# Patient Record
Sex: MALE | Race: WHITE | NOT HISPANIC OR LATINO | Employment: OTHER
[De-identification: names, ages, dates, MRNs, and addresses within clinical notes are randomized per-mention and may not be internally consistent; named-entity substitution may affect disease eponyms.]

---

## 2017-05-26 ENCOUNTER — RECORDS - HEALTHEAST (OUTPATIENT)
Dept: ADMINISTRATIVE | Facility: OTHER | Age: 62
End: 2017-05-26

## 2017-08-30 ENCOUNTER — RECORDS - HEALTHEAST (OUTPATIENT)
Dept: ADMINISTRATIVE | Facility: OTHER | Age: 62
End: 2017-08-30

## 2017-08-30 ENCOUNTER — TRANSFERRED RECORDS (OUTPATIENT)
Dept: HEALTH INFORMATION MANAGEMENT | Facility: CLINIC | Age: 62
End: 2017-08-30

## 2018-07-31 ENCOUNTER — RECORDS - HEALTHEAST (OUTPATIENT)
Dept: LAB | Facility: CLINIC | Age: 63
End: 2018-07-31

## 2018-07-31 LAB
ALBUMIN SERPL-MCNC: 3.8 G/DL (ref 3.5–5)
ALP SERPL-CCNC: 72 U/L (ref 45–120)
ALT SERPL W P-5'-P-CCNC: 25 U/L (ref 0–45)
ANION GAP SERPL CALCULATED.3IONS-SCNC: 8 MMOL/L (ref 5–18)
AST SERPL W P-5'-P-CCNC: 18 U/L (ref 0–40)
BILIRUB SERPL-MCNC: 0.6 MG/DL (ref 0–1)
BUN SERPL-MCNC: 16 MG/DL (ref 8–22)
CALCIUM SERPL-MCNC: 9.2 MG/DL (ref 8.5–10.5)
CHLORIDE BLD-SCNC: 104 MMOL/L (ref 98–107)
CO2 SERPL-SCNC: 25 MMOL/L (ref 22–31)
CREAT SERPL-MCNC: 1.02 MG/DL (ref 0.7–1.3)
GFR SERPL CREATININE-BSD FRML MDRD: >60 ML/MIN/1.73M2
GLUCOSE BLD-MCNC: 127 MG/DL (ref 70–125)
POTASSIUM BLD-SCNC: 4.9 MMOL/L (ref 3.5–5)
PROT SERPL-MCNC: 6.7 G/DL (ref 6–8)
SODIUM SERPL-SCNC: 137 MMOL/L (ref 136–145)

## 2018-12-04 ENCOUNTER — RECORDS - HEALTHEAST (OUTPATIENT)
Dept: ADMINISTRATIVE | Facility: OTHER | Age: 63
End: 2018-12-04

## 2018-12-04 ENCOUNTER — TRANSFERRED RECORDS (OUTPATIENT)
Dept: HEALTH INFORMATION MANAGEMENT | Facility: CLINIC | Age: 63
End: 2018-12-04

## 2018-12-13 ENCOUNTER — RECORDS - HEALTHEAST (OUTPATIENT)
Dept: ADMINISTRATIVE | Facility: OTHER | Age: 63
End: 2018-12-13

## 2018-12-20 ENCOUNTER — RECORDS - HEALTHEAST (OUTPATIENT)
Dept: ADMINISTRATIVE | Facility: OTHER | Age: 63
End: 2018-12-20

## 2019-01-02 ENCOUNTER — HOSPITAL ENCOUNTER (OUTPATIENT)
Dept: RADIOLOGY | Facility: HOSPITAL | Age: 64
Discharge: HOME | End: 2019-01-02
Attending: FAMILY MEDICINE

## 2019-01-02 DIAGNOSIS — K21.00 REFLUX ESOPHAGITIS: ICD-10-CM

## 2019-02-06 ENCOUNTER — RECORDS - HEALTHEAST (OUTPATIENT)
Dept: ADMINISTRATIVE | Facility: OTHER | Age: 64
End: 2019-02-06

## 2019-02-19 ENCOUNTER — HOSPITAL ENCOUNTER (OUTPATIENT)
Dept: NUCLEAR MEDICINE | Facility: HOSPITAL | Age: 64
Discharge: HOME | End: 2019-02-19
Attending: SURGERY

## 2019-02-19 DIAGNOSIS — K21.9 ACID REFLUX: ICD-10-CM

## 2019-02-25 ENCOUNTER — TRANSFERRED RECORDS (OUTPATIENT)
Dept: HEALTH INFORMATION MANAGEMENT | Facility: CLINIC | Age: 64
End: 2019-02-25

## 2019-02-27 ENCOUNTER — TRANSFERRED RECORDS (OUTPATIENT)
Dept: HEALTH INFORMATION MANAGEMENT | Facility: CLINIC | Age: 64
End: 2019-02-27

## 2019-03-20 ENCOUNTER — RECORDS - HEALTHEAST (OUTPATIENT)
Dept: ADMINISTRATIVE | Facility: OTHER | Age: 64
End: 2019-03-20

## 2019-04-01 ENCOUNTER — TRANSFERRED RECORDS (OUTPATIENT)
Dept: HEALTH INFORMATION MANAGEMENT | Facility: CLINIC | Age: 64
End: 2019-04-01

## 2019-04-01 ENCOUNTER — HOSPITAL ENCOUNTER (OUTPATIENT)
Dept: RADIOLOGY | Facility: CLINIC | Age: 64
Discharge: HOME | End: 2019-04-01
Attending: SURGERY

## 2019-04-01 ENCOUNTER — HOSPITAL ENCOUNTER (OUTPATIENT)
Dept: CT IMAGING | Facility: CLINIC | Age: 64
Discharge: HOME | End: 2019-04-01
Attending: SURGERY

## 2019-04-01 DIAGNOSIS — K44.9 HIATAL HERNIA: ICD-10-CM

## 2019-04-01 LAB
CREAT BLD-MCNC: 1.1 MG/DL (ref 0.7–1.3)
GFR SERPL CREATININE-BSD FRML MDRD: >60 ML/MIN/1.73M2

## 2019-05-09 ENCOUNTER — ANESTHESIA - HEALTHEAST (OUTPATIENT)
Dept: SURGERY | Facility: CLINIC | Age: 64
End: 2019-05-09

## 2019-05-09 ENCOUNTER — SURGERY - HEALTHEAST (OUTPATIENT)
Dept: SURGERY | Facility: CLINIC | Age: 64
End: 2019-05-09

## 2019-05-09 ENCOUNTER — TRANSFERRED RECORDS (OUTPATIENT)
Dept: HEALTH INFORMATION MANAGEMENT | Facility: CLINIC | Age: 64
End: 2019-05-09

## 2019-05-09 ASSESSMENT — MIFFLIN-ST. JEOR: SCORE: 1702.53

## 2019-05-14 ENCOUNTER — RECORDS - HEALTHEAST (OUTPATIENT)
Dept: LAB | Facility: CLINIC | Age: 64
End: 2019-05-14

## 2019-05-14 LAB — VIT B12 SERPL-MCNC: 549 PG/ML (ref 213–816)

## 2019-08-05 ENCOUNTER — RECORDS - HEALTHEAST (OUTPATIENT)
Dept: LAB | Facility: HOSPITAL | Age: 64
End: 2019-08-05

## 2019-08-05 LAB
B BURGDOR IGG+IGM SER QL: 0.05 INDEX VALUE
TSH SERPL DL<=0.005 MIU/L-ACNC: 1.06 UIU/ML (ref 0.3–5)
VIT B12 SERPL-MCNC: 630 PG/ML (ref 213–816)

## 2019-08-06 LAB
PATH ICD:: NORMAL
PROT PATTERN SERPL IFE-IMP: NORMAL
REVIEWING PATHOLOGIST: NORMAL
SS-A/RO AUTOANTIBODIES - HISTORICAL: 1 EU
SS-B/LA AUTOANTIBODIES - HISTORICAL: 2 EU

## 2019-08-08 LAB — ARUP MISCELLANEOUS TEST: NORMAL

## 2019-08-09 LAB
MISCELLANEOUS TEST DEPT. - HE HISTORICAL: NORMAL
PERFORMING LAB: NORMAL
SPECIMEN STATUS: NORMAL
TEST NAME: NORMAL

## 2019-10-31 ENCOUNTER — TRANSFERRED RECORDS (OUTPATIENT)
Dept: HEALTH INFORMATION MANAGEMENT | Facility: CLINIC | Age: 64
End: 2019-10-31

## 2019-12-10 ENCOUNTER — MEDICAL CORRESPONDENCE (OUTPATIENT)
Dept: HEALTH INFORMATION MANAGEMENT | Facility: CLINIC | Age: 64
End: 2019-12-10

## 2019-12-18 NOTE — TELEPHONE ENCOUNTER
RECORDS RECEIVED FROM: [facility]- Dr. Abad Noel   DATE RECEIVED: 3.11.2020   NOTES STATUS DETAILS   OFFICE NOTE from referring provider Received 12/10/19 Referral   10/31/19 Office visit with Dr. Noel   OFFICE NOTE from other specialist Received  5/26/17 Office visit with Violet Dawson PA-C ([facility])    5/29/15, 3/24/15 Office visit with Carolyn Schuster NP ([facility])      DISCHARGE SUMMARY from hospital N/A    OPERATIVE REPORT Received  Esophageal Motility Study: 2/25/19   MEDICATION LIST N/A         ENDOSCOPY  Received  EGD: 12/4/18 ([facility])   EGD: 8/30/17 ([facility])    COLONOSCOPY Received 6/7/16 ([facility])    ERCP N/A    EUS N/A    STOOL TESTING N/A    PERTINENT LABS N/A    PATHOLOGY REPORTS (RELATED) N/A    IMAGING (CT, MRI, EGD) N/A      REFERRAL INFORMATION    Date referral was placed: 3/11/2020   Date all records received: N/A   Date records were scanned into EPIC: N/A   Date records were sent to Provider to review: N/A   Date and recommendation received from provider:  LETTER SENT  SCHEDULE APPOINTMENT   Date patient was contacted to schedule: 12/18/19     Nina SALAZAR 12.18.19 2:51 PM   Action Taken Requested all gi related notes and imaging from [facility]. Requested records from [facility] as well.

## 2020-01-31 ENCOUNTER — TELEPHONE (OUTPATIENT)
Dept: GASTROENTEROLOGY | Facility: CLINIC | Age: 65
End: 2020-01-31

## 2020-01-31 NOTE — TELEPHONE ENCOUNTER
----- Message from Enid Quezada MD sent at 1/30/2020 11:53 AM CST -----   Can you look into this referral too?     How did this not go through the referral process?     Seen at [facility] and [facility] ([facility] in the last few months).    I don't think we have anything further to offer.      Thanks,   Yana

## 2020-02-10 ENCOUNTER — TELEPHONE (OUTPATIENT)
Dept: GASTROENTEROLOGY | Facility: CLINIC | Age: 65
End: 2020-02-10

## 2020-02-10 NOTE — TELEPHONE ENCOUNTER
Patient returned my call ans states that he would  like to keep his appointment in the GI clinic with Dr Quezada

## 2020-03-09 ENCOUNTER — TELEPHONE (OUTPATIENT)
Dept: GASTROENTEROLOGY | Facility: CLINIC | Age: 65
End: 2020-03-09

## 2020-03-09 ASSESSMENT — ENCOUNTER SYMPTOMS
RECTAL PAIN: 0
BOWEL INCONTINENCE: 0
NAUSEA: 1
ABDOMINAL PAIN: 1
HEARTBURN: 1
BLOOD IN STOOL: 0
JAUNDICE: 0
CONSTIPATION: 0
BLOATING: 0
DIARRHEA: 1
VOMITING: 0

## 2020-03-09 NOTE — TELEPHONE ENCOUNTER
Spoke to patient reminding of appointment scheduled on 3/11/20 at 0820 with [facility] clinic. Patient to arrive 15 min early. To reschedule or cancel patient to call [phone].      OMA Singh

## 2020-03-11 ENCOUNTER — HEALTH MAINTENANCE LETTER (OUTPATIENT)
Age: 65
End: 2020-03-11

## 2020-03-11 ENCOUNTER — OFFICE VISIT (OUTPATIENT)
Dept: GASTROENTEROLOGY | Facility: CLINIC | Age: 65
End: 2020-03-11
Payer: COMMERCIAL

## 2020-03-11 ENCOUNTER — PRE VISIT (OUTPATIENT)
Dept: GASTROENTEROLOGY | Facility: CLINIC | Age: 65
End: 2020-03-11

## 2020-03-11 VITALS
HEIGHT: 71 IN | DIASTOLIC BLOOD PRESSURE: 86 MMHG | OXYGEN SATURATION: 96 % | BODY MASS INDEX: 31.51 KG/M2 | SYSTOLIC BLOOD PRESSURE: 124 MMHG | HEART RATE: 69 BPM | WEIGHT: 225.1 LBS

## 2020-03-11 DIAGNOSIS — R19.5 LOOSE STOOLS: ICD-10-CM

## 2020-03-11 DIAGNOSIS — E16.1 POSTPRANDIAL HYPOGLYCEMIA: ICD-10-CM

## 2020-03-11 DIAGNOSIS — K21.9 GASTROESOPHAGEAL REFLUX DISEASE WITHOUT ESOPHAGITIS: Primary | ICD-10-CM

## 2020-03-11 DIAGNOSIS — Z98.890 STATUS POST NISSEN FUNDOPLICATION: ICD-10-CM

## 2020-03-11 RX ORDER — DICYCLOMINE HCL 20 MG
20 TABLET ORAL
COMMUNITY
End: 2020-09-01

## 2020-03-11 RX ORDER — GABAPENTIN 300 MG/1
300 CAPSULE ORAL 2 TIMES DAILY
COMMUNITY
End: 2020-12-17

## 2020-03-11 RX ORDER — ATENOLOL 50 MG/1
50 TABLET ORAL DAILY
COMMUNITY
Start: 1999-01-01

## 2020-03-11 RX ORDER — CYANOCOBALAMIN (VITAMIN B-12) 500 MCG
500 TABLET ORAL DAILY
COMMUNITY

## 2020-03-11 RX ORDER — PANTOPRAZOLE SODIUM 40 MG/1
TABLET, DELAYED RELEASE ORAL DAILY
COMMUNITY
Start: 2019-05-15 | End: 2020-10-28

## 2020-03-11 RX ORDER — NORTRIPTYLINE HCL 25 MG
25 CAPSULE ORAL DAILY
COMMUNITY

## 2020-03-11 ASSESSMENT — PAIN SCALES - GENERAL: PAINLEVEL: NO PAIN (0)

## 2020-03-11 ASSESSMENT — MIFFLIN-ST. JEOR: SCORE: 1833.18

## 2020-03-11 NOTE — LETTER
3/11/2020       RE: Bal Junior  [address] [place] [address]     Dear Colleague,    Thank you for referring your patient, Bal Junior, to the [facility] at [facility]. Please see a copy of my visit note below.    GI CLINIC VISIT    CC/REFERRING MD:  Abad Noel  REASON FOR CONSULTATION:   Mr. Junior is a 65 year old male who I was asked to see in consultation at the request of Dr. Abad Noel for   Chief Complaint   Patient presents with     New Patient     New consult       ASSESSMENT/PLAN:  (K21.9) Gastroesophageal reflux disease without esophagitis  (primary encounter diagnosis)  (Z98.890) Status post Nissen fundoplication  Comment:    Patient interested in evaluation for potential Nissen re-do in the setting of return of reflux with report of loosened Nissen on recent (outside) EGD. Notably has had prior B12 deficiency reportedly leading to neuropathy and previously felt to be related to PPI use; he has concerns about ongoing/chronic PPI use.     Has been seen previously at [facility] for [facility]ERD and seen Dr. Zhao of surgery at [facility] for initial opinion regarding Nissen re-do.    Will have patient see thoracic surgery for evaluation, potential review in Esophageal Conference. Discussed return to H2 blocker with more regular Tums use and strict lifestyle and dietary interventions given his desire to avoid PPI.       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    Pt does not feel this correlates with loose stool episodes.    On nortriptyline and dicyclomine, episodes are much less frequent than in the past (2015), when the meds were started, though pt unclear how much impact they are having.     Recalls some diet changes in the past, but doesn't recall much discussion on including foods which  "slow gastric emptying.    Has never done anything to treat the symptomatic hypoglycemia/attempt to resolve symptoms faster.       (R19.5) Loose stools  Comment:    Pt doesn't feel this is part of his \"dumping syndrome\". Occurs sporadically. Apparently rare symptom at this time (perhaps improved with ongoing nortriptyline and dicyclomine use). Will continue to monitor.       1. Check with the physician who is prescribing your B12 and ensure you are getting adequate repletion.  2. Will have you follow-up with esophageal/thoracic team.   3. Will have you meet with our GI dietitian for diet tracking, particularly focusing on complex vs simple carbs to try and identify meals more likely to lead to low blood sugars.   4. Track your loose stool episodes on a calendar - see if any relation to your low blood sugar episodes.   5. Ok to continue your dicyclomine and nortriptyline at this time. We can follow your symptoms and consider tapering off these medications in the future.   6. Consider trial of glucose tabs (take 1) for low blood sugar episodes.   7. Consider trial of ranitidine with Tums at meal time, use if you are very concerned about side effects from PPIs, like pantoprazole.  8. Recommend continuing with aerobic exercise - recommend at least 10 minutes, 3 times a daily. Increasing as tolerated.  9. Follow-up in GI clinic in 12-16 weeks with Dr. Quezada or NORA Choudhury.      Thank you for this consultation.  It was a pleasure to participate in the care of this patient; please contact us with any further questions.  I spent 45 minutes with the patient, of which > 50% was spent face-to-face with the patient in education, care coordination and counseling regarding the above diagnoses (explaining treatment options, disease processes, laboratory/imaging results, prognosis, risks and benefits of treatment options, medication side effects, importance of compliance with treatment, risk factor reduction, follow up " "with primary care physician).  I spent a total time (reviewing notes, labs, imaging, clinical status events) of 30 minutes      Enid Quezada MD   of Medicine  Division of Gastroenterology, Hepatology and Nutrition  [facility]    ---------------------------------------------------------------------------------------------------  HPI:   Mr. Junior is a 65 year old male with HTN, GERD s/p Nissen fundoplication, and reported dumping syndrome who is s/p R inguinal hernia repair and s/p appendectomy who presents for another opinion on his \"slipped Nissen\" and whether it can be repaired. He has previously followed at [facility] for his GERD and dumping syndrome. He was recently seen by Dr. Zhao of surgery at [facility] for consideration of Nissen re-do.     1. GERD  He reports reflux ongoing for decades. In late 1980s/early 1990s he was treating this with intermittent omeprazole (as was only allowed for periodic use at that time). In the 1990s he was able to transition to daily omeprazole which managed his symptoms for a number of years.     In the early 2000s he started having numbness/tingling in his fingers and toes) in early 2000s. He states he [facility]. Around this time his omeprazole was becoming less effective and he began to consider pursuing a Nissen fundoplication.    Mr. Junior shares that Dr. Millan identified a low B12 as the source of his neuropathy and his PPI was felt to be contributing. He elected to move forward with the Nissen surgery (2011) and he was able to stop his PPI.    For much of the 2010s, Mr. Junior felt his Nissen controlled his reflux symptoms. He was able to eat whatever he wanted, including trigger foods, and was symptom-free. Unfortunately his neuropathy symptoms did not resolve, but they did remain stable. He states his B12 was repleted (initially with injections, then with oral B12). He also noted " "increased lower GI symptoms.     Mr. Junior states he'd had intermittent issues with post-prandial loose stools and urgency over the years, but this became a much more frequent phenomenon after his Nissen. He also noted post-prandial lightheadedness, weakness (see Dumping Syndrome below) which was worked up and treated at [facility]. He felt these symptoms were manageable and he continued on for a few years.    Then, two years ago, while on a trip he states he had a sudden onset pain as if someone \"dropped a weight\" on his abdomen or \"hit him\" really hard. He wasn't doing anything in particular when it happened, but found the pain so severe he could not leave his hotel room for two days. He self-treated with ibuprofen and then a bottle of Maalox. The pain started improving after a couple days and he wondered if this was due to Maalox. Mr. Junior saw his PCP upon his return to [facility]. At that visit he noted the pain was gone, but he had begun having frequent reflux again. He recalls being started on Zantac/ranitidine (300 mg/day he believes) and an EGD was ordered. The EGD was done through [facility] and Mr. Junior states he was told it was normal. A few months later had another EGD was done at [facility] with the finding of a slipped Nissen. He then was seen by Dr. Zhao of surgery at [facility] for consideration of Nissen repair.     From available records it seems GERD symptoms started again around ?2017? He had an EGD 8/30/2017 with report indicating a normal exam with intact Nissen. Another EGD on 12/4/2018 was done for BRAVO placement. EGD report indicates a large hiatal hernia was seen and the \"wrap appears slipped.\" BRAVO testing revealed a DeMeester score total of 24.8 (18.7 on Day 1, 29.3 on Day 2 with 100% symptom concordance). Esophageal manometry was done though we do not have the interpretation of this test, images are scanned (but difficult to interpret). Dr. Zhao had ordered an esophagram (1/2/19) which showed free " flow of contrast, no hiatal hernia, and mild reflux. GES 2/19/19 showed only 4% of food remaining at 2 hours and exam stopped. Repeat esophagram 4/1/19 with again no hernia seen on upright images, but small HH induced with abdominal pressure and Valsalva. No reflux was noted on this exam. CT chest 4/1/19 with small HH and fundoplication changes.    Ultimately, he states that Dr. Zhao would consider repeat surgery, but given his above testing further trial of medical management was recommended. Mr. Junior was then started on pantoprazole. He feels is works better than ranitidine, but he still has breakthrough reflux with ingestion of trigger foods.     He is also concerned that his paresthesias are worsening since he recently started PPI. He feels that his B12 was recently checked, though he's not sure who is following this (his PCP or neurologist). He continues on oral B12 replacement and doesn't recall any recommendation to change his dosing after his recent check. Review of records reveals normal B12 in Aug and May of 2019 (630 and 549 respectively). He would like to stop his PPI if possible and hopes that a Nissen re-do may permit this.     In regards to his current GERD symptoms, they are experienced as a substernal burning which occurs with food triggers 1-2 times a week while on daily pantoprazole 40 mg. Occasionally he notes refluxant going up to level of throat and he may bring up liquid with food particles (1-2 times a month) He does feel that if he wasn't avoiding trigger foods symptoms would happen every day. He notes that while on ranitidine in the recent past his symptoms were much worse. He denies any nocturnal refluxant.    Food triggers: tomato sauce, citrus juices      2. Dumping syndrome:   3. Loose stools  He reports the diagnosis was made a [facility] after a GES and post-prandial glucose checks (he believes down to 50s, but not less than 50). He may have had an oral glucose tolerance test, though  "he's not sure.     The symptoms prompting this evaluation were \"dizziness\", feeling \"whoozy\", and the world \"going grey\" as well as uncontrolled sneezing. These episodes would start during a meal or a few minutes after, rarely would delay until 30 minutes after a meal. The symptoms would resolve within 5 minutes, but a generalized feeling of being worn out would last another 30 minutes or so.     When asked today, he denies that he had loose stools, urgency, or cramping with these postprandial episodes, though he recalls having those symptoms at other, unpredictable times. They may happen after eating but would be 1-2 hours later and not necessarily occurring when he'd had the other postprandial symptoms.      Per available [facility] records, he did have rapid emptying on a GES (2013) though oral glucose tolerance test was reportedly normal. His symptoms reported to [facility] were dizziness, weakness, diaphoresis that would last for 60 minutes. He was treated with Baclofen and nortriptyline with 80% resolution of symptoms. Baclofen was discontinued (unclear why) and dicyclomine started. References were made to a prior endocrinology visit but reportedly they could offer no further interventions and we have none of these records. Nortriptyline was titrated up over several months.       Currently he is having post-prandial hypoglycemia-like symptoms a few times a week. It does not happen with every meal and he hasn't identified which meals may bring it one.     His loose stool episodes are very infrequent. He can't recall how frequently they were happening before (while being evaluated at [facility]). He was started on nortriptyline and dicyclomine daily for these symptoms but isn't sure if they've helped.     His current stooling pattern: moving his bowels - 2-3 times a day. Stools are formed and easy to pass.       ROS:    Please see the bottom of this note    PERTINENT PAST MEDICAL HISTORY:  Hypertension  GERD  \"Dumping " "syndrome\"    PREVIOUS SURGERIES:  Past Surgical History:   Procedure Laterality Date     ABDOMEN SURGERY  2012?     APPENDECTOMY  1964?     COLONOSCOPY  2006, 2016     EYE SURGERY  199x    laser for retinal tears     HERNIA REPAIR  1961?     SOFT TISSUE SURGERY  2009?    MCL l. thumb     S/p tonsillectomy and adenoidectomy  S/p R inguinal hernia repair  S/p Nissen fundoplication      ALLERGIES:   No Known Allergies    PERTINENT MEDICATIONS:  Current Outpatient Medications   Medication     atenolol (TENORMIN) 50 MG tablet     pantoprazole (PROTONIX) 40 MG EC tablet     cyanocobalamin (VITAMIN B-12) 500 MCG tablet     dicyclomine (BENTYL) 20 MG tablet     gabapentin (NEURONTIN) 300 MG capsule     nortriptyline (PAMELOR) 25 MG capsule     No current facility-administered medications for this visit.        SOCIAL HISTORY:  Social History     Socioeconomic History     Marital status:      Spouse name: Not on file     Number of children: Not on file     Years of education: Not on file     Highest education level: Not on file   Occupational History     Not on file   Social Needs     Financial resource strain: Not on file     Food insecurity     Worry: Not on file     Inability: Not on file     Transportation needs     Medical: Not on file     Non-medical: Not on file   Tobacco Use     Smoking status: Never Smoker     Smokeless tobacco: Never Used   Substance and Sexual Activity     Alcohol use: Yes     Comment: light/moderate     Drug use: Never     Sexual activity: Yes     Partners: Female     Birth control/protection: Post-menopausal, Male Surgical, Female Surgical   Lifestyle     Physical activity     Days per week: Not on file     Minutes per session: Not on file     Stress: Not on file   Relationships     Social connections     Talks on phone: Not on file     Gets together: Not on file     Attends Yazdanism service: Not on file     Active member of club or organization: Not on file     Attends meetings of clubs " "or organizations: Not on file     Relationship status: Not on file     Intimate partner violence     Fear of current or ex partner: Not on file     Emotionally abused: Not on file     Physically abused: Not on file     Forced sexual activity: Not on file   Other Topics Concern     Not on file   Social History Narrative     Not on file       FAMILY HISTORY:  Family History   Problem Relation Age of Onset     Coronary Artery Disease Maternal Grandfather      Hypertension Mother      Uterine Cancer Mother         1965       Past/family/social history reviewed and no changes    PHYSICAL EXAMINATION:  Vitals /86   Pulse 69   Ht 1.803 m (5' 11\")   Wt 102.1 kg (225 lb 1.6 oz)   SpO2 96%   BMI 31.40 kg/m     Wt   Wt Readings from Last 2 Encounters:   03/11/20 102.1 kg (225 lb 1.6 oz)   Gen: Pt sitting up in NAD, interactive and cooperative on exam  Eyes: sclerae anicteric, no injection  ENT:  OP clear, MMM  Cardiac: RRR, nl S1, S2, no peripheral edema  Resp: Clear bilaterally  GI: Normoactive BS, abd soft, flat, nontender  Skin: Warm, dry, no jaundice, nails appear healthy  Lymph: no submandibular, no cervical, and no supraclavicular LAD  Neuro: alert, oriented, answers questions appropriately    Again, thank you for allowing me to participate in the care of your patient.      Sincerely,    Enid Quezada MD      "

## 2020-03-11 NOTE — PROGRESS NOTES
"GI CLINIC VISIT    CC/REFERRING MD:  Abad Noel  REASON FOR CONSULTATION:   Mr. Junior is a 65 year old male who I was asked to see in consultation at the request of Dr. Abad Noel for   Chief Complaint   Patient presents with     New Patient     New consult       ASSESSMENT/PLAN:  (K21.9) Gastroesophageal reflux disease without esophagitis  (primary encounter diagnosis)  (Z98.890) Status post Nissen fundoplication  Comment:    Patient interested in evaluation for potential Nissen re-do in the setting of return of reflux with report of loosened Nissen on recent (outside) EGD. Notably has had prior B12 deficiency reportedly leading to neuropathy and previously felt to be related to PPI use; he has concerns about ongoing/chronic PPI use.     Has been seen previously at [facility] for GERD and seen Dr. Zhao of surgery at [facility] for initial opinion regarding Nissen re-do.    Will have patient see thoracic surgery for evaluation, potential review in Esophageal Conference. Discussed return to H2 blocker with more regular Tums use and strict lifestyle and dietary interventions given his desire to avoid PPI.       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    Pt does not feel this correlates with loose stool episodes.    On nortriptyline and dicyclomine, episodes are much less frequent than in the past (2015), when the meds were started, though pt unclear how much impact they are having.     Recalls some diet changes in the past, but doesn't recall much discussion on including foods which slow gastric emptying.    Has never done anything to treat the symptomatic hypoglycemia/attempt to resolve symptoms faster.       (R19.5) Loose stools  Comment:    Pt doesn't feel this is part of his \"dumping syndrome\". Occurs sporadically. Apparently rare symptom at this time (perhaps " improved with ongoing nortriptyline and dicyclomine use). Will continue to monitor.       1. Check with the physician who is prescribing your B12 and ensure you are getting adequate repletion.  2. Will have you follow-up with esophageal/thoracic team.   3. Will have you meet with our GI dietitian for diet tracking, particularly focusing on complex vs simple carbs to try and identify meals more likely to lead to low blood sugars.   4. Track your loose stool episodes on a calendar - see if any relation to your low blood sugar episodes.   5. Ok to continue your dicyclomine and nortriptyline at this time. We can follow your symptoms and consider tapering off these medications in the future.   6. Consider trial of glucose tabs (take 1) for low blood sugar episodes.   7. Consider trial of ranitidine with Tums at meal time, use if you are very concerned about side effects from PPIs, like pantoprazole.  8. Recommend continuing with aerobic exercise - recommend at least 10 minutes, 3 times a daily. Increasing as tolerated.  9. Follow-up in GI clinic in 12-16 weeks with Dr. Quezada or NORA Choudhury.      Thank you for this consultation.  It was a pleasure to participate in the care of this patient; please contact us with any further questions.  I spent 45 minutes with the patient, of which > 50% was spent face-to-face with the patient in education, care coordination and counseling regarding the above diagnoses (explaining treatment options, disease processes, laboratory/imaging results, prognosis, risks and benefits of treatment options, medication side effects, importance of compliance with treatment, risk factor reduction, follow up with primary care physician).  I spent a total time (reviewing notes, labs, imaging, clinical status events) of 30 minutes      Enid Quezada MD   of Medicine  Division of Gastroenterology, Hepatology and Nutrition  [facility]  "[place]    ---------------------------------------------------------------------------------------------------  HPI:   Mr. Junior is a 65 year old male with HTN, GERD s/p Nissen fundoplication, and reported dumping syndrome who is s/p R inguinal hernia repair and s/p appendectomy who presents for another opinion on his \"slipped Nissen\" and whether it can be repaired. He has previously followed at [facility] for his GERD and dumping syndrome. He was recently seen by Dr. Zhao of surgery at [facility] for consideration of Nissen re-do.     1. GERD  He reports reflux ongoing for decades. In late 1980s/early 1990s he was treating this with intermittent omeprazole (as was only allowed for periodic use at that time). In the 1990s he was able to transition to daily omeprazole which managed his symptoms for a number of years.     In the early 2000s he started having numbness/tingling in his fingers and toes) in early 2000s. He states he started seeing Dr. Millan at [facility]. Around this time his omeprazole was becoming less effective and he began to consider pursuing a Nissen fundoplication.    Mr. Junior shares that Dr. Millan identified a low B12 as the source of his neuropathy and his PPI was felt to be contributing. He elected to move forward with the Nissen surgery (2011) and he was able to stop his PPI.    For much of the 2010s, Mr. Junior felt his Nissen controlled his reflux symptoms. He was able to eat whatever he wanted, including trigger foods, and was symptom-free. Unfortunately his neuropathy symptoms did not resolve, but they did remain stable. He states his B12 was repleted (initially with injections, then with oral B12). He also noted increased lower GI symptoms.     Mr. Junior states he'd had intermittent issues with post-prandial loose stools and urgency over the years, but this became a much more frequent phenomenon after his Nissen. He also noted post-prandial lightheadedness, " "weakness (see Dumping Syndrome below) which was worked up and treated at [facility]. He felt these symptoms were manageable and he continued on for a few years.    Then, two years ago, while on a trip he states he had a sudden onset pain as if someone \"dropped a weight\" on his abdomen or \"hit him\" really hard. He wasn't doing anything in particular when it happened, but found the pain so severe he could not leave his hotel room for two days. He self-treated with ibuprofen and then a bottle of Maalox. The pain started improving after a couple days and he wondered if this was due to Maalox. Mr. Junior saw his PCP upon his return to [facility]. At that visit he noted the pain was gone, but he had begun having frequent reflux again. He recalls being started on Zantac/ranitidine (300 mg/day he believes) and an EGD was ordered. The EGD was done through [facility] and Mr. Junior states he was told it was normal. A few months later had another EGD was done at [facility] with the finding of a slipped Nissen. He then was seen by Dr. Zhao of surgery at [facility] for consideration of Nissen repair.     From available records it seems GERD symptoms started again around ?2017? He had an EGD 8/30/2017 with report indicating a normal exam with intact Nissen. Another EGD on 12/4/2018 was done for BRAVO placement. EGD report indicates a large hiatal hernia was seen and the \"wrap appears slipped.\" BRAVO testing revealed a DeMeester score total of 24.8 (18.7 on Day 1, 29.3 on Day 2 with 100% symptom concordance). Esophageal manometry was done though we do not have the interpretation of this test, images are scanned (but difficult to interpret). Dr. Zhao had ordered an esophagram (1/2/19) which showed free flow of contrast, no hiatal hernia, and mild reflux. GES 2/19/19 showed only 4% of food remaining at 2 hours and exam stopped. Repeat esophagram 4/1/19 with again no hernia seen on upright images, but small HH induced with abdominal pressure and " "Valsalva. No reflux was noted on this exam. CT chest 4/1/19 with small HH and fundoplication changes.    Ultimately, he states that Dr. Zhao would consider repeat surgery, but given his above testing further trial of medical management was recommended. Mr. Junior was then started on pantoprazole. He feels is works better than ranitidine, but he still has breakthrough reflux with ingestion of trigger foods.     He is also concerned that his paresthesias are worsening since he recently started PPI. He feels that his B12 was recently checked, though he's not sure who is following this (his PCP or neurologist). He continues on oral B12 replacement and doesn't recall any recommendation to change his dosing after his recent check. Review of records reveals normal B12 in Aug and May of 2019 (630 and 549 respectively). He would like to stop his PPI if possible and hopes that a Nissen re-do may permit this.     In regards to his current GERD symptoms, they are experienced as a substernal burning which occurs with food triggers 1-2 times a week while on daily pantoprazole 40 mg. Occasionally he notes refluxant going up to level of throat and he may bring up liquid with food particles (1-2 times a month) He does feel that if he wasn't avoiding trigger foods symptoms would happen every day. He notes that while on ranitidine in the recent past his symptoms were much worse. He denies any nocturnal refluxant.    Food triggers: tomato sauce, citrus juices      2. Dumping syndrome:   3. Loose stools  He reports the diagnosis was made a [facility] after a GES and post-prandial glucose checks (he believes down to 50s, but not less than 50). He may have had an oral glucose tolerance test, though he's not sure.     The symptoms prompting this evaluation were \"dizziness\", feeling \"whoozy\", and the world \"going grey\" as well as uncontrolled sneezing. These episodes would start during a meal or a few minutes after, rarely would delay until 30 " "minutes after a meal. The symptoms would resolve within 5 minutes, but a generalized feeling of being worn out would last another 30 minutes or so.     When asked today, he denies that he had loose stools, urgency, or cramping with these postprandial episodes, though he recalls having those symptoms at other, unpredictable times. They may happen after eating but would be 1-2 hours later and not necessarily occurring when he'd had the other postprandial symptoms.      Per available [facility] records, he did have rapid emptying on a GES (2013) though oral glucose tolerance test was reportedly normal. His symptoms reported to [facility] were dizziness, weakness, diaphoresis that would last for 60 minutes. He was treated with Baclofen and nortriptyline with 80% resolution of symptoms. Baclofen was discontinued (unclear why) and dicyclomine started. References were made to a prior endocrinology visit but reportedly they could offer no further interventions and we have none of these records. Nortriptyline was titrated up over several months.       Currently he is having post-prandial hypoglycemia-like symptoms a few times a week. It does not happen with every meal and he hasn't identified which meals may bring it one.     His loose stool episodes are very infrequent. He can't recall how frequently they were happening before (while being evaluated at [facility]). He was started on nortriptyline and dicyclomine daily for these symptoms but isn't sure if they've helped.     His current stooling pattern: moving his bowels - 2-3 times a day. Stools are formed and easy to pass.       ROS:    Please see the bottom of this note    PERTINENT PAST MEDICAL HISTORY:  Hypertension  GERD  \"Dumping syndrome\"    PREVIOUS SURGERIES:  Past Surgical History:   Procedure Laterality Date     ABDOMEN SURGERY  2012?     APPENDECTOMY  1964?     COLONOSCOPY  2006, 2016     EYE SURGERY  199x    laser for retinal tears     HERNIA REPAIR  1961?     SOFT TISSUE " SURGERY  2009?    [facility] l. thumb     S/p tonsillectomy and adenoidectomy  S/p R inguinal hernia repair  S/p Nissen fundoplication      ALLERGIES:   No Known Allergies    PERTINENT MEDICATIONS:  Current Outpatient Medications   Medication     atenolol (TENORMIN) 50 MG tablet     pantoprazole (PROTONIX) 40 MG EC tablet     cyanocobalamin (VITAMIN B-12) 500 MCG tablet     dicyclomine (BENTYL) 20 MG tablet     gabapentin (NEURONTIN) 300 MG capsule     nortriptyline (PAMELOR) 25 MG capsule     No current facility-administered medications for this visit.        SOCIAL HISTORY:  Social History     Socioeconomic History     Marital status:      Spouse name: Not on file     Number of children: Not on file     Years of education: Not on file     Highest education level: Not on file   Occupational History     Not on file   Social Needs     Financial resource strain: Not on file     Food insecurity     Worry: Not on file     Inability: Not on file     Transportation needs     Medical: Not on file     Non-medical: Not on file   Tobacco Use     Smoking status: Never Smoker     Smokeless tobacco: Never Used   Substance and Sexual Activity     Alcohol use: Yes     Comment: light/moderate     Drug use: Never     Sexual activity: Yes     Partners: Female     Birth control/protection: Post-menopausal, Male Surgical, Female Surgical   Lifestyle     Physical activity     Days per week: Not on file     Minutes per session: Not on file     Stress: Not on file   Relationships     Social connections     Talks on phone: Not on file     Gets together: Not on file     Attends Mu-ism service: Not on file     Active member of club or organization: Not on file     Attends meetings of clubs or organizations: Not on file     Relationship status: Not on file     Intimate partner violence     Fear of current or ex partner: Not on file     Emotionally abused: Not on file     Physically abused: Not on file     Forced sexual activity: Not on  "file   Other Topics Concern     Not on file   Social History Narrative     Not on file       FAMILY HISTORY:  Family History   Problem Relation Age of Onset     Coronary Artery Disease Maternal Grandfather      Hypertension Mother      Uterine Cancer Mother         1965       Past/family/social history reviewed and no changes    PHYSICAL EXAMINATION:  Vitals /86   Pulse 69   Ht 1.803 m (5' 11\")   Wt 102.1 kg (225 lb 1.6 oz)   SpO2 96%   BMI 31.40 kg/m     Wt   Wt Readings from Last 2 Encounters:   03/11/20 102.1 kg (225 lb 1.6 oz)   Gen: Pt sitting up in NAD, interactive and cooperative on exam  Eyes: sclerae anicteric, no injection  ENT:  OP clear, MMM  Cardiac: RRR, nl S1, S2, no peripheral edema  Resp: Clear bilaterally  GI: Normoactive BS, abd soft, flat, nontender  Skin: Warm, dry, no jaundice, nails appear healthy  Lymph: no submandibular, no cervical, and no supraclavicular LAD  Neuro: alert, oriented, answers questions appropriately      Answers for HPI/ROS submitted by the patient on 3/9/2020   General Symptoms: No  Skin Symptoms: No  HENT Symptoms: No  EYE SYMPTOMS: No  HEART SYMPTOMS: No  LUNG SYMPTOMS: No  INTESTINAL SYMPTOMS: Yes  URINARY SYMPTOMS: No  REPRODUCTIVE SYMPTOMS: No  SKELETAL SYMPTOMS: No  BLOOD SYMPTOMS: No  NERVOUS SYSTEM SYMPTOMS: No  MENTAL HEALTH SYMPTOMS: No  Heart burn or indigestion: Yes  Nausea: Yes  Vomiting: No  Abdominal pain: Yes  Bloating: No  Constipation: No  Diarrhea: Yes  Blood in stool: No  Black stools: No  Rectal or Anal pain: No  Fecal incontinence: No  Yellowing of skin or eyes: No  Vomit with blood: No  Change in stools: Yes    "

## 2020-03-11 NOTE — NURSING NOTE
"Chief Complaint   Patient presents with     New Patient     New consult       Vitals:    03/11/20 0812   BP: 124/86   Pulse: 69   SpO2: 96%   Weight: 102.1 kg (225 lb 1.6 oz)   Height: 1.803 m (5' 11\")       Body mass index is 31.4 kg/m .    Bonnie Dunbar CMA    "

## 2020-03-11 NOTE — PATIENT INSTRUCTIONS
1. Check with the physician who is prescribing your B12 and ensure you are getting adequate repletion.  2. Will have you follow-up with esophageal team.   3. Will have you meet with our GI dietitian for diet tracking, particularly focusing on complex vs simple carbs to try and identify meals more likely to lead to low blood sugars.   4. Track your loose stool episodes on a calendar - see if any relation to your low blood sugar episodes.   5. Ok to continue your dicyclomine and nortriptyline at this time. We can follow your symptoms and consider tapering off these medications in the future.   6. Consider trial of glucose tabs (take 1) for low blood sugar episodes.   7. Consider trial of ranitidine with Tums at meal time, use if you are very concerned about side effects from PPIs, like pantoprazole.  8. Recommend continuing with aerobic exercise - recommend at least 10 minutes, 3 times a daily. Increasing as tolerated.  9. Follow-up in GI clinic in 12-16 weeks with Dr. Quezada or NORA Choudhury.     If you have any questions, please don't hesitate to contact me through our GI RN Clinic Coordinator, Sabrina Alvarez, at [phone].

## 2020-03-30 ENCOUNTER — VIRTUAL VISIT (OUTPATIENT)
Dept: GASTROENTEROLOGY | Facility: CLINIC | Age: 65
End: 2020-03-30
Payer: COMMERCIAL

## 2020-03-30 DIAGNOSIS — E16.1 POSTPRANDIAL HYPOGLYCEMIA: ICD-10-CM

## 2020-03-30 DIAGNOSIS — Z71.3 NUTRITIONAL COUNSELING: Primary | ICD-10-CM

## 2020-03-30 DIAGNOSIS — K21.9 GASTROESOPHAGEAL REFLUX DISEASE WITHOUT ESOPHAGITIS: ICD-10-CM

## 2020-03-30 DIAGNOSIS — Z98.890 STATUS POST NISSEN FUNDOPLICATION: ICD-10-CM

## 2020-03-30 DIAGNOSIS — R19.5 LOOSE STOOLS: ICD-10-CM

## 2020-03-30 NOTE — PROGRESS NOTES
[facility] Outpatient Medical Nutrition Therapy      Time Spent:  30 minutes  Session Type:  Initial telephone Session  Referring Physician:  Dr. Enid Quezada  Reason for RD Visit:   Nutritional counseling, post prandial hypoglycemia, GERD, hx Nissen fundoplication.    Nutrition Assessment:  Patient is a 65 year old male with history of hiatal hernia, GERD s/p Nissen fundoplication and noted to be loosened based on recent outside EGD, hypertension, post prandial hypoglycemia, sometimes loose stools and reported hx of B12 deficiency leading to neuropathy previously felt to be related to PPI use.  Patient stated that he has been having some worsening reflux symptoms who has not slept.  He also complains of ongoing issues with hypoglycemic incidences.  He stated that for example if he eats eggs this triggers low blood sugars.  He tends to have some whole-grain toast as well as 1-2 slices of raman or sausage along with eggs, but still has low blood sugars after eating this meal.  He stated that tomatoes and citrus fruits causes reflux for him occasionally wine but other times wine is fine.  He tends to eat 3 meals a day +3 snacks.  His afternoon snack tends to be something sweet usually a couple of cookies.  In the evening he has ice cream for a snack and based on conversation today he does not want to stop eating ice cream in the evening.  He stated that his usual times for having low blood sugars tend to be between lunch and dinner and/or right after eating dinner.  His wife is Polish and therefore the eat many Polish foods/dishes.  About half the time during the week in the evening he will have 1 to 2 glasses of wine or a beer.  He tends to sip on that wine slowly through dinner and up until the evening before bed.  He stated that he is taking B12 daily in tablet form.  In the past he was getting B12 shots but found this difficult to find time to continuously going to clinic to get these shots.  He does not drink  "any coffee so has diet Dr. Pepper in the morning only.  In the summer he will sometimes have diet ice tea in the afternoon as well.  In the past he has tried keeping food and beverage journals and seeing if he can identify any trigger foods and beverages but never found any correlation for his low blood sugars.  He is willing to retry keeping journals.  He denies any major issues with loose stool, only occasional loose stool.  He stated his biggest concerns are low blood sugars and increasing symptoms of reflux.  See diet recall below.    Height:   Ht Readings from Last 1 Encounters:   03/11/20 1.803 m (5' 11\")     Weight:      Wt Readings from Last 10 Encounters:   03/11/20 102.1 kg (225 lb 1.6 oz)        BMI: 31.39    Diet Recall:  (some usual meals and snacks and beverages):  Meal Food    Breakfast Oatmeal, frozen berries, with 1/8 c whole, 1 tsp turbinado sugar   Lunch 11:30-1:45: dinner leftovers or sandwich with deli meat and cheese, lettuce and peppers and pickle with chips and salsa OR if out monster noodle bowl OR 1/2 burrito bowl OR naf naf grill chicken/pork rice, hummus   Dinner BBQ ribs, kasha, pickles with vinegar and sour cream OR chicken/pork/steak/fish with veg and salad and grain or potato   Snacks Mid morning: Banana or apple with handful trail mix and at home a cookiesand PM; single scoop ice cream or glass milk   Beverages Can of Diet dr. 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     Labs:  Reviewed EMR. Pt is planning on getting labs/rechecking B12 but was unable to get labs easily due to current COVID 19 pandemic limiting visits/in person at his usual clinic.    Pertinent Medications/vitamin and mineral supplements:   He reported taking B12 (500 mg) per day, " ranitidine. Reviewed EMR.  Food Allergies:  NKFA    Intolerances: tomato based, citrus, occas wine other times okay. Sweetened greek yogurt and sweetened oatmeal packets cause low blood sugars as well as eggs.    Physical Activity:   He took the month of March off from exercising. He Skis in winter. Just started 30-45 minutes again on elliptical every other day. Will do some spinning classes at work when can. In warmer weather does outdoor biking and likes to hike on weekends.    Estimated Nutrition Needs based on ideal body weight of 78 k-2340 calories (25-30 kcals/kg), 78g protein (1g/kg), ~1 ml/kcal or total fluids per MD.     MALNUTRITION:  % Weight Loss:  None noted  % Intake:  No decreased intake noted  Subcutaneous Fat Loss and Muscle Loss:  Note due to virtual visit did not perform nutrition focused physical exam today.    Fluid Retention:  None noted    Malnutrition Diagnosis: Patient does not meet two of the above criteria necessary for diagnosing malnutrition  In Context of:  Chronic illness or disease    Nutrition Diagnosis:    Food and nutrition related knowledge deficit related to lack of previous diet education/lack of complete recall of previous diet education as evidenced by pt report and interest in diet education/review.     Nutrition Prescription: GERD And anti-dumping diet recommendations    Nutrition Intervention:    Nutrition Education/Counseling:  Provided diet education for dumping syndrome, hypoglycemia and GERD issues.  Reviewed the difference between complex versus simple carbohydrates.  Discussed some tips and suggestions for choosing more complex carbohydrates in place of some of patient's usual simple sugars in diet.  Encourage patient to retry keeping food and beverage journals and tracking symptoms to see if he can identify any specific foods, meals, snacks contributing to low blood sugars.  Highly recommended patient discontinue sweetened afternoon snack and evening snack  and choosing a healthier snack option instead especially since these are times when patient tends to notice issues with lower blood sugars.  Discussed some example snacks patient can try and based on some of his food preferences.    Reviewed diet education with tips and suggestions for GERD including recommended foods (as tolerated) and foods not recommended if causing symptoms such as chocolate, caffeine, carbonation, peppermint and spearmint, alcohol, spicy foods and fatty foods/high fat diet. Provided lowfat diet tips. Encouraged patient to keep a food journal and write down all food and beverages consumed, time consumed and track any GI symptoms. Encouraged drinking adequate fluids to get at least 480z-64 oz per day.    Discussed some lifestyle recommendations that may be helpful for relieving issues with GERD such as getting regular exercise at least 3-4 times per week, maintaining a healthy body weight/weight loss, can raise the head of the bed by using a foam wedge under top part of mattress for example, eating slowly, sitting while eating and eating in a calm and relaxed place is recommended, stop eating when satisfied not full. Additionally recommended not eating 3 hours before bedtime or lying down.    Patient verbalized understanding of education provided. See Goals below.     Goals:    1. Aim for eating a healthier afternoon snack and also if having an evening snack make a healthy snack option (can try decreasing the number of nights you have ice cream for example).    -Can retry having greek yogurt for afternoon or evening snack (lowfat plain with frozen berries or Dannon Triple zero SF yogurt) for example.  -Can try having a smaller portion of nuts and fruit at snack and the other half for afternoon snack.  -Can have some non starchy vegetables such as cut up carrots, peppers or other vegetables you like for a snack.    2. Aim for choosing complex carbohydrates at meals and snacks along with lean  protein sources. Complex carbohydrates are vegetables, fruit, whole grain starches, lowfat unsweetened dairy. Limit or avoid simplex carbohydrates such as added sugars, turbinado sugar, honey, agave syrup, sweetened beverages/sweetened teas/sodas.    3. Eat slowly, chew food very well before swallowing and stop eating when satisfied not full.    4. Some potential food and beverage triggers for reflux include chocolate, caffeine, peppermint and spearmint, alcohol, spicy foods and fatty foods/high amounts of fat in diet.    5. Some lifestyle tips/recommendations for decreasing reflux issues include getting regular exercise at least 3-4 times per week, can raise the head of the bed by using a foam wedge under top part of mattress for example, eating slowly, sitting while eating and eating in a calm and relaxed place and stop eating when satisfied/don't eat until full. Avoid straws, chewing gum, hard candies. Additionally recommended not eat at least 3 hours before bedtime or lying down. Maintaining a healthy body weight/weight loss.    6. Can retry keeping daily food and beverage journals and track symptoms. Can use an betsy such as My Symptoms tracker or Lesa betsy which are helpful for tracking symptoms and identifying any potential food/beverage triggers for symptoms. Otherwise some other apps such as My Fitness pal, Lose it apps, track amount of added sugars eaten, calories, total carbohydrates, total grams protein, fat, fiber for example which can be helpful for reducing intake/hidden intake of added sugars that can lead to dumping/low blood sugar issues.     7. Continue to limit the amount of fluids that you drink with meals and aim for drinking most fluids in between meals as able.    8. Recommend drinking at least 48 oz-64 oz water/sugar free flavored water per day.    Follow up in 2-3 months.    Nutrition Monitoring and Evaluation: Will monitor adherence to nutrition recommendations at future RD visits.      Further Medical Nutrition Therapy:  Recommended f/u in 2-3 months. Pt already has visit scheduled in 6/2020 following PA-C visit.    Patient was encouraged to call/contact RD with any further questions.    Tia Neal MS, RD, LD

## 2020-03-30 NOTE — PATIENT INSTRUCTIONS
It was nice speaking with you today on the phone. Below are some of the nutrition recommendations we discussed at your visit today.    1. Aim for eating a healthier afternoon snack and also if having an evening snack make a healthy snack option (can try decreasing the number of nights you have ice cream for example).    -Can retry having greek yogurt for afternoon or evening snack (lowfat plain with frozen berries or Dannon Triple zero SF yogurt) for example.  -Can try having a smaller portion of nuts and fruit at snack and the other half for afternoon snack.  -Can have some non starchy vegetables such as cut up carrots, peppers or other vegetables you like for a snack.    2. Aim for choosing complex carbohydrates at meals and snacks along with lean protein sources. Complex carbohydrates are vegetables, fruit, whole grain starches, lowfat unsweetened dairy. Limit or avoid simplex carbohydrates such as added sugars, turbinado sugar, honey, agave syrup, sweetened beverages/sweetened teas/sodas.    3. Eat slowly, chew food very well before swallowing and stop eating when satisfied not full.    4. Some potential food and beverage triggers for reflux include chocolate, caffeine, peppermint and spearmint, alcohol, spicy foods and fatty foods/high amounts of fat in diet.    5. Some lifestyle tips/recommendations for decreasing reflux issues include getting regular exercise at least 3-4 times per week, can raise the head of the bed by using a foam wedge under top part of mattress for example, eating slowly, sitting while eating and eating in a calm and relaxed place and stop eating when satisfied/don't eat until full. Avoid straws, chewing gum, hard candies. Additionally recommended not eat at least 3 hours before bedtime or lying down. Maintaining a healthy body weight/weight loss.    6. Can retry keeping daily food and beverage journals and track symptoms. Can use an betsy such as My Symptoms tracker or Lesa betsy which are  helpful for tracking symptoms and identifying any potential food/beverage triggers for symptoms. Otherwise some other apps such as My Fitness pal, Lose it apps, track amount of added sugars eaten, calories, total carbohydrates, total grams protein, fat, fiber for example which can be helpful for reducing intake/hidden intake of added sugars that can lead to dumping/low blood sugar issues.     7. Continue to limit the amount of fluids that you drink with meals and aim for drinking most fluids in between meals as able.    8. Recommend drinking at least 48 oz-64 oz water/sugar free flavored water per day.    Follow up in 2-3 months.    Tia Neal MS, RD, LD  [phone]

## 2020-05-11 ENCOUNTER — RECORDS - HEALTHEAST (OUTPATIENT)
Dept: LAB | Facility: CLINIC | Age: 65
End: 2020-05-11

## 2020-05-11 LAB — VIT B12 SERPL-MCNC: 1108 PG/ML (ref 213–816)

## 2020-06-17 ENCOUNTER — VIRTUAL VISIT (OUTPATIENT)
Dept: GASTROENTEROLOGY | Facility: CLINIC | Age: 65
End: 2020-06-17
Payer: COMMERCIAL

## 2020-06-17 DIAGNOSIS — E16.1 POSTPRANDIAL HYPOGLYCEMIA: ICD-10-CM

## 2020-06-17 DIAGNOSIS — Z71.3 NUTRITIONAL COUNSELING: Primary | ICD-10-CM

## 2020-06-17 DIAGNOSIS — R19.5 LOOSE STOOLS: ICD-10-CM

## 2020-06-17 DIAGNOSIS — Z98.890 STATUS POST NISSEN FUNDOPLICATION: ICD-10-CM

## 2020-06-17 DIAGNOSIS — K21.9 GASTROESOPHAGEAL REFLUX DISEASE WITHOUT ESOPHAGITIS: ICD-10-CM

## 2020-06-17 NOTE — LETTER
"    6/17/2020         RE: Bal Junior  [address] [place] [address]        Dear Colleague,    Thank you for referring your patient, Bal Junior, to the [facility]. Please see a copy of my visit note below.    Bal Junior is a 65 year old male who is being evaluated via a billable telephone visit.        Phone call duration: 45 minutes  Call start time: 9:01 am  Call end time: 9:46 am    Tia Neal MS, RD, LD    [facility] Outpatient Medical Nutrition Therapy      Time Spent:  45 minutes  Session Type:  Initial telephone Session  Referring Physician:  Dr. Enid Quezada  Reason for RD Visit:   Nutritional counseling, post prandial hypoglycemia, GERD, hx Nissen fundoplication.    Nutrition Assessment:  Patient is a 65 year old male with history of hiatal hernia, GERD s/p Nissen fundoplication and noted to be loosened based on recent outside EGD, hypertension, post prandial hypoglycemia, sometimes loose stools and reported hx of B12 deficiency leading to neuropathy previously felt to be related to PPI use.      Patient stated about a month ago he had a day where he felt like he \"got punched in the stomach\" where he could not eat or do anything until the next day.  He believes his Nissen slipped at that time.  He stated after last visit he started keeping some food journals for a few weeks and found that greater volumes of food and more dense foods like a large amount of red meat caused more loose stools and postprandial hypo-glycemia reactions.  He especially noticed this at lunch meal and believes lunch meal is little bit larger due to having several courses such as homemade soup as well as salad and sandwich or homemade soup and other leftovers at lunch.  He stated his lower blood sugars are more prevalent now after lunch meals now. One our of 3 days, he is now having issues with more immediate loose stool after breakfast meal exclusively, and then the rest of the day " stools are normal.  He continues to take B12, and stated if he does not take his B12 for a few days then he will get more neuropathy symptoms.  He is looking forward to his follow-up visit with AYDEE next week to discuss his symptoms and issue last month where he believes his Nissen slipped.  See patient's progress towards previous goals as well as diet recall below.    Progress towards previous goals:  1. Aim for eating a healthier afternoon snack and also if having an evening snack make a healthy snack option (can try decreasing the number of nights you have ice cream for example).-Meeting.  He is having a half of sandwich as an afternoon snack instead of cookies and sweets.  He is not having ice cream every night, reduced and other nights will have half a cup of plain unsweetened Greek yogurt with some fruit.    2. Aim for choosing complex carbohydrates at meals and snacks along with lean protein sources. Complex carbohydrates are vegetables, fruit, whole grain starches, lowfat unsweetened dairy. Limit or avoid simplex carbohydrates such as added sugars, turbinado sugar, honey, agave syrup, sweetened beverages/sweetened teas/sodas.-Not consistently having some protein at breakfast meal. Uses whole milk in oatmeal and turbinado sugar but ~less sugar than previously was.    3. Eat slowly, chew food very well before swallowing and stop eating when satisfied not full.-has been paying more attention to this.    4. Some potential food and beverage triggers for reflux include chocolate, caffeine, peppermint and spearmint, alcohol, spicy foods and fatty foods/high amounts of fat in diet.-reported that currently his reflux symptoms are well controlled.    5. Some lifestyle tips/recommendations for decreasing reflux issues include getting regular exercise at least 3-4 times per week, can raise the head of the bed by using a foam wedge under top part of mattress for example, eating slowly, sitting while eating and eating in  "a calm and relaxed place and stop eating when satisfied/don't eat until full. Avoid straws, chewing gum, hard candies. Additionally recommended not eat at least 3 hours before bedtime or lying down. Maintaining a healthy body weight/weight loss.-reported that currently his reflux symptoms are well controlled.    6. Can retry keeping daily food and beverage journals and track symptoms. Can use an betsy such as My Symptoms tracker or Lesa betsy which are helpful for tracking symptoms and identifying any potential food/beverage triggers for symptoms. Otherwise some other apps such as My Fitness pal, Lose it apps, track amount of added sugars eaten, calories, total carbohydrates, total grams protein, fat, fiber for example which can be helpful for reducing intake/hidden intake of added sugars that can lead to dumping/low blood sugar issues. -kept a journal for a few weeks after last visit. Volume and density food causes more symptoms (hypoglycemia and looser stools).    7. Continue to limit the amount of fluids that you drink with meals and aim for drinking most fluids in between meals as able.-Meeting. He stated this can be difficult when they have soup at meals, but he is trying to eat less of the broth. He stated that his wife is polish traditionally they have homemade soups at many lunch and dinner meals.    8. Recommend drinking at least 48 oz-64 oz water/sugar free flavored water per day.-Increased since last visit but sometimes less than recommended at 32-48 oz water per day.    Height:   Ht Readings from Last 1 Encounters:   03/11/20 1.803 m (5' 11\")     Weight:      Wt Readings from Last 10 Encounters:   03/11/20 102.1 kg (225 lb 1.6 oz)        BMI: 31.39    Diet Recall:  (some usual meals and snacks and beverages):  Meal Food    Breakfast Oatmeal, 12 strawberries, with whole milk, 1/4 tsp turbinado sugar   Lunch 11:30-1:45: dinner leftovers or homemade soup, salad, 1/2-1 sandwich with deli meat and cheese, lettuce " and peppers and pickle with chips and salsa OR if out monster noodle bowl OR 1/2 burrito bowl    Dinner 1/2 leftover pork tenderloin with cucumber salad with bell peppers and balsalmic with potato OR homemade soup OR BBQ ribs, kasha, pickles with vinegar and sour cream OR chicken/pork/steak/fish with veg and salad and grain or potato   Snacks Afternoon: 1/2 sandwich (ham/turkeycheese) and PM: single scoop ice cream or 1/2 c unsweetened greek plain yogurt with fruit (kiwi and banana)    Beverages Can of Diet dr. 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     Labs:  Reviewed EMR. Pt is planning on getting labs/rechecking B12 but was unable to get labs easily due to current COVID 19 pandemic limiting visits/in person at his usual clinic.    Pertinent Medications/vitamin and mineral supplements:   He reported taking B12 (500 mg) per day, ranitidine. Reviewed EMR.  Food Allergies:  NKFA    Intolerances: tomato based, citrus, occas wine other times okay. Sweetened greek yogurt and sweetened oatmeal packets cause low blood sugars as well as eggs.    Physical Activity:   He took the month of March off from exercising. He Skis in winter. Just started 30-45 minutes again on elliptical every other day. Will do some spinning classes at work when can. In warmer weather does outdoor biking and likes to hike on weekends.    Estimated Nutrition Needs based on ideal body weight of 78 k-2340 calories (25-30 kcals/kg), 78g protein (1g/kg), ~1 ml/kcal or total fluids per MD.     MALNUTRITION:  % Weight Loss:  None noted  % Intake:  No decreased intake noted  Subcutaneous Fat Loss  and Muscle Loss:  Note due to virtual visit did not perform nutrition focused physical exam today.    Fluid Retention:  None noted    Malnutrition Diagnosis: Patient does not meet two of the above criteria necessary for diagnosing malnutrition  In Context of:  Chronic illness or disease    Nutrition Diagnosis:    Food and nutrition related knowledge deficit related to lack of previous diet education/lack of complete recall of previous diet education as evidenced by pt report and interest in diet education/review. -Ongoing    Nutrition Prescription: continue GERD And anti-dumping diet recommendations    Nutrition Intervention:    Nutrition Education/Counseling:  Discussed pt's progress towards previous goals, obtained some recent diet recall. Provided diet education review for pt with issues of post prandial hypoglycemia, possible dumping syndrome/loose stools in am and some tips/suggestions with hx nissen.  Patient verbalized understanding of education provided. See Goals below.     Goals:  1. Include some protein/lean protein source along with breakfast meal.   -can try lowfat Fairlife milk (higher in protein and lactose free) or skim or 1% milk) in oatmeal instead of whole milk.  -discontinue adding sugar into oatmeal  -Can add a small amount (1 tsp-1T range) of almond or cashew butter into oatmeal for some protein.  -Can have a lowfat cheese stick or 1 oz of lean meat on the side with oatmeal.    2. Continue to limit portion size of lunch meals. Can try 1/2 portions of your usual lunch meals to see if better tolerated.    3. For soup, can drain most of the liquid and just eat more of the solid contents of your soups.    4. Stop eating when satisfied, do not eat until full.    5. If you will eat a later lunch meal, then aim for having a healthy planned snack before lunch time (to help prevent getting too hungry and overeating at lunch meal and to help potential for post meal low blood sugars).    6. Continue  eating a healthy/low sugar planned afternoon snack and limiting the number of nights that you have ice cream in the evening.  Some example snacks include:  -Greek yogurt for afternoon or evening snack (lowfat plain with frozen berries or Dannon Triple zero SF yogurt).  -Can have some non starchy vegetables such as cut up carrots, peppers or other vegetables you like for a snack.  -1/2 sandwich  -protein drink or 1/2 protein drink (low sugar, low fat. Some examples include Premier protein, Pure protein, muscle milk, Ensure Max or Boost high protein). Note: for the symptoms you are experiencing, I do not recommend ensure original or ensure plus or boost original because they are higher in fat and sugar.    7. Continue to limit the amount of fluids that you drink with meals and aim for drinking most fluids in between meals as able.    8. Recommend drinking at least 48 oz-64 oz water/sugar free flavored water per day.    Nutrition Monitoring and Evaluation: Will monitor adherence to nutrition recommendations at future RD visits.     Further Medical Nutrition Therapy:  Can follow up as needed.     Patient was encouraged to call/contact RD with any further questions.    Tia Neal, MS, RD, LD

## 2020-06-17 NOTE — PATIENT INSTRUCTIONS
It was nice speaking with you again today. Below are the nutrition recommendations we discussed at your visit today.  Please let me know if you have any additional questions.    Goals:  1. Include some protein/lean protein source along with breakfast meal.   -can try lowfat Fairlife milk (higher in protein and lactose free) or skim or 1% milk) in oatmeal instead of whole milk.  -discontinue adding sugar into oatmeal  -Can add a small amount (1 tsp-1T range) of almond or cashew butter into oatmeal for some protein.    2. Continue to limit portion size of lunch meals. Can try 1/2 portions of your usual lunch meals to see if better tolerated.    3. For soup, can drain most of the liquid and just eat more of the solid contents of your soups.    4. Stop eating when satisfied, do not eat until full.    5. If you will eat a later lunch meal, then aim for having a healthy planned snack before lunch time (to help prevent getting too hungry and overeating at lunch meal and to help potential for post meal low blood sugars).    6. Continue eating a healthy/low sugar planned afternoon snack and limiting the number of nights that you have ice cream in the evening.  Some example snacks include:  -Greek yogurt for afternoon or evening snack (lowfat plain with frozen berries or Dannon Triple zero SF yogurt).  -Can have some non starchy vegetables such as cut up carrots, peppers or other vegetables you like for a snack.  -1/2 sandwich  -protein drink or 1/2 protein drink (low sugar, low fat. Some examples include Premier protein, Pure protein, muscle milk, Ensure Max or Boost high protein). Note do not recommend.    7. Continue to limit the amount of fluids that you drink with meals and aim for drinking most fluids in between meals as able.    8. Recommend drinking at least 48 oz-64 oz water/sugar free flavored water per day.    Follow up as needed.    If you would like to schedule a follow up appointment with Tia Neal,  Registered Dietitian, please call [phone].    Tia Neal, MS, RD, LD

## 2020-06-23 ENCOUNTER — VIRTUAL VISIT (OUTPATIENT)
Dept: GASTROENTEROLOGY | Facility: CLINIC | Age: 65
End: 2020-06-23
Payer: COMMERCIAL

## 2020-06-23 DIAGNOSIS — R19.5 LOOSE STOOLS: ICD-10-CM

## 2020-06-23 DIAGNOSIS — K21.9 GASTROESOPHAGEAL REFLUX DISEASE WITHOUT ESOPHAGITIS: ICD-10-CM

## 2020-06-23 DIAGNOSIS — E16.1 POSTPRANDIAL HYPOGLYCEMIA: ICD-10-CM

## 2020-06-23 DIAGNOSIS — Z98.890 HISTORY OF NISSEN FUNDOPLICATION: Primary | ICD-10-CM

## 2020-06-23 ASSESSMENT — PAIN SCALES - GENERAL: PAINLEVEL: NO PAIN (0)

## 2020-06-23 NOTE — LETTER
6/23/2020         RE: Bal Junior  [address] [place] [address]        Dear Colleague,    Thank you for referring your patient, Bal Junior, to the [facility]. Please see a copy of my visit note below.    Bal Junior is a 65 year old male who is being evaluated via a billable video visit.      Video-Visit Details    Type of service:  Video Visit    Video Start Time: 6:00  Video End Time: 6:24    Originating Location (pt. Location): patient's home     Distant Location (provider location):  [facility] (provider's home)     Platform used for Video Visit: Grecia Pedroza PA-C  Division of Gastroenterology, Hepatology & Nutrition  [facility]      GI CLINIC VISIT    CC/REFERRING MD:  Abad Noel  REASON FOR CONSULTATION: follow-up     ASSESSMENT/PLAN:  1.  Gastroesophageal reflux disease without esophagitis, Status post Nissen fundoplication  Patient has history of B12 deficiency reportedly leading to neuropathy, thought to be secondary to PPI use.  Interested in reducing medicine so that he can discontinue PPI.  Has been evaluated through [facility] for this, would recommend that he see thoracic surgery for evaluation. Currently on PPI, can continue if neuropathy does not worsen, notably recent B12 elevated.   -- thoracic surgery referral- please call us if you have not heard about an appointment!   GERD Lifestyle Modifications  -- Avoid foods high in fat or high fructose corn syrup  -- do not eat within three hours of laying down or going to bed  -- raise the head of your bed 6-8 inches  -- avoid alcohol  -- aim for BMI of less than <25 and lose extra weight as needed  -- ok to continue pantoprazole- if neuropathy switch to Pepcid      2. Postprandial hypoglycemia  Rapid emptying on GES with reported postprandial hypoglycemia on glucometer checks (in 50s), though normal oral glucose tests.  On nortriptyline and  "dicyclomine and patient notes episodes are less frequent than in the past.  Would recommend continuing nortriptyline at this point, but we will plan to taper dicyclomine as outlined below.  Also can use glucose tabs if he does have symptoms.  --Decrease dicyclomine to 2 times a day for 2 weeks, then to 1 time a day for 2 weeks.  If her symptoms do not worsen, stop the medication entirely.  --Continue nortriptyline  --Continue recommendations as was discussed with our dietitian  -- Consider trial of glucose tabs (take 1) for low blood sugar episodes.     3. Loose stools  Patient does not feel that this is related to dumping syndrome/postprandial hypoglycemia as outlined above.  Does not feel that there are specific food triggers.  Will obtain celiac serologies, not consistent with infectious diarrhea.  We will continue to monitor.  -- labs for celiac disease      Thank you for this consultation.  It was a pleasure to participate in the care of this patient; please contact us with any further questions.  I spent 24 minutes with the patient, of which > 50% was spent face-to-face with the patient in education, care coordination and counseling regarding the above diagnoses (explaining treatment options, disease processes, laboratory/imaging results, prognosis, risks and benefits of treatment options, medication side effects, importance of compliance with treatment, risk factor reduction, follow up with primary care physician).    Adrienne Pedroza PA-C  Division of Gastroenterology, Hepatology & Nutrition  [facility]    HPI:   Mr. Junior is a 65 year old male with HTN, GERD s/p Nissen fundoplication, and reported dumping syndrome who is s/p R inguinal hernia repair and s/p appendectomy who presents for another opinion on his \"slipped Nissen\" and GI symptoms. He has previously been seen by Dr. Quezada, please see her documentation for additional details. He has also previously followed at [facility] for GERD and " "dumping syndrome, and has recently been seen by Dr. Zhao of surgery at [facility] for consideration of Nissen re-do.     History summarized in brief:   Reports decades of reflux starting in late 1980s/early 1990s she was treating with intermittent omeprazole.  In the 1990s does transition to daily omeprazole.  In the early 2000 fingers and toes which was attributed to the low B12, it was felt that PPI may be contributing to this underwent in 2011 so that he could stop his PPI.  Stated for well controlled neuropathy symptoms have not resolved but were stable.  Replaced B12 with PCP.  Also described intermittent issues with postprandial stools, urgency, lightheadedness weakness he was treated at [facility].  Had acute symptoms.  Ago felt like something hit him hard.  Pain improved but reflux continued.  He was started on Zantac had multiple EGDs 1 tried slipped Nissen.    Patient then underwent following work-up for his symptoms:  -EGD 8/30/2017 with report indicating a normal exam with intact Nissen.   -12/4/2018 was done for BRAVO placement. EGD report indicates a large hiatal hernia was seen and the \"wrap appears slipped.\"   -BRAVO testing revealed a DeMeester score total of 24.8 (18.7 on Day 1, 29.3 on Day 2 with 100% symptom concordance).  - Esophageal manometry was done though we do not have the interpretation of this test, images are scanned (but difficult to interpret).   - esophagram (1/2/19) which showed free flow of contrast, no hiatal hernia, and mild reflux. GES 2/19/19 showed only 4% of food remaining at 2 hours and exam stopped.   - esophagram 4/1/19 with again no hernia seen on upright images, but small HH induced with abdominal pressure and Valsalva. No reflux was noted on this exam.   - CT chest 4/1/19 with small HH and fundoplication changes.    The patient had stated that Dr. Zhao would consider repeat surgery, but given his above testing further trial of medical management was " "recommended. Mr. Junior was then started on pantoprazole-is concerned that paresthesias are worsening since starting that.    Regards to dumping syndrome, this was diagnosed at [facility] after gastric emptying study (rapid with oral glucose tolerance test normal), postprandial glucose checks with levels in the 50s.  Describes symptoms of dizziness, wooziness, and \"going gray \", associated with sneezing which would occur after a meal.  Typically resolved in 5 minutes.  Also noted loose stools sometimes occur at unpredictable times.  Patient was treated with baclofen and nortriptyline with 80% resolution symptoms, and baclofen was discontinued and switched to dicyclomine.      Interval history 6/23/2020:  Symptoms are going ok, symptoms are mostly controlled when he avoids having large volume and density of foods. Breakfest and lunch are the most common times for loose stools and wooziness. Eggs are a big trigger. Otherwise has cut back on amount he is eating. Has not been measuring blood sugars. Is able to tell based on symptoms- hot sweats, dizziness. Sometimes he will need to sneeze and this clears up.     Bowel movements: 2-3 times a week will have diarrhea (non-bloody, Orange scale 6 with the smell at the end). In between these days, he will usually have a fairly normal bowel movement. Was previously happening 3-4 times a week.     About a month and a half ago, he felt like something punched him in his abdomen. This was centered on the upper stomach.  Thought about going in but symptoms improved. Not doing or eating anything out of the ordinary.     GERD  Pantoprazole seems to be controlling. Not taking zantac. Has breakthrough symptoms about once a month. Is concernend that neuropathy might get worse with pantoprazole.     Continues to take Nortiptyline or Bentyl. Takes Bentyl 3 times a day     Vitamin B12- is checking with primary care last level was high.      PERTINENT PAST MEDICAL " "HISTORY:  Hypertension  GERD  \"Dumping syndrome\"    PREVIOUS SURGERIES:  Past Surgical History:   Procedure Laterality Date     ABDOMEN SURGERY  2012?     APPENDECTOMY  1964?     COLONOSCOPY  2006, 2016     EYE SURGERY  199x    laser for retinal tears     HERNIA REPAIR  1961?     SOFT TISSUE SURGERY  2009?    MCL l. thumb     S/p tonsillectomy and adenoidectomy  S/p R inguinal hernia repair  S/p Nissen fundoplication      ALLERGIES:   No Known Allergies    PERTINENT MEDICATIONS:  Current Outpatient Medications   Medication     atenolol (TENORMIN) 50 MG tablet     cyanocobalamin (VITAMIN B-12) 500 MCG tablet     dicyclomine (BENTYL) 20 MG tablet     gabapentin (NEURONTIN) 300 MG capsule     nortriptyline (PAMELOR) 25 MG capsule     pantoprazole (PROTONIX) 40 MG EC tablet     No current facility-administered medications for this visit.        SOCIAL HISTORY:  Social History     Socioeconomic History     Marital status:      Spouse name: Not on file     Number of children: Not on file     Years of education: Not on file     Highest education level: Not on file   Occupational History     Not on file   Social Needs     Financial resource strain: Not on file     Food insecurity     Worry: Not on file     Inability: Not on file     Transportation needs     Medical: Not on file     Non-medical: Not on file   Tobacco Use     Smoking status: Never Smoker     Smokeless tobacco: Never Used   Substance and Sexual Activity     Alcohol use: Yes     Comment: light/moderate     Drug use: Never     Sexual activity: Yes     Partners: Female     Birth control/protection: Post-menopausal, Male Surgical, Female Surgical   Lifestyle     Physical activity     Days per week: Not on file     Minutes per session: Not on file     Stress: Not on file   Relationships     Social connections     Talks on phone: Not on file     Gets together: Not on file     Attends Nondenominational service: Not on file     Active member of club or organization: " Not on file     Attends meetings of clubs or organizations: Not on file     Relationship status: Not on file     Intimate partner violence     Fear of current or ex partner: Not on file     Emotionally abused: Not on file     Physically abused: Not on file     Forced sexual activity: Not on file   Other Topics Concern     Not on file   Social History Narrative     Not on file       FAMILY HISTORY:  Family History   Problem Relation Age of Onset     Coronary Artery Disease Maternal Grandfather      Hypertension Mother      Uterine Cancer Mother         1965       Past/family/social history reviewed and no changes    PHYSICAL EXAMINATION:  Vitals There were no vitals taken for this visit.   Wt   Wt Readings from Last 2 Encounters:   03/11/20 102.1 kg (225 lb 1.6 oz)   General appearance: Healthy appearing adult, in no acute distress  Eyes: Sclera anicteric, Pupils round and reactive to light  Ears, nose, mouth and throat: No obvious external lesions of ears and nose, Hearing intact  Neck: Symmetric, No obvious external lesions  Respiratory: Normal respiration, no use of accessory muscles   MSK: grossly normal movement of upper extremities   Skin: No rashes or jaundice   Psychiatric: Oriented to person, place and time, Appropriate mood and affect.     Again, thank you for allowing me to participate in the care of your patient.        Sincerely,        Adrienne Pedroza PA-C

## 2020-06-23 NOTE — NURSING NOTE
Chief Complaint   Patient presents with     RECHECK     follow up       There were no vitals filed for this visit.    There is no height or weight on file to calculate BMI.    Bonnie Hawkins, CMA

## 2020-06-23 NOTE — PROGRESS NOTES
"Bal Junior is a 65 year old male who is being evaluated via a billable video visit.      The patient has been notified of following:     \"This video visit will be conducted via a call between you and your physician/provider. We have found that certain health care needs can be provided without the need for an in-person physical exam.  This service lets us provide the care you need with a video conversation.  If a prescription is necessary we can send it directly to your pharmacy.  If lab work is needed we can place an order for that and you can then stop by our lab to have the test done at a later time.    Video visits are billed at different rates depending on your insurance coverage.  Please reach out to your insurance provider with any questions.    If during the course of the call the physician/provider feels a video visit is not appropriate, you will not be charged for this service.\"    Patient has given verbal consent for Video visit? Yes    Will anyone else be joining your video visit? No    Video-Visit Details    Type of service:  Video Visit    Video Start Time: 6:00  Video End Time: 6:24    Originating Location (pt. Location): patient's home     Distant Location (provider location):  [facility] (provider's home)     Platform used for Video Visit: Grecia Pedroza PA-C  Division of Gastroenterology, Hepatology & Nutrition  [facility]      GI CLINIC VISIT    CC/REFERRING MD:  Abad Noel  REASON FOR CONSULTATION: follow-up     ASSESSMENT/PLAN:  1.  Gastroesophageal reflux disease without esophagitis, Status post Nissen fundoplication  Patient has history of B12 deficiency reportedly leading to neuropathy, thought to be secondary to PPI use.  Interested in reducing medicine so that he can discontinue PPI.  Has been evaluated through [facility] for this, would recommend that he see thoracic surgery for evaluation. Currently on PPI, can continue if " neuropathy does not worsen, notably recent B12 elevated.   -- thoracic surgery referral- please call us if you have not heard about an appointment!   GERD Lifestyle Modifications  -- Avoid foods high in fat or high fructose corn syrup  -- do not eat within three hours of laying down or going to bed  -- raise the head of your bed 6-8 inches  -- avoid alcohol  -- aim for BMI of less than <25 and lose extra weight as needed  -- ok to continue pantoprazole- if neuropathy switch to Pepcid      2. Postprandial hypoglycemia  Rapid emptying on GES with reported postprandial hypoglycemia on glucometer checks (in 50s), though normal oral glucose tests.  On nortriptyline and dicyclomine and patient notes episodes are less frequent than in the past.  Would recommend continuing nortriptyline at this point, but we will plan to taper dicyclomine as outlined below.  Also can use glucose tabs if he does have symptoms.  --Decrease dicyclomine to 2 times a day for 2 weeks, then to 1 time a day for 2 weeks.  If her symptoms do not worsen, stop the medication entirely.  --Continue nortriptyline  --Continue recommendations as was discussed with our dietitian  -- Consider trial of glucose tabs (take 1) for low blood sugar episodes.     3. Loose stools  Patient does not feel that this is related to dumping syndrome/postprandial hypoglycemia as outlined above.  Does not feel that there are specific food triggers.  Will obtain celiac serologies, not consistent with infectious diarrhea.  We will continue to monitor.  -- labs for celiac disease      Thank you for this consultation.  It was a pleasure to participate in the care of this patient; please contact us with any further questions.  I spent 24 minutes with the patient, of which > 50% was spent face-to-face with the patient in education, care coordination and counseling regarding the above diagnoses (explaining treatment options, disease processes, laboratory/imaging results, prognosis,  "risks and benefits of treatment options, medication side effects, importance of compliance with treatment, risk factor reduction, follow up with primary care physician).    Adrienne Pedroza PA-C  Division of Gastroenterology, Hepatology & Nutrition  [facility]    HPI:   Mr. Junior is a 65 year old male with HTN, GERD s/p Nissen fundoplication, and reported dumping syndrome who is s/p R inguinal hernia repair and s/p appendectomy who presents for another opinion on his \"slipped Nissen\" and GI symptoms. He has previously been seen by Dr. Quezada, please see her documentation for additional details. He has also previously followed at [facility] for GERD and dumping syndrome, and has recently been seen by Dr. Zhao of surgery at [facility] for consideration of Nissen re-do.     History summarized in brief:   Reports decades of reflux starting in late 1980s/early 1990s she was treating with intermittent omeprazole.  In the 1990s does transition to daily omeprazole.  In the early 2000 fingers and toes which was attributed to the low B12, it was felt that PPI may be contributing to this underwent in 2011 so that he could stop his PPI.  Stated for well controlled neuropathy symptoms have not resolved but were stable.  Replaced B12 with PCP.  Also described intermittent issues with postprandial stools, urgency, lightheadedness weakness he was treated at [facility].  Had acute symptoms.  Ago felt like something hit him hard.  Pain improved but reflux continued.  He was started on Zantac had multiple EGDs 1 tried slipped Nissen.    Patient then underwent following work-up for his symptoms:  -EGD 8/30/2017 with report indicating a normal exam with intact Nissen.   -12/4/2018 was done for BRAVO placement. EGD report indicates a large hiatal hernia was seen and the \"wrap appears slipped.\"   -BRAVO testing revealed a DeMeester score total of 24.8 (18.7 on Day 1, 29.3 on Day 2 with 100% symptom concordance).  - " "Esophageal manometry was done though we do not have the interpretation of this test, images are scanned (but difficult to interpret).   - esophagram (1/2/19) which showed free flow of contrast, no hiatal hernia, and mild reflux. GES 2/19/19 showed only 4% of food remaining at 2 hours and exam stopped.   - esophagram 4/1/19 with again no hernia seen on upright images, but small HH induced with abdominal pressure and Valsalva. No reflux was noted on this exam.   - CT chest 4/1/19 with small HH and fundoplication changes.    The patient had stated that Dr. Zhao would consider repeat surgery, but given his above testing further trial of medical management was recommended. Mr. Junior was then started on pantoprazole-is concerned that paresthesias are worsening since starting that.    Regards to dumping syndrome, this was diagnosed at [facility] after gastric emptying study (rapid with oral glucose tolerance test normal), postprandial glucose checks with levels in the 50s.  Describes symptoms of dizziness, wooziness, and \"going gray \", associated with sneezing which would occur after a meal.  Typically resolved in 5 minutes.  Also noted loose stools sometimes occur at unpredictable times.  Patient was treated with baclofen and nortriptyline with 80% resolution symptoms, and baclofen was discontinued and switched to dicyclomine.      Interval history 6/23/2020:  Symptoms are going ok, symptoms are mostly controlled when he avoids having large volume and density of foods. Breakfest and lunch are the most common times for loose stools and wooziness. Eggs are a big trigger. Otherwise has cut back on amount he is eating. Has not been measuring blood sugars. Is able to tell based on symptoms- hot sweats, dizziness. Sometimes he will need to sneeze and this clears up.     Bowel movements: 2-3 times a week will have diarrhea (non-bloody, [place] scale 6 with the smell at the end). In between these days, he will " "usually have a fairly normal bowel movement. Was previously happening 3-4 times a week.     About a month and a half ago, he felt like something punched him in his abdomen. This was centered on the upper stomach.  Thought about going in but symptoms improved. Not doing or eating anything out of the ordinary.     GERD  Pantoprazole seems to be controlling. Not taking zantac. Has breakthrough symptoms about once a month. Is concernend that neuropathy might get worse with pantoprazole.     Continues to take Nortiptyline or Bentyl. Takes Bentyl 3 times a day     Vitamin B12- is checking with primary care last level was high.      PERTINENT PAST MEDICAL HISTORY:  Hypertension  GERD  \"Dumping syndrome\"    PREVIOUS SURGERIES:  Past Surgical History:   Procedure Laterality Date     ABDOMEN SURGERY  2012?     APPENDECTOMY  1964?     COLONOSCOPY  2006, 2016     EYE SURGERY  199x    laser for retinal tears     HERNIA REPAIR  1961?     SOFT TISSUE SURGERY  2009?    [facility] l. thumb     S/p tonsillectomy and adenoidectomy  S/p R inguinal hernia repair  S/p Nissen fundoplication      ALLERGIES:   No Known Allergies    PERTINENT MEDICATIONS:  Current Outpatient Medications   Medication     atenolol (TENORMIN) 50 MG tablet     cyanocobalamin (VITAMIN B-12) 500 MCG tablet     dicyclomine (BENTYL) 20 MG tablet     gabapentin (NEURONTIN) 300 MG capsule     nortriptyline (PAMELOR) 25 MG capsule     pantoprazole (PROTONIX) 40 MG EC tablet     No current facility-administered medications for this visit.        SOCIAL HISTORY:  Social History     Socioeconomic History     Marital status:      Spouse name: Not on file     Number of children: Not on file     Years of education: Not on file     Highest education level: Not on file   Occupational History     Not on file   Social Needs     Financial resource strain: Not on file     Food insecurity     Worry: Not on file     Inability: Not on file     Transportation needs     Medical: Not on " file     Non-medical: Not on file   Tobacco Use     Smoking status: Never Smoker     Smokeless tobacco: Never Used   Substance and Sexual Activity     Alcohol use: Yes     Comment: light/moderate     Drug use: Never     Sexual activity: Yes     Partners: Female     Birth control/protection: Post-menopausal, Male Surgical, Female Surgical   Lifestyle     Physical activity     Days per week: Not on file     Minutes per session: Not on file     Stress: Not on file   Relationships     Social connections     Talks on phone: Not on file     Gets together: Not on file     Attends Judaism service: Not on file     Active member of club or organization: Not on file     Attends meetings of clubs or organizations: Not on file     Relationship status: Not on file     Intimate partner violence     Fear of current or ex partner: Not on file     Emotionally abused: Not on file     Physically abused: Not on file     Forced sexual activity: Not on file   Other Topics Concern     Not on file   Social History Narrative     Not on file       FAMILY HISTORY:  Family History   Problem Relation Age of Onset     Coronary Artery Disease Maternal Grandfather      Hypertension Mother      Uterine Cancer Mother         1965       Past/family/social history reviewed and no changes    PHYSICAL EXAMINATION:  Vitals There were no vitals taken for this visit.   Wt   Wt Readings from Last 2 Encounters:   03/11/20 102.1 kg (225 lb 1.6 oz)   General appearance: Healthy appearing adult, in no acute distress  Eyes: Sclera anicteric, Pupils round and reactive to light  Ears, nose, mouth and throat: No obvious external lesions of ears and nose, Hearing intact  Neck: Symmetric, No obvious external lesions  Respiratory: Normal respiration, no use of accessory muscles   MSK: grossly normal movement of upper extremities   Skin: No rashes or jaundice   Psychiatric: Oriented to person, place and time, Appropriate mood and affect.

## 2020-06-24 ENCOUNTER — PRE VISIT (OUTPATIENT)
Dept: SURGERY | Facility: CLINIC | Age: 65
End: 2020-06-24

## 2020-06-24 NOTE — TELEPHONE ENCOUNTER
ONCOLOGY INTAKE: Records Information      APPT INFORMATION:  Referring provider:  NORA Deleon  Referring provider s clinic:  [facility]  Reason for visit/diagnosis:  GERD  Has patient been notified of appointment date and time?: YES    RECORDS INFORMATION:  Were the records received with the referral (via [employer])? Yes    Has patient been seen for any external appt for this diagnosis? No    If yes, where? N/A    Has patient had any imaging or procedures outside of [place] for this condition? No      If Yes, where? N/A    ADDITIONAL INFORMATION:  None

## 2020-07-02 ENCOUNTER — OFFICE VISIT (OUTPATIENT)
Dept: SURGERY | Facility: CLINIC | Age: 65
End: 2020-07-02
Attending: PHYSICIAN ASSISTANT
Payer: COMMERCIAL

## 2020-07-02 VITALS
HEIGHT: 70 IN | OXYGEN SATURATION: 96 % | RESPIRATION RATE: 14 BRPM | BODY MASS INDEX: 31.64 KG/M2 | HEART RATE: 68 BPM | SYSTOLIC BLOOD PRESSURE: 135 MMHG | WEIGHT: 221 LBS | TEMPERATURE: 98.8 F | DIASTOLIC BLOOD PRESSURE: 89 MMHG

## 2020-07-02 DIAGNOSIS — Z98.890 HISTORY OF NISSEN FUNDOPLICATION: ICD-10-CM

## 2020-07-02 DIAGNOSIS — K21.9 GASTROESOPHAGEAL REFLUX DISEASE WITHOUT ESOPHAGITIS: ICD-10-CM

## 2020-07-02 PROBLEM — K91.1 POSTGASTRIC SURGERY SYNDROME: Status: ACTIVE | Noted: 2020-07-02

## 2020-07-02 PROBLEM — M79.2 PERIPHERAL NEUROPATHIC PAIN: Status: ACTIVE | Noted: 2020-07-02

## 2020-07-02 PROBLEM — K91.89 SLIPPED NISSEN FUNDOPLICATION: Status: ACTIVE | Noted: 2018-06-12

## 2020-07-02 PROBLEM — R00.2 PALPITATIONS: Status: ACTIVE | Noted: 2020-07-02

## 2020-07-02 PROBLEM — Z91.038: Status: ACTIVE | Noted: 2020-07-02

## 2020-07-02 PROBLEM — G62.9 PERIPHERAL NEUROPATHY: Status: ACTIVE | Noted: 2020-07-02

## 2020-07-02 PROBLEM — K21.00 GASTROESOPHAGEAL REFLUX DISEASE WITH ESOPHAGITIS: Status: ACTIVE | Noted: 2020-07-02

## 2020-07-02 PROBLEM — K44.9 HIATAL HERNIA WITH GASTROESOPHAGEAL REFLUX: Status: ACTIVE | Noted: 2020-07-02

## 2020-07-02 PROCEDURE — G0463 HOSPITAL OUTPT CLINIC VISIT: HCPCS | Mod: ZF

## 2020-07-02 RX ORDER — EPINEPHRINE 0.3 MG/.3ML
INJECTION SUBCUTANEOUS
COMMUNITY

## 2020-07-02 RX ORDER — AMOXICILLIN 250 MG/1
250 CAPSULE ORAL 2 TIMES DAILY
COMMUNITY
End: 2020-08-31

## 2020-07-02 ASSESSMENT — MIFFLIN-ST. JEOR: SCORE: 1793.08

## 2020-07-02 ASSESSMENT — PAIN SCALES - GENERAL: PAINLEVEL: NO PAIN (0)

## 2020-07-02 NOTE — NURSING NOTE
"Oncology Rooming Note    July 2, 2020 10:44 AM   Bal Junior is a 65 year old male who presents for:    Chief Complaint   Patient presents with     Oncology Clinic Visit     New; Hx of Nissen fundoplication and Gastroesophageal Reflux      Initial Vitals: /89   Pulse 68   Temp 98.8  F (37.1  C) (Oral)   Resp 14   Ht 1.769 m (5' 9.65\")   Wt 100.2 kg (221 lb)   SpO2 96%   BMI 32.03 kg/m   Estimated body mass index is 32.03 kg/m  as calculated from the following:    Height as of this encounter: 1.769 m (5' 9.65\").    Weight as of this encounter: 100.2 kg (221 lb). Body surface area is 2.22 meters squared.  No Pain (0) Comment: Data Unavailable   No LMP for male patient.  Allergies reviewed: Yes  Medications reviewed: Yes    Medications: Medication refills not needed today.  Pharmacy name entered into [employer]: [facility] - [place] [id number]    Clinical concerns: GERD       Hermila Reyes CMA              "

## 2020-07-02 NOTE — LETTER
7/2/2020         RE: Bal Junior  [address] [address]        Dear Colleague,    Thank you for referring your patient, Bal Junior, to the [facility]. Please see a copy of my visit note below.    THORACIC SURGERY - NEW PATIENT OFFICE VISIT          I saw Mr. Junior in consultation for the evaluation and treatment of a herniated Nissen Fundoplication.     HPI  Mr. Bal Junior is a 65 year old year-old male who has had a long standing history of reflux. He was on long term PPI and was diagnosed with peripheral neuropathy likely due to prolonged use of PPIs. He had a Nissen fundoplication in 2011 which gave him good relief of symptoms until a couple of years ago when he suddenly experienced upper abdominal pain and then strted to have reflux symptoms again. He is back on PPIs.  He has seen a surgeon at another hospital where he has had some of his testing to consider a  Re-do Nissen. He is here today for a second opinion    Previsit Tests   EGD 5/19: ? Slipped Nissen, no esophagitis  12/4/2018 BRAVO: DeMeester score total of 24.8 (18.7 on Day 1, 29.3 on Day 2 with 100% symptom concordance)  Esophageal manometry was done though we do not have the interpretation of this test, images are scanned (but difficult to interpret)   1/2/19  Esophagram: Free flow of contrast, no hiatal hernia, and mild reflux.   2/19/19: Gastric Emptying study: showed only 4% of food remaining at 2 hours and exam stopped.   4/1/19: Repeat esophagram no hernia seen on upright images, but small HH induced with abdominal pressure and Valsalva. No reflux was noted on this exam.  4/1/19: CT chest : appears to have herniated wrap  PMH      Past Medical History:   Diagnosis Date     Stomach problems Noted earlier        PSH      Past Surgical History:   Procedure Laterality Date     ABDOMEN SURGERY  2012?     APPENDECTOMY  1964?     COLONOSCOPY  2006, 2016     EYE SURGERY  199x    laser for retinal tears      HERNIA REPAIR  1961?     SOFT TISSUE SURGERY  2009?    [facility] l. thumb        ETOH denies   TOB denies     Physical examination  BMI  32        IMPRESSION (Z98.890) History of Nissen fundoplication  Comment:   Plan:     (K21.9) Gastroesophageal reflux disease without esophagitis  Comment:   Plan:      65 year old year-old male with recurrent reflux symtpoms s/p Nissen fundoplication. Likely herniated wrap.    PLAN  I spent a total of 45 minutes with Mr. Junior , more than 50% of which were spent in counseling, coordination of care, and face-to-face time. I reviewed the plan as follows:  We discussed that given his symptoms, it would probably make sense to perform a take-down and re-do of the Nissen Fundoplication.   We discussed the potential risks of the re-operative surgery.   I would like to go over his work-up at our esophageal conference prior to offering him surgery.    All questions were answered and Bal Junior and present family were in agreement with the plan.  I appreciate the opportunity to participate in the care of your patient and will keep you updated.  Sincerely,        Katlyn Tobar MD

## 2020-07-09 ENCOUNTER — TELEPHONE (OUTPATIENT)
Dept: SURGERY | Facility: CLINIC | Age: 65
End: 2020-07-09

## 2020-07-09 NOTE — TELEPHONE ENCOUNTER
Called patient to see where he had his Nissen Fundoplication. He had the surgery done at [facility] in [place] but he cannot recall the year he had it done.

## 2020-07-13 NOTE — PROGRESS NOTES
THORACIC SURGERY - NEW PATIENT OFFICE VISIT          I saw Mr. Junior in consultation for the evaluation and treatment of a herniated Nissen Fundoplication.     HPI  . Bal Junior is a 65 year old year-old male who has had a long standing history of reflux. He was on long term PPI and was diagnosed with peripheral neuropathy likely due to prolonged use of PPIs. He had a Nissen fundoplication in 2011 which gave him good relief of symptoms until a couple of years ago when he suddenly experienced upper abdominal pain and then strted to have reflux symptoms again. He is back on PPIs.  He has seen a surgeon at another hospital where he has had some of his testing to consider a  Re-do Nissen. He is here today for a second opinion    Previsit Tests   EGD 5/19: ? Slipped Nissen, no esophagitis  12/4/2018 BRAVO: DeMeester score total of 24.8 (18.7 on Day 1, 29.3 on Day 2 with 100% symptom concordance)  Esophageal manometry was done though we do not have the interpretation of this test, images are scanned (but difficult to interpret)   1/2/19  Esophagram: Free flow of contrast, no hiatal hernia, and mild reflux.   2/19/19: Gastric Emptying study: showed only 4% of food remaining at 2 hours and exam stopped.   4/1/19: Repeat esophagram no hernia seen on upright images, but small HH induced with abdominal pressure and Valsalva. No reflux was noted on this exam.  4/1/19: CT chest : appears to have herniated wrap  PMH      Past Medical History:   Diagnosis Date     Stomach problems Noted earlier        PSH      Past Surgical History:   Procedure Laterality Date     ABDOMEN SURGERY  2012?     APPENDECTOMY  1964?     COLONOSCOPY  2006, 2016     EYE SURGERY  199x    laser for retinal tears     HERNIA REPAIR  1961?     SOFT TISSUE SURGERY  2009?    [facility] l. thumb        ETOH denies   TOB denies     Physical examination  BMI  32        IMPRESSION (Z98.890) History of Nissen fundoplication  Comment:   Plan:     (K21.9) Gastroesophageal  reflux disease without esophagitis  Comment:   Plan:      65 year old year-old male with recurrent reflux symtpoms s/p Nissen fundoplication. Likely herniated wrap.    PLAN  I spent a total of 45 minutes with Mr. Junior , more than 50% of which were spent in counseling, coordination of care, and face-to-face time. I reviewed the plan as follows:  We discussed that given his symptoms, it would probably make sense to perform a take-down and re-do of the Nissen Fundoplication.   We discussed the potential risks of the re-operative surgery.   I would like to go over his work-up at our esophageal conference prior to offering him surgery.    All questions were answered and Bal Junior and present family were in agreement with the plan.  I appreciate the opportunity to participate in the care of your patient and will keep you updated.  Sincerely,

## 2020-07-23 ENCOUNTER — TELEPHONE (OUTPATIENT)
Dept: GASTROENTEROLOGY | Facility: CLINIC | Age: 65
End: 2020-07-23

## 2020-07-23 NOTE — TELEPHONE ENCOUNTER
Called and spoke with patient to confirm where he would be having labs completed GI provider ordered from visit 6/23/20. Patient resides in [place], [place] and will be completing labs prior to next follow up GI appointment to be scheduled at end of August/beginning of September.

## 2020-08-10 ENCOUNTER — TELEPHONE (OUTPATIENT)
Dept: SURGERY | Facility: CLINIC | Age: 65
End: 2020-08-10

## 2020-08-10 NOTE — TELEPHONE ENCOUNTER
Called patient and left a voicemail with my call back information. Dr. Tobar discussed his case at the esophageal conference and recommendations were to go ahead with surgery.     Called and spoke with Bal, he is still very interested in having surgery. Will let Dr. Tobar know.

## 2020-08-12 ENCOUNTER — PREP FOR PROCEDURE (OUTPATIENT)
Dept: SURGERY | Facility: CLINIC | Age: 65
End: 2020-08-12

## 2020-08-12 DIAGNOSIS — K44.9 HIATAL HERNIA: Primary | ICD-10-CM

## 2020-08-12 RX ORDER — CEFAZOLIN SODIUM 1 G/50ML
1 INJECTION, SOLUTION INTRAVENOUS SEE ADMIN INSTRUCTIONS
Status: CANCELLED | OUTPATIENT
Start: 2020-08-12

## 2020-08-12 RX ORDER — CEFAZOLIN SODIUM 2 G/50ML
2 SOLUTION INTRAVENOUS
Status: CANCELLED | OUTPATIENT
Start: 2020-08-12

## 2020-08-26 ENCOUNTER — DOCUMENTATION ONLY (OUTPATIENT)
Dept: GASTROENTEROLOGY | Facility: CLINIC | Age: 65
End: 2020-08-26

## 2020-08-26 NOTE — PROGRESS NOTES
Called and spoke with patient to advise labs ordered from GI visit 6/23/20 faxed to [facility], fax #[phone].

## 2020-08-27 ENCOUNTER — TELEPHONE (OUTPATIENT)
Dept: SURGERY | Facility: CLINIC | Age: 65
End: 2020-08-27

## 2020-08-27 ENCOUNTER — TRANSFERRED RECORDS (OUTPATIENT)
Dept: HEALTH INFORMATION MANAGEMENT | Facility: CLINIC | Age: 65
End: 2020-08-27

## 2020-08-27 DIAGNOSIS — Z11.59 ENCOUNTER FOR SCREENING FOR OTHER VIRAL DISEASES: Primary | ICD-10-CM

## 2020-08-27 PROBLEM — K44.9 HIATAL HERNIA: Status: ACTIVE | Noted: 2020-08-27

## 2020-08-27 NOTE — TELEPHONE ENCOUNTER
Spoke with patient to schedule procedure with Dr. Katlyn Tobar    Procedure was scheduled on 09/25 at [facility]  Patient will have H&P at [facility]     Patient is aware a COVID-19 test is needed before their procedure. The test should be with-in 4 days of their procedure.   Test Details: Patient will contact [facility] or [facility] in [facility]     Patient is aware a / is needed day of surgery.   Surgery letter was sent via [employer] and mail, patient has my direct contact information for any further questions.

## 2020-08-31 ENCOUNTER — TELEPHONE (OUTPATIENT)
Dept: GASTROENTEROLOGY | Facility: CLINIC | Age: 65
End: 2020-08-31

## 2020-08-31 NOTE — TELEPHONE ENCOUNTER
[facility] Call Center    Phone Message    May a detailed message be left on voicemail: yes     Reason for Call: Other:     Bal says he received a VM from Adrienne Pedroza this afternoon asking for him to return her call.     Please call him again when you are able.       Action Taken: Message routed to:  [facility] ([facility] GI    Travel Screening: Not Applicable

## 2020-09-01 ENCOUNTER — VIRTUAL VISIT (OUTPATIENT)
Dept: GASTROENTEROLOGY | Facility: CLINIC | Age: 65
End: 2020-09-01
Payer: COMMERCIAL

## 2020-09-01 VITALS — HEIGHT: 71 IN | BODY MASS INDEX: 29.79 KG/M2 | WEIGHT: 212.8 LBS

## 2020-09-01 DIAGNOSIS — K21.9 GASTROESOPHAGEAL REFLUX DISEASE WITHOUT ESOPHAGITIS: Primary | ICD-10-CM

## 2020-09-01 DIAGNOSIS — K91.89 SLIPPED NISSEN FUNDOPLICATION: ICD-10-CM

## 2020-09-01 DIAGNOSIS — R19.5 LOOSE STOOLS: ICD-10-CM

## 2020-09-01 DIAGNOSIS — E16.1 POSTPRANDIAL HYPOGLYCEMIA: ICD-10-CM

## 2020-09-01 ASSESSMENT — MIFFLIN-ST. JEOR: SCORE: 1777.38

## 2020-09-01 ASSESSMENT — PAIN SCALES - GENERAL: PAINLEVEL: NO PAIN (0)

## 2020-09-01 NOTE — PATIENT INSTRUCTIONS
It was a pleasure taking care of you today.  I've included a brief summary of our discussion and care plan from today's visit below.  Please review this information with your primary care provider.  ______________________________________________________________________    My recommendations are summarized as follows:    --Plan for takedown and redo of Nissen fundoplication with thoracic surgery.   GERD Lifestyle Modifications  -- Avoid foods high in fat or high fructose corn syrup  -- do not eat within three hours of laying down or going to bed  -- raise the head of your bed 6-8 inches  -- avoid alcohol  -- aim for BMI of less than <25 and lose extra weight as needed  -- ok to continue pantoprazole- if neuropathy switch to Pepcid   --Continue nortriptyline  --Continue recommendations as was discussed with our dietitian  --Start Metamucil on a daily basis with 1/2 tablespoon/day    Return to GI Clinic in 2-3 months to review your progress.    ______________________________________________________________________    Who do I call with any questions after my visit?  Please be in touch if there are any further questions that arise following today's visit.  There are multiple ways to contact your gastroenterology care team.        During business hours, you may reach a Gastroenterology nurse at [phone]      To schedule or reschedule an appointment, please call [phone].       You can always send a secure message through [employer].  [employer] messages are answered by your nurse or doctor typically within 24 hours.  Please allow extra time on weekends and holidays.        For urgent/emergent questions after business hours, you may reach the on-call GI Fellow by contacting the [facility] at [phone].     How will I get the results of any tests ordered?    You will receive all of your results.  If you have signed up for MyChart, any tests ordered at your visit will be available to you after your  physician reviews them.  Typically this takes 1-2 weeks.  If there are urgent results that require a change in your care plan, your physician or nurse will call you to discuss the next steps.      What is [employer]?  [employer] is a secure way for you to access all of your healthcare records from the [facility].  It is a web based computer program, so you can sign on to it from any location.  It also allows you to send secure messages to your care team.  I recommend signing up for [employer] access if you have not already done so and are comfortable with using a computer.      How to I schedule a follow-up visit?  If you did not schedule a follow-up visit today, please call [phone] to schedule a follow-up office visit.        Sincerely,    Adrienne Pedroza PA-C  Division of Gastroenterology, Hepatology & Nutrition  [facility]

## 2020-09-01 NOTE — NURSING NOTE
"Chief Complaint   Patient presents with     RECHECK     3 mo f/u for GERD.       Vitals:    09/01/20 1558   Weight: 96.5 kg (212 lb 12.8 oz)   Height: 1.803 m (5' 11\")       Body mass index is 29.68 kg/m .    Mary Dunbar LPN               "

## 2020-09-01 NOTE — LETTER
9/1/2020       RE: Bal Junior  [address] [place] [address]     Dear Colleague,    Thank you for referring your patient, Bal Junior, to the [facility]STRO[facility] IBD [facility] at [facility]. Please see a copy of my visit note below.    GI CLINIC VISIT    CC/REFERRING MD:  Abad Noel  REASON FOR CONSULTATION: follow-up     ASSESSMENT/PLAN:  1.  Gastroesophageal reflux disease without esophagitis, Status post Nissen fundoplication  Patient has history of B12 deficiency reportedly leading to neuropathy, thought to be secondary to PPI use.  Patient currently on pantoprazole at this time, reports if he does not take this for 2 days he will have horrible symptoms though he is concerned he was having neuropathy due to this medication.  Has been evaluated by our thoracic surgery group with the plan for takedown and redo of Nissen fundoplication at the end of September.  After surgery, would recommend discontinuing PPI entirely.   --Plan for takedown and redo of Nissen fundoplication with thoracic surgery.   GERD Lifestyle Modifications  -- Avoid foods high in fat or high fructose corn syrup  -- do not eat within three hours of laying down or going to bed  -- raise the head of your bed 6-8 inches  -- avoid alcohol  -- aim for BMI of less than <25 and lose extra weight as needed  -- ok to continue pantoprazole- if neuropathy switch to Pepcid      2. Postprandial hypoglycemia  Rapid emptying on GES with reported postprandial hypoglycemia on glucometer checks (in 50s), though normal oral glucose tests.  On nortriptyline, notes that symptoms have improved significantly since tapering off of dicyclomine.  --Continue nortriptyline  --Continue recommendations as was discussed with our dietitian    3. Loose stools  Patient does not feel that this is related to dumping syndrome/postprandial hypoglycemia as outlined above.  Celiac serologies within normal limits.  Reports  "that volume of food as opposed to type of food is most likely trigger for episode of symptoms.  Could start low-dose fiber supplementation with 1/2 tablespoon of Metamucil a day.  Medication discussed.  --Start Metamucil on a daily basis with 1/2 tablespoon/day    Follow-up in 2 to 3 months    Thank you for this consultation.  It was a pleasure to participate in the care of this patient; please contact us with any further questions.     Adrienne Pedroza PA-C  Division of Gastroenterology, Hepatology & Nutrition  [facility]    HPI:   Mr. Junior is a 65 year old male with HTN, GERD s/p Nissen fundoplication, and reported dumping syndrome who is s/p R inguinal hernia repair and s/p appendectomy who presents for another opinion on his \"slipped Nissen\" and GI symptoms. He has previously been seen by Dr. Quezada, please see her documentation for additional details. He has also previously followed at [facility] for GERD and dumping syndrome, and has recently been seen by Dr. Zhao of surgery at [facility] for consideration of Nissen re-do.     History summarized in brief:   Reports decades of reflux starting in late 1980s/early 1990s she was treating with intermittent omeprazole.  In the 1990s does transition to daily omeprazole.  In the early 2000 fingers and toes which was attributed to the low B12, it was felt that PPI may be contributing to this underwent in 2011 so that he could stop his PPI.  Stated for well controlled neuropathy symptoms have not resolved but were stable.  Replaced B12 with PCP.  Also described intermittent issues with postprandial stools, urgency, lightheadedness weakness he was treated at [facility].  Had acute symptoms.  Ago felt like something hit him hard.  Pain improved but reflux continued.  He was started on Zantac had multiple EGDs 1 tried slipped Nissen.    Patient then underwent following work-up for his symptoms:  -EGD 8/30/2017 with report indicating a normal exam with " "intact Nissen.   -12/4/2018 was done for BRAVO placement. EGD report indicates a large hiatal hernia was seen and the \"wrap appears slipped.\"   -BRAVO testing revealed a DeMeester score total of 24.8 (18.7 on Day 1, 29.3 on Day 2 with 100% symptom concordance).  - Esophageal manometry was done though we do not have the interpretation of this test, images are scanned (but difficult to interpret).   - esophagram (1/2/19) which showed free flow of contrast, no hiatal hernia, and mild reflux. GES 2/19/19 showed only 4% of food remaining at 2 hours and exam stopped.   - esophagram 4/1/19 with again no hernia seen on upright images, but small HH induced with abdominal pressure and Valsalva. No reflux was noted on this exam.   - CT chest 4/1/19 with small HH and fundoplication changes.    The patient had stated that Dr. Zhao would consider repeat surgery, but given his above testing further trial of medical management was recommended. Mr. Junior was then started on pantoprazole-is concerned that paresthesias are worsening since starting that.    Regards to dumping syndrome, this was diagnosed at [facility] after gastric emptying study (rapid with oral glucose tolerance test normal), postprandial glucose checks with levels in the 50s.  Describes symptoms of dizziness, wooziness, and \"going gray \", associated with sneezing which would occur after a meal.  Typically resolved in 5 minutes.  Also noted loose stools sometimes occur at unpredictable times.  Patient was treated with baclofen and nortriptyline with 80% resolution symptoms, and baclofen was discontinued and switched to dicyclomine.      Interval history 6/23/2020:  Symptoms are going ok, symptoms are mostly controlled when he avoids having large volume and density of foods. Breakfest and lunch are the most common times for loose stools and wooziness. Eggs are a big trigger. Otherwise has cut back on amount he is eating. Has not been measuring blood " "sugars. Is able to tell based on symptoms- hot sweats, dizziness. Sometimes he will need to sneeze and this clears up.     Bowel movements: 2-3 times a week will have diarrhea (non-bloody, Glen scale 6 with the smell at the end). In between these days, he will usually have a fairly normal bowel movement. Was previously happening 3-4 times a week.     About a month and a half ago, he felt like something punched him in his abdomen. This was centered on the upper stomach.  Thought about going in but symptoms improved. Not doing or eating anything out of the ordinary.     GERD  Pantoprazole seems to be controlling. Not taking zantac. Has breakthrough symptoms about once a month. Is concernend that neuropathy might get worse with pantoprazole.     Continues to take Nortiptyline or Bentyl. Takes Bentyl 3 times a day     Vitamin B12- is checking with primary care last level was high.    Interval History 9/1/2020:  Decreasing diccylomine helped his symptoms. Symptoms of sneezing after eating has decreased significantly. Maybe will have symptoms for 10 minutes. He would say this has improved 80%. Feels like nortriptyline is going ok.     Still taking pantoprazole- rare symptoms related to heartburn situation. If he skips this for a couple of days he will have symptoms. Has not had to take pepcid.     Bowel movements: some loose stools in the morning (related to amount of food the day before). Does not seem to be type of foods, volume related. Stools are a little loose 2-3 times a week.     Has had increased \"hay fever symptoms\"-    PERTINENT PAST MEDICAL HISTORY:  Hypertension  GERD  \"Dumping syndrome\"          PREVIOUS SURGERIES:  Past Surgical History:   Procedure Laterality Date     ABDOMEN SURGERY  2012?     APPENDECTOMY  1964?     COLONOSCOPY  2006, 2016     EYE SURGERY  199x    laser for retinal tears     HERNIA REPAIR  1961?     SOFT TISSUE SURGERY  2009?    MCL l. thumb     S/p tonsillectomy and adenoidectomy  S/p " R inguinal hernia repair  S/p Nissen fundoplication      ALLERGIES:  Allergies   Allergen Reactions     Hornets      Wasp Venom Protein Hives     Bee Venom Swelling       PERTINENT MEDICATIONS:  Current Outpatient Medications   Medication     atenolol (TENORMIN) 50 MG tablet     cyanocobalamin (VITAMIN B-12) 500 MCG tablet     dicyclomine (BENTYL) 20 MG tablet     EPINEPHrine (EPIPEN 2-GINO) 0.3 MG/0.3ML injection 2-pack     gabapentin (NEURONTIN) 300 MG capsule     nortriptyline (PAMELOR) 25 MG capsule     pantoprazole (PROTONIX) 40 MG EC tablet     No current facility-administered medications for this visit.        SOCIAL HISTORY:  Social History     Socioeconomic History     Marital status:      Spouse name: Not on file     Number of children: Not on file     Years of education: Not on file     Highest education level: Not on file   Occupational History     Not on file   Social Needs     Financial resource strain: Not on file     Food insecurity     Worry: Not on file     Inability: Not on file     Transportation needs     Medical: Not on file     Non-medical: Not on file   Tobacco Use     Smoking status: Never Smoker     Smokeless tobacco: Never Used   Substance and Sexual Activity     Alcohol use: Yes     Alcohol/week: 3.0 - 6.0 standard drinks     Types: 1 - 2 Glasses of wine, 1 - 2 Cans of beer, 1 - 2 Shots of liquor per week     Frequency: 2-3 times a week     Drinks per session: 1 or 2     Binge frequency: Less than monthly     Comment: glass of wine with dinner, can of beer in evening or 1 whiskey  or bourbon     Drug use: Never     Sexual activity: Yes     Partners: Female     Birth control/protection: Post-menopausal, Male Surgical, Female Surgical   Lifestyle     Physical activity     Days per week: Not on file     Minutes per session: Not on file     Stress: Not on file   Relationships     Social connections     Talks on phone: Not on file     Gets together: Not on file     Attends Taoism  service: Not on file     Active member of club or organization: Not on file     Attends meetings of clubs or organizations: Not on file     Relationship status: Not on file     Intimate partner violence     Fear of current or ex partner: Not on file     Emotionally abused: Not on file     Physically abused: Not on file     Forced sexual activity: Not on file   Other Topics Concern     Not on file   Social History Narrative     Not on file               FAMILY HISTORY:  Family History   Problem Relation Age of Onset     Coronary Artery Disease Maternal Grandfather      Hypertension Mother      Uterine Cancer Mother         1965       Past/family/social history reviewed and no changes    PHYSICAL EXAMINATION:  Telephone visit in the setting of COVID pandemic       Again, thank you for allowing me to participate in the care of your patient.  Sincerely,    Adrienne Pedroza PA-C

## 2020-09-01 NOTE — LETTER
"    9/1/2020         RE: Bal Junior  [address] [facility] [address]        Dear Colleague,    Thank you for referring your patient, Bal Junior, to the [facility]STRO[facility]OLOG[facility] IBD [facility]IN[facility]. Please see a copy of my visit note below.    Bal Junior is a 65 year old male who is being evaluated via a billable telephone visit.      The patient has been notified of following:     \"This telephone visit will be conducted via a call between you and your physician/provider. We have found that certain health care needs can be provided without the need for a physical exam.  This service lets us provide the care you need with a short phone conversation.  If a prescription is necessary we can send it directly to your pharmacy.  If lab work is needed we can place an order for that and you can then stop by our lab to have the test done at a later time.    Telephone visits are billed at different rates depending on your insurance coverage. During this emergency period, for some insurers they may be billed the same as an in-person visit.  Please reach out to your insurance provider with any questions.    If during the course of the call the physician/provider feels a telephone visit is not appropriate, you will not be charged for this service.\"    Patient has given verbal consent for Telephone visit?  Yes    What phone number would you like to be contacted at? [phone]    How would you like to obtain your AVS? [employer]    Phone call duration: 26 minutes    Adrienne Pedroza PA-C  Division of Gastroenterology, Hepatology & Nutrition  [facility]      GI CLINIC VISIT    CC/REFERRING MD:  Abad Noel  REASON FOR CONSULTATION: follow-up     ASSESSMENT/PLAN:  1.  Gastroesophageal reflux disease without esophagitis, Status post Nissen fundoplication  Patient has history of B12 deficiency reportedly leading to neuropathy, thought to be secondary to PPI use.  Patient currently on pantoprazole at this " time, reports if he does not take this for 2 days he will have horrible symptoms though he is concerned he was having neuropathy due to this medication.  Has been evaluated by our thoracic surgery group with the plan for takedown and redo of Nissen fundoplication at the end of September.  After surgery, would recommend discontinuing PPI entirely.   --Plan for takedown and redo of Nissen fundoplication with thoracic surgery.   GERD Lifestyle Modifications  -- Avoid foods high in fat or high fructose corn syrup  -- do not eat within three hours of laying down or going to bed  -- raise the head of your bed 6-8 inches  -- avoid alcohol  -- aim for BMI of less than <25 and lose extra weight as needed  -- ok to continue pantoprazole- if neuropathy switch to Pepcid      2. Postprandial hypoglycemia  Rapid emptying on GES with reported postprandial hypoglycemia on glucometer checks (in 50s), though normal oral glucose tests.  On nortriptyline, notes that symptoms have improved significantly since tapering off of dicyclomine.  --Continue nortriptyline  --Continue recommendations as was discussed with our dietitian    3. Loose stools  Patient does not feel that this is related to dumping syndrome/postprandial hypoglycemia as outlined above.  Celiac serologies within normal limits.  Reports that volume of food as opposed to type of food is most likely trigger for episode of symptoms.  Could start low-dose fiber supplementation with 1/2 tablespoon of Metamucil a day.  Medication discussed.  --Start Metamucil on a daily basis with 1/2 tablespoon/day    Follow-up in 2 to 3 months    Thank you for this consultation.  It was a pleasure to participate in the care of this patient; please contact us with any further questions.     Adrienne Pedroza PA-C  Division of Gastroenterology, Hepatology & Nutrition  [facility]    HPI:   Mr. Junior is a 65 year old male with HTN, GERD s/p Nissen fundoplication, and reported dumping  "syndrome who is s/p R inguinal hernia repair and s/p appendectomy who presents for another opinion on his \"slipped Nissen\" and GI symptoms. He has previously been seen by Dr. Quezada, please see her documentation for additional details. He has also previously followed at [facility] for GERD and dumping syndrome, and has recently been seen by Dr. Zhao of surgery at [facility] for consideration of Nissen re-do.     History summarized in brief:   Reports decades of reflux starting in late 1980s/early 1990s she was treating with intermittent omeprazole.  In the 1990s does transition to daily omeprazole.  In the early 2000 fingers and toes which was attributed to the low B12, it was felt that PPI may be contributing to this underwent in 2011 so that he could stop his PPI.  Stated for well controlled neuropathy symptoms have not resolved but were stable.  Replaced B12 with PCP.  Also described intermittent issues with postprandial stools, urgency, lightheadedness weakness he was treated at [place] [facility].  Had acute symptoms.  Ago felt like something hit him hard.  Pain improved but reflux continued.  He was started on Zantac had multiple EGDs 1 tried slipped Nissen.    Patient then underwent following work-up for his symptoms:  -EGD 8/30/2017 with report indicating a normal exam with intact Nissen.   -12/4/2018 was done for BRAVO placement. EGD report indicates a large hiatal hernia was seen and the \"wrap appears slipped.\"   -BRAVO testing revealed a DeMeester score total of 24.8 (18.7 on Day 1, 29.3 on Day 2 with 100% symptom concordance).  - Esophageal manometry was done though we do not have the interpretation of this test, images are scanned (but difficult to interpret).   - esophagram (1/2/19) which showed free flow of contrast, no hiatal hernia, and mild reflux. GES 2/19/19 showed only 4% of food remaining at 2 hours and exam stopped.   - esophagram 4/1/19 with again no hernia seen on upright images, but small HH " "induced with abdominal pressure and Valsalva. No reflux was noted on this exam.   - CT chest 4/1/19 with small HH and fundoplication changes.    The patient had stated that Dr. Zhao would consider repeat surgery, but given his above testing further trial of medical management was recommended. Mr. Junior was then started on pantoprazole-is concerned that paresthesias are worsening since starting that.    Regards to dumping syndrome, this was diagnosed at [place] [facility] after gastric emptying study (rapid with oral glucose tolerance test normal), postprandial glucose checks with levels in the 50s.  Describes symptoms of dizziness, wooziness, and \"going gray \", associated with sneezing which would occur after a meal.  Typically resolved in 5 minutes.  Also noted loose stools sometimes occur at unpredictable times.  Patient was treated with baclofen and nortriptyline with 80% resolution symptoms, and baclofen was discontinued and switched to dicyclomine.      Interval history 6/23/2020:  Symptoms are going ok, symptoms are mostly controlled when he avoids having large volume and density of foods. Breakfest and lunch are the most common times for loose stools and wooziness. Eggs are a big trigger. Otherwise has cut back on amount he is eating. Has not been measuring blood sugars. Is able to tell based on symptoms- hot sweats, dizziness. Sometimes he will need to sneeze and this clears up.     Bowel movements: 2-3 times a week will have diarrhea (non-bloody, [place] scale 6 with the smell at the end). In between these days, he will usually have a fairly normal bowel movement. Was previously happening 3-4 times a week.     About a month and a half ago, he felt like something punched him in his abdomen. This was centered on the upper stomach.  Thought about going in but symptoms improved. Not doing or eating anything out of the ordinary.     GERD  Pantoprazole seems to be controlling. Not taking zantac. Has " "breakthrough symptoms about once a month. Is concernend that neuropathy might get worse with pantoprazole.     Continues to take Nortiptyline or Bentyl. Takes Bentyl 3 times a day     Vitamin B12- is checking with primary care last level was high.    Interval History 9/1/2020:  Decreasing diccylomine helped his symptoms. Symptoms of sneezing after eating has decreased significantly. Maybe will have symptoms for 10 minutes. He would say this has improved 80%. Feels like nortriptyline is going ok.     Still taking pantoprazole- rare symptoms related to heartburn situation. If he skips this for a couple of days he will have symptoms. Has not had to take pepcid.     Bowel movements: some loose stools in the morning (related to amount of food the day before). Does not seem to be type of foods, volume related. Stools are a little loose 2-3 times a week.     Has had increased \"hay fever symptoms\"-    PERTINENT PAST MEDICAL HISTORY:  Hypertension  GERD  \"Dumping syndrome\"    PREVIOUS SURGERIES:  Past Surgical History:   Procedure Laterality Date     ABDOMEN SURGERY  2012?     APPENDECTOMY  1964?     COLONOSCOPY  2006, 2016     EYE SURGERY  199x    laser for retinal tears     HERNIA REPAIR  1961?     SOFT TISSUE SURGERY  2009?    MCL l. thumb     S/p tonsillectomy and adenoidectomy  S/p R inguinal hernia repair  S/p Nissen fundoplication      ALLERGIES:     Allergies   Allergen Reactions     Hornets      Wasp Venom Protein Hives     Bee Venom Swelling       PERTINENT MEDICATIONS:  Current Outpatient Medications   Medication     atenolol (TENORMIN) 50 MG tablet     cyanocobalamin (VITAMIN B-12) 500 MCG tablet     dicyclomine (BENTYL) 20 MG tablet     EPINEPHrine (EPIPEN 2-GINO) 0.3 MG/0.3ML injection 2-pack     gabapentin (NEURONTIN) 300 MG capsule     nortriptyline (PAMELOR) 25 MG capsule     pantoprazole (PROTONIX) 40 MG EC tablet     No current facility-administered medications for this visit.        SOCIAL HISTORY:  Social " History     Socioeconomic History     Marital status:      Spouse name: Not on file     Number of children: Not on file     Years of education: Not on file     Highest education level: Not on file   Occupational History     Not on file   Social Needs     Financial resource strain: Not on file     Food insecurity     Worry: Not on file     Inability: Not on file     Transportation needs     Medical: Not on file     Non-medical: Not on file   Tobacco Use     Smoking status: Never Smoker     Smokeless tobacco: Never Used   Substance and Sexual Activity     Alcohol use: Yes     Alcohol/week: 3.0 - 6.0 standard drinks     Types: 1 - 2 Glasses of wine, 1 - 2 Cans of beer, 1 - 2 Shots of liquor per week     Frequency: 2-3 times a week     Drinks per session: 1 or 2     Binge frequency: Less than monthly     Comment: glass of wine with dinner, can of beer in evening or 1 whiskey  or bourbon     Drug use: Never     Sexual activity: Yes     Partners: Female     Birth control/protection: Post-menopausal, Male Surgical, Female Surgical   Lifestyle     Physical activity     Days per week: Not on file     Minutes per session: Not on file     Stress: Not on file   Relationships     Social connections     Talks on phone: Not on file     Gets together: Not on file     Attends [employer] service: Not on file     Active member of club or organization: Not on file     Attends meetings of clubs or organizations: Not on file     Relationship status: Not on file     Intimate partner violence     Fear of current or ex partner: Not on file     Emotionally abused: Not on file     Physically abused: Not on file     Forced sexual activity: Not on file   Other Topics Concern     Not on file   Social History Narrative     Not on file       FAMILY HISTORY:  Family History   Problem Relation Age of Onset     Coronary Artery Disease Maternal Grandfather      Hypertension Mother      Uterine Cancer Mother         1965       Past/family/social  history reviewed and no changes    PHYSICAL EXAMINATION:  Telephone visit in the setting of COVID pandemic       Again, thank you for allowing me to participate in the care of your patient.        Sincerely,        Adrienne Pedroza PA-C

## 2020-09-01 NOTE — PROGRESS NOTES
"Bal Junior is a 65 year old male who is being evaluated via a billable telephone visit.      The patient has been notified of following:     \"This telephone visit will be conducted via a call between you and your physician/provider. We have found that certain health care needs can be provided without the need for a physical exam.  This service lets us provide the care you need with a short phone conversation.  If a prescription is necessary we can send it directly to your pharmacy.  If lab work is needed we can place an order for that and you can then stop by our lab to have the test done at a later time.    Telephone visits are billed at different rates depending on your insurance coverage. During this emergency period, for some insurers they may be billed the same as an in-person visit.  Please reach out to your insurance provider with any questions.    If during the course of the call the physician/provider feels a telephone visit is not appropriate, you will not be charged for this service.\"    Patient has given verbal consent for Telephone visit?  Yes    What phone number would you like to be contacted at? [phone]    How would you like to obtain your AVS? [employer]    Phone call duration: 26 minutes    Adrienne Pedroza PA-C  Division of Gastroenterology, Hepatology & Nutrition  [facility]      GI CLINIC VISIT    CC/REFERRING MD:  Abad Noel  REASON FOR CONSULTATION: follow-up     ASSESSMENT/PLAN:  1.  Gastroesophageal reflux disease without esophagitis, Status post Nissen fundoplication  Patient has history of B12 deficiency reportedly leading to neuropathy, thought to be secondary to PPI use.  Patient currently on pantoprazole at this time, reports if he does not take this for 2 days he will have horrible symptoms though he is concerned he was having neuropathy due to this medication.  Has been evaluated by our thoracic surgery group with the plan for takedown and redo of Nissen " "fundoplication at the end of September.  After surgery, would recommend discontinuing PPI entirely.   --Plan for takedown and redo of Nissen fundoplication with thoracic surgery.   GERD Lifestyle Modifications  -- Avoid foods high in fat or high fructose corn syrup  -- do not eat within three hours of laying down or going to bed  -- raise the head of your bed 6-8 inches  -- avoid alcohol  -- aim for BMI of less than <25 and lose extra weight as needed  -- ok to continue pantoprazole- if neuropathy switch to Pepcid      2. Postprandial hypoglycemia  Rapid emptying on GES with reported postprandial hypoglycemia on glucometer checks (in 50s), though normal oral glucose tests.  On nortriptyline, notes that symptoms have improved significantly since tapering off of dicyclomine.  --Continue nortriptyline  --Continue recommendations as was discussed with our dietitian    3. Loose stools  Patient does not feel that this is related to dumping syndrome/postprandial hypoglycemia as outlined above.  Celiac serologies within normal limits.  Reports that volume of food as opposed to type of food is most likely trigger for episode of symptoms.  Could start low-dose fiber supplementation with 1/2 tablespoon of Metamucil a day.  Medication discussed.  --Start Metamucil on a daily basis with 1/2 tablespoon/day    Follow-up in 2 to 3 months    Thank you for this consultation.  It was a pleasure to participate in the care of this patient; please contact us with any further questions.     Adrienne Pedroza PA-C  Division of Gastroenterology, Hepatology & Nutrition  [facility]    HPI:   Mr. Junior is a 65 year old male with HTN, GERD s/p Nissen fundoplication, and reported dumping syndrome who is s/p R inguinal hernia repair and s/p appendectomy who presents for another opinion on his \"slipped Nissen\" and GI symptoms. He has previously been seen by Dr. Quezada, please see her documentation for additional details. He has also " "previously followed at [facility] for GERD and dumping syndrome, and has recently been seen by Dr. Zhao of surgery at [facility] for consideration of Nissen re-do.     History summarized in brief:   Reports decades of reflux starting in late 1980s/early 1990s she was treating with intermittent omeprazole.  In the 1990s does transition to daily omeprazole.  In the early 2000 fingers and toes which was attributed to the low B12, it was felt that PPI may be contributing to this underwent in 2011 so that he could stop his PPI.  Stated for well controlled neuropathy symptoms have not resolved but were stable.  Replaced B12 with PCP.  Also described intermittent issues with postprandial stools, urgency, lightheadedness weakness he was treated at [place] [facility].  Had acute symptoms.  Ago felt like something hit him hard.  Pain improved but reflux continued.  He was started on Zantac had multiple EGDs 1 tried slipped Nissen.    Patient then underwent following work-up for his symptoms:  -EGD 8/30/2017 with report indicating a normal exam with intact Nissen.   -12/4/2018 was done for BRAVO placement. EGD report indicates a large hiatal hernia was seen and the \"wrap appears slipped.\"   -BRAVO testing revealed a DeMeester score total of 24.8 (18.7 on Day 1, 29.3 on Day 2 with 100% symptom concordance).  - Esophageal manometry was done though we do not have the interpretation of this test, images are scanned (but difficult to interpret).   - esophagram (1/2/19) which showed free flow of contrast, no hiatal hernia, and mild reflux. GES 2/19/19 showed only 4% of food remaining at 2 hours and exam stopped.   - esophagram 4/1/19 with again no hernia seen on upright images, but small HH induced with abdominal pressure and Valsalva. No reflux was noted on this exam.   - CT chest 4/1/19 with small HH and fundoplication changes.    The patient had stated that Dr. Zhao would consider repeat surgery, but given his above testing " "further trial of medical management was recommended. Mr. Junior was then started on pantoprazole-is concerned that paresthesias are worsening since starting that.    Regards to dumping syndrome, this was diagnosed at [place] [facility] after gastric emptying study (rapid with oral glucose tolerance test normal), postprandial glucose checks with levels in the 50s.  Describes symptoms of dizziness, wooziness, and \"going gray \", associated with sneezing which would occur after a meal.  Typically resolved in 5 minutes.  Also noted loose stools sometimes occur at unpredictable times.  Patient was treated with baclofen and nortriptyline with 80% resolution symptoms, and baclofen was discontinued and switched to dicyclomine.      Interval history 6/23/2020:  Symptoms are going ok, symptoms are mostly controlled when he avoids having large volume and density of foods. Breakfest and lunch are the most common times for loose stools and wooziness. Eggs are a big trigger. Otherwise has cut back on amount he is eating. Has not been measuring blood sugars. Is able to tell based on symptoms- hot sweats, dizziness. Sometimes he will need to sneeze and this clears up.     Bowel movements: 2-3 times a week will have diarrhea (non-bloody, [place] scale 6 with the smell at the end). In between these days, he will usually have a fairly normal bowel movement. Was previously happening 3-4 times a week.     About a month and a half ago, he felt like something punched him in his abdomen. This was centered on the upper stomach.  Thought about going in but symptoms improved. Not doing or eating anything out of the ordinary.     GERD  Pantoprazole seems to be controlling. Not taking zantac. Has breakthrough symptoms about once a month. Is concernend that neuropathy might get worse with pantoprazole.     Continues to take Nortiptyline or Bentyl. Takes Bentyl 3 times a day     Vitamin B12- is checking with primary care last level was " "high.    Interval History 9/1/2020:  Decreasing diccylomine helped his symptoms. Symptoms of sneezing after eating has decreased significantly. Maybe will have symptoms for 10 minutes. He would say this has improved 80%. Feels like nortriptyline is going ok.     Still taking pantoprazole- rare symptoms related to heartburn situation. If he skips this for a couple of days he will have symptoms. Has not had to take pepcid.     Bowel movements: some loose stools in the morning (related to amount of food the day before). Does not seem to be type of foods, volume related. Stools are a little loose 2-3 times a week.     Has had increased \"hay fever symptoms\"-    PERTINENT PAST MEDICAL HISTORY:  Hypertension  GERD  \"Dumping syndrome\"    PREVIOUS SURGERIES:  Past Surgical History:   Procedure Laterality Date     ABDOMEN SURGERY  2012?     APPENDECTOMY  1964?     COLONOSCOPY  2006, 2016     EYE SURGERY  199x    laser for retinal tears     HERNIA REPAIR  1961?     SOFT TISSUE SURGERY  2009?    MCL l. thumb     S/p tonsillectomy and adenoidectomy  S/p R inguinal hernia repair  S/p Nissen fundoplication      ALLERGIES:     Allergies   Allergen Reactions     Hornets      Wasp Venom Protein Hives     Bee Venom Swelling       PERTINENT MEDICATIONS:  Current Outpatient Medications   Medication     atenolol (TENORMIN) 50 MG tablet     cyanocobalamin (VITAMIN B-12) 500 MCG tablet     dicyclomine (BENTYL) 20 MG tablet     EPINEPHrine (EPIPEN 2-GINO) 0.3 MG/0.3ML injection 2-pack     gabapentin (NEURONTIN) 300 MG capsule     nortriptyline (PAMELOR) 25 MG capsule     pantoprazole (PROTONIX) 40 MG EC tablet     No current facility-administered medications for this visit.        SOCIAL HISTORY:  Social History     Socioeconomic History     Marital status:      Spouse name: Not on file     Number of children: Not on file     Years of education: Not on file     Highest education level: Not on file   Occupational History     Not on " file   Social Needs     Financial resource strain: Not on file     Food insecurity     Worry: Not on file     Inability: Not on file     Transportation needs     Medical: Not on file     Non-medical: Not on file   Tobacco Use     Smoking status: Never Smoker     Smokeless tobacco: Never Used   Substance and Sexual Activity     Alcohol use: Yes     Alcohol/week: 3.0 - 6.0 standard drinks     Types: 1 - 2 Glasses of wine, 1 - 2 Cans of beer, 1 - 2 Shots of liquor per week     Frequency: 2-3 times a week     Drinks per session: 1 or 2     Binge frequency: Less than monthly     Comment: glass of wine with dinner, can of beer in evening or 1 whiskey  or bourbon     Drug use: Never     Sexual activity: Yes     Partners: Female     Birth control/protection: Post-menopausal, Male Surgical, Female Surgical   Lifestyle     Physical activity     Days per week: Not on file     Minutes per session: Not on file     Stress: Not on file   Relationships     Social connections     Talks on phone: Not on file     Gets together: Not on file     Attends [employer]ah's witness service: Not on file     Active member of club or organization: Not on file     Attends meetings of clubs or organizations: Not on file     Relationship status: Not on file     Intimate partner violence     Fear of current or ex partner: Not on file     Emotionally abused: Not on file     Physically abused: Not on file     Forced sexual activity: Not on file   Other Topics Concern     Not on file   Social History Narrative     Not on file       FAMILY HISTORY:  Family History   Problem Relation Age of Onset     Coronary Artery Disease Maternal Grandfather      Hypertension Mother      Uterine Cancer Mother         1965       Past/family/social history reviewed and no changes    PHYSICAL EXAMINATION:  Telephone visit in the setting of COVID pandemic

## 2020-09-02 ENCOUNTER — TELEPHONE (OUTPATIENT)
Dept: GASTROENTEROLOGY | Facility: CLINIC | Age: 65
End: 2020-09-02

## 2020-09-17 ENCOUNTER — RECORDS - HEALTHEAST (OUTPATIENT)
Dept: LAB | Facility: CLINIC | Age: 65
End: 2020-09-17

## 2020-09-17 LAB
ALBUMIN SERPL-MCNC: 4.2 G/DL (ref 3.5–5)
ALP SERPL-CCNC: 98 U/L (ref 45–120)
ALT SERPL W P-5'-P-CCNC: 37 U/L (ref 0–45)
ANION GAP SERPL CALCULATED.3IONS-SCNC: 11 MMOL/L (ref 5–18)
AST SERPL W P-5'-P-CCNC: 22 U/L (ref 0–40)
BILIRUB SERPL-MCNC: 0.8 MG/DL (ref 0–1)
BUN SERPL-MCNC: 13 MG/DL (ref 8–22)
CALCIUM SERPL-MCNC: 9.3 MG/DL (ref 8.5–10.5)
CHLORIDE BLD-SCNC: 105 MMOL/L (ref 98–107)
CO2 SERPL-SCNC: 23 MMOL/L (ref 22–31)
CREAT SERPL-MCNC: 0.92 MG/DL (ref 0.7–1.3)
GFR SERPL CREATININE-BSD FRML MDRD: >60 ML/MIN/1.73M2
GLUCOSE BLD-MCNC: 86 MG/DL (ref 70–125)
POTASSIUM BLD-SCNC: 4.5 MMOL/L (ref 3.5–5)
PROT SERPL-MCNC: 7.3 G/DL (ref 6–8)
SODIUM SERPL-SCNC: 139 MMOL/L (ref 136–145)

## 2020-09-22 DIAGNOSIS — K44.9 HIATAL HERNIA WITH GASTROESOPHAGEAL REFLUX: Primary | ICD-10-CM

## 2020-09-22 DIAGNOSIS — K21.9 HIATAL HERNIA WITH GASTROESOPHAGEAL REFLUX: Primary | ICD-10-CM

## 2020-09-24 ENCOUNTER — VIRTUAL VISIT (OUTPATIENT)
Dept: SURGERY | Facility: CLINIC | Age: 65
End: 2020-09-24
Attending: STUDENT IN AN ORGANIZED HEALTH CARE EDUCATION/TRAINING PROGRAM
Payer: COMMERCIAL

## 2020-09-24 ENCOUNTER — ANESTHESIA EVENT (OUTPATIENT)
Dept: SURGERY | Facility: CLINIC | Age: 65
End: 2020-09-24
Payer: COMMERCIAL

## 2020-09-24 DIAGNOSIS — K21.9 GASTROESOPHAGEAL REFLUX DISEASE WITHOUT ESOPHAGITIS: Primary | ICD-10-CM

## 2020-09-24 PROCEDURE — 40001009 ZZH VIDEO/TELEPHONE VISIT; NO CHARGE

## 2020-09-24 NOTE — LETTER
"    9/24/2020         RE: Bal Junior  [address]  [place] [address]        Dear Colleague,    Thank you for referring your patient, Bal Junior, to the [facility]. Please see a copy of my visit note below.    Bal Junior is a 65 year old male who is being evaluated via a billable telephone visit.      The patient has been notified of following:     \"This telephone visit will be conducted via a call between you and your physician/provider. We have found that certain health care needs can be provided without the need for a physical exam.  This service lets us provide the care you need with a short phone conversation.  If a prescription is necessary we can send it directly to your pharmacy.  If lab work is needed we can place an order for that and you can then stop by our lab to have the test done at a later time.    Telephone visits are billed at different rates depending on your insurance coverage. During this emergency period, for some insurers they may be billed the same as an in-person visit.  Please reach out to your insurance provider with any questions.    If during the course of the call the physician/provider feels a telephone visit is not appropriate, you will not be charged for this service.\"    Patient has given verbal consent for Telephone visit?  Yes    What phone number would you like to be contacted at? [phone]    How would you like to obtain your AVS? Mail a copy    Vitals - Patient Reported 9/24/2020   Height (Patient Reported) 2' 3.756\"   Weight (Patient Reported) 217 lb 6.4 oz   BMI (Based on Pt Reported Ht/Wt) 198.4 kg/m2   Systolic (Patient Reported) 122   Diastolic (Patient Reported) 82   Pulse (Patient Reported) 55     2/10 on pain scale.     Shanda Cain CMA          Phone call duration minutes    Katlyn Tobar MD    THORACIC SURGERY FOLLOW UP VISIT    HPI  . Bal Junior is a 65 year old year-old male who has had a long standing history of " reflux. He was on long term PPI and was diagnosed with peripheral neuropathy likely due to prolonged use of PPIs. He had a Nissen fundoplication in 2011 which gave him good relief of symptoms until a couple of years ago when he suddenly experienced upper abdominal pain and then strted to have reflux symptoms again. He is back on PPIs.  He is currently scheduled for a take down and re-do Nissen tmrw and is here for a pre operative visit.  He says his dumping syndrome symtpms are much beter after some change in medications     Previsit Tests   EGD 5/19: ? Slipped Nissen, no esophagitis  12/4/2018 BRAVO: DeMeester score total of 24.8 (18.7 on Day 1, 29.3 on Day 2 with 100% symptom concordance)  Esophageal manometry was done though we do not have the interpretation of this test, images are scanned (but difficult to interpret)   1/2/19  Esophagram: Free flow of contrast, no hiatal hernia, and mild reflux.   2/19/19: Gastric Emptying study: showed only 4% of food remaining at 2 hours and exam stopped.   4/1/19: Repeat esophagram no hernia seen on upright images, but small HH induced with abdominal pressure and Valsalva. No reflux was noted on this exam.  4/1/19: CT chest : appears to have herniated wrap  PMH        Past Medical History        Past Medical History:   Diagnosis Date     Stomach problems Noted earlier            PSH        Past Surgical History         Past Surgical History:   Procedure Laterality Date     ABDOMEN SURGERY   2012?     APPENDECTOMY   1964?     COLONOSCOPY   2006, 2016     EYE SURGERY   199x     laser for retinal tears     HERNIA REPAIR   1961?     SOFT TISSUE SURGERY   2009?     MCL l. thumb            ETOH denies   TOB denies      Physical examination  BMI  32           IMPRESSION (Z98.890) History of Nissen fundoplication  Comment:   Plan:      (K21.9) Gastroesophageal reflux disease without esophagitis  Comment:   Plan:        PLAN  I spent a total of 40 minutes with Mr. Bal Junior , more  than 50% of which were spent in counseling, coordination of care, and face-to-face time. I reviewed the plan as follows:  We discussed again that the procedure is intended to take care of his reflux and not fot the dumping syndrome.  Procedure planned:  Laparoscopic take down of Nissen fundoplication, hiatal hernia repair and re-do Nissen fundoplication, possible PEG tube. I reviewed the indications, risks, and benefits of the procedure with Mr. Bal Junior.  We discussed the risks that include but are not limited to bleeding, infection, need for reoperation, conversion to laparotomy, potential long-term failure, and death. I explained the anticipated hospital course (2-5 days) and postoperative recovery, transient dysphagia, transient diarrhea, and permanent postprandial bloating which can be mitigated with proper dietary habits. We discussed the importance of lifetime awareness to limit lifting heavy weights in case of identifying a hiatal hernia.  .  Consent: pending   Necessary Preop Tests & Appointments:   Pre op H and P done   Regional Anesthesia Plan: local   Anticoagulation Plan: lovenox in holding   All questions were answered and Bal Junior and present family were in agreement with the plan.  I appreciate the opportunity to participate in the care of your patient and will keep you updated.  Sincerely,    Katlyn Tobar MD

## 2020-09-24 NOTE — PROGRESS NOTES
"Bal Junior is a 65 year old male who is being evaluated via a billable telephone visit.      The patient has been notified of following:     \"This telephone visit will be conducted via a call between you and your physician/provider. We have found that certain health care needs can be provided without the need for a physical exam.  This service lets us provide the care you need with a short phone conversation.  If a prescription is necessary we can send it directly to your pharmacy.  If lab work is needed we can place an order for that and you can then stop by our lab to have the test done at a later time.    Telephone visits are billed at different rates depending on your insurance coverage. During this emergency period, for some insurers they may be billed the same as an in-person visit.  Please reach out to your insurance provider with any questions.    If during the course of the call the physician/provider feels a telephone visit is not appropriate, you will not be charged for this service.\"    Patient has given verbal consent for Telephone visit?  Yes    What phone number would you like to be contacted at? [phone]    How would you like to obtain your AVS? Mail a copy    Vitals - Patient Reported 9/24/2020   Height (Patient Reported) 2' 3.756\"   Weight (Patient Reported) 217 lb 6.4 oz   BMI (Based on Pt Reported Ht/Wt) 198.4 kg/m2   Systolic (Patient Reported) 122   Diastolic (Patient Reported) 82   Pulse (Patient Reported) 55     2/10 on pain scale.     Shanda Cain, [facility]          Phone call duration minutes    Katlyn Tobar MD    THORACIC SURGERY FOLLOW UP VISIT    HPI  Mr. Bal Junior is a 65 year old year-old male who has had a long standing history of reflux. He was on long term PPI and was diagnosed with peripheral neuropathy likely due to prolonged use of PPIs. He had a Nissen fundoplication in 2011 which gave him good relief of symptoms until a couple of years ago when he suddenly " experienced upper abdominal pain and then strted to have reflux symptoms again. He is back on PPIs.  He is currently scheduled for a take down and re-do Nissen tmrw and is here for a pre operative visit.  He says his dumping syndrome symtpms are much beter after some change in medications     Previsit Tests   EGD 5/19: ? Slipped Nissen, no esophagitis  12/4/2018 BRAVO: DeMeester score total of 24.8 (18.7 on Day 1, 29.3 on Day 2 with 100% symptom concordance)  Esophageal manometry was done though we do not have the interpretation of this test, images are scanned (but difficult to interpret)   1/2/19  Esophagram: Free flow of contrast, no hiatal hernia, and mild reflux.   2/19/19: Gastric Emptying study: showed only 4% of food remaining at 2 hours and exam stopped.   4/1/19: Repeat esophagram no hernia seen on upright images, but small HH induced with abdominal pressure and Valsalva. No reflux was noted on this exam.  4/1/19: CT chest : appears to have herniated wrap  PMH        Past Medical History        Past Medical History:   Diagnosis Date     Stomach problems Noted earlier            PSH        Past Surgical History         Past Surgical History:   Procedure Laterality Date     ABDOMEN SURGERY   2012?     APPENDECTOMY   1964?     COLONOSCOPY   2006, 2016     EYE SURGERY   199x     laser for retinal tears     HERNIA REPAIR   1961?     SOFT TISSUE SURGERY   2009?     MCL l. thumb            ETOH denies   TOB denies      Physical examination  BMI  32           IMPRESSION (Z98.890) History of Nissen fundoplication  Comment:   Plan:      (K21.9) Gastroesophageal reflux disease without esophagitis  Comment:   Plan:        PLAN  I spent a total of 40 minutes with . Bal Juniro , more than 50% of which were spent in counseling, coordination of care, and face-to-face time. I reviewed the plan as follows:  We discussed again that the procedure is intended to take care of his reflux and not fot the dumping  syndrome.  Procedure planned:  Laparoscopic take down of Nissen fundoplication, hiatal hernia repair and re-do Nissen fundoplication, possible PEG tube. I reviewed the indications, risks, and benefits of the procedure with . Bal Junior.  We discussed the risks that include but are not limited to bleeding, infection, need for reoperation, conversion to laparotomy, potential long-term failure, and death. I explained the anticipated hospital course (2-5 days) and postoperative recovery, transient dysphagia, transient diarrhea, and permanent postprandial bloating which can be mitigated with proper dietary habits. We discussed the importance of lifetime awareness to limit lifting heavy weights in case of identifying a hiatal hernia.  .  Consent: pending   Necessary Preop Tests & Appointments:   Pre op H and P done   Regional Anesthesia Plan: local   Anticoagulation Plan: lovenox in holding       All questions were answered and Bal Junior and present family were in agreement with the plan.  I appreciate the opportunity to participate in the care of your patient and will keep you updated.  Sincerely,

## 2020-09-25 ENCOUNTER — ANESTHESIA (OUTPATIENT)
Dept: SURGERY | Facility: CLINIC | Age: 65
End: 2020-09-25
Payer: COMMERCIAL

## 2020-09-25 ENCOUNTER — APPOINTMENT (OUTPATIENT)
Dept: GENERAL RADIOLOGY | Facility: CLINIC | Age: 65
End: 2020-09-25
Attending: STUDENT IN AN ORGANIZED HEALTH CARE EDUCATION/TRAINING PROGRAM
Payer: COMMERCIAL

## 2020-09-25 ENCOUNTER — HOSPITAL ENCOUNTER (INPATIENT)
Facility: CLINIC | Age: 65
LOS: 3 days | Discharge: HOME | End: 2020-09-28
Attending: STUDENT IN AN ORGANIZED HEALTH CARE EDUCATION/TRAINING PROGRAM | Admitting: STUDENT IN AN ORGANIZED HEALTH CARE EDUCATION/TRAINING PROGRAM
Payer: COMMERCIAL

## 2020-09-25 DIAGNOSIS — K44.9 HIATAL HERNIA: ICD-10-CM

## 2020-09-25 PROBLEM — Z93.1 S/P NISSEN FUNDOPLICATION (WITH GASTROSTOMY TUBE PLACEMENT) (H): Status: ACTIVE | Noted: 2020-09-25

## 2020-09-25 LAB
ABO + RH BLD: NORMAL
ABO + RH BLD: NORMAL
ANION GAP SERPL CALCULATED.3IONS-SCNC: 12 MMOL/L (ref 3–14)
BASE DEFICIT BLDA-SCNC: 5.3 MMOL/L
BASE DEFICIT BLDA-SCNC: 5.9 MMOL/L
BASE DEFICIT BLDA-SCNC: 6.5 MMOL/L
BASE DEFICIT BLDA-SCNC: 7 MMOL/L
BASE DEFICIT BLDA-SCNC: 7.3 MMOL/L
BASE DEFICIT BLDA-SCNC: 7.4 MMOL/L
BASE DEFICIT BLDA-SCNC: 8.4 MMOL/L
BLD GP AB SCN SERPL QL: NORMAL
BLOOD BANK CMNT PATIENT-IMP: NORMAL
BUN SERPL-MCNC: 17 MG/DL (ref 7–30)
CA-I BLD-MCNC: 4.2 MG/DL (ref 4.4–5.2)
CA-I BLD-MCNC: 4.4 MG/DL (ref 4.4–5.2)
CA-I BLD-MCNC: 4.4 MG/DL (ref 4.4–5.2)
CA-I BLD-MCNC: 4.6 MG/DL (ref 4.4–5.2)
CA-I BLD-MCNC: 4.6 MG/DL (ref 4.4–5.2)
CA-I BLD-MCNC: 4.7 MG/DL (ref 4.4–5.2)
CALCIUM SERPL-MCNC: 7.9 MG/DL (ref 8.5–10.1)
CHLORIDE SERPL-SCNC: 105 MMOL/L (ref 94–109)
CO2 SERPL-SCNC: 19 MMOL/L (ref 20–32)
CREAT SERPL-MCNC: 1.17 MG/DL (ref 0.66–1.25)
ERYTHROCYTE [DISTWIDTH] IN BLOOD BY AUTOMATED COUNT: 12.5 % (ref 10–15)
GFR SERPL CREATININE-BSD FRML MDRD: 65 ML/MIN/{1.73_M2}
GLUCOSE BLD-MCNC: 139 MG/DL (ref 70–99)
GLUCOSE BLD-MCNC: 142 MG/DL (ref 70–99)
GLUCOSE BLD-MCNC: 152 MG/DL (ref 70–99)
GLUCOSE BLD-MCNC: 169 MG/DL (ref 70–99)
GLUCOSE BLD-MCNC: 193 MG/DL (ref 70–99)
GLUCOSE BLD-MCNC: 201 MG/DL (ref 70–99)
GLUCOSE BLDC GLUCOMTR-MCNC: 115 MG/DL (ref 70–99)
GLUCOSE BLDC GLUCOMTR-MCNC: 127 MG/DL (ref 70–99)
GLUCOSE BLDC GLUCOMTR-MCNC: 128 MG/DL (ref 70–99)
GLUCOSE BLDC GLUCOMTR-MCNC: 134 MG/DL (ref 70–99)
GLUCOSE BLDC GLUCOMTR-MCNC: 140 MG/DL (ref 70–99)
GLUCOSE SERPL-MCNC: 140 MG/DL (ref 70–99)
HCO3 BLD-SCNC: 19 MMOL/L (ref 21–28)
HCO3 BLD-SCNC: 19 MMOL/L (ref 21–28)
HCO3 BLD-SCNC: 20 MMOL/L (ref 21–28)
HCO3 BLD-SCNC: 21 MMOL/L (ref 21–28)
HCT VFR BLD AUTO: 42.3 % (ref 40–53)
HGB BLD-MCNC: 14.3 G/DL (ref 13.3–17.7)
HGB BLD-MCNC: 14.7 G/DL (ref 13.3–17.7)
HGB BLD-MCNC: 15 G/DL (ref 13.3–17.7)
HGB BLD-MCNC: 15.4 G/DL (ref 13.3–17.7)
HGB BLD-MCNC: 15.4 G/DL (ref 13.3–17.7)
HGB BLD-MCNC: 16.3 G/DL (ref 13.3–17.7)
HGB BLD-MCNC: 16.4 G/DL (ref 13.3–17.7)
LACTATE BLD-SCNC: 2.6 MMOL/L (ref 0.7–2)
LACTATE BLD-SCNC: 3.4 MMOL/L (ref 0.7–2)
LACTATE BLD-SCNC: 3.7 MMOL/L (ref 0.7–2)
LACTATE BLD-SCNC: 4 MMOL/L (ref 0.7–2)
LACTATE BLD-SCNC: 4.2 MMOL/L (ref 0.7–2)
LACTATE BLD-SCNC: 4.6 MMOL/L (ref 0.7–2)
LACTATE BLD-SCNC: 5.2 MMOL/L (ref 0.7–2)
MCH RBC QN AUTO: 30.4 PG (ref 26.5–33)
MCHC RBC AUTO-ENTMCNC: 33.8 G/DL (ref 31.5–36.5)
MCV RBC AUTO: 90 FL (ref 78–100)
O2/TOTAL GAS SETTING VFR VENT: 10 %
O2/TOTAL GAS SETTING VFR VENT: 41 %
O2/TOTAL GAS SETTING VFR VENT: 52 %
O2/TOTAL GAS SETTING VFR VENT: 60 %
PCO2 BLD: 36 MM HG (ref 35–45)
PCO2 BLD: 40 MM HG (ref 35–45)
PCO2 BLD: 40 MM HG (ref 35–45)
PCO2 BLD: 44 MM HG (ref 35–45)
PCO2 BLD: 44 MM HG (ref 35–45)
PCO2 BLD: 46 MM HG (ref 35–45)
PCO2 BLD: 46 MM HG (ref 35–45)
PH BLD: 7.24 PH (ref 7.35–7.45)
PH BLD: 7.24 PH (ref 7.35–7.45)
PH BLD: 7.26 PH (ref 7.35–7.45)
PH BLD: 7.26 PH (ref 7.35–7.45)
PH BLD: 7.29 PH (ref 7.35–7.45)
PH BLD: 7.31 PH (ref 7.35–7.45)
PH BLD: 7.35 PH (ref 7.35–7.45)
PLATELET # BLD AUTO: 168 10E9/L (ref 150–450)
PO2 BLD: 102 MM HG (ref 80–105)
PO2 BLD: 107 MM HG (ref 80–105)
PO2 BLD: 113 MM HG (ref 80–105)
PO2 BLD: 138 MM HG (ref 80–105)
PO2 BLD: 78 MM HG (ref 80–105)
PO2 BLD: 80 MM HG (ref 80–105)
PO2 BLD: 93 MM HG (ref 80–105)
POTASSIUM BLD-SCNC: 4.6 MMOL/L (ref 3.4–5.3)
POTASSIUM BLD-SCNC: 4.8 MMOL/L (ref 3.4–5.3)
POTASSIUM BLD-SCNC: 4.8 MMOL/L (ref 3.4–5.3)
POTASSIUM BLD-SCNC: 5.1 MMOL/L (ref 3.4–5.3)
POTASSIUM BLD-SCNC: 5.3 MMOL/L (ref 3.4–5.3)
POTASSIUM BLD-SCNC: 5.4 MMOL/L (ref 3.4–5.3)
POTASSIUM SERPL-SCNC: 4.5 MMOL/L (ref 3.4–5.3)
RBC # BLD AUTO: 4.71 10E12/L (ref 4.4–5.9)
SODIUM BLD-SCNC: 134 MMOL/L (ref 133–144)
SODIUM BLD-SCNC: 135 MMOL/L (ref 133–144)
SODIUM BLD-SCNC: 136 MMOL/L (ref 133–144)
SODIUM BLD-SCNC: 138 MMOL/L (ref 133–144)
SODIUM SERPL-SCNC: 135 MMOL/L (ref 133–144)
SPECIMEN EXP DATE BLD: NORMAL
WBC # BLD AUTO: 16.4 10E9/L (ref 4–11)

## 2020-09-25 PROCEDURE — 25000132 ZZH RX MED GY IP 250 OP 250 PS 637

## 2020-09-25 PROCEDURE — 40000170 ZZH STATISTIC PRE-PROCEDURE ASSESSMENT II: Performed by: STUDENT IN AN ORGANIZED HEALTH CARE EDUCATION/TRAINING PROGRAM

## 2020-09-25 PROCEDURE — 25000125 ZZHC RX 250: Performed by: NURSE ANESTHETIST, CERTIFIED REGISTERED

## 2020-09-25 PROCEDURE — 37000008 ZZH ANESTHESIA TECHNICAL FEE, 1ST 30 MIN: Performed by: STUDENT IN AN ORGANIZED HEALTH CARE EDUCATION/TRAINING PROGRAM

## 2020-09-25 PROCEDURE — 12000004 ZZH R&B IMCU UMMC

## 2020-09-25 PROCEDURE — 84132 ASSAY OF SERUM POTASSIUM: CPT

## 2020-09-25 PROCEDURE — 25000125 ZZHC RX 250

## 2020-09-25 PROCEDURE — 71000014 ZZH RECOVERY PHASE 1 LEVEL 2 FIRST HR: Performed by: STUDENT IN AN ORGANIZED HEALTH CARE EDUCATION/TRAINING PROGRAM

## 2020-09-25 PROCEDURE — 25000128 H RX IP 250 OP 636

## 2020-09-25 PROCEDURE — 27210794 ZZH OR GENERAL SUPPLY STERILE: Performed by: STUDENT IN AN ORGANIZED HEALTH CARE EDUCATION/TRAINING PROGRAM

## 2020-09-25 PROCEDURE — 83605 ASSAY OF LACTIC ACID: CPT

## 2020-09-25 PROCEDURE — 86900 BLOOD TYPING SEROLOGIC ABO: CPT | Performed by: STUDENT IN AN ORGANIZED HEALTH CARE EDUCATION/TRAINING PROGRAM

## 2020-09-25 PROCEDURE — 37000009 ZZH ANESTHESIA TECHNICAL FEE, EACH ADDTL 15 MIN: Performed by: STUDENT IN AN ORGANIZED HEALTH CARE EDUCATION/TRAINING PROGRAM

## 2020-09-25 PROCEDURE — 25000128 H RX IP 250 OP 636: Performed by: NURSE ANESTHETIST, CERTIFIED REGISTERED

## 2020-09-25 PROCEDURE — 25000565 ZZH ISOFLURANE, EA 15 MIN: Performed by: STUDENT IN AN ORGANIZED HEALTH CARE EDUCATION/TRAINING PROGRAM

## 2020-09-25 PROCEDURE — 40000986 XR CHEST PORT 1 VW

## 2020-09-25 PROCEDURE — 40000275 ZZH STATISTIC RCP TIME EA 10 MIN

## 2020-09-25 PROCEDURE — 25800030 ZZH RX IP 258 OP 636

## 2020-09-25 PROCEDURE — 84295 ASSAY OF SERUM SODIUM: CPT

## 2020-09-25 PROCEDURE — 71000015 ZZH RECOVERY PHASE 1 LEVEL 2 EA ADDTL HR: Performed by: STUDENT IN AN ORGANIZED HEALTH CARE EDUCATION/TRAINING PROGRAM

## 2020-09-25 PROCEDURE — 86850 RBC ANTIBODY SCREEN: CPT | Performed by: STUDENT IN AN ORGANIZED HEALTH CARE EDUCATION/TRAINING PROGRAM

## 2020-09-25 PROCEDURE — 00000146 ZZHCL STATISTIC GLUCOSE BY METER IP

## 2020-09-25 PROCEDURE — 36415 COLL VENOUS BLD VENIPUNCTURE: CPT | Performed by: STUDENT IN AN ORGANIZED HEALTH CARE EDUCATION/TRAINING PROGRAM

## 2020-09-25 PROCEDURE — 25800030 ZZH RX IP 258 OP 636: Performed by: NURSE ANESTHETIST, CERTIFIED REGISTERED

## 2020-09-25 PROCEDURE — 25000128 H RX IP 250 OP 636: Performed by: ANESTHESIOLOGY

## 2020-09-25 PROCEDURE — P9041 ALBUMIN (HUMAN),5%, 50ML: HCPCS | Performed by: NURSE ANESTHETIST, CERTIFIED REGISTERED

## 2020-09-25 PROCEDURE — 85027 COMPLETE CBC AUTOMATED: CPT

## 2020-09-25 PROCEDURE — 86901 BLOOD TYPING SEROLOGIC RH(D): CPT | Performed by: STUDENT IN AN ORGANIZED HEALTH CARE EDUCATION/TRAINING PROGRAM

## 2020-09-25 PROCEDURE — 82947 ASSAY GLUCOSE BLOOD QUANT: CPT

## 2020-09-25 PROCEDURE — 25000128 H RX IP 250 OP 636: Performed by: STUDENT IN AN ORGANIZED HEALTH CARE EDUCATION/TRAINING PROGRAM

## 2020-09-25 PROCEDURE — 36000064 ZZH SURGERY LEVEL 4 EA 15 ADDTL MIN - UMMC: Performed by: STUDENT IN AN ORGANIZED HEALTH CARE EDUCATION/TRAINING PROGRAM

## 2020-09-25 PROCEDURE — 40000014 ZZH STATISTIC ARTERIAL MONITORING DAILY

## 2020-09-25 PROCEDURE — 82330 ASSAY OF CALCIUM: CPT

## 2020-09-25 PROCEDURE — 80048 BASIC METABOLIC PNL TOTAL CA: CPT | Performed by: ANESTHESIOLOGY

## 2020-09-25 PROCEDURE — 36000062 ZZH SURGERY LEVEL 4 1ST 30 MIN - UMMC: Performed by: STUDENT IN AN ORGANIZED HEALTH CARE EDUCATION/TRAINING PROGRAM

## 2020-09-25 PROCEDURE — 25000125 ZZHC RX 250: Performed by: STUDENT IN AN ORGANIZED HEALTH CARE EDUCATION/TRAINING PROGRAM

## 2020-09-25 PROCEDURE — 82803 BLOOD GASES ANY COMBINATION: CPT

## 2020-09-25 RX ORDER — SODIUM CHLORIDE, SODIUM LACTATE, POTASSIUM CHLORIDE, CALCIUM CHLORIDE 600; 310; 30; 20 MG/100ML; MG/100ML; MG/100ML; MG/100ML
INJECTION, SOLUTION INTRAVENOUS CONTINUOUS PRN
Status: DISCONTINUED | OUTPATIENT
Start: 2020-09-25 | End: 2020-09-25

## 2020-09-25 RX ORDER — ONDANSETRON 4 MG/1
4 TABLET, ORALLY DISINTEGRATING ORAL EVERY 30 MIN PRN
Status: DISCONTINUED | OUTPATIENT
Start: 2020-09-25 | End: 2020-09-25 | Stop reason: HOSPADM

## 2020-09-25 RX ORDER — SODIUM CHLORIDE, SODIUM LACTATE, POTASSIUM CHLORIDE, CALCIUM CHLORIDE 600; 310; 30; 20 MG/100ML; MG/100ML; MG/100ML; MG/100ML
INJECTION, SOLUTION INTRAVENOUS CONTINUOUS
Status: DISCONTINUED | OUTPATIENT
Start: 2020-09-25 | End: 2020-09-25 | Stop reason: HOSPADM

## 2020-09-25 RX ORDER — GABAPENTIN 300 MG/1
300 CAPSULE ORAL 2 TIMES DAILY
Status: DISCONTINUED | OUTPATIENT
Start: 2020-09-25 | End: 2020-09-28 | Stop reason: HOSPADM

## 2020-09-25 RX ORDER — CEFAZOLIN SODIUM 2 G/100ML
2 INJECTION, SOLUTION INTRAVENOUS
Status: COMPLETED | OUTPATIENT
Start: 2020-09-25 | End: 2020-09-25

## 2020-09-25 RX ORDER — FENTANYL CITRATE 50 UG/ML
INJECTION, SOLUTION INTRAMUSCULAR; INTRAVENOUS PRN
Status: DISCONTINUED | OUTPATIENT
Start: 2020-09-25 | End: 2020-09-25

## 2020-09-25 RX ORDER — GINSENG 100 MG
CAPSULE ORAL 3 TIMES DAILY
Status: DISCONTINUED | OUTPATIENT
Start: 2020-09-25 | End: 2020-09-28 | Stop reason: HOSPADM

## 2020-09-25 RX ORDER — METHOCARBAMOL 500 MG/1
500 TABLET, FILM COATED ORAL 4 TIMES DAILY
Status: DISCONTINUED | OUTPATIENT
Start: 2020-09-25 | End: 2020-09-28 | Stop reason: HOSPADM

## 2020-09-25 RX ORDER — OXYCODONE HYDROCHLORIDE 5 MG/1
5-10 TABLET ORAL EVERY 4 HOURS PRN
Status: DISCONTINUED | OUTPATIENT
Start: 2020-09-25 | End: 2020-09-28 | Stop reason: HOSPADM

## 2020-09-25 RX ORDER — NORTRIPTYLINE HCL 25 MG
25 CAPSULE ORAL DAILY
Status: DISCONTINUED | OUTPATIENT
Start: 2020-09-26 | End: 2020-09-26

## 2020-09-25 RX ORDER — ESMOLOL HYDROCHLORIDE 10 MG/ML
INJECTION INTRAVENOUS PRN
Status: DISCONTINUED | OUTPATIENT
Start: 2020-09-25 | End: 2020-09-25

## 2020-09-25 RX ORDER — ONDANSETRON 2 MG/ML
INJECTION INTRAMUSCULAR; INTRAVENOUS PRN
Status: DISCONTINUED | OUTPATIENT
Start: 2020-09-25 | End: 2020-09-25

## 2020-09-25 RX ORDER — ACETAMINOPHEN 325 MG/1
975 TABLET ORAL EVERY 6 HOURS
Status: DISCONTINUED | OUTPATIENT
Start: 2020-09-25 | End: 2020-09-28 | Stop reason: HOSPADM

## 2020-09-25 RX ORDER — LIDOCAINE HYDROCHLORIDE 20 MG/ML
INJECTION, SOLUTION INFILTRATION; PERINEURAL PRN
Status: DISCONTINUED | OUTPATIENT
Start: 2020-09-25 | End: 2020-09-25

## 2020-09-25 RX ORDER — PROPOFOL 10 MG/ML
INJECTION, EMULSION INTRAVENOUS PRN
Status: DISCONTINUED | OUTPATIENT
Start: 2020-09-25 | End: 2020-09-25

## 2020-09-25 RX ORDER — BUPIVACAINE HYDROCHLORIDE AND EPINEPHRINE 2.5; 5 MG/ML; UG/ML
INJECTION, SOLUTION INFILTRATION; PERINEURAL PRN
Status: DISCONTINUED | OUTPATIENT
Start: 2020-09-25 | End: 2020-09-25 | Stop reason: HOSPADM

## 2020-09-25 RX ORDER — GLYCOPYRROLATE 0.2 MG/ML
INJECTION, SOLUTION INTRAMUSCULAR; INTRAVENOUS PRN
Status: DISCONTINUED | OUTPATIENT
Start: 2020-09-25 | End: 2020-09-25

## 2020-09-25 RX ORDER — EPHEDRINE SULFATE 50 MG/ML
INJECTION, SOLUTION INTRAMUSCULAR; INTRAVENOUS; SUBCUTANEOUS PRN
Status: DISCONTINUED | OUTPATIENT
Start: 2020-09-25 | End: 2020-09-25

## 2020-09-25 RX ORDER — OXYCODONE HYDROCHLORIDE 5 MG/1
5 TABLET ORAL EVERY 4 HOURS PRN
Status: DISCONTINUED | OUTPATIENT
Start: 2020-09-25 | End: 2020-09-25

## 2020-09-25 RX ORDER — PHENYLEPHRINE HCL IN 0.9% NACL 50MG/250ML
0.5-6 PLASTIC BAG, INJECTION (ML) INTRAVENOUS CONTINUOUS
Status: DISCONTINUED | OUTPATIENT
Start: 2020-09-25 | End: 2020-09-25 | Stop reason: HOSPADM

## 2020-09-25 RX ORDER — ONDANSETRON 2 MG/ML
4 INJECTION INTRAMUSCULAR; INTRAVENOUS EVERY 30 MIN PRN
Status: DISCONTINUED | OUTPATIENT
Start: 2020-09-25 | End: 2020-09-25 | Stop reason: HOSPADM

## 2020-09-25 RX ORDER — ALBUMIN, HUMAN INJ 5% 5 %
SOLUTION INTRAVENOUS CONTINUOUS PRN
Status: DISCONTINUED | OUTPATIENT
Start: 2020-09-25 | End: 2020-09-25

## 2020-09-25 RX ORDER — HEPARIN SODIUM 5000 [USP'U]/.5ML
5000 INJECTION, SOLUTION INTRAVENOUS; SUBCUTANEOUS EVERY 8 HOURS
Status: DISCONTINUED | OUTPATIENT
Start: 2020-09-26 | End: 2020-09-28 | Stop reason: HOSPADM

## 2020-09-25 RX ORDER — CEFAZOLIN SODIUM 1 G/3ML
1 INJECTION, POWDER, FOR SOLUTION INTRAMUSCULAR; INTRAVENOUS SEE ADMIN INSTRUCTIONS
Status: DISCONTINUED | OUTPATIENT
Start: 2020-09-25 | End: 2020-09-25 | Stop reason: HOSPADM

## 2020-09-25 RX ORDER — LABETALOL 20 MG/4 ML (5 MG/ML) INTRAVENOUS SYRINGE
PRN
Status: DISCONTINUED | OUTPATIENT
Start: 2020-09-25 | End: 2020-09-25

## 2020-09-25 RX ORDER — HYDROMORPHONE HYDROCHLORIDE 1 MG/ML
.3-.5 INJECTION, SOLUTION INTRAMUSCULAR; INTRAVENOUS; SUBCUTANEOUS EVERY 5 MIN PRN
Status: DISCONTINUED | OUTPATIENT
Start: 2020-09-25 | End: 2020-09-25 | Stop reason: HOSPADM

## 2020-09-25 RX ORDER — LIDOCAINE 40 MG/G
CREAM TOPICAL
Status: DISCONTINUED | OUTPATIENT
Start: 2020-09-25 | End: 2020-09-28 | Stop reason: HOSPADM

## 2020-09-25 RX ORDER — DEXAMETHASONE SODIUM PHOSPHATE 4 MG/ML
INJECTION, SOLUTION INTRA-ARTICULAR; INTRALESIONAL; INTRAMUSCULAR; INTRAVENOUS; SOFT TISSUE PRN
Status: DISCONTINUED | OUTPATIENT
Start: 2020-09-25 | End: 2020-09-25

## 2020-09-25 RX ORDER — SODIUM CHLORIDE, SODIUM GLUCONATE, SODIUM ACETATE, POTASSIUM CHLORIDE AND MAGNESIUM CHLORIDE 526; 502; 368; 37; 30 MG/100ML; MG/100ML; MG/100ML; MG/100ML; MG/100ML
INJECTION, SOLUTION INTRAVENOUS CONTINUOUS PRN
Status: DISCONTINUED | OUTPATIENT
Start: 2020-09-25 | End: 2020-09-25

## 2020-09-25 RX ORDER — DEXTROSE, SODIUM CHLORIDE, SODIUM LACTATE, POTASSIUM CHLORIDE, AND CALCIUM CHLORIDE 5; .6; .31; .03; .02 G/100ML; G/100ML; G/100ML; G/100ML; G/100ML
1000 INJECTION, SOLUTION INTRAVENOUS CONTINUOUS
Status: DISCONTINUED | OUTPATIENT
Start: 2020-09-25 | End: 2020-09-26

## 2020-09-25 RX ORDER — ATENOLOL 50 MG/1
50 TABLET ORAL DAILY
Status: DISCONTINUED | OUTPATIENT
Start: 2020-09-25 | End: 2020-09-26

## 2020-09-25 RX ORDER — NALOXONE HYDROCHLORIDE 0.4 MG/ML
.1-.4 INJECTION, SOLUTION INTRAMUSCULAR; INTRAVENOUS; SUBCUTANEOUS
Status: ACTIVE | OUTPATIENT
Start: 2020-09-25 | End: 2020-09-26

## 2020-09-25 RX ORDER — FENTANYL CITRATE 50 UG/ML
25-50 INJECTION, SOLUTION INTRAMUSCULAR; INTRAVENOUS
Status: DISCONTINUED | OUTPATIENT
Start: 2020-09-25 | End: 2020-09-25 | Stop reason: HOSPADM

## 2020-09-25 RX ORDER — HYDROMORPHONE HYDROCHLORIDE 1 MG/ML
0.5 INJECTION, SOLUTION INTRAMUSCULAR; INTRAVENOUS; SUBCUTANEOUS
Status: DISCONTINUED | OUTPATIENT
Start: 2020-09-25 | End: 2020-09-28 | Stop reason: HOSPADM

## 2020-09-25 RX ADMIN — CEFAZOLIN 1 G: 1 INJECTION, POWDER, FOR SOLUTION INTRAMUSCULAR; INTRAVENOUS at 16:09

## 2020-09-25 RX ADMIN — SODIUM CHLORIDE 500 ML: 9 INJECTION, SOLUTION INTRAVENOUS at 18:48

## 2020-09-25 RX ADMIN — LIDOCAINE HYDROCHLORIDE 100 MG: 20 INJECTION, SOLUTION INFILTRATION; PERINEURAL at 07:50

## 2020-09-25 RX ADMIN — LABETALOL 20 MG/4 ML (5 MG/ML) INTRAVENOUS SYRINGE 2.5 MG: at 08:57

## 2020-09-25 RX ADMIN — ROCURONIUM BROMIDE 10 MG: 10 INJECTION INTRAVENOUS at 13:42

## 2020-09-25 RX ADMIN — ROCURONIUM BROMIDE 20 MG: 10 INJECTION INTRAVENOUS at 11:34

## 2020-09-25 RX ADMIN — PHENYLEPHRINE HYDROCHLORIDE 100 MCG: 10 INJECTION INTRAVENOUS at 08:21

## 2020-09-25 RX ADMIN — FENTANYL CITRATE 100 MCG: 50 INJECTION, SOLUTION INTRAMUSCULAR; INTRAVENOUS at 11:23

## 2020-09-25 RX ADMIN — ATENOLOL 50 MG: 50 TABLET ORAL at 21:01

## 2020-09-25 RX ADMIN — SODIUM CHLORIDE, POTASSIUM CHLORIDE, SODIUM LACTATE AND CALCIUM CHLORIDE: 600; 310; 30; 20 INJECTION, SOLUTION INTRAVENOUS at 07:38

## 2020-09-25 RX ADMIN — NOREPINEPHRINE BITARTRATE 6.4 MCG: 1 INJECTION, SOLUTION, CONCENTRATE INTRAVENOUS at 09:25

## 2020-09-25 RX ADMIN — HYDROMORPHONE HYDROCHLORIDE 0.5 MG: 1 INJECTION, SOLUTION INTRAMUSCULAR; INTRAVENOUS; SUBCUTANEOUS at 20:03

## 2020-09-25 RX ADMIN — ROCURONIUM BROMIDE 20 MG: 10 INJECTION INTRAVENOUS at 11:26

## 2020-09-25 RX ADMIN — PHENYLEPHRINE HYDROCHLORIDE 100 MCG: 10 INJECTION INTRAVENOUS at 13:47

## 2020-09-25 RX ADMIN — FENTANYL CITRATE 25 MCG: 50 INJECTION, SOLUTION INTRAMUSCULAR; INTRAVENOUS at 18:01

## 2020-09-25 RX ADMIN — NOREPINEPHRINE BITARTRATE 6.4 MCG: 1 INJECTION, SOLUTION, CONCENTRATE INTRAVENOUS at 11:15

## 2020-09-25 RX ADMIN — ROCURONIUM BROMIDE 20 MG: 10 INJECTION INTRAVENOUS at 14:44

## 2020-09-25 RX ADMIN — ESMOLOL HYDROCHLORIDE 20 MG: 10 INJECTION, SOLUTION INTRAVENOUS at 11:39

## 2020-09-25 RX ADMIN — PHENYLEPHRINE HYDROCHLORIDE 100 MCG: 10 INJECTION INTRAVENOUS at 13:27

## 2020-09-25 RX ADMIN — Medication 10 MG: at 07:51

## 2020-09-25 RX ADMIN — SODIUM CHLORIDE, POTASSIUM CHLORIDE, SODIUM LACTATE AND CALCIUM CHLORIDE: 600; 310; 30; 20 INJECTION, SOLUTION INTRAVENOUS at 12:37

## 2020-09-25 RX ADMIN — CEFAZOLIN 1 G: 1 INJECTION, POWDER, FOR SOLUTION INTRAMUSCULAR; INTRAVENOUS at 12:08

## 2020-09-25 RX ADMIN — SODIUM CHLORIDE, POTASSIUM CHLORIDE, SODIUM LACTATE AND CALCIUM CHLORIDE: 600; 310; 30; 20 INJECTION, SOLUTION INTRAVENOUS at 11:20

## 2020-09-25 RX ADMIN — PHENYLEPHRINE HYDROCHLORIDE 50 MCG: 10 INJECTION INTRAVENOUS at 08:12

## 2020-09-25 RX ADMIN — PHENYLEPHRINE HYDROCHLORIDE 200 MCG: 10 INJECTION INTRAVENOUS at 08:35

## 2020-09-25 RX ADMIN — Medication 5 MG: at 08:35

## 2020-09-25 RX ADMIN — NOREPINEPHRINE BITARTRATE 6.4 MCG: 1 INJECTION, SOLUTION, CONCENTRATE INTRAVENOUS at 09:33

## 2020-09-25 RX ADMIN — PHENYLEPHRINE HYDROCHLORIDE 0.4 MCG/KG/MIN: 10 INJECTION INTRAVENOUS at 09:29

## 2020-09-25 RX ADMIN — Medication 2 G: at 08:04

## 2020-09-25 RX ADMIN — FENTANYL CITRATE 100 MCG: 50 INJECTION, SOLUTION INTRAMUSCULAR; INTRAVENOUS at 07:48

## 2020-09-25 RX ADMIN — PHENYLEPHRINE HYDROCHLORIDE 150 MCG: 10 INJECTION INTRAVENOUS at 07:55

## 2020-09-25 RX ADMIN — ROCURONIUM BROMIDE 20 MG: 10 INJECTION INTRAVENOUS at 09:27

## 2020-09-25 RX ADMIN — Medication 5 MG: at 13:29

## 2020-09-25 RX ADMIN — MIDAZOLAM 2 MG: 1 INJECTION INTRAMUSCULAR; INTRAVENOUS at 07:38

## 2020-09-25 RX ADMIN — PHENYLEPHRINE HYDROCHLORIDE 100 MCG: 10 INJECTION INTRAVENOUS at 09:17

## 2020-09-25 RX ADMIN — PHENYLEPHRINE HYDROCHLORIDE 100 MCG: 10 INJECTION INTRAVENOUS at 12:14

## 2020-09-25 RX ADMIN — ONDANSETRON 4 MG: 2 INJECTION INTRAMUSCULAR; INTRAVENOUS at 16:28

## 2020-09-25 RX ADMIN — FENTANYL CITRATE 150 MCG: 50 INJECTION, SOLUTION INTRAMUSCULAR; INTRAVENOUS at 07:50

## 2020-09-25 RX ADMIN — FENTANYL CITRATE 25 MCG: 50 INJECTION, SOLUTION INTRAMUSCULAR; INTRAVENOUS at 18:10

## 2020-09-25 RX ADMIN — FENTANYL CITRATE 50 MCG: 50 INJECTION, SOLUTION INTRAMUSCULAR; INTRAVENOUS at 16:55

## 2020-09-25 RX ADMIN — ROCURONIUM BROMIDE 80 MG: 10 INJECTION INTRAVENOUS at 07:51

## 2020-09-25 RX ADMIN — GABAPENTIN 300 MG: 300 CAPSULE ORAL at 21:01

## 2020-09-25 RX ADMIN — NOREPINEPHRINE BITARTRATE 6.4 MCG: 1 INJECTION, SOLUTION, CONCENTRATE INTRAVENOUS at 09:20

## 2020-09-25 RX ADMIN — HYDROMORPHONE HYDROCHLORIDE 0.5 MG: 1 INJECTION, SOLUTION INTRAMUSCULAR; INTRAVENOUS; SUBCUTANEOUS at 16:44

## 2020-09-25 RX ADMIN — OXYCODONE HYDROCHLORIDE 5 MG: 5 TABLET ORAL at 21:01

## 2020-09-25 RX ADMIN — BACITRACIN: 500 OINTMENT TOPICAL at 21:01

## 2020-09-25 RX ADMIN — ROCURONIUM BROMIDE 20 MG: 10 INJECTION INTRAVENOUS at 10:28

## 2020-09-25 RX ADMIN — LABETALOL 20 MG/4 ML (5 MG/ML) INTRAVENOUS SYRINGE 5 MG: at 16:57

## 2020-09-25 RX ADMIN — ROCURONIUM BROMIDE 20 MG: 10 INJECTION INTRAVENOUS at 15:14

## 2020-09-25 RX ADMIN — ROCURONIUM BROMIDE 10 MG: 10 INJECTION INTRAVENOUS at 09:56

## 2020-09-25 RX ADMIN — PHENYLEPHRINE HYDROCHLORIDE 100 MCG: 10 INJECTION INTRAVENOUS at 08:36

## 2020-09-25 RX ADMIN — CEFAZOLIN 1 G: 1 INJECTION, POWDER, FOR SOLUTION INTRAMUSCULAR; INTRAVENOUS at 10:04

## 2020-09-25 RX ADMIN — ROCURONIUM BROMIDE 20 MG: 10 INJECTION INTRAVENOUS at 13:15

## 2020-09-25 RX ADMIN — PROPOFOL 80 MG: 10 INJECTION, EMULSION INTRAVENOUS at 07:51

## 2020-09-25 RX ADMIN — ROCURONIUM BROMIDE 10 MG: 10 INJECTION INTRAVENOUS at 11:01

## 2020-09-25 RX ADMIN — Medication 5 MG: at 08:19

## 2020-09-25 RX ADMIN — ROCURONIUM BROMIDE 10 MG: 10 INJECTION INTRAVENOUS at 12:54

## 2020-09-25 RX ADMIN — GLYCOPYRROLATE 0.2 MG: 0.2 INJECTION, SOLUTION INTRAMUSCULAR; INTRAVENOUS at 08:38

## 2020-09-25 RX ADMIN — HYDROMORPHONE HYDROCHLORIDE 0.5 MG: 1 INJECTION, SOLUTION INTRAMUSCULAR; INTRAVENOUS; SUBCUTANEOUS at 16:23

## 2020-09-25 RX ADMIN — ROCURONIUM BROMIDE 20 MG: 10 INJECTION INTRAVENOUS at 14:08

## 2020-09-25 RX ADMIN — ROCURONIUM BROMIDE 10 MG: 10 INJECTION INTRAVENOUS at 12:47

## 2020-09-25 RX ADMIN — CEFAZOLIN 1 G: 1 INJECTION, POWDER, FOR SOLUTION INTRAMUSCULAR; INTRAVENOUS at 14:01

## 2020-09-25 RX ADMIN — SUGAMMADEX 200 MG: 100 INJECTION, SOLUTION INTRAVENOUS at 16:33

## 2020-09-25 RX ADMIN — FENTANYL CITRATE 25 MCG: 50 INJECTION, SOLUTION INTRAMUSCULAR; INTRAVENOUS at 19:21

## 2020-09-25 RX ADMIN — ALBUMIN HUMAN: 0.05 INJECTION, SOLUTION INTRAVENOUS at 14:06

## 2020-09-25 RX ADMIN — PHENYLEPHRINE HYDROCHLORIDE 100 MCG: 10 INJECTION INTRAVENOUS at 13:42

## 2020-09-25 RX ADMIN — PHENYLEPHRINE HYDROCHLORIDE 100 MCG: 10 INJECTION INTRAVENOUS at 08:13

## 2020-09-25 RX ADMIN — SODIUM CHLORIDE, SODIUM LACTATE, POTASSIUM CHLORIDE, CALCIUM CHLORIDE AND DEXTROSE MONOHYDRATE 1000 ML: 5; 600; 310; 30; 20 INJECTION, SOLUTION INTRAVENOUS at 18:40

## 2020-09-25 RX ADMIN — PHENYLEPHRINE HYDROCHLORIDE 100 MCG: 10 INJECTION INTRAVENOUS at 12:54

## 2020-09-25 RX ADMIN — SODIUM CHLORIDE, SODIUM GLUCONATE, SODIUM ACETATE, POTASSIUM CHLORIDE AND MAGNESIUM CHLORIDE: 526; 502; 368; 37; 30 INJECTION, SOLUTION INTRAVENOUS at 14:32

## 2020-09-25 RX ADMIN — FENTANYL CITRATE 25 MCG: 50 INJECTION, SOLUTION INTRAMUSCULAR; INTRAVENOUS at 19:10

## 2020-09-25 RX ADMIN — FENTANYL CITRATE 25 MCG: 50 INJECTION, SOLUTION INTRAMUSCULAR; INTRAVENOUS at 18:28

## 2020-09-25 RX ADMIN — HUMAN INSULIN 1 UNITS/HR: 100 INJECTION, SOLUTION SUBCUTANEOUS at 12:07

## 2020-09-25 RX ADMIN — FENTANYL CITRATE 50 MCG: 50 INJECTION, SOLUTION INTRAMUSCULAR; INTRAVENOUS at 16:51

## 2020-09-25 RX ADMIN — ESMOLOL HYDROCHLORIDE 30 MG: 10 INJECTION, SOLUTION INTRAVENOUS at 16:46

## 2020-09-25 RX ADMIN — LABETALOL 20 MG/4 ML (5 MG/ML) INTRAVENOUS SYRINGE 2.5 MG: at 13:16

## 2020-09-25 RX ADMIN — PROPOFOL 120 MG: 10 INJECTION, EMULSION INTRAVENOUS at 07:50

## 2020-09-25 RX ADMIN — DEXAMETHASONE SODIUM PHOSPHATE 6 MG: 4 INJECTION, SOLUTION INTRA-ARTICULAR; INTRALESIONAL; INTRAMUSCULAR; INTRAVENOUS; SOFT TISSUE at 07:59

## 2020-09-25 RX ADMIN — ROCURONIUM BROMIDE 20 MG: 10 INJECTION INTRAVENOUS at 08:47

## 2020-09-25 RX ADMIN — ESMOLOL HYDROCHLORIDE 20 MG: 10 INJECTION, SOLUTION INTRAVENOUS at 12:54

## 2020-09-25 RX ADMIN — PHENYLEPHRINE HYDROCHLORIDE 100 MCG: 10 INJECTION INTRAVENOUS at 08:02

## 2020-09-25 RX ADMIN — METHOCARBAMOL 500 MG: 500 TABLET, FILM COATED ORAL at 21:01

## 2020-09-25 RX ADMIN — LABETALOL 20 MG/4 ML (5 MG/ML) INTRAVENOUS SYRINGE 2.5 MG: at 14:01

## 2020-09-25 RX ADMIN — HYDROMORPHONE HYDROCHLORIDE 0.5 MG: 1 INJECTION, SOLUTION INTRAMUSCULAR; INTRAVENOUS; SUBCUTANEOUS at 23:34

## 2020-09-25 RX ADMIN — PHENYLEPHRINE HYDROCHLORIDE 100 MCG: 10 INJECTION INTRAVENOUS at 08:26

## 2020-09-25 ASSESSMENT — ACTIVITIES OF DAILY LIVING (ADL)
FALL_HISTORY_WITHIN_LAST_SIX_MONTHS: NO
AMBULATION: 0-->INDEPENDENT
TOILETING: 0-->INDEPENDENT
RETIRED_EATING: 0-->INDEPENDENT
SWALLOWING: 0-->SWALLOWS FOODS/LIQUIDS WITHOUT DIFFICULTY
BATHING: 0-->INDEPENDENT
COGNITION: 0 - NO COGNITION ISSUES REPORTED
RETIRED_COMMUNICATION: 0-->UNDERSTANDS/COMMUNICATES WITHOUT DIFFICULTY
TRANSFERRING: 0-->INDEPENDENT
DRESS: 0-->INDEPENDENT

## 2020-09-25 ASSESSMENT — MIFFLIN-ST. JEOR: SCORE: 1796.87

## 2020-09-25 NOTE — BRIEF OP NOTE
[facility], [place]    Brief Operative Note    Pre-operative diagnosis: Hiatal hernia [K44.9]    Post-operative diagnosis Same as pre-operative diagnosis    Procedure: Procedure(s):  Take-Down and Re-Do of Nissen Fundoplications, Lysis of Adhesions, Percutaneous Gastrostomy Tube Placement, Esophagogastroduodenoscopy    Surgeon: Surgeon(s) and Role:     * Katlyn Tobar MD - Primary     * Damon Johnson MD - Assisting     * Jose Garibay MD - Fellow    Anesthesia: General     Estimated blood loss: Less than 50 ml    Drains:   Percutaneous Gastrostomy Tube to Dependent Drainage  Bilateral 19 Fr Tenzin Drains (Pleural) to Water Seal    Specimens: None    Findings: Fundoplication failed, appears to have fallen apart. Re-done with 52 Fr Bougie. PEG tube placed. Bilateral pleural drains.    Complications: None    Implants: None    Jose Garibay MD  Cardiothoracic Surgery Fellow  [phone]

## 2020-09-25 NOTE — ANESTHESIA CARE TRANSFER NOTE
Patient: Bal Junior    Procedure(s):  TAKE-DOWN, FUNDOPLICATION, REDO NISSEN, LAPAROSCOPIC, gastrostomy feeding tube placement    Diagnosis: Hiatal hernia [K44.9]  Diagnosis Additional Information: No value filed.    Anesthesia Type:   General     Note:  Airway :ETT  Patient transferred to:PACU  Comments: Placed on vent.  VSS.  Awakening in pacu Handoff Report: Identifed the Patient, Identified the Reponsible Provider, Reviewed the pertinent medical history, Discussed the surgical course, Reviewed Intra-OP anesthesia mangement and issues during anesthesia, Set expectations for post-procedure period and Allowed opportunity for questions and acknowledgement of understanding      Vitals: (Last set prior to Anesthesia Care Transfer)    CRNA VITALS  9/25/2020 1641 - 9/25/2020 1720      9/25/2020             Pulse:  110    ART BP:  145/81    ART Mean:  106    SpO2:  100 %    Resp Rate (observed):  9                Electronically Signed By: ROSY Fowler CRNA  September 25, 2020  5:20 PM

## 2020-09-25 NOTE — OR NURSING
Jose Garibay at bedside. CXR ok. Aware of Lactic level. Continue with transfer to  when bed available. Bilateral CT to H2O seal.

## 2020-09-25 NOTE — ANESTHESIA PREPROCEDURE EVALUATION
"Anesthesia Pre-Procedure Evaluation    Patient: Bal Junior   MRN:     [id number] Gender:   male   Age:    65 year old :      1955        Preoperative Diagnosis: Hiatal hernia [K44.9]   Procedure(s):  TAKE-DOWN, FUNDOPLICATION, REDO NISSEN, LAPAROSCOPIC     LABS:  CBC: No results found for: WBC, HGB, HCT, PLT  BMP: No results found for: NA, POTASSIUM, CHLORIDE, CO2, BUN, CR, GLC  COAGS: No results found for: PTT, INR, FIBR  POC:   Lab Results   Component Value Date     (H) 2020     OTHER: No results found for: PH, LACT, A1C, AUGUSTIN, PHOS, MAG, ALBUMIN, PROTTOTAL, ALT, AST, GGT, ALKPHOS, BILITOTAL, BILIDIRECT, LIPASE, AMYLASE, DAVID, TSH, T4, T3, CRP, SED     Preop Vitals    BP Readings from Last 3 Encounters:   20 124/84   20 135/89   20 124/86    Pulse Readings from Last 3 Encounters:   20 68   20 69      Resp Readings from Last 3 Encounters:   20 18   20 14    SpO2 Readings from Last 3 Encounters:   20 99%   20 96%   20 96%      Temp Readings from Last 1 Encounters:   20 36.5  C (97.7  F) (Oral)    Ht Readings from Last 1 Encounters:   20 1.803 m (5' 10.98\")      Wt Readings from Last 1 Encounters:   20 98.5 kg (217 lb 2.5 oz)    Estimated body mass index is 30.3 kg/m  as calculated from the following:    Height as of this encounter: 1.803 m (5' 10.98\").    Weight as of this encounter: 98.5 kg (217 lb 2.5 oz).     LDA:        Past Medical History:   Diagnosis Date     Hypertension      Neuritis      Peripheral neuropathy      Personal history of other medical treatment     postgastrectomy dumping syndrome     Stomach problems Noted earlier      Past Surgical History:   Procedure Laterality Date     ABDOMEN SURGERY  ?     APPENDECTOMY  1964?     COLONOSCOPY  2006, 2016     EYE SURGERY  199x    laser for retinal tears     HERNIA REPAIR  ?     SOFT TISSUE SURGERY  ?    MCL l. thumb      Allergies   Allergen " Reactions     Hornets      Wasp Venom Protein Hives     Bee Venom Swelling             JZ[facility] AN PHYSICAL EXAM    Assessment:   ASA SCORE: 3    H&P: History and physical reviewed and following examination; no interval change.   Smoking Status:  Non-Smoker/Unknown   NPO Status: NPO Appropriate     Plan:   Anes. Type:  General   Pre-Medication: None   Induction:  IV (Standard)   Airway: ETT; Oral   Access/Monitoring: PIV; 2nd PIV; A-Line; FloTrac   Maintenance: Balanced     Advanced Monitoring: BIS     Postop Plan:   Postop Pain: Opioids  Postop Sedation/Airway: Not planned     PONV Management:   Adult Risk Factors:, Non-Smoker, Postop Opioids   Prevention: Ondansetron     CONSENT: Direct conversation   Plan and risks discussed with: Patient   Blood Products: Consented (ALL Blood Products)                   Gia Cortez MD

## 2020-09-26 ENCOUNTER — APPOINTMENT (OUTPATIENT)
Dept: PHYSICAL THERAPY | Facility: CLINIC | Age: 65
End: 2020-09-26
Attending: STUDENT IN AN ORGANIZED HEALTH CARE EDUCATION/TRAINING PROGRAM
Payer: COMMERCIAL

## 2020-09-26 ENCOUNTER — APPOINTMENT (OUTPATIENT)
Dept: GENERAL RADIOLOGY | Facility: CLINIC | Age: 65
End: 2020-09-26
Attending: STUDENT IN AN ORGANIZED HEALTH CARE EDUCATION/TRAINING PROGRAM
Payer: COMMERCIAL

## 2020-09-26 LAB
ANION GAP SERPL CALCULATED.3IONS-SCNC: 7 MMOL/L (ref 3–14)
BUN SERPL-MCNC: 14 MG/DL (ref 7–30)
CALCIUM SERPL-MCNC: 8.1 MG/DL (ref 8.5–10.1)
CHLORIDE SERPL-SCNC: 106 MMOL/L (ref 94–109)
CO2 SERPL-SCNC: 25 MMOL/L (ref 20–32)
CREAT SERPL-MCNC: 0.95 MG/DL (ref 0.66–1.25)
ERYTHROCYTE [DISTWIDTH] IN BLOOD BY AUTOMATED COUNT: 12.5 % (ref 10–15)
GFR SERPL CREATININE-BSD FRML MDRD: 84 ML/MIN/{1.73_M2}
GLUCOSE SERPL-MCNC: 144 MG/DL (ref 70–99)
HCT VFR BLD AUTO: 39.5 % (ref 40–53)
HGB BLD-MCNC: 13.3 G/DL (ref 13.3–17.7)
LACTATE BLD-SCNC: 2.8 MMOL/L (ref 0.7–2)
LACTATE BLD-SCNC: 4.7 MMOL/L (ref 0.7–2)
MAGNESIUM SERPL-MCNC: 1.6 MG/DL (ref 1.6–2.3)
MCH RBC QN AUTO: 30.4 PG (ref 26.5–33)
MCHC RBC AUTO-ENTMCNC: 33.7 G/DL (ref 31.5–36.5)
MCV RBC AUTO: 90 FL (ref 78–100)
PLATELET # BLD AUTO: 127 10E9/L (ref 150–450)
POTASSIUM SERPL-SCNC: 4 MMOL/L (ref 3.4–5.3)
PROCALCITONIN SERPL-MCNC: 0.21 NG/ML
RBC # BLD AUTO: 4.38 10E12/L (ref 4.4–5.9)
SODIUM SERPL-SCNC: 137 MMOL/L (ref 133–144)
WBC # BLD AUTO: 10 10E9/L (ref 4–11)

## 2020-09-26 PROCEDURE — 85027 COMPLETE CBC AUTOMATED: CPT

## 2020-09-26 PROCEDURE — 80048 BASIC METABOLIC PNL TOTAL CA: CPT

## 2020-09-26 PROCEDURE — 25000128 H RX IP 250 OP 636

## 2020-09-26 PROCEDURE — 83735 ASSAY OF MAGNESIUM: CPT

## 2020-09-26 PROCEDURE — 12000004 ZZH R&B IMCU UMMC

## 2020-09-26 PROCEDURE — 97161 PT EVAL LOW COMPLEX 20 MIN: CPT | Mod: GP

## 2020-09-26 PROCEDURE — 25800030 ZZH RX IP 258 OP 636: Performed by: NURSE PRACTITIONER

## 2020-09-26 PROCEDURE — 84145 PROCALCITONIN (PCT): CPT | Performed by: NURSE PRACTITIONER

## 2020-09-26 PROCEDURE — 25000132 ZZH RX MED GY IP 250 OP 250 PS 637

## 2020-09-26 PROCEDURE — 83605 ASSAY OF LACTIC ACID: CPT

## 2020-09-26 PROCEDURE — 36415 COLL VENOUS BLD VENIPUNCTURE: CPT | Performed by: NURSE PRACTITIONER

## 2020-09-26 PROCEDURE — 97530 THERAPEUTIC ACTIVITIES: CPT | Mod: GP

## 2020-09-26 PROCEDURE — 25000132 ZZH RX MED GY IP 250 OP 250 PS 637: Performed by: STUDENT IN AN ORGANIZED HEALTH CARE EDUCATION/TRAINING PROGRAM

## 2020-09-26 PROCEDURE — 87040 BLOOD CULTURE FOR BACTERIA: CPT | Performed by: NURSE PRACTITIONER

## 2020-09-26 PROCEDURE — 83605 ASSAY OF LACTIC ACID: CPT | Performed by: NURSE PRACTITIONER

## 2020-09-26 PROCEDURE — 36415 COLL VENOUS BLD VENIPUNCTURE: CPT

## 2020-09-26 PROCEDURE — 71045 X-RAY EXAM CHEST 1 VIEW: CPT

## 2020-09-26 RX ORDER — DEXTROSE, SODIUM CHLORIDE, SODIUM LACTATE, POTASSIUM CHLORIDE, AND CALCIUM CHLORIDE 5; .6; .31; .03; .02 G/100ML; G/100ML; G/100ML; G/100ML; G/100ML
1000 INJECTION, SOLUTION INTRAVENOUS CONTINUOUS
Status: DISCONTINUED | OUTPATIENT
Start: 2020-09-26 | End: 2020-09-28 | Stop reason: HOSPADM

## 2020-09-26 RX ORDER — NORTRIPTYLINE HCL 25 MG
25 CAPSULE ORAL EVERY 24 HOURS
Status: DISCONTINUED | OUTPATIENT
Start: 2020-09-26 | End: 2020-09-28 | Stop reason: HOSPADM

## 2020-09-26 RX ORDER — LIDOCAINE 40 MG/G
CREAM TOPICAL
Status: DISCONTINUED | OUTPATIENT
Start: 2020-09-26 | End: 2020-09-28 | Stop reason: HOSPADM

## 2020-09-26 RX ORDER — ATENOLOL 50 MG/1
50 TABLET ORAL EVERY 24 HOURS
Status: DISCONTINUED | OUTPATIENT
Start: 2020-09-26 | End: 2020-09-28 | Stop reason: HOSPADM

## 2020-09-26 RX ADMIN — BACITRACIN: 500 OINTMENT TOPICAL at 20:07

## 2020-09-26 RX ADMIN — ATENOLOL 50 MG: 50 TABLET ORAL at 22:13

## 2020-09-26 RX ADMIN — ACETAMINOPHEN 975 MG: 325 TABLET, FILM COATED ORAL at 03:07

## 2020-09-26 RX ADMIN — OXYCODONE HYDROCHLORIDE 10 MG: 5 TABLET ORAL at 19:57

## 2020-09-26 RX ADMIN — HYDROMORPHONE HYDROCHLORIDE 0.5 MG: 1 INJECTION, SOLUTION INTRAMUSCULAR; INTRAVENOUS; SUBCUTANEOUS at 08:28

## 2020-09-26 RX ADMIN — BACITRACIN: 500 OINTMENT TOPICAL at 13:14

## 2020-09-26 RX ADMIN — HEPARIN SODIUM 5000 UNITS: 5000 INJECTION, SOLUTION INTRAVENOUS; SUBCUTANEOUS at 11:58

## 2020-09-26 RX ADMIN — OXYCODONE HYDROCHLORIDE 10 MG: 5 TABLET ORAL at 13:44

## 2020-09-26 RX ADMIN — ACETAMINOPHEN 975 MG: 325 TABLET, FILM COATED ORAL at 09:46

## 2020-09-26 RX ADMIN — METHOCARBAMOL 500 MG: 500 TABLET, FILM COATED ORAL at 11:56

## 2020-09-26 RX ADMIN — SODIUM CHLORIDE, SODIUM LACTATE, POTASSIUM CHLORIDE, CALCIUM CHLORIDE AND DEXTROSE MONOHYDRATE 1000 ML: 5; 600; 310; 30; 20 INJECTION, SOLUTION INTRAVENOUS at 08:06

## 2020-09-26 RX ADMIN — HYDROMORPHONE HYDROCHLORIDE 0.5 MG: 1 INJECTION, SOLUTION INTRAMUSCULAR; INTRAVENOUS; SUBCUTANEOUS at 03:12

## 2020-09-26 RX ADMIN — OXYCODONE HYDROCHLORIDE 10 MG: 5 TABLET ORAL at 01:18

## 2020-09-26 RX ADMIN — BACITRACIN: 500 OINTMENT TOPICAL at 08:07

## 2020-09-26 RX ADMIN — GABAPENTIN 300 MG: 300 CAPSULE ORAL at 08:02

## 2020-09-26 RX ADMIN — METHOCARBAMOL 500 MG: 500 TABLET, FILM COATED ORAL at 08:00

## 2020-09-26 RX ADMIN — ACETAMINOPHEN 975 MG: 325 TABLET, FILM COATED ORAL at 22:13

## 2020-09-26 RX ADMIN — SODIUM CHLORIDE 500 ML: 9 INJECTION, SOLUTION INTRAVENOUS at 01:36

## 2020-09-26 RX ADMIN — METHOCARBAMOL 500 MG: 500 TABLET, FILM COATED ORAL at 15:43

## 2020-09-26 RX ADMIN — OXYCODONE HYDROCHLORIDE 10 MG: 5 TABLET ORAL at 09:46

## 2020-09-26 RX ADMIN — ACETAMINOPHEN 975 MG: 325 TABLET, FILM COATED ORAL at 15:43

## 2020-09-26 RX ADMIN — NORTRIPTYLINE HYDROCHLORIDE 25 MG: 25 CAPSULE ORAL at 22:13

## 2020-09-26 RX ADMIN — OXYCODONE HYDROCHLORIDE 10 MG: 5 TABLET ORAL at 05:27

## 2020-09-26 RX ADMIN — HEPARIN SODIUM 5000 UNITS: 5000 INJECTION, SOLUTION INTRAVENOUS; SUBCUTANEOUS at 19:56

## 2020-09-26 RX ADMIN — GABAPENTIN 300 MG: 300 CAPSULE ORAL at 19:56

## 2020-09-26 RX ADMIN — HYDROMORPHONE HYDROCHLORIDE 0.5 MG: 1 INJECTION, SOLUTION INTRAMUSCULAR; INTRAVENOUS; SUBCUTANEOUS at 05:31

## 2020-09-26 RX ADMIN — METHOCARBAMOL 500 MG: 500 TABLET, FILM COATED ORAL at 19:56

## 2020-09-26 ASSESSMENT — ACTIVITIES OF DAILY LIVING (ADL)
ADLS_ACUITY_SCORE: 14
ADLS_ACUITY_SCORE: 13
ADLS_ACUITY_SCORE: 14
ADLS_ACUITY_SCORE: 14
ADLS_ACUITY_SCORE: 13
ADLS_ACUITY_SCORE: 13

## 2020-09-26 ASSESSMENT — PAIN DESCRIPTION - DESCRIPTORS: DESCRIPTORS: CONSTANT

## 2020-09-26 ASSESSMENT — MIFFLIN-ST. JEOR: SCORE: 1948.87

## 2020-09-26 NOTE — PROGRESS NOTES
Rapid Response Team Note    Assessment   In assessment a rapid response was called on Bal Junior due to lactic acidosis.   Initial lactic acid done in PACU and found to be 5.2. recheck at 4.7    This presentation is likely due to pain and dehydration and worsened by the comorbidities listed above. The patient is NOT critically ill at this time.    Sepsis Evaluation   NO EVIDENCE OF SEPSIS at this time.  Vital sign, physical exam, and lab findings are likely due to pain and dehydration.  However, will get blood cultures    Plan   1) 500 NS bolus  2) Blood cultures X 2   3) Repeat lactic acid 0530  4) Procalcitonin     Disposition: The patient will remain on the current unit. We will continue to monitor this patient closely..    The thoracic surgery primary team was paged and currently awaiting a response.    Reassessment   Reassessment and plan follow-up will be performed by the primary team. The current agreed upon plan for reassessment/follow-up includes vitals check and will be completed in 2 hours.    Time Spent on this Encounter   Total Critical Care time spent by me, excluding procedures, was 20 minutes.    ROSY Trejo [facility] AMCOM Job Code Contact #[id number]    Hospital Course   Admission Diagnosis: Hiatal hernia [K44.9]     Brief Summary of events leading to rapid response:   A rapid response was called for Bal Junior due to lactic acidosis found on scheduled recheck.      The patients is not known to have an infection.    Significant Comorbidities:   Pt is POD0 s/p Take-Down and Re-Do of Nissen Fundoplications, Lysis of Adhesions, Percutaneous Gastrostomy Tube Placement, Esophagogastroduodenoscopy.    Medications   Scheduled     sodium chloride 0.9%  1,000 mL Intravenous Once     acetaminophen  975 mg Oral Q6H     atenolol  50 mg Oral Daily     bacitracin   Topical TID     gabapentin  300 mg Oral BID     heparin ANTICOAGULANT  5,000 Units Subcutaneous Q8H     methocarbamol   500 mg Oral 4x Daily     nortriptyline  25 mg Oral Daily     sodium chloride (PF)  3 mL Intracatheter Q8H     sodium chloride (PF)  3 mL Intracatheter Q8H      PRN   HYDROmorphone, lidocaine 4%, lidocaine 4%, lidocaine (buffered or not buffered), lidocaine (buffered or not buffered), - MEDICATION INSTRUCTIONS -, naloxone, oxyCODONE, sodium chloride (PF), sodium chloride (PF)   Allergies   Allergies   Allergen Reactions     Hornets      Wasp Venom Protein Hives     Bee Venom Swelling        Physical Exam   Temp: 97.6  F (36.4  C) Temp  Min: 96.9  F (36.1  C)  Max: 98  F (36.7  C)  Resp: 22 Resp  Min: 10  Max: 22  SpO2: 97 % SpO2  Min: 92 %  Max: 99 %  Pulse: 94 Pulse  Min: 88  Max: 112    No data recorded  BP: (!) 152/96 Systolic (24hrs), Av , Min:124 , Max:153   Diastolic (24hrs), Av, Min:84, Max:104     I/Os: I/O last 3 completed shifts:  In: 3930 [P.O.:105; I.V.:2575]  Out: 705 [Urine:575; Blood:30; Chest Tube:100]     Exam:   General: in no acute distress  Mental Status: AAOx4.      Significant Results and Procedures   Lactic Acid:   Recent Labs   Lab Test 20  0026 20  1800 20  1640   LACT 4.7* 5.2* 4.6*     CBC:   Recent Labs   Lab Test 20  1800 20  1640 20  1603   WBC 16.4*  --   --    HGB 14.3 14.7 15.4   HCT 42.3  --   --      --   --

## 2020-09-26 NOTE — ANESTHESIA POSTPROCEDURE EVALUATION
Anesthesia POST Procedure Evaluation    Patient: Bal Junior   MRN:     [id number] Gender:   male   Age:    65 year old :      1955        Preoperative Diagnosis: Hiatal hernia [K44.9]   Procedure(s):  TAKE-DOWN, FUNDOPLICATION, REDO NISSEN, LAPAROSCOPIC, gastrostomy feeding tube placement   Postop Comments: No value filed.     Anesthesia Type: General       Disposition: Admission   Postop Pain Control: Uneventful            Sign Out: Well controlled pain   PONV: No   Neuro/Psych: Uneventful            Sign Out: Acceptable/Baseline neuro status   Airway/Respiratory: Uneventful            Sign Out: Acceptable/Baseline resp. status   CV/Hemodynamics: Uneventful            Sign Out: Acceptable CV status   Other NRE: NONE   DID A NON-ROUTINE EVENT OCCUR? No         Last Anesthesia Record Vitals:  CRNA VITALS  2020 1641 - 2020 1741      2020             Pulse:  110    ART BP:  145/81    ART Mean:  106    SpO2:  100 %    Resp Rate (observed):  9          Last PACU Vitals:  Vitals Value Taken Time   /97 2020  8:00 PM   Temp 36.4  C (97.6  F) 2020  6:15 PM   Pulse 97 2020  8:06 PM   Resp 14 2020  8:00 PM   SpO2 93 % 2020  8:06 PM   Temp src     NIBP     Pulse     SpO2     Resp     Temp     Ht Rate     Temp 2     Vitals shown include unvalidated device data.      Electronically Signed By: Ellyn Robison MD, 2020, 8:07 PM

## 2020-09-26 NOTE — PROGRESS NOTES
Postop check    Pt is POD0 s/p Take-Down and Re-Do of Nissen Fundoplications, Lysis of Adhesions, Percutaneous Gastrostomy Tube Placement, Esophagogastroduodenoscopy.    Complaining of pain, worst at chest tube sites and epigastrum. He has received 1 dose of oxy since moving to the floor. Denies n/v, SOB, lightheadedness.    Vitals:    09/25/20 2100 09/25/20 2130 09/25/20 2200 09/25/20 2230   BP: (!) 140/101 (!) 148/99 (!) 142/104 (!) 139/90   Pulse: 99 94 95 89   Resp:       Temp:       TempSrc:       SpO2: 94% 98% 99% 98%   Weight:       Height:           Gen: NAD, resting comfortably  CV: RRR  Resp: nonlabored respirations, comfortable on 3LNC, b/l chest tubes to Y connector in place w/ serosang drainage and no leak, weak cough, incisions cdi  Abd: soft, ntnd, incisions cdi, PEG tube in place w/ dark output  Ext: WWP, no edema or tenderness    A/P  65 year old male now s/p re-do Nissen fundoplication, doing well postoperatively. No acute concerns. Will increase oxycodone dose.     Continue management per primary team.    Diana Mac MD (PGY-1)  Surgery

## 2020-09-26 NOTE — PROGRESS NOTES
09/26/20 1000   Quick Adds   Type of Visit Initial PT Evaluation   Living Environment   Lives With spouse   Living Arrangements   ([facility])   Home Accessibility stairs within home;stairs to enter home   Number of Stairs, Main Entrance 2   Stair Railings, Main Entrance none   Number of Stairs, Within Home, Primary   (13)   Stair Railings, Within Home, Primary railing on left side (ascending)   Transportation Anticipated car, drives self;family or friend will provide   Living Environment Comment Lives in [facility] with basement. All beds on 2nd floors. pt uses basement level often for library and exercise equipment   Self-Care   Usual Activity Tolerance excellent   Current Activity Tolerance fair   Regular Exercise   (elliptical in basement. Avid skier)   Activity/Exercise/Self-Care Comment works in computer security   Functional Level Prior   Ambulation 0-->independent   Transferring 0-->independent   Toileting 0-->independent   Bathing 0-->independent   Communication 0-->understands/communicates without difficulty   Swallowing 0-->swallows foods/liquids without difficulty   Cognition 0 - no cognition issues reported   Fall history within last six months no   Which of the above functional risks had a recent onset or change? ambulation;transferring;bathing;dressing   Prior Functional Level Comment IND at baseline   General Information   Onset of Illness/Injury or Date of Surgery - Date 09/25/20   Referring Physician Jose Garibay MD    Patient/Family Goals Statement Home on Monday. Improve pain   Pertinent History of Current Problem (include personal factors and/or comorbidities that impact the POC) s/p Take-Down and Re-Do of Nissen Fundoplications, Lysis of Adhesions, Percutaneous Gastrostomy Tube Placement, Esophagogastroduodenoscopy.   Precautions/Limitations abdominal precautions   Cognitive Status Examination   Orientation orientation to person, place and time   Level of Consciousness alert  "  Follows Commands and Answers Questions 100% of the time   Pain Assessment   Patient Currently in Pain Yes, see Vital Sign flowsheet  (mostly chest tube)   Integumentary/Edema   Integumentary/Edema no deficits were identifed   Integumentary/Edema Comments Mild B foot swelling, likely inactivity   Posture    Posture Not impaired   Range of Motion (ROM)   ROM Comment Limited LUE due to pain otherwise WFL   Strength   Strength Comments NT due to pain/straining   Bed Mobility   Bed Mobility Comments Mod-A supine>sit   Transfer Skills   Transfer Comments CGA sit<>stand   Gait   Gait Comments Not tolerating due to pain   Balance   Balance Comments No overt balance deficits not, limited by pain   Sensory Examination   Sensory Perception Comments Baseline neuropathy   General Therapy Interventions   Planned Therapy Interventions bed mobility training;strengthening;transfer training;risk factor education;home program guidelines;progressive activity/exercise;gait training   Clinical Impression   Criteria for Skilled Therapeutic Intervention yes, treatment indicated   PT Diagnosis Impaired functional mobility   Influenced by the following impairments Pain, precautions   Functional limitations due to impairments Decreased IND with bed mobility, transfers, gait   Clinical Presentation Stable/Uncomplicated   Clinical Presentation Rationale Medically stable   Clinical Decision Making (Complexity) Low complexity   Therapy Frequency Daily   Predicted Duration of Therapy Intervention (days/wks) 3 days   Anticipated Discharge Disposition Home with Assist   Risk & Benefits of therapy have been explained Yes   Patient, Family & other staff in agreement with plan of care Yes   Clinical Impression Comments Current deficits mostly due to pain/chest tubes. Anticipate with further pain management pt will progress will and safely discharge home   [facility] AM-PAC TM \"6 Clicks\"   2016, Trustees of [facility], under license to " "[employer].  All rights reserved.   6 Clicks Short Forms Basic Mobility Inpatient Short Form   [facility] AM-PAC  \"6 Clicks\" V.2 Basic Mobility Inpatient Short Form   1. Turning from your back to your side while in a flat bed without using bedrails? 3 - A Little   2. Moving from lying on your back to sitting on the side of a flat bed without using bedrails? 2 - A Lot   3. Moving to and from a bed to a chair (including a wheelchair)? 3 - A Little   4. Standing up from a chair using your arms (e.g., wheelchair, or bedside chair)? 3 - A Little   5. To walk in hospital room? 3 - A Little   6. Climbing 3-5 steps with a railing? 2 - A Lot   Basic Mobility Raw Score (Score out of 24.Lower scores equate to lower levels of function) 16   Total Evaluation Time   Total Evaluation Time (Minutes) 8     "

## 2020-09-26 NOTE — OR NURSING
Recheck Potassium 4.5. Notified Dr. Robison. Satting 96% ON 3 liter NC. Arouses to soft verbal. Dr. Robison will address sign-out.

## 2020-09-26 NOTE — PLAN OF CARE
"/87 (BP Location: Left arm)   Pulse 101   Temp 98.5  F (36.9  C) (Oral)   Resp 19   Ht 1.803 m (5' 10.98\")   Wt 113.7 kg (250 lb 10.6 oz)   SpO2 95%   BMI 34.98 kg/m      Neuro: A&Ox4. Numbness and tingling in hands and feet, baseline for patient.  Cardiac: SR with Bps in 140s/80s. VSS. afebrile  Respiratory: 2L + capno. Capno hits 24 hours at 9pm on 9/26  GI/: Adequate urine output via steward. PEG tube with G to gravity, clear brown drainage.   Diet/appetite: NPO w/ ice chips  Activity:  Assist of 2, declined getting oob due to pain. Sequentials on all night.   Pain: oxy and dilaudid given routinely throughout the night in addition to scheduled tylenol. Not adequate pain relief. Pain at sites of chest tube incisions, burning.   Skin: No new deficits noted. Lap sites with liquid bandage. Chest tube incisions.   LDA's: PIVs, D5LR at 75/hr    Plan: Continue with POC. Notify primary team with changes.    "

## 2020-09-26 NOTE — PLAN OF CARE
Neuro: A&Ox4.   Cardiac: SR. SBP stable. Afebrile. VSS.   Respiratory: Weaned to RA, CAPNO to remain in place until 9pm.   GI/: Prasad removed, pt voiding post removal. Adequate urine output. No BM, pt reports passing gas.   Diet/appetite: Tolerating clear liquid diet. G tube remains vented to gravity.  Activity:  Assist of 1, up to chair and in halls.  Pain: Oxycodone and dilaudid given for pain at CT sites. At acceptable level on current regimen.   Skin: No new deficits noted.  LDA's: R CT removed, L CT to water seal.       Plan: Continue with POC. Notify primary team with changes.

## 2020-09-26 NOTE — PROVIDER NOTIFICATION
09/26/20 0108   Call Information   Date of Call 09/26/20   Time of Call 0108   Name of person requesting the team Jeannette   Title of person requesting team RN   RRT Arrival time 0110   Time RRT ended 0200   Reason for call   Type of RRT Adult   Primary reason for call Sepsis suspected   Sepsis Suspected Elevated Lactate level   Was patient transferred from the ED, ICU, or PACU within last 24 hours prior to RRT call? No   SBAR   Situation Called for Lactic 4.7   Background Pt is POD0 s/p Take-Down and Re-Do of Nissen Fundoplications, Lysis of Adhesions, Percutaneous Gastrostomy Tube Placement, Esophagogastroduodenoscopy.   Notable History/Conditions Hypertension   Assessment Patient tired from surgery, c/o pain at chest tube sites (medicated by bedside RN). Chest tubes patent, lungs cleared, but diminished.    Interventions Fluid bolus  (500ml fluid bolus (thoracic request). )   Patient Outcome   Patient Outcome Stabilized on unit   RRT Team   Attending/Primary/Covering Physician Surgery   Date Attending Physician notified 09/26/20   Time Attending Physician notified 0108   Physician(s) Ifeoma Moseley APRN CNP   Lead RN La Manning     D/I: Called for patient with lactic 4.7. MAPs 100's, HR 90's SPO2 high 90's. CO2 28-29. Chest tubes patent. Patient c/o pain at CT insertion site. 500ml fluid bolus given (smaller fluid bolus per thoracic request). Lactic is trending down slowly post-op. Blood culture times two. No antibiotics at this time.   P: Continue care on 6B, follow-up lactic at 0700.     RRT follow-up 0330: Procalcitonin drawn and result pending. U/O about 100 ml/hour. VS unchanged.  Patient was sleeping at recheck and not disturbed. Plan for lactic follow-up with morning labs.     Will follow-up on lactic and procalcitonin result.   Update. Lactic imprioved to 2.8. Procalcitonin 0.21. Surgery team updated by bedside RN (see note).

## 2020-09-26 NOTE — PROVIDER NOTIFICATION
Rapid response called at 0107 for lactic of 4.7. 500ml bolus ordered and blood cultures and procalcitonin.  Primary team notified and in touch with rapid response team.

## 2020-09-26 NOTE — PROGRESS NOTES
Cross Cover Note      I was paged that a rapid response was called for elevated lactate. Initial lactate in PACU was 5.2 and recheck was 4.7, which triggered the response.     He is in pain postoperatively, but otherwise has no complaints.     Vitals:    09/25/20 2321 09/26/20 0000 09/26/20 0100 09/26/20 0130   BP: (!) 147/91 (!) 145/95 (!) 152/96 (!) 145/91   BP Location: Left arm      Pulse: 88 92 94 97   Resp: 22   20   Temp: 97.6  F (36.4  C)      TempSrc: Oral      SpO2: 96% 95% 97% 100%   Weight:       Height:         He remains hemodynamically stable. Lactate is likely related to postoperative changes.     In discussion with rapid response team, the following plan was agreed upon:    - 500cc bolus  - BCx x2  - Repeat lactate @0530  - Procal    Patient was discussed with Dr. Jamari Mac MD (PGY-1)  Surgery

## 2020-09-26 NOTE — PLAN OF CARE
"Transfer  Transferred from: PACU  Via:bed  Family: Aware of transfer  Belongings: Received with pt  Chart: Received with pt  Medications: Meds received from old unit with pt  Code Status verified on armband: yes  2 RN Skin Assessment Completed By: Daniel Barclay RN   Med rec completed: no  Bed surface reassessed with algorithm and charted: yes  New bed surface ordered: /no    Report received from: ALISON Millan  Pt status: BP (!) 145/100   Pulse 90   Temp 98  F (36.7  C) (Axillary)   Resp 16   Ht 1.803 m (5' 10.98\")   Wt 98.5 kg (217 lb 2.5 oz)   SpO2 92%   BMI 30.30 kg/m            "

## 2020-09-26 NOTE — PLAN OF CARE
6B Discharge Planner PT   Patient plan for discharge: Home Monday with spouse  Current status: Mod-A supine>sit. Required extended time sitting EOB due to becoming diaphoretic, improved with cool wash clothes. BP near 147/95 both at rest and EOB. CGA sit<>stand and stand/pivot to chair.  Barriers to return to prior living situation: Pain, current activity tolerance  Recommendations for discharge: Home with assist  Rationale for recommendations: Current deficits mostly due to pain/chest tubes. Anticipate with further pain management pt will progress will and safely discharge home.       Entered by: Chris Wu 09/26/2020 11:09 AM

## 2020-09-26 NOTE — PROGRESS NOTES
"THORACIC SURGERY PROGRESS NOTE  09/26/2020    S: Overnight received 500 ml bolus for lactate 5.2, has improved. Pain is controlled. UOP appropriate. No nausea/vomiting.     O:  BP (!) 149/95 (BP Location: Left arm)   Pulse 94   Temp 98.3  F (36.8  C) (Oral)   Resp 20   Ht 1.803 m (5' 10.98\")   Wt 113.7 kg (250 lb 10.6 oz)   SpO2 96%   BMI 34.98 kg/m      Sitting up in bed, NAD, looks comfortable  RRR  NLB on 2L NC  Abdomen is soft, non-distended, appropriately tender to palpation  PEG to gravity drainage with normal output  Extremities are warm    Labs reviewed - WBC normal, Hgb stable, LA 2.8 (4.7 < 5.2)  CXR reviewed - no acute findings    A/P: 65M s/p re-do Nissen fundoplication, KATHYA, and PEG placement on 9/25. Doing well.    - Pain control  - Clear liquid diet  - Will remove right chest tube  - Keep left chest tube to water seal  - PEG to gravity drainage, but ok for meds  - Dispo: 6B    Seen with staff.    Albino Contreras MD (PGY-3)  General Surgery Resident  Thoracic Surgery  "

## 2020-09-27 ENCOUNTER — APPOINTMENT (OUTPATIENT)
Dept: GENERAL RADIOLOGY | Facility: CLINIC | Age: 65
End: 2020-09-27
Attending: STUDENT IN AN ORGANIZED HEALTH CARE EDUCATION/TRAINING PROGRAM
Payer: COMMERCIAL

## 2020-09-27 LAB
ANION GAP SERPL CALCULATED.3IONS-SCNC: 5 MMOL/L (ref 3–14)
BUN SERPL-MCNC: 11 MG/DL (ref 7–30)
CALCIUM SERPL-MCNC: 8.2 MG/DL (ref 8.5–10.1)
CHLORIDE SERPL-SCNC: 106 MMOL/L (ref 94–109)
CO2 SERPL-SCNC: 26 MMOL/L (ref 20–32)
CREAT SERPL-MCNC: 0.82 MG/DL (ref 0.66–1.25)
GFR SERPL CREATININE-BSD FRML MDRD: >90 ML/MIN/{1.73_M2}
GLUCOSE SERPL-MCNC: 116 MG/DL (ref 70–99)
MAGNESIUM SERPL-MCNC: 1.9 MG/DL (ref 1.6–2.3)
POTASSIUM SERPL-SCNC: 3.9 MMOL/L (ref 3.4–5.3)
SODIUM SERPL-SCNC: 137 MMOL/L (ref 133–144)

## 2020-09-27 PROCEDURE — 25000132 ZZH RX MED GY IP 250 OP 250 PS 637: Performed by: STUDENT IN AN ORGANIZED HEALTH CARE EDUCATION/TRAINING PROGRAM

## 2020-09-27 PROCEDURE — 71045 X-RAY EXAM CHEST 1 VIEW: CPT

## 2020-09-27 PROCEDURE — 25000132 ZZH RX MED GY IP 250 OP 250 PS 637

## 2020-09-27 PROCEDURE — 71045 X-RAY EXAM CHEST 1 VIEW: CPT | Mod: 77

## 2020-09-27 PROCEDURE — 36415 COLL VENOUS BLD VENIPUNCTURE: CPT

## 2020-09-27 PROCEDURE — 25000128 H RX IP 250 OP 636

## 2020-09-27 PROCEDURE — 80048 BASIC METABOLIC PNL TOTAL CA: CPT

## 2020-09-27 PROCEDURE — 12000004 ZZH R&B IMCU UMMC

## 2020-09-27 PROCEDURE — 83735 ASSAY OF MAGNESIUM: CPT

## 2020-09-27 RX ORDER — POLYETHYLENE GLYCOL 3350 17 G/17G
17 POWDER, FOR SOLUTION ORAL DAILY
Status: DISCONTINUED | OUTPATIENT
Start: 2020-09-27 | End: 2020-09-28 | Stop reason: HOSPADM

## 2020-09-27 RX ORDER — AMOXICILLIN 250 MG
2 CAPSULE ORAL DAILY
Status: DISCONTINUED | OUTPATIENT
Start: 2020-09-27 | End: 2020-09-28 | Stop reason: HOSPADM

## 2020-09-27 RX ADMIN — OXYCODONE HYDROCHLORIDE 10 MG: 5 TABLET ORAL at 01:25

## 2020-09-27 RX ADMIN — METHOCARBAMOL 500 MG: 500 TABLET, FILM COATED ORAL at 15:30

## 2020-09-27 RX ADMIN — METHOCARBAMOL 500 MG: 500 TABLET, FILM COATED ORAL at 19:57

## 2020-09-27 RX ADMIN — NORTRIPTYLINE HYDROCHLORIDE 25 MG: 25 CAPSULE ORAL at 21:56

## 2020-09-27 RX ADMIN — ATENOLOL 50 MG: 50 TABLET ORAL at 21:56

## 2020-09-27 RX ADMIN — BACITRACIN: 500 OINTMENT TOPICAL at 08:09

## 2020-09-27 RX ADMIN — GABAPENTIN 300 MG: 300 CAPSULE ORAL at 19:57

## 2020-09-27 RX ADMIN — ACETAMINOPHEN 975 MG: 325 TABLET, FILM COATED ORAL at 15:30

## 2020-09-27 RX ADMIN — METHOCARBAMOL 500 MG: 500 TABLET, FILM COATED ORAL at 11:31

## 2020-09-27 RX ADMIN — BACITRACIN: 500 OINTMENT TOPICAL at 19:57

## 2020-09-27 RX ADMIN — HEPARIN SODIUM 5000 UNITS: 5000 INJECTION, SOLUTION INTRAVENOUS; SUBCUTANEOUS at 12:00

## 2020-09-27 RX ADMIN — POLYETHYLENE GLYCOL 3350 17 G: 17 POWDER, FOR SOLUTION ORAL at 08:08

## 2020-09-27 RX ADMIN — HEPARIN SODIUM 5000 UNITS: 5000 INJECTION, SOLUTION INTRAVENOUS; SUBCUTANEOUS at 04:10

## 2020-09-27 RX ADMIN — ACETAMINOPHEN 975 MG: 325 TABLET, FILM COATED ORAL at 04:10

## 2020-09-27 RX ADMIN — ACETAMINOPHEN 975 MG: 325 TABLET, FILM COATED ORAL at 21:56

## 2020-09-27 RX ADMIN — GABAPENTIN 300 MG: 300 CAPSULE ORAL at 08:08

## 2020-09-27 RX ADMIN — DOCUSATE SODIUM 50 MG AND SENNOSIDES 8.6 MG 2 TABLET: 8.6; 5 TABLET, FILM COATED ORAL at 08:09

## 2020-09-27 RX ADMIN — HEPARIN SODIUM 5000 UNITS: 5000 INJECTION, SOLUTION INTRAVENOUS; SUBCUTANEOUS at 19:57

## 2020-09-27 RX ADMIN — BACITRACIN: 500 OINTMENT TOPICAL at 13:16

## 2020-09-27 RX ADMIN — ACETAMINOPHEN 975 MG: 325 TABLET, FILM COATED ORAL at 10:02

## 2020-09-27 RX ADMIN — METHOCARBAMOL 500 MG: 500 TABLET, FILM COATED ORAL at 08:08

## 2020-09-27 ASSESSMENT — ACTIVITIES OF DAILY LIVING (ADL)
ADLS_ACUITY_SCORE: 12
ADLS_ACUITY_SCORE: 12
ADLS_ACUITY_SCORE: 13
ADLS_ACUITY_SCORE: 12
ADLS_ACUITY_SCORE: 13
ADLS_ACUITY_SCORE: 13

## 2020-09-27 ASSESSMENT — MIFFLIN-ST. JEOR: SCORE: 1803.87

## 2020-09-27 NOTE — PLAN OF CARE
6B PT    Pt reports just finished walking to nursing station and it really wiped him out. Will check back in PM as scheduling allows.

## 2020-09-27 NOTE — PROGRESS NOTES
STAFF ADDENDUM:  I saw and evaluated Mr. Junior and agree with the resident s findings and plan of care as documented in the resident s note and edited by me, as applicable.      In summary, Mr. Junior is doing well. Started on clears with g-tube to gravity, removed right chest tube.   The patient had all questions answered and was in agreement with the plan.  Erik Lindsey MD

## 2020-09-27 NOTE — PROGRESS NOTES
"THORACIC SURGERY PROGRESS NOTE  09/27/2020    S: No acute events overnight. Pain is controlled. Tolerating clears, no n/v. No fever/chills.    O:  /82   Pulse 78   Temp 98.1  F (36.7  C)   Resp 18   Ht 1.803 m (5' 10.98\")   Wt 99.2 kg (218 lb 11.1 oz)   SpO2 94%   BMI 30.52 kg/m    Sitting up in bed, NAD, looks comfortable and well  NLB on 2 L NC  RRR  Incisions c/d/i with skin glue  Abdomen is soft, non-distended, minimally appropriately tender to palpation  PEG in place without surrounding skin erythema or drainage  Extremities are warm    Labs reviewed - BMP unremarkable  CXR reviewed - Both chest tubes now removed, no acute findings    A/P: 65M s/p re-do Nissen fundoplication, KATHYA, and PEG placement on 9/25. Doing well.    - Pain control  - Clear liquid diet  - Left chest tube removed this morning  - Keep left chest tube to water seal  - Cap PEG, ok for meds  - Dispo: 6B    Discussed with staff.    Albino Contreras MD (PGY-3)  General Surgery Resident  Thoracic Surgery  "

## 2020-09-27 NOTE — PROGRESS NOTES
STAFF ADDENDUM:  I saw and evaluated Mr. Junior and agree with the resident s findings and plan of care as documented in the resident s note and edited by me, as applicable.      In summary, Mr. Junior is making gradual progress. Both chest tubes are out and we capped his G-tube today. Hopefully, we can discharge him tomorrow. He will require an enema prior to discharge.  The patient had all questions answered and was in agreement with the plan.  Erik Lindsey MD

## 2020-09-27 NOTE — PLAN OF CARE
D AVSS with sat's 93% on room air while awake and 96% on 2L/min of oxygen via nasal cannula while sleeping. Heart regular and lungs decreased in bases otherwise clear. Denied nausea and pain has been controlled with Oxycodone. Making adequate amount of urine and Minimal output from CT. Slept well between cares.   I Vital's, assessment and med's per order.   A Resting in bed with call light in reach.   P Continue to monitor and update MD with changes.

## 2020-09-28 ENCOUNTER — APPOINTMENT (OUTPATIENT)
Dept: GENERAL RADIOLOGY | Facility: CLINIC | Age: 65
End: 2020-09-28
Attending: STUDENT IN AN ORGANIZED HEALTH CARE EDUCATION/TRAINING PROGRAM
Payer: COMMERCIAL

## 2020-09-28 ENCOUNTER — APPOINTMENT (OUTPATIENT)
Dept: PHYSICAL THERAPY | Facility: CLINIC | Age: 65
End: 2020-09-28
Attending: STUDENT IN AN ORGANIZED HEALTH CARE EDUCATION/TRAINING PROGRAM
Payer: COMMERCIAL

## 2020-09-28 VITALS
BODY MASS INDEX: 30.62 KG/M2 | SYSTOLIC BLOOD PRESSURE: 130 MMHG | HEIGHT: 71 IN | TEMPERATURE: 98.3 F | RESPIRATION RATE: 20 BRPM | OXYGEN SATURATION: 98 % | HEART RATE: 74 BPM | WEIGHT: 218.7 LBS | DIASTOLIC BLOOD PRESSURE: 89 MMHG

## 2020-09-28 PROCEDURE — 97116 GAIT TRAINING THERAPY: CPT | Mod: GP

## 2020-09-28 PROCEDURE — 25000132 ZZH RX MED GY IP 250 OP 250 PS 637

## 2020-09-28 PROCEDURE — 25000132 ZZH RX MED GY IP 250 OP 250 PS 637: Performed by: STUDENT IN AN ORGANIZED HEALTH CARE EDUCATION/TRAINING PROGRAM

## 2020-09-28 PROCEDURE — 71045 X-RAY EXAM CHEST 1 VIEW: CPT

## 2020-09-28 PROCEDURE — 0DJ08ZZ INSPECTION OF UPPER INTESTINAL TRACT, VIA NATURAL OR ARTIFICIAL OPENING ENDOSCOPIC: ICD-10-PCS | Performed by: STUDENT IN AN ORGANIZED HEALTH CARE EDUCATION/TRAINING PROGRAM

## 2020-09-28 PROCEDURE — 97530 THERAPEUTIC ACTIVITIES: CPT | Mod: GP

## 2020-09-28 PROCEDURE — 25000128 H RX IP 250 OP 636

## 2020-09-28 PROCEDURE — 0DH63UZ INSERTION OF FEEDING DEVICE INTO STOMACH, PERCUTANEOUS APPROACH: ICD-10-PCS | Performed by: STUDENT IN AN ORGANIZED HEALTH CARE EDUCATION/TRAINING PROGRAM

## 2020-09-28 PROCEDURE — 0DQ64ZZ REPAIR STOMACH, PERCUTANEOUS ENDOSCOPIC APPROACH: ICD-10-PCS | Performed by: STUDENT IN AN ORGANIZED HEALTH CARE EDUCATION/TRAINING PROGRAM

## 2020-09-28 PROCEDURE — 0DV44ZZ RESTRICTION OF ESOPHAGOGASTRIC JUNCTION, PERCUTANEOUS ENDOSCOPIC APPROACH: ICD-10-PCS | Performed by: STUDENT IN AN ORGANIZED HEALTH CARE EDUCATION/TRAINING PROGRAM

## 2020-09-28 PROCEDURE — 0BQT4ZZ REPAIR DIAPHRAGM, PERCUTANEOUS ENDOSCOPIC APPROACH: ICD-10-PCS | Performed by: STUDENT IN AN ORGANIZED HEALTH CARE EDUCATION/TRAINING PROGRAM

## 2020-09-28 RX ORDER — OXYCODONE HYDROCHLORIDE 5 MG/1
5-10 TABLET ORAL EVERY 4 HOURS PRN
Qty: 30 TABLET | Refills: 0 | Status: SHIPPED | OUTPATIENT
Start: 2020-09-28 | End: 2020-10-28

## 2020-09-28 RX ORDER — AMOXICILLIN 250 MG
2 CAPSULE ORAL DAILY
Qty: 50 TABLET | Refills: 0 | Status: SHIPPED | OUTPATIENT
Start: 2020-09-29 | End: 2020-10-28

## 2020-09-28 RX ADMIN — HEPARIN SODIUM 5000 UNITS: 5000 INJECTION, SOLUTION INTRAVENOUS; SUBCUTANEOUS at 04:39

## 2020-09-28 RX ADMIN — BACITRACIN: 500 OINTMENT TOPICAL at 08:06

## 2020-09-28 RX ADMIN — ACETAMINOPHEN 975 MG: 325 TABLET, FILM COATED ORAL at 09:14

## 2020-09-28 RX ADMIN — DOCUSATE SODIUM 50 MG AND SENNOSIDES 8.6 MG 2 TABLET: 8.6; 5 TABLET, FILM COATED ORAL at 08:05

## 2020-09-28 RX ADMIN — GABAPENTIN 300 MG: 300 CAPSULE ORAL at 08:05

## 2020-09-28 RX ADMIN — METHOCARBAMOL 500 MG: 500 TABLET, FILM COATED ORAL at 08:05

## 2020-09-28 RX ADMIN — METHOCARBAMOL 500 MG: 500 TABLET, FILM COATED ORAL at 11:21

## 2020-09-28 ASSESSMENT — ACTIVITIES OF DAILY LIVING (ADL)
ADLS_ACUITY_SCORE: 12

## 2020-09-28 NOTE — PROGRESS NOTES
"  THORACIC SURGERY PROGRESS NOTE  September 28, 2020     S. No acute events. Ambulating. Some difficulty breathing overnight. Pain is controlled. Tolerating CLDs. Satted well on 2L NC. Urinating.     O.  /85 (BP Location: Left arm)   Pulse 67   Temp 98.5  F (36.9  C) (Oral)   Resp 17   Ht 1.803 m (5' 10.98\")   Wt 99.2 kg (218 lb 11.1 oz)   SpO2 96%   BMI 30.52 kg/m       Sitting up in bed, NAD, looks comfortable and well  NLB on 2 L NC  RRR  Incisions c/d/i with skin glue  Abdomen is soft, non-distended, minimally appropriately tender to palpation  PEG in place without surrounding skin erythema or drainage, clamped  Extremities are warm    Labs reviewed  Imaging reviewed - CXR 9/28: 1. Decrease in size of tiny right apical pneumothorax. 2. Bibasilar streaky opacities favored to be atelectasis. Small left pleural effusion.    I/O last 3 completed shifts:  In: 605 [P.O.:605]  Out: 1420 [Urine:1175; Emesis/NG output:225; Chest Tube:20]     A/P: 65M s/p re-do Nissen fundoplication, KATHYA, and PEG placement on 9/25, now POD3 and doing well.     - Pain control  - Clear liquid diet  - Cap PEG, ok for meds  - Bowel regiment  - Pain mgmt with Robaxin  - PPX: subcutaneous heparin  - Dispo: 6B      Seen and d/w staff     Delta Hutchinson MD NAVEED  PGY-1  Surgery  [facility]  Pager [phone]           "

## 2020-09-28 NOTE — PROVIDER NOTIFICATION
"Notified surgery cross cover that pt c/o feeling \"hot\" and having some shortness of breath/ shallow RR. VSS.   -surgery cross cover at bedside, CXR ordered. Continue to monitor  "

## 2020-09-28 NOTE — PROGRESS NOTES
Cross cover note    I was paged that pt feels warm and is having some trouble breathing. I went to see and examine patient.    He feels warmer than usual, but the bedside fan is helping. Since about 3pm, he has felt some trouble breathing, like he is just taking shorter breaths. Saturating >95%, taking shallow breaths but RR wnl. HDS    Last chest tube was removed this AM. Will get CXR to evaluate.    Diana Mac MD (PGY-1)  Surgery

## 2020-09-28 NOTE — PROGRESS NOTES
DISCHARGE                         9/28/2020  3:00 PM  ----------------------------------------------------------------------------  Discharged to: Home  Via: private transportation  Accompanied by: Family  Discharge Instructions: nissen diet, activity, medications, follow up appointments, when to call the MD, aftercare instructions.  Prescriptions: To be filled by discharge pharmacy; medication list reviewed & sent with pt  Follow Up Appointments: arranged; information given  Belongings: All sent with pt  IV: d/c'd  Telemetry: d/c'd  Pt exhibits understanding of above discharge instructions; all questions answered.  G-tube teaching completed with patient and wife.     Discharge Paperwork: Signed, copied, and sent home with patient.

## 2020-09-28 NOTE — PROGRESS NOTES
BRIEF PROGRESS NOTE    PEG tube and the insertion site check complete.  Tube in appropriate position and with appropriate tension.  No erythema or induration.  Pt educated on how to maintain appropriate tension and demonstrated understanding.  No other scheduled PEG follow up necessary.    Alexey Cohen PA-C  P: [phone]

## 2020-09-28 NOTE — PROGRESS NOTES
Received consult for Nissen Fundoplication diet ed.      Assessed learning needs, learning preferences, and willingness to learn    Nutrition Education (Content):  a) Provided handout Diet Guidelines for a Nissen Fundoplication  b) Discussed slow progression of diet based on tolerance    Nutrition Education (Application):  a) Discussed eating habits and recommended alternative food choices    Patient verbalizes understanding of diet    Anticipate good compliance    Diet Education - refer to Education Flowsheet    Marleny Holcomb RDN, LD  Pg [phone]

## 2020-09-28 NOTE — PLAN OF CARE
PT - 6B  Discharge Planner PT   Patient plan for discharge: Home with assist  Current status: Engaged pt in ambulation for 100 ft, + 100 ft to and from therapy gym with SBA. Facilitated stair training with SBA. Pt tolerated session well, AVSS on RA for activity.  Barriers to return to prior living situation: no PT barriers  Recommendations for discharge: Home with assist  Rationale for recommendations: Pt displays good BLE strength, is able to ambulate 100 ft without Assistive device with SBA, is able to climb 18 stairs with step through pattern.   Entered by: Masoud Blanton 09/28/2020 1:54 PM     Physical Therapy Discharge Summary    Reason for therapy discharge:    Discharged to home.    Progress towards therapy goal(s). See goals on Care Plan in [facility] electronic health record for goal details.  Goals met    Therapy recommendation(s):    Continue home exercise program.

## 2020-09-28 NOTE — PLAN OF CARE
Neuro: A&Ox4.   Cardiac: SR. SBP stable. Afebrile. VSS.         Respiratory: Weaned to RA. IS encouraged.  GI/: Adequate urine output. No BM, pt reports passing gas. Miralax and senna started this AM.  Diet/appetite: Tolerating clear liquid diet. G tube clamped. No nausea noted.   Activity:  Assist of 1, up to chair and in halls.  Pain: On scheduled robaxin.  At acceptable level on current regimen.   Skin: No new deficits noted.  LDA's: L CT removed.     L CT removed this AM. This afternoon pt reported feeling flush/hot and slight increase in work of breathing. Temp 99.6 at this time. Surg cross cover notified, no orders. Pt reported feeling better after taking a nap. Pt needed encouraged for OOB activity.

## 2020-09-28 NOTE — DISCHARGE SUMMARY
NAME: Bal Junior   MRN: [id number]   : 1955     DATE OF ADMISSION: 2020     PRE/POSTOPERATIVE DIAGNOSES: Hiatal hernia [K44.9]    PROCEDURES PERFORMED:   Take-Down and Re-Do of Nissen Fundoplications, Lysis of Adhesions, Percutaneous Gastrostomy Tube Placement, Esophagogastroduodenoscopy    PATHOLOGY RESULTS: None    CULTURE RESULTS: None     INTRAOPERATIVE COMPLICATIONS: None     POSTOPERATIVE COMPLICATIONS: None     DRAINS/TUBES PRESENT AT DISCHARGE: PEG tube for gastropexy and PRN decompression     DATE OF DISCHARGE:  2020     HOSPITAL COURSE: Bal Junior is a 65 year old male who on 2020 underwent the above-named procedures.  He tolerated the operation well and postoperatively was transferred to the general post-surgical unit.  The remainder of his course was essentially uncomplicated.  Prior to discharge, his pain was controlled well, he was able to perform ADLs and ambulate independently without difficulty, and had full return of bowel and bladder function.  On 2020, he was discharged to home in stable condition.    DISCHARGE EXAM:   A&O, NAD  Resp non-labored  Distal extremities warm    Incisions CDI     DISCHARGE INSTRUCTIONS:  Discharge Procedure Orders   XR Upper GI without KUB   Standing Status: Future Standing Exp. Date: 20     Order Specific Question Answer Comments   Priority Routine      Adult [facility] Follow-up and recommended labs and tests   Order Comments: 1.) Follow up with primary care physician, Abad Noel, in 1-2 weeks.  2.) Follow up with a thoracic surgery Clinical Nurse Specialist in Thoracic Surgery clinic in 1 month, prior to which an upper GI should be performed.     Appointments on [place] and/or [facility] (with [facility] or [facility] provider or service). Call [phone] if you haven't heard regarding these appointments within 7 days of discharge.     Discharge Instructions   Order Comments: THORACIC SURGERY DISCHARGE  "INSTRUCTIONS    DIET: Diet as discussed with dietician    If your plans upon discharge include prolonged periods of sitting (i.e a lengthy car or plane ride), it is highly beneficial to get up and walk at least once per hour to help prevent swelling and blood clots.     You may remove chest tube dressing 48 hours after tube removal and bandage the site at your own discretion thereafter.  Small amounts of leakage are normal for 2-3 days after removal.  Feel free to call with questions.    You may get incision wet 2 days after operation. Do not submerge, soak, or scrub incision or swim until seen in follow-up.    Take incentive spirometer home for continued frequent use    Activity as tolerated, no strenous activity until seen in follow-up, no lifting greater than 20 pounds for the next 6-8 weeks.    Stay hydrated. Take over the counter fiber (metamucil or benefiber) and stool softeners (Miralax, docusate or senna) if becoming constipated.     Call for fever greater than 101.5, chills, increased size of incision, red skin around incision, vision changes, muscle strength changes, sensation changes, shortness of breath, or other concerns.    No driving while taking narcotic pain medication.    Transition to ibuprofen or tylenol/acetaminophen for pain control. Do not take tylenol/acetaminophen and acetaminophen containing narcotic (e.g., percocet or vicodin) at the same time. If you have known ulcer problems, or kidney trouble (elevated creatinine) do not take the ibuprofen.    In emergencies, call 911    For other Questions or Concerns;   A.) During weekday working hours (Monday through Friday 8am to 4:30pm)   call [phone] and ask to speak to a clinical nurse specialist.     B.) At nights (after 4:30pm), on weekends, or if urgent call [phone] and   tell the  \"I would like to page job code [phone], the thoracic surgery   fellow on call, please.\"       DISCHARGE MEDICATIONS:   Current Discharge " Medication List      START taking these medications    Details   oxyCODONE (ROXICODONE) 5 MG tablet Take 1-2 tablets (5-10 mg) by mouth every 4 hours as needed for moderate to severe pain  Qty: 30 tablet, Refills: 0    Comments: Wean narcotic use as tolerated starting at time of discharge  Associated Diagnoses: Hiatal hernia      senna-docusate (SENOKOT-S/PERICOLACE) 8.6-50 MG tablet Take 2 tablets by mouth daily  Qty: 50 tablet, Refills: 0    Associated Diagnoses: Hiatal hernia         CONTINUE these medications which have NOT CHANGED    Details   atenolol (TENORMIN) 50 MG tablet Take 50 mg by mouth daily       cyanocobalamin (VITAMIN B-12) 500 MCG tablet daily       gabapentin (NEURONTIN) 300 MG capsule Take 300 mg by mouth 2 times daily       nortriptyline (PAMELOR) 25 MG capsule Take 25 mg by mouth daily       pantoprazole (PROTONIX) 40 MG EC tablet daily       EPINEPHrine (EPIPEN 2-GINO) 0.3 MG/0.3ML injection 2-pack 0.3 mg

## 2020-09-28 NOTE — PLAN OF CARE
"Shift: 6227-8223  VS: VSS /85 (BP Location: Left arm)   Pulse 67   Temp 98.5  F (36.9  C) (Oral)   Resp 17   Ht 1.803 m (5' 10.98\")   Wt 99.2 kg (218 lb 11.1 oz)   SpO2 96%   BMI 30.52 kg/m    Pain: denies.  Neuro: A&O x4.  Cardiac: SR 60s-70s.  Respiratory: Satting >92% on RA when awake, 2L NC placed while pt sleeping.  GI/Diet/Appetite: Tolerating clear liquid diet. Bowel sounds hypoactive - pt states he feels he is unable to pass gas. Ambulation & prn bowel meds encouraged.  : Voiding adequately via urinal.  LDA's: R PIV SL. G tube clamped.  Skin: Abdominal lap sites HEATHER, bilat. Chest tube sites covered.  Activity: Up w/1. Refusing OOB overnight.  Tests/Procedures: CXR x1 as pt endorsed feeling \"hot\" and short of breath. Improved this am per pt.  Pertinent Labs/Lab Collection: N/A     Plan: Will continue w/ POC.    "

## 2020-09-29 ENCOUNTER — PATIENT OUTREACH (OUTPATIENT)
Dept: CARE COORDINATION | Facility: CLINIC | Age: 65
End: 2020-09-29

## 2020-09-29 NOTE — OP NOTE
Procedure Date: 09/25/2020      PREOPERATIVE DIAGNOSIS:  Herniated Nissen fundoplication with recurrent reflux symptoms.      POSTOPERATIVE DIAGNOSIS:   Herniated Nissen fundoplication with recurrent reflux symptoms.      PROCEDURE PERFORMED:  Laparoscopic takedown of Nissen fundoplication, repair of hiatal hernia with redo Nissen fundoplication, upper endoscopy and gastropexy with PEG tube.      SURGEON:  Katlyn Tobar MD      ASSISTANT SURGEON:  Jose Garibay MD      OPERATIVE FINDINGS:  Herniated fat, large hiatal defect.  Previous mesh visualized.     Complications: small gastrotomy during take down. Small wedge of the area stapled across. No clinical significance     DESCRIPTION OF PROCEDURE:  After obtaining informed consent, the patient was brought to the operating room and laid supine on the operating table.  After induction of general anesthesia and introduction of an endotracheal tube, the patient was positioned supine with the arms out and a foot board.  The chest and abdomen were prepped and draped in a sterile manner.        We then proceeded with our standard 4-port laparoscopic approach with the first port being made using the open Anahy technique and having 0 Vicryl sutures in the fascia for eventual closure.  We had a first port for the liver retractor.  Upon entering the abdominal cavity, there were some adhesions to the anterior abdominal wall which were taken down.  We then identified the hiatal defect and the port clamp.  Very meticulously with sharp dissection, we freed up the hiatus circumferentially.  We then carefully took down the Nissen wrap.  There was a small gastrotomy during this procedure due to a laceration caused during the take-down, which was immediately recognized and a small wedge of this area of the stomach was taken using a green load on the stapler.  After the wrap had been completely undone and the hiatus had been freed up, we carried out a hiatal dissection of the  mediastinum up to the inferior pulmonary vein.  We confirmed with endoscopy at this time that we had at least 3 cm of intraabdominal esophagus.  We then at this point placed bilateral chest tubes after we entered the pleural space.  We then proceeded with our hiatal repair using 3 pledgeted silk sutures posteriorly and 1 anteriorly.  A 52 Icelandic bougie was passed through the esophagus and with this, we will comfortably able to pass a grasper through the hiatus, confirming that the hiatal repair was not too tight.        We then proceeded to perform a Nissen fundoplication with the bougie in place.  A repeat endoscopy was then performed to confirm a stack of coins appearance endoscopically and to also make sure that the wrap was not too tight.  After confirming this, we proceeded to place a PEG tube with endoscopic and gastroscopic visualization.  An easily transilluminated area in the left upper quadrant was identified.  A needle was passed through this into the stomach and using the snare and the pull-through technique, a 20 Icelandic Ponsky tube was delivered into the stomach to emerge at the left upper quadrant incision.  This was tacked to the abdominal wall making sure it was neither too tight nor too loose.  We then ensured hemostasis.        The port sites were closed in layers.  The patient tolerated the procedure well.         AIME RAHMAN MD             D: 2020   T: 2020   MT: CHANELL      Name:     DEWEY SERRANO   MRN:      [id number]        Account:        [id number]   :      1955           Procedure Date: 2020      Document: [id number]

## 2020-09-29 NOTE — PROGRESS NOTES
[facility]: Post-Discharge Note  SITUATION                                                      Admission:    Admission Date: 09/25/20   Reason for Admission: Take-Down and Re-Do of Nissen Fundoplications  Discharge:   Discharge Date: 09/28/20  Discharge Diagnosis: Take-Down and Re-Do of Nissen Fundoplications  Discharge Service: thoracic surgery  Discharge Plan:  pcp thoracic surgery    BACKGROUND                                                      Bal Junior is a 65 year old male who on 9/25/2020 underwent the above-named procedures.  He tolerated the operation well and postoperatively was transferred to the general post-surgical unit.  The remainder of his course was essentially uncomplicated.  Prior to discharge, his pain was controlled well, he was able to perform ADLs and ambulate independently without difficulty, and had full return of bowel and bladder function.  On September 28, 2020, he was discharged to home in stable condition.    ASSESSMENT      Discharge Assessment  Patient reports symptoms are: Improved(coughing some plegm, some peg sight pain when coughing.)  Does the patient have all of their medications?: Yes  Does patient know what their new medications are for?: Yes  Does patient have a follow-up appointment scheduled?: Yes  Does patient have any other questions or concerns?: No    Post-op  Did the patient have surgery or a procedure: Yes  Incision: healing  Drainage: No  Bleeding: none  Fever: No  Chills: No  Redness: No  Warmth: No  Swelling: No  Incision site pain: No  Closure: suture  Eating & Drinking: eating and drinking without complaints/concerns  PO Intake: full liquids  Bowel Function: normal  Urinary Status: voiding without complaint/concerns        PLAN                                                      Outpatient Plan:      Future Appointments   Date Time Provider Department Center   10/9/2020  9:00 AM Adrienne Pedroza PA-C [facility]            Marleny Valiente

## 2020-09-30 NOTE — ANESTHESIA PROCEDURE NOTES
Arterial Line Procedure Note      Staff -   Anesthesiologist:  Gia Cortez MD  Performed By: anesthesiologist    Location: In OR After Induction  Procedure Start/Stop Times:     patient identified, IV checked, site marked, risks and benefits discussed, informed consent, monitors and equipment checked, pre-op evaluation and at physician/surgeon's request      Correct Patient: Yes      Correct Position: Yes      Correct Site: Yes      Correct Procedure: Yes      Correct Laterality:  Yes    Site Marked:  Yes  Line Placement:     Procedure:  Arterial Line    Insertion Site:  Radial    Insertion laterality:  Left    Skin Prep: Chloraprep      Patient Prep: patient draped, mask, sterile gloves, hat and hand hygiene      Local skin infiltration:  None    Ultrasound Guided?: Yes      Artery evaluated via ultrasound confirming patency.   Using realtime imaging, the artery was punctured and the needle was observed entering the artery.      A permanent image is NOT entered into the patient's record.      Catheter size:  20 gauge, Quick cath    Cath secured with: anchor securement device      Dressing:  Tegaderm    Complications:  None obvious    Arterial waveform: Yes

## 2020-10-02 LAB
BACTERIA SPEC CULT: NO GROWTH
BACTERIA SPEC CULT: NO GROWTH
SPECIMEN SOURCE: NORMAL
SPECIMEN SOURCE: NORMAL

## 2020-10-09 ENCOUNTER — VIRTUAL VISIT (OUTPATIENT)
Dept: GASTROENTEROLOGY | Facility: CLINIC | Age: 65
End: 2020-10-09
Payer: COMMERCIAL

## 2020-10-09 VITALS — BODY MASS INDEX: 29.2 KG/M2 | WEIGHT: 204 LBS | HEIGHT: 70 IN

## 2020-10-09 DIAGNOSIS — K21.9 GERD WITHOUT ESOPHAGITIS: Primary | ICD-10-CM

## 2020-10-09 PROCEDURE — 99213 OFFICE O/P EST LOW 20 MIN: CPT | Mod: 95 | Performed by: PHYSICIAN ASSISTANT

## 2020-10-09 ASSESSMENT — MIFFLIN-ST. JEOR: SCORE: 1721.59

## 2020-10-09 ASSESSMENT — PAIN SCALES - GENERAL: PAINLEVEL: MILD PAIN (3)

## 2020-10-09 NOTE — NURSING NOTE
"Chief Complaint   Patient presents with     RECHECK     follow up on reflux and dumping syndrome       Vitals:    10/09/20 0830   Weight: 92.5 kg (204 lb)   Height: 1.778 m (5' 10\")       Body mass index is 29.27 kg/m .      Ciaran Chirinos LPN                        "

## 2020-10-09 NOTE — LETTER
10/9/2020       RE: Bal Junior  [address] [place] [address]     Dear Colleague,    Thank you for referring your patient, Bal Junior, to the [facility]. Please see a copy of my visit note below.    GI CLINIC VISIT    CC/REFERRING MD:  Abad Noel  REASON FOR CONSULTATION: follow-up     ASSESSMENT/PLAN:  1.  Gastroesophageal reflux disease without esophagitis, Status post Nissen fundoplication re-do  Patient has history of B12 deficiency reportedly leading to neuropathy, thought to be secondary to PPI use.  Patient currently on pantoprazole at this time, has recently underwent Nissen re-do, overall is healing well from the surgery.  Would recommend trial of discontinuing PPI entirely- discussed that rebound reflux less likely given Nissen surgery.   -- stop PPI  -- if you have temporary symptoms, ok to take tums or pepcid. If symptoms persist please let us know      2. Postprandial hypoglycemia  Rapid emptying on GES with reported postprandial hypoglycemia on glucometer checks (in 50s), though normal oral glucose tests.  On nortriptyline, notes that symptoms have improved significantly since tapering off of dicyclomine.  --Continue nortriptyline  --Continue recommendations as was discussed with our dietitian     3. Loose stools  Has had negative celiac serologies, hard to say if this is due to liquid diet versus more chronic post-prandial loose stools. Will continue to monitor for improvement after surgery     Follow-up in 2 to 3 months    Thank you for this consultation.  It was a pleasure to participate in the care of this patient; please contact us with any further questions.     Adrienne Pedroza PA-C  Division of Gastroenterology, Hepatology & Nutrition  [facility]    HPI:   Mr. Junior is a 65 year old male with HTN, GERD s/p Nissen fundoplication, and reported dumping syndrome who is s/p  "R inguinal hernia repair and s/p appendectomy who presents for another opinion on his \"slipped Nissen\" and GI symptoms. He has previously been seen by Dr. Quezada, please see her documentation for additional details. He has also previously followed at [facility] for GERD and dumping syndrome, and has recently been seen by Dr. Zhao of surgery at [facility] for consideration of Nissen re-do.     History summarized in brief:   Reports decades of reflux starting in late 1980s/early 1990s she was treating with intermittent omeprazole.  In the 1990s does transition to daily omeprazole.  In the early 2000 fingers and toes which was attributed to the low B12, it was felt that PPI may be contributing to this underwent in 2011 so that he could stop his PPI.  Stated for well controlled neuropathy symptoms have not resolved but were stable.  Replaced B12 with PCP.  Also described intermittent issues with postprandial stools, urgency, lightheadedness weakness he was treated at [facility].  Had acute symptoms.  Ago felt like something hit him hard.  Pain improved but reflux continued.  He was started on Zantac had multiple EGDs 1 tried slipped Nissen.    Patient then underwent following work-up for his symptoms:  -EGD 8/30/2017 with report indicating a normal exam with intact Nissen.   -12/4/2018 was done for BRAVO placement. EGD report indicates a large hiatal hernia was seen and the \"wrap appears slipped.\"   -BRAVO testing revealed a DeMeester score total of 24.8 (18.7 on Day 1, 29.3 on Day 2 with 100% symptom concordance).  - Esophageal manometry was done though we do not have the interpretation of this test, images are scanned (but difficult to interpret).   - esophagram (1/2/19) which showed free flow of contrast, no hiatal hernia, and mild reflux. GES 2/19/19 showed only 4% of food remaining at 2 hours and exam stopped.   - esophagram 4/1/19 with again no hernia seen on upright images, but small HH induced with " "abdominal pressure and Valsalva. No reflux was noted on this exam.   - CT chest 4/1/19 with small HH and fundoplication changes.    The patient had stated that Dr. Zhao would consider repeat surgery, but given his above testing further trial of medical management was recommended. Mr. Junior was then started on pantoprazole-is concerned that paresthesias are worsening since starting that.    Regards to dumping syndrome, this was diagnosed at [facility] after gastric emptying study (rapid with oral glucose tolerance test normal), postprandial glucose checks with levels in the 50s.  Describes symptoms of dizziness, wooziness, and \"going gray \", associated with sneezing which would occur after a meal.  Typically resolved in 5 minutes.  Also noted loose stools sometimes occur at unpredictable times.  Patient was treated with baclofen and nortriptyline with 80% resolution symptoms, and baclofen was discontinued and switched to dicyclomine.        Interval history 6/23/2020:  Symptoms are going ok, symptoms are mostly controlled when he avoids having large volume and density of foods. Breakfest and lunch are the most common times for loose stools and wooziness. Eggs are a big trigger. Otherwise has cut back on amount he is eating. Has not been measuring blood sugars. Is able to tell based on symptoms- hot sweats, dizziness. Sometimes he will need to sneeze and this clears up.     Bowel movements: 2-3 times a week will have diarrhea (non-bloody, Dallas scale 6 with the smell at the end). In between these days, he will usually have a fairly normal bowel movement. Was previously happening 3-4 times a week.     About a month and a half ago, he felt like something punched him in his abdomen. This was centered on the upper stomach.  Thought about going in but symptoms improved. Not doing or eating anything out of the ordinary.     GERD  Pantoprazole seems to be controlling. Not taking zantac. Has breakthrough " "symptoms about once a month. Is concernend that neuropathy might get worse with pantoprazole.     Continues to take Nortiptyline or Bentyl. Takes Bentyl 3 times a day     Vitamin B12- is checking with primary care last level was high.    Interval History 9/1/2020:  Decreasing diccylomine helped his symptoms. Symptoms of sneezing after eating has decreased significantly. Maybe will have symptoms for 10 minutes. He would say this has improved 80%. Feels like nortriptyline is going ok.     Still taking pantoprazole- rare symptoms related to heartburn situation. If he skips this for a couple of days he will have symptoms. Has not had to take pepcid.     Bowel movements: some loose stools in the morning (related to amount of food the day before). Does not seem to be type of foods, volume related. Stools are a little loose 2-3 times a week.     Has had increased \"hay fever symptoms\"    Interval  History 10/9/2020:   Take-down and re-do of nissen fundoplication, KATHYA, gastrostomy tube placement (still present)  Flushing tube 30 ml twice a day. G tube is irritating if he sits up.     Mixing in semi-liquids in the past couple of days, hard to say if this is \"dumping syndrome\".    GERD- has not had any GERD, tomato soup. Continues on PPI     Bowel movements have been a little more liquid than normal, hard to say if this is due to more liquid diet. Every other day or every third day loose stools. Has not taken senna.     PERTINENT PAST MEDICAL HISTORY:  Hypertension  GERD  \"Dumping syndrome\"    PREVIOUS SURGERIES:  Past Surgical History:   Procedure Laterality Date     ABDOMEN SURGERY  2012?     APPENDECTOMY  1964?     COLONOSCOPY  2006, 2016     EYE SURGERY  199x    laser for retinal tears     HERNIA REPAIR  1961?     LAPAROSCOPIC TAKEDOWN NISSEN FUNDOPLICATION N/A 9/25/2020    Procedure: TAKE-DOWN, FUNDOPLICATION, REDO NISSEN, LAPAROSCOPIC, gastrostomy feeding tube placement;  Surgeon: Katlyn Tobar MD;  Location:  OR     " SOFT TISSUE SURGERY  2009?    [facility] l. thumb     S/p tonsillectomy and adenoidectomy  S/p R inguinal hernia repair  S/p Nissen fundoplication    ALLERGIES:  Allergies   Allergen Reactions     Hornets      Wasp Venom Protein Hives     Bee Venom Swelling     PERTINENT MEDICATIONS:  Current Outpatient Medications   Medication     atenolol (TENORMIN) 50 MG tablet     cyanocobalamin (VITAMIN B-12) 500 MCG tablet     EPINEPHrine (EPIPEN 2-GINO) 0.3 MG/0.3ML injection 2-pack     gabapentin (NEURONTIN) 300 MG capsule     nortriptyline (PAMELOR) 25 MG capsule     pantoprazole (PROTONIX) 40 MG EC tablet     oxyCODONE (ROXICODONE) 5 MG tablet     senna-docusate (SENOKOT-S/PERICOLACE) 8.6-50 MG tablet     No current facility-administered medications for this visit.      SOCIAL HISTORY:  Social History     Socioeconomic History     Marital status:      Spouse name: Not on file     Number of children: Not on file     Years of education: Not on file     Highest education level: Not on file   Occupational History     Not on file   Social Needs     Financial resource strain: Not on file     Food insecurity     Worry: Not on file     Inability: Not on file     Transportation needs     Medical: Not on file     Non-medical: Not on file   Tobacco Use     Smoking status: Never Smoker     Smokeless tobacco: Never Used   Substance and Sexual Activity     Alcohol use: Yes     Alcohol/week: 3.0 - 6.0 standard drinks     Types: 1 - 2 Glasses of wine, 1 - 2 Cans of beer, 1 - 2 Shots of liquor per week     Frequency: 2-3 times a week     Drinks per session: 1 or 2     Binge frequency: Less than monthly     Comment: glass of wine with dinner, can of beer in evening or 1 whiskey  or bourbon     Drug use: Never     Sexual activity: Yes     Partners: Female     Birth control/protection: Post-menopausal, Male Surgical, Female Surgical   Lifestyle     Physical activity     Days per week: Not on file     Minutes per session: Not on file      Stress: Not on file   Relationships     Social connections     Talks on phone: Not on file     Gets together: Not on file     Attends Muslim service: Not on file     Active member of club or organization: Not on file     Attends meetings of clubs or organizations: Not on file     Relationship status: Not on file     Intimate partner violence     Fear of current or ex partner: Not on file     Emotionally abused: Not on file     Physically abused: Not on file     Forced sexual activity: Not on file   Other Topics Concern     Not on file   Social History Narrative     Not on file     FAMILY HISTORY:  Family History   Problem Relation Age of Onset     Coronary Artery Disease Maternal Grandfather      Hypertension Mother      Uterine Cancer Mother         1965     GERD No family hx of      Ulcerative Colitis No family hx of      Crohn's Disease No family hx of      Stomach Cancer No family hx of      Past/family/social history reviewed and no changes    PHYSICAL EXAMINATION:  Telephone visit in the setting of COVID pandemic     Again, thank you for allowing me to participate in the care of your patient.  Sincerely,    Adrienne Pedroza PA-C

## 2020-10-09 NOTE — PATIENT INSTRUCTIONS
It was a pleasure taking care of you today.  I've included a brief summary of our discussion and care plan from today's visit below.  Please review this information with your primary care provider.  ______________________________________________________________________    My recommendations are summarized as follows:    -- stop PPI  -- if you have temporary symptoms, ok to take tums or pepcid. If symptoms persist please let us know   --Continue nortriptyline  --Continue recommendations as was discussed with our dietitian   -- monitor loose stools  -- let us know if symptoms change or worsen     Return to GI Clinic in 2 months to review your progress.    ______________________________________________________________________    How do I schedule labs, imaging studies, or procedures that were ordered in clinic today?   Labs: To schedule lab appointment at the [facility], use my chart or call [phone]. If you have a [place] lab closer to home where you are regularly seen you can give them a call.     Procedures: If a colonoscopy, upper endoscopy, breath test, esophageal manometry, or pH impedence was ordered today, our endoscopy team will call you to schedule this. If you have not heard from our endoscopy team within a week, please call [phone] to schedule.     Imaging Studies: If you were scheduled for a CT scan, X-ray, MRI, ultrasound, HIDA scan or other imaging study, please call [phone] to have this scheduled.     Referral: If a referral to another specialty was ordered, expect a phone call or follow instructions above. If you have not heard from anyone regarding your referral in a week, please call our clinic to check the status.     Who do I call with any questions after my visit?  Please be in touch if there are any further questions that arise following today's visit.  There are multiple ways to contact your gastroenterology care team.        During business hours, you may reach a  Gastroenterology nurse at [phone]      To schedule or reschedule an appointment, please call [phone].       You can always send a secure message through [employer].  [employer] messages are answered by your nurse or doctor typically within 24 hours.  Please allow extra time on weekends and holidays.        For urgent/emergent questions after business hours, you may reach the on-call GI Fellow by contacting the [facility] at [phone].     How will I get the results of any tests ordered?    You will receive all of your results.  If you have signed up for [employer], any tests ordered at your visit will be available to you after your physician reviews them.  Typically this takes 1-2 weeks.  If there are urgent results that require a change in your care plan, your physician or nurse will call you to discuss the next steps.      What is [employer]?  [employer] is a secure way for you to access all of your healthcare records from the [facility].  It is a web based computer program, so you can sign on to it from any location.  It also allows you to send secure messages to your care team.  I recommend signing up for [employer] access if you have not already done so and are comfortable with using a computer.      How to I schedule a follow-up visit?  If you did not schedule a follow-up visit today, please call [phone] to schedule a follow-up office visit.      Sincerely,    Adrienne Pedroza PA-C  Division of Gastroenterology, Hepatology & Nutrition  [facility]

## 2020-10-09 NOTE — PROGRESS NOTES
"Bal Junior is a 65 year old male who is being evaluated via a billable telephone visit.      The patient has been notified of following:     \"This telephone visit will be conducted via a call between you and your physician/provider. We have found that certain health care needs can be provided without the need for a physical exam.  This service lets us provide the care you need with a short phone conversation.  If a prescription is necessary we can send it directly to your pharmacy.  If lab work is needed we can place an order for that and you can then stop by our lab to have the test done at a later time.    Telephone visits are billed at different rates depending on your insurance coverage. During this emergency period, for some insurers they may be billed the same as an in-person visit.  Please reach out to your insurance provider with any questions.    If during the course of the call the physician/provider feels a telephone visit is not appropriate, you will not be charged for this service.\"    Patient has given verbal consent for Telephone visit?  Yes. [place] so he will no longer has access to his computer. Pt would like to do a telephone visit.     What phone number would you like to be contacted at?   [phone].    How would you like to obtain your AVS? Criptext    Phone call duration: 13 minutes    Adrienne Pedroza PA-C  Division of Gastroenterology, Hepatology & Nutrition  [facility]      GI CLINIC VISIT    CC/REFERRING MD:  Abad Noel  REASON FOR CONSULTATION: follow-up     ASSESSMENT/PLAN:  1.  Gastroesophageal reflux disease without esophagitis, Status post Nissen fundoplication re-do  Patient has history of B12 deficiency reportedly leading to neuropathy, thought to be secondary to PPI use.  Patient currently on pantoprazole at this time, has recently underwent Nissen re-do, overall is healing well from the surgery.  Would " "recommend trial of discontinuing PPI entirely- discussed that rebound reflux less likely given Nissen surgery.   -- stop PPI  -- if you have temporary symptoms, ok to take tums or pepcid. If symptoms persist please let us know      2. Postprandial hypoglycemia  Rapid emptying on GES with reported postprandial hypoglycemia on glucometer checks (in 50s), though normal oral glucose tests.  On nortriptyline, notes that symptoms have improved significantly since tapering off of dicyclomine.  --Continue nortriptyline  --Continue recommendations as was discussed with our dietitian     3. Loose stools  Has had negative celiac serologies, hard to say if this is due to liquid diet versus more chronic post-prandial loose stools. Will continue to monitor for improvement after surgery     Follow-up in 2 to 3 months    Thank you for this consultation.  It was a pleasure to participate in the care of this patient; please contact us with any further questions.     Adrienne Pedroza PA-C  Division of Gastroenterology, Hepatology & Nutrition  [facility]    HPI:   Mr. Junior is a 65 year old male with HTN, GERD s/p Nissen fundoplication, and reported dumping syndrome who is s/p R inguinal hernia repair and s/p appendectomy who presents for another opinion on his \"slipped Nissen\" and GI symptoms. He has previously been seen by Dr. Quezada, please see her documentation for additional details. He has also previously followed at [place] [facility] for GERD and dumping syndrome, and has recently been seen by Dr. Zhao of surgery at [facility] for consideration of Nissen re-do.     History summarized in brief:   Reports decades of reflux starting in late 1980s/early 1990s she was treating with intermittent omeprazole.  In the 1990s does transition to daily omeprazole.  In the early 2000 fingers and toes which was attributed to the low B12, it was felt that PPI may be contributing to this underwent in 2011 so that he could stop his PPI.  " "Stated for well controlled neuropathy symptoms have not resolved but were stable.  Replaced B12 with PCP.  Also described intermittent issues with postprandial stools, urgency, lightheadedness weakness he was treated at [place] [facility].  Had acute symptoms.  Ago felt like something hit him hard.  Pain improved but reflux continued.  He was started on Zantac had multiple EGDs 1 tried slipped Nissen.    Patient then underwent following work-up for his symptoms:  -EGD 8/30/2017 with report indicating a normal exam with intact Nissen.   -12/4/2018 was done for BRAVO placement. EGD report indicates a large hiatal hernia was seen and the \"wrap appears slipped.\"   -BRAVO testing revealed a DeMeester score total of 24.8 (18.7 on Day 1, 29.3 on Day 2 with 100% symptom concordance).  - Esophageal manometry was done though we do not have the interpretation of this test, images are scanned (but difficult to interpret).   - esophagram (1/2/19) which showed free flow of contrast, no hiatal hernia, and mild reflux. GES 2/19/19 showed only 4% of food remaining at 2 hours and exam stopped.   - esophagram 4/1/19 with again no hernia seen on upright images, but small HH induced with abdominal pressure and Valsalva. No reflux was noted on this exam.   - CT chest 4/1/19 with small HH and fundoplication changes.    The patient had stated that Dr. Zhao would consider repeat surgery, but given his above testing further trial of medical management was recommended. Mr. Junior was then started on pantoprazole-is concerned that paresthesias are worsening since starting that.    Regards to dumping syndrome, this was diagnosed at [place] [facility] after gastric emptying study (rapid with oral glucose tolerance test normal), postprandial glucose checks with levels in the 50s.  Describes symptoms of dizziness, wooziness, and \"going gray \", associated with sneezing which would occur after a meal.  Typically resolved in 5 minutes.  Also " noted loose stools sometimes occur at unpredictable times.  Patient was treated with baclofen and nortriptyline with 80% resolution symptoms, and baclofen was discontinued and switched to dicyclomine.      Interval history 6/23/2020:  Symptoms are going ok, symptoms are mostly controlled when he avoids having large volume and density of foods. Breakfest and lunch are the most common times for loose stools and wooziness. Eggs are a big trigger. Otherwise has cut back on amount he is eating. Has not been measuring blood sugars. Is able to tell based on symptoms- hot sweats, dizziness. Sometimes he will need to sneeze and this clears up.     Bowel movements: 2-3 times a week will have diarrhea (non-bloody, Camden scale 6 with the smell at the end). In between these days, he will usually have a fairly normal bowel movement. Was previously happening 3-4 times a week.     About a month and a half ago, he felt like something punched him in his abdomen. This was centered on the upper stomach.  Thought about going in but symptoms improved. Not doing or eating anything out of the ordinary.     GERD  Pantoprazole seems to be controlling. Not taking zantac. Has breakthrough symptoms about once a month. Is concernend that neuropathy might get worse with pantoprazole.     Continues to take Nortiptyline or Bentyl. Takes Bentyl 3 times a day     Vitamin B12- is checking with primary care last level was high.    Interval History 9/1/2020:  Decreasing diccylomine helped his symptoms. Symptoms of sneezing after eating has decreased significantly. Maybe will have symptoms for 10 minutes. He would say this has improved 80%. Feels like nortriptyline is going ok.     Still taking pantoprazole- rare symptoms related to heartburn situation. If he skips this for a couple of days he will have symptoms. Has not had to take pepcid.     Bowel movements: some loose stools in the morning (related to amount of food the day before). Does not seem  "to be type of foods, volume related. Stools are a little loose 2-3 times a week.     Has had increased \"hay fever symptoms\"    Interval  History 10/9/2020:   Take-down and re-do of nissen fundoplication, KATHYA, gastrostomy tube placement (still present)  Flushing tube 30 ml twice a day. G tube is irritating if he sits up.     Mixing in semi-liquids in the past couple of days, hard to say if this is \"dumping syndrome\".    GERD- has not had any GERD, tomato soup. Continues on PPI     Bowel movements have been a little more liquid than normal, hard to say if this is due to more liquid diet. Every other day or every third day loose stools. Has not taken senna.     PERTINENT PAST MEDICAL HISTORY:  Hypertension  GERD  \"Dumping syndrome\"    PREVIOUS SURGERIES:  Past Surgical History:   Procedure Laterality Date     ABDOMEN SURGERY  2012?     APPENDECTOMY  1964?     COLONOSCOPY  2006, 2016     EYE SURGERY  199x    laser for retinal tears     HERNIA REPAIR  1961?     LAPAROSCOPIC TAKEDOWN NISSEN FUNDOPLICATION N/A 9/25/2020    Procedure: TAKE-DOWN, FUNDOPLICATION, REDO NISSEN, LAPAROSCOPIC, gastrostomy feeding tube placement;  Surgeon: Katlyn Tobar MD;  Location: [facility] OR     SOFT TISSUE SURGERY  2009?    MCL l. thumb     S/p tonsillectomy and adenoidectomy  S/p R inguinal hernia repair  S/p Nissen fundoplication      ALLERGIES:     Allergies   Allergen Reactions     Hornets      Wasp Venom Protein Hives     Bee Venom Swelling       PERTINENT MEDICATIONS:  Current Outpatient Medications   Medication     atenolol (TENORMIN) 50 MG tablet     cyanocobalamin (VITAMIN B-12) 500 MCG tablet     EPINEPHrine (EPIPEN 2-GINO) 0.3 MG/0.3ML injection 2-pack     gabapentin (NEURONTIN) 300 MG capsule     nortriptyline (PAMELOR) 25 MG capsule     pantoprazole (PROTONIX) 40 MG EC tablet     oxyCODONE (ROXICODONE) 5 MG tablet     senna-docusate (SENOKOT-S/PERICOLACE) 8.6-50 MG tablet     No current facility-administered medications for this " visit.        SOCIAL HISTORY:  Social History     Socioeconomic History     Marital status:      Spouse name: Not on file     Number of children: Not on file     Years of education: Not on file     Highest education level: Not on file   Occupational History     Not on file   Social Needs     Financial resource strain: Not on file     Food insecurity     Worry: Not on file     Inability: Not on file     Transportation needs     Medical: Not on file     Non-medical: Not on file   Tobacco Use     Smoking status: Never Smoker     Smokeless tobacco: Never Used   Substance and Sexual Activity     Alcohol use: Yes     Alcohol/week: 3.0 - 6.0 standard drinks     Types: 1 - 2 Glasses of wine, 1 - 2 Cans of beer, 1 - 2 Shots of liquor per week     Frequency: 2-3 times a week     Drinks per session: 1 or 2     Binge frequency: Less than monthly     Comment: glass of wine with dinner, can of beer in evening or 1 whiskey  or bourbon     Drug use: Never     Sexual activity: Yes     Partners: Female     Birth control/protection: Post-menopausal, Male Surgical, Female Surgical   Lifestyle     Physical activity     Days per week: Not on file     Minutes per session: Not on file     Stress: Not on file   Relationships     Social connections     Talks on phone: Not on file     Gets together: Not on file     Attends Bahai service: Not on file     Active member of club or organization: Not on file     Attends meetings of clubs or organizations: Not on file     Relationship status: Not on file     Intimate partner violence     Fear of current or ex partner: Not on file     Emotionally abused: Not on file     Physically abused: Not on file     Forced sexual activity: Not on file   Other Topics Concern     Not on file   Social History Narrative     Not on file       FAMILY HISTORY:  Family History   Problem Relation Age of Onset     Coronary Artery Disease Maternal Grandfather      Hypertension Mother      Uterine Cancer Mother          1965     GERD No family hx of      Ulcerative Colitis No family hx of      Crohn's Disease No family hx of      Stomach Cancer No family hx of        Past/family/social history reviewed and no changes    PHYSICAL EXAMINATION:  Telephone visit in the setting of COVID pandemic

## 2020-10-09 NOTE — LETTER
"    10/9/2020         RE: Bal Junior  [address] [place] [address]        Dear Colleague,    Thank you for referring your patient, Bal Junior, to the [facility] [place]. Please see a copy of my visit note below.    Bal Junior is a 65 year old male who is being evaluated via a billable telephone visit.      The patient has been notified of following:     \"This telephone visit will be conducted via a call between you and your physician/provider. We have found that certain health care needs can be provided without the need for a physical exam.  This service lets us provide the care you need with a short phone conversation.  If a prescription is necessary we can send it directly to your pharmacy.  If lab work is needed we can place an order for that and you can then stop by our lab to have the test done at a later time.    Telephone visits are billed at different rates depending on your insurance coverage. During this emergency period, for some insurers they may be billed the same as an in-person visit.  Please reach out to your insurance provider with any questions.    If during the course of the call the physician/provider feels a telephone visit is not appropriate, you will not be charged for this service.\"    Patient has given verbal consent for Telephone visit?  Yes. Pt [place] so he will no longer has access to his computer. Pt would like to do a telephone visit.     What phone number would you like to be contacted at?   [phone].    How would you like to obtain your AVS? PlaceFullmiguelina    Phone call duration: 13 minutes    Adrienne Pedroza PA-C  Division of Gastroenterology, Hepatology & Nutrition  [facility]      GI CLINIC VISIT    CC/REFERRING MD:  Abad Noel  REASON FOR CONSULTATION: follow-up     ASSESSMENT/PLAN:  1.  Gastroesophageal reflux disease without esophagitis, Status post Nissen " "fundoplication re-do  Patient has history of B12 deficiency reportedly leading to neuropathy, thought to be secondary to PPI use.  Patient currently on pantoprazole at this time, has recently underwent Nissen re-do, overall is healing well from the surgery.  Would recommend trial of discontinuing PPI entirely- discussed that rebound reflux less likely given Nissen surgery.   -- stop PPI  -- if you have temporary symptoms, ok to take tums or pepcid. If symptoms persist please let us know      2. Postprandial hypoglycemia  Rapid emptying on GES with reported postprandial hypoglycemia on glucometer checks (in 50s), though normal oral glucose tests.  On nortriptyline, notes that symptoms have improved significantly since tapering off of dicyclomine.  --Continue nortriptyline  --Continue recommendations as was discussed with our dietitian     3. Loose stools  Has had negative celiac serologies, hard to say if this is due to liquid diet versus more chronic post-prandial loose stools. Will continue to monitor for improvement after surgery     Follow-up in 2 to 3 months    Thank you for this consultation.  It was a pleasure to participate in the care of this patient; please contact us with any further questions.     Adrienne Pedroza PA-C  Division of Gastroenterology, Hepatology & Nutrition  [facility]    HPI:   Mr. Junior is a 65 year old male with HTN, GERD s/p Nissen fundoplication, and reported dumping syndrome who is s/p R inguinal hernia repair and s/p appendectomy who presents for another opinion on his \"slipped Nissen\" and GI symptoms. He has previously been seen by Dr. Quezada, please see her documentation for additional details. He has also previously followed at [place] [facility] for GERD and dumping syndrome, and has recently been seen by Dr. Zhao of surgery at [facility] for consideration of Nissen re-do.     History summarized in brief:   Reports decades of reflux starting in late 1980s/early 1990s she was " "treating with intermittent omeprazole.  In the 1990s does transition to daily omeprazole.  In the early 2000 fingers and toes which was attributed to the low B12, it was felt that PPI may be contributing to this underwent in 2011 so that he could stop his PPI.  Stated for well controlled neuropathy symptoms have not resolved but were stable.  Replaced B12 with PCP.  Also described intermittent issues with postprandial stools, urgency, lightheadedness weakness he was treated at [place] [facility].  Had acute symptoms.  Ago felt like something hit him hard.  Pain improved but reflux continued.  He was started on Zantac had multiple EGDs 1 tried slipped Nissen.    Patient then underwent following work-up for his symptoms:  -EGD 8/30/2017 with report indicating a normal exam with intact Nissen.   -12/4/2018 was done for BRAVO placement. EGD report indicates a large hiatal hernia was seen and the \"wrap appears slipped.\"   -BRAVO testing revealed a DeMeester score total of 24.8 (18.7 on Day 1, 29.3 on Day 2 with 100% symptom concordance).  - Esophageal manometry was done though we do not have the interpretation of this test, images are scanned (but difficult to interpret).   - esophagram (1/2/19) which showed free flow of contrast, no hiatal hernia, and mild reflux. GES 2/19/19 showed only 4% of food remaining at 2 hours and exam stopped.   - esophagram 4/1/19 with again no hernia seen on upright images, but small HH induced with abdominal pressure and Valsalva. No reflux was noted on this exam.   - CT chest 4/1/19 with small HH and fundoplication changes.    The patient had stated that Dr. Zhao would consider repeat surgery, but given his above testing further trial of medical management was recommended. Mr. Junior was then started on pantoprazole-is concerned that paresthesias are worsening since starting that.    Regards to dumping syndrome, this was diagnosed at [place] [facility] after gastric emptying study " "(rapid with oral glucose tolerance test normal), postprandial glucose checks with levels in the 50s.  Describes symptoms of dizziness, wooziness, and \"going gray \", associated with sneezing which would occur after a meal.  Typically resolved in 5 minutes.  Also noted loose stools sometimes occur at unpredictable times.  Patient was treated with baclofen and nortriptyline with 80% resolution symptoms, and baclofen was discontinued and switched to dicyclomine.      Interval history 6/23/2020:  Symptoms are going ok, symptoms are mostly controlled when he avoids having large volume and density of foods. Breakfest and lunch are the most common times for loose stools and wooziness. Eggs are a big trigger. Otherwise has cut back on amount he is eating. Has not been measuring blood sugars. Is able to tell based on symptoms- hot sweats, dizziness. Sometimes he will need to sneeze and this clears up.     Bowel movements: 2-3 times a week will have diarrhea (non-bloody, Gulf scale 6 with the smell at the end). In between these days, he will usually have a fairly normal bowel movement. Was previously happening 3-4 times a week.     About a month and a half ago, he felt like something punched him in his abdomen. This was centered on the upper stomach.  Thought about going in but symptoms improved. Not doing or eating anything out of the ordinary.     GERD  Pantoprazole seems to be controlling. Not taking zantac. Has breakthrough symptoms about once a month. Is concernend that neuropathy might get worse with pantoprazole.     Continues to take Nortiptyline or Bentyl. Takes Bentyl 3 times a day     Vitamin B12- is checking with primary care last level was high.    Interval History 9/1/2020:  Decreasing diccylomine helped his symptoms. Symptoms of sneezing after eating has decreased significantly. Maybe will have symptoms for 10 minutes. He would say this has improved 80%. Feels like nortriptyline is going ok.     Still taking " "pantoprazole- rare symptoms related to heartburn situation. If he skips this for a couple of days he will have symptoms. Has not had to take pepcid.     Bowel movements: some loose stools in the morning (related to amount of food the day before). Does not seem to be type of foods, volume related. Stools are a little loose 2-3 times a week.     Has had increased \"hay fever symptoms\"    Interval  History 10/9/2020:   Take-down and re-do of nissen fundoplication, KATHYA, gastrostomy tube placement (still present)  Flushing tube 30 ml twice a day. G tube is irritating if he sits up.     Mixing in semi-liquids in the past couple of days, hard to say if this is \"dumping syndrome\".    GERD- has not had any GERD, tomato soup. Continues on PPI     Bowel movements have been a little more liquid than normal, hard to say if this is due to more liquid diet. Every other day or every third day loose stools. Has not taken senna.     PERTINENT PAST MEDICAL HISTORY:  Hypertension  GERD  \"Dumping syndrome\"    PREVIOUS SURGERIES:  Past Surgical History:   Procedure Laterality Date     ABDOMEN SURGERY  2012?     APPENDECTOMY  1964?     COLONOSCOPY  2006, 2016     EYE SURGERY  199x    laser for retinal tears     HERNIA REPAIR  1961?     LAPAROSCOPIC TAKEDOWN NISSEN FUNDOPLICATION N/A 9/25/2020    Procedure: TAKE-DOWN, FUNDOPLICATION, REDO NISSEN, LAPAROSCOPIC, gastrostomy feeding tube placement;  Surgeon: Katlyn Tobar MD;  Location: [facility] OR     SOFT TISSUE SURGERY  2009?    MCL l. thumb     S/p tonsillectomy and adenoidectomy  S/p R inguinal hernia repair  S/p Nissen fundoplication      ALLERGIES:     Allergies   Allergen Reactions     Hornets      Wasp Venom Protein Hives     Bee Venom Swelling       PERTINENT MEDICATIONS:  Current Outpatient Medications   Medication     atenolol (TENORMIN) 50 MG tablet     cyanocobalamin (VITAMIN B-12) 500 MCG tablet     EPINEPHrine (EPIPEN 2-GINO) 0.3 MG/0.3ML injection 2-pack     gabapentin (NEURONTIN) " 300 MG capsule     nortriptyline (PAMELOR) 25 MG capsule     pantoprazole (PROTONIX) 40 MG EC tablet     oxyCODONE (ROXICODONE) 5 MG tablet     senna-docusate (SENOKOT-S/PERICOLACE) 8.6-50 MG tablet     No current facility-administered medications for this visit.        SOCIAL HISTORY:  Social History     Socioeconomic History     Marital status:      Spouse name: Not on file     Number of children: Not on file     Years of education: Not on file     Highest education level: Not on file   Occupational History     Not on file   Social Needs     Financial resource strain: Not on file     Food insecurity     Worry: Not on file     Inability: Not on file     Transportation needs     Medical: Not on file     Non-medical: Not on file   Tobacco Use     Smoking status: Never Smoker     Smokeless tobacco: Never Used   Substance and Sexual Activity     Alcohol use: Yes     Alcohol/week: 3.0 - 6.0 standard drinks     Types: 1 - 2 Glasses of wine, 1 - 2 Cans of beer, 1 - 2 Shots of liquor per week     Frequency: 2-3 times a week     Drinks per session: 1 or 2     Binge frequency: Less than monthly     Comment: glass of wine with dinner, can of beer in evening or 1 whiskey  or bourbon     Drug use: Never     Sexual activity: Yes     Partners: Female     Birth control/protection: Post-menopausal, Male Surgical, Female Surgical   Lifestyle     Physical activity     Days per week: Not on file     Minutes per session: Not on file     Stress: Not on file   Relationships     Social connections     Talks on phone: Not on file     Gets together: Not on file     Attends Taoist service: Not on file     Active member of club or organization: Not on file     Attends meetings of clubs or organizations: Not on file     Relationship status: Not on file     Intimate partner violence     Fear of current or ex partner: Not on file     Emotionally abused: Not on file     Physically abused: Not on file     Forced sexual activity: Not on  file   Other Topics Concern     Not on file   Social History Narrative     Not on file       FAMILY HISTORY:  Family History   Problem Relation Age of Onset     Coronary Artery Disease Maternal Grandfather      Hypertension Mother      Uterine Cancer Mother         1965     GERD No family hx of      Ulcerative Colitis No family hx of      Crohn's Disease No family hx of      Stomach Cancer No family hx of        Past/family/social history reviewed and no changes    PHYSICAL EXAMINATION:  Telephone visit in the setting of COVID pandemic           Again, thank you for allowing me to participate in the care of your patient.        Sincerely,        Adrienne Pedroza PA-C

## 2020-10-27 ENCOUNTER — ANCILLARY PROCEDURE (OUTPATIENT)
Dept: GENERAL RADIOLOGY | Facility: CLINIC | Age: 65
End: 2020-10-27
Attending: PHYSICIAN ASSISTANT
Payer: COMMERCIAL

## 2020-10-27 DIAGNOSIS — K44.9 HIATAL HERNIA: ICD-10-CM

## 2020-10-27 PROCEDURE — 74240 X-RAY XM UPR GI TRC 1CNTRST: CPT | Mod: GC | Performed by: RADIOLOGY

## 2020-10-27 ASSESSMENT — ENCOUNTER SYMPTOMS
POSTURAL DYSPNEA: 0
COUGH: 1
WHEEZING: 0
HEMOPTYSIS: 0
COUGH DISTURBING SLEEP: 0
SNORES LOUDLY: 0
SPUTUM PRODUCTION: 1
DYSPNEA ON EXERTION: 0
SHORTNESS OF BREATH: 0

## 2020-10-28 ENCOUNTER — OFFICE VISIT (OUTPATIENT)
Dept: SURGERY | Facility: CLINIC | Age: 65
End: 2020-10-28
Attending: CLINICAL NURSE SPECIALIST
Payer: COMMERCIAL

## 2020-10-28 ENCOUNTER — DOCUMENTATION ONLY (OUTPATIENT)
Dept: ONCOLOGY | Facility: CLINIC | Age: 65
End: 2020-10-28

## 2020-10-28 VITALS
BODY MASS INDEX: 30.42 KG/M2 | OXYGEN SATURATION: 99 % | TEMPERATURE: 97.7 F | HEART RATE: 76 BPM | SYSTOLIC BLOOD PRESSURE: 122 MMHG | DIASTOLIC BLOOD PRESSURE: 77 MMHG | RESPIRATION RATE: 16 BRPM | WEIGHT: 212 LBS

## 2020-10-28 DIAGNOSIS — K44.9 HIATAL HERNIA WITH GASTROESOPHAGEAL REFLUX: Primary | ICD-10-CM

## 2020-10-28 DIAGNOSIS — K21.9 HIATAL HERNIA WITH GASTROESOPHAGEAL REFLUX: Primary | ICD-10-CM

## 2020-10-28 LAB — GLUCOSE BLDC GLUCOMTR-MCNC: 102 MG/DL (ref 70–99)

## 2020-10-28 PROCEDURE — 999N001017 HC STATISTIC GLUCOSE BY METER IP

## 2020-10-28 PROCEDURE — 99024 POSTOP FOLLOW-UP VISIT: CPT | Performed by: CLINICAL NURSE SPECIALIST

## 2020-10-28 PROCEDURE — G0463 HOSPITAL OUTPT CLINIC VISIT: HCPCS

## 2020-10-28 ASSESSMENT — PAIN SCALES - GENERAL: PAINLEVEL: NO PAIN (0)

## 2020-10-28 NOTE — PATIENT INSTRUCTIONS
Keep gauze dressing on PEG site, change daily.  OK to remove dressing, take shower, pat dry and re-apply a fresh dressing.  May take 10-14 days to completely heal.  There are no dietary restrictions.    Avoid heavy lifting, pushing or pulling (life-time caution).  Call with any concerns or questions.  Return as needed.

## 2020-10-28 NOTE — PROGRESS NOTES
The pt was in a clinic appointment and had a near syncopal episode today during a peg tube removal. Pt complained weakness, and dizzyness. No LOC. RRT was called. Upon arrival pt is A&Ox4; skin is pale warm and dry. BP is 120/77 laying down. P-75. The pt is able to stand up and has BP drop to 100's and complains of dizziness. Pt sits back down and drinks water. Pt states that he has a hx of this. Pt denies any current CP, SOB, dizziness, nausuea, vomiting, headache, or pain. CMS intact. Stroke scale is negative. BS is 108. Pt states that he is now feeling better and refuses any further medical care.

## 2020-10-28 NOTE — PROGRESS NOTES
"THORACIC SURGERY FOLLOW UP VISIT    I saw Mr. Bal Junior in follow-up today. The clinical summary follows:     PREOP DIAGNOSIS   Herniated Nissen fundoplication  PROCEDURE   Laparoscopic takedown of Nissen fundoplication, repair of hiatal hernia with redo Nissen fundoplication, upper endoscopy and gastropexy with PEG tube.   DATE OF PROCEDURE  9/25/2020    COMPLICATIONS  None    INTERVAL STUDIES  Esophagram 10/27/2020  IMPRESSION:  1. Status post Nissen fundoplication redo with a small hiatal hernia  extending through the otherwise normally positioned Nissen wrap.  2. Mild esophageal dysmotility.    SUBJECTIVE  I completely went off Protonix and am eating pretty much anything.   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   His wife is also at home.    IMPRESSION No diagnosis found.  65 year-old male recovering from his recent re-do of Nissen fundoplication.   He denies any issues with nausea, bloating or constipation.  He is not using any analgesics.    His incisions are well healed.   We reviewed his xray.  All questions were answered.    After discussing removal of PEG and after care, the PEG was removed without complication.    Upon sitting up, he became light-headed and clammy, saying \"I'm losing it\".    I laid him back and lowered his head, called a rapid response team.   His initial blood pressure was 130/69, HR 72 and O2 sat 93%.    His blood sugar was checked and is 109.  After 30\" of assessing, slowly sitting up and finally standing, he was feeling better.  He was also given several bottles of water.  After stopping in the bathroom (he feels crampy and needed to have a BM), he was released home.       PLAN  I spent a total of 40 minutes with Mr. Bal Junior, more than 50% of which were spent in counseling, coordination of care, and face-to-face time. I reviewed the plan as follows:  After care instructions were written out on the " after visit summary and given to patient.  Call or return with any new concerns.  All questions were answered and the patient and present family were in agreement with the plan.  I appreciate the opportunity to participate in the care of your patient and will keep you updated.  Sincerely,  ROSY Benton, CNS  Answers for HPI/ROS submitted by the patient on 10/27/2020   General Symptoms: No  Skin Symptoms: No  HENT Symptoms: No  EYE SYMPTOMS: No  HEART SYMPTOMS: No  LUNG SYMPTOMS: Yes  INTESTINAL SYMPTOMS: No  URINARY SYMPTOMS: No  REPRODUCTIVE SYMPTOMS: No  SKELETAL SYMPTOMS: No  BLOOD SYMPTOMS: No  NERVOUS SYSTEM SYMPTOMS: No  MENTAL HEALTH SYMPTOMS: No  Cough: Yes  Sputum or phlegm: Yes  Coughing up blood: No  Difficulty breating or shortness of breath: No  Snoring: No  Wheezing: No  Difficulty breathing on exertion: No  Nighttime Cough: No  Difficulty breathing when lying flat: No

## 2020-10-28 NOTE — NURSING NOTE
"Oncology Rooming Note    October 28, 2020 7:51 AM   Bal Junior is a 65 year old male who presents for:    Chief Complaint   Patient presents with     Oncology Clinic Visit     Return; [facility]     Initial Vitals: /77   Pulse 76   Temp 97.7  F (36.5  C) (Oral)   Resp 16   Wt 96.2 kg (212 lb)   SpO2 99%   BMI 30.42 kg/m   Estimated body mass index is 30.42 kg/m  as calculated from the following:    Height as of 10/9/20: 1.778 m (5' 10\").    Weight as of this encounter: 96.2 kg (212 lb). Body surface area is 2.18 meters squared.  No Pain (0) Comment: Data Unavailable   No LMP for male patient.  Allergies reviewed: Yes  Medications reviewed: Yes    Medications: Medication refills not needed today.  Pharmacy name entered into [employer]: [facility] - [address]    Clinical concerns:  Patient would like to have feeding tube removed.      Lisa Flores CMA              "

## 2020-10-29 ENCOUNTER — TELEPHONE (OUTPATIENT)
Dept: SURGERY | Facility: CLINIC | Age: 65
End: 2020-10-29

## 2020-10-29 NOTE — TELEPHONE ENCOUNTER
"Bal called and left me a VM telling me he is doing great today, has no residual pain from removing his PEG, \"very little drainage\" and feels things are fine.   He thanked me for calling him.  "

## 2020-10-29 NOTE — PROGRESS NOTES
10/29/2020  I tried to reach manisha Selby asking him to call me and give me an update or if he has any questions.  Answers for HPI/ROS submitted by the patient on 10/27/2020   General Symptoms: No  Skin Symptoms: No  HENT Symptoms: No  EYE SYMPTOMS: No  HEART SYMPTOMS: No  LUNG SYMPTOMS: Yes  INTESTINAL SYMPTOMS: No  URINARY SYMPTOMS: No  REPRODUCTIVE SYMPTOMS: No  SKELETAL SYMPTOMS: No  BLOOD SYMPTOMS: No  NERVOUS SYSTEM SYMPTOMS: No  MENTAL HEALTH SYMPTOMS: No  Cough: Yes  Sputum or phlegm: Yes  Coughing up blood: No  Difficulty breating or shortness of breath: No  Snoring: No  Wheezing: No  Difficulty breathing on exertion: No  Nighttime Cough: No  Difficulty breathing when lying flat: No

## 2020-12-17 DIAGNOSIS — G62.9 PERIPHERAL NEUROPATHY: ICD-10-CM

## 2020-12-17 DIAGNOSIS — G62.9 NEUROPATHY: Primary | ICD-10-CM

## 2020-12-17 NOTE — TELEPHONE ENCOUNTER
Refill request for gabapentin.  Pt is due for follow up appt. No future appt scheduled.  Letter mailed to pt.  Medication T'd for review and signature  Perla Samuels LPN on 12/17/2020 at 4:17 PM

## 2020-12-17 NOTE — LETTER
12/17/2020        RE: Bal Junior  [address] [address]        Dear Bal,      We recently provided you with medication refills.  Many medications require routine follow-up with your doctor.    Your prescription(s) have been refilled for 30 days so you may have time for the above noted follow-up. Please call to schedule soon so we can assure you have an appointment before your next refills are needed. If you have already made a follow up appointment, please disregard this letter.           Sincerely,        [facility] [place]     (Formerly known as [facility])

## 2020-12-18 RX ORDER — GABAPENTIN 300 MG/1
300 CAPSULE ORAL 2 TIMES DAILY
Qty: 60 CAPSULE | Refills: 3 | Status: SHIPPED | OUTPATIENT
Start: 2020-12-18 | End: 2021-02-02

## 2021-01-04 ENCOUNTER — HEALTH MAINTENANCE LETTER (OUTPATIENT)
Age: 66
End: 2021-01-04

## 2021-01-26 ENCOUNTER — TELEPHONE (OUTPATIENT)
Dept: GASTROENTEROLOGY | Facility: CLINIC | Age: 66
End: 2021-01-26

## 2021-01-26 NOTE — TELEPHONE ENCOUNTER
[facility] Call Center    Phone Message    May a detailed message be left on voicemail: yes     Reason for Call: Other: Patient is scheduled w/ Adrienne for video visit on 2/2 at 3:00p for follow up.     Based review of the scheduling protocol, the patient would meet criteria for a virtual visit, however they are out of state or will be at the time of the visit.  Does this patient meet criteria, in your clinical judgement, to proceed with a virtual visit?   Please work directly with the patient to arrange the appropriate virtual vs in person visit.     Action Taken: Message routed to:  [facility] ([facility] Gastroenterology    Travel Screening: Not Applicable

## 2021-01-29 NOTE — TELEPHONE ENCOUNTER
patient will continue to be followed in clinic when in person visits resume video  visit appropriate to continue

## 2021-02-02 ENCOUNTER — VIRTUAL VISIT (OUTPATIENT)
Dept: NEUROLOGY | Facility: CLINIC | Age: 66
End: 2021-02-02
Payer: COMMERCIAL

## 2021-02-02 ENCOUNTER — VIRTUAL VISIT (OUTPATIENT)
Dept: GASTROENTEROLOGY | Facility: CLINIC | Age: 66
End: 2021-02-02
Payer: COMMERCIAL

## 2021-02-02 VITALS — HEIGHT: 70 IN | WEIGHT: 208 LBS | BODY MASS INDEX: 29.78 KG/M2

## 2021-02-02 VITALS — BODY MASS INDEX: 30.35 KG/M2 | HEIGHT: 70 IN | WEIGHT: 212 LBS

## 2021-02-02 DIAGNOSIS — G56.01 CARPAL TUNNEL SYNDROME OF RIGHT WRIST: Chronic | ICD-10-CM

## 2021-02-02 DIAGNOSIS — G62.9 NEUROPATHY: ICD-10-CM

## 2021-02-02 DIAGNOSIS — K91.1 DUMPING SYNDROME: Primary | ICD-10-CM

## 2021-02-02 DIAGNOSIS — G60.9 IDIOPATHIC PERIPHERAL NEUROPATHY: Primary | ICD-10-CM

## 2021-02-02 PROCEDURE — 99213 OFFICE O/P EST LOW 20 MIN: CPT | Mod: 95 | Performed by: PHYSICIAN ASSISTANT

## 2021-02-02 PROCEDURE — 99214 OFFICE O/P EST MOD 30 MIN: CPT | Mod: 95 | Performed by: PSYCHIATRY & NEUROLOGY

## 2021-02-02 RX ORDER — GABAPENTIN 300 MG/1
300 CAPSULE ORAL 2 TIMES DAILY
Qty: 180 CAPSULE | Refills: 3 | Status: SHIPPED | OUTPATIENT
Start: 2021-02-02 | End: 2021-11-24

## 2021-02-02 ASSESSMENT — MIFFLIN-ST. JEOR
SCORE: 1734.73
SCORE: 1752.88

## 2021-02-02 NOTE — LETTER
"  2/2/2021      RE: Bal Junior  [address] [facility] [address]      Dear Colleague,    Thank you for referring your patient, Bal Junior, to the [facility] [place]. Please see a copy of my visit note below.    Bal Junior is a 66 year old male who is being evaluated via a billable video visit.      The patient has been notified of following:     \"This video visit will be conducted via a call between you and your physician/provider. We have found that certain health care needs can be provided without the need for an in-person physical exam.  This service lets us provide the care you need with a video conversation.  If a prescription is necessary we can send it directly to your pharmacy.  If lab work is needed we can place an order for that and you can then stop by our lab to have the test done at a later time.    If during the course of the call the physician/provider feels a video visit is not appropriate, you will not be charged for this service.\"     Patient confirmed that they are in [place] for today's visit no, patient lives in [place] and currently there for visit. Follow-up will be completed in person       Video-Visit Details  Type of service:  Video Visit    Video Start Time: 2:59 PM  Video End Time:  3:23 PM    Originating Location (pt. Location): Home    Distant Location (provider location):  [facility] [place]     Platform used: [facility]      GI CLINIC VISIT    CC/REFERRING MD:  Abad Noel  REASON FOR CONSULTATION: follow-up     ASSESSMENT/PLAN:  1.  Gastroesophageal reflux disease without esophagitis, Status post Nissen fundoplication re-do  Patient has history of B12 deficiency reportedly leading to neuropathy, thought to be secondary to PPI use. Now resolved, has stopped PPI without symptoms.      2. Postprandial hypoglycemia  Rapid emptying on GES with reported postprandial hypoglycemia on glucometer checks " "(in 50s), though normal oral glucose tests.  On nortriptyline, notes that symptoms have improved significantly since tapering off of dicyclomine. Discussed lifestyle modifications including eating smaller more frequent meals spread throughout the day.  Would also recommend resting after eating, and not having liquids with eating.  Should also avoid soda and carbonation.  --Continue nortriptyline for the time being.  We can discuss tapering and follow-up  --Continue recommendations as was discussed with our dietitian   --Try to have small more frequent meals throughout the day.  Try to prevent overloading your stomach at any given time.  After eating lie down.  Do not have liquids with eating meals.  Avoid high sugar foods    3.  Chest pain  Patient reports episode of chest pain which felt as though food was caught in his Nissen wrap.  This resolved with increased water intake.  Discussed that if symptoms do not resolve next time, may need to be evaluated for a cardiac etiology.  If it does not feel like food getting stuck, can consider evaluating further with esophagram. Should also follow good swallowing hygeine     Follow-up in 4-6 months.  Next visit will need to be done in person at clinic in [place].    Thank you for this consultation.  It was a pleasure to participate in the care of this patient; please contact us with any further questions.     29 Minutes was spent on the date of the encounter during chart review, history and exam, documentation, and further activities as noted       Adrienne Pedroza PA-C  Division of Gastroenterology, Hepatology & Nutrition  [facility]    HPI:   Mr. Junior is a 65 year old male with HTN, GERD s/p Nissen fundoplication, and reported dumping syndrome who is s/p R inguinal hernia repair and s/p appendectomy who presents for another opinion on his \"slipped Nissen\" and GI symptoms. He has previously been seen by Dr. Quezada, please see her documentation for " "additional details. He has also previously followed at [place] [facility] for GERD and dumping syndrome, and has recently been seen by Dr. Zhao of surgery at [facility] for consideration of Nissen re-do.     History summarized in brief:   Reports decades of reflux starting in late 1980s/early 1990s she was treating with intermittent omeprazole.  In the 1990s does transition to daily omeprazole.  In the early 2000 fingers and toes which was attributed to the low B12, it was felt that PPI may be contributing to this underwent in 2011 so that he could stop his PPI.  Stated for well controlled neuropathy symptoms have not resolved but were stable.  Replaced B12 with PCP.  Also described intermittent issues with postprandial stools, urgency, lightheadedness weakness he was treated at [place] [facility].  Had acute symptoms.  Ago felt like something hit him hard.  Pain improved but reflux continued.  He was started on Zantac had multiple EGDs 1 tried slipped Nissen.    Patient then underwent following work-up for his symptoms:  -EGD 8/30/2017 with report indicating a normal exam with intact Nissen.   -12/4/2018 was done for BRAVO placement. EGD report indicates a large hiatal hernia was seen and the \"wrap appears slipped.\"   -BRAVO testing revealed a DeMeester score total of 24.8 (18.7 on Day 1, 29.3 on Day 2 with 100% symptom concordance).  - Esophageal manometry was done though we do not have the interpretation of this test, images are scanned (but difficult to interpret).   - esophagram (1/2/19) which showed free flow of contrast, no hiatal hernia, and mild reflux. GES 2/19/19 showed only 4% of food remaining at 2 hours and exam stopped.   - esophagram 4/1/19 with again no hernia seen on upright images, but small HH induced with abdominal pressure and Valsalva. No reflux was noted on this exam.   - CT chest 4/1/19 with small HH and fundoplication changes.    The patient had stated that Dr. Zhao would consider repeat surgery, " "but given his above testing further trial of medical management was recommended. Mr. Junior was then started on pantoprazole-is concerned that paresthesias are worsening since starting that.    Regards to dumping syndrome, this was diagnosed at [place] [facility] after gastric emptying study (rapid with oral glucose tolerance test normal), postprandial glucose checks with levels in the 50s.  Describes symptoms of dizziness, wooziness, and \"going gray \", associated with sneezing which would occur after a meal.  Typically resolved in 5 minutes.  Also noted loose stools sometimes occur at unpredictable times.  Patient was treated with baclofen and nortriptyline with 80% resolution symptoms, and baclofen was discontinued and switched to dicyclomine.      Interval history 6/23/2020:  Symptoms are going ok, symptoms are mostly controlled when he avoids having large volume and density of foods. Breakfest and lunch are the most common times for loose stools and wooziness. Eggs are a big trigger. Otherwise has cut back on amount he is eating. Has not been measuring blood sugars. Is able to tell based on symptoms- hot sweats, dizziness. Sometimes he will need to sneeze and this clears up.     Bowel movements: 2-3 times a week will have diarrhea (non-bloody, [place] scale 6 with the smell at the end). In between these days, he will usually have a fairly normal bowel movement. Was previously happening 3-4 times a week.     About a month and a half ago, he felt like something punched him in his abdomen. This was centered on the upper stomach.  Thought about going in but symptoms improved. Not doing or eating anything out of the ordinary.     GERD  Pantoprazole seems to be controlling. Not taking zantac. Has breakthrough symptoms about once a month. Is concernend that neuropathy might get worse with pantoprazole.     Continues to take Nortiptyline or Bentyl. Takes Bentyl 3 times a day     Vitamin B12- is checking with " "primary care last level was high.    Interval History 9/1/2020:  Decreasing diccylomine helped his symptoms. Symptoms of sneezing after eating has decreased significantly. Maybe will have symptoms for 10 minutes. He would say this has improved 80%. Feels like nortriptyline is going ok.     Still taking pantoprazole- rare symptoms related to heartburn situation. If he skips this for a couple of days he will have symptoms. Has not had to take pepcid.     Bowel movements: some loose stools in the morning (related to amount of food the day before). Does not seem to be type of foods, volume related. Stools are a little loose 2-3 times a week.     Has had increased \"hay fever symptoms\"    Interval  History 10/9/2020:   Take-down and re-do of nissen fundoplication, KATHYA, gastrostomy tube placement (still present)  Flushing tube 30 ml twice a day. G tube is irritating if he sits up.     Mixing in semi-liquids in the past couple of days, hard to say if this is \"dumping syndrome\".    GERD- has not had any GERD, tomato soup. Continues on PPI     Bowel movements have been a little more liquid than normal, hard to say if this is due to more liquid diet. Every other day or every third day loose stools. Has not taken senna.     Interval History 2/2/20201:  Had liqour about hald an hour before eating, then had cheese (large amount). Then had \"hyoglycemic episode\". Lasted about 5-10 minutes. Had a sharp pain in upper chest- felt like something had gotten stuck inside the nissen wrap- he drank some water and pain went away. He felt fine the next morning. Does not remember what food he had eaten.     Reflux symptoms completely- not on any PPI. Does feel bloating and gas, no pain. Sometimes burping, othertimes passing gas. Seems to be volume related- but no specific food triggers. Has reduced carbonated beverages. Does not drink out of straws. Diet Dr. Pepper.    30% of the time in the morning after having oatmeal and fruit- he will " "feel lethargic, will rest.     Weight is stable at 208.     No longer taking dicyclomine     Continues to take nortirtpyline     PERTINENT PAST MEDICAL HISTORY:  Hypertension  GERD  \"Dumping syndrome\"    PREVIOUS SURGERIES:  Past Surgical History:   Procedure Laterality Date     ABDOMEN SURGERY  2012?     APPENDECTOMY  1964?     COLONOSCOPY  2006, 2016     EYE SURGERY  199x    laser for retinal tears     HERNIA REPAIR  1961?     LAPAROSCOPIC TAKEDOWN NISSEN FUNDOPLICATION N/A 9/25/2020    Procedure: TAKE-DOWN, FUNDOPLICATION, REDO NISSEN, LAPAROSCOPIC, gastrostomy feeding tube placement;  Surgeon: Katlyn Tobar MD;  Location: [facility] OR     SOFT TISSUE SURGERY  2009?    MCL l. thumb     S/p tonsillectomy and adenoidectomy  S/p R inguinal hernia repair  S/p Nissen fundoplication      ALLERGIES:     Allergies   Allergen Reactions     Hornets      Wasp Venom Protein Hives     Bee Venom Swelling       PERTINENT MEDICATIONS:  Current Outpatient Medications   Medication     atenolol (TENORMIN) 50 MG tablet     cyanocobalamin (VITAMIN B-12) 500 MCG tablet     EPINEPHrine (EPIPEN 2-GINO) 0.3 MG/0.3ML injection 2-pack     gabapentin (NEURONTIN) 300 MG capsule     nortriptyline (PAMELOR) 25 MG capsule     No current facility-administered medications for this visit.      Facility-Administered Medications Ordered in Other Visits   Medication     barium sulfate (EZ-DISK) tablet 700 mg     sod bicarbonate-citric acid-simethicone (EZ GAS) 2.21-1.53-0.04 g packet 4 g       SOCIAL HISTORY:  Social History     Socioeconomic History     Marital status:      Spouse name: Not on file     Number of children: Not on file     Years of education: Not on file     Highest education level: Not on file   Occupational History     Not on file   Social Needs     Financial resource strain: Not on file     Food insecurity     Worry: Not on file     Inability: Not on file     Transportation needs     Medical: Not on file     Non-medical: Not on " file   Tobacco Use     Smoking status: Never Smoker     Smokeless tobacco: Never Used   Substance and Sexual Activity     Alcohol use: Yes     Alcohol/week: 3.0 - 6.0 standard drinks     Types: 1 - 2 Glasses of wine, 1 - 2 Cans of beer, 1 - 2 Shots of liquor per week     Frequency: 2-3 times a week     Drinks per session: 1 or 2     Binge frequency: Less than monthly     Comment: glass of wine with dinner, can of beer in evening or 1 whiskey  or bourbon     Drug use: Never     Sexual activity: Yes     Partners: Female     Birth control/protection: Post-menopausal, Male Surgical, Female Surgical   Lifestyle     Physical activity     Days per week: Not on file     Minutes per session: Not on file     Stress: Not on file   Relationships     Social connections     Talks on phone: Not on file     Gets together: Not on file     Attends Muslim service: Not on file     Active member of club or organization: Not on file     Attends meetings of clubs or organizations: Not on file     Relationship status: Not on file     Intimate partner violence     Fear of current or ex partner: Not on file     Emotionally abused: Not on file     Physically abused: Not on file     Forced sexual activity: Not on file   Other Topics Concern     Not on file   Social History Narrative     Not on file       FAMILY HISTORY:  Family History   Problem Relation Age of Onset     Coronary Artery Disease Maternal Grandfather      Hypertension Mother      Uterine Cancer Mother         1965     Mitral Valve Replacement Father      GERD No family hx of      Ulcerative Colitis No family hx of      Crohn's Disease No family hx of      Stomach Cancer No family hx of        Past/family/social history reviewed and no changes    PHYSICAL EXAMINATION:  Telephone visit in the setting of COVID pandemic       Again, thank you for allowing me to participate in the care of your patient.      Sincerely,    Adrienne Pedroza PA-C

## 2021-02-02 NOTE — NURSING NOTE
"Chief Complaint   Patient presents with     Follow Up     follow up       Vitals:    02/02/21 1435   Weight: 96.2 kg (212 lb)   Height: 1.778 m (5' 10\")       Body mass index is 30.42 kg/m .    Bonnie Hawkins CMA    "

## 2021-02-02 NOTE — PROGRESS NOTES
"Video follow-up visit requested by patient  Video follow-up visit due to the COVID-19 pandemic  Patient identified  .Patient is being evaluated via a billable video visit. The patient has been notified of following:     \"This video visit will be conducted via a call between you and your physician/provider. We have found that certain health care needs can be provided without the need for an in-person physical exam. This service lets us provide the care you need with a video conversation. If a prescription is necessary we can send it directly to your pharmacy. If lab work is needed we can place an order for that and you can then stop by our lab to have the test done at a later time.    If during the course of the call the physician/provider feels a video visit is not appropriate, you will not be charged for this service.    Physician has received verbal consent for a Video Visit from the patient? YES    Patient would like the video invitation sent by: Email, my chart    Video Visit Details    Type of service: Video Visit    Video Start Time: 9:10 AM    Video End Time (time video stopped): 9:25 AM    Originating Location (pt. Location): Patient's Home    Distant Location (provider location): [facility]     Mode of Communication: Video Conference via [facility]            Chief Complaint   Paresthesias hands and feet slight nose and tongue rare    HPI  Seen September 3, 2019  Video visit February 2, 2021    66-year-old with history of  Paresthesias  Sensory neuropathy  Relatively brisker reflexes expected for neuropathy  Right carpal tunnel syndrome mild to moderate    Distribution is in a stocking glove but also involves the tips of his ear and tips of his tongue    Patient had some fluctuations in his symptoms since I last saw him but overall feels that they are pretty much the same    He has some paresthesias more so in the toes and the fingers it stays just in the toes not so much the " foot rarely might have it in the tongue    No bowel or bladder difficulty  No trouble with erections  No significant low back pain or neck pain    The does still do skiing and his hands and feet get more cold and he is aware that  Question whether he has some autonomic instability    No significant balance problems as he still a ski year  Does okay walking in the dark    Since last seen did have a redo of his Nissen procedure on September 2020 hospitalized for 3 days  Currently now eating well  No other major illnesses since last seen          His neurologic difficulties include:    1. Numbness and tingling and dysesthesias of his hands and feet, sometimes involves the tip of his tongue and nose, comes intermittently. Seemed to be somewhat better with Neurontin, did not seem to be very progressive.  2. Mild carpal tunnel syndrome, works on computers, not getting worse the last time I saw him, last EMG though was in November 2009.  3. Symptoms slightly better with B12 even though his B12 was not significantly low.  4. Past history of Nissen fundoplication with GERD nine-plus years ago and with increased GERD recently when I saw him in July 2018.  5. Past history of dumping syndrome which occurred more so after breakfast and takes nortriptyline through GI specialists for that.  6. Patient did have  Lyme's tick bite back in 2018 treated with antibiotics for 1 month      Some summary of past workup showed:    1. EEG on August 14, 2013 was normal.  2. EMG in 2009 carpal tunnel syndrome, did not want surgery.  3. Laboratory data in 2009 for small fiber neuropathy included negative GALOP antibodies, negative MAG antibodies.  4. Negative anti-sulfatide antibodies. Anti-Hu was negative. Hepatic profile normal, sedimentation rate 5, MAYELA, rheumatoid factor, Lyme s titers negative, serum protein immunoelectrophoresis negative, SSA/SSB were negative,     methylmalonic acid was 0.11 with a folic acid of 15.2 and B12 of 538.  5.   Repeat laboratory data shows        immune electrophoresis negative       B12 630, TSH 1.06       SSA and SSB negative        Lyme's titer negative  6.  EMG examination shows 2019       Right carpal tunnel syndrome mild to moderate in degree slightly increased chronic motor changes compared to        Sensory neuropathy with some slight change in amplitudes but sensory snap potentials are still present at the sural superficial peroneal ulnar and median nerves        Past medical history:    1. Sensory neuropathy.  2. Hypertension.  3. GERD status post Nissen fundoplication.  Repeat surgery 2020 for Nissen  4. Dumping syndrome.  5. Carpal tunnel syndrome.    Family history is positive for:    1. Father with gallbladder removed.  2. Mother with uterine cancer, hypertension, and polio.  3. Maternal grandfather  with MI.        Review of Systems   No headache no chest pain no shortness of breath no nausea vomiting no diarrhea no fever chills  No low back pain no neck pain  Gets erections okay  No bowel or bladder difficulties  Paresthesias in the toes fingers tongue and tips of the ears similar  Still able to ski but gets cold extremities  No significant ataxia    Otherwise review of systems negative          Neurologic exam  Alert oriented x3  No aphasia no neglect normal memory recall    Cranial nerves II through XII are normal  No nystagmus  No ophthalmoplegia  Symmetrical pupils  Reactive pupils  Visual fields intact  Face is symmetrical  Tongue twisters are good    Upper extremities  No drift no tremor no incoordination  Distal and proximal strength is normal    Lower extremity strength distal and proximally are normal    Previous exam for comparison  Slight vibratory decrease in the toes compared to the ankle  Temperature appreciation seems to be intact  Pinprick is similar in the feet and hands no significant stocking or glove distribution    Negative Sorenson reflex downgoing toes  no clonus no spasticity reflexes did not seem on the brisk side today as compared to 6 months ago    No spasticity no change in tone reflexes present at 2+ biceps triceps and knees, 1+ at the ankles,  Toes are downgoing    Gait normal  Romberg mildly positive more so when he standing on his tippy toes                  Assessment/Plan     1.  CTS (Carpal Tunnel Syndrome) (G56.01)   Patient still with a mild to moderate right carpal tunnel syndrome on his EMG       2.  Extremity numbness (R20.0)   Probably has a sensory neuropathy subtle with some slight change in the sensory snap potentials comparing 8524-4995 a 10-year difference     3.  Sensory neuropathy (G60.8)   Relatively stable with no changes in exam or changes in EMG or lab testing from 8839-0791, but still follow clinically   May have some mild autonomic instability a Raynaud's type phenomenon    Sensory neuropathy (G62.9) If in the future he was more hyperreflexive we could consider checking a scan of his neck but without significant neck complaints I would hold off on this     At this time, he has:    1. Paresthesias more of a sensory neuropathy with negative workup.  2. Rather brisk reflexes, but no long tract findings of myelopathy.  3. Empirically on B12 replacement.  4. Neurontin 300 mg b.i.d. gives him some symptomatic relief.  5. Talked about the difference between negative and positive symptoms.  6. Talked about good foot care since he has numbness, but he needs to be careful about injuries, frostbite, or other difficulties.  7. Repeat labs and EMG fall 2019 stable  8.  Patient is an avid skier we talked about the fact that he should avoid frostbite which could exacerbate his sensory neuropathy    Recommend patient follow-up in late August 2021 for in person examination  Depending on symptoms and signs decide whether we would do an MRI of his head or cervical spine  Currently symptoms seem to be quite stable though    Total care time today was 32  minutes refilled medications reviewed past symptoms signs current symptoms and side recommendation and plan

## 2021-02-02 NOTE — NURSING NOTE
Chief Complaint   Patient presents with     Numbness     Pt states he still having numbness and tingling.     Video Visit     MYCHART/If having difficulties call [phone]     Perla Samuels LPN on 2/2/2021 at 8:49 AM

## 2021-02-02 NOTE — LETTER
"    2/2/2021         RE: Bal Junior  [address] [place] [address]        Dear Colleague,    Thank you for referring your patient, Bal Junior, to the [facility] [place]. Please see a copy of my visit note below.    Video follow-up visit requested by patient  Video follow-up visit due to the COVID-19 pandemic  Patient identified  .Patient is being evaluated via a billable video visit. The patient has been notified of following:     \"This video visit will be conducted via a call between you and your physician/provider. We have found that certain health care needs can be provided without the need for an in-person physical exam. This service lets us provide the care you need with a video conversation. If a prescription is necessary we can send it directly to your pharmacy. If lab work is needed we can place an order for that and you can then stop by our lab to have the test done at a later time.    If during the course of the call the physician/provider feels a video visit is not appropriate, you will not be charged for this service.    Physician has received verbal consent for a Video Visit from the patient? YES    Patient would like the video invitation sent by: Email, my chart    Video Visit Details    Type of service: Video Visit    Video Start Time: 9:10 AM    Video End Time (time video stopped): 9:25 AM    Originating Location (pt. Location): Patient's Home    Distant Location (provider location): [facility]     Mode of Communication: Video Conference via [employer]            Chief Complaint   Paresthesias hands and feet slight nose and tongue rare    HPI  Seen September 3, 2019  Video visit February 2, 2021    66-year-old with history of  Paresthesias  Sensory neuropathy  Relatively brisker reflexes expected for neuropathy  Right carpal tunnel syndrome mild to moderate    Distribution is in a stocking glove but also involves the tips of his ear " and tips of his tongue    Patient had some fluctuations in his symptoms since I last saw him but overall feels that they are pretty much the same    He has some paresthesias more so in the toes and the fingers it stays just in the toes not so much the foot rarely might have it in the tongue    No bowel or bladder difficulty  No trouble with erections  No significant low back pain or neck pain    The does still do skiing and his hands and feet get more cold and he is aware that  Question whether he has some autonomic instability    No significant balance problems as he still a ski year  Does okay walking in the dark    Since last seen did have a redo of his Nissen procedure on September 2020 hospitalized for 3 days  Currently now eating well  No other major illnesses since last seen          His neurologic difficulties include:    1. Numbness and tingling and dysesthesias of his hands and feet, sometimes involves the tip of his tongue and nose, comes intermittently. Seemed to be somewhat better with Neurontin, did not seem to be very progressive.  2. Mild carpal tunnel syndrome, works on computers, not getting worse the last time I saw him, last EMG though was in November 2009.  3. Symptoms slightly better with B12 even though his B12 was not significantly low.  4. Past history of Nissen fundoplication with GERD nine-plus years ago and with increased GERD recently when I saw him in July 2018.  5. Past history of dumping syndrome which occurred more so after breakfast and takes nortriptyline through GI specialists for that.  6. Patient did have  Lyme's tick bite back in 2018 treated with antibiotics for 1 month      Some summary of past workup showed:    1. EEG on August 14, 2013 was normal.  2. EMG in 2009 carpal tunnel syndrome, did not want surgery.  3. Laboratory data in 2009 for small fiber neuropathy included negative GALOP antibodies, negative MAG antibodies.  4. Negative anti-sulfatide antibodies. Anti-Hu was  negative. Hepatic profile normal, sedimentation rate 5, MAYELA, rheumatoid factor, Lyme s titers negative, serum protein immunoelectrophoresis negative, SSA/SSB were negative,     methylmalonic acid was 0.11 with a folic acid of 15.2 and B12 of 538.  5.  Repeat laboratory data shows        immune electrophoresis negative       B12 630, TSH 1.06       SSA and SSB negative        Lyme's titer negative  6.  EMG examination shows 2019       Right carpal tunnel syndrome mild to moderate in degree slightly increased chronic motor changes compared to        Sensory neuropathy with some slight change in amplitudes but sensory snap potentials are still present at the sural superficial peroneal ulnar and median nerves        Past medical history:    1. Sensory neuropathy.  2. Hypertension.  3. GERD status post Nissen fundoplication.  Repeat surgery 2020 for Nissen  4. Dumping syndrome.  5. Carpal tunnel syndrome.    Family history is positive for:    1. Father with gallbladder removed.  2. Mother with uterine cancer, hypertension, and polio.  3. Maternal grandfather  with MI.        Review of Systems   No headache no chest pain no shortness of breath no nausea vomiting no diarrhea no fever chills  No low back pain no neck pain  Gets erections okay  No bowel or bladder difficulties  Paresthesias in the toes fingers tongue and tips of the ears similar  Still able to ski but gets cold extremities  No significant ataxia    Otherwise review of systems negative          Neurologic exam  Alert oriented x3  No aphasia no neglect normal memory recall    Cranial nerves II through XII are normal  No nystagmus  No ophthalmoplegia  Symmetrical pupils  Reactive pupils  Visual fields intact  Face is symmetrical  Tongue twisters are good    Upper extremities  No drift no tremor no incoordination  Distal and proximal strength is normal    Lower extremity strength distal and proximally are normal    Previous exam  for comparison  Slight vibratory decrease in the toes compared to the ankle  Temperature appreciation seems to be intact  Pinprick is similar in the feet and hands no significant stocking or glove distribution    Negative Sorenson reflex downgoing toes no clonus no spasticity reflexes did not seem on the brisk side today as compared to 6 months ago    No spasticity no change in tone reflexes present at 2+ biceps triceps and knees, 1+ at the ankles,  Toes are downgoing    Gait normal  Romberg mildly positive more so when he standing on his tippy toes                  Assessment/Plan     1.  CTS (Carpal Tunnel Syndrome) (G56.01)   Patient still with a mild to moderate right carpal tunnel syndrome on his EMG       2.  Extremity numbness (R20.0)   Probably has a sensory neuropathy subtle with some slight change in the sensory snap potentials comparing 9307-1169 a 10-year difference     3.  Sensory neuropathy (G60.8)   Relatively stable with no changes in exam or changes in EMG or lab testing from [id number], but still follow clinically   May have some mild autonomic instability a Raynaud's type phenomenon    Sensory neuropathy (G62.9) If in the future he was more hyperreflexive we could consider checking a scan of his neck but without significant neck complaints I would hold off on this     At this time, he has:    1. Paresthesias more of a sensory neuropathy with negative workup.  2. Rather brisk reflexes, but no long tract findings of myelopathy.  3. Empirically on B12 replacement.  4. Neurontin 300 mg b.i.d. gives him some symptomatic relief.  5. Talked about the difference between negative and positive symptoms.  6. Talked about good foot care since he has numbness, but he needs to be careful about injuries, frostbite, or other difficulties.  7. Repeat labs and EMG fall 2019 stable  8.  Patient is an avid skier we talked about the fact that he should avoid frostbite which could exacerbate his sensory  neuropathy    Recommend patient follow-up in late August 2021 for in person examination  Depending on symptoms and signs decide whether we would do an MRI of his head or cervical spine  Currently symptoms seem to be quite stable though    Total care time today was 32 minutes refilled medications reviewed past symptoms signs current symptoms and side recommendation and plan            Again, thank you for allowing me to participate in the care of your patient.        Sincerely,        Yo Millan MD

## 2021-02-02 NOTE — PROGRESS NOTES
"Bal Junior is a 66 year old male who is being evaluated via a billable video visit.      The patient has been notified of following:     \"This video visit will be conducted via a call between you and your physician/provider. We have found that certain health care needs can be provided without the need for an in-person physical exam.  This service lets us provide the care you need with a video conversation.  If a prescription is necessary we can send it directly to your pharmacy.  If lab work is needed we can place an order for that and you can then stop by our lab to have the test done at a later time.    If during the course of the call the physician/provider feels a video visit is not appropriate, you will not be charged for this service.\"     Patient confirmed that they are in [place] for today's visit no, patient lives in [place] and currently there for visit. Follow-up will be completed in person     Video-Visit Details  Type of service:  Video Visit    Video Start Time: 2:59 PM  Video End Time:  3:23 PM    Originating Location (pt. Location): Home    Distant Location (provider location):  [facility] [place]     Platform used: [facility]      GI CLINIC VISIT    CC/REFERRING MD:  Abad Noel  REASON FOR CONSULTATION: follow-up     ASSESSMENT/PLAN:  1.  Gastroesophageal reflux disease without esophagitis, Status post Nissen fundoplication re-do  Patient has history of B12 deficiency reportedly leading to neuropathy, thought to be secondary to PPI use. Now resolved, has stopped PPI without symptoms.      2. Postprandial hypoglycemia  Rapid emptying on GES with reported postprandial hypoglycemia on glucometer checks (in 50s), though normal oral glucose tests.  On nortriptyline, notes that symptoms have improved significantly since tapering off of dicyclomine. Discussed lifestyle modifications including eating smaller more frequent meals spread throughout the day.  Would " "also recommend resting after eating, and not having liquids with eating.  Should also avoid soda and carbonation.  --Continue nortriptyline for the time being.  We can discuss tapering and follow-up  --Continue recommendations as was discussed with our dietitian   --Try to have small more frequent meals throughout the day.  Try to prevent overloading your stomach at any given time.  After eating lie down.  Do not have liquids with eating meals.  Avoid high sugar foods    3.  Chest pain  Patient reports episode of chest pain which felt as though food was caught in his Nissen wrap.  This resolved with increased water intake.  Discussed that if symptoms do not resolve next time, may need to be evaluated for a cardiac etiology.  If it does not feel like food getting stuck, can consider evaluating further with esophagram. Should also follow good swallowing hygeine     Follow-up in 4-6 months.  Next visit will need to be done in person at clinic in [place].    Thank you for this consultation.  It was a pleasure to participate in the care of this patient; please contact us with any further questions.     29 Minutes was spent on the date of the encounter during chart review, history and exam, documentation, and further activities as noted       Adrienne Pedroza PA-C  Division of Gastroenterology, Hepatology & Nutrition  [facility]    HPI:   Mr. Junior is a 65 year old male with HTN, GERD s/p Nissen fundoplication, and reported dumping syndrome who is s/p R inguinal hernia repair and s/p appendectomy who presents for another opinion on his \"slipped Nissen\" and GI symptoms. He has previously been seen by Dr. Quezada, please see her documentation for additional details. He has also previously followed at [facility] for GERD and dumping syndrome, and has recently been seen by Dr. Zhao of surgery at [facility] for consideration of Nissen re-do.     History summarized in brief:   Reports decades of reflux starting in " "late 1980s/early 1990s she was treating with intermittent omeprazole.  In the 1990s does transition to daily omeprazole.  In the early 2000 fingers and toes which was attributed to the low B12, it was felt that PPI may be contributing to this underwent in 2011 so that he could stop his PPI.  Stated for well controlled neuropathy symptoms have not resolved but were stable.  Replaced B12 with PCP.  Also described intermittent issues with postprandial stools, urgency, lightheadedness weakness he was treated at [place] [facility].  Had acute symptoms.  Ago felt like something hit him hard.  Pain improved but reflux continued.  He was started on Zantac had multiple EGDs 1 tried slipped Nissen.    Patient then underwent following work-up for his symptoms:  -EGD 8/30/2017 with report indicating a normal exam with intact Nissen.   -12/4/2018 was done for BRAVO placement. EGD report indicates a large hiatal hernia was seen and the \"wrap appears slipped.\"   -BRAVO testing revealed a DeMeester score total of 24.8 (18.7 on Day 1, 29.3 on Day 2 with 100% symptom concordance).  - Esophageal manometry was done though we do not have the interpretation of this test, images are scanned (but difficult to interpret).   - esophagram (1/2/19) which showed free flow of contrast, no hiatal hernia, and mild reflux. GES 2/19/19 showed only 4% of food remaining at 2 hours and exam stopped.   - esophagram 4/1/19 with again no hernia seen on upright images, but small HH induced with abdominal pressure and Valsalva. No reflux was noted on this exam.   - CT chest 4/1/19 with small HH and fundoplication changes.    The patient had stated that Dr. Zhao would consider repeat surgery, but given his above testing further trial of medical management was recommended. Mr. Junior was then started on pantoprazole-is concerned that paresthesias are worsening since starting that.    Regards to dumping syndrome, this was diagnosed at [place] " "gastroenterology after gastric emptying study (rapid with oral glucose tolerance test normal), postprandial glucose checks with levels in the 50s.  Describes symptoms of dizziness, wooziness, and \"going gray \", associated with sneezing which would occur after a meal.  Typically resolved in 5 minutes.  Also noted loose stools sometimes occur at unpredictable times.  Patient was treated with baclofen and nortriptyline with 80% resolution symptoms, and baclofen was discontinued and switched to dicyclomine.      Interval history 6/23/2020:  Symptoms are going ok, symptoms are mostly controlled when he avoids having large volume and density of foods. Breakfest and lunch are the most common times for loose stools and wooziness. Eggs are a big trigger. Otherwise has cut back on amount he is eating. Has not been measuring blood sugars. Is able to tell based on symptoms- hot sweats, dizziness. Sometimes he will need to sneeze and this clears up.     Bowel movements: 2-3 times a week will have diarrhea (non-bloody, Schley scale 6 with the smell at the end). In between these days, he will usually have a fairly normal bowel movement. Was previously happening 3-4 times a week.     About a month and a half ago, he felt like something punched him in his abdomen. This was centered on the upper stomach.  Thought about going in but symptoms improved. Not doing or eating anything out of the ordinary.     GERD  Pantoprazole seems to be controlling. Not taking zantac. Has breakthrough symptoms about once a month. Is concernend that neuropathy might get worse with pantoprazole.     Continues to take Nortiptyline or Bentyl. Takes Bentyl 3 times a day     Vitamin B12- is checking with primary care last level was high.    Interval History 9/1/2020:  Decreasing diccylomine helped his symptoms. Symptoms of sneezing after eating has decreased significantly. Maybe will have symptoms for 10 minutes. He would say this has improved 80%. Feels " "like nortriptyline is going ok.     Still taking pantoprazole- rare symptoms related to heartburn situation. If he skips this for a couple of days he will have symptoms. Has not had to take pepcid.     Bowel movements: some loose stools in the morning (related to amount of food the day before). Does not seem to be type of foods, volume related. Stools are a little loose 2-3 times a week.     Has had increased \"hay fever symptoms\"    Interval  History 10/9/2020:   Take-down and re-do of nissen fundoplication, KATHYA, gastrostomy tube placement (still present)  Flushing tube 30 ml twice a day. G tube is irritating if he sits up.     Mixing in semi-liquids in the past couple of days, hard to say if this is \"dumping syndrome\".    GERD- has not had any GERD, tomato soup. Continues on PPI     Bowel movements have been a little more liquid than normal, hard to say if this is due to more liquid diet. Every other day or every third day loose stools. Has not taken senna.     Interval History 2/2/20201:  Had liqour about hald an hour before eating, then had cheese (large amount). Then had \"hyoglycemic episode\". Lasted about 5-10 minutes. Had a sharp pain in upper chest- felt like something had gotten stuck inside the nissen wrap- he drank some water and pain went away. He felt fine the next morning. Does not remember what food he had eaten.     Reflux symptoms completely- not on any PPI. Does feel bloating and gas, no pain. Sometimes burping, othertimes passing gas. Seems to be volume related- but no specific food triggers. Has reduced carbonated beverages. Does not drink out of straws. Diet Dr. Pepper.    30% of the time in the morning after having oatmeal and fruit- he will feel lethargic, will rest.     Weight is stable at 208.     No longer taking dicyclomine     Continues to take nortirtpyline     PERTINENT PAST MEDICAL HISTORY:  Hypertension  GERD  \"Dumping syndrome\"    PREVIOUS SURGERIES:  Past Surgical History: "   Procedure Laterality Date     ABDOMEN SURGERY  2012?     APPENDECTOMY  1964?     COLONOSCOPY  2006, 2016     EYE SURGERY  199x    laser for retinal tears     HERNIA REPAIR  1961?     LAPAROSCOPIC TAKEDOWN NISSEN FUNDOPLICATION N/A 9/25/2020    Procedure: TAKE-DOWN, FUNDOPLICATION, REDO NISSEN, LAPAROSCOPIC, gastrostomy feeding tube placement;  Surgeon: Katlyn Tobar MD;  Location: [facility] OR     SOFT TISSUE SURGERY  2009?    MCL l. thumb     S/p tonsillectomy and adenoidectomy  S/p R inguinal hernia repair  S/p Nissen fundoplication      ALLERGIES:     Allergies   Allergen Reactions     Hornets      Wasp Venom Protein Hives     Bee Venom Swelling       PERTINENT MEDICATIONS:  Current Outpatient Medications   Medication     atenolol (TENORMIN) 50 MG tablet     cyanocobalamin (VITAMIN B-12) 500 MCG tablet     EPINEPHrine (EPIPEN 2-GINO) 0.3 MG/0.3ML injection 2-pack     gabapentin (NEURONTIN) 300 MG capsule     nortriptyline (PAMELOR) 25 MG capsule     No current facility-administered medications for this visit.      Facility-Administered Medications Ordered in Other Visits   Medication     barium sulfate (EZ-DISK) tablet 700 mg     sod bicarbonate-citric acid-simethicone (EZ GAS) 2.21-1.53-0.04 g packet 4 g       SOCIAL HISTORY:  Social History     Socioeconomic History     Marital status:      Spouse name: Not on file     Number of children: Not on file     Years of education: Not on file     Highest education level: Not on file   Occupational History     Not on file   Social Needs     Financial resource strain: Not on file     Food insecurity     Worry: Not on file     Inability: Not on file     Transportation needs     Medical: Not on file     Non-medical: Not on file   Tobacco Use     Smoking status: Never Smoker     Smokeless tobacco: Never Used   Substance and Sexual Activity     Alcohol use: Yes     Alcohol/week: 3.0 - 6.0 standard drinks     Types: 1 - 2 Glasses of wine, 1 - 2 Cans of beer, 1 - 2 Shots  of liquor per week     Frequency: 2-3 times a week     Drinks per session: 1 or 2     Binge frequency: Less than monthly     Comment: glass of wine with dinner, can of beer in evening or 1 whiskey  or bourbon     Drug use: Never     Sexual activity: Yes     Partners: Female     Birth control/protection: Post-menopausal, Male Surgical, Female Surgical   Lifestyle     Physical activity     Days per week: Not on file     Minutes per session: Not on file     Stress: Not on file   Relationships     Social connections     Talks on phone: Not on file     Gets together: Not on file     Attends Druze service: Not on file     Active member of club or organization: Not on file     Attends meetings of clubs or organizations: Not on file     Relationship status: Not on file     Intimate partner violence     Fear of current or ex partner: Not on file     Emotionally abused: Not on file     Physically abused: Not on file     Forced sexual activity: Not on file   Other Topics Concern     Not on file   Social History Narrative     Not on file       FAMILY HISTORY:  Family History   Problem Relation Age of Onset     Coronary Artery Disease Maternal Grandfather      Hypertension Mother      Uterine Cancer Mother         1965     Mitral Valve Replacement Father      GERD No family hx of      Ulcerative Colitis No family hx of      Crohn's Disease No family hx of      Stomach Cancer No family hx of        Past/family/social history reviewed and no changes    PHYSICAL EXAMINATION:  Telephone visit in the setting of COVID pandemic

## 2021-02-02 NOTE — PATIENT INSTRUCTIONS
It was a pleasure taking care of you today.  I've included a brief summary of our discussion and care plan from today's visit below.  Please review this information with your primary care provider.  ______________________________________________________________________    My recommendations are summarized as follows:    --Continue nortriptyline for the time being.  We can discuss tapering and follow-up  --Continue recommendations as was discussed with our dietitian   --Try to have small more frequent meals throughout the day.  Try to prevent overloading your stomach at any given time.  After eating lie down.  Do not have liquids with eating meals.  Avoid high sugar food like soda as this can cause bloating  -- let us know if chest discomfort recurs/if food gets stuck    Return to GI Clinic in 4-6 months in person to review your progress.    ______________________________________________________________________    How do I schedule labs, imaging studies, or procedures that were ordered in clinic today?   Labs: To schedule lab appointment at the [facility], use my chart or call [phone]. If you have a [place] lab closer to home where you are regularly seen you can give them a call.     Procedures: If a colonoscopy, upper endoscopy, breath test, esophageal manometry, or pH impedence was ordered today, our endoscopy team will call you to schedule this. If you have not heard from our endoscopy team within a week, please call [phone] to schedule.     Imaging Studies: If you were scheduled for a CT scan, X-ray, MRI, ultrasound, HIDA scan or other imaging study, please call [phone] to have this scheduled.     Referral: If a referral to another specialty was ordered, expect a phone call or follow instructions above. If you have not heard from anyone regarding your referral in a week, please call our clinic to check the status.     Who do I call with any questions after my visit?  Please be in  touch if there are any further questions that arise following today's visit.  There are multiple ways to contact your gastroenterology care team.        During business hours, you may reach a Gastroenterology nurse at [phone]      To schedule or reschedule an appointment, please call [phone].       You can always send a secure message through [employer].  [employer] messages are answered by your nurse or doctor typically within 24 hours.  Please allow extra time on weekends and holidays.        For urgent/emergent questions after business hours, you may reach the on-call GI Fellow by contacting the [facility]  at [phone].     How will I get the results of any tests ordered?    You will receive all of your results.  If you have signed up for [employer], any tests ordered at your visit will be available to you after your physician reviews them.  Typically this takes 1-2 weeks.  If there are urgent results that require a change in your care plan, your physician or nurse will call you to discuss the next steps.      What is [employer]?  [employer] is a secure way for you to access all of your healthcare records from the [facility].  It is a web based computer program, so you can sign on to it from any location.  It also allows you to send secure messages to your care team.  I recommend signing up for [employer] access if you have not already done so and are comfortable with using a computer.      How to I schedule a follow-up visit?  If you did not schedule a follow-up visit today, please call [phone] to schedule a follow-up office visit.      Sincerely,    Adrienne Pedroza PA-C  Division of Gastroenterology, Hepatology & Nutrition  [facility]

## 2021-03-07 ENCOUNTER — HEALTH MAINTENANCE LETTER (OUTPATIENT)
Age: 66
End: 2021-03-07

## 2021-05-28 NOTE — ANESTHESIA POSTPROCEDURE EVALUATION
Patient: Bal Junior  UPPER GASTROINTESTINAL ENDOSCOPY  Anesthesia type: MAC    Patient location: PACU  Last vitals:   Vitals Value Taken Time   /80 5/9/2019 11:45 AM   Temp 36.5  C (97.7  F) 5/9/2019 11:14 AM   Pulse 66 5/9/2019 11:58 AM   Resp 12 5/9/2019 11:14 AM   SpO2 99 % 5/9/2019 11:58 AM   Vitals shown include unvalidated device data.  Post vital signs: stable  Level of consciousness: awake and responds to simple questions  Post-anesthesia pain: pain controlled  Post-anesthesia nausea and vomiting: no  Pulmonary: unassisted, return to baseline  Cardiovascular: stable and blood pressure at baseline  Hydration: adequate  Anesthetic events: no    QCDR Measures:  ASA# 11 - Analy-op Cardiac Arrest: ASA11B - Patient did NOT experience unanticipated cardiac arrest  ASA# 12 - Analy-op Mortality Rate: ASA12B - Patient did NOT die  ASA# 13 - PACU Re-Intubation Rate: ASA13B - Patient did NOT require a new airway mgmt  ASA# 10 - Composite Anes Safety: ASA10A - No serious adverse event    Additional Notes:

## 2021-05-28 NOTE — ANESTHESIA CARE TRANSFER NOTE
Last vitals:   Vitals:    05/09/19 1114   BP: 115/85   Pulse: 77   Resp: 12   Temp: 36.5  C (97.7  F)   SpO2: 100%     Patient's level of consciousness is drowsy  Spontaneous respirations: yes  Maintains airway independently: yes  Dentition unchanged: yes  Oropharynx: oropharynx clear of all foreign objects    QCDR Measures:  ASA# 20 - Surgical Safety Checklist: WHO surgical safety checklist completed prior to induction    PQRS# 430 - Adult PONV Prevention: 4558F - Pt received => 2 anti-emetic agents (different classes) preop & intraop  ASA# 8 - Peds PONV Prevention: NA - Not pediatric patient, not GA or 2 or more risk factors NOT present  PQRS# 424 - Analy-op Temp Management: NA - MAC anesthesia or case < 60 minutes  PQRS# 426 - PACU Transfer Protocol: - Transfer of care checklist used  ASA# 14 - Acute Post-op Pain: ASA14B - Patient did NOT experience pain >= 7 out of 10

## 2021-05-28 NOTE — ANESTHESIA PREPROCEDURE EVALUATION
Anesthesia Evaluation      Patient summary reviewed   No history of anesthetic complications     Airway   Mallampati: III  Neck ROM: full   Pulmonary - normal exam                          Cardiovascular - normal exam  (+) hypertension, ,      Neuro/Psych    (+) neuromuscular disease,      Endo/Other    (+) obesity,      GI/Hepatic/Renal    (+) hiatal hernia, GERD,             Dental - normal exam                        Anesthesia Plan  Planned anesthetic: MAC    ASA 2   Induction: intravenous   Anesthetic plan and risks discussed with: patient  Anesthesia plan special considerations: antiemetics,   Post-op plan: routine recovery

## 2021-05-30 ENCOUNTER — RECORDS - HEALTHEAST (OUTPATIENT)
Dept: ADMINISTRATIVE | Facility: CLINIC | Age: 66
End: 2021-05-30

## 2021-05-31 ENCOUNTER — RECORDS - HEALTHEAST (OUTPATIENT)
Dept: ADMINISTRATIVE | Facility: CLINIC | Age: 66
End: 2021-05-31

## 2021-06-02 ENCOUNTER — RECORDS - HEALTHEAST (OUTPATIENT)
Dept: ADMINISTRATIVE | Facility: CLINIC | Age: 66
End: 2021-06-02

## 2021-06-02 VITALS — WEIGHT: 204.4 LBS | BODY MASS INDEX: 30.27 KG/M2 | HEIGHT: 69 IN

## 2021-08-05 ENCOUNTER — LAB REQUISITION (OUTPATIENT)
Dept: LAB | Facility: CLINIC | Age: 66
End: 2021-08-05

## 2021-08-05 DIAGNOSIS — I10 ESSENTIAL (PRIMARY) HYPERTENSION: ICD-10-CM

## 2021-08-05 DIAGNOSIS — Z13.220 ENCOUNTER FOR SCREENING FOR LIPOID DISORDERS: ICD-10-CM

## 2021-08-05 LAB
ALBUMIN SERPL-MCNC: 3.9 G/DL (ref 3.5–5)
ALP SERPL-CCNC: 79 U/L (ref 45–120)
ALT SERPL W P-5'-P-CCNC: 18 U/L (ref 0–45)
ANION GAP SERPL CALCULATED.3IONS-SCNC: 12 MMOL/L (ref 5–18)
AST SERPL W P-5'-P-CCNC: 19 U/L (ref 0–40)
BILIRUB SERPL-MCNC: 0.6 MG/DL (ref 0–1)
BUN SERPL-MCNC: 16 MG/DL (ref 8–22)
CALCIUM SERPL-MCNC: 9.3 MG/DL (ref 8.5–10.5)
CHLORIDE BLD-SCNC: 110 MMOL/L (ref 98–107)
CHOLEST SERPL-MCNC: 161 MG/DL
CO2 SERPL-SCNC: 20 MMOL/L (ref 22–31)
CREAT SERPL-MCNC: 1.07 MG/DL (ref 0.7–1.3)
GFR SERPL CREATININE-BSD FRML MDRD: 72 ML/MIN/1.73M2
GLUCOSE BLD-MCNC: 90 MG/DL (ref 70–125)
HDLC SERPL-MCNC: 53 MG/DL
LDLC SERPL CALC-MCNC: 75 MG/DL
POTASSIUM BLD-SCNC: 4.3 MMOL/L (ref 3.5–5)
PROT SERPL-MCNC: 6.7 G/DL (ref 6–8)
PSA SERPL-MCNC: 0.48 UG/L (ref 0–4.5)
SODIUM SERPL-SCNC: 142 MMOL/L (ref 136–145)
TRIGL SERPL-MCNC: 167 MG/DL

## 2021-08-05 PROCEDURE — 82040 ASSAY OF SERUM ALBUMIN: CPT | Performed by: FAMILY MEDICINE

## 2021-08-05 PROCEDURE — 36415 COLL VENOUS BLD VENIPUNCTURE: CPT | Performed by: FAMILY MEDICINE

## 2021-08-05 PROCEDURE — G0103 PSA SCREENING: HCPCS | Performed by: FAMILY MEDICINE

## 2021-08-05 PROCEDURE — 80061 LIPID PANEL: CPT | Performed by: FAMILY MEDICINE

## 2021-10-10 ENCOUNTER — HEALTH MAINTENANCE LETTER (OUTPATIENT)
Age: 66
End: 2021-10-10

## 2021-11-24 ENCOUNTER — OFFICE VISIT (OUTPATIENT)
Dept: NEUROLOGY | Facility: CLINIC | Age: 66
End: 2021-11-24
Payer: COMMERCIAL

## 2021-11-24 VITALS
WEIGHT: 203 LBS | HEART RATE: 62 BPM | BODY MASS INDEX: 29.06 KG/M2 | DIASTOLIC BLOOD PRESSURE: 75 MMHG | HEIGHT: 70 IN | SYSTOLIC BLOOD PRESSURE: 124 MMHG

## 2021-11-24 DIAGNOSIS — M54.2 NECK PAIN: ICD-10-CM

## 2021-11-24 DIAGNOSIS — G62.9 NEUROPATHY: ICD-10-CM

## 2021-11-24 DIAGNOSIS — G60.9 IDIOPATHIC PERIPHERAL NEUROPATHY: Primary | ICD-10-CM

## 2021-11-24 PROCEDURE — 99215 OFFICE O/P EST HI 40 MIN: CPT | Performed by: PSYCHIATRY & NEUROLOGY

## 2021-11-24 RX ORDER — GABAPENTIN 300 MG/1
300 CAPSULE ORAL 3 TIMES DAILY
Qty: 270 CAPSULE | Refills: 3 | Status: SHIPPED | OUTPATIENT
Start: 2021-11-24 | End: 2022-06-24

## 2021-11-24 ASSESSMENT — MIFFLIN-ST. JEOR: SCORE: 1712.05

## 2021-11-24 NOTE — LETTER
11/24/2021         RE: Bal Junior  [address] [place] [address]        Dear Colleague,    Thank you for referring your patient, Bal Junior, to the [facility]. Please see a copy of my visit note below.    In person evaluation    HPI  9/3/2019, in person visit  2/2/2021, video visit    66-year-old followed neurologically for:  Paresthesias  Sensory neuropathy  Right CTS mild to moderate  Relatively brisk reflexes in relationship to concern with neuropathy    A.  Sensory neuropathy       Onset in 2009       Patient with paresthesias of his toes and fingers       In the past had some paresthesias of the tongue       Distribution is predominantly stocking glove a little bit of the tips of the ear and tips of the tongue       No bowel or bladder difficulty       No trouble with erections       No significant back or neck pain       No balance problems can ski and walk in the dark with no difficulty         Does go skiing and his hands get fairly cold question some autonomic instability distally    B.  History of GERD        Nissen fundoplication procedure September 2020      C.  Patient with a fall in September 2021 has had no loss of consciousness       Seen in the ER had a cervical spine x-ray with no fracture but a lot of degenerative disease       Feels any and had a concussion balance is not as good as it used to be      His symptoms of achiness in his ankles and some numbness sometimes keep him awake at night  His last gabapentin teen 300 mg twice daily we will try increasing Ativan at    Since last seen in February 2021 on video  No specific hospitalizations  Was in the ER August 24, 2021 after a fall with hitting his head with no loss of consciousness  No major illnesses  No Covid illness  Did get the Covid vaccine and the booster  Still an avid skier      His neurologic difficulties include:  1. Numbness and tingling and dysesthesias of his hands and feet,  sometimes involves the tip of his tongue and nose, comes intermittently. Seemed to be somewhat better with Neurontin, did not seem to be very progressive.  2. Mild carpal tunnel syndrome, works on computers, not getting worse the last time I saw him, last EMG though was in November 2009.  3. Symptoms slightly better with B12 even though his B12 was not significantly low.  4. Past history of Nissen fundoplication with GERD nine-plus years ago and with increased GERD recently when I saw him in July 2018.  5. Past history of dumping syndrome which occurred more so after breakfast and takes nortriptyline through GI specialists for that.  6. Patient did have  Lyme's tick bite back in 2018 treated with antibiotics for 1 month      Some summary of past workup showed:  MRI scan head 10/16/2009 with and without contrast normal  EEG on August 14, 2013 was normal.  EMG in 2009 carpal tunnel syndrome, did not want surgery.  Laboratory data in 2009 for small fiber neuropathy included negative GALOP antibodies, negative MAG antibodies.  Negative anti-sulfatide antibodies. Anti-Hu was negative. Hepatic profile normal, sedimentation rate 5, MAYELA, rheumatoid factor, Lyme s titers negative, serum protein immunoelectrophoresis negative, SSA/SSB were negative,     methylmalonic acid was 0.11 with a folic acid of 15.2 and B12 of 538.  Repeat laboratory data shows 2019       immune electrophoresis negative       B12 630, TSH 1.06       SSA and SSB negative        Lyme's titer negative  EMG examination shows September 2019       Right carpal tunnel syndrome mild to moderate in degree slightly increased chronic motor changes compared to 2009       Sensory neuropathy with some slight change in amplitudes but sensory snap potentials are still present at the sural superficial peroneal ulnar and median nerves    Laboratory review  Labs 8/5/2021  Sodium 142 potassium 4.3, BUN 16 creatinine 1.07  Glucose 90  AST 19  8/24/2021 C-spine x-ray    IMPRESSION: No fracture.  Normal vertebral heights and alignment. Severe C4-C5 through C6-C7 interbody degeneration. No advanced facet arthropathy. Normal open mouth view.      Past medical history  Sensory neuropathy, onset   Hypertension  GERD status post Nissen fundoplication, repeat surgery 2020  Dumping syndrome  Carpal tunnel syndrome right    Habits  Does not smoke  Occasional alcoholic beverage, 3/week      Family history is positive for:  1. Father with gallbladder removed.  2. Mother with uterine cancer, hypertension, and polio.  3. Maternal grandfather  with MI.        Review of Systems   Pertinent positives and negatives    No headache no chest pain or shortness of breath no nausea vomiting no diarrhea no fever chills    Does have some neck pain but not radicular    Balance is a little bit more off than before  Blames it on the concussion that he had in September    No diplopia dysarthria dysphagia    Does have some paresthesias that involve both the nose fingertips and toes    No significant back pain  No bowel or bladder difficulty  No trouble with erections      Otherwise review of systems negative    General exam  Alert oriented x3  Blood pressure 124/75, pulse 62, temperature 97.7  Lungs clear  Heart rate regular  Abdomen soft  Symmetrical pulses  No edema feet      Neurologic exam  Alert oriented x3  Normal prosody of speech  Normal naming  Normal comprehension  Normal repetition  No aphasia  No neglect  Normal memory recall      Cranial nerves II through XII normal  No ophthalmoplegia  No nystagmus  Visual fields intact  Face is symmetrical  Tongue twisters good    Upper extremities reported right over left  Deltoid 5/5  Biceps 5/5  Triceps 5/5  Wrist extensors and flexors 5/5  Finger extensors and flexors 5/5    No drift  No incoordination      Lower extremities reported right over left  Iliopsoas 5/5  Quadriceps 5/5  Hamstrings 5/5  Anterior tibial 5/5  Gastrocnemius 5/5    No  actual spasticity  Negative straight leg raising    Reflexes brisk  Biceps 3/3  Triceps 3/3  Knees 3/3  Ankles 2/2    Toes downgoing  Negative Sorenson reflex  No spasticity    Gait  Normal on the flat  Slight difficulty with balance when he is up on his tippy toes or heels  Negative Romberg    Vibratory sensation intact in the upper and lower extremities            Assessment/Plan     1.  CTS (Carpal Tunnel Syndrome) (G56.01)        Patient still with a mild to moderate right carpal tunnel syndrome on his EMG       2.  Extremity numbness (R20.0)         Probably has a sensory neuropathy subtle with some slight change in the sensory snap potentials comparing [id number], a 10-year difference     3.  Sensory neuropathy (G60.8)        Relatively stable with no changes in exam or changes in EMG or lab testing from 9070-0446, but still follow clinically        May have some mild autonomic instability a Raynaud's type phenomenon    4.  Sensory neuropathy (G62.9) I      f in the future he was more hyperreflexive we could consider checking a scan of his neck but without significant neck complaints I would hold off on this     5.  Brisk reflexes degenerative disease on plain x-ray of the neck       Rule out myelopathy with MRI of the neck    6.  Fall with hitting his head, August 24, 2021 possible mild concussion but no loss of consciousness seen in the ER      At this time, he has:    1. Paresthesias more of a sensory neuropathy with negative workup.  2. Rather brisk reflexes, but no long tract findings of myelopathy.  3. Empirically on B12 replacement.  4. Neurontin 300 mg 3 times daily (a.m. 6 PM bedtime)  5. Talked about the difference between negative and positive symptoms.  6. Talked about good foot care since he has numbness, but he needs to be careful about injuries, frostbite, or other difficulties.  7. Repeat labs and EMG fall 2019 stable  8.  Patient is an avid skier we talked about the fact that he should avoid  frostbite which could exacerbate his sensory neuropathy    With recent fall hitting his head brisk reflexes and some slight increase in symptoms  More ataxia than before although it subtle    Recommend MRI scan cervical spine rule out spinal canal stenosis  If this shows degenerative disease but no canal narrowing we will just monitor his exam clinically and follow-up in June 2022  If there is significant narrowing I refer him to the neurosurgeons I have notified him that we would send him a letter or call him with the results of the MRI    We discussed his symptoms and signs and work-up above    As part of visit today  Reviewed labs  Reviewed x-rays  Reviewed recent fall and ER report August August 24, 2021    Total care time today 46 minutes          Again, thank you for allowing me to participate in the care of your patient.        Sincerely,        Yo Millan MD

## 2021-11-24 NOTE — NURSING NOTE
Chief Complaint   Patient presents with     NEUROPATHY     Pt states he has been having pain in bilat feet/ankles. Pt states he is having difficulty sleeping due to the pain/numbness.     Perla Samuels LPN on 11/24/2021 at 10:31 AM

## 2021-11-24 NOTE — PROGRESS NOTES
In person evaluation    HPI  9/3/2019, in person visit  2/2/2021, video visit  11/24/2021, in person visit    66-year-old followed neurologically for:  Paresthesias  Sensory neuropathy  Right CTS mild to moderate  Relatively brisk reflexes in relationship to concern with neuropathy    A.  Sensory neuropathy       Onset in 2009       Patient with paresthesias of his toes and fingers       In the past had some paresthesias of the tongue       Distribution is predominantly stocking glove a little bit of the tips of the ear and tips of the tongue       No bowel or bladder difficulty       No trouble with erections       No significant back or neck pain       No balance problems can ski and walk in the dark with no difficulty         Does go skiing and his hands get fairly cold question some autonomic instability distally    B.  History of GERD        Nissen fundoplication procedure September 2020      C.  Patient with a fall in September 2021 has had no loss of consciousness       Seen in the ER had a cervical spine x-ray with no fracture but a lot of degenerative disease       Feels any and had a concussion balance is not as good as it used to be      His symptoms of achiness in his ankles and some numbness sometimes keep him awake at night  His last gabapentin teen 300 mg twice daily we will try increasing Ativan at    Since last seen in February 2021 on video  No specific hospitalizations  Was in the ER August 24, 2021 after a fall with hitting his head with no loss of consciousness  No major illnesses  No Covid illness  Did get the Covid vaccine and the booster  Still an avid skier      His neurologic difficulties include:  1. Numbness and tingling and dysesthesias of his hands and feet, sometimes involves the tip of his tongue and nose, comes intermittently. Seemed to be somewhat better with Neurontin, did not seem to be very progressive.  2. Mild carpal tunnel syndrome, works on computers, not getting worse the  last time I saw him, last EMG though was in 2009.  3. Symptoms slightly better with B12 even though his B12 was not significantly low.  4. Past history of Nissen fundoplication with GERD nine-plus years ago and with increased GERD recently when I saw him in 2018.  5. Past history of dumping syndrome which occurred more so after breakfast and takes nortriptyline through GI specialists for that.  6. Patient did have  Lyme's tick bite back in 2018 treated with antibiotics for 1 month      Past medical history  Sensory neuropathy, onset   Hypertension  GERD status post Nissen fundoplication, repeat surgery 2020  Dumping syndrome  Carpal tunnel syndrome right    Habits  Does not smoke  Occasional alcoholic beverage, 3/week      Family history is positive for:  1. Father with gallbladder removed.  2. Mother with uterine cancer, hypertension, and polio.  3. Maternal grandfather  with MI.     Work-up summary  MRI scan head 10/16/2009 with and without contrast normal  EEG on 2013 was normal.  EMG in  carpal tunnel syndrome, did not want surgery.  Laboratory data in  for small fiber neuropathy included negative GALOP antibodies, negative MAG antibodies.  Negative anti-sulfatide antibodies. Anti-Hu was negative. Hepatic profile normal, sedimentation rate 5, MAYELA, rheumatoid factor, Lyme s titers negative, serum protein immunoelectrophoresis negative, SSA/SSB were negative,     methylmalonic acid was 0.11 with a folic acid of 15.2 and B12 of 538.  Repeat laboratory data shows        immune electrophoresis negative       B12 630, TSH 1.06       SSA and SSB negative        Lyme's titer negative  EMG examination shows 2019       Right carpal tunnel syndrome mild to moderate in degree slightly increased chronic motor changes compared to        Sensory neuropathy with some slight change in amplitudes but sensory snap potentials are still present at the sural  superficial peroneal ulnar and median nerves  Laboratory review  Labs 8/5/2021  Sodium 142 potassium 4.3, BUN 16 creatinine 1.07  Glucose 90  AST 19  8/24/2021 C-spine x-ray   IMPRESSION: No fracture.  Normal vertebral heights and alignment. Severe C4-C5 through C6-C7 interbody degeneration. No advanced facet arthropathy. Normal open mouth view.    MRI scan cervical spine 1/7/2022  C3-4, severe right, moderate left foraminal narrowing no canal narrowing  C4-5 severe bilateral foraminal narrowing, no canal narrowing  C5-6, severe bilateral foraminal narrowing, mild canal narrowing  C6-7 moderate bilateral foraminal narrowing, no canal narrowing  C7-T1, moderate right foraminal narrowing, no canal narrowing  At the C5/C6 level, there is a broad bar disc osteophyte complex with mild spinal canal narrowing.        Review of Systems   Pertinent positives and negatives    No headache no chest pain or shortness of breath no nausea vomiting no diarrhea no fever chills    Does have some neck pain but not radicular    Balance is a little bit more off than before  Blames it on the concussion that he had in September    No diplopia dysarthria dysphagia    Does have some paresthesias that involve both the nose fingertips and toes    No significant back pain  No bowel or bladder difficulty  No trouble with erections      Otherwise review of systems negative    General exam  Alert oriented x3  Blood pressure 124/75, pulse 62, temperature 97.7  Lungs clear  Heart rate regular  Abdomen soft  Symmetrical pulses  No edema feet      Neurologic exam  Alert oriented x3  Normal prosody of speech  Normal naming  Normal comprehension  Normal repetition  No aphasia  No neglect  Normal memory recall      Cranial nerves II through XII normal  No ophthalmoplegia  No nystagmus  Visual fields intact  Face is symmetrical  Tongue twisters good    Upper extremities reported right over left  Deltoid 5/5  Biceps 5/5  Triceps 5/5  Wrist extensors and  flexors 5/5  Finger extensors and flexors 5/5    No drift  No incoordination      Lower extremities reported right over left  Iliopsoas 5/5  Quadriceps 5/5  Hamstrings 5/5  Anterior tibial 5/5  Gastrocnemius 5/5    No actual spasticity  Negative straight leg raising    Reflexes brisk  Biceps 3/3  Triceps 3/3  Knees 3/3  Ankles 2/2    Toes downgoing  Negative Sorenson reflex  No spasticity    Gait  Normal on the flat  Slight difficulty with balance when he is up on his tippy toes or heels  Negative Romberg    Vibratory sensation intact in the upper and lower extremities            Assessment/Plan     1.  CTS (Carpal Tunnel Syndrome) (G56.01)        Patient still with a mild to moderate right carpal tunnel syndrome on his EMG       2.  Extremity numbness (R20.0)         Probably has a sensory neuropathy subtle with some slight change in the sensory snap potentials comparing 7604-9342, a 10-year difference     3.  Sensory neuropathy (G60.8)        Relatively stable with no changes in exam or changes in EMG or lab testing from 2977-2632, but still follow clinically        May have some mild autonomic instability a Raynaud's type phenomenon    4.  Sensory neuropathy (G62.9) I      f in the future he was more hyperreflexive we could consider checking a scan of his neck but without significant neck complaints I would hold off on this     5.  Brisk reflexes degenerative disease on plain x-ray of the neck       Rule out myelopathy with MRI of the neck    6.  Fall with hitting his head, August 24, 2021 possible mild concussion but no loss of consciousness seen in the ER      At this time, he has:    1. Paresthesias more of a sensory neuropathy with negative workup.  2. Rather brisk reflexes, but no long tract findings of myelopathy.  3. Empirically on B12 replacement.  4. Neurontin 300 mg 3 times daily (a.m. 6 PM bedtime)  5. Talked about the difference between negative and positive symptoms.  6. Talked about good foot care  since he has numbness, but he needs to be careful about injuries, frostbite, or other difficulties.  7. Repeat labs and EMG fall 2019 stable  8.  Patient is an avid skier we talked about the fact that he should avoid frostbite which could exacerbate his sensory neuropathy    With recent fall hitting his head brisk reflexes and some slight increase in symptoms  More ataxia than before although it subtle    Recommend MRI scan cervical spine rule out spinal canal stenosis  If this shows degenerative disease but no canal narrowing we will just monitor his exam clinically and follow-up in June 2022  If there is significant narrowing I refer him to the neurosurgeons I have notified him that we would send him a letter or call him with the results of the MRI    We discussed his symptoms and signs and work-up above    As part of visit today  Reviewed labs  Reviewed x-rays  Reviewed recent fall and ER report August August 24, 2021    Total care time today 46 minutes    Addendum 1/7/2022  MRI scan cervical spine 1/7/2022  C3-4, severe right, moderate left foraminal narrowing no canal narrowing  C4-5 severe bilateral foraminal narrowing, no canal narrowing  C5-6, severe bilateral foraminal narrowing, mild canal narrowing  C6-7 moderate bilateral foraminal narrowing, no canal narrowing  C7-T1, moderate right foraminal narrowing, no canal narrowing  At the C5/C6 level, there is a broad bar disc osteophyte complex with mild spinal canal narrowing.

## 2021-11-24 NOTE — LETTER
January 7, 2022      Bal Junior  [address] [facility] [address]        Dear ,    We are writing to inform you of your test results.    Your recent MRI of the neck on 1/7/2022 does not show any spinal cord compression that would cause your neuropathy symptoms.  There is a lot of arthritis in the neck.  Continuous plan follow-up for neuropathy in 1 year    If you have any questions or concerns, please call the clinic at the number listed above.       Sincerely,

## 2022-01-07 ENCOUNTER — HOSPITAL ENCOUNTER (OUTPATIENT)
Dept: MRI IMAGING | Facility: CLINIC | Age: 67
Discharge: HOME | End: 2022-01-07
Attending: PSYCHIATRY & NEUROLOGY | Admitting: PSYCHIATRY & NEUROLOGY
Payer: COMMERCIAL

## 2022-01-07 DIAGNOSIS — G60.9 IDIOPATHIC PERIPHERAL NEUROPATHY: ICD-10-CM

## 2022-01-07 DIAGNOSIS — M54.2 NECK PAIN: ICD-10-CM

## 2022-01-07 PROCEDURE — 72141 MRI NECK SPINE W/O DYE: CPT

## 2022-03-27 ENCOUNTER — HEALTH MAINTENANCE LETTER (OUTPATIENT)
Age: 67
End: 2022-03-27

## 2022-05-04 ENCOUNTER — TELEPHONE (OUTPATIENT)
Dept: NEUROLOGY | Facility: CLINIC | Age: 67
End: 2022-05-04
Payer: COMMERCIAL

## 2022-05-04 NOTE — TELEPHONE ENCOUNTER
Spoke with pharmacy staff, Ariana, and gave her provider's [facility] number per request.     Romelia Casarez RN on 5/4/2022 at 10:24 AM

## 2022-05-04 NOTE — TELEPHONE ENCOUNTER
[facility] Call Center    Phone Message    May a detailed message be left on voicemail: yes     Reason for Call: Medication Question or concern regarding medication   Prescription Clarification  Name of Medication: gabapentin (NEURONTIN) 300 MG capsule  Prescribing Provider: Dr. Millan   Pharmacy: [facility] - [place] [address]   What on the order needs clarification? Ariana from [facility] pharmacy called and stated that they need a [facility] number in order to fill this prescription. Please call back with further information.           Action Taken: Message routed to:  [facility] ([facility]    Travel Screening: Not Applicable

## 2022-06-24 ENCOUNTER — OFFICE VISIT (OUTPATIENT)
Dept: NEUROLOGY | Facility: CLINIC | Age: 67
End: 2022-06-24
Payer: COMMERCIAL

## 2022-06-24 VITALS
HEIGHT: 70 IN | BODY MASS INDEX: 28.49 KG/M2 | WEIGHT: 199 LBS | SYSTOLIC BLOOD PRESSURE: 98 MMHG | DIASTOLIC BLOOD PRESSURE: 81 MMHG | HEART RATE: 58 BPM

## 2022-06-24 DIAGNOSIS — G62.9 NEUROPATHY: ICD-10-CM

## 2022-06-24 DIAGNOSIS — G60.9 IDIOPATHIC PERIPHERAL NEUROPATHY: Primary | ICD-10-CM

## 2022-06-24 DIAGNOSIS — G56.01 CARPAL TUNNEL SYNDROME OF RIGHT WRIST: ICD-10-CM

## 2022-06-24 DIAGNOSIS — M54.2 NECK PAIN: ICD-10-CM

## 2022-06-24 PROCEDURE — 99214 OFFICE O/P EST MOD 30 MIN: CPT | Performed by: PSYCHIATRY & NEUROLOGY

## 2022-06-24 RX ORDER — GABAPENTIN 300 MG/1
300 CAPSULE ORAL 3 TIMES DAILY
Qty: 270 CAPSULE | Refills: 3 | Status: SHIPPED | OUTPATIENT
Start: 2022-06-24 | End: 2023-02-15

## 2022-06-24 NOTE — PROGRESS NOTES
"In person evaluation    HPI  9/3/2019, in person visit  2/2/2021, video visit  11/24/2021, in person visit  6/24/2022, in person visit      67-year-old followed neurologically for:  Paresthesias  Sensory neuropathy  Right CTS mild to moderate  Relatively brisk reflexes in relationship to concern with neuropathy      Since last seen November 2021  Gets some pain in the left foot more in the heel and on the right foot more in the arch they are intermittent, rarely sound more like a planter fasciitis or Achilles tendinitis  Do not happen often enough for him to know if there is anything that triggers it  Seems to be worse later in the day or at night  Did state that he could take an extra dose of gabapentin if it flared up  Should also make sure he wears good shoes and support  And if the pain flares up see if there is something that he did earlier in the day or leading up to it that may have triggered it.    We reviewed his actual MRI scan of the cervical spine showing the degenerative changes but nothing that would go after surgically    Patient has had some \"dumping type symptomatology from his GI problem and it was talking the other physicians in that regard        A.  Sensory neuropathy       Onset in 2009       Patient with paresthesias of his toes and fingers       In the past had some paresthesias of the tongue       Distribution is predominantly stocking glove a little bit of the tips of the ear and tips of the tongue       No bowel or bladder difficulty       No trouble with erections       No significant back or neck pain       No balance problems can ski and walk in the dark with no difficulty         Does go skiing and his hands get fairly cold question some autonomic instability distally    B.  History of GERD        Nissen fundoplication procedure September 2020      C.  Patient with a fall in September 2021 has had no loss of consciousness       Seen in the ER had a cervical spine x-ray with no fracture " but a lot of degenerative disease       Feels any and had a concussion balance is not as good as it used to be      His symptoms of achiness in his ankles and some numbness sometimes keep him awake at night  His last gabapentin teen 300 mg twice daily we will try increasing Ativan at    Since last seen in 2021 on video  No specific hospitalizations  Was in the ER 2021 after a fall with hitting his head with no loss of consciousness  No major illnesses  No Covid illness  Did get the Covid vaccine and the booster  Still an avid skier      His neurologic difficulties include:  1. Numbness and tingling and dysesthesias of his hands and feet, sometimes involves the tip of his tongue and nose, comes intermittently. Seemed to be somewhat better with Neurontin, did not seem to be very progressive.  2. 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  3. Symptoms slightly better with B12 even though his B12 was not significantly low.  4. Past history of Nissen fundoplication with GERD nine-plus years ago and with increased GERD recently when I saw him in 2018.  5. Past history of dumping syndrome which occurred more so after breakfast and takes nortriptyline through GI specialists for that.  6. Patient did have  Lyme's tick bite back in 2018 treated with antibiotics for 1 month      Past medical history  Sensory neuropathy, onset   Hypertension  GERD status post Nissen fundoplication, repeat surgery 2020  Dumping syndrome  Carpal tunnel syndrome right    Habits  Does not smoke  Occasional alcoholic beverage, 3/week      Family history is positive for:  1. Father with gallbladder removed.  2. Mother with uterine cancer, hypertension, and polio.  3. Maternal grandfather  with MI.     Work-up summary  MRI scan head 10/16/2009 with and without contrast normal  EEG on 2013 was normal.  EMG in   carpal tunnel syndrome, did not want surgery.  Laboratory data in 2009 for small fiber neuropathy included negative GALOP antibodies, negative MAG antibodies.  Negative anti-sulfatide antibodies. Anti-Hu was negative. Hepatic profile normal, sedimentation rate 5, MAYELA, rheumatoid factor, Lyme s titers negative, serum protein immunoelectrophoresis negative, SSA/SSB were negative,     methylmalonic acid was 0.11 with a folic acid of 15.2 and B12 of 538.  Repeat laboratory data shows 2019       immune electrophoresis negative       B12 630, TSH 1.06       SSA and SSB negative        Lyme's titer negative  EMG examination shows September 2019       Right carpal tunnel syndrome mild to moderate in degree slightly increased chronic motor changes compared to 2009       Sensory neuropathy with some slight change in amplitudes but sensory snap potentials are still present at the sural superficial peroneal ulnar and median nerves  Laboratory review  Labs 8/5/2021  Sodium 142 potassium 4.3, BUN 16 creatinine 1.07  Glucose 90  AST 19  8/24/2021 C-spine x-ray   IMPRESSION: No fracture.  Normal vertebral heights and alignment. Severe C4-C5 through C6-C7 interbody degeneration. No advanced facet arthropathy. Normal open mouth view.    MRI scan cervical spine 1/7/2022  C3-4, severe right, moderate left foraminal narrowing no canal narrowing  C4-5 severe bilateral foraminal narrowing, no canal narrowing  C5-6, severe bilateral foraminal narrowing, mild canal narrowing  C6-7 moderate bilateral foraminal narrowing, no canal narrowing  C7-T1, moderate right foraminal narrowing, no canal narrowing  At the C5/C6 level, there is a broad bar disc osteophyte complex with mild spinal canal narrowing.        Review of Systems   Pertinent positives and negatives    No headache no chest pain or shortness of breath no nausea vomiting no diarrhea no fever chills    Does have some neck pain but not radicular    Balance is a little bit more off  than before  Blames it on the concussion that he had in September    No diplopia dysarthria dysphagia    Does have some paresthesias that involve both the nose fingertips and toes    No significant back pain  No bowel or bladder difficulty  No trouble with erections    Has some pain on the left heel  Some pain in the right arch  They are both intermittent and occasional last time was 3 months ago    Otherwise review of systems negative    General exam  Alert oriented x3  Blood pressure 98/81, pulse 58  Lungs clear  Heart rate regular  Abdomen soft  Symmetrical pulses  No edema feet      Neurologic exam  Alert oriented x3  Normal prosody of speech  Normal naming  Normal comprehension  Normal repetition  No aphasia  No neglect  Normal memory recall      Cranial nerves II through XII normal  No ophthalmoplegia  No nystagmus  Visual fields intact  Face is symmetrical  Tongue twisters good    Upper extremities reported right over left  Deltoid 5/5  Biceps 5/5  Triceps 5/5  Wrist extensors and flexors 5/5  Finger extensors and flexors 5/5    No drift  No incoordination      Lower extremities reported right over left  Iliopsoas 5/5  Quadriceps 5/5  Hamstrings 5/5  Anterior tibial 5/5  Gastrocnemius 5/5    No actual spasticity  Negative straight leg raising    Reflexes brisk  Biceps 3/3  Triceps 3/3  Knees 3/3  Ankles 2/2    Toes downgoing  Negative Sorenson reflex  No spasticity    Gait  Normal on the flat  Slight difficulty with balance when he is up on his tippy toes or heels  Negative Romberg    Vibratory sensation intact in the upper and lower extremities            Assessment/Plan     1.  CTS (Carpal Tunnel Syndrome) (G56.01)        Patient still with a mild to moderate right carpal tunnel syndrome on his EMG       2.  Extremity numbness (R20.0)         Probably has a sensory neuropathy subtle with some slight change in the sensory snap potentials comparing 2207-0578, a 10-year difference     3.  Sensory neuropathy  (G60.8)        Relatively stable with no changes in exam or changes in EMG or lab testing from 8893-1552, but still follow clinically        May have some mild autonomic instability a Raynaud's type phenomenon    4.  Sensory neuropathy (G62.9)          5.  Brisk reflexes degenerative disease on plain x-ray of the neck September 2021       Rule out myelopathy with MRI of the neck      MRI scan cervical spine 1/7/2022      C3-4, severe right, moderate left foraminal narrowing no canal narrowing      C4-5 severe bilateral foraminal narrowing, no canal narrowing      C5-6, severe bilateral foraminal narrowing, mild canal narrowing      C6-7 moderate bilateral foraminal narrowing, no canal narrowing      C7-T1, moderate right foraminal narrowing, no canal narrowing       At the C5/C6 level, there is a broad bar disc osteophyte complex with mild spinal canal narrowing.        if in the future he was more hyperreflexive we could consider checking a scan of his neck but without significant neck complaints I would hold off on repeating this at this time.    6.  Fall with hitting his head, August 24, 2021 possible mild concussion but no loss of consciousness seen in the ER      At this time, he has:    1. Paresthesias more of a sensory neuropathy with negative workup.  2. Rather brisk reflexes, but no long tract findings of myelopathy.  3. Empirically on B12 replacement.  4. Neurontin 300 mg 3 times daily (a.m. 6 PM bedtime)  5. Talked about the difference between negative and positive symptoms.  6. Talked about good foot care since he has numbness, but he needs to be careful about injuries, frostbite, or other difficulties.  7. Repeat labs and EMG fall 2019 stable  8.  Patient is an avid skier we talked about the fact that he should avoid frostbite which could exacerbate his sensory neuropathy      I did talk with patient about his gabapentin teen and if his foot pain is bad at night he be allowed to take a intermittent  extra gabapentin he has extra at home.  If he needed to do this more regularly we would have to increase the prescription  He will follow-up in February 2023  He still the skiing he should use good foot wear if he is out in the cold    Total care time today 32 minutes  We did review the actual MRI scan of the cervical spine together.

## 2022-06-24 NOTE — LETTER
"    6/24/2022         RE: Bal Junior  [address] [place] [address]        Dear Colleague,    Thank you for referring your patient, Bal Junior, to the [facility] [place]. Please see a copy of my visit note below.    In person evaluation    HPI  9/3/2019, in person visit  2/2/2021, video visit  11/24/2021, in person visit  6/24/2022, in person visit      67-year-old followed neurologically for:  Paresthesias  Sensory neuropathy  Right CTS mild to moderate  Relatively brisk reflexes in relationship to concern with neuropathy      Since last seen November 2021  Gets some pain in the left foot more in the heel and on the right foot more in the arch they are intermittent, rarely sound more like a planter fasciitis or Achilles tendinitis  Do not happen often enough for him to know if there is anything that triggers it  Seems to be worse later in the day or at night  Did state that he could take an extra dose of gabapentin if it flared up  Should also make sure he wears good shoes and support  And if the pain flares up see if there is something that he did earlier in the day or leading up to it that may have triggered it.    We reviewed his actual MRI scan of the cervical spine showing the degenerative changes but nothing that would go after surgically    Patient has had some \"dumping type symptomatology from his GI problem and it was talking the other physicians in that regard        A.  Sensory neuropathy       Onset in 2009       Patient with paresthesias of his toes and fingers       In the past had some paresthesias of the tongue       Distribution is predominantly stocking glove a little bit of the tips of the ear and tips of the tongue       No bowel or bladder difficulty       No trouble with erections       No significant back or neck pain       No balance problems can ski and walk in the dark with no difficulty         Does go skiing and his hands get fairly cold question " some autonomic instability distally    B.  History of GERD        Nissen fundoplication procedure September 2020      C.  Patient with a fall in September 2021 has had no loss of consciousness       Seen in the ER had a cervical spine x-ray with no fracture but a lot of degenerative disease       Feels any and had a concussion balance is not as good as it used to be      His symptoms of achiness in his ankles and some numbness sometimes keep him awake at night  His last gabapentin teen 300 mg twice daily we will try increasing Ativan at    Since last seen in February 2021 on video  No specific hospitalizations  Was in the ER August 24, 2021 after a fall with hitting his head with no loss of consciousness  No major illnesses  No Covid illness  Did get the Covid vaccine and the booster  Still an avid skier      His neurologic difficulties include:  1. Numbness and tingling and dysesthesias of his hands and feet, sometimes involves the tip of his tongue and nose, comes intermittently. Seemed to be somewhat better with Neurontin, did not seem to be very progressive.  2. Mild carpal tunnel syndrome, works on computers, not getting worse the last time I saw him, last EMG though was in November 2009.  3. Symptoms slightly better with B12 even though his B12 was not significantly low.  4. Past history of Nissen fundoplication with GERD nine-plus years ago and with increased GERD recently when I saw him in July 2018.  5. Past history of dumping syndrome which occurred more so after breakfast and takes nortriptyline through GI specialists for that.  6. Patient did have  Lyme's tick bite back in 2018 treated with antibiotics for 1 month      Past medical history  Sensory neuropathy, onset 2009  Hypertension  GERD status post Nissen fundoplication, repeat surgery February 2020  Dumping syndrome  Carpal tunnel syndrome right    Habits  Does not smoke  Occasional alcoholic beverage, 3/week      Family history is positive for:  1.  Father with gallbladder removed.  2. Mother with uterine cancer, hypertension, and polio.  3. Maternal grandfather  with MI.     Work-up summary  MRI scan head 10/16/2009 with and without contrast normal  EEG on 2013 was normal.  EMG in  carpal tunnel syndrome, did not want surgery.  Laboratory data in  for small fiber neuropathy included negative GALOP antibodies, negative MAG antibodies.  Negative anti-sulfatide antibodies. Anti-Hu was negative. Hepatic profile normal, sedimentation rate 5, MAYELA, rheumatoid factor, Lyme s titers negative, serum protein immunoelectrophoresis negative, SSA/SSB were negative,     methylmalonic acid was 0.11 with a folic acid of 15.2 and B12 of 538.  Repeat laboratory data shows        immune electrophoresis negative       B12 630, TSH 1.06       SSA and SSB negative        Lyme's titer negative  EMG examination shows 2019       Right carpal tunnel syndrome mild to moderate in degree slightly increased chronic motor changes compared to        Sensory neuropathy with some slight change in amplitudes but sensory snap potentials are still present at the sural superficial peroneal ulnar and median nerves  Laboratory review  Labs 2021  Sodium 142 potassium 4.3, BUN 16 creatinine 1.07  Glucose 90  AST 19  2021 C-spine x-ray   IMPRESSION: No fracture.  Normal vertebral heights and alignment. Severe C4-C5 through C6-C7 interbody degeneration. No advanced facet arthropathy. Normal open mouth view.    MRI scan cervical spine 2022  C3-4, severe right, moderate left foraminal narrowing no canal narrowing  C4-5 severe bilateral foraminal narrowing, no canal narrowing  C5-6, severe bilateral foraminal narrowing, mild canal narrowing  C6-7 moderate bilateral foraminal narrowing, no canal narrowing  C7-T1, moderate right foraminal narrowing, no canal narrowing  At the C5/C6 level, there is a broad bar disc osteophyte complex with mild spinal canal  narrowing.        Review of Systems   Pertinent positives and negatives    No headache no chest pain or shortness of breath no nausea vomiting no diarrhea no fever chills    Does have some neck pain but not radicular    Balance is a little bit more off than before  Blames it on the concussion that he had in September    No diplopia dysarthria dysphagia    Does have some paresthesias that involve both the nose fingertips and toes    No significant back pain  No bowel or bladder difficulty  No trouble with erections    Has some pain on the left heel  Some pain in the right arch  They are both intermittent and occasional last time was 3 months ago    Otherwise review of systems negative    General exam  Alert oriented x3  Blood pressure 98/81, pulse 58  Lungs clear  Heart rate regular  Abdomen soft  Symmetrical pulses  No edema feet      Neurologic exam  Alert oriented x3  Normal prosody of speech  Normal naming  Normal comprehension  Normal repetition  No aphasia  No neglect  Normal memory recall      Cranial nerves II through XII normal  No ophthalmoplegia  No nystagmus  Visual fields intact  Face is symmetrical  Tongue twisters good    Upper extremities reported right over left  Deltoid 5/5  Biceps 5/5  Triceps 5/5  Wrist extensors and flexors 5/5  Finger extensors and flexors 5/5    No drift  No incoordination      Lower extremities reported right over left  Iliopsoas 5/5  Quadriceps 5/5  Hamstrings 5/5  Anterior tibial 5/5  Gastrocnemius 5/5    No actual spasticity  Negative straight leg raising    Reflexes brisk  Biceps 3/3  Triceps 3/3  Knees 3/3  Ankles 2/2    Toes downgoing  Negative Sorenson reflex  No spasticity    Gait  Normal on the flat  Slight difficulty with balance when he is up on his tippy toes or heels  Negative Romberg    Vibratory sensation intact in the upper and lower extremities            Assessment/Plan     1.  CTS (Carpal Tunnel Syndrome) (G56.01)        Patient still with a mild to moderate  right carpal tunnel syndrome on his EMG       2.  Extremity numbness (R20.0)         Probably has a sensory neuropathy subtle with some slight change in the sensory snap potentials comparing [id number], a 10-year difference     3.  Sensory neuropathy (G60.8)        Relatively stable with no changes in exam or changes in EMG or lab testing from [id number], but still follow clinically        May have some mild autonomic instability a Raynaud's type phenomenon    4.  Sensory neuropathy (G62.9)          5.  Brisk reflexes degenerative disease on plain x-ray of the neck September 2021       Rule out myelopathy with MRI of the neck      MRI scan cervical spine 1/7/2022      C3-4, severe right, moderate left foraminal narrowing no canal narrowing      C4-5 severe bilateral foraminal narrowing, no canal narrowing      C5-6, severe bilateral foraminal narrowing, mild canal narrowing      C6-7 moderate bilateral foraminal narrowing, no canal narrowing      C7-T1, moderate right foraminal narrowing, no canal narrowing       At the C5/C6 level, there is a broad bar disc osteophyte complex with mild spinal canal narrowing.        if in the future he was more hyperreflexive we could consider checking a scan of his neck but without significant neck complaints I would hold off on repeating this at this time.    6.  Fall with hitting his head, August 24, 2021 possible mild concussion but no loss of consciousness seen in the ER      At this time, he has:    1. Paresthesias more of a sensory neuropathy with negative workup.  2. Rather brisk reflexes, but no long tract findings of myelopathy.  3. Empirically on B12 replacement.  4. Neurontin 300 mg 3 times daily (a.m. 6 PM bedtime)  5. Talked about the difference between negative and positive symptoms.  6. Talked about good foot care since he has numbness, but he needs to be careful about injuries, frostbite, or other difficulties.  7. Repeat labs and EMG fall 2019 stable  8.  Patient is  an avid skier we talked about the fact that he should avoid frostbite which could exacerbate his sensory neuropathy      I did talk with patient about his gabapentin teen and if his foot pain is bad at night he be allowed to take a intermittent extra gabapentin he has extra at home.  If he needed to do this more regularly we would have to increase the prescription  He will follow-up in February 2023  He still the skiing he should use good foot wear if he is out in the cold    Total care time today 32 minutes  We did review the actual MRI scan of the cervical spine together.          Again, thank you for allowing me to participate in the care of your patient.        Sincerely,        Yo Millan MD

## 2022-06-24 NOTE — NURSING NOTE
Chief Complaint   Patient presents with     NEUROPATHY     7 month follow up. Pt states some days gabapentin not helpful.     Perla Samuels LPN on 6/24/2022 at 10:59 AM

## 2022-07-15 ENCOUNTER — LAB REQUISITION (OUTPATIENT)
Dept: LAB | Facility: CLINIC | Age: 67
End: 2022-07-15
Payer: MEDICARE

## 2022-07-15 DIAGNOSIS — Z13.220 ENCOUNTER FOR SCREENING FOR LIPOID DISORDERS: ICD-10-CM

## 2022-07-15 DIAGNOSIS — I10 ESSENTIAL (PRIMARY) HYPERTENSION: ICD-10-CM

## 2022-07-15 LAB
ALBUMIN SERPL BCG-MCNC: 4.2 G/DL (ref 3.5–5.2)
ALP SERPL-CCNC: 88 U/L (ref 40–129)
ALT SERPL W P-5'-P-CCNC: 17 U/L (ref 10–50)
ANION GAP SERPL CALCULATED.3IONS-SCNC: 11 MMOL/L (ref 7–15)
AST SERPL W P-5'-P-CCNC: ABNORMAL U/L
BILIRUB SERPL-MCNC: 0.4 MG/DL
BUN SERPL-MCNC: 17.9 MG/DL (ref 8–23)
CALCIUM SERPL-MCNC: 8.9 MG/DL (ref 8.8–10.2)
CHLORIDE SERPL-SCNC: 103 MMOL/L (ref 98–107)
CHOLEST SERPL-MCNC: 171 MG/DL
CREAT SERPL-MCNC: 0.99 MG/DL (ref 0.67–1.17)
DEPRECATED HCO3 PLAS-SCNC: 21 MMOL/L (ref 22–29)
GFR SERPL CREATININE-BSD FRML MDRD: 84 ML/MIN/1.73M2
GLUCOSE SERPL-MCNC: 116 MG/DL (ref 70–99)
HDLC SERPL-MCNC: 52 MG/DL
LDLC SERPL CALC-MCNC: 51 MG/DL
NONHDLC SERPL-MCNC: 119 MG/DL
POTASSIUM SERPL-SCNC: 4.2 MMOL/L (ref 3.4–5.3)
PROT SERPL-MCNC: 6.7 G/DL (ref 6.4–8.3)
SODIUM SERPL-SCNC: 135 MMOL/L (ref 136–145)
TRIGL SERPL-MCNC: 339 MG/DL

## 2022-07-15 PROCEDURE — 84155 ASSAY OF PROTEIN SERUM: CPT | Mod: ORL | Performed by: FAMILY MEDICINE

## 2022-07-15 PROCEDURE — 80061 LIPID PANEL: CPT | Mod: ORL | Performed by: FAMILY MEDICINE

## 2022-09-18 ENCOUNTER — HEALTH MAINTENANCE LETTER (OUTPATIENT)
Age: 67
End: 2022-09-18

## 2023-01-10 ENCOUNTER — MYC MEDICAL ADVICE (OUTPATIENT)
Dept: GASTROENTEROLOGY | Facility: CLINIC | Age: 68
End: 2023-01-10

## 2023-01-10 NOTE — TELEPHONE ENCOUNTER
Called to remind patient of their upcoming appointment with our GI clinic, on Tuesday 1/17/2023 at 11:45AM with Char Aguirre. This appointment is scheduled as an in-person appt. Please arrive 15 minutes early to check in for your appointment. , if your appointment is virtual (video or telephone) you need to be in [place] for the visit. To reschedule or cancel patient to call [phone].    Anna Granado MA

## 2023-01-11 ENCOUNTER — TRANSFERRED RECORDS (OUTPATIENT)
Dept: HEALTH INFORMATION MANAGEMENT | Facility: CLINIC | Age: 68
End: 2023-01-11
Payer: COMMERCIAL

## 2023-01-16 NOTE — PROGRESS NOTES
GI CLINIC VISIT    CC/REFERRING MD:  Abad Noel  REASON FOR CONSULTATION: follow-up     ASSESSMENT/PLAN:  1.  Gastroesophageal reflux disease without esophagitis, Status post Nissen fundoplication re-do  Patient has history of B12 deficiency reportedly leading to neuropathy, thought to be secondary to PPI use. Now resolved, has stopped PPI without symptoms.      2. Postprandial hypoglycemia  Rapid emptying on GES with reported postprandial hypoglycemia on glucometer checks (in 50s), though normal oral glucose test at [facility].  Continues on nortriptyline to slow emptying, previously on dicyclomine as well. Given recent worsening of symptoms, would recommend referral to endocrine for further work up to determine blood glucose pattern, and evaluate for other potential etiologies. We will also try fiber supplementation around meal times in attempt to further slow emptying of stomach. Additionally he would benefit from meeting with a dietitian again. Reviewed lifestyle modifications including eating smaller more frequent meals spread throughout the day.  Would also recommend resting after eating, and not having liquids with eating.  Should also avoid soda and carbonation.  -- Continue nortriptyline daily  -- Try soluble fiber (sugar free or plain psyllium). Try 1/2 tablespoon with breakfast to start. Can increase to 1/2-1 tablespoon with meals.  -- Referral to endocrine  -- Referral to meet with our dietitian  -- Carry glucose tabs with you  --Try to have small more frequent meals throughout the day.  Try to prevent overloading your stomach at any given time.  After eating lie down.  Do not have liquids with eating meals.  Avoid high sugar foods      Follow-up in 3-4 months.  Next visit will need to be done in person at clinic in [place].    Thank you for this consultation.  It was a pleasure to participate in the care of this patient; please contact us with any further questions.     70 Minutes was spent on the  "date of the encounter during chart review, history and exam, documentation, and further activities as noted     Cathy Aguirre PA-C  Division of Gastroenterology, Hepatology & Nutrition  [facility]    HPI:   Mr. Junior is a 67 year old male with HTN, GERD s/p Nissen fundoplication, and reported dumping syndrome who is s/p R inguinal hernia repair and s/p appendectomy who presents for another opinion on his \"slipped Nissen\" and GI symptoms. He has previously been seen by Dr. Quezada, please see her documentation for additional details. He has also previously followed at [facility] for GERD and dumping syndrome, and has recently been seen by Dr. Zhao of surgery at [facility] for consideration of Nissen re-do.     History summarized in brief:   Reports decades of reflux starting in late 1980s/early 1990s she was treating with intermittent omeprazole.  In the 1990s does transition to daily omeprazole.  In the early 2000 fingers and toes which was attributed to the low B12, it was felt that PPI may be contributing to this underwent in 2011 so that he could stop his PPI.  Stated for well controlled neuropathy symptoms have not resolved but were stable.  Replaced B12 with PCP.  Also described intermittent issues with postprandial stools, urgency, lightheadedness weakness he was treated at [place] [facility].  Had acute symptoms.  Ago felt like something hit him hard.  Pain improved but reflux continued.  He was started on Zantac had multiple EGDs 1 tried slipped Nissen.    Patient then underwent following work-up for his symptoms:  -EGD 8/30/2017 with report indicating a normal exam with intact Nissen.   -12/4/2018 was done for BRAVO placement. EGD report indicates a large hiatal hernia was seen and the \"wrap appears slipped.\"   -BRAVO testing revealed a DeMeester score total of 24.8 (18.7 on Day 1, 29.3 on Day 2 with 100% symptom concordance).  - Esophageal manometry was done though we do not have the " "interpretation of this test, images are scanned (but difficult to interpret).   - esophagram (1/2/19) which showed free flow of contrast, no hiatal hernia, and mild reflux. GES 2/19/19 showed only 4% of food remaining at 2 hours and exam stopped.   - esophagram 4/1/19 with again no hernia seen on upright images, but small HH induced with abdominal pressure and Valsalva. No reflux was noted on this exam.   - CT chest 4/1/19 with small HH and fundoplication changes.    The patient had stated that Dr. Zhao would consider repeat surgery, but given his above testing further trial of medical management was recommended. Mr. Junior was then started on pantoprazole-is concerned that paresthesias are worsening since starting that.    Regards to dumping syndrome, this was diagnosed at [place] [facility] after gastric emptying study (rapid with oral glucose tolerance test normal), postprandial glucose checks with levels in the 50s.  Describes symptoms of dizziness, wooziness, and \"going gray \", associated with sneezing which would occur after a meal.  Typically resolved in 5 minutes.  Also noted loose stools sometimes occur at unpredictable times.  Patient was treated with baclofen and nortriptyline with 80% resolution symptoms, and baclofen was discontinued and switched to dicyclomine.      Interval history 6/23/2020:  Symptoms are going ok, symptoms are mostly controlled when he avoids having large volume and density of foods. Breakfest and lunch are the most common times for loose stools and wooziness. Eggs are a big trigger. Otherwise has cut back on amount he is eating. Has not been measuring blood sugars. Is able to tell based on symptoms- hot sweats, dizziness. Sometimes he will need to sneeze and this clears up.     Bowel movements: 2-3 times a week will have diarrhea (non-bloody, Haralson scale 6 with the smell at the end). In between these days, he will usually have a fairly normal bowel movement. Was " "previously happening 3-4 times a week.     About a month and a half ago, he felt like something punched him in his abdomen. This was centered on the upper stomach.  Thought about going in but symptoms improved. Not doing or eating anything out of the ordinary.     GERD  Pantoprazole seems to be controlling. Not taking zantac. Has breakthrough symptoms about once a month. Is concernend that neuropathy might get worse with pantoprazole.     Continues to take Nortiptyline or Bentyl. Takes Bentyl 3 times a day     Vitamin B12- is checking with primary care last level was high.    Interval History 9/1/2020:  Decreasing diccylomine helped his symptoms. Symptoms of sneezing after eating has decreased significantly. Maybe will have symptoms for 10 minutes. He would say this has improved 80%. Feels like nortriptyline is going ok.     Still taking pantoprazole- rare symptoms related to heartburn situation. If he skips this for a couple of days he will have symptoms. Has not had to take pepcid.     Bowel movements: some loose stools in the morning (related to amount of food the day before). Does not seem to be type of foods, volume related. Stools are a little loose 2-3 times a week.     Has had increased \"hay fever symptoms\"    Interval  History 10/9/2020:   Take-down and re-do of nissen fundoplication, KATHYA, gastrostomy tube placement (still present)  Flushing tube 30 ml twice a day. G tube is irritating if he sits up.     Mixing in semi-liquids in the past couple of days, hard to say if this is \"dumping syndrome\".    GERD- has not had any GERD, tomato soup. Continues on PPI     Bowel movements have been a little more liquid than normal, hard to say if this is due to more liquid diet. Every other day or every third day loose stools. Has not taken senna.     Interval History 2/2/20201:  Had liqour about hald an hour before eating, then had cheese (large amount). Then had \"hyoglycemic episode\". Lasted about 5-10 minutes. Had a " "sharp pain in upper chest- felt like something had gotten stuck inside the nissen wrap- he drank some water and pain went away. He felt fine the next morning. Does not remember what food he had eaten.     Reflux symptoms completely- not on any PPI. Does feel bloating and gas, no pain. Sometimes burping, othertimes passing gas. Seems to be volume related- but no specific food triggers. Has reduced carbonated beverages. Does not drink out of straws. Diet Dr. Pepper.    30% of the time in the morning after having oatmeal and fruit- he will feel lethargic, will rest.     Weight is stable at 208.     No longer taking dicyclomine     Continues to take nortirtpyline    Interval History 1/17/2022:  Here today for follow-up due to increasing symptoms, this is my first encounter with this patient.     He reports 10-12 yr history of \"dumping syndrome\" with worsening in past 2 months. He describes recent episode while skiing where he became dizzy and light headed, lights started going out. He was taken by  on sled, given glucose tabs and started feeling better. When at a medical facility glucose was 115 (did not have supplies to check previously).    He also reports new onset of feeling dizzy and woozy when he walks his dog in the morning before eating.     Currently having symptoms daily - this is typically feeling \"down\" within about 30 minutes after eating. Describes this as being phyically tired and feeling emotionally depressed. Laying down for 15 minutes will help. He also gets urgent loose stools 30 minutes after eating a couple times per week. Also notes sneezing episodes after eating.    Notes he previously checked his blood sugars after meals, typically 60-70, has not for sometime.     Nortriptyline 25 mcg daily to slow gastric emptying, takes daily.    GERD well controlled post surgical revision. Continues to avoid omeprazole. Taking vitamin B12 and gabapentin for neuropathy.    No abdominal pain, no nausea " "or vomiting. No hematochezia or melena.    Diet Recall: eats small frequent meals, try to avoid liquids with meals  Breakfast: oatmeal, milk, berries, 1tsp sugar  Mid Morning: granola bar/fruit nut bar/handful of nuts  Lunch: green salad, leftovers  Mid Afternoon: banana with nuts  Dinner: green salad, meat/sausage, starch, out to eat once per week  PM Snack: Occasional small dish of ice cream.  No alcoholic drinks unless out to eat. Has not noticed more sx with more alcohol.  No pattern with foods and sx that he can appreciate  Diarrhea 30 minutes after eating - eggs, asian food at lunch, rare at dinner.    Weight down 4 lb since the holidays.       PERTINENT PAST MEDICAL HISTORY:  Hypertension  GERD  \"Dumping syndrome\"    PREVIOUS SURGERIES:  Past Surgical History:   Procedure Laterality Date     ABDOMEN SURGERY  2012?     APPENDECTOMY  1964?     COLONOSCOPY  2006, 2016     EYE SURGERY  199x    laser for retinal tears     GASTRIC FUNDOPLICATION       HERNIA REPAIR  1961?     LAPAROSCOPIC TAKEDOWN NISSEN FUNDOPLICATION N/A 9/25/2020    Procedure: TAKE-DOWN, FUNDOPLICATION, REDO NISSEN, LAPAROSCOPIC, gastrostomy feeding tube placement;  Surgeon: Katlyn Tobar MD;  Location:  OR     [place] ESOPHAGOGASTRODUODENOSCOPY TRANSORAL DIAGNOSTIC N/A 5/9/2019    Procedure: UPPER GASTROINTESTINAL ENDOSCOPY;  Surgeon: Dino Ward MD;  Location: [facility];  Service: General     SOFT TISSUE SURGERY  2009?    MCL l. thumb     S/p tonsillectomy and adenoidectomy  S/p R inguinal hernia repair  S/p Nissen fundoplication    ALLERGIES:     Allergies   Allergen Reactions     Hornets      Wasp Venom Protein Hives     Bee Venom Swelling       PERTINENT MEDICATIONS:  Current Outpatient Medications   Medication     atenolol (TENORMIN) 50 MG tablet     cyanocobalamin (VITAMIN B-12) 500 MCG tablet     EPINEPHrine (ANY BX GENERIC EQUIV) 0.3 MG/0.3ML injection 2-pack     gabapentin (NEURONTIN) 300 MG capsule     nortriptyline " (PAMELOR) 25 MG capsule     No current facility-administered medications for this visit.     Facility-Administered Medications Ordered in Other Visits   Medication     barium sulfate (EZ-DISK) tablet 700 mg     sod bicarbonate-citric acid-simethicone (EZ GAS) 2.21-1.53-0.04 g packet 4 g       SOCIAL HISTORY:  Social History     Socioeconomic History     Marital status:      Spouse name: Not on file     Number of children: Not on file     Years of education: Not on file     Highest education level: Not on file   Occupational History     Not on file   Tobacco Use     Smoking status: Never     Smokeless tobacco: Never   Substance and Sexual Activity     Alcohol use: Yes     Alcohol/week: 3.0 - 6.0 standard drinks     Types: 1 - 2 Glasses of wine, 1 - 2 Cans of beer, 1 - 2 Shots of liquor per week     Comment: glass of wine with dinner, can of beer in evening or 1 whiskey  or bourbon     Drug use: Never     Sexual activity: Yes     Partners: Female     Birth control/protection: Post-menopausal, Male Surgical, Female Surgical   Other Topics Concern     Not on file   Social History Narrative     Not on file     Social Determinants of Health     Financial Resource Strain: Not on file   Food Insecurity: Not on file   Transportation Needs: Not on file   Physical Activity: Not on file   Stress: Not on file   Social Connections: Not on file   Intimate Partner Violence: Not on file   Housing Stability: Not on file       FAMILY HISTORY:  Family History   Problem Relation Age of Onset     Coronary Artery Disease Maternal Grandfather      Hypertension Mother      Uterine Cancer Mother         1965     Mitral Valve Replacement Father      GERD No family hx of      Ulcerative Colitis No family hx of      Crohn's Disease No family hx of      Stomach Cancer No family hx of        Past/family/social history reviewed and no changes    PHYSICAL EXAMINATION:  Eyes: Sclera anicteric/injected  Ears/nose/mouth/throat: Normal  oropharynx without ulcers or exudate, mucus membranes moist, hearing intact  Neck: supple, thyroid normal size  CV: No edema  Respiratory: Unlabored breathing  Lymph: No axillary, submandibular, supraclavicular or inguinal lymphadenopathy  Abd: Nondistended, +bs, no hepatosplenomegaly, nontender, no peritoneal signs  Skin: warm, perfused, no jaundice  Psych: Normal affect  MSK: Normal gait

## 2023-01-17 ENCOUNTER — OFFICE VISIT (OUTPATIENT)
Dept: GASTROENTEROLOGY | Facility: CLINIC | Age: 68
End: 2023-01-17
Payer: COMMERCIAL

## 2023-01-17 VITALS
WEIGHT: 193.6 LBS | HEIGHT: 70 IN | SYSTOLIC BLOOD PRESSURE: 128 MMHG | HEART RATE: 60 BPM | BODY MASS INDEX: 27.72 KG/M2 | DIASTOLIC BLOOD PRESSURE: 76 MMHG

## 2023-01-17 DIAGNOSIS — K91.1 DUMPING SYNDROME: Primary | ICD-10-CM

## 2023-01-17 PROCEDURE — 99417 PROLNG OP E/M EACH 15 MIN: CPT | Performed by: DIETITIAN, REGISTERED

## 2023-01-17 PROCEDURE — 99215 OFFICE O/P EST HI 40 MIN: CPT | Performed by: DIETITIAN, REGISTERED

## 2023-01-17 ASSESSMENT — PAIN SCALES - GENERAL: PAINLEVEL: NO PAIN (0)

## 2023-01-17 NOTE — PATIENT INSTRUCTIONS
It was a pleasure taking care of you today.  I've included a brief summary of our discussion and care plan from today's visit below.  Please review this information with your primary care provider.  ______________________________________________________________________    My recommendations are summarized as follows:  -- Continue nortriptyline daily  -- Try soluble fiber (sugar free or plain psyllium). Try 1/2 tablespoon with breakfast to start. Can increase to 1/2-1 tablespoon with meals.  -- Referral to endocrine  -- Referral to meet with our dietitian  -- Carry glucose tabs with you    -- please see scheduling information provided below     Return to GI Clinic in 3-4 months to review your progress.    ______________________________________________________________________    How do I schedule labs, imaging studies, or procedures that were ordered in clinic today?     Labs: To schedule lab appointment at the [facility], use my chart or call [phone]. If you have a [facility] lab closer to home where you are regularly seen you can give them a call.     Procedures: If a colonoscopy, upper endoscopy, breath test, esophageal manometry, or pH impedence was ordered today, our endoscopy team will call you to schedule this. If you have not heard from our endoscopy team within a week, please call [phone] to schedule.     Imaging Studies: If you were scheduled for a CT scan, X-ray, MRI, ultrasound, HIDA scan or other imaging study, please call [phone] to have this scheduled.     Referral: If a referral to another specialty was ordered, expect a phone call or follow instructions above. If you have not heard from anyone regarding your referral in a week, please call our clinic to check the status.     Who do I call with any questions after my visit?  Please be in touch if there are any further questions that arise following today's visit.  There are multiple ways to contact your gastroenterology  care team.      During business hours, you may reach a Gastroenterology nurse at [phone]    To schedule or reschedule an appointment, please call [phone].     You can always send a secure message through [employer].  [employer] messages are answered by your nurse or doctor typically within 24 hours.  Please allow extra time on weekends and holidays.      For urgent/emergent questions after business hours, you may reach the on-call GI Fellow by contacting the [facility]  at [phone].     How will I get the results of any tests ordered?    You will receive all of your results.  If you have signed up for [employer]t, any tests ordered at your visit will be available to you after your provider reviews them.  Typically this takes 1-2 weeks.  If there are urgent results that require a change in your care plan, your provider or nurse will call you to discuss the next steps.      What is [employer]?  [employer] is a secure way for you to access all of your healthcare records from the [facility].  It is a web based computer program, so you can sign on to it from any location.  It also allows you to send secure messages to your care team.  I recommend signing up for [employer] access if you have not already done so and are comfortable with using a computer.      How to I schedule a follow-up visit?  If you did not schedule a follow-up visit today, please call [phone] to schedule a follow-up office visit.      Sincerely,    Cathy Aguirre PA-C  Division of Gastroenterology, Hepatology & Nutrition  [facility]

## 2023-01-17 NOTE — TELEPHONE ENCOUNTER
RECORDS RECEIVED FROM: internal /ce   DATE RECEIVED: 1/18/23    NOTES (FOR ALL VISITS) STATUS DETAILS   OFFICE NOTES from referring provider internal  Cathy Aguirre PA-C   OFFICE NOTES from other specialist Ce/internal  Francine- 1.11.23 Abad Rodriguez 2.2.21 Gastro      MEDICATION LIST internal       LABS     DIABETES: HBGA1C, CREATININE, FASTING LIPIDS, MICROALBUMIN URINE, POTASSIUM, TSH, T4    THYROID: TSH, T4, CBC, THYRODLONULIN, TOTAL T3, FREE T4, CALCITONIN, CEA internal  CMP- 7/15/22  Lipid- 7/15/22  Prostate specific- 8/5/21

## 2023-01-17 NOTE — LETTER
1/17/2023         RE: Bal Junior  [address] [place] [address]        Dear Colleague,    Thank you for referring your patient, Bal Junior, to the [facility] [place]. Please see a copy of my visit note below.    GI CLINIC VISIT    CC/REFERRING MD:  Abad Noel  REASON FOR CONSULTATION: follow-up     ASSESSMENT/PLAN:  1.  Gastroesophageal reflux disease without esophagitis, Status post Nissen fundoplication re-do  Patient has history of B12 deficiency reportedly leading to neuropathy, thought to be secondary to PPI use. Now resolved, has stopped PPI without symptoms.      2. Postprandial hypoglycemia  Rapid emptying on GES with reported postprandial hypoglycemia on glucometer checks (in 50s), though normal oral glucose test at [facility].  Continues on nortriptyline to slow emptying, previously on dicyclomine as well. Given recent worsening of symptoms, would recommend referral to endocrine for further work up to determine blood glucose pattern, and evaluate for other potential etiologies. We will also try fiber supplementation around meal times in attempt to further slow emptying of stomach. Additionally he would benefit from meeting with a dietitian again. Reviewed lifestyle modifications including eating smaller more frequent meals spread throughout the day.  Would also recommend resting after eating, and not having liquids with eating.  Should also avoid soda and carbonation.  -- Continue nortriptyline daily  -- Try soluble fiber (sugar free or plain psyllium). Try 1/2 tablespoon with breakfast to start. Can increase to 1/2-1 tablespoon with meals.  -- Referral to endocrine  -- Referral to meet with our dietitian  -- Carry glucose tabs with you  --Try to have small more frequent meals throughout the day.  Try to prevent overloading your stomach at any given time.  After eating lie down.  Do not have liquids with eating meals.  Avoid high sugar  "foods      Follow-up in 3-4 months.  Next visit will need to be done in person at clinic in [place].    Thank you for this consultation.  It was a pleasure to participate in the care of this patient; please contact us with any further questions.     70 Minutes was spent on the date of the encounter during chart review, history and exam, documentation, and further activities as noted     Cathy Aguirre PA-C  Division of Gastroenterology, Hepatology & Nutrition  [facility]    HPI:   Mr. Junior is a 67 year old male with HTN, GERD s/p Nissen fundoplication, and reported dumping syndrome who is s/p R inguinal hernia repair and s/p appendectomy who presents for another opinion on his \"slipped Nissen\" and GI symptoms. He has previously been seen by Dr. Quezada, please see her documentation for additional details. He has also previously followed at [facility] for GERD and dumping syndrome, and has recently been seen by Dr. Zhao of surgery at [facility] for consideration of Nissen re-do.     History summarized in brief:   Reports decades of reflux starting in late 1980s/early 1990s she was treating with intermittent omeprazole.  In the 1990s does transition to daily omeprazole.  In the early 2000 fingers and toes which was attributed to the low B12, it was felt that PPI may be contributing to this underwent in 2011 so that he could stop his PPI.  Stated for well controlled neuropathy symptoms have not resolved but were stable.  Replaced B12 with PCP.  Also described intermittent issues with postprandial stools, urgency, lightheadedness weakness he was treated at [place] [facility].  Had acute symptoms.  Ago felt like something hit him hard.  Pain improved but reflux continued.  He was started on Zantac had multiple EGDs 1 tried slipped Nissen.    Patient then underwent following work-up for his symptoms:  -EGD 8/30/2017 with report indicating a normal exam with intact Nissen.   -12/4/2018 was done for BRAVO " "placement. EGD report indicates a large hiatal hernia was seen and the \"wrap appears slipped.\"   -BRAVO testing revealed a DeMeester score total of 24.8 (18.7 on Day 1, 29.3 on Day 2 with 100% symptom concordance).  - Esophageal manometry was done though we do not have the interpretation of this test, images are scanned (but difficult to interpret).   - esophagram (1/2/19) which showed free flow of contrast, no hiatal hernia, and mild reflux. GES 2/19/19 showed only 4% of food remaining at 2 hours and exam stopped.   - esophagram 4/1/19 with again no hernia seen on upright images, but small HH induced with abdominal pressure and Valsalva. No reflux was noted on this exam.   - CT chest 4/1/19 with small HH and fundoplication changes.    The patient had stated that Dr. Zhao would consider repeat surgery, but given his above testing further trial of medical management was recommended. Mr. Junior was then started on pantoprazole-is concerned that paresthesias are worsening since starting that.    Regards to dumping syndrome, this was diagnosed at [place] [facility] after gastric emptying study (rapid with oral glucose tolerance test normal), postprandial glucose checks with levels in the 50s.  Describes symptoms of dizziness, wooziness, and \"going gray \", associated with sneezing which would occur after a meal.  Typically resolved in 5 minutes.  Also noted loose stools sometimes occur at unpredictable times.  Patient was treated with baclofen and nortriptyline with 80% resolution symptoms, and baclofen was discontinued and switched to dicyclomine.      Interval history 6/23/2020:  Symptoms are going ok, symptoms are mostly controlled when he avoids having large volume and density of foods. Breakfest and lunch are the most common times for loose stools and wooziness. Eggs are a big trigger. Otherwise has cut back on amount he is eating. Has not been measuring blood sugars. Is able to tell based on symptoms- hot " "sweats, dizziness. Sometimes he will need to sneeze and this clears up.     Bowel movements: 2-3 times a week will have diarrhea (non-bloody, Amador scale 6 with the smell at the end). In between these days, he will usually have a fairly normal bowel movement. Was previously happening 3-4 times a week.     About a month and a half ago, he felt like something punched him in his abdomen. This was centered on the upper stomach.  Thought about going in but symptoms improved. Not doing or eating anything out of the ordinary.     GERD  Pantoprazole seems to be controlling. Not taking zantac. Has breakthrough symptoms about once a month. Is concernend that neuropathy might get worse with pantoprazole.     Continues to take Nortiptyline or Bentyl. Takes Bentyl 3 times a day     Vitamin B12- is checking with primary care last level was high.    Interval History 9/1/2020:  Decreasing diccylomine helped his symptoms. Symptoms of sneezing after eating has decreased significantly. Maybe will have symptoms for 10 minutes. He would say this has improved 80%. Feels like nortriptyline is going ok.     Still taking pantoprazole- rare symptoms related to heartburn situation. If he skips this for a couple of days he will have symptoms. Has not had to take pepcid.     Bowel movements: some loose stools in the morning (related to amount of food the day before). Does not seem to be type of foods, volume related. Stools are a little loose 2-3 times a week.     Has had increased \"hay fever symptoms\"    Interval  History 10/9/2020:   Take-down and re-do of nissen fundoplication, KATHYA, gastrostomy tube placement (still present)  Flushing tube 30 ml twice a day. G tube is irritating if he sits up.     Mixing in semi-liquids in the past couple of days, hard to say if this is \"dumping syndrome\".    GERD- has not had any GERD, tomato soup. Continues on PPI     Bowel movements have been a little more liquid than normal, hard to say if this is due " "to more liquid diet. Every other day or every third day loose stools. Has not taken senna.     Interval History 2/2/20201:  Had liqour about hald an hour before eating, then had cheese (large amount). Then had \"hyoglycemic episode\". Lasted about 5-10 minutes. Had a sharp pain in upper chest- felt like something had gotten stuck inside the nissen wrap- he drank some water and pain went away. He felt fine the next morning. Does not remember what food he had eaten.     Reflux symptoms completely- not on any PPI. Does feel bloating and gas, no pain. Sometimes burping, othertimes passing gas. Seems to be volume related- but no specific food triggers. Has reduced carbonated beverages. Does not drink out of straws. Diet Dr. Pepper.    30% of the time in the morning after having oatmeal and fruit- he will feel lethargic, will rest.     Weight is stable at 208.     No longer taking dicyclomine     Continues to take nortirtpyline    Interval History 1/17/2022:  Here today for follow-up due to increasing symptoms, this is my first encounter with this patient.     He reports 10-12 yr history of \"dumping syndrome\" with worsening in past 2 months. He describes recent episode while skiing where he became dizzy and light headed, lights started going out. He was taken by  on sled, given glucose tabs and started feeling better. When at a medical facility glucose was 115 (did not have supplies to check previously).    He also reports new onset of feeling dizzy and woozy when he walks his dog in the morning before eating.     Currently having symptoms daily - this is typically feeling \"down\" within about 30 minutes after eating. Describes this as being phyically tired and feeling emotionally depressed. Laying down for 15 minutes will help. He also gets urgent loose stools 30 minutes after eating a couple times per week. Also notes sneezing episodes after eating.    Notes he previously checked his blood sugars after meals, " "typically 60-70, has not for sometime.     Nortriptyline 25 mcg daily to slow gastric emptying, takes daily.    GERD well controlled post surgical revision. Continues to avoid omeprazole. Taking vitamin B12 and gabapentin for neuropathy.    No abdominal pain, no nausea or vomiting. No hematochezia or melena.    Diet Recall: eats small frequent meals, try to avoid liquids with meals  Breakfast: oatmeal, milk, berries, 1tsp sugar  Mid Morning: granola bar/fruit nut bar/handful of nuts  Lunch: green salad, leftovers  Mid Afternoon: banana with nuts  Dinner: green salad, meat/sausage, starch, out to eat once per week  PM Snack: Occasional small dish of ice cream.  No alcoholic drinks unless out to eat. Has not noticed more sx with more alcohol.  No pattern with foods and sx that he can appreciate  Diarrhea 30 minutes after eating - eggs, asian food at lunch, rare at dinner.    Weight down 4 lb since the holidays.       PERTINENT PAST MEDICAL HISTORY:  Hypertension  GERD  \"Dumping syndrome\"    PREVIOUS SURGERIES:  Past Surgical History:   Procedure Laterality Date     ABDOMEN SURGERY  2012?     APPENDECTOMY  1964?     COLONOSCOPY  2006, 2016     EYE SURGERY  199x    laser for retinal tears     GASTRIC FUNDOPLICATION       HERNIA REPAIR  1961?     LAPAROSCOPIC TAKEDOWN NISSEN FUNDOPLICATION N/A 9/25/2020    Procedure: TAKE-DOWN, FUNDOPLICATION, REDO NISSEN, LAPAROSCOPIC, gastrostomy feeding tube placement;  Surgeon: Katlyn Tobar MD;  Location: [facility] ESOPHAGOGASTRODUODENOSCOPY TRANSORAL DIAGNOSTIC N/A 5/9/2019    Procedure: UPPER GASTROINTESTINAL ENDOSCOPY;  Surgeon: Dino Ward MD;  Location: [facility];  Service: General     SOFT TISSUE SURGERY  2009?    MCL l. thumb     S/p tonsillectomy and adenoidectomy  S/p R inguinal hernia repair  S/p Nissen fundoplication    ALLERGIES:     Allergies   Allergen Reactions     Hornets      Wasp Venom Protein Hives     Bee Venom Swelling       PERTINENT " MEDICATIONS:  Current Outpatient Medications   Medication     atenolol (TENORMIN) 50 MG tablet     cyanocobalamin (VITAMIN B-12) 500 MCG tablet     EPINEPHrine (ANY BX GENERIC EQUIV) 0.3 MG/0.3ML injection 2-pack     gabapentin (NEURONTIN) 300 MG capsule     nortriptyline (PAMELOR) 25 MG capsule     No current facility-administered medications for this visit.     Facility-Administered Medications Ordered in Other Visits   Medication     barium sulfate (EZ-DISK) tablet 700 mg     sod bicarbonate-citric acid-simethicone (EZ GAS) 2.21-1.53-0.04 g packet 4 g       SOCIAL HISTORY:  Social History     Socioeconomic History     Marital status:      Spouse name: Not on file     Number of children: Not on file     Years of education: Not on file     Highest education level: Not on file   Occupational History     Not on file   Tobacco Use     Smoking status: Never     Smokeless tobacco: Never   Substance and Sexual Activity     Alcohol use: Yes     Alcohol/week: 3.0 - 6.0 standard drinks     Types: 1 - 2 Glasses of wine, 1 - 2 Cans of beer, 1 - 2 Shots of liquor per week     Comment: glass of wine with dinner, can of beer in evening or 1 whiskey  or bourbon     Drug use: Never     Sexual activity: Yes     Partners: Female     Birth control/protection: Post-menopausal, Male Surgical, Female Surgical   Other Topics Concern     Not on file   Social History Narrative     Not on file     Social Determinants of Health     Financial Resource Strain: Not on file   Food Insecurity: Not on file   Transportation Needs: Not on file   Physical Activity: Not on file   Stress: Not on file   Social Connections: Not on file   Intimate Partner Violence: Not on file   Housing Stability: Not on file       FAMILY HISTORY:  Family History   Problem Relation Age of Onset     Coronary Artery Disease Maternal Grandfather      Hypertension Mother      Uterine Cancer Mother         1965     Mitral Valve Replacement Father      GERD No family hx  of      Ulcerative Colitis No family hx of      Crohn's Disease No family hx of      Stomach Cancer No family hx of        Past/family/social history reviewed and no changes    PHYSICAL EXAMINATION:  Eyes: Sclera anicteric/injected  Ears/nose/mouth/throat: Normal oropharynx without ulcers or exudate, mucus membranes moist, hearing intact  Neck: supple, thyroid normal size  CV: No edema  Respiratory: Unlabored breathing  Lymph: No axillary, submandibular, supraclavicular or inguinal lymphadenopathy  Abd: Nondistended, +bs, no hepatosplenomegaly, nontender, no peritoneal signs  Skin: warm, perfused, no jaundice  Psych: Normal affect  MSK: Normal gait        Sincerely,    Cathy Aguirre PA-C

## 2023-01-17 NOTE — NURSING NOTE
"Chief Complaint   Patient presents with     Follow Up       Vitals:    01/17/23 1154   BP: 128/76   Pulse: 60   Weight: 87.8 kg (193 lb 9.6 oz)   Height: 1.778 m (5' 10\")       Body mass index is 27.78 kg/m .    Anna Granado MA    "

## 2023-01-18 ENCOUNTER — LAB (OUTPATIENT)
Dept: LAB | Facility: CLINIC | Age: 68
End: 2023-01-18
Payer: COMMERCIAL

## 2023-01-18 ENCOUNTER — OFFICE VISIT (OUTPATIENT)
Dept: ENDOCRINOLOGY | Facility: CLINIC | Age: 68
End: 2023-01-18
Payer: COMMERCIAL

## 2023-01-18 ENCOUNTER — PRE VISIT (OUTPATIENT)
Dept: ENDOCRINOLOGY | Facility: CLINIC | Age: 68
End: 2023-01-18

## 2023-01-18 VITALS
BODY MASS INDEX: 27.69 KG/M2 | SYSTOLIC BLOOD PRESSURE: 116 MMHG | WEIGHT: 193 LBS | DIASTOLIC BLOOD PRESSURE: 76 MMHG | HEART RATE: 58 BPM

## 2023-01-18 DIAGNOSIS — E16.2 HYPOGLYCEMIA: ICD-10-CM

## 2023-01-18 DIAGNOSIS — K91.1 DUMPING SYNDROME: ICD-10-CM

## 2023-01-18 DIAGNOSIS — Z93.1 S/P NISSEN FUNDOPLICATION (WITH GASTROSTOMY TUBE PLACEMENT) (H): ICD-10-CM

## 2023-01-18 DIAGNOSIS — E16.2 HYPOGLYCEMIA: Primary | ICD-10-CM

## 2023-01-18 LAB
ALBUMIN SERPL BCG-MCNC: 4.2 G/DL (ref 3.5–5.2)
ALP SERPL-CCNC: 86 U/L (ref 40–129)
ALT SERPL W P-5'-P-CCNC: 10 U/L (ref 10–50)
ANION GAP SERPL CALCULATED.3IONS-SCNC: 8 MMOL/L (ref 7–15)
AST SERPL W P-5'-P-CCNC: 18 U/L (ref 10–50)
BILIRUB SERPL-MCNC: 0.5 MG/DL
BUN SERPL-MCNC: 14.8 MG/DL (ref 8–23)
CALCIUM SERPL-MCNC: 9.5 MG/DL (ref 8.8–10.2)
CHLORIDE SERPL-SCNC: 106 MMOL/L (ref 98–107)
CREAT SERPL-MCNC: 0.94 MG/DL (ref 0.67–1.17)
DEPRECATED HCO3 PLAS-SCNC: 26 MMOL/L (ref 22–29)
ERYTHROCYTE [DISTWIDTH] IN BLOOD BY AUTOMATED COUNT: 12.2 % (ref 10–15)
GFR SERPL CREATININE-BSD FRML MDRD: 89 ML/MIN/1.73M2
GLUCOSE SERPL-MCNC: 111 MG/DL (ref 70–99)
HBA1C MFR BLD: 5.9 %
HCT VFR BLD AUTO: 44.9 % (ref 40–53)
HGB BLD-MCNC: 15.1 G/DL (ref 13.3–17.7)
MCH RBC QN AUTO: 30.8 PG (ref 26.5–33)
MCHC RBC AUTO-ENTMCNC: 33.6 G/DL (ref 31.5–36.5)
MCV RBC AUTO: 91 FL (ref 78–100)
PLATELET # BLD AUTO: 274 10E3/UL (ref 150–450)
POTASSIUM SERPL-SCNC: 5.2 MMOL/L (ref 3.4–5.3)
PROT SERPL-MCNC: 7.1 G/DL (ref 6.4–8.3)
RBC # BLD AUTO: 4.91 10E6/UL (ref 4.4–5.9)
SODIUM SERPL-SCNC: 140 MMOL/L (ref 136–145)
TSH SERPL DL<=0.005 MIU/L-ACNC: 1.02 UIU/ML (ref 0.3–4.2)
WBC # BLD AUTO: 6.9 10E3/UL (ref 4–11)

## 2023-01-18 PROCEDURE — 84443 ASSAY THYROID STIM HORMONE: CPT | Performed by: INTERNAL MEDICINE

## 2023-01-18 PROCEDURE — 99204 OFFICE O/P NEW MOD 45 MIN: CPT | Performed by: INTERNAL MEDICINE

## 2023-01-18 PROCEDURE — 83036 HEMOGLOBIN GLYCOSYLATED A1C: CPT | Performed by: INTERNAL MEDICINE

## 2023-01-18 PROCEDURE — 36415 COLL VENOUS BLD VENIPUNCTURE: CPT | Performed by: PATHOLOGY

## 2023-01-18 PROCEDURE — 80053 COMPREHEN METABOLIC PANEL: CPT | Performed by: PATHOLOGY

## 2023-01-18 PROCEDURE — 85027 COMPLETE CBC AUTOMATED: CPT | Performed by: PATHOLOGY

## 2023-01-18 ASSESSMENT — ENCOUNTER SYMPTOMS
CHILLS: 0
FATIGUE: 1
WEIGHT LOSS: 1
FEVER: 0
HALLUCINATIONS: 0
POLYPHAGIA: 0
DECREASED APPETITE: 0
ALTERED TEMPERATURE REGULATION: 1
POLYDIPSIA: 0
INCREASED ENERGY: 1
WEIGHT GAIN: 0
NIGHT SWEATS: 0

## 2023-01-18 ASSESSMENT — PAIN SCALES - GENERAL: PAINLEVEL: NO PAIN (0)

## 2023-01-18 NOTE — NURSING NOTE
"Chief Complaint   Patient presents with     Endocrine Problem     Vital signs:      BP: 116/76 Pulse: 58             Weight: 87.5 kg (193 lb)  Estimated body mass index is 27.69 kg/m  as calculated from the following:    Height as of 1/17/23: 1.778 m (5' 10\").    Weight as of this encounter: 87.5 kg (193 lb).          "

## 2023-01-18 NOTE — PROGRESS NOTES
Endocrinology Clinic Visit    Chief Complaint: Endocrine Problem     Information obtained from:Patient      Assessment/Treatment Plan:    History of Niesen fundoplication  History of dumping syndrome  Prediabetes A1c 5.9   Symptoms concerning for hypoglycemia without hypoglycemic medications    Reports that he has had dumping syndrome longstanding described as diarrhea immediately after eating food.  He has also noted over the years that 'feels down' immediately after eating.  More recently he was skiing when he had symptoms of dizziness, presyncope and then finally after going down the ski; he could not get up.  He was given glucose gels at the time without checking his blood sugar and he felt better.  Following the glucose gels his blood sugar was 117.  He does not normally check his blood sugars at home. His most recent episode led to referral to endocrinology clinic.  He was told that continuous glucose monitor might also be helpful for monitoring purposes and is asking if he can have a prescription for it.  It is hard to say if his symptoms are secondary to hypoglycemia without documented blood sugars.  CGM is not approved to monitor this kind of condition however can be used [discussed not accurate at the lower end of normal].  Most importantly we will schedule mixed Meal test to assess blood sugar and insulin levels following meal intake.  He has never had any symptoms in a fasting state. Further plan based on the results. If he has post prandial hypoglycemia due to history of Nissen fundoplication; would consider acarbose.  We checked CMP, CBC, TSH and hemoglobin A1c at the time of appointment and results were unremarkable except for prediabetes.      Of note, no signs and symptoms suggestive of adrenal insufficiency therefore screening not pursued on this plan at this time.    Return to clinic in 4 months.    Test and/or medications prescribed today:  Orders Placed This Encounter   Procedures     TSH with  free T4 reflex     Comprehensive metabolic panel     CBC with platelets     Hemoglobin A1c         Cali Dougherty MD  Staff Endocrinologist    Division of Endocrinology and Diabetes      Subjective:         HPI: Bal Junior is a 67 year old male with history of concern for hypoglycemia  who is seen in consultation at Cathy Aguirre's request for the same.     Two weeks ago, skiing (2 rounds), felt 'oozy, about to black out'. Couldn't stand up.  70/40 BP, glucose gels given, by the time transport showed up BP normal, BG was 117 after correction. A friend drove. Tired for two days after that. BG not checked recently; 71 hypoglycemia symptoms. Sneezing 30 minutes after eating. Over the last two months; more frequent, at least once per day, 'feeling down' after eating within one  Hour, BG not checked during this episodes. Fasting state no symptoms. Minor symptoms since 2010 but now has worsened.    Minor episodes, dumping syndrome many years ago   3 hours after eating.   Nissen fundoplication redo 2020   Peripheral neuropathy 50 years ago. B12 was low. On gabapentin.   Omeprazole for 20 years.     Weight-191-192; now 187.   No symtpoms if not eating.   Answers for HPI/ROS submitted by the patient on 1/18/2023  General Symptoms: Yes  Skin Symptoms: No  HENT Symptoms: No  EYE SYMPTOMS: No  HEART SYMPTOMS: No  LUNG SYMPTOMS: No  INTESTINAL SYMPTOMS: No  URINARY SYMPTOMS: No  REPRODUCTIVE SYMPTOMS: No  SKELETAL SYMPTOMS: No  BLOOD SYMPTOMS: No  NERVOUS SYSTEM SYMPTOMS: No  MENTAL HEALTH SYMPTOMS: No  Fever: No  Loss of appetite: No  Weight loss: Yes  Weight gain: No  Fatigue: Yes  Night sweats: No  Chills: No  Increased stress: No  Excessive hunger: No  Excessive thirst: No  Feeling hot or cold when others believe the temperature is normal: Yes  Loss of height: No  Post-operative complications: No  Surgical site pain: No  Hallucinations: No  Change in or Loss of Energy: Yes  Hyperactivity: No  Confusion:  No         Allergies   Allergen Reactions     Hornets      Wasp Venom Protein Hives     Bee Venom Swelling       Current Outpatient Medications   Medication Sig Dispense Refill     atenolol (TENORMIN) 50 MG tablet Take 50 mg by mouth daily        Continuous Blood Gluc  (FREESTYLE SIMIN 2 READER) REN 1 each once for 1 dose Use to read blood sugars per 's instructions. 1 each 0     Continuous Blood Gluc Sensor (FREESTYLE SIMIN 2 SENSOR) MISC 1 each every 14 days Use 1 sensor every 14 days. Use to read blood sugars per 's instructions. 2 each 5     cyanocobalamin (VITAMIN B-12) 500 MCG tablet daily        EPINEPHrine (ANY BX GENERIC EQUIV) 0.3 MG/0.3ML injection 2-pack 0.3 mg       gabapentin (NEURONTIN) 300 MG capsule Take 1 capsule (300 mg) by mouth 3 times daily 270 capsule 3     nortriptyline (PAMELOR) 25 MG capsule Take 25 mg by mouth daily          Review of Systems     11 point review system (Constitutional, HENT, Eyes, Respiratory, Cardiovascular, Gastrointestinal, Genitourinary, Musculoskeletal,Neurological, Psychiatric/Behavioural, Endocrine) is negative or is as per HPI above    Past Medical History:   Diagnosis Date     Hypertension      Neuritis      Peripheral neuropathy      Personal history of other medical treatment     postgastrectomy dumping syndrome     Stomach problems Noted earlier       Past Surgical History:   Procedure Laterality Date     ABDOMEN SURGERY  2012?     APPENDECTOMY  1964?     COLONOSCOPY  2006, 2016     EYE SURGERY  199x    laser for retinal tears     GASTRIC FUNDOPLICATION       HERNIA REPAIR  1961?     LAPAROSCOPIC TAKEDOWN NISSEN FUNDOPLICATION N/A 9/25/2020    Procedure: TAKE-DOWN, FUNDOPLICATION, REDO NISSEN, LAPAROSCOPIC, gastrostomy feeding tube placement;  Surgeon: Katlyn Tobar MD;  Location: [facility] OR     ME ESOPHAGOGASTRODUODENOSCOPY TRANSORAL DIAGNOSTIC N/A 5/9/2019    Procedure: UPPER GASTROINTESTINAL ENDOSCOPY;  Surgeon: Dino Ward  MD;  Location: [facility] OR;  Service: General     SOFT TISSUE SURGERY  2009?    MCL l. thumb     Objective:   /76 (BP Location: Right arm, Patient Position: Sitting, Cuff Size: Adult Regular)   Pulse 58   Wt 87.5 kg (193 lb)   BMI 27.69 kg/m    Wt Readings from Last 10 Encounters:   01/18/23 87.5 kg (193 lb)   01/17/23 87.8 kg (193 lb 9.6 oz)   06/24/22 90.3 kg (199 lb)   11/24/21 92.1 kg (203 lb)   02/02/21 96.2 kg (212 lb)   02/02/21 94.3 kg (208 lb)   10/28/20 96.2 kg (212 lb)   10/09/20 92.5 kg (204 lb)   09/27/20 99.2 kg (218 lb 11.1 oz)   09/01/20 96.5 kg (212 lb 12.8 oz)   Constitutional: Pleasant no acute cardiopulmonary distress.   EYES: anicteric, normal extra-ocular movements, no lid lag or retraction.  Cardiovascular: RRR, S1, S2 normal.   Pulmonary/Chest: CTAB. No wheezing or rales.   Abdominal: +BS. Non tender to palpation.  Stretch marks: none. Hernia.   Neurological: Alert and oriented.  No tremor and reflexes are symmetrical bilaterally and within the normal limits. Muscle strength 5/5.   Extremities: No edema.  Psychological: appropriate mood and affect   In House Labs:       TSH   Date Value Ref Range Status   08/05/2019 1.06 0.30 - 5.00 uIU/mL Final       Creatinine   Date Value Ref Range Status   07/15/2022 0.99 0.67 - 1.17 mg/dL Final   09/27/2020 0.82 0.66 - 1.25 mg/dL Final   ]  Component      Latest Ref Rng & Units 1/18/2023   Sodium      136 - 145 mmol/L 140   Potassium      3.4 - 5.3 mmol/L 5.2   Chloride      98 - 107 mmol/L 106   Carbon Dioxide (CO2)      22 - 29 mmol/L 26   Anion Gap      7 - 15 mmol/L 8   Urea Nitrogen      8.0 - 23.0 mg/dL 14.8   Creatinine      0.67 - 1.17 mg/dL 0.94   Calcium      8.8 - 10.2 mg/dL 9.5   Glucose      70 - 99 mg/dL 111 (H)   Alkaline Phosphatase      40 - 129 U/L 86   AST      10 - 50 U/L 18   ALT      10 - 50 U/L 10   Protein Total      6.4 - 8.3 g/dL 7.1   Albumin      3.5 - 5.2 g/dL 4.2   Bilirubin Total      <=1.2 mg/dL 0.5   GFR  Estimate      >60 mL/min/1.73m2 89   WBC      4.0 - 11.0 10e3/uL 6.9   RBC Count      4.40 - 5.90 10e6/uL 4.91   Hemoglobin      13.3 - 17.7 g/dL 15.1   Hematocrit      40.0 - 53.0 % 44.9   MCV      78 - 100 fL 91   MCH      26.5 - 33.0 pg 30.8   MCHC      31.5 - 36.5 g/dL 33.6   RDW      10.0 - 15.0 % 12.2   Platelet Count      150 - 450 10e3/uL 274   TSH      0.30 - 4.20 uIU/mL 1.02   Hemoglobin A1C      <5.7 % 5.9 (H)     This note has been dictated using voice recognition software.  As a result, there may be errors in the documentation that have gone undetected.  Please consider this when interpreting information in this documentation.

## 2023-01-18 NOTE — LETTER
1/18/2023       RE: Bal Junior  [address] [place] [address]     Dear Colleague,    Thank you for referring your patient, Bal Junior, to the [facility] ENDOCRINOLOGY [facility]IC [place] [facility]. Please see a copy of my visit note below.    Endocrinology Clinic Visit    Chief Complaint: Endocrine Problem     Information obtained from:Patient      Assessment/Treatment Plan:    History of Niesen fundoplication  History of dumping syndrome  Prediabetes A1c 5.9   Symptoms concerning for hypoglycemia without hypoglycemic medications    Reports that he has had dumping syndrome longstanding described as diarrhea immediately after eating food.  He has also noted over the years that 'feels down' immediately after eating.  More recently he was skiing when he had symptoms of dizziness, presyncope and then finally after going down the ski; he could not get up.  He was given glucose gels at the time without checking his blood sugar and he felt better.  Following the glucose gels his blood sugar was 117.  He does not normally check his blood sugars at home. His most recent episode led to referral to endocrinology clinic.  He was told that continuous glucose monitor might also be helpful for monitoring purposes and is asking if he can have a prescription for it.  It is hard to say if his symptoms are secondary to hypoglycemia without documented blood sugars.  CGM is not approved to monitor this kind of condition however can be used [discussed not accurate at the lower end of normal].  Most importantly we will schedule mixed Meal test to assess blood sugar and insulin levels following meal intake.  He has never had any symptoms in a fasting state. Further plan based on the results. If he has post prandial hypoglycemia due to history of Nissen fundoplication; would consider acarbose.  We checked CMP, CBC, TSH and hemoglobin A1c at the time of appointment and  results were unremarkable except for prediabetes.      Of note, no signs and symptoms suggestive of adrenal insufficiency therefore screening not pursued on this plan at this time.    Return to clinic in 4 months.    Test and/or medications prescribed today:  Orders Placed This Encounter   Procedures     TSH with free T4 reflex     Comprehensive metabolic panel     CBC with platelets     Hemoglobin A1c         Cali Dougherty MD  Staff Endocrinologist    Division of Endocrinology and Diabetes      Subjective:         HPI: Bal Junior is a 67 year old male with history of concern for hypoglycemia  who is seen in consultation at Cathy Aguirre's request for the same.     Two weeks ago, skiing (2 rounds), felt 'oozy, about to black out'. Couldn't stand up.  70/40 BP, glucose gels given, by the time transport showed up BP normal, BG was 117 after correction. A friend drove. Tired for two days after that. BG not checked recently; 71 hypoglycemia symptoms. Sneezing 30 minutes after eating. Over the last two months; more frequent, at least once per day, 'feeling down' after eating within one  Hour, BG not checked during this episodes. Fasting state no symptoms. Minor symptoms since 2010 but now has worsened.    Minor episodes, dumping syndrome many years ago   3 hours after eating.   Nissen fundoplication redo 2020   Peripheral neuropathy 50 years ago. B12 was low. On gabapentin.   Omeprazole for 20 years.     Weight-191-192; now 187.   No symtpoms if not eating.   Answers for HPI/ROS submitted by the patient on 1/18/2023  General Symptoms: Yes  Skin Symptoms: No  HENT Symptoms: No  EYE SYMPTOMS: No  HEART SYMPTOMS: No  LUNG SYMPTOMS: No  INTESTINAL SYMPTOMS: No  URINARY SYMPTOMS: No  REPRODUCTIVE SYMPTOMS: No  SKELETAL SYMPTOMS: No  BLOOD SYMPTOMS: No  NERVOUS SYSTEM SYMPTOMS: No  MENTAL HEALTH SYMPTOMS: No  Fever: No  Loss of appetite: No  Weight loss: Yes  Weight gain: No  Fatigue: Yes  Night sweats:  No  Chills: No  Increased stress: No  Excessive hunger: No  Excessive thirst: No  Feeling hot or cold when others believe the temperature is normal: Yes  Loss of height: No  Post-operative complications: No  Surgical site pain: No  Hallucinations: No  Change in or Loss of Energy: Yes  Hyperactivity: No  Confusion: No         Allergies   Allergen Reactions     Hornets      Wasp Venom Protein Hives     Bee Venom Swelling       Current Outpatient Medications   Medication Sig Dispense Refill     atenolol (TENORMIN) 50 MG tablet Take 50 mg by mouth daily        Continuous Blood Gluc  (FREESTYLE SIMIN 2 READER) REN 1 each once for 1 dose Use to read blood sugars per 's instructions. 1 each 0     Continuous Blood Gluc Sensor (FREESTYLE SIMIN 2 SENSOR) MISC 1 each every 14 days Use 1 sensor every 14 days. Use to read blood sugars per 's instructions. 2 each 5     cyanocobalamin (VITAMIN B-12) 500 MCG tablet daily        EPINEPHrine (ANY BX GENERIC EQUIV) 0.3 MG/0.3ML injection 2-pack 0.3 mg       gabapentin (NEURONTIN) 300 MG capsule Take 1 capsule (300 mg) by mouth 3 times daily 270 capsule 3     nortriptyline (PAMELOR) 25 MG capsule Take 25 mg by mouth daily          Review of Systems     11 point review system (Constitutional, HENT, Eyes, Respiratory, Cardiovascular, Gastrointestinal, Genitourinary, Musculoskeletal,Neurological, Psychiatric/Behavioural, Endocrine) is negative or is as per HPI above    Past Medical History:   Diagnosis Date     Hypertension      Neuritis      Peripheral neuropathy      Personal history of other medical treatment     postgastrectomy dumping syndrome     Stomach problems Noted earlier       Past Surgical History:   Procedure Laterality Date     ABDOMEN SURGERY  2012?     APPENDECTOMY  1964?     COLONOSCOPY  2006, 2016     EYE SURGERY  199x    laser for retinal tears     GASTRIC FUNDOPLICATION       HERNIA REPAIR  1961?     LAPAROSCOPIC TAKEDOWN NISSEN  FUNDOPLICATION N/A 9/25/2020    Procedure: TAKE-DOWN, FUNDOPLICATION, REDO NISSEN, LAPAROSCOPIC, gastrostomy feeding tube placement;  Surgeon: Katlyn Tobar MD;  [facility]     NC ESOPHAGOGASTRODUODENOSCOPY TRANSORAL DIAGNOSTIC N/A 5/9/2019    Procedure: UPPER GASTROINTESTINAL ENDOSCOPY;  Surgeon: Dino Ward MD;  Location: [facility];  Service: General     SOFT TISSUE SURGERY  2009?    MCL l. thumb     Objective:   /76 (BP Location: Right arm, Patient Position: Sitting, Cuff Size: Adult Regular)   Pulse 58   Wt 87.5 kg (193 lb)   BMI 27.69 kg/m    Wt Readings from Last 10 Encounters:   01/18/23 87.5 kg (193 lb)   01/17/23 87.8 kg (193 lb 9.6 oz)   06/24/22 90.3 kg (199 lb)   11/24/21 92.1 kg (203 lb)   02/02/21 96.2 kg (212 lb)   02/02/21 94.3 kg (208 lb)   10/28/20 96.2 kg (212 lb)   10/09/20 92.5 kg (204 lb)   09/27/20 99.2 kg (218 lb 11.1 oz)   09/01/20 96.5 kg (212 lb 12.8 oz)   Constitutional: Pleasant no acute cardiopulmonary distress.   EYES: anicteric, normal extra-ocular movements, no lid lag or retraction.  Cardiovascular: RRR, S1, S2 normal.   Pulmonary/Chest: CTAB. No wheezing or rales.   Abdominal: +BS. Non tender to palpation.  Stretch marks: none. Hernia.   Neurological: Alert and oriented.  No tremor and reflexes are symmetrical bilaterally and within the normal limits. Muscle strength 5/5.   Extremities: No edema.  Psychological: appropriate mood and affect   In House Labs:       TSH   Date Value Ref Range Status   08/05/2019 1.06 0.30 - 5.00 uIU/mL Final       Creatinine   Date Value Ref Range Status   07/15/2022 0.99 0.67 - 1.17 mg/dL Final   09/27/2020 0.82 0.66 - 1.25 mg/dL Final   ]  Component      Latest Ref Rng & Units 1/18/2023   Sodium      136 - 145 mmol/L 140   Potassium      3.4 - 5.3 mmol/L 5.2   Chloride      98 - 107 mmol/L 106   Carbon Dioxide (CO2)      22 - 29 mmol/L 26   Anion Gap      7 - 15 mmol/L 8   Urea Nitrogen      8.0 - 23.0 mg/dL 14.8    Creatinine      0.67 - 1.17 mg/dL 0.94   Calcium      8.8 - 10.2 mg/dL 9.5   Glucose      70 - 99 mg/dL 111 (H)   Alkaline Phosphatase      40 - 129 U/L 86   AST      10 - 50 U/L 18   ALT      10 - 50 U/L 10   Protein Total      6.4 - 8.3 g/dL 7.1   Albumin      3.5 - 5.2 g/dL 4.2   Bilirubin Total      <=1.2 mg/dL 0.5   GFR Estimate      >60 mL/min/1.73m2 89   WBC      4.0 - 11.0 10e3/uL 6.9   RBC Count      4.40 - 5.90 10e6/uL 4.91   Hemoglobin      13.3 - 17.7 g/dL 15.1   Hematocrit      40.0 - 53.0 % 44.9   MCV      78 - 100 fL 91   MCH      26.5 - 33.0 pg 30.8   MCHC      31.5 - 36.5 g/dL 33.6   RDW      10.0 - 15.0 % 12.2   Platelet Count      150 - 450 10e3/uL 274   TSH      0.30 - 4.20 uIU/mL 1.02   Hemoglobin A1C      <5.7 % 5.9 (H)     This note has been dictated using voice recognition software.  As a result, there may be errors in the documentation that have gone undetected.  Please consider this when interpreting information in this documentation.          Again, thank you for allowing me to participate in the care of your patient.      Sincerely,    Cali Dougherty MD

## 2023-01-19 NOTE — RESULT ENCOUNTER NOTE
Dear Bal,     Here are your recent results.   #1 thyroid blood work result is within the normal limits.  2.  Hemoglobin A1c level indicates prediabetes or borderline diabetes.  3.  Electrolytes, kidney function test results and liver function test results are within the expected range.  #4 complete blood count results are within the normal limits.  We will discuss next steps once we have the results from the scheduled mixed Meal test result.   Please let us know if you have any questions or concerns.    Regards,  Cali Dougherty MD

## 2023-01-30 ENCOUNTER — MEDICAL CORRESPONDENCE (OUTPATIENT)
Dept: HEALTH INFORMATION MANAGEMENT | Facility: CLINIC | Age: 68
End: 2023-01-30
Payer: COMMERCIAL

## 2023-02-02 ENCOUNTER — TRANSCRIBE ORDERS (OUTPATIENT)
Dept: OTHER | Age: 68
End: 2023-02-02

## 2023-02-02 ENCOUNTER — INFUSION THERAPY VISIT (OUTPATIENT)
Dept: INFUSION THERAPY | Facility: CLINIC | Age: 68
End: 2023-02-02
Attending: INTERNAL MEDICINE
Payer: MEDICARE

## 2023-02-02 VITALS
DIASTOLIC BLOOD PRESSURE: 69 MMHG | TEMPERATURE: 97.8 F | RESPIRATION RATE: 16 BRPM | OXYGEN SATURATION: 100 % | SYSTOLIC BLOOD PRESSURE: 124 MMHG | HEART RATE: 64 BPM

## 2023-02-02 DIAGNOSIS — E16.2 HYPOGLYCEMIA: ICD-10-CM

## 2023-02-02 DIAGNOSIS — Z93.1 S/P NISSEN FUNDOPLICATION (WITH GASTROSTOMY TUBE PLACEMENT) (H): ICD-10-CM

## 2023-02-02 DIAGNOSIS — K91.1 DUMPING SYNDROME: Primary | ICD-10-CM

## 2023-02-02 DIAGNOSIS — E16.2 HYPOGLYCEMIA: Primary | ICD-10-CM

## 2023-02-02 LAB
GLUCOSE BLDC GLUCOMTR-MCNC: 136 MG/DL (ref 70–99)
GLUCOSE BLDC GLUCOMTR-MCNC: 138 MG/DL (ref 70–99)
GLUCOSE BLDC GLUCOMTR-MCNC: 162 MG/DL (ref 70–99)
GLUCOSE SERPL-MCNC: 115 MG/DL (ref 70–99)
GLUCOSE SERPL-MCNC: 129 MG/DL (ref 70–99)
GLUCOSE SERPL-MCNC: 152 MG/DL (ref 70–99)
GLUCOSE SERPL-MCNC: 183 MG/DL (ref 70–99)
GLUCOSE SERPL-MCNC: 187 MG/DL (ref 70–99)
GLUCOSE SERPL-MCNC: 68 MG/DL (ref 70–99)
GLUCOSE SERPL-MCNC: 79 MG/DL (ref 70–99)
GLUCOSE SERPL-MCNC: 84 MG/DL (ref 70–99)
GLUCOSE SERPL-MCNC: 91 MG/DL (ref 70–99)
INSULIN SERPL-ACNC: 10.2 UU/ML (ref 2.6–24.9)
INSULIN SERPL-ACNC: 213 UU/ML (ref 2.6–24.9)
INSULIN SERPL-ACNC: 23.6 UU/ML (ref 2.6–24.9)
INSULIN SERPL-ACNC: 26.9 UU/ML (ref 2.6–24.9)
INSULIN SERPL-ACNC: 4.6 UU/ML (ref 2.6–24.9)
INSULIN SERPL-ACNC: 6.3 UU/ML (ref 2.6–24.9)
INSULIN SERPL-ACNC: 632 UU/ML (ref 2.6–24.9)
INSULIN SERPL-ACNC: 8.7 UU/ML (ref 2.6–24.9)
INSULIN SERPL-ACNC: 91.9 UU/ML (ref 2.6–24.9)

## 2023-02-02 PROCEDURE — 999N000127 HC STATISTIC PERIPHERAL IV START W US GUIDANCE

## 2023-02-02 PROCEDURE — 36415 COLL VENOUS BLD VENIPUNCTURE: CPT | Performed by: INTERNAL MEDICINE

## 2023-02-02 PROCEDURE — 82962 GLUCOSE BLOOD TEST: CPT | Mod: 91

## 2023-02-02 PROCEDURE — 82947 ASSAY GLUCOSE BLOOD QUANT: CPT | Performed by: INTERNAL MEDICINE

## 2023-02-02 PROCEDURE — 83525 ASSAY OF INSULIN: CPT | Mod: 91 | Performed by: INTERNAL MEDICINE

## 2023-02-02 RX ORDER — NICOTINE POLACRILEX 4 MG
15 LOZENGE BUCCAL
Status: DISCONTINUED | OUTPATIENT
Start: 2023-02-02 | End: 2023-02-02

## 2023-02-02 NOTE — LETTER
2/2/2023         RE: Bal Junior  [address] [place] [address]        Dear Colleague,    Thank you for referring your patient, Bal Junior, to the [facility]. Please see a copy of my visit note below.    Nursing Note  Bal Junior [facility]:   Chief Complaint   Patient presents with     ENDO meal tolerance test     ENDO meal tolerance test     During today's [facility] appointment, orders from Cali Dougherty MD were completed.  Frequency: once    Progress note:    Last ate at 8 pm on 2/1/2023. Light meal.  Patient identification verified by name and date of birth.  Assessment completed.  Vitals recorded in Doc Flowsheets.  Patient was provided with education regarding medication/procedure and possible side effects.  Patient verbalized understanding.     present during visit today: Not Applicable.    Vascular access: peripheral IV was placed by vascular access nurse. Used for all lab draws.    Treatment Conditions:  DIET EFFECTIVE NOW, starting today at 0854, Until Specified   Food Preferences:  Other - please comment 1. Provide a HIGH CHO meal of 8 oz orange juice, 6 oz Yoplait fat free fruit flavored yogurt (11 total carbs) , 1 slice bread or toast with 1 tsp margarine and 2 tsp jam was sugar free (6 total carbs).     Baseline vs: RR 16, P 57 /73 Labs at 0829  Start meal at 0830 patient ate the entire HIGH CHO meal within 10 minutes.    Time zero 0830  +15 0845 RR 16 P 57 /76 light headed  +30 0900 RR 18 P 59 /71 59 light headed, pt felt he could not stand if he tried  +45 0915 RR 18 P 67 /70 67 light headed dizzy feels disconnected. RN noted a change if pt's color he was very pale.  +60 0930 RR 16 P 72 /74 still having symtomes but they were starting to improve.       +90 1000 RR 16 P 68 /63 symptoms improving. Feels  exhausted.   +120 1030 RR 16 P 63  78 Back to base line except feeling exhausted. RN noted color improving  +180 1130 RR 16 P58 /73 baseline. Color baseline  +240 1230 RR 16 P 64 /69 Baseline    Discharge Plan:   Follow up plan of care with: primary care provider,  Discharge instructions were reviewed with patient.  Patient/representative verbalized understanding of discharge instructions and all questions answered.  [facility] discharged [facility] in stable condition.    Marci Flores RN        /73   Pulse 58   Temp 97.8  F (36.6  C) (Oral)   Resp 16   SpO2 99%           Again, thank you for allowing me to participate in the care of your patient.        Sincerely,        [facility] Nurse

## 2023-02-02 NOTE — PROGRESS NOTES
Nursing Note  Bal Junior presents today to [facility] for:   Chief Complaint   Patient presents with     ENDO meal tolerance test     ENDO meal tolerance test     During today's [facility] appointment, orders from Cali Dougherty MD were completed.  Frequency: once    Progress note:    Last ate at 8 pm on 2/1/2023. Light meal.  Patient identification verified by name and date of birth.  Assessment completed.  Vitals recorded in Doc Flowsheets.  Patient was provided with education regarding medication/procedure and possible side effects.  Patient verbalized understanding.     present during visit today: Not Applicable.    Vascular access: peripheral IV was placed by vascular access nurse. Used for all lab draws.    Treatment Conditions:  DIET EFFECTIVE NOW, starting today at 0854, Until Specified   Food Preferences:  Other - please comment 1. Provide a HIGH CHO meal of 8 oz orange juice, 6 oz Yoplait fat free fruit flavored yogurt (11 total carbs) , 1 slice bread or toast with 1 tsp margarine and 2 tsp jam was sugar free (6 total carbs).     Baseline vs: RR 16, P 57 /73 Labs at 0829  Start meal at 0830 patient ate the entire HIGH CHO meal within 10 minutes.    Time zero 0830  +15 0845 RR 16 P 57 /76 light headed  +30 0900 RR 18 P 59 /71 59 light headed, pt felt he could not stand if he tried  +45 0915 RR 18 P 67 /70 67 light headed dizzy feels disconnected. RN noted a change if pt's color he was very pale.  +60 0930 RR 16 P 72 /74 still having symtomes but they were starting to improve.       +90 1000 RR 16 P 68 /63 symptoms improving. Feels exhausted.   +120 1030 RR 16 P 63  78 Back to base line except feeling exhausted. RN noted color improving  +180 1130 RR 16 P58 /73 baseline. Color baseline  +240 1230 RR 16 P 64 /69 Baseline    Discharge Plan:   Follow up plan of care with: primary  care provider,  Discharge instructions were reviewed with patient.  Patient/representative verbalized understanding of discharge instructions and all questions answered.  [facility] discharged [facility] in stable condition.    Marci Flores RN        /73   Pulse 58   Temp 97.8  F (36.6  C) (Oral)   Resp 16   SpO2 99%

## 2023-02-02 NOTE — LETTER
Date:February 3, 2023      Provider requested that no letter be sent. Do not send.       [facility]

## 2023-02-06 ENCOUNTER — TELEPHONE (OUTPATIENT)
Dept: ENDOCRINOLOGY | Facility: CLINIC | Age: 68
End: 2023-02-06

## 2023-02-06 NOTE — PROGRESS NOTES
Outcome for 02/06/23 3:08 PM: [employer] message sent  Bibi Ortiz MA  Outcome for 02/07/23 6:41 AM: Data uploaded on Federico Ortiz MA  Outcome for 02/07/23 10:30 AM: Federico emailed to provider  Bibi Ortiz MA

## 2023-02-06 NOTE — RESULT ENCOUNTER NOTE
Please add-on pt at 9:30 or 10:30 am on 2/8/23 to discuss results and next steps.   Dear zheng Selby results of the mixed meal test showed overall, prediabetes or borderline diabetes and exaggerated insulin production after high carbohydrate intake. We would like to discuss next steps with you regarding this finding and our team will call you and schedule a follow up appointment for this week.     Please let us know if you have any questions or concerns.    Regards,  Cali Dougherty MD

## 2023-02-06 NOTE — TELEPHONE ENCOUNTER
Spoke to pt over the phone and scheduled overbook appointment per Dr. Dougherty to go over lab results.   Lance Morocho on 2/6/2023 at 3:04 PM

## 2023-02-06 NOTE — TELEPHONE ENCOUNTER
----- Message from Cali Dougherty MD sent at 2/6/2023  1:21 PM CST -----  Please add-on pt at 9:30 or 10:30 am on 2/8/23 to discuss results and next steps.   Dear zheng Selby results of the mixed meal test showed overall, prediabetes or borderline diabetes and exaggerated insulin production after high carbohydrate intake. We would like to discuss next steps with you regarding this finding and our team will call you and schedule a follow up appointment for this week.      Please let us know if you have any questions or concerns.     Regards,  Cali Dougherty MD

## 2023-02-08 ENCOUNTER — VIRTUAL VISIT (OUTPATIENT)
Dept: ENDOCRINOLOGY | Facility: CLINIC | Age: 68
End: 2023-02-08
Payer: COMMERCIAL

## 2023-02-08 DIAGNOSIS — Z93.1 S/P NISSEN FUNDOPLICATION (WITH GASTROSTOMY TUBE PLACEMENT) (H): ICD-10-CM

## 2023-02-08 DIAGNOSIS — R73.03 PREDIABETES: ICD-10-CM

## 2023-02-08 DIAGNOSIS — E16.1 POSTPRANDIAL HYPOGLYCEMIA: ICD-10-CM

## 2023-02-08 DIAGNOSIS — K91.1 DUMPING SYNDROME: Primary | ICD-10-CM

## 2023-02-08 PROCEDURE — 99214 OFFICE O/P EST MOD 30 MIN: CPT | Mod: TEL | Performed by: INTERNAL MEDICINE

## 2023-02-08 RX ORDER — ACARBOSE 25 MG/1
25 TABLET ORAL
Qty: 90 TABLET | Refills: 3 | Status: SHIPPED | OUTPATIENT
Start: 2023-02-08 | End: 2023-05-17

## 2023-02-08 ASSESSMENT — ENCOUNTER SYMPTOMS
VOMITING: 0
BOWEL INCONTINENCE: 0
NAUSEA: 0
CONSTIPATION: 0
HEARTBURN: 1
RECTAL PAIN: 0
DIARRHEA: 1
BLOOD IN STOOL: 0
ABDOMINAL PAIN: 0
JAUNDICE: 0

## 2023-02-08 NOTE — NURSING NOTE
Video-Visit Details    Type of service:  Video Visit    Originating Location (pt. Location): Home      Mode of Communication:  Telephone call

## 2023-02-08 NOTE — PROGRESS NOTES
Endocrinology Clinic Visit    Chief Complaint: Follow Up and Video Visit     Information obtained from:Patient      Assessment/Treatment Plan:    Dumping syndrome of 10 years duration following Niesen fundoplication  Prediabetes A1c 5.9   Hypoglycemia symptoms with lowest BG 68 mg/dl following mixed meal test containing high CHO content  Federico continuous glucose monitor reviewed. Lowest BG was 68 mgl/dl. We completed mixed meal test with high CHO content and had symptoms of hypoglycemia following meal with lowest BG of 68 mg/dl and high insulin  Level prior to that. Normal BG and insulin level at the end of the test.  Checked CMP, CBC, TSH and hemoglobin A1c at the time of appointment and results were unremarkable except for prediabetes.  Of note, no signs and symptoms suggestive of adrenal insufficiency therefore screening not pursued.  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  The mixed meal test findings are suggestive of PHH.  Treatment plan: Dietary modification is the first treatment plan therefore we are arranging a follow-up with a nutritionist.  In addition, he might benefit from acarbose with meals.  Prescription for acarbose provided.  We will follow-up again in 3-4 months; earlier follow-up if symptoms worsen.      Return to clinic in 3-4 months.        Cali Dougherty MD  Staff Endocrinologist    Division of Endocrinology and Diabetes      Subjective:         HPI: Bal Junior is a 68 year old male with history of concern for hypoglycemia  who is here to follow-up on results.  Few weeks ago, skiing (2 rounds), felt 'oozy, about to black out'. Couldn't stand up.  70/40 BP, glucose gels given, by the time transport showed up BP normal, BG was 117 after correction. A friend drove. Tired for two days after that. BG not checked recently; 71 hypoglycemia symptoms.  "Sneezing 30 minutes after eating. Over the last two months; more frequent, at least once per day, 'feeling down' after eating within one  Hour, BG not checked during this episodes. Fasting state no symptoms. Minor symptoms since 2010 but now has worsened.    Minor episodes, dumping syndrome many years ago   3 hours after eating.   Nissen fundoplication redo 2020   Peripheral neuropathy 50 years ago. B12 was low. On gabapentin.   Omeprazole for 20 years.     Weight-191-192; now 187.   No symtpoms if not eating.     Mixed meal test on 2/2/23 progress note.   \"Last ate at 8 pm on 2/1/2023. Light meal.  Patient identification verified by name and date of birth.  Assessment completed.  Vitals recorded in Doc Flowsheets.  Patient was provided with education regarding medication/procedure and possible side effects.  Patient verbalized understanding.      present during visit today: Not Applicable.     Vascular access: peripheral IV was placed by vascular access nurse. Used for all lab draws.     Treatment Conditions:  DIET EFFECTIVE NOW, starting today at 0854, Until Specified   Food Preferences:  Other - please comment 1. Provide a HIGH CHO meal of 8 oz orange juice, 6 oz Yoplait fat free fruit flavored yogurt (11 total carbs) , 1 slice bread or toast with 1 tsp margarine and 2 tsp jam was sugar free (6 total carbs).      Baseline vs: RR 16, P 57 /73 Labs at 0829  Start meal at 0830 patient ate the entire HIGH CHO meal within 10 minutes.     Time zero 0830  +15 0845 RR 16 P 57 /76 light headed  +30 0900 RR 18 P 59 /71 59 light headed, pt felt he could not stand if he tried  +45 0915 RR 18 P 67 /70 67 light headed dizzy feels disconnected. RN noted a change if pt's color he was very pale.  +60 0930 RR 16 P 72 /74 still having symtomes but they were starting to improve.       +90 1000 RR 16 P 68 /63 symptoms improving. Feels exhausted.   +120 1030 RR 16 P 63  78 Back to " "base line except feeling exhausted. RN noted color improving  +180 1130 RR 16 P58 /73 baseline. Color baseline  +240 1230 RR 16 P 64 /69 Baseline\"    Component      Latest Ref Rng & Units 2/2/2023 2/2/2023 2/2/2023 2/2/2023           8:28 AM  8:45 AM  9:00 AM  9:01 AM   Glucose      70 - 99 mg/dL 115 (H) 129 (H) 152 (H)    Insulin      2.6 - 24.9 uU/mL 4.6 8.7 26.9 (H)    GLUCOSE BY METER POCT      70 - 99 mg/dL    138 (H)     Component      Latest Ref Rng & Units 2/2/2023 2/2/2023 2/2/2023 2/2/2023           9:14 AM  9:15 AM  9:30 AM  9:31 AM   Glucose      70 - 99 mg/dL  183 (H) 187 (H)    Insulin      2.6 - 24.9 uU/mL  213.0 (H) 632.0 (H)    GLUCOSE BY METER POCT      70 - 99 mg/dL 136 (H)   162 (H)     Component      Latest Ref Rng & Units 2/2/2023 2/2/2023 2/2/2023 2/2/2023          10:00 AM 10:30 AM 11:30 AM 12:30 PM   Glucose      70 - 99 mg/dL 79 68 (L) 84 91   Insulin      2.6 - 24.9 uU/mL 91.9 (H) 23.6 10.2 6.3   GLUCOSE BY METER POCT      70 - 99 mg/dL         Answers for HPI/ROS submitted by the patient on 2/8/2023  General Symptoms: No  Skin Symptoms: No  HENT Symptoms: No  EYE SYMPTOMS: No  HEART SYMPTOMS: No  LUNG SYMPTOMS: No  INTESTINAL SYMPTOMS: Yes  URINARY SYMPTOMS: No  REPRODUCTIVE SYMPTOMS: No  SKELETAL SYMPTOMS: No  BLOOD SYMPTOMS: No  NERVOUS SYSTEM SYMPTOMS: No  MENTAL HEALTH SYMPTOMS: No  Heart burn or indigestion: Yes  Nausea: No  Vomiting: No  Abdominal pain: No  Constipation: No  Diarrhea: Yes  Blood in stool: No  Black stools: No  Rectal or Anal pain: No  Fecal incontinence: No  Yellowing of skin or eyes: No  Vomit with blood: No  Change in stools: No           Allergies   Allergen Reactions     Hornets      Wasp Venom Protein Hives     Bee Venom Swelling       Current Outpatient Medications   Medication Sig Dispense Refill     atenolol (TENORMIN) 50 MG tablet Take 50 mg by mouth daily        Continuous Blood Gluc Sensor (FREESTYLE SIMIN 2 SENSOR) MISC 1 each every 14 days " Use 1 sensor every 14 days. Use to read blood sugars per 's instructions. 2 each 5     cyanocobalamin (VITAMIN B-12) 500 MCG tablet daily        EPINEPHrine (ANY BX GENERIC EQUIV) 0.3 MG/0.3ML injection 2-pack 0.3 mg       gabapentin (NEURONTIN) 300 MG capsule Take 1 capsule (300 mg) by mouth 3 times daily 270 capsule 3     nortriptyline (PAMELOR) 25 MG capsule Take 25 mg by mouth daily          Review of Systems     11 point review system (Constitutional, HENT, Eyes, Respiratory, Cardiovascular, Gastrointestinal, Genitourinary, Musculoskeletal,Neurological, Psychiatric/Behavioural, Endocrine) is negative or is as per HPI above    Past Medical History:   Diagnosis Date     Hypertension      Neuritis      Peripheral neuropathy      Personal history of other medical treatment     postgastrectomy dumping syndrome     Stomach problems Noted earlier        Past Surgical History:   Procedure Laterality Date     ABDOMEN SURGERY  2012?     APPENDECTOMY  1964?     COLONOSCOPY  2006, 2016     EYE SURGERY  199x    laser for retinal tears     GASTRIC FUNDOPLICATION       HERNIA REPAIR  1961?     LAPAROSCOPIC TAKEDOWN NISSEN FUNDOPLICATION N/A 9/25/2020    Procedure: TAKE-DOWN, FUNDOPLICATION, REDO NISSEN, LAPAROSCOPIC, gastrostomy feeding tube placement;  Surgeon: Katlyn Tobar MD;  Location:  OR     [place] ESOPHAGOGASTRODUODENOSCOPY TRANSORAL DIAGNOSTIC N/A 5/9/2019    Procedure: UPPER GASTROINTESTINAL ENDOSCOPY;  Surgeon: Dino Ward MD;  Location: [facility];  Service: General     SOFT TISSUE SURGERY  2009?    MCL l. thumb     Objective:     Wt Readings from Last 10 Encounters:   01/18/23 87.5 kg (193 lb)   01/17/23 87.8 kg (193 lb 9.6 oz)   06/24/22 90.3 kg (199 lb)   11/24/21 92.1 kg (203 lb)   02/02/21 96.2 kg (212 lb)   02/02/21 94.3 kg (208 lb)   10/28/20 96.2 kg (212 lb)   10/09/20 92.5 kg (204 lb)   09/27/20 99.2 kg (218 lb 11.1 oz)   09/01/20 96.5 kg (212 lb 12.8 oz)   previously documented  below   Constitutional: Pleasant no acute cardiopulmonary distress.   EYES: anicteric, normal extra-ocular movements, no lid lag or retraction.  Cardiovascular: RRR, S1, S2 normal.   Pulmonary/Chest: CTAB. No wheezing or rales.   Abdominal: +BS. Non tender to palpation.  Stretch marks: none. Hernia.   Neurological: Alert and oriented.  No tremor and reflexes are symmetrical bilaterally and within the normal limits. Muscle strength 5/5.   Extremities: No edema.  Psychological: appropriate mood and affect   In House Labs:       TSH   Date Value Ref Range Status   01/18/2023 1.02 0.30 - 4.20 uIU/mL Final   08/05/2019 1.06 0.30 - 5.00 uIU/mL Final       Creatinine   Date Value Ref Range Status   01/18/2023 0.94 0.67 - 1.17 mg/dL Final   09/27/2020 0.82 0.66 - 1.25 mg/dL Final       Component      Latest Ref Rng & Units 2/2/2023 2/2/2023 2/2/2023 2/2/2023           9:14 AM  9:15 AM  9:30 AM  9:31 AM   Sodium      136 - 145 mmol/L       Potassium      3.4 - 5.3 mmol/L       Chloride      98 - 107 mmol/L       Carbon Dioxide (CO2)      22 - 29 mmol/L       Anion Gap      7 - 15 mmol/L       Urea Nitrogen      8.0 - 23.0 mg/dL       Creatinine      0.67 - 1.17 mg/dL       Calcium      8.8 - 10.2 mg/dL       Glucose      70 - 99 mg/dL  183 (H) 187 (H)    Alkaline Phosphatase      40 - 129 U/L       AST      10 - 50 U/L       ALT      10 - 50 U/L       Protein Total      6.4 - 8.3 g/dL       Albumin      3.5 - 5.2 g/dL       Bilirubin Total      <=1.2 mg/dL       GFR Estimate      >60 mL/min/1.73m2       WBC      4.0 - 11.0 10e3/uL       RBC Count      4.40 - 5.90 10e6/uL       Hemoglobin      13.3 - 17.7 g/dL       Hematocrit      40.0 - 53.0 %       MCV      78 - 100 fL       MCH      26.5 - 33.0 pg         Component      Latest Ref Rng & Units 2/2/2023          12:30 PM   Sodium      136 - 145 mmol/L    Potassium      3.4 - 5.3 mmol/L    Chloride      98 - 107 mmol/L    Carbon Dioxide (CO2)      22 - 29 mmol/L    Anion  Gap      7 - 15 mmol/L    Urea Nitrogen      8.0 - 23.0 mg/dL    Creatinine      0.67 - 1.17 mg/dL    Calcium      8.8 - 10.2 mg/dL    Glucose      70 - 99 mg/dL 91   Alkaline Phosphatase      40 - 129 U/L    AST      10 - 50 U/L    ALT      10 - 50 U/L    Protein Total      6.4 - 8.3 g/dL    Albumin      3.5 - 5.2 g/dL    Bilirubin Total      <=1.2 mg/dL    GFR Estimate      >60 mL/min/1.73m2    WBC      4.0 - 11.0 10e3/uL    RBC Count      4.40 - 5.90 10e6/uL    Hemoglobin      13.3 - 17.7 g/dL    Hematocrit      40.0 - 53.0 %    MCV      78 - 100 fL    MCH      26.5 - 33.0 pg    MCHC      31.5 - 36.5 g/dL    RDW      10.0 - 15.0 %    Platelet Count      150 - 450 10e3/uL    TSH      0.30 - 4.20 uIU/mL    Hemoglobin A1C      <5.7 %    Insulin      2.6 - 24.9 uU/mL 6.3   GLUCOSE BY METER POCT      70 - 99 mg/dL      Component      Latest Ref Rng & Units 1/18/2023   Sodium      136 - 145 mmol/L 140   Potassium      3.4 - 5.3 mmol/L 5.2   Chloride      98 - 107 mmol/L 106   Carbon Dioxide (CO2)      22 - 29 mmol/L 26   Anion Gap      7 - 15 mmol/L 8   Urea Nitrogen      8.0 - 23.0 mg/dL 14.8   Creatinine      0.67 - 1.17 mg/dL 0.94   Calcium      8.8 - 10.2 mg/dL 9.5   Glucose      70 - 99 mg/dL 111 (H)   Alkaline Phosphatase      40 - 129 U/L 86   AST      10 - 50 U/L 18   ALT      10 - 50 U/L 10   Protein Total      6.4 - 8.3 g/dL 7.1   Albumin      3.5 - 5.2 g/dL 4.2   Bilirubin Total      <=1.2 mg/dL 0.5   GFR Estimate      >60 mL/min/1.73m2 89   WBC      4.0 - 11.0 10e3/uL 6.9   RBC Count      4.40 - 5.90 10e6/uL 4.91   Hemoglobin      13.3 - 17.7 g/dL 15.1   Hematocrit      40.0 - 53.0 % 44.9   MCV      78 - 100 fL 91   MCH      26.5 - 33.0 pg 30.8   MCHC      31.5 - 36.5 g/dL 33.6   RDW      10.0 - 15.0 % 12.2   Platelet Count      150 - 450 10e3/uL 274   TSH      0.30 - 4.20 uIU/mL 1.02   Hemoglobin A1C      <5.7 % 5.9 (H)     This note has been dictated using voice recognition software.  As a result, there  may be errors in the documentation that have gone undetected.  Please consider this when interpreting information in this documentation.                           This note has been dictated using voice recognition software.  As a result, there may be errors in the documentation that have gone undetected.  Please consider this when interpreting information in this documentation.    Total telephone time= 9 minutes. 34 minutes spent on the date of the encounter doing CGM review, history, documentation and further activities per the note.

## 2023-02-08 NOTE — LETTER
2/8/2023       RE: Bal Junior  [address] [place] [address]     Dear Colleague,    Thank you for referring your patient, Bal Junior, to the [facility] ENDOCRINOLOGY [facility] [place] at [facility]. Please see a copy of my visit note below.    Outcome for 02/06/23 3:08 PM: [employer] message sent  Bibi Ortiz MA  Outcome for 02/07/23 6:41 AM: Data uploaded on Federicodevang Ortiz MA  Outcome for 02/07/23 10:30 AM: Federico emailed to provider  Bibi Ortiz MA              Endocrinology Clinic Visit    Chief Complaint: Follow Up and Video Visit     Information obtained from:Patient      Assessment/Treatment Plan:    Dumping syndrome of 10 years duration following Niesen fundoplication  Prediabetes A1c 5.9   Hypoglycemia symptoms with lowest BG 68 mg/dl following mixed meal test containing high CHO content  Federico continuous glucose monitor reviewed. Lowest BG was 68 mgl/dl. We completed mixed meal test with high CHO content and had symptoms of hypoglycemia following meal with lowest BG of 68 mg/dl and high insulin  Level prior to that. Normal BG and insulin level at the end of the test.  Checked CMP, CBC, TSH and hemoglobin A1c at the time of appointment and results were unremarkable except for prediabetes.  Of note, no signs and symptoms suggestive of adrenal insufficiency therefore screening not pursued.  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  The mixed meal test findings are suggestive of PHH.  Treatment plan: Dietary modification is the first treatment plan therefore we are arranging a follow-up with a nutritionist.  In addition, he might benefit from acarbose with meals.  Prescription for acarbose provided.  We will follow-up again in 3-4 months; earlier follow-up if symptoms worsen.      Return to clinic in  "3-4 months.        Cali Dougherty MD  Staff Endocrinologist    Division of Endocrinology and Diabetes      Subjective:         HPI: Bal Junior is a 68 year old male with history of concern for hypoglycemia  who is here to follow-up on results.  Few weeks ago, skiing (2 rounds), felt 'oozy, about to black out'. Couldn't stand up.  70/40 BP, glucose gels given, by the time transport showed up BP normal, BG was 117 after correction. A friend drove. Tired for two days after that. BG not checked recently; 71 hypoglycemia symptoms. Sneezing 30 minutes after eating. Over the last two months; more frequent, at least once per day, 'feeling down' after eating within one  Hour, BG not checked during this episodes. Fasting state no symptoms. Minor symptoms since 2010 but now has worsened.    Minor episodes, dumping syndrome many years ago   3 hours after eating.   Nissen fundoplication redo 2020   Peripheral neuropathy 50 years ago. B12 was low. On gabapentin.   Omeprazole for 20 years.     Weight-191-192; now 187.   No symtpoms if not eating.     Mixed meal test on 2/2/23 progress note.   \"Last ate at 8 pm on 2/1/2023. Light meal.  Patient identification verified by name and date of birth.  Assessment completed.  Vitals recorded in Doc Flowsheets.  Patient was provided with education regarding medication/procedure and possible side effects.  Patient verbalized understanding.      present during visit today: Not Applicable.     Vascular access: peripheral IV was placed by vascular access nurse. Used for all lab draws.     Treatment Conditions:  DIET EFFECTIVE NOW, starting today at 0854, Until Specified   Food Preferences:  Other - please comment 1. Provide a HIGH CHO meal of 8 oz orange juice, 6 oz Yoplait fat free fruit flavored yogurt (11 total carbs) , 1 slice bread or toast with 1 tsp margarine and 2 tsp jam was sugar free (6 total carbs).      Baseline vs: RR 16, P 57 /73 Labs at " "0829  Start meal at 0830 patient ate the entire HIGH CHO meal within 10 minutes.     Time zero 0830  +15 0845 RR 16 P 57 /76 light headed  +30 0900 RR 18 P 59 /71 59 light headed, pt felt he could not stand if he tried  +45 0915 RR 18 P 67 /70 67 light headed dizzy feels disconnected. RN noted a change if pt's color he was very pale.  +60 0930 RR 16 P 72 /74 still having symtomes but they were starting to improve.       +90 1000 RR 16 P 68 /63 symptoms improving. Feels exhausted.   +120 1030 RR 16 P 63  78 Back to base line except feeling exhausted. RN noted color improving  +180 1130 RR 16 P58 /73 baseline. Color baseline  +240 1230 RR 16 P 64 /69 Baseline\"    Component      Latest Ref Rng & Units 2/2/2023 2/2/2023 2/2/2023 2/2/2023           8:28 AM  8:45 AM  9:00 AM  9:01 AM   Glucose      70 - 99 mg/dL 115 (H) 129 (H) 152 (H)    Insulin      2.6 - 24.9 uU/mL 4.6 8.7 26.9 (H)    GLUCOSE BY METER POCT      70 - 99 mg/dL    138 (H)     Component      Latest Ref Rng & Units 2/2/2023 2/2/2023 2/2/2023 2/2/2023           9:14 AM  9:15 AM  9:30 AM  9:31 AM   Glucose      70 - 99 mg/dL  183 (H) 187 (H)    Insulin      2.6 - 24.9 uU/mL  213.0 (H) 632.0 (H)    GLUCOSE BY METER POCT      70 - 99 mg/dL 136 (H)   162 (H)     Component      Latest Ref Rng & Units 2/2/2023 2/2/2023 2/2/2023 2/2/2023          10:00 AM 10:30 AM 11:30 AM 12:30 PM   Glucose      70 - 99 mg/dL 79 68 (L) 84 91   Insulin      2.6 - 24.9 uU/mL 91.9 (H) 23.6 10.2 6.3   GLUCOSE BY METER POCT      70 - 99 mg/dL         Answers for HPI/ROS submitted by the patient on 2/8/2023  General Symptoms: No  Skin Symptoms: No  HENT Symptoms: No  EYE SYMPTOMS: No  HEART SYMPTOMS: No  LUNG SYMPTOMS: No  INTESTINAL SYMPTOMS: Yes  URINARY SYMPTOMS: No  REPRODUCTIVE SYMPTOMS: No  SKELETAL SYMPTOMS: No  BLOOD SYMPTOMS: No  NERVOUS SYSTEM SYMPTOMS: No  MENTAL HEALTH SYMPTOMS: No  Heart burn or indigestion: Yes  Nausea: " No  Vomiting: No  Abdominal pain: No  Constipation: No  Diarrhea: Yes  Blood in stool: No  Black stools: No  Rectal or Anal pain: No  Fecal incontinence: No  Yellowing of skin or eyes: No  Vomit with blood: No  Change in stools: No           Allergies   Allergen Reactions     Hornets      Wasp Venom Protein Hives     Bee Venom Swelling       Current Outpatient Medications   Medication Sig Dispense Refill     atenolol (TENORMIN) 50 MG tablet Take 50 mg by mouth daily        Continuous Blood Gluc Sensor (FREESTYLE SIMIN 2 SENSOR) MISC 1 each every 14 days Use 1 sensor every 14 days. Use to read blood sugars per 's instructions. 2 each 5     cyanocobalamin (VITAMIN B-12) 500 MCG tablet daily        EPINEPHrine (ANY BX GENERIC EQUIV) 0.3 MG/0.3ML injection 2-pack 0.3 mg       gabapentin (NEURONTIN) 300 MG capsule Take 1 capsule (300 mg) by mouth 3 times daily 270 capsule 3     nortriptyline (PAMELOR) 25 MG capsule Take 25 mg by mouth daily          Review of Systems     11 point review system (Constitutional, HENT, Eyes, Respiratory, Cardiovascular, Gastrointestinal, Genitourinary, Musculoskeletal,Neurological, Psychiatric/Behavioural, Endocrine) is negative or is as per HPI above    Past Medical History:   Diagnosis Date     Hypertension      Neuritis      Peripheral neuropathy      Personal history of other medical treatment     postgastrectomy dumping syndrome     Stomach problems Noted earlier        Past Surgical History:   Procedure Laterality Date     ABDOMEN SURGERY  2012?     APPENDECTOMY  1964?     COLONOSCOPY  2006, 2016     EYE SURGERY  199x    laser for retinal tears     GASTRIC FUNDOPLICATION       HERNIA REPAIR  1961?     LAPAROSCOPIC TAKEDOWN NISSEN FUNDOPLICATION N/A 9/25/2020    Procedure: TAKE-DOWN, FUNDOPLICATION, REDO NISSEN, LAPAROSCOPIC, gastrostomy feeding tube placement;  Surgeon: Katlyn Tobar MD;  Location: [facility] OR     MI ESOPHAGOGASTRODUODENOSCOPY TRANSORAL DIAGNOSTIC N/A 5/9/2019     Procedure: UPPER GASTROINTESTINAL ENDOSCOPY;  Surgeon: Dino Ward MD;  Location: [facility];  Service: General     SOFT TISSUE SURGERY  2009?    MCL l. thumb     Objective:     Wt Readings from Last 10 Encounters:   01/18/23 87.5 kg (193 lb)   01/17/23 87.8 kg (193 lb 9.6 oz)   06/24/22 90.3 kg (199 lb)   11/24/21 92.1 kg (203 lb)   02/02/21 96.2 kg (212 lb)   02/02/21 94.3 kg (208 lb)   10/28/20 96.2 kg (212 lb)   10/09/20 92.5 kg (204 lb)   09/27/20 99.2 kg (218 lb 11.1 oz)   09/01/20 96.5 kg (212 lb 12.8 oz)   previously documented below   Constitutional: Pleasant no acute cardiopulmonary distress.   EYES: anicteric, normal extra-ocular movements, no lid lag or retraction.  Cardiovascular: RRR, S1, S2 normal.   Pulmonary/Chest: CTAB. No wheezing or rales.   Abdominal: +BS. Non tender to palpation.  Stretch marks: none. Hernia.   Neurological: Alert and oriented.  No tremor and reflexes are symmetrical bilaterally and within the normal limits. Muscle strength 5/5.   Extremities: No edema.  Psychological: appropriate mood and affect   In House Labs:       TSH   Date Value Ref Range Status   01/18/2023 1.02 0.30 - 4.20 uIU/mL Final   08/05/2019 1.06 0.30 - 5.00 uIU/mL Final       Creatinine   Date Value Ref Range Status   01/18/2023 0.94 0.67 - 1.17 mg/dL Final   09/27/2020 0.82 0.66 - 1.25 mg/dL Final       Component      Latest Ref Rng & Units 2/2/2023 2/2/2023 2/2/2023 2/2/2023           9:14 AM  9:15 AM  9:30 AM  9:31 AM   Sodium      136 - 145 mmol/L       Potassium      3.4 - 5.3 mmol/L       Chloride      98 - 107 mmol/L       Carbon Dioxide (CO2)      22 - 29 mmol/L       Anion Gap      7 - 15 mmol/L       Urea Nitrogen      8.0 - 23.0 mg/dL       Creatinine      0.67 - 1.17 mg/dL       Calcium      8.8 - 10.2 mg/dL       Glucose      70 - 99 mg/dL  183 (H) 187 (H)    Alkaline Phosphatase      40 - 129 U/L       AST      10 - 50 U/L       ALT      10 - 50 U/L       Protein Total      6.4 -  8.3 g/dL       Albumin      3.5 - 5.2 g/dL       Bilirubin Total      <=1.2 mg/dL       GFR Estimate      >60 mL/min/1.73m2       WBC      4.0 - 11.0 10e3/uL       RBC Count      4.40 - 5.90 10e6/uL       Hemoglobin      13.3 - 17.7 g/dL       Hematocrit      40.0 - 53.0 %       MCV      78 - 100 fL       MCH      26.5 - 33.0 pg         Component      Latest Ref Rng & Units 2/2/2023          12:30 PM   Sodium      136 - 145 mmol/L    Potassium      3.4 - 5.3 mmol/L    Chloride      98 - 107 mmol/L    Carbon Dioxide (CO2)      22 - 29 mmol/L    Anion Gap      7 - 15 mmol/L    Urea Nitrogen      8.0 - 23.0 mg/dL    Creatinine      0.67 - 1.17 mg/dL    Calcium      8.8 - 10.2 mg/dL    Glucose      70 - 99 mg/dL 91   Alkaline Phosphatase      40 - 129 U/L    AST      10 - 50 U/L    ALT      10 - 50 U/L    Protein Total      6.4 - 8.3 g/dL    Albumin      3.5 - 5.2 g/dL    Bilirubin Total      <=1.2 mg/dL    GFR Estimate      >60 mL/min/1.73m2    WBC      4.0 - 11.0 10e3/uL    RBC Count      4.40 - 5.90 10e6/uL    Hemoglobin      13.3 - 17.7 g/dL    Hematocrit      40.0 - 53.0 %    MCV      78 - 100 fL    MCH      26.5 - 33.0 pg    MCHC      31.5 - 36.5 g/dL    RDW      10.0 - 15.0 %    Platelet Count      150 - 450 10e3/uL    TSH      0.30 - 4.20 uIU/mL    Hemoglobin A1C      <5.7 %    Insulin      2.6 - 24.9 uU/mL 6.3   GLUCOSE BY METER POCT      70 - 99 mg/dL      Component      Latest Ref Rng & Units 1/18/2023   Sodium      136 - 145 mmol/L 140   Potassium      3.4 - 5.3 mmol/L 5.2   Chloride      98 - 107 mmol/L 106   Carbon Dioxide (CO2)      22 - 29 mmol/L 26   Anion Gap      7 - 15 mmol/L 8   Urea Nitrogen      8.0 - 23.0 mg/dL 14.8   Creatinine      0.67 - 1.17 mg/dL 0.94   Calcium      8.8 - 10.2 mg/dL 9.5   Glucose      70 - 99 mg/dL 111 (H)   Alkaline Phosphatase      40 - 129 U/L 86   AST      10 - 50 U/L 18   ALT      10 - 50 U/L 10   Protein Total      6.4 - 8.3 g/dL 7.1   Albumin      3.5 - 5.2 g/dL 4.2    Bilirubin Total      <=1.2 mg/dL 0.5   GFR Estimate      >60 mL/min/1.73m2 89   WBC      4.0 - 11.0 10e3/uL 6.9   RBC Count      4.40 - 5.90 10e6/uL 4.91   Hemoglobin      13.3 - 17.7 g/dL 15.1   Hematocrit      40.0 - 53.0 % 44.9   MCV      78 - 100 fL 91   MCH      26.5 - 33.0 pg 30.8   MCHC      31.5 - 36.5 g/dL 33.6   RDW      10.0 - 15.0 % 12.2   Platelet Count      150 - 450 10e3/uL 274   TSH      0.30 - 4.20 uIU/mL 1.02   Hemoglobin A1C      <5.7 % 5.9 (H)     This note has been dictated using voice recognition software.  As a result, there may be errors in the documentation that have gone undetected.  Please consider this when interpreting information in this documentation.                           This note has been dictated using voice recognition software.  As a result, there may be errors in the documentation that have gone undetected.  Please consider this when interpreting information in this documentation.    Total telephone time= 9 minutes. 34 minutes spent on the date of the encounter doing CGM review, history, documentation and further activities per the note.    Cali Dougherty MD

## 2023-02-15 ENCOUNTER — OFFICE VISIT (OUTPATIENT)
Dept: NEUROLOGY | Facility: CLINIC | Age: 68
End: 2023-02-15
Payer: COMMERCIAL

## 2023-02-15 VITALS
HEIGHT: 70 IN | DIASTOLIC BLOOD PRESSURE: 74 MMHG | HEART RATE: 53 BPM | SYSTOLIC BLOOD PRESSURE: 124 MMHG | BODY MASS INDEX: 28.2 KG/M2 | WEIGHT: 197 LBS

## 2023-02-15 DIAGNOSIS — G60.9 IDIOPATHIC PERIPHERAL NEUROPATHY: Primary | ICD-10-CM

## 2023-02-15 DIAGNOSIS — G62.9 NEUROPATHY: ICD-10-CM

## 2023-02-15 PROCEDURE — 99214 OFFICE O/P EST MOD 30 MIN: CPT | Performed by: PSYCHIATRY & NEUROLOGY

## 2023-02-15 RX ORDER — GABAPENTIN 300 MG/1
300 CAPSULE ORAL 3 TIMES DAILY
Qty: 270 CAPSULE | Refills: 3 | Status: SHIPPED | OUTPATIENT
Start: 2023-02-15 | End: 2023-10-30

## 2023-02-15 NOTE — LETTER
"    2/15/2023         RE: Bal Junior  [address] [place] [address]        Dear Colleague,    Thank you for referring your patient, Bal Junior, to the [facility] NEUROLOGY [facility]IN[facility] [place]. Please see a copy of my visit note below.    In person evaluation    HPI  9/3/2019, in person visit  2/2/2021, video visit  11/24/2021, in person visit  6/24/2022, in person visit  2/15/2023, in person visit          68-year-old followed neurologically for:  Paresthesias  Sensory neuropathy  Right CTS mild to moderate  Relatively brisk reflexes in relationship to concern with neuropathy      Patient sensory neuropathy seems to be stable  Has numbness and tingling in fingers and toes but it does seem to bother him as much  Previously had some left foot heel pain more so than right  In the past that sounded much more like a planter fasciitis    Feels that the gabapentin 300 mg 3 times daily keeps the symptoms and check  No swelling in the feet no excessive tiredness    Has some Raynaud's phenomenon with cold hands  Is also an avid ski year  Previously had MRI scan cervical spine in January 2022 no significant cervical myelopathy      Patient has had some \"dumping type symptomatology from his GI problem and it was talking the other physicians in that regard  Was out skiing in [facility] January 2023  Was on the ski lift and got very \"woozy\" which was more of a lightheadedness no chest pain  X-ray let him ski down that he will but then could not get back up when he was at the bottom  They did give him glucose and after the glucose treatment his glucose went up to 70  Primary is working him up for difficulty with his \"dumping syndrome periodic hypoglycemia possible inappropriate insulin secretion      A.  Sensory neuropathy       Onset in 2009       Patient with paresthesias of his toes and fingers       In the past had some paresthesias of the tongue       Distribution is predominantly stocking glove a " little bit of the tips of the ear and tips of the tongue       No bowel or bladder difficulty       No trouble with erections       No significant back or neck pain       No balance problems can ski and walk in the dark with no difficulty         Does go skiing and his hands get fairly cold question some autonomic instability distally    B.  History of GERD        Nissen fundoplication procedure September 2020      C.  Patient with a fall in September 2021 has had no loss of consciousness       Seen in the ER had a cervical spine x-ray with no fracture but a lot of degenerative disease       Feels a after he had a concussion balance is not as good as it used to be      Still an avid skier      His neurologic difficulties include:  1. Numbness and tingling and dysesthesias of his hands and feet, sometimes involves the tip of his tongue and nose, comes intermittently. Seemed to be somewhat better with Neurontin, did not seem to be very progressive.  2. Mild carpal tunnel syndrome, works on computers, not getting worse the last time I saw him, last EMG though was in November 2009.  3. Symptoms slightly better with B12 even though his B12 was not significantly low.  4. Past history of Nissen fundoplication with GERD nine-plus years ago and with increased GERD recently when I saw him in July 2018.  5. Past history of dumping syndrome which occurred more so after breakfast and takes nortriptyline through GI specialists for that.  6. Patient did have  Lyme's tick bite back in 2018 treated with antibiotics for 1 month      Past medical history  Sensory neuropathy, onset 2009  Hypertension  GERD status post Nissen fundoplication, repeat surgery February 2020  Dumping syndrome  Carpal tunnel syndrome right    Habits  Does not smoke  Occasional alcoholic beverage, 3/week      Family history is positive for:  1. Father with gallbladder removed.  2. Mother with uterine cancer, hypertension, and polio.  3. Maternal grandfather   with MI.     Work-up summary  MRI scan head 10/16/2009 with and without contrast normal  EEG on 2013 was normal.  EMG in  carpal tunnel syndrome, did not want surgery.  Laboratory data in  for small fiber neuropathy included negative GALOP antibodies, negative MAG antibodies.  Negative anti-sulfatide antibodies. Anti-Hu was negative. Hepatic profile normal, sedimentation rate 5, MAYELA, rheumatoid factor, Lyme s titers negative, serum protein immunoelectrophoresis negative, SSA/SSB were negative,     methylmalonic acid was 0.11 with a folic acid of 15.2 and B12 of 538.  Repeat laboratory data shows        immune electrophoresis negative       B12 630, TSH 1.06       SSA and SSB negative        Lyme's titer negative  EMG examination shows 2019       Right carpal tunnel syndrome mild to moderate in degree slightly increased chronic motor changes compared to        Sensory neuropathy with some slight change in amplitudes but sensory snap potentials are still present at the sural superficial peroneal ulnar and median nerves  Laboratory review  Labs 2021  Sodium 142 potassium 4.3, BUN 16 creatinine 1.07  Glucose 90  AST 19  2021 C-spine x-ray   IMPRESSION: No fracture.  Normal vertebral heights and alignment. Severe C4-C5 through C6-C7 interbody degeneration. No advanced facet arthropathy. Normal open mouth view.    MRI scan cervical spine 2022  C3-4, severe right, moderate left foraminal narrowing no canal narrowing  C4-5 severe bilateral foraminal narrowing, no canal narrowing  C5-6, severe bilateral foraminal narrowing, mild canal narrowing  C6-7 moderate bilateral foraminal narrowing, no canal narrowing  C7-T1, moderate right foraminal narrowing, no canal narrowing  At the C5/C6 level, there is a broad bar disc osteophyte complex with mild spinal canal narrowing.        Review of Systems   Pertinent positives and negatives    No headache no chest pain or shortness  of breath no nausea vomiting no diarrhea no fever chills    Does have some neck pain but not radicular    Balance is a little bit more off than before  Blames it on the concussion that he had in September    No diplopia dysarthria dysphagia    Does have some paresthesias that involve both the nose fingertips and toes    No significant back pain  No bowel or bladder difficulty  No trouble with erections    Has some pain on the left heel  Some pain in the right arch  They are both intermittent and occasional last time was 3 months ago    Otherwise review of systems negative    General exam  Alert oriented x3  Blood pressure 124/74, pulse 53  Lungs clear  Heart rate regular  Abdomen soft  Symmetrical pulses  No edema feet      Neurologic exam  Alert oriented x3  Normal prosody of speech  Normal naming  Normal comprehension  Normal repetition  No aphasia  No neglect  Normal memory recall      Cranial nerves II through XII normal  No ophthalmoplegia  No nystagmus  Visual fields intact  Face is symmetrical  Tongue twisters good    Upper extremities reported right over left  Deltoid 5/5  Biceps 5/5  Triceps 5/5  Wrist extensors and flexors 5/5  Finger extensors and flexors 5/5    No drift  No incoordination      Lower extremities reported right over left  Iliopsoas 5/5  Quadriceps 5/5  Hamstrings 5/5  Anterior tibial 5/5  Gastrocnemius 5/5    No actual spasticity  Negative straight leg raising    Reflexes brisk  Biceps 3/3  Triceps 3/3  Knees 3/3  Ankles 2/2    Toes downgoing  Negative Sorenson reflex  No spasticity    Gait  Normal on the flat  Slight difficulty with balance when he is up on his tippy toes or heels  Negative Romberg    Sensory exam  Decreased pinprick stocking distribution in the foot to the mid shin  Some increased hypersensitivity at mid shin of pinprick  Temperature appreciation decreased to mid shin  Vibration decreased to the ankle                  Assessment/Plan     1.  CTS (Carpal Tunnel Syndrome)  (G56.01)        Patient still with a mild to moderate right carpal tunnel syndrome on his EMG       2.  Extremity numbness (R20.0)         Probably has a sensory neuropathy subtle with some slight change in the sensory snap potentials comparing [id number], a 10-year difference     3.  Sensory neuropathy (G60.8)        Relatively stable with no changes in exam or changes in EMG or lab testing from 4994-2850, but still follow clinically        May have some mild autonomic instability a Raynaud's type phenomenon        4.  Sensory neuropathy (G62.9)          5.  Brisk reflexes degenerative disease on plain x-ray of the neck September 2021       Rule out myelopathy with MRI of the neck      MRI scan cervical spine 1/7/2022      C3-4, severe right, moderate left foraminal narrowing no canal narrowing      C4-5 severe bilateral foraminal narrowing, no canal narrowing      C5-6, severe bilateral foraminal narrowing, mild canal narrowing      C6-7 moderate bilateral foraminal narrowing, no canal narrowing      C7-T1, moderate right foraminal narrowing, no canal narrowing       At the C5/C6 level, there is a broad bar disc osteophyte complex with mild spinal canal narrowing.        if in the future he was more hyperreflexive we could consider checking a scan of his neck but without significant neck complaints I would hold off on repeating this at this time.    6.  Fall with hitting his head, August 24, 2021 possible mild concussion but no loss of consciousness seen in the ER    7.  Dumping syndrome with hypoglycemia       [place] to be from his fundoplication       Concerned whether there is some autonomic instability also although       We will monitor    At this time, he has:    1. Paresthesias more of a sensory neuropathy with negative workup.  2. Rather brisk reflexes, but no long tract findings of myelopathy.  3. Empirically on B12 replacement.  4. Neurontin 300 mg 3 times daily   5. Talked about the difference between  negative and positive symptoms.  6. Talked about good foot care since he has numbness, but he needs to be careful about injuries, frostbite, or other difficulties.  7. Repeat labs and EMG fall 2019 stable  8.  Patient is an avid skier we talked about the fact that he should avoid frostbite which could exacerbate his sensory neuropathy      Medications refilled  He still the skiing he should use good foot wear if he is out in the cold    Patient follow-up in October 2023    Total care time today 32 minutes discussing and evaluating the above.    As part of the visit  I reviewed chart notes 2/8/2023 and 1/18/2023          Again, thank you for allowing me to participate in the care of your patient.        Sincerely,        Yo Millan MD

## 2023-02-15 NOTE — PROGRESS NOTES
"In person evaluation    HPI  9/3/2019, in person visit  2/2/2021, video visit  11/24/2021, in person visit  6/24/2022, in person visit  2/15/2023, in person visit          68-year-old followed neurologically for:  Paresthesias  Sensory neuropathy  Right CTS mild to moderate  Relatively brisk reflexes in relationship to concern with neuropathy      Patient sensory neuropathy seems to be stable  Has numbness and tingling in fingers and toes but it does seem to bother him as much  Previously had some left foot heel pain more so than right  In the past that sounded much more like a planter fasciitis    Feels that the gabapentin 300 mg 3 times daily keeps the symptoms and check  No swelling in the feet no excessive tiredness    Has some Raynaud's phenomenon with cold hands  Is also an avid ski year  Previously had MRI scan cervical spine in January 2022 no significant cervical myelopathy      Patient has had some \"dumping type symptomatology from his GI problem and it was talking the other physicians in that regard  Was out skiing in [facility] January 2023  Was on the ski lift and got very \"woozy\" which was more of a lightheadedness no chest pain  X-ray let him ski down that he will but then could not get back up when he was at the bottom  They did give him glucose and after the glucose treatment his glucose went up to 70  Primary is working him up for difficulty with his \"dumping syndrome periodic hypoglycemia possible inappropriate insulin secretion      A.  Sensory neuropathy       Onset in 2009       Patient with paresthesias of his toes and fingers       In the past had some paresthesias of the tongue       Distribution is predominantly stocking glove a little bit of the tips of the ear and tips of the tongue       No bowel or bladder difficulty       No trouble with erections       No significant back or neck pain       No balance problems can ski and walk in the dark with no difficulty         Does go " skiing and his hands get fairly cold question some autonomic instability distally    B.  History of GERD        Nissen fundoplication procedure 2020      C.  Patient with a fall in 2021 has had no loss of consciousness       Seen in the ER had a cervical spine x-ray with no fracture but a lot of degenerative disease       Feels a after he had a concussion balance is not as good as it used to be      Still an avid skier      His neurologic difficulties include:  1. Numbness and tingling and dysesthesias of his hands and feet, sometimes involves the tip of his tongue and nose, comes intermittently. Seemed to be somewhat better with Neurontin, did not seem to be very progressive.  2. 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  3. Symptoms slightly better with B12 even though his B12 was not significantly low.  4. Past history of Nissen fundoplication with GERD nine-plus years ago and with increased GERD recently when I saw him in 2018.  5. Past history of dumping syndrome which occurred more so after breakfast and takes nortriptyline through GI specialists for that.  6. Patient did have  Lyme's tick bite back in 2018 treated with antibiotics for 1 month      Past medical history  Sensory neuropathy, onset   Hypertension  GERD status post Nissen fundoplication, repeat surgery 2020  Dumping syndrome  Carpal tunnel syndrome right    Habits  Does not smoke  Occasional alcoholic beverage, 3/week      Family history is positive for:  1. Father with gallbladder removed.  2. Mother with uterine cancer, hypertension, and polio.  3. Maternal grandfather  with MI.     Work-up summary  MRI scan head 10/16/2009 with and without contrast normal  EEG on 2013 was normal.  EMG in  carpal tunnel syndrome, did not want surgery.  Laboratory data in  for small fiber neuropathy included negative  GALOP antibodies, negative MAG antibodies.  Negative anti-sulfatide antibodies. Anti-Hu was negative. Hepatic profile normal, sedimentation rate 5, MAYELA, rheumatoid factor, Lyme s titers negative, serum protein immunoelectrophoresis negative, SSA/SSB were negative,     methylmalonic acid was 0.11 with a folic acid of 15.2 and B12 of 538.  Repeat laboratory data shows 2019       immune electrophoresis negative       B12 630, TSH 1.06       SSA and SSB negative        Lyme's titer negative  EMG examination shows September 2019       Right carpal tunnel syndrome mild to moderate in degree slightly increased chronic motor changes compared to 2009       Sensory neuropathy with some slight change in amplitudes but sensory snap potentials are still present at the sural superficial peroneal ulnar and median nerves  Laboratory review  Labs 8/5/2021  Sodium 142 potassium 4.3, BUN 16 creatinine 1.07  Glucose 90  AST 19  8/24/2021 C-spine x-ray   IMPRESSION: No fracture.  Normal vertebral heights and alignment. Severe C4-C5 through C6-C7 interbody degeneration. No advanced facet arthropathy. Normal open mouth view.    MRI scan cervical spine 1/7/2022  C3-4, severe right, moderate left foraminal narrowing no canal narrowing  C4-5 severe bilateral foraminal narrowing, no canal narrowing  C5-6, severe bilateral foraminal narrowing, mild canal narrowing  C6-7 moderate bilateral foraminal narrowing, no canal narrowing  C7-T1, moderate right foraminal narrowing, no canal narrowing  At the C5/C6 level, there is a broad bar disc osteophyte complex with mild spinal canal narrowing.        Review of Systems   Pertinent positives and negatives    No headache no chest pain or shortness of breath no nausea vomiting no diarrhea no fever chills    Does have some neck pain but not radicular    Balance is a little bit more off than before  Blames it on the concussion that he had in September    No diplopia dysarthria dysphagia    Does have some  paresthesias that involve both the nose fingertips and toes    No significant back pain  No bowel or bladder difficulty  No trouble with erections    Has some pain on the left heel  Some pain in the right arch  They are both intermittent and occasional last time was 3 months ago    Otherwise review of systems negative    General exam  Alert oriented x3  Blood pressure 124/74, pulse 53  Lungs clear  Heart rate regular  Abdomen soft  Symmetrical pulses  No edema feet      Neurologic exam  Alert oriented x3  Normal prosody of speech  Normal naming  Normal comprehension  Normal repetition  No aphasia  No neglect  Normal memory recall      Cranial nerves II through XII normal  No ophthalmoplegia  No nystagmus  Visual fields intact  Face is symmetrical  Tongue twisters good    Upper extremities reported right over left  Deltoid 5/5  Biceps 5/5  Triceps 5/5  Wrist extensors and flexors 5/5  Finger extensors and flexors 5/5    No drift  No incoordination      Lower extremities reported right over left  Iliopsoas 5/5  Quadriceps 5/5  Hamstrings 5/5  Anterior tibial 5/5  Gastrocnemius 5/5    No actual spasticity  Negative straight leg raising    Reflexes brisk  Biceps 3/3  Triceps 3/3  Knees 3/3  Ankles 2/2    Toes downgoing  Negative Sorenson reflex  No spasticity    Gait  Normal on the flat  Slight difficulty with balance when he is up on his tippy toes or heels  Negative Romberg    Sensory exam  Decreased pinprick stocking distribution in the foot to the mid shin  Some increased hypersensitivity at mid shin of pinprick  Temperature appreciation decreased to mid shin  Vibration decreased to the ankle                  Assessment/Plan     1.  CTS (Carpal Tunnel Syndrome) (G56.01)        Patient still with a mild to moderate right carpal tunnel syndrome on his EMG       2.  Extremity numbness (R20.0)         Probably has a sensory neuropathy subtle with some slight change in the sensory snap potentials comparing [id number], a  10-year difference     3.  Sensory neuropathy (G60.8)        Relatively stable with no changes in exam or changes in EMG or lab testing from 8064-1035, but still follow clinically        May have some mild autonomic instability a Raynaud's type phenomenon        4.  Sensory neuropathy (G62.9)          5.  Brisk reflexes degenerative disease on plain x-ray of the neck September 2021       Rule out myelopathy with MRI of the neck      MRI scan cervical spine 1/7/2022      C3-4, severe right, moderate left foraminal narrowing no canal narrowing      C4-5 severe bilateral foraminal narrowing, no canal narrowing      C5-6, severe bilateral foraminal narrowing, mild canal narrowing      C6-7 moderate bilateral foraminal narrowing, no canal narrowing      C7-T1, moderate right foraminal narrowing, no canal narrowing       At the C5/C6 level, there is a broad bar disc osteophyte complex with mild spinal canal narrowing.        if in the future he was more hyperreflexive we could consider checking a scan of his neck but without significant neck complaints I would hold off on repeating this at this time.    6.  Fall with hitting his head, August 24, 2021 possible mild concussion but no loss of consciousness seen in the ER    7.  Dumping syndrome with hypoglycemia       [place] to be from his fundoplication       Concerned whether there is some autonomic instability also although       We will monitor    At this time, he has:    1. Paresthesias more of a sensory neuropathy with negative workup.  2. Rather brisk reflexes, but no long tract findings of myelopathy.  3. Empirically on B12 replacement.  4. Neurontin 300 mg 3 times daily   5. Talked about the difference between negative and positive symptoms.  6. Talked about good foot care since he has numbness, but he needs to be careful about injuries, frostbite, or other difficulties.  7. Repeat labs and EMG fall 2019 stable  8.  Patient is an avid skier we talked about the fact  that he should avoid frostbite which could exacerbate his sensory neuropathy      Medications refilled  He still the skiing he should use good foot wear if he is out in the cold    Patient follow-up in October 2023    Total care time today 32 minutes discussing and evaluating the above.    As part of the visit  I reviewed chart notes 2/8/2023 and 1/18/2023

## 2023-02-15 NOTE — NURSING NOTE
Chief Complaint   Patient presents with     NEUROPATHY     8 month follow up. Numbness/tingling in fingers/toes feels it has improved slightly/stable.     Perla Samuels LPN on 2/15/2023 at 10:27 AM

## 2023-02-20 ENCOUNTER — TELEPHONE (OUTPATIENT)
Dept: ENDOCRINOLOGY | Facility: CLINIC | Age: 68
End: 2023-02-20
Payer: COMMERCIAL

## 2023-02-20 ENCOUNTER — LAB REQUISITION (OUTPATIENT)
Dept: LAB | Facility: CLINIC | Age: 68
End: 2023-02-20
Payer: MEDICARE

## 2023-02-20 DIAGNOSIS — K91.1 DUMPING SYNDROME: ICD-10-CM

## 2023-02-20 DIAGNOSIS — Z93.1 S/P NISSEN FUNDOPLICATION (WITH GASTROSTOMY TUBE PLACEMENT) (H): ICD-10-CM

## 2023-02-20 DIAGNOSIS — J02.9 ACUTE PHARYNGITIS, UNSPECIFIED: ICD-10-CM

## 2023-02-20 DIAGNOSIS — E16.2 HYPOGLYCEMIA: ICD-10-CM

## 2023-02-20 PROCEDURE — 87081 CULTURE SCREEN ONLY: CPT | Mod: ORL | Performed by: PHYSICIAN ASSISTANT

## 2023-02-20 NOTE — TELEPHONE ENCOUNTER
[facility] Call Center    Phone Message    May a detailed message be left on voicemail: yes     Reason for Call: Medication Question or concern regarding medication   Prescription Clarification  Name of Medication: Continuous Blood Gluc Sensor (FREESTYLE SIMIN 2 SENSOR) MISC -    Prescribing Provider:    Pharmacy: [facility] - [place] [address]   What on the order needs clarification?       Per pharmacy would like us to know [place] and is asking if a 3 months fill would be okay since at this time the RX is only a 1 month fill. Per pharmacy also has check with insurance and it looks like things went smoothly.  Please call pharmacy back asap.          Action Taken: Message routed to:  Other: ENDO    Travel Screening: Not Applicable                                                                         lower L/lower R

## 2023-02-22 LAB — BACTERIA SPEC CULT: NORMAL

## 2023-04-10 NOTE — TELEPHONE ENCOUNTER
Called the patient to confirm he needed the appointment in the GI clinic with Dr Quezada    Left my direct number for call back   Cimzia Pregnancy And Lactation Text: This medication crosses the placenta but can be considered safe in certain situations. Cimzia may be excreted in breast milk.

## 2023-04-19 ENCOUNTER — MYC MEDICAL ADVICE (OUTPATIENT)
Dept: ENDOCRINOLOGY | Facility: CLINIC | Age: 68
End: 2023-04-19
Payer: COMMERCIAL

## 2023-05-07 ENCOUNTER — HEALTH MAINTENANCE LETTER (OUTPATIENT)
Age: 68
End: 2023-05-07

## 2023-05-16 ASSESSMENT — ENCOUNTER SYMPTOMS
NAUSEA: 0
BOWEL INCONTINENCE: 0
BLOATING: 0
BLOOD IN STOOL: 0
RECTAL PAIN: 0
CONSTIPATION: 0
VOMITING: 0
DIARRHEA: 1
ABDOMINAL PAIN: 0
HEARTBURN: 0

## 2023-05-17 ENCOUNTER — OFFICE VISIT (OUTPATIENT)
Dept: ENDOCRINOLOGY | Facility: CLINIC | Age: 68
End: 2023-05-17
Payer: COMMERCIAL

## 2023-05-17 VITALS
HEIGHT: 70 IN | HEART RATE: 63 BPM | BODY MASS INDEX: 27.35 KG/M2 | SYSTOLIC BLOOD PRESSURE: 121 MMHG | WEIGHT: 191 LBS | DIASTOLIC BLOOD PRESSURE: 78 MMHG | OXYGEN SATURATION: 99 %

## 2023-05-17 DIAGNOSIS — R73.03 PREDIABETES: Primary | ICD-10-CM

## 2023-05-17 DIAGNOSIS — E16.2 HYPOGLYCEMIA: ICD-10-CM

## 2023-05-17 DIAGNOSIS — K91.1 DUMPING SYNDROME: ICD-10-CM

## 2023-05-17 LAB — HBA1C MFR BLD: 5.9 % (ref 4.3–?)

## 2023-05-17 PROCEDURE — 99214 OFFICE O/P EST MOD 30 MIN: CPT | Performed by: INTERNAL MEDICINE

## 2023-05-17 PROCEDURE — 83036 HEMOGLOBIN GLYCOSYLATED A1C: CPT | Performed by: INTERNAL MEDICINE

## 2023-05-17 RX ORDER — LANCETS
EACH MISCELLANEOUS
Qty: 100 EACH | Refills: 6 | Status: SHIPPED | OUTPATIENT
Start: 2023-05-17

## 2023-05-17 RX ORDER — METFORMIN HCL 500 MG
500 TABLET, EXTENDED RELEASE 24 HR ORAL
Qty: 90 TABLET | Refills: 0 | Status: SHIPPED | OUTPATIENT
Start: 2023-05-17 | End: 2023-09-28

## 2023-05-17 NOTE — NURSING NOTE
"Chief Complaint   Patient presents with     Endocrine Problem     Hypoglycemia     Vital signs:      BP: 121/78 Pulse: 63     SpO2: 99 %     Height: 177.8 cm (5' 10\") Weight: 86.6 kg (191 lb)  Estimated body mass index is 27.41 kg/m  as calculated from the following:    Height as of this encounter: 1.778 m (5' 10\").    Weight as of this encounter: 86.6 kg (191 lb).          "

## 2023-05-17 NOTE — PROGRESS NOTES
Endocrinology Clinic Visit    Chief Complaint: Endocrine Problem (Hypoglycemia)     Information obtained from:Patient      Assessment/Treatment Plan:    Dumping syndrome of 10 years duration following Niesen fundoplication  Prediabetes A1c 5.9   Hypoglycemia symptoms with lowest BG 68 mg/dl following mixed meal test containing high CHO content  Federico continuous glucose monitor reviewed. Lowest BG was 68 mgl/dl. We completed mixed meal test with high CHO content and had symptoms of hypoglycemia following meal with lowest BG of 68 mg/dl and high insulin  Level prior to that. Normal BG and insulin level at the end of the test.  Checked CMP, CBC, TSH and hemoglobin A1c at the time of appointment and results were unremarkable except for prediabetes.  Of note, no signs and symptoms suggestive of adrenal insufficiency therefore screening not pursued.  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  The mixed meal test findings are suggestive of PHH.  Treatment plan: Dietary modification is the first treatment plan therefore we are arranging a follow-up with a nutritionist.  In addition, we tried acarbose however he did not tolerate it due to bloating.  Discussed other options off label use, decision was made to proceed with metformin low-dose.    Cali Dougherty MD  Staff Endocrinologist    Division of Endocrinology and Diabetes      Subjective:         HPI: Bal Junior is a 68 year old male with history of concern for hypoglycemia  who is here to follow-up on results.  Few weeks ago, skiing (2 rounds), felt 'oozy, about to black out'. Couldn't stand up.  70/40 BP, glucose gels given, by the time transport showed up BP normal, BG was 117 after correction. A friend drove. Tired for two days after that. BG not checked recently; 71 hypoglycemia symptoms. Sneezing 30 minutes after eating.  "Over the last two months; more frequent, at least once per day, 'feeling down' after eating within one  Hour, BG not checked during this episodes. Fasting state no symptoms. Minor symptoms since 2010 but now has worsened.    Minor episodes, dumping syndrome many years ago   3 hours after eating.   Nissen fundoplication redo 2020   Peripheral neuropathy 50 years ago. B12 was low. On gabapentin.   Omeprazole for 20 years.     Weight-191-192; now 187.   No symtpoms if not eating.     Mixed meal test on 2/2/23 progress note.   \"Last ate at 8 pm on 2/1/2023. Light meal.  Patient identification verified by name and date of birth.  Assessment completed.  Vitals recorded in Doc Flowsheets.  Patient was provided with education regarding medication/procedure and possible side effects.  Patient verbalized understanding.      present during visit today: Not Applicable.     Vascular access: peripheral IV was placed by vascular access nurse. Used for all lab draws.     Treatment Conditions:  DIET EFFECTIVE NOW, starting today at 0854, Until Specified   Food Preferences:  Other - please comment 1. Provide a HIGH CHO meal of 8 oz orange juice, 6 oz Yoplait fat free fruit flavored yogurt (11 total carbs) , 1 slice bread or toast with 1 tsp margarine and 2 tsp jam was sugar free (6 total carbs).      Baseline vs: RR 16, P 57 /73 Labs at 0829  Start meal at 0830 patient ate the entire HIGH CHO meal within 10 minutes.     Time zero 0830  +15 0845 RR 16 P 57 /76 light headed  +30 0900 RR 18 P 59 /71 59 light headed, pt felt he could not stand if he tried  +45 0915 RR 18 P 67 /70 67 light headed dizzy feels disconnected. RN noted a change if pt's color he was very pale.  +60 0930 RR 16 P 72 /74 still having symtomes but they were starting to improve.       +90 1000 RR 16 P 68 /63 symptoms improving. Feels exhausted.   +120 1030 RR 16 P 63  78 Back to base line except feeling exhausted. " "RN noted color improving  +180 1130 RR 16 P58 /73 baseline. Color baseline  +240 1230 RR 16 P 64 /69 Baseline\"    Component      Latest Ref Rng & Units 2/2/2023 2/2/2023 2/2/2023 2/2/2023           8:28 AM  8:45 AM  9:00 AM  9:01 AM   Glucose      70 - 99 mg/dL 115 (H) 129 (H) 152 (H)    Insulin      2.6 - 24.9 uU/mL 4.6 8.7 26.9 (H)    GLUCOSE BY METER POCT      70 - 99 mg/dL    138 (H)     Component      Latest Ref Rng & Units 2/2/2023 2/2/2023 2/2/2023 2/2/2023           9:14 AM  9:15 AM  9:30 AM  9:31 AM   Glucose      70 - 99 mg/dL  183 (H) 187 (H)    Insulin      2.6 - 24.9 uU/mL  213.0 (H) 632.0 (H)    GLUCOSE BY METER POCT      70 - 99 mg/dL 136 (H)   162 (H)     Component      Latest Ref Rng & Units 2/2/2023 2/2/2023 2/2/2023 2/2/2023          10:00 AM 10:30 AM 11:30 AM 12:30 PM   Glucose      70 - 99 mg/dL 79 68 (L) 84 91   Insulin      2.6 - 24.9 uU/mL 91.9 (H) 23.6 10.2 6.3   GLUCOSE BY METER POCT      70 - 99 mg/dL         Answers for HPI/ROS submitted by the patient on 5/16/2023  General Symptoms: No  Skin Symptoms: No  HENT Symptoms: No  EYE SYMPTOMS: No  HEART SYMPTOMS: No  LUNG SYMPTOMS: No  INTESTINAL SYMPTOMS: Yes  URINARY SYMPTOMS: No  REPRODUCTIVE SYMPTOMS: No  SKELETAL SYMPTOMS: No  BLOOD SYMPTOMS: No  NERVOUS SYSTEM SYMPTOMS: No  MENTAL HEALTH SYMPTOMS: No  Heart burn or indigestion: No  Nausea: No  Vomiting: No  Abdominal pain: No  Bloating: No  Constipation: No  Diarrhea: Yes  Blood in stool: No  Black stools: No  Rectal or Anal pain: No  Fecal incontinence: No  Vomit with blood: No  Change in stools: No         Allergies   Allergen Reactions     Hornets      Wasp Venom Protein Hives     Bee Venom Swelling       Current Outpatient Medications   Medication Sig Dispense Refill     atenolol (TENORMIN) 50 MG tablet Take 50 mg by mouth daily        blood glucose (NO BRAND SPECIFIED) test strip Use to test blood sugar 1 times daily or as directed. To accompany: Blood Glucose Monitor " Brands: per insurance. 100 strip 6     blood glucose monitoring (NO BRAND SPECIFIED) meter device kit Use to test blood sugar 1 times daily or as directed. Preferred blood glucose meter OR supplies to accompany: Blood Glucose Monitor Brands: per insurance. 1 kit 0     Continuous Blood Gluc Sensor (FREESTYLE SIMIN 2 SENSOR) [facility] 1 each every 14 days Use 1 sensor every 14 days. Use to read blood sugars per 's instructions. 6 each 1     cyanocobalamin (VITAMIN B-12) 500 MCG tablet daily        EPINEPHrine (ANY BX GENERIC EQUIV) 0.3 MG/0.3ML injection 2-pack 0.3 mg       gabapentin (NEURONTIN) 300 MG capsule Take 1 capsule (300 mg) by mouth 3 times daily 270 capsule 3     metFORMIN (GLUCOPHAGE XR) 500 MG 24 hr tablet Take 1 tablet (500 mg) by mouth daily (with dinner) 90 tablet 0     nortriptyline (PAMELOR) 25 MG capsule Take 25 mg by mouth daily        thin (NO BRAND SPECIFIED) lancets Use with lanceting device. To accompany: Blood Glucose Monitor Brands: per insurance. 100 each 6       Review of Systems     11 point review system (Constitutional, HENT, Eyes, Respiratory, Cardiovascular, Gastrointestinal, Genitourinary, Musculoskeletal,Neurological, Psychiatric/Behavioural, Endocrine) is negative or is as per HPI above    Past Medical History:   Diagnosis Date     Hypertension      Neuritis      Peripheral neuropathy      Personal history of other medical treatment     postgastrectomy dumping syndrome     Stomach problems Noted earlier        Past Surgical History:   Procedure Laterality Date     ABDOMEN SURGERY  2012?     APPENDECTOMY  1964?     COLONOSCOPY  2006, 2016     EYE SURGERY  199x    laser for retinal tears     GASTRIC FUNDOPLICATION       HERNIA REPAIR  1961?     LAPAROSCOPIC TAKEDOWN NISSEN FUNDOPLICATION N/A 9/25/2020    Procedure: TAKE-DOWN, FUNDOPLICATION, REDO NISSEN, LAPAROSCOPIC, gastrostomy feeding tube placement;  Surgeon: Katlyn Tobar MD;  Location:  OR     [place]  ESOPHAGOGASTRODUODENOSCOPY TRANSORAL DIAGNOSTIC N/A 5/9/2019    Procedure: UPPER GASTROINTESTINAL ENDOSCOPY;  Surgeon: Dino Ward MD;  Location: [facility];  Service: General     SOFT TISSUE SURGERY  2009?    MCL l. thumb     Objective:     Wt Readings from Last 10 Encounters:   05/17/23 86.6 kg (191 lb)   02/15/23 89.4 kg (197 lb)   01/18/23 87.5 kg (193 lb)   01/17/23 87.8 kg (193 lb 9.6 oz)   06/24/22 90.3 kg (199 lb)   11/24/21 92.1 kg (203 lb)   02/02/21 96.2 kg (212 lb)   02/02/21 94.3 kg (208 lb)   10/28/20 96.2 kg (212 lb)   10/09/20 92.5 kg (204 lb)     Constitutional: Pleasant no acute cardiopulmonary distress.   EYES: anicteric, normal extra-ocular movements, no lid lag or retraction.  Cardiovascular: RRR, S1, S2 normal.   Pulmonary/Chest: CTAB. No wheezing or rales.   Abdominal: +BS. Non tender to palpation.  Stretch marks: none. Hernia.   Neurological: Alert and oriented.  No tremor and reflexes are symmetrical bilaterally and within the normal limits. Muscle strength 5/5.   Extremities: No edema.  Psychological: appropriate mood and affect   In House Labs:       TSH   Date Value Ref Range Status   01/18/2023 1.02 0.30 - 4.20 uIU/mL Final   08/05/2019 1.06 0.30 - 5.00 uIU/mL Final       Creatinine   Date Value Ref Range Status   01/18/2023 0.94 0.67 - 1.17 mg/dL Final   09/27/2020 0.82 0.66 - 1.25 mg/dL Final     Hemoglobin A1C   Date Value Ref Range Status   01/18/2023 5.9 (H) <5.7 % Final     Comment:     Normal <5.7%   Prediabetes 5.7-6.4%    Diabetes 6.5% or higher     Note: Adopted from ADA consensus guidelines.     Hemoglobin A1C POCT   Date Value Ref Range Status   05/17/2023 5.9 4.3 - <5.7 % Final     Component      Latest Ref Rng & Units 2/2/2023 2/2/2023 2/2/2023 2/2/2023           9:14 AM  9:15 AM  9:30 AM  9:31 AM   Sodium      136 - 145 mmol/L       Potassium      3.4 - 5.3 mmol/L       Chloride      98 - 107 mmol/L       Carbon Dioxide (CO2)      22 - 29 mmol/L       Anion  Gap      7 - 15 mmol/L       Urea Nitrogen      8.0 - 23.0 mg/dL       Creatinine      0.67 - 1.17 mg/dL       Calcium      8.8 - 10.2 mg/dL       Glucose      70 - 99 mg/dL  183 (H) 187 (H)    Alkaline Phosphatase      40 - 129 U/L       AST      10 - 50 U/L       ALT      10 - 50 U/L       Protein Total      6.4 - 8.3 g/dL       Albumin      3.5 - 5.2 g/dL       Bilirubin Total      <=1.2 mg/dL       GFR Estimate      >60 mL/min/1.73m2       WBC      4.0 - 11.0 10e3/uL       RBC Count      4.40 - 5.90 10e6/uL       Hemoglobin      13.3 - 17.7 g/dL       Hematocrit      40.0 - 53.0 %       MCV      78 - 100 fL       MCH      26.5 - 33.0 pg         Component      Latest Ref Rng & Units 2/2/2023          12:30 PM   Sodium      136 - 145 mmol/L    Potassium      3.4 - 5.3 mmol/L    Chloride      98 - 107 mmol/L    Carbon Dioxide (CO2)      22 - 29 mmol/L    Anion Gap      7 - 15 mmol/L    Urea Nitrogen      8.0 - 23.0 mg/dL    Creatinine      0.67 - 1.17 mg/dL    Calcium      8.8 - 10.2 mg/dL    Glucose      70 - 99 mg/dL 91   Alkaline Phosphatase      40 - 129 U/L    AST      10 - 50 U/L    ALT      10 - 50 U/L    Protein Total      6.4 - 8.3 g/dL    Albumin      3.5 - 5.2 g/dL    Bilirubin Total      <=1.2 mg/dL    GFR Estimate      >60 mL/min/1.73m2    WBC      4.0 - 11.0 10e3/uL    RBC Count      4.40 - 5.90 10e6/uL    Hemoglobin      13.3 - 17.7 g/dL    Hematocrit      40.0 - 53.0 %    MCV      78 - 100 fL    MCH      26.5 - 33.0 pg    MCHC      31.5 - 36.5 g/dL    RDW      10.0 - 15.0 %    Platelet Count      150 - 450 10e3/uL    TSH      0.30 - 4.20 uIU/mL    Hemoglobin A1C      <5.7 %    Insulin      2.6 - 24.9 uU/mL 6.3   GLUCOSE BY METER POCT      70 - 99 mg/dL      Component      Latest Ref Rng & Units 1/18/2023   Sodium      136 - 145 mmol/L 140   Potassium      3.4 - 5.3 mmol/L 5.2   Chloride      98 - 107 mmol/L 106   Carbon Dioxide (CO2)      22 - 29 mmol/L 26   Anion Gap      7 - 15 mmol/L 8   Urea  Nitrogen      8.0 - 23.0 mg/dL 14.8   Creatinine      0.67 - 1.17 mg/dL 0.94   Calcium      8.8 - 10.2 mg/dL 9.5   Glucose      70 - 99 mg/dL 111 (H)   Alkaline Phosphatase      40 - 129 U/L 86   AST      10 - 50 U/L 18   ALT      10 - 50 U/L 10   Protein Total      6.4 - 8.3 g/dL 7.1   Albumin      3.5 - 5.2 g/dL 4.2   Bilirubin Total      <=1.2 mg/dL 0.5   GFR Estimate      >60 mL/min/1.73m2 89   WBC      4.0 - 11.0 10e3/uL 6.9   RBC Count      4.40 - 5.90 10e6/uL 4.91   Hemoglobin      13.3 - 17.7 g/dL 15.1   Hematocrit      40.0 - 53.0 % 44.9   MCV      78 - 100 fL 91   MCH      26.5 - 33.0 pg 30.8   MCHC      31.5 - 36.5 g/dL 33.6   RDW      10.0 - 15.0 % 12.2   Platelet Count      150 - 450 10e3/uL 274   TSH      0.30 - 4.20 uIU/mL 1.02   Hemoglobin A1C      <5.7 % 5.9 (H)         This note has been dictated using voice recognition software.  As a result, there may be errors in the documentation that have gone undetected.  Please consider this when interpreting information in this documentation.    Total telephone time= 9 minutes. 34 minutes spent on the date of the encounter doing CGM review, history, documentation and further activities per the note.

## 2023-05-17 NOTE — LETTER
5/17/2023       RE: Bal Junior  [address] [place] [address]     Dear Colleague,    Thank you for referring your patient, Bal Junior, to the [facility] ENDOCRINOLOGY CLINIC [place] at [facility]. Please see a copy of my visit note below.      Endocrinology Clinic Visit    Chief Complaint: Endocrine Problem (Hypoglycemia)     Information obtained from:Patient      Assessment/Treatment Plan:    Dumping syndrome of 10 years duration following Niesen fundoplication  Prediabetes A1c 5.9   Hypoglycemia symptoms with lowest BG 68 mg/dl following mixed meal test containing high CHO content  Federico continuous glucose monitor reviewed. Lowest BG was 68 mgl/dl. We completed mixed meal test with high CHO content and had symptoms of hypoglycemia following meal with lowest BG of 68 mg/dl and high insulin  Level prior to that. Normal BG and insulin level at the end of the test.  Checked CMP, CBC, TSH and hemoglobin A1c at the time of appointment and results were unremarkable except for prediabetes.  Of note, no signs and symptoms suggestive of adrenal insufficiency therefore screening not pursued.  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  The mixed meal test findings are suggestive of PHH.  Treatment plan: Dietary modification is the first treatment plan therefore we are arranging a follow-up with a nutritionist.  In addition, we tried acarbose however he did not tolerate it due to bloating.  Discussed other options off label use, decision was made to proceed with metformin low-dose.    Cali Dougherty MD  Staff Endocrinologist    Division of Endocrinology and Diabetes      Subjective:         HPI: Bal Junior is a 68 year old male with history of concern for hypoglycemia  who is here to follow-up on results.  Few weeks ago,  "skiing (2 rounds), felt 'oozy, about to black out'. Couldn't stand up.  70/40 BP, glucose gels given, by the time transport showed up BP normal, BG was 117 after correction. A friend drove. Tired for two days after that. BG not checked recently; 71 hypoglycemia symptoms. Sneezing 30 minutes after eating. Over the last two months; more frequent, at least once per day, 'feeling down' after eating within one  Hour, BG not checked during this episodes. Fasting state no symptoms. Minor symptoms since 2010 but now has worsened.    Minor episodes, dumping syndrome many years ago   3 hours after eating.   Nissen fundoplication redo 2020   Peripheral neuropathy 50 years ago. B12 was low. On gabapentin.   Omeprazole for 20 years.     Weight-191-192; now 187.   No symtpoms if not eating.     Mixed meal test on 2/2/23 progress note.   \"Last ate at 8 pm on 2/1/2023. Light meal.  Patient identification verified by name and date of birth.  Assessment completed.  Vitals recorded in Doc Flowsheets.  Patient was provided with education regarding medication/procedure and possible side effects.  Patient verbalized understanding.      present during visit today: Not Applicable.     Vascular access: peripheral IV was placed by vascular access nurse. Used for all lab draws.     Treatment Conditions:  DIET EFFECTIVE NOW, starting today at 0854, Until Specified   Food Preferences:  Other - please comment 1. Provide a HIGH CHO meal of 8 oz orange juice, 6 oz Yoplait fat free fruit flavored yogurt (11 total carbs) , 1 slice bread or toast with 1 tsp margarine and 2 tsp jam was sugar free (6 total carbs).      Baseline vs: RR 16, P 57 /73 Labs at 0829  Start meal at 0830 patient ate the entire HIGH CHO meal within 10 minutes.     Time zero 0830  +15 0845 RR 16 P 57 /76 light headed  +30 0900 RR 18 P 59 /71 59 light headed, pt felt he could not stand if he tried  +45 0915 RR 18 P 67 /70 67 light " "headed dizzy feels disconnected. RN noted a change if pt's color he was very pale.  +60 0930 RR 16 P 72 /74 still having symtomes but they were starting to improve.       +90 1000 RR 16 P 68 /63 symptoms improving. Feels exhausted.   +120 1030 RR 16 P 63  78 Back to base line except feeling exhausted. RN noted color improving  +180 1130 RR 16 P58 /73 baseline. Color baseline  +240 1230 RR 16 P 64 /69 Baseline\"    Component      Latest Ref Rng & Units 2/2/2023 2/2/2023 2/2/2023 2/2/2023           8:28 AM  8:45 AM  9:00 AM  9:01 AM   Glucose      70 - 99 mg/dL 115 (H) 129 (H) 152 (H)    Insulin      2.6 - 24.9 uU/mL 4.6 8.7 26.9 (H)    GLUCOSE BY METER POCT      70 - 99 mg/dL    138 (H)     Component      Latest Ref Rng & Units 2/2/2023 2/2/2023 2/2/2023 2/2/2023           9:14 AM  9:15 AM  9:30 AM  9:31 AM   Glucose      70 - 99 mg/dL  183 (H) 187 (H)    Insulin      2.6 - 24.9 uU/mL  213.0 (H) 632.0 (H)    GLUCOSE BY METER POCT      70 - 99 mg/dL 136 (H)   162 (H)     Component      Latest Ref Rng & Units 2/2/2023 2/2/2023 2/2/2023 2/2/2023          10:00 AM 10:30 AM 11:30 AM 12:30 PM   Glucose      70 - 99 mg/dL 79 68 (L) 84 91   Insulin      2.6 - 24.9 uU/mL 91.9 (H) 23.6 10.2 6.3   GLUCOSE BY METER POCT      70 - 99 mg/dL         Answers for HPI/ROS submitted by the patient on 5/16/2023  General Symptoms: No  Skin Symptoms: No  HENT Symptoms: No  EYE SYMPTOMS: No  HEART SYMPTOMS: No  LUNG SYMPTOMS: No  INTESTINAL SYMPTOMS: Yes  URINARY SYMPTOMS: No  REPRODUCTIVE SYMPTOMS: No  SKELETAL SYMPTOMS: No  BLOOD SYMPTOMS: No  NERVOUS SYSTEM SYMPTOMS: No  MENTAL HEALTH SYMPTOMS: No  Heart burn or indigestion: No  Nausea: No  Vomiting: No  Abdominal pain: No  Bloating: No  Constipation: No  Diarrhea: Yes  Blood in stool: No  Black stools: No  Rectal or Anal pain: No  Fecal incontinence: No  Vomit with blood: No  Change in stools: No         Allergies   Allergen Reactions    aCr Lucas" Venom Protein Hives    Bee Venom Swelling       Current Outpatient Medications   Medication Sig Dispense Refill    atenolol (TENORMIN) 50 MG tablet Take 50 mg by mouth daily       blood glucose (NO BRAND SPECIFIED) test strip Use to test blood sugar 1 times daily or as directed. To accompany: Blood Glucose Monitor Brands: per insurance. 100 strip 6    blood glucose monitoring (NO BRAND SPECIFIED) meter device kit Use to test blood sugar 1 times daily or as directed. Preferred blood glucose meter OR supplies to accompany: Blood Glucose Monitor Brands: per insurance. 1 kit 0    Continuous Blood Gluc Sensor (FREESTYLE SIMIN 2 SENSOR) [facility] 1 each every 14 days Use 1 sensor every 14 days. Use to read blood sugars per 's instructions. 6 each 1    cyanocobalamin (VITAMIN B-12) 500 MCG tablet daily       EPINEPHrine (ANY BX GENERIC EQUIV) 0.3 MG/0.3ML injection 2-pack 0.3 mg      gabapentin (NEURONTIN) 300 MG capsule Take 1 capsule (300 mg) by mouth 3 times daily 270 capsule 3    metFORMIN (GLUCOPHAGE XR) 500 MG 24 hr tablet Take 1 tablet (500 mg) by mouth daily (with dinner) 90 tablet 0    nortriptyline (PAMELOR) 25 MG capsule Take 25 mg by mouth daily       thin (NO BRAND SPECIFIED) lancets Use with lanceting device. To accompany: Blood Glucose Monitor Brands: per insurance. 100 each 6       Review of Systems     11 point review system (Constitutional, HENT, Eyes, Respiratory, Cardiovascular, Gastrointestinal, Genitourinary, Musculoskeletal,Neurological, Psychiatric/Behavioural, Endocrine) is negative or is as per HPI above    Past Medical History:   Diagnosis Date    Hypertension     Neuritis     Peripheral neuropathy     Personal history of other medical treatment     postgastrectomy dumping syndrome    Stomach problems Noted earlier        Past Surgical History:   Procedure Laterality Date    ABDOMEN SURGERY  2012?    APPENDECTOMY  1964?    COLONOSCOPY  2006, 2016    EYE SURGERY  199x    laser for retinal  tears    GASTRIC FUNDOPLICATION      HERNIA REPAIR  1961?    LAPAROSCOPIC TAKEDOWN NISSEN FUNDOPLICATION N/A 9/25/2020    Procedure: TAKE-DOWN, FUNDOPLICATION, REDO NISSEN, LAPAROSCOPIC, gastrostomy feeding tube placement;  Surgeon: Katlyn Tobar MD;  Location: [facility] ESOPHAGOGASTRODUODENOSCOPY TRANSORAL DIAGNOSTIC N/A 5/9/2019    Procedure: UPPER GASTROINTESTINAL ENDOSCOPY;  Surgeon: Dino Ward MD;  Location: [facility];  Service: General    SOFT TISSUE SURGERY  2009?    MCL l. thumb     Objective:     Wt Readings from Last 10 Encounters:   05/17/23 86.6 kg (191 lb)   02/15/23 89.4 kg (197 lb)   01/18/23 87.5 kg (193 lb)   01/17/23 87.8 kg (193 lb 9.6 oz)   06/24/22 90.3 kg (199 lb)   11/24/21 92.1 kg (203 lb)   02/02/21 96.2 kg (212 lb)   02/02/21 94.3 kg (208 lb)   10/28/20 96.2 kg (212 lb)   10/09/20 92.5 kg (204 lb)     Constitutional: Pleasant no acute cardiopulmonary distress.   EYES: anicteric, normal extra-ocular movements, no lid lag or retraction.  Cardiovascular: RRR, S1, S2 normal.   Pulmonary/Chest: CTAB. No wheezing or rales.   Abdominal: +BS. Non tender to palpation.  Stretch marks: none. Hernia.   Neurological: Alert and oriented.  No tremor and reflexes are symmetrical bilaterally and within the normal limits. Muscle strength 5/5.   Extremities: No edema.  Psychological: appropriate mood and affect   In House Labs:       TSH   Date Value Ref Range Status   01/18/2023 1.02 0.30 - 4.20 uIU/mL Final   08/05/2019 1.06 0.30 - 5.00 uIU/mL Final       Creatinine   Date Value Ref Range Status   01/18/2023 0.94 0.67 - 1.17 mg/dL Final   09/27/2020 0.82 0.66 - 1.25 mg/dL Final     Hemoglobin A1C   Date Value Ref Range Status   01/18/2023 5.9 (H) <5.7 % Final     Comment:     Normal <5.7%   Prediabetes 5.7-6.4%    Diabetes 6.5% or higher     Note: Adopted from ADA consensus guidelines.     Hemoglobin A1C POCT   Date Value Ref Range Status   05/17/2023 5.9 4.3 - <5.7 % Final      Component      Latest Ref Rng & Units 2/2/2023 2/2/2023 2/2/2023 2/2/2023           9:14 AM  9:15 AM  9:30 AM  9:31 AM   Sodium      136 - 145 mmol/L       Potassium      3.4 - 5.3 mmol/L       Chloride      98 - 107 mmol/L       Carbon Dioxide (CO2)      22 - 29 mmol/L       Anion Gap      7 - 15 mmol/L       Urea Nitrogen      8.0 - 23.0 mg/dL       Creatinine      0.67 - 1.17 mg/dL       Calcium      8.8 - 10.2 mg/dL       Glucose      70 - 99 mg/dL  183 (H) 187 (H)    Alkaline Phosphatase      40 - 129 U/L       AST      10 - 50 U/L       ALT      10 - 50 U/L       Protein Total      6.4 - 8.3 g/dL       Albumin      3.5 - 5.2 g/dL       Bilirubin Total      <=1.2 mg/dL       GFR Estimate      >60 mL/min/1.73m2       WBC      4.0 - 11.0 10e3/uL       RBC Count      4.40 - 5.90 10e6/uL       Hemoglobin      13.3 - 17.7 g/dL       Hematocrit      40.0 - 53.0 %       MCV      78 - 100 fL       MCH      26.5 - 33.0 pg         Component      Latest Ref Rng & Units 2/2/2023          12:30 PM   Sodium      136 - 145 mmol/L    Potassium      3.4 - 5.3 mmol/L    Chloride      98 - 107 mmol/L    Carbon Dioxide (CO2)      22 - 29 mmol/L    Anion Gap      7 - 15 mmol/L    Urea Nitrogen      8.0 - 23.0 mg/dL    Creatinine      0.67 - 1.17 mg/dL    Calcium      8.8 - 10.2 mg/dL    Glucose      70 - 99 mg/dL 91   Alkaline Phosphatase      40 - 129 U/L    AST      10 - 50 U/L    ALT      10 - 50 U/L    Protein Total      6.4 - 8.3 g/dL    Albumin      3.5 - 5.2 g/dL    Bilirubin Total      <=1.2 mg/dL    GFR Estimate      >60 mL/min/1.73m2    WBC      4.0 - 11.0 10e3/uL    RBC Count      4.40 - 5.90 10e6/uL    Hemoglobin      13.3 - 17.7 g/dL    Hematocrit      40.0 - 53.0 %    MCV      78 - 100 fL    MCH      26.5 - 33.0 pg    MCHC      31.5 - 36.5 g/dL    RDW      10.0 - 15.0 %    Platelet Count      150 - 450 10e3/uL    TSH      0.30 - 4.20 uIU/mL    Hemoglobin A1C      <5.7 %    Insulin      2.6 - 24.9 uU/mL 6.3    GLUCOSE BY METER POCT      70 - 99 mg/dL      Component      Latest Ref Rng & Units 1/18/2023   Sodium      136 - 145 mmol/L 140   Potassium      3.4 - 5.3 mmol/L 5.2   Chloride      98 - 107 mmol/L 106   Carbon Dioxide (CO2)      22 - 29 mmol/L 26   Anion Gap      7 - 15 mmol/L 8   Urea Nitrogen      8.0 - 23.0 mg/dL 14.8   Creatinine      0.67 - 1.17 mg/dL 0.94   Calcium      8.8 - 10.2 mg/dL 9.5   Glucose      70 - 99 mg/dL 111 (H)   Alkaline Phosphatase      40 - 129 U/L 86   AST      10 - 50 U/L 18   ALT      10 - 50 U/L 10   Protein Total      6.4 - 8.3 g/dL 7.1   Albumin      3.5 - 5.2 g/dL 4.2   Bilirubin Total      <=1.2 mg/dL 0.5   GFR Estimate      >60 mL/min/1.73m2 89   WBC      4.0 - 11.0 10e3/uL 6.9   RBC Count      4.40 - 5.90 10e6/uL 4.91   Hemoglobin      13.3 - 17.7 g/dL 15.1   Hematocrit      40.0 - 53.0 % 44.9   MCV      78 - 100 fL 91   MCH      26.5 - 33.0 pg 30.8   MCHC      31.5 - 36.5 g/dL 33.6   RDW      10.0 - 15.0 % 12.2   Platelet Count      150 - 450 10e3/uL 274   TSH      0.30 - 4.20 uIU/mL 1.02   Hemoglobin A1C      <5.7 % 5.9 (H)         This note has been dictated using voice recognition software.  As a result, there may be errors in the documentation that have gone undetected.  Please consider this when interpreting information in this documentation.    Total telephone time= 9 minutes. 34 minutes spent on the date of the encounter doing CGM review, history, documentation and further activities per the note.      Cali Dougherty MD

## 2023-05-23 ENCOUNTER — TELEPHONE (OUTPATIENT)
Dept: ENDOCRINOLOGY | Facility: CLINIC | Age: 68
End: 2023-05-23
Payer: COMMERCIAL

## 2023-05-23 NOTE — TELEPHONE ENCOUNTER
Communication Summary: Spoke with patient on the phone and scheduled appointment     Appointment type: Return Endocrine  Provider: Dr. Dougherty  Return date: 01/31/2024  Speciality phone number: [phone]  Additional appointment(s) needed: N/  Additional notes: N/A    Rima Ro on 5/23/2023 at 8:53 AM

## 2023-09-14 ENCOUNTER — LAB REQUISITION (OUTPATIENT)
Dept: LAB | Facility: CLINIC | Age: 68
End: 2023-09-14
Payer: MEDICARE

## 2023-09-14 DIAGNOSIS — I10 ESSENTIAL (PRIMARY) HYPERTENSION: ICD-10-CM

## 2023-09-14 DIAGNOSIS — Z13.220 ENCOUNTER FOR SCREENING FOR LIPOID DISORDERS: ICD-10-CM

## 2023-09-14 DIAGNOSIS — Z12.5 ENCOUNTER FOR SCREENING FOR MALIGNANT NEOPLASM OF PROSTATE: ICD-10-CM

## 2023-09-14 PROCEDURE — 80053 COMPREHEN METABOLIC PANEL: CPT | Mod: ORL | Performed by: FAMILY MEDICINE

## 2023-09-14 PROCEDURE — 80061 LIPID PANEL: CPT | Mod: ORL | Performed by: FAMILY MEDICINE

## 2023-09-14 PROCEDURE — G0103 PSA SCREENING: HCPCS | Mod: ORL | Performed by: FAMILY MEDICINE

## 2023-09-15 LAB
ALBUMIN SERPL BCG-MCNC: 4.1 G/DL (ref 3.5–5.2)
ALP SERPL-CCNC: 78 U/L (ref 40–129)
ALT SERPL W P-5'-P-CCNC: 13 U/L (ref 0–70)
ANION GAP SERPL CALCULATED.3IONS-SCNC: 11 MMOL/L (ref 7–15)
AST SERPL W P-5'-P-CCNC: 17 U/L (ref 0–45)
BILIRUB SERPL-MCNC: 0.3 MG/DL
BUN SERPL-MCNC: 13.6 MG/DL (ref 8–23)
CALCIUM SERPL-MCNC: 9.2 MG/DL (ref 8.8–10.2)
CHLORIDE SERPL-SCNC: 105 MMOL/L (ref 98–107)
CHOLEST SERPL-MCNC: 156 MG/DL
CREAT SERPL-MCNC: 1 MG/DL (ref 0.67–1.17)
DEPRECATED HCO3 PLAS-SCNC: 24 MMOL/L (ref 22–29)
EGFRCR SERPLBLD CKD-EPI 2021: 82 ML/MIN/1.73M2
GLUCOSE SERPL-MCNC: 116 MG/DL (ref 70–99)
HDLC SERPL-MCNC: 54 MG/DL
LDLC SERPL CALC-MCNC: 51 MG/DL
NONHDLC SERPL-MCNC: 102 MG/DL
POTASSIUM SERPL-SCNC: 4.5 MMOL/L (ref 3.4–5.3)
PROT SERPL-MCNC: 6.5 G/DL (ref 6.4–8.3)
PSA SERPL DL<=0.01 NG/ML-MCNC: 0.72 NG/ML (ref 0–4.5)
SODIUM SERPL-SCNC: 140 MMOL/L (ref 136–145)
TRIGL SERPL-MCNC: 257 MG/DL

## 2023-09-25 DIAGNOSIS — R73.03 PREDIABETES: ICD-10-CM

## 2023-09-25 DIAGNOSIS — E16.2 HYPOGLYCEMIA: ICD-10-CM

## 2023-09-28 RX ORDER — METFORMIN HCL 500 MG
500 TABLET, EXTENDED RELEASE 24 HR ORAL
Qty: 90 TABLET | Refills: 1 | Status: SHIPPED | OUTPATIENT
Start: 2023-09-28 | End: 2024-01-17

## 2023-09-28 NOTE — TELEPHONE ENCOUNTER
metFORMIN (GLUCOPHAGE XR) 500 MG 24 hr tablet       Biguanide Agents Passed     Cali Dougherty MD    Endocrinology, Diabetes, and Metabolism    5/17/2023  [facility] [place]

## 2023-10-25 ENCOUNTER — TELEPHONE (OUTPATIENT)
Dept: ENDOCRINOLOGY | Facility: CLINIC | Age: 68
End: 2023-10-25
Payer: COMMERCIAL

## 2023-10-26 NOTE — TELEPHONE ENCOUNTER
[facility] Call Center    Phone Message    May a detailed message be left on voicemail: yes     Reason for Call: Other: Per pt would not like to wait until August 2024 for a follow up. Per pt would like if something can be offer to him in Jan or Feb 2024. Writer ask if pt would like to do video and pt would not like to do a video. Please call pt back to discuss. Thank you!      Action Taken: Message routed to:  [facility] ([facility] ENDO    Travel Screening: Not Applicable

## 2023-10-30 ENCOUNTER — OFFICE VISIT (OUTPATIENT)
Dept: NEUROLOGY | Facility: CLINIC | Age: 68
End: 2023-10-30
Payer: COMMERCIAL

## 2023-10-30 VITALS
DIASTOLIC BLOOD PRESSURE: 78 MMHG | HEART RATE: 78 BPM | BODY MASS INDEX: 27.2 KG/M2 | WEIGHT: 190 LBS | HEIGHT: 70 IN | SYSTOLIC BLOOD PRESSURE: 115 MMHG

## 2023-10-30 DIAGNOSIS — G60.9 IDIOPATHIC PERIPHERAL NEUROPATHY: Primary | ICD-10-CM

## 2023-10-30 DIAGNOSIS — G62.9 NEUROPATHY: ICD-10-CM

## 2023-10-30 PROCEDURE — 99213 OFFICE O/P EST LOW 20 MIN: CPT | Performed by: PSYCHIATRY & NEUROLOGY

## 2023-10-30 RX ORDER — GABAPENTIN 300 MG/1
300 CAPSULE ORAL 3 TIMES DAILY
Qty: 270 CAPSULE | Refills: 3 | Status: SHIPPED | OUTPATIENT
Start: 2023-10-30 | End: 2024-09-10

## 2023-10-30 NOTE — LETTER
"    10/30/2023         RE: Bal Junior  [address] [place] [address]        Dear Colleague,    Thank you for referring your patient, Bal Junior, to the [facility] [place]. Please see a copy of my visit note below.    In person evaluation    HPI  9/3/2019, in person visit  2/2/2021, video visit  11/24/2021, in person visit  6/24/2022, in person visit  2/15/2023, in person visit          68-year-old followed neurologically for:  Paresthesias  Sensory neuropathy  Right CTS mild to moderate  Relatively brisk reflexes in relationship to concern with neuropathy    Since last seen a year ago  No hospitalization  No surgeries  No major illnesses  Is working on some dental issues with the dentist/oral surgeon      Patient's sensory neuropathy seems to be stable on exam  No new bowel or bladder difficulties  Has some numbness and tingling in his fingers and toes  Previously had more left foot heel pain more so than the right  May have had some fasciitis in his foot in the past    Says that sometimes it is hard for him to get off about a chair although when we tested him today his proximal muscle strength is pretty good    Discussed good foot care  Still does ski      Neurontin 300 mg 3 times per day rarely will take 1/4 tablet  No swelling in the feet no excessive sedation    Feels that the gabapentin 300 mg 3 times daily keeps the symptoms and check  No swelling in the feet no excessive tiredness    Has some Raynaud's phenomenon with cold hands  Is also an avid ski year  Previously had MRI scan cervical spine in January 2022 no significant cervical myelopathy      Past GI difficulties  Patient has had some \"dumping type symptomatology from his GI problem and it was talking the other physicians in that regard  Was out skiing in [facility] January 2023  Was on the ski lift and got very \"woozy\" which was more of a lightheadedness no chest pain  X-ray let him ski down that he will " "but then could not get back up when he was at the bottom  They did give him glucose and after the glucose treatment his glucose went up to 70  Primary is working him up for difficulty with his \"dumping syndrome periodic hypoglycemia possible inappropriate insulin secretion      A.  Sensory neuropathy       Onset in 2009       Patient with paresthesias of his toes and fingers       In the past had some paresthesias of the tongue       Distribution is predominantly stocking glove a little bit of the tips of the ear and tips of the tongue       No bowel or bladder difficulty       No trouble with erections       No significant back or neck pain       No balance problems can ski and walk in the dark with no difficulty         Does go skiing and his hands get fairly cold question some autonomic instability distally    B.  History of GERD        Nissen fundoplication procedure September 2020      C.  Patient with a fall in September 2021 has had no loss of consciousness       Seen in the ER had a cervical spine x-ray with no fracture but a lot of degenerative disease       Feels a after he had a concussion balance is not as good as it used to be      Still an avid skier      His neurologic difficulties include:  1. Numbness and tingling and dysesthesias of his hands and feet, sometimes involves the tip of his tongue and nose, comes intermittently. Seemed to be somewhat better with Neurontin, did not seem to be very progressive.  2. Mild carpal tunnel syndrome, works on computers, not getting worse the last time I saw him, last EMG though was in November 2009.  3. Symptoms slightly better with B12 even though his B12 was not significantly low.  4. Past history of Nissen fundoplication with GERD nine-plus years ago and with increased GERD recently when I saw him in July 2018.  5. Past history of dumping syndrome which occurred more so after breakfast and takes nortriptyline through GI specialists for that.  6. Patient did " have  Lyme's tick bite back in 2018 treated with antibiotics for 1 month      Past medical history  Sensory neuropathy, onset   Hypertension  GERD status post Nissen fundoplication, repeat surgery 2020  Dumping syndrome  Carpal tunnel syndrome right    Habits  Does not smoke  Occasional alcoholic beverage, 3/week      Family history is positive for:  1. Father with gallbladder removed.  2. Mother with uterine cancer, hypertension, and polio.  3. Maternal grandfather  with MI.     Work-up summary  MRI scan head 10/16/2009 with and without contrast normal  EEG on 2013 was normal.  EMG in  carpal tunnel syndrome, did not want surgery.  Laboratory data in  for small fiber neuropathy included negative GALOP antibodies, negative MAG antibodies.  Negative anti-sulfatide antibodies. Anti-Hu was negative. Hepatic profile normal, sedimentation rate 5, MAYELA, rheumatoid factor, Lyme s titers negative, serum protein immunoelectrophoresis negative, SSA/SSB were negative,     methylmalonic acid was 0.11 with a folic acid of 15.2 and B12 of 538.  Repeat laboratory data shows        immune electrophoresis negative       B12 630, TSH 1.06       SSA and SSB negative        Lyme's titer negative  EMG examination shows 2019       Right carpal tunnel syndrome mild to moderate in degree slightly increased chronic motor changes compared to        Sensory neuropathy with some slight change in amplitudes but sensory snap potentials are still present at the sural superficial peroneal ulnar and median nerves  Laboratory review  Labs 2021  Sodium 142 potassium 4.3, BUN 16 creatinine 1.07  Glucose 90  AST 19  2021 C-spine x-ray   IMPRESSION: No fracture.  Normal vertebral heights and alignment. Severe C4-C5 through C6-C7 interbody degeneration. No advanced facet arthropathy. Normal open mouth view.    MRI scan cervical spine 2022  C3-4, severe right, moderate left foraminal  narrowing no canal narrowing  C4-5 severe bilateral foraminal narrowing, no canal narrowing  C5-6, severe bilateral foraminal narrowing, mild canal narrowing  C6-7 moderate bilateral foraminal narrowing, no canal narrowing  C7-T1, moderate right foraminal narrowing, no canal narrowing  At the C5/C6 level, there is a broad bar disc osteophyte complex with mild spinal canal narrowing.    Laboratory data review                        9/2023  NA/K            140/4.5  BUN/Cr         13.6/1.0  GLU              116  AST               17  HGBA1C       5.9      Review of Systems   Pertinent positives and negatives    No headache no chest pain or shortness of breath no nausea vomiting no diarrhea no fever chills    Does have some neck pain but not radicular    Balance is a little bit more off than before  Blames it on the concussion that he had in September    No diplopia dysarthria dysphagia    Does have some paresthesias that involve both the nose fingertips and toes    No significant back pain  No bowel or bladder difficulty  No trouble with erections    Has some pain on the left heel  Some pain in the right arch  They are both intermittent and occasional last time was 3 months ago    Otherwise review of systems negative    General exam  Alert oriented x3  Blood pressure 115/78, pulse 78  Lungs clear  Heart rate regular  Abdomen soft  Symmetrical pulses  No edema feet  Straight leg raising sign negative    Neurologic exam  Alert oriented x3  Normal prosody of speech  Normal naming  Normal comprehension  Normal repetition  No aphasia  No neglect  Normal memory recall      Cranial nerves II through XII normal  No ophthalmoplegia  No nystagmus  Visual fields intact  Face is symmetrical  Tongue twisters good    Upper extremities reported right over left  Deltoid 5/5  Biceps 5/5  Triceps 5/5  Wrist extensors and flexors 5/5  Finger extensors and flexors 5/5    No drift  No incoordination      Lower extremities reported right  "over left  Iliopsoas 5/5  Quadriceps 5/5  Hamstrings 5/5  Anterior tibial 5/5  Gastrocnemius 5/5    No actual spasticity  Negative straight leg raising    Reflexes brisk  Biceps 3/3  Triceps 3/3  Knees 3/3  Ankles 2/2    Toes downgoing  Negative Sorenson reflex  No spasticity    Gait  Normal on the flat  Gets up from the chair with his arms crossed no difficulty  Seen consistent with status  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  Sent COVID-19    Reduced urine used to have on your Dilaudid and the Suboxone    It is 341\"  Slight difficulty with balance when he is up on his tippy toes or heels  Negative Romberg    Sensory exam  Decreased pinprick stocking distribution in the foot to the mid shin subtle  Some increased hypersensitivity on the bottom of his feet  Temperature appreciation decreased to mid shin subtle  Vibration decreased to the ankle subtle    Neuro exam stable              Assessment/Plan     1.  CTS (Carpal Tunnel Syndrome) (G56.01)        Patient still with a mild to moderate right carpal tunnel syndrome on his EMG       2.  Extremity numbness (R20.0)         Probably has a sensory neuropathy subtle with some slight change in the sensory snap potentials comparing 9111-3837, a 10-year difference     3.  Sensory neuropathy (G60.8)        Relatively stable with no changes in exam or changes in EMG or lab testing from 6254-8280, but still follow clinically        May have some mild autonomic instability a Raynaud's type phenomenon        4.  Sensory neuropathy (G62.9)          5.  Brisk reflexes degenerative disease on plain x-ray of the neck September 2021       Rule out myelopathy with MRI of the neck      MRI scan cervical spine 1/7/2022      C3-4, severe right, moderate left foraminal narrowing no canal narrowing      C4-5 severe bilateral foraminal narrowing, no canal narrowing      C5-6, severe bilateral foraminal narrowing, mild canal narrowing   "    C6-7 moderate bilateral foraminal narrowing, no canal narrowing      C7-T1, moderate right foraminal narrowing, no canal narrowing       At the C5/C6 level, there is a broad bar disc osteophyte complex with mild spinal canal narrowing.        if in the future he was more hyperreflexive we could consider checking a scan of his neck but without significant neck complaints I would hold off on repeating this at this time.    6.  Fall with hitting his head, August 24, 2021 possible mild concussion but no loss of consciousness seen in the ER    7.  Dumping syndrome with hypoglycemia       [place] to be from his fundoplication       Concerned whether there is some autonomic instability also although       We will monitor    At this time, he has:    1. Paresthesias more of a sensory neuropathy with negative workup.  2. Rather brisk reflexes, but no long tract findings of myelopathy.  3. Empirically on B12 replacement.  4. Neurontin 300 mg 3 times daily (sometimes uses a fourth rescue tablet)  5. Talked about the difference between negative and positive symptoms.  6. Talked about good foot care since he has numbness, but he needs to be careful about injuries, frostbite, or other difficulties.  7. Repeat labs and EMG fall 2019 stable  8.  Patient is an avid skier we talked about the fact that he should avoid frostbite which could exacerbate his sensory neuropathy      Medications refilled  He still the skiing he should use good foot wear if he is out in the cold  Discussed good foot care    Follow-up September 2024    Total care time today 22 minutes    As part of visit today  Reviewed laboratory data        Again, thank you for allowing me to participate in the care of your patient.        Sincerely,        marcell Millan MD

## 2023-10-30 NOTE — NURSING NOTE
Chief Complaint   Patient presents with    NEUROPATHY     8 month follow up. Pt states neuropathy symptoms are unchanged.     Perla Samuels LPN on 10/30/2023 at 3:12 PM

## 2023-10-30 NOTE — PROGRESS NOTES
"In person evaluation    HPI  9/3/2019, in person visit  2/2/2021, video visit  11/24/2021, in person visit  6/24/2022, in person visit  2/15/2023, in person visit          68-year-old followed neurologically for:  Paresthesias  Sensory neuropathy  Right CTS mild to moderate  Relatively brisk reflexes in relationship to concern with neuropathy    Since last seen a year ago  No hospitalization  No surgeries  No major illnesses  Is working on some dental issues with the dentist/oral surgeon      Patient's sensory neuropathy seems to be stable on exam  No new bowel or bladder difficulties  Has some numbness and tingling in his fingers and toes  Previously had more left foot heel pain more so than the right  May have had some fasciitis in his foot in the past    Says that sometimes it is hard for him to get off about a chair although when we tested him today his proximal muscle strength is pretty good    Discussed good foot care  Still does ski      Neurontin 300 mg 3 times per day rarely will take 1/4 tablet  No swelling in the feet no excessive sedation    Feels that the gabapentin 300 mg 3 times daily keeps the symptoms and check  No swelling in the feet no excessive tiredness    Has some Raynaud's phenomenon with cold hands  Is also an avid ski year  Previously had MRI scan cervical spine in January 2022 no significant cervical myelopathy      Past GI difficulties  Patient has had some \"dumping type symptomatology from his GI problem and it was talking the other physicians in that regard  Was out skiing in [facility] January 2023  Was on the ski lift and got very \"woozy\" which was more of a lightheadedness no chest pain  X-ray let him ski down that he will but then could not get back up when he was at the bottom  They did give him glucose and after the glucose treatment his glucose went up to 70  Primary is working him up for difficulty with his \"dumping syndrome periodic hypoglycemia possible inappropriate " insulin secretion      A.  Sensory neuropathy       Onset in 2009       Patient with paresthesias of his toes and fingers       In the past had some paresthesias of the tongue       Distribution is predominantly stocking glove a little bit of the tips of the ear and tips of the tongue       No bowel or bladder difficulty       No trouble with erections       No significant back or neck pain       No balance problems can ski and walk in the dark with no difficulty         Does go skiing and his hands get fairly cold question some autonomic instability distally    B.  History of GERD        Nissen fundoplication procedure September 2020      C.  Patient with a fall in September 2021 has had no loss of consciousness       Seen in the ER had a cervical spine x-ray with no fracture but a lot of degenerative disease       Feels a after he had a concussion balance is not as good as it used to be      Still an avid skier      His neurologic difficulties include:  1. Numbness and tingling and dysesthesias of his hands and feet, sometimes involves the tip of his tongue and nose, comes intermittently. Seemed to be somewhat better with Neurontin, did not seem to be very progressive.  2. Mild carpal tunnel syndrome, works on computers, not getting worse the last time I saw him, last EMG though was in November 2009.  3. Symptoms slightly better with B12 even though his B12 was not significantly low.  4. Past history of Nissen fundoplication with GERD nine-plus years ago and with increased GERD recently when I saw him in July 2018.  5. Past history of dumping syndrome which occurred more so after breakfast and takes nortriptyline through GI specialists for that.  6. Patient did have  Lyme's tick bite back in 2018 treated with antibiotics for 1 month      Past medical history  Sensory neuropathy, onset 2009  Hypertension  GERD status post Nissen fundoplication, repeat surgery February 2020  Dumping syndrome  Carpal tunnel syndrome  right    Habits  Does not smoke  Occasional alcoholic beverage, 3/week      Family history is positive for:  1. Father with gallbladder removed.  2. Mother with uterine cancer, hypertension, and polio.  3. Maternal grandfather  with MI.     Work-up summary  MRI scan head 10/16/2009 with and without contrast normal  EEG on 2013 was normal.  EMG in  carpal tunnel syndrome, did not want surgery.  Laboratory data in  for small fiber neuropathy included negative GALOP antibodies, negative MAG antibodies.  Negative anti-sulfatide antibodies. Anti-Hu was negative. Hepatic profile normal, sedimentation rate 5, MAYELA, rheumatoid factor, Lyme s titers negative, serum protein immunoelectrophoresis negative, SSA/SSB were negative,     methylmalonic acid was 0.11 with a folic acid of 15.2 and B12 of 538.  Repeat laboratory data shows        immune electrophoresis negative       B12 630, TSH 1.06       SSA and SSB negative        Lyme's titer negative  EMG examination shows 2019       Right carpal tunnel syndrome mild to moderate in degree slightly increased chronic motor changes compared to        Sensory neuropathy with some slight change in amplitudes but sensory snap potentials are still present at the sural superficial peroneal ulnar and median nerves  Laboratory review  Labs 2021  Sodium 142 potassium 4.3, BUN 16 creatinine 1.07  Glucose 90  AST 19  2021 C-spine x-ray   IMPRESSION: No fracture.  Normal vertebral heights and alignment. Severe C4-C5 through C6-C7 interbody degeneration. No advanced facet arthropathy. Normal open mouth view.    MRI scan cervical spine 2022  C3-4, severe right, moderate left foraminal narrowing no canal narrowing  C4-5 severe bilateral foraminal narrowing, no canal narrowing  C5-6, severe bilateral foraminal narrowing, mild canal narrowing  C6-7 moderate bilateral foraminal narrowing, no canal narrowing  C7-T1, moderate right foraminal  narrowing, no canal narrowing  At the C5/C6 level, there is a broad bar disc osteophyte complex with mild spinal canal narrowing.    Laboratory data review                        9/2023  NA/K            140/4.5  BUN/Cr         13.6/1.0  GLU              116  AST               17  HGBA1C       5.9      Review of Systems   Pertinent positives and negatives    No headache no chest pain or shortness of breath no nausea vomiting no diarrhea no fever chills    Does have some neck pain but not radicular    Balance is a little bit more off than before  Blames it on the concussion that he had in September    No diplopia dysarthria dysphagia    Does have some paresthesias that involve both the nose fingertips and toes    No significant back pain  No bowel or bladder difficulty  No trouble with erections    Has some pain on the left heel  Some pain in the right arch  They are both intermittent and occasional last time was 3 months ago    Otherwise review of systems negative    General exam  Alert oriented x3  Blood pressure 115/78, pulse 78  Lungs clear  Heart rate regular  Abdomen soft  Symmetrical pulses  No edema feet  Straight leg raising sign negative    Neurologic exam  Alert oriented x3  Normal prosody of speech  Normal naming  Normal comprehension  Normal repetition  No aphasia  No neglect  Normal memory recall      Cranial nerves II through XII normal  No ophthalmoplegia  No nystagmus  Visual fields intact  Face is symmetrical  Tongue twisters good    Upper extremities reported right over left  Deltoid 5/5  Biceps 5/5  Triceps 5/5  Wrist extensors and flexors 5/5  Finger extensors and flexors 5/5    No drift  No incoordination      Lower extremities reported right over left  Iliopsoas 5/5  Quadriceps 5/5  Hamstrings 5/5  Anterior tibial 5/5  Gastrocnemius 5/5    No actual spasticity  Negative straight leg raising    Reflexes brisk  Biceps 3/3  Triceps 3/3  Knees 3/3  Ankles 2/2    Toes downgoing  Negative Sorenson  "reflex  No spasticity    Gait  Normal on the flat  Gets up from the chair with his arms crossed no difficulty  Seen consistent with status  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  Sent COVID-19    Reduced urine used to have on your Dilaudid and the Suboxone    It is 341\"  Slight difficulty with balance when he is up on his tippy toes or heels  Negative Romberg    Sensory exam  Decreased pinprick stocking distribution in the foot to the mid shin subtle  Some increased hypersensitivity on the bottom of his feet  Temperature appreciation decreased to mid shin subtle  Vibration decreased to the ankle subtle    Neuro exam stable              Assessment/Plan     1.  CTS (Carpal Tunnel Syndrome) (G56.01)        Patient still with a mild to moderate right carpal tunnel syndrome on his EMG       2.  Extremity numbness (R20.0)         Probably has a sensory neuropathy subtle with some slight change in the sensory snap potentials comparing 0701-4608, a 10-year difference     3.  Sensory neuropathy (G60.8)        Relatively stable with no changes in exam or changes in EMG or lab testing from 7357-1414, but still follow clinically        May have some mild autonomic instability a Raynaud's type phenomenon        4.  Sensory neuropathy (G62.9)          5.  Brisk reflexes degenerative disease on plain x-ray of the neck September 2021       Rule out myelopathy with MRI of the neck      MRI scan cervical spine 1/7/2022      C3-4, severe right, moderate left foraminal narrowing no canal narrowing      C4-5 severe bilateral foraminal narrowing, no canal narrowing      C5-6, severe bilateral foraminal narrowing, mild canal narrowing      C6-7 moderate bilateral foraminal narrowing, no canal narrowing      C7-T1, moderate right foraminal narrowing, no canal narrowing       At the C5/C6 level, there is a broad bar disc osteophyte complex with mild spinal canal narrowing.        if in " the future he was more hyperreflexive we could consider checking a scan of his neck but without significant neck complaints I would hold off on repeating this at this time.    6.  Fall with hitting his head, August 24, 2021 possible mild concussion but no loss of consciousness seen in the ER    7.  Dumping syndrome with hypoglycemia       [place] to be from his fundoplication       Concerned whether there is some autonomic instability also although       We will monitor    At this time, he has:    1. Paresthesias more of a sensory neuropathy with negative workup.  2. Rather brisk reflexes, but no long tract findings of myelopathy.  3. Empirically on B12 replacement.  4. Neurontin 300 mg 3 times daily (sometimes uses a fourth rescue tablet)  5. Talked about the difference between negative and positive symptoms.  6. Talked about good foot care since he has numbness, but he needs to be careful about injuries, frostbite, or other difficulties.  7. Repeat labs and EMG fall 2019 stable  8.  Patient is an avid skier we talked about the fact that he should avoid frostbite which could exacerbate his sensory neuropathy      Medications refilled  He still the skiing he should use good foot wear if he is out in the cold  Discussed good foot care    Follow-up September 2024    Total care time today 22 minutes    As part of visit today  Reviewed laboratory data

## 2024-01-17 ENCOUNTER — OFFICE VISIT (OUTPATIENT)
Dept: ENDOCRINOLOGY | Facility: CLINIC | Age: 69
End: 2024-01-17
Payer: COMMERCIAL

## 2024-01-17 VITALS
BODY MASS INDEX: 26.46 KG/M2 | HEART RATE: 71 BPM | WEIGHT: 189 LBS | HEIGHT: 71 IN | DIASTOLIC BLOOD PRESSURE: 81 MMHG | OXYGEN SATURATION: 98 % | SYSTOLIC BLOOD PRESSURE: 129 MMHG

## 2024-01-17 DIAGNOSIS — E16.1 POSTPRANDIAL HYPOGLYCEMIA: Primary | ICD-10-CM

## 2024-01-17 DIAGNOSIS — E16.2 HYPOGLYCEMIA: ICD-10-CM

## 2024-01-17 DIAGNOSIS — Z98.890 S/P NISSEN FUNDOPLICATION (WITHOUT GASTROSTOMY TUBE) PROCEDURE: ICD-10-CM

## 2024-01-17 DIAGNOSIS — K44.9 HIATAL HERNIA: ICD-10-CM

## 2024-01-17 DIAGNOSIS — R73.03 PREDIABETES: ICD-10-CM

## 2024-01-17 LAB — HBA1C MFR BLD: 6 %

## 2024-01-17 PROCEDURE — 83036 HEMOGLOBIN GLYCOSYLATED A1C: CPT | Performed by: PATHOLOGY

## 2024-01-17 PROCEDURE — 99215 OFFICE O/P EST HI 40 MIN: CPT | Performed by: INTERNAL MEDICINE

## 2024-01-17 RX ORDER — METFORMIN HCL 500 MG
500 TABLET, EXTENDED RELEASE 24 HR ORAL
Qty: 90 TABLET | Refills: 3 | Status: SHIPPED | OUTPATIENT
Start: 2024-01-17

## 2024-01-17 ASSESSMENT — PAIN SCALES - GENERAL: PAINLEVEL: NO PAIN (0)

## 2024-01-17 NOTE — LETTER
1/17/2024       RE: Bal Junior  [address] [place] [address]     Dear Colleague,    Thank you for referring your patient, Bal Junior, to the [facility] [place] [facility]. Please see a copy of my visit note below.    1/9/2024  PATIENT LAB/IMAGING STATUS : No pending lab orders         Endocrinology Clinic Visit    Chief Complaint: Diabetes     Information obtained from:Patient      Assessment/Treatment Plan:    Postprandial hypoglycemia   Dumping syndrome of 10 years duration following Niesen fundoplication  Prediabetes A1c 6%   Hypoglycemia symptoms with lowest BG 68 mg/dl following mixed meal test containing high CHO content (fasting was 115).   Federico continuous glucose monitor reviewed. Lowest BG was 53 mgl/dl and he was symptomatic. We completed mixed meal test with high CHO content and had symptoms of hypoglycemia following meal with lowest BG of 68 mg/dl and high insulin  Level prior to that. Normal BG and insulin level at the end of the test.  Checked CMP, CBC, TSH and hemoglobin A1c previously and results were unremarkable except for prediabetes.  Of note, no signs and symptoms suggestive of adrenal insufficiency.      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  The mixed meal test findings are suggestive of PHH.    For additional evaluation recommended 72 hour fast in a hospital setting however Bal doesn't want to purse that at this time. Will obtain a follow up CT which was ordered today.      Treatment plan: Dietary modification is the first treatment plan therefore I have reccommended a follow up with a nutritionist.  He has noticed interval improvement on metformin (prediabetes); continue metformin. Schedule CT scan for additional evaluation. Let us know if you want to pursue 72 hour  "fast in a hospital setting.     Cali Dougherty MD  Staff Endocrinologist    Division of Endocrinology and Diabetes      Subjective:         HPI: Bal Junior is a 68 year old male with history of concern for hypoglycemia  who is here to follow-up on results.  Several months ago, skiing (2 rounds), felt 'oozy, about to black out'. Couldn't stand up.  glucose gels given, by the time transport showed up BP normal, BG was 117 after correction. A friend drove. Tired for two days after that. Minor symptoms since 2010 but now has worsened.      Minor episodes, dumping syndrome many years ago   3 hours after eating.   Nissen fundoplication redo 2020   Peripheral neuropathy 50 years ago. B12 was low. On gabapentin.   Omeprazole for 20 years.     Weight-191-192; now 187.   No symtpoms if not eating.     Mixed meal test on 2/2/23 progress note.   \"Last ate at 8 pm on 2/1/2023. Light meal.    Treatment Conditions:  DIET EFFECTIVE NOW, starting today at 0854, Until Specified   Food Preferences:  Other - please comment 1. Provide a HIGH CHO meal of 8 oz orange juice, 6 oz Yoplait fat free fruit flavored yogurt (11 total carbs) , 1 slice bread or toast with 1 tsp margarine and 2 tsp jam was sugar free (6 total carbs).      Baseline vs: RR 16, P 57 /73 Labs at 0829  Start meal at 0830 patient ate the entire HIGH CHO meal within 10 minutes.     Time zero 0830  +15 0845 RR 16 P 57 /76 light headed  +30 0900 RR 18 P 59 /71 59 light headed, pt felt he could not stand if he tried  +45 0915 RR 18 P 67 /70 67 light headed dizzy feels disconnected. RN noted a change if pt's color he was very pale.  +60 0930 RR 16 P 72 /74 still having symtomes but they were starting to improve.       +90 1000 RR 16 P 68 /63 symptoms improving. Feels exhausted.   +120 1030 RR 16 P 63  78 Back to base line except feeling exhausted. RN noted color improving  +180 1130 RR 16 P58 /73 baseline. Color " "baseline  +240 1230 RR 16 P 64 /69 Baseline\"    General Symptoms: No  Skin Symptoms: No  HENT Symptoms: No  EYE SYMPTOMS: No  HEART SYMPTOMS: No  LUNG SYMPTOMS: No  INTESTINAL SYMPTOMS: Yes  URINARY SYMPTOMS: No  REPRODUCTIVE SYMPTOMS: No  SKELETAL SYMPTOMS: No  BLOOD SYMPTOMS: No  NERVOUS SYSTEM SYMPTOMS: No  MENTAL HEALTH SYMPTOMS: No  Heart burn or indigestion: No  Nausea: No  Vomiting: No  Abdominal pain: No  Bloating: No  Constipation: No  Diarrhea: Yes  Blood in stool: No  Black stools: No  Rectal or Anal pain: No  Fecal incontinence: No  Vomit with blood: No  Change in stools: No       Interval improvement on metformin. BG readings as follows:               Allergies   Allergen Reactions     Hornets      Wasp Venom Protein Hives     Bee Venom Swelling       Current Outpatient Medications   Medication Sig Dispense Refill     atenolol (TENORMIN) 50 MG tablet Take 50 mg by mouth daily        blood glucose (NO BRAND SPECIFIED) test strip Use to test blood sugar 1 times daily or as directed. To accompany: Blood Glucose Monitor Brands: per insurance. 100 strip 6     blood glucose monitoring (NO BRAND SPECIFIED) meter device kit Use to test blood sugar 1 times daily or as directed. Preferred blood glucose meter OR supplies to accompany: Blood Glucose Monitor Brands: per insurance. 1 kit 0     Continuous Blood Gluc Sensor (FREESTYLE SIMIN 2 SENSOR) MISC 1 each every 14 days Use 1 sensor every 14 days. Use to read blood sugars per 's instructions. 6 each 1     cyanocobalamin (VITAMIN B-12) 500 MCG tablet daily        EPINEPHrine (ANY BX GENERIC EQUIV) 0.3 MG/0.3ML injection 2-pack 0.3 mg       gabapentin (NEURONTIN) 300 MG capsule Take 1 capsule (300 mg) by mouth 3 times daily 270 capsule 3     metFORMIN (GLUCOPHAGE XR) 500 MG 24 hr tablet Take 1 tablet (500 mg) by mouth daily (with dinner) 90 tablet 1     nortriptyline (PAMELOR) 25 MG capsule Take 25 mg by mouth daily        thin (NO BRAND " SPECIFIED) lancets Use with lanceting device. To accompany: Blood Glucose Monitor Brands: per insurance. 100 each 6       Review of Systems     11 point review system (Constitutional, HENT, Eyes, Respiratory, Cardiovascular, Gastrointestinal, Genitourinary, Musculoskeletal,Neurological, Psychiatric/Behavioural, Endocrine) is negative or is as per HPI above    Past Medical History:   Diagnosis Date     Hypertension      Neuritis      Peripheral neuropathy      Personal history of other medical treatment     postgastrectomy dumping syndrome     Stomach problems Noted earlier        Past Surgical History:   Procedure Laterality Date     ABDOMEN SURGERY  2012?     APPENDECTOMY  1964?     COLONOSCOPY  2006, 2016     EYE SURGERY  199x    laser for retinal tears     GASTRIC FUNDOPLICATION       HERNIA REPAIR  1961?     LAPAROSCOPIC TAKEDOWN NISSEN FUNDOPLICATION N/A 9/25/2020    Procedure: TAKE-DOWN, FUNDOPLICATION, REDO NISSEN, LAPAROSCOPIC, gastrostomy feeding tube placement;  Surgeon: Katlyn Tobar MD;  Location: [facility] ESOPHAGOGASTRODUODENOSCOPY TRANSORAL DIAGNOSTIC N/A 5/9/2019    Procedure: UPPER GASTROINTESTINAL ENDOSCOPY;  Surgeon: iDno Ward MD;  Location: [facility];  Service: General     SOFT TISSUE SURGERY  2009?    MCL l. thumb     Objective:     Wt Readings from Last 10 Encounters:   01/17/24 85.7 kg (189 lb)   10/30/23 86.2 kg (190 lb)   05/17/23 86.6 kg (191 lb)   02/15/23 89.4 kg (197 lb)   01/18/23 87.5 kg (193 lb)   01/17/23 87.8 kg (193 lb 9.6 oz)   06/24/22 90.3 kg (199 lb)   11/24/21 92.1 kg (203 lb)   02/02/21 96.2 kg (212 lb)   02/02/21 94.3 kg (208 lb)     Constitutional: Pleasant no acute cardiopulmonary distress.   EYES: anicteric, normal extra-ocular movements, no lid lag or retraction.  Cardiovascular: RRR, S1, S2 normal.   Pulmonary/Chest: CTAB. No wheezing or rales.   Abdominal: +BS. Non tender to palpation.  Stretch marks: none. Hernia.   Neurological: Alert and  oriented.  No tremor and reflexes are symmetrical bilaterally and within the normal limits. Muscle strength 5/5.   Extremities: No edema.  Psychological: appropriate mood and affect   In House Labs:       TSH   Date Value Ref Range Status   01/18/2023 1.02 0.30 - 4.20 uIU/mL Final   08/05/2019 1.06 0.30 - 5.00 uIU/mL Final       Creatinine   Date Value Ref Range Status   09/14/2023 1.00 0.67 - 1.17 mg/dL Final   09/27/2020 0.82 0.66 - 1.25 mg/dL Final     Hemoglobin A1C   Date Value Ref Range Status   01/18/2023 5.9 (H) <5.7 % Final     Comment:     Normal <5.7%   Prediabetes 5.7-6.4%    Diabetes 6.5% or higher     Note: Adopted from ADA consensus guidelines.     Hemoglobin A1C POCT   Date Value Ref Range Status   05/17/2023 5.9 4.3 - <5.7 % Final     Component      Latest Ref Rng & Units 2/2/2023 2/2/2023 2/2/2023 2/2/2023           9:14 AM  9:15 AM  9:30 AM  9:31 AM   Glucose      70 - 99 mg/dL  183 (H) 187 (H)      Component      Latest Ref Rng & Units 2/2/2023          12:30 PM   Glucose      70 - 99 mg/dL 91   Insulin      2.6 - 24.9 uU/mL 6.3     Component      Latest Ref Rng & Units 1/18/2023   Sodium      136 - 145 mmol/L 140   Potassium      3.4 - 5.3 mmol/L 5.2   Chloride      98 - 107 mmol/L 106   Carbon Dioxide (CO2)      22 - 29 mmol/L 26   Anion Gap      7 - 15 mmol/L 8   Urea Nitrogen      8.0 - 23.0 mg/dL 14.8   Creatinine      0.67 - 1.17 mg/dL 0.94   Calcium      8.8 - 10.2 mg/dL 9.5   Glucose      70 - 99 mg/dL 111 (H)   Alkaline Phosphatase      40 - 129 U/L 86   AST      10 - 50 U/L 18   ALT      10 - 50 U/L 10   Protein Total      6.4 - 8.3 g/dL 7.1   Albumin      3.5 - 5.2 g/dL 4.2   Bilirubin Total      <=1.2 mg/dL 0.5   GFR Estimate      >60 mL/min/1.73m2 89   WBC      4.0 - 11.0 10e3/uL 6.9   RBC Count      4.40 - 5.90 10e6/uL 4.91   Hemoglobin      13.3 - 17.7 g/dL 15.1   Hematocrit      40.0 - 53.0 % 44.9   MCV      78 - 100 fL 91   MCH      26.5 - 33.0 pg 30.8   MCHC      31.5 - 36.5  g/dL 33.6   RDW      10.0 - 15.0 % 12.2   Platelet Count      150 - 450 10e3/uL 274   TSH      0.30 - 4.20 uIU/mL 1.02   Hemoglobin A1C      <5.7 % 5.9 (H)         Component      Latest Ref Rng & Units 2/2/2023 2/2/2023 2/2/2023 2/2/2023           8:28 AM  8:45 AM  9:00 AM  9:01 AM   Glucose      70 - 99 mg/dL 115 (H) 129 (H) 152 (H)    Insulin      2.6 - 24.9 uU/mL 4.6 8.7 26.9 (H)    GLUCOSE BY METER POCT      70 - 99 mg/dL    138 (H)     Component      Latest Ref Rng & Units 2/2/2023 2/2/2023 2/2/2023 2/2/2023           9:14 AM  9:15 AM  9:30 AM  9:31 AM   Glucose      70 - 99 mg/dL  183 (H) 187 (H)    Insulin      2.6 - 24.9 uU/mL  213.0 (H) 632.0 (H)    GLUCOSE BY METER POCT      70 - 99 mg/dL 136 (H)   162 (H)     Component      Latest Ref Rng & Units 2/2/2023 2/2/2023 2/2/2023 2/2/2023          10:00 AM 10:30 AM 11:30 AM 12:30 PM   Glucose      70 - 99 mg/dL 79 68 (L) 84 91   Insulin      2.6 - 24.9 uU/mL 91.9 (H) 23.6 10.2 6.3   GLUCOSE BY METER POCT      70 - 99 mg/dL         This note has been dictated using voice recognition software.  As a result, there may be errors in the documentation that have gone undetected.  Please consider this when interpreting information in this documentation.    40 minutes spent by me on the date of the encounter doing chart review, history and exam, documentation, counseling on management of postprandial hypoglycemia and further activities per the note.     Duplicate.                                                               Again, thank you for allowing me to participate in the care of your patient.      Sincerely,    Cali Dougherty MD

## 2024-01-17 NOTE — PROGRESS NOTES
Endocrinology Clinic Visit    Chief Complaint: Diabetes     Information obtained from:Patient      Assessment/Treatment Plan:    Postprandial hypoglycemia   Dumping syndrome of 10 years duration following Niesen fundoplication  Prediabetes A1c 6%   Hypoglycemia symptoms with lowest BG 68 mg/dl following mixed meal test containing high CHO content (fasting was 115).   Federico continuous glucose monitor reviewed. Lowest BG was 53 mgl/dl and he was symptomatic. We completed mixed meal test with high CHO content and had symptoms of hypoglycemia following meal with lowest BG of 68 mg/dl and high insulin  Level prior to that. Normal BG and insulin level at the end of the test.  Checked CMP, CBC, TSH and hemoglobin A1c previously and results were unremarkable except for prediabetes.  Of note, no signs and symptoms suggestive of adrenal insufficiency.      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  The mixed meal test findings are suggestive of PHH.    For additional evaluation recommended 72 hour fast in a hospital setting however Bal doesn't want to purse that at this time. Will obtain a follow up CT which was ordered today.      Treatment plan: Dietary modification is the first treatment plan therefore I have reccommended a follow up with a nutritionist.  He has noticed interval improvement on metformin (prediabetes); continue metformin. Schedule CT scan for additional evaluation. Let us know if you want to pursue 72 hour fast in a hospital setting.     Cali Dougherty MD  Staff Endocrinologist    Division of Endocrinology and Diabetes      Subjective:         HPI: Bal Junior is a 68 year old male with history of concern for hypoglycemia  who is here to follow-up on results.  Several months ago, skiing (2 rounds), felt 'oozy, about to black out'. Couldn't stand up.  glucose gels  "given, by the time transport showed up BP normal, BG was 117 after correction. A friend drove. Tired for two days after that. Minor symptoms since 2010 but now has worsened.      Minor episodes, dumping syndrome many years ago   3 hours after eating.   Nissen fundoplication redo 2020   Peripheral neuropathy 50 years ago. B12 was low. On gabapentin.   Omeprazole for 20 years.     Weight-191-192; now 187.   No symtpoms if not eating.     Mixed meal test on 2/2/23 progress note.   \"Last ate at 8 pm on 2/1/2023. Light meal.    Treatment Conditions:  DIET EFFECTIVE NOW, starting today at 0854, Until Specified   Food Preferences:  Other - please comment 1. Provide a HIGH CHO meal of 8 oz orange juice, 6 oz Yoplait fat free fruit flavored yogurt (11 total carbs) , 1 slice bread or toast with 1 tsp margarine and 2 tsp jam was sugar free (6 total carbs).      Baseline vs: RR 16, P 57 /73 Labs at 0829  Start meal at 0830 patient ate the entire HIGH CHO meal within 10 minutes.     Time zero 0830  +15 0845 RR 16 P 57 /76 light headed  +30 0900 RR 18 P 59 /71 59 light headed, pt felt he could not stand if he tried  +45 0915 RR 18 P 67 /70 67 light headed dizzy feels disconnected. RN noted a change if pt's color he was very pale.  +60 0930 RR 16 P 72 /74 still having symtomes but they were starting to improve.       +90 1000 RR 16 P 68 /63 symptoms improving. Feels exhausted.   +120 1030 RR 16 P 63  78 Back to base line except feeling exhausted. RN noted color improving  +180 1130 RR 16 P58 /73 baseline. Color baseline  +240 1230 RR 16 P 64 /69 Baseline\"    General Symptoms: No  Skin Symptoms: No  HENT Symptoms: No  EYE SYMPTOMS: No  HEART SYMPTOMS: No  LUNG SYMPTOMS: No  INTESTINAL SYMPTOMS: Yes  URINARY SYMPTOMS: No  REPRODUCTIVE SYMPTOMS: No  SKELETAL SYMPTOMS: No  BLOOD SYMPTOMS: No  NERVOUS SYSTEM SYMPTOMS: No  MENTAL HEALTH SYMPTOMS: No  Heart burn or indigestion: " No  Nausea: No  Vomiting: No  Abdominal pain: No  Bloating: No  Constipation: No  Diarrhea: Yes  Blood in stool: No  Black stools: No  Rectal or Anal pain: No  Fecal incontinence: No  Vomit with blood: No  Change in stools: No       Interval improvement on metformin. BG readings as follows:               Allergies   Allergen Reactions    Hornets     Wasp Venom Protein Hives    Bee Venom Swelling       Current Outpatient Medications   Medication Sig Dispense Refill    atenolol (TENORMIN) 50 MG tablet Take 50 mg by mouth daily       blood glucose (NO BRAND SPECIFIED) test strip Use to test blood sugar 1 times daily or as directed. To accompany: Blood Glucose Monitor Brands: per insurance. 100 strip 6    blood glucose monitoring (NO BRAND SPECIFIED) meter device kit Use to test blood sugar 1 times daily or as directed. Preferred blood glucose meter OR supplies to accompany: Blood Glucose Monitor Brands: per insurance. 1 kit 0    Continuous Blood Gluc Sensor (FREESTYLE SIMIN 2 SENSOR) [facility] 1 each every 14 days Use 1 sensor every 14 days. Use to read blood sugars per 's instructions. 6 each 1    cyanocobalamin (VITAMIN B-12) 500 MCG tablet daily       EPINEPHrine (ANY BX GENERIC EQUIV) 0.3 MG/0.3ML injection 2-pack 0.3 mg      gabapentin (NEURONTIN) 300 MG capsule Take 1 capsule (300 mg) by mouth 3 times daily 270 capsule 3    metFORMIN (GLUCOPHAGE XR) 500 MG 24 hr tablet Take 1 tablet (500 mg) by mouth daily (with dinner) 90 tablet 1    nortriptyline (PAMELOR) 25 MG capsule Take 25 mg by mouth daily       thin (NO BRAND SPECIFIED) lancets Use with lanceting device. To accompany: Blood Glucose Monitor Brands: per insurance. 100 each 6       Review of Systems     11 point review system (Constitutional, HENT, Eyes, Respiratory, Cardiovascular, Gastrointestinal, Genitourinary, Musculoskeletal,Neurological, Psychiatric/Behavioural, Endocrine) is negative or is as per HPI above    Past Medical History:   Diagnosis  Date    Hypertension     Neuritis     Peripheral neuropathy     Personal history of other medical treatment     postgastrectomy dumping syndrome    Stomach problems Noted earlier        Past Surgical History:   Procedure Laterality Date    ABDOMEN SURGERY  2012?    APPENDECTOMY  1964?    COLONOSCOPY  2006, 2016    EYE SURGERY  199x    laser for retinal tears    GASTRIC FUNDOPLICATION      HERNIA REPAIR  1961?    LAPAROSCOPIC TAKEDOWN NISSEN FUNDOPLICATION N/A 9/25/2020    Procedure: TAKE-DOWN, FUNDOPLICATION, REDO NISSEN, LAPAROSCOPIC, gastrostomy feeding tube placement;  Surgeon: Katlyn Tobar MD;  Location:  [place]    [place] ESOPHAGOGASTRODUODENOSCOPY TRANSORAL DIAGNOSTIC N/A 5/9/2019    Procedure: UPPER GASTROINTESTINAL ENDOSCOPY;  Surgeon: Dino Ward MD;  Location: [facility];  Service: General    SOFT TISSUE SURGERY  2009?    MCL l. thumb     Objective:     Wt Readings from Last 10 Encounters:   01/17/24 85.7 kg (189 lb)   10/30/23 86.2 kg (190 lb)   05/17/23 86.6 kg (191 lb)   02/15/23 89.4 kg (197 lb)   01/18/23 87.5 kg (193 lb)   01/17/23 87.8 kg (193 lb 9.6 oz)   06/24/22 90.3 kg (199 lb)   11/24/21 92.1 kg (203 lb)   02/02/21 96.2 kg (212 lb)   02/02/21 94.3 kg (208 lb)     Constitutional: Pleasant no acute cardiopulmonary distress.   EYES: anicteric, normal extra-ocular movements, no lid lag or retraction.  Cardiovascular: RRR, S1, S2 normal.   Pulmonary/Chest: CTAB. No wheezing or rales.   Abdominal: +BS. Non tender to palpation.  Stretch marks: none. Hernia.   Neurological: Alert and oriented.  No tremor and reflexes are symmetrical bilaterally and within the normal limits. Muscle strength 5/5.   Extremities: No edema.  Psychological: appropriate mood and affect   In House Labs:       TSH   Date Value Ref Range Status   01/18/2023 1.02 0.30 - 4.20 uIU/mL Final   08/05/2019 1.06 0.30 - 5.00 uIU/mL Final       Creatinine   Date Value Ref Range Status   09/14/2023 1.00 0.67 - 1.17 mg/dL Final    09/27/2020 0.82 0.66 - 1.25 mg/dL Final     Hemoglobin A1C   Date Value Ref Range Status   01/18/2023 5.9 (H) <5.7 % Final     Comment:     Normal <5.7%   Prediabetes 5.7-6.4%    Diabetes 6.5% or higher     Note: Adopted from ADA consensus guidelines.     Hemoglobin A1C POCT   Date Value Ref Range Status   05/17/2023 5.9 4.3 - <5.7 % Final     Component      Latest Ref Rng & Units 2/2/2023 2/2/2023 2/2/2023 2/2/2023           9:14 AM  9:15 AM  9:30 AM  9:31 AM   Glucose      70 - 99 mg/dL  183 (H) 187 (H)      Component      Latest Ref Rng & Units 2/2/2023          12:30 PM   Glucose      70 - 99 mg/dL 91   Insulin      2.6 - 24.9 uU/mL 6.3     Component      Latest Ref Rng & Units 1/18/2023   Sodium      136 - 145 mmol/L 140   Potassium      3.4 - 5.3 mmol/L 5.2   Chloride      98 - 107 mmol/L 106   Carbon Dioxide (CO2)      22 - 29 mmol/L 26   Anion Gap      7 - 15 mmol/L 8   Urea Nitrogen      8.0 - 23.0 mg/dL 14.8   Creatinine      0.67 - 1.17 mg/dL 0.94   Calcium      8.8 - 10.2 mg/dL 9.5   Glucose      70 - 99 mg/dL 111 (H)   Alkaline Phosphatase      40 - 129 U/L 86   AST      10 - 50 U/L 18   ALT      10 - 50 U/L 10   Protein Total      6.4 - 8.3 g/dL 7.1   Albumin      3.5 - 5.2 g/dL 4.2   Bilirubin Total      <=1.2 mg/dL 0.5   GFR Estimate      >60 mL/min/1.73m2 89   WBC      4.0 - 11.0 10e3/uL 6.9   RBC Count      4.40 - 5.90 10e6/uL 4.91   Hemoglobin      13.3 - 17.7 g/dL 15.1   Hematocrit      40.0 - 53.0 % 44.9   MCV      78 - 100 fL 91   MCH      26.5 - 33.0 pg 30.8   MCHC      31.5 - 36.5 g/dL 33.6   RDW      10.0 - 15.0 % 12.2   Platelet Count      150 - 450 10e3/uL 274   TSH      0.30 - 4.20 uIU/mL 1.02   Hemoglobin A1C      <5.7 % 5.9 (H)         Component      Latest Ref Rng & Units 2/2/2023 2/2/2023 2/2/2023 2/2/2023           8:28 AM  8:45 AM  9:00 AM  9:01 AM   Glucose      70 - 99 mg/dL 115 (H) 129 (H) 152 (H)    Insulin      2.6 - 24.9 uU/mL 4.6 8.7 26.9 (H)    GLUCOSE BY METER POCT       70 - 99 mg/dL    138 (H)     Component      Latest Ref Rng & Units 2/2/2023 2/2/2023 2/2/2023 2/2/2023           9:14 AM  9:15 AM  9:30 AM  9:31 AM   Glucose      70 - 99 mg/dL  183 (H) 187 (H)    Insulin      2.6 - 24.9 uU/mL  213.0 (H) 632.0 (H)    GLUCOSE BY METER POCT      70 - 99 mg/dL 136 (H)   162 (H)     Component      Latest Ref Rng & Units 2/2/2023 2/2/2023 2/2/2023 2/2/2023          10:00 AM 10:30 AM 11:30 AM 12:30 PM   Glucose      70 - 99 mg/dL 79 68 (L) 84 91   Insulin      2.6 - 24.9 uU/mL 91.9 (H) 23.6 10.2 6.3   GLUCOSE BY METER POCT      70 - 99 mg/dL         This note has been dictated using voice recognition software.  As a result, there may be errors in the documentation that have gone undetected.  Please consider this when interpreting information in this documentation.    40 minutes spent by me on the date of the encounter doing chart review, history and exam, documentation, counseling on management of postprandial hypoglycemia and further activities per the note.

## 2024-01-26 ENCOUNTER — ANCILLARY PROCEDURE (OUTPATIENT)
Dept: CT IMAGING | Facility: CLINIC | Age: 69
End: 2024-01-26
Attending: INTERNAL MEDICINE
Payer: COMMERCIAL

## 2024-01-26 DIAGNOSIS — Z98.890 S/P NISSEN FUNDOPLICATION (WITHOUT GASTROSTOMY TUBE) PROCEDURE: ICD-10-CM

## 2024-01-26 DIAGNOSIS — K44.9 HIATAL HERNIA: ICD-10-CM

## 2024-01-26 DIAGNOSIS — E16.1 POSTPRANDIAL HYPOGLYCEMIA: ICD-10-CM

## 2024-01-26 PROCEDURE — 74177 CT ABD & PELVIS W/CONTRAST: CPT | Mod: GC | Performed by: STUDENT IN AN ORGANIZED HEALTH CARE EDUCATION/TRAINING PROGRAM

## 2024-01-26 RX ORDER — IOPAMIDOL 755 MG/ML
93 INJECTION, SOLUTION INTRAVASCULAR ONCE
Status: COMPLETED | OUTPATIENT
Start: 2024-01-26 | End: 2024-01-26

## 2024-01-26 RX ADMIN — IOPAMIDOL 93 ML: 755 INJECTION, SOLUTION INTRAVASCULAR at 11:59

## 2024-01-26 NOTE — DISCHARGE INSTRUCTIONS

## 2024-01-29 NOTE — RESULT ENCOUNTER NOTE
Hello -    Here are my comments about your recent results:  The CT scan showed postoperative changes of the Niesen fundoplication.  And per the report the recent fundal wrap is above the diaphragm which might be concerning for a slipped Nissen.  Please follow-up with your surgeon regarding this finding.    There is hepatic steatosis or fatty liver.  Please schedule a routine appointment with gastroenterology regarding this issue or can follow-up with your primary.    Incidentally it was noted that you have kidney stone nonobstructing.    The pancreas appears small  or as reported it is atrophic.  This does not need additional follow-up at this time.     Please let us know if you have any questions or concerns.     Regards,  Cali Dougherty MD

## 2024-02-08 ENCOUNTER — TELEPHONE (OUTPATIENT)
Dept: GASTROENTEROLOGY | Facility: CLINIC | Age: 69
End: 2024-02-08
Payer: COMMERCIAL

## 2024-02-08 NOTE — TELEPHONE ENCOUNTER
Writer received a message from provider to help get Pt scheduled for a follow up visit in 4 months (June). Writer called and left a message, along with call back number for the Pt.

## 2024-02-13 NOTE — TELEPHONE ENCOUNTER
[employer] message sent offering alternative/sooner appointment slot on Cathy Aguirre's schedule. Awaiting confirmation.

## 2024-02-13 NOTE — TELEPHONE ENCOUNTER
[facility] Call Center    Phone Message    May a detailed message be left on voicemail: yes     Reason for Call: Other: Patient returned call.  Writer found first available in July.  Patient said his symptoms are more severe than that and wants to be seen much sooner due to symptoms.  Please call to discuss.      Action Taken: Message routed to:  [facility] ([facility] GI    Travel Screening: Not Applicable

## 2024-02-19 ENCOUNTER — OFFICE VISIT (OUTPATIENT)
Dept: GASTROENTEROLOGY | Facility: CLINIC | Age: 69
End: 2024-02-19
Payer: COMMERCIAL

## 2024-02-19 VITALS
DIASTOLIC BLOOD PRESSURE: 83 MMHG | HEIGHT: 70 IN | SYSTOLIC BLOOD PRESSURE: 126 MMHG | BODY MASS INDEX: 27.29 KG/M2 | OXYGEN SATURATION: 98 % | WEIGHT: 190.6 LBS | HEART RATE: 62 BPM

## 2024-02-19 DIAGNOSIS — K91.1 DUMPING SYNDROME: ICD-10-CM

## 2024-02-19 DIAGNOSIS — E16.2 HYPOGLYCEMIA: ICD-10-CM

## 2024-02-19 DIAGNOSIS — K91.1 POSTGASTRIC SURGERY SYNDROME: ICD-10-CM

## 2024-02-19 DIAGNOSIS — K91.89 SLIPPED NISSEN FUNDOPLICATION: Primary | ICD-10-CM

## 2024-02-19 DIAGNOSIS — R13.10 DYSPHAGIA, UNSPECIFIED TYPE: ICD-10-CM

## 2024-02-19 DIAGNOSIS — R68.81 EARLY SATIETY: ICD-10-CM

## 2024-02-19 PROCEDURE — 99215 OFFICE O/P EST HI 40 MIN: CPT | Performed by: DIETITIAN, REGISTERED

## 2024-02-19 PROCEDURE — 99417 PROLNG OP E/M EACH 15 MIN: CPT | Performed by: DIETITIAN, REGISTERED

## 2024-02-19 ASSESSMENT — PAIN SCALES - GENERAL: PAINLEVEL: NO PAIN (0)

## 2024-02-19 NOTE — PROGRESS NOTES
GI CLINIC VISIT    CC/REFERRING MD:  Abad Noel  REASON FOR CONSULTATION: follow-up     ASSESSMENT/PLAN:  1.  Gastroesophageal reflux disease without esophagitis, Status post Nissen fundoplication re-do 9/25/2020    Longstanding reflux starting in 1980s/early 1990s, treated with omeprazole starting in 1990s.  He developed neuropathy attributed to B12 deficiency and felt that PPI was contributing to this so underwent Nissen in 2011 so he could stop PPI.  There was evidence of Nissen slippage and this was redone 9/25/2020.  He did well postsurgery without reflux symptoms.  However recent CT scan 1/2024 showed superior aspect of Nissen fundal wrap appears to be about 2 cm above the diaphragm possibly indicating slipped Nissen.  He does report that in the last couple months starting around December he has had decreased appetite with early fullness, increased chest pressure, and return of dysphagia with feeling of foods getting caught near base of sternum about twice per week.  He has had about a 5 pound weight loss since December.  No significant reflux or heartburn symptoms.  No epigastric pain.  We will workup further with upper endoscopy, esophagram timed with tablet, and we will move forward with esophageal manometry as part of potential surgery workup.  .--Upper endoscopy  -- Esophagram, timed and with barium tablet  -- Esophageal manometry      2. Postprandial hypoglycemia  Rapid emptying on GES with reported postprandial hypoglycemia on glucometer checks (in 50s), though normal oral glucose test at [facility].  He has been seen by endocrinology with our group, mixed meal test was indicative of postprandial hyperinsulinemic hypoglycemia.  He was recommended to complete 72-hour fast in hospital setting however he declined to do this at this time.  He continues on nortriptyline to slow gastric emptying.  He was also started on metformin and he reports that this is significantly improved his symptoms, now much  "less severe and less frequent.  Not using fiber supplementation regularly.  He has been recommended to meet with a dietitian however has not been able to do so yet.  We will refer today.  Recommend lifestyle modifications including eating smaller more frequent meals spread throughout the day.  Would also recommend resting after eating, and not having liquids with eating.  Should also avoid soda and carbonation.  -- Continue nortriptyline daily  -- Continue to follow with endocrine  -- Referral to meet with our dietitian  --Try to have small more frequent meals throughout the day.  Try to prevent overloading your stomach at any given time.  After eating lie down.  Do not have liquids with eating meals.  Avoid high sugar foods      Follow-up in 3-4 months.  Next visit will need to be done in person at clinic in [place].    Thank you for this consultation.  It was a pleasure to participate in the care of this patient; please contact us with any further questions.     60 Minutes was spent on the date of the encounter during chart review, history and exam, documentation, and further activities as noted     Cathy Aguirre PA-C  Division of Gastroenterology, Hepatology & Nutrition  [facility]    HPI:   Mr. Junior is a 69 year old male with HTN, GERD s/p Nissen fundoplication, and reported dumping syndrome who is s/p R inguinal hernia repair and s/p appendectomy who presents for another opinion on his \"slipped Nissen\" and GI symptoms. He has previously been seen by Dr. Quezada, please see her documentation for additional details. He has also previously followed at [facility] for GERD and dumping syndrome, and has recently been seen by Dr. Zhao of surgery at [facility] for consideration of Nissen re-do.     History summarized in brief:   Reports decades of reflux starting in late 1980s/early 1990s she was treating with intermittent omeprazole.  In the 1990s does transition to daily omeprazole.  In the early 2000 " "fingers and toes which was attributed to the low B12, it was felt that PPI may be contributing to this underwent in 2011 so that he could stop his PPI.  Stated for well controlled neuropathy symptoms have not resolved but were stable.  Replaced B12 with PCP.  Also described intermittent issues with postprandial stools, urgency, lightheadedness weakness he was treated at [place] [facility].  Had acute symptoms.  Ago felt like something hit him hard.  Pain improved but reflux continued.  He was started on Zantac had multiple EGDs 1 tried slipped Nissen.    Patient then underwent following work-up for his symptoms:  -EGD 8/30/2017 with report indicating a normal exam with intact Nissen.   -12/4/2018 was done for BRAVO placement. EGD report indicates a large hiatal hernia was seen and the \"wrap appears slipped.\"   -BRAVO testing revealed a DeMeester score total of 24.8 (18.7 on Day 1, 29.3 on Day 2 with 100% symptom concordance).  - Esophageal manometry was done though we do not have the interpretation of this test, images are scanned (but difficult to interpret).   - esophagram (1/2/19) which showed free flow of contrast, no hiatal hernia, and mild reflux. GES 2/19/19 showed only 4% of food remaining at 2 hours and exam stopped.   - esophagram 4/1/19 with again no hernia seen on upright images, but small HH induced with abdominal pressure and Valsalva. No reflux was noted on this exam.   - CT chest 4/1/19 with small HH and fundoplication changes.    The patient had stated that Dr. Zhao would consider repeat surgery, but given his above testing further trial of medical management was recommended. Mr. Junior was then started on pantoprazole-is concerned that paresthesias are worsening since starting that.    Regards to dumping syndrome, this was diagnosed at [place] [facility] after gastric emptying study (rapid with oral glucose tolerance test normal), postprandial glucose checks with levels in the 50s.  " "Describes symptoms of dizziness, wooziness, and \"going gray \", associated with sneezing which would occur after a meal.  Typically resolved in 5 minutes.  Also noted loose stools sometimes occur at unpredictable times.  Patient was treated with baclofen and nortriptyline with 80% resolution symptoms, and baclofen was discontinued and switched to dicyclomine.      Interval history 6/23/2020:  Symptoms are going ok, symptoms are mostly controlled when he avoids having large volume and density of foods. Breakfest and lunch are the most common times for loose stools and wooziness. Eggs are a big trigger. Otherwise has cut back on amount he is eating. Has not been measuring blood sugars. Is able to tell based on symptoms- hot sweats, dizziness. Sometimes he will need to sneeze and this clears up.     Bowel movements: 2-3 times a week will have diarrhea (non-bloody, [place] scale 6 with the smell at the end). In between these days, he will usually have a fairly normal bowel movement. Was previously happening 3-4 times a week.     About a month and a half ago, he felt like something punched him in his abdomen. This was centered on the upper stomach.  Thought about going in but symptoms improved. Not doing or eating anything out of the ordinary.     GERD  Pantoprazole seems to be controlling. Not taking zantac. Has breakthrough symptoms about once a month. Is concernend that neuropathy might get worse with pantoprazole.     Continues to take Nortiptyline or Bentyl. Takes Bentyl 3 times a day     Vitamin B12- is checking with primary care last level was high.    Interval History 9/1/2020:  Decreasing diccylomine helped his symptoms. Symptoms of sneezing after eating has decreased significantly. Maybe will have symptoms for 10 minutes. He would say this has improved 80%. Feels like nortriptyline is going ok.     Still taking pantoprazole- rare symptoms related to heartburn situation. If he skips this for a couple of days he " "will have symptoms. Has not had to take pepcid.     Bowel movements: some loose stools in the morning (related to amount of food the day before). Does not seem to be type of foods, volume related. Stools are a little loose 2-3 times a week.     Has had increased \"hay fever symptoms\"    Interval  History 10/9/2020:   Take-down and re-do of nissen fundoplication, KATHYA, gastrostomy tube placement (still present)  Flushing tube 30 ml twice a day. G tube is irritating if he sits up.     Mixing in semi-liquids in the past couple of days, hard to say if this is \"dumping syndrome\".    GERD- has not had any GERD, tomato soup. Continues on PPI     Bowel movements have been a little more liquid than normal, hard to say if this is due to more liquid diet. Every other day or every third day loose stools. Has not taken senna.     Interval History 2/2/20201:  Had liqour about hald an hour before eating, then had cheese (large amount). Then had \"hyoglycemic episode\". Lasted about 5-10 minutes. Had a sharp pain in upper chest- felt like something had gotten stuck inside the nissen wrap- he drank some water and pain went away. He felt fine the next morning. Does not remember what food he had eaten.     Reflux symptoms completely- not on any PPI. Does feel bloating and gas, no pain. Sometimes burping, othertimes passing gas. Seems to be volume related- but no specific food triggers. Has reduced carbonated beverages. Does not drink out of straws. Diet Dr. Pepper.    30% of the time in the morning after having oatmeal and fruit- he will feel lethargic, will rest.     Weight is stable at 208.     No longer taking dicyclomine     Continues to take nortirtpyline    Interval History 1/17/2022:  Here today for follow-up due to increasing symptoms, this is my first encounter with this patient.     He reports 10-12 yr history of \"dumping syndrome\" with worsening in past 2 months. He describes recent episode while skiing where he became dizzy " "and light headed, lights started going out. He was taken by  on sled, given glucose tabs and started feeling better. When at a medical facility glucose was 115 (did not have supplies to check previously).    He also reports new onset of feeling dizzy and woozy when he walks his dog in the morning before eating.     Currently having symptoms daily - this is typically feeling \"down\" within about 30 minutes after eating. Describes this as being phyically tired and feeling emotionally depressed. Laying down for 15 minutes will help. He also gets urgent loose stools 30 minutes after eating a couple times per week. Also notes sneezing episodes after eating.    Notes he previously checked his blood sugars after meals, typically 60-70, has not for sometime.     Nortriptyline 25 mcg daily to slow gastric emptying, takes daily.    GERD well controlled post surgical revision. Continues to avoid omeprazole. Taking vitamin B12 and gabapentin for neuropathy.    No abdominal pain, no nausea or vomiting. No hematochezia or melena.    Diet Recall: eats small frequent meals, try to avoid liquids with meals  Breakfast: oatmeal, milk, berries, 1tsp sugar  Mid Morning: granola bar/fruit nut bar/handful of nuts  Lunch: green salad, leftovers  Mid Afternoon: banana with nuts  Dinner: green salad, meat/sausage, starch, out to eat once per week  PM Snack: Occasional small dish of ice cream.  No alcoholic drinks unless out to eat. Has not noticed more sx with more alcohol.  No pattern with foods and sx that he can appreciate  Diarrhea 30 minutes after eating - eggs, asian food at lunch, rare at dinner.    Weight down 4 lb since the holidays.       Interval History 2/19/24:    Here today for follow up after CT A/P last month showing superior aspect of the Nissen fundal wrap appears about 2 cm abovethe diaphragm possibly representing slipped Nissen.     Today he reports that he has been experiencing intermittent dysphagia with " "feeling of food getting caught at base of sternum about twice per week.  Unsure when this first started but he thinks last few months.  No epigastric pain but does note increased chest pressure.  No heartburn or acid brash.  He thinks he might of had some regurgitation.  He also endorses lower appetite and early satiety.  Notes he has lost about 5 to 7 pounds in the last 2 to 3 months.    In terms of his postprandial hypoglycemia and dumping syndrome, he reports that this is much better controlled.  He is working with Dr. Dougherty with endocrinology.  Testing done including continuous glucose monitor with lowest BG of 53.  Mixed meal testing with high carbohydrate content had symptoms of hypoglycemia following meal with lowest BG of 68 and high insulin prior to that but normal insulin at end of test.  For complete workup he was recommended 72-hour fast in hospital setting, however Bal declined at this time.  He has not yet met with a dietitian, he is interested in doing so.  He was started on metformin which he reports has been a big improvement.  He continues on nortriptyline.  Tried fiber supplement but did not feel like this made a big difference, taking intermittently.  Has been recommended to work with the dietitian regarding eating pattern and food choices to help manage however has not been able to do so yet.  He reports much less dizziness, severity and frequency improved.  Currently having about 2-3 bowel movements per day.  Morning stool after breakfast can be runny, often after noon stool will be more formed.  Occasional additional stools in the evening after dinner.  No blood in stool or melena.      PERTINENT PAST MEDICAL HISTORY:  Hypertension  GERD  \"Dumping syndrome\"    PREVIOUS SURGERIES:  Past Surgical History:   Procedure Laterality Date    ABDOMEN SURGERY  2012?    APPENDECTOMY  1964?    COLONOSCOPY  2006, 2016    EYE SURGERY  199x    laser for retinal tears    GASTRIC FUNDOPLICATION      HERNIA " REPAIR  1961?    LAPAROSCOPIC TAKEDOWN NISSEN FUNDOPLICATION N/A 9/25/2020    Procedure: TAKE-DOWN, FUNDOPLICATION, REDO NISSEN, LAPAROSCOPIC, gastrostomy feeding tube placement;  Surgeon: Katlyn Tobar MD;  Location: [facility] OR    CT ESOPHAGOGASTRODUODENOSCOPY TRANSORAL DIAGNOSTIC N/A 5/9/2019    Procedure: UPPER GASTROINTESTINAL ENDOSCOPY;  Surgeon: Dino Ward MD;  Location: [facility];  Service: General    SOFT TISSUE SURGERY  2009?    MCL l. thumb     S/p tonsillectomy and adenoidectomy  S/p R inguinal hernia repair  S/p Nissen fundoplication    ALLERGIES:     Allergies   Allergen Reactions    Hornets     Wasp Venom Protein Hives    Bee Venom Swelling       PERTINENT MEDICATIONS:  Current Outpatient Medications   Medication    atenolol (TENORMIN) 50 MG tablet    blood glucose (NO BRAND SPECIFIED) test strip    blood glucose monitoring (NO BRAND SPECIFIED) meter device kit    Continuous Blood Gluc Sensor (FREESTYLE SIMIN 2 SENSOR) MISC    cyanocobalamin (VITAMIN B-12) 500 MCG tablet    EPINEPHrine (ANY BX GENERIC EQUIV) 0.3 MG/0.3ML injection 2-pack    gabapentin (NEURONTIN) 300 MG capsule    metFORMIN (GLUCOPHAGE XR) 500 MG 24 hr tablet    nortriptyline (PAMELOR) 25 MG capsule    thin (NO BRAND SPECIFIED) lancets     No current facility-administered medications for this visit.     Facility-Administered Medications Ordered in Other Visits   Medication    barium sulfate (EZ-DISK) tablet 700 mg    sod bicarbonate-citric acid-simethicone (EZ GAS) 2.21-1.53-0.04 g packet 4 g       SOCIAL HISTORY:  Social History     Socioeconomic History    Marital status:      Spouse name: Not on file    Number of children: Not on file    Years of education: Not on file    Highest education level: Not on file   Occupational History    Not on file   Tobacco Use    Smoking status: Never    Smokeless tobacco: Never   Substance and Sexual Activity    Alcohol use: Yes     Alcohol/week: 3.0 - 6.0 standard drinks of  alcohol     Types: 1 - 2 Glasses of wine, 1 - 2 Cans of beer, 1 - 2 Shots of liquor per week     Comment: glass of wine with dinner, can of beer in evening or 1 whiskey  or bourbon    Drug use: Never    Sexual activity: Yes     Partners: Female     Birth control/protection: Post-menopausal, Male Surgical, Female Surgical   Other Topics Concern    Not on file   Social History Narrative    Not on file     Social Determinants of Health     Financial Resource Strain: Not on file   Food Insecurity: Not on file   Transportation Needs: Not on file   Physical Activity: Not on file   Stress: Not on file   Social Connections: Not on file   Interpersonal Safety: Not on file   Housing Stability: Not on file       FAMILY HISTORY:  Family History   Problem Relation Age of Onset    Coronary Artery Disease Maternal Grandfather     Hypertension Mother     Uterine Cancer Mother         1965    Mitral Valve Replacement Father     GERD No family hx of     Ulcerative Colitis No family hx of     Crohn's Disease No family hx of     Stomach Cancer No family hx of        Past/family/social history reviewed and no changes    PHYSICAL EXAMINATION:  Eyes: Sclera anicteric/injected  Ears/nose/mouth/throat: Normal oropharynx without ulcers or exudate, mucus membranes moist, hearing intact  Neck: supple, thyroid normal size  CV: No edema  Respiratory: Unlabored breathing  Lymph: No axillary, submandibular, supraclavicular or inguinal lymphadenopathy  Abd: Nondistended, +bs, no hepatosplenomegaly, nontender, no peritoneal signs  Skin: warm, perfused, no jaundice  Psych: Normal affect  MSK: Normal gait

## 2024-02-19 NOTE — PROGRESS NOTES
"Chief Complaint   Patient presents with    Follow Up       Vitals:    02/19/24 1054   BP: 126/83   BP Location: Left arm   Patient Position: Sitting   Cuff Size: Adult Regular   Pulse: 62   SpO2: 98%   Weight: 86.5 kg (190 lb 9.6 oz)   Height: 1.778 m (5' 10\")       Body mass index is 27.35 kg/m .    Rico Burrows"

## 2024-02-19 NOTE — LETTER
2/19/2024         RE: Bal Junior  [address] [place] [address]        Dear Colleague,    Thank you for referring your patient, Bal Junior, to the [facility] [place]. Please see a copy of my visit note below.    GI CLINIC VISIT    CC/REFERRING MD:  Abad Noel  REASON FOR CONSULTATION: follow-up     ASSESSMENT/PLAN:  1.  Gastroesophageal reflux disease without esophagitis, Status post Nissen fundoplication re-do 9/25/2020    Longstanding reflux starting in 1980s/early 1990s, treated with omeprazole starting in 1990s.  He developed neuropathy attributed to B12 deficiency and felt that PPI was contributing to this so underwent Nissen in 2011 so he could stop PPI.  There was evidence of Nissen slippage and this was redone 9/25/2020.  He did well postsurgery without reflux symptoms.  However recent CT scan 1/2024 showed superior aspect of Nissen fundal wrap appears to be about 2 cm above the diaphragm possibly indicating slipped Nissen.  He does report that in the last couple months starting around December he has had decreased appetite with early fullness, increased chest pressure, and return of dysphagia with feeling of foods getting caught near base of sternum about twice per week.  He has had about a 5 pound weight loss since December.  No significant reflux or heartburn symptoms.  No epigastric pain.  We will workup further with upper endoscopy, esophagram timed with tablet, and we will move forward with esophageal manometry as part of potential surgery workup.  .--Upper endoscopy  -- Esophagram, timed and with barium tablet  -- Esophageal manometry      2. Postprandial hypoglycemia  Rapid emptying on GES with reported postprandial hypoglycemia on glucometer checks (in 50s), though normal oral glucose test at [facility].  He has been seen by endocrinology with our group, mixed meal test was indicative of postprandial hyperinsulinemic hypoglycemia.  He was  "recommended to complete 72-hour fast in hospital setting however he declined to do this at this time.  He continues on nortriptyline to slow gastric emptying.  He was also started on metformin and he reports that this is significantly improved his symptoms, now much less severe and less frequent.  Not using fiber supplementation regularly.  He has been recommended to meet with a dietitian however has not been able to do so yet.  We will refer today.  Recommend lifestyle modifications including eating smaller more frequent meals spread throughout the day.  Would also recommend resting after eating, and not having liquids with eating.  Should also avoid soda and carbonation.  -- Continue nortriptyline daily  -- Continue to follow with endocrine  -- Referral to meet with our dietitian  --Try to have small more frequent meals throughout the day.  Try to prevent overloading your stomach at any given time.  After eating lie down.  Do not have liquids with eating meals.  Avoid high sugar foods      Follow-up in 3-4 months.  Next visit will need to be done in person at clinic in [place].    Thank you for this consultation.  It was a pleasure to participate in the care of this patient; please contact us with any further questions.     60 Minutes was spent on the date of the encounter during chart review, history and exam, documentation, and further activities as noted     Cathy Aguirre PA-C  Division of Gastroenterology, Hepatology & Nutrition  [facility]    HPI:   Mr. Junior is a 69 year old male with HTN, GERD s/p Nissen fundoplication, and reported dumping syndrome who is s/p R inguinal hernia repair and s/p appendectomy who presents for another opinion on his \"slipped Nissen\" and GI symptoms. He has previously been seen by Dr. Quezada, please see her documentation for additional details. He has also previously followed at [facility] for GERD and dumping syndrome, and has recently been seen by Dr. Zhao of " "surgery at [facility] for consideration of Nissen re-do.     History summarized in brief:   Reports decades of reflux starting in late 1980s/early 1990s she was treating with intermittent omeprazole.  In the 1990s does transition to daily omeprazole.  In the early 2000 fingers and toes which was attributed to the low B12, it was felt that PPI may be contributing to this underwent in 2011 so that he could stop his PPI.  Stated for well controlled neuropathy symptoms have not resolved but were stable.  Replaced B12 with PCP.  Also described intermittent issues with postprandial stools, urgency, lightheadedness weakness he was treated at [facility].  Had acute symptoms.  Ago felt like something hit him hard.  Pain improved but reflux continued.  He was started on Zantac had multiple EGDs 1 tried slipped Nissen.    Patient then underwent following work-up for his symptoms:  -EGD 8/30/2017 with report indicating a normal exam with intact Nissen.   -12/4/2018 was done for BRAVO placement. EGD report indicates a large hiatal hernia was seen and the \"wrap appears slipped.\"   -BRAVO testing revealed a DeMeester score total of 24.8 (18.7 on Day 1, 29.3 on Day 2 with 100% symptom concordance).  - Esophageal manometry was done though we do not have the interpretation of this test, images are scanned (but difficult to interpret).   - esophagram (1/2/19) which showed free flow of contrast, no hiatal hernia, and mild reflux. GES 2/19/19 showed only 4% of food remaining at 2 hours and exam stopped.   - esophagram 4/1/19 with again no hernia seen on upright images, but small HH induced with abdominal pressure and Valsalva. No reflux was noted on this exam.   - CT chest 4/1/19 with small HH and fundoplication changes.    The patient had stated that Dr. Zhao would consider repeat surgery, but given his above testing further trial of medical management was recommended. Mr. Junior was then started on pantoprazole-is concerned " "that paresthesias are worsening since starting that.    Regards to dumping syndrome, this was diagnosed at [place] [facility] after gastric emptying study (rapid with oral glucose tolerance test normal), postprandial glucose checks with levels in the 50s.  Describes symptoms of dizziness, wooziness, and \"going gray \", associated with sneezing which would occur after a meal.  Typically resolved in 5 minutes.  Also noted loose stools sometimes occur at unpredictable times.  Patient was treated with baclofen and nortriptyline with 80% resolution symptoms, and baclofen was discontinued and switched to dicyclomine.      Interval history 6/23/2020:  Symptoms are going ok, symptoms are mostly controlled when he avoids having large volume and density of foods. Breakfest and lunch are the most common times for loose stools and wooziness. Eggs are a big trigger. Otherwise has cut back on amount he is eating. Has not been measuring blood sugars. Is able to tell based on symptoms- hot sweats, dizziness. Sometimes he will need to sneeze and this clears up.     Bowel movements: 2-3 times a week will have diarrhea (non-bloody, [place] scale 6 with the smell at the end). In between these days, he will usually have a fairly normal bowel movement. Was previously happening 3-4 times a week.     About a month and a half ago, he felt like something punched him in his abdomen. This was centered on the upper stomach.  Thought about going in but symptoms improved. Not doing or eating anything out of the ordinary.     GERD  Pantoprazole seems to be controlling. Not taking zantac. Has breakthrough symptoms about once a month. Is concernend that neuropathy might get worse with pantoprazole.     Continues to take Nortiptyline or Bentyl. Takes Bentyl 3 times a day     Vitamin B12- is checking with primary care last level was high.    Interval History 9/1/2020:  Decreasing diccylomine helped his symptoms. Symptoms of sneezing after " "eating has decreased significantly. Maybe will have symptoms for 10 minutes. He would say this has improved 80%. Feels like nortriptyline is going ok.     Still taking pantoprazole- rare symptoms related to heartburn situation. If he skips this for a couple of days he will have symptoms. Has not had to take pepcid.     Bowel movements: some loose stools in the morning (related to amount of food the day before). Does not seem to be type of foods, volume related. Stools are a little loose 2-3 times a week.     Has had increased \"hay fever symptoms\"    Interval  History 10/9/2020:   Take-down and re-do of nissen fundoplication, KATHYA, gastrostomy tube placement (still present)  Flushing tube 30 ml twice a day. G tube is irritating if he sits up.     Mixing in semi-liquids in the past couple of days, hard to say if this is \"dumping syndrome\".    GERD- has not had any GERD, tomato soup. Continues on PPI     Bowel movements have been a little more liquid than normal, hard to say if this is due to more liquid diet. Every other day or every third day loose stools. Has not taken senna.     Interval History 2/2/20201:  Had liqour about hald an hour before eating, then had cheese (large amount). Then had \"hyoglycemic episode\". Lasted about 5-10 minutes. Had a sharp pain in upper chest- felt like something had gotten stuck inside the nissen wrap- he drank some water and pain went away. He felt fine the next morning. Does not remember what food he had eaten.     Reflux symptoms completely- not on any PPI. Does feel bloating and gas, no pain. Sometimes burping, othertimes passing gas. Seems to be volume related- but no specific food triggers. Has reduced carbonated beverages. Does not drink out of straws. Diet Dr. Pepper.    30% of the time in the morning after having oatmeal and fruit- he will feel lethargic, will rest.     Weight is stable at 208.     No longer taking dicyclomine     Continues to take nortirtpyline    Interval " "History 1/17/2022:  Here today for follow-up due to increasing symptoms, this is my first encounter with this patient.     He reports 10-12 yr history of \"dumping syndrome\" with worsening in past 2 months. He describes recent episode while skiing where he became dizzy and light headed, lights started going out. He was taken by  on sled, given glucose tabs and started feeling better. When at a medical facility glucose was 115 (did not have supplies to check previously).    He also reports new onset of feeling dizzy and woozy when he walks his dog in the morning before eating.     Currently having symptoms daily - this is typically feeling \"down\" within about 30 minutes after eating. Describes this as being phyically tired and feeling emotionally depressed. Laying down for 15 minutes will help. He also gets urgent loose stools 30 minutes after eating a couple times per week. Also notes sneezing episodes after eating.    Notes he previously checked his blood sugars after meals, typically 60-70, has not for sometime.     Nortriptyline 25 mcg daily to slow gastric emptying, takes daily.    GERD well controlled post surgical revision. Continues to avoid omeprazole. Taking vitamin B12 and gabapentin for neuropathy.    No abdominal pain, no nausea or vomiting. No hematochezia or melena.    Diet Recall: eats small frequent meals, try to avoid liquids with meals  Breakfast: oatmeal, milk, berries, 1tsp sugar  Mid Morning: granola bar/fruit nut bar/handful of nuts  Lunch: green salad, leftovers  Mid Afternoon: banana with nuts  Dinner: green salad, meat/sausage, starch, out to eat once per week  PM Snack: Occasional small dish of ice cream.  No alcoholic drinks unless out to eat. Has not noticed more sx with more alcohol.  No pattern with foods and sx that he can appreciate  Diarrhea 30 minutes after eating - eggs, asian food at lunch, rare at dinner.    Weight down 4 lb since the holidays.       Interval History " 2/19/24:    Here today for follow up after CT A/P last month showing superior aspect of the Nissen fundal wrap appears about 2 cm abovethe diaphragm possibly representing slipped Nissen.     Today he reports that he has been experiencing intermittent dysphagia with feeling of food getting caught at base of sternum about twice per week.  Unsure when this first started but he thinks last few months.  No epigastric pain but does note increased chest pressure.  No heartburn or acid brash.  He thinks he might of had some regurgitation.  He also endorses lower appetite and early satiety.  Notes he has lost about 5 to 7 pounds in the last 2 to 3 months.    In terms of his postprandial hypoglycemia and dumping syndrome, he reports that this is much better controlled.  He is working with Dr. Dougherty with endocrinology.  Testing done including continuous glucose monitor with lowest BG of 53.  Mixed meal testing with high carbohydrate content had symptoms of hypoglycemia following meal with lowest BG of 68 and high insulin prior to that but normal insulin at end of test.  For complete workup he was recommended 72-hour fast in hospital setting, however Bal declined at this time.  He has not yet met with a dietitian, he is interested in doing so.  He was started on metformin which he reports has been a big improvement.  He continues on nortriptyline.  Tried fiber supplement but did not feel like this made a big difference, taking intermittently.  Has been recommended to work with the dietitian regarding eating pattern and food choices to help manage however has not been able to do so yet.  He reports much less dizziness, severity and frequency improved.  Currently having about 2-3 bowel movements per day.  Morning stool after breakfast can be runny, often after noon stool will be more formed.  Occasional additional stools in the evening after dinner.  No blood in stool or melena.      PERTINENT PAST MEDICAL  "HISTORY:  Hypertension  GERD  \"Dumping syndrome\"    PREVIOUS SURGERIES:  Past Surgical History:   Procedure Laterality Date    ABDOMEN SURGERY  2012?    APPENDECTOMY  1964?    COLONOSCOPY  2006, 2016    EYE SURGERY  199x    laser for retinal tears    GASTRIC FUNDOPLICATION      HERNIA REPAIR  1961?    LAPAROSCOPIC TAKEDOWN NISSEN FUNDOPLICATION N/A 9/25/2020    Procedure: TAKE-DOWN, FUNDOPLICATION, REDO NISSEN, LAPAROSCOPIC, gastrostomy feeding tube placement;  Surgeon: Katlyn Tobar MD;  Location: [facility]    DC ESOPHAGOGASTRODUODENOSCOPY TRANSORAL DIAGNOSTIC N/A 5/9/2019    Procedure: UPPER GASTROINTESTINAL ENDOSCOPY;  Surgeon: Dino Ward MD;  Location: [facility];  Service: General    SOFT TISSUE SURGERY  2009?    MCL l. thumb     S/p tonsillectomy and adenoidectomy  S/p R inguinal hernia repair  S/p Nissen fundoplication    ALLERGIES:     Allergies   Allergen Reactions    Hornets     Wasp Venom Protein Hives    Bee Venom Swelling       PERTINENT MEDICATIONS:  Current Outpatient Medications   Medication    atenolol (TENORMIN) 50 MG tablet    blood glucose (NO BRAND SPECIFIED) test strip    blood glucose monitoring (NO BRAND SPECIFIED) meter device kit    Continuous Blood Gluc Sensor (FREESTYLE SIMIN 2 SENSOR) MISC    cyanocobalamin (VITAMIN B-12) 500 MCG tablet    EPINEPHrine (ANY BX GENERIC EQUIV) 0.3 MG/0.3ML injection 2-pack    gabapentin (NEURONTIN) 300 MG capsule    metFORMIN (GLUCOPHAGE XR) 500 MG 24 hr tablet    nortriptyline (PAMELOR) 25 MG capsule    thin (NO BRAND SPECIFIED) lancets     No current facility-administered medications for this visit.     Facility-Administered Medications Ordered in Other Visits   Medication    barium sulfate (EZ-DISK) tablet 700 mg    sod bicarbonate-citric acid-simethicone (EZ GAS) 2.21-1.53-0.04 g packet 4 g       SOCIAL HISTORY:  Social History     Socioeconomic History    Marital status:      Spouse name: Not on file    Number of children: Not on " file    Years of education: Not on file    Highest education level: Not on file   Occupational History    Not on file   Tobacco Use    Smoking status: Never    Smokeless tobacco: Never   Substance and Sexual Activity    Alcohol use: Yes     Alcohol/week: 3.0 - 6.0 standard drinks of alcohol     Types: 1 - 2 Glasses of wine, 1 - 2 Cans of beer, 1 - 2 Shots of liquor per week     Comment: glass of wine with dinner, can of beer in evening or 1 whiskey  or bourbon    Drug use: Never    Sexual activity: Yes     Partners: Female     Birth control/protection: Post-menopausal, Male Surgical, Female Surgical   Other Topics Concern    Not on file   Social History Narrative    Not on file     Social Determinants of Health     Financial Resource Strain: Not on file   Food Insecurity: Not on file   Transportation Needs: Not on file   Physical Activity: Not on file   Stress: Not on file   Social Connections: Not on file   Interpersonal Safety: Not on file   Housing Stability: Not on file       FAMILY HISTORY:  Family History   Problem Relation Age of Onset    Coronary Artery Disease Maternal Grandfather     Hypertension Mother     Uterine Cancer Mother         1965    Mitral Valve Replacement Father     GERD No family hx of     Ulcerative Colitis No family hx of     Crohn's Disease No family hx of     Stomach Cancer No family hx of        Past/family/social history reviewed and no changes    PHYSICAL EXAMINATION:  Eyes: Sclera anicteric/injected  Ears/nose/mouth/throat: Normal oropharynx without ulcers or exudate, mucus membranes moist, hearing intact  Neck: supple, thyroid normal size  CV: No edema  Respiratory: Unlabored breathing  Lymph: No axillary, submandibular, supraclavicular or inguinal lymphadenopathy  Abd: Nondistended, +bs, no hepatosplenomegaly, nontender, no peritoneal signs  Skin: warm, perfused, no jaundice  Psych: Normal affect  MSK: Normal gait            Chief Complaint   Patient presents with    Follow Up  "      Vitals:    02/19/24 1054   BP: 126/83   BP Location: Left arm   Patient Position: Sitting   Cuff Size: Adult Regular   Pulse: 62   SpO2: 98%   Weight: 86.5 kg (190 lb 9.6 oz)   Height: 1.778 m (5' 10\")       Body mass index is 27.35 kg/m .    Rico Burrows      Again, thank you for allowing me to participate in the care of your patient.      Sincerely,    Cathy Aguirre PA-C  "

## 2024-02-19 NOTE — PATIENT INSTRUCTIONS
It was a pleasure taking care of you today.  I've included a brief summary of our discussion and care plan from today's visit below.  Please review this information with your primary care provider.  ______________________________________________________________________    My recommendations are summarized as follows:    -- Schedule upper endoscopy  -- Schedule upper GI imaging  -- If symptoms get worse, you can try famotidine (Pepcid), gaviscon, or reflux gourmet  -- Referral to GI dietitian    -- please see scheduling information provided below     Return to GI Clinic in 3-4 months to review your progress.    ______________________________________________________________________    How do I schedule labs, imaging studies, or procedures that were ordered in clinic today?     Labs: To schedule lab appointment at the [facility], use my chart or call [phone]. If you have a [place] lab closer to home where you are regularly seen you can give them a call.     Procedures: If a colonoscopy, upper endoscopy, breath test, esophageal manometry, or pH impedence was ordered today, our endoscopy team will call you to schedule this. If you have not heard from our endoscopy team within a week, please call [phone] to schedule.     Imaging Studies: If you were scheduled for a CT scan, X-ray, MRI, ultrasound, HIDA scan or other imaging study, please call [phone] to have this scheduled.     Referral: If a referral to another specialty was ordered, expect a phone call or follow instructions above. If you have not heard from anyone regarding your referral in a week, please call our clinic to check the status.     Who do I call with any questions after my visit?  Please be in touch if there are any further questions that arise following today's visit.  There are multiple ways to contact your gastroenterology care team.      During business hours, you may reach a Gastroenterology nurse at  [phone]    To schedule or reschedule an appointment, please call [phone].     You can always send a secure message through [email or web address].  [email or web address] messages are answered by your nurse or doctor typically within 24 hours.  Please allow extra time on weekends and holidays.      For urgent/emergent questions after business hours, you may reach the on-call GI Fellow by contacting the [facility]  at [phone].     How will I get the results of any tests ordered?    You will receive all of your results.  If you have signed up for [employer], any tests ordered at your visit will be available to you after your provider reviews them.  Typically this takes 1-2 weeks.  If there are urgent results that require a change in your care plan, your provider or nurse will call you to discuss the next steps.      What is [email or web address]?  [email or web address] is a secure way for you to access all of your healthcare records from the [facility].  It is a web based computer program, so you can sign on to it from any location.  It also allows you to send secure messages to your care team.  I recommend signing up for [email or web address] access if you have not already done so and are comfortable with using a computer.      How to I schedule a follow-up visit?  If you did not schedule a follow-up visit today, please call [phone] to schedule a follow-up office visit.      Sincerely,    Cathy Aguirre PA-C  Division of Gastroenterology, Hepatology & Nutrition  [facility]

## 2024-03-05 ENCOUNTER — TELEPHONE (OUTPATIENT)
Dept: GASTROENTEROLOGY | Facility: CLINIC | Age: 69
End: 2024-03-05
Payer: COMMERCIAL

## 2024-03-05 ENCOUNTER — HOSPITAL ENCOUNTER (OUTPATIENT)
Facility: AMBULATORY SURGERY CENTER | Age: 69
End: 2024-03-05
Attending: INTERNAL MEDICINE
Payer: MEDICARE

## 2024-03-05 NOTE — TELEPHONE ENCOUNTER
"Endoscopy Scheduling Screen    Have you had a positive Covid test in the last 14 days?  No    Are you active on MyChart?   Yes    What insurance is in the chart?  Other:  [employer]    Ordering/Referring Provider: MAIKOL PARNELL   (If ordering provider performs procedure, schedule with ordering provider unless otherwise instructed. )    BMI: Estimated body mass index is 27.35 kg/m  as calculated from the following:    Height as of 2/19/24: 1.778 m (5' 10\").    Weight as of 2/19/24: 86.5 kg (190 lb 9.6 oz).     Sedation Ordered  moderate sedation.   If patient BMI > 50 do not schedule in [facility].    If patient BMI > 45 do not schedule at [facility].    Are you taking methadone or Suboxone?  No    Have you had difficulties, pain, or discomfort during past endoscopy procedures?  No    Are you taking any prescription medications for pain 3 or more times per week?   NO - No RN review required.    Do you have a history of malignant hyperthermia or adverse reaction to anesthesia?  No    (Females) Are you currently pregnant?   No     Have you been diagnosed or told you have pulmonary hypertension?   No    Do you have an LVAD?  No    Have you been told you have moderate to severe sleep apnea?  No    Have you been told you have COPD, asthma, or any other lung disease?  No    Do you have any heart conditions?  No     Have you ever had an organ transplant?   No    Have you ever had or are you awaiting a heart or lung transplant?   No    Have you had a stroke or transient ischemic attack (TIA aka \"mini stroke\" in the last 6 months?   No    Have you been diagnosed with or been told you have cirrhosis of the liver?   No    Are you currently on dialysis?   No    Do you need assistance transferring?   No    BMI: Estimated body mass index is 27.35 kg/m  as calculated from the following:    Height as of 2/19/24: 1.778 m (5' 10\").    Weight as of 2/19/24: 86.5 kg (190 lb 9.6 oz).     Is patients BMI > 40 and scheduling location [facility]?  No    Do you take " an injectable medication for weight loss or diabetes (excluding insulin)?  No    Do you take the medication Naltrexone?  No    Do you take blood thinners?  No       Prep   Are you currently on dialysis or do you have chronic kidney disease?  No    Do you have a diagnosis of diabetes?  No    Do you have a diagnosis of cystic fibrosis (CF)?  No    On a regular basis do you go 3 -5 days between bowel movements?  No    BMI > 40?  No    Preferred Pharmacy:    [facility] - [place] - [address]  [address] [facility] [place] [address]  Phone: [phone] Fax: [phone]      Final Scheduling Details   Colonoscopy prep sent?  N/A    Procedure scheduled  Upper endoscopy (EGD)    Surgeon:  KAYCEE     Date of procedure:  6/26/24     Pre-OP / PAC:   No - Not required for this site.    Location  [facility] - [facility] - Per order.    Sedation   MAC/Deep Sedation - Per order.      Patient Reminders:   You will receive a call from a Nurse to review instructions and health history.  This assessment must be completed prior to your procedure.  Failure to complete the Nurse assessment may result in the procedure being cancelled.      On the day of your procedure, please designate an adult(s) who can drive you home stay with you for the next 24 hours. The medicines used in the exam will make you sleepy. You will not be able to drive.      You cannot take public transportation, ride share services, or non-medical taxi service without a responsible caregiver.  Medical transport services are allowed with the requirement that a responsible caregiver will receive you at your destination.  We require that drivers and caregivers are confirmed prior to your procedure.

## 2024-04-05 ENCOUNTER — PATIENT OUTREACH (OUTPATIENT)
Dept: GASTROENTEROLOGY | Facility: CLINIC | Age: 69
End: 2024-04-05
Payer: COMMERCIAL

## 2024-04-05 NOTE — TELEPHONE ENCOUNTER
Reached out to pt to review information and instructions for upcoming esophageal manometry testing. Left detailed vm with call back number to use as needed.

## 2024-04-11 ENCOUNTER — OFFICE VISIT (OUTPATIENT)
Dept: GASTROENTEROLOGY | Facility: CLINIC | Age: 69
End: 2024-04-11
Payer: COMMERCIAL

## 2024-04-11 VITALS
TEMPERATURE: 96.8 F | RESPIRATION RATE: 16 BRPM | HEIGHT: 70 IN | DIASTOLIC BLOOD PRESSURE: 76 MMHG | WEIGHT: 180 LBS | OXYGEN SATURATION: 99 % | BODY MASS INDEX: 25.77 KG/M2 | SYSTOLIC BLOOD PRESSURE: 108 MMHG | HEART RATE: 51 BPM

## 2024-04-11 DIAGNOSIS — K91.1 DUMPING SYNDROME: ICD-10-CM

## 2024-04-11 DIAGNOSIS — K44.9 HIATAL HERNIA: Primary | ICD-10-CM

## 2024-04-11 PROCEDURE — 91010 ESOPHAGUS MOTILITY STUDY: CPT | Performed by: PHYSICIAN ASSISTANT

## 2024-04-11 PROCEDURE — 91037 ESOPH IMPED FUNCTION TEST: CPT | Performed by: PHYSICIAN ASSISTANT

## 2024-04-11 ASSESSMENT — PAIN SCALES - GENERAL: PAINLEVEL: NO PAIN (0)

## 2024-04-11 NOTE — PATIENT INSTRUCTIONS
Esophogeal Manometry Study  1. Resume regular diet.  2. You may have a bloody nose or sore throat after the procedure.  3. If you have questions call [phone] from 7:00am-5:00pm.  For afterhours questions call GI doctor on call at [phone].

## 2024-04-11 NOTE — PROGRESS NOTES
Non-Invasive GI Procedure Visit    Bal Junior is a 69 year old male with history of    Hiatal hernia  Dumping syndrome.   Patient stated reason for procedure:  potentially slipped nissen      COVID-19 PCR Results           No data to display              COVID-19 Antibody Results, Testing for Immunity           No data to display                Pre-Procedure Assessment  Patient presents to clinic today for Esophageal Manometry Study    Referring Provider: Cathy MELENDEZ  Patient has undergone previous endoscopy.    Does patient report taking a PPI (omeprazole, pantoprazole, rabeprazole, lansoprazole, esomeprazole, dexlansoprazole)? No  Does patient report taking a H2 blocker (ranitidine, or famotidine)? No  Does patient report taking opioids? No  Patient reported that last food and/or drink was last consumed 12 hours ago.  Esophageal Questionnaire(s) Completed: Yes - Esophageal Questionnaire(s)    BEDQ Questionnaire      4/10/2024     8:52 AM   BEDQ Questionnaire: How Often Have You Had the Following?   Trouble eating solid food (meat, bread, vegetables) 1   Trouble eating soft foods (yogurt, jello, pudding) 0   Trouble swallowing liquids 0   Pain while swallowing 1   Coughing or choking while swallowing foods or liquids 0   Total Score: 2         4/10/2024     8:52 AM   BEDQ Questionnaire: Discomfort/Pain Ratings   Eating solid food (meat, bread, vegetables) 1   Eating soft foods (yogurt, jello, pudding) 0   Drinking liquid 0   Total Score: 1       Eckardt Questionnaire      4/10/2024     8:53 AM   Eckardt Questionnaire   Dysphagia 1   Regurgitation 0   Retrosternal Pain 0   Weight Loss (kg) 1   Total Score:  2       Promis 10 Questionnaire      4/10/2024     8:54 AM   PROMIS 10 FLOWSHEET DATA   In general, would you say your health is: 4   In general, would you say your quality of life is: 4   In general, how would you rate your physical health? 4   In general, how would you rate your mental health, including  "your mood and your ability to think? 4   In general, how would you rate your satisfaction with your social activities and relationships? 4   In general, please rate how well you carry out your usual social activities and roles. (This includes activities at home, at work and in your community, and responsibilities as a parent, child, spouse, employee, friend, etc.) 4   To what extent are you able to carry out your everyday physical activities such as walking, climbing stairs, carrying groceries, or moving a chair? 5   In the past 7 days, how often have you been bothered by emotional problems such as feeling anxious, depressed, or irritable? 2   In the past 7 days, how would you rate your fatigue on average? 3   In the past 7 days, how would you rate your pain on average, where 0 means no pain, and 10 means worst imaginable pain? 2   Mental health question re-calculation - no clinical value 4   Physical health question re-calculation - no clinical value 3   Pain question re-calculation - no clinical value 4   Global Mental Health Score 16   Global Physical Health Score 16   PROMIS TOTAL - SUBSCORES 32   .    Patient Hx  Patient's history, medications and allergies were reviewed.     Height: 5' 10\"   Weight: 180 lbs 0 oz    Patient Active Problem List    Diagnosis Date Noted    Dumping syndrome 01/18/2023     Priority: Medium    Hypoglycemia 01/18/2023     Priority: Medium    Carpal tunnel syndrome of right wrist 02/02/2021     Priority: Medium    S/P Nissen fundoplication (with gastrostomy tube placement) (H) 09/25/2020     Priority: Medium    Hiatal hernia 08/27/2020     Priority: Medium     Added automatically from request for surgery [id number]      Gastroesophageal reflux disease with esophagitis 07/02/2020     Priority: Medium    Hiatal hernia with gastroesophageal reflux 07/02/2020     Priority: Medium    Hx of allergy to insects 07/02/2020     Priority: Medium    Peripheral neuropathy 07/02/2020     Priority: " Medium    Palpitations 07/02/2020     Priority: Medium     Created by Conversion      Peripheral neuropathic pain 07/02/2020     Priority: Medium    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 Authorizing Provider   atenolol (TENORMIN) 50 MG tablet Take 50 mg by mouth daily  1/1/1999   Reported, Patient   blood glucose (NO BRAND SPECIFIED) test strip Use to test blood sugar 1 times daily or as directed. To accompany: Blood Glucose Monitor Brands: per insurance. 5/17/23   Cali Dougherty MD   blood glucose monitoring (NO BRAND SPECIFIED) meter device kit Use to test blood sugar 1 times daily or as directed. Preferred blood glucose meter OR supplies to accompany: Blood Glucose Monitor Brands: per insurance. 5/17/23   Cali Dougherty MD   Continuous Blood Gluc Sensor (FREESTYLE SIMIN 2 SENSOR) MISC 1 each every 14 days Use 1 sensor every 14 days. Use to read blood sugars per 's instructions. 2/20/23   Cali Dougherty MD   cyanocobalamin (VITAMIN B-12) 500 MCG tablet daily     Reported, Patient   EPINEPHrine (ANY BX GENERIC EQUIV) 0.3 MG/0.3ML injection 2-pack 0.3 mg    Reported, Patient   gabapentin (NEURONTIN) 300 MG capsule Take 1 capsule (300 mg) by mouth 3 times daily 10/30/23   Yo Millan MD   metFORMIN (GLUCOPHAGE XR) 500 MG 24 hr tablet Take 1 tablet (500 mg) by mouth daily (with dinner) 1/17/24   Cali Dougherty MD   nortriptyline (PAMELOR) 25 MG capsule Take 25 mg by mouth daily     Reported, Patient   thin (NO BRAND SPECIFIED) lancets Use with lanceting device. To accompany: Blood Glucose Monitor Brands: per insurance. 5/17/23    Cali Dougherty MD     Allergies   Allergen Reactions    Hornets     Wasp Venom Protein Hives    Bee Venom Swelling     Past Medical History:   Diagnosis Date    Hypertension     Neuritis     Peripheral neuropathy     Personal history of other medical treatment     postgastrectomy dumping syndrome    Stomach problems Noted earlier     Past Surgical History:   Procedure Laterality Date    ABDOMEN SURGERY  2012?    APPENDECTOMY  1964?    COLONOSCOPY  2006, 2016    EYE SURGERY  199x    laser for retinal tears    GASTRIC FUNDOPLICATION      HERNIA REPAIR  1961?    LAPAROSCOPIC TAKEDOWN NISSEN FUNDOPLICATION N/A 9/25/2020    Procedure: TAKE-DOWN, FUNDOPLICATION, REDO NISSEN, LAPAROSCOPIC, gastrostomy feeding tube placement;  Surgeon: Katlyn Tobar MD;  [facility]    WA ESOPHAGOGASTRODUODENOSCOPY TRANSORAL DIAGNOSTIC N/A 5/9/2019    Procedure: UPPER GASTROINTESTINAL ENDOSCOPY;  Surgeon: Dino Ward MD;  [facility]: [facility];  Service: General    SOFT TISSUE SURGERY  2009?    MCL l. thumb     Family History   Problem Relation Age of Onset    Coronary Artery Disease Maternal Grandfather     Hypertension Mother     Uterine Cancer Mother         1965    Mitral Valve Replacement Father     GERD No family hx of     Ulcerative Colitis No family hx of     Crohn's Disease No family hx of     Stomach Cancer No family hx of      Social History     Tobacco Use    Smoking status: Never     Passive exposure: Past    Smokeless tobacco: Never   Substance Use Topics    Alcohol use: Yes     Alcohol/week: 3.0 - 6.0 standard drinks of alcohol     Types: 1 - 2 Glasses of wine, 1 - 2 Cans of beer, 1 - 2 Shots of liquor per week     Comment: glass of wine with dinner, can of beer in evening or 1 whiskey  or bourbon        Pre-Procedure Education & Consent  Procedure education was provided to: Patient  Teaching method: Explanation  Barriers to learning: No Barrier    Patient indicated understanding of pre-procedure  instruction and appropriate consent was obtained and documented.    ____________________________________________________________________    Post-Procedure Documentation: Esophageal Manometry    Manometry catheter was placed via right nare to 53 cm and normal saline swallows given per protocol. Manometry catheter was removed at the end of test.    Discharge instructions given to patient.    Notification of pending test results sent to provider for interpretation. Please reference scanned document for final interpretation of results. Patient will follow up with referring provider for test results.    Lindsey Rockwell RN on 4/11/2024 at 8:42 AM

## 2024-04-15 ENCOUNTER — VIRTUAL VISIT (OUTPATIENT)
Dept: GASTROENTEROLOGY | Facility: CLINIC | Age: 69
End: 2024-04-15
Attending: DIETITIAN, REGISTERED
Payer: COMMERCIAL

## 2024-04-15 VITALS — HEIGHT: 70 IN | BODY MASS INDEX: 25.77 KG/M2 | WEIGHT: 180 LBS

## 2024-04-15 DIAGNOSIS — E16.2 HYPOGLYCEMIA: ICD-10-CM

## 2024-04-15 DIAGNOSIS — K44.9 HIATAL HERNIA: ICD-10-CM

## 2024-04-15 DIAGNOSIS — R13.10 DYSPHAGIA, UNSPECIFIED TYPE: ICD-10-CM

## 2024-04-15 DIAGNOSIS — K91.1 DUMPING SYNDROME: Primary | ICD-10-CM

## 2024-04-15 DIAGNOSIS — R68.81 EARLY SATIETY: ICD-10-CM

## 2024-04-15 PROCEDURE — 99207 PR NO CHARGE LOS: CPT | Mod: 95 | Performed by: DIETITIAN, REGISTERED

## 2024-04-15 PROCEDURE — 97802 MEDICAL NUTRITION INDIV IN: CPT | Mod: GA | Performed by: DIETITIAN, REGISTERED

## 2024-04-15 ASSESSMENT — PAIN SCALES - GENERAL: PAINLEVEL: NO PAIN (0)

## 2024-04-15 NOTE — PATIENT INSTRUCTIONS
It was nice speaking with you today. Below are the nutrition recommendations we discussed at your visit.    Please let me know if you have any additional questions.    Nutrition Recommendations    Aim for eating multiple smaller meals per day (such as 4-6 smaller meals and snacks per day).    Aim for eating a smaller lunch meal. Could start by eating 1/2 of your usual amount at lunch and then eat the other half later in the afternoon/as another smaller meal.    Limit foods with added sugars. Choose higher fiber carbohydrate foods and include some protein along with all meals and with snacks.    Chew food very well before swallowing.    Eat slowly and take at least 20 minutes to eat a meal.    Avoid drinking fluids with eating meals, snacks and solid foods, and wait at least 30 minutes after eating before drinking fluids again. (Ideally also stop drinking about 30 minutes before meals too, but more important to wait 30 minutes after eating before drinking liquids).    Drink at least 48 oz-64 oz water per day (okay to add some sugar free [place] drops or sugar free crystal lite to some water too).    Include some good sources of soluble fiber in your diet such as oats, barley, berries, figs, avocado, banana, sweet potatoes, broccoli, brussels sprouts, apples, applesauce, nuts especially almonds, roberto seeds, flaxseeds, ground flaxseeds, beans and legumes (navy beans are especially high in soluble fiber)    Here are some links to handouts. You can ignore the part where it says after weight loss surgery. Although you didn't have weight loss surgery, the tips are the same recommendations for preventing/decreasing issues with hypoglycemia and dumping syndrome.    Preventing Low Blood Sugar     Preventing Dumping Syndrome     Can follow up as needed.    If you would like to schedule a follow up appointment, please call [phone].    Thank you,    Tia Neal, MS, RD, LD

## 2024-04-15 NOTE — PROGRESS NOTES
Virtual Visit Details    Type of service:  Video Visit   Video Start Time:  1:35 PM  Video End Time: 2:10 PM    Originating Location (pt. Location): Home  Distant Location (provider location):  On-site  Platform used for Video Visit: Grecia CARDENSA signed electronically on 2/19/2024 (prior to visit).    [facility] Outpatient Medical Nutrition Therapy      Time Spent:  35 minutes  Session Type:  Re-Initial (last visit in 9/2020)  Referring Physician:  Cathy Aguirre PA-C  Reason for RD Visit:   dumping syndrome     Nutrition Assessment:  Patient is a 69 year old male with history that includes GERD, hypertension, hiatal hernia, hx Nissen fundiplication in 2011 and redo in 9/2020, slipped Nissen, postprandial hypoglycemia,  started metformin, dumping syndrome,and reported losing weight unintentionally.     He stated that he has issues at times after eating where he gets light-headed/woozy feeling and has to lie down, occasionally gets loose stools/diarrhea after a meal but this is not usual. The light headed feeling is more of a common issue for him. He especially notices this after lunch meal, but sometimes it can happen after his breakfast meal. Usually he is okay after dinner and in the evening. He has gotten this feeling a couple of time after dinner but this is not usual and after lunch is the more prevalent time it occurs.  From discussion today, his lunch meal tends to be his larger meal of the day. He has been trying to eat smaller amounts at meals. He is limiting his diet soda intake to 1 can diet coke spread out throughout the morning. He will have a diet iced tea or unsweetened iced tea with his afternoon snack. At breakfast, he doesn't drink with this meal but does drink liquids with other meals and snacks. He tends to eat oatmeal for breakfast and this is usually the best tolerated foods for him. He doesn't tolerate eggs as they cause him to have diarrhea/loose stools. He only gets diarrhea/loose  "stools once in a while and he notices this especially if he drinks too many liquids before a meal. He is wondering if there are some other foods similar to oatmeal that may be better tolerated and help prevent/decrease issues with dumping syndrome and interested in other diet education and tips for this. He isn't currently experiencing any heartburn, but he went back to avoiding some of those foods such as tomato sauce that used to trigger his symptoms in the past.    Patient Active Problem List   Diagnosis    Essential hypertension    Gastroesophageal reflux disease with esophagitis    Hiatal hernia with gastroesophageal reflux    Hx of allergy to insects    Slipped Nissen fundoplication    Peripheral neuropathy    Palpitations    Peripheral neuropathic pain    Postgastric surgery syndrome    Hiatal hernia    S/P Nissen fundoplication (with gastrostomy tube placement) (H)    Carpal tunnel syndrome of right wrist    Dumping syndrome    Hypoglycemia     Height:   Ht Readings from Last 1 Encounters:   04/15/24 1.778 m (5' 10\")     Weight: he stated that since 2010, with having GERD and Nissen issues, he has been steadily losing weight. He reported in 2010, he weighed ~250 lbs and now he is ~180 lbs.  Wt Readings from Last 10 Encounters:   04/15/24 81.6 kg (180 lb)   04/11/24 81.6 kg (180 lb)   02/19/24 86.5 kg (190 lb 9.6 oz)   01/17/24 85.7 kg (189 lb)   10/30/23 86.2 kg (190 lb)   05/17/23 86.6 kg (191 lb)   02/15/23 89.4 kg (197 lb)   01/18/23 87.5 kg (193 lb)   01/17/23 87.8 kg (193 lb 9.6 oz)   06/24/22 90.3 kg (199 lb)     BMI: Estimated body mass index is 25.83 kg/m  as calculated from the following:    Height as of this encounter: 1.778 m (5' 10\").    Weight as of this encounter: 81.6 kg (180 lb).    Diet Recall:  (some usual/recent meals/snacks/beverages):  Meal Food    Breakfast Oatmeal, fruit and milk   Lunch Today: leftover salmon, fried rice and salad or yesterday: leftover burger, fried rice   Dinner " [place], fried rice or polish tyree stew, sauerkraut, beans, sausage, veggies. Also eats other meats/pork some beef and some chicken and salmon   Snacks 10:30-11: sports bar/small bag nuts, dried fruit. Afternoon: 3:30-4:30 PM: something sweeter, couple cookies   Beverages 1 can diet soda (stretches throughout morning), 32 oz water, 16 oz diet instant iced tea   Alcohol Intake Every other night has 1 Beer or 1 glass bourbon     Frequency of eating/taking out meals: ~1x/month. [place] in [place] goes out every other day (stays in [place] for 3 month of the year).     Labs:    Last Comprehensive Metabolic Panel:  Sodium   Date Value Ref Range Status   09/14/2023 140 136 - 145 mmol/L Final   09/27/2020 137 133 - 144 mmol/L Final     Potassium   Date Value Ref Range Status   09/14/2023 4.5 3.4 - 5.3 mmol/L Final   08/05/2021 4.3 3.5 - 5.0 mmol/L Final   09/27/2020 3.9 3.4 - 5.3 mmol/L Final     Chloride   Date Value Ref Range Status   09/14/2023 105 98 - 107 mmol/L Final   08/05/2021 110 (H) 98 - 107 mmol/L Final   09/27/2020 106 94 - 109 mmol/L Final     Carbon Dioxide   Date Value Ref Range Status   09/27/2020 26 20 - 32 mmol/L Final     Carbon Dioxide (CO2)   Date Value Ref Range Status   09/14/2023 24 22 - 29 mmol/L Final   08/05/2021 20 (L) 22 - 31 mmol/L Final     Anion Gap   Date Value Ref Range Status   09/14/2023 11 7 - 15 mmol/L Final   08/05/2021 12 5 - 18 mmol/L Final   09/27/2020 5 3 - 14 mmol/L Final     Glucose   Date Value Ref Range Status   09/14/2023 116 (H) 70 - 99 mg/dL Final   08/05/2021 90 70 - 125 mg/dL Final   09/27/2020 116 (H) 70 - 99 mg/dL Final     GLUCOSE BY METER POCT   Date Value Ref Range Status   02/02/2023 162 (H) 70 - 99 mg/dL Final     Urea Nitrogen   Date Value Ref Range Status   09/14/2023 13.6 8.0 - 23.0 mg/dL Final   08/05/2021 16 8 - 22 mg/dL Final   09/27/2020 11 7 - 30 mg/dL Final     Creatinine   Date Value Ref Range Status   09/14/2023 1.00 0.67 - 1.17 mg/dL Final   09/27/2020  0.82 0.66 - 1.25 mg/dL Final     GFR Estimate   Date Value Ref Range Status   09/14/2023 82 >60 mL/min/1.73m2 Final   09/27/2020 >90 >60 mL/min/[1.73_m2] Final     Comment:     Non  GFR Calc  Starting 12/18/2018, serum creatinine based estimated GFR (eGFR) will be   calculated using the Chronic Kidney Disease Epidemiology Collaboration   (CKD-EPI) equation.       Calcium   Date Value Ref Range Status   09/14/2023 9.2 8.8 - 10.2 mg/dL Final   09/27/2020 8.2 (L) 8.5 - 10.1 mg/dL Final     CBC RESULTS:   Recent Labs   Lab Test 01/18/23  1204   WBC 6.9   RBC 4.91   HGB 15.1   HCT 44.9   MCV 91   MCH 30.8   MCHC 33.6   RDW 12.2        Pertinent Medications/vitamin and mineral supplements:      Current Outpatient Medications   Medication Sig Dispense Refill    atenolol (TENORMIN) 50 MG tablet Take 50 mg by mouth daily       blood glucose (NO BRAND SPECIFIED) test strip Use to test blood sugar 1 times daily or as directed. To accompany: Blood Glucose Monitor Brands: per insurance. 100 strip 6    blood glucose monitoring (NO BRAND SPECIFIED) meter device kit Use to test blood sugar 1 times daily or as directed. Preferred blood glucose meter OR supplies to accompany: Blood Glucose Monitor Brands: per insurance. 1 kit 0    Continuous Blood Gluc Sensor (FREESTYLE SIMIN 2 SENSOR) MISC 1 each every 14 days Use 1 sensor every 14 days. Use to read blood sugars per 's instructions. 6 each 1    cyanocobalamin (VITAMIN B-12) 500 MCG tablet daily       EPINEPHrine (ANY BX GENERIC EQUIV) 0.3 MG/0.3ML injection 2-pack 0.3 mg      gabapentin (NEURONTIN) 300 MG capsule Take 1 capsule (300 mg) by mouth 3 times daily 270 capsule 3    metFORMIN (GLUCOPHAGE XR) 500 MG 24 hr tablet Take 1 tablet (500 mg) by mouth daily (with dinner) 90 tablet 3    nortriptyline (PAMELOR) 25 MG capsule Take 25 mg by mouth daily       thin (NO BRAND SPECIFIED) lancets Use with lanceting device. To accompany: Blood Glucose  Monitor Brands: per insurance. 100 each 6     No current facility-administered medications for this visit.     Facility-Administered Medications Ordered in Other Visits   Medication Dose Route Frequency Provider Last Rate Last Admin    barium sulfate (EZ-DISK) tablet 700 mg  700 mg Oral Once Norm Mcdonald MD        sod bicarbonate-citric acid-simethicone (EZ GAS) 2.21-1.53-0.04 g packet 4 g  4 g Oral Once Norm Mcdonald MD         Food Allergies:  NKFA   Physical Activity:  spinning class 1-2 times per week and mountain biking 2x/week and 1-2x/week kayak. Dog walks.gets 8-12K steps per day. During ski season (over 3 months) skis 4 times per week.    MALNUTRITION:  % Weight Loss:  Weight loss does not meet criteria for malnutrition   % Intake:  Decreased intake does not meet criteria for malnutrition   Subcutaneous Fat Loss:  None observed  Muscle Loss:  None observed  Fluid Retention:  None noted    Malnutrition Diagnosis: Patient does not meet two of the above criteria necessary for diagnosing malnutrition  In Context of:  Chronic illness or disease    Nutrition Prescription: Nutrition Education     Nutrition Intervention      Provided diet education for hx nissen, hypoglycemia and dumping syndrome.    Answered patient's questions. Patient verbalized understanding of education provided. See Goals below.     Educational Materials Provided:  FV: preventing dumping syndrome and preventing low blood sugars (explained this is a handout we use after weight loss surgery and although, he hasn't had wt loss surgery, this tips for preventing dumping are the same recommendations).    Goals:    Aim for eating multiple smaller meals per day (such as 4-6 smaller meals and snacks per day).    Aim for eating a smaller lunch meal. Could start by eating 1/2 of your usual amount at lunch and then eat the other half later in the afternoon/as another smaller meal.    Limit foods with added sugars. Choose higher fiber  carbohydrate foods and include some protein along with all meals and with snacks.    Chew food very well before swallowing.    Eat slowly and take at least 20 minutes to eat a meal.    Avoid drinking fluids with eating meals, snacks and solid foods, and wait at least 30 minutes after eating before drinking fluids again. (Ideally also stop drinking about 30 minutes before meals too, but more important to wait 30 minutes after eating before drinking liquids).    Drink at least 48 oz-64 oz water per day (okay to add some sugar free Christopher drops or sugar free crystal lite to some water too).    Include some good sources of soluble fiber in your diet such as oats, barley, berries, figs, avocado, banana, sweet potatoes, broccoli, brussels sprouts, apples, applesauce, nuts especially almonds, roberto seeds, flaxseeds, ground flaxseeds, beans and legumes (navy beans are especially high in soluble fiber)    Preventing Low Blood Sugar     Preventing Dumping Syndrome     Nutrition Monitoring and Evaluation: Will monitor adherence to nutrition recommendations at future RD visits.     Further Medical Nutrition Therapy:  Follow-up as needed.    Patient was encouraged to contact RD with any further questions.    Tia Neal, MS, RD, LD

## 2024-04-15 NOTE — NURSING NOTE
Is the patient currently in the state of [place]? YES    Visit mode:VIDEO    If the visit is dropped, the patient can be reconnected by: VIDEO VISIT: Send to e-mail at: [email or web address]    Will anyone else be joining the visit? NO  (If patient encounters technical issues they should call [phone]    How would you like to obtain your AVS? MyChart    Are changes needed to the allergy or medication list? No    Are refills needed on medications prescribed by this physician? NO    Reason for visit: Consult    Santos ELAM

## 2024-04-15 NOTE — LETTER
4/15/2024         RE: Bal Junior  [address] [place] [address]        Dear Colleague,    Thank you for referring your patient, Bal Junior, to the [facility] [place]. Please see a copy of my visit note below.      ABN signed electronically on 2/19/2024 (prior to visit).    [facility] Outpatient Medical Nutrition Therapy      Time Spent:  35 minutes  Session Type:  Re-Initial (last visit in 9/2020)  Referring Physician:  Cathy Aguirre PA-C  Reason for RD Visit:   dumping syndrome     Nutrition Assessment:  Patient is a 69 year old male with history that includes GERD, hypertension, hiatal hernia, hx Nissen fundiplication in 2011 and redo in 9/2020, slipped Nissen, postprandial hypoglycemia,  started metformin, dumping syndrome,and reported losing weight unintentionally.     He stated that he has issues at times after eating where he gets light-headed/woozy feeling and has to lie down, occasionally gets loose stools/diarrhea after a meal but this is not usual. The light headed feeling is more of a common issue for him. He especially notices this after lunch meal, but sometimes it can happen after his breakfast meal. Usually he is okay after dinner and in the evening. He has gotten this feeling a couple of time after dinner but this is not usual and after lunch is the more prevalent time it occurs.  From discussion today, his lunch meal tends to be his larger meal of the day. He has been trying to eat smaller amounts at meals. He is limiting his diet soda intake to 1 can diet coke spread out throughout the morning. He will have a diet iced tea or unsweetened iced tea with his afternoon snack. At breakfast, he doesn't drink with this meal but does drink liquids with other meals and snacks. He tends to eat oatmeal for breakfast and this is usually the best tolerated foods for him. He doesn't tolerate eggs as they cause him to have diarrhea/loose stools. He only  "gets diarrhea/loose stools once in a while and he notices this especially if he drinks too many liquids before a meal. He is wondering if there are some other foods similar to oatmeal that may be better tolerated and help prevent/decrease issues with dumping syndrome and interested in other diet education and tips for this. He isn't currently experiencing any heartburn, but he went back to avoiding some of those foods such as tomato sauce that used to trigger his symptoms in the past.    Patient Active Problem List   Diagnosis    Essential hypertension    Gastroesophageal reflux disease with esophagitis    Hiatal hernia with gastroesophageal reflux    Hx of allergy to insects    Slipped Nissen fundoplication    Peripheral neuropathy    Palpitations    Peripheral neuropathic pain    Postgastric surgery syndrome    Hiatal hernia    S/P Nissen fundoplication (with gastrostomy tube placement) (H)    Carpal tunnel syndrome of right wrist    Dumping syndrome    Hypoglycemia     Height:   Ht Readings from Last 1 Encounters:   04/15/24 1.778 m (5' 10\")     Weight: he stated that since 2010, with having GERD and Nissen issues, he has been steadily losing weight. He reported in 2010, he weighed ~250 lbs and now he is ~180 lbs.  Wt Readings from Last 10 Encounters:   04/15/24 81.6 kg (180 lb)   04/11/24 81.6 kg (180 lb)   02/19/24 86.5 kg (190 lb 9.6 oz)   01/17/24 85.7 kg (189 lb)   10/30/23 86.2 kg (190 lb)   05/17/23 86.6 kg (191 lb)   02/15/23 89.4 kg (197 lb)   01/18/23 87.5 kg (193 lb)   01/17/23 87.8 kg (193 lb 9.6 oz)   06/24/22 90.3 kg (199 lb)     BMI: Estimated body mass index is 25.83 kg/m  as calculated from the following:    Height as of this encounter: 1.778 m (5' 10\").    Weight as of this encounter: 81.6 kg (180 lb).    Diet Recall:  (some usual/recent meals/snacks/beverages):  Meal Food    Breakfast Oatmeal, fruit and milk   Lunch Today: leftover salmon, fried rice and salad or yesterday: leftover [place], " fried rice   Dinner [place], fried rice or polish tyree stew, sauerkraut, beans, sausage, veggies. Also eats other meats/pork some beef and some chicken and salmon   Snacks 10:30-11: sports bar/small bag nuts, dried fruit. Afternoon: 3:30-4:30 PM: something sweeter, couple cookies   Beverages 1 can diet soda (stretches throughout morning), 32 oz water, 16 oz diet instant iced tea   Alcohol Intake Every other night has 1 Beer or 1 glass bourbon     Frequency of eating/taking out meals: ~1x/month. [place] goes out every other day ([place] in [place] for 3 month of the year).     Labs:    Last Comprehensive Metabolic Panel:  Sodium   Date Value Ref Range Status   09/14/2023 140 136 - 145 mmol/L Final   09/27/2020 137 133 - 144 mmol/L Final     Potassium   Date Value Ref Range Status   09/14/2023 4.5 3.4 - 5.3 mmol/L Final   08/05/2021 4.3 3.5 - 5.0 mmol/L Final   09/27/2020 3.9 3.4 - 5.3 mmol/L Final     Chloride   Date Value Ref Range Status   09/14/2023 105 98 - 107 mmol/L Final   08/05/2021 110 (H) 98 - 107 mmol/L Final   09/27/2020 106 94 - 109 mmol/L Final     Carbon Dioxide   Date Value Ref Range Status   09/27/2020 26 20 - 32 mmol/L Final     Carbon Dioxide (CO2)   Date Value Ref Range Status   09/14/2023 24 22 - 29 mmol/L Final   08/05/2021 20 (L) 22 - 31 mmol/L Final     Anion Gap   Date Value Ref Range Status   09/14/2023 11 7 - 15 mmol/L Final   08/05/2021 12 5 - 18 mmol/L Final   09/27/2020 5 3 - 14 mmol/L Final     Glucose   Date Value Ref Range Status   09/14/2023 116 (H) 70 - 99 mg/dL Final   08/05/2021 90 70 - 125 mg/dL Final   09/27/2020 116 (H) 70 - 99 mg/dL Final     GLUCOSE BY METER POCT   Date Value Ref Range Status   02/02/2023 162 (H) 70 - 99 mg/dL Final     Urea Nitrogen   Date Value Ref Range Status   09/14/2023 13.6 8.0 - 23.0 mg/dL Final   08/05/2021 16 8 - 22 mg/dL Final   09/27/2020 11 7 - 30 mg/dL Final     Creatinine   Date Value Ref Range Status   09/14/2023 1.00 0.67 - 1.17 mg/dL  Final   09/27/2020 0.82 0.66 - 1.25 mg/dL Final     GFR Estimate   Date Value Ref Range Status   09/14/2023 82 >60 mL/min/1.73m2 Final   09/27/2020 >90 >60 mL/min/[1.73_m2] Final     Comment:     Non  GFR Calc  Starting 12/18/2018, serum creatinine based estimated GFR (eGFR) will be   calculated using the Chronic Kidney Disease Epidemiology Collaboration   (CKD-EPI) equation.       Calcium   Date Value Ref Range Status   09/14/2023 9.2 8.8 - 10.2 mg/dL Final   09/27/2020 8.2 (L) 8.5 - 10.1 mg/dL Final     CBC RESULTS:   Recent Labs   Lab Test 01/18/23  1204   WBC 6.9   RBC 4.91   HGB 15.1   HCT 44.9   MCV 91   MCH 30.8   MCHC 33.6   RDW 12.2        Pertinent Medications/vitamin and mineral supplements:      Current Outpatient Medications   Medication Sig Dispense Refill    atenolol (TENORMIN) 50 MG tablet Take 50 mg by mouth daily       blood glucose (NO BRAND SPECIFIED) test strip Use to test blood sugar 1 times daily or as directed. To accompany: Blood Glucose Monitor Brands: per insurance. 100 strip 6    blood glucose monitoring (NO BRAND SPECIFIED) meter device kit Use to test blood sugar 1 times daily or as directed. Preferred blood glucose meter OR supplies to accompany: Blood Glucose Monitor Brands: per insurance. 1 kit 0    Continuous Blood Gluc Sensor (FREESTYLE SIMIN 2 SENSOR) MISC 1 each every 14 days Use 1 sensor every 14 days. Use to read blood sugars per 's instructions. 6 each 1    cyanocobalamin (VITAMIN B-12) 500 MCG tablet daily       EPINEPHrine (ANY BX GENERIC EQUIV) 0.3 MG/0.3ML injection 2-pack 0.3 mg      gabapentin (NEURONTIN) 300 MG capsule Take 1 capsule (300 mg) by mouth 3 times daily 270 capsule 3    metFORMIN (GLUCOPHAGE XR) 500 MG 24 hr tablet Take 1 tablet (500 mg) by mouth daily (with dinner) 90 tablet 3    nortriptyline (PAMELOR) 25 MG capsule Take 25 mg by mouth daily       thin (NO BRAND SPECIFIED) lancets Use with lanceting device. To accompany:  Blood Glucose Monitor Brands: per insurance. 100 each 6     No current facility-administered medications for this visit.     Facility-Administered Medications Ordered in Other Visits   Medication Dose Route Frequency Provider Last Rate Last Admin    barium sulfate (EZ-DISK) tablet 700 mg  700 mg Oral Once Norm Mcdonald MD        sod bicarbonate-citric acid-simethicone (EZ GAS) 2.21-1.53-0.04 g packet 4 g  4 g Oral Once Norm Mcdonald MD         Food Allergies:  NKFA   Physical Activity:  spinning class 1-2 times per week and mountain biking 2x/week and 1-2x/week kayak. Dog walks.gets 8-12K steps per day. During ski season (over 3 months) skis 4 times per week.    MALNUTRITION:  % Weight Loss:  Weight loss does not meet criteria for malnutrition   % Intake:  Decreased intake does not meet criteria for malnutrition   Subcutaneous Fat Loss:  None observed  Muscle Loss:  None observed  Fluid Retention:  None noted    Malnutrition Diagnosis: Patient does not meet two of the above criteria necessary for diagnosing malnutrition  In Context of:  Chronic illness or disease    Nutrition Prescription: Nutrition Education     Nutrition Intervention      Provided diet education for hx nissen, hypoglycemia and dumping syndrome.    Answered patient's questions. Patient verbalized understanding of education provided. See Goals below.     Educational Materials Provided:  FV: preventing dumping syndrome and preventing low blood sugars (explained this is a handout we use after weight loss surgery and although, he hasn't had wt loss surgery, this tips for preventing dumping are the same recommendations).    Goals:    Aim for eating multiple smaller meals per day (such as 4-6 smaller meals and snacks per day).    Aim for eating a smaller lunch meal. Could start by eating 1/2 of your usual amount at lunch and then eat the other half later in the afternoon/as another smaller meal.    Limit foods with added sugars.  Choose higher fiber carbohydrate foods and include some protein along with all meals and with snacks.    Chew food very well before swallowing.    Eat slowly and take at least 20 minutes to eat a meal.    Avoid drinking fluids with eating meals, snacks and solid foods, and wait at least 30 minutes after eating before drinking fluids again. (Ideally also stop drinking about 30 minutes before meals too, but more important to wait 30 minutes after eating before drinking liquids).    Drink at least 48 oz-64 oz water per day (okay to add some sugar free Christopher drops or sugar free crystal lite to some water too).    Include some good sources of soluble fiber in your diet such as oats, barley, berries, figs, avocado, banana, sweet potatoes, broccoli, brussels sprouts, apples, applesauce, nuts especially almonds, roberto seeds, flaxseeds, ground flaxseeds, beans and legumes (navy beans are especially high in soluble fiber)    Preventing Low Blood Sugar     Preventing Dumping Syndrome     Nutrition Monitoring and Evaluation: Will monitor adherence to nutrition recommendations at future RD visits.     Further Medical Nutrition Therapy:  Follow-up as needed.    Patient was encouraged to contact RD with any further questions.          Again, thank you for allowing me to participate in the care of your patient.      Sincerely,    Tia Neal RD

## 2024-04-30 ENCOUNTER — ANCILLARY PROCEDURE (OUTPATIENT)
Dept: GENERAL RADIOLOGY | Facility: CLINIC | Age: 69
End: 2024-04-30
Attending: DIETITIAN, REGISTERED
Payer: COMMERCIAL

## 2024-04-30 DIAGNOSIS — R13.10 DYSPHAGIA, UNSPECIFIED TYPE: ICD-10-CM

## 2024-04-30 DIAGNOSIS — K91.89 SLIPPED NISSEN FUNDOPLICATION: ICD-10-CM

## 2024-04-30 PROCEDURE — 74220 X-RAY XM ESOPHAGUS 1CNTRST: CPT | Mod: GC | Performed by: RADIOLOGY

## 2024-06-03 DIAGNOSIS — K91.89 SLIPPED NISSEN FUNDOPLICATION: ICD-10-CM

## 2024-06-03 DIAGNOSIS — K44.9 HIATAL HERNIA: Primary | ICD-10-CM

## 2024-06-03 DIAGNOSIS — R13.10 DYSPHAGIA, UNSPECIFIED TYPE: ICD-10-CM

## 2024-06-05 ENCOUNTER — PATIENT OUTREACH (OUTPATIENT)
Dept: ONCOLOGY | Facility: CLINIC | Age: 69
End: 2024-06-05
Payer: COMMERCIAL

## 2024-06-05 NOTE — PROGRESS NOTES
New Patient Oncology Nurse Navigator Note     Referring provider: Cathy Aguirre PA-C    Referring Clinic/Organization: [facility]  Referred to: Thoracic Surgery  Requested provider (if applicable): First available - did not specify   Referral Received: 06/03/24       Evaluation for :   Diagnosis   R13.10 (ICD-10-CM) - Dysphagia, unspecified type   K44.9 (ICD-10-CM) - Hiatal hernia   K91.89 (ICD-10-CM) - Slipped Nissen fundoplication      Additional Information:  s/p Nissen redo 9/2020, recent increasing symptoms, CT scan 1/2024 with wrap 2 cm above diaphragm    Clinical History (per Nurse review of records provided):      09/25/2020 Procedure: Laparoscopic takedown of Nissen fundoplication, repair of hiatal hernia with redo Nissen fundoplication, upper endoscopy and gastropexy with PEG tube.     01/26/2024 CT Abdomen Pelvis (bookmarked) showed:   IMPRESSION:   1.  Superior aspect of the Nissen fundal wrap appears about 2 cm above  the diaphragm possibly representing slipped Nissen.  2.  Hepatic steatosis.  3.  Nonobstructing renal calculi. No hydronephrosis.    Clinical Assessment / Barriers to Care (Per Nurse):  Tobacco History    Smoking Status  Never     BMI 25    Records Location: [facility]   Records Needed: None  Additional testing needed prior to consult: None  Referral updates and Plan:   Former pt of Dr. Tobar's. Sending for scheduling with Dr. Tobar.     Ashley Diaz, MPN, RN, OCN  [facility] Oncology Nurse Navigator  [phone] / [phone]

## 2024-07-14 ENCOUNTER — HEALTH MAINTENANCE LETTER (OUTPATIENT)
Age: 69
End: 2024-07-14

## 2024-07-23 ENCOUNTER — OFFICE VISIT (OUTPATIENT)
Dept: GASTROENTEROLOGY | Facility: CLINIC | Age: 69
End: 2024-07-23
Attending: DIETITIAN, REGISTERED
Payer: COMMERCIAL

## 2024-07-23 ENCOUNTER — LAB (OUTPATIENT)
Dept: LAB | Facility: CLINIC | Age: 69
End: 2024-07-23
Payer: COMMERCIAL

## 2024-07-23 VITALS
BODY MASS INDEX: 25.91 KG/M2 | SYSTOLIC BLOOD PRESSURE: 106 MMHG | WEIGHT: 181 LBS | HEART RATE: 63 BPM | DIASTOLIC BLOOD PRESSURE: 74 MMHG | OXYGEN SATURATION: 99 % | HEIGHT: 70 IN

## 2024-07-23 DIAGNOSIS — R63.4 WEIGHT LOSS: ICD-10-CM

## 2024-07-23 DIAGNOSIS — R10.11 RUQ ABDOMINAL PAIN: Primary | ICD-10-CM

## 2024-07-23 DIAGNOSIS — Z93.1 S/P NISSEN FUNDOPLICATION (WITH GASTROSTOMY TUBE PLACEMENT) (H): ICD-10-CM

## 2024-07-23 DIAGNOSIS — R19.5 LOOSE STOOLS: ICD-10-CM

## 2024-07-23 DIAGNOSIS — E16.2 HYPOGLYCEMIA: ICD-10-CM

## 2024-07-23 DIAGNOSIS — K91.1 DUMPING SYNDROME: ICD-10-CM

## 2024-07-23 LAB
ALBUMIN SERPL BCG-MCNC: 4.1 G/DL (ref 3.5–5.2)
ALP SERPL-CCNC: 72 U/L (ref 40–150)
ALT SERPL W P-5'-P-CCNC: <5 U/L (ref 0–70)
ANION GAP SERPL CALCULATED.3IONS-SCNC: 9 MMOL/L (ref 7–15)
AST SERPL W P-5'-P-CCNC: 14 U/L (ref 0–45)
BASOPHILS # BLD AUTO: 0 10E3/UL (ref 0–0.2)
BASOPHILS NFR BLD AUTO: 1 %
BILIRUB SERPL-MCNC: 0.5 MG/DL
BUN SERPL-MCNC: 15.4 MG/DL (ref 8–23)
CALCIUM SERPL-MCNC: 9.1 MG/DL (ref 8.8–10.4)
CHLORIDE SERPL-SCNC: 107 MMOL/L (ref 98–107)
CREAT SERPL-MCNC: 1.01 MG/DL (ref 0.67–1.17)
EGFRCR SERPLBLD CKD-EPI 2021: 81 ML/MIN/1.73M2
EOSINOPHIL # BLD AUTO: 0.3 10E3/UL (ref 0–0.7)
EOSINOPHIL NFR BLD AUTO: 5 %
ERYTHROCYTE [DISTWIDTH] IN BLOOD BY AUTOMATED COUNT: 12.7 % (ref 10–15)
GLUCOSE SERPL-MCNC: 85 MG/DL (ref 70–99)
HCO3 SERPL-SCNC: 24 MMOL/L (ref 22–29)
HCT VFR BLD AUTO: 44.4 % (ref 40–53)
HGB BLD-MCNC: 15.2 G/DL (ref 13.3–17.7)
IMM GRANULOCYTES # BLD: 0 10E3/UL
IMM GRANULOCYTES NFR BLD: 0 %
LYMPHOCYTES # BLD AUTO: 1.9 10E3/UL (ref 0.8–5.3)
LYMPHOCYTES NFR BLD AUTO: 28 %
MCH RBC QN AUTO: 31.3 PG (ref 26.5–33)
MCHC RBC AUTO-ENTMCNC: 34.2 G/DL (ref 31.5–36.5)
MCV RBC AUTO: 92 FL (ref 78–100)
MONOCYTES # BLD AUTO: 0.8 10E3/UL (ref 0–1.3)
MONOCYTES NFR BLD AUTO: 11 %
NEUTROPHILS # BLD AUTO: 3.7 10E3/UL (ref 1.6–8.3)
NEUTROPHILS NFR BLD AUTO: 55 %
NRBC # BLD AUTO: 0 10E3/UL
NRBC BLD AUTO-RTO: 0 /100
PLATELET # BLD AUTO: 187 10E3/UL (ref 150–450)
POTASSIUM SERPL-SCNC: 4.9 MMOL/L (ref 3.4–5.3)
PROT SERPL-MCNC: 6.7 G/DL (ref 6.4–8.3)
RBC # BLD AUTO: 4.85 10E6/UL (ref 4.4–5.9)
SODIUM SERPL-SCNC: 140 MMOL/L (ref 135–145)
TSH SERPL DL<=0.005 MIU/L-ACNC: 1.16 UIU/ML (ref 0.3–4.2)
WBC # BLD AUTO: 6.7 10E3/UL (ref 4–11)

## 2024-07-23 PROCEDURE — 36415 COLL VENOUS BLD VENIPUNCTURE: CPT | Performed by: PATHOLOGY

## 2024-07-23 PROCEDURE — 99214 OFFICE O/P EST MOD 30 MIN: CPT | Performed by: DIETITIAN, REGISTERED

## 2024-07-23 PROCEDURE — 84443 ASSAY THYROID STIM HORMONE: CPT | Performed by: PATHOLOGY

## 2024-07-23 PROCEDURE — 80053 COMPREHEN METABOLIC PANEL: CPT | Performed by: PATHOLOGY

## 2024-07-23 PROCEDURE — 85025 COMPLETE CBC W/AUTO DIFF WBC: CPT | Performed by: PATHOLOGY

## 2024-07-23 ASSESSMENT — PAIN SCALES - GENERAL: PAINLEVEL: NO PAIN (0)

## 2024-07-23 NOTE — PROGRESS NOTES
GI CLINIC VISIT    CC/REFERRING MD:  Abad Noel  REASON FOR CONSULTATION: follow-up     ASSESSMENT/PLAN:  1.  Gastroesophageal reflux disease without esophagitis, Status post Nissen fundoplication re-do 9/25/2020    Longstanding reflux starting in 1980s/early 1990s, treated with omeprazole starting in 1990s.  He developed neuropathy attributed to B12 deficiency and felt that PPI was contributing to this so underwent Nissen in 2011 so he could stop PPI.  There was evidence of Nissen slippage and this was redone 9/25/2020.  He did well postsurgery without reflux symptoms.  However recent CT scan 1/2024 showed superior aspect of Nissen fundal wrap appears to be about 2 cm above the diaphragm possibly indicating slipped Nissen.  He was having symptoms of dysphagia and chest pressure which have now improved. Cotninues to have low appetite/early satiety, unclear if this is related. After last visit esophagram and manometry done.  Manometry consistent with inconclusive manometric EGJOO. Esophagram showed routine contrast lasting up to 5 minutes but patent flow of contrast and tablet.  He has planned upper endoscopy planned with Dr. Michel in September with Endoflip and potential dilation.  He will also meet with thoracic surgery regarding potential role for surgical repair.   -- Upper endoscopy with EndoFLIP in Sept  -- Meet with thoracic surgery      2. Postprandial hypoglycemia  Rapid emptying on GES with reported postprandial hypoglycemia on glucometer checks (in 50s), though normal oral glucose test at [facility].  He has been seen by endocrinology with our group, mixed meal test was indicative of postprandial hyperinsulinemic hypoglycemia.  He was recommended to complete 72-hour fast in hospital setting however he declined to do this at this time.  He continues on nortriptyline to attempt to slow gastric emptying.  He was also started on metformin and he reports that this is significantly improved his symptoms  "initially, however over the past few weeks has had increasing symptoms again of \"depression\" irritability, graying out without syncope, fatigue, jitteriness following eating. Currently occurring with most PO intake. He has not worn glucose monitor recently. Encouraged him to do this and follow up with endocrine team. He is agreeable to this. Not using fiber supplementation regularly.  Met with RD, trying to consume small frequent meals.   Would also recommend resting after eating, and not having liquids with eating.  Should also avoid soda and carbonation.  -- Wear glucose monitor  -- Follow up with endocrine  -- Continue small frequent meals  -- Continue nortriptyline daily  --Try to have small more frequent meals throughout the day.  Try to prevent overloading your stomach at any given time.  After eating lie down.  Do not have liquids with eating meals.  Avoid high sugar foods    3. RUQ Pain, tenderness  4. Loose stools  5. Weight loss  6. Decreased appetite and early satiety.  Patient with new RUQ pain on exam, no pain with eating or without palpation. We will get RUQ to further assess. He also notes looser and more frequent stools over the past few weeks, timing somewhat correlated with increased symptoms postprandially. No significant urgency, nocturnal stools, or blood. Notably he has had lower appetite and early satiety. Given change in symptoms will get basic lab work with CBC, CMP, TSH. Will also check fecal calprotectin. Given worsening symptoms above including jitteriness, fatigue, graying out and looser stools,  overall low suspicion for something like NET with will check urine 5HIAA to ensure. We will also see if we can add on colonoscopy to EGD given recent weight trend down.  -- Get RUQ abdominal ultrasound  -- EGD as above, see if we can add on colonoscopy  -- CBC, CMP, TSH, fecal calcprotectin, urine 5HIAA.      Follow-up in 3-4 months.  Next visit will need to be done in person at clinic in " "[place].    Thank you for this consultation.  It was a pleasure to participate in the care of this patient; please contact us with any further questions.     39 Minutes was spent on the date of the encounter during chart review, history and exam, documentation, and further activities as noted     Cathy Aguirre PA-C  Division of Gastroenterology, Hepatology & Nutrition  [facility]    HPI:   Mr. Junior is a 69 year old male with HTN, GERD s/p Nissen fundoplication, and reported dumping syndrome who is s/p R inguinal hernia repair and s/p appendectomy who presents for another opinion on his \"slipped Nissen\" and GI symptoms. He has previously been seen by Dr. Quezada, please see her documentation for additional details. He has also previously followed at [facility] for GERD and dumping syndrome, and has recently been seen by Dr. Zhao of surgery at [facility] for consideration of Nissen re-do.     History summarized in brief:   Reports decades of reflux starting in late 1980s/early 1990s she was treating with intermittent omeprazole.  In the 1990s does transition to daily omeprazole.  In the early 2000 fingers and toes which was attributed to the low B12, it was felt that PPI may be contributing to this underwent in 2011 so that he could stop his PPI.  Stated for well controlled neuropathy symptoms have not resolved but were stable.  Replaced B12 with PCP.  Also described intermittent issues with postprandial stools, urgency, lightheadedness weakness he was treated at [place] [facility].  Had acute symptoms.  Ago felt like something hit him hard.  Pain improved but reflux continued.  He was started on Zantac had multiple EGDs 1 tried slipped Nissen.    Patient then underwent following work-up for his symptoms:  -EGD 8/30/2017 with report indicating a normal exam with intact Nissen.   -12/4/2018 was done for BRAVO placement. EGD report indicates a large hiatal hernia was seen and the \"wrap appears slipped.\" " "  -BRAVO testing revealed a DeMeester score total of 24.8 (18.7 on Day 1, 29.3 on Day 2 with 100% symptom concordance).  - Esophageal manometry was done though we do not have the interpretation of this test, images are scanned (but difficult to interpret).   - esophagram (1/2/19) which showed free flow of contrast, no hiatal hernia, and mild reflux. GES 2/19/19 showed only 4% of food remaining at 2 hours and exam stopped.   - esophagram 4/1/19 with again no hernia seen on upright images, but small HH induced with abdominal pressure and Valsalva. No reflux was noted on this exam.   - CT chest 4/1/19 with small HH and fundoplication changes.    The patient had stated that Dr. Zhao would consider repeat surgery, but given his above testing further trial of medical management was recommended. Mr. Junior was then started on pantoprazole-is concerned that paresthesias are worsening since starting that.    Regards to dumping syndrome, this was diagnosed at [place] [facility] after gastric emptying study (rapid with oral glucose tolerance test normal), postprandial glucose checks with levels in the 50s.  Describes symptoms of dizziness, wooziness, and \"going gray \", associated with sneezing which would occur after a meal.  Typically resolved in 5 minutes.  Also noted loose stools sometimes occur at unpredictable times.  Patient was treated with baclofen and nortriptyline with 80% resolution symptoms, and baclofen was discontinued and switched to dicyclomine.      Interval history 6/23/2020:  Symptoms are going ok, symptoms are mostly controlled when he avoids having large volume and density of foods. Breakfest and lunch are the most common times for loose stools and wooziness. Eggs are a big trigger. Otherwise has cut back on amount he is eating. Has not been measuring blood sugars. Is able to tell based on symptoms- hot sweats, dizziness. Sometimes he will need to sneeze and this clears up.     Bowel movements: " "2-3 times a week will have diarrhea (non-bloody, Cabell scale 6 with the smell at the end). In between these days, he will usually have a fairly normal bowel movement. Was previously happening 3-4 times a week.     About a month and a half ago, he felt like something punched him in his abdomen. This was centered on the upper stomach.  Thought about going in but symptoms improved. Not doing or eating anything out of the ordinary.     GERD  Pantoprazole seems to be controlling. Not taking zantac. Has breakthrough symptoms about once a month. Is concernend that neuropathy might get worse with pantoprazole.     Continues to take Nortiptyline or Bentyl. Takes Bentyl 3 times a day     Vitamin B12- is checking with primary care last level was high.    Interval History 9/1/2020:  Decreasing diccylomine helped his symptoms. Symptoms of sneezing after eating has decreased significantly. Maybe will have symptoms for 10 minutes. He would say this has improved 80%. Feels like nortriptyline is going ok.     Still taking pantoprazole- rare symptoms related to heartburn situation. If he skips this for a couple of days he will have symptoms. Has not had to take pepcid.     Bowel movements: some loose stools in the morning (related to amount of food the day before). Does not seem to be type of foods, volume related. Stools are a little loose 2-3 times a week.     Has had increased \"hay fever symptoms\"    Interval  History 10/9/2020:   Take-down and re-do of nissen fundoplication, KATHYA, gastrostomy tube placement (still present)  Flushing tube 30 ml twice a day. G tube is irritating if he sits up.     Mixing in semi-liquids in the past couple of days, hard to say if this is \"dumping syndrome\".    GERD- has not had any GERD, tomato soup. Continues on PPI     Bowel movements have been a little more liquid than normal, hard to say if this is due to more liquid diet. Every other day or every third day loose stools. Has not taken senna. " "    Interval History 2/2/20201:  Had liqour about hald an hour before eating, then had cheese (large amount). Then had \"hyoglycemic episode\". Lasted about 5-10 minutes. Had a sharp pain in upper chest- felt like something had gotten stuck inside the nissen wrap- he drank some water and pain went away. He felt fine the next morning. Does not remember what food he had eaten.     Reflux symptoms completely- not on any PPI. Does feel bloating and gas, no pain. Sometimes burping, othertimes passing gas. Seems to be volume related- but no specific food triggers. Has reduced carbonated beverages. Does not drink out of straws. Diet Dr. Pepper.    30% of the time in the morning after having oatmeal and fruit- he will feel lethargic, will rest.     Weight is stable at 208.     No longer taking dicyclomine     Continues to take nortirtpyline    Interval History 1/17/2022:  Here today for follow-up due to increasing symptoms, this is my first encounter with this patient.     He reports 10-12 yr history of \"dumping syndrome\" with worsening in past 2 months. He describes recent episode while skiing where he became dizzy and light headed, lights started going out. He was taken by  on sled, given glucose tabs and started feeling better. When at a medical facility glucose was 115 (did not have supplies to check previously).    He also reports new onset of feeling dizzy and woozy when he walks his dog in the morning before eating.     Currently having symptoms daily - this is typically feeling \"down\" within about 30 minutes after eating. Describes this as being phyically tired and feeling emotionally depressed. Laying down for 15 minutes will help. He also gets urgent loose stools 30 minutes after eating a couple times per week. Also notes sneezing episodes after eating.    Notes he previously checked his blood sugars after meals, typically 60-70, has not for sometime.     Nortriptyline 25 mcg daily to slow gastric " emptying, takes daily.    GERD well controlled post surgical revision. Continues to avoid omeprazole. Taking vitamin B12 and gabapentin for neuropathy.    No abdominal pain, no nausea or vomiting. No hematochezia or melena.    Diet Recall: eats small frequent meals, try to avoid liquids with meals  Breakfast: oatmeal, milk, berries, 1tsp sugar  Mid Morning: granola bar/fruit nut bar/handful of nuts  Lunch: green salad, leftovers  Mid Afternoon: banana with nuts  Dinner: green salad, meat/sausage, starch, out to eat once per week  PM Snack: Occasional small dish of ice cream.  No alcoholic drinks unless out to eat. Has not noticed more sx with more alcohol.  No pattern with foods and sx that he can appreciate  Diarrhea 30 minutes after eating - eggs, asian food at lunch, rare at dinner.    Weight down 4 lb since the holidays.       Interval History 2/19/24:  Here today for follow up after CT A/P last month showing superior aspect of the Nissen fundal wrap appears about 2 cm abovethe diaphragm possibly representing slipped Nissen.     Today he reports that he has been experiencing intermittent dysphagia with feeling of food getting caught at base of sternum about twice per week.  Unsure when this first started but he thinks last few months.  No epigastric pain but does note increased chest pressure.  No heartburn or acid brash.  He thinks he might of had some regurgitation.  He also endorses lower appetite and early satiety.  Notes he has lost about 5 to 7 pounds in the last 2 to 3 months.    In terms of his postprandial hypoglycemia and dumping syndrome, he reports that this is much better controlled.  He is working with Dr. Dougherty with endocrinology.  Testing done including continuous glucose monitor with lowest BG of 53.  Mixed meal testing with high carbohydrate content had symptoms of hypoglycemia following meal with lowest BG of 68 and high insulin prior to that but normal insulin at end of test.  For complete  "workup he was recommended 72-hour fast in hospital setting, however Bal declined at this time.  He has not yet met with a dietitian, he is interested in doing so.  He was started on metformin which he reports has been a big improvement.  He continues on nortriptyline.  Tried fiber supplement but did not feel like this made a big difference, taking intermittently.  Has been recommended to work with the dietitian regarding eating pattern and food choices to help manage however has not been able to do so yet.  He reports much less dizziness, severity and frequency improved.  Currently having about 2-3 bowel movements per day.  Morning stool after breakfast can be runny, often after noon stool will be more formed.  Occasional additional stools in the evening after dinner.  No blood in stool or melena.    Interval History 7/23/24:  Here today for follow-up.    After last visit esophagram and manometry done.  Manometry consistent with inconclusive manometric EGJOO. Esophagram showed routine contrast lasting up to 5 minutes but patent flow of contrast and tablet.  He has planned upper endoscopy planned with Dr. Michel in September with Endoflip and potential dilation.  Also planning to see thoracic surgery regarding potentially slipped Nissen.    He reports that his swelling is actually much better.  Not feeling like food is getting stuck.  No epigastric or chest pain.  No GERD for the last few weeks.    He reports he has been doing well with his postprandial symptoms however in the last couple weeks this has gotten to be more significant again.  Occurring with almost any PO, doesn't matter what he eats or CHO content. After eating will have a sneezing fit for about a minute.  He also gets a general \"depression\" feeling.  Feels clammy and irritable. Gets \"graying\" but no passing out. Needs to lay down and rest for at least 30 minutes.  No nausea or vomiting. Does endorse low appetite and early fullness.  Seen by Tia CORONA " "for postprandial hypoglycemia.  Eating small frequent meals.    He hasn't worn glucose monitor recently. Has followed with endocrinology, hasn't seen recently with worsening symptoms.    Last few weeks  more loose stools, formed in the morning, then looser. [place] 4 then 5-6. Typically 3 stools per day. Not typically urgent. Stools often after eating.    Intermittent RLQ discomfort. Some bloating.    Weight trending down. 10 lb over past 5-6 months, stable last 3 months.   Wt Readings from Last 10 Encounters:   07/23/24 82.1 kg (181 lb)   04/15/24 81.6 kg (180 lb)   04/11/24 81.6 kg (180 lb)   02/19/24 86.5 kg (190 lb 9.6 oz)   01/17/24 85.7 kg (189 lb)   10/30/23 86.2 kg (190 lb)   05/17/23 86.6 kg (191 lb)   02/15/23 89.4 kg (197 lb)   01/18/23 87.5 kg (193 lb)   01/17/23 87.8 kg (193 lb 9.6 oz)         PERTINENT PAST MEDICAL HISTORY:  Hypertension  GERD  \"Dumping syndrome\"    PREVIOUS SURGERIES:  Past Surgical History:   Procedure Laterality Date    ABDOMEN SURGERY  2012?    APPENDECTOMY  1964?    COLONOSCOPY  2006, 2016    EYE SURGERY  199x    laser for retinal tears    GASTRIC FUNDOPLICATION      HERNIA REPAIR  1961?    LAPAROSCOPIC TAKEDOWN NISSEN FUNDOPLICATION N/A 9/25/2020    Procedure: TAKE-DOWN, FUNDOPLICATION, REDO NISSEN, LAPAROSCOPIC, gastrostomy feeding tube placement;  Surgeon: Katlyn Tobar MD;  Location: [facility] ESOPHAGOGASTRODUODENOSCOPY TRANSORAL DIAGNOSTIC N/A 5/9/2019    Procedure: UPPER GASTROINTESTINAL ENDOSCOPY;  Surgeon: Dino Ward MD;  Location: [facility];  Service: General    SOFT TISSUE SURGERY  2009?    MCL l. thumb     S/p tonsillectomy and adenoidectomy  S/p R inguinal hernia repair  S/p Nissen fundoplication    ALLERGIES:     Allergies   Allergen Reactions    Hornets     Wasp Venom Protein Hives    Bee Venom Swelling       PERTINENT MEDICATIONS:  Current Outpatient Medications   Medication Sig Dispense Refill    atenolol (TENORMIN) 50 MG tablet Take 50 mg by " mouth daily       blood glucose (NO BRAND SPECIFIED) test strip Use to test blood sugar 1 times daily or as directed. To accompany: Blood Glucose Monitor Brands: per insurance. 100 strip 6    blood glucose monitoring (NO BRAND SPECIFIED) meter device kit Use to test blood sugar 1 times daily or as directed. Preferred blood glucose meter OR supplies to accompany: Blood Glucose Monitor Brands: per insurance. 1 kit 0    Continuous Blood Gluc Sensor (FREESTYLE SIMIN 2 SENSOR) [facility] 1 each every 14 days Use 1 sensor every 14 days. Use to read blood sugars per 's instructions. 6 each 1    cyanocobalamin (VITAMIN B-12) 500 MCG tablet daily       EPINEPHrine (ANY BX GENERIC EQUIV) 0.3 MG/0.3ML injection 2-pack 0.3 mg      gabapentin (NEURONTIN) 300 MG capsule Take 1 capsule (300 mg) by mouth 3 times daily 270 capsule 3    metFORMIN (GLUCOPHAGE XR) 500 MG 24 hr tablet Take 1 tablet (500 mg) by mouth daily (with dinner) 90 tablet 3    nortriptyline (PAMELOR) 25 MG capsule Take 25 mg by mouth daily       thin (NO BRAND SPECIFIED) lancets Use with lanceting device. To accompany: Blood Glucose Monitor Brands: per insurance. 100 each 6     No current facility-administered medications for this visit.     Facility-Administered Medications Ordered in Other Visits   Medication Dose Route Frequency Provider Last Rate Last Admin    barium sulfate (EZ-DISK) tablet 700 mg  700 mg Oral Once Norm Mcdonald MD        sod bicarbonate-citric acid-simethicone (EZ GAS) 2.21-1.53-0.04 g packet 4 g  4 g Oral Once Norm Mcdonald MD           SOCIAL HISTORY:  Social History     Socioeconomic History    Marital status:      Spouse name: Not on file    Number of children: Not on file    Years of education: Not on file    Highest education level: Not on file   Occupational History    Not on file   Tobacco Use    Smoking status: Never     Passive exposure: Past    Smokeless tobacco: Never   Substance and Sexual  Activity    Alcohol use: Yes     Alcohol/week: 3.0 - 6.0 standard drinks of alcohol     Types: 1 - 2 Glasses of wine, 1 - 2 Cans of beer, 1 - 2 Shots of liquor per week     Comment: glass of wine with dinner, can of beer in evening or 1 whiskey  or bourbon    Drug use: Never    Sexual activity: Yes     Partners: Female     Birth control/protection: Post-menopausal, Male Surgical, Female Surgical   Other Topics Concern    Not on file   Social History Narrative    Not on file     Social Determinants of Health     Financial Resource Strain: Not on file   Food Insecurity: Not on file   Transportation Needs: Not on file   Physical Activity: Not on file   Stress: Not on file   Social Connections: Not on file   Interpersonal Safety: Not At Risk (9/19/2023)    Received from [facility] Custom IPV     Do you feel UNSAFE in any of your personal relationships with your family members or any other acquaintances?: No   Housing Stability: Not on file       FAMILY HISTORY:  Family History   Problem Relation Age of Onset    Coronary Artery Disease Maternal Grandfather     Hypertension Mother     Uterine Cancer Mother         1965    Mitral Valve Replacement Father     GERD No family hx of     Ulcerative Colitis No family hx of     Crohn's Disease No family hx of     Stomach Cancer No family hx of        Past/family/social history reviewed and no changes    PHYSICAL EXAMINATION:  Eyes: Sclera anicteric/injected  Ears/nose/mouth/throat: Normal oropharynx without ulcers or exudate, mucus membranes moist, hearing intact  Neck: supple, thyroid normal size  CV: No edema  Respiratory: Unlabored breathing  Lymph: No axillary, submandibular, supraclavicular or inguinal lymphadenopathy  Abd: Nondistended, +bs, no hepatosplenomegaly, RUQ tenderness, no Coyle sign, no peritoneal signs  Skin: warm, perfused, no jaundice  Psych: Normal affect  MSK: Normal gait      PERTINENT STUDIES:  CT A/P  1/26/24:  IMPRESSION:   1.  Superior aspect of the Nissen fundal wrap appears about 2 cm above  the diaphragm possibly representing slipped Nissen.  2.  Hepatic steatosis.  3.  Nonobstructing renal calculi. No hydronephrosis.    Esophageal Manometry 4/11/24:  This is most consistent with inconclusive manometric EGJOO- esophagogastric junction outflow obstruction. If clinically appropriate, please consider an upper endoscopic evaluation with endoflip +/- dilation and consultation to thoracic surgery     Esophogram 4/30/24:   IMPRESSION:  1. Timed barium esophagram results as detailed above with retained  esophageal contrast lasting up to 5 minutes. There is patent flow of  contrast.   2. Expected passage of barium tablet into the stomach.

## 2024-07-23 NOTE — NURSING NOTE
"Chief Complaint   Patient presents with    Follow Up       Vitals:    07/23/24 0750   BP: 106/74   Pulse: 63   SpO2: 99%   Weight: 82.1 kg (181 lb)   Height: 1.778 m (5' 10\")       Body mass index is 25.97 kg/m .    [employer]    "

## 2024-07-23 NOTE — LETTER
7/23/2024      Bal Junior  [address] [place] [address]      Dear Colleague,    Thank you for referring your patient, Bal Junior, to the [facility] [place]. Please see a copy of my visit note below.    GI CLINIC VISIT    CC/REFERRING MD:  Abda Noel  REASON FOR CONSULTATION: follow-up     ASSESSMENT/PLAN:  1.  Gastroesophageal reflux disease without esophagitis, Status post Nissen fundoplication re-do 9/25/2020    Longstanding reflux starting in 1980s/early 1990s, treated with omeprazole starting in 1990s.  He developed neuropathy attributed to B12 deficiency and felt that PPI was contributing to this so underwent Nissen in 2011 so he could stop PPI.  There was evidence of Nissen slippage and this was redone 9/25/2020.  He did well postsurgery without reflux symptoms.  However recent CT scan 1/2024 showed superior aspect of Nissen fundal wrap appears to be about 2 cm above the diaphragm possibly indicating slipped Nissen.  He was having symptoms of dysphagia and chest pressure which have now improved. Cotninues to have low appetite/early satiety, unclear if this is related. After last visit esophagram and manometry done.  Manometry consistent with inconclusive manometric EGJOO. Esophagram showed routine contrast lasting up to 5 minutes but patent flow of contrast and tablet.  He has planned upper endoscopy planned with Dr. Michel in September with Endoflip and potential dilation.  He will also meet with thoracic surgery regarding potential role for surgical repair.   -- Upper endoscopy with EndoFLIP in Sept  -- Meet with thoracic surgery      2. Postprandial hypoglycemia  Rapid emptying on GES with reported postprandial hypoglycemia on glucometer checks (in 50s), though normal oral glucose test at [facility].  He has been seen by endocrinology with our group, mixed meal test was indicative of postprandial hyperinsulinemic hypoglycemia.  He was recommended to  "complete 72-hour fast in hospital setting however he declined to do this at this time.  He continues on nortriptyline to attempt to slow gastric emptying.  He was also started on metformin and he reports that this is significantly improved his symptoms initially, however over the past few weeks has had increasing symptoms again of \"depression\" irritability, graying out without syncope, fatigue, jitteriness following eating. Currently occurring with most PO intake. He has not worn glucose monitor recently. Encouraged him to do this and follow up with endocrine team. He is agreeable to this. Not using fiber supplementation regularly.  Met with RD, trying to consume small frequent meals.   Would also recommend resting after eating, and not having liquids with eating.  Should also avoid soda and carbonation.  -- Wear glucose monitor  -- Follow up with endocrine  -- Continue small frequent meals  -- Continue nortriptyline daily  --Try to have small more frequent meals throughout the day.  Try to prevent overloading your stomach at any given time.  After eating lie down.  Do not have liquids with eating meals.  Avoid high sugar foods    3. RUQ Pain, tenderness  4. Loose stools  5. Weight loss  6. Decreased appetite and early satiety.  Patient with new RUQ pain on exam, no pain with eating or without palpation. We will get RUQ to further assess. He also notes looser and more frequent stools over the past few weeks, timing somewhat correlated with increased symptoms postprandially. No significant urgency, nocturnal stools, or blood. Notably he has had lower appetite and early satiety. Given change in symptoms will get basic lab work with CBC, CMP, TSH. Will also check fecal calprotectin. Given worsening symptoms above including jitteriness, fatigue, graying out and looser stools,  overall low suspicion for something like NET with will check urine 5HIAA to ensure. We will also see if we can add on colonoscopy to EGD given " "recent weight trend down.  -- Get RUQ abdominal ultrasound  -- EGD as above, see if we can add on colonoscopy  -- CBC, CMP, TSH, fecal calcprotectin, urine 5HIAA.      Follow-up in 3-4 months.  Next visit will need to be done in person at clinic in [place].    Thank you for this consultation.  It was a pleasure to participate in the care of this patient; please contact us with any further questions.     39 Minutes was spent on the date of the encounter during chart review, history and exam, documentation, and further activities as noted     Cathy Aguirre PA-C  Division of Gastroenterology, Hepatology & Nutrition  [facility]    HPI:   Mr. Junior is a 69 year old male with HTN, GERD s/p Nissen fundoplication, and reported dumping syndrome who is s/p R inguinal hernia repair and s/p appendectomy who presents for another opinion on his \"slipped Nissen\" and GI symptoms. He has previously been seen by Dr. Quezada, please see her documentation for additional details. He has also previously followed at [facility] for GERD and dumping syndrome, and has recently been seen by Dr. Zhao of surgery at [facility] for consideration of Nissen re-do.     History summarized in brief:   Reports decades of reflux starting in late 1980s/early 1990s she was treating with intermittent omeprazole.  In the 1990s does transition to daily omeprazole.  In the early 2000 fingers and toes which was attributed to the low B12, it was felt that PPI may be contributing to this underwent in 2011 so that he could stop his PPI.  Stated for well controlled neuropathy symptoms have not resolved but were stable.  Replaced B12 with PCP.  Also described intermittent issues with postprandial stools, urgency, lightheadedness weakness he was treated at [place] [facility].  Had acute symptoms.  Ago felt like something hit him hard.  Pain improved but reflux continued.  He was started on Zantac had multiple EGDs 1 tried slipped Nissen.    Patient then " "underwent following work-up for his symptoms:  -EGD 8/30/2017 with report indicating a normal exam with intact Nissen.   -12/4/2018 was done for BRAVO placement. EGD report indicates a large hiatal hernia was seen and the \"wrap appears slipped.\"   -BRAVO testing revealed a DeMeester score total of 24.8 (18.7 on Day 1, 29.3 on Day 2 with 100% symptom concordance).  - Esophageal manometry was done though we do not have the interpretation of this test, images are scanned (but difficult to interpret).   - esophagram (1/2/19) which showed free flow of contrast, no hiatal hernia, and mild reflux. GES 2/19/19 showed only 4% of food remaining at 2 hours and exam stopped.   - esophagram 4/1/19 with again no hernia seen on upright images, but small HH induced with abdominal pressure and Valsalva. No reflux was noted on this exam.   - CT chest 4/1/19 with small HH and fundoplication changes.    The patient had stated that Dr. Zhao would consider repeat surgery, but given his above testing further trial of medical management was recommended. Mr. Junior was then started on pantoprazole-is concerned that paresthesias are worsening since starting that.    Regards to dumping syndrome, this was diagnosed at [place] [facility] after gastric emptying study (rapid with oral glucose tolerance test normal), postprandial glucose checks with levels in the 50s.  Describes symptoms of dizziness, wooziness, and \"going gray \", associated with sneezing which would occur after a meal.  Typically resolved in 5 minutes.  Also noted loose stools sometimes occur at unpredictable times.  Patient was treated with baclofen and nortriptyline with 80% resolution symptoms, and baclofen was discontinued and switched to dicyclomine.      Interval history 6/23/2020:  Symptoms are going ok, symptoms are mostly controlled when he avoids having large volume and density of foods. Breakfest and lunch are the most common times for loose stools and " "wooziness. Eggs are a big trigger. Otherwise has cut back on amount he is eating. Has not been measuring blood sugars. Is able to tell based on symptoms- hot sweats, dizziness. Sometimes he will need to sneeze and this clears up.     Bowel movements: 2-3 times a week will have diarrhea (non-bloody, [place] scale 6 with the smell at the end). In between these days, he will usually have a fairly normal bowel movement. Was previously happening 3-4 times a week.     About a month and a half ago, he felt like something punched him in his abdomen. This was centered on the upper stomach.  Thought about going in but symptoms improved. Not doing or eating anything out of the ordinary.     GERD  Pantoprazole seems to be controlling. Not taking zantac. Has breakthrough symptoms about once a month. Is concernend that neuropathy might get worse with pantoprazole.     Continues to take Nortiptyline or Bentyl. Takes Bentyl 3 times a day     Vitamin B12- is checking with primary care last level was high.    Interval History 9/1/2020:  Decreasing diccylomine helped his symptoms. Symptoms of sneezing after eating has decreased significantly. Maybe will have symptoms for 10 minutes. He would say this has improved 80%. Feels like nortriptyline is going ok.     Still taking pantoprazole- rare symptoms related to heartburn situation. If he skips this for a couple of days he will have symptoms. Has not had to take pepcid.     Bowel movements: some loose stools in the morning (related to amount of food the day before). Does not seem to be type of foods, volume related. Stools are a little loose 2-3 times a week.     Has had increased \"hay fever symptoms\"    Interval  History 10/9/2020:   Take-down and re-do of nissen fundoplication, KATHYA, gastrostomy tube placement (still present)  Flushing tube 30 ml twice a day. G tube is irritating if he sits up.     Mixing in semi-liquids in the past couple of days, hard to say if this is \"dumping " "syndrome\".    GERD- has not had any GERD, tomato soup. Continues on PPI     Bowel movements have been a little more liquid than normal, hard to say if this is due to more liquid diet. Every other day or every third day loose stools. Has not taken senna.     Interval History 2/2/20201:  Had liqour about hald an hour before eating, then had cheese (large amount). Then had \"hyoglycemic episode\". Lasted about 5-10 minutes. Had a sharp pain in upper chest- felt like something had gotten stuck inside the nissen wrap- he drank some water and pain went away. He felt fine the next morning. Does not remember what food he had eaten.     Reflux symptoms completely- not on any PPI. Does feel bloating and gas, no pain. Sometimes burping, othertimes passing gas. Seems to be volume related- but no specific food triggers. Has reduced carbonated beverages. Does not drink out of straws. Diet Dr. Pepper.    30% of the time in the morning after having oatmeal and fruit- he will feel lethargic, will rest.     Weight is stable at 208.     No longer taking dicyclomine     Continues to take nortirtpyline    Interval History 1/17/2022:  Here today for follow-up due to increasing symptoms, this is my first encounter with this patient.     He reports 10-12 yr history of \"dumping syndrome\" with worsening in past 2 months. He describes recent episode while skiing where he became dizzy and light headed, lights started going out. He was taken by  on sled, given glucose tabs and started feeling better. When at a medical facility glucose was 115 (did not have supplies to check previously).    He also reports new onset of feeling dizzy and woozy when he walks his dog in the morning before eating.     Currently having symptoms daily - this is typically feeling \"down\" within about 30 minutes after eating. Describes this as being phyically tired and feeling emotionally depressed. Laying down for 15 minutes will help. He also gets urgent loose " stools 30 minutes after eating a couple times per week. Also notes sneezing episodes after eating.    Notes he previously checked his blood sugars after meals, typically 60-70, has not for sometime.     Nortriptyline 25 mcg daily to slow gastric emptying, takes daily.    GERD well controlled post surgical revision. Continues to avoid omeprazole. Taking vitamin B12 and gabapentin for neuropathy.    No abdominal pain, no nausea or vomiting. No hematochezia or melena.    Diet Recall: eats small frequent meals, try to avoid liquids with meals  Breakfast: oatmeal, milk, berries, 1tsp sugar  Mid Morning: granola bar/fruit nut bar/handful of nuts  Lunch: green salad, leftovers  Mid Afternoon: banana with nuts  Dinner: green salad, meat/sausage, starch, out to eat once per week  PM Snack: Occasional small dish of ice cream.  No alcoholic drinks unless out to eat. Has not noticed more sx with more alcohol.  No pattern with foods and sx that he can appreciate  Diarrhea 30 minutes after eating - eggs, asian food at lunch, rare at dinner.    Weight down 4 lb since the holidays.       Interval History 2/19/24:  Here today for follow up after CT A/P last month showing superior aspect of the Nissen fundal wrap appears about 2 cm abovethe diaphragm possibly representing slipped Nissen.     Today he reports that he has been experiencing intermittent dysphagia with feeling of food getting caught at base of sternum about twice per week.  Unsure when this first started but he thinks last few months.  No epigastric pain but does note increased chest pressure.  No heartburn or acid brash.  He thinks he might of had some regurgitation.  He also endorses lower appetite and early satiety.  Notes he has lost about 5 to 7 pounds in the last 2 to 3 months.    In terms of his postprandial hypoglycemia and dumping syndrome, he reports that this is much better controlled.  He is working with Dr. Dougherty with endocrinology.  Testing done  including continuous glucose monitor with lowest BG of 53.  Mixed meal testing with high carbohydrate content had symptoms of hypoglycemia following meal with lowest BG of 68 and high insulin prior to that but normal insulin at end of test.  For complete workup he was recommended 72-hour fast in hospital setting, however Bal declined at this time.  He has not yet met with a dietitian, he is interested in doing so.  He was started on metformin which he reports has been a big improvement.  He continues on nortriptyline.  Tried fiber supplement but did not feel like this made a big difference, taking intermittently.  Has been recommended to work with the dietitian regarding eating pattern and food choices to help manage however has not been able to do so yet.  He reports much less dizziness, severity and frequency improved.  Currently having about 2-3 bowel movements per day.  Morning stool after breakfast can be runny, often after noon stool will be more formed.  Occasional additional stools in the evening after dinner.  No blood in stool or melena.    Interval History 7/23/24:  Here today for follow-up.    After last visit esophagram and manometry done.  Manometry consistent with inconclusive manometric EGJOO. Esophagram showed routine contrast lasting up to 5 minutes but patent flow of contrast and tablet.  He has planned upper endoscopy planned with Dr. Michel in September with Endoflip and potential dilation.  Also planning to see thoracic surgery regarding potentially slipped Nissen.    He reports that his swelling is actually much better.  Not feeling like food is getting stuck.  No epigastric or chest pain.  No GERD for the last few weeks.    He reports he has been doing well with his postprandial symptoms however in the last couple weeks this has gotten to be more significant again.  Occurring with almost any PO, doesn't matter what he eats or CHO content. After eating will have a sneezing fit for about a  "minute.  He also gets a general \"depression\" feeling.  Feels clammy and irritable. Gets \"graying\" but no passing out. Needs to lay down and rest for at least 30 minutes.  No nausea or vomiting. Does endorse low appetite and early fullness.  Seen by Tia CORONA for postprandial hypoglycemia.  Eating small frequent meals.    He hasn't worn glucose monitor recently. Has followed with endocrinology, hasn't seen recently with worsening symptoms.    Last few weeks  more loose stools, formed in the morning, then looser. [place] 4 then 5-6. Typically 3 stools per day. Not typically urgent. Stools often after eating.    Intermittent RLQ discomfort. Some bloating.    Weight trending down. 10 lb over past 5-6 months, stable last 3 months.   Wt Readings from Last 10 Encounters:   07/23/24 82.1 kg (181 lb)   04/15/24 81.6 kg (180 lb)   04/11/24 81.6 kg (180 lb)   02/19/24 86.5 kg (190 lb 9.6 oz)   01/17/24 85.7 kg (189 lb)   10/30/23 86.2 kg (190 lb)   05/17/23 86.6 kg (191 lb)   02/15/23 89.4 kg (197 lb)   01/18/23 87.5 kg (193 lb)   01/17/23 87.8 kg (193 lb 9.6 oz)         PERTINENT PAST MEDICAL HISTORY:  Hypertension  GERD  \"Dumping syndrome\"    PREVIOUS SURGERIES:  Past Surgical History:   Procedure Laterality Date    ABDOMEN SURGERY  2012?    APPENDECTOMY  1964?    COLONOSCOPY  2006, 2016    EYE SURGERY  199x    laser for retinal tears    GASTRIC FUNDOPLICATION      HERNIA REPAIR  1961?    LAPAROSCOPIC TAKEDOWN NISSEN FUNDOPLICATION N/A 9/25/2020    Procedure: TAKE-DOWN, FUNDOPLICATION, REDO NISSEN, LAPAROSCOPIC, gastrostomy feeding tube placement;  Surgeon: Katlyn Tobar MD;  Location:  OR    [place] ESOPHAGOGASTRODUODENOSCOPY TRANSORAL DIAGNOSTIC N/A 5/9/2019    Procedure: UPPER GASTROINTESTINAL ENDOSCOPY;  Surgeon: Dino Ward MD;  Location: [facility];  Service: General    SOFT TISSUE SURGERY  2009?    MCL l. thumb     S/p tonsillectomy and adenoidectomy  S/p R inguinal hernia repair  S/p Nissen " fundoplication    ALLERGIES:     Allergies   Allergen Reactions    Hornets     Wasp Venom Protein Hives    Bee Venom Swelling       PERTINENT MEDICATIONS:  Current Outpatient Medications   Medication Sig Dispense Refill    atenolol (TENORMIN) 50 MG tablet Take 50 mg by mouth daily       blood glucose (NO BRAND SPECIFIED) test strip Use to test blood sugar 1 times daily or as directed. To accompany: Blood Glucose Monitor Brands: per insurance. 100 strip 6    blood glucose monitoring (NO BRAND SPECIFIED) meter device kit Use to test blood sugar 1 times daily or as directed. Preferred blood glucose meter OR supplies to accompany: Blood Glucose Monitor Brands: per insurance. 1 kit 0    Continuous Blood Gluc Sensor (FREESTYLE SIMIN 2 SENSOR) [facility] 1 each every 14 days Use 1 sensor every 14 days. Use to read blood sugars per 's instructions. 6 each 1    cyanocobalamin (VITAMIN B-12) 500 MCG tablet daily       EPINEPHrine (ANY BX GENERIC EQUIV) 0.3 MG/0.3ML injection 2-pack 0.3 mg      gabapentin (NEURONTIN) 300 MG capsule Take 1 capsule (300 mg) by mouth 3 times daily 270 capsule 3    metFORMIN (GLUCOPHAGE XR) 500 MG 24 hr tablet Take 1 tablet (500 mg) by mouth daily (with dinner) 90 tablet 3    nortriptyline (PAMELOR) 25 MG capsule Take 25 mg by mouth daily       thin (NO BRAND SPECIFIED) lancets Use with lanceting device. To accompany: Blood Glucose Monitor Brands: per insurance. 100 each 6     No current facility-administered medications for this visit.     Facility-Administered Medications Ordered in Other Visits   Medication Dose Route Frequency Provider Last Rate Last Admin    barium sulfate (EZ-DISK) tablet 700 mg  700 mg Oral Once Norm Mcdonald MD        sod bicarbonate-citric acid-simethicone (EZ GAS) 2.21-1.53-0.04 g packet 4 g  4 g Oral Once Norm Mcdonald MD           SOCIAL HISTORY:  Social History     Socioeconomic History    Marital status:      Spouse name: Not on file     Number of children: Not on file    Years of education: Not on file    Highest education level: Not on file   Occupational History    Not on file   Tobacco Use    Smoking status: Never     Passive exposure: Past    Smokeless tobacco: Never   Substance and Sexual Activity    Alcohol use: Yes     Alcohol/week: 3.0 - 6.0 standard drinks of alcohol     Types: 1 - 2 Glasses of wine, 1 - 2 Cans of beer, 1 - 2 Shots of liquor per week     Comment: glass of wine with dinner, can of beer in evening or 1 whiskey  or bourbon    Drug use: Never    Sexual activity: Yes     Partners: Female     Birth control/protection: Post-menopausal, Male Surgical, Female Surgical   Other Topics Concern    Not on file   Social History Narrative    Not on file     Social Determinants of Health     Financial Resource Strain: Not on file   Food Insecurity: Not on file   Transportation Needs: Not on file   Physical Activity: Not on file   Stress: Not on file   Social Connections: Not on file   Interpersonal Safety: Not At Risk (9/19/2023)    Received [facility] and [facility]     IP Custom IPV     Do you feel UNSAFE in any of your personal relationships with your family members or any other acquaintances?: No   Housing Stability: Not on file       FAMILY HISTORY:  Family History   Problem Relation Age of Onset    Coronary Artery Disease Maternal Grandfather     Hypertension Mother     Uterine Cancer Mother         1965    Mitral Valve Replacement Father     GERD No family hx of     Ulcerative Colitis No family hx of     Crohn's Disease No family hx of     Stomach Cancer No family hx of        Past/family/social history reviewed and no changes    PHYSICAL EXAMINATION:  Eyes: Sclera anicteric/injected  Ears/nose/mouth/throat: Normal oropharynx without ulcers or exudate, mucus membranes moist, hearing intact  Neck: supple, thyroid normal size  CV: No edema  Respiratory: Unlabored breathing  Lymph: No axillary,  submandibular, supraclavicular or inguinal lymphadenopathy  Abd: Nondistended, +bs, no hepatosplenomegaly, RUQ tenderness, no Coyle sign, no peritoneal signs  Skin: warm, perfused, no jaundice  Psych: Normal affect  MSK: Normal gait      PERTINENT STUDIES:  CT A/P 1/26/24:  IMPRESSION:   1.  Superior aspect of the Nissen fundal wrap appears about 2 cm above  the diaphragm possibly representing slipped Nissen.  2.  Hepatic steatosis.  3.  Nonobstructing renal calculi. No hydronephrosis.    Esophageal Manometry 4/11/24:  This is most consistent with inconclusive manometric EGJOO- esophagogastric junction outflow obstruction. If clinically appropriate, please consider an upper endoscopic evaluation with endoflip +/- dilation and consultation to thoracic surgery     Esophogram 4/30/24:   IMPRESSION:  1. Timed barium esophagram results as detailed above with retained  esophageal contrast lasting up to 5 minutes. There is patent flow of  contrast.   2. Expected passage of barium tablet into the stomach.            Again, thank you for allowing me to participate in the care of your patient.      Sincerely,    Cathy Aguirre PA-C

## 2024-07-23 NOTE — PATIENT INSTRUCTIONS
It was a pleasure meeting with you today and discussing your healthcare plan. Below is a summary of what we covered:    -- Labs, urine test, stool test  -- Get RUQ abdominal ultrasound  -- Wear glucose monitor  -- Follow up with endocrine  -- Continue small frequent meals  -- Continue nortriptyline daily  -- Upper endoscopy with [facility] in Sept  -- Meet with thoracic surgery      Please see below for any additional questions and scheduling guidelines.    Sign up for ActiveEon: ActiveEon patient portal serves as a secure platform for accessing your medical records from the [facility]. Additionally, ActiveEon facilitates easy, timely, and secure messaging with your care team. If you have not signed up, you may do so by using the provided code or calling [phone].    Coordinating your care after your visit:  There are multiple options for scheduling your follow-up care based on your provider's recommendation.    How do I schedule a follow-up clinic appointment:   After your appointment, you may receive scheduling assistance with the Clinic Coordinators by having a seat in the waiting room and a Clinic Coordinator will call you up to schedule.  Virtual visits or after you leave the clinic:  Your provider has placed a follow-up order in the [employer] portal for scheduling your return appointment. A member of the scheduling team will contact you to schedule.  [employer] Scheduling: Timely scheduling through Active[employer] is advised to ensure appointment availability.   Call to schedule: You may schedule your follow-up appointment(s) by calling [phone], option 1.    How do I schedule my endoscopy or colonoscopy procedure:  If a procedure, such as a colonoscopy or upper endoscopy was ordered by your provider, the scheduling team will contact you to schedule this procedure. Or you may choose to call to schedule at   [phone], option 2.  Please allow 20-30 minutes when scheduling a procedure.    How do I get my  blood work done? To get your blood work done, you need to schedule a lab appointment at an [facility]. There are multiple ways to schedule:   At the clinic: The Clinic Coordinator you meet after your visit can help you schedule a lab appointment.   [facility] scheduling: [facility] offers online lab scheduling at all [facility] laboratory locations.   Call to schedule: You can call [phone] to schedule your lab appointment.    How do I schedule my imaging study: To schedule imaging studies, such as CT scans, ultrasounds, MRIs, or X-rays, contact Imaging Services at [phone].    How do I schedule a referral to another doctor: If your provider recommended a referral to another specialist(s), the referral order was placed by your provider. You will receive a phone call to schedule this referral, or you may choose to call the number attached to the referral to self-schedule.    For Post-Visit Question(s):  For any inquiries following today's visit:  Please utilize [employer] messaging and allow 48 hours for reply or contact the Call Center during normal business hours at [phone], option 3.  For Emergent After-hours questions, contact the On-Call GI Fellow through the [facility]  at [phone].  In addition, you may contact your Nurse directly using the provided contact information.    Test Results:  Test results will be accessible via [employer] in compliance with the 21st Century Cures Act. This means that your results will be available to you at the same time as your provider. Often you may see your results before your provider does. Results are reviewed by staff within two weeks with communication follow-up. Results may be released in the patient portal prior to your care team review.    Prescription Refill(s):  Medication prescribed by your provider will be addressed during your visit. For future refills, please coordinate with your pharmacy. If you have not had a  recent clinic visit or routine labs, for your safety, your provider may not be able to refill your prescription.

## 2024-07-25 ENCOUNTER — MYC MEDICAL ADVICE (OUTPATIENT)
Dept: ENDOCRINOLOGY | Facility: CLINIC | Age: 69
End: 2024-07-25

## 2024-07-25 ENCOUNTER — APPOINTMENT (OUTPATIENT)
Dept: LAB | Facility: CLINIC | Age: 69
End: 2024-07-25
Payer: COMMERCIAL

## 2024-07-25 DIAGNOSIS — E16.2 HYPOGLYCEMIA: ICD-10-CM

## 2024-07-25 DIAGNOSIS — R63.4 WEIGHT LOSS: ICD-10-CM

## 2024-07-25 DIAGNOSIS — R10.11 RUQ ABDOMINAL PAIN: Primary | ICD-10-CM

## 2024-07-25 DIAGNOSIS — Z93.1 S/P NISSEN FUNDOPLICATION (WITH GASTROSTOMY TUBE PLACEMENT) (H): Primary | ICD-10-CM

## 2024-07-25 DIAGNOSIS — R68.81 EARLY SATIETY: ICD-10-CM

## 2024-07-25 DIAGNOSIS — R19.5 LOOSE STOOLS: ICD-10-CM

## 2024-07-25 PROCEDURE — 83993 ASSAY FOR CALPROTECTIN FECAL: CPT | Performed by: DIETITIAN, REGISTERED

## 2024-07-25 PROCEDURE — 99000 SPECIMEN HANDLING OFFICE-LAB: CPT | Performed by: PATHOLOGY

## 2024-07-25 PROCEDURE — 83497 ASSAY OF 5-HIAA: CPT | Mod: 90

## 2024-07-25 NOTE — PROGRESS NOTES
Per Cathy Aguirre -    Due to new symptoms, plan to order and scheduled colonoscopy in addition to EGD.    Inbasket message sent to endoscopy scheduling, who confirmed the colonoscopy cannot be added onto current EGD due to the provider's schedule being full.    New order placed; inbasket message sent to endoscopy scheduling to request patient be scheduled.

## 2024-07-26 LAB — CALPROTECTIN STL-MCNT: 13.3 MG/KG (ref 0–49.9)

## 2024-07-26 NOTE — TELEPHONE ENCOUNTER
LVM for patient to schedule colonoscopy separately from EGD. There is not enough time with current EGD to add on.

## 2024-07-28 DIAGNOSIS — Z93.1 S/P NISSEN FUNDOPLICATION (WITH GASTROSTOMY TUBE PLACEMENT) (H): ICD-10-CM

## 2024-07-28 DIAGNOSIS — K91.1 DUMPING SYNDROME: ICD-10-CM

## 2024-07-28 DIAGNOSIS — E16.2 HYPOGLYCEMIA: ICD-10-CM

## 2024-07-28 NOTE — TELEPHONE ENCOUNTER
Medication Requested:  Continuous Blood Gluc Sensor (FREESTYLE SIMIN 2 SENSOR) MISC 6 each 1 2/20/2023 -- No   Sig - Route: 1 each every 14 days Use 1 sensor every 14 days. Use to read blood sugars per 's instructions. - Does not apply               ----------------------  Last Office Visit : 1/17/2024  [facility] [place]      Future Office visit:     1/15/2025 1:00 PM (30 min)  [place]   Arrive by: 12:45 PM   RETURN ENDOCRINE   [facility] ([facility])   Cali Dougherty MD     ----------------------

## 2024-07-29 LAB
5HIAA & CREATININE UR-IMP: NORMAL
5OH-INDOLEACETATE 24H UR-MCNC: 2.7 MG/L
5OH-INDOLEACETATE 24H UR-MRATE: 4 MG/D
5OH-INDOLEACETATE/CREAT 24H UR: 3 MG/GCR
CREAT 24H UR-MRATE: 1318 MG/D
CREAT UR-MCNC: 85 MG/DL

## 2024-07-30 ENCOUNTER — LAB (OUTPATIENT)
Dept: LAB | Facility: CLINIC | Age: 69
End: 2024-07-30
Payer: COMMERCIAL

## 2024-07-30 DIAGNOSIS — Z93.1 S/P NISSEN FUNDOPLICATION (WITH GASTROSTOMY TUBE PLACEMENT) (H): ICD-10-CM

## 2024-07-30 DIAGNOSIS — E16.2 HYPOGLYCEMIA: ICD-10-CM

## 2024-07-30 LAB — CORTIS SERPL-MCNC: 11.4 UG/DL

## 2024-07-30 PROCEDURE — 36415 COLL VENOUS BLD VENIPUNCTURE: CPT

## 2024-07-30 PROCEDURE — 82533 TOTAL CORTISOL: CPT

## 2024-07-30 PROCEDURE — 82024 ASSAY OF ACTH: CPT

## 2024-07-31 LAB — ACTH PLAS-MCNC: 41 PG/ML

## 2024-08-01 NOTE — TELEPHONE ENCOUNTER
Addressed in a different encounter./  FREESTYLE SIMIN 2 SENSOR) [facility] 6 each 1 7/27/2024

## 2024-08-14 NOTE — TELEPHONE ENCOUNTER
Writer attempted to reach patient to confirm whether or not he will completing colonoscopy with [facility].

## 2024-09-09 NOTE — PROGRESS NOTES
"In person evaluation    HPI  9/3/2019, in person visit  2/2/2021, video visit  11/24/2021, in person visit  6/24/2022, in person visit  2/15/2023, in person visit  10/30/2023, in person visit  9/10/2024, in person visit        69-year-old followed neurologically for:  Paresthesias  Sensory neuropathy  Right CTS mild to moderate  Relatively brisk reflexes in relationship to neuropathy      Since last seen a year ago  No hospitalizations  No surgeries  No major illnesses  No falls or injuries    Neurontin 300 mg 3 times daily, no swelling in the feet no constipation  B12 500 mcg once per day    Does have a procedure on his esophagus coming up for redo of his Nissen procedure    Still skis and does okay  No changes in bowel or bladder      Does have a little bit of numbness and tingling in the fingers and toes  Has a little bit more hypersensitivity on the left foot compared to the right  May have had some past fasciitis in the foot      Knows about good footcare    Has some Raynaud's phenomenon with cold hands  Is also an avid ski year  Previously had MRI scan cervical spine in January 2022 no significant cervical myelopathy      Past GI difficulties  Patient has had some \"dumping type symptomatology from his GI problem and it was talking the other physicians in that regard  Was out skiing in [facility] January 2023  Was on the ski lift and got very \"woozy\" which was more of a lightheadedness no chest pain  X-ray let him ski down that he will but then could not get back up when he was at the bottom  They did give him glucose and after the glucose treatment his glucose went up to 70  Primary is working him up for difficulty with his \"dumping syndrome periodic hypoglycemia possible inappropriate insulin secretion      A.  Sensory neuropathy       Onset in 2009       Patient with paresthesias of his toes and fingers       In the past had some paresthesias of the tongue       Distribution is predominantly stocking " glove a little bit of the tips of the ear and tips of the tongue       No bowel or bladder difficulty       No trouble with erections       No significant back or neck pain       No balance problems can ski and walk in the dark with no difficulty         Does go skiing and his hands get fairly cold question some autonomic instability distally    B.  History of GERD        Nissen fundoplication procedure September 2020      C.  Patient with a fall in September 2021 has had no loss of consciousness       Seen in the ER had a cervical spine x-ray with no fracture but a lot of degenerative disease       Feels a after he had a concussion balance is not as good as it used to be      Still an avid skier      His neurologic difficulties include:  1. Numbness and tingling and dysesthesias of his hands and feet, sometimes involves the tip of his tongue and nose, comes intermittently. Seemed to be somewhat better with Neurontin, did not seem to be very progressive.  2. Mild carpal tunnel syndrome, works on computers, not getting worse the last time I saw him, last EMG though was in November 2009.  3. Symptoms slightly better with B12 even though his B12 was not significantly low.  4. Past history of Nissen fundoplication with GERD nine-plus years ago and with increased GERD recently when I saw him in July 2018.  5. Past history of dumping syndrome which occurred more so after breakfast and takes nortriptyline through GI specialists for that.  6. Patient did have  Lyme's tick bite back in 2018 treated with antibiotics for 1 month      Past medical history  Sensory neuropathy, onset 2009  Hypertension  GERD status post Nissen fundoplication, repeat surgery February 2020  Dumping syndrome  Carpal tunnel syndrome right    Habits  Does not smoke  Occasional alcoholic beverage, 3/week      Family history is positive for:  1. Father with gallbladder removed.  2. Mother with uterine cancer, hypertension, and polio.  3. Maternal  grandfather  with MI.     Work-up summary  MRI scan head 10/16/2009 with and without contrast normal  EEG on 2013 was normal.  EMG in  carpal tunnel syndrome, did not want surgery.  Laboratory data in  for small fiber neuropathy included negative GALOP antibodies, negative MAG antibodies.  Negative anti-sulfatide antibodies. Anti-Hu was negative. Hepatic profile normal, sedimentation rate 5, MAYELA, rheumatoid factor, Lyme s titers negative, serum protein immunoelectrophoresis negative, SSA/SSB were negative,     methylmalonic acid was 0.11 with a folic acid of 15.2 and B12 of 538.  Repeat laboratory data shows        immune electrophoresis negative       B12 630, TSH 1.06       SSA and SSB negative        Lyme's titer negative  EMG examination shows 2019       Right carpal tunnel syndrome mild to moderate in degree slightly increased chronic motor changes compared to        Sensory neuropathy with some slight change in amplitudes but sensory snap potentials are still present at the sural superficial peroneal ulnar and median nerves  Laboratory review  Labs 2021  Sodium 142 potassium 4.3, BUN 16 creatinine 1.07  Glucose 90  AST 19  2021 C-spine x-ray   IMPRESSION: No fracture.  Normal vertebral heights and alignment. Severe C4-C5 through C6-C7 interbody degeneration. No advanced facet arthropathy. Normal open mouth view.    MRI scan cervical spine 2022  C3-4, severe right, moderate left foraminal narrowing no canal narrowing  C4-5 severe bilateral foraminal narrowing, no canal narrowing  C5-6, severe bilateral foraminal narrowing, mild canal narrowing  C6-7 moderate bilateral foraminal narrowing, no canal narrowing  C7-T1, moderate right foraminal narrowing, no canal narrowing  At the C5/C6 level, there is a broad bar disc osteophyte complex with mild spinal canal narrowing.    Laboratory data review                        2023  NA/K             140/4.5       148/4.9  BUN/Cr         13.6/1.0     15.4/1.01  GLU              116             85  AST               17              14  WBC/HGB                      6.7/15.2  PLTs                               187,000  HGBA1C       5.9  TSH                                1.16    Review of Systems   Pertinent positives and negatives    No headache no chest pain or shortness of breath no nausea vomiting no diarrhea no fever chills    Does have some neck pain but not radicular    Balance is a little bit more off than before  Blames it on the concussion that he had in September    No diplopia dysarthria dysphagia    Does have some paresthesias that involve both the nose fingertips and toes    No significant back pain  No bowel or bladder difficulty  No trouble with erections    Has some pain on the left heel  Some pain in the right arch  They are both intermittent and occasional last time was 3 months ago    Otherwise review of systems negative    General exam  Blood pressure 119/73, pulse 62  Alert oriented x3  HEENT normal  Lungs clear  Heart rate regular  Abdomen soft  Symmetrical pulses  No edema feet  Straight leg raising sign negative    Neurologic exam  Alert oriented x3  Normal prosody of speech  Normal naming  Normal comprehension  Normal repetition  No aphasia  No neglect  Normal memory recall      Cranial nerves II through XII normal  No ophthalmoplegia  No nystagmus  Visual fields intact  Face is symmetrical  Tongue twisters good    Upper extremities reported right over left  Deltoid 5/5  Biceps 5/5  Triceps 5/5  Wrist extensors and flexors 5/5  Finger extensors and flexors 5/5    No drift  No incoordination      Lower extremities reported right over left  Iliopsoas 5/5  Quadriceps 5/5  Hamstrings 5/5  Anterior tibial 5/5  EHL 5/5  Gastrocnemius 5/5    No actual spasticity  Negative straight leg raising    Reflexes brisk  Biceps 2/2  Triceps 1/1  Knees 2/2  Ankles 1/0    Toes downgoing  Negative Sorenson  reflex  No spasticity    Gait  Normal on the flat  Gets up from the chair with his arms crossed no difficulty  Negative Romberg      Sensory exam  Decreased pinprick stocking distribution in the foot to the mid shin subtle (slightly hypersensitive though on the left compared to the right)  Some increased hypersensitivity on the bottom of his feet  Temperature appreciation decreased to mid shin subtle  Vibration decreased to the ankle subtle      Neuroexam stable  Discussed gait safety            Assessment/Plan     1.  CTS (Carpal Tunnel Syndrome) (G56.01)        Patient still with a mild to moderate right carpal tunnel syndrome on his EMG       2.  Extremity numbness (R20.0)         Probably has a sensory neuropathy subtle with some slight change in the sensory snap potentials comparing 6636-3114, a 10-year difference     3.  Sensory neuropathy (G60.8)        Relatively stable with no changes in exam or changes in EMG or lab testing from [id number], but still follow clinically        May have some mild autonomic instability a Raynaud's type phenomenon        4.  Sensory neuropathy (G62.9)          5.  Brisk reflexes degenerative disease on plain x-ray of the neck September 2021       Rule out myelopathy with MRI of the neck      MRI scan cervical spine 1/7/2022      C3-4, severe right, moderate left foraminal narrowing no canal narrowing      C4-5 severe bilateral foraminal narrowing, no canal narrowing      C5-6, severe bilateral foraminal narrowing, mild canal narrowing      C6-7 moderate bilateral foraminal narrowing, no canal narrowing      C7-T1, moderate right foraminal narrowing, no canal narrowing       At the C5/C6 level, there is a broad bar disc osteophyte complex with mild spinal canal narrowing.        if in the future he was more hyperreflexive we could consider checking a scan of his neck but without significant neck complaints I would hold off on repeating this at this time.    6.  Fall with hitting  his head, August 24, 2021 possible mild concussion but no loss of consciousness seen in the ER    7.  Dumping syndrome with hypoglycemia       [place] to be from his fundoplication       Concerned whether there is some autonomic instability also although       We will monitor    At this time, he has:    1. Paresthesias more of a sensory neuropathy with negative workup.  2. Rather brisk reflexes, but no long tract findings of myelopathy.  3. Empirically on B12 replacement.  4. Neurontin 300 mg 3 times daily (sometimes uses a fourth rescue tablet)  5. Talked about the difference between negative and positive symptoms.  6. Talked about good foot care since he has numbness, but he needs to be careful about injuries, frostbite, or other difficulties.  7. Repeat labs and EMG fall 2019 stable  8.  Patient is an avid skier we talked about the fact that he should avoid frostbite which could exacerbate his sensory neuropathy      Medication refilled  He still skis wears, good footwear when he is out in the cold  Discussed good footcare especially with history of neuropathy  Discussed balance and coordination    Follow-up in 1 year    Total care time today 30 minutes  The longitudinal plan of care for the diagnosis(es)/condition(s) as documented were addressed during this visit. Due to the added complexity in care, I will continue to support Bal in the subsequent management and with ongoing continuity of care.      As per the visit today  Reviewed laboratory data  Reviewed EMR chart  Reviewed GI note 7/23/2024  As part of visit today  Reviewed laboratory data

## 2024-09-10 ENCOUNTER — OFFICE VISIT (OUTPATIENT)
Dept: NEUROLOGY | Facility: CLINIC | Age: 69
End: 2024-09-10
Payer: COMMERCIAL

## 2024-09-10 VITALS
SYSTOLIC BLOOD PRESSURE: 119 MMHG | HEIGHT: 70 IN | DIASTOLIC BLOOD PRESSURE: 73 MMHG | BODY MASS INDEX: 26.2 KG/M2 | HEART RATE: 62 BPM | WEIGHT: 183 LBS

## 2024-09-10 DIAGNOSIS — G62.9 NEUROPATHY: ICD-10-CM

## 2024-09-10 DIAGNOSIS — G60.9 IDIOPATHIC PERIPHERAL NEUROPATHY: Primary | ICD-10-CM

## 2024-09-10 PROCEDURE — 99214 OFFICE O/P EST MOD 30 MIN: CPT | Performed by: PSYCHIATRY & NEUROLOGY

## 2024-09-10 PROCEDURE — G2211 COMPLEX E/M VISIT ADD ON: HCPCS | Performed by: PSYCHIATRY & NEUROLOGY

## 2024-09-10 RX ORDER — GABAPENTIN 300 MG/1
300 CAPSULE ORAL 3 TIMES DAILY
Qty: 270 CAPSULE | Refills: 3 | Status: SHIPPED | OUTPATIENT
Start: 2024-09-10

## 2024-09-10 NOTE — NURSING NOTE
Chief Complaint   Patient presents with    NEUROPATHY     11 month follow up.  Pt states neuropathy symptoms are unchanged. Denies any new concerns.     Perla Samuels LPN on 9/10/2024 at 1:52 PM

## 2024-09-10 NOTE — LETTER
"9/10/2024      Bal Junior  [address] [place] [address]      Dear Colleague,    Thank you for referring your patient, Bal Junior, to the [facility] [place]. Please see a copy of my visit note below.    In person evaluation    HPI  9/3/2019, in person visit  2/2/2021, video visit  11/24/2021, in person visit  6/24/2022, in person visit  2/15/2023, in person visit  10/30/2023, in person visit  9/10/2024, in person visit        69-year-old followed neurologically for:  Paresthesias  Sensory neuropathy  Right CTS mild to moderate  Relatively brisk reflexes in relationship to neuropathy      Since last seen a year ago  No hospitalizations  No surgeries  No major illnesses  No falls or injuries    Neurontin 300 mg 3 times daily, no swelling in the feet no constipation  B12 500 mcg once per day    Does have a procedure on his esophagus coming up for redo of his Nissen procedure    Still skis and does okay  No changes in bowel or bladder      Does have a little bit of numbness and tingling in the fingers and toes  Has a little bit more hypersensitivity on the left foot compared to the right  May have had some past fasciitis in the foot      Knows about good footcare    Has some Raynaud's phenomenon with cold hands  Is also an avid ski year  Previously had MRI scan cervical spine in January 2022 no significant cervical myelopathy      Past GI difficulties  Patient has had some \"dumping type symptomatology from his GI problem and it was talking the other physicians in that regard  Was out skiing in [facility] January 2023  Was on the ski lift and got very \"woozy\" which was more of a lightheadedness no chest pain  X-ray let him ski down that he will but then could not get back up when he was at the bottom  They did give him glucose and after the glucose treatment his glucose went up to 70  Primary is working him up for difficulty with his \"dumping syndrome periodic hypoglycemia " possible inappropriate insulin secretion      A.  Sensory neuropathy       Onset in 2009       Patient with paresthesias of his toes and fingers       In the past had some paresthesias of the tongue       Distribution is predominantly stocking glove a little bit of the tips of the ear and tips of the tongue       No bowel or bladder difficulty       No trouble with erections       No significant back or neck pain       No balance problems can ski and walk in the dark with no difficulty         Does go skiing and his hands get fairly cold question some autonomic instability distally    B.  History of GERD        Nissen fundoplication procedure September 2020      C.  Patient with a fall in September 2021 has had no loss of consciousness       Seen in the ER had a cervical spine x-ray with no fracture but a lot of degenerative disease       Feels a after he had a concussion balance is not as good as it used to be      Still an avid skier      His neurologic difficulties include:  1. Numbness and tingling and dysesthesias of his hands and feet, sometimes involves the tip of his tongue and nose, comes intermittently. Seemed to be somewhat better with Neurontin, did not seem to be very progressive.  2. Mild carpal tunnel syndrome, works on computers, not getting worse the last time I saw him, last EMG though was in November 2009.  3. Symptoms slightly better with B12 even though his B12 was not significantly low.  4. Past history of Nissen fundoplication with GERD nine-plus years ago and with increased GERD recently when I saw him in July 2018.  5. Past history of dumping syndrome which occurred more so after breakfast and takes nortriptyline through GI specialists for that.  6. Patient did have  Lyme's tick bite back in 2018 treated with antibiotics for 1 month      Past medical history  Sensory neuropathy, onset 2009  Hypertension  GERD status post Nissen fundoplication, repeat surgery February 2020  Dumping  syndrome  Carpal tunnel syndrome right    Habits  Does not smoke  Occasional alcoholic beverage, 3/week      Family history is positive for:  1. Father with gallbladder removed.  2. Mother with uterine cancer, hypertension, and polio.  3. Maternal grandfather  with MI.     Work-up summary  MRI scan head 10/16/2009 with and without contrast normal  EEG on 2013 was normal.  EMG in  carpal tunnel syndrome, did not want surgery.  Laboratory data in  for small fiber neuropathy included negative GALOP antibodies, negative MAG antibodies.  Negative anti-sulfatide antibodies. Anti-Hu was negative. Hepatic profile normal, sedimentation rate 5, MAYELA, rheumatoid factor, Lyme s titers negative, serum protein immunoelectrophoresis negative, SSA/SSB were negative,     methylmalonic acid was 0.11 with a folic acid of 15.2 and B12 of 538.  Repeat laboratory data shows        immune electrophoresis negative       B12 630, TSH 1.06       SSA and SSB negative        Lyme's titer negative  EMG examination shows 2019       Right carpal tunnel syndrome mild to moderate in degree slightly increased chronic motor changes compared to        Sensory neuropathy with some slight change in amplitudes but sensory snap potentials are still present at the sural superficial peroneal ulnar and median nerves  Laboratory review  Labs 2021  Sodium 142 potassium 4.3, BUN 16 creatinine 1.07  Glucose 90  AST 19  2021 C-spine x-ray   IMPRESSION: No fracture.  Normal vertebral heights and alignment. Severe C4-C5 through C6-C7 interbody degeneration. No advanced facet arthropathy. Normal open mouth view.    MRI scan cervical spine 2022  C3-4, severe right, moderate left foraminal narrowing no canal narrowing  C4-5 severe bilateral foraminal narrowing, no canal narrowing  C5-6, severe bilateral foraminal narrowing, mild canal narrowing  C6-7 moderate bilateral foraminal narrowing, no canal  narrowing  C7-T1, moderate right foraminal narrowing, no canal narrowing  At the C5/C6 level, there is a broad bar disc osteophyte complex with mild spinal canal narrowing.    Laboratory data review                        9/2023 7/2024  NA/K            140/4.5       148/4.9  BUN/Cr         13.6/1.0     15.4/1.01  GLU              116             85  AST               17              14  WBC/HGB                      6.7/15.2  PLTs                               187,000  HGBA1C       5.9  TSH                                1.16    Review of Systems   Pertinent positives and negatives    No headache no chest pain or shortness of breath no nausea vomiting no diarrhea no fever chills    Does have some neck pain but not radicular    Balance is a little bit more off than before  Blames it on the concussion that he had in September    No diplopia dysarthria dysphagia    Does have some paresthesias that involve both the nose fingertips and toes    No significant back pain  No bowel or bladder difficulty  No trouble with erections    Has some pain on the left heel  Some pain in the right arch  They are both intermittent and occasional last time was 3 months ago    Otherwise review of systems negative    General exam  Blood pressure 119/73, pulse 62  Alert oriented x3  HEENT normal  Lungs clear  Heart rate regular  Abdomen soft  Symmetrical pulses  No edema feet  Straight leg raising sign negative    Neurologic exam  Alert oriented x3  Normal prosody of speech  Normal naming  Normal comprehension  Normal repetition  No aphasia  No neglect  Normal memory recall      Cranial nerves II through XII normal  No ophthalmoplegia  No nystagmus  Visual fields intact  Face is symmetrical  Tongue twisters good    Upper extremities reported right over left  Deltoid 5/5  Biceps 5/5  Triceps 5/5  Wrist extensors and flexors 5/5  Finger extensors and flexors 5/5    No drift  No incoordination      Lower extremities reported right over  left  Iliopsoas 5/5  Quadriceps 5/5  Hamstrings 5/5  Anterior tibial 5/5  EHL 5/5  Gastrocnemius 5/5    No actual spasticity  Negative straight leg raising    Reflexes brisk  Biceps 2/2  Triceps 1/1  Knees 2/2  Ankles 1/0    Toes downgoing  Negative Sorenson reflex  No spasticity    Gait  Normal on the flat  Gets up from the chair with his arms crossed no difficulty  Negative Romberg      Sensory exam  Decreased pinprick stocking distribution in the foot to the mid shin subtle (slightly hypersensitive though on the left compared to the right)  Some increased hypersensitivity on the bottom of his feet  Temperature appreciation decreased to mid shin subtle  Vibration decreased to the ankle subtle      Neuroexam stable  Discussed gait safety            Assessment/Plan     1.  CTS (Carpal Tunnel Syndrome) (G56.01)        Patient still with a mild to moderate right carpal tunnel syndrome on his EMG       2.  Extremity numbness (R20.0)         Probably has a sensory neuropathy subtle with some slight change in the sensory snap potentials comparing 2459-0204, a 10-year difference     3.  Sensory neuropathy (G60.8)        Relatively stable with no changes in exam or changes in EMG or lab testing from 0430-6816, but still follow clinically        May have some mild autonomic instability a Raynaud's type phenomenon        4.  Sensory neuropathy (G62.9)          5.  Brisk reflexes degenerative disease on plain x-ray of the neck September 2021       Rule out myelopathy with MRI of the neck      MRI scan cervical spine 1/7/2022      C3-4, severe right, moderate left foraminal narrowing no canal narrowing      C4-5 severe bilateral foraminal narrowing, no canal narrowing      C5-6, severe bilateral foraminal narrowing, mild canal narrowing      C6-7 moderate bilateral foraminal narrowing, no canal narrowing      C7-T1, moderate right foraminal narrowing, no canal narrowing       At the C5/C6 level, there is a broad bar disc  osteophyte complex with mild spinal canal narrowing.        if in the future he was more hyperreflexive we could consider checking a scan of his neck but without significant neck complaints I would hold off on repeating this at this time.    6.  Fall with hitting his head, August 24, 2021 possible mild concussion but no loss of consciousness seen in the ER    7.  Dumping syndrome with hypoglycemia       [place] to be from his fundoplication       Concerned whether there is some autonomic instability also although       We will monitor    At this time, he has:    1. Paresthesias more of a sensory neuropathy with negative workup.  2. Rather brisk reflexes, but no long tract findings of myelopathy.  3. Empirically on B12 replacement.  4. Neurontin 300 mg 3 times daily (sometimes uses a fourth rescue tablet)  5. Talked about the difference between negative and positive symptoms.  6. Talked about good foot care since he has numbness, but he needs to be careful about injuries, frostbite, or other difficulties.  7. Repeat labs and EMG fall 2019 stable  8.  Patient is an avid skier we talked about the fact that he should avoid frostbite which could exacerbate his sensory neuropathy      Medication refilled  He still skis wears, good footwear when he is out in the cold  Discussed good footcare especially with history of neuropathy  Discussed balance and coordination    Follow-up in 1 year    Total care time today 30 minutes  The longitudinal plan of care for the diagnosis(es)/condition(s) as documented were addressed during this visit. Due to the added complexity in care, I will continue to support Bal in the subsequent management and with ongoing continuity of care.      As per the visit today  Reviewed laboratory data  Reviewed EMR chart  Reviewed GI note 7/23/2024  As part of visit today  Reviewed laboratory data        Again, thank you for allowing me to participate in the care of your patient.         Sincerely,        marcell Millan MD

## 2024-09-12 ENCOUNTER — TELEPHONE (OUTPATIENT)
Dept: GASTROENTEROLOGY | Facility: CLINIC | Age: 69
End: 2024-09-12
Payer: COMMERCIAL

## 2024-09-12 NOTE — TELEPHONE ENCOUNTER
Attempted to contact patient in order to complete pre assessment questions.     No answer. Left message to return call to [phone] option 2.    Callback required communication sent via [employer].      Procedure details:    Patient scheduled for Upper endoscopy (EGD) with Endoflip on 9.24.24.     Arrival time: 0815. Procedure time 0945    Facility location: [facility]; [address] [place] [address], [address] Floor, [place] [address]. Check in location: Main entrance at registration desk.    Sedation type: MAC    Pre op exam needed? No.    Indication for procedure: history of nissen with redo 9/2020, increasing symptoms, inconclusive EGJOO on manometry, dilation if appropriate       Chart review:     Electronic implanted devices? No    Recent diagnosis of diverticulitis within the last 6 weeks? N/A      Medication review:    Diabetic? Yes. Diabetic medication HOLDING recommendations: Oral diabetic medications: Metformin (glucophage): HOLD day of procedure.     Anticoagulants? No    Weight loss medication/injectable? No GLP-1 medication per patient's medication list.  RN will verify with pre-assessment call.    Other medication HOLDING recommendations:  N/A      Prep for procedure:     Prep instructions sent via [employer].       Ariana Kelly RN  Endoscopy Procedure Pre Assessment

## 2024-09-12 NOTE — TELEPHONE ENCOUNTER
Pre assessment completed for upcoming procedure.   (Please see previous telephone encounter notes for complete details)    Patient returned call.       Procedure details:    Arrival time and facility location reviewed.    Pre op exam needed? No.    Designated  policy reviewed. Instructed to have someone stay 24 hours post procedure.       Medication review:    Medications reviewed. Please see supporting documentation below. Holding recommendations discussed (if applicable).   Oral diabetic medication(s): Metformin (glucophage): HOLD day of procedure.  Patient reports metformin was discontinued for one month.       Prep for procedure:     Procedure prep instructions reviewed.        Any additional information needed:  N/A      Patient  verbalized understanding and had no questions or concerns at this time.      Ana Luisa Montes RN  Endoscopy Procedure Pre Assessment   [phone] option 2

## 2024-09-20 NOTE — TELEPHONE ENCOUNTER
RECORDS STATUS - ALL OTHER DIAGNOSIS      RECORDS RECEIVED FROM: [facility]   NOTES STATUS DETAILS   OFFICE NOTE from referring provider Epic 2/19/2024 - Cathy Aguirre PA-C   DISCHARGE SUMMARY from hospital [facility] 9/25/2020 - [facility]   OPERATIVE REPORT [facility] 9/25/2020 - TAKE-DOWN, FUNDOPLICATION, REDO NISSEN, LAPAROSCOPIC, gastrostomy feeding tube placement (Abdomen)    MEDICATION LIST [facility]    LABS     PATHOLOGY REPORTS     ANYTHING RELATED TO DIAGNOSIS Epic 7/30/2024   IMAGING (NEED IMAGES & REPORT)     CT SCANS PACS CT Abdomen Pelvis: 1/26/2024  CT Chest: 4/1/2019   XRAYS PACS Xray Esophagram: 4/30/2024-1/2/2019  Xray Upper GI: 10/27/2020  Xray Chest: 9/28/2020-9/25/2020      Seek medical care for worsening symptoms such as increased redness or swelling.

## 2024-09-22 ENCOUNTER — MYC MEDICAL ADVICE (OUTPATIENT)
Dept: ENDOCRINOLOGY | Facility: CLINIC | Age: 69
End: 2024-09-22
Payer: COMMERCIAL

## 2024-09-24 ENCOUNTER — HOSPITAL ENCOUNTER (OUTPATIENT)
Facility: CLINIC | Age: 69
Discharge: HOME | End: 2024-09-24
Attending: INTERNAL MEDICINE | Admitting: INTERNAL MEDICINE
Payer: MEDICARE

## 2024-09-24 ENCOUNTER — ANESTHESIA EVENT (OUTPATIENT)
Dept: GASTROENTEROLOGY | Facility: CLINIC | Age: 69
End: 2024-09-24
Payer: MEDICARE

## 2024-09-24 ENCOUNTER — ANESTHESIA (OUTPATIENT)
Dept: GASTROENTEROLOGY | Facility: CLINIC | Age: 69
End: 2024-09-24
Payer: MEDICARE

## 2024-09-24 VITALS
OXYGEN SATURATION: 100 % | HEART RATE: 53 BPM | BODY MASS INDEX: 26.2 KG/M2 | WEIGHT: 183 LBS | DIASTOLIC BLOOD PRESSURE: 83 MMHG | HEIGHT: 70 IN | RESPIRATION RATE: 16 BRPM | SYSTOLIC BLOOD PRESSURE: 114 MMHG | TEMPERATURE: 97.9 F

## 2024-09-24 LAB — UPPER GI ENDOSCOPY: NORMAL

## 2024-09-24 PROCEDURE — 43239 EGD BIOPSY SINGLE/MULTIPLE: CPT | Performed by: NURSE ANESTHETIST, CERTIFIED REGISTERED

## 2024-09-24 PROCEDURE — 88360 TUMOR IMMUNOHISTOCHEM/MANUAL: CPT | Mod: TC | Performed by: INTERNAL MEDICINE

## 2024-09-24 PROCEDURE — 250N000011 HC RX IP 250 OP 636: Performed by: NURSE ANESTHETIST, CERTIFIED REGISTERED

## 2024-09-24 PROCEDURE — 43239 EGD BIOPSY SINGLE/MULTIPLE: CPT | Performed by: ANESTHESIOLOGY

## 2024-09-24 PROCEDURE — 43239 EGD BIOPSY SINGLE/MULTIPLE: CPT | Performed by: INTERNAL MEDICINE

## 2024-09-24 PROCEDURE — 88360 TUMOR IMMUNOHISTOCHEM/MANUAL: CPT | Mod: 26 | Performed by: PATHOLOGY

## 2024-09-24 PROCEDURE — 370N000017 HC ANESTHESIA TECHNICAL FEE, PER MIN: Performed by: INTERNAL MEDICINE

## 2024-09-24 PROCEDURE — 91040 ESOPH BALLOON DISTENSION TST: CPT | Performed by: INTERNAL MEDICINE

## 2024-09-24 PROCEDURE — 88305 TISSUE EXAM BY PATHOLOGIST: CPT | Mod: TC | Performed by: INTERNAL MEDICINE

## 2024-09-24 PROCEDURE — 250N000009 HC RX 250: Performed by: NURSE ANESTHETIST, CERTIFIED REGISTERED

## 2024-09-24 PROCEDURE — 258N000003 HC RX IP 258 OP 636: Performed by: NURSE ANESTHETIST, CERTIFIED REGISTERED

## 2024-09-24 PROCEDURE — 88305 TISSUE EXAM BY PATHOLOGIST: CPT | Mod: 26 | Performed by: PATHOLOGY

## 2024-09-24 RX ORDER — ONDANSETRON 2 MG/ML
4 INJECTION INTRAMUSCULAR; INTRAVENOUS
Status: DISCONTINUED | OUTPATIENT
Start: 2024-09-24 | End: 2024-09-24 | Stop reason: HOSPADM

## 2024-09-24 RX ORDER — OXYCODONE HYDROCHLORIDE 10 MG/1
10 TABLET ORAL
Status: DISCONTINUED | OUTPATIENT
Start: 2024-09-24 | End: 2024-09-24 | Stop reason: HOSPADM

## 2024-09-24 RX ORDER — PROCHLORPERAZINE MALEATE 5 MG
5 TABLET ORAL EVERY 6 HOURS PRN
Status: DISCONTINUED | OUTPATIENT
Start: 2024-09-24 | End: 2024-09-24 | Stop reason: HOSPADM

## 2024-09-24 RX ORDER — ONDANSETRON 2 MG/ML
4 INJECTION INTRAMUSCULAR; INTRAVENOUS EVERY 6 HOURS PRN
Status: DISCONTINUED | OUTPATIENT
Start: 2024-09-24 | End: 2024-09-24 | Stop reason: HOSPADM

## 2024-09-24 RX ORDER — FLUMAZENIL 0.1 MG/ML
0.2 INJECTION, SOLUTION INTRAVENOUS
Status: DISCONTINUED | OUTPATIENT
Start: 2024-09-24 | End: 2024-09-24 | Stop reason: HOSPADM

## 2024-09-24 RX ORDER — LIDOCAINE HYDROCHLORIDE 20 MG/ML
INJECTION, SOLUTION INFILTRATION; PERINEURAL PRN
Status: DISCONTINUED | OUTPATIENT
Start: 2024-09-24 | End: 2024-09-24

## 2024-09-24 RX ORDER — ONDANSETRON 2 MG/ML
4 INJECTION INTRAMUSCULAR; INTRAVENOUS EVERY 30 MIN PRN
Status: DISCONTINUED | OUTPATIENT
Start: 2024-09-24 | End: 2024-09-24 | Stop reason: HOSPADM

## 2024-09-24 RX ORDER — SODIUM CHLORIDE, SODIUM LACTATE, POTASSIUM CHLORIDE, CALCIUM CHLORIDE 600; 310; 30; 20 MG/100ML; MG/100ML; MG/100ML; MG/100ML
INJECTION, SOLUTION INTRAVENOUS CONTINUOUS
Status: DISCONTINUED | OUTPATIENT
Start: 2024-09-24 | End: 2024-09-24 | Stop reason: HOSPADM

## 2024-09-24 RX ORDER — PROPOFOL 10 MG/ML
INJECTION, EMULSION INTRAVENOUS PRN
Status: DISCONTINUED | OUTPATIENT
Start: 2024-09-24 | End: 2024-09-24

## 2024-09-24 RX ORDER — SODIUM CHLORIDE, SODIUM LACTATE, POTASSIUM CHLORIDE, CALCIUM CHLORIDE 600; 310; 30; 20 MG/100ML; MG/100ML; MG/100ML; MG/100ML
INJECTION, SOLUTION INTRAVENOUS CONTINUOUS PRN
Status: DISCONTINUED | OUTPATIENT
Start: 2024-09-24 | End: 2024-09-24

## 2024-09-24 RX ORDER — PROPOFOL 10 MG/ML
INJECTION, EMULSION INTRAVENOUS CONTINUOUS PRN
Status: DISCONTINUED | OUTPATIENT
Start: 2024-09-24 | End: 2024-09-24

## 2024-09-24 RX ORDER — NALOXONE HYDROCHLORIDE 0.4 MG/ML
0.1 INJECTION, SOLUTION INTRAMUSCULAR; INTRAVENOUS; SUBCUTANEOUS
Status: DISCONTINUED | OUTPATIENT
Start: 2024-09-24 | End: 2024-09-24 | Stop reason: HOSPADM

## 2024-09-24 RX ORDER — ONDANSETRON 4 MG/1
4 TABLET, ORALLY DISINTEGRATING ORAL EVERY 6 HOURS PRN
Status: DISCONTINUED | OUTPATIENT
Start: 2024-09-24 | End: 2024-09-24 | Stop reason: HOSPADM

## 2024-09-24 RX ORDER — DEXAMETHASONE SODIUM PHOSPHATE 4 MG/ML
4 INJECTION, SOLUTION INTRA-ARTICULAR; INTRALESIONAL; INTRAMUSCULAR; INTRAVENOUS; SOFT TISSUE
Status: DISCONTINUED | OUTPATIENT
Start: 2024-09-24 | End: 2024-09-24 | Stop reason: HOSPADM

## 2024-09-24 RX ORDER — ONDANSETRON 4 MG/1
4 TABLET, ORALLY DISINTEGRATING ORAL EVERY 30 MIN PRN
Status: DISCONTINUED | OUTPATIENT
Start: 2024-09-24 | End: 2024-09-24 | Stop reason: HOSPADM

## 2024-09-24 RX ORDER — OXYCODONE HYDROCHLORIDE 5 MG/1
5 TABLET ORAL
Status: DISCONTINUED | OUTPATIENT
Start: 2024-09-24 | End: 2024-09-24 | Stop reason: HOSPADM

## 2024-09-24 RX ORDER — LIDOCAINE 40 MG/G
CREAM TOPICAL
Status: DISCONTINUED | OUTPATIENT
Start: 2024-09-24 | End: 2024-09-24 | Stop reason: HOSPADM

## 2024-09-24 RX ADMIN — LIDOCAINE HYDROCHLORIDE 100 MG: 20 INJECTION, SOLUTION INFILTRATION; PERINEURAL at 09:11

## 2024-09-24 RX ADMIN — SODIUM CHLORIDE, POTASSIUM CHLORIDE, SODIUM LACTATE AND CALCIUM CHLORIDE: 600; 310; 30; 20 INJECTION, SOLUTION INTRAVENOUS at 09:08

## 2024-09-24 RX ADMIN — PROPOFOL 150 MCG/KG/MIN: 10 INJECTION, EMULSION INTRAVENOUS at 09:11

## 2024-09-24 RX ADMIN — PROPOFOL 30 MG: 10 INJECTION, EMULSION INTRAVENOUS at 09:17

## 2024-09-24 RX ADMIN — PROPOFOL 50 MG: 10 INJECTION, EMULSION INTRAVENOUS at 09:11

## 2024-09-24 ASSESSMENT — ACTIVITIES OF DAILY LIVING (ADL)
ADLS_ACUITY_SCORE: 36

## 2024-09-24 NOTE — ANESTHESIA PREPROCEDURE EVALUATION
Anesthesia Pre-Procedure Evaluation    Patient: Bal Junior   MRN: [id number] : 1955        Procedure : Procedure(s):  Esophagoscopy, gastroscopy, duodenoscopy (EGD), combined  Esophageal Balloon Provocation Study          Past Medical History:   Diagnosis Date    Hypertension     Neuritis     Peripheral neuropathy     Personal history of other medical treatment     postgastrectomy dumping syndrome    Stomach problems Noted earlier      Past Surgical History:   Procedure Laterality Date    ABDOMEN SURGERY  2012?    APPENDECTOMY  1964?    COLONOSCOPY  , 2016    EYE SURGERY  199x    laser for retinal tears    GASTRIC FUNDOPLICATION      HERNIA REPAIR  ?    LAPAROSCOPIC TAKEDOWN NISSEN FUNDOPLICATION N/A 2020    Procedure: TAKE-DOWN, FUNDOPLICATION, REDO NISSEN, LAPAROSCOPIC, gastrostomy feeding tube placement;  Surgeon: Katlyn Tobar MD;  Location:  OR    KS ESOPHAGOGASTRODUODENOSCOPY TRANSORAL DIAGNOSTIC N/A 2019    Procedure: UPPER GASTROINTESTINAL ENDOSCOPY;  Surgeon: Dino Ward MD;  Location: [facility];  Service: General    SOFT TISSUE SURGERY  ?    MCL l. thumb      Allergies   Allergen Reactions    Hornets     Wasp Venom Protein Hives    Bee Venom Swelling      Social History     Tobacco Use    Smoking status: Never     Passive exposure: Past    Smokeless tobacco: Never   Substance Use Topics    Alcohol use: Yes     Alcohol/week: 3.0 - 6.0 standard drinks of alcohol     Types: 1 - 2 Glasses of wine, 1 - 2 Cans of beer, 1 - 2 Shots of liquor per week     Comment: glass of wine with dinner, can of beer in evening or 1 whiskey  or bourbon      Wt Readings from Last 1 Encounters:   09/10/24 83 kg (183 lb)        Anesthesia Evaluation   Pt has had prior anesthetic.         ROS/MED HX  ENT/Pulmonary:       Neurologic:       Cardiovascular:     (+)  hypertension- -   -  - -                                      METS/Exercise Tolerance:     Hematologic:      "  Musculoskeletal:       GI/Hepatic: Comment: H/o Nissen    (+) GERD,                   Renal/Genitourinary:       Endo:       Psychiatric/Substance Use:       Infectious Disease:       Malignancy:       Other:            Physical Exam    Airway        Mallampati: II   TM distance: > 3 FB   Neck ROM: full   Mouth opening: > 3 cm    Respiratory Devices and Support         Dental     Comment: wnl        Cardiovascular          Rhythm and rate: regular and normal     Pulmonary           breath sounds clear to auscultation           OUTSIDE LABS:  CBC:   Lab Results   Component Value Date    WBC 6.7 07/23/2024    WBC 6.9 01/18/2023    HGB 15.2 07/23/2024    HGB 15.1 01/18/2023    HCT 44.4 07/23/2024    HCT 44.9 01/18/2023     07/23/2024     01/18/2023     BMP:   Lab Results   Component Value Date     07/23/2024     09/14/2023    POTASSIUM 4.9 07/23/2024    POTASSIUM 4.5 09/14/2023    CHLORIDE 107 07/23/2024    CHLORIDE 105 09/14/2023    CO2 24 07/23/2024    CO2 24 09/14/2023    BUN 15.4 07/23/2024    BUN 13.6 09/14/2023    CR 1.01 07/23/2024    CR 1.00 09/14/2023    GLC 85 07/23/2024     (H) 09/14/2023     COAGS: No results found for: \"PTT\", \"INR\", \"FIBR\"  POC:   Lab Results   Component Value Date     (H) 10/28/2020     HEPATIC:   Lab Results   Component Value Date    ALBUMIN 4.1 07/23/2024    PROTTOTAL 6.7 07/23/2024    ALT <5 07/23/2024    AST 14 07/23/2024    ALKPHOS 72 07/23/2024    BILITOTAL 0.5 07/23/2024     OTHER:   Lab Results   Component Value Date    PH 7.35 09/25/2020    LACT 2.8 (H) 09/26/2020    A1C 6.0 (H) 01/17/2024    AUGUSTIN 9.1 07/23/2024    MAG 1.9 09/27/2020    TSH 1.16 07/23/2024       Anesthesia Plan    ASA Status:  2    NPO Status:  NPO Appropriate    Anesthesia Type: MAC.     - Reason for MAC: straight local not clinically adequate              Consents    Anesthesia Plan(s) and associated risks, benefits, and realistic alternatives discussed. Questions " "answered and patient/representative(s) expressed understanding.     - Discussed:     - Discussed with:  Patient      - Extended Intubation/Ventilatory Support Discussed: No.      - Patient is DNR/DNI Status: No     Use of blood products discussed: No .     Postoperative Care            Comments:    Other Comments: NPO. Meds reviewed. No recent URI. No CP or SOB. No problems with anesthesia in past.           Roshni Diggs MD    I have reviewed the pertinent notes and labs in the chart from the past 30 days and (re)examined the patient.  Any updates or changes from those notes are reflected in this note.              # Overweight: Estimated body mass index is 26.26 kg/m  as calculated from the following:    Height as of 9/10/24: 1.778 m (5' 10\").    Weight as of 9/10/24: 83 kg (183 lb).      "

## 2024-09-24 NOTE — ANESTHESIA POSTPROCEDURE EVALUATION
Patient: Bal Junior    Procedure: Procedure(s):  ESOPHAGOGASTRODUODENOSCOPY, WITH BIOPSY  Esophageal Balloon Provocation Study       Anesthesia Type:  MAC    Note:  Disposition: Outpatient   Postop Pain Control: Uneventful            Sign Out: Well controlled pain   PONV: No   Neuro/Psych: Uneventful            Sign Out: Acceptable/Baseline neuro status   Airway/Respiratory: Uneventful            Sign Out: Acceptable/Baseline resp. status   CV/Hemodynamics: Uneventful            Sign Out: Acceptable CV status; No obvious hypovolemia; No obvious fluid overload   Other NRE: NONE   DID A NON-ROUTINE EVENT OCCUR? No           Last vitals:  Vitals Value Taken Time   /83 09/24/24 1001   Temp     Pulse     Resp     SpO2 100 % 09/24/24 1005   Vitals shown include unfiled device data.    Electronically Signed By: Roshni Diggs MD  September 24, 2024  10:26 AM

## 2024-09-24 NOTE — H&P
Bal Junior  [id number]  male  69 year old      Reason for procedure/surgery: egd, endoflip, inconclusive egjoo    Patient Active Problem List   Diagnosis    Essential hypertension    Gastroesophageal reflux disease with esophagitis    Hiatal hernia with gastroesophageal reflux    Hx of allergy to insects    Slipped Nissen fundoplication    Peripheral neuropathy    Palpitations    Peripheral neuropathic pain    Postgastric surgery syndrome    Hiatal hernia    S/P Nissen fundoplication (with gastrostomy tube placement) (H)    Carpal tunnel syndrome of right wrist    Dumping syndrome    Hypoglycemia       Past Surgical History:    Past Surgical History:   Procedure Laterality Date    ABDOMEN SURGERY  2012?    APPENDECTOMY  1964?    COLONOSCOPY  2006, 2016    EYE SURGERY  199x    laser for retinal tears    GASTRIC FUNDOPLICATION      HERNIA REPAIR  1961?    LAPAROSCOPIC TAKEDOWN NISSEN FUNDOPLICATION N/A 9/25/2020    Procedure: TAKE-DOWN, FUNDOPLICATION, REDO NISSEN, LAPAROSCOPIC, gastrostomy feeding tube placement;  Surgeon: Katlyn Tobar MD;  Location:  OR    FL ESOPHAGOGASTRODUODENOSCOPY TRANSORAL DIAGNOSTIC N/A 5/9/2019    Procedure: UPPER GASTROINTESTINAL ENDOSCOPY;  Surgeon: Dino Ward MD;  Location: [facility];  Service: General    SOFT TISSUE SURGERY  2009?    [facility] l. thumb       Past Medical History:   Past Medical History:   Diagnosis Date    Hypertension     Neuritis     Peripheral neuropathy     Personal history of other medical treatment     postgastrectomy dumping syndrome    Stomach problems Noted earlier       Social History:   Social History     Tobacco Use    Smoking status: Never     Passive exposure: Past    Smokeless tobacco: Never   Substance Use Topics    Alcohol use: Yes     Alcohol/week: 3.0 - 6.0 standard drinks of alcohol     Types: 1 - 2 Glasses of wine, 1 - 2 Cans of beer, 1 - 2 Shots of liquor per week     Comment: glass of wine with dinner, can of beer in evening or 1  "domi  or lidiaon       Family History:   Family History   Problem Relation Age of Onset    Coronary Artery Disease Maternal Grandfather     Hypertension Mother     Uterine Cancer Mother         1965    Mitral Valve Replacement Father     GERD No family hx of     Ulcerative Colitis No family hx of     Crohn's Disease No family hx of     Stomach Cancer No family hx of        Allergies:   Allergies   Allergen Reactions    Hornets     Wasp Venom Protein Hives    Bee Venom Swelling       Active Medications:   No current outpatient medications on file.       Systemic Review:   CONSTITUTIONAL: NEGATIVE for fever, chills, change in weight  ENT/MOUTH: NEGATIVE for ear, mouth and throat problems  RESP: NEGATIVE for significant cough or SOB  CV: NEGATIVE for chest pain, palpitations or peripheral edema    Physical Examination:   Vital Signs: /87   Pulse 53   Temp 97.9  F (36.6  C) (Oral)   Resp 16   Ht 1.778 m (5' 10\")   Wt 83 kg (183 lb)   SpO2 99%   BMI 26.26 kg/m    GENERAL: healthy, alert and no distress  NECK: no adenopathy, no asymmetry, masses, or scars  RESP: lungs clear to auscultation - no rales, rhonchi or wheezes  CV: regular rate and rhythm, normal S1 S2, no S3 or S4, no murmur, click or rub, no peripheral edema and peripheral pulses strong  ABDOMEN: soft, nontender, no hepatosplenomegaly, no masses and bowel sounds normal  MS: no gross musculoskeletal defects noted, no edema    I examined patient s dentition and informed the patient that dental injuries are a risk of any anesthetic management. No concerns were noted with this patient's dentition. . The patient has consented to proceed with the procedure.    Plan: Appropriate to proceed as scheduled.      Carson Michel DO  9/24/2024    PCP:  Domenica Wing    "

## 2024-09-24 NOTE — ANESTHESIA CARE TRANSFER NOTE
Patient: Bal Junior    Procedure: Procedure(s):  ESOPHAGOGASTRODUODENOSCOPY, WITH BIOPSY  Esophageal Balloon Provocation Study       Diagnosis: Dysphagia, unspecified type [R13.10]  Hiatal hernia [K44.9]  Slipped Nissen fundoplication [K91.89]  Diagnosis Additional Information: No value filed.    Anesthesia Type:   MAC     Note:    Oropharynx: oropharynx clear of all foreign objects and spontaneously breathing  Level of Consciousness: drowsy  Oxygen Supplementation: room air    Independent Airway: airway patency satisfactory and stable  Dentition: dentition unchanged  Vital Signs Stable: post-procedure vital signs reviewed and stable  Report to RN Given: handoff report given  Patient transferred to: Phase II    Handoff Report: Identifed the Patient, Identified the Reponsible Provider, Reviewed the pertinent medical history, Discussed the surgical course, Reviewed Intra-OP anesthesia mangement and issues during anesthesia, Set expectations for post-procedure period and Allowed opportunity for questions and acknowledgement of understanding      Vitals:  Vitals Value Taken Time   BP 98/69 09/24/24 0941   Temp     Pulse 74    Resp 15    SpO2 100 % 09/24/24 0942   Vitals shown include unfiled device data.    Electronically Signed By: ROSY Wong CRNA  September 24, 2024  9:43 AM

## 2024-09-24 NOTE — OP NOTE
Please see endoscopy report for full description of the procedure.     EndoFLIP directed at the  LES (16cm (EF-322) balloon length):   Date: 9/24/24    16cm balloon  Balloon inflation Balloon pressure CSA (mm^2) DI (mm^2/mmHg) Dmin (mm) Compliance   30 (ladmark ID) 12.5 17 1.39 4.7    40 19.2 17 0.9 4.7    50 29.3 115 3.92 12.1    60 43.5  6.18 18.5    70           Absent contractility. Normal EGJ opening    Carson Michel DO   of Medicine  Director, Esophageal Disorders Program  Division of Gastroenterology, Hepatology, and Nutrition  [facility]

## 2024-09-26 ENCOUNTER — TELEPHONE (OUTPATIENT)
Dept: GASTROENTEROLOGY | Facility: CLINIC | Age: 69
End: 2024-09-26
Payer: COMMERCIAL

## 2024-09-26 NOTE — TELEPHONE ENCOUNTER
Patient underwent EGD 9/24 with Dr. Michel. Path from GE junction has resulted adenocarcinoma.    Reviewed case with Dr. Michel. Dr. Michel has contacted patient and reviewed results. We have reached out and sent urgent message to thoracic surgery team regarding new esophageal adenocarcinoma. Will continue to follow.    Cathy Aguirre PA-C  Division of Gastroenterology, Hepatology & Nutrition  [facility]

## 2024-09-27 DIAGNOSIS — C16.0 ADENOCARCINOMA OF GASTROESOPHAGEAL JUNCTION (H): Primary | ICD-10-CM

## 2024-09-27 LAB
PATH REPORT.COMMENTS IMP SPEC: NORMAL
PATH REPORT.COMMENTS IMP SPEC: NORMAL
PATH REPORT.FINAL DX SPEC: NORMAL
PATH REPORT.GROSS SPEC: NORMAL
PATH REPORT.RELEVANT HX SPEC: NORMAL
PATHOLOGY SYNOPTIC REPORT: NORMAL
PHOTO IMAGE: NORMAL

## 2024-09-28 ENCOUNTER — TELEPHONE (OUTPATIENT)
Dept: GASTROENTEROLOGY | Facility: CLINIC | Age: 69
End: 2024-09-28
Payer: COMMERCIAL

## 2024-09-28 NOTE — TELEPHONE ENCOUNTER
I personally discussed the findings with the patient via telephone regarding his newly diagnosed cancer, biopsy proven. I discussed the case with NORA Villaseñor who also notified the thoracic surgery team.     Carson Michel DO   of Medicine  Director, Esophageal Disorders Program  Director of Endoscopy  Division of Gastroenterology, Hepatology, and Nutrition  [facility]    
MEDICATIONS  (STANDING):  budesonide 160 MICROgram(s)/formoterol 4.5 MICROgram(s) Inhaler 2 Puff(s) Inhalation two times a day  enoxaparin Injectable 30 milliGRAM(s) SubCutaneous every 24 hours  hydrALAZINE Injectable 5 milliGRAM(s) IV Push every 8 hours  lactated ringers. 1000 milliLiter(s) (75 mL/Hr) IV Continuous <Continuous>  levothyroxine Injectable 20 MICROGram(s) IV Push <User Schedule>  levothyroxine Injectable 40 MICROGram(s) IV Push <User Schedule>  metoprolol tartrate Injectable 5 milliGRAM(s) IV Push every 6 hours  piperacillin/tazobactam IVPB.. 3.375 Gram(s) IV Intermittent every 8 hours    MEDICATIONS  (PRN):  albuterol/ipratropium for Nebulization 3 milliLiter(s) Nebulizer every 6 hours PRN Shortness of Breath and/or Wheezing

## 2024-10-01 ENCOUNTER — HOSPITAL ENCOUNTER (OUTPATIENT)
Dept: PET IMAGING | Facility: HOSPITAL | Age: 69
Discharge: HOME | End: 2024-10-01
Attending: THORACIC SURGERY (CARDIOTHORACIC VASCULAR SURGERY) | Admitting: THORACIC SURGERY (CARDIOTHORACIC VASCULAR SURGERY)
Payer: MEDICARE

## 2024-10-01 DIAGNOSIS — C16.0 ADENOCARCINOMA OF GASTROESOPHAGEAL JUNCTION (H): ICD-10-CM

## 2024-10-01 LAB — GLUCOSE BLDC GLUCOMTR-MCNC: 105 MG/DL (ref 70–99)

## 2024-10-01 PROCEDURE — 82962 GLUCOSE BLOOD TEST: CPT

## 2024-10-01 PROCEDURE — G1010 CDSM STANSON: HCPCS | Mod: PI

## 2024-10-01 PROCEDURE — 343N000001 HC RX 343: Performed by: THORACIC SURGERY (CARDIOTHORACIC VASCULAR SURGERY)

## 2024-10-01 PROCEDURE — A9552 F18 FDG: HCPCS | Performed by: THORACIC SURGERY (CARDIOTHORACIC VASCULAR SURGERY)

## 2024-10-01 PROCEDURE — 343N000001 HC RX 343 MED OP 636: Performed by: THORACIC SURGERY (CARDIOTHORACIC VASCULAR SURGERY)

## 2024-10-01 RX ORDER — FLUDEOXYGLUCOSE F 18 200 MCI/ML
7-18 INJECTION, SOLUTION INTRAVENOUS ONCE
Status: COMPLETED | OUTPATIENT
Start: 2024-10-01 | End: 2024-10-01

## 2024-10-01 RX ADMIN — FLUDEOXYGLUCOSE F 18 11.1 MILLICURIE: 200 INJECTION, SOLUTION INTRAVENOUS at 08:28

## 2024-10-02 NOTE — PROGRESS NOTES
"    THORACIC SURGERY - NEW PATIENT OFFICE VISIT      Dear Dr. Wing, Domenica BURNS,    I saw Bal Junior at Dr. 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    HPI  Bal Junior is a 69 year old man with PMH of HTN, GERD s/p Nissen fundoplication, and reported dumping syndrome. In recent months, patient endorses occasional reflux and chest pressure which are improving, but continues to have low appetite/early satiety, and denies Hoarse voice, Dyspnea, and Cough. He recently had an EGD with the GI team to assess reflux/dysphagia in setting of possible \"slipped\" Nissen vs hiatal hernia found on CT. During the EGD, the GEJ was noted to be bulky and erythematous. Biopsies were obtained which have resulted in poorly differentiated, adenocarcinoma.     Patient denies previous cardiac history. His last colonoscopy was last Friday without concerning findings.       Per 07/23/2024 GI note:  Bal has had longstanding reflux starting in 1980s/early 1990s, treated with omeprazole starting in 1990s.  He developed neuropathy attributed to B12 deficiency and felt that PPI was contributing to this so underwent Nissen in 2011 so he could stop PPI.  There was evidence of Nissen slippage and this was redone 9/25/2020. CT scan 1/2024 showed superior aspect of Nissen fundal wrap appears to be about 2 cm above the diaphragm possibly indicating slipped Nissen. He was having symptoms of dysphagia and chest pressure which have now improved. Continues to have low appetite/early satiety. Manometry consistent with inconclusive manometric EGJOO. Esophagram showed routine contrast lasting up to 5 minutes but patent flow of contrast and tablet.     ECOG performance status  0- Fully active, without restriction                                   Previsit Tests:  Barium Swallow (04/30/2024):   FINDINGS: Examination of the esophagus reveals adequate primary and  secondary peristalsis. " There were no focal strictures, though formal  evaluation of the mucosa was not performed. The gastroesophageal  junction is patent. No hiatal hernia was seen on examination. There  was absence of gastroesophageal reflux.    Path (09/24/2024):   GASTROESOPHAGEAL JUNCTION, BIOPSY:  - Adenocarcinoma, poorly-differentiated, with signet ring cell features    PET (10/01/2024):     Findings suspicious for the gastroesophageal junction adenocarcinoma   without convincing evidence of tona or metastatic disease.     Endoscopy findings (09/24/2024):   Evidence of a Nissen fundoplication was found at the gastroesophageal   junction. The wrap appeared loose. This was traversed.   Localized moderate mucosal changes characterized by erythema and   nodularity were found at the gastroesophageal junction. Biopsies were   taken with a cold forceps for histology.       Esophageal Motility Study: (04/11/2024):      Covid vaccination status: Vaccinated    Labs:  Hospital Outpatient Visit on 10/01/2024   Component Date Value Ref Range Status    GLUCOSE BY METER POCT 10/01/2024 105 (H)  70 - 99 mg/dL Final         PMH  Past Medical History:   Diagnosis Date    Hypertension     Neuritis     Peripheral neuropathy     Personal history of other medical treatment     postgastrectomy dumping syndrome    Stomach problems Noted earlier      PSH  Past Surgical History:   Procedure Laterality Date    ABDOMEN SURGERY  2012?    APPENDECTOMY  1964?    COLONOSCOPY  2006, 2016    ESOPHAGEAL BALLOON PROVOCATION STUDY N/A 9/24/2024    Procedure: Esophageal Balloon Provocation Study;  Surgeon: Carson Michel DO;  Location: [facility]    ESOPHAGOSCOPY, GASTROSCOPY, DUODENOSCOPY (EGD), COMBINED N/A 9/24/2024    Procedure: ESOPHAGOGASTRODUODENOSCOPY, WITH BIOPSY;  Surgeon: Carson Michel DO;  Location:  GI    EYE SURGERY  199x    laser for retinal tears    GASTRIC FUNDOPLICATION      HERNIA REPAIR  1961?    LAPAROSCOPIC TAKEDOWN NISSEN FUNDOPLICATION N/A  9/25/2020    Procedure: TAKE-DOWN, FUNDOPLICATION, REDO NISSEN, LAPAROSCOPIC, gastrostomy feeding tube placement;  Surgeon: Katlyn Tobar MD;  Location: [facility] OR    NH ESOPHAGOGASTRODUODENOSCOPY TRANSORAL DIAGNOSTIC N/A 5/9/2019    Procedure: UPPER GASTROINTESTINAL ENDOSCOPY;  Surgeon: Dino Ward MD;  Location: [facility];  Service: General    SOFT TISSUE SURGERY  2009?    [facility] l. thumb      Meds:  Current Outpatient Medications   Medication Sig Dispense Refill    atenolol (TENORMIN) 50 MG tablet Take 50 mg by mouth daily       blood glucose (NO BRAND SPECIFIED) test strip Use to test blood sugar 1 times daily or as directed. To accompany: Blood Glucose Monitor Brands: per insurance. 100 strip 6    blood glucose monitoring (NO BRAND SPECIFIED) meter device kit Use to test blood sugar 1 times daily or as directed. Preferred blood glucose meter OR supplies to accompany: Blood Glucose Monitor Brands: per insurance. 1 kit 0    Continuous Glucose Sensor (FREESTYLE SIMIN 2 SENSOR) MISC Use 1 sensor every 14 days. Use to read blood sugars per 's instructions. 6 each 1    cyanocobalamin (VITAMIN B-12) 500 MCG tablet daily       EPINEPHrine (ANY BX GENERIC EQUIV) 0.3 MG/0.3ML injection 2-pack 0.3 mg      gabapentin (NEURONTIN) 300 MG capsule Take 1 capsule (300 mg) by mouth 3 times daily. 270 capsule 3    metFORMIN (GLUCOPHAGE XR) 500 MG 24 hr tablet Take 1 tablet (500 mg) by mouth daily (with dinner) 90 tablet 3    nortriptyline (PAMELOR) 25 MG capsule Take 25 mg by mouth daily       thin (NO BRAND SPECIFIED) lancets Use with lanceting device. To accompany: Blood Glucose Monitor Brands: per insurance. 100 each 6     No current facility-administered medications for this visit.     Facility-Administered Medications Ordered in Other Visits   Medication Dose Route Frequency Provider Last Rate Last Admin    barium sulfate (EZ-DISK) tablet 700 mg  700 mg Oral Once Norm Mcdonald MD        sod  "bicarbonate-citric acid-simethicone (EZ GAS) 2.21-1.53-0.04 g packet 4 g  4 g Oral Once Norm Mcdonald MD           Allergies   Allergies   Allergen Reactions    Hornets     Wasp Venom Protein Hives    Bee Venom Swelling     Social History:  Social History     Tobacco Use    Smoking status: Never     Passive exposure: Past    Smokeless tobacco: Never   Substance Use Topics    Alcohol use: Yes     Alcohol/week: 3.0 - 6.0 standard drinks of alcohol     Types: 1 - 2 Glasses of wine, 1 - 2 Cans of beer, 1 - 2 Shots of liquor per week     Comment: glass of wine with dinner, can of beer in evening or 1 whiskey  or bourbon    Drug use: Never        Physical examination  Vitals:  /72 (BP Location: Right arm, Patient Position: Sitting, Cuff Size: Adult Large)   Pulse 51   Temp 97.5  F (36.4  C) (Oral)   Resp 16   Ht 1.746 m (5' 8.75\")   Wt 83.8 kg (184 lb 11.2 oz)   SpO2 99%   BMI 27.47 kg/m    Gen: Alert and conversational adult male in NAD  Eyes: Nonicteric  ENT: Mucous Membranes Moist  Pulm: Nonlabored Breathing on RA  CV: RRR   Skin: WWP  Neuro: CN grossly intact, no focal deficits  Psych: Appropriate in conversation        From a personal perspective, Bal is a very active person who enjoys skiing and mountain biking.    IMPRESSION   GEJ adenocarcinoma     Bal Junior is a 69 year old man with recent biopsy confirmed GEJ poorly differentiated, adenocarcinoma in setting of prolonged GERD.       PLAN  I spent 45 min on the date of the encounter in chart review, patient visit, review of tests, documentation and/or discussion with other providers about the issues documented above. I reviewed the plan as follows:    We discussed at length his diagnosis, pathology results, and PET scan findings. The PET scan does not show any evidence of distant metastasis. The pathology results, however, show a more aggressive cellularity with pleomorphism and signet ring cells. In order to better characterize the extent " and stage of disease prior to surgical intervention, he should receive an endoscopic ultrasound (EUS) to determine the depth of the tumor. He will also need to be seen by medical oncology to provide guidance on medical management and possible need for neoadjuvant chemotherapy.     All questions were answered and Bal Junior and present family were in agreement with the plan.  I appreciate the opportunity to participate in the care of your patient and will keep you updated.      Sincerely,

## 2024-10-03 ENCOUNTER — TELEPHONE (OUTPATIENT)
Dept: GASTROENTEROLOGY | Facility: CLINIC | Age: 69
End: 2024-10-03
Payer: COMMERCIAL

## 2024-10-03 ENCOUNTER — PRE VISIT (OUTPATIENT)
Dept: SURGERY | Facility: CLINIC | Age: 69
End: 2024-10-03
Payer: COMMERCIAL

## 2024-10-03 ENCOUNTER — ONCOLOGY VISIT (OUTPATIENT)
Dept: SURGERY | Facility: CLINIC | Age: 69
End: 2024-10-03
Attending: DIETITIAN, REGISTERED
Payer: COMMERCIAL

## 2024-10-03 ENCOUNTER — PATIENT OUTREACH (OUTPATIENT)
Dept: ONCOLOGY | Facility: CLINIC | Age: 69
End: 2024-10-03
Payer: COMMERCIAL

## 2024-10-03 ENCOUNTER — PATIENT OUTREACH (OUTPATIENT)
Dept: SURGERY | Facility: CLINIC | Age: 69
End: 2024-10-03
Payer: COMMERCIAL

## 2024-10-03 VITALS
DIASTOLIC BLOOD PRESSURE: 72 MMHG | HEART RATE: 51 BPM | WEIGHT: 184.7 LBS | OXYGEN SATURATION: 99 % | TEMPERATURE: 97.5 F | BODY MASS INDEX: 27.36 KG/M2 | RESPIRATION RATE: 16 BRPM | HEIGHT: 69 IN | SYSTOLIC BLOOD PRESSURE: 114 MMHG

## 2024-10-03 DIAGNOSIS — K44.9 HIATAL HERNIA: ICD-10-CM

## 2024-10-03 DIAGNOSIS — C16.0 ADENOCARCINOMA OF GASTROESOPHAGEAL JUNCTION (H): Primary | ICD-10-CM

## 2024-10-03 DIAGNOSIS — K91.89 SLIPPED NISSEN FUNDOPLICATION: ICD-10-CM

## 2024-10-03 DIAGNOSIS — R13.10 DYSPHAGIA, UNSPECIFIED TYPE: ICD-10-CM

## 2024-10-03 PROCEDURE — 99204 OFFICE O/P NEW MOD 45 MIN: CPT | Performed by: STUDENT IN AN ORGANIZED HEALTH CARE EDUCATION/TRAINING PROGRAM

## 2024-10-03 PROCEDURE — G0463 HOSPITAL OUTPT CLINIC VISIT: HCPCS | Performed by: STUDENT IN AN ORGANIZED HEALTH CARE EDUCATION/TRAINING PROGRAM

## 2024-10-03 ASSESSMENT — PAIN SCALES - GENERAL: PAINLEVEL: NO PAIN (0)

## 2024-10-03 NOTE — PROGRESS NOTES
New Patient Oncology Nurse Navigator Note     Referring provider: Dr. Katlyn Tobar    Referring Clinic/Organization: [facility]  Referred to: Thoracic Surgery  Requested provider (if applicable): First available - did not specify   Referral Received: 10/03/24       Evaluation for :   Diagnosis   C16.0 (ICD-10-CM) - Adenocarcinoma of gastroesophageal junction (H)   Additional Information:  STAT EUS ordered    Clinical History (per Nurse review of records provided):      09/24/2024 Surgical Pathology (bookmarked) showed:   Final Diagnosis   A. GASTROESOPHAGEAL JUNCTION, BIOPSY:  - Adenocarcinoma, poorly-differentiated, with signet ring cell features   Addendum electronically signed by Jay Browning DO on 9/27/2024 at  7:45 AM  Electronically signed by Jay Browning DO on 9/25/2024 at 11:29 AM   Synoptic Checklist   Gastric HER2 Biomarker Reporting Template   Protocol posted: 2/27/2019(Added in Addendum) GASTRIC HER2: BIOMARKER REPORTING TEMPLATE - All Specimens     GASTRIC HER2 BIOMARKER TESTS   Test(s) Performed     HER2 by IHC     Results  Negative (Score 0)   .      Clinical Information  [facility]   Dysphagia       10/01/2024 PET (bookmarked) showed:   IMPRESSION:     Findings suspicious for the gastroesophageal junction adenocarcinoma without convincing evidence of tona or metastatic disease.    Clinical Assessment / Barriers to Care (Per Nurse):  Never smoker  Records Location: Epic   Records Needed: None  Additional testing needed prior to consult: EUS  Referral updates and Plan:   10/3: Msg sent to Dr. Mc. Msg sent to advanced GI team.   10/4: Per Yvette SHEPPARD RN they were able to get Bal scheduled for EUS 10/8. Per chart review, [facility] schedulers scheduled Bal for a video visit with Dr. Mc 10/14. Will have NPS link referral with the appt.    MP PickeringN, RN, OCN  [facility] Oncology Nurse Navigator  [phone] / [phone]

## 2024-10-03 NOTE — TELEPHONE ENCOUNTER
Advanced Endoscopy     Referring provider:      Chacha Bhatia APRN CNS       Referred to: Advanced Endoscopy Provider Group     Provider Requested:  none specified     Referral Received:  10/3/24    Records received:  EPIC    EGD 9/24/24  Impression:            - Normal esophagus.                          - A Nissen fundoplication was found. The wrap appears                          loose.                          - Erythematous, nodular mucosa in the esophagus.                          Biopsied.                          - 2 cm hiatal hernia.                          - Normal stomach.                          - Normal examined duodenum.                          - FLIP imaging performed. Suggestive of normal                          esophago-gastric junction opening. Suggestive of                          abnormal contractility of the esophagus. Not                          consistent with esophago-gastric junction outflow                          obstruction.     Final Diagnosis   A. GASTROESOPHAGEAL JUNCTION, BIOPSY:  - Adenocarcinoma, poorly-differentiated, with signet ring cell features     Esophagram 4/30/24   IMPRESSION:  1. Timed barium esophagram results as detailed above with retained  esophageal contrast lasting up to 5 minutes. There is patent flow of  contrast.   2. Expected passage of barium tablet into the stomach.    Esophageal manometry study 4/11/24  Impressions   This is most consistent with inconclusive manometric EGJOO- esophagogastric junction outflow obstruction. If clinically appropriate, please consider an upper endoscopic evaluation with endoflip +/- dilation and consultation to thoracic surgery   Ludy Neal PA-C     CT abd/pelvis 1/26/24  IMPRESSION:   1.  Superior aspect of the Nissen fundal wrap appears about 2 cm above  the diaphragm possibly representing slipped Nissen.  2.  Hepatic steatosis.  3.  Nonobstructing renal calculi. No hydronephrosis       Images received:   PACs    Insurance Coverage:  [employer]    Evaluation for:  Adenocarcinoma of gastroesophageal junction (H) , EUS for Biopsy proven adenocarcinoma of GE junction, poorly-differentiated, with signet ring cell features      Clinical History (per RN review):     Dr Tobar's oncology note 10/3/24 (not yet completed)    I saw Bal Junior at Dr. 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     HPI  Bal Junior is a 69 year old man with PMH of HTN, GERD s/p Nissen fundoplication, and reported dumping syndrome. In recent months, patient endorses recent dysphagia and chest pressure which are improving, but continues to have low appetite/early satiety, and denies Hoarse voice, Dyspnea, and Cough. He recently had an EGD with the GI team to assess reflux/dysphagia in setting of possible slipped Nissen found on CT. During the EGD, the GEJ was noted to be bulky and erythematous. Biopsies were obtained which have resulted in poorly differentiated, adenocarcinoma.      MD review date:   MD Decision for clinic consultation/Orders:            Referral updates/Patient contacted:

## 2024-10-03 NOTE — LETTER
"10/3/2024      Bal Junior  [address] [facility] [address]      Dear Colleague,    Thank you for referring your patient, Bal Junior, to the [facility]. Please see a copy of my visit note below.        THORACIC SURGERY - NEW PATIENT OFFICE VISIT      Dear Domenica Arias,    I saw Bal Junior at Dr. 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    HPI  Bal Junior is a 69 year old man with PMH of HTN, GERD s/p Nissen fundoplication, and reported dumping syndrome. In recent months, patient endorses occasional reflux and chest pressure which are improving, but continues to have low appetite/early satiety, and denies Hoarse voice, Dyspnea, and Cough. He recently had an EGD with the GI team to assess reflux/dysphagia in setting of possible \"slipped\" Nissen vs hiatal hernia found on CT. During the EGD, the GEJ was noted to be bulky and erythematous. Biopsies were obtained which have resulted in poorly differentiated, adenocarcinoma.     Patient denies previous cardiac history. His last colonoscopy was last Friday without concerning findings.       Per 07/23/2024 GI note:  Bal has had longstanding reflux starting in 1980s/early 1990s, treated with omeprazole starting in 1990s.  He developed neuropathy attributed to B12 deficiency and felt that PPI was contributing to this so underwent Nissen in 2011 so he could stop PPI.  There was evidence of Nissen slippage and this was redone 9/25/2020. CT scan 1/2024 showed superior aspect of Nissen fundal wrap appears to be about 2 cm above the diaphragm possibly indicating slipped Nissen. He was having symptoms of dysphagia and chest pressure which have now improved. Continues to have low appetite/early satiety. Manometry consistent with inconclusive manometric EGJOO. Esophagram showed routine contrast lasting up to 5 minutes but patent flow of contrast and tablet. "     ECOG performance status  0- Fully active, without restriction                                   Previsit Tests:  Barium Swallow (04/30/2024):   FINDINGS: Examination of the esophagus reveals adequate primary and  secondary peristalsis. There were no focal strictures, though formal  evaluation of the mucosa was not performed. The gastroesophageal  junction is patent. No hiatal hernia was seen on examination. There  was absence of gastroesophageal reflux.    Path (09/24/2024):   GASTROESOPHAGEAL JUNCTION, BIOPSY:  - Adenocarcinoma, poorly-differentiated, with signet ring cell features    PET (10/01/2024):     Findings suspicious for the gastroesophageal junction adenocarcinoma   without convincing evidence of tona or metastatic disease.     Endoscopy findings (09/24/2024):   Evidence of a Nissen fundoplication was found at the gastroesophageal   junction. The wrap appeared loose. This was traversed.   Localized moderate mucosal changes characterized by erythema and   nodularity were found at the gastroesophageal junction. Biopsies were   taken with a cold forceps for histology.       Esophageal Motility Study: (04/11/2024):      Covid vaccination status: Vaccinated    Labs:  Hospital Outpatient Visit on 10/01/2024   Component Date Value Ref Range Status     GLUCOSE BY METER POCT 10/01/2024 105 (H)  70 - 99 mg/dL Final         PMH  Past Medical History:   Diagnosis Date     Hypertension      Neuritis      Peripheral neuropathy      Personal history of other medical treatment     postgastrectomy dumping syndrome     Stomach problems Noted earlier      PSH  Past Surgical History:   Procedure Laterality Date     ABDOMEN SURGERY  2012?     APPENDECTOMY  1964?     COLONOSCOPY  2006, 2016     ESOPHAGEAL BALLOON PROVOCATION STUDY N/A 9/24/2024    Procedure: Esophageal Balloon Provocation Study;  Surgeon: Carson Michel DO;  Location:  GI     ESOPHAGOSCOPY, GASTROSCOPY, DUODENOSCOPY (EGD), COMBINED N/A 9/24/2024     Procedure: ESOPHAGOGASTRODUODENOSCOPY, WITH BIOPSY;  Surgeon: Carson Michel DO;  Location:  GI     EYE SURGERY  199x    laser for retinal tears     GASTRIC FUNDOPLICATION       HERNIA REPAIR  1961?     LAPAROSCOPIC TAKEDOWN NISSEN FUNDOPLICATION N/A 9/25/2020    Procedure: TAKE-DOWN, FUNDOPLICATION, REDO NISSEN, LAPAROSCOPIC, gastrostomy feeding tube placement;  Surgeon: Katlyn Tobar MD;  Location:  OR     MT ESOPHAGOGASTRODUODENOSCOPY TRANSORAL DIAGNOSTIC N/A 5/9/2019    Procedure: UPPER GASTROINTESTINAL ENDOSCOPY;  Surgeon: Dino Ward MD;  Location: [facility];  Service: General     SOFT TISSUE SURGERY  2009?    [facility] l. thumb      Meds:  Current Outpatient Medications   Medication Sig Dispense Refill     atenolol (TENORMIN) 50 MG tablet Take 50 mg by mouth daily        blood glucose (NO BRAND SPECIFIED) test strip Use to test blood sugar 1 times daily or as directed. To accompany: Blood Glucose Monitor Brands: per insurance. 100 strip 6     blood glucose monitoring (NO BRAND SPECIFIED) meter device kit Use to test blood sugar 1 times daily or as directed. Preferred blood glucose meter OR supplies to accompany: Blood Glucose Monitor Brands: per insurance. 1 kit 0     Continuous Glucose Sensor (FREESTYLE SIMIN 2 SENSOR) MISC Use 1 sensor every 14 days. Use to read blood sugars per 's instructions. 6 each 1     cyanocobalamin (VITAMIN B-12) 500 MCG tablet daily        EPINEPHrine (ANY BX GENERIC EQUIV) 0.3 MG/0.3ML injection 2-pack 0.3 mg       gabapentin (NEURONTIN) 300 MG capsule Take 1 capsule (300 mg) by mouth 3 times daily. 270 capsule 3     metFORMIN (GLUCOPHAGE XR) 500 MG 24 hr tablet Take 1 tablet (500 mg) by mouth daily (with dinner) 90 tablet 3     nortriptyline (PAMELOR) 25 MG capsule Take 25 mg by mouth daily        thin (NO BRAND SPECIFIED) lancets Use with lanceting device. To accompany: Blood Glucose Monitor Brands: per insurance. 100 each 6     No current  "facility-administered medications for this visit.     Facility-Administered Medications Ordered in Other Visits   Medication Dose Route Frequency Provider Last Rate Last Admin     barium sulfate (EZ-DISK) tablet 700 mg  700 mg Oral Once Nrom Mcdonald MD         sod bicarbonate-citric acid-simethicone (EZ GAS) 2.21-1.53-0.04 g packet 4 g  4 g Oral Once Norm Mcdonald MD           Allergies   Allergies   Allergen Reactions     Hornets      Wasp Venom Protein Hives     Bee Venom Swelling     Social History:  Social History     Tobacco Use     Smoking status: Never     Passive exposure: Past     Smokeless tobacco: Never   Substance Use Topics     Alcohol use: Yes     Alcohol/week: 3.0 - 6.0 standard drinks of alcohol     Types: 1 - 2 Glasses of wine, 1 - 2 Cans of beer, 1 - 2 Shots of liquor per week     Comment: glass of wine with dinner, can of beer in evening or 1 whiskey  or bourbon     Drug use: Never        Physical examination  Vitals:  /72 (BP Location: Right arm, Patient Position: Sitting, Cuff Size: Adult Large)   Pulse 51   Temp 97.5  F (36.4  C) (Oral)   Resp 16   Ht 1.746 m (5' 8.75\")   Wt 83.8 kg (184 lb 11.2 oz)   SpO2 99%   BMI 27.47 kg/m    Gen: Alert and conversational adult male in NAD  Eyes: Nonicteric  ENT: Mucous Membranes Moist  Pulm: Nonlabored Breathing on RA  CV: RRR   Skin: WWP  Neuro: CN grossly intact, no focal deficits  Psych: Appropriate in conversation        From a personal perspective, Bal is a very active person who enjoys skiing and mountain biking.    IMPRESSION   GEJ adenocarcinoma     Bal Junior is a 69 year old man with recent biopsy confirmed GEJ poorly differentiated, adenocarcinoma in setting of prolonged GERD.       PLAN  I spent 45 min on the date of the encounter in chart review, patient visit, review of tests, documentation and/or discussion with other providers about the issues documented above. I reviewed the plan as follows:    We " discussed at length his diagnosis, pathology results, and PET scan findings. The PET scan does not show any evidence of distant metastasis. The pathology results, however, show a more aggressive cellularity with pleomorphism and signet ring cells. In order to better characterize the extent and stage of disease prior to surgical intervention, he should receive an endoscopic ultrasound (EUS) to determine the depth of the tumor. He will also need to be seen by medical oncology to provide guidance on medical management and possible need for neoadjuvant chemotherapy.     All questions were answered and Bal Junior and present family were in agreement with the plan.  I appreciate the opportunity to participate in the care of your patient and will keep you updated.      Sincerely,         Again, thank you for allowing me to participate in the care of your patient.        Sincerely,        Katlyn Tobar MD

## 2024-10-03 NOTE — PROGRESS NOTES
Met with pt and pt's wife in clinic following visit with Dr. Tobar. Introduced self and role of RNCC.     Handouts given: Thoracic surgery direct contact information      Discussed plan of care including: Referral to med onc and GI for EUS    RNCC will outreach again: following completion of EUS    Pt is aware of need for PAC visit within 30 days prior to surgery      Pt in agreement and had no further questions or concerns.    Cathreine Del Toro, RN BSN  Thoracic Surgery RN Care Coordinator  [phone]

## 2024-10-03 NOTE — NURSING NOTE
"Oncology Rooming Note    October 3, 2024 10:32 AM   Bal Junior is a 69 year old male who presents for:    Chief Complaint   Patient presents with    Oncology Clinic Visit     Hiatal Hernia     Initial Vitals: /72 (BP Location: Right arm, Patient Position: Sitting, Cuff Size: Adult Large)   Pulse 51   Temp 97.5  F (36.4  C) (Oral)   Resp 16   Ht 1.746 m (5' 8.75\")   Wt 83.8 kg (184 lb 11.2 oz)   SpO2 99%   BMI 27.47 kg/m   Estimated body mass index is 27.47 kg/m  as calculated from the following:    Height as of this encounter: 1.746 m (5' 8.75\").    Weight as of this encounter: 83.8 kg (184 lb 11.2 oz). Body surface area is 2.02 meters squared.  No Pain (0) Comment: Data Unavailable   No LMP for male patient.  Allergies reviewed: Yes  Medications reviewed: Yes    Medications: Medication refills not needed today.  Pharmacy name entered into [facility]: [facility] - [place], [place] - [address]    Frailty Screening:   Is the patient here for a new oncology consult visit in cancer care? 2. No      Clinical concerns: Patient is new.       Claudia Waller LPN  10/3/2024              "

## 2024-10-04 ENCOUNTER — TELEPHONE (OUTPATIENT)
Dept: GASTROENTEROLOGY | Facility: CLINIC | Age: 69
End: 2024-10-04
Payer: COMMERCIAL

## 2024-10-04 ENCOUNTER — PATIENT OUTREACH (OUTPATIENT)
Dept: GASTROENTEROLOGY | Facility: CLINIC | Age: 69
End: 2024-10-04
Payer: COMMERCIAL

## 2024-10-04 DIAGNOSIS — C16.0 ADENOCARCINOMA OF GASTROESOPHAGEAL JUNCTION (H): Primary | ICD-10-CM

## 2024-10-04 NOTE — TELEPHONE ENCOUNTER
Called pt to discuss referral and Dr Lopez's recommendations. Would like to schedule on 10/8 as a one hour morning case.     Procedure/Imaging/Clinic: Upper EUS   Physician: Jessica   Timing: Tues 10/8   Scope time needed:1 hr [facility] slot   Anesthesia:MAC   Dx: GE junction adenoca   Tier:Tier 2 - Not life/limb threatening but potential for  patient morbidity/mortality or adverse  patient/disease outcome if  surgery/procedure not done within 30 day   Location: [facility]   OK to schedule while attending: HOMERO   Header of letter for pt communication:    Examination using ultrasound from inside the gastrointestinal tract.     Pt in agreement with 10/8 procedure date.     Explained they can expect a call from  for date of procedure,  will need a , someone to stay with them for 24 hours and should stay in town for 24 hours (within 45 min of Hospital) post procedure    Does patient have any history of gastric bypass/gastric surgery/altered panc/bili anatomy? None, Nissen in the past    Does patient have [employer] insurance?:[employer]    Med Review    Blood thinner -  none  ASA - none  Diabetic - none  Any meds by injection or mouth for weight loss or diabetes- none    Patient Education r/t procedure: mychart    Verbalized understanding of all instructions. All questions answered.     Procedure order placed, message routed to Endo      Yvette Tee, RN, BSN,   [facility]  Care coordinator          Edema of right lower extremity Non-traumatic compartment syndrome of right lower extremity

## 2024-10-04 NOTE — TELEPHONE ENCOUNTER
"Endoscopy Scheduling Screen    Have you had any respiratory illness or flu-like symptoms in the last 10 days?  No    What is your communication preference for Instructions and/or Bowel Prep?   MyChart    What insurance is in the chart?  Other:  [employer]    Ordering/Referring Provider: JOHN HARMON   (If ordering provider performs procedure, schedule with ordering provider unless otherwise instructed. )    BMI: Estimated body mass index is 27.47 kg/m  as calculated from the following:    Height as of 10/3/24: 1.746 m (5' 8.75\").    Weight as of 10/3/24: 83.8 kg (184 lb 11.2 oz).     Sedation Ordered  MAC/deep sedation.   BMI<= 45 45 < BMI <= 48 48 < BMI < = 50  BMI > 50   No Restrictions No [facility]  No [facility]  [facility] with exceptions Hospital Only OR Only       Do you have a history of malignant hyperthermia?  No     Have you been diagnosed or told you have pulmonary hypertension?   No    Do you have an LVAD?  No    Have you been told you have moderate to severe sleep apnea?  No.    Have you been told you have COPD, asthma, or any other lung disease?  No    Do you have any heart conditions?  No     Have you ever had or are you waiting for an organ transplant?  No. Continue scheduling, no site restrictions.    Have you had a stroke or transient ischemic attack (TIA aka \"mini stroke\" in the last 6 months?   No    Have you been diagnosed with or been told you have cirrhosis of the liver?   No.    Are you currently on dialysis?   No    Do you need assistance transferring?   No    BMI: Estimated body mass index is 27.47 kg/m  as calculated from the following:    Height as of 10/3/24: 1.746 m (5' 8.75\").    Weight as of 10/3/24: 83.8 kg (184 lb 11.2 oz).     Is patients BMI > 40 and scheduling location [facility]?  No    Do you take an injectable or oral medication for weight loss or diabetes (excluding insulin)?  No    Do you take the medication Naltrexone?  No    Do you take blood thinners?  No       Prep   Are you " currently on dialysis or do you have chronic kidney disease?  No    Do you have a diagnosis of diabetes?  No    Do you have a diagnosis of cystic fibrosis (CF)?  No    On a regular basis do you go 3 -5 days between bowel movements?  No    BMI > 40?  No    Preferred Pharmacy:    [facility] - [place] - [address]  [address]  [place] [address]  Phone: [phone] Fax: [phone]      Final Scheduling Details     Procedure scheduled  Endoscopic ultrasound (EUS)    Surgeon:  LAYNE     Date of procedure:  10/8/2024     Pre-OP / PAC:   No - Not required for this site.    Location  [facility] - Per order.    Sedation   MAC/Deep Sedation - Per order.      Patient Reminders:   You will receive a call from a Nurse to review instructions and health history.  This assessment must be completed prior to your procedure.  Failure to complete the Nurse assessment may result in the procedure being cancelled.      On the day of your procedure, please designate an adult(s) who can drive you home stay with you for the next 24 hours. The medicines used in the exam will make you sleepy. You will not be able to drive.      You cannot take public transportation, ride share services, or non-medical taxi service without a responsible caregiver.  Medical transport services are allowed with the requirement that a responsible caregiver will receive you at your destination.  We require that drivers and caregivers are confirmed prior to your procedure.

## 2024-10-04 NOTE — TELEPHONE ENCOUNTER
Pre assessment completed for upcoming procedure.      Procedure details:    Patient scheduled for Endoscopic ultrasound (EUS) on 10.8.24.     Arrival time: 0830. Procedure time 1000    Facility location: [facility]; [address] [place] [address], [address] Floor, [place] [address]. Check in location: Main entrance at registration desk.    Sedation type: MAC    Pre op exam needed? No.    Indication for procedure:     Dx: GE junction adenocarcinoma       Designated  policy reviewed. Instructed to have someone stay 24 hours post procedure.       Chart review:     Electronic implanted devices? No    Recent diagnosis of diverticulitis within the last 6 weeks?  N/A      Medication review:    Diabetic? Prediabetic    Anticoagulants? No    Weight loss medication/injectable? No GLP-1 medication per patient's medication list.  RN will verify with pre-assessment call.    Other medication HOLDING recommendations:  N/A      Prep for procedure:     Prep instructions sent via [employer]     Reviewed procedure prep instructions.     Patient verbalized understanding and had no questions or concerns at this time.        Ariana Kelly RN  Endoscopy Procedure Pre Assessment   [phone] option 2

## 2024-10-07 NOTE — TELEPHONE ENCOUNTER
RECORDS STATUS - ALL OTHER DIAGNOSIS      RECORDS RECEIVED FROM: [facility]   NOTES STATUS DETAILS   OFFICE NOTE from referring provider Epic 10/3/2024 - Dr. Katlyn Tobar   DISCHARGE SUMMARY from hospital [facility] 9/25/2020 - [facility]    OPERATIVE REPORT [facility] 9/25/2020 - TAKE-DOWN, FUNDOPLICATION, REDO NISSEN, LAPAROSCOPIC, gastrostomy feeding tube placement (Abdomen)    MEDICATION LIST [facility]    LABS     PATHOLOGY REPORTS [facility] 9/24/2024 - [id number]    ANYTHING RELATED TO DIAGNOSIS Epic 7/30/2024   IMAGING (NEED IMAGES & REPORT)     CT SCANS PACS CT Abdomen Pelvis: 1/26/2024  CT Chest: 4/1/2019   XRAYS PACS Xray Esophagram: 4/30/2024-1/2/2019  Xray Upper GI: 10/27/2020  Xray Chest: 9/28/2020-9/25/2020   PET PACS PET Eyes to Thighs: 10/1/2024

## 2024-10-08 ENCOUNTER — ANESTHESIA EVENT (OUTPATIENT)
Dept: GASTROENTEROLOGY | Facility: CLINIC | Age: 69
End: 2024-10-08
Payer: MEDICARE

## 2024-10-08 ENCOUNTER — HOSPITAL ENCOUNTER (OUTPATIENT)
Facility: CLINIC | Age: 69
Discharge: HOME | End: 2024-10-08
Attending: INTERNAL MEDICINE | Admitting: INTERNAL MEDICINE
Payer: MEDICARE

## 2024-10-08 ENCOUNTER — ANESTHESIA (OUTPATIENT)
Dept: GASTROENTEROLOGY | Facility: CLINIC | Age: 69
End: 2024-10-08
Payer: MEDICARE

## 2024-10-08 VITALS — HEART RATE: 54 BPM | SYSTOLIC BLOOD PRESSURE: 105 MMHG | OXYGEN SATURATION: 100 % | DIASTOLIC BLOOD PRESSURE: 85 MMHG

## 2024-10-08 LAB — UPPER EUS: NORMAL

## 2024-10-08 PROCEDURE — 43242 EGD US FINE NEEDLE BX/ASPIR: CPT | Performed by: INTERNAL MEDICINE

## 2024-10-08 PROCEDURE — 370N000017 HC ANESTHESIA TECHNICAL FEE, PER MIN: Performed by: INTERNAL MEDICINE

## 2024-10-08 PROCEDURE — 43238 EGD US FINE NEEDLE BX/ASPIR: CPT | Performed by: INTERNAL MEDICINE

## 2024-10-08 PROCEDURE — 43238 EGD US FINE NEEDLE BX/ASPIR: CPT | Performed by: NURSE ANESTHETIST, CERTIFIED REGISTERED

## 2024-10-08 PROCEDURE — 250N000011 HC RX IP 250 OP 636: Performed by: NURSE ANESTHETIST, CERTIFIED REGISTERED

## 2024-10-08 PROCEDURE — 88172 CYTP DX EVAL FNA 1ST EA SITE: CPT | Mod: 26 | Performed by: PATHOLOGY

## 2024-10-08 PROCEDURE — 88305 TISSUE EXAM BY PATHOLOGIST: CPT | Mod: 26 | Performed by: PATHOLOGY

## 2024-10-08 PROCEDURE — 88172 CYTP DX EVAL FNA 1ST EA SITE: CPT | Mod: TC | Performed by: INTERNAL MEDICINE

## 2024-10-08 PROCEDURE — 250N000009 HC RX 250: Performed by: NURSE ANESTHETIST, CERTIFIED REGISTERED

## 2024-10-08 PROCEDURE — 88173 CYTOPATH EVAL FNA REPORT: CPT | Mod: 26 | Performed by: PATHOLOGY

## 2024-10-08 RX ORDER — ONDANSETRON 2 MG/ML
4 INJECTION INTRAMUSCULAR; INTRAVENOUS EVERY 30 MIN PRN
Status: DISCONTINUED | OUTPATIENT
Start: 2024-10-08 | End: 2024-10-08 | Stop reason: HOSPADM

## 2024-10-08 RX ORDER — PROPOFOL 10 MG/ML
INJECTION, EMULSION INTRAVENOUS CONTINUOUS PRN
Status: DISCONTINUED | OUTPATIENT
Start: 2024-10-08 | End: 2024-10-09

## 2024-10-08 RX ORDER — OXYCODONE HYDROCHLORIDE 5 MG/1
5 TABLET ORAL
Status: DISCONTINUED | OUTPATIENT
Start: 2024-10-08 | End: 2024-10-08 | Stop reason: HOSPADM

## 2024-10-08 RX ORDER — DEXAMETHASONE SODIUM PHOSPHATE 4 MG/ML
4 INJECTION, SOLUTION INTRA-ARTICULAR; INTRALESIONAL; INTRAMUSCULAR; INTRAVENOUS; SOFT TISSUE
Status: DISCONTINUED | OUTPATIENT
Start: 2024-10-08 | End: 2024-10-08 | Stop reason: HOSPADM

## 2024-10-08 RX ORDER — FLUMAZENIL 0.1 MG/ML
0.2 INJECTION, SOLUTION INTRAVENOUS
Status: DISCONTINUED | OUTPATIENT
Start: 2024-10-08 | End: 2024-10-08 | Stop reason: HOSPADM

## 2024-10-08 RX ORDER — LIDOCAINE 40 MG/G
CREAM TOPICAL
Status: DISCONTINUED | OUTPATIENT
Start: 2024-10-08 | End: 2024-10-08 | Stop reason: HOSPADM

## 2024-10-08 RX ORDER — NALOXONE HYDROCHLORIDE 0.4 MG/ML
0.2 INJECTION, SOLUTION INTRAMUSCULAR; INTRAVENOUS; SUBCUTANEOUS
Status: DISCONTINUED | OUTPATIENT
Start: 2024-10-08 | End: 2024-10-08 | Stop reason: HOSPADM

## 2024-10-08 RX ORDER — ONDANSETRON 4 MG/1
4 TABLET, ORALLY DISINTEGRATING ORAL EVERY 30 MIN PRN
Status: DISCONTINUED | OUTPATIENT
Start: 2024-10-08 | End: 2024-10-08 | Stop reason: HOSPADM

## 2024-10-08 RX ORDER — OXYCODONE HYDROCHLORIDE 10 MG/1
10 TABLET ORAL
Status: DISCONTINUED | OUTPATIENT
Start: 2024-10-08 | End: 2024-10-08 | Stop reason: HOSPADM

## 2024-10-08 RX ORDER — ONDANSETRON 2 MG/ML
4 INJECTION INTRAMUSCULAR; INTRAVENOUS EVERY 6 HOURS PRN
Status: DISCONTINUED | OUTPATIENT
Start: 2024-10-08 | End: 2024-10-08 | Stop reason: HOSPADM

## 2024-10-08 RX ORDER — NALOXONE HYDROCHLORIDE 0.4 MG/ML
0.1 INJECTION, SOLUTION INTRAMUSCULAR; INTRAVENOUS; SUBCUTANEOUS
Status: DISCONTINUED | OUTPATIENT
Start: 2024-10-08 | End: 2024-10-08 | Stop reason: HOSPADM

## 2024-10-08 RX ORDER — ONDANSETRON 4 MG/1
4 TABLET, ORALLY DISINTEGRATING ORAL EVERY 6 HOURS PRN
Status: DISCONTINUED | OUTPATIENT
Start: 2024-10-08 | End: 2024-10-08 | Stop reason: HOSPADM

## 2024-10-08 RX ORDER — NALOXONE HYDROCHLORIDE 0.4 MG/ML
0.4 INJECTION, SOLUTION INTRAMUSCULAR; INTRAVENOUS; SUBCUTANEOUS
Status: DISCONTINUED | OUTPATIENT
Start: 2024-10-08 | End: 2024-10-08 | Stop reason: HOSPADM

## 2024-10-08 RX ORDER — LIDOCAINE HYDROCHLORIDE 20 MG/ML
INJECTION, SOLUTION INFILTRATION; PERINEURAL PRN
Status: DISCONTINUED | OUTPATIENT
Start: 2024-10-08 | End: 2024-10-09

## 2024-10-08 RX ADMIN — PROPOFOL 150 MCG/KG/MIN: 10 INJECTION, EMULSION INTRAVENOUS at 10:05

## 2024-10-08 RX ADMIN — LIDOCAINE HYDROCHLORIDE 80 MG: 20 INJECTION, SOLUTION INFILTRATION; PERINEURAL at 10:05

## 2024-10-08 ASSESSMENT — ACTIVITIES OF DAILY LIVING (ADL)
ADLS_ACUITY_SCORE: 36

## 2024-10-08 NOTE — ANESTHESIA CARE TRANSFER NOTE
Patient: Bal Junior    Procedure: Procedure(s):  ESOPHAGOGASTRODUODENOSCOPY, WITH FINE NEEDLE ASPIRATION BIOPSY, WITH ENDOSCOPIC ULTRASOUND GUIDANCE       Diagnosis: Adenocarcinoma of gastroesophageal junction (H) [C16.0]  Diagnosis Additional Information: No value filed.    Anesthesia Type:   MAC     Note:    Oropharynx: oropharynx clear of all foreign objects  Level of Consciousness: drowsy  Oxygen Supplementation: room air    Independent Airway: airway patency satisfactory and stable  Dentition: dentition unchanged  Vital Signs Stable: post-procedure vital signs reviewed and stable  Report to RN Given: handoff report given  Patient transferred to: Phase II    Handoff Report: Identifed the Patient, Identified the Reponsible Provider, Reviewed the pertinent medical history, Discussed the surgical course, Reviewed Intra-OP anesthesia mangement and issues during anesthesia, Set expectations for post-procedure period and Allowed opportunity for questions and acknowledgement of understanding      Vitals:  Vitals Value Taken Time   BP 82/60 10/08/24 1106   Temp     Pulse 54 10/08/24 1107   Resp     SpO2 100 % 10/08/24 1107   Vitals shown include unfiled device data.    Electronically Signed By: ROSY Baptiste CRNA  October 8, 2024  11:08 AM

## 2024-10-08 NOTE — ANESTHESIA POSTPROCEDURE EVALUATION
Patient: Bal Junior    Procedure: Procedure(s):  ESOPHAGOGASTRODUODENOSCOPY, WITH FINE NEEDLE ASPIRATION BIOPSY, WITH ENDOSCOPIC ULTRASOUND GUIDANCE       Anesthesia Type:  MAC    Note:  Disposition: Outpatient   Postop Pain Control: Uneventful            Sign Out: Well controlled pain   PONV: No   Neuro/Psych: Uneventful            Sign Out: Acceptable/Baseline neuro status   Airway/Respiratory: Uneventful            Sign Out: Acceptable/Baseline resp. status   CV/Hemodynamics: Uneventful            Sign Out: Acceptable CV status; No obvious hypovolemia; No obvious fluid overload   Other NRE: NONE   DID A NON-ROUTINE EVENT OCCUR? No           Last vitals:  Vitals Value Taken Time   /85 10/08/24 1140   Temp     Pulse 60 10/08/24 1111   Resp     SpO2 100 % 10/08/24 1140   Vitals shown include unfiled device data.    Electronically Signed By: Dagmar Ho MD  October 8, 2024  11:46 AM

## 2024-10-08 NOTE — ANESTHESIA PREPROCEDURE EVALUATION
Anesthesia Pre-Procedure Evaluation    Patient: Bal Junior   MRN: [id number] : 1955        Procedure : Procedure(s):  Endoscopic ultrasound upper gastrointestinal tract (GI)          Past Medical History:   Diagnosis Date    Hypertension     Neuritis     Peripheral neuropathy     Personal history of other medical treatment     postgastrectomy dumping syndrome    Stomach problems Noted earlier      Past Surgical History:   Procedure Laterality Date    ABDOMEN SURGERY  ?    APPENDECTOMY  1964?    COLONOSCOPY  , 2016    ESOPHAGEAL BALLOON PROVOCATION STUDY N/A 2024    Procedure: Esophageal Balloon Provocation Study;  Surgeon: Carson Michel DO;  Location:  GI    ESOPHAGOSCOPY, GASTROSCOPY, DUODENOSCOPY (EGD), COMBINED N/A 2024    Procedure: ESOPHAGOGASTRODUODENOSCOPY, WITH BIOPSY;  Surgeon: Carson Michel DO;  Location:  GI    EYE SURGERY  199x    laser for retinal tears    GASTRIC FUNDOPLICATION      HERNIA REPAIR  ?    LAPAROSCOPIC TAKEDOWN NISSEN FUNDOPLICATION N/A 2020    Procedure: TAKE-DOWN, FUNDOPLICATION, REDO NISSEN, LAPAROSCOPIC, gastrostomy feeding tube placement;  Surgeon: Katlyn Tboar MD;  Location: [facility] ESOPHAGOGASTRODUODENOSCOPY TRANSORAL DIAGNOSTIC N/A 2019    Procedure: UPPER GASTROINTESTINAL ENDOSCOPY;  Surgeon: Dino Ward MD;  Location: Health system;  Service: General    SOFT TISSUE SURGERY  ?    MCL l. thumb      Allergies   Allergen Reactions    Hornets     Wasp Venom Protein Hives    Bee Venom Swelling      Social History     Tobacco Use    Smoking status: Never     Passive exposure: Past    Smokeless tobacco: Never   Substance Use Topics    Alcohol use: Yes     Alcohol/week: 3.0 - 6.0 standard drinks of alcohol     Types: 1 - 2 Glasses of wine, 1 - 2 Cans of beer, 1 - 2 Shots of liquor per week     Comment: glass of wine with dinner, can of beer in evening or 1 whiskey  or bourbon      Wt Readings from Last 1 Encounters:  "  10/03/24 83.8 kg (184 lb 11.2 oz)        Anesthesia Evaluation   Pt has had prior anesthetic. Type: General and MAC.    No history of anesthetic complications       ROS/MED HX  ENT/Pulmonary:     (+)     JESSICA risk factors,  hypertension,                                 Neurologic:       Cardiovascular:     (+)  hypertension- -   -  - -                                      METS/Exercise Tolerance:     Hematologic:       Musculoskeletal:       GI/Hepatic: Comment: H/o Nissen     (+) GERD,     hiatal hernia,              Renal/Genitourinary:       Endo:       Psychiatric/Substance Use:       Infectious Disease:       Malignancy:       Other:            Physical Exam    Airway        Mallampati: II   TM distance: > 3 FB   Neck ROM: full   Mouth opening: > 3 cm    Respiratory Devices and Support         Dental       (+) Modest Abnormalities - crowns, retainers, 1 or 2 missing teeth      Cardiovascular   cardiovascular exam normal          Pulmonary   pulmonary exam normal                OUTSIDE LABS:  CBC:   Lab Results   Component Value Date    WBC 6.7 07/23/2024    WBC 6.9 01/18/2023    HGB 15.2 07/23/2024    HGB 15.1 01/18/2023    HCT 44.4 07/23/2024    HCT 44.9 01/18/2023     07/23/2024     01/18/2023     BMP:   Lab Results   Component Value Date     07/23/2024     09/14/2023    POTASSIUM 4.9 07/23/2024    POTASSIUM 4.5 09/14/2023    CHLORIDE 107 07/23/2024    CHLORIDE 105 09/14/2023    CO2 24 07/23/2024    CO2 24 09/14/2023    BUN 15.4 07/23/2024    BUN 13.6 09/14/2023    CR 1.01 07/23/2024    CR 1.00 09/14/2023     (H) 10/01/2024    GLC 85 07/23/2024     COAGS: No results found for: \"PTT\", \"INR\", \"FIBR\"  POC:   Lab Results   Component Value Date     (H) 10/28/2020     HEPATIC:   Lab Results   Component Value Date    ALBUMIN 4.1 07/23/2024    PROTTOTAL 6.7 07/23/2024    ALT <5 07/23/2024    AST 14 07/23/2024    ALKPHOS 72 07/23/2024    BILITOTAL 0.5 07/23/2024     OTHER: " "  Lab Results   Component Value Date    PH 7.35 09/25/2020    LACT 2.8 (H) 09/26/2020    A1C 6.0 (H) 01/17/2024    AUGUSTIN 9.1 07/23/2024    MAG 1.9 09/27/2020    TSH 1.16 07/23/2024       Anesthesia Plan    ASA Status:  2    NPO Status:  NPO Appropriate    Anesthesia Type: MAC.     - Reason for MAC: straight local not clinically adequate   Induction: Intravenous.   Maintenance: TIVA.        Consents    Anesthesia Plan(s) and associated risks, benefits, and realistic alternatives discussed. Questions answered and patient/representative(s) expressed understanding.     - Discussed: Risks, Benefits and Alternatives for the PROCEDURE were discussed     - Discussed with:             Postoperative Care    Pain management: Multi-modal analgesia.   PONV prophylaxis: Ondansetron (or other 5HT-3), Background Propofol Infusion     Comments:               Dagmar Ho MD    I have reviewed the pertinent notes and labs in the chart from the past 30 days and (re)examined the patient.  Any updates or changes from those notes are reflected in this note.              # Hypertension: Noted on problem list         # Overweight: Estimated body mass index is 27.47 kg/m  as calculated from the following:    Height as of 10/3/24: 1.746 m (5' 8.75\").    Weight as of 10/3/24: 83.8 kg (184 lb 11.2 oz).             "

## 2024-10-08 NOTE — H&P
See note 10/3/24 by Dr. Tobar.    EUS for staging of GE junction adenoca.  PET without additional foci of disease.  Minimal dysphagia.    Mallampati 3.  ASA 2

## 2024-10-08 NOTE — DISCHARGE INSTRUCTIONS
Discharge Instructions after Endoscopic Ultrasound    Activity  You were given medicine for pain. You may be dizzy or sleepy.    For 24 hours:  Do not drive or use heavy equipment.  Do not make important decisions.  Do not drink any alcohol.    Diet  Wait one hour before eating or drinking. Start with sips of water. When your gag reflex has returned you  may go back to your usual diet, medicines and light exercise.    Discomfort  Some bloating is normal. You may have large burps or pass air.  You may have a sore throat for 2 to 3 days. It may help to:  Avoid hot liquids for 24 hours.  Use sore throat lozenges.  Gargle as needed with salt water up to 4 times a day. Mix 1 cup of warm water with 1 teaspoon of salt. Do not swallow.  You may take Tylenol (acetaminophen) for pain unless your doctor has told you not to.    Follow-up  ___ We took small tissue or fluid samples to study. We will call you with the results in about 10 working days.    When to call    Call right away if you have:  Severe throat pain or trouble swallowing  Black stools (tar-like looking bowel movement)  Fever above 100.6 F (37.5 C)  Unusual pain in belly or chest not relieved by belching or passing air.    If you vomit blood or have severe pain, go to an emergency room.    If you have questions, call    Monday to Friday, 8 a.m. to 4:30 p.m.:   Central Scheduling Department: [phone]  After hours: Hospital: [phone] (Ask for the GI fellow on call)

## 2024-10-08 NOTE — ANESTHESIA CARE TRANSFER NOTE
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    Anesthesia Type:   MAC     Note:  Anesthesia Care Transfer Notewriter  Vitals:  Vitals Value Taken Time   /83 10/08/24 0845   Temp     Pulse     Resp     SpO2 100 % 10/08/24 0854   Vitals shown include unfiled device data.    Electronically Signed By: Yvette Guzmán  October 8, 2024  8:55 AM

## 2024-10-09 LAB
PATH REPORT.COMMENTS IMP SPEC: NORMAL
PATH REPORT.COMMENTS IMP SPEC: NORMAL
PATH REPORT.FINAL DX SPEC: NORMAL
PATH REPORT.GROSS SPEC: NORMAL
PATH REPORT.MICROSCOPIC SPEC OTHER STN: NORMAL
PATH REPORT.RELEVANT HX SPEC: NORMAL

## 2024-10-11 NOTE — PROGRESS NOTES
Virtual Visit Details    Type of service:  Video Visit     Originating Location (pt. Location): Home    Distant Location (provider location):  On-site  Platform used for Video Visit: [facility] ONCOLOGY NEW PATIENT VISIT - INITIAL CONSULTATION NOTE - Dr. 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 Katlyn Tobar, Thoracic Surgery service, [facility]      History of Present Illness/Cancer Diagnosis and Evaluation to date:  Mr. Junior is a 69 year old male who joins me today for initial consultation regarding a new diagnosis of gastroesophageal junction adenocarcinoma.  He joined me for virtual video visit from his daughter's home in [place], [place].  His wife Jess joins us for most of the visit as well after walking the dog.    His past medical history is most prominent for a history of severe acid reflux, necessitating this and fundoplication around 2011/12, also for issues relating to postprandial hypoglycemia and neuritis/neuropathy, of unclear etiology.  He states that the neuropathic symptoms were attributed by his primary care doctor, and others to long-term use of omeprazole in the 2000s, prior to the Nissen fundoplication surgery.    He has been followed routinely with upper endoscopies over the last decade, many of which have been done within the [facility].  The ones done over the last seven years include ones performed on August 30, 2017, December 4, 2018, May 9, 2019, and most recently as September 24, 2024.    He had a CT scan in January 2024 with results as follows:     January 26, 2024--CT a/p--IMPRESSION:   1.  Superior aspect of the Nissen fundal wrap appears about 2 cm above  the diaphragm possibly  "representing slipped Nissen.  2.  Hepatic steatosis.  3.  Nonobstructing renal calculi. No hydronephrosis.    He was experiencing dysphagia and slipped Nissen fundoplication and related issues, some of which were said to be related to worsening occasional reflux, and chest pressure associate with early satiety.  On April 30, 2024, he underwent barium swallow, with results as follows:  IMPRESSION:  1. Timed barium esophagram results as detailed above with retained  esophageal contrast lasting up to 5 minutes. There is patent flow of  contrast.   2. Expected passage of barium tablet into the stomach.      He had continued evaluation and during the summer of 2024, including on July 23, 2024. Part of the assessment included impression that the \"Manometry was consistent with inconclusive manometric EGJOO. Esophagram showed routine contrast lasting up to 5 minutes but patent flow of contrast and tablet.\"      Further evaluation included upper endoscopy, that revealed a nodularity at the gastroesophageal junction, concerning for malignancy, as follows:  September 24, 2024--EGD--Evidence of a Nissen fundoplication was found at the gastroesophageal   junction. The wrap appeared loose. This was traversed.   Localized moderate mucosal changes characterized by erythema and   nodularity were found at the gastroesophageal junction. Biopsies were   taken with a cold forceps for histology.   Specimen:    Gastric Esophageal Junction, Gastroesophageal biopsy                                      Final Diagnosis   A. GASTROESOPHAGEAL JUNCTION, BIOPSY:  - Adenocarcinoma, poorly-differentiated, with signet ring cell features     GASTRIC HER2 BIOMARKER TESTS   Test(s) Performed     HER2 by IHC     Results         With this new finding of malignancy, a staging PET/CT was performed, and no distant metastasis was detected:  October 1, 2024--PET/CT --                                                                 IMPRESSION Findings " suspicious for the gastroesophageal junction adenocarcinoma without convincing evidence of tona or metastatic disease.      He further underwent a staging EUS, that characterized the tumor as staged as T2N0 per EUS criteria:      Oct 8, 2024 EUS--    Impression:            - Malignant esophageal tumor was found at the                          gastroesophageal junction. Siewert 2. Measurements as                          above.                          This is classified T2 N0 (cytology pending) M0 by EUS                          criteria.                          Three small lymph nodes seen as described above. Two                          were sampled (station 8R) and are preliminarily                          benign. The third was identical in size and appearance                          and not amenable to sampling due to the proximity to                          the heart and proximal margin of the mass.                          - Z-line, 37 cm from the incisors.                          - A Nissen fundoplication was found. This has                          partially slipped above the diaphragm.   Specimen A                 Interpretation:                  Negative for malignancy  Polymorphous population of lymphocytes present                 Adequacy:                 Satisfactory for evaluation        He met with Dr. Katlyn Tobar from our thoracic surgery service here at [facility] on October 3, 2024, thus he presents here today to discuss the medical oncology implications of his new diagnosis of cancer.  Mainly his questions focus on prognosis, logistics of care, including potential chemotherapy and/or radiation, and any potential use for immunotherapy in his case.        Review Of Systems:  Comprehensive (14-point) ROS reviewed. Pertinent symptoms reviewed above per HPI.      Past medical, social, surgical, and family histories reviewed, confirmed, and pertinent details discussed with patient and family;  additional sources include the medical record.      Past Medical History:   Diagnosis Date    Hypertension     Neuritis     Peripheral neuropathy     Personal history of other medical treatment     postgastrectomy dumping syndrome    Stomach problems Noted earlier      Past Surgical History:   Procedure Laterality Date    ABDOMEN SURGERY  2012?    APPENDECTOMY  1964?    COLONOSCOPY  2006, 2016    ESOPHAGEAL BALLOON PROVOCATION STUDY N/A 9/24/2024    Procedure: Esophageal Balloon Provocation Study;  Surgeon: Carson Michel DO;  Location:  GI    ESOPHAGOSCOPY, GASTROSCOPY, DUODENOSCOPY (EGD), COMBINED N/A 9/24/2024    Procedure: ESOPHAGOGASTRODUODENOSCOPY, WITH BIOPSY;  Surgeon: Carson Michel DO;  Location:  GI    ESOPHAGOSCOPY, GASTROSCOPY, DUODENOSCOPY (EGD), COMBINED N/A 10/8/2024    Procedure: ESOPHAGOGASTRODUODENOSCOPY, WITH FINE NEEDLE ASPIRATION BIOPSY, WITH ENDOSCOPIC ULTRASOUND GUIDANCE;  Surgeon: Lance Lopez MD;  Location:  GI    EYE SURGERY  199x    laser for retinal tears    GASTRIC FUNDOPLICATION      HERNIA REPAIR  1961?    LAPAROSCOPIC TAKEDOWN NISSEN FUNDOPLICATION N/A 9/25/2020    Procedure: TAKE-DOWN, FUNDOPLICATION, REDO NISSEN, LAPAROSCOPIC, gastrostomy feeding tube placement;  Surgeon: Katlyn Tobar MD;  Location: [facility] ESOPHAGOGASTRODUODENOSCOPY TRANSORAL DIAGNOSTIC N/A 5/9/2019    Procedure: UPPER GASTROINTESTINAL ENDOSCOPY;  Surgeon: Dino Ward MD;  Location: [facility];  Service: General    SOFT TISSUE SURGERY  2009?    MCL l. thumb          SOCIAL HISTORY: Lives in [place], [place] with his wife Jess. They've been  for 15 years.  He has three daughters, ages 38, 42, 50.  He is retired from working in computer security.  He denies any history of oral tobacco or cigarette use in his lifetime.  Alcohol use that he reports lifetime is five drinks per week since age of 17.  Info from other notes:    Alcohol use: Yes        Alcohol/week: 3.0 - 6.0  standard drinks of alcohol       Types: 1 - 2 Glasses of wine, 1 - 2 Cans of beer, 1 - 2 Shots of liquor per week       Comment: glass of win   He is an avid skier, and enjoy skiing a winter, and sometimes the peripheral neuropathycan interfere with that aspect of quality of life.    FAMILY HISTORY OF CANCER: his mother was diagnosed with uterine cancer in her late 30s; this diagnosis took place in the 1960s, and she  in  of unrelated causes.  He has two sisters, in good health, and his father had a superficial form of skin cancer, otherwise no other family history of cancer.      Allergies:  Allergies as of 10/14/2024 - Reviewed 10/08/2024   Allergen Reaction Noted    Hornets  2020    Wasp venom protein Hives 2016    Bee venom Swelling 2019       Current Medications:  Current Outpatient Medications   Medication Sig Dispense Refill    atenolol (TENORMIN) 50 MG tablet Take 50 mg by mouth daily       blood glucose (NO BRAND SPECIFIED) test strip Use to test blood sugar 1 times daily or as directed. To accompany: Blood Glucose Monitor Brands: per insurance. 100 strip 6    blood glucose monitoring (NO BRAND SPECIFIED) meter device kit Use to test blood sugar 1 times daily or as directed. Preferred blood glucose meter OR supplies to accompany: Blood Glucose Monitor Brands: per insurance. 1 kit 0    Continuous Glucose Sensor (FREESTYLE SIMIN 2 SENSOR) MISC Use 1 sensor every 14 days. Use to read blood sugars per 's instructions. 6 each 1    cyanocobalamin (VITAMIN B-12) 500 MCG tablet Take 500 mcg by mouth daily.      EPINEPHrine (ANY BX GENERIC EQUIV) 0.3 MG/0.3ML injection 2-pack 0.3 mg      gabapentin (NEURONTIN) 300 MG capsule Take 1 capsule (300 mg) by mouth 3 times daily. 270 capsule 3    metFORMIN (GLUCOPHAGE XR) 500 MG 24 hr tablet Take 1 tablet (500 mg) by mouth daily (with dinner) 90 tablet 3    nortriptyline (PAMELOR) 25 MG capsule Take 25 mg by mouth daily       thin (NO  BRAND SPECIFIED) lancets Use with lanceting device. To accompany: Blood Glucose Monitor Brands: per insurance. 100 each 6        Physical Exam:  In-person physical exam could not be performed today in context of a Virtual Visit. Observed physical assessments made today by visualizing the patient by video link:  Vitals - Patient Reported  Pain Score: No Pain (0)             General/Constitutional: Generally appears well, not acutely ill.  HEENT: no scleral icterus, not jaundiced.  Respiratory: no labored breathing.  Musculoskeletal: appears to have full range of motion and adequate physical strength.  Skin: no jaundice, discoloration or other notable lesions.  Neurological: no evidence of tremors.  Psychiatric: no evident anxiety; fully alert and oriented with fluent speech.      The rest of a comprehensive physical examination is deferred due to nature of video visits.     Laboratory/Imaging Studies  No visits with results within 2 Week(s) from this visit.   Latest known visit with results is:   Lab on 07/30/2024   Component Date Value Ref Range Status    Cortisol 07/30/2024 11.4    ug/dL Final    6 months and older:  6 to 10 AM Cortisol Reference Range:  4-22 ug/dL   4 to 8 PM Cortisol Reference Range:  3-17 ug/dL    Adrenal Corticotropin 07/30/2024 41  <47 pg/mL Final          RADIOLOGY:  Prior to and including the day of this visit, I personally reviewed the recent imaging scans. I released the pertinent recent imaging results to [facility] in advance of this visit, and reviewed the summary verbatim and in lay language during today's call.     ASSESSMENT/PLAN:  Mr. Bal Junior is a 61-year-old gentleman from [place], [place] with a long-standing history over ~two decades of severe acid reflux, necessitating Nissen Fundoplication procedure in 2011 or 2012; in recent years he has experienced a slippage of the fundoplication anatomy. Throughout 2024, he has had evaluation for worsening symptoms including dysphagia and  chest pressure, and as of September/October 20, 24, he has a new diagnosis of gastroesophageal adenocarcinoma staged by endosonographic criteria as a T2N0 form of GE junction cancer.  Histopathologically, it is poorly differentiated with signet ring cell features.  Per the pet CT, there is no evidence of distant metastasis.  Per the EUS, the tumor is classed as Siewert 2.    I reviewed basic principles of the natural course, biology, diagnosis, and treatment of gastroesophageal adenocarcinomas.  I described results of the biopsy and EGD procedure in lay terms, and also reviewed the reports of the staging PET/CT scan that I had ordered and that was completed prior to today's visit.     His particular tumor is Siewert class 2, and we reviewed the meaning of that classification. He has already met with Dr. Tobar, one of our expert thoracic surgeons who is assessing his case and will present his case at our multidisciplinary thoracic tumor board tomorrow, Tuesday, October 15, 2024 to discuss his case in more depth.  In particular, I deferred to her in terms of surgical implications of having the prior Nissen fundoplication performed, and also with recent slippage reported in recent years, and how that would impact any potential surgery.  I corresponded with Dr. Tobar last week regarding the tumor staging, and we discussed potential treatment options and approaches to his case, and we will have continue discussion following the overall tumor board discussion.    In terms of treatment approach and strategy, I reviewed the general standard of care historically over the last one or two decades has comprised neoadjuvant or perioperative treatments, with chemoradiation, per the CROSS regimen, or perioperative systemic chemotherapy, respectively.  In that study (CROSS), the addition of neoadjuvant chemoradiation using carboplatin and paclitaxel for patients with T2-3/N0/M0 operable esophageal adenocarcinoma resulted in median  overall survival of 49 months, compared with 26 months OS for surgery alone.  The NCCN guidelines also endorses (category 1) the use of this regimen as another potential approach to patients with these resectable cancers: weekly paclitaxel 50mg/m2 and carboplatin AUC2 x 5 weeks (Ace LONGO et al. J Clin Oncol 28:15s, 2010).    With the Yazdanism of FLOT, this three drug regimen, which while difficult to tolerate for many patients, has been shown to be advantageous in terms of survival when used perioperatively, and is administered intravenously every two weeks for four cycles, then with opportunity for surgery, followed again with another four cycles given every two weeks in the postoperative setting as well.  Assuming that he may be a candidate for surgery, and considering the recent presentation at the 2024 ASCO annual meeting in June of the results of the  ESOPEC trial that reported higher rates of pathologic complete response as well as significantly higher overall survival rates using perioperative FLOT as compared to the neoadjuvant concurrent chemoradiation CROSS regimen (overall survival 66 months vs 37 months, respectively), then I would recommend perioperative FLOT systemic chemotherapy be used rather than incorporating radiation. I have noted that, per the abstract for the ESOPEC trial, the investigators did include patient's whose tumors were clinically staged as T2 and N0, and we await final publication of that study.    I reviewed all the above,and in addition to his questions about logistics of care, he asked about potential use of immunotherapy.  I will request compensation or profiling to assess genomic features including for dMMR/MSI-H; I stated that most often immunotherapeutic treatments are largely reserved for patients with stage IV/metastatic forms of this cancer, although in the minority of cases that also harbor dMMR/MSI-H, which is generally a rare exception, there is potential use for  deploying immunotherapy in the upfront neoadjuvant intent setting.  We will await those results in assessment as well.    Thank you, Dr. Tobar, for the opportunity to meet this kind gentleman and to be of service.        VIRTUAL VISIT - DETAILS:    I have reviewed the note as documented above. This accurately captures the substance of my conversation with the patient.    Date of call: October 11, 2024   Start of call: 7:48 AM  End of call: 8:20 AM    Provider location: [facility] (academic office)  Patient location: Home      Mode of Video Visit: Joseph           I spent 32 minutes in consultation, including history and discussion with the patient including review of recent lab values and radiologic imaging results.  An additional 30 minutes was spent on the day of the visit, including reviewing pertinent medical notes and documentation from other physicians and APPs who have evaluated and treated this patient, pertinent lab values, pathology and imaging results, personal review of radiologic images, discussing the case with referring providers and/or nurse coordinator team, post-visit orders, and all post-visit documentation.    Jonah Mc MD PhD       The above was transcribed using Dragon voice recognition software that is now required for use by [facility] and [facility] in place of transcription of dictated notes.  This change may unfortunately lead into an increase in errors in the EMR. While I reviewed and edited the transcription, I may miss errors.  Please let me know of any of serious errors and I will addend the note.            ADDENDUM 10/28/24    Tumor genomic profilng reported by [facility] on 10-26-24  Positive for Claudin 18 expression 3+, negative for HER2.  Microsatellite stable (GUILLAUME)  See scanned report for other details.  Likely pathogenic alterations noted in TP53, PRKCA, FANCD2      JONAH MC MD PHD

## 2024-10-14 ENCOUNTER — PRE VISIT (OUTPATIENT)
Dept: ONCOLOGY | Facility: CLINIC | Age: 69
End: 2024-10-14
Payer: COMMERCIAL

## 2024-10-14 ENCOUNTER — VIRTUAL VISIT (OUTPATIENT)
Dept: ONCOLOGY | Facility: CLINIC | Age: 69
End: 2024-10-14
Attending: CLINICAL NURSE SPECIALIST
Payer: COMMERCIAL

## 2024-10-14 ENCOUNTER — PATIENT OUTREACH (OUTPATIENT)
Dept: ONCOLOGY | Facility: CLINIC | Age: 69
End: 2024-10-14
Payer: COMMERCIAL

## 2024-10-14 VITALS — WEIGHT: 179 LBS | HEIGHT: 69 IN | BODY MASS INDEX: 26.51 KG/M2

## 2024-10-14 DIAGNOSIS — C16.0 ADENOCARCINOMA OF GASTROESOPHAGEAL JUNCTION (H): ICD-10-CM

## 2024-10-14 PROCEDURE — 99205 OFFICE O/P NEW HI 60 MIN: CPT | Mod: 95 | Performed by: INTERNAL MEDICINE

## 2024-10-14 ASSESSMENT — PAIN SCALES - GENERAL: PAINLEVEL: NO PAIN (0)

## 2024-10-14 NOTE — LETTER
10/14/2024      Bal Junior  [address] [facility] [address]      Dear Colleague,    Thank you for referring your patient, Bal Junior, to the [facility]. Please see a copy of my visit note below.    Virtual Visit Details    Type of service:  Video Visit     Originating Location (pt. Location): Home    Distant Location (provider location):  On-site  Platform used for Video Visit: [facility] ONCOLOGY NEW PATIENT VISIT - INITIAL CONSULTATION NOTE - Dr. 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 Katlyn Tobar, Thoracic Surgery service, [facility]      History of Present Illness/Cancer Diagnosis and Evaluation to date:  Mr. Junior is a 69 year old male who joins me today for initial consultation regarding a new diagnosis of gastroesophageal junction adenocarcinoma.  He joined me for virtual video visit from his daughter's home in [place], [place].  His wife Jess joins us for most of the visit as well after walking the dog.    His past medical history is most prominent for a history of severe acid reflux, necessitating this and fundoplication around 2011/12, also for issues relating to postprandial hypoglycemia and neuritis/neuropathy, of unclear etiology.  He states that the neuropathic symptoms were attributed by his primary care doctor, and others to long-term use of omeprazole in the 2000s, prior to the Nissen fundoplication surgery.    He has been followed routinely with upper endoscopies over the last decade, many of which have been done within the [facility] system.  The ones done over the last seven years include ones performed on August 30, 2017, December 4, 2018, May 9, 2019, and most  "recently as September 24, 2024.    He had a CT scan in January 2024 with results as follows:     January 26, 2024--CT a/p--IMPRESSION:   1.  Superior aspect of the Nissen fundal wrap appears about 2 cm above  the diaphragm possibly representing slipped Nissen.  2.  Hepatic steatosis.  3.  Nonobstructing renal calculi. No hydronephrosis.    He was experiencing dysphagia and slipped Nissen fundoplication and related issues, some of which were said to be related to worsening occasional reflux, and chest pressure associate with early satiety.  On April 30, 2024, he underwent barium swallow, with results as follows:  IMPRESSION:  1. Timed barium esophagram results as detailed above with retained  esophageal contrast lasting up to 5 minutes. There is patent flow of  contrast.   2. Expected passage of barium tablet into the stomach.      He had continued evaluation and during the summer of 2024, including on July 23, 2024. Part of the assessment included impression that the \"Manometry was consistent with inconclusive manometric EGJOO. Esophagram showed routine contrast lasting up to 5 minutes but patent flow of contrast and tablet.\"      Further evaluation included upper endoscopy, that revealed a nodularity at the gastroesophageal junction, concerning for malignancy, as follows:  September 24, 2024--EGD--Evidence of a Nissen fundoplication was found at the gastroesophageal   junction. The wrap appeared loose. This was traversed.   Localized moderate mucosal changes characterized by erythema and   nodularity were found at the gastroesophageal junction. Biopsies were   taken with a cold forceps for histology.   Specimen:    Gastric Esophageal Junction, Gastroesophageal biopsy                                      Final Diagnosis   A. GASTROESOPHAGEAL JUNCTION, BIOPSY:  - Adenocarcinoma, poorly-differentiated, with signet ring cell features     GASTRIC HER2 BIOMARKER TESTS   Test(s) Performed     HER2 by IHC     Results   "       With this new finding of malignancy, a staging PET/CT was performed, and no distant metastasis was detected:  October 1, 2024--PET/CT --                                                                 IMPRESSION Findings suspicious for the gastroesophageal junction adenocarcinoma without convincing evidence of tona or metastatic disease.      He further underwent a staging EUS, that characterized the tumor as staged as T2N0 per EUS criteria:      Oct 8, 2024 EUS--    Impression:            - Malignant esophageal tumor was found at the                          gastroesophageal junction. Siewert 2. Measurements as                          above.                          This is classified T2 N0 (cytology pending) M0 by EUS                          criteria.                          Three small lymph nodes seen as described above. Two                          were sampled (station 8R) and are preliminarily                          benign. The third was identical in size and appearance                          and not amenable to sampling due to the proximity to                          the heart and proximal margin of the mass.                          - Z-line, 37 cm from the incisors.                          - A Nissen fundoplication was found. This has                          partially slipped above the diaphragm.   Specimen A                 Interpretation:                  Negative for malignancy  Polymorphous population of lymphocytes present                 Adequacy:                 Satisfactory for evaluation        He met with Dr. Katlyn Tobar from our thoracic surgery service here at [facility] on October 3, 2024, thus he presents here today to discuss the medical oncology implications of his new diagnosis of cancer.  Mainly his questions focus on prognosis, logistics of care, including potential chemotherapy and/or radiation, and any potential use for immunotherapy in his case.        Review Of  Systems:  Comprehensive (14-point) ROS reviewed. Pertinent symptoms reviewed above per HPI.      Past medical, social, surgical, and family histories reviewed, confirmed, and pertinent details discussed with patient and family; additional sources include the medical record.      Past Medical History:   Diagnosis Date     Hypertension      Neuritis      Peripheral neuropathy      Personal history of other medical treatment     postgastrectomy dumping syndrome     Stomach problems Noted earlier      Past Surgical History:   Procedure Laterality Date     ABDOMEN SURGERY  2012?     APPENDECTOMY  1964?     COLONOSCOPY  2006, 2016     ESOPHAGEAL BALLOON PROVOCATION STUDY N/A 9/24/2024    Procedure: Esophageal Balloon Provocation Study;  Surgeon: Carson Michel DO;  Location:  GI     ESOPHAGOSCOPY, GASTROSCOPY, DUODENOSCOPY (EGD), COMBINED N/A 9/24/2024    Procedure: ESOPHAGOGASTRODUODENOSCOPY, WITH BIOPSY;  Surgeon: Carson Michel DO;  Location:  GI     ESOPHAGOSCOPY, GASTROSCOPY, DUODENOSCOPY (EGD), COMBINED N/A 10/8/2024    Procedure: ESOPHAGOGASTRODUODENOSCOPY, WITH FINE NEEDLE ASPIRATION BIOPSY, WITH ENDOSCOPIC ULTRASOUND GUIDANCE;  Surgeon: Lance Lopez MD;  Location:  GI     EYE SURGERY  199x    laser for retinal tears     GASTRIC FUNDOPLICATION       HERNIA REPAIR  1961?     LAPAROSCOPIC TAKEDOWN NISSEN FUNDOPLICATION N/A 9/25/2020    Procedure: TAKE-DOWN, FUNDOPLICATION, REDO NISSEN, LAPAROSCOPIC, gastrostomy feeding tube placement;  Surgeon: Katlyn Tobar MD;  Location: [facility] ESOPHAGOGASTRODUODENOSCOPY TRANSORAL DIAGNOSTIC N/A 5/9/2019    Procedure: UPPER GASTROINTESTINAL ENDOSCOPY;  Surgeon: Dino Ward MD;  [facility];  Service: General     SOFT TISSUE SURGERY  2009?    MCL l. thumb          SOCIAL HISTORY: Lives in [place], [place] with his wife Jess. They've been  for 15 years.  He has three daughters, ages 38, 42, 50.  He is retired from working in  computer security.  He denies any history of oral tobacco or cigarette use in his lifetime.  Alcohol use that he reports lifetime is five drinks per week since age of 17.  Info from other notes:    Alcohol use: Yes        Alcohol/week: 3.0 - 6.0 standard drinks of alcohol       Types: 1 - 2 Glasses of wine, 1 - 2 Cans of beer, 1 - 2 Shots of liquor per week       Comment: glass of win   He is an avid skier, and enjoy skiing a winter, and sometimes the peripheral neuropathycan interfere with that aspect of quality of life.    FAMILY HISTORY OF CANCER: his mother was diagnosed with uterine cancer in her late 30s; this diagnosis took place in the 1960s, and she  in  of unrelated causes.  He has two sisters, in good health, and his father had a superficial form of skin cancer, otherwise no other family history of cancer.      Allergies:  Allergies as of 10/14/2024 - Reviewed 10/08/2024   Allergen Reaction Noted     Hornets  2020     Wasp venom protein Hives 2016     Bee venom Swelling 2019       Current Medications:  Current Outpatient Medications   Medication Sig Dispense Refill     atenolol (TENORMIN) 50 MG tablet Take 50 mg by mouth daily        blood glucose (NO BRAND SPECIFIED) test strip Use to test blood sugar 1 times daily or as directed. To accompany: Blood Glucose Monitor Brands: per insurance. 100 strip 6     blood glucose monitoring (NO BRAND SPECIFIED) meter device kit Use to test blood sugar 1 times daily or as directed. Preferred blood glucose meter OR supplies to accompany: Blood Glucose Monitor Brands: per insurance. 1 kit 0     Continuous Glucose Sensor (FREESTYLE SIMIN 2 SENSOR) MISC Use 1 sensor every 14 days. Use to read blood sugars per 's instructions. 6 each 1     cyanocobalamin (VITAMIN B-12) 500 MCG tablet Take 500 mcg by mouth daily.       EPINEPHrine (ANY BX GENERIC EQUIV) 0.3 MG/0.3ML injection 2-pack 0.3 mg       gabapentin (NEURONTIN) 300 MG capsule  Take 1 capsule (300 mg) by mouth 3 times daily. 270 capsule 3     metFORMIN (GLUCOPHAGE XR) 500 MG 24 hr tablet Take 1 tablet (500 mg) by mouth daily (with dinner) 90 tablet 3     nortriptyline (PAMELOR) 25 MG capsule Take 25 mg by mouth daily        thin (NO BRAND SPECIFIED) lancets Use with lanceting device. To accompany: Blood Glucose Monitor Brands: per insurance. 100 each 6        Physical Exam:  In-person physical exam could not be performed today in context of a Virtual Visit. Observed physical assessments made today by visualizing the patient by video link:  Vitals - Patient Reported  Pain Score: No Pain (0)             General/Constitutional: Generally appears well, not acutely ill.  HEENT: no scleral icterus, not jaundiced.  Respiratory: no labored breathing.  Musculoskeletal: appears to have full range of motion and adequate physical strength.  Skin: no jaundice, discoloration or other notable lesions.  Neurological: no evidence of tremors.  Psychiatric: no evident anxiety; fully alert and oriented with fluent speech.      The rest of a comprehensive physical examination is deferred due to nature of video visits.     Laboratory/Imaging Studies  No visits with results within 2 Week(s) from this visit.   Latest known visit with results is:   Lab on 07/30/2024   Component Date Value Ref Range Status     Cortisol 07/30/2024 11.4    ug/dL Final    6 months and older:  6 to 10 AM Cortisol Reference Range:  4-22 ug/dL   4 to 8 PM Cortisol Reference Range:  3-17 ug/dL     Adrenal Corticotropin 07/30/2024 41  <47 pg/mL Final          RADIOLOGY:  Prior to and including the day of this visit, I personally reviewed the recent imaging scans. I released the pertinent recent imaging results to [employer] in advance of this visit, and reviewed the summary verbatim and in lay language during today's call.     ASSESSMENT/PLAN:  Mr. Bal Junior is a 61-year-old gentleman from [place], [place] with a long-standing history over  ~two decades of severe acid reflux, necessitating Nissen Fundoplication procedure in 2011 or 2012; in recent years he has experienced a slippage of the fundoplication anatomy. Throughout 2024, he has had evaluation for worsening symptoms including dysphagia and chest pressure, and as of September/October 20, 24, he has a new diagnosis of gastroesophageal adenocarcinoma staged by endosonographic criteria as a T2N0 form of GE junction cancer.  Histopathologically, it is poorly differentiated with signet ring cell features.  Per the pet CT, there is no evidence of distant metastasis.  Per the EUS, the tumor is classed as Siewert 2.    I reviewed basic principles of the natural course, biology, diagnosis, and treatment of gastroesophageal adenocarcinomas.  I described results of the biopsy and EGD procedure in lay terms, and also reviewed the reports of the staging PET/CT scan that I had ordered and that was completed prior to today's visit.     His particular tumor is Siewert class 2, and we reviewed the meaning of that classification. He has already met with Dr. Tobar, one of our expert thoracic surgeons who is assessing his case and will present his case at our multidisciplinary thoracic tumor board tomorrow, Tuesday, October 15, 2024 to discuss his case in more depth.  In particular, I deferred to her in terms of surgical implications of having the prior Nissen fundoplication performed, and also with recent slippage reported in recent years, and how that would impact any potential surgery.  I corresponded with Dr. Tobar last week regarding the tumor staging, and we discussed potential treatment options and approaches to his case, and we will have continue discussion following the overall tumor board discussion.    In terms of treatment approach and strategy, I reviewed the general standard of care historically over the last one or two decades has comprised neoadjuvant or perioperative treatments, with chemoradiation,  per the CROSS regimen, or perioperative systemic chemotherapy, respectively.  In that study (CROSS), the addition of neoadjuvant chemoradiation using carboplatin and paclitaxel for patients with T2-3/N0/M0 operable esophageal adenocarcinoma resulted in median overall survival of 49 months, compared with 26 months OS for surgery alone.  The NCCN guidelines also endorses (category 1) the use of this regimen as another potential approach to patients with these resectable cancers: weekly paclitaxel 50mg/m2 and carboplatin AUC2 x 5 weeks (Ace LONGO et al. J Clin Oncol 28:15s, 2010).    With the Shinto of FLOT, this three drug regimen, which while difficult to tolerate for many patients, has been shown to be advantageous in terms of survival when used perioperatively, and is administered intravenously every two weeks for four cycles, then with opportunity for surgery, followed again with another four cycles given every two weeks in the postoperative setting as well.  Assuming that he may be a candidate for surgery, and considering the recent presentation at the 2024 ASCO annual meeting in June of the results of the  ESOPEC trial that reported higher rates of pathologic complete response as well as significantly higher overall survival rates using perioperative FLOT as compared to the neoadjuvant concurrent chemoradiation CROSS regimen (overall survival 66 months vs 37 months, respectively), then I would recommend perioperative FLOT systemic chemotherapy be used rather than incorporating radiation. I have noted that, per the abstract for the ESOPEC trial, the investigators did include patient's whose tumors were clinically staged as T2 and N0, and we await final publication of that study.    I reviewed all the above,and in addition to his questions about logistics of care, he asked about potential use of immunotherapy.  I will request compensation or profiling to assess genomic features including for dMMR/MSI-H; I  stated that most often immunotherapeutic treatments are largely reserved for patients with stage IV/metastatic forms of this cancer, although in the minority of cases that also harbor dMMR/MSI-H, which is generally a rare exception, there is potential use for deploying immunotherapy in the upfront neoadjuvant intent setting.  We will await those results in assessment as well.    Thank you, Dr. Tobar, for the opportunity to meet this kind gentleman and to be of service.        VIRTUAL VISIT - DETAILS:    I have reviewed the note as documented above. This accurately captures the substance of my conversation with the patient.    Date of call: October 11, 2024   Start of call: 7:48 AM  End of call: 8:20 AM    Provider location: [facility] (academic office)  Patient location: Home      Mode of Video Visit: Amwell           I spent 32 minutes in consultation, including history and discussion with the patient including review of recent lab values and radiologic imaging results.  An additional 30 minutes was spent on the day of the visit, including reviewing pertinent medical notes and documentation from other physicians and APPs who have evaluated and treated this patient, pertinent lab values, pathology and imaging results, personal review of radiologic images, discussing the case with referring providers and/or nurse coordinator team, post-visit orders, and all post-visit documentation.    Lawrence Mc MD PhD       The above was transcribed using Dragon voice recognition software that is now required for use by [facility] and [facility] in place of transcription of dictated notes.  This change may unfortunately lead into an increase in errors in the EMR. While I reviewed and edited the transcription, I may miss errors.  Please let me know of any of serious errors and I will addend the note.          Again, thank you for allowing me to participate in the care of your patient.         Sincerely,        Lawrence Mc MD

## 2024-10-14 NOTE — NURSING NOTE
Current patient location: Pt is at his daughters home in [place]    Is the patient currently in the state of [place]? YES    Visit mode:VIDEO    If the visit is dropped, the patient can be reconnected by: VIDEO VISIT: Send to e-mail at: [email or web address]    Will anyone else be joining the visit? NO  (If patient encounters technical issues they should call [phone]    Are changes needed to the allergy or medication list? Pt stated no changes to allergies and Pt stated no med changes    Are refills needed on medications prescribed by this physician? NO    Rooming Documentation:  Questionnaire(s) not done per department protocol    Reason for visit: Consult    Marleny Byrd LPN

## 2024-10-14 NOTE — TELEPHONE ENCOUNTER
Tiffanie requisition submitted using specimen:    Case Report   Surgical Pathology Report                         Case: [id number]                                  Authorizing Provider:  Carson Michel DO          Collected:           09/24/2024 09:20 AM          Ordering Location:     [facility]          Received:            09/24/2024 09:43 AM                                 Endoscopy                                                                    Pathologist:           Jay Browning DO                                                            Specimen:    Gastric Esophageal Junction, Gastroesophageal biopsy                                    Final Diagnosis   A. GASTROESOPHAGEAL JUNCTION, BIOPSY:   - Adenocarcinoma, poorly-differentiated, with signet ring cell features      [facility]: Cancer Care Short Note                                                                                          Completed chart audit to assign Oncology Care Coordination enrollment status.    Introductory Cookstrhart message sent to patient:    Introduced self and role of RN Care Coordinator at [facility]. Provided my contact information, [facility] phone number (which has options to talk with a Nurse available 24/7 - triage and RNCC via this option during business hours).     Advised use of [place] [facility] to not use it for symptoms unless is an update on status from working with another provider about symptoms. Cookstrharts will typically be answered same day, voicemail messages within 4-6 hours or the next business day if calling after 3 pm. Anything requiring urgent call back pt should ask to speak with triage to leave a message so provider and or care coordinator can be paged.    Attached Consent to Communicate (previously Auth to Share Protected Health Information) to allow designated parties access to medical and billing information. Asked pt to scan back as a pdf, email, fax or bring back to clinic at  next in person appointment.

## 2024-10-15 ENCOUNTER — HOME INFUSION (PRE-WILLOW HOME INFUSION) (OUTPATIENT)
Dept: PHARMACY | Facility: CLINIC | Age: 69
End: 2024-10-15

## 2024-10-15 ENCOUNTER — TUMOR CONFERENCE (OUTPATIENT)
Dept: ONCOLOGY | Facility: CLINIC | Age: 69
End: 2024-10-15
Payer: COMMERCIAL

## 2024-10-15 NOTE — TUMOR CONFERENCE
Tumor Conference Information           Documentation / Disclaimer Cancer Tumor Board Note  Cancer tumor board recommendations do not override what is determined to be reasonable care and treatment, which is dependent on the circumstances of a patient's case; the patient's medical, social, and personal concerns; and the clinical judgment of the oncologist [physician].    Group consensus for neoadjuvant FLOT prior to surgical resection.    The patient's medical oncologist and RNCC will discuss this plan with the pt.    Catherine Del Toro RN BSN  Thoracic Surgery RN Care Coordinator  [phone]

## 2024-10-21 ENCOUNTER — MYC MEDICAL ADVICE (OUTPATIENT)
Dept: INTERVENTIONAL RADIOLOGY/VASCULAR | Facility: CLINIC | Age: 69
End: 2024-10-21
Payer: COMMERCIAL

## 2024-10-21 DIAGNOSIS — C16.0 ADENOCARCINOMA OF GASTROESOPHAGEAL JUNCTION (H): Primary | ICD-10-CM

## 2024-10-21 RX ORDER — HEPARIN SODIUM (PORCINE) LOCK FLUSH IV SOLN 100 UNIT/ML 100 UNIT/ML
5 SOLUTION INTRAVENOUS
Status: CANCELLED | OUTPATIENT
Start: 2024-10-21

## 2024-10-21 RX ORDER — METHYLPREDNISOLONE SODIUM SUCCINATE 125 MG/2ML
125 INJECTION INTRAMUSCULAR; INTRAVENOUS
Status: CANCELLED
Start: 2024-11-14

## 2024-10-21 RX ORDER — ALBUTEROL SULFATE 0.83 MG/ML
2.5 SOLUTION RESPIRATORY (INHALATION)
Status: CANCELLED | OUTPATIENT
Start: 2024-10-21

## 2024-10-21 RX ORDER — ALBUTEROL SULFATE 90 UG/1
1-2 INHALANT RESPIRATORY (INHALATION)
Status: CANCELLED
Start: 2024-11-28

## 2024-10-21 RX ORDER — EPINEPHRINE 1 MG/ML
0.3 INJECTION, SOLUTION INTRAMUSCULAR; SUBCUTANEOUS EVERY 5 MIN PRN
Status: CANCELLED | OUTPATIENT
Start: 2024-10-21

## 2024-10-21 RX ORDER — HEPARIN SODIUM (PORCINE) LOCK FLUSH IV SOLN 100 UNIT/ML 100 UNIT/ML
5 SOLUTION INTRAVENOUS
Status: CANCELLED | OUTPATIENT
Start: 2024-11-01

## 2024-10-21 RX ORDER — HEPARIN SODIUM,PORCINE 10 UNIT/ML
5-20 VIAL (ML) INTRAVENOUS DAILY PRN
Status: CANCELLED | OUTPATIENT
Start: 2024-11-14

## 2024-10-21 RX ORDER — MEPERIDINE HYDROCHLORIDE 25 MG/ML
25 INJECTION INTRAMUSCULAR; INTRAVENOUS; SUBCUTANEOUS EVERY 30 MIN PRN
Status: CANCELLED | OUTPATIENT
Start: 2024-10-21

## 2024-10-21 RX ORDER — MEPERIDINE HYDROCHLORIDE 25 MG/ML
25 INJECTION INTRAMUSCULAR; INTRAVENOUS; SUBCUTANEOUS EVERY 30 MIN PRN
Status: CANCELLED | OUTPATIENT
Start: 2024-11-28

## 2024-10-21 RX ORDER — ONDANSETRON 2 MG/ML
8 INJECTION INTRAMUSCULAR; INTRAVENOUS ONCE
Status: CANCELLED | OUTPATIENT
Start: 2024-10-21

## 2024-10-21 RX ORDER — HEPARIN SODIUM (PORCINE) LOCK FLUSH IV SOLN 100 UNIT/ML 100 UNIT/ML
5 SOLUTION INTRAVENOUS
OUTPATIENT
Start: 2024-12-13

## 2024-10-21 RX ORDER — METHYLPREDNISOLONE SODIUM SUCCINATE 125 MG/2ML
125 INJECTION INTRAMUSCULAR; INTRAVENOUS
Status: CANCELLED
Start: 2024-11-28

## 2024-10-21 RX ORDER — MEPERIDINE HYDROCHLORIDE 25 MG/ML
25 INJECTION INTRAMUSCULAR; INTRAVENOUS; SUBCUTANEOUS EVERY 30 MIN PRN
Status: CANCELLED | OUTPATIENT
Start: 2024-12-12

## 2024-10-21 RX ORDER — HEPARIN SODIUM (PORCINE) LOCK FLUSH IV SOLN 100 UNIT/ML 100 UNIT/ML
5 SOLUTION INTRAVENOUS
OUTPATIENT
Start: 2024-11-15

## 2024-10-21 RX ORDER — ALBUTEROL SULFATE 90 UG/1
1-2 INHALANT RESPIRATORY (INHALATION)
Status: CANCELLED
Start: 2024-11-14

## 2024-10-21 RX ORDER — HEPARIN SODIUM (PORCINE) LOCK FLUSH IV SOLN 100 UNIT/ML 100 UNIT/ML
5 SOLUTION INTRAVENOUS
OUTPATIENT
Start: 2024-12-12

## 2024-10-21 RX ORDER — DIPHENHYDRAMINE HYDROCHLORIDE 50 MG/ML
50 INJECTION INTRAMUSCULAR; INTRAVENOUS
Status: CANCELLED
Start: 2024-11-28

## 2024-10-21 RX ORDER — HEPARIN SODIUM,PORCINE 10 UNIT/ML
5-20 VIAL (ML) INTRAVENOUS DAILY PRN
Status: CANCELLED | OUTPATIENT
Start: 2024-11-01

## 2024-10-21 RX ORDER — ALBUTEROL SULFATE 0.83 MG/ML
2.5 SOLUTION RESPIRATORY (INHALATION)
Status: CANCELLED | OUTPATIENT
Start: 2024-11-28

## 2024-10-21 RX ORDER — EPINEPHRINE 1 MG/ML
0.3 INJECTION, SOLUTION INTRAMUSCULAR; SUBCUTANEOUS EVERY 5 MIN PRN
Status: CANCELLED | OUTPATIENT
Start: 2024-12-12

## 2024-10-21 RX ORDER — HEPARIN SODIUM (PORCINE) LOCK FLUSH IV SOLN 100 UNIT/ML 100 UNIT/ML
5 SOLUTION INTRAVENOUS
Status: CANCELLED | OUTPATIENT
Start: 2024-11-14

## 2024-10-21 RX ORDER — MEPERIDINE HYDROCHLORIDE 25 MG/ML
25 INJECTION INTRAMUSCULAR; INTRAVENOUS; SUBCUTANEOUS EVERY 30 MIN PRN
Status: CANCELLED | OUTPATIENT
Start: 2024-11-14

## 2024-10-21 RX ORDER — LORAZEPAM 2 MG/ML
0.5 INJECTION INTRAMUSCULAR EVERY 4 HOURS PRN
OUTPATIENT
Start: 2024-12-12

## 2024-10-21 RX ORDER — HEPARIN SODIUM,PORCINE 10 UNIT/ML
5-20 VIAL (ML) INTRAVENOUS DAILY PRN
OUTPATIENT
Start: 2024-12-12

## 2024-10-21 RX ORDER — METHYLPREDNISOLONE SODIUM SUCCINATE 125 MG/2ML
125 INJECTION INTRAMUSCULAR; INTRAVENOUS
Status: CANCELLED
Start: 2024-10-21

## 2024-10-21 RX ORDER — DIPHENHYDRAMINE HYDROCHLORIDE 50 MG/ML
50 INJECTION INTRAMUSCULAR; INTRAVENOUS
Status: CANCELLED
Start: 2024-11-14

## 2024-10-21 RX ORDER — LORAZEPAM 2 MG/ML
0.5 INJECTION INTRAMUSCULAR EVERY 4 HOURS PRN
Status: CANCELLED | OUTPATIENT
Start: 2024-11-14

## 2024-10-21 RX ORDER — HEPARIN SODIUM,PORCINE 10 UNIT/ML
5-20 VIAL (ML) INTRAVENOUS DAILY PRN
Status: CANCELLED | OUTPATIENT
Start: 2024-10-21

## 2024-10-21 RX ORDER — HEPARIN SODIUM (PORCINE) LOCK FLUSH IV SOLN 100 UNIT/ML 100 UNIT/ML
5 SOLUTION INTRAVENOUS
OUTPATIENT
Start: 2024-11-29

## 2024-10-21 RX ORDER — ALBUTEROL SULFATE 0.83 MG/ML
2.5 SOLUTION RESPIRATORY (INHALATION)
Status: CANCELLED | OUTPATIENT
Start: 2024-11-14

## 2024-10-21 RX ORDER — EPINEPHRINE 1 MG/ML
0.3 INJECTION, SOLUTION INTRAMUSCULAR; SUBCUTANEOUS EVERY 5 MIN PRN
Status: CANCELLED | OUTPATIENT
Start: 2024-11-14

## 2024-10-21 RX ORDER — LORAZEPAM 2 MG/ML
0.5 INJECTION INTRAMUSCULAR EVERY 4 HOURS PRN
Status: CANCELLED | OUTPATIENT
Start: 2024-10-21

## 2024-10-21 RX ORDER — ONDANSETRON 2 MG/ML
8 INJECTION INTRAMUSCULAR; INTRAVENOUS ONCE
OUTPATIENT
Start: 2024-11-28

## 2024-10-21 RX ORDER — EPINEPHRINE 1 MG/ML
0.3 INJECTION, SOLUTION INTRAMUSCULAR; SUBCUTANEOUS EVERY 5 MIN PRN
Status: CANCELLED | OUTPATIENT
Start: 2024-11-28

## 2024-10-21 RX ORDER — ALBUTEROL SULFATE 90 UG/1
1-2 INHALANT RESPIRATORY (INHALATION)
Status: CANCELLED
Start: 2024-10-21

## 2024-10-21 RX ORDER — LORAZEPAM 2 MG/ML
0.5 INJECTION INTRAMUSCULAR EVERY 4 HOURS PRN
OUTPATIENT
Start: 2024-11-28

## 2024-10-21 RX ORDER — ALBUTEROL SULFATE 0.83 MG/ML
2.5 SOLUTION RESPIRATORY (INHALATION)
Status: CANCELLED | OUTPATIENT
Start: 2024-12-12

## 2024-10-21 RX ORDER — METHYLPREDNISOLONE SODIUM SUCCINATE 125 MG/2ML
125 INJECTION INTRAMUSCULAR; INTRAVENOUS
Status: CANCELLED
Start: 2024-12-12

## 2024-10-21 RX ORDER — HEPARIN SODIUM (PORCINE) LOCK FLUSH IV SOLN 100 UNIT/ML 100 UNIT/ML
5 SOLUTION INTRAVENOUS
OUTPATIENT
Start: 2024-11-28

## 2024-10-21 RX ORDER — ONDANSETRON 2 MG/ML
8 INJECTION INTRAMUSCULAR; INTRAVENOUS ONCE
Status: CANCELLED | OUTPATIENT
Start: 2024-11-14

## 2024-10-21 RX ORDER — DIPHENHYDRAMINE HYDROCHLORIDE 50 MG/ML
50 INJECTION INTRAMUSCULAR; INTRAVENOUS
Status: CANCELLED
Start: 2024-12-12

## 2024-10-21 RX ORDER — HEPARIN SODIUM,PORCINE 10 UNIT/ML
5-20 VIAL (ML) INTRAVENOUS DAILY PRN
OUTPATIENT
Start: 2024-12-13

## 2024-10-21 RX ORDER — HEPARIN SODIUM,PORCINE 10 UNIT/ML
5-20 VIAL (ML) INTRAVENOUS DAILY PRN
OUTPATIENT
Start: 2024-11-28

## 2024-10-21 RX ORDER — ALBUTEROL SULFATE 90 UG/1
1-2 INHALANT RESPIRATORY (INHALATION)
Status: CANCELLED
Start: 2024-12-12

## 2024-10-21 RX ORDER — HEPARIN SODIUM,PORCINE 10 UNIT/ML
5-20 VIAL (ML) INTRAVENOUS DAILY PRN
OUTPATIENT
Start: 2024-11-15

## 2024-10-21 RX ORDER — ONDANSETRON 2 MG/ML
8 INJECTION INTRAMUSCULAR; INTRAVENOUS ONCE
OUTPATIENT
Start: 2024-12-12

## 2024-10-21 RX ORDER — HEPARIN SODIUM,PORCINE 10 UNIT/ML
5-20 VIAL (ML) INTRAVENOUS DAILY PRN
OUTPATIENT
Start: 2024-11-29

## 2024-10-21 RX ORDER — DIPHENHYDRAMINE HYDROCHLORIDE 50 MG/ML
50 INJECTION INTRAMUSCULAR; INTRAVENOUS
Status: CANCELLED
Start: 2024-10-21

## 2024-10-22 NOTE — PROGRESS NOTES
Interventional Radiology - Pre Procedure Chart Review  Outpatient - [facility]  Oct 24, 2024    Procedure Requested: Port PLACEMENT  Requested by: Lawrence Mc MD     History and Physical Reviewed: H&P documented within 30 days (by Anand MCHUGH on 9/24/2024).  I have personally reviewed the patient's medical history and have updated the medical record as necessary.    HPI: 69 year old patient with history of HTN, GERD s/p Nissen c/b slipped Nissen, hiatal hernia followed routinely with upper endoscopies over the past decade.  C/R dysphagia and evaluated with upper endoscopy noting nodularity at the gastrophageal juncture.  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    Plans for chemotherapy with FLOT followed by surgical resection.        Clinical notes reviewed.  Initial order reviewed, no laterality specified.      Pertinent medications reviewed:   - Anticoagulation: None per chart review  - Antibiotic: Ancef 2 g IV x1 ordered for procedure     Allergies   Allergen Reactions    Hornets     Wasp Venom Protein Hives    Bee Venom Swelling       IMAGING:  Narrative & Impression   EXAM: PET ONCOLOGY (EYES TO THIGHS)  LOCATION: [facility]  DATE: 10/1/2024     INDICATION: Initial treatment strategy for malignant neoplasm of lower third of esophagus, gastroesophageal junction adenocarcinoma.  COMPARISON: CT abdomen pelvis 1/26/2024, CT chest 4/1/2019  CONTRAST: None  TECHNIQUE: Serum glucose level 105 mg/dL. One hour post intravenous administration of 11.1 mCi F-18 FDG, PET imaging was performed from the skull vertex to mid thighs, utilizing attenuation correction with concurrent axial CT and PET/CT image fusion.   Dose reduction techniques were used.     PET/CT FINDINGS: Segmental FDG avid wall thickening in the lower esophagus extending into the gastroesophageal junction (SUV max 7.3) suspicious for the biopsy-proven adenocarcinoma.  "No convincing evidence of tona or metastatic disease.      Symmetric mild FDG uptake in the palatine tonsils and a few cervical chain lymph nodes typical of a reactive/inflammatory process. Bilateral shoulder synovitis.     CT FINDINGS: Mild senescent intracranial changes. Moderate coronary arterial calcification. Normal variant azygos lobe/fissure. A 2 mm nodule in the left upper lobe laterally is stable dating back to 4/1/2019 and should be benign (series 6, image 109).   Sequelae of Nissen fundoplication with wrap located superior to the diaphragm which can be seen with slipped Nissen. Small cyst near the dome of the liver. Nonobstructing bilateral renal calyceal tip calculi. No hydronephrosis. Colonic diverticulosis.   Diffuse bladder wall thickening/muscular hypertrophy. Multilevel degenerative changes in the spine. Few osteocartilaginous loose bodies adjacent to the right glenohumeral joint. The lower extremities are unremarkable.                                                                      IMPRESSION:     Findings suspicious for the gastroesophageal junction adenocarcinoma without convincing evidence of tona or metastatic disease.       EXAM:  There were no vitals taken for this visit.  Not assessed    LABS:  No results found for: \"INR\"    Lab Results   Component Value Date    WBC 6.7 07/23/2024    HGB 15.2 07/23/2024     07/23/2024       No results found for: \"CREATININE\"    No components found for: \"K\"      PLAN:  Planning for port PLACEMENT, with sedation, laterality at proceduralist discretion in IR.    - Ancef 2 g IV x1 ordered for procedure  - No apparent contraindications for right sided port placement based upon chart review  - Radiologist to further review procedure with patient.    EMR reviewed. No formal assessment completed.     Total time: 15 minutes       DAVION LOOMIS NP  Interventional Radiology    "

## 2024-10-23 ENCOUNTER — TELEPHONE (OUTPATIENT)
Dept: ONCOLOGY | Facility: CLINIC | Age: 69
End: 2024-10-23
Payer: COMMERCIAL

## 2024-10-23 NOTE — TELEPHONE ENCOUNTER
MEDICAL ONCOLOGY ATTENDING PHYSICIAN TELEPHONE NOTE  October 23, 2024  Time and duration of call: 10:47 am, 2 minutes [left voicemail]     Home Phone [phone]   Work Phone Not on file.   Mobile [phone]      Reason for call: to update patient on plans to move forward with upfront/neoadjuvant chemotherapy      I called the patient Bal Junior at the above date and time to provide an update on outcomes of interdisciplinary discussion. Our oncology scheduling team is scheduling him for MITZI evaluation and initiation of FLOT regimen chemotherapy to begin within the next 1-2 weeks. I left a voicemail and invited him to call the clinic if he has any questions.      JONAH TOMPKINS MD PHD    No No No

## 2024-10-24 ENCOUNTER — MYC MEDICAL ADVICE (OUTPATIENT)
Dept: SURGERY | Facility: CLINIC | Age: 69
End: 2024-10-24

## 2024-10-24 ENCOUNTER — HOSPITAL ENCOUNTER (OUTPATIENT)
Dept: INTERVENTIONAL RADIOLOGY/VASCULAR | Facility: CLINIC | Age: 69
Discharge: HOME | End: 2024-10-24
Attending: INTERNAL MEDICINE | Admitting: STUDENT IN AN ORGANIZED HEALTH CARE EDUCATION/TRAINING PROGRAM
Payer: MEDICARE

## 2024-10-24 ENCOUNTER — ENROLLMENT (OUTPATIENT)
Dept: HOME HEALTH SERVICES | Facility: HOME HEALTH | Age: 69
End: 2024-10-24
Payer: COMMERCIAL

## 2024-10-24 VITALS
OXYGEN SATURATION: 96 % | TEMPERATURE: 97.8 F | DIASTOLIC BLOOD PRESSURE: 72 MMHG | SYSTOLIC BLOOD PRESSURE: 107 MMHG | RESPIRATION RATE: 18 BRPM | HEART RATE: 68 BPM

## 2024-10-24 DIAGNOSIS — C16.0 ADENOCARCINOMA OF GASTROESOPHAGEAL JUNCTION (H): Primary | ICD-10-CM

## 2024-10-24 DIAGNOSIS — C16.0 ADENOCARCINOMA OF GASTROESOPHAGEAL JUNCTION (H): ICD-10-CM

## 2024-10-24 LAB
ATRIAL RATE - MUSE: 52 BPM
DIASTOLIC BLOOD PRESSURE - MUSE: NORMAL MMHG
INTERPRETATION ECG - MUSE: NORMAL
P AXIS - MUSE: 6 DEGREES
PR INTERVAL - MUSE: 162 MS
QRS DURATION - MUSE: 68 MS
QT - MUSE: 458 MS
QTC - MUSE: 425 MS
R AXIS - MUSE: 11 DEGREES
SYSTOLIC BLOOD PRESSURE - MUSE: NORMAL MMHG
T AXIS - MUSE: 24 DEGREES
VENTRICULAR RATE- MUSE: 52 BPM

## 2024-10-24 PROCEDURE — 93005 ELECTROCARDIOGRAM TRACING: CPT

## 2024-10-24 PROCEDURE — 258N000003 HC RX IP 258 OP 636: Performed by: STUDENT IN AN ORGANIZED HEALTH CARE EDUCATION/TRAINING PROGRAM

## 2024-10-24 PROCEDURE — 99152 MOD SED SAME PHYS/QHP 5/>YRS: CPT

## 2024-10-24 PROCEDURE — 93010 ELECTROCARDIOGRAM REPORT: CPT | Performed by: INTERNAL MEDICINE

## 2024-10-24 PROCEDURE — 250N000011 HC RX IP 250 OP 636: Performed by: NURSE PRACTITIONER

## 2024-10-24 PROCEDURE — 272N000500 HC NEEDLE CR2

## 2024-10-24 PROCEDURE — C1769 GUIDE WIRE: HCPCS

## 2024-10-24 PROCEDURE — C1788 PORT, INDWELLING, IMP: HCPCS

## 2024-10-24 PROCEDURE — 93005 ELECTROCARDIOGRAM TRACING: CPT | Performed by: STUDENT IN AN ORGANIZED HEALTH CARE EDUCATION/TRAINING PROGRAM

## 2024-10-24 PROCEDURE — 250N000011 HC RX IP 250 OP 636: Performed by: STUDENT IN AN ORGANIZED HEALTH CARE EDUCATION/TRAINING PROGRAM

## 2024-10-24 PROCEDURE — 999N000054 HC STATISTIC EKG NON-CHARGEABLE

## 2024-10-24 PROCEDURE — 250N000009 HC RX 250: Performed by: STUDENT IN AN ORGANIZED HEALTH CARE EDUCATION/TRAINING PROGRAM

## 2024-10-24 RX ORDER — NALOXONE HYDROCHLORIDE 0.4 MG/ML
0.4 INJECTION, SOLUTION INTRAMUSCULAR; INTRAVENOUS; SUBCUTANEOUS
Status: DISCONTINUED | OUTPATIENT
Start: 2024-10-24 | End: 2024-10-25 | Stop reason: HOSPADM

## 2024-10-24 RX ORDER — NALOXONE HYDROCHLORIDE 0.4 MG/ML
0.2 INJECTION, SOLUTION INTRAMUSCULAR; INTRAVENOUS; SUBCUTANEOUS
Status: DISCONTINUED | OUTPATIENT
Start: 2024-10-24 | End: 2024-10-25 | Stop reason: HOSPADM

## 2024-10-24 RX ORDER — HEPARIN SODIUM 200 [USP'U]/100ML
1 INJECTION, SOLUTION INTRAVENOUS EVERY 5 MIN PRN
Status: DISCONTINUED | OUTPATIENT
Start: 2024-10-24 | End: 2024-10-25 | Stop reason: HOSPADM

## 2024-10-24 RX ORDER — LIDOCAINE HYDROCHLORIDE AND EPINEPHRINE 10; 10 MG/ML; UG/ML
1-20 INJECTION, SOLUTION INFILTRATION; PERINEURAL ONCE
Status: COMPLETED | OUTPATIENT
Start: 2024-10-24 | End: 2024-10-24

## 2024-10-24 RX ORDER — FLUMAZENIL 0.1 MG/ML
0.2 INJECTION, SOLUTION INTRAVENOUS
Status: DISCONTINUED | OUTPATIENT
Start: 2024-10-24 | End: 2024-10-25 | Stop reason: HOSPADM

## 2024-10-24 RX ORDER — CEFAZOLIN SODIUM/WATER 2 G/20 ML
2 SYRINGE (ML) INTRAVENOUS
Status: COMPLETED | OUTPATIENT
Start: 2024-10-24 | End: 2024-10-24

## 2024-10-24 RX ORDER — HEPARIN SODIUM (PORCINE) LOCK FLUSH IV SOLN 100 UNIT/ML 100 UNIT/ML
500 SOLUTION INTRAVENOUS ONCE
Status: COMPLETED | OUTPATIENT
Start: 2024-10-24 | End: 2024-10-24

## 2024-10-24 RX ORDER — FENTANYL CITRATE 50 UG/ML
25-50 INJECTION, SOLUTION INTRAMUSCULAR; INTRAVENOUS EVERY 5 MIN PRN
Status: DISCONTINUED | OUTPATIENT
Start: 2024-10-24 | End: 2024-10-25 | Stop reason: HOSPADM

## 2024-10-24 RX ORDER — LIDOCAINE 40 MG/G
CREAM TOPICAL
Status: DISCONTINUED | OUTPATIENT
Start: 2024-10-24 | End: 2024-10-25 | Stop reason: HOSPADM

## 2024-10-24 RX ADMIN — LIDOCAINE HYDROCHLORIDE 20 ML: 10 INJECTION, SOLUTION INFILTRATION; PERINEURAL at 13:39

## 2024-10-24 RX ADMIN — MIDAZOLAM HYDROCHLORIDE 1 MG: 1 INJECTION, SOLUTION INTRAMUSCULAR; INTRAVENOUS at 13:45

## 2024-10-24 RX ADMIN — HEPARIN SODIUM IN SODIUM CHLORIDE 1 BAG: 200 INJECTION INTRAVENOUS at 13:31

## 2024-10-24 RX ADMIN — FENTANYL CITRATE 50 MCG: 50 INJECTION INTRAMUSCULAR; INTRAVENOUS at 13:28

## 2024-10-24 RX ADMIN — Medication 2 G: at 12:45

## 2024-10-24 RX ADMIN — SODIUM CHLORIDE 1000 ML: 9 INJECTION, SOLUTION INTRAVENOUS at 13:32

## 2024-10-24 RX ADMIN — HEPARIN SODIUM (PORCINE) LOCK FLUSH IV SOLN 100 UNIT/ML 500 UNITS: 100 SOLUTION at 13:46

## 2024-10-24 RX ADMIN — FENTANYL CITRATE 50 MCG: 50 INJECTION INTRAMUSCULAR; INTRAVENOUS at 13:45

## 2024-10-24 RX ADMIN — LIDOCAINE HYDROCHLORIDE,EPINEPHRINE BITARTRATE 20 ML: 10; .01 INJECTION, SOLUTION INFILTRATION; PERINEURAL at 13:40

## 2024-10-24 RX ADMIN — MIDAZOLAM HYDROCHLORIDE 1 MG: 1 INJECTION, SOLUTION INTRAMUSCULAR; INTRAVENOUS at 13:28

## 2024-10-24 NOTE — DISCHARGE INSTRUCTIONS
Port Placement Procedure Discharge Instructions:  You had a port placed. A port is a small medical device that is placed under the skin and is connected to a vein with a catheter (thin, flexible tube). Ports can be used to administer IV medications (including chemotherapy), fluids or blood products or for blood lab draws. Please follow the below instructions after your procedure:    Care Instructions:  - If you received sedation for your procedure, do not drive or operate heavy machinery for the rest of the day.  - You may shower beginning tomorrow (post procedure day #1). Do not scrub site until well healed; pat dry gently with a towel.  - You likely have skin adhesive over your port site. Skin adhesive works like a bandage to keep the site covered and protected. Do not use antibiotic ointment or creams/lotions over adhesive as it can break it down. The skin adhesive will peel off on its own (typically in 5-14 days).  - Avoid submerging the port site under water (ex: tub baths, Jacuzzis, lakes, hot tubs and pools) for 10 days or until your site is well healed.  - You may have some discomfort, minimal swelling, redness and/or bruising at your port site/procedure site. You may take over the counter pain medication for discomfort (follow the package directions) or apply an ice pack wrapped in a towel over the site (rotating 20 minutes with ice pack on and 20 minutes with ice pack off) for comfort as needed. It can take several days for these to resolve.  - Avoid heavy lifting (greater than 10 pounds) and strenuous activities for 2 days following your procedure.   - If you experience significant bleeding at site, apply pressure with hands above the clavicle bone, sit upright and seek immediate medical assistance.  - Ports need to be flushed approximately every 4-6 weeks, if not being used more frequently. Follow up with the provider who ordered your port placement for further instructions for this.    Seek medical  evaluation or contact [facility] RN [facility] at [phone] if you experience the following:  - Uncontrolled bleeding from port site  - Fever (greater than 101 F (38.3C))  - Purulent (yellow/green/foul smelling) drainage from port insertion site  - Increasing pain at port site  - Increasing redness at port site          * Recovery After Conscious Sedation (Adult)  We gave you medicine by vein to make you sleepy or relaxed during your procedure. This may have included both a pain medicine and sleeping medicine. Most of the effects have worn off. But you may still feel sleepy for the next 6 to 8 hours.  Home care  Follow these guidelines when you get home:  You may feel sleepy and clumsy and have poor balance for the next few hours.  A responsible adult should stay with you for the next 8 hours. This person should make sure your condition doesn t get worse.  Don't drink any alcohol for the next 24 hours.  Don't drive, operate dangerous machinery, make important business or personal decisions or sign legal documents during the next 24 hours.  You may vomit (throw up) if you eat too soon after the procedure. If this happens, drink small amounts of water, juice or clear broth. Wait to try solid food until you no longer have nausea (upset stomach).  Note: Your care team may tell you not to take any medicine by mouth for pain or sleep in the next 4 hours. These medicines may react with the medicines you had in the hospital. This could cause a much stronger response than usual.  Follow-up care  Follow up with your care team if you are not alert and back to your usual level of activity within 12 hours.  When to seek medical advice  Call your care team right away if any of these occur:  You still feel sleepy or clumsy after 12 hours, or your sleepiness gets worse  Weakness or dizziness gets worse  Repeated vomiting  If you can't be woken up and someone is staying with you, they should call 911.  For informational purposes only.  Not to replace the advice of your health care provider.  Copyright   2018 [employer]. All rights reserved.

## 2024-10-24 NOTE — PROGRESS NOTES
AUTH REQUIRED DAY ORDERS (or CHANGE IN ORDERS) ARE RECEIVED. PLEASE NOTIFY PA TEAM ONCE ORDERS RECEIVED.    10/15/2024 - PT HAS COVERAGE FOR HOME DISCONNECT, 5FU, LINECARE AND HYDRATION.     PT DOES NOT HAVE COVERAGE FOR ONPRO AND TPA. ML    ELASTOMERICS ARE NOT COVERED UNDER PATIENT PLAN. IF DRUG SENT ON A HOMEPUMP PLEASE NOTIFY INTAKE FOR SELF-PAY QUOTE. ml

## 2024-10-24 NOTE — IR NOTE
Patient Name: Bal Junior  Medical Record Number: [id number]  Today's Date: 10/24/2024    Procedure: IR right chest port placement  Proceduralist: Dr. Giraldo    Procedure Start: 1328  Procedure end: 1353  Sedation medications administered: 2 mg midazolam and 100 mcg fentanyl   Sedation time: 25 minutes    Other Notes: During pre-procedure, pt had a vasovagal episode with IV placement. Pt became lightheaded and heart rate and BP decreased. Pt was placed supine which improved symptoms. Once pt was sat up again, symptoms reoccurred, and again placed supine. Pt does endorse having these symptoms during prior IV starts, though infrequent. An ECG and fluid bolus were ordered after discussion with Dr. Giraldo, interventional radiologist. Patient's symptoms resolved with fluid bolus. Provider deemed it was safe to proceed with procedure.    Pt arrived to IR room 1 from Pre/post bay 1. Consent reviewed. Pt denies any questions or concerns regarding procedure. Pt positioned supine and monitored per protocol. Pt tolerated procedure without any noted complications. VSS on RA. Right chest wall port was placed. Pt transferred back to Pre/post bay 1.    Discharge criteria met. No further symptoms mentioned above. Vitals remained stable during and after procedure. Discharge instructions given and reviewed with patient and spouse. No further questions or concerns. Pt brought to hospital entrance via wheelchair and d/c'd home with spouse.

## 2024-10-25 ENCOUNTER — HOME INFUSION (OUTPATIENT)
Dept: HOME HEALTH SERVICES | Facility: HOME HEALTH | Age: 69
End: 2024-10-25
Payer: COMMERCIAL

## 2024-10-25 DIAGNOSIS — C16.0 ADENOCARCINOMA OF GASTROESOPHAGEAL JUNCTION (H): Primary | ICD-10-CM

## 2024-10-28 LAB — SPECIMEN STATUS: NORMAL

## 2024-10-30 RX ORDER — PROCHLORPERAZINE MALEATE 10 MG
10 TABLET ORAL EVERY 6 HOURS PRN
Qty: 30 TABLET | Refills: 2 | Status: SHIPPED | OUTPATIENT
Start: 2024-10-30

## 2024-10-30 RX ORDER — ONDANSETRON 8 MG/1
8 TABLET, FILM COATED ORAL EVERY 8 HOURS PRN
Qty: 30 TABLET | Refills: 2 | Status: SHIPPED | OUTPATIENT
Start: 2024-10-30

## 2024-10-30 RX ORDER — DEXAMETHASONE 4 MG/1
8 TABLET ORAL 2 TIMES DAILY WITH MEALS
Qty: 6 TABLET | Refills: 7 | Status: SHIPPED | OUTPATIENT
Start: 2024-10-30 | End: 2024-11-13

## 2024-10-30 NOTE — PROGRESS NOTES
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 Mc's consult note):  Mr. Junior is a 69 year old male with history of severe acid reflux, s/p fundoplication around 2011/12, and postprandial hypoglycemia and neuritis/neuropathy, of unclear etiology.      He has been followed routinely with upper endoscopies over the last decade, many of which have been done within the [facility] system.  The ones done over the last seven years include ones performed on August 30, 2017, December 4, 2018, May 9, 2019, and most recently as September 24, 2024.    He had a CT scan in January 2024 with results as follows:     January 26, 2024--CT a/p--IMPRESSION:   1.  Superior aspect of the Nissen fundal wrap appears about 2 cm above  the diaphragm possibly representing slipped Nissen.  2.  Hepatic steatosis.  3.  Nonobstructing renal calculi. No hydronephrosis.    He was experiencing dysphagia and slipped Nissen fundoplication and related issues, some of which were said to be related to worsening occasional reflux, and chest pressure associate with early satiety.  On April 30, 2024, he underwent barium swallow, with results as follows:  IMPRESSION:  1. Timed barium esophagram results as detailed above with retained  esophageal contrast lasting up to 5 minutes. There is patent flow of  contrast.   2. Expected passage of barium tablet into the stomach.      He had continued evaluation and during the summer of 2024, including on July 23, 2024. Part of the assessment included impression that the \"Manometry was consistent with inconclusive manometric EGJOO. Esophagram showed routine contrast lasting up to 5 minutes but patent flow of contrast and tablet.\"      Further evaluation included " upper endoscopy, that revealed a nodularity at the gastroesophageal junction, concerning for malignancy, as follows:  September 24, 2024--EGD--Evidence of a Nissen fundoplication was found at the gastroesophageal   junction. The wrap appeared loose. This was traversed.   Localized moderate mucosal changes characterized by erythema and   nodularity were found at the gastroesophageal junction. Biopsies were   taken with a cold forceps for histology.   Specimen:    Gastric Esophageal Junction, Gastroesophageal biopsy                                      Final Diagnosis   A. GASTROESOPHAGEAL JUNCTION, BIOPSY:  - Adenocarcinoma, poorly-differentiated, with signet ring cell features     GASTRIC HER2 BIOMARKER TESTS   Test(s) Performed     HER2 by IHC     Results         With this new finding of malignancy, a staging PET/CT was performed, and no distant metastasis was detected:  October 1, 2024--PET/CT --                                                                 IMPRESSION Findings suspicious for the gastroesophageal junction adenocarcinoma without convincing evidence of tona or metastatic disease.      He further underwent a staging EUS, that characterized the tumor as staged as T2N0 per EUS criteria:      Oct 8, 2024 EUS--    Impression:            - Malignant esophageal tumor was found at the                          gastroesophageal junction. Siewert 2. Measurements as                          above.                          This is classified T2 N0 (cytology pending) M0 by EUS                          criteria.                          Three small lymph nodes seen as described above. Two                          were sampled (station 8R) and are preliminarily                          benign. The third was identical in size and appearance                          and not amenable to sampling due to the proximity to                          the heart and proximal margin of the mass.                          -  Z-line, 37 cm from the incisors.                          - A Nissen fundoplication was found. This has                          partially slipped above the diaphragm.   Specimen A                 Interpretation:                  Negative for malignancy  Polymorphous population of lymphocytes present                 Adequacy:                 Satisfactory for evaluation        He met with Dr. Katlyn Tobar and Dr. Lawrence Mc with plan for neoadjuvant FLOT x 4 followed by surgical resection and then 4 adjuvant cycles of FLOT.      He presents today to start cycle 1 of FLOT.     Interval History: Bal is here today with his wife. He is overall doing well. He notes very slight dysphagia at times with eating otherwise is feeling well. No pain, anorexia, or other symptoms. Energy has been good. No changes to bowels or bladder. No fevers/chills or infectious concerns.     He has some questions about chemotherapy as he has not had any teaching or information on this yet. He does feel ready to begin today.         Past medical, social, surgical, and family histories reviewed, confirmed, and pertinent details discussed with patient and family; additional sources include the medical record.      Past Medical History:   Diagnosis Date    Hypertension     Neuritis     Peripheral neuropathy     Personal history of other medical treatment     postgastrectomy dumping syndrome    Stomach problems Noted earlier      Past Surgical History:   Procedure Laterality Date    ABDOMEN SURGERY  2012?    APPENDECTOMY  1964?    COLONOSCOPY  2006, 2016    ESOPHAGEAL BALLOON PROVOCATION STUDY N/A 9/24/2024    Procedure: Esophageal Balloon Provocation Study;  Surgeon: Carson Michel DO;  Location:  GI    ESOPHAGOSCOPY, GASTROSCOPY, DUODENOSCOPY (EGD), COMBINED N/A 9/24/2024    Procedure: ESOPHAGOGASTRODUODENOSCOPY, WITH BIOPSY;  Surgeon: Carson Michel DO;  Location:  GI    ESOPHAGOSCOPY, GASTROSCOPY, DUODENOSCOPY (EGD), COMBINED N/A 10/8/2024     Procedure: ESOPHAGOGASTRODUODENOSCOPY, WITH FINE NEEDLE ASPIRATION BIOPSY, WITH ENDOSCOPIC ULTRASOUND GUIDANCE;  Surgeon: Lance Lopez MD;  Location:  GI    EYE SURGERY  199x    laser for retinal tears    GASTRIC FUNDOPLICATION      HERNIA REPAIR  ?    IR CHEST PORT PLACEMENT > 5 YRS OF AGE  10/24/2024    LAPAROSCOPIC TAKEDOWN NISSEN FUNDOPLICATION N/A 2020    Procedure: TAKE-DOWN, FUNDOPLICATION, REDO NISSEN, LAPAROSCOPIC, gastrostomy feeding tube placement;  Surgeon: Katlyn Tobar MD;  Location:  OR    GA ESOPHAGOGASTRODUODENOSCOPY TRANSORAL DIAGNOSTIC N/A 2019    Procedure: UPPER GASTROINTESTINAL ENDOSCOPY;  Surgeon: Dino Ward MD;  Location: [facility];  Service: General    SOFT TISSUE SURGERY  ?    JCARLOS l. thumb          SOCIAL HISTORY: Lives in [place], [place] with his wife Jess. They've been  for 15 years.  He has three daughters, ages 38, 42, 50.  He is retired from working in computer security.  He denies any history of oral tobacco or cigarette use in his lifetime.  Alcohol use that he reports lifetime is five drinks per week since age of 17.  Info from other notes:    Alcohol use: Yes        Alcohol/week: 3.0 - 6.0 standard drinks of alcohol       Types: 1 - 2 Glasses of wine, 1 - 2 Cans of beer, 1 - 2 Shots of liquor per week       Comment: glass of win   He is an avid skier, and enjoy skiing a winter, and sometimes the peripheral neuropathycan interfere with that aspect of quality of life.    FAMILY HISTORY OF CANCER: his mother was diagnosed with uterine cancer in her late 30s; this diagnosis took place in the 1960s, and she  in  of unrelated causes.  He has two sisters, in good health, and his father had a superficial form of skin cancer, otherwise no other family history of cancer.      Allergies:  Allergies as of 10/31/2024 - Reviewed 10/24/2024   Allergen Reaction Noted    Hornets  2020    Wasp venom protein Hives 2016     Bee venom Swelling 05/08/2019       Current Medications:  Current Outpatient Medications   Medication Sig Dispense Refill    atenolol (TENORMIN) 50 MG tablet Take 50 mg by mouth daily       blood glucose (NO BRAND SPECIFIED) test strip Use to test blood sugar 1 times daily or as directed. To accompany: Blood Glucose Monitor Brands: per insurance. 100 strip 6    blood glucose monitoring (NO BRAND SPECIFIED) meter device kit Use to test blood sugar 1 times daily or as directed. Preferred blood glucose meter OR supplies to accompany: Blood Glucose Monitor Brands: per insurance. 1 kit 0    Continuous Glucose Sensor (FREESTYLE SIMIN 2 SENSOR) MISC Use 1 sensor every 14 days. Use to read blood sugars per 's instructions. 6 each 1    cyanocobalamin (VITAMIN B-12) 500 MCG tablet Take 500 mcg by mouth daily.      dexAMETHasone (DECADRON) 4 MG tablet Take 2 tablets (8 mg) by mouth 2 times daily (with meals). Start evening of Docetaxel infusion and continue for a total of 3 doses. 6 tablet 7    EPINEPHrine (ANY BX GENERIC EQUIV) 0.3 MG/0.3ML injection 2-pack 0.3 mg      gabapentin (NEURONTIN) 300 MG capsule Take 1 capsule (300 mg) by mouth 3 times daily. 270 capsule 3    metFORMIN (GLUCOPHAGE XR) 500 MG 24 hr tablet Take 1 tablet (500 mg) by mouth daily (with dinner) 90 tablet 3    nortriptyline (PAMELOR) 25 MG capsule Take 25 mg by mouth daily       ondansetron (ZOFRAN) 8 MG tablet Take 1 tablet (8 mg) by mouth every 8 hours as needed for nausea (vomiting). 30 tablet 2    prochlorperazine (COMPAZINE) 10 MG tablet Take 1 tablet (10 mg) by mouth every 6 hours as needed for nausea or vomiting. 30 tablet 2    thin (NO BRAND SPECIFIED) lancets Use with lanceting device. To accompany: Blood Glucose Monitor Brands: per insurance. 100 each 6        PHYSICAL EXAM:  /75   Pulse 60   Temp 97.4  F (36.3  C) (Oral)   Resp 16   Wt 86.4 kg (190 lb 6.4 oz)   SpO2 98%   BMI 28.32 kg/m    Wt Readings from Last 4  Encounters:   10/31/24 86.4 kg (190 lb 6.4 oz)   10/14/24 81.2 kg (179 lb)   10/03/24 83.8 kg (184 lb 11.2 oz)   09/24/24 83 kg (183 lb)   General: Alert, oriented, pleasant, NAD  HEENT: Normocephalic, atraumatic, no icterus. MMM, no lesions or thrush.   Neck: No cervical or supraclavicular LAD.  Lungs: CTA bilaterally, normal work of breathing  Cardiac: RRR  Abdomen: Soft, nontender, nondistended. Normoactive bowel sounds.   Neuro: CNII-XII grossly intact  Extremities: No pedal edema      Laboratory/Imaging Studies      10/31/24 08:29   Sodium 140   Potassium 4.4   Chloride 108 (H)   Carbon Dioxide (CO2) 23   Urea Nitrogen 12.6   Creatinine 0.91   GFR Estimate >90   Calcium 8.9   Anion Gap 9   Albumin 3.9   Protein Total 6.5   Alkaline Phosphatase 81   ALT 12   AST 20   Bilirubin Total 0.4   Glucose 110 (H)   WBC 7.4   Hemoglobin 14.1   Hematocrit 41.2   Platelet Count 181   RBC Count 4.64   MCV 89   MCH 30.4   MCHC 34.2   RDW 12.4   % Neutrophils 62   % Lymphocytes 26   % Monocytes 9   % Eosinophils 4   % Basophils 0   Absolute Basophils 0.0   Absolute Eosinophils 0.3   Absolute Immature Granulocytes 0.0   Absolute Lymphocytes 1.9   Absolute Monocytes 0.6   % Immature Granulocytes 0   Absolute Neutrophils 4.6   Absolute NRBCs 0.0   NRBCs per 100 WBC 0       ASSESSMENT/PLAN:  Mr. Bal Junior is a 69-year-old gentleman from [place], [place] with a long-standing history over ~two decades of severe acid reflux, necessitating Nissen Fundoplication procedure in 2011 or 2012; in recent years he has experienced a slippage of the fundoplication anatomy.     GE junction adenocarcinoma, localized: Plan to start neoadjuvant FLOT today. He is clinically well and baseline labs are adequate today to start this. I reviewed treatment plan, pre meds, common side effects including fatigue, myelosuppression, cold sensitivity, nausea, anorexia, bowel changes, hair loss. He will have MITZI follow-up prior to each cycle with labs but  should reach out with any interval concerns. He had chemotherapy teaching after the visit by our RNCC.     45 minutes spent on the date of the encounter doing chart review, review of test results, interpretation of tests, patient visit, and documentation     Riya Wiley PA-C

## 2024-10-31 ENCOUNTER — ONCOLOGY VISIT (OUTPATIENT)
Dept: ONCOLOGY | Facility: CLINIC | Age: 69
End: 2024-10-31
Attending: INTERNAL MEDICINE
Payer: MEDICARE

## 2024-10-31 ENCOUNTER — HOME INFUSION BILLING (OUTPATIENT)
Dept: HOME HEALTH SERVICES | Facility: HOME HEALTH | Age: 69
End: 2024-10-31
Payer: COMMERCIAL

## 2024-10-31 ENCOUNTER — INFUSION THERAPY VISIT (OUTPATIENT)
Dept: ONCOLOGY | Facility: CLINIC | Age: 69
End: 2024-10-31
Attending: PHYSICIAN ASSISTANT
Payer: MEDICARE

## 2024-10-31 ENCOUNTER — PATIENT OUTREACH (OUTPATIENT)
Dept: ONCOLOGY | Facility: CLINIC | Age: 69
End: 2024-10-31

## 2024-10-31 ENCOUNTER — APPOINTMENT (OUTPATIENT)
Dept: LAB | Facility: CLINIC | Age: 69
End: 2024-10-31
Attending: PHYSICIAN ASSISTANT
Payer: MEDICARE

## 2024-10-31 VITALS
HEART RATE: 60 BPM | WEIGHT: 190.4 LBS | SYSTOLIC BLOOD PRESSURE: 122 MMHG | DIASTOLIC BLOOD PRESSURE: 75 MMHG | TEMPERATURE: 97.4 F | BODY MASS INDEX: 28.32 KG/M2 | OXYGEN SATURATION: 98 % | RESPIRATION RATE: 16 BRPM

## 2024-10-31 DIAGNOSIS — C16.0 ADENOCARCINOMA OF GASTROESOPHAGEAL JUNCTION (H): Primary | ICD-10-CM

## 2024-10-31 LAB
ALBUMIN SERPL BCG-MCNC: 3.9 G/DL (ref 3.5–5.2)
ALP SERPL-CCNC: 81 U/L (ref 40–150)
ALT SERPL W P-5'-P-CCNC: 12 U/L (ref 0–70)
ANION GAP SERPL CALCULATED.3IONS-SCNC: 9 MMOL/L (ref 7–15)
AST SERPL W P-5'-P-CCNC: 20 U/L (ref 0–45)
BASOPHILS # BLD AUTO: 0 10E3/UL (ref 0–0.2)
BASOPHILS NFR BLD AUTO: 0 %
BILIRUB SERPL-MCNC: 0.4 MG/DL
BUN SERPL-MCNC: 12.6 MG/DL (ref 8–23)
CALCIUM SERPL-MCNC: 8.9 MG/DL (ref 8.8–10.4)
CHLORIDE SERPL-SCNC: 108 MMOL/L (ref 98–107)
CREAT SERPL-MCNC: 0.91 MG/DL (ref 0.67–1.17)
EGFRCR SERPLBLD CKD-EPI 2021: >90 ML/MIN/1.73M2
EOSINOPHIL # BLD AUTO: 0.3 10E3/UL (ref 0–0.7)
EOSINOPHIL NFR BLD AUTO: 4 %
ERYTHROCYTE [DISTWIDTH] IN BLOOD BY AUTOMATED COUNT: 12.4 % (ref 10–15)
GLUCOSE SERPL-MCNC: 110 MG/DL (ref 70–99)
HCO3 SERPL-SCNC: 23 MMOL/L (ref 22–29)
HCT VFR BLD AUTO: 41.2 % (ref 40–53)
HGB BLD-MCNC: 14.1 G/DL (ref 13.3–17.7)
IMM GRANULOCYTES # BLD: 0 10E3/UL
IMM GRANULOCYTES NFR BLD: 0 %
LYMPHOCYTES # BLD AUTO: 1.9 10E3/UL (ref 0.8–5.3)
LYMPHOCYTES NFR BLD AUTO: 26 %
MCH RBC QN AUTO: 30.4 PG (ref 26.5–33)
MCHC RBC AUTO-ENTMCNC: 34.2 G/DL (ref 31.5–36.5)
MCV RBC AUTO: 89 FL (ref 78–100)
MONOCYTES # BLD AUTO: 0.6 10E3/UL (ref 0–1.3)
MONOCYTES NFR BLD AUTO: 9 %
NEUTROPHILS # BLD AUTO: 4.6 10E3/UL (ref 1.6–8.3)
NEUTROPHILS NFR BLD AUTO: 62 %
NRBC # BLD AUTO: 0 10E3/UL
NRBC BLD AUTO-RTO: 0 /100
PLATELET # BLD AUTO: 181 10E3/UL (ref 150–450)
POTASSIUM SERPL-SCNC: 4.4 MMOL/L (ref 3.4–5.3)
PROT SERPL-MCNC: 6.5 G/DL (ref 6.4–8.3)
RBC # BLD AUTO: 4.64 10E6/UL (ref 4.4–5.9)
SODIUM SERPL-SCNC: 140 MMOL/L (ref 135–145)
WBC # BLD AUTO: 7.4 10E3/UL (ref 4–11)

## 2024-10-31 PROCEDURE — 96415 CHEMO IV INFUSION ADDL HR: CPT

## 2024-10-31 PROCEDURE — 250N000011 HC RX IP 250 OP 636: Performed by: PHYSICIAN ASSISTANT

## 2024-10-31 PROCEDURE — 96417 CHEMO IV INFUS EACH ADDL SEQ: CPT

## 2024-10-31 PROCEDURE — A4222 INFUSION SUPPLIES WITH PUMP: HCPCS

## 2024-10-31 PROCEDURE — 85004 AUTOMATED DIFF WBC COUNT: CPT | Performed by: INTERNAL MEDICINE

## 2024-10-31 PROCEDURE — G0498 CHEMO EXTEND IV INFUS W/PUMP: HCPCS

## 2024-10-31 PROCEDURE — 36591 DRAW BLOOD OFF VENOUS DEVICE: CPT | Performed by: INTERNAL MEDICINE

## 2024-10-31 PROCEDURE — 80048 BASIC METABOLIC PNL TOTAL CA: CPT | Performed by: INTERNAL MEDICINE

## 2024-10-31 PROCEDURE — 96368 THER/DIAG CONCURRENT INF: CPT

## 2024-10-31 PROCEDURE — 96413 CHEMO IV INFUSION 1 HR: CPT

## 2024-10-31 PROCEDURE — 96375 TX/PRO/DX INJ NEW DRUG ADDON: CPT

## 2024-10-31 PROCEDURE — 96367 TX/PROPH/DG ADDL SEQ IV INF: CPT

## 2024-10-31 PROCEDURE — G0463 HOSPITAL OUTPT CLINIC VISIT: HCPCS | Performed by: PHYSICIAN ASSISTANT

## 2024-10-31 PROCEDURE — E0781 EXTERNAL AMBULATORY INFUS PU: HCPCS | Mod: RR

## 2024-10-31 PROCEDURE — 99215 OFFICE O/P EST HI 40 MIN: CPT | Performed by: PHYSICIAN ASSISTANT

## 2024-10-31 PROCEDURE — 82947 ASSAY GLUCOSE BLOOD QUANT: CPT | Performed by: INTERNAL MEDICINE

## 2024-10-31 PROCEDURE — 250N000011 HC RX IP 250 OP 636: Performed by: INTERNAL MEDICINE

## 2024-10-31 PROCEDURE — 258N000003 HC RX IP 258 OP 636: Performed by: INTERNAL MEDICINE

## 2024-10-31 RX ORDER — HEPARIN SODIUM (PORCINE) LOCK FLUSH IV SOLN 100 UNIT/ML 100 UNIT/ML
500 SOLUTION INTRAVENOUS ONCE
Status: COMPLETED | OUTPATIENT
Start: 2024-10-31 | End: 2024-10-31

## 2024-10-31 RX ORDER — ONDANSETRON 2 MG/ML
8 INJECTION INTRAMUSCULAR; INTRAVENOUS ONCE
Status: COMPLETED | OUTPATIENT
Start: 2024-10-31 | End: 2024-10-31

## 2024-10-31 RX ADMIN — DEXTROSE MONOHYDRATE 100 ML: 50 INJECTION, SOLUTION INTRAVENOUS at 11:52

## 2024-10-31 RX ADMIN — Medication 500 UNITS: at 08:30

## 2024-10-31 RX ADMIN — ONDANSETRON 8 MG: 2 INJECTION INTRAMUSCULAR; INTRAVENOUS at 10:14

## 2024-10-31 RX ADMIN — OXALIPLATIN 170 MG: 5 INJECTION, SOLUTION INTRAVENOUS at 11:52

## 2024-10-31 RX ADMIN — FOSAPREPITANT: 150 INJECTION, POWDER, LYOPHILIZED, FOR SOLUTION INTRAVENOUS at 10:12

## 2024-10-31 RX ADMIN — LEUCOVORIN CALCIUM 400 MG: 200 INJECTION, POWDER, LYOPHILIZED, FOR SOLUTION INTRAMUSCULAR; INTRAVENOUS at 11:52

## 2024-10-31 RX ADMIN — DOCETAXEL 100 MG: 20 INJECTION, SOLUTION, CONCENTRATE INTRAVENOUS at 10:35

## 2024-10-31 ASSESSMENT — PAIN SCALES - GENERAL: PAINLEVEL_OUTOF10: NO PAIN (0)

## 2024-10-31 NOTE — PROGRESS NOTES
"Infusion Nursing Note:  Bal Junior presents today for Cycle 1 Day 1 docetaxel, oxaliplatin, leucovorin, fluorouracil home pump connect.    Patient seen by provider today: Yes: Riya Wiley PA-C   present during visit today: Not Applicable.    Note: Bal is new to oncology infusion room and is receiving FLOT for the first time. Oriented to infusion room and call light. Chemotherapy teaching done previously by RNCC.  Reinforced chemotherapy teaching/side effects and schedule during infusion visit. Copy of AVS reviewed with patient. Instructed to call care coordinator, triage (or MD on call if after hours/weekends) with chills/temp >=100.4, questions/concerns. Bal stated understanding of plan.    Bal presents today feeling well. Denies pain or nausea/vomiting. Offers no concerns since visit with Riya Wiley prior to infusion.    Shortly after starting docetaxel infusion, Bal reported feeling \"really tired and woozy, like he needed to take a nap.\" Also reported tingling sensation on his tongue. Infusion stopped with immediate resolution of tongue symptoms without intervention. Kept infusion off for several minutes, Bal continued to feel tired, but did not complain of dizziness, chest pain, lip/tongue/throat swelling/tingling/numbness. Restarted infusion and remained next to patient for several more     Prior to discharge: Port is secured in place with tegaderm and flushed with 10cc NS with positive blood return noted.    Continuous home infusion CADD pump connected.    All connectors secured in place and clamps taped open.    Pump started, \"running\" noted on display (CADD): YES.   Capillary element taped to pt's skin per protocol.  Pump Connection double checked with Emily Meese, RN.  Patient instructed to call our clinic or [facility] with any questions or concerns at home.  Patient verbalized understanding.    Patient set up for pump disconnect at our clinic on 11/01/24 at 1400. " Appointment scheduled.        Intravenous Access:  Implanted Port.    Treatment Conditions:     Latest Reference Range & Units 10/31/24 08:29   Sodium 135 - 145 mmol/L 140   Potassium 3.4 - 5.3 mmol/L 4.4   Chloride 98 - 107 mmol/L 108 (H)   Carbon Dioxide (CO2) 22 - 29 mmol/L 23   Urea Nitrogen 8.0 - 23.0 mg/dL 12.6   Creatinine 0.67 - 1.17 mg/dL 0.91   GFR Estimate >60 mL/min/1.73m2 >90   Calcium 8.8 - 10.4 mg/dL 8.9   Anion Gap 7 - 15 mmol/L 9   Albumin 3.5 - 5.2 g/dL 3.9   Protein Total 6.4 - 8.3 g/dL 6.5   Alkaline Phosphatase 40 - 150 U/L 81   ALT 0 - 70 U/L 12   AST 0 - 45 U/L 20   Bilirubin Total <=1.2 mg/dL 0.4   Glucose 70 - 99 mg/dL 110 (H)   WBC 4.0 - 11.0 10e3/uL 7.4   Hemoglobin 13.3 - 17.7 g/dL 14.1   Hematocrit 40.0 - 53.0 % 41.2   Platelet Count 150 - 450 10e3/uL 181   RBC Count 4.40 - 5.90 10e6/uL 4.64   MCV 78 - 100 fL 89   MCH 26.5 - 33.0 pg 30.4   MCHC 31.5 - 36.5 g/dL 34.2   RDW 10.0 - 15.0 % 12.4   % Neutrophils % 62   % Lymphocytes % 26   % Monocytes % 9   % Eosinophils % 4   % Basophils % 0   Absolute Basophils 0.0 - 0.2 10e3/uL 0.0   Absolute Eosinophils 0.0 - 0.7 10e3/uL 0.3   Absolute Immature Granulocytes <=0.4 10e3/uL 0.0   Absolute Lymphocytes 0.8 - 5.3 10e3/uL 1.9   Absolute Monocytes 0.0 - 1.3 10e3/uL 0.6   % Immature Granulocytes % 0   Absolute Neutrophils 1.6 - 8.3 10e3/uL 4.6   Absolute NRBCs 10e3/uL 0.0   NRBCs per 100 WBC <1 /100 0     Results reviewed, labs MET treatment parameters, ok to proceed with treatment.      Post Infusion Assessment:  Patient tolerated infusion without incident.  Blood return noted pre and post infusion.  Site patent and intact, free from redness, edema or discomfort.  No evidence of extravasations.      Discharge Plan:   Prescription refills given for zofran, compazine, decadron.  Discharge instructions reviewed with: Patient.  Patient and/or family verbalized understanding of discharge instructions and all questions answered.  Copy of AVS reviewed  with patient and/or family.  Patient will return tomorrow for next infusion appointment.  Patient discharged in stable condition accompanied by: wife.  Departure Mode: Ambulatory.      Pau Farias RN

## 2024-10-31 NOTE — NURSING NOTE
"Chief Complaint   Patient presents with    Port Draw     Labs drawn via port by RN in lab.  VS taken       Port accessed with 20 gauge 3/4\" Power needle by RN, labs collected, line flushed with saline and heparin.  Vitals taken. Pt checked in for appointment(s).    Kelly Mac RN    "

## 2024-10-31 NOTE — LETTER
10/31/2024      Bal Junior  [address]  [facility] [address]      Dear Colleague,    Thank you for referring your patient, Bal Junior, to the [facility]. Please see a copy of my visit note below.    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 Mc's consult note):  Mr. Junior is a 69 year old male with history of severe acid reflux, s/p fundoplication around 2011/12, and postprandial hypoglycemia and neuritis/neuropathy, of unclear etiology.      He has been followed routinely with upper endoscopies over the last decade, many of which have been done within the [facility] system.  The ones done over the last seven years include ones performed on August 30, 2017, December 4, 2018, May 9, 2019, and most recently as September 24, 2024.    He had a CT scan in January 2024 with results as follows:     January 26, 2024--CT a/p--IMPRESSION:   1.  Superior aspect of the Nissen fundal wrap appears about 2 cm above  the diaphragm possibly representing slipped Nissen.  2.  Hepatic steatosis.  3.  Nonobstructing renal calculi. No hydronephrosis.    He was experiencing dysphagia and slipped Nissen fundoplication and related issues, some of which were said to be related to worsening occasional reflux, and chest pressure associate with early satiety.  On April 30, 2024, he underwent barium swallow, with results as follows:  IMPRESSION:  1. Timed barium esophagram results as detailed above with retained  esophageal contrast lasting up to 5 minutes. There is patent flow of  contrast.   2. Expected passage of barium tablet into the stomach.      He had continued evaluation and during the summer of 2024, including on July 23, 2024. Part of the  "assessment included impression that the \"Manometry was consistent with inconclusive manometric EGJOO. Esophagram showed routine contrast lasting up to 5 minutes but patent flow of contrast and tablet.\"      Further evaluation included upper endoscopy, that revealed a nodularity at the gastroesophageal junction, concerning for malignancy, as follows:  September 24, 2024--EGD--Evidence of a Nissen fundoplication was found at the gastroesophageal   junction. The wrap appeared loose. This was traversed.   Localized moderate mucosal changes characterized by erythema and   nodularity were found at the gastroesophageal junction. Biopsies were   taken with a cold forceps for histology.   Specimen:    Gastric Esophageal Junction, Gastroesophageal biopsy                                      Final Diagnosis   A. GASTROESOPHAGEAL JUNCTION, BIOPSY:  - Adenocarcinoma, poorly-differentiated, with signet ring cell features     GASTRIC HER2 BIOMARKER TESTS   Test(s) Performed     HER2 by IHC     Results         With this new finding of malignancy, a staging PET/CT was performed, and no distant metastasis was detected:  October 1, 2024--PET/CT --                                                                 IMPRESSION Findings suspicious for the gastroesophageal junction adenocarcinoma without convincing evidence of tona or metastatic disease.      He further underwent a staging EUS, that characterized the tumor as staged as T2N0 per EUS criteria:      Oct 8, 2024 EUS--    Impression:            - Malignant esophageal tumor was found at the                          gastroesophageal junction. Siewert 2. Measurements as                          above.                          This is classified T2 N0 (cytology pending) M0 by EUS                          criteria.                          Three small lymph nodes seen as described above. Two                          were sampled (station 8R) and are preliminarily                       "    benign. The third was identical in size and appearance                          and not amenable to sampling due to the proximity to                          the heart and proximal margin of the mass.                          - Z-line, 37 cm from the incisors.                          - A Nissen fundoplication was found. This has                          partially slipped above the diaphragm.   Specimen A                 Interpretation:                  Negative for malignancy  Polymorphous population of lymphocytes present                 Adequacy:                 Satisfactory for evaluation        He met with Dr. Katlyn Tobar and Dr. Lawrence Mc with plan for neoadjuvant FLOT x 4 followed by surgical resection and then 4 adjuvant cycles of FLOT.      He presents today to start cycle 1 of FLOT.     Interval History: Bal is here today with his wife. He is overall doing well. He notes very slight dysphagia at times with eating otherwise is feeling well. No pain, anorexia, or other symptoms. Energy has been good. No changes to bowels or bladder. No fevers/chills or infectious concerns.     He has some questions about chemotherapy as he has not had any teaching or information on this yet. He does feel ready to begin today.         Past medical, social, surgical, and family histories reviewed, confirmed, and pertinent details discussed with patient and family; additional sources include the medical record.      Past Medical History:   Diagnosis Date     Hypertension      Neuritis      Peripheral neuropathy      Personal history of other medical treatment     postgastrectomy dumping syndrome     Stomach problems Noted earlier      Past Surgical History:   Procedure Laterality Date     ABDOMEN SURGERY  2012?     APPENDECTOMY  1964?     COLONOSCOPY  2006, 2016     ESOPHAGEAL BALLOON PROVOCATION STUDY N/A 9/24/2024    Procedure: Esophageal Balloon Provocation Study;  Surgeon: Carson Michel DO;  Location:  GI      ESOPHAGOSCOPY, GASTROSCOPY, DUODENOSCOPY (EGD), COMBINED N/A 2024    Procedure: ESOPHAGOGASTRODUODENOSCOPY, WITH BIOPSY;  Surgeon: Carson Michel DO;  Location:  GI     ESOPHAGOSCOPY, GASTROSCOPY, DUODENOSCOPY (EGD), COMBINED N/A 10/8/2024    Procedure: ESOPHAGOGASTRODUODENOSCOPY, WITH FINE NEEDLE ASPIRATION BIOPSY, WITH ENDOSCOPIC ULTRASOUND GUIDANCE;  Surgeon: Lance Lopez MD;  Location:  GI     EYE SURGERY  199x    laser for retinal tears     GASTRIC FUNDOPLICATION       HERNIA REPAIR  ?     IR CHEST PORT PLACEMENT > 5 YRS OF AGE  10/24/2024     LAPAROSCOPIC TAKEDOWN NISSEN FUNDOPLICATION N/A 2020    Procedure: TAKE-DOWN, FUNDOPLICATION, REDO NISSEN, LAPAROSCOPIC, gastrostomy feeding tube placement;  Surgeon: Katlyn Tobar MD;  Location:  OR     DC ESOPHAGOGASTRODUODENOSCOPY TRANSORAL DIAGNOSTIC N/A 2019    Procedure: UPPER GASTROINTESTINAL ENDOSCOPY;  Surgeon: Dino Ward MD;  Location: [facility];  Service: General     SOFT TISSUE SURGERY  ?    MCL l. thumb          SOCIAL HISTORY: Lives in [place], [place] with his wife Jess. They've been  for 15 years.  He has three daughters, ages 38, 42, 50.  He is retired from working in computer security.  He denies any history of oral tobacco or cigarette use in his lifetime.  Alcohol use that he reports lifetime is five drinks per week since age of 17.  Info from other notes:    Alcohol use: Yes        Alcohol/week: 3.0 - 6.0 standard drinks of alcohol       Types: 1 - 2 Glasses of wine, 1 - 2 Cans of beer, 1 - 2 Shots of liquor per week       Comment: glass of win   He is an avid skier, and enjoy skiing a winter, and sometimes the peripheral neuropathycan interfere with that aspect of quality of life.    FAMILY HISTORY OF CANCER: his mother was diagnosed with uterine cancer in her late 30s; this diagnosis took place in the 1960s, and she  in  of unrelated causes.  He has two sisters, in good health,  and his father had a superficial form of skin cancer, otherwise no other family history of cancer.      Allergies:  Allergies as of 10/31/2024 - Reviewed 10/24/2024   Allergen Reaction Noted     Hornets  07/02/2020     Wasp venom protein Hives 06/26/2016     Bee venom Swelling 05/08/2019       Current Medications:  Current Outpatient Medications   Medication Sig Dispense Refill     atenolol (TENORMIN) 50 MG tablet Take 50 mg by mouth daily        blood glucose (NO BRAND SPECIFIED) test strip Use to test blood sugar 1 times daily or as directed. To accompany: Blood Glucose Monitor Brands: per insurance. 100 strip 6     blood glucose monitoring (NO BRAND SPECIFIED) meter device kit Use to test blood sugar 1 times daily or as directed. Preferred blood glucose meter OR supplies to accompany: Blood Glucose Monitor Brands: per insurance. 1 kit 0     Continuous Glucose Sensor (FREESTYLE SIMIN 2 SENSOR) MISC Use 1 sensor every 14 days. Use to read blood sugars per 's instructions. 6 each 1     cyanocobalamin (VITAMIN B-12) 500 MCG tablet Take 500 mcg by mouth daily.       dexAMETHasone (DECADRON) 4 MG tablet Take 2 tablets (8 mg) by mouth 2 times daily (with meals). Start evening of Docetaxel infusion and continue for a total of 3 doses. 6 tablet 7     EPINEPHrine (ANY BX GENERIC EQUIV) 0.3 MG/0.3ML injection 2-pack 0.3 mg       gabapentin (NEURONTIN) 300 MG capsule Take 1 capsule (300 mg) by mouth 3 times daily. 270 capsule 3     metFORMIN (GLUCOPHAGE XR) 500 MG 24 hr tablet Take 1 tablet (500 mg) by mouth daily (with dinner) 90 tablet 3     nortriptyline (PAMELOR) 25 MG capsule Take 25 mg by mouth daily        ondansetron (ZOFRAN) 8 MG tablet Take 1 tablet (8 mg) by mouth every 8 hours as needed for nausea (vomiting). 30 tablet 2     prochlorperazine (COMPAZINE) 10 MG tablet Take 1 tablet (10 mg) by mouth every 6 hours as needed for nausea or vomiting. 30 tablet 2     thin (NO BRAND SPECIFIED) lancets Use with  lanceting device. To accompany: Blood Glucose Monitor Brands: per insurance. 100 each 6        PHYSICAL EXAM:  /75   Pulse 60   Temp 97.4  F (36.3  C) (Oral)   Resp 16   Wt 86.4 kg (190 lb 6.4 oz)   SpO2 98%   BMI 28.32 kg/m    Wt Readings from Last 4 Encounters:   10/31/24 86.4 kg (190 lb 6.4 oz)   10/14/24 81.2 kg (179 lb)   10/03/24 83.8 kg (184 lb 11.2 oz)   09/24/24 83 kg (183 lb)   General: Alert, oriented, pleasant, NAD  HEENT: Normocephalic, atraumatic, no icterus. MMM, no lesions or thrush.   Neck: No cervical or supraclavicular LAD.  Lungs: CTA bilaterally, normal work of breathing  Cardiac: RRR  Abdomen: Soft, nontender, nondistended. Normoactive bowel sounds.   Neuro: CNII-XII grossly intact  Extremities: No pedal edema      Laboratory/Imaging Studies      10/31/24 08:29   Sodium 140   Potassium 4.4   Chloride 108 (H)   Carbon Dioxide (CO2) 23   Urea Nitrogen 12.6   Creatinine 0.91   GFR Estimate >90   Calcium 8.9   Anion Gap 9   Albumin 3.9   Protein Total 6.5   Alkaline Phosphatase 81   ALT 12   AST 20   Bilirubin Total 0.4   Glucose 110 (H)   WBC 7.4   Hemoglobin 14.1   Hematocrit 41.2   Platelet Count 181   RBC Count 4.64   MCV 89   MCH 30.4   MCHC 34.2   RDW 12.4   % Neutrophils 62   % Lymphocytes 26   % Monocytes 9   % Eosinophils 4   % Basophils 0   Absolute Basophils 0.0   Absolute Eosinophils 0.3   Absolute Immature Granulocytes 0.0   Absolute Lymphocytes 1.9   Absolute Monocytes 0.6   % Immature Granulocytes 0   Absolute Neutrophils 4.6   Absolute NRBCs 0.0   NRBCs per 100 WBC 0       ASSESSMENT/PLAN:  Mr. Bal Junior is a 69-year-old gentleman from [place], [place] with a long-standing history over ~two decades of severe acid reflux, necessitating Nissen Fundoplication procedure in 2011 or 2012; in recent years he has experienced a slippage of the fundoplication anatomy.     GE junction adenocarcinoma, localized: Plan to start neoadjuvant FLOT today. He is clinically well and  baseline labs are adequate today to start this. I reviewed treatment plan, pre meds, common side effects including fatigue, myelosuppression, cold sensitivity, nausea, anorexia, bowel changes, hair loss. He will have MITZI follow-up prior to each cycle with labs but should reach out with any interval concerns. He had chemotherapy teaching after the visit by our RNCC.     45 minutes spent on the date of the encounter doing chart review, review of test results, interpretation of tests, patient visit, and documentation     Riya Wiley PA-C                   Again, thank you for allowing me to participate in the care of your patient.        Sincerely,        Riya Wiley PA-C

## 2024-10-31 NOTE — PATIENT INSTRUCTIONS
[facility] Triage and after hours / weekends / holidays:  [phone]    Please call the triage or after hours line if you experience a temperature greater than or equal to 100.4, shaking chills, have uncontrolled nausea, vomiting and/or diarrhea, dizziness, shortness of breath, chest pain, bleeding, unexplained bruising, or if you have any other new/concerning symptoms, questions, or concerns.      If you are having any concerning symptoms or wish to speak to a provider before your next infusion visit, please call your care coordinator or triage to notify them so we can adequately serve you.     If you need a refill on a narcotic prescription or other medication, please call before your infusion appointment.

## 2024-10-31 NOTE — TELEPHONE ENCOUNTER
RN Care Coordination Note     OUTGOING CALL: spoke with patient via telephone call    Provided RNCC contact information, [facility] phone number (which has options to talk with a Nurse available 24/7 - triage and RNCC via this option during business hours).   Reviewed appropriate use of [employer], not to be used for symptom reporting.   Reviewed [facility] care team members including midlevel providers in oncology dept and Dr. Mc's usual clinic hours.    Chemo Teach  re: FLOT treatment plan   -See Pt Education documentation - Chemo Effects (Adult)  -Reviewed treatment schedule including cycle length, lab monitoring and prn medications.  -Verbal and written instruction provided using:   [facility] Drug Information sent via [employer] 10/15: reviewed Possible Side Effects, When to Contact Your Doctor of Health Care Provider and Self Care Tips sections.     We discussed what to expect on day of infusion / [facility] Infusion visitor policy    Take home medications: prochlorperazine and ondansetron for nausea, loperamide for diarrhea and dexamethasone start this evening x3 days    Pt is outside of [facility] Infusion services, will have pump disconnect appointments at [facility] location, except for [place] will come to the [facility] for disconnect.    Follow-up visit: GI MITZI will be added prior to each cycle in person.    Pt voiced understanding of above instructions and information and denied further questions

## 2024-10-31 NOTE — NURSING NOTE
"Oncology Rooming Note    October 31, 2024 8:36 AM   Bal Junior is a 69 year old male who presents for:    Chief Complaint   Patient presents with    Port Draw     Labs drawn via port by RN in lab.  VS taken    Oncology Clinic Visit     Ge Junction Adenocarcinoma      Initial Vitals: /75   Pulse 60   Temp 97.4  F (36.3  C) (Oral)   Resp 16   Wt 86.4 kg (190 lb 6.4 oz)   SpO2 98%   BMI 28.32 kg/m   Estimated body mass index is 28.32 kg/m  as calculated from the following:    Height as of 10/14/24: 1.746 m (5' 8.75\").    Weight as of this encounter: 86.4 kg (190 lb 6.4 oz). Body surface area is 2.05 meters squared.  No Pain (0) Comment: Data Unavailable   No LMP for male patient.  Allergies reviewed: Yes  Medications reviewed: Yes    Medications: Medication refills not needed today.  Pharmacy name entered into [employer]: [facility] - [place], [place] - [facility] [address]    Frailty Screening:   Is the patient here for a new oncology consult visit in cancer care? 2. No      Clinical concerns:  none      Mar Murray              "

## 2024-11-01 ENCOUNTER — INFUSION THERAPY VISIT (OUTPATIENT)
Dept: ONCOLOGY | Facility: CLINIC | Age: 69
End: 2024-11-01
Attending: INTERNAL MEDICINE
Payer: MEDICARE

## 2024-11-01 VITALS
OXYGEN SATURATION: 99 % | TEMPERATURE: 97.5 F | DIASTOLIC BLOOD PRESSURE: 80 MMHG | HEART RATE: 98 BPM | RESPIRATION RATE: 16 BRPM | SYSTOLIC BLOOD PRESSURE: 129 MMHG

## 2024-11-01 DIAGNOSIS — C16.0 ADENOCARCINOMA OF GASTROESOPHAGEAL JUNCTION (H): Primary | ICD-10-CM

## 2024-11-01 PROCEDURE — 250N000011 HC RX IP 250 OP 636: Performed by: INTERNAL MEDICINE

## 2024-11-01 RX ORDER — HEPARIN SODIUM (PORCINE) LOCK FLUSH IV SOLN 100 UNIT/ML 100 UNIT/ML
5 SOLUTION INTRAVENOUS
Status: DISCONTINUED | OUTPATIENT
Start: 2024-11-01 | End: 2024-11-01 | Stop reason: HOSPADM

## 2024-11-01 RX ADMIN — Medication 5 ML: at 13:52

## 2024-11-01 ASSESSMENT — PAIN SCALES - GENERAL: PAINLEVEL_OUTOF10: MODERATE PAIN (5)

## 2024-11-01 NOTE — PROGRESS NOTES
Infusion Nursing Note:  Bal Junior presents today for pump disconnect.    Patient seen by provider today: No   present during visit today: Not Applicable.    Note: Pt arrived to infusion for pump disconnect.  Denies recent fever/chills/sore throat, CP/cough/SOB, N/V/C, (reports two loose stools overnight after having been constipated), denies skin breakdown, urinary symptoms or other s/s infection.      Intravenous Access:  Implanted Port.    Treatment Conditions:  Not Applicable.      Post Infusion Assessment:  Reservoir volume of 5FU CADD pump was 0.  Patient tolerated infusion without incident.  Blood return noted pre and post infusion.  Site patent and intact, free from redness, edema or discomfort.  No evidence of extravasations.  Access discontinued per protocol.       Discharge Plan:   Patient declined prescription refills.  Discharge instructions reviewed with: Patient.  Patient and/or family verbalized understanding of discharge instructions and all questions answered.  AVS to patient via [employer].  Patient will return 11/13 ([facility]) for next appointment.   Patient discharged in stable condition accompanied by: self.  Departure Mode: Ambulatory.    Administrations This Visit       heparin lock flush 100 unit/mL injection 5 mL       Admin Date  11/01/2024 Action  $Given Dose  5 mL Route  Intracatheter Documented By  Luisana Moyer RN              sodium chloride (PF) 0.9% PF flush 3-20 mL       Admin Date  11/01/2024 Action  $Given Dose  10 mL Route  Intracatheter Documented By  Luisana Moyer RN                        Luisana Moyer RN

## 2024-11-04 RX ORDER — DIPHENHYDRAMINE HYDROCHLORIDE 50 MG/ML
50 INJECTION INTRAMUSCULAR; INTRAVENOUS
Start: 2024-11-28

## 2024-11-04 RX ORDER — METHYLPREDNISOLONE SODIUM SUCCINATE 40 MG/ML
40 INJECTION INTRAMUSCULAR; INTRAVENOUS
Status: CANCELLED
Start: 2024-11-14

## 2024-11-04 RX ORDER — DIPHENHYDRAMINE HYDROCHLORIDE 50 MG/ML
25 INJECTION INTRAMUSCULAR; INTRAVENOUS
Start: 2024-12-12

## 2024-11-04 RX ORDER — EPINEPHRINE 1 MG/ML
0.3 INJECTION, SOLUTION, CONCENTRATE INTRAVENOUS EVERY 5 MIN PRN
OUTPATIENT
Start: 2024-12-12

## 2024-11-04 RX ORDER — ALBUTEROL SULFATE 90 UG/1
1-2 INHALANT RESPIRATORY (INHALATION)
Start: 2024-11-28

## 2024-11-04 RX ORDER — ALBUTEROL SULFATE 0.83 MG/ML
2.5 SOLUTION RESPIRATORY (INHALATION)
OUTPATIENT
Start: 2024-11-28

## 2024-11-04 RX ORDER — EPINEPHRINE 1 MG/ML
0.3 INJECTION, SOLUTION, CONCENTRATE INTRAVENOUS EVERY 5 MIN PRN
OUTPATIENT
Start: 2024-11-28

## 2024-11-04 RX ORDER — DIPHENHYDRAMINE HYDROCHLORIDE 50 MG/ML
25 INJECTION INTRAMUSCULAR; INTRAVENOUS
Start: 2024-11-28

## 2024-11-04 RX ORDER — ALBUTEROL SULFATE 0.83 MG/ML
2.5 SOLUTION RESPIRATORY (INHALATION)
OUTPATIENT
Start: 2024-12-12

## 2024-11-04 RX ORDER — ALBUTEROL SULFATE 90 UG/1
1-2 INHALANT RESPIRATORY (INHALATION)
Start: 2024-12-12

## 2024-11-04 RX ORDER — ALBUTEROL SULFATE 90 UG/1
1-2 INHALANT RESPIRATORY (INHALATION)
Status: CANCELLED
Start: 2024-11-14

## 2024-11-04 RX ORDER — DIPHENHYDRAMINE HYDROCHLORIDE 50 MG/ML
25 INJECTION INTRAMUSCULAR; INTRAVENOUS
Status: CANCELLED
Start: 2024-11-14

## 2024-11-04 RX ORDER — ALBUTEROL SULFATE 0.83 MG/ML
2.5 SOLUTION RESPIRATORY (INHALATION)
Status: CANCELLED | OUTPATIENT
Start: 2024-11-14

## 2024-11-04 RX ORDER — METHYLPREDNISOLONE SODIUM SUCCINATE 40 MG/ML
40 INJECTION INTRAMUSCULAR; INTRAVENOUS
Start: 2024-12-12

## 2024-11-04 RX ORDER — MEPERIDINE HYDROCHLORIDE 25 MG/ML
25 INJECTION INTRAMUSCULAR; INTRAVENOUS; SUBCUTANEOUS
Status: CANCELLED | OUTPATIENT
Start: 2024-11-14

## 2024-11-04 RX ORDER — METHYLPREDNISOLONE SODIUM SUCCINATE 40 MG/ML
40 INJECTION INTRAMUSCULAR; INTRAVENOUS
Start: 2024-11-28

## 2024-11-04 RX ORDER — MEPERIDINE HYDROCHLORIDE 25 MG/ML
25 INJECTION INTRAMUSCULAR; INTRAVENOUS; SUBCUTANEOUS
OUTPATIENT
Start: 2024-12-12

## 2024-11-04 RX ORDER — DIPHENHYDRAMINE HYDROCHLORIDE 50 MG/ML
50 INJECTION INTRAMUSCULAR; INTRAVENOUS
Status: CANCELLED
Start: 2024-11-14

## 2024-11-04 RX ORDER — DIPHENHYDRAMINE HYDROCHLORIDE 50 MG/ML
50 INJECTION INTRAMUSCULAR; INTRAVENOUS
Start: 2024-12-12

## 2024-11-04 RX ORDER — EPINEPHRINE 1 MG/ML
0.3 INJECTION, SOLUTION, CONCENTRATE INTRAVENOUS EVERY 5 MIN PRN
Status: CANCELLED | OUTPATIENT
Start: 2024-11-14

## 2024-11-04 RX ORDER — MEPERIDINE HYDROCHLORIDE 25 MG/ML
25 INJECTION INTRAMUSCULAR; INTRAVENOUS; SUBCUTANEOUS
OUTPATIENT
Start: 2024-11-28

## 2024-11-06 ENCOUNTER — PATIENT OUTREACH (OUTPATIENT)
Dept: ONCOLOGY | Facility: CLINIC | Age: 69
End: 2024-11-06
Payer: COMMERCIAL

## 2024-11-07 ENCOUNTER — HOME INFUSION (OUTPATIENT)
Dept: HOME HEALTH SERVICES | Facility: HOME HEALTH | Age: 69
End: 2024-11-07
Payer: COMMERCIAL

## 2024-11-09 ENCOUNTER — NURSE TRIAGE (OUTPATIENT)
Dept: NURSING | Facility: CLINIC | Age: 69
End: 2024-11-09
Payer: COMMERCIAL

## 2024-11-09 NOTE — TELEPHONE ENCOUNTER
"Nurse Triage SBAR    Is this a 2nd Level Triage? No    Situation/Background: Patient is calling regarding diarrhea the last few days, since afternoon Thursday, 11/7. Patient states he has lost 4 pounds in the last few days. Patient thinks he had 6-8 diarrhea stools since Thursday afternoon.     Background: Patient had first infusion treatment on 10/31/24.       Assessment:   Gas pains in abdomen - mild at time of call  Is not passing gas between stools   Patient reports diarrhea stools \"every 3-4 hours\" since Thursday.       Recommendation: Per disposition, Go to ED now. Patient stated he is not certain if he will go to ED. Patient stated he would call [facility] ED to ask what they would do. Rationale reinforced. Advised to call back if additional concerns. Declines additional questions. Advised patient to call back with any new or worsening symptoms. Patient verbalized understanding and agrees with plan.    Protocol Recommended Disposition: Emergency department    Kalyani Sorenson RN on 11/9/2024 at 9:18 AM  [facility]  Reason for Disposition   [1] Neutropenia known or suspected (e.g., recent cancer chemotherapy) AND [2] new-onset of diarrhea    Additional Information   Negative: Shock suspected (e.g., cold/pale/clammy skin, too weak to stand, low BP, rapid pulse)   Negative: Difficult to awaken or acting confused (e.g., disoriented, slurred speech)   Negative: Sounds like a life-threatening emergency to the triager   Negative: Vomiting also present and worse than the diarrhea   Negative: Fecal impaction suspected (with leakage of watery stool around impaction)   Negative: [1] SEVERE abdominal pain (e.g., excruciating) AND [2] present > 1 hour   Negative: [1] SEVERE abdominal pain AND [2] age > 60 years   Negative: [1] Blood in the stool AND [2] moderate or large amount of blood   Negative: Black or tarry bowel movements  (Exception: Chronic-unchanged black-grey BMs AND is taking iron " pills or Pepto-Bismol.)    Protocols used: Cancer - Diarrhea-A-AH

## 2024-11-12 PROCEDURE — E1399 DURABLE MEDICAL EQUIPMENT MI: HCPCS

## 2024-11-13 ENCOUNTER — VIRTUAL VISIT (OUTPATIENT)
Dept: ONCOLOGY | Facility: CLINIC | Age: 69
End: 2024-11-13
Attending: INTERNAL MEDICINE
Payer: MEDICARE

## 2024-11-13 ENCOUNTER — INFUSION THERAPY VISIT (OUTPATIENT)
Dept: INFUSION THERAPY | Facility: HOSPITAL | Age: 69
End: 2024-11-13
Attending: FAMILY MEDICINE
Payer: MEDICARE

## 2024-11-13 ENCOUNTER — HOME INFUSION BILLING (OUTPATIENT)
Dept: HOME HEALTH SERVICES | Facility: HOME HEALTH | Age: 69
End: 2024-11-13
Payer: COMMERCIAL

## 2024-11-13 ENCOUNTER — INFUSION THERAPY VISIT (OUTPATIENT)
Dept: INFUSION THERAPY | Facility: HOSPITAL | Age: 69
End: 2024-11-13
Attending: INTERNAL MEDICINE
Payer: MEDICARE

## 2024-11-13 VITALS — HEIGHT: 70 IN | BODY MASS INDEX: 26.05 KG/M2 | WEIGHT: 182 LBS

## 2024-11-13 VITALS
WEIGHT: 187 LBS | DIASTOLIC BLOOD PRESSURE: 84 MMHG | OXYGEN SATURATION: 97 % | BODY MASS INDEX: 26.83 KG/M2 | SYSTOLIC BLOOD PRESSURE: 119 MMHG | HEART RATE: 58 BPM | TEMPERATURE: 98 F | RESPIRATION RATE: 16 BRPM

## 2024-11-13 DIAGNOSIS — C16.0 ADENOCARCINOMA OF GASTROESOPHAGEAL JUNCTION (H): Primary | ICD-10-CM

## 2024-11-13 LAB
ALBUMIN SERPL BCG-MCNC: 3.7 G/DL (ref 3.5–5.2)
ALP SERPL-CCNC: 75 U/L (ref 40–150)
ALT SERPL W P-5'-P-CCNC: 18 U/L (ref 0–70)
ANION GAP SERPL CALCULATED.3IONS-SCNC: 11 MMOL/L (ref 7–15)
AST SERPL W P-5'-P-CCNC: 20 U/L (ref 0–45)
BASOPHILS # BLD AUTO: 0.1 10E3/UL (ref 0–0.2)
BASOPHILS NFR BLD AUTO: 1 %
BILIRUB SERPL-MCNC: 0.2 MG/DL
BUN SERPL-MCNC: 12.7 MG/DL (ref 8–23)
CALCIUM SERPL-MCNC: 8.5 MG/DL (ref 8.8–10.4)
CHLORIDE SERPL-SCNC: 103 MMOL/L (ref 98–107)
CREAT SERPL-MCNC: 0.89 MG/DL (ref 0.67–1.17)
EGFRCR SERPLBLD CKD-EPI 2021: >90 ML/MIN/1.73M2
EOSINOPHIL # BLD AUTO: 0.1 10E3/UL (ref 0–0.7)
EOSINOPHIL NFR BLD AUTO: 2 %
ERYTHROCYTE [DISTWIDTH] IN BLOOD BY AUTOMATED COUNT: 12.2 % (ref 10–15)
GLUCOSE SERPL-MCNC: 110 MG/DL (ref 70–99)
HCO3 SERPL-SCNC: 23 MMOL/L (ref 22–29)
HCT VFR BLD AUTO: 39.9 % (ref 40–53)
HGB BLD-MCNC: 13.9 G/DL (ref 13.3–17.7)
IMM GRANULOCYTES # BLD: 0 10E3/UL
IMM GRANULOCYTES NFR BLD: 1 %
LYMPHOCYTES # BLD AUTO: 1.9 10E3/UL (ref 0.8–5.3)
LYMPHOCYTES NFR BLD AUTO: 46 %
MCH RBC QN AUTO: 30.8 PG (ref 26.5–33)
MCHC RBC AUTO-ENTMCNC: 34.8 G/DL (ref 31.5–36.5)
MCV RBC AUTO: 88 FL (ref 78–100)
MONOCYTES # BLD AUTO: 0.9 10E3/UL (ref 0–1.3)
MONOCYTES NFR BLD AUTO: 22 %
NEUTROPHILS # BLD AUTO: 1.2 10E3/UL (ref 1.6–8.3)
NEUTROPHILS NFR BLD AUTO: 28 %
NRBC # BLD AUTO: 0 10E3/UL
NRBC BLD AUTO-RTO: 0 /100
PLATELET # BLD AUTO: 216 10E3/UL (ref 150–450)
POTASSIUM SERPL-SCNC: 4.6 MMOL/L (ref 3.4–5.3)
PROT SERPL-MCNC: 6.4 G/DL (ref 6.4–8.3)
RBC # BLD AUTO: 4.52 10E6/UL (ref 4.4–5.9)
SODIUM SERPL-SCNC: 137 MMOL/L (ref 135–145)
WBC # BLD AUTO: 4.2 10E3/UL (ref 4–11)

## 2024-11-13 PROCEDURE — A4222 INFUSION SUPPLIES WITH PUMP: HCPCS

## 2024-11-13 PROCEDURE — 96375 TX/PRO/DX INJ NEW DRUG ADDON: CPT

## 2024-11-13 PROCEDURE — A4221 SUPP NON-INSULIN INF CATH/WK: HCPCS

## 2024-11-13 PROCEDURE — 96413 CHEMO IV INFUSION 1 HR: CPT

## 2024-11-13 PROCEDURE — 96368 THER/DIAG CONCURRENT INF: CPT

## 2024-11-13 PROCEDURE — G0498 CHEMO EXTEND IV INFUS W/PUMP: HCPCS

## 2024-11-13 PROCEDURE — 82040 ASSAY OF SERUM ALBUMIN: CPT | Performed by: INTERNAL MEDICINE

## 2024-11-13 PROCEDURE — 85004 AUTOMATED DIFF WBC COUNT: CPT | Performed by: INTERNAL MEDICINE

## 2024-11-13 PROCEDURE — 36591 DRAW BLOOD OFF VENOUS DEVICE: CPT | Performed by: INTERNAL MEDICINE

## 2024-11-13 PROCEDURE — 258N000003 HC RX IP 258 OP 636: Performed by: INTERNAL MEDICINE

## 2024-11-13 PROCEDURE — 96415 CHEMO IV INFUSION ADDL HR: CPT

## 2024-11-13 PROCEDURE — 96417 CHEMO IV INFUS EACH ADDL SEQ: CPT

## 2024-11-13 PROCEDURE — 250N000011 HC RX IP 250 OP 636: Performed by: INTERNAL MEDICINE

## 2024-11-13 PROCEDURE — 96367 TX/PROPH/DG ADDL SEQ IV INF: CPT

## 2024-11-13 RX ORDER — METHYLPREDNISOLONE SODIUM SUCCINATE 40 MG/ML
40 INJECTION INTRAMUSCULAR; INTRAVENOUS
Status: DISCONTINUED | OUTPATIENT
Start: 2024-11-13 | End: 2024-11-13 | Stop reason: HOSPADM

## 2024-11-13 RX ORDER — MEPERIDINE HYDROCHLORIDE 50 MG/ML
25 INJECTION INTRAMUSCULAR; INTRAVENOUS; SUBCUTANEOUS
Status: DISCONTINUED | OUTPATIENT
Start: 2024-11-13 | End: 2024-11-13 | Stop reason: HOSPADM

## 2024-11-13 RX ORDER — DIPHENHYDRAMINE HYDROCHLORIDE 50 MG/ML
25 INJECTION INTRAMUSCULAR; INTRAVENOUS
Status: DISCONTINUED | OUTPATIENT
Start: 2024-11-13 | End: 2024-11-13 | Stop reason: HOSPADM

## 2024-11-13 RX ORDER — ALBUTEROL SULFATE 90 UG/1
1-2 INHALANT RESPIRATORY (INHALATION)
Status: DISCONTINUED | OUTPATIENT
Start: 2024-11-13 | End: 2024-11-13 | Stop reason: HOSPADM

## 2024-11-13 RX ORDER — EPINEPHRINE 1 MG/ML
0.3 INJECTION, SOLUTION INTRAMUSCULAR; SUBCUTANEOUS EVERY 5 MIN PRN
Status: DISCONTINUED | OUTPATIENT
Start: 2024-11-13 | End: 2024-11-13 | Stop reason: HOSPADM

## 2024-11-13 RX ORDER — ONDANSETRON 2 MG/ML
8 INJECTION INTRAMUSCULAR; INTRAVENOUS ONCE
Status: COMPLETED | OUTPATIENT
Start: 2024-11-13 | End: 2024-11-13

## 2024-11-13 RX ORDER — DEXAMETHASONE 4 MG/1
8 TABLET ORAL 2 TIMES DAILY WITH MEALS
Qty: 6 TABLET | Refills: 7 | Status: SHIPPED | OUTPATIENT
Start: 2024-11-13

## 2024-11-13 RX ORDER — ALBUTEROL SULFATE 0.83 MG/ML
2.5 SOLUTION RESPIRATORY (INHALATION)
Status: DISCONTINUED | OUTPATIENT
Start: 2024-11-13 | End: 2024-11-13 | Stop reason: HOSPADM

## 2024-11-13 RX ORDER — DIPHENHYDRAMINE HYDROCHLORIDE 50 MG/ML
50 INJECTION INTRAMUSCULAR; INTRAVENOUS
Status: DISCONTINUED | OUTPATIENT
Start: 2024-11-13 | End: 2024-11-13 | Stop reason: HOSPADM

## 2024-11-13 RX ADMIN — OXALIPLATIN 170 MG: 5 INJECTION, SOLUTION INTRAVENOUS at 12:55

## 2024-11-13 RX ADMIN — DEXAMETHASONE SODIUM PHOSPHATE: 10 INJECTION, SOLUTION INTRAMUSCULAR; INTRAVENOUS at 10:54

## 2024-11-13 RX ADMIN — LEUCOVORIN CALCIUM 400 MG: 350 INJECTION, POWDER, LYOPHILIZED, FOR SUSPENSION INTRAMUSCULAR; INTRAVENOUS at 12:55

## 2024-11-13 RX ADMIN — SODIUM CHLORIDE 250 ML: 9 INJECTION, SOLUTION INTRAVENOUS at 10:36

## 2024-11-13 RX ADMIN — ONDANSETRON 8 MG: 2 INJECTION, SOLUTION INTRAMUSCULAR; INTRAVENOUS at 10:36

## 2024-11-13 RX ADMIN — DOCETAXEL 100 MG: 20 INJECTION INTRAVENOUS at 11:39

## 2024-11-13 ASSESSMENT — PAIN SCALES - GENERAL: PAINLEVEL_OUTOF10: NO PAIN (0)

## 2024-11-13 NOTE — NURSING NOTE
Current patient location:  [facility]    Is the patient currently in the state of [place]? YES    Visit mode:VIDEO    If the visit is dropped, the patient can be reconnected by:VIDEO VISIT: Text to cell phone:   Telephone Information:   Mobile [phone]       Will anyone else be joining the visit? NO  (If patient encounters technical issues they should call [phone]    Are changes needed to the allergy or medication list? No    Are refills needed on medications prescribed by this physician? NO    Rooming Documentation:  Pt unable    Reason for visit: SIER[facility] VVF

## 2024-11-13 NOTE — PROGRESS NOTES
Virtual Visit Details    Type of service:  Video Visit     Originating Location (pt. 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 Mc's consult note):  Mr. Junior is a 69 year old male with history of severe acid reflux, s/p fundoplication around 2011/12, and postprandial hypoglycemia and neuritis/neuropathy, of unclear etiology.      He has been followed routinely with upper endoscopies over the last decade, many of which have been done within the [facility] system.  The ones done over the last seven years include ones performed on August 30, 2017, December 4, 2018, May 9, 2019, and most recently as September 24, 2024.    He had a CT scan in January 2024 with results as follows:     January 26, 2024--CT a/p--IMPRESSION:   1.  Superior aspect of the Nissen fundal wrap appears about 2 cm above  the diaphragm possibly representing slipped Nissen.  2.  Hepatic steatosis.  3.  Nonobstructing renal calculi. No hydronephrosis.    He was experiencing dysphagia and slipped Nissen fundoplication and related issues, some of which were said to be related to worsening occasional reflux, and chest pressure associate with early satiety.  On April 30, 2024, he underwent barium swallow, with results as follows:  IMPRESSION:  1. Timed barium esophagram results as detailed above with retained  esophageal contrast lasting up to 5 minutes. There is patent flow of  contrast.   2. Expected passage of barium tablet into the stomach.      He had continued evaluation and during the summer of 2024, including on July 23, 2024. Part of the assessment included impression that the  "\"Manometry was consistent with inconclusive manometric EGJOO. Esophagram showed routine contrast lasting up to 5 minutes but patent flow of contrast and tablet.\"      Further evaluation included upper endoscopy, that revealed a nodularity at the gastroesophageal junction, concerning for malignancy, as follows:  September 24, 2024--EGD--Evidence of a Nissen fundoplication was found at the gastroesophageal   junction. The wrap appeared loose. This was traversed.   Localized moderate mucosal changes characterized by erythema and   nodularity were found at the gastroesophageal junction. Biopsies were   taken with a cold forceps for histology.   Specimen:    Gastric Esophageal Junction, Gastroesophageal biopsy                                      Final Diagnosis   A. GASTROESOPHAGEAL JUNCTION, BIOPSY:  - Adenocarcinoma, poorly-differentiated, with signet ring cell features     GASTRIC HER2 BIOMARKER TESTS   Test(s) Performed     HER2 by IHC     Results         With this new finding of malignancy, a staging PET/CT was performed, and no distant metastasis was detected:  October 1, 2024--PET/CT --                                                                 IMPRESSION Findings suspicious for the gastroesophageal junction adenocarcinoma without convincing evidence of tona or metastatic disease.      He further underwent a staging EUS, that characterized the tumor as staged as T2N0 per EUS criteria:      Oct 8, 2024 EUS--    Impression:            - Malignant esophageal tumor was found at the                          gastroesophageal junction. Siewert 2. Measurements as                          above.                          This is classified T2 N0 (cytology pending) M0 by EUS                          criteria.                          Three small lymph nodes seen as described above. Two                          were sampled (station 8R) and are preliminarily                          benign. The third was identical in " "size and appearance                          and not amenable to sampling due to the proximity to                          the heart and proximal margin of the mass.                          - Z-line, 37 cm from the incisors.                          - A Nissen fundoplication was found. This has                          partially slipped above the diaphragm.   Specimen A                 Interpretation:                  Negative for malignancy  Polymorphous population of lymphocytes present                 Adequacy:                 Satisfactory for evaluation        He met with Dr. Katlyn Tobar and Dr. Lawrence Mc with plan for neoadjuvant FLOT x 4 followed by surgical resection and then 4 adjuvant cycles of FLOT.  He started FLOT on 10/31.     Interval History: Bal is seen virtually prior to C2 of FLOT. He has been feeling well the past few days. With cycle 1 he felt pretty good initially then Sunday he started to have fatigue and felt intense shin pain on Sunday night like a muscle pulling. This calmed down with tylenol by Monday afternoon. He was using warm showers as well.     He did not have any nausea and did not use any antiemetics. Had some mouth sensitivity and sores start Tuesday. He changed to Biotene toothpaste and started s/s rinses with relief.     He had diarrhea start Wednesday morning, got worse on Thursday through Saturday. Food was \"going through\" him. He did not use Imodium. Noticed 3-4 pound weight loss so he called into triage on Saturday morning for advice and was told to go to ED right away. He was seen by [facility] ED staff and since he was clinically well and tolerating orals, he did not receive any IVF or labs. He felt like ED recommendation was excessive. Of note, he was not using any Imodium for the diarrhea. Stools have been normal since last Sunday.     No blood sugar issues on steroids.     Had minor cold sensitivity in oropharynx, nothing in hands or feet. No changes in chronic PN. "     No fevers/chills or infectious concerns.     Past medical, social, surgical, and family histories reviewed, confirmed, and pertinent details discussed with patient and family; additional sources include the medical record.      Past Medical History:   Diagnosis Date    Hypertension     Neuritis     Peripheral neuropathy     Personal history of other medical treatment     postgastrectomy dumping syndrome    Stomach problems Noted earlier      Past Surgical History:   Procedure Laterality Date    ABDOMEN SURGERY  2012?    APPENDECTOMY  1964?    COLONOSCOPY  2006, 2016    ESOPHAGEAL BALLOON PROVOCATION STUDY N/A 9/24/2024    Procedure: Esophageal Balloon Provocation Study;  Surgeon: Carson Michel DO;  Location:  GI    ESOPHAGOSCOPY, GASTROSCOPY, DUODENOSCOPY (EGD), COMBINED N/A 9/24/2024    Procedure: ESOPHAGOGASTRODUODENOSCOPY, WITH BIOPSY;  Surgeon: Carson Michel DO;  Location:  GI    ESOPHAGOSCOPY, GASTROSCOPY, DUODENOSCOPY (EGD), COMBINED N/A 10/8/2024    Procedure: ESOPHAGOGASTRODUODENOSCOPY, WITH FINE NEEDLE ASPIRATION BIOPSY, WITH ENDOSCOPIC ULTRASOUND GUIDANCE;  Surgeon: Lance Lopez MD;  Location:  GI    EYE SURGERY  199x    laser for retinal tears    GASTRIC FUNDOPLICATION      HERNIA REPAIR  1961?    IR CHEST PORT PLACEMENT > 5 YRS OF AGE  10/24/2024    LAPAROSCOPIC TAKEDOWN NISSEN FUNDOPLICATION N/A 9/25/2020    Procedure: TAKE-DOWN, FUNDOPLICATION, REDO NISSEN, LAPAROSCOPIC, gastrostomy feeding tube placement;  Surgeon: Katlyn Tobar MD;  Location: [facility] ESOPHAGOGASTRODUODENOSCOPY TRANSORAL DIAGNOSTIC N/A 5/9/2019    Procedure: UPPER GASTROINTESTINAL ENDOSCOPY;  Surgeon: Dino Ward MD;  Location: [facility];  Service: General    SOFT TISSUE SURGERY  2009?    MCL l. thumb          SOCIAL HISTORY: Lives in [place], [place] with his wife Jess. They've been  for 15 years.  He has three daughters, ages 38, 42, 50.  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  He denies any history of oral tobacco or cigarette use in his lifetime.  Alcohol use that he reports lifetime is five drinks per week since age of 17.  Info from other notes:    Alcohol use: Yes        Alcohol/week: 3.0 - 6.0 standard drinks of alcohol       Types: 1 - 2 Glasses of wine, 1 - 2 Cans of beer, 1 - 2 Shots of liquor per week       Comment: glass of win   He is an avid skier, and enjoy skiing a winter, and sometimes the peripheral neuropathycan interfere with that aspect of quality of life.    FAMILY HISTORY OF CANCER: his mother was diagnosed with uterine cancer in her late 30s; this diagnosis took place in the 1960s, and she  in  of unrelated causes.  He has two sisters, in good health, and his father had a superficial form of skin cancer, otherwise no other family history of cancer.      Allergies:  Allergies as of 2024 - Reviewed 2024   Allergen Reaction Noted    Hornets  2020    Wasp venom protein Hives 2016    Bee venom Swelling 2019       Current Medications:  Current Outpatient Medications   Medication Sig Dispense Refill    atenolol (TENORMIN) 50 MG tablet Take 50 mg by mouth daily       blood glucose (NO BRAND SPECIFIED) test strip Use to test blood sugar 1 times daily or as directed. To accompany: Blood Glucose Monitor Brands: per insurance. 100 strip 6    blood glucose monitoring (NO BRAND SPECIFIED) meter device kit Use to test blood sugar 1 times daily or as directed. Preferred blood glucose meter OR supplies to accompany: Blood Glucose Monitor Brands: per insurance. 1 kit 0    Continuous Glucose Sensor (FREESTYLE SIMIN 2 SENSOR) MISC Use 1 sensor every 14 days. Use to read blood sugars per 's instructions. 6 each 1    cyanocobalamin (VITAMIN B-12) 500 MCG tablet Take 500 mcg by mouth daily.      dexAMETHasone (DECADRON) 4 MG tablet Take 2 tablets (8 mg) by mouth 2 times daily (with meals). Start evening of Docetaxel infusion and  continue for a total of 3 doses. 6 tablet 7    EPINEPHrine (ANY BX GENERIC EQUIV) 0.3 MG/0.3ML injection 2-pack 0.3 mg      Fluorouracil (ADRUCIL) 5,150 mg in sodium chloride 0.9 % 240 mL via CADD pump Infuse 5,150 mg at 10 mL/hr over 24 hours into the vein once Do not start before November 13, 2024. 921123 mL 0    gabapentin (NEURONTIN) 300 MG capsule Take 1 capsule (300 mg) by mouth 3 times daily. 270 capsule 3    metFORMIN (GLUCOPHAGE XR) 500 MG 24 hr tablet Take 1 tablet (500 mg) by mouth daily (with dinner) 90 tablet 3    nortriptyline (PAMELOR) 25 MG capsule Take 25 mg by mouth daily       ondansetron (ZOFRAN) 8 MG tablet Take 1 tablet (8 mg) by mouth every 8 hours as needed for nausea (vomiting). 30 tablet 2    prochlorperazine (COMPAZINE) 10 MG tablet Take 1 tablet (10 mg) by mouth every 6 hours as needed for nausea or vomiting. 30 tablet 2    thin (NO BRAND SPECIFIED) lancets Use with lanceting device. To accompany: Blood Glucose Monitor Brands: per insurance. 100 each 6        PHYSICAL EXAM:  Video physical exam  General: Patient appears well in no acute distress.   Skin: No visualized rash or lesions on visualized skin  Eyes: EOMI, no erythema, sclera icterus or discharge noted  Resp: Appears to be breathing comfortably without accessory muscle usage, speaking in full sentences, no cough  MSK: Appears to have normal range of motion based on visualized movements  Neurologic: No apparent tremors, facial movements symmetric  Psych: affect and mood congruent, alert and oriented        Laboratory/Imaging Studies   Labs pending at time of appt       ASSESSMENT/PLAN:  Mr. Bal Junior is a 69-year-old gentleman from [place], [place] with a long-standing history over ~two decades of severe acid reflux, necessitating Nissen Fundoplication procedure in 2011 or 2012; in recent years he has experienced a slippage of the fundoplication anatomy.     GE junction adenocarcinoma, localized: S/p 1 cycle of neoadjuvant FLOT.  This was complicated with diarrhea, myalgias, fatigue, and mild mucositis and cold sensitivity. See below for side effect management. Okay to proceed with cycle 2 of FLOT later today as long as labs are adequate. He will have MITZI follow-up prior to each cycle with labs but should reach out with any interval concerns.     Right now his PET is scheduled a month after C4 FLOT. Will review timeline with Dr. Mc and Dr. Tobar.     Diarrhea: Imodium PRN if 3 or more loose stools in 24 hours. Reviewed dosing on this.     Mucositis: S/s rinses BID for prevention and every 2-3 hours during the day for active sores.     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 May be cyclical each cycle. Discussed using tylenol, warm bath/showers, heating pad.     Riya Wiley PA-C     35 minutes spent on the date of the encounter doing chart review, review of test results, interpretation of tests, patient visit, and documentation

## 2024-11-13 NOTE — PROGRESS NOTES
Infusion Nursing Note:  Bal Junior presents today for D1C2 Taxotere , oxaliplatin, leucovorin, and 5 F U pump only.    Patient seen by provider today: Yes: video visit with Riya Wiley   present during visit today: Not Applicable.    Note: pt hooked up to chemo pump at 1515 today. Pt will come in tomorrow for pump disconnect at 1515 and also was approved for neulasta on pro..      Intravenous Access:  Implanted Port.    Treatment Conditions:  Lab Results   Component Value Date    HGB 13.9 11/13/2024    WBC 4.2 11/13/2024    ANEUTAUTO 1.2 (L) 11/13/2024     11/13/2024        Lab Results   Component Value Date     11/13/2024    POTASSIUM 4.6 11/13/2024    MAG 1.9 09/27/2020    CR 0.89 11/13/2024    AUGUSTIN 8.5 (L) 11/13/2024    BILITOTAL 0.2 11/13/2024    ALBUMIN 3.7 11/13/2024    ALT 18 11/13/2024    AST 20 11/13/2024       Results reviewed, labs did NOT meet treatment parameters: Riya Wiley was secure chatted. She wants pt to get Neulasta on pro tomorrow when he comes for pump disconnect. Finance informed for prior auth. Pt is to get treatment today. Also pt informed he should start Claritin tonight, tomorrow , and Friday. Pt also will take dexamethasone at home..      Post Infusion Assessment:  Patient tolerated infusion without incident.  Blood return noted pre and post infusion.  Site patent and intact, free from redness, edema or discomfort.  All connections taped at discharge and port reinforced      Discharge Plan:   Patient and/or family verbalized understanding of discharge instructions and all questions answered.      Belkys Carr RN

## 2024-11-13 NOTE — LETTER
11/13/2024      Bal Junior  [address] [facility] [address]      Dear Colleague,    Thank you for referring your patient, Bal Junior, to the [facility]. Please see a copy of my visit note below.    Virtual Visit Details    Type of service:  Video Visit     Originating Location (pt. 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 Mc's consult note):  Mr. Junior is a 69 year old male with history of severe acid reflux, s/p fundoplication around 2011/12, and postprandial hypoglycemia and neuritis/neuropathy, of unclear etiology.      He has been followed routinely with upper endoscopies over the last decade, many of which have been done within the [facility] system.  The ones done over the last seven years include ones performed on August 30, 2017, December 4, 2018, May 9, 2019, and most recently as September 24, 2024.    He had a CT scan in January 2024 with results as follows:     January 26, 2024--CT a/p--IMPRESSION:   1.  Superior aspect of the Nissen fundal wrap appears about 2 cm above  the diaphragm possibly representing slipped Nissen.  2.  Hepatic steatosis.  3.  Nonobstructing renal calculi. No hydronephrosis.    He was experiencing dysphagia and slipped Nissen fundoplication and related issues, some of which were said to be related to worsening occasional reflux, and chest pressure associate with early satiety.  On April 30, 2024, he underwent barium swallow, with results as follows:  IMPRESSION:  1. Timed barium esophagram results as detailed above with retained  esophageal contrast lasting up to 5 minutes.  "There is patent flow of  contrast.   2. Expected passage of barium tablet into the stomach.      He had continued evaluation and during the summer of 2024, including on July 23, 2024. Part of the assessment included impression that the \"Manometry was consistent with inconclusive manometric EGJOO. Esophagram showed routine contrast lasting up to 5 minutes but patent flow of contrast and tablet.\"      Further evaluation included upper endoscopy, that revealed a nodularity at the gastroesophageal junction, concerning for malignancy, as follows:  September 24, 2024--EGD--Evidence of a Nissen fundoplication was found at the gastroesophageal   junction. The wrap appeared loose. This was traversed.   Localized moderate mucosal changes characterized by erythema and   nodularity were found at the gastroesophageal junction. Biopsies were   taken with a cold forceps for histology.   Specimen:    Gastric Esophageal Junction, Gastroesophageal biopsy                                      Final Diagnosis   A. GASTROESOPHAGEAL JUNCTION, BIOPSY:  - Adenocarcinoma, poorly-differentiated, with signet ring cell features     GASTRIC HER2 BIOMARKER TESTS   Test(s) Performed     HER2 by IHC     Results         With this new finding of malignancy, a staging PET/CT was performed, and no distant metastasis was detected:  October 1, 2024--PET/CT --                                                                 IMPRESSION Findings suspicious for the gastroesophageal junction adenocarcinoma without convincing evidence of tona or metastatic disease.      He further underwent a staging EUS, that characterized the tumor as staged as T2N0 per EUS criteria:      Oct 8, 2024 EUS--    Impression:            - Malignant esophageal tumor was found at the                          gastroesophageal junction. Siewert 2. Measurements as                          above.                          This is classified T2 N0 (cytology pending) M0 by EUS             " "             criteria.                          Three small lymph nodes seen as described above. Two                          were sampled (station [id number]) and are preliminarily                          benign. The third was identical in size and appearance                          and not amenable to sampling due to the proximity to                          the heart and proximal margin of the mass.                          - Z-line, 37 cm from the incisors.                          - A Nissen fundoplication was found. This has                          partially slipped above the diaphragm.   Specimen A                 Interpretation:                  Negative for malignancy  Polymorphous population of lymphocytes present                 Adequacy:                 Satisfactory for evaluation        He met with Dr. Katlyn Tobar and Dr. Lawrence Mc with plan for neoadjuvant FLOT x 4 followed by surgical resection and then 4 adjuvant cycles of FLOT.  He started FLOT on 10/31.     Interval History: Bal is seen virtually prior to C2 of FLOT. He has been feeling well the past few days. With cycle 1 he felt pretty good initially then Sunday he started to have fatigue and felt intense shin pain on Sunday night like a muscle pulling. This calmed down with tylenol by Monday afternoon. He was using warm showers as well.     He did not have any nausea and did not use any antiemetics. Had some mouth sensitivity and sores start Tuesday. He changed to Biotene toothpaste and started s/s rinses with relief.     He had diarrhea start Wednesday morning, got worse on Thursday through Saturday. Food was \"going through\" him. He did not use Imodium. Noticed 3-4 pound weight loss so he called into triage on Saturday morning for advice and was told to go to ED right away. He was seen by [facility] ED staff and since he was clinically well and tolerating orals, he did not receive any IVF or labs. He felt like ED recommendation was excessive. " Of note, he was not using any Imodium for the diarrhea. Stools have been normal since last Sunday.     No blood sugar issues on steroids.     Had minor cold sensitivity in oropharynx, nothing in hands or feet. No changes in chronic PN.     No fevers/chills or infectious concerns.     Past medical, social, surgical, and family histories reviewed, confirmed, and pertinent details discussed with patient and family; additional sources include the medical record.      Past Medical History:   Diagnosis Date     Hypertension      Neuritis      Peripheral neuropathy      Personal history of other medical treatment     postgastrectomy dumping syndrome     Stomach problems Noted earlier      Past Surgical History:   Procedure Laterality Date     ABDOMEN SURGERY  2012?     APPENDECTOMY  1964?     COLONOSCOPY  2006, 2016     ESOPHAGEAL BALLOON PROVOCATION STUDY N/A 9/24/2024    Procedure: Esophageal Balloon Provocation Study;  Surgeon: Carson Michel DO;  Location:  GI     ESOPHAGOSCOPY, GASTROSCOPY, DUODENOSCOPY (EGD), COMBINED N/A 9/24/2024    Procedure: ESOPHAGOGASTRODUODENOSCOPY, WITH BIOPSY;  Surgeon: Carson Michel DO;  Location:  GI     ESOPHAGOSCOPY, GASTROSCOPY, DUODENOSCOPY (EGD), COMBINED N/A 10/8/2024    Procedure: ESOPHAGOGASTRODUODENOSCOPY, WITH FINE NEEDLE ASPIRATION BIOPSY, WITH ENDOSCOPIC ULTRASOUND GUIDANCE;  Surgeon: Lance Lopez MD;  Location:  GI     EYE SURGERY  199x    laser for retinal tears     GASTRIC FUNDOPLICATION       HERNIA REPAIR  1961?     IR CHEST PORT PLACEMENT > 5 YRS OF AGE  10/24/2024     LAPAROSCOPIC TAKEDOWN NISSEN FUNDOPLICATION N/A 9/25/2020    Procedure: TAKE-DOWN, FUNDOPLICATION, REDO NISSEN, LAPAROSCOPIC, gastrostomy feeding tube placement;  Surgeon: Katlyn Tobar MD;  Location:  OR     IN ESOPHAGOGASTRODUODENOSCOPY TRANSORAL DIAGNOSTIC N/A 5/9/2019    Procedure: UPPER GASTROINTESTINAL ENDOSCOPY;  Surgeon: Dino Ward MD;  Location: [facility];   Service: General     SOFT TISSUE SURGERY  ?    JCARLOS l. thumb          SOCIAL HISTORY: Lives in [place], [place] with his wife Jess. They've been  for 15 years.  He has three daughters, ages 38, 42, 50.  He is retired from working in computer security.  He denies any history of oral tobacco or cigarette use in his lifetime.  Alcohol use that he reports lifetime is five drinks per week since age of 17.  Info from other notes:    Alcohol use: Yes        Alcohol/week: 3.0 - 6.0 standard drinks of alcohol       Types: 1 - 2 Glasses of wine, 1 - 2 Cans of beer, 1 - 2 Shots of liquor per week       Comment: glass of win   He is an avid skier, and enjoy skiing a winter, and sometimes the peripheral neuropathycan interfere with that aspect of quality of life.    FAMILY HISTORY OF CANCER: his mother was diagnosed with uterine cancer in her late 30s; this diagnosis took place in the 1960s, and she  in  of unrelated causes.  He has two sisters, in good health, and his father had a superficial form of skin cancer, otherwise no other family history of cancer.      Allergies:  Allergies as of 2024 - Reviewed 2024   Allergen Reaction Noted     Hornets  2020     Wasp venom protein Hives 2016     Bee venom Swelling 2019       Current Medications:  Current Outpatient Medications   Medication Sig Dispense Refill     atenolol (TENORMIN) 50 MG tablet Take 50 mg by mouth daily        blood glucose (NO BRAND SPECIFIED) test strip Use to test blood sugar 1 times daily or as directed. To accompany: Blood Glucose Monitor Brands: per insurance. 100 strip 6     blood glucose monitoring (NO BRAND SPECIFIED) meter device kit Use to test blood sugar 1 times daily or as directed. Preferred blood glucose meter OR supplies to accompany: Blood Glucose Monitor Brands: per insurance. 1 kit 0     Continuous Glucose Sensor (FREESTYLE SIMIN 2 SENSOR) MISC Use 1 sensor every 14 days. Use to read blood sugars  per 's instructions. 6 each 1     cyanocobalamin (VITAMIN B-12) 500 MCG tablet Take 500 mcg by mouth daily.       dexAMETHasone (DECADRON) 4 MG tablet Take 2 tablets (8 mg) by mouth 2 times daily (with meals). Start evening of Docetaxel infusion and continue for a total of 3 doses. 6 tablet 7     EPINEPHrine (ANY BX GENERIC EQUIV) 0.3 MG/0.3ML injection 2-pack 0.3 mg       Fluorouracil (ADRUCIL) 5,150 mg in sodium chloride 0.9 % 240 mL via CADD pump Infuse 5,150 mg at 10 mL/hr over 24 hours into the vein once Do not start before November 13, 2024. 525059 mL 0     gabapentin (NEURONTIN) 300 MG capsule Take 1 capsule (300 mg) by mouth 3 times daily. 270 capsule 3     metFORMIN (GLUCOPHAGE XR) 500 MG 24 hr tablet Take 1 tablet (500 mg) by mouth daily (with dinner) 90 tablet 3     nortriptyline (PAMELOR) 25 MG capsule Take 25 mg by mouth daily        ondansetron (ZOFRAN) 8 MG tablet Take 1 tablet (8 mg) by mouth every 8 hours as needed for nausea (vomiting). 30 tablet 2     prochlorperazine (COMPAZINE) 10 MG tablet Take 1 tablet (10 mg) by mouth every 6 hours as needed for nausea or vomiting. 30 tablet 2     thin (NO BRAND SPECIFIED) lancets Use with lanceting device. To accompany: Blood Glucose Monitor Brands: per insurance. 100 each 6        PHYSICAL EXAM:  Video physical exam  General: Patient appears well in no acute distress.   Skin: No visualized rash or lesions on visualized skin  Eyes: EOMI, no erythema, sclera icterus or discharge noted  Resp: Appears to be breathing comfortably without accessory muscle usage, speaking in full sentences, no cough  MSK: Appears to have normal range of motion based on visualized movements  Neurologic: No apparent tremors, facial movements symmetric  Psych: affect and mood congruent, alert and oriented        Laboratory/Imaging Studies   Labs pending at time of appt       ASSESSMENT/PLAN:  Mr. Bal Junior is a 69-year-old gentleman from [place], [place] with a  long-standing history over ~two decades of severe acid reflux, necessitating Nissen Fundoplication procedure in 2011 or 2012; in recent years he has experienced a slippage of the fundoplication anatomy.     GE junction adenocarcinoma, localized: S/p 1 cycle of neoadjuvant FLOT. This was complicated with diarrhea, myalgias, fatigue, and mild mucositis and cold sensitivity. See below for side effect management. Okay to proceed with cycle 2 of FLOT later today as long as labs are adequate. He will have MTIZI follow-up prior to each cycle with labs but should reach out with any interval concerns.     Right now his PET is scheduled a month after C4 FLOT. Will review timeline with Dr. Mc and Dr. Tobar.     Diarrhea: Imodium PRN if 3 or more loose stools in 24 hours. Reviewed dosing on this.     Mucositis: S/s rinses BID for prevention and every 2-3 hours during the day for active sores.     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 May be cyclical each cycle. Discussed using tylenol, warm bath/showers, heating pad.     Riya Wiley PA-C     35 minutes spent on the date of the encounter doing chart review, review of test results, interpretation of tests, patient visit, and documentation                     Again, thank you for allowing me to participate in the care of your patient.        Sincerely,        Riya Wiley PA-C

## 2024-11-14 ENCOUNTER — INFUSION THERAPY VISIT (OUTPATIENT)
Dept: INFUSION THERAPY | Facility: HOSPITAL | Age: 69
End: 2024-11-14
Attending: INTERNAL MEDICINE
Payer: MEDICARE

## 2024-11-14 VITALS
SYSTOLIC BLOOD PRESSURE: 123 MMHG | HEART RATE: 77 BPM | RESPIRATION RATE: 16 BRPM | DIASTOLIC BLOOD PRESSURE: 78 MMHG | TEMPERATURE: 97.7 F

## 2024-11-14 DIAGNOSIS — C16.0 ADENOCARCINOMA OF GASTROESOPHAGEAL JUNCTION (H): Primary | ICD-10-CM

## 2024-11-14 PROCEDURE — 250N000011 HC RX IP 250 OP 636: Performed by: INTERNAL MEDICINE

## 2024-11-14 PROCEDURE — 96372 THER/PROPH/DIAG INJ SC/IM: CPT | Performed by: PHYSICIAN ASSISTANT

## 2024-11-14 PROCEDURE — 96377 APPLICATON ON-BODY INJECTOR: CPT | Mod: XS | Performed by: PHYSICIAN ASSISTANT

## 2024-11-14 PROCEDURE — 250N000011 HC RX IP 250 OP 636: Mod: JZ | Performed by: PHYSICIAN ASSISTANT

## 2024-11-14 RX ORDER — HEPARIN SODIUM (PORCINE) LOCK FLUSH IV SOLN 100 UNIT/ML 100 UNIT/ML
5 SOLUTION INTRAVENOUS
Status: DISCONTINUED | OUTPATIENT
Start: 2024-11-14 | End: 2024-11-14 | Stop reason: HOSPADM

## 2024-11-14 RX ADMIN — PEGFILGRASTIM 6 MG: KIT SUBCUTANEOUS at 15:07

## 2024-11-14 RX ADMIN — HEPARIN 5 ML: 100 SYRINGE at 15:07

## 2024-11-14 NOTE — PROGRESS NOTES
Infusion Nursing Note:  Bal Junior presents today for D2C2 5fu pump discontinuation.    Patient seen by provider today: No   present during visit today: Not Applicable.    Note: Patient states feeling very tired today. Reviewed Neulasta On-body kit including potential side effects and management strategies, when and where to call if issues. Neulasta hand-outs given.      Intravenous Access:  Implanted Port.    Treatment Conditions:  Not Applicable.      Post Infusion Assessment:  Patient tolerated infusion without incident.  Blood return noted post infusion.  Site patent and intact, free from redness, edema or discomfort.  Access discontinued per protocol.       Discharge Plan:   Patient will return 11/27 for next appointment.   Patient discharged in stable condition accompanied by: self.  Departure Mode: Ambulatory.      Marisa Walker RN

## 2024-11-16 PROCEDURE — E1399 DURABLE MEDICAL EQUIPMENT MI: HCPCS

## 2024-11-18 ENCOUNTER — HOME INFUSION (OUTPATIENT)
Dept: HOME HEALTH SERVICES | Facility: HOME HEALTH | Age: 69
End: 2024-11-18
Payer: COMMERCIAL

## 2024-11-18 DIAGNOSIS — C16.0 ADENOCARCINOMA OF GASTROESOPHAGEAL JUNCTION (H): ICD-10-CM

## 2024-11-26 NOTE — PROGRESS NOTES
Virtual Visit Details    Type of service:  Video Visit     Originating Location (pt. 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 Mc's consult note):  Mr. Junior is a 69 year old male with history of severe acid reflux, s/p fundoplication around 2011/12, and postprandial hypoglycemia and neuritis/neuropathy, of unclear etiology.      He has been followed routinely with upper endoscopies over the last decade, many of which have been done within the [facility] system.  The ones done over the last seven years include ones performed on August 30, 2017, December 4, 2018, May 9, 2019, and most recently as September 24, 2024.    He had a CT scan in January 2024 with results as follows:     January 26, 2024--CT a/p--IMPRESSION:   1.  Superior aspect of the Nissen fundal wrap appears about 2 cm above  the diaphragm possibly representing slipped Nissen.  2.  Hepatic steatosis.  3.  Nonobstructing renal calculi. No hydronephrosis.    He was experiencing dysphagia and slipped Nissen fundoplication and related issues, some of which were said to be related to worsening occasional reflux, and chest pressure associate with early satiety.  On April 30, 2024, he underwent barium swallow, with results as follows:  IMPRESSION:  1. Timed barium esophagram results as detailed above with retained  esophageal contrast lasting up to 5 minutes. There is patent flow of  contrast.   2. Expected passage of barium tablet into the stomach.      He had continued evaluation and during the summer of 2024, including on July 23, 2024. Part of the assessment included impression  "that the \"Manometry was consistent with inconclusive manometric EGJOO. Esophagram showed routine contrast lasting up to 5 minutes but patent flow of contrast and tablet.\"      Further evaluation included upper endoscopy, that revealed a nodularity at the gastroesophageal junction, concerning for malignancy, as follows:  September 24, 2024--EGD--Evidence of a Nissen fundoplication was found at the gastroesophageal   junction. The wrap appeared loose. This was traversed.   Localized moderate mucosal changes characterized by erythema and   nodularity were found at the gastroesophageal junction. Biopsies were   taken with a cold forceps for histology.   Specimen:    Gastric Esophageal Junction, Gastroesophageal biopsy                                      Final Diagnosis   A. GASTROESOPHAGEAL JUNCTION, BIOPSY:  - Adenocarcinoma, poorly-differentiated, with signet ring cell features     GASTRIC HER2 BIOMARKER TESTS   Test(s) Performed     HER2 by IHC     Results         With this new finding of malignancy, a staging PET/CT was performed, and no distant metastasis was detected:  October 1, 2024--PET/CT --                                                                 IMPRESSION Findings suspicious for the gastroesophageal junction adenocarcinoma without convincing evidence of tona or metastatic disease.      He further underwent a staging EUS, that characterized the tumor as staged as T2N0 per EUS criteria:      Oct 8, 2024 EUS--    Impression:            - Malignant esophageal tumor was found at the                          gastroesophageal junction. Siewert 2. Measurements as                          above.                          This is classified T2 N0 (cytology pending) M0 by EUS                          criteria.                          Three small lymph nodes seen as described above. Two                          were sampled (station [id number]) and are preliminarily                          benign. The third was " "identical in size and appearance                          and not amenable to sampling due to the proximity to                          the heart and proximal margin of the mass.                          - Z-line, 37 cm from the incisors.                          - A Nissen fundoplication was found. This has                          partially slipped above the diaphragm.   Specimen A                 Interpretation:                  Negative for malignancy  Polymorphous population of lymphocytes present                 Adequacy:                 Satisfactory for evaluation        He met with Dr. Katlyn Tobar and Dr. Lawrence Mc with plan for neoadjuvant FLOT x 4 followed by surgical resection and then 4 adjuvant cycles of FLOT.  He started FLOT on 10/31.     Interval History: Bal is seen virtually prior to C3 of FLOT. He is feeling well today. Cycle 2 went okay. He had muscle pain/soreness again the first weekend following FLOT for 2 days. It was \"everywhere I have ever pulled a muscle\". Did not have the bad shin pain. No deep bone pain from neulasta. He used tylenol with relief.     He did not have any diarrhea this cycle. He used Imodium quickly with any soft stools and this helped. No nausea. Some minimal GERD--at his usual baseline. Uses TUMs with relief.     His appetite is down but weight is maintaining. He is using whey protein powder in his oatmeal.     Otherwise he has noticed hair thinning. He quickly gets fatigued on day 1 but then is fully recovered for several days before chemo.     No rashes. No mucositis.           Past medical, social, surgical, and family histories reviewed, confirmed, and pertinent details discussed with patient and family; additional sources include the medical record.      Past Medical History:   Diagnosis Date    Hypertension     Neuritis     Peripheral neuropathy     Personal history of other medical treatment     postgastrectomy dumping syndrome    Stomach problems Noted earlier    "   Past Surgical History:   Procedure Laterality Date    ABDOMEN SURGERY  2012?    APPENDECTOMY  1964?    COLONOSCOPY  2006, 2016    ESOPHAGEAL BALLOON PROVOCATION STUDY N/A 9/24/2024    Procedure: Esophageal Balloon Provocation Study;  Surgeon: Carson Michel DO;  Location:  GI    ESOPHAGOSCOPY, GASTROSCOPY, DUODENOSCOPY (EGD), COMBINED N/A 9/24/2024    Procedure: ESOPHAGOGASTRODUODENOSCOPY, WITH BIOPSY;  Surgeon: Carson Michel DO;  Location:  GI    ESOPHAGOSCOPY, GASTROSCOPY, DUODENOSCOPY (EGD), COMBINED N/A 10/8/2024    Procedure: ESOPHAGOGASTRODUODENOSCOPY, WITH FINE NEEDLE ASPIRATION BIOPSY, WITH ENDOSCOPIC ULTRASOUND GUIDANCE;  Surgeon: Lance Lopez MD;  Location:  GI    EYE SURGERY  199x    laser for retinal tears    GASTRIC FUNDOPLICATION      HERNIA REPAIR  1961?    IR CHEST PORT PLACEMENT > 5 YRS OF AGE  10/24/2024    LAPAROSCOPIC TAKEDOWN NISSEN FUNDOPLICATION N/A 9/25/2020    Procedure: TAKE-DOWN, FUNDOPLICATION, REDO NISSEN, LAPAROSCOPIC, gastrostomy feeding tube placement;  Surgeon: Katlyn Tobar MD;  Location:  OR    WI ESOPHAGOGASTRODUODENOSCOPY TRANSORAL DIAGNOSTIC N/A 5/9/2019    Procedure: UPPER GASTROINTESTINAL ENDOSCOPY;  Surgeon: Dino Ward MD;  Location: [facility];  Service: General    SOFT TISSUE SURGERY  2009?    MCL l. thumb          SOCIAL HISTORY: Lives in [place], [place] with his wife Jess. They've been  for 15 years.  He has three daughters, ages 38, 42, 50.  He is retired from working in computer security.  He denies any history of oral tobacco or cigarette use in his lifetime.  Alcohol use that he reports lifetime is five drinks per week since age of 17.  Info from other notes:    Alcohol use: Yes        Alcohol/week: 3.0 - 6.0 standard drinks of alcohol       Types: 1 - 2 Glasses of wine, 1 - 2 Cans of beer, 1 - 2 Shots of liquor per week       Comment: glass of win   He is an avid skier, and enjoy skiing a winter, and sometimes the  peripheral neuropathycan interfere with that aspect of quality of life.    FAMILY HISTORY OF CANCER: his mother was diagnosed with uterine cancer in her late 30s; this diagnosis took place in the 1960s, and she  in  of unrelated causes.  He has two sisters, in good health, and his father had a superficial form of skin cancer, otherwise no other family history of cancer.      Allergies:  Allergies as of 2024 - Reviewed 2024   Allergen Reaction Noted    Hornets  2020    Wasp venom protein Hives 2016    Bee venom Swelling 2019       Current Medications:  Current Outpatient Medications   Medication Sig Dispense Refill    atenolol (TENORMIN) 50 MG tablet Take 50 mg by mouth daily       blood glucose (NO BRAND SPECIFIED) test strip Use to test blood sugar 1 times daily or as directed. To accompany: Blood Glucose Monitor Brands: per insurance. 100 strip 6    blood glucose monitoring (NO BRAND SPECIFIED) meter device kit Use to test blood sugar 1 times daily or as directed. Preferred blood glucose meter OR supplies to accompany: Blood Glucose Monitor Brands: per insurance. 1 kit 0    Continuous Glucose Sensor (FREESTYLE SIMIN 2 SENSOR) MISC Use 1 sensor every 14 days. Use to read blood sugars per 's instructions. 6 each 1    cyanocobalamin (VITAMIN B-12) 500 MCG tablet Take 500 mcg by mouth daily.      dexAMETHasone (DECADRON) 4 MG tablet Take 2 tablets (8 mg) by mouth 2 times daily (with meals). Start evening of Docetaxel infusion and continue for a total of 3 doses. 6 tablet 7    EPINEPHrine (ANY BX GENERIC EQUIV) 0.3 MG/0.3ML injection 2-pack 0.3 mg      [START ON 2024] Fluorouracil (ADRUCIL) 5,150 mg in sodium chloride 0.9 % 240 mL via CADD pump Infuse 5,150 mg at 10 mL/hr over 24 hours into the vein once Do not start before 2024. 795350 mL 0    gabapentin (NEURONTIN) 300 MG capsule Take 1 capsule (300 mg) by mouth 3 times daily. 270 capsule 3     metFORMIN (GLUCOPHAGE XR) 500 MG 24 hr tablet Take 1 tablet (500 mg) by mouth daily (with dinner) 90 tablet 3    nortriptyline (PAMELOR) 25 MG capsule Take 25 mg by mouth daily       ondansetron (ZOFRAN) 8 MG tablet Take 1 tablet (8 mg) by mouth every 8 hours as needed for nausea (vomiting). 30 tablet 2    prochlorperazine (COMPAZINE) 10 MG tablet Take 1 tablet (10 mg) by mouth every 6 hours as needed for nausea or vomiting. 30 tablet 2    thin (NO BRAND SPECIFIED) lancets Use with lanceting device. To accompany: Blood Glucose Monitor Brands: per insurance. 100 each 6        PHYSICAL EXAM:  Video physical exam  General: Patient appears well in no acute distress.   Skin: No visualized rash or lesions on visualized skin  Eyes: EOMI, no erythema, sclera icterus or discharge noted  Resp: Appears to be breathing comfortably without accessory muscle usage, speaking in full sentences, no cough  MSK: Appears to have normal range of motion based on visualized movements  Neurologic: No apparent tremors, facial movements symmetric  Psych: affect and mood congruent, alert and oriented        Laboratory/Imaging Studies   11/27/24 07:34   Sodium 136   Potassium 4.2   Chloride 104   Carbon Dioxide (CO2) 21 (L)   Urea Nitrogen 11.0   Creatinine 1.01   GFR Estimate 81   Calcium 8.7 (L)   Anion Gap 11   Albumin 3.5   Protein Total 5.9 (L)   Alkaline Phosphatase 117   ALT 27   AST 27   Bilirubin Total 0.2   Glucose 140 (H)   WBC 16.0 (H)   Hemoglobin 14.2   Hematocrit 41.4   Platelet Count 128 (L)   RBC Count 4.60   MCV 90   MCH 30.9   MCHC 34.3   RDW 14.4   % Neutrophils 77   % Lymphocytes 13   % Monocytes 6   % Eosinophils 0   % Basophils 1   Absolute Basophils 0.1   Absolute Eosinophils 0.0   Absolute Immature Granulocytes 0.6 (H)   Absolute Lymphocytes 2.0   Absolute Monocytes 1.0   % Immature Granulocytes 4   Absolute Neutrophils 12.3 (H)   Absolute NRBCs 0.0   NRBCs per 100 WBC 0         ASSESSMENT/PLAN:  Mr. Bal Junior is a  69-year-old gentleman from [place], [place] with a long-standing history over ~two decades of severe acid reflux, necessitating Nissen Fundoplication procedure in 2011 or 2012; in recent years he has experienced a slippage of the fundoplication anatomy.     GE junction adenocarcinoma, localized: S/p 2 cycles of neoadjuvant FLOT. Neulasta was added with C2 with ANC of 1200. He is tolerating fairly well with expected side effects. See below for side effect management.  He will have MITZI follow-up prior to each cycle with labs but should reach out with any interval concerns.     Right now his PET is scheduled a month after C4 FLOT. Both Dr. Mc and Dr. Tobar are okay with this.     Diarrhea: Imodium PRN if 3 or more loose stools in 24 hours. Better this past cycle.     Mucositis: S/s rinses BID for prevention and every 2-3 hours during the day for active sores. None this past cycle.     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 May be cyclical each cycle. Discussed using tylenol, warm bath/showers, heating pad.     Riya Wiley PA-C     30 minutes spent on the date of the encounter doing chart review, review of test results, interpretation of tests, patient visit, and documentation

## 2024-11-27 ENCOUNTER — INFUSION THERAPY VISIT (OUTPATIENT)
Dept: INFUSION THERAPY | Facility: HOSPITAL | Age: 69
End: 2024-11-27
Attending: INTERNAL MEDICINE
Payer: MEDICARE

## 2024-11-27 ENCOUNTER — HOME INFUSION BILLING (OUTPATIENT)
Dept: HOME HEALTH SERVICES | Facility: HOME HEALTH | Age: 69
End: 2024-11-27
Payer: COMMERCIAL

## 2024-11-27 ENCOUNTER — VIRTUAL VISIT (OUTPATIENT)
Dept: ONCOLOGY | Facility: CLINIC | Age: 69
End: 2024-11-27
Attending: PHYSICIAN ASSISTANT
Payer: MEDICARE

## 2024-11-27 VITALS — HEIGHT: 70 IN | BODY MASS INDEX: 26.05 KG/M2 | WEIGHT: 182 LBS

## 2024-11-27 VITALS
HEART RATE: 74 BPM | SYSTOLIC BLOOD PRESSURE: 99 MMHG | RESPIRATION RATE: 16 BRPM | OXYGEN SATURATION: 99 % | DIASTOLIC BLOOD PRESSURE: 74 MMHG | TEMPERATURE: 97.8 F

## 2024-11-27 VITALS — BODY MASS INDEX: 26.69 KG/M2 | WEIGHT: 186 LBS

## 2024-11-27 DIAGNOSIS — C16.0 ADENOCARCINOMA OF GASTROESOPHAGEAL JUNCTION (H): Primary | ICD-10-CM

## 2024-11-27 LAB
ALBUMIN SERPL BCG-MCNC: 3.5 G/DL (ref 3.5–5.2)
ALP SERPL-CCNC: 117 U/L (ref 40–150)
ALT SERPL W P-5'-P-CCNC: 27 U/L (ref 0–70)
ANION GAP SERPL CALCULATED.3IONS-SCNC: 11 MMOL/L (ref 7–15)
AST SERPL W P-5'-P-CCNC: 27 U/L (ref 0–45)
BASOPHILS # BLD AUTO: 0.1 10E3/UL (ref 0–0.2)
BASOPHILS NFR BLD AUTO: 1 %
BILIRUB SERPL-MCNC: 0.2 MG/DL
BUN SERPL-MCNC: 11 MG/DL (ref 8–23)
CALCIUM SERPL-MCNC: 8.7 MG/DL (ref 8.8–10.4)
CHLORIDE SERPL-SCNC: 104 MMOL/L (ref 98–107)
CREAT SERPL-MCNC: 1.01 MG/DL (ref 0.67–1.17)
EGFRCR SERPLBLD CKD-EPI 2021: 81 ML/MIN/1.73M2
EOSINOPHIL # BLD AUTO: 0 10E3/UL (ref 0–0.7)
EOSINOPHIL NFR BLD AUTO: 0 %
ERYTHROCYTE [DISTWIDTH] IN BLOOD BY AUTOMATED COUNT: 14.4 % (ref 10–15)
GLUCOSE SERPL-MCNC: 140 MG/DL (ref 70–99)
HCO3 SERPL-SCNC: 21 MMOL/L (ref 22–29)
HCT VFR BLD AUTO: 41.4 % (ref 40–53)
HGB BLD-MCNC: 14.2 G/DL (ref 13.3–17.7)
IMM GRANULOCYTES # BLD: 0.6 10E3/UL
IMM GRANULOCYTES NFR BLD: 4 %
LYMPHOCYTES # BLD AUTO: 2 10E3/UL (ref 0.8–5.3)
LYMPHOCYTES NFR BLD AUTO: 13 %
MCH RBC QN AUTO: 30.9 PG (ref 26.5–33)
MCHC RBC AUTO-ENTMCNC: 34.3 G/DL (ref 31.5–36.5)
MCV RBC AUTO: 90 FL (ref 78–100)
MONOCYTES # BLD AUTO: 1 10E3/UL (ref 0–1.3)
MONOCYTES NFR BLD AUTO: 6 %
NEUTROPHILS # BLD AUTO: 12.3 10E3/UL (ref 1.6–8.3)
NEUTROPHILS NFR BLD AUTO: 77 %
NRBC # BLD AUTO: 0 10E3/UL
NRBC BLD AUTO-RTO: 0 /100
PLATELET # BLD AUTO: 128 10E3/UL (ref 150–450)
POTASSIUM SERPL-SCNC: 4.2 MMOL/L (ref 3.4–5.3)
PROT SERPL-MCNC: 5.9 G/DL (ref 6.4–8.3)
RBC # BLD AUTO: 4.6 10E6/UL (ref 4.4–5.9)
SODIUM SERPL-SCNC: 136 MMOL/L (ref 135–145)
WBC # BLD AUTO: 16 10E3/UL (ref 4–11)

## 2024-11-27 PROCEDURE — 250N000011 HC RX IP 250 OP 636: Performed by: INTERNAL MEDICINE

## 2024-11-27 PROCEDURE — 258N000003 HC RX IP 258 OP 636: Performed by: INTERNAL MEDICINE

## 2024-11-27 PROCEDURE — 85004 AUTOMATED DIFF WBC COUNT: CPT | Performed by: INTERNAL MEDICINE

## 2024-11-27 PROCEDURE — 36591 DRAW BLOOD OFF VENOUS DEVICE: CPT | Performed by: INTERNAL MEDICINE

## 2024-11-27 PROCEDURE — A4222 INFUSION SUPPLIES WITH PUMP: HCPCS

## 2024-11-27 PROCEDURE — A4221 SUPP NON-INSULIN INF CATH/WK: HCPCS

## 2024-11-27 PROCEDURE — 80053 COMPREHEN METABOLIC PANEL: CPT | Performed by: INTERNAL MEDICINE

## 2024-11-27 PROCEDURE — E1399 DURABLE MEDICAL EQUIPMENT MI: HCPCS

## 2024-11-27 RX ORDER — DIPHENHYDRAMINE HYDROCHLORIDE 50 MG/ML
50 INJECTION INTRAMUSCULAR; INTRAVENOUS
Status: DISCONTINUED | OUTPATIENT
Start: 2024-11-27 | End: 2024-11-27 | Stop reason: HOSPADM

## 2024-11-27 RX ORDER — EPINEPHRINE 1 MG/ML
0.3 INJECTION, SOLUTION INTRAMUSCULAR; SUBCUTANEOUS EVERY 5 MIN PRN
Status: DISCONTINUED | OUTPATIENT
Start: 2024-11-27 | End: 2024-11-27 | Stop reason: HOSPADM

## 2024-11-27 RX ORDER — ONDANSETRON 2 MG/ML
8 INJECTION INTRAMUSCULAR; INTRAVENOUS ONCE
Status: COMPLETED | OUTPATIENT
Start: 2024-11-27 | End: 2024-11-27

## 2024-11-27 RX ORDER — MEPERIDINE HYDROCHLORIDE 50 MG/ML
25 INJECTION INTRAMUSCULAR; INTRAVENOUS; SUBCUTANEOUS
Status: DISCONTINUED | OUTPATIENT
Start: 2024-11-27 | End: 2024-11-27 | Stop reason: HOSPADM

## 2024-11-27 RX ORDER — DIPHENHYDRAMINE HYDROCHLORIDE 50 MG/ML
25 INJECTION INTRAMUSCULAR; INTRAVENOUS
Status: DISCONTINUED | OUTPATIENT
Start: 2024-11-27 | End: 2024-11-27 | Stop reason: HOSPADM

## 2024-11-27 RX ORDER — METHYLPREDNISOLONE SODIUM SUCCINATE 40 MG/ML
40 INJECTION INTRAMUSCULAR; INTRAVENOUS
Status: DISCONTINUED | OUTPATIENT
Start: 2024-11-27 | End: 2024-11-27 | Stop reason: HOSPADM

## 2024-11-27 RX ORDER — ALBUTEROL SULFATE 90 UG/1
1-2 INHALANT RESPIRATORY (INHALATION)
Status: DISCONTINUED | OUTPATIENT
Start: 2024-11-27 | End: 2024-11-27 | Stop reason: HOSPADM

## 2024-11-27 RX ORDER — ALBUTEROL SULFATE 0.83 MG/ML
2.5 SOLUTION RESPIRATORY (INHALATION)
Status: DISCONTINUED | OUTPATIENT
Start: 2024-11-27 | End: 2024-11-27 | Stop reason: HOSPADM

## 2024-11-27 RX ADMIN — OXALIPLATIN 170 MG: 5 INJECTION, SOLUTION INTRAVENOUS at 10:26

## 2024-11-27 RX ADMIN — DEXTROSE MONOHYDRATE 250 ML: 50 INJECTION, SOLUTION INTRAVENOUS at 10:26

## 2024-11-27 RX ADMIN — DOCETAXEL 100 MG: 20 INJECTION INTRAVENOUS at 09:13

## 2024-11-27 RX ADMIN — LEUCOVORIN CALCIUM 400 MG: 350 INJECTION, POWDER, LYOPHILIZED, FOR SUSPENSION INTRAMUSCULAR; INTRAVENOUS at 10:26

## 2024-11-27 RX ADMIN — ONDANSETRON 8 MG: 2 INJECTION, SOLUTION INTRAMUSCULAR; INTRAVENOUS at 08:35

## 2024-11-27 RX ADMIN — DEXAMETHASONE SODIUM PHOSPHATE: 10 INJECTION, SOLUTION INTRAMUSCULAR; INTRAVENOUS at 08:43

## 2024-11-27 NOTE — PROGRESS NOTES
Infusion Nursing Note:  Bal Junior presents today for Cycle 3, Day 1 of his chemotherapy plan.    Patient seen by provider today: Yes: Video visit with AYDEE Ritter.   present during visit today: Not Applicable.    Note: Bal arrived ambulatory by himself for treatment following his video visit with AYDEE Ritter. Plan of care reviewed and all questions answered at this time.   Bal stated he forgot to tell Riya he has had a few occasions where he feels a bit more fatigued and notices his heart rate on his watch is above 100. He lays down for about a half hour and heart rate goes back to normal range. He hasn't noticed a pattern/trigger for it. He denies any other changes. Riya was notified via secure chat. She stated as long as he does not have any other cardiac symptoms with it (SOB, dizziness, CP) it is okay to watch. Especially if it is happening when he is moving it could be some sinus tach just from deconditioning. Bal was informed of her response.  Zofran, emend/dexamethasone administered 30 minutes prior to treatment. Treatment administered as ordered.    Intravenous Access:  Labs drawn without difficulty.  Implanted Port.    Treatment Conditions:  Lab Results   Component Value Date    HGB 14.2 11/27/2024    WBC 16.0 (H) 11/27/2024    ANEUTAUTO 12.3 (H) 11/27/2024     (L) 11/27/2024        Lab Results   Component Value Date     11/27/2024    POTASSIUM 4.2 11/27/2024    MAG 1.9 09/27/2020    CR 1.01 11/27/2024    AUGUSTIN 8.7 (L) 11/27/2024    BILITOTAL 0.2 11/27/2024    ALBUMIN 3.5 11/27/2024    ALT 27 11/27/2024    AST 27 11/27/2024       Results reviewed, labs MET treatment parameters, ok to proceed with treatment.    Post Infusion Assessment:  Patient tolerated infusion without incident.  Blood return noted pre and post infusion.  Site patent and intact, free from redness, edema or discomfort.  No evidence of extravasations.  CADD pump settings verified with ALISON Hopkins, and pump  was started at 1240.     Discharge Plan:   Patient will return to [facility] tomorrow for next appointment.  Patient discharged in stable condition accompanied by: daughter.  Departure Mode: Ambulatory.      Rosa James RN

## 2024-11-27 NOTE — NURSING NOTE
Current patient location: Infusion Center in [facility]   Is the patient currently in the state of [place]? YES    Visit mode:VIDEO    If the visit is dropped, the patient can be reconnected by:TELEPHONE VISIT: Phone number: [phone]    Will anyone else be joining the visit? NO  (If patient encounters technical issues they should call [phone] :[phone])    Are changes needed to the allergy or medication list? No    Are refills needed on medications prescribed by this physician? NO    Rooming Documentation:  Unable to complete questionnaire(s) due to time    Reason for visit: RECHECK    Leny ELAM

## 2024-11-28 ENCOUNTER — INFUSION THERAPY VISIT (OUTPATIENT)
Dept: ONCOLOGY | Facility: CLINIC | Age: 69
End: 2024-11-28
Attending: INTERNAL MEDICINE
Payer: MEDICARE

## 2024-11-28 VITALS
DIASTOLIC BLOOD PRESSURE: 77 MMHG | SYSTOLIC BLOOD PRESSURE: 110 MMHG | OXYGEN SATURATION: 99 % | HEART RATE: 80 BPM | TEMPERATURE: 97.7 F | RESPIRATION RATE: 16 BRPM

## 2024-11-28 DIAGNOSIS — C16.0 ADENOCARCINOMA OF GASTROESOPHAGEAL JUNCTION (H): Primary | ICD-10-CM

## 2024-11-28 PROCEDURE — 250N000011 HC RX IP 250 OP 636: Performed by: INTERNAL MEDICINE

## 2024-11-28 PROCEDURE — 96372 THER/PROPH/DIAG INJ SC/IM: CPT | Performed by: PHYSICIAN ASSISTANT

## 2024-11-28 RX ORDER — HEPARIN SODIUM (PORCINE) LOCK FLUSH IV SOLN 100 UNIT/ML 100 UNIT/ML
5 SOLUTION INTRAVENOUS
Status: DISCONTINUED | OUTPATIENT
Start: 2024-11-28 | End: 2024-11-28 | Stop reason: HOSPADM

## 2024-11-28 RX ADMIN — HEPARIN SODIUM (PORCINE) LOCK FLUSH IV SOLN 100 UNIT/ML 5 ML: 100 SOLUTION at 12:46

## 2024-11-28 NOTE — PATIENT INSTRUCTIONS
[facility] Triage and after hours / weekends / holidays:  [phone]    Please call the triage or after hours line if you experience a temperature greater than or equal to 100.4, shaking chills, have uncontrolled nausea, vomiting and/or diarrhea, dizziness, shortness of breath, chest pain, bleeding, unexplained bruising, or if you have any other new/concerning symptoms, questions or concerns.      If you are having any concerning symptoms or wish to speak to a provider before your next infusion visit, please call triage to notify them so we can adequately serve you.     If you need a refill on a narcotic prescription or other medication, please call before your infusion appointment.

## 2024-11-28 NOTE — PROGRESS NOTES
Infusion Nursing Note:  Bal Junior presents today for CADD Disconnect and Neulasta On-Pro.    Patient seen by provider today: No   present during visit today: Not Applicable.    Note: Patient presents to the infusion center feeling significantly fatigued today. He reports that the fatigued is much worse this cycle. Also reports significant cold sensitivity in his bilateral hands and feet and his mouth. The cold sensitivity is also worse this cycle - he has peripheral neuropathy at baseline. Denies fevers, chills, cough, or SOB. He reports some right rib/chest pain that he feels is due to his tumor location, he declines any pain interventions at this time. Patient encouraged to call triage line with any worsening symptoms or concerns.       Intravenous Access:  Implanted Port.    Treatment Conditions:  Not Applicable.      Post Infusion Assessment:  Blood return noted pre and post infusion.  Site patent and intact, free from redness, edema or discomfort.  No evidence of extravasations.  Access discontinued per protocol.     ONPRO  Was placed on patient's: back of left arm.    Was placed at 1:00 PM    Podpal used: Yes    ONPRO injector device Lot number: [id number]    Patient education included: what patient can expect after application, what colored lights mean on the device, when to remove device, when and where to call with questions or issues, all patients questions answered, and that Neulasta administration will occur at 1600 on 11/29/2024.    Patient tolerated administration well.    Discharge Plan:   Patient declined prescription refills.  Discharge instructions reviewed with: Patient and Family.  Patient and/or family verbalized understanding of discharge instructions and all questions answered.  AVS to patient via Arzeda.  Patient will return 12/11/2024 for next appointment.   Patient discharged in stable condition accompanied by: self and wife.  Departure Mode: Ambulatory.      Daija Avery  RN

## 2024-12-02 ENCOUNTER — HOME INFUSION (OUTPATIENT)
Dept: HOME HEALTH SERVICES | Facility: HOME HEALTH | Age: 69
End: 2024-12-02
Payer: COMMERCIAL

## 2024-12-02 DIAGNOSIS — C16.0 ADENOCARCINOMA OF GASTROESOPHAGEAL JUNCTION (H): ICD-10-CM

## 2024-12-03 ENCOUNTER — PATIENT OUTREACH (OUTPATIENT)
Dept: ONCOLOGY | Facility: CLINIC | Age: 69
End: 2024-12-03
Payer: COMMERCIAL

## 2024-12-03 NOTE — TELEPHONE ENCOUNTER
[facility]: Cancer Care                                                                                          Returned call to pt stating he has lost 6 lbs in one week. Wondering if protein shakes will help.  Home scale is 176 lbs, last recorded in clinic 182-186 lbs on 11/27 . Pt had not noticed weight loss in any specific body area. Mild diarrhea Friday, took 1 imodium. Has been pushing fluids and feels C3 went well. Eating 3 meals a day with snacks, denies any nausea and vomiting.    Offered Nutrition consult if pt was interested in exploring different types of shakes or review daily caloric intake, pt declined.    Reviewed whether pt was having any urinary issues or edema, he stated urine flow is slower, sometimes takes 5 min or more to get started. Denied urinating less.  Denies any swelling in arms or legs  Yesterday slept 14 hours but did wake up refreshed, was able to do some work from home, watched tv. Taking a couple of naps a day.    Advised pt continue to monitor, may add 1-2 protein shakes a day if he feels he's eating less than usual. He is in his Arthur period and to watch for increasing fatigue, sob or weakness. Advised to call triage 24/7 and may send him for labs at local [facility]. He confirmed next infusion on 12/11 at , will have virtual visit with Riya Wiley PA-C after labs are drawn. He's very pleased with how scheduling has this set up, there is a private conference room he can use while there for the virtual call.     Advised pt to weigh himself at home the day of infusion with same amount of clothing on, and re-weigh in clinic on 12/11 to see if scales match up, pt verbalized understanding and denied any other concerns.    Signature:  Jelly Dill RN

## 2024-12-10 NOTE — PROGRESS NOTES
Virtual Visit Details    Type of service:  Video Visit     Originating Location (pt. Location): Other clinic    Distant Location (provider location):  Off-site  Platform used for Video Visit: 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 Mc's consult note):  Mr. Junior is a 69 year old male with history of severe acid reflux, s/p fundoplication around 2011/12, and postprandial hypoglycemia and neuritis/neuropathy, of unclear etiology.      He has been followed routinely with upper endoscopies over the last decade, many of which have been done within the [facility] [place] system.  The ones done over the last seven years include ones performed on August 30, 2017, December 4, 2018, May 9, 2019, and most recently as September 24, 2024.    He had a CT scan in January 2024 with results as follows:     January 26, 2024--CT a/p--IMPRESSION:   1.  Superior aspect of the Nissen fundal wrap appears about 2 cm above  the diaphragm possibly representing slipped Nissen.  2.  Hepatic steatosis.  3.  Nonobstructing renal calculi. No hydronephrosis.    He was experiencing dysphagia and slipped Nissen fundoplication and related issues, some of which were said to be related to worsening occasional reflux, and chest pressure associate with early satiety.  On April 30, 2024, he underwent barium swallow, with results as follows:  IMPRESSION:  1. Timed barium esophagram results as detailed above with retained  esophageal contrast lasting up to 5 minutes. There is patent flow of  contrast.   2. Expected passage of barium tablet into the stomach.      He had continued evaluation and during the summer of 2024, including on July 23, 2024. Part of the assessment included impression that  "the \"Manometry was consistent with inconclusive manometric EGJOO. Esophagram showed routine contrast lasting up to 5 minutes but patent flow of contrast and tablet.\"      Further evaluation included upper endoscopy, that revealed a nodularity at the gastroesophageal junction, concerning for malignancy, as follows:  September 24, 2024--EGD--Evidence of a Nissen fundoplication was found at the gastroesophageal   junction. The wrap appeared loose. This was traversed.   Localized moderate mucosal changes characterized by erythema and   nodularity were found at the gastroesophageal junction. Biopsies were   taken with a cold forceps for histology.   Specimen:    Gastric Esophageal Junction, Gastroesophageal biopsy                                      Final Diagnosis   A. GASTROESOPHAGEAL JUNCTION, BIOPSY:  - Adenocarcinoma, poorly-differentiated, with signet ring cell features     GASTRIC HER2 BIOMARKER TESTS   Test(s) Performed     HER2 by IHC     Results         With this new finding of malignancy, a staging PET/CT was performed, and no distant metastasis was detected:  October 1, 2024--PET/CT --                                                                 IMPRESSION Findings suspicious for the gastroesophageal junction adenocarcinoma without convincing evidence of tona or metastatic disease.      He further underwent a staging EUS, that characterized the tumor as staged as T2N0 per EUS criteria:      Oct 8, 2024 EUS--    Impression:            - Malignant esophageal tumor was found at the                          gastroesophageal junction. Siewert 2. Measurements as                          above.                          This is classified T2 N0 (cytology pending) M0 by EUS                          criteria.                          Three small lymph nodes seen as described above. Two                          were sampled (station 8R) and are preliminarily                          benign. The third was identical " in size and appearance                          and not amenable to sampling due to the proximity to                          the heart and proximal margin of the mass.                          - Z-line, 37 cm from the incisors.                          - A Nissen fundoplication was found. This has                          partially slipped above the diaphragm.   Specimen A                 Interpretation:                  Negative for malignancy  Polymorphous population of lymphocytes present                 Adequacy:                 Satisfactory for evaluation        He met with Dr. Katlyn Tobar and Dr. Lawrence Mc with plan for neoadjuvant FLOT x 4 followed by surgical resection and then 4 adjuvant cycles of FLOT.  He started FLOT on 10/31.     Interval History: Bal is seen virtually prior to C3 of FLOT. He notes with cycle 3 he had leg and shin pain again for the first few days. Tylenol was helpful. He also had cold sensitivity lasting a longer time. It was noticeable yesterday. Doing well managing this with simple cold avoidance. No neuropathy without cold exposure. He also has been more fatigued but is expecting this.     The first week of treatment he dropped 6 pounds unexpectedly. He started protein shakes daily and so far has regained 4 pounds. No nausea, GERD is at baseline. Had one day of softer stools and used 1 Imodium with resolution.     He had some irritated, tender red skin on face. Has been using moisturizer. Also has some irritated skin on head of penis. No dysuria symptoms.     He has a hard spot on roof of mouth but no tender sores. Using s/s rinses.     No fevers/chills or URI symptoms.     Past medical, social, surgical, and family histories reviewed, confirmed, and pertinent details discussed with patient and family; additional sources include the medical record.      Past Medical History:   Diagnosis Date    Hypertension     Neuritis     Peripheral neuropathy     Personal history of other  medical treatment     postgastrectomy dumping syndrome    Stomach problems Noted earlier      Past Surgical History:   Procedure Laterality Date    ABDOMEN SURGERY  2012?    APPENDECTOMY  1964?    COLONOSCOPY  2006, 2016    ESOPHAGEAL BALLOON PROVOCATION STUDY N/A 9/24/2024    Procedure: Esophageal Balloon Provocation Study;  Surgeon: Carson Michel DO;  Location:  GI    ESOPHAGOSCOPY, GASTROSCOPY, DUODENOSCOPY (EGD), COMBINED N/A 9/24/2024    Procedure: ESOPHAGOGASTRODUODENOSCOPY, WITH BIOPSY;  Surgeon: Carson Michel DO;  Location:  GI    ESOPHAGOSCOPY, GASTROSCOPY, DUODENOSCOPY (EGD), COMBINED N/A 10/8/2024    Procedure: ESOPHAGOGASTRODUODENOSCOPY, WITH FINE NEEDLE ASPIRATION BIOPSY, WITH ENDOSCOPIC ULTRASOUND GUIDANCE;  Surgeon: Lance Lopez MD;  Location:  GI    EYE SURGERY  199x    laser for retinal tears    GASTRIC FUNDOPLICATION      HERNIA REPAIR  1961?    IR CHEST PORT PLACEMENT > 5 YRS OF AGE  10/24/2024    LAPAROSCOPIC TAKEDOWN NISSEN FUNDOPLICATION N/A 9/25/2020    Procedure: TAKE-DOWN, FUNDOPLICATION, REDO NISSEN, LAPAROSCOPIC, gastrostomy feeding tube placement;  Surgeon: Katlyn Tobar MD;  Location:  OR    [place] ESOPHAGOGASTRODUODENOSCOPY TRANSORAL DIAGNOSTIC N/A 5/9/2019    Procedure: UPPER GASTROINTESTINAL ENDOSCOPY;  Surgeon: Dino Ward MD;  Location: [facility];  Service: General    SOFT TISSUE SURGERY  2009?    MCL l. thumb          SOCIAL HISTORY: Lives in [place], [place] with his wife Jess. They've been  for 15 years.  He has three daughters, ages 38, 42, 50.  He is retired from working in computer security.  He denies any history of oral tobacco or cigarette use in his lifetime.  Alcohol use that he reports lifetime is five drinks per week since age of 17.  Info from other notes:    Alcohol use: Yes        Alcohol/week: 3.0 - 6.0 standard drinks of alcohol       Types: 1 - 2 Glasses of wine, 1 - 2 Cans of beer, 1 - 2 Shots of liquor per week        Comment: glass of bismark   He is an avid skier, and enjoy skiing a winter, and sometimes the peripheral neuropathycan interfere with that aspect of quality of life.    FAMILY HISTORY OF CANCER: his mother was diagnosed with uterine cancer in her late 30s; this diagnosis took place in the 1960s, and she  in  of unrelated causes.  He has two sisters, in good health, and his father had a superficial form of skin cancer, otherwise no other family history of cancer.      Allergies:  Allergies as of 2024 - Reviewed 2024   Allergen Reaction Noted    Hornets  2020    Wasp venom protein Hives 2016    Bee venom Swelling 2019       Current Medications:  Current Outpatient Medications   Medication Sig Dispense Refill    atenolol (TENORMIN) 50 MG tablet Take 50 mg by mouth daily       blood glucose (NO BRAND SPECIFIED) test strip Use to test blood sugar 1 times daily or as directed. To accompany: Blood Glucose Monitor Brands: per insurance. 100 strip 6    blood glucose monitoring (NO BRAND SPECIFIED) meter device kit Use to test blood sugar 1 times daily or as directed. Preferred blood glucose meter OR supplies to accompany: Blood Glucose Monitor Brands: per insurance. 1 kit 0    Continuous Glucose Sensor (FREESTYLE SIMIN 2 SENSOR) MISC Use 1 sensor every 14 days. Use to read blood sugars per 's instructions. 6 each 1    cyanocobalamin (VITAMIN B-12) 500 MCG tablet Take 500 mcg by mouth daily.      dexAMETHasone (DECADRON) 4 MG tablet Take 2 tablets (8 mg) by mouth 2 times daily (with meals). Start evening of Docetaxel infusion and continue for a total of 3 doses. 6 tablet 7    EPINEPHrine (ANY BX GENERIC EQUIV) 0.3 MG/0.3ML injection 2-pack 0.3 mg      [START ON 2024] Fluorouracil (ADRUCIL) 5,150 mg in sodium chloride 0.9 % 240 mL via CADD pump Infuse 5,150 mg at 10 mL/hr over 24 hours into the vein once Do not start before 2024. 370679 mL 0    gabapentin  (NEURONTIN) 300 MG capsule Take 1 capsule (300 mg) by mouth 3 times daily. 270 capsule 3    metFORMIN (GLUCOPHAGE XR) 500 MG 24 hr tablet Take 1 tablet (500 mg) by mouth daily (with dinner) 90 tablet 3    nortriptyline (PAMELOR) 25 MG capsule Take 25 mg by mouth daily       ondansetron (ZOFRAN) 8 MG tablet Take 1 tablet (8 mg) by mouth every 8 hours as needed for nausea (vomiting). 30 tablet 2    prochlorperazine (COMPAZINE) 10 MG tablet Take 1 tablet (10 mg) by mouth every 6 hours as needed for nausea or vomiting. 30 tablet 2    thin (NO BRAND SPECIFIED) lancets Use with lanceting device. To accompany: Blood Glucose Monitor Brands: per insurance. 100 each 6        PHYSICAL EXAM:  Video physical exam  General: Patient appears well in no acute distress.   Skin: No visualized rash or lesions on visualized skin  Eyes: EOMI, no erythema, sclera icterus or discharge noted  Resp: Appears to be breathing comfortably without accessory muscle usage, speaking in full sentences, no cough  MSK: Appears to have normal range of motion based on visualized movements  Neurologic: No apparent tremors, facial movements symmetric  Psych: affect and mood congruent, alert and oriented        Laboratory/Imaging Studies   12/11/24 07:57   Sodium 136   Potassium 4.3   Chloride 104   Carbon Dioxide (CO2) 21 (L)   Urea Nitrogen 18.3   Creatinine 1.00   GFR Estimate 82   Calcium 8.5 (L)   Anion Gap 11   Albumin 3.3 (L)   Protein Total 5.6 (L)   Alkaline Phosphatase 118   ALT 17   AST 25   Bilirubin Total 0.3   Glucose 114 (H)   WBC 17.5 (H)   Hemoglobin 13.1 (L)   Hematocrit 38.8 (L)   Platelet Count 119 (L)   RBC Count 4.24 (L)   MCV 92   MCH 30.9   MCHC 33.8   RDW 16.9 (H)   % Neutrophils 79   % Lymphocytes 10   % Monocytes 7   % Eosinophils 0   % Basophils 0   Absolute Basophils 0.1   Absolute Eosinophils 0.0   Absolute Immature Granulocytes 0.6 (H)   Absolute Lymphocytes 1.8   Absolute Monocytes 1.2   % Immature Granulocytes 3   Absolute  Neutrophils 13.8 (H)   Absolute NRBCs 0.0   NRBCs per 100 WBC 0         ASSESSMENT/PLAN:  Mr. Bal Junior is a 69-year-old gentleman from [place], [place] with a long-standing history over ~two decades of severe acid reflux, necessitating Nissen Fundoplication procedure in 2011 or 2012; in recent years he has experienced a slippage of the fundoplication anatomy.     GE junction adenocarcinoma, localized: S/p 3 cycles of neoadjuvant FLOT. This was complicated with diarrhea, myalgias, fatigue, mild mucositis, and cold sensitivity. See below for side effect management. Okay to proceed with cycle 4 of FLOT today. Neulasta was added with cycle 2 due to lower ANC and this will continue for this cycle.     Right now his PET is scheduled a month after C4 FLOT. This was okayed by Dr. Tobar and Dr. Mc. After surgery we will see him back for 4 more cycles of FLOT. He will call with any interval concerns.     Diarrhea: Imodium PRN if 3 or more loose stools in 24 hours. Reviewed dosing on this.     Mucositis: S/s rinses BID for prevention and every 2-3 hours during the day for active sores.     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 May be cyclical each cycle. Discussed using tylenol, warm bath/showers, heating pad.     Rashes: Rx for antifungal cream BID to penis until clearance. Hydrocortisone cream once daily to face along with moisturizer.     Riya Wiley PA-C     35 minutes spent on the date of the encounter doing chart review, review of test results, interpretation of tests, patient visit, and documentation     The longitudinal plan of care for the diagnosis(es)/condition(s) as documented were addressed during this visit. Due to the added complexity in care, I will continue to support Bal in the subsequent management and with ongoing continuity of care.

## 2024-12-11 ENCOUNTER — VIRTUAL VISIT (OUTPATIENT)
Dept: ONCOLOGY | Facility: CLINIC | Age: 69
End: 2024-12-11
Attending: PHYSICIAN ASSISTANT
Payer: MEDICARE

## 2024-12-11 ENCOUNTER — INFUSION THERAPY VISIT (OUTPATIENT)
Dept: INFUSION THERAPY | Facility: HOSPITAL | Age: 69
End: 2024-12-11
Attending: INTERNAL MEDICINE
Payer: MEDICARE

## 2024-12-11 ENCOUNTER — HOME INFUSION BILLING (OUTPATIENT)
Dept: HOME HEALTH SERVICES | Facility: HOME HEALTH | Age: 69
End: 2024-12-11
Payer: COMMERCIAL

## 2024-12-11 VITALS
TEMPERATURE: 97.6 F | WEIGHT: 186.4 LBS | BODY MASS INDEX: 26.75 KG/M2 | RESPIRATION RATE: 16 BRPM | HEART RATE: 75 BPM | SYSTOLIC BLOOD PRESSURE: 115 MMHG | DIASTOLIC BLOOD PRESSURE: 72 MMHG | OXYGEN SATURATION: 99 %

## 2024-12-11 VITALS — BODY MASS INDEX: 25.77 KG/M2 | WEIGHT: 180 LBS | HEIGHT: 70 IN

## 2024-12-11 DIAGNOSIS — B37.2 CANDIDIASIS OF SKIN: ICD-10-CM

## 2024-12-11 DIAGNOSIS — C16.0 ADENOCARCINOMA OF GASTROESOPHAGEAL JUNCTION (H): Primary | ICD-10-CM

## 2024-12-11 LAB
ALBUMIN SERPL BCG-MCNC: 3.3 G/DL (ref 3.5–5.2)
ALP SERPL-CCNC: 118 U/L (ref 40–150)
ALT SERPL W P-5'-P-CCNC: 17 U/L (ref 0–70)
ANION GAP SERPL CALCULATED.3IONS-SCNC: 11 MMOL/L (ref 7–15)
AST SERPL W P-5'-P-CCNC: 25 U/L (ref 0–45)
BASOPHILS # BLD AUTO: 0.1 10E3/UL (ref 0–0.2)
BASOPHILS NFR BLD AUTO: 0 %
BILIRUB SERPL-MCNC: 0.3 MG/DL
BUN SERPL-MCNC: 18.3 MG/DL (ref 8–23)
CALCIUM SERPL-MCNC: 8.5 MG/DL (ref 8.8–10.4)
CHLORIDE SERPL-SCNC: 104 MMOL/L (ref 98–107)
CREAT SERPL-MCNC: 1 MG/DL (ref 0.67–1.17)
EGFRCR SERPLBLD CKD-EPI 2021: 82 ML/MIN/1.73M2
EOSINOPHIL # BLD AUTO: 0 10E3/UL (ref 0–0.7)
EOSINOPHIL NFR BLD AUTO: 0 %
ERYTHROCYTE [DISTWIDTH] IN BLOOD BY AUTOMATED COUNT: 16.9 % (ref 10–15)
GLUCOSE SERPL-MCNC: 114 MG/DL (ref 70–99)
HCO3 SERPL-SCNC: 21 MMOL/L (ref 22–29)
HCT VFR BLD AUTO: 38.8 % (ref 40–53)
HGB BLD-MCNC: 13.1 G/DL (ref 13.3–17.7)
IMM GRANULOCYTES # BLD: 0.6 10E3/UL
IMM GRANULOCYTES NFR BLD: 3 %
LYMPHOCYTES # BLD AUTO: 1.8 10E3/UL (ref 0.8–5.3)
LYMPHOCYTES NFR BLD AUTO: 10 %
MCH RBC QN AUTO: 30.9 PG (ref 26.5–33)
MCHC RBC AUTO-ENTMCNC: 33.8 G/DL (ref 31.5–36.5)
MCV RBC AUTO: 92 FL (ref 78–100)
MONOCYTES # BLD AUTO: 1.2 10E3/UL (ref 0–1.3)
MONOCYTES NFR BLD AUTO: 7 %
NEUTROPHILS # BLD AUTO: 13.8 10E3/UL (ref 1.6–8.3)
NEUTROPHILS NFR BLD AUTO: 79 %
NRBC # BLD AUTO: 0 10E3/UL
NRBC BLD AUTO-RTO: 0 /100
PLATELET # BLD AUTO: 119 10E3/UL (ref 150–450)
POTASSIUM SERPL-SCNC: 4.3 MMOL/L (ref 3.4–5.3)
PROT SERPL-MCNC: 5.6 G/DL (ref 6.4–8.3)
RBC # BLD AUTO: 4.24 10E6/UL (ref 4.4–5.9)
SODIUM SERPL-SCNC: 136 MMOL/L (ref 135–145)
WBC # BLD AUTO: 17.5 10E3/UL (ref 4–11)

## 2024-12-11 PROCEDURE — 82435 ASSAY OF BLOOD CHLORIDE: CPT | Performed by: INTERNAL MEDICINE

## 2024-12-11 PROCEDURE — 250N000011 HC RX IP 250 OP 636: Performed by: INTERNAL MEDICINE

## 2024-12-11 PROCEDURE — G0498 CHEMO EXTEND IV INFUS W/PUMP: HCPCS

## 2024-12-11 PROCEDURE — 96375 TX/PRO/DX INJ NEW DRUG ADDON: CPT

## 2024-12-11 PROCEDURE — A4222 INFUSION SUPPLIES WITH PUMP: HCPCS

## 2024-12-11 PROCEDURE — 96415 CHEMO IV INFUSION ADDL HR: CPT

## 2024-12-11 PROCEDURE — 96413 CHEMO IV INFUSION 1 HR: CPT

## 2024-12-11 PROCEDURE — 258N000003 HC RX IP 258 OP 636: Performed by: INTERNAL MEDICINE

## 2024-12-11 PROCEDURE — 96368 THER/DIAG CONCURRENT INF: CPT

## 2024-12-11 PROCEDURE — 85004 AUTOMATED DIFF WBC COUNT: CPT | Performed by: INTERNAL MEDICINE

## 2024-12-11 PROCEDURE — 96417 CHEMO IV INFUS EACH ADDL SEQ: CPT

## 2024-12-11 PROCEDURE — A4221 SUPP NON-INSULIN INF CATH/WK: HCPCS

## 2024-12-11 PROCEDURE — 36591 DRAW BLOOD OFF VENOUS DEVICE: CPT | Performed by: INTERNAL MEDICINE

## 2024-12-11 PROCEDURE — 82040 ASSAY OF SERUM ALBUMIN: CPT | Performed by: INTERNAL MEDICINE

## 2024-12-11 PROCEDURE — 80053 COMPREHEN METABOLIC PANEL: CPT | Performed by: INTERNAL MEDICINE

## 2024-12-11 PROCEDURE — 96367 TX/PROPH/DG ADDL SEQ IV INF: CPT

## 2024-12-11 PROCEDURE — E1399 DURABLE MEDICAL EQUIPMENT MI: HCPCS

## 2024-12-11 PROCEDURE — 82247 BILIRUBIN TOTAL: CPT | Performed by: INTERNAL MEDICINE

## 2024-12-11 PROCEDURE — 85018 HEMOGLOBIN: CPT | Performed by: INTERNAL MEDICINE

## 2024-12-11 PROCEDURE — E0781 EXTERNAL AMBULATORY INFUS PU: HCPCS | Mod: RR

## 2024-12-11 RX ORDER — ONDANSETRON 2 MG/ML
8 INJECTION INTRAMUSCULAR; INTRAVENOUS ONCE
Status: COMPLETED | OUTPATIENT
Start: 2024-12-11 | End: 2024-12-11

## 2024-12-11 RX ORDER — EPINEPHRINE 1 MG/ML
0.3 INJECTION, SOLUTION INTRAMUSCULAR; SUBCUTANEOUS EVERY 5 MIN PRN
Status: DISCONTINUED | OUTPATIENT
Start: 2024-12-11 | End: 2024-12-11 | Stop reason: HOSPADM

## 2024-12-11 RX ORDER — DIPHENHYDRAMINE HYDROCHLORIDE 50 MG/ML
50 INJECTION INTRAMUSCULAR; INTRAVENOUS
Status: DISCONTINUED | OUTPATIENT
Start: 2024-12-11 | End: 2024-12-11 | Stop reason: HOSPADM

## 2024-12-11 RX ORDER — CLOTRIMAZOLE 1 %
CREAM (GRAM) TOPICAL 2 TIMES DAILY
Qty: 30 G | Refills: 0 | Status: SHIPPED | OUTPATIENT
Start: 2024-12-11

## 2024-12-11 RX ORDER — METHYLPREDNISOLONE SODIUM SUCCINATE 40 MG/ML
40 INJECTION INTRAMUSCULAR; INTRAVENOUS
Status: DISCONTINUED | OUTPATIENT
Start: 2024-12-11 | End: 2024-12-11 | Stop reason: HOSPADM

## 2024-12-11 RX ORDER — ALBUTEROL SULFATE 0.83 MG/ML
2.5 SOLUTION RESPIRATORY (INHALATION)
Status: DISCONTINUED | OUTPATIENT
Start: 2024-12-11 | End: 2024-12-11 | Stop reason: HOSPADM

## 2024-12-11 RX ORDER — ALBUTEROL SULFATE 90 UG/1
1-2 INHALANT RESPIRATORY (INHALATION)
Status: DISCONTINUED | OUTPATIENT
Start: 2024-12-11 | End: 2024-12-11 | Stop reason: HOSPADM

## 2024-12-11 RX ORDER — HEPARIN SODIUM (PORCINE) LOCK FLUSH IV SOLN 100 UNIT/ML 100 UNIT/ML
5 SOLUTION INTRAVENOUS
Status: DISCONTINUED | OUTPATIENT
Start: 2024-12-11 | End: 2024-12-11 | Stop reason: HOSPADM

## 2024-12-11 RX ORDER — DIPHENHYDRAMINE HYDROCHLORIDE 50 MG/ML
25 INJECTION INTRAMUSCULAR; INTRAVENOUS
Status: DISCONTINUED | OUTPATIENT
Start: 2024-12-11 | End: 2024-12-11 | Stop reason: HOSPADM

## 2024-12-11 RX ORDER — MEPERIDINE HYDROCHLORIDE 50 MG/ML
25 INJECTION INTRAMUSCULAR; INTRAVENOUS; SUBCUTANEOUS
Status: DISCONTINUED | OUTPATIENT
Start: 2024-12-11 | End: 2024-12-11 | Stop reason: HOSPADM

## 2024-12-11 RX ADMIN — DEXAMETHASONE SODIUM PHOSPHATE: 10 INJECTION, SOLUTION INTRAMUSCULAR; INTRAVENOUS at 08:56

## 2024-12-11 RX ADMIN — LEUCOVORIN CALCIUM 400 MG: 350 INJECTION, POWDER, LYOPHILIZED, FOR SUSPENSION INTRAMUSCULAR; INTRAVENOUS at 10:37

## 2024-12-11 RX ADMIN — DOCETAXEL 100 MG: 20 INJECTION INTRAVENOUS at 09:31

## 2024-12-11 RX ADMIN — OXALIPLATIN 170 MG: 5 INJECTION, SOLUTION INTRAVENOUS at 10:38

## 2024-12-11 RX ADMIN — ONDANSETRON 8 MG: 2 INJECTION, SOLUTION INTRAMUSCULAR; INTRAVENOUS at 08:50

## 2024-12-11 RX ADMIN — DEXTROSE MONOHYDRATE 250 ML: 50 INJECTION, SOLUTION INTRAVENOUS at 08:50

## 2024-12-11 ASSESSMENT — PAIN SCALES - GENERAL: PAINLEVEL_OUTOF10: NO PAIN (0)

## 2024-12-11 NOTE — LETTER
12/11/2024      Bal Junior  [address] [facility] [address]      Dear Colleague,    Thank you for referring your patient, Bal Junior, to the [facility]. Please see a copy of my visit note below.    Virtual Visit Details    Type of service:  Video Visit     Originating Location (pt. 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 Mc's consult note):  Mr. Junior is a 69 year old male with history of severe acid reflux, s/p fundoplication around 2011/12, and postprandial hypoglycemia and neuritis/neuropathy, of unclear etiology.      He has been followed routinely with upper endoscopies over the last decade, many of which have been done within the [facility] system.  The ones done over the last seven years include ones performed on August 30, 2017, December 4, 2018, May 9, 2019, and most recently as September 24, 2024.    He had a CT scan in January 2024 with results as follows:     January 26, 2024--CT a/p--IMPRESSION:   1.  Superior aspect of the Nissen fundal wrap appears about 2 cm above  the diaphragm possibly representing slipped Nissen.  2.  Hepatic steatosis.  3.  Nonobstructing renal calculi. No hydronephrosis.    He was experiencing dysphagia and slipped Nissen fundoplication and related issues, some of which were said to be related to worsening occasional reflux, and chest pressure associate with early satiety.  On April 30, 2024, he underwent barium swallow, with results as follows:  IMPRESSION:  1. Timed barium esophagram results as detailed above with retained  esophageal contrast lasting up to 5  "minutes. There is patent flow of  contrast.   2. Expected passage of barium tablet into the stomach.      He had continued evaluation and during the summer of 2024, including on July 23, 2024. Part of the assessment included impression that the \"Manometry was consistent with inconclusive manometric EGJOO. Esophagram showed routine contrast lasting up to 5 minutes but patent flow of contrast and tablet.\"      Further evaluation included upper endoscopy, that revealed a nodularity at the gastroesophageal junction, concerning for malignancy, as follows:  September 24, 2024--EGD--Evidence of a Nissen fundoplication was found at the gastroesophageal   junction. The wrap appeared loose. This was traversed.   Localized moderate mucosal changes characterized by erythema and   nodularity were found at the gastroesophageal junction. Biopsies were   taken with a cold forceps for histology.   Specimen:    Gastric Esophageal Junction, Gastroesophageal biopsy                                      Final Diagnosis   A. GASTROESOPHAGEAL JUNCTION, BIOPSY:  - Adenocarcinoma, poorly-differentiated, with signet ring cell features     GASTRIC HER2 BIOMARKER TESTS   Test(s) Performed     HER2 by IHC     Results         With this new finding of malignancy, a staging PET/CT was performed, and no distant metastasis was detected:  October 1, 2024--PET/CT --                                                                 IMPRESSION Findings suspicious for the gastroesophageal junction adenocarcinoma without convincing evidence of tona or metastatic disease.      He further underwent a staging EUS, that characterized the tumor as staged as T2N0 per EUS criteria:      Oct 8, 2024 EUS--    Impression:            - Malignant esophageal tumor was found at the                          gastroesophageal junction. Siewert 2. Measurements as                          above.                          This is classified T2 N0 (cytology pending) M0 by EUS    "                       criteria.                          Three small lymph nodes seen as described above. Two                          were sampled (station [id number]) and are preliminarily                          benign. The third was identical in size and appearance                          and not amenable to sampling due to the proximity to                          the heart and proximal margin of the mass.                          - Z-line, 37 cm from the incisors.                          - A Nissen fundoplication was found. This has                          partially slipped above the diaphragm.   Specimen A                 Interpretation:                  Negative for malignancy  Polymorphous population of lymphocytes present                 Adequacy:                 Satisfactory for evaluation        He met with Dr. Katlyn Tobar and Dr. Lawrence Mc with plan for neoadjuvant FLOT x 4 followed by surgical resection and then 4 adjuvant cycles of FLOT.  He started FLOT on 10/31.     Interval History: Bal is seen virtually prior to C3 of FLOT. He notes with cycle 3 he had leg and shin pain again for the first few days. Tylenol was helpful. He also had cold sensitivity lasting a longer time. It was noticeable yesterday. Doing well managing this with simple cold avoidance. No neuropathy without cold exposure. He also has been more fatigued but is expecting this.     The first week of treatment he dropped 6 pounds unexpectedly. He started protein shakes daily and so far has regained 4 pounds. No nausea, GERD is at baseline. Had one day of softer stools and used 1 Imodium with resolution.     He had some irritated, tender red skin on face. Has been using moisturizer. Also has some irritated skin on head of penis. No dysuria symptoms.     He has a hard spot on roof of mouth but no tender sores. Using s/s rinses.     No fevers/chills or URI symptoms.     Past medical, social, surgical, and family histories reviewed, confirmed,  and pertinent details discussed with patient and family; additional sources include the medical record.      Past Medical History:   Diagnosis Date     Hypertension      Neuritis      Peripheral neuropathy      Personal history of other medical treatment     postgastrectomy dumping syndrome     Stomach problems Noted earlier      Past Surgical History:   Procedure Laterality Date     ABDOMEN SURGERY  2012?     APPENDECTOMY  1964?     COLONOSCOPY  2006, 2016     ESOPHAGEAL BALLOON PROVOCATION STUDY N/A 9/24/2024    Procedure: Esophageal Balloon Provocation Study;  Surgeon: Carson Michel DO;  Location:  GI     ESOPHAGOSCOPY, GASTROSCOPY, DUODENOSCOPY (EGD), COMBINED N/A 9/24/2024    Procedure: ESOPHAGOGASTRODUODENOSCOPY, WITH BIOPSY;  Surgeon: Carson Michel DO;  Location:  GI     ESOPHAGOSCOPY, GASTROSCOPY, DUODENOSCOPY (EGD), COMBINED N/A 10/8/2024    Procedure: ESOPHAGOGASTRODUODENOSCOPY, WITH FINE NEEDLE ASPIRATION BIOPSY, WITH ENDOSCOPIC ULTRASOUND GUIDANCE;  Surgeon: Lance Lopez MD;  Location:  GI     EYE SURGERY  199x    laser for retinal tears     GASTRIC FUNDOPLICATION       HERNIA REPAIR  1961?     IR CHEST PORT PLACEMENT > 5 YRS OF AGE  10/24/2024     LAPAROSCOPIC TAKEDOWN NISSEN FUNDOPLICATION N/A 9/25/2020    Procedure: TAKE-DOWN, FUNDOPLICATION, REDO NISSEN, LAPAROSCOPIC, gastrostomy feeding tube placement;  Surgeon: Katlyn Tobar MD;  Location: [facility] ESOPHAGOGASTRODUODENOSCOPY TRANSORAL DIAGNOSTIC N/A 5/9/2019    Procedure: UPPER GASTROINTESTINAL ENDOSCOPY;  Surgeon: Dino Ward MD;  Location: [facility];  Service: General     SOFT TISSUE SURGERY  2009?    MCL lJackeline thumb          SOCIAL HISTORY: Lives in [place], [place] with his wife Jess. They've been  for 15 years.  He has three daughters, ages 38, 42, 50.  He is retired from working in computer security.  He denies any history of oral tobacco or cigarette use in his lifetime.  Alcohol use that he reports  lifetime is five drinks per week since age of 17.  Info from other notes:    Alcohol use: Yes        Alcohol/week: 3.0 - 6.0 standard drinks of alcohol       Types: 1 - 2 Glasses of wine, 1 - 2 Cans of beer, 1 - 2 Shots of liquor per week       Comment: glass of win   He is an avid skier, and enjoy skiing a winter, and sometimes the peripheral neuropathycan interfere with that aspect of quality of life.    FAMILY HISTORY OF CANCER: his mother was diagnosed with uterine cancer in her late 30s; this diagnosis took place in the 1960s, and she  in  of unrelated causes.  He has two sisters, in good health, and his father had a superficial form of skin cancer, otherwise no other family history of cancer.      Allergies:  Allergies as of 2024 - Reviewed 2024   Allergen Reaction Noted     Hornets  2020     Wasp venom protein Hives 2016     Bee venom Swelling 2019       Current Medications:  Current Outpatient Medications   Medication Sig Dispense Refill     atenolol (TENORMIN) 50 MG tablet Take 50 mg by mouth daily        blood glucose (NO BRAND SPECIFIED) test strip Use to test blood sugar 1 times daily or as directed. To accompany: Blood Glucose Monitor Brands: per insurance. 100 strip 6     blood glucose monitoring (NO BRAND SPECIFIED) meter device kit Use to test blood sugar 1 times daily or as directed. Preferred blood glucose meter OR supplies to accompany: Blood Glucose Monitor Brands: per insurance. 1 kit 0     Continuous Glucose Sensor (FREESTYLE SIMIN 2 SENSOR) MISC Use 1 sensor every 14 days. Use to read blood sugars per 's instructions. 6 each 1     cyanocobalamin (VITAMIN B-12) 500 MCG tablet Take 500 mcg by mouth daily.       dexAMETHasone (DECADRON) 4 MG tablet Take 2 tablets (8 mg) by mouth 2 times daily (with meals). Start evening of Docetaxel infusion and continue for a total of 3 doses. 6 tablet 7     EPINEPHrine (ANY BX GENERIC EQUIV) 0.3 MG/0.3ML  injection 2-pack 0.3 mg       [START ON 12/11/2024] Fluorouracil (ADRUCIL) 5,150 mg in sodium chloride 0.9 % 240 mL via CADD pump Infuse 5,150 mg at 10 mL/hr over 24 hours into the vein once Do not start before December 11, 2024. 813175 mL 0     gabapentin (NEURONTIN) 300 MG capsule Take 1 capsule (300 mg) by mouth 3 times daily. 270 capsule 3     metFORMIN (GLUCOPHAGE XR) 500 MG 24 hr tablet Take 1 tablet (500 mg) by mouth daily (with dinner) 90 tablet 3     nortriptyline (PAMELOR) 25 MG capsule Take 25 mg by mouth daily        ondansetron (ZOFRAN) 8 MG tablet Take 1 tablet (8 mg) by mouth every 8 hours as needed for nausea (vomiting). 30 tablet 2     prochlorperazine (COMPAZINE) 10 MG tablet Take 1 tablet (10 mg) by mouth every 6 hours as needed for nausea or vomiting. 30 tablet 2     thin (NO BRAND SPECIFIED) lancets Use with lanceting device. To accompany: Blood Glucose Monitor Brands: per insurance. 100 each 6        PHYSICAL EXAM:  Video physical exam  General: Patient appears well in no acute distress.   Skin: No visualized rash or lesions on visualized skin  Eyes: EOMI, no erythema, sclera icterus or discharge noted  Resp: Appears to be breathing comfortably without accessory muscle usage, speaking in full sentences, no cough  MSK: Appears to have normal range of motion based on visualized movements  Neurologic: No apparent tremors, facial movements symmetric  Psych: affect and mood congruent, alert and oriented        Laboratory/Imaging Studies   12/11/24 07:57   Sodium 136   Potassium 4.3   Chloride 104   Carbon Dioxide (CO2) 21 (L)   Urea Nitrogen 18.3   Creatinine 1.00   GFR Estimate 82   Calcium 8.5 (L)   Anion Gap 11   Albumin 3.3 (L)   Protein Total 5.6 (L)   Alkaline Phosphatase 118   ALT 17   AST 25   Bilirubin Total 0.3   Glucose 114 (H)   WBC 17.5 (H)   Hemoglobin 13.1 (L)   Hematocrit 38.8 (L)   Platelet Count 119 (L)   RBC Count 4.24 (L)   MCV 92   MCH 30.9   MCHC 33.8   RDW 16.9 (H)   %  Neutrophils 79   % Lymphocytes 10   % Monocytes 7   % Eosinophils 0   % Basophils 0   Absolute Basophils 0.1   Absolute Eosinophils 0.0   Absolute Immature Granulocytes 0.6 (H)   Absolute Lymphocytes 1.8   Absolute Monocytes 1.2   % Immature Granulocytes 3   Absolute Neutrophils 13.8 (H)   Absolute NRBCs 0.0   NRBCs per 100 WBC 0         ASSESSMENT/PLAN:  Mr. Bal Junior is a 69-year-old gentleman from [place], [place] with a long-standing history over ~two decades of severe acid reflux, necessitating Nissen Fundoplication procedure in 2011 or 2012; in recent years he has experienced a slippage of the fundoplication anatomy.     GE junction adenocarcinoma, localized: S/p 3 cycles of neoadjuvant FLOT. This was complicated with diarrhea, myalgias, fatigue, mild mucositis, and cold sensitivity. See below for side effect management. Okay to proceed with cycle 4 of FLOT today. Neulasta was added with cycle 2 due to lower ANC and this will continue for this cycle.     Right now his PET is scheduled a month after C4 FLOT. This was okayed by Dr. Tobar and Dr. Mc. After surgery we will see him back for 4 more cycles of FLOT. He will call with any interval concerns.     Diarrhea: Imodium PRN if 3 or more loose stools in 24 hours. Reviewed dosing on this.     Mucositis: S/s rinses BID for prevention and every 2-3 hours during the day for active sores.     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 May be cyclical each cycle. Discussed using tylenol, warm bath/showers, heating pad.     Rashes: Rx for antifungal cream BID to penis until clearance. Hydrocortisone cream once daily to face along with moisturizer.     Riya Wiley PA-C     35 minutes spent on the date of the encounter doing chart review, review of test results, interpretation of tests, patient visit, and documentation     The longitudinal plan of care for the diagnosis(es)/condition(s) as documented were addressed during this visit. Due to the added complexity in care, I  will continue to support Bal in the subsequent management and with ongoing continuity of care.                  Again, thank you for allowing me to participate in the care of your patient.        Sincerely,        Riya Wiley PA-C

## 2024-12-11 NOTE — NURSING NOTE
Current patient location:  [facility]    Is the patient currently in the state of [place]? YES    Visit mode:VIDEO    If the visit is dropped, the patient can be reconnected by:VIDEO VISIT: Text to cell phone:   Telephone Information:   Mobile [phone]       Will anyone else be joining the visit? NO  (If patient encounters technical issues they should call [phone]    Are changes needed to the allergy or medication list? No    Are refills needed on medications prescribed by this physician? NO    Rooming Documentation:  Questionnaire(s) not done per department protocol    Reason for visit: SIERRA ELAM

## 2024-12-11 NOTE — PROGRESS NOTES
"Infusion Nursing Note:  Bal Junior presents today for D1C4 Docetaxel, oxaliplatin, 5fu.    Patient seen by provider today: Yes: NORA Bauman   present during visit today: Not Applicable.    Note: Reviewed plan of care. Patient c/o increased fatigue and cold sensitivity that remains today; patient notified provider. Reviewed strategies for managing cold sensitivities.     Patient c/o \"weird taste\" when docetaxel infusion started; went away with mints. Partway through oxaliplatin infusion, patient c/o increase in fatigue.      Patient connected to CADD pump for 24 hour 5fu infusion, will return approx 1230 tomorrow for 1245 pump discontinuation.      Intravenous Access:  Labs drawn without difficulty.  Implanted Port.    Treatment Conditions:  Lab Results   Component Value Date    HGB 13.1 (L) 12/11/2024    WBC 17.5 (H) 12/11/2024    ANEUTAUTO 13.8 (H) 12/11/2024     (L) 12/11/2024        Lab Results   Component Value Date     12/11/2024    POTASSIUM 4.3 12/11/2024    MAG 1.9 09/27/2020    CR 1.00 12/11/2024    AUGUSTIN 8.5 (L) 12/11/2024    BILITOTAL 0.3 12/11/2024    ALBUMIN 3.3 (L) 12/11/2024    ALT 17 12/11/2024    AST 25 12/11/2024       Results reviewed, labs MET treatment parameters, ok to proceed with treatment.      Post Infusion Assessment:  Patient tolerated infusion without incident other than significantly increased fatigue. Patient requested provider be notified so secured chat sent to NORA Greenberg  Blood return noted pre and post infusion.  Site patent and intact, free from redness, edema or discomfort.  No evidence of extravasations.       Discharge Plan:   Patient will return tomorrow for pump discontinuation and Neulasta next appointment.   Patient discharged in stable condition accompanied by: wife, Jess  Departure Mode: Ambulatory.      Marisa Walker RN    "

## 2024-12-12 ENCOUNTER — INFUSION THERAPY VISIT (OUTPATIENT)
Dept: INFUSION THERAPY | Facility: HOSPITAL | Age: 69
End: 2024-12-12
Attending: INTERNAL MEDICINE
Payer: MEDICARE

## 2024-12-12 VITALS
HEART RATE: 81 BPM | RESPIRATION RATE: 18 BRPM | DIASTOLIC BLOOD PRESSURE: 73 MMHG | SYSTOLIC BLOOD PRESSURE: 117 MMHG | OXYGEN SATURATION: 97 % | TEMPERATURE: 98.3 F

## 2024-12-12 DIAGNOSIS — C16.0 ADENOCARCINOMA OF GASTROESOPHAGEAL JUNCTION (H): Primary | ICD-10-CM

## 2024-12-12 PROCEDURE — 250N000011 HC RX IP 250 OP 636: Performed by: INTERNAL MEDICINE

## 2024-12-12 PROCEDURE — 96372 THER/PROPH/DIAG INJ SC/IM: CPT | Performed by: PHYSICIAN ASSISTANT

## 2024-12-12 RX ORDER — HEPARIN SODIUM (PORCINE) LOCK FLUSH IV SOLN 100 UNIT/ML 100 UNIT/ML
5 SOLUTION INTRAVENOUS
Status: DISCONTINUED | OUTPATIENT
Start: 2024-12-12 | End: 2024-12-12 | Stop reason: HOSPADM

## 2024-12-12 RX ADMIN — HEPARIN 5 ML: 100 SYRINGE at 12:56

## 2024-12-12 NOTE — PROGRESS NOTES
"Infusion Nursing Note:  Bal Junior presents today for D2C4 5fu pump discontinuation.    Patient seen by provider today: No   present during visit today: Not Applicable.    Note: Patient presents with worsening neuropathy in florecita hands, present now while sitting in sunshine. Patient also c/o couple of small, raised, white bumps roof of mouth and tongue \"not feeling normal\", like \"I burned it a couple of days ago and it's now healing\". Patient is doing baking soda-salt rinses. Patient instructed to call Triage if mouth issues progress.      Intravenous Access:  Implanted Port.    Treatment Conditions:  Not Applicable.      Post Infusion Assessment:  Patient tolerated infusion without incident.  Blood return noted pre and post infusion.  Site patent and intact, free from redness, edema or discomfort.  No evidence of extravasations.  Access discontinued per protocol.       Discharge Plan:   Patient does not have next appointments scheduled yet.   Patient discharged in stable condition accompanied by: self.  Departure Mode: Ambulatory.  Neulasta On-body kit applied with Pod Pal dressing to RIGHT posterior arm, will dispense medication approx 1600 tomorrow.       Marisa Walker RN    "

## 2024-12-16 ENCOUNTER — HOSPITAL ENCOUNTER (EMERGENCY)
Facility: CLINIC | Age: 69
Discharge: HOME | End: 2024-12-16
Attending: STUDENT IN AN ORGANIZED HEALTH CARE EDUCATION/TRAINING PROGRAM | Admitting: STUDENT IN AN ORGANIZED HEALTH CARE EDUCATION/TRAINING PROGRAM
Payer: MEDICARE

## 2024-12-16 ENCOUNTER — APPOINTMENT (OUTPATIENT)
Dept: CT IMAGING | Facility: CLINIC | Age: 69
End: 2024-12-16
Attending: STUDENT IN AN ORGANIZED HEALTH CARE EDUCATION/TRAINING PROGRAM
Payer: MEDICARE

## 2024-12-16 ENCOUNTER — NURSE TRIAGE (OUTPATIENT)
Dept: ONCOLOGY | Facility: CLINIC | Age: 69
End: 2024-12-16
Payer: COMMERCIAL

## 2024-12-16 VITALS
OXYGEN SATURATION: 98 % | TEMPERATURE: 97.9 F | RESPIRATION RATE: 18 BRPM | DIASTOLIC BLOOD PRESSURE: 84 MMHG | SYSTOLIC BLOOD PRESSURE: 143 MMHG | BODY MASS INDEX: 25.48 KG/M2 | HEIGHT: 70 IN | HEART RATE: 114 BPM | WEIGHT: 178 LBS

## 2024-12-16 DIAGNOSIS — R00.0 TACHYCARDIA: ICD-10-CM

## 2024-12-16 LAB
ALBUMIN SERPL BCG-MCNC: 3.3 G/DL (ref 3.5–5.2)
ALBUMIN UR-MCNC: NEGATIVE MG/DL
ALP SERPL-CCNC: 183 U/L (ref 40–150)
ALT SERPL W P-5'-P-CCNC: 25 U/L (ref 0–70)
ANION GAP SERPL CALCULATED.3IONS-SCNC: 10 MMOL/L (ref 7–15)
APPEARANCE UR: CLEAR
AST SERPL W P-5'-P-CCNC: 25 U/L (ref 0–45)
BASOPHILS # BLD AUTO: 0 10E3/UL (ref 0–0.2)
BASOPHILS NFR BLD AUTO: 0 %
BILIRUB SERPL-MCNC: 0.3 MG/DL
BILIRUB UR QL STRIP: NEGATIVE
BUN SERPL-MCNC: 16 MG/DL (ref 8–23)
CALCIUM SERPL-MCNC: 8.2 MG/DL (ref 8.8–10.4)
CHLORIDE SERPL-SCNC: 103 MMOL/L (ref 98–107)
COLOR UR AUTO: ABNORMAL
CREAT SERPL-MCNC: 0.77 MG/DL (ref 0.67–1.17)
EGFRCR SERPLBLD CKD-EPI 2021: >90 ML/MIN/1.73M2
EOSINOPHIL # BLD AUTO: 0 10E3/UL (ref 0–0.7)
EOSINOPHIL NFR BLD AUTO: 0 %
ERYTHROCYTE [DISTWIDTH] IN BLOOD BY AUTOMATED COUNT: 17 % (ref 10–15)
FLUAV RNA SPEC QL NAA+PROBE: NEGATIVE
FLUBV RNA RESP QL NAA+PROBE: NEGATIVE
GLUCOSE SERPL-MCNC: 141 MG/DL (ref 70–99)
GLUCOSE UR STRIP-MCNC: NEGATIVE MG/DL
HCO3 SERPL-SCNC: 22 MMOL/L (ref 22–29)
HCT VFR BLD AUTO: 39.3 % (ref 40–53)
HGB BLD-MCNC: 13.4 G/DL (ref 13.3–17.7)
HGB UR QL STRIP: NEGATIVE
IMM GRANULOCYTES # BLD: 0.5 10E3/UL
IMM GRANULOCYTES NFR BLD: 2 %
KETONES UR STRIP-MCNC: NEGATIVE MG/DL
LACTATE SERPL-SCNC: 2.1 MMOL/L (ref 0.7–2)
LEUKOCYTE ESTERASE UR QL STRIP: ABNORMAL
LYMPHOCYTES # BLD AUTO: 2.1 10E3/UL (ref 0.8–5.3)
LYMPHOCYTES NFR BLD AUTO: 9 %
MAGNESIUM SERPL-MCNC: 2 MG/DL (ref 1.7–2.3)
MCH RBC QN AUTO: 31.6 PG (ref 26.5–33)
MCHC RBC AUTO-ENTMCNC: 34.1 G/DL (ref 31.5–36.5)
MCV RBC AUTO: 93 FL (ref 78–100)
MONOCYTES # BLD AUTO: 0.4 10E3/UL (ref 0–1.3)
MONOCYTES NFR BLD AUTO: 2 %
MUCOUS THREADS #/AREA URNS LPF: PRESENT /LPF
NEUTROPHILS # BLD AUTO: 20.3 10E3/UL (ref 1.6–8.3)
NEUTROPHILS NFR BLD AUTO: 87 %
NITRATE UR QL: NEGATIVE
NRBC # BLD AUTO: 0 10E3/UL
NRBC BLD AUTO-RTO: 0 /100
PH UR STRIP: 5.5 [PH] (ref 5–7)
PLAT MORPH BLD: NORMAL
PLATELET # BLD AUTO: 169 10E3/UL (ref 150–450)
POTASSIUM SERPL-SCNC: 3.7 MMOL/L (ref 3.4–5.3)
PROT SERPL-MCNC: 5.7 G/DL (ref 6.4–8.3)
RBC # BLD AUTO: 4.24 10E6/UL (ref 4.4–5.9)
RBC MORPH BLD: NORMAL
RBC URINE: 0 /HPF
RSV RNA SPEC NAA+PROBE: NEGATIVE
SARS-COV-2 RNA RESP QL NAA+PROBE: NEGATIVE
SODIUM SERPL-SCNC: 135 MMOL/L (ref 135–145)
SP GR UR STRIP: 1.05 (ref 1–1.03)
SQUAMOUS EPITHELIAL: <1 /HPF
TROPONIN T SERPL HS-MCNC: 9 NG/L
TSH SERPL DL<=0.005 MIU/L-ACNC: 1.75 UIU/ML (ref 0.3–4.2)
UROBILINOGEN UR STRIP-MCNC: NORMAL MG/DL
WBC # BLD AUTO: 23.3 10E3/UL (ref 4–11)
WBC URINE: 2 /HPF

## 2024-12-16 PROCEDURE — 84443 ASSAY THYROID STIM HORMONE: CPT | Performed by: PHYSICIAN ASSISTANT

## 2024-12-16 PROCEDURE — 36415 COLL VENOUS BLD VENIPUNCTURE: CPT | Performed by: PHYSICIAN ASSISTANT

## 2024-12-16 PROCEDURE — 96361 HYDRATE IV INFUSION ADD-ON: CPT | Performed by: STUDENT IN AN ORGANIZED HEALTH CARE EDUCATION/TRAINING PROGRAM

## 2024-12-16 PROCEDURE — 71275 CT ANGIOGRAPHY CHEST: CPT | Mod: MF

## 2024-12-16 PROCEDURE — 83605 ASSAY OF LACTIC ACID: CPT | Performed by: PHYSICIAN ASSISTANT

## 2024-12-16 PROCEDURE — 84484 ASSAY OF TROPONIN QUANT: CPT | Performed by: PHYSICIAN ASSISTANT

## 2024-12-16 PROCEDURE — 96360 HYDRATION IV INFUSION INIT: CPT | Mod: 59 | Performed by: STUDENT IN AN ORGANIZED HEALTH CARE EDUCATION/TRAINING PROGRAM

## 2024-12-16 PROCEDURE — G1010 CDSM STANSON: HCPCS

## 2024-12-16 PROCEDURE — 93010 ELECTROCARDIOGRAM REPORT: CPT | Performed by: PHYSICIAN ASSISTANT

## 2024-12-16 PROCEDURE — 258N000003 HC RX IP 258 OP 636: Performed by: PHYSICIAN ASSISTANT

## 2024-12-16 PROCEDURE — 250N000011 HC RX IP 250 OP 636: Performed by: STUDENT IN AN ORGANIZED HEALTH CARE EDUCATION/TRAINING PROGRAM

## 2024-12-16 PROCEDURE — 99284 EMERGENCY DEPT VISIT MOD MDM: CPT | Mod: FS | Performed by: STUDENT IN AN ORGANIZED HEALTH CARE EDUCATION/TRAINING PROGRAM

## 2024-12-16 PROCEDURE — 71275 CT ANGIOGRAPHY CHEST: CPT | Mod: 26 | Performed by: STUDENT IN AN ORGANIZED HEALTH CARE EDUCATION/TRAINING PROGRAM

## 2024-12-16 PROCEDURE — 87637 SARSCOV2&INF A&B&RSV AMP PRB: CPT | Performed by: PHYSICIAN ASSISTANT

## 2024-12-16 PROCEDURE — 93005 ELECTROCARDIOGRAM TRACING: CPT | Performed by: STUDENT IN AN ORGANIZED HEALTH CARE EDUCATION/TRAINING PROGRAM

## 2024-12-16 PROCEDURE — G1010 CDSM STANSON: HCPCS | Performed by: STUDENT IN AN ORGANIZED HEALTH CARE EDUCATION/TRAINING PROGRAM

## 2024-12-16 PROCEDURE — 99285 EMERGENCY DEPT VISIT HI MDM: CPT | Mod: 25 | Performed by: STUDENT IN AN ORGANIZED HEALTH CARE EDUCATION/TRAINING PROGRAM

## 2024-12-16 PROCEDURE — 81001 URINALYSIS AUTO W/SCOPE: CPT | Performed by: PHYSICIAN ASSISTANT

## 2024-12-16 PROCEDURE — 85025 COMPLETE CBC W/AUTO DIFF WBC: CPT | Performed by: PHYSICIAN ASSISTANT

## 2024-12-16 PROCEDURE — 83735 ASSAY OF MAGNESIUM: CPT | Performed by: PHYSICIAN ASSISTANT

## 2024-12-16 PROCEDURE — 82040 ASSAY OF SERUM ALBUMIN: CPT | Performed by: PHYSICIAN ASSISTANT

## 2024-12-16 PROCEDURE — 87040 BLOOD CULTURE FOR BACTERIA: CPT | Performed by: PHYSICIAN ASSISTANT

## 2024-12-16 PROCEDURE — 999N000127 HC STATISTIC PERIPHERAL IV START W US GUIDANCE

## 2024-12-16 RX ORDER — HEPARIN SODIUM,PORCINE 10 UNIT/ML
5-10 VIAL (ML) INTRAVENOUS
Status: DISCONTINUED | OUTPATIENT
Start: 2024-12-16 | End: 2024-12-16 | Stop reason: HOSPADM

## 2024-12-16 RX ORDER — HEPARIN SODIUM,PORCINE 10 UNIT/ML
VIAL (ML) INTRAVENOUS
Status: DISCONTINUED
Start: 2024-12-16 | End: 2024-12-16 | Stop reason: HOSPADM

## 2024-12-16 RX ORDER — IOPAMIDOL 755 MG/ML
62 INJECTION, SOLUTION INTRAVASCULAR ONCE
Status: COMPLETED | OUTPATIENT
Start: 2024-12-16 | End: 2024-12-16

## 2024-12-16 RX ORDER — ACETAMINOPHEN 500 MG
1000 TABLET ORAL ONCE
Status: COMPLETED | OUTPATIENT
Start: 2024-12-16 | End: 2024-12-16

## 2024-12-16 RX ADMIN — SODIUM CHLORIDE 500 ML: 9 INJECTION, SOLUTION INTRAVENOUS at 18:03

## 2024-12-16 RX ADMIN — HEPARIN, PORCINE (PF) 10 UNIT/ML INTRAVENOUS SYRINGE 10 ML: at 21:46

## 2024-12-16 RX ADMIN — IOPAMIDOL 62 ML: 755 INJECTION, SOLUTION INTRAVENOUS at 18:55

## 2024-12-16 ASSESSMENT — ACTIVITIES OF DAILY LIVING (ADL)
ADLS_ACUITY_SCORE: 47

## 2024-12-16 ASSESSMENT — COLUMBIA-SUICIDE SEVERITY RATING SCALE - C-SSRS
6. HAVE YOU EVER DONE ANYTHING, STARTED TO DO ANYTHING, OR PREPARED TO DO ANYTHING TO END YOUR LIFE?: NO
2. HAVE YOU ACTUALLY HAD ANY THOUGHTS OF KILLING YOURSELF IN THE PAST MONTH?: NO
1. IN THE PAST MONTH, HAVE YOU WISHED YOU WERE DEAD OR WISHED YOU COULD GO TO SLEEP AND NOT WAKE UP?: NO

## 2024-12-16 NOTE — ED PROVIDER NOTES
ED Provider Note  [facility]      History     Chief Complaint   Patient presents with    Headache    Tachycardia    Hypertension     HPI  Bal Junior is a 69 year old male complex past medical history including history of adenocarcinoma of the gastroesophageal junction, history of prior Nissen, GERD, hypertension and palpitations who presents to the emergency room with concerns for headache and fast heart rate.    Patient presents with his wife.  He notes that he had recent chemotherapy Wednesday and Thursday of last week.  Shortly thereafter he started noticing elevated heart rates he.  He states he is a smart watch and has noticed heart rates into the 120s.  This is improved when he lies flat noting that then he has heart rates into the 70s.  He denies significant other associated symptoms with this including any associated chest pain or shortness of breath.  Denies any fevers.  He denies any runny nose or cough.  He does note some lightheadedness and dizziness which he states he feels is from his chemo, not new for him.  He has had several episodes of diarrhea without vomiting abdominal pain or urinary symptoms.  Noted appetite has been good and he has been eating and drinking having about 8 glasses of water yesterday he tells me.  Patient denies any new medication changes in the chemotherapy.  He denies any caffeine use recreational drug use, or concerns for withdrawal from other meds.    Patient also states that in the car ride here he started developing gradual onset mild frontal headache.  This is without any head injuries found coagulation vision changes vomiting or significant history of headaches.  He notes he is not overly concerned about the headache today.    Past Medical History  Past Medical History:   Diagnosis Date    Hypertension     Neuritis     Peripheral neuropathy     Personal history of other medical treatment     postgastrectomy dumping syndrome    Stomach  problems Noted earlier     Past Surgical History:   Procedure Laterality Date    ABDOMEN SURGERY  2012?    APPENDECTOMY  1964?    COLONOSCOPY  2006, 2016    ESOPHAGEAL BALLOON PROVOCATION STUDY N/A 9/24/2024    Procedure: Esophageal Balloon Provocation Study;  Surgeon: Carson Michel DO;  Location:  GI    ESOPHAGOSCOPY, GASTROSCOPY, DUODENOSCOPY (EGD), COMBINED N/A 9/24/2024    Procedure: ESOPHAGOGASTRODUODENOSCOPY, WITH BIOPSY;  Surgeon: Carson Michel DO;  Location:  GI    ESOPHAGOSCOPY, GASTROSCOPY, DUODENOSCOPY (EGD), COMBINED N/A 10/8/2024    Procedure: ESOPHAGOGASTRODUODENOSCOPY, WITH FINE NEEDLE ASPIRATION BIOPSY, WITH ENDOSCOPIC ULTRASOUND GUIDANCE;  Surgeon: Lance Lopez MD;  Location:  GI    EYE SURGERY  199x    laser for retinal tears    GASTRIC FUNDOPLICATION      HERNIA REPAIR  1961?    IR CHEST PORT PLACEMENT > 5 YRS OF AGE  10/24/2024    LAPAROSCOPIC TAKEDOWN NISSEN FUNDOPLICATION N/A 9/25/2020    Procedure: TAKE-DOWN, FUNDOPLICATION, REDO NISSEN, LAPAROSCOPIC, gastrostomy feeding tube placement;  Surgeon: Katlyn Tobar MD;  Location:  OR    [place] ESOPHAGOGASTRODUODENOSCOPY TRANSORAL DIAGNOSTIC N/A 5/9/2019    Procedure: UPPER GASTROINTESTINAL ENDOSCOPY;  Surgeon: Dino Ward MD;  Location: [facility];  Service: General    SOFT TISSUE SURGERY  2009?    JCARLOS l. thumb     atenolol (TENORMIN) 50 MG tablet  blood glucose (NO BRAND SPECIFIED) test strip  blood glucose monitoring (NO BRAND SPECIFIED) meter device kit  clotrimazole (LOTRIMIN) 1 % external cream  Continuous Glucose Sensor (FREESTYLE SIMIN 2 SENSOR) MISC  cyanocobalamin (VITAMIN B-12) 500 MCG tablet  dexAMETHasone (DECADRON) 4 MG tablet  EPINEPHrine (ANY BX GENERIC EQUIV) 0.3 MG/0.3ML injection 2-pack  Fluorouracil (ADRUCIL) 5,150 mg in sodium chloride 0.9 % 240 mL via CADD pump  gabapentin (NEURONTIN) 300 MG capsule  nortriptyline (PAMELOR) 25 MG capsule  ondansetron (ZOFRAN) 8 MG tablet  prochlorperazine  "(COMPAZINE) 10 MG tablet  thin (NO BRAND SPECIFIED) lancets      Allergies   Allergen Reactions    Hornets     Wasp Venom Protein Hives    Bee Venom Swelling     Family History  Family History   Problem Relation Age of Onset    Coronary Artery Disease Maternal Grandfather     Hypertension Mother     Uterine Cancer Mother         1965    Mitral Valve Replacement Father     GERD No family hx of     Ulcerative Colitis No family hx of     Crohn's Disease No family hx of     Stomach Cancer No family hx of      Social History   Social History     Tobacco Use    Smoking status: Never     Passive exposure: Past    Smokeless tobacco: Never   Substance Use Topics    Alcohol use: Yes     Alcohol/week: 3.0 - 6.0 standard drinks of alcohol     Types: 1 - 2 Glasses of wine, 1 - 2 Cans of beer, 1 - 2 Shots of liquor per week     Comment: glass of wine with dinner, can of beer in evening or 1 whiskey  or bourbon    Drug use: Never      A medically appropriate review of systems was performed with pertinent positives and negatives noted in the HPI, and all other systems negative.    Physical Exam   BP: 111/76  Pulse: 118  Temp: 98.1  F (36.7  C)  Resp: 19  Height: 177.8 cm (5' 10\")  Weight: 80.7 kg (178 lb)  SpO2: 99 %  Physical Exam    GENERAL APPEARANCE: The patient is well developed, well appearing, and in no acute distress.  HEAD:  Normocephalic and atraumatic.   EENT: Voice normal.  NECK: Trachea is midline.No lymphadenopathy or tenderness.  LUNGS: Breath sounds are equal and clear bilaterally. No wheezes, rhonchi, or rales.  HEART: Borderline tachycardic rate and normal rhythm.    ABDOMEN: Soft, flat, and benign. No mass, tenderness, guarding, or rebound.Bowel sounds are present.  EXTREMITIES: No cyanosis, clubbing, or edema.  NEUROLOGIC: No focal sensory or motor deficits are noted.  Cranial nerves II through XII intact.  Rapid alternating movements and finger-to-nose testing is intact.   strength is 5 out of 5 bilaterally " with resisted plantarflexion dorsiflexion 5 out of 5 bilaterally.  PSYCHIATRIC: The patient is awake, alert.  Appropriate mood and affect.  SKIN: Warm, dry, and well perfused. Good turgor.      ED Course, Procedures, & Data    IV Antibiotics given and/or elevated Lactate of 2.1 and no sepsis note found - Delete this reminder and enter the sepsis note or '.edcms' before signing chart.>>>EKG 12/16/2024:  Sinus tachycardia, low voltage present.  Ventricular rate 105 QTc 430.  On my interpretation the rhythm is tachycardic.  No visible ST elevation or depression to suggest ischemia.  EKG similar to 10/24/2024     Results for orders placed or performed during the hospital encounter of 12/16/24   CT Chest Pulmonary Embolism w Contrast     Status: None    Narrative    CT CHEST PULMONARY EMBOLISM W CONTRAST, 12/16/2024 7:10 PM    History: tachycardia; Male sex; No prior imaging in the last 24 hours;  Pulmonary Embolism Rule-Out Criteria (PERC) score > 0; Revised Tiffin  Score (RGS) not >= 11; No D-dimer result available; D-dimer not  ordered    Comparison: None.    Technique: Helical acquisition of CT images of the chest from the lung  apices to the kidneys were acquired after the administration of  intravenous contrast according to the CT pulmonary angiogram protocol.  Axial images were reconstructed in 1 and 3 mm slice thickness. Coronal  reconstructions performed. Three-dimensional (3D) post-processed  angiographic images were reconstructed, archived to PACS and used in  the interpretation of this study.    Contrast dose: iopamidol (ISOVUE-370) solution 62mL    Findings:   The contrast bolus is adequate. There is no pulmonary embolism.     Lungs: No acute opacities. No suspicious pulmonary nodules. Central  tracheobronchial tree is clear. No pleural effusion or pneumothorax.  Azygos fissure vein.    Mediastinum: No lymphadenopathy. Normal caliber of pulmonary arteries  and thoracic aorta.    Upper abdomen: Small hiatal  hernia. The appearance of the upper  abdomen is otherwise unremarkable.    Bones and soft tissues: No acute or aggressive abnormality.       Impression    Impression:  1. No pulmonary embolism identified.  2. No worrisome findings in the chest and upper abdomen.    In the event of a positive result for acute pulmonary embolism  resulting in right heart strain, consider calling the   [facility] patient placement ([phone]) for PERT (Pulmonary Embolism  Response Team) Activation?    PERT -- Pulmonary Embolism Response Team (Multidisciplinary team  including cardiology, interventional radiology, critical care,  hematology)    I have personally reviewed the examination and initial interpretation  and I agree with the findings.    RENÉ MARS DO         SYSTEM ID:  [id number]   Comprehensive metabolic panel     Status: Abnormal   Result Value Ref Range    Sodium 135 135 - 145 mmol/L    Potassium 3.7 3.4 - 5.3 mmol/L    Carbon Dioxide (CO2) 22 22 - 29 mmol/L    Anion Gap 10 7 - 15 mmol/L    Urea Nitrogen 16.0 8.0 - 23.0 mg/dL    Creatinine 0.77 0.67 - 1.17 mg/dL    GFR Estimate >90 >60 mL/min/1.73m2    Calcium 8.2 (L) 8.8 - 10.4 mg/dL    Chloride 103 98 - 107 mmol/L    Glucose 141 (H) 70 - 99 mg/dL    Alkaline Phosphatase 183 (H) 40 - 150 U/L    AST 25 0 - 45 U/L    ALT 25 0 - 70 U/L    Protein Total 5.7 (L) 6.4 - 8.3 g/dL    Albumin 3.3 (L) 3.5 - 5.2 g/dL    Bilirubin Total 0.3 <=1.2 mg/dL   TSH with free T4 reflex     Status: Normal   Result Value Ref Range    TSH 1.75 0.30 - 4.20 uIU/mL   Magnesium     Status: Normal   Result Value Ref Range    Magnesium 2.0 1.7 - 2.3 mg/dL   Troponin T, High Sensitivity     Status: Normal   Result Value Ref Range    Troponin T, High Sensitivity 9 <=22 ng/L   CBC with platelets and differential     Status: Abnormal   Result Value Ref Range    WBC Count 23.3 (H) 4.0 - 11.0 10e3/uL    RBC Count 4.24 (L) 4.40 - 5.90 10e6/uL    Hemoglobin 13.4 13.3 - 17.7 g/dL    Hematocrit 39.3 (L) 40.0 -  53.0 %    MCV 93 78 - 100 fL    MCH 31.6 26.5 - 33.0 pg    MCHC 34.1 31.5 - 36.5 g/dL    RDW 17.0 (H) 10.0 - 15.0 %    Platelet Count 169 150 - 450 10e3/uL    % Neutrophils 87 %    % Lymphocytes 9 %    % Monocytes 2 %    % Eosinophils 0 %    % Basophils 0 %    % Immature Granulocytes 2 %    NRBCs per 100 WBC 0 <1 /100    Absolute Neutrophils 20.3 (H) 1.6 - 8.3 10e3/uL    Absolute Lymphocytes 2.1 0.8 - 5.3 10e3/uL    Absolute Monocytes 0.4 0.0 - 1.3 10e3/uL    Absolute Eosinophils 0.0 0.0 - 0.7 10e3/uL    Absolute Basophils 0.0 0.0 - 0.2 10e3/uL    Absolute Immature Granulocytes 0.5 (H) <=0.4 10e3/uL    Absolute NRBCs 0.0 10e3/uL   RBC and Platelet Morphology     Status: None   Result Value Ref Range    RBC Morphology Confirmed RBC Indices     Platelet Assessment  Automated Count Confirmed. Platelet morphology is normal.     Automated Count Confirmed. Platelet morphology is normal.   Lactic acid whole blood     Status: Abnormal   Result Value Ref Range    Lactic Acid 2.1 (H) 0.7 - 2.0 mmol/L   Influenza A/B, RSV and SARS-CoV2 PCR (COVID-19) Nasopharyngeal     Status: Normal    Specimen: Nasopharyngeal; Swab   Result Value Ref Range    Influenza A PCR Negative Negative    Influenza B PCR Negative Negative    RSV PCR Negative Negative    SARS CoV2 PCR Negative Negative    Narrative    Testing was performed using the Xpert Xpress CoV2/Flu/RSV Assay on the [employer] GeneXpert Instrument. This test should be ordered for the detection of SARS-CoV2, influenza, and RSV viruses in individuals with signs and symptoms of respiratory tract infection. This test is for in vitro diagnostic use under the [place] FDA for laboratories certified under CLIA to perform high or moderate complexity testing. This test has been [place] FDA cleared. A negative result does not rule out the presence of PCR inhibitors in the specimen or target RNA in concentration below the limit of detection for the assay. If only one viral target is positive but  coinfection with multiple targets is suspected, the sample should be re-tested with another FDA cleared, approved, or authorized test, if coninfection would change clinical management. This test was validated by the [facility]. These laboratories are certified under the Clinical Laboratory Improvement Amendments of 1988 (CLIA-88) as qualified to perfom high complexity laboratory testing.   UA with Microscopic     Status: Abnormal   Result Value Ref Range    Color Urine Light Yellow Colorless, Straw, Light Yellow, Yellow    Appearance Urine Clear Clear    Glucose Urine Negative Negative mg/dL    Bilirubin Urine Negative Negative    Ketones Urine Negative Negative mg/dL    Specific Gravity Urine 1.050 (H) 1.003 - 1.035    Blood Urine Negative Negative    pH Urine 5.5 5.0 - 7.0    Protein Albumin Urine Negative Negative mg/dL    Urobilinogen Urine Normal Normal, 2.0 mg/dL    Nitrite Urine Negative Negative    Leukocyte Esterase Urine Trace (A) Negative    Mucus Urine Present (A) None Seen /LPF    RBC Urine 0 <=2 /HPF    WBC Urine 2 <=5 /HPF    Squamous Epithelials Urine <1 <=1 /HPF   EKG 12 lead     Status: None (Preliminary result)   Result Value Ref Range    Systolic Blood Pressure  mmHg    Diastolic Blood Pressure  mmHg    Ventricular Rate 105 BPM    Atrial Rate 105 BPM    ND Interval 146 ms    QRS Duration 60 ms     ms    QTc 430 ms    P Axis 88 degrees    R AXIS 89 degrees    T Axis 79 degrees    Interpretation ECG       Sinus tachycardia  Low voltage QRS  Borderline ECG     CBC with platelets differential     Status: Abnormal    Narrative    The following orders were created for panel order CBC with platelets differential.  Procedure                               Abnormality         Status                     ---------                               -----------         ------                     CBC with platelets and d...[[id number]]  Abnormal            Final result               RBC and  Platelet Morphology[[id number]]                      Final result                 Please view results for these tests on the individual orders.     Medications   heparin lock flush 10 unit/mL injection 5-10 mL (10 mLs Intracatheter $Given 12/16/24 2146)   sodium chloride 0.9% BOLUS 500 mL (0 mLs Intravenous Stopped 12/16/24 2139)   acetaminophen (TYLENOL) tablet 1,000 mg (1,000 mg Oral Not Given 12/16/24 1803)   iopamidol (ISOVUE-370) solution 62 mL (62 mLs Intravenous $Given 12/16/24 1855)   sodium chloride (PF) 0.9% PF flush 92 mL (92 mLs Intravenous $Given 12/16/24 1856)     Labs Ordered and Resulted from Time of ED Arrival to Time of ED Departure   COMPREHENSIVE METABOLIC PANEL - Abnormal       Result Value    Sodium 135      Potassium 3.7      Carbon Dioxide (CO2) 22      Anion Gap 10      Urea Nitrogen 16.0      Creatinine 0.77      GFR Estimate >90      Calcium 8.2 (*)     Chloride 103      Glucose 141 (*)     Alkaline Phosphatase 183 (*)     AST 25      ALT 25      Protein Total 5.7 (*)     Albumin 3.3 (*)     Bilirubin Total 0.3     CBC WITH PLATELETS AND DIFFERENTIAL - Abnormal    WBC Count 23.3 (*)     RBC Count 4.24 (*)     Hemoglobin 13.4      Hematocrit 39.3 (*)     MCV 93      MCH 31.6      MCHC 34.1      RDW 17.0 (*)     Platelet Count 169      % Neutrophils 87      % Lymphocytes 9      % Monocytes 2      % Eosinophils 0      % Basophils 0      % Immature Granulocytes 2      NRBCs per 100 WBC 0      Absolute Neutrophils 20.3 (*)     Absolute Lymphocytes 2.1      Absolute Monocytes 0.4      Absolute Eosinophils 0.0      Absolute Basophils 0.0      Absolute Immature Granulocytes 0.5 (*)     Absolute NRBCs 0.0     LACTIC ACID WHOLE BLOOD - Abnormal    Lactic Acid 2.1 (*)    ROUTINE UA WITH MICROSCOPIC - Abnormal    Color Urine Light Yellow      Appearance Urine Clear      Glucose Urine Negative      Bilirubin Urine Negative      Ketones Urine Negative      Specific Gravity Urine 1.050 (*)     Blood Urine  Negative      pH Urine 5.5      Protein Albumin Urine Negative      Urobilinogen Urine Normal      Nitrite Urine Negative      Leukocyte Esterase Urine Trace (*)     Mucus Urine Present (*)     RBC Urine 0      WBC Urine 2      Squamous Epithelials Urine <1     TSH WITH FREE T4 REFLEX - Normal    TSH 1.75     MAGNESIUM - Normal    Magnesium 2.0     TROPONIN T, HIGH SENSITIVITY - Normal    Troponin T, High Sensitivity 9     INFLUENZA A/B, RSV AND SARS-COV2 PCR - Normal    Influenza A PCR Negative      Influenza B PCR Negative      RSV PCR Negative      SARS CoV2 PCR Negative     RBC AND PLATELET MORPHOLOGY    RBC Morphology Confirmed RBC Indices      Platelet Assessment        Value: Automated Count Confirmed. Platelet morphology is normal.   BLOOD CULTURE   BLOOD CULTURE     CT Chest Pulmonary Embolism w Contrast   Final Result   Impression:   1. No pulmonary embolism identified.   2. No worrisome findings in the chest and upper abdomen.      In the event of a positive result for acute pulmonary embolism   resulting in right heart strain, consider calling the    [facility] patient placement ([phone]) for PERT (Pulmonary Embolism   Response Team) Activation?      PERT -- Pulmonary Embolism Response Team (Multidisciplinary team   including cardiology, interventional radiology, critical care,   hematology)      I have personally reviewed the examination and initial interpretation   and I agree with the findings.      RENÉ MARS DO            SYSTEM ID:  [id number]             Critical care was not performed.     Medical Decision Making  The patient's presentation was of moderate complexity (an acute complicated injury).    The patient's evaluation involved:  review of external note(s) from 3+ sources (see separate area of note for details)  ordering and/or review of 3+ test(s) in this encounter (see separate area of note for details)    The patient's management necessitated moderate risk (prescription drug management  including medications given in the ED).    Assessment & Plan    This is a medically complex exterior male present with concerns for several days of ongoing tachycardia without other associated symptoms in the setting of recent chemotherapy.  On presentation to the department he is mildly tachycardic initial presenting heart rate 118.  He is normoxic and normotensive.  He has no focal neurologic deficits on exam he has clear lungs and a soft abdomen.  Consider broad differential including arrhythmia electrolyte disturbance, anxiety, hypovolemia, PE, ACS amongst others on the differential.  Will initiate workup including EKG CT chest routine infectious labs.  Patient given 500 cc of fluids while awaiting test results.    Initial EKG shows sinus tachycardia without findings for ischemia or other arrhythmias.  Chemistry shows leukocytosis 23.3 consistent with patient's history of Neulasta.  Initial troponin negative suggest against ACS.  Chemistry shows no clinically significant electrolyte disturbance.  CT PE negative for PE, pneumonia or other emergent chest pathology.  UA negative.  COVID-negative.  Blood cultures ordered and pending.  On reevaluation patient feels consistent with his baseline he feels like he can safely go home.  I rechecked his heart rate prior to discharge she was around 10 3-1 06.  He has been eating and drinking the department.  He was instructed to follow-up closely with his team and we did discuss return precautions.  Patient has no other questions or concerns at this time.  Red flag signs were addressed, and they were in agreement with the patient care plan provided.    Patient seen and discussed with attending physician , who agrees with my plan of care.      --    ED Attending Physician Attestation    I Jude Marrufo MD, cared for this patient with the Advanced Practice Provider (MITZI). I personally provided a substantive portion of the care for this patient, including approving  the care plan for the number and complexity of problems addressed and taking responsibility related to the risk of complications and/or morbidity or mortality of patient management. Please see the MITZI's documentation for full details.    Summary of HPI, PE, ED Course   Patient is a 69 year old male evaluated in the emergency department for tachycardia. Exam and ED course notable for reassurring labs and CT scan, marked leukocytosis but expected. After the completion of care in the emergency department, the patient was discharged.      Jude Marrufo MD  Emergency Medicine      I have reviewed the nursing notes. I have reviewed the findings, diagnosis, plan and need for follow up with the patient.    Discharge Medication List as of 12/16/2024  9:39 PM          Final diagnoses:   Tachycardia       YANIV Gutierrez  [facility] EMERGENCY DEPARTMENT  12/16/2024     Aleyda Young PA-C  12/16/24 [id number]       Jude Marrufo MD  12/19/24 [id number]

## 2024-12-16 NOTE — TELEPHONE ENCOUNTER
Oncology Nurse Triage    Situation:   Bal reporting the following symptoms:  - concern for fast heart rate    Background:   Treating Provider:   Dr. Mc    Date of last office visit: 12/11/2024 Riya Wiley PA-C    Recent Treatments:12/12/2024 D1C2 of DOCEtaxel, fluorouracil, leucovorin  Oxaliplatin    Vital signs from 12/12 were /73 Pulse 81      Assessment:       Pt does not have a home pulse oximetry.    Pt has a smart watch that tracks pulse, the watch and outpt readings are within a few beats of each other.    Over weekend Sat 12/14 and Coy 12/15 the HR was up in the 1teens and 1 twenties and took a few hours to come down    At rest/laying HR is at 99.     Usual heart rate is around upper 60's.     Continues to have fatigue. Has a mild headache last night, tylenol seemed to help. Denies feeling anxious or heart pounding out of chest.     Pt's grandfather had history of heart issues, but no other closer relative that has history of cardiac or stroke. Pt denies history of cardiac/stroke.     Can raise arms both evenly, denies facial drooping, uneven smile.     Pt is drinking enough fluids, urine is clear/yellow, Denies odor/burning with urination.     Pt continues to take atenolol 50mg daily for hx of high BP.     1410 BP taken at home cuff was 141/103    Denies fever, chest pain, SOB, abdomen pain, nausea/vomiting.     Recommendations:     1417 Paged provider Dr. Mc     1420 Per Dr. Mc, recommends patient to go the emergency department, preference is [facility] [address]  for further in-person evaluation.     1421 This writer spoke to Bal with recommendations and Bal is in agreement with going to [facility] ED today. Is choosing to have wife drive pt in, Pt is aware to call 911 if needed if starts to have any chest pain/SOB.     1416 This writer gave hand off report to [facility] ED nurse.

## 2024-12-16 NOTE — ED TRIAGE NOTES
Came in to ED triage ambulatory accompanied by wife. Came in as advised by Oncology triage due to high  and High /108. With headache pain level of 3/10. No chest pain, minimal  dizziness upon standing. No N/V/D.

## 2024-12-17 ENCOUNTER — PATIENT OUTREACH (OUTPATIENT)
Dept: ONCOLOGY | Facility: CLINIC | Age: 69
End: 2024-12-17
Payer: COMMERCIAL

## 2024-12-17 LAB
ATRIAL RATE - MUSE: 105 BPM
DIASTOLIC BLOOD PRESSURE - MUSE: NORMAL MMHG
INTERPRETATION ECG - MUSE: NORMAL
P AXIS - MUSE: 88 DEGREES
PR INTERVAL - MUSE: 146 MS
QRS DURATION - MUSE: 60 MS
QT - MUSE: 326 MS
QTC - MUSE: 430 MS
R AXIS - MUSE: 89 DEGREES
SYSTOLIC BLOOD PRESSURE - MUSE: NORMAL MMHG
T AXIS - MUSE: 79 DEGREES
VENTRICULAR RATE- MUSE: 105 BPM

## 2024-12-17 NOTE — TELEPHONE ENCOUNTER
[facility]: Cancer Care                                                                                          Returned call to pt checking in after ER visit yesterday for tachycardia.    This morning HR 74. Pt does have a BP cuff but has not checked recently. BP was high in the ER yesterday 143/84, otherwise has been normal in clinic.    Pt is on Atenolol 50 mg for past 20 years by pcp. He finished 4 cycles of FLOT on 12/12 and is getting a PET scan on 1/14 for thoracic surgery. No other symptoms at this time.    Advised pt to continue monitoring both BP and HR at home, write or call in if consistently high. He feels hydrated and stated no issues eating or drinking. He should also follow-up with pcp. Pt verbalized understanding.    Signature:  Jelly Dill RN

## 2024-12-17 NOTE — DISCHARGE INSTRUCTIONS
Here in the emergency room you have reassuring evaluation.  Your labs here are reassuring.  Your CT scan shows no findings for blood clot or pneumonia in your lungs.  Your urine sample today is negative for infection.  Your COVID is negative.  You have been able to eat and drink here, were given fluids and continue to feel well.  You wish to discharge.  At this time does not appear to be an emergent cause for your symptoms.  You are able to discharge.  It is importantly follow-up closely with your oncology team however.  You should reach out to them tomorrow.    If you develop any new or worsening symptoms, is important to return right away to the emergency department for further evaluation and management.

## 2024-12-18 PROCEDURE — A4221 SUPP NON-INSULIN INF CATH/WK: HCPCS

## 2024-12-19 LAB
BACTERIA BLD CULT: NORMAL
BACTERIA BLD CULT: NORMAL

## 2024-12-21 LAB
BACTERIA BLD CULT: NO GROWTH
BACTERIA BLD CULT: NO GROWTH

## 2024-12-30 ENCOUNTER — PATIENT OUTREACH (OUTPATIENT)
Dept: ONCOLOGY | Facility: CLINIC | Age: 69
End: 2024-12-30
Payer: COMMERCIAL

## 2024-12-30 NOTE — TELEPHONE ENCOUNTER
[facility]: Cancer Care                                                                                          Returned call to pt who stated [place] and wondering if any restrictions from care team. He denied any symptoms at this time, last week did get a rash on both hands but applied lotion liberally and this resolved. He finished 4 cycles of FLOT on 12/12 and follow-up with Dr. Tobar is scheduled 1/16 with PET scan prior. Informed him no restrictions from Oncology, as always he should look up closest ER to where he's staying in case he needs to be seen while away. Will continue to monitor specialty's notes to coordinate follow-up in Oncology after surgery.    Signature:  Jelly Dill RN

## 2025-01-07 DIAGNOSIS — D70.1 CHEMOTHERAPY-INDUCED NEUTROPENIA: Primary | ICD-10-CM

## 2025-01-07 DIAGNOSIS — C16.0 ADENOCARCINOMA OF GASTROESOPHAGEAL JUNCTION (H): ICD-10-CM

## 2025-01-07 DIAGNOSIS — T45.1X5A CHEMOTHERAPY-INDUCED NEUTROPENIA: Primary | ICD-10-CM

## 2025-01-07 RX ORDER — ALBUTEROL SULFATE 90 UG/1
1-2 INHALANT RESPIRATORY (INHALATION)
Start: 2025-01-09

## 2025-01-07 RX ORDER — EPINEPHRINE 1 MG/ML
0.3 INJECTION, SOLUTION INTRAMUSCULAR; SUBCUTANEOUS EVERY 5 MIN PRN
OUTPATIENT
Start: 2025-01-23

## 2025-01-07 RX ORDER — LORAZEPAM 2 MG/ML
0.5 INJECTION INTRAMUSCULAR EVERY 4 HOURS PRN
OUTPATIENT
Start: 2025-01-23

## 2025-01-07 RX ORDER — ALBUTEROL SULFATE 0.83 MG/ML
2.5 SOLUTION RESPIRATORY (INHALATION)
OUTPATIENT
Start: 2025-01-23

## 2025-01-07 RX ORDER — ONDANSETRON 2 MG/ML
8 INJECTION INTRAMUSCULAR; INTRAVENOUS ONCE
OUTPATIENT
Start: 2025-01-07

## 2025-01-07 RX ORDER — HEPARIN SODIUM,PORCINE 10 UNIT/ML
5-20 VIAL (ML) INTRAVENOUS DAILY PRN
OUTPATIENT
Start: 2025-01-09

## 2025-01-07 RX ORDER — HEPARIN SODIUM (PORCINE) LOCK FLUSH IV SOLN 100 UNIT/ML 100 UNIT/ML
5 SOLUTION INTRAVENOUS
OUTPATIENT
Start: 2025-01-24

## 2025-01-07 RX ORDER — HEPARIN SODIUM,PORCINE 10 UNIT/ML
5-20 VIAL (ML) INTRAVENOUS DAILY PRN
OUTPATIENT
Start: 2025-02-07

## 2025-01-07 RX ORDER — METHYLPREDNISOLONE SODIUM SUCCINATE 40 MG/ML
40 INJECTION INTRAMUSCULAR; INTRAVENOUS
Start: 2025-02-06

## 2025-01-07 RX ORDER — HEPARIN SODIUM (PORCINE) LOCK FLUSH IV SOLN 100 UNIT/ML 100 UNIT/ML
5 SOLUTION INTRAVENOUS
OUTPATIENT
Start: 2025-01-23

## 2025-01-07 RX ORDER — LORAZEPAM 2 MG/ML
0.5 INJECTION INTRAMUSCULAR EVERY 4 HOURS PRN
OUTPATIENT
Start: 2025-01-07

## 2025-01-07 RX ORDER — DIPHENHYDRAMINE HYDROCHLORIDE 50 MG/ML
50 INJECTION INTRAMUSCULAR; INTRAVENOUS
Start: 2025-01-07

## 2025-01-07 RX ORDER — HEPARIN SODIUM (PORCINE) LOCK FLUSH IV SOLN 100 UNIT/ML 100 UNIT/ML
5 SOLUTION INTRAVENOUS
OUTPATIENT
Start: 2025-02-06

## 2025-01-07 RX ORDER — EPINEPHRINE 1 MG/ML
0.3 INJECTION, SOLUTION INTRAMUSCULAR; SUBCUTANEOUS EVERY 5 MIN PRN
OUTPATIENT
Start: 2025-02-06

## 2025-01-07 RX ORDER — DIPHENHYDRAMINE HYDROCHLORIDE 50 MG/ML
25 INJECTION INTRAMUSCULAR; INTRAVENOUS
Start: 2025-01-23

## 2025-01-07 RX ORDER — ONDANSETRON 2 MG/ML
8 INJECTION INTRAMUSCULAR; INTRAVENOUS ONCE
OUTPATIENT
Start: 2025-01-23

## 2025-01-07 RX ORDER — DIPHENHYDRAMINE HYDROCHLORIDE 50 MG/ML
25 INJECTION INTRAMUSCULAR; INTRAVENOUS
Start: 2025-02-06

## 2025-01-07 RX ORDER — DIPHENHYDRAMINE HYDROCHLORIDE 50 MG/ML
25 INJECTION INTRAMUSCULAR; INTRAVENOUS
Start: 2025-01-09

## 2025-01-07 RX ORDER — ALBUTEROL SULFATE 90 UG/1
1-2 INHALANT RESPIRATORY (INHALATION)
Start: 2025-01-23

## 2025-01-07 RX ORDER — EPINEPHRINE 1 MG/ML
0.3 INJECTION, SOLUTION INTRAMUSCULAR; SUBCUTANEOUS EVERY 5 MIN PRN
OUTPATIENT
Start: 2025-01-07

## 2025-01-07 RX ORDER — HEPARIN SODIUM (PORCINE) LOCK FLUSH IV SOLN 100 UNIT/ML 100 UNIT/ML
5 SOLUTION INTRAVENOUS
OUTPATIENT
Start: 2025-01-07

## 2025-01-07 RX ORDER — ONDANSETRON 2 MG/ML
8 INJECTION INTRAMUSCULAR; INTRAVENOUS ONCE
OUTPATIENT
Start: 2025-02-06

## 2025-01-07 RX ORDER — HEPARIN SODIUM,PORCINE 10 UNIT/ML
5-20 VIAL (ML) INTRAVENOUS DAILY PRN
OUTPATIENT
Start: 2025-02-06

## 2025-01-07 RX ORDER — DIPHENHYDRAMINE HYDROCHLORIDE 50 MG/ML
50 INJECTION INTRAMUSCULAR; INTRAVENOUS
Start: 2025-01-09

## 2025-01-07 RX ORDER — LORAZEPAM 2 MG/ML
0.5 INJECTION INTRAMUSCULAR EVERY 4 HOURS PRN
OUTPATIENT
Start: 2025-01-09

## 2025-01-07 RX ORDER — HEPARIN SODIUM,PORCINE 10 UNIT/ML
5-20 VIAL (ML) INTRAVENOUS DAILY PRN
OUTPATIENT
Start: 2025-01-10

## 2025-01-07 RX ORDER — HEPARIN SODIUM,PORCINE 10 UNIT/ML
5-20 VIAL (ML) INTRAVENOUS DAILY PRN
OUTPATIENT
Start: 2025-01-07

## 2025-01-07 RX ORDER — METHYLPREDNISOLONE SODIUM SUCCINATE 40 MG/ML
40 INJECTION INTRAMUSCULAR; INTRAVENOUS
Start: 2025-01-23

## 2025-01-07 RX ORDER — MEPERIDINE HYDROCHLORIDE 25 MG/ML
25 INJECTION INTRAMUSCULAR; INTRAVENOUS; SUBCUTANEOUS
OUTPATIENT
Start: 2025-02-06

## 2025-01-07 RX ORDER — MEPERIDINE HYDROCHLORIDE 25 MG/ML
25 INJECTION INTRAMUSCULAR; INTRAVENOUS; SUBCUTANEOUS
OUTPATIENT
Start: 2025-01-23

## 2025-01-07 RX ORDER — ALBUTEROL SULFATE 0.83 MG/ML
2.5 SOLUTION RESPIRATORY (INHALATION)
OUTPATIENT
Start: 2025-01-07

## 2025-01-07 RX ORDER — METHYLPREDNISOLONE SODIUM SUCCINATE 40 MG/ML
40 INJECTION INTRAMUSCULAR; INTRAVENOUS
Start: 2025-01-07

## 2025-01-07 RX ORDER — ALBUTEROL SULFATE 0.83 MG/ML
2.5 SOLUTION RESPIRATORY (INHALATION)
OUTPATIENT
Start: 2025-01-09

## 2025-01-07 RX ORDER — HEPARIN SODIUM,PORCINE 10 UNIT/ML
5-20 VIAL (ML) INTRAVENOUS DAILY PRN
OUTPATIENT
Start: 2025-01-24

## 2025-01-07 RX ORDER — DIPHENHYDRAMINE HYDROCHLORIDE 50 MG/ML
50 INJECTION INTRAMUSCULAR; INTRAVENOUS
Start: 2025-01-23

## 2025-01-07 RX ORDER — ALBUTEROL SULFATE 90 UG/1
1-2 INHALANT RESPIRATORY (INHALATION)
Start: 2025-01-07

## 2025-01-07 RX ORDER — MEPERIDINE HYDROCHLORIDE 25 MG/ML
25 INJECTION INTRAMUSCULAR; INTRAVENOUS; SUBCUTANEOUS
OUTPATIENT
Start: 2025-01-09

## 2025-01-07 RX ORDER — HEPARIN SODIUM (PORCINE) LOCK FLUSH IV SOLN 100 UNIT/ML 100 UNIT/ML
5 SOLUTION INTRAVENOUS
OUTPATIENT
Start: 2025-02-07

## 2025-01-07 RX ORDER — LORAZEPAM 2 MG/ML
0.5 INJECTION INTRAMUSCULAR EVERY 4 HOURS PRN
OUTPATIENT
Start: 2025-02-06

## 2025-01-07 RX ORDER — MEPERIDINE HYDROCHLORIDE 25 MG/ML
25 INJECTION INTRAMUSCULAR; INTRAVENOUS; SUBCUTANEOUS
OUTPATIENT
Start: 2025-01-07

## 2025-01-07 RX ORDER — METHYLPREDNISOLONE SODIUM SUCCINATE 40 MG/ML
40 INJECTION INTRAMUSCULAR; INTRAVENOUS
Start: 2025-01-09

## 2025-01-07 RX ORDER — DIPHENHYDRAMINE HYDROCHLORIDE 50 MG/ML
50 INJECTION INTRAMUSCULAR; INTRAVENOUS
Start: 2025-02-06

## 2025-01-07 RX ORDER — HEPARIN SODIUM (PORCINE) LOCK FLUSH IV SOLN 100 UNIT/ML 100 UNIT/ML
5 SOLUTION INTRAVENOUS
OUTPATIENT
Start: 2025-01-10

## 2025-01-07 RX ORDER — EPINEPHRINE 1 MG/ML
0.3 INJECTION, SOLUTION INTRAMUSCULAR; SUBCUTANEOUS EVERY 5 MIN PRN
OUTPATIENT
Start: 2025-01-09

## 2025-01-07 RX ORDER — ALBUTEROL SULFATE 90 UG/1
1-2 INHALANT RESPIRATORY (INHALATION)
Start: 2025-02-06

## 2025-01-07 RX ORDER — HEPARIN SODIUM,PORCINE 10 UNIT/ML
5-20 VIAL (ML) INTRAVENOUS DAILY PRN
OUTPATIENT
Start: 2025-01-23

## 2025-01-07 RX ORDER — DIPHENHYDRAMINE HYDROCHLORIDE 50 MG/ML
25 INJECTION INTRAMUSCULAR; INTRAVENOUS
Start: 2025-01-07

## 2025-01-07 RX ORDER — ONDANSETRON 2 MG/ML
8 INJECTION INTRAMUSCULAR; INTRAVENOUS ONCE
OUTPATIENT
Start: 2025-01-09

## 2025-01-07 RX ORDER — HEPARIN SODIUM (PORCINE) LOCK FLUSH IV SOLN 100 UNIT/ML 100 UNIT/ML
5 SOLUTION INTRAVENOUS
OUTPATIENT
Start: 2025-01-09

## 2025-01-07 RX ORDER — ALBUTEROL SULFATE 0.83 MG/ML
2.5 SOLUTION RESPIRATORY (INHALATION)
OUTPATIENT
Start: 2025-02-06

## 2025-01-16 ENCOUNTER — PATIENT OUTREACH (OUTPATIENT)
Dept: SURGERY | Facility: CLINIC | Age: 70
End: 2025-01-16

## 2025-01-16 ENCOUNTER — HOSPITAL ENCOUNTER (OUTPATIENT)
Dept: PET IMAGING | Facility: CLINIC | Age: 70
End: 2025-01-16
Attending: CLINICAL NURSE SPECIALIST
Payer: MEDICARE

## 2025-01-16 ENCOUNTER — ONCOLOGY VISIT (OUTPATIENT)
Dept: SURGERY | Facility: CLINIC | Age: 70
End: 2025-01-16
Attending: STUDENT IN AN ORGANIZED HEALTH CARE EDUCATION/TRAINING PROGRAM
Payer: MEDICARE

## 2025-01-16 VITALS
HEART RATE: 85 BPM | OXYGEN SATURATION: 99 % | DIASTOLIC BLOOD PRESSURE: 77 MMHG | RESPIRATION RATE: 18 BRPM | SYSTOLIC BLOOD PRESSURE: 126 MMHG | BODY MASS INDEX: 27.23 KG/M2 | TEMPERATURE: 97.6 F | WEIGHT: 189.8 LBS

## 2025-01-16 DIAGNOSIS — I10 ESSENTIAL HYPERTENSION: ICD-10-CM

## 2025-01-16 DIAGNOSIS — R00.2 PALPITATIONS: ICD-10-CM

## 2025-01-16 DIAGNOSIS — C16.0 ADENOCARCINOMA OF GASTROESOPHAGEAL JUNCTION (H): Primary | ICD-10-CM

## 2025-01-16 DIAGNOSIS — C16.0 ADENOCARCINOMA OF GASTROESOPHAGEAL JUNCTION (H): ICD-10-CM

## 2025-01-16 PROCEDURE — A9552 F18 FDG: HCPCS | Performed by: CLINICAL NURSE SPECIALIST

## 2025-01-16 PROCEDURE — 343N000001 HC RX 343 MED OP 636: Performed by: CLINICAL NURSE SPECIALIST

## 2025-01-16 PROCEDURE — 99215 OFFICE O/P EST HI 40 MIN: CPT | Performed by: STUDENT IN AN ORGANIZED HEALTH CARE EDUCATION/TRAINING PROGRAM

## 2025-01-16 PROCEDURE — G0463 HOSPITAL OUTPT CLINIC VISIT: HCPCS | Performed by: STUDENT IN AN ORGANIZED HEALTH CARE EDUCATION/TRAINING PROGRAM

## 2025-01-16 PROCEDURE — 78816 PET IMAGE W/CT FULL BODY: CPT | Mod: PS

## 2025-01-16 PROCEDURE — G0463 HOSPITAL OUTPT CLINIC VISIT: HCPCS | Mod: 25 | Performed by: STUDENT IN AN ORGANIZED HEALTH CARE EDUCATION/TRAINING PROGRAM

## 2025-01-16 RX ORDER — FLUDEOXYGLUCOSE F 18 200 MCI/ML
10-18 INJECTION, SOLUTION INTRAVENOUS ONCE
Status: COMPLETED | OUTPATIENT
Start: 2025-01-16 | End: 2025-01-16

## 2025-01-16 RX ADMIN — FLUDEOXYGLUCOSE F 18 10.53 MILLICURIE: 200 INJECTION, SOLUTION INTRAVENOUS at 07:37

## 2025-01-16 ASSESSMENT — PAIN SCALES - GENERAL: PAINLEVEL_OUTOF10: NO PAIN (0)

## 2025-01-16 NOTE — PROGRESS NOTES
Met with pt and pt's wife in clinic following visit with Dr. Tobar. Introduced self and role of RNCC.     Handouts given: Thoracic surgery direct contact information      Discussed plan of care including: PFT, echo, abdominal CTA. Prefers imaging at [facility].   Step 1-diagnostic lap, J tube placement, Nissen takedown  Step 2-esophagectomy    RNCC will outreach again: As needed. Pt states he would like to schedule testing but is still considering surgery and seeking other opinions. He agreed to call or mychart RNCC with his decision in the next 1-2 weeks.     Pt is aware of need for PAC visit within 30 days prior to surgery. We discussed he will not need an H & P with PCP and should see our PAC clinic.    Pt in agreement and had no further questions or concerns.    Catherine Del Toro, RN BSN  Thoracic Surgery RN Care Coordinator  [phone]

## 2025-01-16 NOTE — PROGRESS NOTES
THORACIC SURGERY FOLLOW UP VISIT    Dear Dr. Wing,  I saw Mr. Bal Junior in follow-up today. The clinical summary follows:     PREVISIT DIAGNOSIS   Adenocarcinoma of the GE junction  PROCEDURE   Take-down fundoplication, laparoscopic redo nissen fundoplication with gastrostomy feeding tube placement  DATE OF PROCEDURE  9/25/20    COMPLICATIONS  None    INTERVAL STUDIES  EGD 9/24/24: loose Nissen fundoplication wrap, erythematous and nodular mucosa in esophagus, 2cm hiatal hernia  EUS 10/8/24: malignant esophageal tumor at gastroesophageal junction, Siewert 2, 15 x 5 mm, without extension beyond muscularis propria  PET 10/1/24: FDG avid wall thickening in lower esophagus extending into GE junction (SUV max 7.3). No convincing evidence of tona or metastatic disease    PET 1/16/25: results pending    Pathology 9/24/24: adenocarcinoma, poorly-differentiated, with signet ring cell features    ETOH Never  TOB Never  BMI 27.23 kg/m2    SUBJECTIVE  Mr. Junior has been doing well since the takedown and redo Nissen fundoplication. The recent discovery and biopsy results prompted his return to clinic for surgical management of the newly found cancer in his esophagus. He has undergone four rounds of chemotherapy with some recovery from the fatigue that has accompanied the infusions.    From a personal perspective, Mr. Junior lives at home with his wife. He enjoys skiing which has not be possible recently due to fatigue.    IMPRESSION  69 year-old male with newly diagnosed adenocarcinoma of the gastroesophageal junction.    PLAN  I spent 45 min on the date of the encounter in chart review, patient visit, review of tests, documentation and/or discussion with other providers about the issues documented above. I reviewed the plan as follows:  Discussed recommendation of undergoing laparoscopic takedown of Nissen fundoplication and evaluate blood supply of stomach and placement of jejunostomy tube. Second procedure would  consist of resection of gastroesophageal junction with anastomosis of remaining esophagus and gastric conduit. At this time, Mr. Junior is hesitant about undergoing such extensive procedures and would like time to consider this further.  Necessary Tests & Appointments: Pulmonary function test, preoperative assessment clinic, CTA abdomen to evaluate gastroepiploic artery  We will be in touch with him after the above procedures to evaluate for surgery  All questions were answered and the patient and present family were in agreement with the plan.  I appreciate the opportunity to participate in the care of your patient and will keep you updated.  Sincerely,

## 2025-01-16 NOTE — NURSING NOTE
"Oncology Rooming Note    January 16, 2025 10:55 AM   Bal Junior is a 69 year old male who presents for:    Chief Complaint   Patient presents with    Oncology Clinic Visit     Adenocarcinoma of gastroesophageal junction     Initial Vitals: /77 (BP Location: Right arm, Patient Position: Sitting, Cuff Size: Adult Regular)   Pulse 85   Temp 97.6  F (36.4  C) (Oral)   Resp 18   Wt 86.1 kg (189 lb 12.8 oz)   SpO2 99%   BMI 27.23 kg/m   Estimated body mass index is 27.23 kg/m  as calculated from the following:    Height as of 12/16/24: 1.778 m (5' 10\").    Weight as of this encounter: 86.1 kg (189 lb 12.8 oz). Body surface area is 2.06 meters squared.  No Pain (0) Comment: Data Unavailable   No LMP for male patient.  Allergies reviewed: Yes  Medications reviewed: Yes    Medications: Medication refills not needed today.  Pharmacy name entered into [employer]:    [facility] - [place], [place] - [facility] - [facility]    Frailty Screening:   Is the patient here for a new oncology consult visit in cancer care? 2. No      Clinical concerns: Patient wants to review his most recent scan.       Sue Vergara              "

## 2025-01-16 NOTE — LETTER
1/16/2025      Bal Junior  [address] [place] [facility] [address]      Dear Colleague,    Thank you for referring your patient, Bal Junior, to the [facility]. Please see a copy of my visit note below.    THORACIC SURGERY FOLLOW UP VISIT    Dear Dr. Wing,  I saw . Bal Junior in follow-up today. The clinical summary follows:     PREVISIT DIAGNOSIS   Adenocarcinoma of the GE junction  PROCEDURE   Take-down fundoplication, laparoscopic redo nissen fundoplication with gastrostomy feeding tube placement  DATE OF PROCEDURE  9/25/20    COMPLICATIONS  None    INTERVAL STUDIES  EGD 9/24/24: loose Nissen fundoplication wrap, erythematous and nodular mucosa in esophagus, 2cm hiatal hernia  EUS 10/8/24: malignant esophageal tumor at gastroesophageal junction, Siewert 2, 15 x 5 mm, without extension beyond muscularis propria  PET 10/1/24: FDG avid wall thickening in lower esophagus extending into GE junction (SUV max 7.3). No convincing evidence of tona or metastatic disease    PET 1/16/25: results pending    Pathology 9/24/24: adenocarcinoma, poorly-differentiated, with signet ring cell features    ETOH Never  TOB Never  BMI 27.23 kg/m2    SUBJECTIVE  Mr. Junior has been doing well since the takedown and redo Nissen fundoplication. The recent discovery and biopsy results prompted his return to clinic for surgical management of the newly found cancer in his esophagus. He has undergone four rounds of chemotherapy with some recovery from the fatigue that has accompanied the infusions.    From a personal perspective, Mr. Junior lives at home with his wife. He enjoys skiing which has not be possible recently due to fatigue.    IMPRESSION  69 year-old male with newly diagnosed adenocarcinoma of the gastroesophageal junction.    PLAN  I spent 45 min on the date of the encounter in chart review, patient visit, review of tests, documentation and/or discussion with other providers about the issues  documented above. I reviewed the plan as follows:  Discussed recommendation of undergoing laparoscopic takedown of Nissen fundoplication and evaluate blood supply of stomach and placement of jejunostomy tube. Second procedure would consist of resection of gastroesophageal junction with anastomosis of remaining esophagus and gastric conduit. At this time, Mr. Junior is hesitant about undergoing such extensive procedures and would like time to consider this further.  Necessary Tests & Appointments: Pulmonary function test, preoperative assessment clinic, CTA abdomen to evaluate gastroepiploic artery  We will be in touch with him after the above procedures to evaluate for surgery  All questions were answered and the patient and present family were in agreement with the plan.  I appreciate the opportunity to participate in the care of your patient and will keep you updated.  Sincerely,       Again, thank you for allowing me to participate in the care of your patient.        Sincerely,        Katlyn Tobar MD    Electronically signed

## 2025-01-27 ENCOUNTER — PATIENT OUTREACH (OUTPATIENT)
Dept: ONCOLOGY | Facility: CLINIC | Age: 70
End: 2025-01-27
Payer: COMMERCIAL

## 2025-01-27 RX ORDER — ALBUTEROL SULFATE 0.83 MG/ML
2.5 SOLUTION RESPIRATORY (INHALATION) ONCE
Status: COMPLETED | OUTPATIENT
Start: 2025-01-28 | End: 2025-01-28

## 2025-01-27 NOTE — TELEPHONE ENCOUNTER
[facility]: Cancer Care                                                                                          Called pt to follow-up after his visit with thoracic surgery if he was going to have esophagectomy. He stated he's scheduled for several work up appointments, PFT, echo and CT and will follow-up with thoracic, unsure if they were going to call him or schedule another visit.    He has an appointment at [facility] for a 2nd opinion whether he should have surgery, this is on 1/30.    He stated he was able to go skiing this morning and very pleased how he's recovering off chemo. Peripheral neuropathy gradually getting better, contributes that to the higher temps as well. Wants to go ski as much as he can. Has pt's direct extension to call with any updates or concerns.      Signature:  Jelly Dill RN

## 2025-01-28 ENCOUNTER — HOSPITAL ENCOUNTER (OUTPATIENT)
Dept: RESPIRATORY THERAPY | Facility: CLINIC | Age: 70
Discharge: HOME | End: 2025-01-28
Payer: MEDICARE

## 2025-01-28 DIAGNOSIS — C16.0 ADENOCARCINOMA OF GASTROESOPHAGEAL JUNCTION (H): Primary | ICD-10-CM

## 2025-01-28 LAB — HGB BLD-MCNC: 13.4 G/DL (ref 13.3–17.7)

## 2025-01-28 PROCEDURE — 999N000157 HC STATISTIC RCP TIME EA 10 MIN

## 2025-01-28 PROCEDURE — 94726 PLETHYSMOGRAPHY LUNG VOLUMES: CPT

## 2025-01-28 PROCEDURE — 36415 COLL VENOUS BLD VENIPUNCTURE: CPT | Performed by: CLINICAL NURSE SPECIALIST

## 2025-01-28 PROCEDURE — 94729 DIFFUSING CAPACITY: CPT

## 2025-01-28 PROCEDURE — 94060 EVALUATION OF WHEEZING: CPT

## 2025-01-28 PROCEDURE — 250N000009 HC RX 250: Performed by: CLINICAL NURSE SPECIALIST

## 2025-01-28 PROCEDURE — 85018 HEMOGLOBIN: CPT | Performed by: CLINICAL NURSE SPECIALIST

## 2025-01-28 RX ADMIN — ALBUTEROL SULFATE 2.5 MG: 2.5 SOLUTION RESPIRATORY (INHALATION) at 10:37

## 2025-01-28 NOTE — PROGRESS NOTES
RESPIRATORY CARE NOTE    Complete Pulmonary Function Test completed by patient.  Good patient effort and cooperation. Albuterol 2.5 mg neb given for bronchodilation.  The results of this test meet the ATS standards for acceptability and repeatability. PT returned to baseline and left in no distress.    Esther Corrales, RT  1/28/2025

## 2025-01-30 LAB
DLCOCOR-%PRED-PRE: 84 %
DLCOCOR-PRE: 21.92 ML/MIN/MMHG
DLCOUNC-%PRED-PRE: 81 %
DLCOUNC-PRE: 21.14 ML/MIN/MMHG
DLCOUNC-PRED: 26.05 ML/MIN/MMHG
ERV-%PRED-PRE: 53 %
ERV-PRE: 0.79 L
ERV-PRED: 1.47 L
EXPTIME-PRE: 6.97 SEC
FEF2575-%PRED-POST: 141 %
FEF2575-%PRED-PRE: 116 %
FEF2575-POST: 3.35 L/SEC
FEF2575-PRE: 2.76 L/SEC
FEF2575-PRED: 2.37 L/SEC
FEFMAX-%PRED-PRE: 113 %
FEFMAX-PRE: 9.59 L/SEC
FEFMAX-PRED: 8.46 L/SEC
FEV1-%PRED-PRE: 112 %
FEV1-PRE: 3.41 L
FEV1FEV6-PRE: 78 %
FEV1FEV6-PRED: 78 %
FEV1FVC-PRE: 77 %
FEV1FVC-PRED: 77 %
FEV1SVC-PRE: 80 %
FEV1SVC-PRED: 72 %
FIFMAX-PRE: 4.57 L/SEC
FRCPLETH-%PRED-PRE: 119 %
FRCPLETH-PRE: 4.44 L
FRCPLETH-PRED: 3.72 L
FVC-%PRED-PRE: 111 %
FVC-PRE: 4.4 L
FVC-PRED: 3.95 L
IC-%PRED-PRE: 117 %
IC-PRE: 3.47 L
IC-PRED: 2.95 L
RVPLETH-%PRED-PRE: 139 %
RVPLETH-PRE: 3.65 L
RVPLETH-PRED: 2.62 L
TLCPLETH-%PRED-PRE: 110 %
TLCPLETH-PRE: 7.91 L
TLCPLETH-PRED: 7.13 L
VA-%PRED-PRE: 110 %
VA-PRE: 7.11 L
VC-%PRED-PRE: 100 %
VC-PRE: 4.25 L
VC-PRED: 4.23 L

## 2025-02-06 ENCOUNTER — ANCILLARY PROCEDURE (OUTPATIENT)
Dept: CT IMAGING | Facility: CLINIC | Age: 70
End: 2025-02-06
Attending: CLINICAL NURSE SPECIALIST
Payer: COMMERCIAL

## 2025-02-06 DIAGNOSIS — C16.0 ADENOCARCINOMA OF GASTROESOPHAGEAL JUNCTION (H): ICD-10-CM

## 2025-02-06 PROCEDURE — 74175 CTA ABDOMEN W/CONTRAST: CPT | Performed by: RADIOLOGY

## 2025-02-06 RX ORDER — IOPAMIDOL 755 MG/ML
90 INJECTION, SOLUTION INTRAVASCULAR ONCE
Status: COMPLETED | OUTPATIENT
Start: 2025-02-06 | End: 2025-02-06

## 2025-02-06 RX ADMIN — IOPAMIDOL 90 ML: 755 INJECTION, SOLUTION INTRAVASCULAR at 15:46

## 2025-02-06 NOTE — DISCHARGE INSTRUCTIONS

## 2025-02-11 ENCOUNTER — TELEPHONE (OUTPATIENT)
Dept: SURGERY | Facility: CLINIC | Age: 70
End: 2025-02-11
Payer: COMMERCIAL

## 2025-02-11 ENCOUNTER — PREP FOR PROCEDURE (OUTPATIENT)
Dept: SURGERY | Facility: CLINIC | Age: 70
End: 2025-02-11
Payer: COMMERCIAL

## 2025-02-11 DIAGNOSIS — C15.9 MALIGNANT NEOPLASM OF ESOPHAGUS, UNSPECIFIED LOCATION (H): Primary | ICD-10-CM

## 2025-02-11 RX ORDER — ENOXAPARIN SODIUM 100 MG/ML
40 INJECTION SUBCUTANEOUS
OUTPATIENT
Start: 2025-02-11

## 2025-02-11 RX ORDER — CEFAZOLIN SODIUM 2 G/50ML
2 SOLUTION INTRAVENOUS SEE ADMIN INSTRUCTIONS
OUTPATIENT
Start: 2025-02-11

## 2025-02-11 RX ORDER — CEFAZOLIN SODIUM 2 G/50ML
2 SOLUTION INTRAVENOUS
OUTPATIENT
Start: 2025-02-11

## 2025-02-11 NOTE — TELEPHONE ENCOUNTER
Spoke with patient to schedule procedure with Dr. Tobar   Procedure was scheduled on 2/26 at [facility] OR  Patient will have H&P with PAC    Informed pt of surgery details:  Date:    Wednesday, February 26, 2025  Arrival Time:  5:30 am    Pt is aware surgery start time may change, and someone will reach out with the new arrival time, if so.    Patient is aware a COVID-19 test is needed before their procedure ONLY IF symptomatic.   (Patient is aware Thoracic is no longer requiring COVID-19 test)       Patient is aware a / is needed day of surgery.   Surgery Letter was sent via [employer],     Patient has my direct contact information for any further questions.        Feb 20, 2025 12:00 PM  (Arrive by 11:45 AM)  Return Patient with Katlyn ROSADO MD  [facility] ([facility] ) [phone]     Feb 20, 2025 1:45 PM  (Arrive by 1:30 PM)  PAC EVALUATION with Mone Simpson PA-C  [facility] [place] ([facility] ) [phone]     Feb 20, 2025 2:45 PM  LAB with [facility]  [facility] [place] ([facility] ) [phone]     Mar 24, 2025 1:30 PM  (Arrive by 1:15 PM)  Return Patient with ROSY Cadet  [facility] ([facility] ) [phone]

## 2025-02-12 NOTE — TELEPHONE ENCOUNTER
FUTURE VISIT INFORMATION      SURGERY INFORMATION:  Date: 25  Location: [facility]u or  Surgeon:  Katlyn Tobar MD   Anesthesia Type:  general  Procedure: TAKE-DOWN, FUNDOPLICATION, NISSEN, LAPAROSCOPIC- ROBOTIC Laparoscopic assisted insertion tube jejunostomy Esophagoscopy, gastroscopy, duodenoscopy (EGD)   Consult: ov 25    RECORDS REQUESTED FROM:       Primary Care Provider: Domenica Wing MD     Pertinent Medical History: hypertension, palpitations    Most recent EKG+ Tracin24    Most recent ECHO: 25    Most recent PFT's: 25

## 2025-02-19 LAB
ABO + RH BLD: NORMAL
BLD GP AB SCN SERPL QL: NEGATIVE
SPECIMEN EXP DATE BLD: NORMAL

## 2025-02-20 ENCOUNTER — ONCOLOGY VISIT (OUTPATIENT)
Dept: SURGERY | Facility: CLINIC | Age: 70
End: 2025-02-20
Attending: STUDENT IN AN ORGANIZED HEALTH CARE EDUCATION/TRAINING PROGRAM
Payer: MEDICARE

## 2025-02-20 ENCOUNTER — PRE VISIT (OUTPATIENT)
Dept: SURGERY | Facility: CLINIC | Age: 70
End: 2025-02-20

## 2025-02-20 ENCOUNTER — ANESTHESIA EVENT (OUTPATIENT)
Dept: SURGERY | Facility: CLINIC | Age: 70
End: 2025-02-20
Payer: MEDICARE

## 2025-02-20 ENCOUNTER — OFFICE VISIT (OUTPATIENT)
Dept: SURGERY | Facility: CLINIC | Age: 70
End: 2025-02-20
Payer: COMMERCIAL

## 2025-02-20 ENCOUNTER — LAB (OUTPATIENT)
Dept: LAB | Facility: CLINIC | Age: 70
End: 2025-02-20
Attending: STUDENT IN AN ORGANIZED HEALTH CARE EDUCATION/TRAINING PROGRAM
Payer: MEDICARE

## 2025-02-20 ENCOUNTER — PATIENT OUTREACH (OUTPATIENT)
Dept: SURGERY | Facility: CLINIC | Age: 70
End: 2025-02-20

## 2025-02-20 VITALS
OXYGEN SATURATION: 100 % | HEIGHT: 70 IN | RESPIRATION RATE: 16 BRPM | WEIGHT: 195.2 LBS | BODY MASS INDEX: 27.94 KG/M2 | DIASTOLIC BLOOD PRESSURE: 80 MMHG | SYSTOLIC BLOOD PRESSURE: 126 MMHG | HEART RATE: 57 BPM

## 2025-02-20 VITALS
OXYGEN SATURATION: 99 % | HEART RATE: 61 BPM | WEIGHT: 195 LBS | DIASTOLIC BLOOD PRESSURE: 91 MMHG | SYSTOLIC BLOOD PRESSURE: 135 MMHG | RESPIRATION RATE: 16 BRPM | TEMPERATURE: 97.1 F | BODY MASS INDEX: 27.98 KG/M2

## 2025-02-20 DIAGNOSIS — K44.9 HIATAL HERNIA: Primary | ICD-10-CM

## 2025-02-20 DIAGNOSIS — Z01.818 PREOP EXAMINATION: ICD-10-CM

## 2025-02-20 DIAGNOSIS — Z01.818 PREOP EXAMINATION: Primary | ICD-10-CM

## 2025-02-20 DIAGNOSIS — K44.9 HIATAL HERNIA: ICD-10-CM

## 2025-02-20 DIAGNOSIS — C15.9 MALIGNANT NEOPLASM OF ESOPHAGUS, UNSPECIFIED LOCATION (H): Primary | ICD-10-CM

## 2025-02-20 LAB
ALBUMIN SERPL BCG-MCNC: 3.9 G/DL (ref 3.5–5.2)
ANION GAP SERPL CALCULATED.3IONS-SCNC: 8 MMOL/L (ref 7–15)
BUN SERPL-MCNC: 12.8 MG/DL (ref 8–23)
CALCIUM SERPL-MCNC: 8.9 MG/DL (ref 8.8–10.4)
CHLORIDE SERPL-SCNC: 108 MMOL/L (ref 98–107)
CREAT SERPL-MCNC: 0.88 MG/DL (ref 0.67–1.17)
EGFRCR SERPLBLD CKD-EPI 2021: >90 ML/MIN/1.73M2
ERYTHROCYTE [DISTWIDTH] IN BLOOD BY AUTOMATED COUNT: 12.5 % (ref 10–15)
GLUCOSE SERPL-MCNC: 80 MG/DL (ref 70–99)
HCO3 SERPL-SCNC: 24 MMOL/L (ref 22–29)
HCT VFR BLD AUTO: 39.9 % (ref 40–53)
HGB BLD-MCNC: 13.6 G/DL (ref 13.3–17.7)
MCH RBC QN AUTO: 31.1 PG (ref 26.5–33)
MCHC RBC AUTO-ENTMCNC: 34.1 G/DL (ref 31.5–36.5)
MCV RBC AUTO: 91 FL (ref 78–100)
PLATELET # BLD AUTO: 149 10E3/UL (ref 150–450)
POTASSIUM SERPL-SCNC: 4.2 MMOL/L (ref 3.4–5.3)
PREALB SERPL-MCNC: 21.2 MG/DL (ref 20–40)
RBC # BLD AUTO: 4.38 10E6/UL (ref 4.4–5.9)
SODIUM SERPL-SCNC: 140 MMOL/L (ref 135–145)
WBC # BLD AUTO: 5.6 10E3/UL (ref 4–11)

## 2025-02-20 PROCEDURE — 85014 HEMATOCRIT: CPT

## 2025-02-20 PROCEDURE — 86900 BLOOD TYPING SEROLOGIC ABO: CPT

## 2025-02-20 PROCEDURE — 36591 DRAW BLOOD OFF VENOUS DEVICE: CPT

## 2025-02-20 PROCEDURE — 84134 ASSAY OF PREALBUMIN: CPT

## 2025-02-20 PROCEDURE — G0463 HOSPITAL OUTPT CLINIC VISIT: HCPCS | Performed by: STUDENT IN AN ORGANIZED HEALTH CARE EDUCATION/TRAINING PROGRAM

## 2025-02-20 PROCEDURE — 82040 ASSAY OF SERUM ALBUMIN: CPT

## 2025-02-20 PROCEDURE — 80048 BASIC METABOLIC PNL TOTAL CA: CPT

## 2025-02-20 PROCEDURE — 86850 RBC ANTIBODY SCREEN: CPT

## 2025-02-20 PROCEDURE — 250N000011 HC RX IP 250 OP 636: Performed by: STUDENT IN AN ORGANIZED HEALTH CARE EDUCATION/TRAINING PROGRAM

## 2025-02-20 RX ORDER — HEPARIN SODIUM (PORCINE) LOCK FLUSH IV SOLN 100 UNIT/ML 100 UNIT/ML
5 SOLUTION INTRAVENOUS DAILY PRN
Status: DISCONTINUED | OUTPATIENT
Start: 2025-02-20 | End: 2025-02-26 | Stop reason: HOSPADM

## 2025-02-20 RX ADMIN — HEPARIN 5 ML: 100 SYRINGE at 14:06

## 2025-02-20 ASSESSMENT — PAIN SCALES - GENERAL
PAINLEVEL_OUTOF10: NO PAIN (0)
PAINLEVEL_OUTOF10: NO PAIN (0)

## 2025-02-20 ASSESSMENT — ENCOUNTER SYMPTOMS: SEIZURES: 0

## 2025-02-20 ASSESSMENT — LIFESTYLE VARIABLES: TOBACCO_USE: 0

## 2025-02-20 NOTE — PROGRESS NOTES
Met with pt in clinic following visit with Dr. Tobar.      Handouts given: PEG tube education packet mailed to home today    Discussed plan of care including: clinic visit between Nissen takedown and esophagectomy scheduled with Dr. Tobar today for 3/11. Tentative date for esophagectomy week of 3/24. He is aware he will get a call from scheduling to confirm.    RNCC will outreach again: PRN    Pt is seeing PAC today. We discussed he will be given a packet with info on holding meds, NPO, showering.     Pt in agreement and had no further questions or concerns.    Catherine Del Toro, RN BSN  Thoracic Surgery RN Care Coordinator  [phone]

## 2025-02-20 NOTE — H&P
Pre-Operative H & P     CC:  Preoperative exam to assess for increased cardiopulmonary risk while undergoing surgery and anesthesia.    Date of Encounter: 2/20/2025  Primary Care Physician:  Domenica Wing     Reason for visit:   Encounter Diagnosis   Name Primary?    Preop examination Yes       HPI  Bal Junior is a 70 year old male who presents for pre-operative H & P in preparation for  Procedure Information       Case: [id number] Date/Time: 02/26/25 0730    Procedures:       TAKE-DOWN, FUNDOPLICATION, NISSEN, LAPAROSCOPIC- ROBOTIC (Chest)      Laparoscopic assisted insertion tube jejunostomy (Abdomen)      Esophagoscopy, gastroscopy, duodenoscopy (EGD) (Esophagus)    Anesthesia type: General    Diagnosis: Malignant neoplasm of esophagus, unspecified location (H) [C15.9]    Pre-op diagnosis: Malignant neoplasm of esophagus, unspecified location (H) [C15.9]    [facility]    Providers: Katlyn Tobar MD            Patient is being evaluated for comorbid conditions of hypertension, h/o palpitations     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  He has a history of a takedown fundoplication, laparoscopic redo Nissen fundoplication 2020.  He was recently found to have cancer in his esophagus.  He has undergone 4 rounds of chemotherapy.  He was reviewed by Dr. Tobar and is now scheduled for surgery as above.    History is obtained from the patient and chart review    Hx of abnormal bleeding or anti-platelet use: denies      Past Medical History  Past Medical History:   Diagnosis Date    Hypertension     Neuritis     Peripheral neuropathy     Personal history of other medical treatment     postgastrectomy dumping syndrome    Stomach problems Noted earlier       Past Surgical History  Past Surgical History:   Procedure Laterality Date    ABDOMEN SURGERY  2012?    APPENDECTOMY  1964?    COLONOSCOPY  2006, 2016    ESOPHAGEAL BALLOON PROVOCATION STUDY N/A 9/24/2024    Procedure: Esophageal  Balloon Provocation Study;  Surgeon: Carson Michel DO;  Location:  GI    ESOPHAGOSCOPY, GASTROSCOPY, DUODENOSCOPY (EGD), COMBINED N/A 9/24/2024    Procedure: ESOPHAGOGASTRODUODENOSCOPY, WITH BIOPSY;  Surgeon: Carson Michel DO;  Location:  GI    ESOPHAGOSCOPY, GASTROSCOPY, DUODENOSCOPY (EGD), COMBINED N/A 10/8/2024    Procedure: ESOPHAGOGASTRODUODENOSCOPY, WITH FINE NEEDLE ASPIRATION BIOPSY, WITH ENDOSCOPIC ULTRASOUND GUIDANCE;  Surgeon: Lance Lopez MD;  Location:  GI    EYE SURGERY  199x    laser for retinal tears    GASTRIC FUNDOPLICATION      HERNIA REPAIR  1961?    IR CHEST PORT PLACEMENT > 5 YRS OF AGE  10/24/2024    LAPAROSCOPIC TAKEDOWN NISSEN FUNDOPLICATION N/A 9/25/2020    Procedure: TAKE-DOWN, FUNDOPLICATION, REDO NISSEN, LAPAROSCOPIC, gastrostomy feeding tube placement;  Surgeon: Katlyn Tobar MD;  Location:  OR    SD ESOPHAGOGASTRODUODENOSCOPY TRANSORAL DIAGNOSTIC N/A 5/9/2019    Procedure: UPPER GASTROINTESTINAL ENDOSCOPY;  Surgeon: Dino Ward MD;  Location: [facility];  Service: General    SOFT TISSUE SURGERY  2009?    MCL l. thumb       Prior to Admission Medications  Current Outpatient Medications   Medication Sig Dispense Refill    atenolol (TENORMIN) 50 MG tablet Take 50 mg by mouth every evening.      clotrimazole (LOTRIMIN) 1 % external cream Apply topically 2 times daily. (Patient taking differently: Apply topically as needed.) 30 g 0    cyanocobalamin (VITAMIN B-12) 500 MCG tablet Take 500 mcg by mouth daily.      gabapentin (NEURONTIN) 300 MG capsule Take 1 capsule (300 mg) by mouth 3 times daily. 270 capsule 3    nortriptyline (PAMELOR) 25 MG capsule Take 25 mg by mouth every evening.      ondansetron (ZOFRAN) 8 MG tablet Take 1 tablet (8 mg) by mouth every 8 hours as needed for nausea (vomiting). 30 tablet 2    prochlorperazine (COMPAZINE) 10 MG tablet Take 1 tablet (10 mg) by mouth every 6 hours as needed for nausea or vomiting. 30 tablet 2    blood  glucose (NO BRAND SPECIFIED) test strip Use to test blood sugar 1 times daily or as directed. To accompany: Blood Glucose Monitor Brands: per insurance. 100 strip 6    blood glucose monitoring (NO BRAND SPECIFIED) meter device kit Use to test blood sugar 1 times daily or as directed. Preferred blood glucose meter OR supplies to accompany: Blood Glucose Monitor Brands: per insurance. 1 kit 0    Continuous Glucose Sensor (FREESTYLE SIMIN 2 SENSOR) MISC Use 1 sensor every 14 days. Use to read blood sugars per 's instructions. 6 each 1    dexAMETHasone (DECADRON) 4 MG tablet Take 2 tablets (8 mg) by mouth 2 times daily (with meals). Start evening of Docetaxel infusion and continue for a total of 3 doses. (Patient not taking: Reported on 2/20/2025) 6 tablet 7    EPINEPHrine (ANY BX GENERIC EQUIV) 0.3 MG/0.3ML injection 2-pack 0.3 mg (Patient not taking: Reported on 2/20/2025)      thin (NO BRAND SPECIFIED) lancets Use with lanceting device. To accompany: Blood Glucose Monitor Brands: per insurance. 100 each 6       Allergies  Allergies   Allergen Reactions    Hornets     Wasp Venom Protein Hives    Bee Venom Swelling       Social History  Social History     Socioeconomic History    Marital status:      Spouse name: Not on file    Number of children: Not on file    Years of education: Not on file    Highest education level: Not on file   Occupational History    Not on file   Tobacco Use    Smoking status: Never     Passive exposure: Past    Smokeless tobacco: Never   Substance and Sexual Activity    Alcohol use: Yes     Alcohol/week: 3.0 - 6.0 standard drinks of alcohol     Types: 1 - 2 Glasses of wine, 1 - 2 Cans of beer, 1 - 2 Shots of liquor per week     Comment: glass of wine with dinner, can of beer in evening or 1 whiskey  or bourbon    Drug use: Never    Sexual activity: Yes     Partners: Female     Birth control/protection: Post-menopausal, Male Surgical, Female Surgical   Other Topics Concern     Not on file   Social History Narrative    Not on file     Social Drivers of Health     Financial Resource Strain: Not on file   Food Insecurity: Not on file   Transportation Needs: Not on file   Physical Activity: Not on file   Stress: Not on file   Social Connections: Not on file   Interpersonal Safety: Low Risk  (10/24/2024)    Interpersonal Safety     Do you feel physically and emotionally safe where you currently live?: Yes     Within the past 12 months, have you been hit, slapped, kicked or otherwise physically hurt by someone?: No     Within the past 12 months, have you been humiliated or emotionally abused in other ways by your partner or ex-partner?: No   Housing Stability: Not on file       Family History  Family History   Problem Relation Age of Onset    Hypertension Mother     Uterine Cancer Mother         1965    Anesthesia Reaction Mother         PONV, slow to emerge    Mitral Valve Replacement Father     Coronary Artery Disease Maternal Grandfather     GERD No family hx of     Ulcerative Colitis No family hx of     Crohn's Disease No family hx of     Stomach Cancer No family hx of        Review of Systems  The complete review of systems is negative other than noted in the HPI or here.   Anesthesia Evaluation   Pt has had prior anesthetic.     No history of anesthetic complications       ROS/MED HX  ENT/Pulmonary:     (+)     JESSICA risk factors, snores loudly, hypertension,                              (-) tobacco use   Neurologic:  - neg neurologic ROS  (-) no seizures and no CVA   Cardiovascular:     (+)  hypertension- -   -  - -                                 Previous cardiac testing   Echo: Date: 2/6/25 Results:  Interpretation Summary  Left ventricular size, wall motion and function are normal. The ejection  fraction is 60-65%.  Right ventricular function, chamber size, wall motion, and thickness are  normal.  No hemodynamically significant valvular abnormalities.  IVC is normal.  There is no  "prior study for direct comparison.  Stress Test:  Date: Results:    ECG Reviewed:  Date: 12/2024 Results:  Sinus tachycardia   Low voltage QRS   Borderline ECG   Unconfirmed report - interpretation of this ECG is computer generated - see medical record for final interpretation     Cath:  Date: Results:   (-) taking anticoagulants/antiplatelets   METS/Exercise Tolerance: 4 - Raking leaves, gardening Comment: Walking 10,000-12,000 steps, skiing. Denies CHAWLA or CP   Hematologic:  - neg hematologic  ROS  (-) history of blood clots and history of blood transfusion   Musculoskeletal:  - neg musculoskeletal ROS     GI/Hepatic:     (+) GERD,     hiatal hernia,              Renal/Genitourinary:  - neg Renal ROS     Endo:  - neg endo ROS  (-) chronic steroid usage   Psychiatric/Substance Use:       Infectious Disease:  - neg infectious disease ROS     Malignancy:   (+) Malignancy, History of GI.GI CA  Active status post Chemo.      Other:            /80 (BP Location: Right arm, Patient Position: Sitting, Cuff Size: Adult Regular)   Pulse 57   Resp 16   Ht 1.778 m (5' 10\")   Wt 88.5 kg (195 lb 3.2 oz)   SpO2 100%   BMI 28.01 kg/m      Physical Exam   Constitutional: Awake, alert, cooperative, no apparent distress, and appears stated age.  Eyes: Pupils equal, round and reactive to light, extra ocular muscles intact, sclera clear, conjunctiva normal.  HENT: Normocephalic, oral pharynx with moist mucus membranes, good dentition.   Respiratory: Clear to auscultation bilaterally, no crackles or wheezing.  Cardiovascular: Regular rate and rhythm, normal S1 and S2, and no murmur noted.  Carotids no bruits. No edema. Palpable pulses to radial  DP and PT arteries.   GI: Normal bowel sounds, soft, non-distended, non-tender  Genitourinary:  deferred  Skin: Warm and dry.  No rashes at anticipated surgical site.   Musculoskeletal: Full ROM of neck. There is no redness, warmth, or swelling of the exposed joints. Gross motor " strength is normal.    Neurologic: Awake, alert, oriented to name, place and time. Cranial nerves II-XII are grossly intact. Gait is normal.   Neuropsychiatric: Calm, cooperative. Normal affect.     Prior Labs/Diagnostic Studies   All labs and imaging personally reviewed     EKG/ stress test - if available please see in ROS above   Echo result w/o MOPS: Interpretation SummaryLeft ventricular size, wall motion and function are normal. The ejectionfraction is 60-65%.Right ventricular function, chamber size, wall motion, and thickness arenormal.No hemodynamically significant valvular abnormalities.IVC is normal.There is no prior study for direct comparison.          Latest Ref Rng & Units 1/28/2025    10:07 AM   PFT   FVC L 4.40    FEV1 L 3.41    FVC% % 111    FEV1% % 112          The patient's records and results personally reviewed by this provider.     Outside records reviewed from: Care Everywhere    LAB/DIAGNOSTIC STUDIES TODAY:  CBC, BMP, T&S    Assessment    Bal Junior is a 70 year old male seen as a PAC referral for risk assessment and optimization for anesthesia.    Plan/Recommendations  Pt will be optimized for the proposed procedure.  See below for details on the assessment, risk, and preoperative recommendations    NEUROLOGY  - No history of TIA, CVA or seizure  -Post Op delirium risk factors:  No risk identified    ENT  - No current airway concerns.  Will need to be reassessed day of surgery.  Mallampati: III  TM: > 3    CARDIAC  - No history of CAD and Afib  -hypertension using atenolol  -denies cardiac symptoms   - METS (Metabolic Equivalents)  Patient performs 4 or more METS exercise without symptoms             Total Score: 0      RCRI-Low risk: Class 2 0.9% complication rate             Total Score: 1    RCRI: High Risk Surgery        PULMONARY  JESSICA Medium Risk             Total Score: 4    JESSICA: Snores loudly    JESSICA: Hypertension    JESSICA: Over 50 ys old    JESSICA: Male      - Denies asthma or inhaler  "use  - Tobacco History    History   Smoking Status    Never   Smokeless Tobacco    Never       GI  S/p redo nissen fundoplication. Now with adenocarcinoma of the GE junction. Above procedure planned   PONV Medium Risk  Total Score: 2           1 AN PONV: Patient is not a current smoker    1 AN PONV: Intended Post Op Opioids        /RENAL  - Baseline Creatinine, will update today     ENDOCRINE    - BMI: Estimated body mass index is 28.01 kg/m  as calculated from the following:    Height as of this encounter: 1.778 m (5' 10\").    Weight as of this encounter: 88.5 kg (195 lb 3.2 oz).  Overweight (BMI 25.0-29.9)  - No history of Diabetes Mellitus    HEME  VTE High Risk 3%             Total Score: 8    VTE: Greater than 59 yrs old    VTE: Male    VTE: Current cancer      - No history of abnormal bleeding or antiplatelet use.  -will update CBC, T&S today     MSK  Patient is NOT Frail             Total Score: 1    Frailty: Weight loss 10 lbs or greater        ACCESS  Has port present      Different anesthesia methods/types have been discussed with the patient, but they are aware that the final plan will be decided by the assigned anesthesia provider on the date of service.    The patient is optimized for their procedure. AVS with information on surgery time/arrival time, meds and NPO status given by nursing staff. No further diagnostic testing indicated.      On the day of service:     Prep time: 11 minutes  Visit time: 10 minutes  Documentation time: 3 minutes  ------------------------------------------  Total time: 24 minutes      Mone Simpson PA-C  Preoperative Assessment Center  [facility]  Clinic and Surgery Center  Phone: [phone]  Fax: [phone]    "

## 2025-02-20 NOTE — NURSING NOTE
"Oncology Rooming Note    February 20, 2025 10:59 AM   Bal Junior is a 70 year old male who presents for:    Chief Complaint   Patient presents with    Oncology Clinic Visit     Adenocarcinoma of gastroesophageal junction     Initial Vitals: BP (!) 135/91   Pulse 61   Temp (!) 48.2  F (9  C) (Oral)   Resp 16   Wt 88.5 kg (195 lb)   SpO2 99%   BMI 27.98 kg/m   Estimated body mass index is 27.98 kg/m  as calculated from the following:    Height as of 12/16/24: 1.778 m (5' 10\").    Weight as of this encounter: 88.5 kg (195 lb). Body surface area is 2.09 meters squared.  No Pain (0) Comment: Data Unavailable   No LMP for male patient.  Allergies reviewed: Yes  Medications reviewed: Yes    Medications: Medication refills not needed today.  Pharmacy name entered into [employer] Mobile:    [facility] - [place], [place] - [facility] - [facility]    Frailty Screening:   Is the patient here for a new oncology consult visit in cancer care? 2. No    PHQ9:  Did this patient require a PHQ9?: No      Clinical concerns:        Lisa Flores CMA              "

## 2025-02-20 NOTE — PATIENT INSTRUCTIONS
Preparing for Your Surgery      Name:  Bal Junior   MRN:  [id number]   :  1955   Today's Date:  2025       Arriving for surgery:  Surgery date:  25  Arrival time:  5.30AM    Please come to:     Please come to:       [facility] [employer] [facility]    [address] [place]  [address]     The [facility] [place] Patient/Visitor Ramp is at [address]. Patients and visitors who self-park will receive the reduced hospital parking rate. If the Patient /Visitor Ramp is full, please follow the signs to the [employer] car park located at the main hospital entrance.       parking is available (24 hours/ 7 days a week)      Discounted parking pass options are available for patients and visitors. They can be purchased at the [employer] desk at the main hospital entrance.     -    Stop at the security desk and they will direct surgery patients to the [facility] Check [facility] and [facility] [phone]        - If you need directions, a wheelchair or an escort please stop at the Information/security desk in the lobby.     What can I eat or drink?  -  You may eat and drink normally up to 8 hours prior to arrival time. (Until 9.30PM)  -  You may have clear liquids until 2 hours prior to arrival time. (Until 3.30AM)    Examples of clear liquids:  Water  Clear broth  Juices (apple, white grape, white cranberry  and cider) without pulp  Noncarbonated, powder based beverages  (lemonade and Lawrence-Aid)  Sodas (Sprite, 7-Up, ginger ale and seltzer)  Coffee or tea (without milk or cream)  Gatorade    -  No Alcohol or cannabis products for at least 24 hours before surgery.     Which medicines can I take?    Hold Aspirin for 7 days before surgery.   Hold Multivitamins for 7 days before surgery.  Hold Supplements for 7 days before surgery.  Hold Ibuprofen (Advil, Motrin) for 1 day(s) before surgery--unless otherwise  directed by surgeon.  Hold Naproxen (Aleve) for 4 days before surgery.    -  DO NOT take these medications the day of surgery:  External cream  Vitamin B12      -  PLEASE TAKE these medications the day of surgery:  Gabapentin  Nortriptyline (Pamelor)  Zofran as needed  Compazine as needed    How do I prepare myself?  - Please take 2 showers (one the night prior to surgery and one the morning of surgery) using Scrubcare or Hibiclens soap.    Use this soap only from the neck to your toes. Avoid genital area      Leave the soap on your skin for one minute--then rinse thoroughly.      You may use your own shampoo and conditioner. No other hair products.   - Please remove all jewelry and body piercings.  - No lotions, deodorants or fragrance.  - No makeup or fingernail polish.   - Bring your ID and insurance card.    -For patients being admitted to the [facility]  Family members are to take the patient belongings with them and place them in the lockers provided in the Family Lounge.  Please limit the items you bring to 1 bag as the lockers are small.      -If you use a CPAP machine, please bring the CPAP machine, tubing, and mask to hospital.    -If you have a Deep Brain Stimulator, Spinal Cord Stimulator, or any Neuro Stimulator device---you must bring the remote control to the hospital.      ALL PATIENTS GOING HOME THE SAME DAY OF SURGERY ARE REQUIRED TO HAVE A RESPONSIBLE ADULT TO DRIVE AND BE IN ATTENDANCE WITH THEM FOR 24 HOURS FOLLOWING SURGERY.    Covid testing policy as of 12/06/2022  Your surgeon will notify and schedule you for a COVID test if one is needed before surgery--please direct any questions or COVID symptoms to your surgeon      Questions or Concerns:    - For any questions regarding the day of surgery or your hospital stay, please contact the Pre Admission Nursing Office at [phone].       - If you have health changes between today and your surgery, please call your surgeon.       - For  questions after surgery, please call your surgeons office.           Current Visitor Guidelines    2 adult visitors for adult patients in the pre op area    If additional visitors come (beyond a patient care attendant or a group home caregiver), the additional visitors will be asked to wait in the main lobby of the hospital    Visiting hours: 8 a.m. to 8:30 p.m.    Patients confirmed or suspected to have symptoms of COVID 19 or flu:     No visitors allowed for adult patients.   Children (under age 18) can have 1 named visitor.     People who are sick or showing symptoms of COVID 19 or flu:    Are not allowed to visit patients--we can only make exceptions in special situations.       Please follow these guidelines for your visit:          Please maintain social distance          Masking is optional--however at times you may be asked to wear a mask for the safety of yourself and others     Clean your hands with alcohol hand . Do this when you arrive at and leave the building and patient room,    And again after you touch your mask or anything in the room.     Go directly to and from the room you are visiting.     Stay in the patient s room during your visit. Limit going to other places in the hospital as much as possible     Leave bags and jackets at home or in the car.     For everyone s health, please don t come and go during your visit. That includes for smoking   during your visit.

## 2025-02-20 NOTE — NURSING NOTE
Chief Complaint   Patient presents with    Port Draw     Labs drawn via port by RN in lab.      Labs drawn via Port accessed using 20g flat needle. Line flushed and Heparin locked.    Kailtyn Banegas RN

## 2025-02-20 NOTE — LETTER
2/20/2025      Bal Junior  [address] [facility] [address]      Dear Colleague,    Thank you for referring your patient, Bal Junior, to the [facility]. Please see a copy of my visit note below.    THORACIC SURGERY FOLLOW UP VISIT    Dear Dr. Wing,  I saw . Bal Junior in follow-up today. The clinical summary follows:     PREvisit DIAGNOSIS   Adenocarcinoma of the GE junction  PROCEDURE   Take-down fundoplication, laparoscopic redo nissen fundoplication with gastrostomy feeding tube placement 9/25/2020  Upcoming PROCEDURE  Planned takedown nissen fundoplication, jejunostomy tube placement and diagnostic lap     COMPLICATIONS  None    INTERVAL STUDIES  PFTs (1/28/25) - FEV1 111% DLCO 81%    ETOH never   TOB never  BMI 28  SUBJECTIVE  Patient is feeling much better since chemo. Has had significant recovery in his neuropathy with GABApentin and his fatigue is improved. He is anxious to move forward with surgery    From a personal perspective, he is here with his wife. He is reitred and enjoys skiing. He reports he is going skiing tomorrow    IMPRESSION No diagnosis found.  69 year-old male with newly diagnosed adenocarcinoma of the gastroesophageal junction.      PLAN  I spent 45 min on the date of the encounter in chart review, patient visit, review of tests, documentation and/or discussion with other providers about the issues documented above. I reviewed the plan as follows:  We discussed again the 2 staged procedure and the timeline for recovery between the procedures. We will plan for the esophagectomy about 3-4 weeks from the take down.   Procedure planned: Laparoscopic Robotic take down of  Nissen fundoplication, feeding jejunostomy. I reviewed the indications, risks, and benefits of the procedure with Mr. Bal Junior .  We discussed the risks that include but are not limited to bleeding, infection, need for reoperation, conversion to laparotomy, delay in  scheduling the esophagectomy if complications occured . I explained the anticipated hospital course (1-2 days) and postoperative recovery.    Necessary Tests & Appointments: nutrition labs, PAC  Pain Control Plan: local at port sites  Anticoagulation Plan: lovenox in holding  Smoking Cessation: na  All questions were answered and the patient and present family were in agreement with the plan.  I appreciate the opportunity to participate in the care of your patient and will keep you updated.  Sincerely,      Again, thank you for allowing me to participate in the care of your patient.        Sincerely,        Katlyn Tobar MD    Electronically signed

## 2025-02-20 NOTE — PROGRESS NOTES
THORACIC SURGERY FOLLOW UP VISIT    Dear Dr. Wing,  I saw . Bal Junior in follow-up today. The clinical summary follows:     PREvisit DIAGNOSIS   Adenocarcinoma of the GE junction  PROCEDURE   Take-down fundoplication, laparoscopic redo nissen fundoplication with gastrostomy feeding tube placement 9/25/2020  Upcoming PROCEDURE  Planned takedown nissen fundoplication, jejunostomy tube placement and diagnostic lap     COMPLICATIONS  None    INTERVAL STUDIES  PFTs (1/28/25) - FEV1 111% DLCO 81%    ETOH never   TOB never  BMI 28  SUBJECTIVE  Patient is feeling much better since chemo. Has had significant recovery in his neuropathy with GABApentin and his fatigue is improved. He is anxious to move forward with surgery    From a personal perspective, he is here with his wife. He is reitred and enjoys skiing. He reports he is going skiing tomorrow    IMPRESSION No diagnosis found.  69 year-old male with newly diagnosed adenocarcinoma of the gastroesophageal junction.      PLAN  I spent 45 min on the date of the encounter in chart review, patient visit, review of tests, documentation and/or discussion with other providers about the issues documented above. I reviewed the plan as follows:  We discussed again the 2 staged procedure and the timeline for recovery between the procedures. We will plan for the esophagectomy about 3-4 weeks from the take down.   Procedure planned: Laparoscopic Robotic take down of  Nissen fundoplication, feeding jejunostomy. I reviewed the indications, risks, and benefits of the procedure with . Bal Junior .  We discussed the risks that include but are not limited to bleeding, infection, need for reoperation, conversion to laparotomy, delay in scheduling the esophagectomy if complications occured . I explained the anticipated hospital course (1-2 days) and postoperative recovery.    Necessary Tests & Appointments: nutrition labs, PAC  Pain Control Plan: local at port  sites  Anticoagulation Plan: lovenox in holding  Smoking Cessation: na  All questions were answered and the patient and present family were in agreement with the plan.  I appreciate the opportunity to participate in the care of your patient and will keep you updated.  Sincerely,

## 2025-02-24 NOTE — TELEPHONE ENCOUNTER
FUTURE VISIT INFORMATION        SURGERY INFORMATION:  Date: 3/24/25  Location: uu or  Surgeon:  Katlyn Tobar MD   Anesthesia Type:  general  Procedure: ESOPHAGECTOMY, MINIMALLY INVASIVE   Consult: ov 25     RECORDS REQUESTED FROM:         Primary Care Provider: Domenica Wing MD      Pertinent Medical History: hypertension, palpitations     Most recent EKG+ Tracin24     Most recent ECHO: 25     Most recent PFT's: 25

## 2025-02-25 RX ORDER — OXYCODONE HYDROCHLORIDE 5 MG/1
5 TABLET ORAL
Status: CANCELLED | OUTPATIENT
Start: 2025-02-25

## 2025-02-25 RX ORDER — ONDANSETRON 4 MG/1
4 TABLET, ORALLY DISINTEGRATING ORAL EVERY 30 MIN PRN
Status: CANCELLED | OUTPATIENT
Start: 2025-02-25

## 2025-02-25 RX ORDER — ONDANSETRON 2 MG/ML
4 INJECTION INTRAMUSCULAR; INTRAVENOUS EVERY 30 MIN PRN
Status: CANCELLED | OUTPATIENT
Start: 2025-02-25

## 2025-02-25 RX ORDER — OXYCODONE HYDROCHLORIDE 10 MG/1
10 TABLET ORAL
Status: CANCELLED | OUTPATIENT
Start: 2025-02-25

## 2025-02-25 RX ORDER — NALOXONE HYDROCHLORIDE 0.4 MG/ML
0.1 INJECTION, SOLUTION INTRAMUSCULAR; INTRAVENOUS; SUBCUTANEOUS
Status: CANCELLED | OUTPATIENT
Start: 2025-02-25

## 2025-02-25 RX ORDER — DEXAMETHASONE SODIUM PHOSPHATE 4 MG/ML
4 INJECTION, SOLUTION INTRA-ARTICULAR; INTRALESIONAL; INTRAMUSCULAR; INTRAVENOUS; SOFT TISSUE
Status: CANCELLED | OUTPATIENT
Start: 2025-02-25

## 2025-02-25 ASSESSMENT — LIFESTYLE VARIABLES: TOBACCO_USE: 0

## 2025-02-25 ASSESSMENT — ENCOUNTER SYMPTOMS: SEIZURES: 0

## 2025-02-26 ENCOUNTER — ANESTHESIA (OUTPATIENT)
Dept: SURGERY | Facility: CLINIC | Age: 70
End: 2025-02-26
Payer: MEDICARE

## 2025-02-26 ENCOUNTER — APPOINTMENT (OUTPATIENT)
Dept: GENERAL RADIOLOGY | Facility: CLINIC | Age: 70
DRG: 328 | End: 2025-02-26
Attending: SURGERY
Payer: MEDICARE

## 2025-02-26 ENCOUNTER — HOSPITAL ENCOUNTER (INPATIENT)
Facility: CLINIC | Age: 70
End: 2025-02-26
Attending: STUDENT IN AN ORGANIZED HEALTH CARE EDUCATION/TRAINING PROGRAM | Admitting: STUDENT IN AN ORGANIZED HEALTH CARE EDUCATION/TRAINING PROGRAM
Payer: MEDICARE

## 2025-02-26 DIAGNOSIS — C16.0 ADENOCARCINOMA OF GASTROESOPHAGEAL JUNCTION (H): Primary | ICD-10-CM

## 2025-02-26 PROBLEM — C15.9 ESOPHAGEAL CANCER (H): Status: ACTIVE | Noted: 2025-02-26

## 2025-02-26 LAB
ANION GAP SERPL CALCULATED.3IONS-SCNC: 13 MMOL/L (ref 7–15)
BUN SERPL-MCNC: 21 MG/DL (ref 8–23)
CALCIUM SERPL-MCNC: 8.6 MG/DL (ref 8.8–10.4)
CHLORIDE SERPL-SCNC: 105 MMOL/L (ref 98–107)
CREAT SERPL-MCNC: 1.17 MG/DL (ref 0.67–1.17)
EGFRCR SERPLBLD CKD-EPI 2021: 67 ML/MIN/1.73M2
ERYTHROCYTE [DISTWIDTH] IN BLOOD BY AUTOMATED COUNT: 12.7 % (ref 10–15)
GLUCOSE BLDC GLUCOMTR-MCNC: 118 MG/DL (ref 70–99)
GLUCOSE SERPL-MCNC: 163 MG/DL (ref 70–99)
HCO3 SERPL-SCNC: 20 MMOL/L (ref 22–29)
HCT VFR BLD AUTO: 40.3 % (ref 40–53)
HGB BLD-MCNC: 13.6 G/DL (ref 13.3–17.7)
MAGNESIUM SERPL-MCNC: 1.6 MG/DL (ref 1.7–2.3)
MCH RBC QN AUTO: 30.9 PG (ref 26.5–33)
MCHC RBC AUTO-ENTMCNC: 33.7 G/DL (ref 31.5–36.5)
MCV RBC AUTO: 92 FL (ref 78–100)
PATH REPORT.COMMENTS IMP SPEC: NORMAL
PATH REPORT.INTRAOP OBS SPEC DOC: NORMAL
PLATELET # BLD AUTO: 150 10E3/UL (ref 150–450)
POTASSIUM SERPL-SCNC: 4.6 MMOL/L (ref 3.4–5.3)
POTASSIUM SERPL-SCNC: 4.7 MMOL/L (ref 3.4–5.3)
RBC # BLD AUTO: 4.4 10E6/UL (ref 4.4–5.9)
SODIUM SERPL-SCNC: 138 MMOL/L (ref 135–145)
WBC # BLD AUTO: 10.2 10E3/UL (ref 4–11)

## 2025-02-26 PROCEDURE — 360N000080 HC SURGERY LEVEL 7, PER MIN: Performed by: STUDENT IN AN ORGANIZED HEALTH CARE EDUCATION/TRAINING PROGRAM

## 2025-02-26 PROCEDURE — 710N000010 HC RECOVERY PHASE 1, LEVEL 2, PER MIN: Performed by: STUDENT IN AN ORGANIZED HEALTH CARE EDUCATION/TRAINING PROGRAM

## 2025-02-26 PROCEDURE — 250N000009 HC RX 250

## 2025-02-26 PROCEDURE — 250N000011 HC RX IP 250 OP 636

## 2025-02-26 PROCEDURE — 80048 BASIC METABOLIC PNL TOTAL CA: CPT | Performed by: SURGERY

## 2025-02-26 PROCEDURE — 82565 ASSAY OF CREATININE: CPT | Performed by: SURGERY

## 2025-02-26 PROCEDURE — 88331 PATH CONSLTJ SURG 1 BLK 1SPC: CPT | Mod: 26 | Performed by: STUDENT IN AN ORGANIZED HEALTH CARE EDUCATION/TRAINING PROGRAM

## 2025-02-26 PROCEDURE — C1769 GUIDE WIRE: HCPCS | Performed by: STUDENT IN AN ORGANIZED HEALTH CARE EDUCATION/TRAINING PROGRAM

## 2025-02-26 PROCEDURE — 82310 ASSAY OF CALCIUM: CPT | Performed by: SURGERY

## 2025-02-26 PROCEDURE — 120N000002 HC R&B MED SURG/OB UMMC

## 2025-02-26 PROCEDURE — 250N000011 HC RX IP 250 OP 636: Performed by: STUDENT IN AN ORGANIZED HEALTH CARE EDUCATION/TRAINING PROGRAM

## 2025-02-26 PROCEDURE — 44186 LAP JEJUNOSTOMY: CPT | Performed by: STUDENT IN AN ORGANIZED HEALTH CARE EDUCATION/TRAINING PROGRAM

## 2025-02-26 PROCEDURE — 0DHA4UZ INSERTION OF FEEDING DEVICE INTO JEJUNUM, PERCUTANEOUS ENDOSCOPIC APPROACH: ICD-10-PCS | Performed by: STUDENT IN AN ORGANIZED HEALTH CARE EDUCATION/TRAINING PROGRAM

## 2025-02-26 PROCEDURE — 88331 PATH CONSLTJ SURG 1 BLK 1SPC: CPT | Mod: TC | Performed by: STUDENT IN AN ORGANIZED HEALTH CARE EDUCATION/TRAINING PROGRAM

## 2025-02-26 PROCEDURE — 88305 TISSUE EXAM BY PATHOLOGIST: CPT | Mod: 26 | Performed by: STUDENT IN AN ORGANIZED HEALTH CARE EDUCATION/TRAINING PROGRAM

## 2025-02-26 PROCEDURE — 85027 COMPLETE CBC AUTOMATED: CPT | Performed by: SURGERY

## 2025-02-26 PROCEDURE — 0DV44ZZ RESTRICTION OF ESOPHAGOGASTRIC JUNCTION, PERCUTANEOUS ENDOSCOPIC APPROACH: ICD-10-PCS | Performed by: STUDENT IN AN ORGANIZED HEALTH CARE EDUCATION/TRAINING PROGRAM

## 2025-02-26 PROCEDURE — 250N000013 HC RX MED GY IP 250 OP 250 PS 637

## 2025-02-26 PROCEDURE — C1894 INTRO/SHEATH, NON-LASER: HCPCS | Performed by: STUDENT IN AN ORGANIZED HEALTH CARE EDUCATION/TRAINING PROGRAM

## 2025-02-26 PROCEDURE — 71045 X-RAY EXAM CHEST 1 VIEW: CPT | Mod: 26 | Performed by: RADIOLOGY

## 2025-02-26 PROCEDURE — 250N000009 HC RX 250: Performed by: NURSE ANESTHETIST, CERTIFIED REGISTERED

## 2025-02-26 PROCEDURE — 0DQ64ZZ REPAIR STOMACH, PERCUTANEOUS ENDOSCOPIC APPROACH: ICD-10-PCS | Performed by: STUDENT IN AN ORGANIZED HEALTH CARE EDUCATION/TRAINING PROGRAM

## 2025-02-26 PROCEDURE — 43289 UNLISTED LAPS PX ESOPH: CPT | Mod: 22 | Performed by: STUDENT IN AN ORGANIZED HEALTH CARE EDUCATION/TRAINING PROGRAM

## 2025-02-26 PROCEDURE — 999N000141 HC STATISTIC PRE-PROCEDURE NURSING ASSESSMENT: Performed by: STUDENT IN AN ORGANIZED HEALTH CARE EDUCATION/TRAINING PROGRAM

## 2025-02-26 PROCEDURE — 250N000013 HC RX MED GY IP 250 OP 250 PS 637: Performed by: SURGERY

## 2025-02-26 PROCEDURE — 0DNW4ZZ RELEASE PERITONEUM, PERCUTANEOUS ENDOSCOPIC APPROACH: ICD-10-PCS | Performed by: STUDENT IN AN ORGANIZED HEALTH CARE EDUCATION/TRAINING PROGRAM

## 2025-02-26 PROCEDURE — 83735 ASSAY OF MAGNESIUM: CPT | Performed by: SURGERY

## 2025-02-26 PROCEDURE — 258N000003 HC RX IP 258 OP 636

## 2025-02-26 PROCEDURE — 250N000025 HC SEVOFLURANE, PER MIN: Performed by: STUDENT IN AN ORGANIZED HEALTH CARE EDUCATION/TRAINING PROGRAM

## 2025-02-26 PROCEDURE — 272N000001 HC OR GENERAL SUPPLY STERILE: Performed by: STUDENT IN AN ORGANIZED HEALTH CARE EDUCATION/TRAINING PROGRAM

## 2025-02-26 PROCEDURE — 250N000009 HC RX 250: Performed by: STUDENT IN AN ORGANIZED HEALTH CARE EDUCATION/TRAINING PROGRAM

## 2025-02-26 PROCEDURE — 370N000017 HC ANESTHESIA TECHNICAL FEE, PER MIN: Performed by: STUDENT IN AN ORGANIZED HEALTH CARE EDUCATION/TRAINING PROGRAM

## 2025-02-26 PROCEDURE — 258N000003 HC RX IP 258 OP 636: Performed by: SURGERY

## 2025-02-26 PROCEDURE — 999N000065 XR CHEST PORT 1 VIEW

## 2025-02-26 PROCEDURE — 8E0W4CZ ROBOTIC ASSISTED PROCEDURE OF TRUNK REGION, PERCUTANEOUS ENDOSCOPIC APPROACH: ICD-10-PCS | Performed by: STUDENT IN AN ORGANIZED HEALTH CARE EDUCATION/TRAINING PROGRAM

## 2025-02-26 RX ORDER — FENTANYL CITRATE 50 UG/ML
50 INJECTION, SOLUTION INTRAMUSCULAR; INTRAVENOUS EVERY 5 MIN PRN
Status: DISCONTINUED | OUTPATIENT
Start: 2025-02-26 | End: 2025-02-26

## 2025-02-26 RX ORDER — HYDROMORPHONE HCL IN WATER/PF 6 MG/30 ML
0.4 PATIENT CONTROLLED ANALGESIA SYRINGE INTRAVENOUS EVERY 5 MIN PRN
Status: DISCONTINUED | OUTPATIENT
Start: 2025-02-26 | End: 2025-02-26

## 2025-02-26 RX ORDER — INDOCYANINE GREEN AND WATER 25 MG
KIT INJECTION PRN
Status: DISCONTINUED | OUTPATIENT
Start: 2025-02-26 | End: 2025-02-26

## 2025-02-26 RX ORDER — LIDOCAINE HYDROCHLORIDE 20 MG/ML
INJECTION, SOLUTION INFILTRATION; PERINEURAL PRN
Status: DISCONTINUED | OUTPATIENT
Start: 2025-02-26 | End: 2025-02-26

## 2025-02-26 RX ORDER — ALBUTEROL SULFATE 90 UG/1
4 INHALANT RESPIRATORY (INHALATION)
Status: ACTIVE | OUTPATIENT
Start: 2025-02-26

## 2025-02-26 RX ORDER — ACETAMINOPHEN 325 MG/1
975 TABLET ORAL ONCE
Status: COMPLETED | OUTPATIENT
Start: 2025-02-26 | End: 2025-02-26

## 2025-02-26 RX ORDER — NALOXONE HYDROCHLORIDE 0.4 MG/ML
0.4 INJECTION, SOLUTION INTRAMUSCULAR; INTRAVENOUS; SUBCUTANEOUS
Status: ACTIVE | OUTPATIENT
Start: 2025-02-26

## 2025-02-26 RX ORDER — ENOXAPARIN SODIUM 100 MG/ML
40 INJECTION SUBCUTANEOUS
Status: DISCONTINUED | OUTPATIENT
Start: 2025-02-26 | End: 2025-02-26

## 2025-02-26 RX ORDER — HYDROMORPHONE HCL IN WATER/PF 6 MG/30 ML
0.2 PATIENT CONTROLLED ANALGESIA SYRINGE INTRAVENOUS
Status: ACTIVE | OUTPATIENT
Start: 2025-02-26

## 2025-02-26 RX ORDER — EPHEDRINE SULFATE 50 MG/ML
INJECTION, SOLUTION INTRAMUSCULAR; INTRAVENOUS; SUBCUTANEOUS PRN
Status: DISCONTINUED | OUTPATIENT
Start: 2025-02-26 | End: 2025-02-26

## 2025-02-26 RX ORDER — HALOPERIDOL 5 MG/ML
1 INJECTION INTRAMUSCULAR
Status: DISCONTINUED | OUTPATIENT
Start: 2025-02-26 | End: 2025-02-27 | Stop reason: HOSPADM

## 2025-02-26 RX ORDER — PROPOFOL 10 MG/ML
INJECTION, EMULSION INTRAVENOUS PRN
Status: DISCONTINUED | OUTPATIENT
Start: 2025-02-26 | End: 2025-02-26

## 2025-02-26 RX ORDER — ONDANSETRON 2 MG/ML
4 INJECTION INTRAMUSCULAR; INTRAVENOUS EVERY 30 MIN PRN
Status: DISCONTINUED | OUTPATIENT
Start: 2025-02-26 | End: 2025-02-26

## 2025-02-26 RX ORDER — ACETAMINOPHEN 325 MG/1
975 TABLET ORAL EVERY 8 HOURS
Status: DISPENSED | OUTPATIENT
Start: 2025-02-26

## 2025-02-26 RX ORDER — CEFAZOLIN SODIUM/WATER 2 G/20 ML
2 SYRINGE (ML) INTRAVENOUS
Status: DISCONTINUED | OUTPATIENT
Start: 2025-02-26 | End: 2025-02-26

## 2025-02-26 RX ORDER — ONDANSETRON 4 MG/1
4 TABLET, ORALLY DISINTEGRATING ORAL EVERY 6 HOURS PRN
Status: ACTIVE | OUTPATIENT
Start: 2025-02-26

## 2025-02-26 RX ORDER — SODIUM CHLORIDE, SODIUM GLUCONATE, SODIUM ACETATE, POTASSIUM CHLORIDE AND MAGNESIUM CHLORIDE 526; 502; 368; 37; 30 MG/100ML; MG/100ML; MG/100ML; MG/100ML; MG/100ML
INJECTION, SOLUTION INTRAVENOUS CONTINUOUS PRN
Status: DISCONTINUED | OUTPATIENT
Start: 2025-02-26 | End: 2025-02-26

## 2025-02-26 RX ORDER — FENTANYL CITRATE 50 UG/ML
INJECTION, SOLUTION INTRAMUSCULAR; INTRAVENOUS PRN
Status: DISCONTINUED | OUTPATIENT
Start: 2025-02-26 | End: 2025-02-26

## 2025-02-26 RX ORDER — DEXAMETHASONE SODIUM PHOSPHATE 4 MG/ML
INJECTION, SOLUTION INTRA-ARTICULAR; INTRALESIONAL; INTRAMUSCULAR; INTRAVENOUS; SOFT TISSUE PRN
Status: DISCONTINUED | OUTPATIENT
Start: 2025-02-26 | End: 2025-02-26

## 2025-02-26 RX ORDER — ATENOLOL 25 MG/1
50 TABLET ORAL EVERY EVENING
Status: DISPENSED | OUTPATIENT
Start: 2025-02-26

## 2025-02-26 RX ORDER — DEXTROSE MONOHYDRATE, SODIUM CHLORIDE, AND POTASSIUM CHLORIDE 50; 1.49; 4.5 G/1000ML; G/1000ML; G/1000ML
INJECTION, SOLUTION INTRAVENOUS CONTINUOUS
Status: DISCONTINUED | OUTPATIENT
Start: 2025-02-26 | End: 2025-02-27

## 2025-02-26 RX ORDER — NALOXONE HYDROCHLORIDE 0.4 MG/ML
0.1 INJECTION, SOLUTION INTRAMUSCULAR; INTRAVENOUS; SUBCUTANEOUS
Status: DISCONTINUED | OUTPATIENT
Start: 2025-02-26 | End: 2025-02-26

## 2025-02-26 RX ORDER — POLYETHYLENE GLYCOL 3350 17 G/17G
17 POWDER, FOR SOLUTION ORAL DAILY
Status: ACTIVE | OUTPATIENT
Start: 2025-02-27

## 2025-02-26 RX ORDER — ENOXAPARIN SODIUM 100 MG/ML
40 INJECTION SUBCUTANEOUS EVERY 24 HOURS
Status: ACTIVE | OUTPATIENT
Start: 2025-02-27

## 2025-02-26 RX ORDER — LIDOCAINE 40 MG/G
CREAM TOPICAL
Status: ACTIVE | OUTPATIENT
Start: 2025-02-26

## 2025-02-26 RX ORDER — CEFAZOLIN SODIUM/WATER 2 G/20 ML
2 SYRINGE (ML) INTRAVENOUS SEE ADMIN INSTRUCTIONS
Status: DISCONTINUED | OUTPATIENT
Start: 2025-02-26 | End: 2025-02-26

## 2025-02-26 RX ORDER — BUPIVACAINE HYDROCHLORIDE AND EPINEPHRINE 2.5; 5 MG/ML; UG/ML
INJECTION, SOLUTION INFILTRATION; PERINEURAL PRN
Status: DISCONTINUED | OUTPATIENT
Start: 2025-02-26 | End: 2025-02-26 | Stop reason: HOSPADM

## 2025-02-26 RX ORDER — DEXAMETHASONE SODIUM PHOSPHATE 4 MG/ML
4 INJECTION, SOLUTION INTRA-ARTICULAR; INTRALESIONAL; INTRAMUSCULAR; INTRAVENOUS; SOFT TISSUE
Status: DISCONTINUED | OUTPATIENT
Start: 2025-02-26 | End: 2025-02-26

## 2025-02-26 RX ORDER — ONDANSETRON 2 MG/ML
INJECTION INTRAMUSCULAR; INTRAVENOUS PRN
Status: DISCONTINUED | OUTPATIENT
Start: 2025-02-26 | End: 2025-02-26

## 2025-02-26 RX ORDER — AMOXICILLIN 250 MG
1 CAPSULE ORAL 2 TIMES DAILY
Status: DISPENSED | OUTPATIENT
Start: 2025-02-26

## 2025-02-26 RX ORDER — OXYCODONE HCL 5 MG/5 ML
5 SOLUTION, ORAL ORAL EVERY 4 HOURS PRN
Status: ACTIVE | OUTPATIENT
Start: 2025-02-26

## 2025-02-26 RX ORDER — PROCHLORPERAZINE MALEATE 5 MG/1
5 TABLET ORAL EVERY 6 HOURS PRN
Status: ACTIVE | OUTPATIENT
Start: 2025-02-26

## 2025-02-26 RX ORDER — PROPOFOL 10 MG/ML
INJECTION, EMULSION INTRAVENOUS CONTINUOUS PRN
Status: DISCONTINUED | OUTPATIENT
Start: 2025-02-26 | End: 2025-02-26

## 2025-02-26 RX ORDER — SODIUM CHLORIDE, SODIUM LACTATE, POTASSIUM CHLORIDE, CALCIUM CHLORIDE 600; 310; 30; 20 MG/100ML; MG/100ML; MG/100ML; MG/100ML
INJECTION, SOLUTION INTRAVENOUS CONTINUOUS
Status: DISCONTINUED | OUTPATIENT
Start: 2025-02-26 | End: 2025-02-26 | Stop reason: DRUGHIGH

## 2025-02-26 RX ORDER — SODIUM CHLORIDE, SODIUM LACTATE, POTASSIUM CHLORIDE, CALCIUM CHLORIDE 600; 310; 30; 20 MG/100ML; MG/100ML; MG/100ML; MG/100ML
INJECTION, SOLUTION INTRAVENOUS CONTINUOUS PRN
Status: DISCONTINUED | OUTPATIENT
Start: 2025-02-26 | End: 2025-02-26

## 2025-02-26 RX ORDER — NALOXONE HYDROCHLORIDE 0.4 MG/ML
0.2 INJECTION, SOLUTION INTRAMUSCULAR; INTRAVENOUS; SUBCUTANEOUS
Status: ACTIVE | OUTPATIENT
Start: 2025-02-26

## 2025-02-26 RX ORDER — ONDANSETRON 2 MG/ML
4 INJECTION INTRAMUSCULAR; INTRAVENOUS EVERY 6 HOURS PRN
Status: ACTIVE | OUTPATIENT
Start: 2025-02-26

## 2025-02-26 RX ORDER — FAMOTIDINE 20 MG/1
20 TABLET, FILM COATED ORAL 2 TIMES DAILY
Status: DISPENSED | OUTPATIENT
Start: 2025-02-26

## 2025-02-26 RX ORDER — FENTANYL CITRATE 50 UG/ML
25 INJECTION, SOLUTION INTRAMUSCULAR; INTRAVENOUS EVERY 5 MIN PRN
Status: DISCONTINUED | OUTPATIENT
Start: 2025-02-26 | End: 2025-02-26

## 2025-02-26 RX ORDER — BISACODYL 10 MG
10 SUPPOSITORY, RECTAL RECTAL DAILY PRN
Status: ACTIVE | OUTPATIENT
Start: 2025-03-01

## 2025-02-26 RX ORDER — HYDROMORPHONE HCL IN WATER/PF 6 MG/30 ML
0.4 PATIENT CONTROLLED ANALGESIA SYRINGE INTRAVENOUS
Status: ACTIVE | OUTPATIENT
Start: 2025-02-26

## 2025-02-26 RX ORDER — ONDANSETRON 4 MG/1
4 TABLET, ORALLY DISINTEGRATING ORAL EVERY 30 MIN PRN
Status: DISCONTINUED | OUTPATIENT
Start: 2025-02-26 | End: 2025-02-26

## 2025-02-26 RX ORDER — ENOXAPARIN SODIUM 100 MG/ML
40 INJECTION SUBCUTANEOUS
Status: COMPLETED | OUTPATIENT
Start: 2025-02-26 | End: 2025-02-26

## 2025-02-26 RX ORDER — CEFAZOLIN SODIUM/WATER 2 G/20 ML
2 SYRINGE (ML) INTRAVENOUS
Status: COMPLETED | OUTPATIENT
Start: 2025-02-26 | End: 2025-02-26

## 2025-02-26 RX ORDER — HYDROMORPHONE HCL IN WATER/PF 6 MG/30 ML
0.2 PATIENT CONTROLLED ANALGESIA SYRINGE INTRAVENOUS EVERY 5 MIN PRN
Status: DISCONTINUED | OUTPATIENT
Start: 2025-02-26 | End: 2025-02-26

## 2025-02-26 RX ADMIN — Medication 200 MG: at 16:05

## 2025-02-26 RX ADMIN — PHENYLEPHRINE HYDROCHLORIDE 50 MCG: 10 INJECTION INTRAVENOUS at 10:25

## 2025-02-26 RX ADMIN — SODIUM CHLORIDE, POTASSIUM CHLORIDE, SODIUM LACTATE AND CALCIUM CHLORIDE: 600; 310; 30; 20 INJECTION, SOLUTION INTRAVENOUS at 07:33

## 2025-02-26 RX ADMIN — HYDROMORPHONE HYDROCHLORIDE 0.5 MG: 1 INJECTION, SOLUTION INTRAMUSCULAR; INTRAVENOUS; SUBCUTANEOUS at 12:57

## 2025-02-26 RX ADMIN — ACETAMINOPHEN 975 MG: 325 TABLET, FILM COATED ORAL at 20:31

## 2025-02-26 RX ADMIN — Medication 20 MG: at 15:07

## 2025-02-26 RX ADMIN — PHENYLEPHRINE HYDROCHLORIDE 200 MCG: 10 INJECTION INTRAVENOUS at 07:47

## 2025-02-26 RX ADMIN — Medication 20 MG: at 10:29

## 2025-02-26 RX ADMIN — ONDANSETRON 4 MG: 2 INJECTION INTRAMUSCULAR; INTRAVENOUS at 14:46

## 2025-02-26 RX ADMIN — Medication 10 MG: at 12:16

## 2025-02-26 RX ADMIN — PHENYLEPHRINE HYDROCHLORIDE 50 MCG: 10 INJECTION INTRAVENOUS at 10:10

## 2025-02-26 RX ADMIN — Medication 70 MG: at 07:37

## 2025-02-26 RX ADMIN — Medication 20 MG: at 14:08

## 2025-02-26 RX ADMIN — LIDOCAINE HYDROCHLORIDE 100 MG: 20 INJECTION, SOLUTION INFILTRATION; PERINEURAL at 07:37

## 2025-02-26 RX ADMIN — Medication 2 G: at 07:40

## 2025-02-26 RX ADMIN — Medication 2 G: at 11:40

## 2025-02-26 RX ADMIN — FENTANYL CITRATE 50 MCG: 50 INJECTION, SOLUTION INTRAMUSCULAR; INTRAVENOUS at 17:30

## 2025-02-26 RX ADMIN — PHENYLEPHRINE HYDROCHLORIDE 100 MCG: 10 INJECTION INTRAVENOUS at 08:07

## 2025-02-26 RX ADMIN — HYDROMORPHONE HYDROCHLORIDE 0.5 MG: 1 INJECTION, SOLUTION INTRAMUSCULAR; INTRAVENOUS; SUBCUTANEOUS at 10:46

## 2025-02-26 RX ADMIN — FAMOTIDINE 20 MG: 20 TABLET, FILM COATED ORAL at 20:31

## 2025-02-26 RX ADMIN — MIDAZOLAM 2 MG: 1 INJECTION INTRAMUSCULAR; INTRAVENOUS at 07:28

## 2025-02-26 RX ADMIN — FENTANYL CITRATE 50 MCG: 50 INJECTION, SOLUTION INTRAMUSCULAR; INTRAVENOUS at 17:03

## 2025-02-26 RX ADMIN — ACETAMINOPHEN 975 MG: 325 TABLET, FILM COATED ORAL at 06:17

## 2025-02-26 RX ADMIN — Medication 10 MG: at 14:47

## 2025-02-26 RX ADMIN — Medication 20 MG: at 09:11

## 2025-02-26 RX ADMIN — FENTANYL CITRATE 100 MCG: 50 INJECTION INTRAMUSCULAR; INTRAVENOUS at 07:37

## 2025-02-26 RX ADMIN — Medication 20 MG: at 13:24

## 2025-02-26 RX ADMIN — Medication 2 G: at 15:38

## 2025-02-26 RX ADMIN — PHENYLEPHRINE HYDROCHLORIDE 0.1 MCG/KG/MIN: 10 INJECTION INTRAVENOUS at 08:28

## 2025-02-26 RX ADMIN — HYDROMORPHONE HYDROCHLORIDE 0.5 MG: 1 INJECTION, SOLUTION INTRAMUSCULAR; INTRAVENOUS; SUBCUTANEOUS at 16:21

## 2025-02-26 RX ADMIN — HYDROMORPHONE HYDROCHLORIDE 0.2 MG: 0.2 INJECTION, SOLUTION INTRAMUSCULAR; INTRAVENOUS; SUBCUTANEOUS at 18:15

## 2025-02-26 RX ADMIN — ENOXAPARIN SODIUM 40 MG: 40 INJECTION SUBCUTANEOUS at 06:18

## 2025-02-26 RX ADMIN — SODIUM CHLORIDE, SODIUM GLUCONATE, SODIUM ACETATE, POTASSIUM CHLORIDE AND MAGNESIUM CHLORIDE: 526; 502; 368; 37; 30 INJECTION, SOLUTION INTRAVENOUS at 12:45

## 2025-02-26 RX ADMIN — PHENYLEPHRINE HYDROCHLORIDE 100 MCG: 10 INJECTION INTRAVENOUS at 07:39

## 2025-02-26 RX ADMIN — EPHEDRINE SULFATE 10 MG: 5 INJECTION INTRAVENOUS at 08:10

## 2025-02-26 RX ADMIN — PHENYLEPHRINE HYDROCHLORIDE 200 MCG: 10 INJECTION INTRAVENOUS at 07:53

## 2025-02-26 RX ADMIN — PHENYLEPHRINE HYDROCHLORIDE 100 MCG: 10 INJECTION INTRAVENOUS at 08:03

## 2025-02-26 RX ADMIN — DEXAMETHASONE SODIUM PHOSPHATE 4 MG: 4 INJECTION, SOLUTION INTRA-ARTICULAR; INTRALESIONAL; INTRAMUSCULAR; INTRAVENOUS; SOFT TISSUE at 14:00

## 2025-02-26 RX ADMIN — SENNOSIDES AND DOCUSATE SODIUM 1 TABLET: 50; 8.6 TABLET ORAL at 20:31

## 2025-02-26 RX ADMIN — Medication 20 MG: at 11:19

## 2025-02-26 RX ADMIN — PROPOFOL 25 MCG/KG/MIN: 10 INJECTION, EMULSION INTRAVENOUS at 07:50

## 2025-02-26 RX ADMIN — DEXAMETHASONE SODIUM PHOSPHATE 4 MG: 4 INJECTION, SOLUTION INTRA-ARTICULAR; INTRALESIONAL; INTRAMUSCULAR; INTRAVENOUS; SOFT TISSUE at 08:07

## 2025-02-26 RX ADMIN — Medication 20 MG: at 08:28

## 2025-02-26 RX ADMIN — PHENYLEPHRINE HYDROCHLORIDE 100 MCG: 10 INJECTION INTRAVENOUS at 08:16

## 2025-02-26 RX ADMIN — FENTANYL CITRATE 50 MCG: 50 INJECTION, SOLUTION INTRAMUSCULAR; INTRAVENOUS at 17:12

## 2025-02-26 RX ADMIN — EPHEDRINE SULFATE 5 MG: 5 INJECTION INTRAVENOUS at 08:17

## 2025-02-26 RX ADMIN — INDOCYANINE GREEN AND WATER 25 MG: KIT at 14:31

## 2025-02-26 RX ADMIN — ATENOLOL 50 MG: 25 TABLET ORAL at 20:31

## 2025-02-26 RX ADMIN — HYDROMORPHONE HYDROCHLORIDE 0.5 MG: 1 INJECTION, SOLUTION INTRAMUSCULAR; INTRAVENOUS; SUBCUTANEOUS at 08:43

## 2025-02-26 RX ADMIN — PROPOFOL 200 MG: 10 INJECTION, EMULSION INTRAVENOUS at 07:37

## 2025-02-26 RX ADMIN — Medication 20 MG: at 09:49

## 2025-02-26 RX ADMIN — POTASSIUM CHLORIDE, DEXTROSE MONOHYDRATE AND SODIUM CHLORIDE 1000 ML: 150; 5; 450 INJECTION, SOLUTION INTRAVENOUS at 17:46

## 2025-02-26 RX ADMIN — EPHEDRINE SULFATE 10 MG: 5 INJECTION INTRAVENOUS at 07:44

## 2025-02-26 RX ADMIN — Medication 20 MG: at 12:30

## 2025-02-26 ASSESSMENT — ACTIVITIES OF DAILY LIVING (ADL)
ADLS_ACUITY_SCORE: 40
ADLS_ACUITY_SCORE: 43
ADLS_ACUITY_SCORE: 40
ADLS_ACUITY_SCORE: 40
ADLS_ACUITY_SCORE: 43
ADLS_ACUITY_SCORE: 41
ADLS_ACUITY_SCORE: 40
ADLS_ACUITY_SCORE: 40
ADLS_ACUITY_SCORE: 43
ADLS_ACUITY_SCORE: 40

## 2025-02-26 NOTE — BRIEF OP NOTE
[facility]    Brief Operative Note    Pre-operative diagnosis: Malignant neoplasm of esophagus, unspecified location (H) [C15.9]  Post-operative diagnosis Same as pre-operative diagnosis    Procedure: TAKE-DOWN, FUNDOPLICATION, NISSEN, LAPAROSCOPIC- ROBOTIC, gastrostomy takedown, lysisof adhesions 2 hr, N/A - Chest  Laparoscopic assisted insertion tube jejunostomy, N/A - Abdomen  Esophagoscopy, gastroscopy, duodenoscopy (EGD), N/A - Esophagus    Surgeon: Surgeons and Role:     * Katlyn Tobar MD - Primary     * Lisa Yoder MD - Assisting     * Kevon Cedillo MD - Assisting     * Marleny Avitia PA-C - Assisting  Anesthesia: General   Estimated Blood Loss: Less than 50 ml    Drains: NG tube  Specimens:   ID Type Source Tests Collected by Time Destination   1 : peritoneal nodule Tissue Peritoneum SURGICAL PATHOLOGY EXAM Katlyn Tobar MD 2/26/2025  9:10 AM    2 : fundus Tissue Stomach, Fundus SURGICAL PATHOLOGY EXAM Katlyn Tobar MD 2/26/2025  1:26 PM      Findings:   Gastrostomy site not patent, ICG green dye test performed with good perfusion of right gastroepiploic distribution .  Complications: None.  Implants: * No implants in log *

## 2025-02-26 NOTE — ANESTHESIA PROCEDURE NOTES
Airway       Patient location during procedure: OR       Procedure Start/Stop Times: 2/26/2025 7:39 AM  Staff -        Anesthesiologist:  Supa Emerson MD       Resident/Fellow: Yo Samuel MD       Performed By: resident  Consent for Airway        Urgency: elective  Indications and Patient Condition       Indications for airway management: suki-procedural       Induction type:intravenous       Mask difficulty assessment: 1 - vent by mask    Final Airway Details       Final airway type: endotracheal airway       Successful airway: ETT - single  Endotracheal Airway Details        ETT size (mm): 7.5       Cuffed: yes       Successful intubation technique: direct laryngoscopy       DL Blade Type: MAC 3       Grade View of Cords: 1       Adjucts: stylet       Position: Right       Measured from: gums/teeth       Secured at (cm): 22       Bite block used: Soft    Post intubation assessment        Placement verified by: capnometry, equal breath sounds and chest rise        Number of attempts at approach: 1       Secured with: tape       Ease of procedure: easy       Dentition: Intact    Medication(s) Administered   Medication Administration Time: 2/26/2025 7:39 AM

## 2025-02-26 NOTE — ANESTHESIA CARE TRANSFER NOTE
Patient: Bal Junior    Procedure: Procedure(s):  TAKE-DOWN, FUNDOPLICATION, NISSEN, LAPAROSCOPIC- ROBOTIC, gastrostomy takedown, lysisof adhesions 2 hr  Laparoscopic assisted insertion tube jejunostomy  Esophagoscopy, gastroscopy, duodenoscopy (EGD)       Diagnosis: Malignant neoplasm of esophagus, unspecified location (H) [C15.9]  Diagnosis Additional Information: No value filed.    Anesthesia Type:   General     Note:    Oropharynx: spontaneously breathing and oropharynx clear of all foreign objects  Level of Consciousness: drowsy  Oxygen Supplementation: nasal cannula  Level of Supplemental Oxygen (L/min / FiO2): 4  Independent Airway: airway patency satisfactory and stable  Dentition: dentition unchanged  Vital Signs Stable: post-procedure vital signs reviewed and stable  Report to RN Given: handoff report given  Patient transferred to: PACU    Handoff Report: Identifed the Patient, Identified the Reponsible Provider, Reviewed the pertinent medical history, Discussed the surgical course, Reviewed Intra-OP anesthesia mangement and issues during anesthesia, Set expectations for post-procedure period and Allowed opportunity for questions and acknowledgement of understanding      Vitals:  Vitals Value Taken Time   /95    Temp     Pulse 92 02/26/25 1627   Resp 15 02/26/25 1627   SpO2 98 % 02/26/25 1627   Vitals shown include unfiled device data.    Electronically Signed By: ROSY Trujillo CRNA  February 26, 2025  4:28 PM

## 2025-02-26 NOTE — ANESTHESIA POSTPROCEDURE EVALUATION
Patient: Bal Junior    Procedure: Procedure(s):  TAKE-DOWN, FUNDOPLICATION, NISSEN, LAPAROSCOPIC- ROBOTIC, gastrostomy takedown, lysisof adhesions 2 hr  Laparoscopic assisted insertion tube jejunostomy  Esophagoscopy, gastroscopy, duodenoscopy (EGD)       Anesthesia Type:  General    Note:  Disposition: Admission   Postop Pain Control: Uneventful            Sign Out: Well controlled pain   PONV: No   Neuro/Psych: Uneventful            Sign Out: Acceptable/Baseline neuro status   Airway/Respiratory: Uneventful            Sign Out: Acceptable/Baseline resp. status   CV/Hemodynamics: Uneventful            Sign Out: Acceptable CV status; No obvious hypovolemia; No obvious fluid overload   Other NRE: NONE   DID A NON-ROUTINE EVENT OCCUR? No           Last vitals:  Vitals Value Taken Time   /89 02/26/25 1730   Temp     Pulse 97 02/26/25 1744   Resp 15 02/26/25 1744   SpO2 99 % 02/26/25 1744   Vitals shown include unfiled device data.    Electronically Signed By: Dino Hutchins MD  February 26, 2025  5:44 PM

## 2025-02-26 NOTE — LETTER
Transition Communication Hand-off for Care Transitions to Next Level of Care Provider    Name: Bal Junior  : 1955  MRN #: [id number]  Primary Care Provider: Domenica Wing     Primary Clinic: [place] [id number]     Reason for Hospitalization:  Malignant neoplasm of esophagus, unspecified location (H) [C15.9]  Esophageal cancer (H) [C15.9]  Admit Date/Time: 2025  5:16 AM  Discharge Date: 2025  Payor Source: Payor: MEDICA / Plan: MEDICA PRIME SOLUTION / Product Type: Indemnity /          Reason for Communication Hand-off Referral: Multiple providers/specialties    Discharge Plan: Home with spouse for support        Concern for non-adherence with plan of care:   Y/N No  Discharge Needs Assessment:  Needs      Flowsheet Row Most Recent Value   Equipment Currently Used at Home none            Already enrolled in Tele-monitoring program and name of program:  NA  Follow-up specialty is recommended: Yes    Follow-up plan:    Future Appointments   Date Time Provider Department Center   3/11/2025  2:15 PM Brandi Church PA-C [facility]   3/11/2025  3:15 PM  LAB [facility]   3/11/2025  3:45 PM Katlyn Tobar MD [facility]   2025 12:15 PM Chacha Bhatia APRN [facility]   2025 11:15 AM Lawrence Mc MD [facility]   2025 11:30 AM [facility] [place]       Any outstanding tests or procedures:  None             Key Recommendations:  Follow up with specialty providers as already scheduled.     zAul Weiss RN    AVS/Discharge Summary is the source of truth; this is a helpful guide for improved communication of patient story

## 2025-02-27 ENCOUNTER — APPOINTMENT (OUTPATIENT)
Dept: GENERAL RADIOLOGY | Facility: CLINIC | Age: 70
DRG: 328 | End: 2025-02-27
Attending: SURGERY
Payer: MEDICARE

## 2025-02-27 ENCOUNTER — APPOINTMENT (OUTPATIENT)
Dept: OCCUPATIONAL THERAPY | Facility: CLINIC | Age: 70
DRG: 328 | End: 2025-02-27
Attending: SURGERY
Payer: MEDICARE

## 2025-02-27 VITALS
SYSTOLIC BLOOD PRESSURE: 131 MMHG | TEMPERATURE: 98.7 F | BODY MASS INDEX: 27.55 KG/M2 | OXYGEN SATURATION: 96 % | HEIGHT: 70 IN | WEIGHT: 192.46 LBS | RESPIRATION RATE: 16 BRPM | DIASTOLIC BLOOD PRESSURE: 85 MMHG | HEART RATE: 71 BPM

## 2025-02-27 LAB — GLUCOSE BLDC GLUCOMTR-MCNC: 109 MG/DL (ref 70–99)

## 2025-02-27 PROCEDURE — 71045 X-RAY EXAM CHEST 1 VIEW: CPT

## 2025-02-27 PROCEDURE — 250N000011 HC RX IP 250 OP 636: Performed by: SURGERY

## 2025-02-27 PROCEDURE — 97165 OT EVAL LOW COMPLEX 30 MIN: CPT | Mod: GO

## 2025-02-27 PROCEDURE — 250N000011 HC RX IP 250 OP 636: Performed by: STUDENT IN AN ORGANIZED HEALTH CARE EDUCATION/TRAINING PROGRAM

## 2025-02-27 PROCEDURE — 97530 THERAPEUTIC ACTIVITIES: CPT | Mod: GO

## 2025-02-27 PROCEDURE — 250N000013 HC RX MED GY IP 250 OP 250 PS 637: Performed by: SURGERY

## 2025-02-27 PROCEDURE — 71045 X-RAY EXAM CHEST 1 VIEW: CPT | Mod: 26 | Performed by: RADIOLOGY

## 2025-02-27 PROCEDURE — 258N000003 HC RX IP 258 OP 636: Performed by: SURGERY

## 2025-02-27 PROCEDURE — 999N000147 HC STATISTIC PT IP EVAL DEFER

## 2025-02-27 PROCEDURE — 120N000002 HC R&B MED SURG/OB UMMC

## 2025-02-27 RX ORDER — HEPARIN SODIUM,PORCINE 10 UNIT/ML
5-10 VIAL (ML) INTRAVENOUS
Status: ACTIVE | OUTPATIENT
Start: 2025-02-27

## 2025-02-27 RX ORDER — HEPARIN SODIUM,PORCINE 10 UNIT/ML
5-10 VIAL (ML) INTRAVENOUS EVERY 24 HOURS
Status: DISPENSED | OUTPATIENT
Start: 2025-02-27

## 2025-02-27 RX ORDER — HEPARIN SODIUM (PORCINE) LOCK FLUSH IV SOLN 100 UNIT/ML 100 UNIT/ML
5-10 SOLUTION INTRAVENOUS
Status: ACTIVE | OUTPATIENT
Start: 2025-02-28

## 2025-02-27 RX ADMIN — SENNOSIDES AND DOCUSATE SODIUM 1 TABLET: 50; 8.6 TABLET ORAL at 07:56

## 2025-02-27 RX ADMIN — ATENOLOL 50 MG: 25 TABLET ORAL at 20:08

## 2025-02-27 RX ADMIN — HEPARIN, PORCINE (PF) 10 UNIT/ML INTRAVENOUS SYRINGE 5 ML: at 18:23

## 2025-02-27 RX ADMIN — OXYCODONE HYDROCHLORIDE 5 MG: 5 SOLUTION ORAL at 05:51

## 2025-02-27 RX ADMIN — ACETAMINOPHEN 975 MG: 325 TABLET, FILM COATED ORAL at 11:45

## 2025-02-27 RX ADMIN — ACETAMINOPHEN 975 MG: 325 TABLET, FILM COATED ORAL at 20:06

## 2025-02-27 RX ADMIN — FAMOTIDINE 20 MG: 20 TABLET, FILM COATED ORAL at 07:56

## 2025-02-27 RX ADMIN — POTASSIUM CHLORIDE, DEXTROSE MONOHYDRATE AND SODIUM CHLORIDE: 150; 5; 450 INJECTION, SOLUTION INTRAVENOUS at 05:49

## 2025-02-27 RX ADMIN — FAMOTIDINE 20 MG: 20 TABLET, FILM COATED ORAL at 20:06

## 2025-02-27 RX ADMIN — ENOXAPARIN SODIUM 40 MG: 40 INJECTION SUBCUTANEOUS at 09:51

## 2025-02-27 RX ADMIN — SENNOSIDES AND DOCUSATE SODIUM 1 TABLET: 50; 8.6 TABLET ORAL at 20:06

## 2025-02-27 RX ADMIN — ACETAMINOPHEN 975 MG: 325 TABLET, FILM COATED ORAL at 03:04

## 2025-02-27 ASSESSMENT — ACTIVITIES OF DAILY LIVING (ADL)
DIFFICULTY_COMMUNICATING: NO
ADLS_ACUITY_SCORE: 33
ADLS_ACUITY_SCORE: 43
ADLS_ACUITY_SCORE: 50
PREVIOUS_RESPONSIBILITIES: MEAL PREP;HOUSEKEEPING;LAUNDRY;SHOPPING;YARDWORK;MEDICATION MANAGEMENT;FINANCES;DRIVING
ADLS_ACUITY_SCORE: 35
ADLS_ACUITY_SCORE: 43
ADLS_ACUITY_SCORE: 33
CHANGE_IN_FUNCTIONAL_STATUS_SINCE_ONSET_OF_CURRENT_ILLNESS/INJURY: NO
ADLS_ACUITY_SCORE: 35
HEARING_DIFFICULTY_OR_DEAF: NO
ADLS_ACUITY_SCORE: 43
ADLS_ACUITY_SCORE: 35
WEAR_GLASSES_OR_BLIND: YES
ADLS_ACUITY_SCORE: 43
DOING_ERRANDS_INDEPENDENTLY_DIFFICULTY: NO
ADLS_ACUITY_SCORE: 52
ADLS_ACUITY_SCORE: 43
TOILETING_ISSUES: NO
ADLS_ACUITY_SCORE: 43
ADLS_ACUITY_SCORE: 52
ADLS_ACUITY_SCORE: 43
ADLS_ACUITY_SCORE: 33
ADLS_ACUITY_SCORE: 43
ADLS_ACUITY_SCORE: 33
CONCENTRATING,_REMEMBERING_OR_MAKING_DECISIONS_DIFFICULTY: NO
ADLS_ACUITY_SCORE: 43
WALKING_OR_CLIMBING_STAIRS_DIFFICULTY: NO
ADLS_ACUITY_SCORE: 43
FALL_HISTORY_WITHIN_LAST_SIX_MONTHS: NO
DIFFICULTY_EATING/SWALLOWING: NO
ADLS_ACUITY_SCORE: 43
DRESSING/BATHING_DIFFICULTY: NO
ADLS_ACUITY_SCORE: 52
ADLS_ACUITY_SCORE: 33

## 2025-02-27 NOTE — PLAN OF CARE
"[id number] Goal Outcome Evaluation:      Plan of Care Reviewed With: patient  Overall Patient Progress: improving     Pt handoff given to 7B RN w/ no further question iraj. Pt transport put in, pt will be transported in bed, accompanied by transport staff and wife @ bedside to room 7227 on 7B.    VS:  /83 (BP Location: Left arm, Patient Position: Semi-Hannah's, Cuff Size: Adult Regular)   Pulse 75   Temp 97.5  F (36.4  C) (Oral)   Resp 18   Ht 1.778 m (5' 10\")   Wt 87.3 kg (192 lb 7.4 oz)   SpO2 100%   BMI 27.62 kg/m      Cardiac:  SR @ rest, HR 70s, -134/80s, denied CP   Respiratory:  Sating >95% on RA, denied SOB, LSCTA   GI/:  No BM this shift, Prasad pulled per order, voided 225 s/p Prasad pull   Neuro:  A&O x4, denied new numbness or tingling   Pain:  Abd incisional pain managed w/ scheduled Tylenol, PRN offered but pt refused   Activity:  SBA, walked morales x1 w/ OT/PT;   CHG bath done this shift   Skin:  Lap sites x6, LLQ J-tube   Dressing:  J-tube dressing CDI;   R PIV dressing CDI;   R port dressing CDI   Diet:  Clear liquid w/ thin; denied N/V   LDA:  R PIV SL;   R port infusing dextrose 5% & 0.45% NaCL +KCL 20 mEq @ 75 mL/hr;   J-tube clamped   Equipment:  IV pole, IV pump, glasses & personal belongings   Plan:  Cont POC   Additional Info:  Prasad & NG pulled per order         "

## 2025-02-27 NOTE — PLAN OF CARE
PACU PT Deferral: Per OT, patient is mobilizing well and without acute PT needs. OT planning to follow while patient is in house to facilitate safe discharge home. Will defer PT orders at this time.

## 2025-02-27 NOTE — PLAN OF CARE
Neuro: A&Ox4.   Cardiac: SR. VSS.   Respiratory: Sating >95% on 2L NC.  GI/: Adequate urine output via steward.   Diet/appetite: NPO except meds.   Activity:  Not OOB this shift. Moves about in bed independently.  Pain: At acceptable level on current regimen. PRN oxycodone x1  Skin: Lap sites x6 covered with primapore.   LDA's: NG: Low continuous suction. J tube: clamped, flushed q shift. Port: D5%, 1/2 NS, Kcl @ 75 ml/hr. Steward.     Plan: Continue with POC. Notify primary team with changes.

## 2025-02-27 NOTE — PROGRESS NOTES
"  Thoracic Surgery Progress Note  Surgery Cross-Cover  Post Op Check    02/26/2025    Bal Junior is a 70 year old male POD#0 s/p Procedure(s):  TAKE-DOWN, FUNDOPLICATION, NISSEN, LAPAROSCOPIC- ROBOTIC, gastrostomy takedown, lysisof adhesions 2 hr  Laparoscopic assisted insertion tube jejunostomy  Esophagoscopy, gastroscopy, duodenoscopy (EGD) for Pre-Op Diagnosis Codes:      * Malignant neoplasm of esophagus, unspecified location (H) [C15.9]    Pt reports their pain is controlled with current regimen. Denies nausea, SOB, chest pain, or dizziness. Patient is not passing flatus or having bowel movements and Is voiding via urinary catheter. Clear yellow urine in steward    BP (!) 140/92 (BP Location: Left arm)   Pulse 99   Temp 99.2  F (37.3  C) (Oral)   Resp 16   Ht 1.778 m (5' 10\")   Wt 87.3 kg (192 lb 7.4 oz)   SpO2 100%   BMI 27.62 kg/m      Gen: A&O x4, NAD   Chest: breathing non-labored on RA   Abdomen: soft, non-tender, non-distended  Incision: clean, dry, intact covered by dressings  Extremities: warm and well perfused  Devices: Jejunal feeding tube in LLQ    A/P: No acute post-op issues. Continue plan of care per primary team. Please call with any questions.    JEMIMA Malagon ANÍBAL  PGY-1  General Surgery   "

## 2025-02-27 NOTE — PLAN OF CARE
Admitted/transferred from:   2 RN full   skin assessment completed by Tanya Gallagher, RN and Cecilia Hope RN.  Skin assessment finding: issues found abdominal lap sites x6 w/ primapore + steri strips,   R Port.  Interventions/actions: skin interventions - dressings monitored,   patient educated on maintaining skin integrity.    Bedside Emergency Equipment Present:  Suction Regulator: Yes  Suction Canister: Yes  Tubing between Regulator and Canister: Yes  O2 Regulator with Tree: Yes  Ambu Bag: Yes

## 2025-02-27 NOTE — PROGRESS NOTES
"   02/27/25 1100   Appointment Info   Signing Clinician's Name / Credentials (OT) Polly Corado, OTD, OTR/L   Rehab Comments (OT) protective abdominal precs   Living Environment   People in Home spouse   Current Living Arrangements house   Home Accessibility no concerns   Transportation Anticipated car, drives self;family or friend will provide   Living Environment Comments pt lives with spouse in  home, has WIS with chair and GB's. No LIDIA, no stairs inside (but does have a home library with stairs to access). Spouse can provide limited assist, primarily with IADLs.   Self-Care   Usual Activity Tolerance excellent   Current Activity Tolerance moderate   Equipment Currently Used at Home none   Fall history within last six months no   Activity/Exercise/Self-Care Comment pt is IND with ADLs at baseline, very active. endorses he was skiing a few days ago. no AD use, no falls.   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 Now s/p Robotic takedown of Nissen fundoplication, J-tube placement.\"   Existing Precautions/Restrictions abdominal   General Observations and Info \"Activity Ambulate with assist  3 TIMES DAILY\"   Cognitive Status Examination   Orientation Status orientation to person, place and time   Affect/Mental Status (Cognitive) WFL   Follows Commands WFL   Visual Perception   Visual Impairment/Limitations WFL;corrective lenses full-time   Sensory   Sensory " Comments intact, no acute change   Range of Motion Comprehensive   Comment, General Range of Motion WFL   Strength Comprehensive (MMT)   Comment, General Manual Muscle Testing (MMT) Assessment NFT, anticipate slight weakness due to recent procedure   Coordination   Upper Extremity Coordination No deficits were identified   Bed Mobility   Bed Mobility supine-sit   Supine-Sit [place] (Bed Mobility) supervision   Comment (Bed Mobility) req inc time and use of bedrail   Transfers   Transfers sit-stand transfer   Transfer Comments SBA   Activities of Daily Living   BADL Assessment/Intervention upper body dressing;lower body dressing;toileting   Upper Body Dressing Assessment/Training   Comment, (Upper Body Dressing) anticipate SBA per clinical judgement   Lower Body Dressing Assessment/Training   Comment, (Lower Body Dressing) anticipate SBA per clinical judgement   Toileting   Comment, (Toileting) anticipate SBA per clinical judgement   Clinical Impression   Criteria for Skilled Therapeutic Interventions Met (OT) Yes, treatment indicated   OT Diagnosis postop precautions, dec act rocío, strength, dec IND   OT Problem List-Impairments impacting ADL problems related to;activity tolerance impaired;mobility;post-surgical precautions;strength;pain   Assessment of Occupational Performance 1-3 Performance Deficits   Identified Performance Deficits bathing, dressing, functional mobility   Planned Therapy Interventions (OT) ADL retraining;IADL retraining;bed mobility training;strengthening;progressive activity/exercise;home program guidelines;transfer training;stretching   Clinical Decision Making Complexity (OT) problem focused assessment/low complexity   Risk & Benefits of therapy have been explained evaluation/treatment results reviewed;care plan/treatment goals reviewed;risks/benefits reviewed;current/potential barriers reviewed;participants voiced agreement with care plan;participants included;patient   OT Total  Evaluation Time   OT Eval, Low Complexity Minutes (10785) 5   OT Goals   Therapy Frequency (OT) Daily   OT Predicted Duration/Target Date for Goal Attainment 03/02/25   OT Goals Upper Body Dressing;Lower Body Dressing;Toilet Transfer/Toileting;OT Goal 1;OT Goal 2   OT: Upper Body Dressing Independent   OT: Lower Body Dressing Independent   OT: Toilet Transfer/Toileting Independent   OT: Goal 1 PT will demo IND with abdominal precs during ADL session   OT: Goal 2 Pt will demo IND with shower transfer using DME as needed to simulate home setup.   OT Discharge Planning   OT Plan progress mobility w/o IV pole, logroll flat bed, shower transfer, LBD   OT Discharge Recommendation (DC Rec) home with assist   OT Rationale for DC Rec pt mobilizing well POD 1 but overall limited by abdominal pain and discomfort. anticipate pt will be safe to d/c home with assist with heavy IADLs once medically ready.   OT Brief overview of current status SBA-CGA w/ IV pole   OT Total Distance Amb During Session (feet) 50   Total Session Time   Timed Code Treatment Minutes 26   Total Session Time (sum of timed and untimed services) 31

## 2025-02-27 NOTE — PROGRESS NOTES
"THORACIC & FOREGUT SURGERY    S:  No acute overnight events.  Pt seen at bedside resting comfortably.  NG to suction with minimal output. Pain is well controlled. Voiding via Prasad. No fever/chills.    O:  Vitals:    25 0050 25 0305 25 0756 25 0850   BP: 133/90 (!) 136/95  126/85   BP Location: Left arm Left arm  Left arm   Patient Position:    Supine   Cuff Size:    Adult Regular   Pulse: 87 78     Resp: 18 18  16   Temp: 98.6  F (37  C) 98.2  F (36.8  C)  97.7  F (36.5  C)   TempSrc: Oral Oral  Oral   SpO2: 100% 100% 100% 98%   Weight:       Height:           A&O, NAD  Breathing non-labored on RA  Appears well perfused  Soft, NDNT. J-tube in place, no leakage. Cuff at 2 cm. NGT in place, minimal brown output.  Distal extremities warm. No calf tenderness.      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 Now s/p Robotic takedown of Nissen fundoplication, J-tube placement.    - Remove NG Tube  - J-tube pulled to 3 cm at the cuff.  - Diet: CLD  - Pain: MMPC  - Abx: none indicated  - DVT ppx: Lovenox  - Remove Prasad  - Flush J-Tube with 30 ml of water Q8H  - Dispo: pending diet tolerance, J-tube teaching    Clinically Significant Risk Factors             # Hypomagnesemia: Lowest Mg = 1.6 mg/dL in last 2 days, will replace as needed       # Hypertension: Noted on problem list            # Overweight: Estimated body mass index is 27.62 kg/m  as calculated from the following:    Height as of this encounter: 1.778 m (5' 10\").    Weight as of this encounter: 87.3 kg (192 lb 7.4 oz)., PRESENT ON ADMISSION            Kevon Faust MD  General Surgery PGY-1  Pager: " [phone]    Thoracic and Foregut Surgery  Text page Thoracic Surgery via [facility] Paging/Directory

## 2025-02-28 ENCOUNTER — APPOINTMENT (OUTPATIENT)
Dept: OCCUPATIONAL THERAPY | Facility: CLINIC | Age: 70
DRG: 328 | End: 2025-02-28
Attending: STUDENT IN AN ORGANIZED HEALTH CARE EDUCATION/TRAINING PROGRAM
Payer: MEDICARE

## 2025-02-28 PROCEDURE — 97535 SELF CARE MNGMENT TRAINING: CPT | Mod: GO

## 2025-02-28 PROCEDURE — 250N000011 HC RX IP 250 OP 636: Performed by: STUDENT IN AN ORGANIZED HEALTH CARE EDUCATION/TRAINING PROGRAM

## 2025-02-28 PROCEDURE — 250N000011 HC RX IP 250 OP 636: Performed by: SURGERY

## 2025-02-28 PROCEDURE — 120N000002 HC R&B MED SURG/OB UMMC

## 2025-02-28 PROCEDURE — 250N000013 HC RX MED GY IP 250 OP 250 PS 637: Performed by: SURGERY

## 2025-02-28 PROCEDURE — 97530 THERAPEUTIC ACTIVITIES: CPT | Mod: GO

## 2025-02-28 RX ADMIN — ACETAMINOPHEN 975 MG: 325 TABLET, FILM COATED ORAL at 04:37

## 2025-02-28 RX ADMIN — ACETAMINOPHEN 975 MG: 325 TABLET, FILM COATED ORAL at 12:32

## 2025-02-28 RX ADMIN — ACETAMINOPHEN 975 MG: 325 TABLET, FILM COATED ORAL at 21:53

## 2025-02-28 RX ADMIN — FAMOTIDINE 20 MG: 20 TABLET, FILM COATED ORAL at 09:26

## 2025-02-28 RX ADMIN — ENOXAPARIN SODIUM 40 MG: 40 INJECTION SUBCUTANEOUS at 09:26

## 2025-02-28 RX ADMIN — FAMOTIDINE 20 MG: 20 TABLET, FILM COATED ORAL at 21:53

## 2025-02-28 RX ADMIN — SENNOSIDES AND DOCUSATE SODIUM 1 TABLET: 50; 8.6 TABLET ORAL at 09:26

## 2025-02-28 RX ADMIN — HEPARIN, PORCINE (PF) 10 UNIT/ML INTRAVENOUS SYRINGE 5 ML: at 22:02

## 2025-02-28 RX ADMIN — ATENOLOL 50 MG: 25 TABLET ORAL at 21:52

## 2025-02-28 RX ADMIN — POLYETHYLENE GLYCOL 3350 17 G: 17 POWDER, FOR SOLUTION ORAL at 09:26

## 2025-02-28 ASSESSMENT — ACTIVITIES OF DAILY LIVING (ADL)
ADLS_ACUITY_SCORE: 33

## 2025-02-28 NOTE — PLAN OF CARE
"Goal Outcome Evaluation:      Plan of Care Reviewed With: patient    Overall Patient Progress: improvingOverall Patient Progress: improving    A&Ox4, able to make needs known. SBA. VSS. On RA. Denies cardiac chest pain, SOB and nausea. Incisional pain managed with scheduled Tylenol. Abdominal lap sites x6 covered with primapore, CDI. J tube clamped, flushed with 30 mL once per order. Right chest Port A Cath Heparin locked. Voiding spontaneously via urinal. No BM this shift but passing gas. Continue with POC.    /79 (BP Location: Right arm)   Pulse 62   Temp 98.6  F (37  C) (Oral)   Resp 18   Ht 1.778 m (5' 10\")   Wt 87.3 kg (192 lb 7.4 oz)   SpO2 96%   BMI 27.62 kg/m         "

## 2025-02-28 NOTE — PHARMACY-ADMISSION MEDICATION HISTORY
Pharmacist Admission Medication History    Admission medication history is complete. The information provided in this note is only as accurate as the sources available at the time of the update.    Information Source(s): Patient and CareEverywhere/SureScripts via in-person. Patient was knowledgeable about their medication regimen.     Priority recommendations for care team: None    Pertinent Information: None    Changes made to PTA medication list:  Added: None  Deleted: clotrimazole cream, dexamethasone (completed)   Changed: None    Allergies reviewed with patient and updates made in EHR: yes - confirmed NKDA - [facility]    Medication History Completed By: Aiden Rosen [facility] 2/28/2025 11:25 AM    Does patient/source state they fill prescriptions at pharmacies outside retrievable fill history? No, fills all through [facility].    PTA Med List   Medication Sig Last Dose/Taking    atenolol (TENORMIN) 50 MG tablet Take 50 mg by mouth every evening. 2/25/2025 at  9:00 PM    cyanocobalamin (VITAMIN B-12) 500 MCG tablet Take 500 mcg by mouth daily. 2/25/2025    EPINEPHrine (ANY BX GENERIC EQUIV) 0.3 MG/0.3ML injection 2-pack  Unknown    gabapentin (NEURONTIN) 300 MG capsule Take 1 capsule (300 mg) by mouth 3 times daily. 2/25/2025 at  9:00 PM    nortriptyline (PAMELOR) 25 MG capsule Take 25 mg by mouth every evening. 2/25/2025 at  9:00 PM    ondansetron (ZOFRAN) 8 MG tablet Take 1 tablet (8 mg) by mouth every 8 hours as needed for nausea (vomiting). Unknown    prochlorperazine (COMPAZINE) 10 MG tablet Take 1 tablet (10 mg) by mouth every 6 hours as needed for nausea or vomiting. Unknown

## 2025-02-28 NOTE — PLAN OF CARE
Goal Outcome Evaluation:      Plan of Care Reviewed With: patient    Overall Patient Progress: no changeOverall Patient Progress: no change         A&Ox4, able to make needs known. On RA.VSS. Denies cardiac chest pain, SOB and nausea. Full liquid diet. SBA.Abdominal lap sites x6 covered with primapore, CDI. Incisional pain managed with scheduled Tylenol.  J tube clamped, flushed with 30 mL once per order. Right chest Port A Cath Heparin locked. Voiding spontaneously via urinal. No BM this shift but passing gas. Continue with POC.

## 2025-03-01 VITALS
HEART RATE: 57 BPM | BODY MASS INDEX: 27.55 KG/M2 | TEMPERATURE: 98.7 F | OXYGEN SATURATION: 98 % | HEIGHT: 70 IN | DIASTOLIC BLOOD PRESSURE: 88 MMHG | RESPIRATION RATE: 16 BRPM | WEIGHT: 192.46 LBS | SYSTOLIC BLOOD PRESSURE: 120 MMHG

## 2025-03-01 LAB
CREAT SERPL-MCNC: 0.78 MG/DL (ref 0.67–1.17)
EGFRCR SERPLBLD CKD-EPI 2021: >90 ML/MIN/1.73M2
PLATELET # BLD AUTO: 145 10E3/UL (ref 150–450)

## 2025-03-01 PROCEDURE — 250N000011 HC RX IP 250 OP 636: Performed by: STUDENT IN AN ORGANIZED HEALTH CARE EDUCATION/TRAINING PROGRAM

## 2025-03-01 PROCEDURE — 85049 AUTOMATED PLATELET COUNT: CPT | Performed by: SURGERY

## 2025-03-01 PROCEDURE — 36591 DRAW BLOOD OFF VENOUS DEVICE: CPT | Performed by: SURGERY

## 2025-03-01 PROCEDURE — 250N000011 HC RX IP 250 OP 636: Performed by: SURGERY

## 2025-03-01 PROCEDURE — 82565 ASSAY OF CREATININE: CPT | Performed by: SURGERY

## 2025-03-01 PROCEDURE — 250N000013 HC RX MED GY IP 250 OP 250 PS 637: Performed by: SURGERY

## 2025-03-01 RX ORDER — SENNA AND DOCUSATE SODIUM 50; 8.6 MG/1; MG/1
1 TABLET, FILM COATED ORAL AT BEDTIME
Qty: 60 TABLET | Refills: 0 | Status: SHIPPED | OUTPATIENT
Start: 2025-03-01

## 2025-03-01 RX ORDER — OXYCODONE HYDROCHLORIDE 5 MG/1
5 TABLET ORAL EVERY 6 HOURS PRN
Qty: 12 TABLET | Refills: 0 | Status: SHIPPED | OUTPATIENT
Start: 2025-03-01 | End: 2025-03-04

## 2025-03-01 RX ORDER — ACETAMINOPHEN 500 MG
500-1000 TABLET ORAL EVERY 6 HOURS PRN
Qty: 60 TABLET | Refills: 0 | Status: SHIPPED | OUTPATIENT
Start: 2025-03-01

## 2025-03-01 RX ADMIN — FAMOTIDINE 20 MG: 20 TABLET, FILM COATED ORAL at 08:13

## 2025-03-01 RX ADMIN — ENOXAPARIN SODIUM 40 MG: 40 INJECTION SUBCUTANEOUS at 08:14

## 2025-03-01 RX ADMIN — Medication 5 ML: at 10:04

## 2025-03-01 RX ADMIN — ACETAMINOPHEN 975 MG: 325 TABLET, FILM COATED ORAL at 04:06

## 2025-03-01 ASSESSMENT — ACTIVITIES OF DAILY LIVING (ADL)
ADLS_ACUITY_SCORE: 30
ADLS_ACUITY_SCORE: 33
ADLS_ACUITY_SCORE: 30

## 2025-03-01 NOTE — PLAN OF CARE
Goal Outcome Evaluation:      Plan of Care Reviewed With: patient    Overall Patient Progress: improvingOverall Patient Progress: improving    Temp: 98.5  F (36.9  C) Temp src: Oral BP: 132/81 Pulse: 74   Resp: 17 SpO2: 97 % O2 Device: None (Room air)       A&Ox4. VSS on RA. Pain rated 6/10 to j-tube and headache. Relieved w/ scheduled tylenol. Denies nausea or SOB. C/o intermittent cough. Tolerates FLD. No acute major changes, plan to discharge today.

## 2025-03-01 NOTE — PROGRESS NOTES
Plan of Care Reviewed With: patient     Overall Patient Progress: improving      Pt is ready to go home.  Will stay 1 more noc.    A&Ox4, able to make needs known.   On RA.VSS.   Denies cardiac chest pain, SOB and nausea.   Full liquid diet - tolerating.   SBA.Abdominal lap sites x6 covered with primapore, CDI. Incisional pain managed with scheduled Tylenol.    J tube clamped  . Right chest Port A Cath Heparin locked.   Voiding spontaneously via urinal. BM this shift x 2  passing gas. Continue with POC.

## 2025-03-01 NOTE — PROGRESS NOTES
Care Management Discharge Note    Discharge Date: 03/01/2025       Discharge Disposition:  Home with wife     Discharge Services:  None    Discharge DME:  Bedside RN was able to give supplies to patient. This included education about flushing the J-Tube.     Discharge Transportation: Spouse is taking patient home.      Private pay costs discussed: Not applicable    Does the patient's insurance plan have a 3 day qualifying hospital stay waiver?  No    Education Provided on the Discharge Plan:  We discussed about supplies that will be given by nurse for today. He will have follow up appointments and will discuss further needs at those appointments.      Persons Notified of Discharge Plans: Patient and family.  Patient/Family in Agreement with the Plan:  Yes    Handoff Referral Completed: Yes, [facility] PCP: Internal handoff referral completed    Additional Information:  We discussed about the future needs of feeding supplies. When the time is appropriate, the care management team will assist in coordinating the home infusion company for supplies as needed.       Azul Weiss RN   [facility] RN Care Coordinator

## 2025-03-01 NOTE — PROGRESS NOTES
"THORACIC & FOREGUT SURGERY    S:  No acute overnight events.  Pt seen at bedside resting comfortably. Tolerating CLD. No nausea/vomiting. Pain is well controlled. Voiding independently. No fever/chills.    O:  Vitals:    25 2212 25 0655 25 1441   BP: (!) 141/88 131/85 119/79 132/81   BP Location:  Right arm Right arm Right arm   Patient Position:       Cuff Size:       Pulse: 71 71 62 74   Resp:  16 18 17   Temp:  98.7  F (37.1  C) 98.6  F (37  C) 98.2  F (36.8  C)   TempSrc:  Oral Oral Oral   SpO2:  96% 96% 100%   Weight:       Height:           A&O, NAD  Breathing non-labored on RA  Appears well perfused  Soft, NDNT. J-tube in place, no leakage. Cuff at 2 cm.   Distal extremities warm. No calf tenderness.      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 Now s/p Robotic takedown of Nissen fundoplication, J-tube placement.    - Diet: FLD  - Pain: MMPC  - Abx: none indicated  - DVT ppx: Lovenox  - Flush J-Tube with 30 ml of water Q8H  - Dispo: pending diet tolerance, J-tube teaching    Clinically Significant Risk Factors                   # Hypertension: Noted on problem list            # Overweight: Estimated body mass index is 27.62 kg/m  as calculated from the following:    Height as of this encounter: 1.778 m (5' 10\").    Weight as of this encounter: 87.3 kg (192 lb 7.4 oz)., PRESENT ON ADMISSION            Kevon Faust MD  General Surgery PGY-1  Pager: [phone]    Thoracic and Foregut Surgery  Text page Thoracic Surgery via [facility] Paging/Directory          "

## 2025-03-01 NOTE — DISCHARGE INSTRUCTIONS
THORACIC SURGERY DISCHARGE INSTRUCTIONS    DIET: Full liquids, advance as tolerated    J-Tube: Flush with 30 ml of water every 8 hrs.    If your plans upon discharge include prolonged periods of sitting (i.e a lengthy car or plane ride), it is highly beneficial to get up and walk at least once per hour to help prevent swelling and blood clots.     You may remove chest tube dressing 48 hours after tube removal and bandage the site at your own discretion thereafter.  Small amounts of leakage are normal for 2-3 days after removal.  Feel free to call with questions.    You may get incision wet 2 days after operation. Do not submerge, soak, or scrub incision or swim until seen in follow-up.    Take incentive spirometer home for continued frequent use    Activity as tolerated, no strenous activity until seen in follow-up, no lifting greater than 20 pounds for the next 4 weeks.    Stay hydrated. Take over the counter fiber (metamucil or benefiber) and stool softeners (Miralax, docusate or senna) if becoming constipated.     Call for fever greater than 101.5, chills, increased size of incision, red skin around incision, vision changes, muscle strength changes, sensation changes, shortness of breath, or other concerns.    No driving while taking narcotic pain medication.    Transition to ibuprofen or tylenol/acetaminophen for pain control. Do not take tylenol/acetaminophen and acetaminophen containing narcotic (e.g., percocet or vicodin) at the same time. If you have known ulcer problems, or kidney trouble (elevated creatinine) do not take the ibuprofen.    If you think you will need a refill on your pain medications, you should call the thoracic surgery team at the number below at least 24hrs prior to running out.  It is also more difficult to provide refills Friday afternoon and over the weekend, so call before those times if possible.     In emergencies, call 911    For other Questions or Concerns;   A.) During weekday  working hours (Monday through Friday 8am to 4:30pm)   call [phone] and ask to speak to a thoracic surgery nurse (RN or LPN).     B.) At nights (after 4:30pm), on weekends, or if urgent call [phone] and   tell the   I would like to page job code [phone], the thoracic surgery   fellow on call, please.

## 2025-03-01 NOTE — PLAN OF CARE
Goal Outcome Evaluation:    Plan of Care Reviewed With: patient.  AVSS.  Alert and oriented x4.  No c/o pain or nausea.  JT in place - flushed per orders.   Supplies for home to flush JT given.  Port Hep -locked and de-accessed for discharge.  Rx sent to discharge pharmacy.  Plan is to discharge home today.

## 2025-03-01 NOTE — PLAN OF CARE
Occupational Therapy Discharge Summary    Reason for therapy discharge:    Discharged to home.    Progress towards therapy goal(s). See goals on Care Plan in [facility] electronic health record for goal details.  Goals not met.  Barriers to achieving goals:   discharge from facility.    Therapy recommendation(s):    No further therapy is recommended.

## 2025-03-03 ENCOUNTER — PATIENT OUTREACH (OUTPATIENT)
Dept: SURGERY | Facility: CLINIC | Age: 70
End: 2025-03-03
Payer: COMMERCIAL

## 2025-03-03 LAB
PATH REPORT.COMMENTS IMP SPEC: NORMAL
PATH REPORT.COMMENTS IMP SPEC: NORMAL
PATH REPORT.FINAL DX SPEC: NORMAL
PATH REPORT.GROSS SPEC: NORMAL
PATH REPORT.INTRAOP OBS SPEC DOC: NORMAL
PATH REPORT.MICROSCOPIC SPEC OTHER STN: NORMAL
PATH REPORT.RELEVANT HX SPEC: NORMAL
PHOTO IMAGE: NORMAL

## 2025-03-03 NOTE — PROGRESS NOTES
Post Op Discharge Call    Surgery:     TAKE-DOWN, FUNDOPLICATION, NISSEN, LAPAROSCOPIC- ROBOTIC, gastrostomy takedown, lysisof adhesions 2 hr N/A General   Laparoscopic assisted insertion tube jejunostomy N/A General   Esophagoscopy, gastroscopy, duodenoscopy (EGD)         Surgery date: 2/26/25    Discharge Date:  3/3/25    Immediate Concerns: None at this time.     Pain:  No concerns with pain management. Managing well with tylenol as directed. Has not needed to take oxycodone. Overall feeling well with exception of occasional sharp pain when J tube is moved. Discussed securing the tube to abdomen to help with this.     Incision:   No concerns, healing well, no redness, drainage or edema reported. Pt reports he has not yet removed bandages applied in the hospital. Wound care was reviewed. Pt educated on signs of infection and will call back if he notes any of these.    Drains:   No drain. Pt states he understands how to flush J tube and has supplies at home.     Diet:   Tolerating soft diet well. Reviewed appropriate foods for soft diet (avoid bread, spicy foods, hard edges, tough meats). Pt voiced understanding.     Bowels:   Passing gas. Had a normal BM today. Not taking senna as this was causing loose stools.     Activity:   No difficulty with ADLs reported.     Post op/follow up plans:   Post op appointment scheduled,confirmed date and time with patient.     Pt will see PCP at [facility] this Thursday, 3/6/25.    Pt will have PAC, labs, Dr. Tobar visit prior to esophagectomy scheduled for 3/24/25. We discussed he will receive more printed patient education in the mail regarding this upcoming surgery.      Future Appointments   Date Time Provider Department Center   3/11/2025  2:15 PM Brandi Church PA-C [facility]   3/11/2025  3:15 PM  LAB [facility]   3/11/2025  3:45 PM Katlyn Tobar MD [facility]   4/14/2025 12:15 PM Chacha Bhatia APRN CNS [facility]   4/25/2025 11:15 AM Jesusita  MD [facility]   9/17/2025 11:30 AM [facility]         Patient has our direct number for any questions or concerns that may arise.      Catherine Del Toro, RN BSN  Thoracic Surgery RN Care Coordinator  Phone: [phone]

## 2025-03-05 NOTE — OP NOTE
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 Evaluation of gatsro epiploic artery     Surgeon: Katlyn Tobar MD   Assistants(s):    Anesthesia: General    Estimated blood loss: Less than 50 ml    Total IV fluids: (See anesthesia record)   Blood transfusion: No transfusion was given during surgery   Total urine output: (See anesthesia record)   Drains: None   Specimens: None   Implants: None     Findings:   None.   Complications: None.   Condition: Stable               Description of procedure:  After obtaining informed consent the patient was brought to the operating room and laid supine on the operating table after induction of general anesthesia and introduction of an endotracheal tube the patient was positioned supine with the arms out and a footboard.  We then entered the peritoneal cavity using the open Linares technique.  Upon entering the peritoneal cavity there were some adhesions that were identified that were taken down in order to place the 3 other robotic ports and one assistant port.  We also used a 5 mm port for a liver retractor.  We first performed a diagnostic laparoscopy and found no concerning metastatic disease.  We then proceeded to docked the robot and started dissection of the fundoplication.  First we took down the tract of the PEG site and freed up the stomach from the abdominal wall.  3-0 silk sutures were placed to invert this tract . of note the Nissen fundoplication was adequately placed and still infra  diaphragmatic in good position.  We started dissecting the fundoplication off of the scar tissue to the liver.  Once this was freed of the liver we dissected the wrap and took down the sutures holding the fundoplication.  We were then able to and wrap the Nissen fundoplication and started taking down the scar from the wrap to the stomach.  The wrap was very densely adherent posteriorly and required extensive lysis of adhesions to free it up up.  We were able to completely mobilize the wrap and bring the fundus back to its normal anatomical position.  After this we injected ICG and identified a patent right gastroepiploic artery that could be used for the future conduit.  Of note the lysis of adhesions between the liver and the fundoplication and the wrap itself to the hiatus and the stomach was significant and took close to 4 hours.  After this an upper endoscopy was performed to ensure no mucosal injuries and we noted that there was no obvious tumor.    After this we proceedded with a laparoscopic approach for the J tube   we identified the ligament of Treitz.  We identified a loop of bowel about 30 cm distal to it.  This was tacked up to the abdominal wall using zero silks on the Rafat-Basim device.  A pursestring suture was then inserted between these two, and using the Seldinger technique, a 16-Maori jejunostomy feeding tube was inserted into the bowel loop going distally.  Additional anterior tacking stitch and an anti-torsion stitch were also inserted.  Position of the jejunostomy tube was confirmed with flushing.   Hemostasis was ensured and the port sites were closed in layers.  After this, we performed an upper endoscopy with the above-noted findings.  The patient tolerated the procedure well.

## 2025-03-06 ENCOUNTER — TRANSFERRED RECORDS (OUTPATIENT)
Dept: HEALTH INFORMATION MANAGEMENT | Facility: CLINIC | Age: 70
End: 2025-03-06

## 2025-03-10 LAB
ABO + RH BLD: NORMAL
BLD GP AB SCN SERPL QL: NEGATIVE
SPECIMEN EXP DATE BLD: NORMAL

## 2025-03-11 ENCOUNTER — PRE VISIT (OUTPATIENT)
Dept: SURGERY | Facility: CLINIC | Age: 70
End: 2025-03-11

## 2025-03-11 ENCOUNTER — ANESTHESIA EVENT (OUTPATIENT)
Dept: SURGERY | Facility: CLINIC | Age: 70
End: 2025-03-11
Payer: MEDICARE

## 2025-03-11 ENCOUNTER — OFFICE VISIT (OUTPATIENT)
Dept: SURGERY | Facility: CLINIC | Age: 70
End: 2025-03-11
Payer: COMMERCIAL

## 2025-03-11 ENCOUNTER — ONCOLOGY VISIT (OUTPATIENT)
Dept: SURGERY | Facility: CLINIC | Age: 70
End: 2025-03-11
Payer: MEDICARE

## 2025-03-11 VITALS
DIASTOLIC BLOOD PRESSURE: 85 MMHG | SYSTOLIC BLOOD PRESSURE: 124 MMHG | TEMPERATURE: 97.4 F | HEIGHT: 70 IN | OXYGEN SATURATION: 96 % | HEART RATE: 71 BPM | BODY MASS INDEX: 26.48 KG/M2 | RESPIRATION RATE: 16 BRPM | WEIGHT: 185 LBS

## 2025-03-11 VITALS
BODY MASS INDEX: 26.63 KG/M2 | HEART RATE: 69 BPM | SYSTOLIC BLOOD PRESSURE: 116 MMHG | TEMPERATURE: 97.5 F | OXYGEN SATURATION: 99 % | DIASTOLIC BLOOD PRESSURE: 78 MMHG | WEIGHT: 185.6 LBS | RESPIRATION RATE: 18 BRPM

## 2025-03-11 DIAGNOSIS — R54 FRAILTY: ICD-10-CM

## 2025-03-11 DIAGNOSIS — C15.9 MALIGNANT NEOPLASM OF ESOPHAGUS, UNSPECIFIED LOCATION (H): ICD-10-CM

## 2025-03-11 DIAGNOSIS — Z01.818 PREOP EXAMINATION: Primary | ICD-10-CM

## 2025-03-11 DIAGNOSIS — C16.0 ADENOCARCINOMA OF GASTROESOPHAGEAL JUNCTION (H): ICD-10-CM

## 2025-03-11 DIAGNOSIS — Z01.818 PRE-OP EVALUATION: ICD-10-CM

## 2025-03-11 DIAGNOSIS — Z01.818 PRE-OP EVALUATION: Primary | ICD-10-CM

## 2025-03-11 LAB
ERYTHROCYTE [DISTWIDTH] IN BLOOD BY AUTOMATED COUNT: 12 % (ref 10–15)
HCT VFR BLD AUTO: 43 % (ref 40–53)
HGB BLD-MCNC: 15 G/DL (ref 13.3–17.7)
MCH RBC QN AUTO: 30.9 PG (ref 26.5–33)
MCHC RBC AUTO-ENTMCNC: 34.9 G/DL (ref 31.5–36.5)
MCV RBC AUTO: 89 FL (ref 78–100)
PLATELET # BLD AUTO: 290 10E3/UL (ref 150–450)
RBC # BLD AUTO: 4.85 10E6/UL (ref 4.4–5.9)
WBC # BLD AUTO: 7 10E3/UL (ref 4–11)

## 2025-03-11 PROCEDURE — G0463 HOSPITAL OUTPT CLINIC VISIT: HCPCS | Performed by: STUDENT IN AN ORGANIZED HEALTH CARE EDUCATION/TRAINING PROGRAM

## 2025-03-11 PROCEDURE — 85014 HEMATOCRIT: CPT | Performed by: STUDENT IN AN ORGANIZED HEALTH CARE EDUCATION/TRAINING PROGRAM

## 2025-03-11 PROCEDURE — 250N000011 HC RX IP 250 OP 636: Performed by: STUDENT IN AN ORGANIZED HEALTH CARE EDUCATION/TRAINING PROGRAM

## 2025-03-11 PROCEDURE — 36591 DRAW BLOOD OFF VENOUS DEVICE: CPT | Performed by: STUDENT IN AN ORGANIZED HEALTH CARE EDUCATION/TRAINING PROGRAM

## 2025-03-11 PROCEDURE — 99024 POSTOP FOLLOW-UP VISIT: CPT | Performed by: STUDENT IN AN ORGANIZED HEALTH CARE EDUCATION/TRAINING PROGRAM

## 2025-03-11 PROCEDURE — 99214 OFFICE O/P EST MOD 30 MIN: CPT | Mod: 24 | Performed by: PHYSICIAN ASSISTANT

## 2025-03-11 PROCEDURE — 86900 BLOOD TYPING SEROLOGIC ABO: CPT | Performed by: STUDENT IN AN ORGANIZED HEALTH CARE EDUCATION/TRAINING PROGRAM

## 2025-03-11 PROCEDURE — 86850 RBC ANTIBODY SCREEN: CPT | Performed by: STUDENT IN AN ORGANIZED HEALTH CARE EDUCATION/TRAINING PROGRAM

## 2025-03-11 RX ORDER — HEPARIN SODIUM (PORCINE) LOCK FLUSH IV SOLN 100 UNIT/ML 100 UNIT/ML
5 SOLUTION INTRAVENOUS ONCE
Status: COMPLETED | OUTPATIENT
Start: 2025-03-11 | End: 2025-03-11

## 2025-03-11 RX ADMIN — HEPARIN 5 ML: 100 SYRINGE at 14:54

## 2025-03-11 ASSESSMENT — PAIN SCALES - GENERAL
PAINLEVEL_OUTOF10: MILD PAIN (3)
PAINLEVEL_OUTOF10: NO PAIN (0)

## 2025-03-11 ASSESSMENT — LIFESTYLE VARIABLES: TOBACCO_USE: 0

## 2025-03-11 NOTE — H&P
Pre-Operative H & P     CC:  Preoperative exam to assess for increased cardiopulmonary risk while undergoing surgery and anesthesia.    Date of Encounter: 3/11/2025  Primary Care Physician:  Domenica Wing     Reason for visit:   Encounter Diagnoses   Name Primary?    Pre-op evaluation Yes    Malignant neoplasm of esophagus, unspecified location (H)        HPI  Bal Junior is a 70 year old male who presents for pre-operative H & P in preparation for  Procedure Information       Case: [id number] Date/Time: 03/24/25 0730    Procedure: ESOPHAGECTOMY, MINIMALLY INVASIVE (Chest)    Anesthesia type: General    Diagnosis: Malignant neoplasm of esophagus, unspecified location (H) [C15.9]    Pre-op diagnosis: Malignant neoplasm of esophagus, unspecified location (H) [C15.9]    [facility]    Providers: Katlyn Tobar MD            Patient is being evaluated for comorbid conditions of HTN, GERD, and thrombocytopenia.    He has a history of esophageal adenocarcinoma. He is s/p four rounds of chemotherapy and has been evaluated by thoracic surgery.  The above surgery has been recommended for further management.    Of note the patient underwent a robotic assisted laparoscopic takedown of Nissen fundoplication, gastrostomy takedown, lysis of adhesions, and laparoscopic insertion of jejunostomy tube on 2/26/2025 in preparation for the above surgery.    History is obtained from the patient and chart review    Hx of abnormal bleeding or anti-platelet use: Denies      Past Medical History  Past Medical History:   Diagnosis Date    Hypertension     Neuritis     Peripheral neuropathy     Personal history of other medical treatment     postgastrectomy dumping syndrome    Stomach problems Noted earlier       Past Surgical History  Past Surgical History:   Procedure Laterality Date    ABDOMEN SURGERY  2012?    APPENDECTOMY  1964?    COLONOSCOPY  2006, 2016    DAVINCI NISSEN FUNDOPLICATION N/A 2/26/2025    Procedure:  TAKE-DOWN, FUNDOPLICATION, NISSEN, LAPAROSCOPIC- ROBOTIC, gastrostomy takedown, lysisof adhesions 2 hr;  Surgeon: Katlyn Tobar MD;  [facility]    ESOPHAGEAL BALLOON PROVOCATION STUDY N/A 9/24/2024    Procedure: Esophageal Balloon Provocation Study;  Surgeon: Carson Michel DO;  [facility]    ESOPHAGOSCOPY, GASTROSCOPY, DUODENOSCOPY (EGD), COMBINED N/A 9/24/2024    Procedure: ESOPHAGOGASTRODUODENOSCOPY, WITH BIOPSY;  Surgeon: Carson Michel DO;  [facility]    ESOPHAGOSCOPY, GASTROSCOPY, DUODENOSCOPY (EGD), COMBINED N/A 10/8/2024    Procedure: ESOPHAGOGASTRODUODENOSCOPY, WITH FINE NEEDLE ASPIRATION BIOPSY, WITH ENDOSCOPIC ULTRASOUND GUIDANCE;  Surgeon: Lance Lopez MD;  Location:  GI    ESOPHAGOSCOPY, GASTROSCOPY, DUODENOSCOPY (EGD), COMBINED N/A 2/26/2025    Procedure: Esophagoscopy, gastroscopy, duodenoscopy (EGD);  Surgeon: Katlyn Tobar MD;  Location:  OR    EYE SURGERY  199x    laser for retinal tears    GASTRIC FUNDOPLICATION      HERNIA REPAIR  1961?    IR CHEST PORT PLACEMENT > 5 YRS OF AGE  10/24/2024    LAPAROSCOPIC ASSISTED INSERTION TUBE JEJUNOSTOMY N/A 2/26/2025    Procedure: Laparoscopic assisted insertion tube jejunostomy;  Surgeon: Katlyn Tobar MD;  [facility]    LAPAROSCOPIC TAKEDOWN NISSEN FUNDOPLICATION N/A 9/25/2020    Procedure: TAKE-DOWN, FUNDOPLICATION, REDO NISSEN, LAPAROSCOPIC, gastrostomy feeding tube placement;  Surgeon: Katlyn Tobar MD;  [facility]    TX ESOPHAGOGASTRODUODENOSCOPY TRANSORAL DIAGNOSTIC N/A 5/9/2019    Procedure: UPPER GASTROINTESTINAL ENDOSCOPY;  Surgeon: Dino Ward MD;  Location: [facility];  Service: General    SOFT TISSUE SURGERY  2009?    JCARLOS l. thumb       Prior to Admission Medications  Current Outpatient Medications   Medication Sig Dispense Refill    acetaminophen (TYLENOL) 500 MG tablet Take 1-2 tablets (500-1,000 mg) by mouth every 6 hours as needed for mild pain. 60 tablet 0    atenolol (TENORMIN) 50  MG tablet Take 50 mg by mouth every evening.      cyanocobalamin (VITAMIN B-12) 500 MCG tablet Take 500 mcg by mouth daily.      EPINEPHrine (ANY BX GENERIC EQUIV) 0.3 MG/0.3ML injection 2-pack       gabapentin (NEURONTIN) 300 MG capsule Take 1 capsule (300 mg) by mouth 3 times daily. 270 capsule 3    nortriptyline (PAMELOR) 25 MG capsule Take 25 mg by mouth every evening.      blood glucose (NO BRAND SPECIFIED) test strip Use to test blood sugar 1 times daily or as directed. To accompany: Blood Glucose Monitor Brands: per insurance. 100 strip 6    blood glucose monitoring (NO BRAND SPECIFIED) meter device kit Use to test blood sugar 1 times daily or as directed. Preferred blood glucose meter OR supplies to accompany: Blood Glucose Monitor Brands: per insurance. 1 kit 0    Continuous Glucose Sensor (FREESTYLE SIMIN 2 SENSOR) MISC Use 1 sensor every 14 days. Use to read blood sugars per 's instructions. 6 each 1    thin (NO BRAND SPECIFIED) lancets Use with lanceting device. To accompany: Blood Glucose Monitor Brands: per insurance. 100 each 6       Allergies  Allergies   Allergen Reactions    Hornets     Wasp Venom Protein Hives    Bee Venom Swelling       Social History  Social History     Socioeconomic History    Marital status:      Spouse name: Not on file    Number of children: Not on file    Years of education: Not on file    Highest education level: Not on file   Occupational History    Not on file   Tobacco Use    Smoking status: Never     Passive exposure: Past    Smokeless tobacco: Never   Substance and Sexual Activity    Alcohol use: Not Currently    Drug use: Never    Sexual activity: Yes     Partners: Female     Birth control/protection: Post-menopausal, Male Surgical, Female Surgical   Other Topics Concern    Not on file   Social History Narrative    Not on file     Social Drivers of Health     Financial Resource Strain: Low Risk  (2/27/2025)    Financial Resource Strain     Within  the past 12 months, have you or your family members you live with been unable to get utilities (heat, electricity) when it was really needed?: No   Food Insecurity: Low Risk  (2/27/2025)    Food Insecurity     Within the past 12 months, did you worry that your food would run out before you got money to buy more?: No     Within the past 12 months, did the food you bought just not last and you didn t have money to get more?: No   Transportation Needs: Low Risk  (2/27/2025)    Transportation Needs     Within the past 12 months, has lack of transportation kept you from medical appointments, getting your medicines, non-medical meetings or appointments, work, or from getting things that you need?: No   Physical Activity: Not on file   Stress: Not on file   Social Connections: Not on file   Interpersonal Safety: Low Risk  (2/26/2025)    Interpersonal Safety     Do you feel physically and emotionally safe where you currently live?: Yes     Within the past 12 months, have you been hit, slapped, kicked or otherwise physically hurt by someone?: No     Within the past 12 months, have you been humiliated or emotionally abused in other ways by your partner or ex-partner?: No   Housing Stability: Low Risk  (2/27/2025)    Housing Stability     Do you have housing? : Yes     Are you worried about losing your housing?: No       Family History  Family History   Problem Relation Age of Onset    Hypertension Mother     Uterine Cancer Mother         1965    Anesthesia Reaction Mother         PONV, slow to emerge    Mitral Valve Replacement Father     Coronary Artery Disease Maternal Grandfather     GERD No family hx of     Ulcerative Colitis No family hx of     Crohn's Disease No family hx of     Stomach Cancer No family hx of     Deep Vein Thrombosis (DVT) No family hx of        Review of Systems  The complete review of systems is negative other than noted in the HPI or here.   Anesthesia Evaluation   Pt has had prior anesthetic.     No  "history of anesthetic complications       ROS/MED HX  ENT/Pulmonary:     (+)     JESSICA risk factors, snores loudly, hypertension,                              (-) tobacco use, asthma and recent URI   Neurologic:  - neg neurologic ROS     Cardiovascular:     (+)  hypertension- -   -  - -                                 Previous cardiac testing   Echo: Date: 2/6/25 Results:  See H&P  Stress Test:  Date: Results:    ECG Reviewed:  Date: 12/2024 Results:  Sinus tachycardia   Low voltage QRS   Borderline ECG   Unconfirmed report - interpretation of this ECG is computer generated - see medical record for final interpretation     Cath:  Date: Results:      METS/Exercise Tolerance: 4 - Raking leaves, gardening Comment: Since surgery two weeks ago, activity has been limited - walking about 2 blocks at a time. Prior to surgery in February was walking 10,000-12,000 steps, skiing. Denies CHAWLA or CP   Hematologic: Comments: Thrombocytopenia   (-) history of blood clots, anemia and history of blood transfusion   Musculoskeletal:  - neg musculoskeletal ROS     GI/Hepatic: Comment: J-tube    (+) GERD,     hiatal hernia,           (-) liver disease   Renal/Genitourinary:  - neg Renal ROS     Endo:  - neg endo ROS     Psychiatric/Substance Use:  - neg psychiatric ROS     Infectious Disease:  - neg infectious disease ROS     Malignancy:   (+) Malignancy, History of GI.GI CA  Active status post Chemo.      Other:            /85 (BP Location: Right arm, Patient Position: Sitting, Cuff Size: Adult Regular)   Pulse 71   Temp 97.4  F (36.3  C) (Oral)   Resp 16   Ht 1.778 m (5' 10\")   Wt 83.9 kg (185 lb)   SpO2 96%   BMI 26.54 kg/m      Physical Exam   Constitutional: Pleasant, well-appearing, no apparent distress, and appears stated age.  Eyes: Pupils equal, round and reactive to light, extra ocular muscles intact, sclera clear, conjunctiva normal.  HENT: Normocephalic and atraumatic, oral pharynx with moist mucus membranes, good " dentition. No goiter appreciated.   Respiratory: Clear to auscultation bilaterally, no crackles or wheezing.  Cardiovascular: Regular rate and rhythm, normal S1 and S2, and no murmur noted.  Carotids +2, no bruits. No edema. Palpable pulses to radial  DP and PT arteries.   GI: Normal bowel sounds, soft, non-distended, laparoscopic incisions c/d/I with steri-strips, J-tube in place.   Lymph/Hematologic: No cervical lymphadenopathy and no supraclavicular lymphadenopathy.  Genitourinary:  Deferred  Skin: Warm and dry.  No rashes on exposed skin   Musculoskeletal: Full ROM of neck. There is no redness, warmth, or swelling of the visible joints. Gross motor strength is normal.    Neurologic: Awake, alert, oriented to name, place and time. Cranial nerves II-XII are grossly intact. Gait is normal.   Neuropsychiatric: Calm, cooperative. Normal affect.       Prior Labs/Diagnostic Studies   All labs and imaging personally reviewed     EKG/ stress test - if available please see in ROS above   Echo result w/o MOPS: Interpretation SummaryLeft ventricular size, wall motion and function are normal. The ejectionfraction is 60-65%.Right ventricular function, chamber size, wall motion, and thickness arenormal.No hemodynamically significant valvular abnormalities.IVC is normal.There is no prior study for direct comparison.          Latest Ref Rng & Units 1/28/2025    10:07 AM   PFT   FVC L 4.40    FEV1 L 3.41    FVC% % 111    FEV1% % 112          The patient's records and results personally reviewed by this provider.     Outside records reviewed from: Care Everywhere    LAB/DIAGNOSTIC STUDIES TODAY:  CBC, T&S    Assessment  Bal Junior is a 70 year old male seen as a PAC referral for risk assessment and optimization for anesthesia.    Plan/Recommendations  Pt will be optimized for the proposed procedure.  See below for details on the assessment, risk, and preoperative recommendations    NEUROLOGY  - No history of TIA, CVA or  "seizure    -Post Op delirium risk factors:  No risk identified    ENT  - No current airway concerns.  Will need to be reassessed day of surgery.  Mallampati: III  TM: > 3    CARDIAC  - No history of CAD and Afib  - Hypertension  Well controlled. Continue atenolol at bedtime.    - METS (Metabolic Equivalents)  Patient performs 4 or more METS exercise without symptoms             Total Score: 0      RCRI-Low risk: Class 2 0.9% complication rate             Total Score: 1    RCRI: High Risk Surgery        PULMONARY    JESSICA Medium Risk             Total Score: 4    JESSICA: Snores loudly    JESSICA: Hypertension    JESSICA: Over 50 ys old    JESSICA: Male      - Denies asthma or inhaler use  - Tobacco History    History   Smoking Status    Never   Smokeless Tobacco    Never       GI  - GERD, s/p takedown nissen fundoplication 2/26/25  - Esophageal cancer  See HPI. Above surgery planned for further management.   - J-tube present    PONV Medium Risk  Total Score: 2           1 AN PONV: Patient is not a current smoker    1 AN PONV: Intended Post Op Opioids        /RENAL  - Baseline Creatinine  1.17    ENDOCRINE    - BMI: Estimated body mass index is 26.54 kg/m  as calculated from the following:    Height as of this encounter: 1.778 m (5' 10\").    Weight as of this encounter: 83.9 kg (185 lb).  Overweight (BMI 25.0-29.9)  - No history of Diabetes Mellitus    HEME  VTE High Risk 3%             Total Score: 8    VTE: Greater than 59 yrs old    VTE: Male    VTE: Current cancer      - No history of abnormal bleeding or antiplatelet use.  - Intermittent thrombocytopenia  Baseline Plts: 120-150  Recommend perioperative use of blood conservation techniques intraoperatively and close monitoring for postoperative bleeding.  - Will update CBC and T&S today    MSK  Patient IS Frail             Total Score: 3    Frailty: Weight loss 10 lbs or greater    Frailty: Slower walking speed    Frailty: Decrease in strength      Due to the patient's risk, a " referral to Physical Medicine and Rehabilitation has been made.      ACCESS  - right chest port-a-cath    Different anesthesia methods/types have been discussed with the patient, but they are aware that the final plan will be decided by the assigned anesthesia provider on the date of service.      The patient is optimized for their procedure. AVS with information on surgery time/arrival time, meds and NPO status given by nursing staff. No further diagnostic testing indicated.      On the day of service:     Prep time: 10 minutes  Visit time: 10 minutes  Documentation time: 10 minutes  ------------------------------------------  Total time: 30 minutes      Brandi Church PA-C  Preoperative Assessment Center  [facility]  Clinic and Surgery Center  Phone: [phone]  Fax: [phone]

## 2025-03-11 NOTE — PROGRESS NOTES
"THORACIC SURGERY FOLLOW UP VISIT    Dear Dr. Wing,  I saw Mr. Junior in follow-up today. 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 He underwent a Nissen in 2011 which was revised in 2020.   S/p 4 cycles of FLOT      COMPLICATIONS  None  INTERVAL STUDIES  None   ETOH never   TOB never  BMI 28\"     PREOP DIAGNOSIS   Adenocarcinoma of GE junction  NEODJUVANT THERAPY   FLOT (completed: 1/17)    PROCEDURE: 2/26/25    TAKE-DOWN, FUNDOPLICATION, NISSEN, LAPAROSCOPIC- ROBOTIC, gastrostomy takedown, lysisof adhesions 2 hr  Laparoscopic assisted insertion tube jejunostomy  Esophagoscopy, gastroscopy, duodenoscopy (EGD), lysis of adhesions of hernia - additional 2 hrs, doagnostic laparoscopy. Evaluation of gatsro epiploic artery        SUBJECTIVE   He has been recovering well. Did lose 8 lbs and has continued to lose weight. He is eating well though and is active   J tube site clean    From a personal perspective, he is here with his wife    IMPRESSION (Z01.818) Pre-op evaluation  Comment:   Plan:     (C15.9) Malignant neoplasm of esophagus, unspecified location (H)  Comment:   Plan:     71 yo M with T2No Siewert II adenoca (signet cell features) with plan fr esophagectomy on 3/24    PLAN  I spent 30 min on the date of the encounter in chart review, patient visit, review of tests, documentation and/or discussion with other providers about the issues documented above. I reviewed the plan as follows:  We discussed that he should be on track for the surgery pending th e following  - monitor weight over the next week  -can have regular diet   -nutirtion labs on 3/17  -PET or CT later this week      All questions were answered and the patient and present family were in agreement with the plan.  I appreciate the opportunity to participate in the care of your patient and will keep you updated.  Sincerely,      "

## 2025-03-11 NOTE — NURSING NOTE
"Oncology Rooming Note    March 11, 2025 3:18 PM   Bal Junior is a 70 year old male who presents for:    Chief Complaint   Patient presents with    Blood Draw     Port blood draw by lab RN    Oncology Clinic Visit     Esophageal Cancer     Initial Vitals: /78   Pulse 69   Temp 97.5  F (36.4  C) (Oral)   Resp 18   Wt 84.2 kg (185 lb 9.6 oz)   SpO2 99%   BMI 26.63 kg/m   Estimated body mass index is 26.63 kg/m  as calculated from the following:    Height as of an earlier encounter on 3/11/25: 1.778 m (5' 10\").    Weight as of this encounter: 84.2 kg (185 lb 9.6 oz). Body surface area is 2.04 meters squared.  Mild Pain (3) Comment: J-tube   No LMP for male patient.  Allergies reviewed: Yes  Medications reviewed: Yes    Medications: Medication refills not needed today.  Pharmacy name entered into [employer]:    [facility] - [place], [place] - [facility] - [facility]    Frailty Screening:   Is the patient here for a new oncology consult visit in cancer care? 2. No    PHQ9:  Did this patient require a PHQ9?: No      Clinical concerns: Leaking at ODIN tube site with mild discomfort that is improving.       Claudia Waller LPN  3/11/2025              "

## 2025-03-11 NOTE — LETTER
"3/11/2025      Bal Junior  [address]  [place] [address]      Dear Colleague,    Thank you for referring your patient, Bal Junior, to the [facility]. Please see a copy of my visit note below.    THORACIC SURGERY FOLLOW UP VISIT    Dear Dr. Wing,  I saw Mr. Junior in follow-up today. 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 He underwent a Nissen in 2011 which was revised in 2020.   S/p 4 cycles of FLOT      COMPLICATIONS  None  INTERVAL STUDIES  None   ETOH never   TOB never  BMI 28\"     PREOP DIAGNOSIS   Adenocarcinoma of GE junction  NEODJUVANT THERAPY   FLOT (completed: 1/17)    PROCEDURE: 2/26/25    TAKE-DOWN, FUNDOPLICATION, NISSEN, LAPAROSCOPIC- ROBOTIC, gastrostomy takedown, lysisof adhesions 2 hr  Laparoscopic assisted insertion tube jejunostomy  Esophagoscopy, gastroscopy, duodenoscopy (EGD), lysis of adhesions of hernia - additional 2 hrs, doagnostic laparoscopy. Evaluation of gatsro epiploic artery        SUBJECTIVE   He has been recovering well. Did lose 8 lbs and has continued to lose weight. He is eating well though and is active   J tube site clean    From a personal perspective, he is here with his wife    IMPRESSION (Z01.818) Pre-op evaluation  Comment:   Plan:     (C15.9) Malignant neoplasm of esophagus, unspecified location (H)  Comment:   Plan:     69 yo M with T2No Siewert II adenoca (signet cell features) with plan fr esophagectomy on 3/24    PLAN  I spent 30 min on the date of the encounter in chart review, patient visit, review of tests, documentation and/or discussion with other providers about the issues documented above. I reviewed the plan as follows:  We discussed that he should be on track for the surgery pending th e following  - monitor weight over the next week  -can have regular diet   -nutirtion labs on 3/17  -PET or " CT later this week      All questions were answered and the patient and present family were in agreement with the plan.  I appreciate the opportunity to participate in the care of your patient and will keep you updated.  Sincerely,        Again, thank you for allowing me to participate in the care of your patient.        Sincerely,        Katlyn Tobar MD    Electronically signed

## 2025-03-11 NOTE — NURSING NOTE
Chief Complaint   Patient presents with    Blood Draw     Port blood draw by lab RN       Port accessed with blood draw and heparin flush by lab RN. Vitals taken and next appointment arrived.    Azul Allen RN

## 2025-03-11 NOTE — PATIENT INSTRUCTIONS
Preparing for Your Surgery      Name:  Bal Junior   MRN:  [id number]   :  1955   Today's Date:  3/11/2025       Arriving for surgery:  Surgery date:  3/24/25  Arrival time:  5.30AM    Please come to:     Please come to:       [facility] Preference [facility]    [address] [place]  [address]     The [facility] ([facility]) [place] Patient/Visitor Ramp is at [address]. Patients and visitors who self-park will receive the reduced hospital parking rate. If the Patient /Visitor Ramp is full, please follow the signs to the [employer] car park located at the main hospital entrance.       parking is available (24 hours/ 7 days a week)      Discounted parking pass options are available for patients and visitors. They can be purchased at the eyefactive desk at the main hospital entrance.     -    Stop at the security desk and they will direct surgery patients to [facility] [phone]        - If you need directions, a wheelchair or an escort please stop at the Information/security desk in the lobby.     What can I eat or drink?  -  You may eat and drink normally up to 8 hours prior to arrival time. (Until 9.30PM)  -  You may have clear liquids until 2 hours prior to arrival time. (Until 3.30AM)    Examples of clear liquids:  Water  Clear broth  Juices (apple, white grape, white cranberry  and cider) without pulp  Noncarbonated, powder based beverages  (lemonade and Lawrence-Aid)  Sodas (Sprite, 7-Up, ginger ale and seltzer)  Coffee or tea (without milk or cream)  Gatorade    -  No Alcohol or cannabis products for at least 24 hours before surgery.     Which medicines can I take?    Hold Aspirin for 7 days before surgery.   Hold Multivitamins for 7 days before surgery.  Hold Supplements for 7 days before surgery.  Hold Ibuprofen (Advil, Motrin) for 1 day(s) before surgery--unless otherwise  directed by surgeon.  Hold Naproxen (Aleve) for 4 days before surgery.    -  DO NOT take these medications the day of surgery:  Vitamin B12     -  PLEASE TAKE these medications the day of surgery:  Gabapentin    Atenolol and Nortriptyline continue per usual in the evening    How do I prepare myself?  - Please take 2 showers (one the night prior to surgery and one the morning of surgery) using Scrubcare or Hibiclens soap.    Use this soap only from the neck to your toes. Avoid genital area      Leave the soap on your skin for one minute--then rinse thoroughly.      You may use your own shampoo and conditioner. No other hair products.   - Please remove all jewelry and body piercings.  - No lotions, deodorants or fragrance.  - No makeup or fingernail polish.   - Bring your ID and insurance card.    -For patients being admitted to the [facility]  Family members are to take the patient belongings with them and place them in the lockers provided in the Family Lounge.  Please limit the items you bring to 1 bag as the lockers are small.      -If you use a CPAP machine, please bring the CPAP machine, tubing, and mask to hospital.    -If you have a Deep Brain Stimulator, Spinal Cord Stimulator, or any Neuro Stimulator device---you must bring the remote control to the hospital.      ALL PATIENTS GOING HOME THE SAME DAY OF SURGERY ARE REQUIRED TO HAVE A RESPONSIBLE ADULT TO DRIVE AND BE IN ATTENDANCE WITH THEM FOR 24 HOURS FOLLOWING SURGERY.    Covid testing policy as of 12/06/2022  Your surgeon will notify and schedule you for a COVID test if one is needed before surgery--please direct any questions or COVID symptoms to your surgeon      Questions or Concerns:    - For any questions regarding the day of surgery or your hospital stay, please contact the Pre Admission Nursing Office at [phone].       - If you have health changes between today and your surgery, please call your surgeon.       - For questions after  surgery, please call your surgeons office.           Current Visitor Guidelines    2 adult visitors for adult patients in the pre op area    If additional visitors come (beyond a patient care attendant or a group home caregiver), the additional visitors will be asked to wait in the main lobby of the hospital    Visiting hours: 8 a.m. to 8:30 p.m.    Patients confirmed or suspected to have symptoms of COVID 19 or flu:     No visitors allowed for adult patients.   Children (under age 18) can have 1 named visitor.     People who are sick or showing symptoms of COVID 19 or flu:    Are not allowed to visit patients--we can only make exceptions in special situations.       Please follow these guidelines for your visit:          Please maintain social distance          Masking is optional--however at times you may be asked to wear a mask for the safety of yourself and others     Clean your hands with alcohol hand . Do this when you arrive at and leave the building and patient room,    And again after you touch your mask or anything in the room.     Go directly to and from the room you are visiting.     Stay in the patient s room during your visit. Limit going to other places in the hospital as much as possible     Leave bags and jackets at home or in the car.     For everyone s health, please don t come and go during your visit. That includes for smoking   during your visit.

## 2025-03-12 ENCOUNTER — TELEPHONE (OUTPATIENT)
Dept: PHYSICAL MEDICINE AND REHAB | Facility: CLINIC | Age: 70
End: 2025-03-12
Payer: COMMERCIAL

## 2025-03-12 NOTE — TELEPHONE ENCOUNTER
Pt was called and declined booking PREHAB appointment.    I certainly agree with pt's decision -  the score on Frailty:  Total Score: 3 based on a) Weight loss 10 lbs or greater  b) Slower walking speed  c) Decrease in strength is totally a result of the Dx & Hx of esophageal adenocarcinoma. He is s/p four rounds of chemotherapy and has been evaluated by thoracic surgery.  He patient underwent a robotic assisted laparoscopic takedown of Nissen fundoplication, gastrostomy takedown, lysis of adhesions, and laparoscopic insertion of jejunostomy tube on 2/26/2025 in preparation for the next surgery scheduled on March 24 for the complete removal of his esophagus due to cancer.  Thank you for calling Mr Junior.   Ileana Dang RN on 3/12/2025 at 12:44 PM     Ileana Dang RN on 3/12/2025 at 1:54 PM

## 2025-03-12 NOTE — TELEPHONE ENCOUNTER
[facility] Call Center    Phone Message    May a detailed message be left on voicemail: yes     Reason for Call: Appointment Intake    Referring Provider Name: Brandi Chruch PA-C   Diagnosis and/or Symptoms: esophageal adenocarcinoma scheduled for esophagectomy 3/24/25    Action Taken: Other: Routed to [facility]    Travel Screening: Not Applicable     Please review and contact patient with alternatives, patient is unable to conduct video visits

## 2025-03-12 NOTE — TELEPHONE ENCOUNTER
RN triage note:    Okay to use EDGAR spot for Ms Holder any day of the week of March 17 - patient could also choose to be seen after his hospitalization if he is too busy ([place])    Ileana Dang RN on 3/12/2025 at 12:10 PM

## 2025-03-13 DIAGNOSIS — Z01.818 PRE-OP EVALUATION: Primary | ICD-10-CM

## 2025-03-13 DIAGNOSIS — C15.9 MALIGNANT NEOPLASM OF ESOPHAGUS, UNSPECIFIED LOCATION (H): ICD-10-CM

## 2025-03-14 ENCOUNTER — HOSPITAL ENCOUNTER (OUTPATIENT)
Dept: PET IMAGING | Facility: HOSPITAL | Age: 70
Discharge: HOME | End: 2025-03-14
Attending: CLINICAL NURSE SPECIALIST
Payer: MEDICARE

## 2025-03-14 DIAGNOSIS — Z01.818 PREOP EXAMINATION: ICD-10-CM

## 2025-03-14 DIAGNOSIS — C16.0 ADENOCARCINOMA OF GASTROESOPHAGEAL JUNCTION (H): ICD-10-CM

## 2025-03-14 LAB — GLUCOSE BLDC GLUCOMTR-MCNC: 92 MG/DL (ref 70–99)

## 2025-03-14 PROCEDURE — 343N000001 HC RX 343 MED OP 636: Performed by: CLINICAL NURSE SPECIALIST

## 2025-03-14 PROCEDURE — 250N000011 HC RX IP 250 OP 636: Performed by: CLINICAL NURSE SPECIALIST

## 2025-03-14 PROCEDURE — 74177 CT ABD & PELVIS W/CONTRAST: CPT

## 2025-03-14 PROCEDURE — 71260 CT THORAX DX C+: CPT

## 2025-03-14 PROCEDURE — 82962 GLUCOSE BLOOD TEST: CPT

## 2025-03-14 PROCEDURE — 78816 PET IMAGE W/CT FULL BODY: CPT | Mod: PS

## 2025-03-14 PROCEDURE — A9552 F18 FDG: HCPCS | Performed by: CLINICAL NURSE SPECIALIST

## 2025-03-14 RX ORDER — FLUDEOXYGLUCOSE F 18 200 MCI/ML
8-18 INJECTION, SOLUTION INTRAVENOUS ONCE
Status: COMPLETED | OUTPATIENT
Start: 2025-03-14 | End: 2025-03-14

## 2025-03-14 RX ORDER — IOPAMIDOL 755 MG/ML
91 INJECTION, SOLUTION INTRAVASCULAR ONCE
Status: COMPLETED | OUTPATIENT
Start: 2025-03-14 | End: 2025-03-14

## 2025-03-14 RX ADMIN — FLUDEOXYGLUCOSE F 18 11.03 MILLICURIE: 200 INJECTION, SOLUTION INTRAVENOUS at 10:37

## 2025-03-14 RX ADMIN — IOPAMIDOL 91 ML: 755 INJECTION, SOLUTION INTRAVENOUS at 11:43

## 2025-03-17 ENCOUNTER — INFUSION THERAPY VISIT (OUTPATIENT)
Dept: INFUSION THERAPY | Facility: HOSPITAL | Age: 70
End: 2025-03-17
Attending: INTERNAL MEDICINE
Payer: MEDICARE

## 2025-03-17 DIAGNOSIS — Z01.818 PRE-OP EVALUATION: ICD-10-CM

## 2025-03-17 DIAGNOSIS — C16.0 ADENOCARCINOMA OF GASTROESOPHAGEAL JUNCTION (H): Primary | ICD-10-CM

## 2025-03-17 DIAGNOSIS — C15.9 MALIGNANT NEOPLASM OF ESOPHAGUS, UNSPECIFIED LOCATION (H): ICD-10-CM

## 2025-03-17 LAB — ALBUMIN SERPL BCG-MCNC: 4 G/DL (ref 3.5–5.2)

## 2025-03-17 PROCEDURE — 250N000011 HC RX IP 250 OP 636: Performed by: INTERNAL MEDICINE

## 2025-03-17 PROCEDURE — 84134 ASSAY OF PREALBUMIN: CPT

## 2025-03-17 PROCEDURE — 82040 ASSAY OF SERUM ALBUMIN: CPT

## 2025-03-17 PROCEDURE — 36591 DRAW BLOOD OFF VENOUS DEVICE: CPT

## 2025-03-17 RX ORDER — HEPARIN SODIUM (PORCINE) LOCK FLUSH IV SOLN 100 UNIT/ML 100 UNIT/ML
5 SOLUTION INTRAVENOUS
Status: DISCONTINUED | OUTPATIENT
Start: 2025-03-17 | End: 2025-03-17 | Stop reason: HOSPADM

## 2025-03-17 RX ORDER — HEPARIN SODIUM (PORCINE) LOCK FLUSH IV SOLN 100 UNIT/ML 100 UNIT/ML
SOLUTION INTRAVENOUS
Status: COMPLETED
Start: 2025-03-17 | End: 2025-03-17

## 2025-03-17 RX ORDER — HEPARIN SODIUM (PORCINE) LOCK FLUSH IV SOLN 100 UNIT/ML 100 UNIT/ML
5 SOLUTION INTRAVENOUS
OUTPATIENT
Start: 2025-03-17

## 2025-03-17 RX ADMIN — HEPARIN SODIUM (PORCINE) LOCK FLUSH IV SOLN 100 UNIT/ML 5 ML: 100 SOLUTION at 09:38

## 2025-03-17 RX ADMIN — HEPARIN 5 ML: 100 SYRINGE at 09:38

## 2025-03-17 NOTE — PROGRESS NOTES
Port accessed without difficulty today, and labs drawn. De accessed port after labs, heparin flushed Nina Hardy RN

## 2025-03-18 ENCOUNTER — DOCUMENTATION ONLY (OUTPATIENT)
Dept: PULMONOLOGY | Facility: CLINIC | Age: 70
End: 2025-03-18

## 2025-03-18 ENCOUNTER — TUMOR CONFERENCE (OUTPATIENT)
Dept: ONCOLOGY | Facility: CLINIC | Age: 70
End: 2025-03-18
Payer: COMMERCIAL

## 2025-03-18 ENCOUNTER — PATIENT OUTREACH (OUTPATIENT)
Dept: SURGERY | Facility: CLINIC | Age: 70
End: 2025-03-18

## 2025-03-18 DIAGNOSIS — R59.1 LYMPHADENOPATHY: Primary | ICD-10-CM

## 2025-03-18 LAB — PREALB SERPL-MCNC: 27.9 MG/DL (ref 20–40)

## 2025-03-18 RX ORDER — CEFAZOLIN SODIUM 2 G/50ML
2 SOLUTION INTRAVENOUS
Status: CANCELLED | OUTPATIENT
Start: 2025-03-18

## 2025-03-18 RX ORDER — NORTRIPTYLINE HYDROCHLORIDE 10 MG/1
CAPSULE ORAL
COMMUNITY
Start: 2025-03-06

## 2025-03-18 RX ORDER — CEFAZOLIN SODIUM 2 G/50ML
2 SOLUTION INTRAVENOUS SEE ADMIN INSTRUCTIONS
Status: CANCELLED | OUTPATIENT
Start: 2025-03-18

## 2025-03-18 NOTE — PROGRESS NOTES
Spoke with pt regarding tumor board consensus for EBUS of avid hilar lymph nodes prior to esophagectomy. Pt voiced understanding. We discussed that EBUS is scheduled for tomorrow, 3/19 with arrival time of 1330 at [facility]. Discussed NPO for 8 hours prior. Pt will look for procedure details in mychart. Pt voiced understanding that esophagectomy on 3/24 may be postponed pending EBUS results. Pt had no further questions.    Catherine Del Toro, RN BSN  Thoracic Surgery RN Care Coordinator  Phone: [phone]

## 2025-03-18 NOTE — NURSING NOTE
Interventional Pulmonology: Pre-Flight Planning    Procedure date: March 19th, 2025    Scheduled procedure: BRONCHOSCOPY, WITH BIOPSY OF 3 OR MORE LYMPH NODE STATIONS WITH ENDOBRONCHIAL ULTRASOUND GUIDANCE    Location: [facility]    Anesthesia: Moderate sedation.    H&P plan: Not needed for endoscopy procedure.     Medication hold(s): no anticoagulation.    CT scheduled: No CT needed for this procedure.     Preoperative Instructions: Sent to patient via [employer] (just sent - will follow-up this is read).    Pathology results follow up:  Dr. Tobar

## 2025-03-18 NOTE — TUMOR CONFERENCE
Tumor Conference Information           Documentation / Disclaimer Cancer Tumor Board Note  Cancer tumor board recommendations do not override what is determined to be reasonable care and treatment, which is dependent on the circumstances of a patient's case; the patient's medical, social, and personal concerns; and the clinical judgment of the oncologist [physician].    PET/CT done 3/14/25 was reviewed and the group consensus was to do an EBUS of hilar lymph nodes prior to esophagectomy.    Catherine Del Toro RN BSN  Thoracic Surgery RN Care Coordinator  Phone: [phone]

## 2025-03-19 ENCOUNTER — HOSPITAL ENCOUNTER (OUTPATIENT)
Facility: CLINIC | Age: 70
Discharge: HOME | End: 2025-03-19
Attending: INTERNAL MEDICINE | Admitting: INTERNAL MEDICINE
Payer: MEDICARE

## 2025-03-19 VITALS
RESPIRATION RATE: 18 BRPM | HEART RATE: 72 BPM | DIASTOLIC BLOOD PRESSURE: 87 MMHG | OXYGEN SATURATION: 94 % | SYSTOLIC BLOOD PRESSURE: 115 MMHG

## 2025-03-19 PROCEDURE — G0500 MOD SEDAT ENDO SERVICE >5YRS: HCPCS | Performed by: INTERNAL MEDICINE

## 2025-03-19 PROCEDURE — 88173 CYTOPATH EVAL FNA REPORT: CPT | Mod: 26 | Performed by: PATHOLOGY

## 2025-03-19 PROCEDURE — 250N000011 HC RX IP 250 OP 636: Performed by: INTERNAL MEDICINE

## 2025-03-19 PROCEDURE — 31653 BRONCH EBUS SAMPLNG 3/> NODE: CPT | Performed by: INTERNAL MEDICINE

## 2025-03-19 PROCEDURE — 31653 BRONCH EBUS SAMPLNG 3/> NODE: CPT | Mod: GC | Performed by: INTERNAL MEDICINE

## 2025-03-19 PROCEDURE — 88305 TISSUE EXAM BY PATHOLOGIST: CPT | Mod: 26 | Performed by: PATHOLOGY

## 2025-03-19 PROCEDURE — 99153 MOD SED SAME PHYS/QHP EA: CPT | Performed by: INTERNAL MEDICINE

## 2025-03-19 PROCEDURE — 88173 CYTOPATH EVAL FNA REPORT: CPT | Mod: TC | Performed by: INTERNAL MEDICINE

## 2025-03-19 PROCEDURE — 88312 SPECIAL STAINS GROUP 1: CPT | Mod: 26 | Performed by: PATHOLOGY

## 2025-03-19 PROCEDURE — 88172 CYTP DX EVAL FNA 1ST EA SITE: CPT | Mod: 26 | Performed by: PATHOLOGY

## 2025-03-19 PROCEDURE — 250N000009 HC RX 250: Performed by: INTERNAL MEDICINE

## 2025-03-19 RX ORDER — HEPARIN SODIUM (PORCINE) LOCK FLUSH IV SOLN 100 UNIT/ML 100 UNIT/ML
5-10 SOLUTION INTRAVENOUS
Status: DISCONTINUED | OUTPATIENT
Start: 2025-03-19 | End: 2025-03-19 | Stop reason: HOSPADM

## 2025-03-19 RX ORDER — LIDOCAINE HYDROCHLORIDE 10 MG/ML
INJECTION, SOLUTION INFILTRATION; PERINEURAL PRN
Status: DISCONTINUED | OUTPATIENT
Start: 2025-03-19 | End: 2025-03-19 | Stop reason: HOSPADM

## 2025-03-19 RX ORDER — FENTANYL CITRATE 50 UG/ML
INJECTION, SOLUTION INTRAMUSCULAR; INTRAVENOUS PRN
Status: DISCONTINUED | OUTPATIENT
Start: 2025-03-19 | End: 2025-03-19 | Stop reason: HOSPADM

## 2025-03-19 RX ORDER — DIPHENHYDRAMINE HYDROCHLORIDE 50 MG/ML
INJECTION, SOLUTION INTRAMUSCULAR; INTRAVENOUS PRN
Status: DISCONTINUED | OUTPATIENT
Start: 2025-03-19 | End: 2025-03-19 | Stop reason: HOSPADM

## 2025-03-19 RX ORDER — LIDOCAINE HYDROCHLORIDE 40 MG/ML
INJECTION, SOLUTION RETROBULBAR PRN
Status: DISCONTINUED | OUTPATIENT
Start: 2025-03-19 | End: 2025-03-19 | Stop reason: HOSPADM

## 2025-03-19 RX ADMIN — Medication 5 ML: at 16:02

## 2025-03-19 ASSESSMENT — ACTIVITIES OF DAILY LIVING (ADL)
ADLS_ACUITY_SCORE: 53

## 2025-03-19 NOTE — OR NURSING
Pt underwent EBUS with FNA under conscious sedation. Specimens: sent with cytology. Pt transferred to recovery and report given to marcie ROSAS.       Dagmar Turner RN

## 2025-03-19 NOTE — DISCHARGE INSTRUCTIONS
Post Bronchoscopy Patient Instructions:    March 19, 2025  Bal Junior    Your procedure completed (bronchoscopy with lymph nodes biopsy) without any immediate complications.  You may cough up scant amount of blood for the next 12-24 hours. If you have excessive cough with blood, chest pain, shortness of breath, please report to the closest emergency room.    You may experience low grade (less than 100.5 F) fever next 24 hours, if so, you can take Tylenol. If the fever persists more than 24 hours, please contact to our office or your primary care provider.    Our office will call you with the results of samples taken during the procedure. Please note that you may get a result notification through  My Chart  before us calling you, as the Laboratories are instructed to release the results as soon as they are available to the patients and providers at the same time. Please allow your us 24-48 hours call you to discuss the results.    You may resume your diet as it was prior to procedure.    You may resume your medications after the procedure unless you are instructed to do differently.     Please follow instructions from the nursing staff upon discharge in terms of activity. In general, you should avoid any attention or motor skill requiring activities (e.g., driving or operating any motorized vehicle) for 24 hours as you might be still under the effect of sedation medications. Please make sure an adult to accompany you next 24 hours.     Should you have any question, please do not hesitate to call our office Kavitha Grewal [phone].     Please take a few moments to evaluate the care you received by me on this link: [email or web address]  Interventional Pulmonary Fellow

## 2025-03-19 NOTE — PROCEDURES
INTERVENTIONAL PULMONOLOGY       Procedure(s):    A flexible bronchoscopy  Airway exam  EBUS-TBNA (3 sites)    Indication:  PET avid LN in setting of esophageal lesions to rule out caner    Attending of Record:  Saad Olmedo MD    Interventional Pulmonary Fellow   Carrol Mace    Trainees Present:   None     Medications:    9 ml 4% lidocaine  12 ml 1% lidocaine  1 spray(s) hurricaine spray  1.5 mg versed  125 mcg fentanyl    Sedation Time:   Total sedation time was Choose Duration: 27 minutes of continuous bedside 1:1 monitoring.    Time Out:  Performed    The patient's medical record has been reviewed.  The indication for the procedure was reviewed.  The necessary history and physical examination was performed and reviewed.  The risks, benefits and alternatives of the procedure were discussed with the the patient in detail and he had the opportunity to ask questions.  I discussed in particular the potential complications including risks of minor or life-threatening bleeding and/or infection, respiratory failure, vocal cord trauma / paralysis, pneumothorax, and discomfort. Sedation risks were also discussed including abnormal heart rhythms, low blood pressure, and respiratory failure. All questions were answered to the best of my ability.  Verbal and written informed consent was obtained.  The proposed procedure and the patient's identification were verified prior to the procedure by the physician and the nurse.    The patient was assessed for the adequacy for the procedure and to receive medications.   Mental Status:  Alert and oriented x 3  Airway examination:  Class I (Complete visualization of soft palate)  Pulmonary:  Non labored respirations  CV:  Regular rate  ASA Grade:  (II)  Mild systemic disease    After clinical evaluation and reviewing the indication, risks, alternatives and benefits of the procedure the patient was deemed to be in satisfactory condition to undergo the procedure.       Immediately before administration of medications the patient was re-assessed for adequacy to receive sedatives including the heart rate, respiratory rate, mental status, oxygen saturation, blood pressure and adequacy of pulmonary ventilation. These same parameters were continuously monitored throughout the procedure.    A Tuberculosis risk assessment was performed:  The patient has no known RISK of Tuberculosis    The procedure was performed in a negative airflow room: Yes    Maneuvers / Procedure:      Airway Examination: A complete airway examination was performed from the distal trachea to the subsegmental level in each lobe of both lungs.  Pertinent findings include normal airways.         EBUS TBNA:   Station 2R; not evaluated .   Station 2L; not evaluated .   Station 4R; visualized and biopsied. A total of 4 biopsies were performed using 22G FNA Needle. .   Station 4L; visualized and not biopsied. Reason: LN diameter was <5mm.   Station 7; visualized and not biopsied. Reason: LN diameter was <5mm.   Station 10R; visualized and biopsied. A total of 4 biopsies were performed using 22G FNA Needle.    Station 10L; visualized and not biopsied. Reason: LN diameter was <5mm.   Station 11R; visualized and biopsied. A total of 4 biopsies were performed using 22G FNA Needle. .   Station 11L; visualized and not biopsied. Reason: LN diameter was <5mm.   Station 12R; visualized and not biopsied. Reason: LN diameter was <5mm.   Station 12L; visualized and not biopsied. Reason: LN diameter was <5mm.     Deepa was Present     Any disposable equipment was visually inspected and deemed to be intact immediately post procedure.      Relevant Pictures  NA    Assessment and Recommendations:     Successful completion of EBUs TBNA from stations 11R, 10R, and 4R.   Ok to discharge once medically stable.   Follow up pathology results.           Carrol Mace  Interventional pulmonary fellow  Pager: [phone]

## 2025-03-21 LAB
PATH REPORT.COMMENTS IMP SPEC: ABNORMAL
PATH REPORT.COMMENTS IMP SPEC: ABNORMAL
PATH REPORT.COMMENTS IMP SPEC: YES
PATH REPORT.FINAL DX SPEC: ABNORMAL
PATH REPORT.GROSS SPEC: ABNORMAL
PATH REPORT.MICROSCOPIC SPEC OTHER STN: ABNORMAL
PATH REPORT.RELEVANT HX SPEC: ABNORMAL

## 2025-03-22 RX ORDER — NALOXONE HYDROCHLORIDE 0.4 MG/ML
0.1 INJECTION, SOLUTION INTRAMUSCULAR; INTRAVENOUS; SUBCUTANEOUS
Status: CANCELLED | OUTPATIENT
Start: 2025-03-22

## 2025-03-22 RX ORDER — OXYCODONE HYDROCHLORIDE 10 MG/1
10 TABLET ORAL
Status: CANCELLED | OUTPATIENT
Start: 2025-03-22

## 2025-03-22 RX ORDER — ONDANSETRON 2 MG/ML
4 INJECTION INTRAMUSCULAR; INTRAVENOUS EVERY 30 MIN PRN
Status: CANCELLED | OUTPATIENT
Start: 2025-03-22

## 2025-03-22 RX ORDER — DEXAMETHASONE SODIUM PHOSPHATE 4 MG/ML
4 INJECTION, SOLUTION INTRA-ARTICULAR; INTRALESIONAL; INTRAMUSCULAR; INTRAVENOUS; SOFT TISSUE
Status: CANCELLED | OUTPATIENT
Start: 2025-03-22

## 2025-03-22 RX ORDER — ONDANSETRON 4 MG/1
4 TABLET, ORALLY DISINTEGRATING ORAL EVERY 30 MIN PRN
Status: CANCELLED | OUTPATIENT
Start: 2025-03-22

## 2025-03-22 RX ORDER — OXYCODONE HYDROCHLORIDE 5 MG/1
5 TABLET ORAL
Status: CANCELLED | OUTPATIENT
Start: 2025-03-22

## 2025-03-22 NOTE — ANESTHESIA PREPROCEDURE EVALUATION
Anesthesia Pre-Procedure Evaluation    Patient: Bal Junior   MRN: [id number] : 1955        Procedure : Procedure(s):  ESOPHAGECTOMY, MINIMALLY INVASIVE          Past Medical History:   Diagnosis Date     Hypertension      Neuritis      Peripheral neuropathy      Personal history of other medical treatment     postgastrectomy dumping syndrome     Stomach problems Noted earlier      Past Surgical History:   Procedure Laterality Date     ABDOMEN SURGERY  ?     APPENDECTOMY  1964?     BRONCHOSCOPY RIDID OR FLEXIBLE W/ENDOBRONCHIAL ULTRASOUND GUIDED 3 OR MORE NODE STATIONS N/A 3/19/2025    Procedure: Bronchoscopy Ridid Or Flexible W/Endobronchial Ultrasound Guided 3 Or More Node Stations;  Surgeon: Saad Olmedo MD;  Location:  GI     COLONOSCOPY  ,      DAVINCI NISSEN FUNDOPLICATION N/A 2025    Procedure: TAKE-DOWN, FUNDOPLICATION, NISSEN, LAPAROSCOPIC- ROBOTIC, gastrostomy takedown, lysisof adhesions 2 hr;  Surgeon: Katlyn Tobar MD;  Location:  OR     ESOPHAGEAL BALLOON PROVOCATION STUDY N/A 2024    Procedure: Esophageal Balloon Provocation Study;  Surgeon: Carson Michel DO;  Location:  GI     ESOPHAGOSCOPY, GASTROSCOPY, DUODENOSCOPY (EGD), COMBINED N/A 2024    Procedure: ESOPHAGOGASTRODUODENOSCOPY, WITH BIOPSY;  Surgeon: Carson Michel DO;  Location:  GI     ESOPHAGOSCOPY, GASTROSCOPY, DUODENOSCOPY (EGD), COMBINED N/A 10/8/2024    Procedure: ESOPHAGOGASTRODUODENOSCOPY, WITH FINE NEEDLE ASPIRATION BIOPSY, WITH ENDOSCOPIC ULTRASOUND GUIDANCE;  Surgeon: Lance Lopez MD;  Location:  GI     ESOPHAGOSCOPY, GASTROSCOPY, DUODENOSCOPY (EGD), COMBINED N/A 2025    Procedure: Esophagoscopy, gastroscopy, duodenoscopy (EGD);  Surgeon: Katlyn Tobar MD;  Location:  OR     EYE SURGERY  199x    laser for retinal tears     GASTRIC FUNDOPLICATION       HERNIA REPAIR  ?     IR CHEST PORT PLACEMENT > 5 YRS OF AGE  10/24/2024     LAPAROSCOPIC ASSISTED INSERTION  TUBE JEJUNOSTOMY N/A 2/26/2025    Procedure: Laparoscopic assisted insertion tube jejunostomy;  Surgeon: Katlyn Tobar MD;  Location: [facility] OR     LAPAROSCOPIC TAKEDOWN NISSEN FUNDOPLICATION N/A 9/25/2020    Procedure: TAKE-DOWN, FUNDOPLICATION, REDO NISSEN, LAPAROSCOPIC, gastrostomy feeding tube placement;  Surgeon: Katlyn Tobar MD;  Location: [facility] OR     MI ESOPHAGOGASTRODUODENOSCOPY TRANSORAL DIAGNOSTIC N/A 5/9/2019    Procedure: UPPER GASTROINTESTINAL ENDOSCOPY;  Surgeon: Dino Ward MD;  [facility]: [facility];  Service: General     SOFT TISSUE SURGERY  2009?    MCL l. thumb      Allergies   Allergen Reactions     Hornets      Wasp Venom Protein Hives     Bee Venom Swelling      Social History     Tobacco Use     Smoking status: Never     Passive exposure: Past     Smokeless tobacco: Never   Substance Use Topics     Alcohol use: Not Currently      Wt Readings from Last 1 Encounters:   03/11/25 84.2 kg (185 lb 9.6 oz)        Anesthesia Evaluation   Pt has had prior anesthetic. Type: General.        ROS/MED HX  ENT/Pulmonary:     (+)     JESSICA risk factors, snores loudly, hypertension,                                 Neurologic:       Cardiovascular:     (+)  hypertension- -   -  - -                                 Previous cardiac testing   Echo: Date: 2/26/25 Results:  Left ventricular size, wall motion and function are normal. The ejection  fraction is 60-65%.  Right ventricular function, chamber size, wall motion, and thickness are  normal.  No hemodynamically significant valvular abnormalities.  IVC is normal.  There is no prior study for direct comparison.    Stress Test:  Date: Results:    ECG Reviewed:  Date: Results:    Cath:  Date: Results:      METS/Exercise Tolerance: 3 - Able to walk 1-2 blocks without stopping    Hematologic:       Musculoskeletal:       GI/Hepatic:     (+) GERD,     hiatal hernia,              Renal/Genitourinary:       Endo:       Psychiatric/Substance Use:     (+)  "psychiatric history depression       Infectious Disease:       Malignancy:   (+) Malignancy, History of GI.GI CA  Active status post Chemo.      Other:            Physical Exam    Airway        Mallampati: III   TM distance: > 3 FB   Neck ROM: full   Mouth opening: > 3 cm    Respiratory Devices and Support         Dental       (+) Modest Abnormalities - crowns, retainers, 1 or 2 missing teeth      Cardiovascular          Rhythm and rate: regular     Pulmonary           breath sounds clear to auscultation       OUTSIDE LABS:  CBC:   Lab Results   Component Value Date    WBC 7.0 03/11/2025    WBC 10.2 02/26/2025    HGB 15.0 03/11/2025    HGB 13.6 02/26/2025    HCT 43.0 03/11/2025    HCT 40.3 02/26/2025     03/11/2025     (L) 03/01/2025     BMP:   Lab Results   Component Value Date     02/26/2025     02/20/2025    POTASSIUM 4.6 02/26/2025    POTASSIUM 4.7 02/26/2025    CHLORIDE 105 02/26/2025    CHLORIDE 108 (H) 02/20/2025    CO2 20 (L) 02/26/2025    CO2 24 02/20/2025    BUN 21.0 02/26/2025    BUN 12.8 02/20/2025    CR 0.78 03/01/2025    CR 1.17 02/26/2025    GLC 92 03/14/2025     (H) 02/27/2025     COAGS: No results found for: \"PTT\", \"INR\", \"FIBR\"  POC:   Lab Results   Component Value Date     (H) 10/28/2020     HEPATIC:   Lab Results   Component Value Date    ALBUMIN 4.0 03/17/2025    PROTTOTAL 5.7 (L) 12/16/2024    ALT 25 12/16/2024    AST 25 12/16/2024    ALKPHOS 183 (H) 12/16/2024    BILITOTAL 0.3 12/16/2024     OTHER:   Lab Results   Component Value Date    PH 7.35 09/25/2020    LACT 2.1 (H) 12/16/2024    A1C 6.0 (H) 01/17/2024    AUGUSTIN 8.6 (L) 02/26/2025    MAG 1.6 (L) 02/26/2025    TSH 1.75 12/16/2024       Anesthesia Plan    ASA Status:  3    NPO Status:  NPO Appropriate    Anesthesia Type: General.     - Airway: ETT   Induction: Intravenous, Propofol.   Maintenance: Balanced.   Techniques and Equipment:     - Airway: Video-Laryngoscope, Fiberoptic Bronchoscope, Double " "lumen ETT     - Lines/Monitors: 2nd IV, Arterial Line, BIS     - Drips/Meds: Phenylephrine     Consents    Anesthesia Plan(s) and associated risks, benefits, and realistic alternatives discussed. Questions answered and patient/representative(s) expressed understanding.     - Discussed: Risks, Benefits and Alternatives for BOTH SEDATION and the PROCEDURE were discussed     - Discussed with:  Patient, Spouse      - Extended Intubation/Ventilatory Support Discussed: No.      - Patient is DNR/DNI Status: No     Use of blood products discussed: Yes.     - Discussed with: Patient.     - Consented: consented to blood products     Postoperative Care    Pain management: IV analgesics, Multi-modal analgesia.   PONV prophylaxis: Ondansetron (or other 5HT-3), Dexamethasone or Solumedrol, Background Propofol Infusion     Comments:               Supa Emerson MD    Clinically Significant Risk Factors Present on Admission                   # Hypertension: Noted on problem list           # Overweight: Estimated body mass index is 26.63 kg/m  as calculated from the following:    Height as of 3/11/25: 1.778 m (5' 10\").    Weight as of 3/11/25: 84.2 kg (185 lb 9.6 oz).                "

## 2025-03-24 ENCOUNTER — APPOINTMENT (OUTPATIENT)
Dept: GENERAL RADIOLOGY | Facility: CLINIC | Age: 70
End: 2025-03-24
Payer: MEDICARE

## 2025-03-24 ENCOUNTER — HOSPITAL ENCOUNTER (INPATIENT)
Facility: CLINIC | Age: 70
End: 2025-03-24
Attending: STUDENT IN AN ORGANIZED HEALTH CARE EDUCATION/TRAINING PROGRAM | Admitting: STUDENT IN AN ORGANIZED HEALTH CARE EDUCATION/TRAINING PROGRAM
Payer: MEDICARE

## 2025-03-24 ENCOUNTER — ANESTHESIA (OUTPATIENT)
Dept: SURGERY | Facility: CLINIC | Age: 70
End: 2025-03-24
Payer: MEDICARE

## 2025-03-24 DIAGNOSIS — G62.9 NEUROPATHY: ICD-10-CM

## 2025-03-24 DIAGNOSIS — C16.0 ADENOCARCINOMA OF GASTROESOPHAGEAL JUNCTION (H): Primary | ICD-10-CM

## 2025-03-24 LAB
ANION GAP SERPL CALCULATED.3IONS-SCNC: 14 MMOL/L (ref 7–15)
BASE EXCESS BLDA CALC-SCNC: -0.5 MMOL/L (ref -3–3)
BASE EXCESS BLDA CALC-SCNC: -0.8 MMOL/L (ref -3–3)
BASE EXCESS BLDA CALC-SCNC: -2.8 MMOL/L (ref -3–3)
BASE EXCESS BLDA CALC-SCNC: -3.6 MMOL/L (ref -3–3)
BASE EXCESS BLDA CALC-SCNC: -4.6 MMOL/L (ref -3–3)
BASE EXCESS BLDA CALC-SCNC: -4.7 MMOL/L (ref -3–3)
BLD PROD TYP BPU: NORMAL
BLD PROD TYP BPU: NORMAL
BLOOD COMPONENT TYPE: NORMAL
BLOOD COMPONENT TYPE: NORMAL
BUN SERPL-MCNC: 16.4 MG/DL (ref 8–23)
CA-I BLD-MCNC: 4.6 MG/DL (ref 4.4–5.2)
CA-I BLD-MCNC: 4.6 MG/DL (ref 4.4–5.2)
CA-I BLD-MCNC: 4.7 MG/DL (ref 4.4–5.2)
CA-I BLD-MCNC: 4.7 MG/DL (ref 4.4–5.2)
CA-I BLD-MCNC: 4.8 MG/DL (ref 4.4–5.2)
CA-I BLD-MCNC: 5.2 MG/DL (ref 4.4–5.2)
CALCIUM SERPL-MCNC: 8.6 MG/DL (ref 8.8–10.4)
CHLORIDE SERPL-SCNC: 102 MMOL/L (ref 98–107)
CODING SYSTEM: NORMAL
CODING SYSTEM: NORMAL
CREAT SERPL-MCNC: 0.93 MG/DL (ref 0.67–1.17)
CROSSMATCH: NORMAL
CROSSMATCH: NORMAL
EGFRCR SERPLBLD CKD-EPI 2021: 89 ML/MIN/1.73M2
ERYTHROCYTE [DISTWIDTH] IN BLOOD BY AUTOMATED COUNT: 12 % (ref 10–15)
GLUCOSE BLD-MCNC: 131 MG/DL (ref 70–99)
GLUCOSE BLD-MCNC: 145 MG/DL (ref 70–99)
GLUCOSE BLD-MCNC: 151 MG/DL (ref 70–99)
GLUCOSE BLD-MCNC: 152 MG/DL (ref 70–99)
GLUCOSE BLD-MCNC: 159 MG/DL (ref 70–99)
GLUCOSE BLD-MCNC: 187 MG/DL (ref 70–99)
GLUCOSE BLDC GLUCOMTR-MCNC: 162 MG/DL (ref 70–99)
GLUCOSE BLDC GLUCOMTR-MCNC: 187 MG/DL (ref 70–99)
GLUCOSE SERPL-MCNC: 171 MG/DL (ref 70–99)
HCO3 BLDA-SCNC: 21 MMOL/L (ref 21–28)
HCO3 BLDA-SCNC: 22 MMOL/L (ref 21–28)
HCO3 BLDA-SCNC: 24 MMOL/L (ref 21–28)
HCO3 BLDA-SCNC: 25 MMOL/L (ref 21–28)
HCO3 SERPL-SCNC: 23 MMOL/L (ref 22–29)
HCT VFR BLD AUTO: 36.6 % (ref 40–53)
HGB BLD-MCNC: 12.1 G/DL (ref 13.3–17.7)
HGB BLD-MCNC: 12.7 G/DL (ref 13.3–17.7)
HGB BLD-MCNC: 12.8 G/DL (ref 13.3–17.7)
HGB BLD-MCNC: 13.3 G/DL (ref 13.3–17.7)
HGB BLD-MCNC: 14.2 G/DL (ref 13.3–17.7)
HGB BLD-MCNC: 14.2 G/DL (ref 13.3–17.7)
HGB BLD-MCNC: 14.4 G/DL (ref 13.3–17.7)
ISSUE DATE AND TIME: NORMAL
ISSUE DATE AND TIME: NORMAL
LACTATE BLD-SCNC: 1.3 MMOL/L (ref 0.7–2)
LACTATE BLD-SCNC: 1.5 MMOL/L (ref 0.7–2)
LACTATE BLD-SCNC: 2.3 MMOL/L (ref 0.7–2)
LACTATE BLD-SCNC: 2.7 MMOL/L (ref 0.7–2)
LACTATE BLD-SCNC: 2.8 MMOL/L (ref 0.7–2)
LACTATE BLD-SCNC: 3 MMOL/L (ref 0.7–2)
MCH RBC QN AUTO: 29.9 PG (ref 26.5–33)
MCHC RBC AUTO-ENTMCNC: 33.1 G/DL (ref 31.5–36.5)
MCV RBC AUTO: 90 FL (ref 78–100)
O2/TOTAL GAS SETTING VFR VENT: 38 %
O2/TOTAL GAS SETTING VFR VENT: 39 %
O2/TOTAL GAS SETTING VFR VENT: 50 %
O2/TOTAL GAS SETTING VFR VENT: 60 %
O2/TOTAL GAS SETTING VFR VENT: 70 %
O2/TOTAL GAS SETTING VFR VENT: 71 %
OXYHGB MFR BLDA: 90 % (ref 92–100)
OXYHGB MFR BLDA: 94 % (ref 92–100)
OXYHGB MFR BLDA: 98 % (ref 92–100)
PATH REPORT.COMMENTS IMP SPEC: NORMAL
PATH REPORT.INTRAOP OBS SPEC DOC: NORMAL
PCO2 BLDA: 38 MM HG (ref 35–45)
PCO2 BLDA: 44 MM HG (ref 35–45)
PCO2 BLDA: 45 MM HG (ref 35–45)
PCO2 BLDA: 46 MM HG (ref 35–45)
PCO2 BLDA: 52 MM HG (ref 35–45)
PCO2 BLDA: 55 MM HG (ref 35–45)
PH BLDA: 7.26 [PH] (ref 7.35–7.45)
PH BLDA: 7.27 [PH] (ref 7.35–7.45)
PH BLDA: 7.3 [PH] (ref 7.35–7.45)
PH BLDA: 7.34 [PH] (ref 7.35–7.45)
PH BLDA: 7.35 [PH] (ref 7.35–7.45)
PH BLDA: 7.36 [PH] (ref 7.35–7.45)
PLATELET # BLD AUTO: 173 10E3/UL (ref 150–450)
PO2 BLDA: 153 MM HG (ref 80–105)
PO2 BLDA: 160 MM HG (ref 80–105)
PO2 BLDA: 165 MM HG (ref 80–105)
PO2 BLDA: 241 MM HG (ref 80–105)
PO2 BLDA: 67 MM HG (ref 80–105)
PO2 BLDA: 77 MM HG (ref 80–105)
POTASSIUM BLD-SCNC: 3.9 MMOL/L (ref 3.4–5.3)
POTASSIUM BLD-SCNC: 4 MMOL/L (ref 3.4–5.3)
POTASSIUM BLD-SCNC: 4.5 MMOL/L (ref 3.4–5.3)
POTASSIUM BLD-SCNC: 4.6 MMOL/L (ref 3.4–5.3)
POTASSIUM BLD-SCNC: 4.6 MMOL/L (ref 3.4–5.3)
POTASSIUM BLD-SCNC: 4.8 MMOL/L (ref 3.4–5.3)
POTASSIUM SERPL-SCNC: 4.6 MMOL/L (ref 3.4–5.3)
RADIOLOGIST FLAGS: ABNORMAL
RBC # BLD AUTO: 4.05 10E6/UL (ref 4.4–5.9)
SAO2 % BLDA: 100 % (ref 95–96)
SAO2 % BLDA: 91 % (ref 95–96)
SAO2 % BLDA: 95 % (ref 95–96)
SAO2 % BLDA: 99 % (ref 95–96)
SODIUM BLD-SCNC: 139 MMOL/L (ref 135–145)
SODIUM BLD-SCNC: 139 MMOL/L (ref 135–145)
SODIUM BLD-SCNC: 140 MMOL/L (ref 135–145)
SODIUM BLD-SCNC: 140 MMOL/L (ref 135–145)
SODIUM BLD-SCNC: 142 MMOL/L (ref 135–145)
SODIUM BLD-SCNC: 142 MMOL/L (ref 135–145)
SODIUM SERPL-SCNC: 139 MMOL/L (ref 135–145)
UNIT ABO/RH: NORMAL
UNIT ABO/RH: NORMAL
UNIT NUMBER: NORMAL
UNIT NUMBER: NORMAL
UNIT STATUS: NORMAL
UNIT STATUS: NORMAL
UNIT TYPE ISBT: 6200
UNIT TYPE ISBT: 6200
WBC # BLD AUTO: 10.6 10E3/UL (ref 4–11)

## 2025-03-24 PROCEDURE — 250N000011 HC RX IP 250 OP 636: Performed by: NURSE ANESTHETIST, CERTIFIED REGISTERED

## 2025-03-24 PROCEDURE — 250N000011 HC RX IP 250 OP 636: Performed by: STUDENT IN AN ORGANIZED HEALTH CARE EDUCATION/TRAINING PROGRAM

## 2025-03-24 PROCEDURE — 250N000009 HC RX 250: Performed by: NURSE ANESTHETIST, CERTIFIED REGISTERED

## 2025-03-24 PROCEDURE — 250N000013 HC RX MED GY IP 250 OP 250 PS 637: Performed by: STUDENT IN AN ORGANIZED HEALTH CARE EDUCATION/TRAINING PROGRAM

## 2025-03-24 PROCEDURE — 32674 THORACOSCOPY LYMPH NODE EXC: CPT | Mod: 58 | Performed by: STUDENT IN AN ORGANIZED HEALTH CARE EDUCATION/TRAINING PROGRAM

## 2025-03-24 PROCEDURE — 88307 TISSUE EXAM BY PATHOLOGIST: CPT | Mod: 26 | Performed by: PATHOLOGY

## 2025-03-24 PROCEDURE — 258N000003 HC RX IP 258 OP 636

## 2025-03-24 PROCEDURE — 250N000011 HC RX IP 250 OP 636: Mod: JZ

## 2025-03-24 PROCEDURE — 99223 1ST HOSP IP/OBS HIGH 75: CPT | Mod: 24 | Performed by: SURGERY

## 2025-03-24 PROCEDURE — 88331 PATH CONSLTJ SURG 1 BLK 1SPC: CPT | Mod: 26 | Performed by: PATHOLOGY

## 2025-03-24 PROCEDURE — 250N000009 HC RX 250

## 2025-03-24 PROCEDURE — 0W9B40Z DRAINAGE OF LEFT PLEURAL CAVITY WITH DRAINAGE DEVICE, PERCUTANEOUS ENDOSCOPIC APPROACH: ICD-10-PCS | Performed by: STUDENT IN AN ORGANIZED HEALTH CARE EDUCATION/TRAINING PROGRAM

## 2025-03-24 PROCEDURE — 84295 ASSAY OF SERUM SODIUM: CPT

## 2025-03-24 PROCEDURE — 999N000065 XR CHEST PORT 1 VIEW

## 2025-03-24 PROCEDURE — 88331 PATH CONSLTJ SURG 1 BLK 1SPC: CPT | Mod: TC | Performed by: STUDENT IN AN ORGANIZED HEALTH CARE EDUCATION/TRAINING PROGRAM

## 2025-03-24 PROCEDURE — 71045 X-RAY EXAM CHEST 1 VIEW: CPT

## 2025-03-24 PROCEDURE — 88304 TISSUE EXAM BY PATHOLOGIST: CPT | Mod: 26 | Performed by: PATHOLOGY

## 2025-03-24 PROCEDURE — 0DN54ZZ RELEASE ESOPHAGUS, PERCUTANEOUS ENDOSCOPIC APPROACH: ICD-10-PCS | Performed by: STUDENT IN AN ORGANIZED HEALTH CARE EDUCATION/TRAINING PROGRAM

## 2025-03-24 PROCEDURE — 272N000001 HC OR GENERAL SUPPLY STERILE: Performed by: STUDENT IN AN ORGANIZED HEALTH CARE EDUCATION/TRAINING PROGRAM

## 2025-03-24 PROCEDURE — 71045 X-RAY EXAM CHEST 1 VIEW: CPT | Mod: 26 | Performed by: RADIOLOGY

## 2025-03-24 PROCEDURE — 710N000010 HC RECOVERY PHASE 1, LEVEL 2, PER MIN: Performed by: STUDENT IN AN ORGANIZED HEALTH CARE EDUCATION/TRAINING PROGRAM

## 2025-03-24 PROCEDURE — 85041 AUTOMATED RBC COUNT: CPT

## 2025-03-24 PROCEDURE — 250N000011 HC RX IP 250 OP 636: Mod: JZ | Performed by: STUDENT IN AN ORGANIZED HEALTH CARE EDUCATION/TRAINING PROGRAM

## 2025-03-24 PROCEDURE — 250N000013 HC RX MED GY IP 250 OP 250 PS 637

## 2025-03-24 PROCEDURE — P9016 RBC LEUKOCYTES REDUCED: HCPCS

## 2025-03-24 PROCEDURE — 86923 COMPATIBILITY TEST ELECTRIC: CPT

## 2025-03-24 PROCEDURE — 360N000078 HC SURGERY LEVEL 5, PER MIN: Performed by: STUDENT IN AN ORGANIZED HEALTH CARE EDUCATION/TRAINING PROGRAM

## 2025-03-24 PROCEDURE — 88309 TISSUE EXAM BY PATHOLOGIST: CPT | Mod: 26 | Performed by: PATHOLOGY

## 2025-03-24 PROCEDURE — 0DJ68ZZ INSPECTION OF STOMACH, VIA NATURAL OR ARTIFICIAL OPENING ENDOSCOPIC: ICD-10-PCS | Performed by: THORACIC SURGERY (CARDIOTHORACIC VASCULAR SURGERY)

## 2025-03-24 PROCEDURE — 120N000005 HC R&B MS OVERFLOW UMMC

## 2025-03-24 PROCEDURE — 0BJ08ZZ INSPECTION OF TRACHEOBRONCHIAL TREE, VIA NATURAL OR ARTIFICIAL OPENING ENDOSCOPIC: ICD-10-PCS | Performed by: STUDENT IN AN ORGANIZED HEALTH CARE EDUCATION/TRAINING PROGRAM

## 2025-03-24 PROCEDURE — 82805 BLOOD GASES W/O2 SATURATION: CPT

## 2025-03-24 PROCEDURE — 250N000025 HC SEVOFLURANE, PER MIN: Performed by: STUDENT IN AN ORGANIZED HEALTH CARE EDUCATION/TRAINING PROGRAM

## 2025-03-24 PROCEDURE — 250N000020 HC RX IP 250 OP 636 J0585: Mod: JZ | Performed by: STUDENT IN AN ORGANIZED HEALTH CARE EDUCATION/TRAINING PROGRAM

## 2025-03-24 PROCEDURE — 999N000141 HC STATISTIC PRE-PROCEDURE NURSING ASSESSMENT: Performed by: STUDENT IN AN ORGANIZED HEALTH CARE EDUCATION/TRAINING PROGRAM

## 2025-03-24 PROCEDURE — 88305 TISSUE EXAM BY PATHOLOGIST: CPT | Mod: 26 | Performed by: PATHOLOGY

## 2025-03-24 PROCEDURE — 42955 SURGICAL OPENING OF THROAT: CPT | Mod: 58 | Performed by: STUDENT IN AN ORGANIZED HEALTH CARE EDUCATION/TRAINING PROGRAM

## 2025-03-24 PROCEDURE — 250N000009 HC RX 250: Performed by: STUDENT IN AN ORGANIZED HEALTH CARE EDUCATION/TRAINING PROGRAM

## 2025-03-24 PROCEDURE — 71045 X-RAY EXAM CHEST 1 VIEW: CPT | Mod: 26 | Performed by: STUDENT IN AN ORGANIZED HEALTH CARE EDUCATION/TRAINING PROGRAM

## 2025-03-24 PROCEDURE — 07B74ZZ EXCISION OF THORAX LYMPHATIC, PERCUTANEOUS ENDOSCOPIC APPROACH: ICD-10-PCS | Performed by: STUDENT IN AN ORGANIZED HEALTH CARE EDUCATION/TRAINING PROGRAM

## 2025-03-24 PROCEDURE — 3E0K8GC INTRODUCTION OF OTHER THERAPEUTIC SUBSTANCE INTO GENITOURINARY TRACT, VIA NATURAL OR ARTIFICIAL OPENING ENDOSCOPIC: ICD-10-PCS | Performed by: STUDENT IN AN ORGANIZED HEALTH CARE EDUCATION/TRAINING PROGRAM

## 2025-03-24 PROCEDURE — 370N000017 HC ANESTHESIA TECHNICAL FEE, PER MIN: Performed by: STUDENT IN AN ORGANIZED HEALTH CARE EDUCATION/TRAINING PROGRAM

## 2025-03-24 PROCEDURE — P9045 ALBUMIN (HUMAN), 5%, 250 ML: HCPCS

## 2025-03-24 PROCEDURE — 07BD4ZZ EXCISION OF AORTIC LYMPHATIC, PERCUTANEOUS ENDOSCOPIC APPROACH: ICD-10-PCS | Performed by: STUDENT IN AN ORGANIZED HEALTH CARE EDUCATION/TRAINING PROGRAM

## 2025-03-24 PROCEDURE — 32551 INSERTION OF CHEST TUBE: CPT | Mod: 58 | Performed by: STUDENT IN AN ORGANIZED HEALTH CARE EDUCATION/TRAINING PROGRAM

## 2025-03-24 PROCEDURE — 250N000011 HC RX IP 250 OP 636

## 2025-03-24 PROCEDURE — 84132 ASSAY OF SERUM POTASSIUM: CPT

## 2025-03-24 PROCEDURE — 43287 ESPHG DSTL 2/3 W/LAPS MOBLJ: CPT | Mod: 82 | Performed by: THORACIC SURGERY (CARDIOTHORACIC VASCULAR SURGERY)

## 2025-03-24 PROCEDURE — 43287 ESPHG DSTL 2/3 W/LAPS MOBLJ: CPT | Mod: 58 | Performed by: STUDENT IN AN ORGANIZED HEALTH CARE EDUCATION/TRAINING PROGRAM

## 2025-03-24 RX ORDER — NALOXONE HYDROCHLORIDE 0.4 MG/ML
0.4 INJECTION, SOLUTION INTRAMUSCULAR; INTRAVENOUS; SUBCUTANEOUS
Status: DISCONTINUED | OUTPATIENT
Start: 2025-03-24 | End: 2025-04-09 | Stop reason: HOSPADM

## 2025-03-24 RX ORDER — NALOXONE HYDROCHLORIDE 0.4 MG/ML
0.2 INJECTION, SOLUTION INTRAMUSCULAR; INTRAVENOUS; SUBCUTANEOUS
Status: DISCONTINUED | OUTPATIENT
Start: 2025-03-24 | End: 2025-04-09 | Stop reason: HOSPADM

## 2025-03-24 RX ORDER — CALCIUM CHLORIDE 100 MG/ML
INJECTION INTRAVENOUS; INTRAVENTRICULAR PRN
Status: DISCONTINUED | OUTPATIENT
Start: 2025-03-24 | End: 2025-03-24

## 2025-03-24 RX ORDER — POLYETHYLENE GLYCOL 3350 17 G/17G
17 POWDER, FOR SOLUTION ORAL DAILY
Status: DISCONTINUED | OUTPATIENT
Start: 2025-03-25 | End: 2025-04-09 | Stop reason: HOSPADM

## 2025-03-24 RX ORDER — OXYCODONE HCL 5 MG/5 ML
5 SOLUTION, ORAL ORAL EVERY 4 HOURS PRN
Status: DISCONTINUED | OUTPATIENT
Start: 2025-03-24 | End: 2025-03-24

## 2025-03-24 RX ORDER — KETAMINE HYDROCHLORIDE 10 MG/ML
INJECTION INTRAMUSCULAR; INTRAVENOUS PRN
Status: DISCONTINUED | OUTPATIENT
Start: 2025-03-24 | End: 2025-03-24

## 2025-03-24 RX ORDER — PROPOFOL 10 MG/ML
INJECTION, EMULSION INTRAVENOUS PRN
Status: DISCONTINUED | OUTPATIENT
Start: 2025-03-24 | End: 2025-03-24

## 2025-03-24 RX ORDER — HYDROMORPHONE HCL IN WATER/PF 6 MG/30 ML
0.2 PATIENT CONTROLLED ANALGESIA SYRINGE INTRAVENOUS
Status: DISCONTINUED | OUTPATIENT
Start: 2025-03-24 | End: 2025-03-27

## 2025-03-24 RX ORDER — ONDANSETRON 4 MG/1
4 TABLET, ORALLY DISINTEGRATING ORAL EVERY 30 MIN PRN
Status: DISCONTINUED | OUTPATIENT
Start: 2025-03-24 | End: 2025-03-24 | Stop reason: HOSPADM

## 2025-03-24 RX ORDER — NOREPINEPHRINE BITARTRATE 1 MG/ML
INJECTION, SOLUTION INTRAVENOUS PRN
Status: DISCONTINUED | OUTPATIENT
Start: 2025-03-24 | End: 2025-03-24

## 2025-03-24 RX ORDER — ENOXAPARIN SODIUM 100 MG/ML
40 INJECTION SUBCUTANEOUS
Status: COMPLETED | OUTPATIENT
Start: 2025-03-24 | End: 2025-03-24

## 2025-03-24 RX ORDER — ONDANSETRON 2 MG/ML
INJECTION INTRAMUSCULAR; INTRAVENOUS PRN
Status: DISCONTINUED | OUTPATIENT
Start: 2025-03-24 | End: 2025-03-24

## 2025-03-24 RX ORDER — OXYCODONE HCL 5 MG/5 ML
5-10 SOLUTION, ORAL ORAL EVERY 4 HOURS PRN
Status: DISCONTINUED | OUTPATIENT
Start: 2025-03-24 | End: 2025-03-26

## 2025-03-24 RX ORDER — DEXAMETHASONE SODIUM PHOSPHATE 4 MG/ML
4 INJECTION, SOLUTION INTRA-ARTICULAR; INTRALESIONAL; INTRAMUSCULAR; INTRAVENOUS; SOFT TISSUE
Status: DISCONTINUED | OUTPATIENT
Start: 2025-03-24 | End: 2025-03-24 | Stop reason: HOSPADM

## 2025-03-24 RX ORDER — DEXAMETHASONE SODIUM PHOSPHATE 4 MG/ML
INJECTION, SOLUTION INTRA-ARTICULAR; INTRALESIONAL; INTRAMUSCULAR; INTRAVENOUS; SOFT TISSUE PRN
Status: DISCONTINUED | OUTPATIENT
Start: 2025-03-24 | End: 2025-03-24

## 2025-03-24 RX ORDER — HYDROMORPHONE HCL IN WATER/PF 6 MG/30 ML
0.4 PATIENT CONTROLLED ANALGESIA SYRINGE INTRAVENOUS EVERY 5 MIN PRN
Status: DISCONTINUED | OUTPATIENT
Start: 2025-03-24 | End: 2025-03-24 | Stop reason: HOSPADM

## 2025-03-24 RX ORDER — ESMOLOL HYDROCHLORIDE 10 MG/ML
INJECTION INTRAVENOUS PRN
Status: DISCONTINUED | OUTPATIENT
Start: 2025-03-24 | End: 2025-03-24

## 2025-03-24 RX ORDER — EPHEDRINE SULFATE 50 MG/ML
INJECTION, SOLUTION INTRAMUSCULAR; INTRAVENOUS; SUBCUTANEOUS PRN
Status: DISCONTINUED | OUTPATIENT
Start: 2025-03-24 | End: 2025-03-24

## 2025-03-24 RX ORDER — ONDANSETRON 2 MG/ML
4 INJECTION INTRAMUSCULAR; INTRAVENOUS EVERY 30 MIN PRN
Status: DISCONTINUED | OUTPATIENT
Start: 2025-03-24 | End: 2025-03-24 | Stop reason: HOSPADM

## 2025-03-24 RX ORDER — HYDROMORPHONE HCL IN WATER/PF 6 MG/30 ML
0.4 PATIENT CONTROLLED ANALGESIA SYRINGE INTRAVENOUS
Status: DISCONTINUED | OUTPATIENT
Start: 2025-03-24 | End: 2025-03-27

## 2025-03-24 RX ORDER — SODIUM CHLORIDE, SODIUM LACTATE, POTASSIUM CHLORIDE, CALCIUM CHLORIDE 600; 310; 30; 20 MG/100ML; MG/100ML; MG/100ML; MG/100ML
INJECTION, SOLUTION INTRAVENOUS CONTINUOUS
Status: DISCONTINUED | OUTPATIENT
Start: 2025-03-24 | End: 2025-03-24 | Stop reason: HOSPADM

## 2025-03-24 RX ORDER — CEFAZOLIN SODIUM/WATER 2 G/20 ML
2 SYRINGE (ML) INTRAVENOUS SEE ADMIN INSTRUCTIONS
Status: DISCONTINUED | OUTPATIENT
Start: 2025-03-24 | End: 2025-03-24

## 2025-03-24 RX ORDER — HYDROMORPHONE HCL IN WATER/PF 6 MG/30 ML
0.2 PATIENT CONTROLLED ANALGESIA SYRINGE INTRAVENOUS
Status: DISCONTINUED | OUTPATIENT
Start: 2025-03-24 | End: 2025-03-24

## 2025-03-24 RX ORDER — ALBUTEROL SULFATE 0.83 MG/ML
2.5 SOLUTION RESPIRATORY (INHALATION)
Status: DISCONTINUED | OUTPATIENT
Start: 2025-03-24 | End: 2025-03-26

## 2025-03-24 RX ORDER — NALOXONE HYDROCHLORIDE 0.4 MG/ML
0.1 INJECTION, SOLUTION INTRAMUSCULAR; INTRAVENOUS; SUBCUTANEOUS
Status: DISCONTINUED | OUTPATIENT
Start: 2025-03-24 | End: 2025-03-24 | Stop reason: HOSPADM

## 2025-03-24 RX ORDER — FENTANYL CITRATE 50 UG/ML
INJECTION, SOLUTION INTRAMUSCULAR; INTRAVENOUS PRN
Status: DISCONTINUED | OUTPATIENT
Start: 2025-03-24 | End: 2025-03-24

## 2025-03-24 RX ORDER — MAGNESIUM SULFATE 1 G/100ML
INJECTION INTRAVENOUS PRN
Status: DISCONTINUED | OUTPATIENT
Start: 2025-03-24 | End: 2025-03-24

## 2025-03-24 RX ORDER — INDOMETHACIN 25 MG/5ML
SUSPENSION ORAL PRN
Status: DISCONTINUED | OUTPATIENT
Start: 2025-03-24 | End: 2025-03-24

## 2025-03-24 RX ORDER — BUPIVACAINE HYDROCHLORIDE AND EPINEPHRINE 2.5; 5 MG/ML; UG/ML
INJECTION, SOLUTION INFILTRATION; PERINEURAL PRN
Status: DISCONTINUED | OUTPATIENT
Start: 2025-03-24 | End: 2025-03-24 | Stop reason: HOSPADM

## 2025-03-24 RX ORDER — SODIUM CHLORIDE, SODIUM GLUCONATE, SODIUM ACETATE, POTASSIUM CHLORIDE AND MAGNESIUM CHLORIDE 526; 502; 368; 37; 30 MG/100ML; MG/100ML; MG/100ML; MG/100ML; MG/100ML
INJECTION, SOLUTION INTRAVENOUS CONTINUOUS PRN
Status: DISCONTINUED | OUTPATIENT
Start: 2025-03-24 | End: 2025-03-24

## 2025-03-24 RX ORDER — DEXTROSE MONOHYDRATE 25 G/50ML
25-50 INJECTION, SOLUTION INTRAVENOUS
Status: DISCONTINUED | OUTPATIENT
Start: 2025-03-24 | End: 2025-04-09 | Stop reason: HOSPADM

## 2025-03-24 RX ORDER — SODIUM CHLORIDE, SODIUM LACTATE, POTASSIUM CHLORIDE, CALCIUM CHLORIDE 600; 310; 30; 20 MG/100ML; MG/100ML; MG/100ML; MG/100ML
INJECTION, SOLUTION INTRAVENOUS CONTINUOUS PRN
Status: DISCONTINUED | OUTPATIENT
Start: 2025-03-24 | End: 2025-03-24

## 2025-03-24 RX ORDER — FENTANYL CITRATE 50 UG/ML
50 INJECTION, SOLUTION INTRAMUSCULAR; INTRAVENOUS EVERY 5 MIN PRN
Status: DISCONTINUED | OUTPATIENT
Start: 2025-03-24 | End: 2025-03-24 | Stop reason: HOSPADM

## 2025-03-24 RX ORDER — HYDROMORPHONE HCL IN WATER/PF 6 MG/30 ML
0.2 PATIENT CONTROLLED ANALGESIA SYRINGE INTRAVENOUS EVERY 5 MIN PRN
Status: DISCONTINUED | OUTPATIENT
Start: 2025-03-24 | End: 2025-03-24 | Stop reason: HOSPADM

## 2025-03-24 RX ORDER — VASOPRESSIN IN 0.9 % NACL 2 UNIT/2ML
SYRINGE (ML) INTRAVENOUS PRN
Status: DISCONTINUED | OUTPATIENT
Start: 2025-03-24 | End: 2025-03-24

## 2025-03-24 RX ORDER — LIDOCAINE HYDROCHLORIDE 20 MG/ML
INJECTION, SOLUTION INFILTRATION; PERINEURAL PRN
Status: DISCONTINUED | OUTPATIENT
Start: 2025-03-24 | End: 2025-03-24

## 2025-03-24 RX ORDER — INDOMETHACIN 25 MG/1
CAPSULE ORAL PRN
Status: DISCONTINUED | OUTPATIENT
Start: 2025-03-24 | End: 2025-03-24

## 2025-03-24 RX ORDER — ENOXAPARIN SODIUM 100 MG/ML
40 INJECTION SUBCUTANEOUS EVERY 24 HOURS
Status: DISCONTINUED | OUTPATIENT
Start: 2025-03-25 | End: 2025-04-09 | Stop reason: HOSPADM

## 2025-03-24 RX ORDER — FENTANYL CITRATE 50 UG/ML
25 INJECTION, SOLUTION INTRAMUSCULAR; INTRAVENOUS EVERY 5 MIN PRN
Status: DISCONTINUED | OUTPATIENT
Start: 2025-03-24 | End: 2025-03-24 | Stop reason: HOSPADM

## 2025-03-24 RX ORDER — CEFAZOLIN SODIUM/WATER 2 G/20 ML
2 SYRINGE (ML) INTRAVENOUS
Status: DISCONTINUED | OUTPATIENT
Start: 2025-03-24 | End: 2025-03-24

## 2025-03-24 RX ORDER — BISACODYL 10 MG
10 SUPPOSITORY, RECTAL RECTAL DAILY PRN
Status: DISCONTINUED | OUTPATIENT
Start: 2025-03-27 | End: 2025-04-09 | Stop reason: HOSPADM

## 2025-03-24 RX ORDER — HYDROMORPHONE HCL IN WATER/PF 6 MG/30 ML
0.4 PATIENT CONTROLLED ANALGESIA SYRINGE INTRAVENOUS
Status: DISCONTINUED | OUTPATIENT
Start: 2025-03-24 | End: 2025-03-24

## 2025-03-24 RX ORDER — NICOTINE POLACRILEX 4 MG
15-30 LOZENGE BUCCAL
Status: DISCONTINUED | OUTPATIENT
Start: 2025-03-24 | End: 2025-04-09 | Stop reason: HOSPADM

## 2025-03-24 RX ORDER — ONDANSETRON 2 MG/ML
4 INJECTION INTRAMUSCULAR; INTRAVENOUS EVERY 6 HOURS PRN
Status: DISCONTINUED | OUTPATIENT
Start: 2025-03-24 | End: 2025-04-09 | Stop reason: HOSPADM

## 2025-03-24 RX ORDER — ACETAMINOPHEN 325 MG/10.15ML
650 LIQUID ORAL EVERY 6 HOURS
Status: DISCONTINUED | OUTPATIENT
Start: 2025-03-24 | End: 2025-04-01

## 2025-03-24 RX ORDER — AMOXICILLIN 250 MG
1 CAPSULE ORAL 2 TIMES DAILY
Status: DISCONTINUED | OUTPATIENT
Start: 2025-03-24 | End: 2025-04-07 | Stop reason: DRUGHIGH

## 2025-03-24 RX ORDER — DEXTROSE MONOHYDRATE, SODIUM CHLORIDE, AND POTASSIUM CHLORIDE 50; 1.49; 4.5 G/1000ML; G/1000ML; G/1000ML
INJECTION, SOLUTION INTRAVENOUS CONTINUOUS
Status: DISCONTINUED | OUTPATIENT
Start: 2025-03-24 | End: 2025-04-03

## 2025-03-24 RX ADMIN — PROPOFOL 10 MG: 10 INJECTION, EMULSION INTRAVENOUS at 15:15

## 2025-03-24 RX ADMIN — MAGNESIUM SULFATE HEPTAHYDRATE 500 MG: 1 INJECTION, SOLUTION INTRAVENOUS at 10:54

## 2025-03-24 RX ADMIN — Medication 20 MG: at 08:29

## 2025-03-24 RX ADMIN — Medication 50 MG: at 07:39

## 2025-03-24 RX ADMIN — HYDROMORPHONE HYDROCHLORIDE 0.4 MG: 0.2 INJECTION, SOLUTION INTRAMUSCULAR; INTRAVENOUS; SUBCUTANEOUS at 19:28

## 2025-03-24 RX ADMIN — Medication 2 G: at 12:17

## 2025-03-24 RX ADMIN — DEXMEDETOMIDINE HYDROCHLORIDE 4 MCG: 100 INJECTION, SOLUTION INTRAVENOUS at 14:16

## 2025-03-24 RX ADMIN — PHENYLEPHRINE HYDROCHLORIDE 200 MCG: 10 INJECTION INTRAVENOUS at 12:22

## 2025-03-24 RX ADMIN — Medication 40 MG: at 13:24

## 2025-03-24 RX ADMIN — DEXMEDETOMIDINE HYDROCHLORIDE 8 MCG: 100 INJECTION, SOLUTION INTRAVENOUS at 09:29

## 2025-03-24 RX ADMIN — ALBUMIN HUMAN: 0.05 INJECTION, SOLUTION INTRAVENOUS at 09:38

## 2025-03-24 RX ADMIN — HYDROMORPHONE HYDROCHLORIDE 0.25 MG: 1 INJECTION, SOLUTION INTRAMUSCULAR; INTRAVENOUS; SUBCUTANEOUS at 14:51

## 2025-03-24 RX ADMIN — DEXTROSE, SODIUM CHLORIDE, AND POTASSIUM CHLORIDE: 5; .45; .15 INJECTION INTRAVENOUS at 19:47

## 2025-03-24 RX ADMIN — Medication 20 MG: at 14:36

## 2025-03-24 RX ADMIN — ALBUMIN HUMAN: 0.05 INJECTION, SOLUTION INTRAVENOUS at 10:29

## 2025-03-24 RX ADMIN — HYDROMORPHONE HYDROCHLORIDE 0.25 MG: 1 INJECTION, SOLUTION INTRAMUSCULAR; INTRAVENOUS; SUBCUTANEOUS at 09:49

## 2025-03-24 RX ADMIN — DEXMEDETOMIDINE HYDROCHLORIDE 8 MCG: 100 INJECTION, SOLUTION INTRAVENOUS at 10:54

## 2025-03-24 RX ADMIN — Medication 0.5 UNITS: at 13:20

## 2025-03-24 RX ADMIN — HYDROMORPHONE HYDROCHLORIDE 0.5 MG: 1 INJECTION, SOLUTION INTRAMUSCULAR; INTRAVENOUS; SUBCUTANEOUS at 18:46

## 2025-03-24 RX ADMIN — Medication 10 MG: at 17:40

## 2025-03-24 RX ADMIN — MIDAZOLAM 1 MG: 1 INJECTION INTRAMUSCULAR; INTRAVENOUS at 07:39

## 2025-03-24 RX ADMIN — CALCIUM CHLORIDE INJECTION 200 MG: 100 INJECTION, SOLUTION INTRAVENOUS at 12:01

## 2025-03-24 RX ADMIN — PROPOFOL 20 MG: 10 INJECTION, EMULSION INTRAVENOUS at 15:09

## 2025-03-24 RX ADMIN — Medication 10 MG: at 15:05

## 2025-03-24 RX ADMIN — SODIUM CHLORIDE, SODIUM LACTATE, POTASSIUM CHLORIDE, AND CALCIUM CHLORIDE: .6; .31; .03; .02 INJECTION, SOLUTION INTRAVENOUS at 07:30

## 2025-03-24 RX ADMIN — PHENYLEPHRINE HYDROCHLORIDE 100 MCG: 10 INJECTION INTRAVENOUS at 13:30

## 2025-03-24 RX ADMIN — INDOMETHACIN 25 MG: 25 SUSPENSION ORAL at 11:06

## 2025-03-24 RX ADMIN — INSULIN HUMAN 1.5 UNITS/HR: 1 INJECTION, SOLUTION INTRAVENOUS at 10:42

## 2025-03-24 RX ADMIN — Medication 30 MG: at 09:10

## 2025-03-24 RX ADMIN — Medication 200 MG: at 18:29

## 2025-03-24 RX ADMIN — EPHEDRINE SULFATE 5 MG: 5 INJECTION INTRAVENOUS at 08:31

## 2025-03-24 RX ADMIN — ESMOLOL HYDROCHLORIDE 10 MG: 10 INJECTION, SOLUTION INTRAVENOUS at 15:10

## 2025-03-24 RX ADMIN — DEXMEDETOMIDINE HYDROCHLORIDE 8 MCG: 100 INJECTION, SOLUTION INTRAVENOUS at 12:12

## 2025-03-24 RX ADMIN — Medication 20 MG: at 16:25

## 2025-03-24 RX ADMIN — HYDROMORPHONE HYDROCHLORIDE 0.4 MG: 0.2 INJECTION, SOLUTION INTRAMUSCULAR; INTRAVENOUS; SUBCUTANEOUS at 20:15

## 2025-03-24 RX ADMIN — ONDANSETRON 4 MG: 2 INJECTION INTRAMUSCULAR; INTRAVENOUS at 08:33

## 2025-03-24 RX ADMIN — Medication 2 G: at 16:17

## 2025-03-24 RX ADMIN — ALBUMIN HUMAN: 0.05 INJECTION, SOLUTION INTRAVENOUS at 12:07

## 2025-03-24 RX ADMIN — FENTANYL CITRATE 50 MCG: 50 INJECTION, SOLUTION INTRAMUSCULAR; INTRAVENOUS at 19:00

## 2025-03-24 RX ADMIN — OXYCODONE HYDROCHLORIDE 5 MG: 5 SOLUTION ORAL at 23:09

## 2025-03-24 RX ADMIN — FENTANYL CITRATE 100 MCG: 50 INJECTION INTRAMUSCULAR; INTRAVENOUS at 07:39

## 2025-03-24 RX ADMIN — LIDOCAINE HYDROCHLORIDE 100 MG: 20 INJECTION, SOLUTION INFILTRATION; PERINEURAL at 12:25

## 2025-03-24 RX ADMIN — SODIUM BICARBONATE 10 MEQ: 84 INJECTION, SOLUTION INTRAVENOUS at 14:00

## 2025-03-24 RX ADMIN — SODIUM BICARBONATE 10 MEQ: 84 INJECTION, SOLUTION INTRAVENOUS at 13:56

## 2025-03-24 RX ADMIN — PHENYLEPHRINE HYDROCHLORIDE 100 MCG: 10 INJECTION INTRAVENOUS at 08:29

## 2025-03-24 RX ADMIN — Medication 10 MG: at 17:13

## 2025-03-24 RX ADMIN — HYDROMORPHONE HYDROCHLORIDE 0.4 MG: 0.2 INJECTION, SOLUTION INTRAMUSCULAR; INTRAVENOUS; SUBCUTANEOUS at 20:44

## 2025-03-24 RX ADMIN — CALCIUM CHLORIDE INJECTION 200 MG: 100 INJECTION, SOLUTION INTRAVENOUS at 13:09

## 2025-03-24 RX ADMIN — Medication 20 MG: at 08:34

## 2025-03-24 RX ADMIN — DEXMEDETOMIDINE HYDROCHLORIDE 4 MCG: 100 INJECTION, SOLUTION INTRAVENOUS at 13:02

## 2025-03-24 RX ADMIN — PHENYLEPHRINE HYDROCHLORIDE 100 MCG: 10 INJECTION INTRAVENOUS at 08:00

## 2025-03-24 RX ADMIN — EPHEDRINE SULFATE 15 MG: 5 INJECTION INTRAVENOUS at 12:21

## 2025-03-24 RX ADMIN — Medication 20 MG: at 15:43

## 2025-03-24 RX ADMIN — SENNOSIDES AND DOCUSATE SODIUM 1 TABLET: 50; 8.6 TABLET ORAL at 22:26

## 2025-03-24 RX ADMIN — Medication 20 MG: at 10:05

## 2025-03-24 RX ADMIN — CALCIUM CHLORIDE INJECTION 200 MG: 100 INJECTION, SOLUTION INTRAVENOUS at 12:43

## 2025-03-24 RX ADMIN — Medication 10 MG: at 16:43

## 2025-03-24 RX ADMIN — DEXMEDETOMIDINE HYDROCHLORIDE 4 MCG: 100 INJECTION, SOLUTION INTRAVENOUS at 17:53

## 2025-03-24 RX ADMIN — DEXMEDETOMIDINE HYDROCHLORIDE 12 MCG: 100 INJECTION, SOLUTION INTRAVENOUS at 10:06

## 2025-03-24 RX ADMIN — LIDOCAINE HYDROCHLORIDE 100 MG: 20 INJECTION, SOLUTION INFILTRATION; PERINEURAL at 07:39

## 2025-03-24 RX ADMIN — SODIUM BICARBONATE 10 MEQ: 84 INJECTION, SOLUTION INTRAVENOUS at 14:04

## 2025-03-24 RX ADMIN — CALCIUM CHLORIDE INJECTION 200 MG: 100 INJECTION, SOLUTION INTRAVENOUS at 12:45

## 2025-03-24 RX ADMIN — Medication 20 MG: at 12:05

## 2025-03-24 RX ADMIN — MAGNESIUM SULFATE HEPTAHYDRATE 500 MG: 1 INJECTION, SOLUTION INTRAVENOUS at 10:37

## 2025-03-24 RX ADMIN — SODIUM CHLORIDE, SODIUM GLUCONATE, SODIUM ACETATE, POTASSIUM CHLORIDE AND MAGNESIUM CHLORIDE: 526; 502; 368; 37; 30 INJECTION, SOLUTION INTRAVENOUS at 12:06

## 2025-03-24 RX ADMIN — PROPOFOL 150 MG: 10 INJECTION, EMULSION INTRAVENOUS at 07:39

## 2025-03-24 RX ADMIN — FENTANYL CITRATE 50 MCG: 50 INJECTION, SOLUTION INTRAMUSCULAR; INTRAVENOUS at 19:14

## 2025-03-24 RX ADMIN — SODIUM CHLORIDE, SODIUM GLUCONATE, SODIUM ACETATE, POTASSIUM CHLORIDE AND MAGNESIUM CHLORIDE: 526; 502; 368; 37; 30 INJECTION, SOLUTION INTRAVENOUS at 07:47

## 2025-03-24 RX ADMIN — Medication 20 MG: at 11:10

## 2025-03-24 RX ADMIN — HYDROMORPHONE HYDROCHLORIDE 0.4 MG: 0.2 INJECTION, SOLUTION INTRAMUSCULAR; INTRAVENOUS; SUBCUTANEOUS at 19:40

## 2025-03-24 RX ADMIN — Medication 0.5 UNITS: at 13:09

## 2025-03-24 RX ADMIN — ACETAMINOPHEN 650 MG: 325 SOLUTION ORAL at 22:26

## 2025-03-24 RX ADMIN — Medication 10 MG: at 12:16

## 2025-03-24 RX ADMIN — SODIUM BICARBONATE 10 MEQ: 84 INJECTION, SOLUTION INTRAVENOUS at 14:12

## 2025-03-24 RX ADMIN — SODIUM BICARBONATE 25 MEQ: 84 INJECTION, SOLUTION INTRAVENOUS at 12:22

## 2025-03-24 RX ADMIN — CALCIUM CHLORIDE INJECTION 200 MG: 100 INJECTION, SOLUTION INTRAVENOUS at 12:22

## 2025-03-24 RX ADMIN — MIDAZOLAM 1 MG: 1 INJECTION INTRAMUSCULAR; INTRAVENOUS at 08:32

## 2025-03-24 RX ADMIN — HYDROMORPHONE HYDROCHLORIDE 0.4 MG: 0.2 INJECTION, SOLUTION INTRAMUSCULAR; INTRAVENOUS; SUBCUTANEOUS at 20:01

## 2025-03-24 RX ADMIN — PHENYLEPHRINE HYDROCHLORIDE 100 MCG: 10 INJECTION INTRAVENOUS at 11:23

## 2025-03-24 RX ADMIN — PROPOFOL 30 MG: 10 INJECTION, EMULSION INTRAVENOUS at 07:41

## 2025-03-24 RX ADMIN — ENOXAPARIN SODIUM 40 MG: 40 INJECTION SUBCUTANEOUS at 06:25

## 2025-03-24 RX ADMIN — NOREPINEPHRINE BITARTRATE 12.8 MCG: 1 INJECTION, SOLUTION, CONCENTRATE INTRAVENOUS at 12:22

## 2025-03-24 RX ADMIN — Medication 10 MG: at 09:32

## 2025-03-24 RX ADMIN — Medication 1 UNITS: at 12:22

## 2025-03-24 RX ADMIN — SODIUM BICARBONATE 10 MEQ: 84 INJECTION, SOLUTION INTRAVENOUS at 14:08

## 2025-03-24 RX ADMIN — SODIUM CHLORIDE, SODIUM GLUCONATE, SODIUM ACETATE, POTASSIUM CHLORIDE AND MAGNESIUM CHLORIDE: 526; 502; 368; 37; 30 INJECTION, SOLUTION INTRAVENOUS at 16:15

## 2025-03-24 RX ADMIN — HYDROMORPHONE HYDROCHLORIDE 0.4 MG: 0.2 INJECTION, SOLUTION INTRAMUSCULAR; INTRAVENOUS; SUBCUTANEOUS at 22:26

## 2025-03-24 RX ADMIN — Medication 2 G: at 08:13

## 2025-03-24 RX ADMIN — EPHEDRINE SULFATE 5 MG: 5 INJECTION INTRAVENOUS at 08:28

## 2025-03-24 RX ADMIN — Medication 20 MG: at 13:52

## 2025-03-24 RX ADMIN — PHENYLEPHRINE HYDROCHLORIDE 0.5 MCG/KG/MIN: 10 INJECTION INTRAVENOUS at 07:50

## 2025-03-24 RX ADMIN — ONDANSETRON 4 MG: 2 INJECTION INTRAMUSCULAR; INTRAVENOUS at 18:35

## 2025-03-24 RX ADMIN — DEXAMETHASONE SODIUM PHOSPHATE 4 MG: 4 INJECTION, SOLUTION INTRA-ARTICULAR; INTRALESIONAL; INTRAMUSCULAR; INTRAVENOUS; SOFT TISSUE at 08:33

## 2025-03-24 RX ADMIN — Medication 10 MG: at 10:54

## 2025-03-24 RX ADMIN — HYDROMORPHONE HYDROCHLORIDE 0.5 MG: 1 INJECTION, SOLUTION INTRAMUSCULAR; INTRAVENOUS; SUBCUTANEOUS at 16:35

## 2025-03-24 RX ADMIN — SODIUM BICARBONATE 25 MEQ: 84 INJECTION, SOLUTION INTRAVENOUS at 12:27

## 2025-03-24 ASSESSMENT — ACTIVITIES OF DAILY LIVING (ADL)
ADLS_ACUITY_SCORE: 47
ADLS_ACUITY_SCORE: 49
ADLS_ACUITY_SCORE: 49
ADLS_ACUITY_SCORE: 47
ADLS_ACUITY_SCORE: 49

## 2025-03-24 NOTE — OR NURSING
Writer spoke with blood bank, who approved a type and screen extension form, for the specimen drawn on 3/11/25.  Type and screen extension form completed and hand carried to lab.  Port accessed in lieu of peripheral IV.  Aleyda Lemos RN on 3/24/2025 at 7:08 AM

## 2025-03-24 NOTE — BRIEF OP NOTE
[facility]    Brief Operative Note    Pre-operative diagnosis: Malignant neoplasm of esophagus, unspecified location (H) [C15.9]  Post-operative diagnosis Same as pre-operative diagnosis    Procedure: ESOPHAGECTOMY, MINIMALLY INVASIVE, Laparoscopic Right Thorascopic Esophagectomy, Botox Injection, N/A - Chest  Esophagoscopy, gastroscopy, duodenoscopy (EGD), combined, N/A - Esophagus    Surgeon: Surgeons and Role:     * Katlyn Tobar MD - Primary     * Erik Lindsey MD - Assisting     * Chavez Rodriguez MD - Resident - Assisting     * Lisa Yoder MD - Fellow - Assisting  Anesthesia: General   Estimated Blood Loss: 200 ml    Drains: Left pleural toyin drain, right pleural chest tube, NG tube  Specimens:   ID Type Source Tests Collected by Time Destination   1 : Left Gastric Lymph Node Tissue Lymph Node(s) SURGICAL PATHOLOGY EXAM Katlyn Tobar MD 3/24/2025  9:35 AM    2 : peritoneal nodule Tissue Peritoneum SURGICAL PATHOLOGY EXAM Katlyn Tobar MD 3/24/2025  9:55 AM    3 : Right Gastric Node Tissue Stomach SURGICAL PATHOLOGY EXAM Katlyn Tobar MD 3/24/2025 10:51 AM    4 : Level 7 Lymph Node Tissue Lymph Node(s) SURGICAL PATHOLOGY EXAM Katlyn Tobar MD 3/24/2025  2:28 PM    5 : Analy Gastric Fat Tissue Stomach SURGICAL PATHOLOGY EXAM Katlyn Tobar MD 3/24/2025  3:49 PM    6 : Final Gastric Margin Tissue Stomach SURGICAL PATHOLOGY EXAM Katlyn Tobar MD 3/24/2025  3:51 PM    7 : Esophagogastectomy -Esophageal Margin FOR FROZEN Tissue Esophagus SURGICAL PATHOLOGY EXAM Katlyn Tobar MD 3/24/2025  4:28 PM      Findings:   Many abdominal adhesions from prior surgeries .  Complications: None.  Implants: * No implants in log *

## 2025-03-24 NOTE — ANESTHESIA PROCEDURE NOTES
Airway       Patient location during procedure: OR       Procedure Start/Stop Times: 3/24/2025 7:44 AM  Staff -        Other Anesthesia Staff: Idania Serna       Performed By: SRNAIndications and Patient Condition       Indications for airway management: suki-procedural         Mask difficulty assessment: 2 - vent by mask + OA or adjuvant +/- NMBA    Final Airway Details       Final airway type: endotracheal airway       Successful airway: ETT - double lumen left  Endotracheal Airway Details        Cuffed: yes       Cuff volume (mL): 10       Successful intubation technique: video laryngoscopy       : S3 hyperangle.       Grade View of Cords: 1       Adjucts: stylet       Position: Right       Measured from: lips       Secured at (cm): 29       Bite block used: None       ETT Double lumen (fr): 39    Post intubation assessment        Placement verified by: capnometry, equal breath sounds and chest rise        Number of attempts at approach: 1       Secured with: tape       Ease of procedure: easy       Dentition: Intact    Medication(s) Administered   Medication Administration Time: 3/24/2025 7:44 AM

## 2025-03-24 NOTE — ANESTHESIA PROCEDURE NOTES
Arterial Line Procedure Note    Pre-Procedure   Staff -        Other Anesthesia Staff: Idania Serna       Performed By: EMIL       Location: OR       Pre-Anesthestic Checklist: patient identified, IV checked, risks and benefits discussed, informed consent, monitors and equipment checked, pre-op evaluation and at physician/surgeon's request  Timeout:       Correct Patient: Yes        Correct Procedure: Yes        Correct Site: Yes        Correct Position: Yes   Line Placement:   This line was placed Post Induction  Procedure   Procedure: arterial line       Laterality: left       Insertion Site: radial.  Sterile Prep        Standard elements of sterile barrier followed       Skin prep: Chloraprep  Insertion/Injection        Technique: ultrasound guided        1. Ultrasound was used to evaluate the access site.       2. Artery evaluated via ultrasound for patency/adequacy.       3. Using real-time ultrasound the needle/catheter was observed entering the artery/vein.       5. The visualized structures were anatomically normal.       Catheter Type/Size: 20 G, 1.75 in/4.5 cm quick cath (integral wire) (Arrow)  Narrative        Tegaderm dressing used.       Complications: None apparent,        Arterial waveform: Yes

## 2025-03-24 NOTE — ANESTHESIA CARE TRANSFER NOTE
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    Anesthesia Type:   General     Note:    Oropharynx: oropharynx clear of all foreign objects, spontaneously breathing and nasal gatric tube in place  Level of Consciousness: drowsy  Oxygen Supplementation: face mask  Level of Supplemental Oxygen (L/min / FiO2): 8  Independent Airway: airway patency satisfactory and stable  Dentition: dentition unchanged  Vital Signs Stable: post-procedure vital signs reviewed and stable  Report to RN Given: handoff report given  Patient transferred to: PACU  Comments: Pt extubated in the OR without incident or complications. Pt VSS upon arrival to the PACU. Pt has moaning with stimulation. No c/o N/V. Pt care report given to receiving RN.   Handoff Report: Identifed the Patient, Identified the Reponsible Provider, Reviewed the pertinent medical history, Discussed the surgical course, Reviewed Intra-OP anesthesia mangement and issues during anesthesia, Set expectations for post-procedure period and Allowed opportunity for questions and acknowledgement of understanding      Vitals:  Vitals Value Taken Time   BP 98/69 03/24/25 1851   Temp     Pulse 72 03/24/25 1859   Resp 12 03/24/25 1859   SpO2 100 % 03/24/25 1859   Vitals shown include unfiled device data.    Electronically Signed By: ROSY Lloyd CRNA  March 24, 2025  6:59 PM

## 2025-03-24 NOTE — H&P
SURGICAL ICU ADMISSION NOTE  03/24/2025      PRIMARY TEAM: Thoracic Surgery   PRIMARY PHYSICIAN: Dr. Tobar  REASON FOR CRITICAL CARE ADMISSION: Hemodynamic monitoring    ADMITTING PHYSICIAN: Dr. 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 Delirium preventions and precautions  - Pain: S tylenol, PRN dilaudid, PRN oxy    - Sedation plan: n/a  # Neuritis  # Peripheral neuropathy  - Hold PTA gabapentin, nortriptyline    Pulmonary:   # Acute hypoxic respiratory support   - Supplemental oxygen to keep saturation above 92%.  - Incentive spirometer every 15-30 minutes when awake.    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    Fluids/Electrolytes:   #       # Hypocalcemia: Lowest Ca = 8.6 mg/dL in last 30 days, will monitor and replace as appropriate  # Hypomagnesemia: Lowest Mg = 1.6 mg/dL in last 30 days, will replace as needed    #  - D5 1/2NS +KcL titrated to 100/Hr for IV fluid hydration  -  Will continue to monitor intake and output.    Endocrine:  # Stress hyperglycemia   - No management  "indication.  Sliding scale for glucose management. Goal to keep BG< 180 for optimal wound healing     ID:  # Leukocytosis 2/2 post operative inflammation  - no indications for antibiotics     Positive cultures:  - none    Heme:     # Acute blood loss anemia   # Anemia of critical illness   - Transfuse if hgb <7.0 or signs/symptoms of hypoperfusion. Monitor and trend.     MSK:   # Weakness and deconditioning of critical illness   - Physical and occupational therapy consult     General Cares/Prophylaxis:    DVT Prophylaxis: Defer to primary service  GI Prophylaxis: Not indicated  Restraints: Restraints for medical healing needed: NO    Lines/ tubes/ drains:  - port  - PIVx2  - steward  - bilateral chest tubes    Disposition:  - Surgical ICU.     Patient seen, findings and plan discussed with surgical ICU staff, Dr. Lamar Santos.    Clinically Significant Risk Factors Present on Admission                   # Hypertension: Noted on problem list     # Acute Hypercapnic Respiratory Failure: based on arterial blood gas results.  Continue supplemental oxygen and ventilatory support as indicated.        # Overweight: Estimated body mass index is 27.08 kg/m  as calculated from the following:    Height as of this encounter: 1.778 m (5' 10\").    Weight as of this encounter: 85.6 kg (188 lb 11.4 oz).                  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    APPENDECTOMY  1964?    BRONCHOSCOPY RIDID OR FLEXIBLE W/ENDOBRONCHIAL ULTRASOUND GUIDED 3 OR MORE NODE STATIONS N/A 3/19/2025    Procedure: Bronchoscopy Ridid Or Flexible W/Endobronchial Ultrasound Guided 3 Or More Node Stations;  Surgeon: Saad Olmedo MD;  Location:  GI    COLONOSCOPY  2006, 2016    DAVINCI NISSEN FUNDOPLICATION N/A 2/26/2025    Procedure: TAKE-DOWN, FUNDOPLICATION, NISSEN, LAPAROSCOPIC- ROBOTIC, gastrostomy takedown, lysisof adhesions 2 hr;  Surgeon: Katlyn Tobar MD;  Location: [facility] OR    ESOPHAGEAL BALLOON PROVOCATION STUDY N/A 9/24/2024    Procedure: Esophageal Balloon Provocation Study;  Surgeon: Carson Michel DO;  Location:  GI    ESOPHAGOSCOPY, GASTROSCOPY, DUODENOSCOPY (EGD), COMBINED N/A 9/24/2024    Procedure: ESOPHAGOGASTRODUODENOSCOPY, WITH BIOPSY;  Surgeon: Carson Michel DO;  Location:  GI    ESOPHAGOSCOPY, GASTROSCOPY, DUODENOSCOPY (EGD), COMBINED N/A 10/8/2024    Procedure: ESOPHAGOGASTRODUODENOSCOPY, WITH FINE NEEDLE ASPIRATION BIOPSY, WITH ENDOSCOPIC ULTRASOUND GUIDANCE;  Surgeon: Lance Lopez MD;  Location:  GI    ESOPHAGOSCOPY, GASTROSCOPY, DUODENOSCOPY (EGD), COMBINED N/A 2/26/2025    Procedure: Esophagoscopy, gastroscopy, duodenoscopy (EGD);  Surgeon: Katlyn Tobar MD;  Location:  OR    EYE SURGERY  199x    laser for retinal tears    GASTRIC FUNDOPLICATION      HERNIA REPAIR  1961?    IR CHEST PORT PLACEMENT > 5 YRS OF AGE  10/24/2024    LAPAROSCOPIC ASSISTED INSERTION TUBE JEJUNOSTOMY N/A 2/26/2025    Procedure: Laparoscopic assisted insertion tube jejunostomy;  Surgeon: Katlyn Tobar MD;  Location: [facility] OR    LAPAROSCOPIC TAKEDOWN NISSEN  FUNDOPLICATION N/A 9/25/2020    Procedure: TAKE-DOWN, FUNDOPLICATION, REDO NISSEN, LAPAROSCOPIC, gastrostomy feeding tube placement;  Surgeon: Katlyn Tobar MD;  Location: [facility] OR    HI ESOPHAGOGASTRODUODENOSCOPY TRANSORAL DIAGNOSTIC N/A 5/9/2019    Procedure: UPPER GASTROINTESTINAL ENDOSCOPY;  Surgeon: Dino Ward MD;  Location: [facility];  Service: General    SOFT TISSUE SURGERY  2009?    MCL l. thumb       SOCIAL HISTORY:   Social History     Tobacco Use    Smoking status: Never     Passive exposure: Past    Smokeless tobacco: Never   Substance Use Topics    Alcohol use: Not Currently    Drug use: Never       FAMILY HISTORY:   Family History   Problem Relation Age of Onset    Hypertension Mother     Uterine Cancer Mother         1965    Anesthesia Reaction Mother         PONV, slow to emerge    Mitral Valve Replacement Father     Coronary Artery Disease Maternal Grandfather     GERD No family hx of     Ulcerative Colitis No family hx of     Crohn's Disease No family hx of     Stomach Cancer No family hx of     Deep Vein Thrombosis (DVT) No family hx of      ALLERGIES:   Allergies   Allergen Reactions    Hornets     Wasp Venom Protein Hives    Bee Venom Swelling       MEDICATIONS:  Current Facility-Administered Medications   Medication Dose Route Frequency Provider Last Rate Last Admin    albuterol (PROVENTIL) neb solution 2.5 mg  2.5 mg Nebulization Q2H PRN Lisa Larson MD        [START ON 3/27/2025] bisacodyl (DULCOLAX) suppository 10 mg  10 mg Rectal Daily PRN Lisa Larson MD        ceFAZolin Sodium (ANCEF) injection 2 g  2 g Intravenous Pre-Op/Pre-procedure x 1 dose Katlyn Tobar MD        ceFAZolin Sodium (ANCEF) injection 2 g  2 g Intravenous See Admin Instructions Katlyn Tobar MD   2 g at 03/24/25 1617    dextrose 5% and 0.45% NaCl + KCl 20 mEq/L infusion   Intravenous Continuous Alexey Maher MD 50 mL/hr at 03/24/25 1947 New Bag at 03/24/25 1947    glucose gel 15-30 g  15-30  g Oral Q15 Min PRN Alexey Maher MD        Or    dextrose 50 % injection 25-50 mL  25-50 mL Intravenous Q15 Min PRN Alexey Maher MD        Or    glucagon injection 1 mg  1 mg Subcutaneous Q15 Min PRN Alexey Maher MD        [START ON 3/25/2025] enoxaparin ANTICOAGULANT (LOVENOX) injection 40 mg  40 mg Subcutaneous Q24H Lisa Larson MD        HYDROmorphone (DILAUDID) injection 0.2 mg  0.2 mg Intravenous Q2H PRN Lisa Larson MD        Or    HYDROmorphone (DILAUDID) injection 0.4 mg  0.4 mg Intravenous Q2H PRN Lisa Larson MD        insulin aspart (NovoLOG) injection (RAPID ACTING)  1-12 Units Subcutaneous Q4H Alexey Maher MD        [START ON 3/26/2025] magnesium hydroxide (MILK OF MAGNESIA) suspension 30 mL  30 mL Per J Tube Daily PRN Lisa Larson MD        [START ON 3/25/2025] polyethylene glycol (MIRALAX) Packet 17 g  17 g Per J Tube Daily Lisa Larson MD        senna-docusate (SENOKOT-S/PERICOLACE) 8.6-50 MG per tablet 1 tablet  1 tablet Per J Tube BID Lisa Larson MD           PHYSICAL EXAMINATION:  Temp:  [96.6  F (35.9  C)-97.5  F (36.4  C)] 97.4  F (36.3  C)  Pulse:  [58-86] 86  Resp:  [8-20] 8  BP: (105-129)/(73-91) 119/91  MAP:  [67 mmHg-93 mmHg] 93 mmHg  Arterial Line BP: ()/(52-69) 129/69  SpO2:  [92 %-100 %] 92 %    General: alert, oriented to conversation, NAD, well appearing  Cardiac: RRR to PP  Respiratory: NLB  Abdomen: soft, appropriately tender, and mildly distended  Extremities: no obvious peripheral edema  Neuro: no obvious focal deficits    LABS: Reviewed.   Arterial Blood Gases   Recent Labs   Lab 03/24/25  1613 03/24/25  1339 03/24/25  1236 03/24/25  1153   PH 7.35 7.26* 7.36 7.34*   PCO2 46* 55* 44 38   PO2 77* 67* 241* 153*   HCO3 25 25 25 21     Complete Blood Count   Recent Labs   Lab 03/24/25  1907 03/24/25  1613 03/24/25  1339 03/24/25  1236   WBC 10.6  --   --   --    HGB 12.1* 12.7* 12.8* 13.3     --   --   --      Basic Metabolic  Panel  Recent Labs   Lab 03/24/25  2108 03/24/25  1909 03/24/25  1907 03/24/25  1613 03/24/25  1339 03/24/25  1236   NA  --   --  139 140 142 142   POTASSIUM  --   --  4.6 4.8 3.9 4.0   CHLORIDE  --   --  102  --   --   --    CO2  --   --  23  --   --   --    BUN  --   --  16.4  --   --   --    CR  --   --  0.93  --   --   --    * 162* 171* 145* 131* 151*     Liver Function Tests  No lab results found in last 7 days.  Pancreatic Enzymes  No lab results found in last 7 days.  Coagulation Profile  No lab results found in last 7 days.    IMAGING:  Recent Results (from the past 24 hours)   XR Chest Port 1 View    Impression    RESIDENT PRELIMINARY INTERPRETATION  Impression:   1. Postsurgical changes of esophagectomy with support devices  appropriately positioned as detailed in body of the report.   2. Small right apical pneumothorax with right apical chest tube in  place.  3. Low lung volumes with streaky perihilar opacities compatible with  atelectasis/edema.  4. Small left pleural effusion with left basilar chest tube in place.

## 2025-03-25 ENCOUNTER — APPOINTMENT (OUTPATIENT)
Dept: OCCUPATIONAL THERAPY | Facility: CLINIC | Age: 70
End: 2025-03-25
Payer: MEDICARE

## 2025-03-25 ENCOUNTER — APPOINTMENT (OUTPATIENT)
Dept: GENERAL RADIOLOGY | Facility: CLINIC | Age: 70
End: 2025-03-25
Attending: NURSE PRACTITIONER
Payer: MEDICARE

## 2025-03-25 LAB
ANION GAP SERPL CALCULATED.3IONS-SCNC: 15 MMOL/L (ref 7–15)
BUN SERPL-MCNC: 14.5 MG/DL (ref 8–23)
CALCIUM SERPL-MCNC: 8.9 MG/DL (ref 8.8–10.4)
CHLORIDE SERPL-SCNC: 103 MMOL/L (ref 98–107)
CREAT SERPL-MCNC: 0.88 MG/DL (ref 0.67–1.17)
EGFRCR SERPLBLD CKD-EPI 2021: >90 ML/MIN/1.73M2
ERYTHROCYTE [DISTWIDTH] IN BLOOD BY AUTOMATED COUNT: 12 % (ref 10–15)
GLUCOSE BLDC GLUCOMTR-MCNC: 147 MG/DL (ref 70–99)
GLUCOSE BLDC GLUCOMTR-MCNC: 158 MG/DL (ref 70–99)
GLUCOSE BLDC GLUCOMTR-MCNC: 162 MG/DL (ref 70–99)
GLUCOSE BLDC GLUCOMTR-MCNC: 173 MG/DL (ref 70–99)
GLUCOSE BLDC GLUCOMTR-MCNC: 180 MG/DL (ref 70–99)
GLUCOSE BLDC GLUCOMTR-MCNC: 199 MG/DL (ref 70–99)
GLUCOSE BLDC GLUCOMTR-MCNC: 227 MG/DL (ref 70–99)
GLUCOSE SERPL-MCNC: 182 MG/DL (ref 70–99)
HCO3 SERPL-SCNC: 21 MMOL/L (ref 22–29)
HCT VFR BLD AUTO: 41.7 % (ref 40–53)
HGB BLD-MCNC: 14 G/DL (ref 13.3–17.7)
MAGNESIUM SERPL-MCNC: 1.9 MG/DL (ref 1.7–2.3)
MCH RBC QN AUTO: 30.5 PG (ref 26.5–33)
MCHC RBC AUTO-ENTMCNC: 33.6 G/DL (ref 31.5–36.5)
MCV RBC AUTO: 91 FL (ref 78–100)
PHOSPHATE SERPL-MCNC: 3.5 MG/DL (ref 2.5–4.5)
PLATELET # BLD AUTO: 171 10E3/UL (ref 150–450)
POTASSIUM SERPL-SCNC: 4.8 MMOL/L (ref 3.4–5.3)
RBC # BLD AUTO: 4.59 10E6/UL (ref 4.4–5.9)
SODIUM SERPL-SCNC: 139 MMOL/L (ref 135–145)
WBC # BLD AUTO: 9.4 10E3/UL (ref 4–11)

## 2025-03-25 PROCEDURE — 250N000013 HC RX MED GY IP 250 OP 250 PS 637: Performed by: STUDENT IN AN ORGANIZED HEALTH CARE EDUCATION/TRAINING PROGRAM

## 2025-03-25 PROCEDURE — 80048 BASIC METABOLIC PNL TOTAL CA: CPT

## 2025-03-25 PROCEDURE — 250N000011 HC RX IP 250 OP 636

## 2025-03-25 PROCEDURE — 71045 X-RAY EXAM CHEST 1 VIEW: CPT

## 2025-03-25 PROCEDURE — 250N000013 HC RX MED GY IP 250 OP 250 PS 637: Performed by: PHYSICIAN ASSISTANT

## 2025-03-25 PROCEDURE — 250N000011 HC RX IP 250 OP 636: Performed by: PHYSICIAN ASSISTANT

## 2025-03-25 PROCEDURE — 250N000013 HC RX MED GY IP 250 OP 250 PS 637

## 2025-03-25 PROCEDURE — 250N000011 HC RX IP 250 OP 636: Mod: JZ | Performed by: STUDENT IN AN ORGANIZED HEALTH CARE EDUCATION/TRAINING PROGRAM

## 2025-03-25 PROCEDURE — 83735 ASSAY OF MAGNESIUM: CPT

## 2025-03-25 PROCEDURE — 97530 THERAPEUTIC ACTIVITIES: CPT | Mod: GO

## 2025-03-25 PROCEDURE — 250N000013 HC RX MED GY IP 250 OP 250 PS 637: Performed by: NURSE PRACTITIONER

## 2025-03-25 PROCEDURE — 97165 OT EVAL LOW COMPLEX 30 MIN: CPT | Mod: GO

## 2025-03-25 PROCEDURE — 97535 SELF CARE MNGMENT TRAINING: CPT | Mod: GO

## 2025-03-25 PROCEDURE — 258N000003 HC RX IP 258 OP 636: Performed by: STUDENT IN AN ORGANIZED HEALTH CARE EDUCATION/TRAINING PROGRAM

## 2025-03-25 PROCEDURE — 84100 ASSAY OF PHOSPHORUS: CPT | Performed by: STUDENT IN AN ORGANIZED HEALTH CARE EDUCATION/TRAINING PROGRAM

## 2025-03-25 PROCEDURE — 85027 COMPLETE CBC AUTOMATED: CPT

## 2025-03-25 PROCEDURE — 250N000011 HC RX IP 250 OP 636: Performed by: NURSE PRACTITIONER

## 2025-03-25 PROCEDURE — 120N000003 HC R&B IMCU UMMC

## 2025-03-25 PROCEDURE — 71045 X-RAY EXAM CHEST 1 VIEW: CPT | Mod: 26 | Performed by: RADIOLOGY

## 2025-03-25 PROCEDURE — 250N000012 HC RX MED GY IP 250 OP 636 PS 637: Performed by: STUDENT IN AN ORGANIZED HEALTH CARE EDUCATION/TRAINING PROGRAM

## 2025-03-25 PROCEDURE — 999N000147 HC STATISTIC PT IP EVAL DEFER

## 2025-03-25 RX ORDER — NORTRIPTYLINE HYDROCHLORIDE 10 MG/5ML
20 SOLUTION ORAL AT BEDTIME
Status: DISCONTINUED | OUTPATIENT
Start: 2025-03-25 | End: 2025-03-25

## 2025-03-25 RX ORDER — POLYETHYLENE GLYCOL 3350 17 G/17G
17 POWDER, FOR SOLUTION ORAL DAILY
Status: DISCONTINUED | OUTPATIENT
Start: 2025-03-25 | End: 2025-03-25

## 2025-03-25 RX ORDER — NORTRIPTYLINE HYDROCHLORIDE 25 MG/1
25 CAPSULE ORAL EVERY EVENING
Status: DISCONTINUED | OUTPATIENT
Start: 2025-03-25 | End: 2025-04-09 | Stop reason: HOSPADM

## 2025-03-25 RX ORDER — METHOCARBAMOL 100 MG/ML
1000 INJECTION, SOLUTION INTRAMUSCULAR; INTRAVENOUS EVERY 8 HOURS
Status: DISCONTINUED | OUTPATIENT
Start: 2025-03-25 | End: 2025-03-26

## 2025-03-25 RX ORDER — AMOXICILLIN 250 MG
1 CAPSULE ORAL 2 TIMES DAILY
Status: DISCONTINUED | OUTPATIENT
Start: 2025-03-25 | End: 2025-03-25

## 2025-03-25 RX ORDER — DEXTROSE MONOHYDRATE 100 MG/ML
INJECTION, SOLUTION INTRAVENOUS CONTINUOUS PRN
Status: DISCONTINUED | OUTPATIENT
Start: 2025-03-25 | End: 2025-04-09 | Stop reason: HOSPADM

## 2025-03-25 RX ORDER — AMOXICILLIN 250 MG
2 CAPSULE ORAL 2 TIMES DAILY
Status: DISCONTINUED | OUTPATIENT
Start: 2025-03-25 | End: 2025-03-25

## 2025-03-25 RX ORDER — GABAPENTIN 250 MG/5ML
300 SOLUTION ORAL EVERY 8 HOURS SCHEDULED
Status: DISCONTINUED | OUTPATIENT
Start: 2025-03-25 | End: 2025-03-25

## 2025-03-25 RX ORDER — GUAIFENESIN 600 MG/1
15 TABLET, EXTENDED RELEASE ORAL DAILY
Status: DISCONTINUED | OUTPATIENT
Start: 2025-03-25 | End: 2025-04-09 | Stop reason: HOSPADM

## 2025-03-25 RX ORDER — NORTRIPTYLINE HYDROCHLORIDE 10 MG/1
20 CAPSULE ORAL EVERY EVENING
Status: ON HOLD | COMMUNITY
End: 2025-04-09

## 2025-03-25 RX ORDER — MAGNESIUM OXIDE 400 MG/1
400 TABLET ORAL EVERY 4 HOURS
Status: COMPLETED | OUTPATIENT
Start: 2025-03-25 | End: 2025-03-25

## 2025-03-25 RX ORDER — GABAPENTIN 300 MG/1
300 CAPSULE ORAL 3 TIMES DAILY
Status: DISCONTINUED | OUTPATIENT
Start: 2025-03-25 | End: 2025-04-09 | Stop reason: HOSPADM

## 2025-03-25 RX ORDER — ATENOLOL 25 MG/1
25 TABLET ORAL DAILY
Status: DISCONTINUED | OUTPATIENT
Start: 2025-03-25 | End: 2025-03-29

## 2025-03-25 RX ORDER — ACETAMINOPHEN 500 MG
1000 TABLET ORAL EVERY 8 HOURS
Status: ON HOLD | COMMUNITY
End: 2025-04-09

## 2025-03-25 RX ADMIN — MAGNESIUM OXIDE TAB 400 MG (241.3 MG ELEMENTAL MG) 400 MG: 400 (241.3 MG) TAB at 06:13

## 2025-03-25 RX ADMIN — INSULIN ASPART 3 UNITS: 100 INJECTION, SOLUTION INTRAVENOUS; SUBCUTANEOUS at 00:38

## 2025-03-25 RX ADMIN — HYDROMORPHONE HYDROCHLORIDE 0.4 MG: 0.2 INJECTION, SOLUTION INTRAMUSCULAR; INTRAVENOUS; SUBCUTANEOUS at 07:26

## 2025-03-25 RX ADMIN — HYDROMORPHONE HYDROCHLORIDE 0.4 MG: 0.2 INJECTION, SOLUTION INTRAMUSCULAR; INTRAVENOUS; SUBCUTANEOUS at 10:14

## 2025-03-25 RX ADMIN — ACETAMINOPHEN 650 MG: 325 SOLUTION ORAL at 22:25

## 2025-03-25 RX ADMIN — HYDROMORPHONE HYDROCHLORIDE 0.4 MG: 0.2 INJECTION, SOLUTION INTRAMUSCULAR; INTRAVENOUS; SUBCUTANEOUS at 05:50

## 2025-03-25 RX ADMIN — ATENOLOL 25 MG: 25 TABLET ORAL at 11:25

## 2025-03-25 RX ADMIN — INSULIN ASPART 1 UNITS: 100 INJECTION, SOLUTION INTRAVENOUS; SUBCUTANEOUS at 04:33

## 2025-03-25 RX ADMIN — HYDROMORPHONE HYDROCHLORIDE 0.4 MG: 0.2 INJECTION, SOLUTION INTRAMUSCULAR; INTRAVENOUS; SUBCUTANEOUS at 08:38

## 2025-03-25 RX ADMIN — NORTRIPTYLINE HYDROCHLORIDE 25 MG: 25 CAPSULE ORAL at 19:30

## 2025-03-25 RX ADMIN — GABAPENTIN 300 MG: 300 CAPSULE ORAL at 19:30

## 2025-03-25 RX ADMIN — METHOCARBAMOL 1000 MG: 100 INJECTION, SOLUTION INTRAMUSCULAR; INTRAVENOUS at 16:29

## 2025-03-25 RX ADMIN — METHOCARBAMOL 1000 MG: 100 INJECTION, SOLUTION INTRAMUSCULAR; INTRAVENOUS at 07:30

## 2025-03-25 RX ADMIN — INSULIN ASPART 2 UNITS: 100 INJECTION, SOLUTION INTRAVENOUS; SUBCUTANEOUS at 16:52

## 2025-03-25 RX ADMIN — INSULIN ASPART 2 UNITS: 100 INJECTION, SOLUTION INTRAVENOUS; SUBCUTANEOUS at 12:03

## 2025-03-25 RX ADMIN — POLYETHYLENE GLYCOL 3350 17 G: 17 POWDER, FOR SOLUTION ORAL at 07:27

## 2025-03-25 RX ADMIN — DEXTROSE, SODIUM CHLORIDE, AND POTASSIUM CHLORIDE: 5; .45; .15 INJECTION INTRAVENOUS at 06:15

## 2025-03-25 RX ADMIN — OXYCODONE HYDROCHLORIDE 10 MG: 5 SOLUTION ORAL at 03:01

## 2025-03-25 RX ADMIN — OXYCODONE HYDROCHLORIDE 10 MG: 5 SOLUTION ORAL at 07:27

## 2025-03-25 RX ADMIN — MAGNESIUM OXIDE TAB 400 MG (241.3 MG ELEMENTAL MG) 400 MG: 400 (241.3 MG) TAB at 10:14

## 2025-03-25 RX ADMIN — Medication: at 11:26

## 2025-03-25 RX ADMIN — GABAPENTIN 300 MG: 300 CAPSULE ORAL at 14:28

## 2025-03-25 RX ADMIN — ENOXAPARIN SODIUM 40 MG: 40 INJECTION SUBCUTANEOUS at 11:25

## 2025-03-25 RX ADMIN — GABAPENTIN 300 MG: 250 SOLUTION ORAL at 11:26

## 2025-03-25 RX ADMIN — OXYCODONE HYDROCHLORIDE 10 MG: 5 SOLUTION ORAL at 19:31

## 2025-03-25 RX ADMIN — ACETAMINOPHEN 650 MG: 325 SOLUTION ORAL at 10:14

## 2025-03-25 RX ADMIN — HYDROMORPHONE HYDROCHLORIDE 0.4 MG: 0.2 INJECTION, SOLUTION INTRAMUSCULAR; INTRAVENOUS; SUBCUTANEOUS at 01:34

## 2025-03-25 RX ADMIN — HYDROMORPHONE HYDROCHLORIDE 0.4 MG: 0.2 INJECTION, SOLUTION INTRAMUSCULAR; INTRAVENOUS; SUBCUTANEOUS at 02:33

## 2025-03-25 RX ADMIN — ACETAMINOPHEN 650 MG: 325 SOLUTION ORAL at 16:30

## 2025-03-25 RX ADMIN — SENNOSIDES AND DOCUSATE SODIUM 1 TABLET: 50; 8.6 TABLET ORAL at 07:27

## 2025-03-25 RX ADMIN — HYDROMORPHONE HYDROCHLORIDE 0.4 MG: 0.2 INJECTION, SOLUTION INTRAMUSCULAR; INTRAVENOUS; SUBCUTANEOUS at 00:08

## 2025-03-25 RX ADMIN — OXYCODONE HYDROCHLORIDE 10 MG: 5 SOLUTION ORAL at 11:37

## 2025-03-25 RX ADMIN — SENNOSIDES AND DOCUSATE SODIUM 1 TABLET: 50; 8.6 TABLET ORAL at 19:31

## 2025-03-25 RX ADMIN — Medication 15 ML: at 10:14

## 2025-03-25 RX ADMIN — HYDROMORPHONE HYDROCHLORIDE 0.4 MG: 0.2 INJECTION, SOLUTION INTRAMUSCULAR; INTRAVENOUS; SUBCUTANEOUS at 09:41

## 2025-03-25 RX ADMIN — ACETAMINOPHEN 650 MG: 325 SOLUTION ORAL at 04:15

## 2025-03-25 RX ADMIN — INSULIN ASPART 1 UNITS: 100 INJECTION, SOLUTION INTRAVENOUS; SUBCUTANEOUS at 19:37

## 2025-03-25 RX ADMIN — INSULIN ASPART 1 UNITS: 100 INJECTION, SOLUTION INTRAVENOUS; SUBCUTANEOUS at 07:50

## 2025-03-25 ASSESSMENT — ACTIVITIES OF DAILY LIVING (ADL)
ADLS_ACUITY_SCORE: 42
ADLS_ACUITY_SCORE: 40
ADLS_ACUITY_SCORE: 40
ADLS_ACUITY_SCORE: 64
ADLS_ACUITY_SCORE: 59
ADLS_ACUITY_SCORE: 64
ADLS_ACUITY_SCORE: 42
ADLS_ACUITY_SCORE: 59
ADLS_ACUITY_SCORE: 64
ADLS_ACUITY_SCORE: 59
ADLS_ACUITY_SCORE: 64
ADLS_ACUITY_SCORE: 42
ADLS_ACUITY_SCORE: 59
ADLS_ACUITY_SCORE: 57
ADLS_ACUITY_SCORE: 61
ADLS_ACUITY_SCORE: 40
ADLS_ACUITY_SCORE: 64
ADLS_ACUITY_SCORE: 59
ADLS_ACUITY_SCORE: 40
ADLS_ACUITY_SCORE: 40
ADLS_ACUITY_SCORE: 64
ADLS_ACUITY_SCORE: 59
ADLS_ACUITY_SCORE: 64

## 2025-03-25 NOTE — PROGRESS NOTES
CLINICAL NUTRITION SERVICES - ASSESSMENT NOTE    RECOMMENDATIONS FOR MDs/PROVIDERS TO ORDER:  FWF adjustments per team.    Registered Dietitian Interventions:  Enteral Nutrition Recommendation:  - Dosing weight: 87 kg  - Access: J tube  - Regimen: Osmolite 1.5 @ 10 mL/hr  - FWF: 30 mL q 4 hrs minimum for tube patency   - Advancement: None today  - Additives: Certavite  - HOB 30 degrees for gastric feeds     Future/Additional Recommendations:  Monitor TF tolerance, lytes, GI/stooling, advancement to goal TF.    Goal: Osmolite 1.5 Gumaro (or equivalent) @ goal of  60ml/hr  (1440ml/day) + 1 pkt [employer] TF20 provides: 2240 kcals (26 kcal/kg), 110 g PRO (1.3 g/kg), 1097 ml free H20, 293 g CHO, and 0 g fiber daily.   - Initiate at 10 mL/hr and advance as tolerated by 10 mL q 8 hrs to goal rate of 60 mL/hr.     REASON FOR ASSESSMENT  Provider order - Registered Dietitian to order TF per Medical Nutrition Therapy Guidelines - ok for trickle per thoracic    INFORMATION OBTAINED  Assessed patient in room.    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 He lost 6 lbs in 1 week d/t being on a liquid diet after most recent surgery. He developed dumping syndrome after his Nissen in 2012 and has been doing small, frequent meals and milder foods since then.    CURRENT NUTRITION ORDERS  Diet: NPO    CURRENT INTAKE/TOLERANCE  No intakes recorded    LABS  Nutrition-relevant labs: Reviewed    MEDICATIONS  Nutrition-relevant medications:  liquid tylenol, novolog, miralax, senna, D5 and 1/2 NS + Kcl 20 mEq/L @ 100 ml/hr    ANTHROPOMETRICS  Height: 177.8 cm (5'  "10\")  Admission Weight: 85.6 kg (188 lb 11.4 oz) (03/24/25 0546)   Most Recent Weight: 87.1 kg (192 lb 0.3 oz) (03/24/25 2134)  IBW: 75.5 kg (115% IBW)  BMI: Body mass index is 27.55 kg/m .   Weight History: No significant wt changes noted.  Wt Readings from Last 30 Encounters:   03/24/25 87.1 kg (192 lb 0.3 oz)   03/11/25 84.2 kg (185 lb 9.6 oz)   03/11/25 83.9 kg (185 lb)   02/26/25 87.3 kg (192 lb 7.4 oz)   02/20/25 88.5 kg (195 lb 3.2 oz)   02/20/25 88.5 kg (195 lb)   01/16/25 86.1 kg (189 lb 12.8 oz)   12/16/24 80.7 kg (178 lb)   12/11/24 81.6 kg (180 lb)   12/11/24 84.6 kg (186 lb 6.4 oz)   11/27/24 82.6 kg (182 lb)   11/27/24 84.4 kg (186 lb)   11/13/24 84.8 kg (187 lb)   11/13/24 82.6 kg (182 lb)   10/31/24 86.4 kg (190 lb 6.4 oz)   10/14/24 81.2 kg (179 lb)   10/03/24 83.8 kg (184 lb 11.2 oz)   09/24/24 83 kg (183 lb)   09/10/24 83 kg (183 lb)   07/23/24 82.1 kg (181 lb)   04/15/24 81.6 kg (180 lb)   04/11/24 81.6 kg (180 lb)   02/19/24 86.5 kg (190 lb 9.6 oz)     Dosing Weight: 87 kg, based on actual wt    ASSESSED NUTRITION NEEDS  Estimated Energy Needs: 2175 - 2610 kcals/day (25 - 30 kcals/kg)  Justification: Maintenance  Estimated Protein Needs: 104 - 130 grams protein/day (1.2 - 1.5 grams of pro/kg)  Justification: Increased needs  Estimated Fluid Needs: 1 mL/kcal  Justification: Maintenance    SYSTEM AND PHYSICAL FINDINGS    NGT to suction (3/24/25), J tube (2/26/25)  GI symptoms:  LBM PTA  Skin/wounds: Reviewed    MALNUTRITION  % Intake: Decreased intake does not meet criteria  % Weight Loss: None noted  Subcutaneous Fat Loss: None observed  Muscle Loss: None observed  Fluid Accumulation/Edema: None noted  Malnutrition Diagnosis: Patient does not meet two of the established criteria necessary for diagnosing malnutrition  Malnutrition Present on Admission: No    NUTRITION DIAGNOSIS  Inadequate oral intake related to NPO s/p esophageal resection as evidenced by reliance on TF to meet 100% of nutrition " "needs.    INTERVENTIONS  See nutrition interventions above    GOALS  Total avg nutritional intake to meet a minimum of 25 kcal/kg and 1.2 g PRO/kg daily (per dosing wt 87 kg).     MONITORING/EVALUATION  Progress toward goals will be monitored and evaluated per policy.    Kevin Clifton, RD, LD  Available on Vocera  Weekend/Holiday RD [employer] - \"Weekend Clinical Dietitian\"   "

## 2025-03-25 NOTE — PLAN OF CARE
Neuro: A&Ox4. No neuro deficits. Up with SBA. Pain uncontrolled this morning, mainly to R upper chest and back. Started on dilaudid PCA with reported improvement.   Cardiac: -130's. 's while pain was uncontrolled as well as prior to restarting atenolol.  Atenolol and the PCA was started simultaneously with a result of better rate control; now 's. Normotensive.    Respiratory: Sating >92% on RA.  Breathing deeper now, tolerating coughs, using IS.  Prior to PCA he was shallow breathing with rates in upper 20's at times.  GI/: Adequate urine output via steward. Steward pulled at 1230. BM 3/24 prior to surgery. No gas yet. NG tube to CIS, ~25 mL of bile out; currently patent.  Diet/appetite: NPO. J tube for meds and trickle feeds; currently at 10 mL/hr with no orders to advance. 30 mL/4hrs FWF  Activity:  Assist of SBA, up to chair.  Skin: No new deficits noted.  X9 lap sites, x2 CTs to water seal, From 1764-7272 R with 410 of serous drainage, small amount draining from site as well, L with 80 of dark red drainage. J tube  LDA's: Port with 100 mL/hr of D5 1/2NS with 20 mEq of K+    Plan: Transferred to , phone report given to Kavitha ROSAS.  Continue with POC. Notify primary team with changes.

## 2025-03-25 NOTE — PLAN OF CARE
Goal Outcome Evaluation:      Plan of Care Reviewed With: patient    Overall Patient Progress: improvingOverall Patient Progress: improving    Outcome Evaluation: Pain slightly improved w/ Dilaudid PCA per pt.  VSS.    Neuro: A&Ox4.   Cardiac: SR/ST.  VSS.   Respiratory: Sating >92% on 1L NC.  IS use encouraged.  GI/: Due to void, steward out earlier today.  BM PTA 3/23.  Diet/appetite: Tolerating NPO w/ TF trickle @10mL/hr, swabs ok per pt and thoracic.  Activity:  Assist of 1-2, just up to edge of bed this shift.  Pain: At acceptable level on current regimen.   Skin: No new deficits noted.  LDA's: NG to LCS, asked thoracic for order and did not hear back.  J tube.  Port w/ Dilaudid PCA & D5 1/2NS w/ 20 Kcl & Dilaudid PCA.  2 CT WS.    Plan: Continue with POC. Notify primary team with changes.

## 2025-03-25 NOTE — OP NOTE
Preoperative diagnosis: Esophageal cancer    Postoperative diagnosis: The same as preoperative diagnosis    Procedure:   Esophagogastroscopy  RIGHT thoracoscopic esophago-gastric anastomosis    Surgeon: Erik Lindsey (I was co-surgeon with Dr. Tobar. This was a very challenging case that required 2 staff surgeons despite the presence of a qualified resident.)    Procedure:  I performed the following parts of the procedure:    I assisted Dr. Tobar with the anastomosis and I placed 3 stitches of the anastomosis myself.  I performed the endoscopy at the end of the procedure, the anastomosis was at ~25 cm, widely patent, and there was no leak with insufflation under immersion.    See Dr. Tobar's note for further details.

## 2025-03-25 NOTE — PLAN OF CARE
Admitted/transferred from: PACU  Reason for admission/transfer: Hourly monitoring  2 RN skin assessment: completed by Rupesh TREVIZO RN, Rashard JANE RN  Result of skin assessment and interventions/actions: lap sites x9 throughout abd and chest, Bilateral chest tubes, NG tube, J tube  Height, weight, drug calc weight: Done  Patient belongings (see Flowsheet)  MDRO education added to care planN/A  ?       Major shift events    CV: Sinus tachy most of the night, BP WDL w/out interventions  Resp: LS coarse, improving as the night went on  Neuro: A&O x4, strengths 5/5 w/ gen weakness d/t pain, baseline neuropathy in hands/feet  Labs: Mag replaced  GI: NPO, NG to low cont suction - some output. Meds through J tube  : Prasad in place w/ adequate output  Skin:   Access: PIV x2 SL, R Port w/ D5 0.45NS 20K at 100 mL/hr, L rad a line  Pain: Complained of a fair amount of pain throughout the night, found minimal relief w/ PRN meds  Activity: Not OOB overnight  Social: Calm and cooperative, wife Jess visited and was supportive in his cares. She took his wedding ring home.      Plan: Continue to monitor, likely to get floor orders today    See flowsheets for full assessments and vitals      Goal Outcome Evaluation:      Plan of Care Reviewed With: patient    Overall Patient Progress: no changeOverall Patient Progress: no change    Outcome Evaluation: Pt complaining of a fair amount of pain througout the night, found some relief w/ PRN meds. R chest tube had large amount of output throughout the night

## 2025-03-25 NOTE — ANESTHESIA POSTPROCEDURE EVALUATION
Patient: Bal Junior    Procedure: Procedure(s):  ESOPHAGECTOMY, MINIMALLY INVASIVE, Laparoscopic Right Thorascopic Esophagectomy, Botox Injection  Esophagoscopy, gastroscopy, duodenoscopy (EGD), combined       Anesthesia Type:  General    Note:  Disposition: Inpatient   Postop Pain Control: Uneventful            Sign Out: Well controlled pain   PONV: No   Neuro/Psych: Uneventful            Sign Out: Acceptable/Baseline neuro status   Airway/Respiratory: Uneventful            Sign Out: Acceptable/Baseline resp. status   CV/Hemodynamics: Uneventful            Sign Out: Acceptable CV status; No obvious hypovolemia; No obvious fluid overload   Other NRE: NONE   DID A NON-ROUTINE EVENT OCCUR? No           Last vitals:  Vitals Value Taken Time   /91 03/24/25 2100   Temp     Pulse 88 03/24/25 2102   Resp 19 03/24/25 2102   SpO2 97 % 03/24/25 2102   Vitals shown include unfiled device data.    Electronically Signed By: Agusto Mays MD  March 24, 2025  9:17 PM

## 2025-03-25 NOTE — PHARMACY-ADMISSION MEDICATION HISTORY
Pharmacist Admission Medication History    Admission medication history is complete. The information provided in this note is only as accurate as the sources available at the time of the update.    Information Source(s): Patient and CareEverywhere/SureScripts via in-person    Pertinent Information: Updated med list and last doses per patient's testimony. The patient is a reliable historian.    Fill history shows Nortriptyline 10 mg capsule was prescribed on 3/6/2025. The patient confirms the plan is to decrease the dose after he gets discharged from the hospital. He is currently taking Nortriptyline 25 mg every evening.    Changes made to PTA medication list:  Added: None  Deleted: None  Changed:   Acetaminophen 500 mg-1000 mg q6h prn pain to 1000 mg q8h.    Allergies reviewed with patient and updates made in EHR: no    Medication History Completed By: Benedicto Orozco RPH 3/25/2025 9:54 AM    PTA Med List   Medication Sig Last Dose/Taking    acetaminophen (TYLENOL) 500 MG tablet Take 1,000 mg by mouth every 8 hours. Past Week    atenolol (TENORMIN) 50 MG tablet Take 50 mg by mouth every evening. 3/23/2025 Evening    cyanocobalamin (VITAMIN B-12) 500 MCG tablet Take 500 mcg by mouth daily. 3/23/2025 Morning    gabapentin (NEURONTIN) 300 MG capsule Take 1 capsule (300 mg) by mouth 3 times daily. 3/24/2025 Morning    nortriptyline (PAMELOR) 25 MG capsule Take 25 mg by mouth every evening. 3/23/2025 Evening

## 2025-03-25 NOTE — PROGRESS NOTES
SURGICAL ICU PROGRESS NOTE  03/25/2025      PRIMARY TEAM: Thoracic Surgery   PRIMARY PHYSICIAN: Dr. Tobar  REASON FOR CRITICAL CARE ADMISSION: Hemodynamic monitoring    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      Overnight Events:  - No acute event overnight. Worsening pain this AM.     Changes Today:  - Consult Pain team   - May resume trickle TF per Surgery  - Restart PTA Nori, Nortriptyline   - Restart PTA Atenolol at 25mg. Can consider increasing to home dose if remains tachy and normotensive.      Neurological:  # Acute pain   # Agitation   Endorsed increased pain despite multiple doses of dilaudid and Oxy PRN. Anesthesia consulted for epidural vs paravertebral cath, however patient declined.   - Pain: - Surgical team to start PCA.   - S tylenol, PRN dilaudid, PRN oxy  (can increase Oxy dose if needed)      - Robaxin 1000mg q 8hrs          - Monitor neurological status. Delirium preventions and precautions                # Neuritis  # Peripheral neuropathy  - Restart PTA gabapentin, nortriptyline     Pulmonary:   # Acute hypoxic respiratory support   Bilat CT to suction, no leaks identified  - Supplemental oxygen to keep saturation above 92%.  - Incentive spirometer every 15-30 minutes when awake.  - CXR pending      Cardiovascular:    # hypertension  # Tachycardia   Normotensive but tachycardic likely in the setting of worsening pain vs rebound tachy from atenolol on hold  - Resume PTA atenolol at 25mg daily. 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   - No indication for parenteral nutrition.     Fluids/Renal/Electrolytes:   - Continue D5 1/2NS +KcL titrated to 100/Hr for hourly IV fluid hydration-- decrease once increased enteral feeds   -  Will continue to monitor intake and output.     Endocrine:  # Stress hyperglycemia   - Sliding scale for glucose management.  - Goal to keep BG< 180 for optimal wound healing      ID:  # Leukocytosis 2/2 post operative inflammation- improved  No fevers, WBC 9.4(10.6).   - no indications for antibiotics      Positive cultures:  - none     Heme:     # Acute blood loss anemia   # Anemia of critical illness   - No overt signs of bleeding  - Transfuse if hgb <7.0 or signs/symptoms of hypoperfusion. Monitor and trend.      MSK:   # Weakness and deconditioning of critical illness   - Physical and occupational therapy consult      General Cares/Prophylaxis:    DVT Prophylaxis: Lovenox  GI Prophylaxis: Not indicated  Restraints: Restraints for medical healing needed: NO     Lines/ tubes/ drains:  - port  - PIVx2  - steward - Remove 3/25  - bilateral chest tubes     Disposition:  - Transfer to [facility]     Patient seen and discussed with staff.    Lenny Galdamez, SUZANNE    I was present with the student who participated in the service and in the documentation of the note. I   have verified the history and personally performed the physical exam and medical decision-making. I   agree with the assessment and plan of care as documented in the note.      Total time spent by me, excluding procedures, was 25  minutes.    Trisha Orozco NP      Clinically Significant Risk  "Factors                   # Hypertension: Noted on problem list     # Acute Hypercapnic Respiratory Failure: based on arterial blood gas results.  Continue supplemental oxygen and ventilatory support as indicated.         # Overweight: Estimated body mass index is 27.55 kg/m  as calculated from the following:    Height as of this encounter: 1.778 m (5' 10\").    Weight as of this encounter: 87.1 kg (192 lb 0.3 oz)., PRESENT ON ADMISSION                 ====================================      OBJECTIVE:     Temp:  [96.6  F (35.9  C)-98.5  F (36.9  C)] 98.5  F (36.9  C)  Pulse:  [] 142  Resp:  [8-22] 20  BP: (105-129)/(73-91) 123/83  MAP:  [67 mmHg-110 mmHg] 83 mmHg  Arterial Line BP: ()/(52-96) 99/70  SpO2:  [89 %-100 %] 96 %  Resp: 20    I/O last 3 completed shifts:  In: 4996.67 [I.V.:4126.67; NG/GT:120]  Out: 3205 [Urine:1755; Emesis/NG output:100; Blood:200; Chest Tube:1150]    General/Neuro: Alert, appropriate, uncomfortable d/t pain but follows commands well.   CV: tachycardia, ST on Tele, S1S2, no audible murmurs noted  Pulm: Lungs diminished ( limited by pain), 2L via NC, bilateral pleural CT noted, to suction - no leaks identified  Abd: soft; ND, appropriately tender. Lap sites noted, dressing CDI  Extremities: no edema, pulses palpable  Skin: warm and well-perfused.     LABS:   Arterial Blood Gases   Recent Labs   Lab 03/24/25  1613 03/24/25  1339 03/24/25  1236 03/24/25  1153   PH 7.35 7.26* 7.36 7.34*   PCO2 46* 55* 44 38   PO2 77* 67* 241* 153*   HCO3 25 25 25 21     Complete Blood Count   Recent Labs   Lab 03/25/25  0432 03/24/25  1907 03/24/25  1613 03/24/25  1339   WBC 9.4 10.6  --   --    HGB 14.0 12.1* 12.7* 12.8*    173  --   --      Basic Metabolic Panel  Recent Labs   Lab 03/25/25  0749 03/25/25  0432 03/25/25  0430 03/25/25  0038 03/24/25  1909 03/24/25  1907 03/24/25  1613 03/24/25  1339   NA  --  139  --   --   --  139 140 142   POTASSIUM  --  4.8  --   --   --  4.6 4.8 3.9 "   CHLORIDE  --  103  --   --   --  102  --   --    CO2  --  21*  --   --   --  23  --   --    BUN  --  14.5  --   --   --  16.4  --   --    CR  --  0.88  --   --   --  0.93  --   --    * 182* 162* 199*   < > 171* 145* 131*    < > = values in this interval not displayed.     Liver Function Tests  No lab results found in last 7 days.  Pancreatic Enzymes  No lab results found in last 7 days.  Coagulation Profile  No lab results found in last 7 days.      IMAGING:   Recent Results (from the past 24 hours)   XR Chest Port 1 View   Result Value    Radiologist flags Pneumothorax (Urgent)    Narrative    Exam: XR CHEST PORT 1 VIEW, 3/24/2025 7:18 PM    Indication: s/p esophagectomy    Comparison: 2/27/2025    Findings:   Portable, frontal, semiupright view of the chest. New postsurgical  changes of the chest. Enteric tube projects to the inferior  mediastinum. Apically directed right chest tube. Left basilar chest  tube. Right chest wall Port-A-Cath terminates at the superior  cavoatrial junction. Mediastinal surgical clips. Focal metallic  appearing opacity projecting over the left upper quadrant    Trachea is midline. Cardioediastinal silhouette is within normal  limits. Low lung volumes. Small right apical pneumothorax. No left  pneumothorax. The right costophrenic angle is clear. Blunting of the  left costophrenic angle. Streaky perihilar opacities. Degenerative  changes of the thoracic spine. Trace subcutaneous emphysema along the  left lateral chest wall.      Impression    Impression:   1. Postsurgical changes of esophagectomy with support devices as  detailed in body of the report.   2. Small right apical pneumothorax with right apical chest tube in  place.  3. Low lung volumes with streaky perihilar opacities compatible with  atelectasis/edema.  4. Small left pleural effusion with left basilar chest tube in place.  5. Focal metallic appearing opacity projecting over the left upper  quadrant, possibly  external to the patient/postsurgical. Consider  correlation.      [Urgent Result: Pneumothorax]    Finding was identified on 3/24/2025 7:52 PM.     Dr. Larson was contacted by Dr. Mena at 3/24/2025 8:00 PM and  verbalized understanding of the urgent finding.     I have personally reviewed the examination and initial interpretation  and I agree with the findings.    ALLEGRA RENDON MD         SYSTEM ID:  [id number]   XR Chest Port 1 View    Narrative    Exam: XR CHEST PORT 1 VIEW, 3/24/2025 9:54 PM    Comparison: Same day chest radiograph    History: s/p esophagectomy    Findings:  Portable AP view of the chest. Right portacatheter terminates in the  low SVC/right atrium. Enteric tube tip projects over the distal  esophagus/GE junction. Stable chest tube. Mediastinal surgical clips.    Trachea is approximately midline. Stable cardiac silhouette. No  significant pleural effusion. Slightly increased conspicuity of mixed  interstitial and airspace pulmonary opacities, most notably in the  left midlung zone. Continued reduced lung volumes. Decreased size of  small right apical pneumothorax. Surgical drain projects over the left  upper abdomen.      Impression    Impression:   1. Stable positioning of right apical chest tube, decreased small  right apical pneumothorax.  2. Continued low lung volumes, with subtly increased conspicuity of  mixed interstitial and pulmonary opacities, likely atelectasis/edema.  3. Enteric tube tip terminates in the distal esophagus, unchanged when  compared to prior.    I have personally reviewed the examination and initial interpretation  and I agree with the findings.    WANAD HARRIS MD         SYSTEM ID:  [id number]

## 2025-03-25 NOTE — CONSULTS
Pain Service (Regional) Consultation Note  [facility]      Patient Name: Bal Junior  MRN: [id number]   Age: 70 year old  Sex: male  Date: March 25, 2025                                         Referring Provider:  Alexey Cohen PA-C   Referring Service:  SICU   Reason for Consultation: block s/p esophagectomy     Assessment/Recommendations:  70 year old male with PMHx of HTN, neuritis, GERD, peripheral neuropathy, GE junction adenocarcinoma, now s/p minimally invasive esophageal resection on 3/24/25.     Plan:   - Patient does not currently want to undergo a procedure for pain treatment at this time.   - Medical management is currently the patient's preferred option. Can consider increasing oxycodone dose and/or restarting the patient's PTA gabapentin and nortriptyline.   - Please reach back out to the pain team if the patient changes his mind and wants to be re-evaluated for a block.     Thank you for the opportunity to participate in the care of Bal Junior  Pain Service will sign off.    Discussed with attending anesthesiologist  Primary Service Contacted with Recommendations? Yes    ANDREW WALLS MD  3/25/2025      Chief Complaint:  Pain s/p esophagectomy       History of Present Illness:  Bal Junior is a 70 year old male with PMHx of HTN, neuritis, GERD, peripheral neuropathy, GE junction adenocarcinoma, now s/p minimally invasive esophageal resection on 3/24/25. The pain is reported to be acute. He reports that oxycodone does seem to help a bit and is able to decrease his pain from a 10/10 to a 6/10. His pain increases with activity. He reports his pain is worse in the areas where the chest tubes are. He does take gabapentin for neuropathy at home.       Past Medical History:  Past Medical History:   Diagnosis Date    Hypertension     Neuritis     Peripheral neuropathy     Personal history of other medical treatment     postgastrectomy dumping syndrome    Stomach  problems Noted earlier         Family History:    Family History   Problem Relation Age of Onset    Hypertension Mother     Uterine Cancer Mother         1965    Anesthesia Reaction Mother         PONV, slow to emerge    Mitral Valve Replacement Father     Coronary Artery Disease Maternal Grandfather     GERD No family hx of     Ulcerative Colitis No family hx of     Crohn's Disease No family hx of     Stomach Cancer No family hx of     Deep Vein Thrombosis (DVT) No family hx of        Social History:  Social History     Tobacco Use    Smoking status: Never     Passive exposure: Past    Smokeless tobacco: Never   Substance Use Topics    Alcohol use: Not Currently             Review of Systems:  Complete ROS reviewed. Unless otherwise noted, all other systems found to be negative.        Laboratory Results:  Recent Labs   Lab Test 03/25/25  0432      BUN 14.5       Allergies:  Allergies   Allergen Reactions    Hornets     Wasp Venom Protein Hives    Bee Venom Swelling         Current Pain Related Medications:  Medications related to Pain Management (From now, onward)      Start     Dose/Rate Route Frequency Ordered Stop    03/27/25 0000  bisacodyl (DULCOLAX) suppository 10 mg         10 mg Rectal DAILY PRN 03/24/25 2126 03/26/25 0000  magnesium hydroxide (MILK OF MAGNESIA) suspension 30 mL         30 mL Per J Tube DAILY PRN 03/24/25 2126 03/25/25 2200  nortriptyline (PAMELOR) solution 20 mg         20 mg Oral AT BEDTIME 03/25/25 0933      03/25/25 1000  gabapentin (NEURONTIN) solution 300 mg         300 mg Oral EVERY 8 HOURS SCHEDULED 03/25/25 0933      03/25/25 0800  polyethylene glycol (MIRALAX) Packet 17 g         17 g Per J Tube DAILY 03/24/25 2126 03/25/25 0800  methocarbamol (ROBAXIN) injection 1,000 mg         1,000 mg Intravenous EVERY 8 HOURS 03/25/25 0703 03/28/25 0759    03/24/25 2200  senna-docusate (SENOKOT-S/PERICOLACE) 8.6-50 MG per tablet 1 tablet         1 tablet Per J Tube 2  "TIMES DAILY 03/24/25 2126 03/24/25 2200  acetaminophen (TYLENOL) oral liquid 650 mg         650 mg Per J Tube EVERY 6 HOURS 03/24/25 2140 03/24/25 2140  oxyCODONE (ROXICODONE) solution 5-10 mg         5-10 mg Per J Tube EVERY 4 HOURS PRN 03/24/25 2141 03/24/25 2140  HYDROmorphone (DILAUDID) injection 0.2 mg        Placed in \"Or\" Linked Group    0.2 mg Intravenous EVERY 1 HOUR PRN 03/24/25 2140 03/24/25 2140  HYDROmorphone (DILAUDID) injection 0.4 mg        Placed in \"Or\" Linked Group    0.4 mg Intravenous EVERY 1 HOUR PRN 03/24/25 2140                Physical Exam:  Vitals: BP (!) 112/91   Pulse (!) 123   Temp 36.9  C (98.5  F) (Oral)   Resp 18   Ht 1.778 m (5' 10\")   Wt 87.1 kg (192 lb 0.3 oz)   SpO2 95%   BMI 27.55 kg/m      Physical Exam:   CONSTITUTIONAL/GENERAL APPEARANCE:  uncomfortable appearing   ENT/NECK: atraumatic, lips and oral mucous membranes dry  RESPIRATORY: non-labored breathing, on NC   CV: sinus tachycardia on tele   ABDOMEN: Soft, non-tender, non-distended  MUSCULOSKELETAL/BACK/SPINE/EXTREMITIES: Moves all extremities purposefully.  No edema or obvious joint deformities   NEURO: Alert and Oriented x3. Answers questions appropriately              Acute Inpatient Pain Service [facility]  Hours of pain coverage 24/7   Page via Amcom- Please Page the Pain Team Via Amcom: \"PAIN MANAGEMENT ACUTE INPATIENT/ [facility]\"           "

## 2025-03-25 NOTE — PROGRESS NOTES
Thoracic Surgery Progress Note    PATIENT NAME: Bal Junior  MRN: [id number]  DATE: 03/25/2025 12:32 PM  LAST PROCEDURE: ESOPHAGECTOMY, MINIMALLY INVASIVE, Laparoscopic Right Thorascopic Esophagectomy, Botox Injection:   Esophagoscopy, gastroscopy, duodenoscopy (EGD), combined: 3/24/2025    Subjective:  No acute events overnight.This morning Bal reports significant BL lower chest pain.     Objective:  Vital Signs: Most Recent  Ranges (24 hours)   Temp: 98.5  F (36.9  C)  Pulse: (!) 129  BP: 108/82  Resp: 20  SpO2: 93 % on Nasal cannula (2L) Temp  Min: 96.6  F (35.9  C)  Max: 98.5  F (36.9  C)  Pulse  Min: 72  Max: 142  BP  Min: 105/82  Max: 129/90  Resp  Min: 8  Max: 24  SpO2  Min: 89 %  Max: 100 %     Physical Exam:  Gen: Appears stated age, NAD, Resting in bed but uncomfortable due to pain  HEENT: NC/AT, PERRL, EOMI, Sclera Anicteric, Hearing intact. NGT in place  Neuro: AO, no focal deficits  Psych: Affect appropriate, Behavior appropriate, Cooperative  CV: Tachycardic, Normotensive  Pulm: NWOB on 2L NC, CTx2 in place to suction, No air leak present, serosanginous  MSK: MAEx4  Skin: No obvious rashes, jaundice, erythema    Other Data:  WBC/Hgb/Hct/Plts:  9.4/14.0/41.7/-- (03/25 0432)  BUN/BUN/Cr/glu/Glucose/ALT/AST/amyl/lip/Lipase:  --/14.5/0.88/--/182/--/--/--/--/-- (03/25 0432)    Assessment:  Bal Junior is a 70 year old male with a history of HTN, neuritis, peripheral neuropathy, history acid reflux, s/p fundoplication takedown with lap redo nissen fundoplication on 2020 with 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 He is recovering well post-op.      Plan:  # Pain: Multimodal pain control  # Pulm Cares: CTx2 to suction  - Continue to titrate supplemental oxygen to SpO2 goals  - Continue IS use; Deep breathing and coughing to promote lung expansion  - Daily CXRs  # GI Cares: NPO for Medical/Clinical Reasons Except for:  No Exceptions  Adult Formula Drip Feeding: Continuous Osmolite 1.5; Jejunostomy; Goal Rate: 10; mL/hr, PRN anti-emetics  - Continue bowel regimen. Last Bowel Movement: 03/24/25   - NGT to LCS  # Renal: Monitor Intake and Outputs  # ID cares: No indication for ABX  - Daily CBC  # Activity: OOB as much as possible  # DVT PPX: Enoxaparin, SCDs  # Dispo: IMC status  # Code Status: Prior    Plan discussed with staff.    Chavez Rodriguez MD   General Surgery Resident

## 2025-03-25 NOTE — PROGRESS NOTES
Transfer  Transferred from:   Via:bed  Reason for transfer: Pt appropriate for 6B- improved patient condition  Family: Aware of transfer  Belongings: Received with pt  Chart: Received with pt  Medications: Meds received from old unit with pt  Code Status verified on armband: yes  2 RN Skin Assessment Completed By: Kavitha RN & Chris RN  Med rec completed: yes  Suction/Ambu bag/Flowmeter at bedside: yes

## 2025-03-25 NOTE — OR NURSING
1917: NORRIS Yoder MD messaged, informed that Chest xray and labs have been done. Also informed patient Left chest tube has put out 140cc.      Fluconazole Counseling:  Patient counseled regarding adverse effects of fluconazole including but not limited to headache, diarrhea, nausea, upset stomach, liver function test abnormalities, taste disturbance, and stomach pain.  There is a rare possibility of liver failure that can occur when taking fluconazole.  The patient understands that monitoring of LFTs and kidney function test may be required, especially at baseline. The patient verbalized understanding of the proper use and possible adverse effects of fluconazole.  All of the patient's questions and concerns were addressed.

## 2025-03-25 NOTE — PLAN OF CARE
Physical Therapy: Orders received. Chart reviewed and discussed with OT.? Per discussion with OT, Physical Therapy not indicated. Pt mobilizing well post-op, SBA, primarily limited by pain. Pt appropriate for one discipline to follow at this time to progress activity tolerance and address any discharge concerns.? Defer discharge recommendations to Occupational Therapy.? Will complete orders.

## 2025-03-26 ENCOUNTER — ENROLLMENT (OUTPATIENT)
Dept: HOME HEALTH SERVICES | Facility: HOME HEALTH | Age: 70
End: 2025-03-26
Payer: COMMERCIAL

## 2025-03-26 ENCOUNTER — APPOINTMENT (OUTPATIENT)
Dept: GENERAL RADIOLOGY | Facility: CLINIC | Age: 70
DRG: 326 | End: 2025-03-26
Attending: STUDENT IN AN ORGANIZED HEALTH CARE EDUCATION/TRAINING PROGRAM
Payer: MEDICARE

## 2025-03-26 LAB
ANION GAP SERPL CALCULATED.3IONS-SCNC: 11 MMOL/L (ref 7–15)
ANION GAP SERPL CALCULATED.3IONS-SCNC: 11 MMOL/L (ref 7–15)
BUN SERPL-MCNC: 24.7 MG/DL (ref 8–23)
BUN SERPL-MCNC: 28.7 MG/DL (ref 8–23)
CALCIUM SERPL-MCNC: 8.8 MG/DL (ref 8.8–10.4)
CALCIUM SERPL-MCNC: 9 MG/DL (ref 8.8–10.4)
CHLORIDE SERPL-SCNC: 103 MMOL/L (ref 98–107)
CHLORIDE SERPL-SCNC: 104 MMOL/L (ref 98–107)
CREAT SERPL-MCNC: 1.69 MG/DL (ref 0.67–1.17)
CREAT SERPL-MCNC: 1.82 MG/DL (ref 0.67–1.17)
EGFRCR SERPLBLD CKD-EPI 2021: 40 ML/MIN/1.73M2
EGFRCR SERPLBLD CKD-EPI 2021: 43 ML/MIN/1.73M2
ERYTHROCYTE [DISTWIDTH] IN BLOOD BY AUTOMATED COUNT: 12.5 % (ref 10–15)
GLUCOSE BLDC GLUCOMTR-MCNC: 132 MG/DL (ref 70–99)
GLUCOSE BLDC GLUCOMTR-MCNC: 159 MG/DL (ref 70–99)
GLUCOSE BLDC GLUCOMTR-MCNC: 159 MG/DL (ref 70–99)
GLUCOSE BLDC GLUCOMTR-MCNC: 165 MG/DL (ref 70–99)
GLUCOSE BLDC GLUCOMTR-MCNC: 172 MG/DL (ref 70–99)
GLUCOSE BLDC GLUCOMTR-MCNC: 179 MG/DL (ref 70–99)
GLUCOSE BLDC GLUCOMTR-MCNC: 179 MG/DL (ref 70–99)
GLUCOSE BLDC GLUCOMTR-MCNC: 201 MG/DL (ref 70–99)
GLUCOSE SERPL-MCNC: 172 MG/DL (ref 70–99)
GLUCOSE SERPL-MCNC: 177 MG/DL (ref 70–99)
HCO3 SERPL-SCNC: 21 MMOL/L (ref 22–29)
HCO3 SERPL-SCNC: 23 MMOL/L (ref 22–29)
HCT VFR BLD AUTO: 43.5 % (ref 40–53)
HGB BLD-MCNC: 14 G/DL (ref 13.3–17.7)
LACTATE SERPL-SCNC: 2.4 MMOL/L (ref 0.7–2)
LACTATE SERPL-SCNC: 3.7 MMOL/L (ref 0.7–2)
MAGNESIUM SERPL-MCNC: 2.3 MG/DL (ref 1.7–2.3)
MCH RBC QN AUTO: 30.1 PG (ref 26.5–33)
MCHC RBC AUTO-ENTMCNC: 32.2 G/DL (ref 31.5–36.5)
MCV RBC AUTO: 94 FL (ref 78–100)
PHOSPHATE SERPL-MCNC: 4.2 MG/DL (ref 2.5–4.5)
PLATELET # BLD AUTO: 192 10E3/UL (ref 150–450)
POTASSIUM SERPL-SCNC: 5 MMOL/L (ref 3.4–5.3)
POTASSIUM SERPL-SCNC: 5.3 MMOL/L (ref 3.4–5.3)
RBC # BLD AUTO: 4.65 10E6/UL (ref 4.4–5.9)
SODIUM SERPL-SCNC: 136 MMOL/L (ref 135–145)
SODIUM SERPL-SCNC: 137 MMOL/L (ref 135–145)
WBC # BLD AUTO: 12.6 10E3/UL (ref 4–11)

## 2025-03-26 PROCEDURE — 120N000003 HC R&B IMCU UMMC

## 2025-03-26 PROCEDURE — 250N000011 HC RX IP 250 OP 636

## 2025-03-26 PROCEDURE — 85027 COMPLETE CBC AUTOMATED: CPT

## 2025-03-26 PROCEDURE — 84100 ASSAY OF PHOSPHORUS: CPT | Performed by: STUDENT IN AN ORGANIZED HEALTH CARE EDUCATION/TRAINING PROGRAM

## 2025-03-26 PROCEDURE — 250N000013 HC RX MED GY IP 250 OP 250 PS 637: Performed by: STUDENT IN AN ORGANIZED HEALTH CARE EDUCATION/TRAINING PROGRAM

## 2025-03-26 PROCEDURE — 258N000003 HC RX IP 258 OP 636

## 2025-03-26 PROCEDURE — 83735 ASSAY OF MAGNESIUM: CPT

## 2025-03-26 PROCEDURE — 250N000013 HC RX MED GY IP 250 OP 250 PS 637: Performed by: PHYSICIAN ASSISTANT

## 2025-03-26 PROCEDURE — 99207 PR NO BILLABLE SERVICE THIS VISIT: CPT | Performed by: PHYSICIAN ASSISTANT

## 2025-03-26 PROCEDURE — 83605 ASSAY OF LACTIC ACID: CPT | Performed by: STUDENT IN AN ORGANIZED HEALTH CARE EDUCATION/TRAINING PROGRAM

## 2025-03-26 PROCEDURE — 82310 ASSAY OF CALCIUM: CPT

## 2025-03-26 PROCEDURE — 82664 ELECTROPHORETIC TEST: CPT

## 2025-03-26 PROCEDURE — 250N000013 HC RX MED GY IP 250 OP 250 PS 637

## 2025-03-26 PROCEDURE — 250N000011 HC RX IP 250 OP 636: Performed by: NURSE PRACTITIONER

## 2025-03-26 PROCEDURE — 84478 ASSAY OF TRIGLYCERIDES: CPT

## 2025-03-26 PROCEDURE — 71045 X-RAY EXAM CHEST 1 VIEW: CPT

## 2025-03-26 PROCEDURE — 999N000157 HC STATISTIC RCP TIME EA 10 MIN

## 2025-03-26 PROCEDURE — 250N000013 HC RX MED GY IP 250 OP 250 PS 637: Performed by: NURSE PRACTITIONER

## 2025-03-26 PROCEDURE — 80048 BASIC METABOLIC PNL TOTAL CA: CPT

## 2025-03-26 PROCEDURE — 258N000003 HC RX IP 258 OP 636: Performed by: STUDENT IN AN ORGANIZED HEALTH CARE EDUCATION/TRAINING PROGRAM

## 2025-03-26 PROCEDURE — 71045 X-RAY EXAM CHEST 1 VIEW: CPT | Mod: 26 | Performed by: RADIOLOGY

## 2025-03-26 PROCEDURE — 999N000127 HC STATISTIC PERIPHERAL IV START W US GUIDANCE

## 2025-03-26 RX ORDER — ALBUTEROL SULFATE 0.83 MG/ML
2.5 SOLUTION RESPIRATORY (INHALATION)
Status: DISCONTINUED | OUTPATIENT
Start: 2025-03-26 | End: 2025-04-09 | Stop reason: HOSPADM

## 2025-03-26 RX ORDER — OXYCODONE HCL 5 MG/5 ML
10 SOLUTION, ORAL ORAL EVERY 4 HOURS PRN
Status: DISCONTINUED | OUTPATIENT
Start: 2025-03-26 | End: 2025-04-07

## 2025-03-26 RX ORDER — OXYCODONE HCL 5 MG/5 ML
5 SOLUTION, ORAL ORAL EVERY 4 HOURS PRN
Status: DISCONTINUED | OUTPATIENT
Start: 2025-03-26 | End: 2025-04-07

## 2025-03-26 RX ORDER — ALBUTEROL SULFATE 0.83 MG/ML
2.5 SOLUTION RESPIRATORY (INHALATION)
Status: DISCONTINUED | OUTPATIENT
Start: 2025-03-27 | End: 2025-03-26

## 2025-03-26 RX ORDER — METHOCARBAMOL 500 MG/1
500 TABLET, FILM COATED ORAL EVERY 6 HOURS PRN
Status: DISCONTINUED | OUTPATIENT
Start: 2025-03-26 | End: 2025-03-31

## 2025-03-26 RX ADMIN — GABAPENTIN 300 MG: 300 CAPSULE ORAL at 15:57

## 2025-03-26 RX ADMIN — INSULIN ASPART 1 UNITS: 100 INJECTION, SOLUTION INTRAVENOUS; SUBCUTANEOUS at 12:36

## 2025-03-26 RX ADMIN — ACETAMINOPHEN 650 MG: 325 SOLUTION ORAL at 21:31

## 2025-03-26 RX ADMIN — SODIUM CHLORIDE, SODIUM LACTATE, POTASSIUM CHLORIDE, AND CALCIUM CHLORIDE 500 ML: .6; .31; .03; .02 INJECTION, SOLUTION INTRAVENOUS at 10:02

## 2025-03-26 RX ADMIN — POLYETHYLENE GLYCOL 3350 17 G: 17 POWDER, FOR SOLUTION ORAL at 09:25

## 2025-03-26 RX ADMIN — INSULIN ASPART 2 UNITS: 100 INJECTION, SOLUTION INTRAVENOUS; SUBCUTANEOUS at 23:43

## 2025-03-26 RX ADMIN — GABAPENTIN 300 MG: 300 CAPSULE ORAL at 20:11

## 2025-03-26 RX ADMIN — INSULIN ASPART 2 UNITS: 100 INJECTION, SOLUTION INTRAVENOUS; SUBCUTANEOUS at 09:19

## 2025-03-26 RX ADMIN — OXYCODONE HYDROCHLORIDE 5 MG: 5 SOLUTION ORAL at 16:02

## 2025-03-26 RX ADMIN — INSULIN ASPART 1 UNITS: 100 INJECTION, SOLUTION INTRAVENOUS; SUBCUTANEOUS at 15:57

## 2025-03-26 RX ADMIN — ACETAMINOPHEN 650 MG: 325 SOLUTION ORAL at 09:19

## 2025-03-26 RX ADMIN — NORTRIPTYLINE HYDROCHLORIDE 25 MG: 25 CAPSULE ORAL at 20:11

## 2025-03-26 RX ADMIN — ACETAMINOPHEN 650 MG: 325 SOLUTION ORAL at 15:56

## 2025-03-26 RX ADMIN — Medication 15 ML: at 09:19

## 2025-03-26 RX ADMIN — ACETAMINOPHEN 650 MG: 325 SOLUTION ORAL at 03:41

## 2025-03-26 RX ADMIN — GABAPENTIN 300 MG: 300 CAPSULE ORAL at 09:21

## 2025-03-26 RX ADMIN — SENNOSIDES AND DOCUSATE SODIUM 1 TABLET: 50; 8.6 TABLET ORAL at 20:11

## 2025-03-26 RX ADMIN — DEXTROSE, SODIUM CHLORIDE, AND POTASSIUM CHLORIDE: 5; .45; .15 INJECTION INTRAVENOUS at 03:45

## 2025-03-26 RX ADMIN — SENNOSIDES AND DOCUSATE SODIUM 1 TABLET: 50; 8.6 TABLET ORAL at 09:26

## 2025-03-26 RX ADMIN — INSULIN ASPART 2 UNITS: 100 INJECTION, SOLUTION INTRAVENOUS; SUBCUTANEOUS at 00:10

## 2025-03-26 RX ADMIN — ENOXAPARIN SODIUM 40 MG: 40 INJECTION SUBCUTANEOUS at 12:37

## 2025-03-26 RX ADMIN — METHOCARBAMOL 1000 MG: 100 INJECTION, SOLUTION INTRAMUSCULAR; INTRAVENOUS at 00:05

## 2025-03-26 RX ADMIN — METHOCARBAMOL 500 MG: 500 TABLET, FILM COATED ORAL at 12:04

## 2025-03-26 RX ADMIN — DEXTROSE, SODIUM CHLORIDE, AND POTASSIUM CHLORIDE: 5; .45; .15 INJECTION INTRAVENOUS at 23:45

## 2025-03-26 RX ADMIN — SODIUM CHLORIDE, SODIUM LACTATE, POTASSIUM CHLORIDE, AND CALCIUM CHLORIDE 500 ML: .6; .31; .03; .02 INJECTION, SOLUTION INTRAVENOUS at 11:19

## 2025-03-26 RX ADMIN — INSULIN ASPART 2 UNITS: 100 INJECTION, SOLUTION INTRAVENOUS; SUBCUTANEOUS at 03:40

## 2025-03-26 RX ADMIN — OXYCODONE HYDROCHLORIDE 10 MG: 5 SOLUTION ORAL at 23:40

## 2025-03-26 RX ADMIN — DEXTROSE, SODIUM CHLORIDE, AND POTASSIUM CHLORIDE: 5; .45; .15 INJECTION INTRAVENOUS at 13:47

## 2025-03-26 RX ADMIN — ATENOLOL 25 MG: 25 TABLET ORAL at 09:19

## 2025-03-26 RX ADMIN — OXYCODONE HYDROCHLORIDE 10 MG: 5 SOLUTION ORAL at 02:18

## 2025-03-26 ASSESSMENT — ACTIVITIES OF DAILY LIVING (ADL)
ADLS_ACUITY_SCORE: 40
ADLS_ACUITY_SCORE: 42
ADLS_ACUITY_SCORE: 40
ADLS_ACUITY_SCORE: 42
ADLS_ACUITY_SCORE: 40
ADLS_ACUITY_SCORE: 42
ADLS_ACUITY_SCORE: 40
ADLS_ACUITY_SCORE: 42
ADLS_ACUITY_SCORE: 40
ADLS_ACUITY_SCORE: 42
ADLS_ACUITY_SCORE: 40
ADLS_ACUITY_SCORE: 42
DEPENDENT_IADLS:: INDEPENDENT
ADLS_ACUITY_SCORE: 42
ADLS_ACUITY_SCORE: 40
ADLS_ACUITY_SCORE: 40
ADLS_ACUITY_SCORE: 42
ADLS_ACUITY_SCORE: 40
ADLS_ACUITY_SCORE: 42
ADLS_ACUITY_SCORE: 42

## 2025-03-26 NOTE — PLAN OF CARE
Problem: Adult Inpatient Plan of Care  Goal: Plan of Care Review  Description: The Plan of Care Review/Shift note should be completed every shift.  The Outcome Evaluation is a brief statement about your assessment that the patient is improving, declining, or no change.  This information will be displayed automatically on your shiftnote.  Flowsheets (Taken 3/26/2025 1038)  Outcome Evaluation: CMA completed.  Will need tubefeeding at discharge.  [facility] investigative referral ordered.  Plan of Care Reviewed With: patient  Goal: Readiness for Transition of Care  Recent Flowsheet Documentation  Taken 3/26/2025 1016 by Marleny Connelly RN  Transportation Anticipated: family or friend will provide  Concerns to be Addressed: all concerns addressed in this encounter  Intervention: Mutually Develop Transition Plan  Recent Flowsheet Documentation  Taken 3/26/2025 1016 by Malreny Connelly RN  Readmission Within the Last 30 Days: no previous admission in last 30 days  Transportation Anticipated: family or friend will provide  Transportation Concerns: none  Concerns to be Addressed: all concerns addressed in this encounter  Patient/Family Anticipated Services at Transition: durable medical equipment  Patient/Family Anticipates Transition to: home  Equipment Currently Used at Home: cane, straight

## 2025-03-26 NOTE — PROVIDER NOTIFICATION
03/26/25 1100   Call Information   Date of Call 03/26/25   Time of Call 1111   Name of person requesting the team Kristie   Title of person requesting team RN   RRT Arrival time 1113   Time RRT ended 1120   Reason for call   Type of RRT Adult   Primary reason for call Sepsis suspected   Sepsis Suspected Elevated Lactate level   Was patient transferred from the ED, ICU, or PACU within last 24 hours prior to RRT call? 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   Interventions Fluid bolus  (already recieving fluid bolus)   Patient Outcome   Patient Outcome Stabilized on unit   RRT Team   Attending/Primary/Covering Physician Thoracic Surgery   Date Attending Physician notified 03/26/25   Time Attending Physician notified 1111   Physician(s) Jabari Ward RN Ileana Flowers   RT Enedina Badillo   Post RRT Intervention Assessment   Post RRT Assessment Stable/Improved   Date Follow Up Done 03/26/25   Time Follow Up Done 1605

## 2025-03-26 NOTE — CODE/RAPID RESPONSE
RT responded to rapid response called for SIRS/sepsis trigger. Upon arrival, pt was on  2L NC with sats at 98%. No respiratory intervention was needed at that time.    Azul Badillo, RT on 3/26/2025 at 2:30 PM

## 2025-03-26 NOTE — CONSULTS
Care Management Initial Consult    General Information  Assessment completed with: Patient,    Type of CM/SW Visit: Initial Assessment    Primary Care Provider verified and updated as needed: Yes   Readmission within the last 30 days: no previous admission in last 30 days      Reason for Consult: discharge planning  Advance Care Planning: Advance Care Planning Reviewed: no concerns identified          Communication Assessment  Patient's communication style: spoken language (English or Bilingual)    Hearing Difficulty or Deaf: no   Wear Glasses or Blind: yes    Cognitive  Cognitive/Neuro/Behavioral: WDL  Level of Consciousness: alert  Arousal Level: opens eyes spontaneously  Orientation: oriented x 4  Mood/Behavior: calm, cooperative  Best Language: 0 - No aphasia  Speech: clear, spontaneous, logical    Living Environment:   People in home: spouse  Jess  Current living Arrangements: house      Able to return to prior arrangements: yes       Family/Social Support:  Care provided by: self  Provides care for: no one     Support system: Wife, Children, Friend  Jess       Description of Support System: Supportive, Involved         Current Resources:   Patient receiving home care services: No        Community Resources: None  Equipment currently used at home: cane, straight  Supplies currently used at home: None    Employment/Financial:  Employment Status: retired        Financial Concerns: none           Does the patient's insurance plan have a 3 day qualifying hospital stay waiver?  No    Lifestyle & Psychosocial Needs:  Social Drivers of Health     Food Insecurity: Low Risk  (3/25/2025)    Food Insecurity     Within the past 12 months, did you worry that your food would run out before you got money to buy more?: No     Within the past 12 months, did the food you bought just not last and you didn t have money to get more?: No   Depression: Not at risk (3/11/2025)    PHQ-2     PHQ-2 Score: 0   Housing Stability: Low Risk   (3/25/2025)    Housing Stability     Do you have housing? : Yes     Are you worried about losing your housing?: No   Tobacco Use: Low Risk  (3/19/2025)    Patient History     Smoking Tobacco Use: Never     Smokeless Tobacco Use: Never     Passive Exposure: Past   Financial Resource Strain: Low Risk  (3/25/2025)    Financial Resource Strain     Within the past 12 months, have you or your family members you live with been unable to get utilities (heat, electricity) when it was really needed?: No   Alcohol Use: Alcohol Misuse (8/31/2020)    AUDIT-C     Frequency of Alcohol Consumption: 2-3 times a week     Average Number of Drinks: 1 or 2     Frequency of Binge Drinking: Less than monthly   Transportation Needs: Low Risk  (3/25/2025)    Transportation Needs     Within the past 12 months, has lack of transportation kept you from medical appointments, getting your medicines, non-medical meetings or appointments, work, or from getting things that you need?: No   Physical Activity: Not on file   Interpersonal Safety: Low Risk  (3/24/2025)    Interpersonal Safety     Do you feel physically and emotionally safe where you currently live?: Yes     Within the past 12 months, have you been hit, slapped, kicked or otherwise physically hurt by someone?: No     Within the past 12 months, have you been humiliated or emotionally abused in other ways by your partner or ex-partner?: No   Stress: Not on file   Social Connections: Not on file   Health Literacy: Not on file       Functional Status:  Prior to admission patient needed assistance:   Dependent ADLs:: Independent  Dependent IADLs:: Independent       Mental Health Status:  Mental Health Status: No Current Concerns       Chemical Dependency Status:  Chemical Dependency Status: No Current Concerns             Values/Beliefs:  Spiritual, Cultural Beliefs, Holiness Practices, Values that affect care:                 Discussed  Partnership in Safe Discharge Planning  document with  "patient/family: No    Additional Information:  Met with patient at bedside to introduce self and role in discharge planning.  Verifed that PCP is correct.  Patient states he has a health care directive at home and declines to complete another one at this time.  Self consulted for discharge with tube feeding.    Bal lives in a home in [place] with his wife.  He did have a J tube placed \"a couple of months ago\" in anticipation of this procedure and the need for enteral feeding.  He is independent in ADL/IADL at baseline.  He stated \"I was just skiing 2 days before this surgery\".  He is anticipating a return home, he states his wile would like him to go to a TCU in the [facility] for a short time after discharge.  Writer let him know we would be following for PT/OT recommendations when he is ready to discharge.      Writer has placed a referral to [facility] for tube feeding.        Next Steps: Follow for discharge planning.  Dispo TBD.    Eliza Connelly,  Nurse Coordinator, BSN  Phone: [phone]  Vocera: [facility]     Social Work and Care Management Department      SEARCHABLE in [facility] - search CARE COORDINATOR      [place] ([phone]) Saturday & Sunday; ([phone])  Recognized Holidays      Units: 5A Onc Vocera & 5C Vocera      Units: 6B Vocera & 6C Vocera       Units: 7A SOT RNCC Vocera, 7B Med Surg Vocera, & 7C Med Surg Vocera       Units: 6A Vocera & 4A CVICU Vocera, 4C MICU Vocera, and 4E SICU Vocera       Units: 5 Ortho Vocera & 5 Med Surg Vocera       Units: 6 Med Surg Vocera & 8 Med Surg Vocera       Marleny Connelly RN     "

## 2025-03-26 NOTE — CODE/RAPID RESPONSE
Rapid Response Team Note    Assessment   A rapid response was called on Bal Juinor due to SIRS/Sepsis trigger. This presentation is likely due to volume depletion.    Plan   -  Additional 500 mL bolus of LR ordered by primary team (for total of 1000 mL)  -  Reflex lactate in 2H  -  The Thoracic Surgery primary team was able to be reached and they are in agreement with the above plan.  -  Disposition: The patient will remain on the current unit. We will continue to monitor this patient closely.  -  Reassessment and plan follow-up will be performed by the primary team    Jabari Arteaga PA-C  Rapid Response Team MITZI  Securely message with [employer]     Medical Decision Making       15 MINUTES SPENT BY ME on the date of service doing chart review, history, exam, documentation & further activities per the note.        Hospital Course   Brief Summary of events leading to rapid response:   Hx Adenocarcinoma of GE junction s/p robotic takedown of nissen fundoplication, J-tube placement on 2/26/25.  Admitted following thorascopic esophagectomy and primary esophagogastric anastamosis.      RRT activation due to sepsis BPA with lactate 3.7.  Minimal  urine output today.  Primary team concerned about volume depletion, already ordered 500 mL bolus prior to lactate results.  Ordered additional 500 mL bolus given results.      Patient denies any new complaints, other than pain at surgical site and chest tubes. NO dyspnea, cough, sputum, N/V/D, abdomiinal pain, or urinary complaints.     Physical Exam   Vital Signs: Temp: 98.3  F (36.8  C) Temp src: Oral BP: 123/78 Pulse: 94   Resp: (P) 20 SpO2: 92 % O2 Device: Nasal cannula Oxygen Delivery: 1 LPM  Weight: 188 lbs 14.95 oz      Exam:   General Appearance: Awake. Alert. Oriented x3. NAD.   Respiratory:  Normal effort. CTAB. No wheezing, rhonchi, rales.   Cardiovascular:  RRR. S1/S2. No murmurs.   GI:  Soft, non-distended, non-tender, +BS.   Extremity:  No pitting edema.   Skin:   No visible rash.   Neuro:  Grossly non-focal.      Significant Results and Procedures     I have personally reviewed the following data over the past 24 hrs:    12.6 (H)  \   14.0   / 192     137 103 24.7 (H) /  159 (H)   5.3 23 1.82 (H) \     Procal: N/A CRP: N/A Lactic Acid: 2.4 (H)         Imaging results reviewed over the past 24 hrs:   Recent Results (from the past 24 hours)   XR Chest Port 1 View    Narrative    EXAM: XR CHEST PORT 1 VIEW  3/26/2025 8:05 AM      HISTORY: s/p esophagectomy    COMPARISON: Prior day    FINDINGS: Portable semiupright AP view of the chest.    Gastric tube sidehole projects over the central thorax, similar to  prior. Right IJ catheter tube tip projects over the cavoatrial  junction. Stable position of right-sided chest tube. Interval  repositioning of left basilar chest tube.    Cardiac silhouette is stable. No appreciable pneumothorax. Mildly  increased streaky right greater than left basilar opacities. No  pleural effusion. Right glenohumeral joint degenerative change.       Impression    IMPRESSION: No appreciable pneumothorax. Stable position of  right-sided chest tube. Interval repositioning of left-sided chest  tube. Mildly increased streaky right basilar opacities.    CLAUDIA STOLL MD (Joe)         SYSTEM ID:  [id number]

## 2025-03-26 NOTE — PLAN OF CARE
Neuro: A&Ox4.   Cardiac: SR/ST. VSS.   Respiratory: Sating >92% on 1L NC.  GI/: No urine output, due to void, void protocol used, q2 Bladder scans.  Diet/appetite: Tolerating NPO diet. 10ml/hr q4 30mL FWF  Activity:  Assist of 2, up to side of bed  Pain: At acceptable level on current regimen. Prn oxy and PCA dilaudid  Skin: No new deficits noted.  LDA's: R Port, L/R CT, NG, J tube. Dilaudid PCA  NG irrigated q2 with consistent output, L/R CT with consistent output,     Plan: Continue with POC. Notify primary team with changes.  Goal Outcome Evaluation:      Plan of Care Reviewed With: patient    Overall Patient Progress: improvingOverall Patient Progress: improving    Outcome Evaluation: Pain managed, VSS, 1L NC, consistent CT output      Problem: Pain Acute  Goal: Optimal Pain Control and Function  Intervention: Develop Pain Management Plan  Recent Flowsheet Documentation  Taken 3/26/2025 0000 by Elder Montoya, RN  Pain Management Interventions:   medication (see MAR)   pain pump in use     Problem: Risk for Delirium  Goal: Improved Sleep  Outcome: Progressing

## 2025-03-26 NOTE — PROGRESS NOTES
Thoracic Surgery Progress Note    PATIENT NAME: Bal Junior  MRN: [id number]  DATE: 03/26/2025 7:53 AM  LAST PROCEDURE: ESOPHAGECTOMY, MINIMALLY INVASIVE, Laparoscopic Right Thorascopic Esophagectomy, Botox Injection:   Esophagoscopy, gastroscopy, duodenoscopy (EGD), combined: 3/24/2025    Subjective:  [place] [facility] [place]. Declined anesthesia team block. No acute events overnight.This morning Bal reports difficulty urinating and has not been able to urinate since pre-op. Reports he has some baseline difficulty urinating since chemotherapy. No flatus. When not coughing, pain is otherwise well controlled.    Objective:  Vital Signs: Most Recent  Ranges (24 hours)   Temp: 98.2  F (36.8  C)  Pulse: 113  BP: 108/76  Resp: 22  SpO2: 92 % on Nasal cannula  Temp  Min: 97.8  F (36.6  C)  Max: 98.8  F (37.1  C)  Pulse  Min: 99  Max: 142  BP  Min: 91/74  Max: 123/83  Resp  Min: 17  Max: 24  SpO2  Min: 91 %  Max: 96 %     Physical Exam:  Gen: Appears stated age, NAD, Resting in bed. Comfortable  HEENT: NC/AT, PERRL, EOMI, Sclera Anicteric, Hearing intact. NGT in place. Flushed, working well.  Neuro: AO, no focal deficits  Psych: Affect appropriate, Behavior appropriate, Cooperative  CV: Tachycardic, Normotensive  Pulm: NWOB on 1L NC, CTx2 in place to suction, No air leak present, L serosanginous, R serous.  MSK: MAEx4  Skin: No obvious rashes, jaundice, erythema    Other Data:  Recent Labs   Lab 03/26/25  0339 03/25/25  0432 03/24/25 1907 03/24/25  1613   WBC 12.6* 9.4 10.6  --    HGB 14.0 14.0 12.1* 12.7*    171 173  --      Recent Labs   Lab 03/26/25  0727 03/26/25  0339 03/26/25  0338 03/26/25  0009 03/25/25  0749 03/25/25  0432 03/24/25  1909 03/24/25  1907 03/24/25  1613   0000   NA  --  137  --   --   --  139  --  139 140  --    POTASSIUM  --  5.3  --   --   --  4.8  --  4.6 4.8  --    CHLORIDE  --  103  --   --   --  103  --  102  --   --    CO2  --  23  --   --   --  21*  --  23  --   --     BUN  --  24.7*  --   --   --  14.5  --  16.4  --   --    CR  --  1.82*  --   --   --  0.88  --  0.93  --   --    * 177* 179* 179*   < > 182*   < > 171* 145*   < >    < > = values in this interval not displayed.       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 He is recovering well post-op.      Plan:  # Pain: Multimodal pain control  # Pulm Cares: CTx2 to suction  - Continue to titrate supplemental oxygen to SpO2 goals  - Continue IS use; Deep breathing and coughing to promote lung expansion  - Daily CXRs  # GI Cares: Strict NPO. J tube tube feeds @ 20ml/hr today. J Tube cares. NGT to LCS  - PRN anti-emetics  - Continue bowel regimen via J. Last Bowel Movement: 03/23/25   # Renal: Monitor Intake and Outputs  - Straight cath x1 for urinary rentention  # ID cares: No indication for ABX  - Daily CBC  # Activity: OOB as much as possible  # DVT PPX: Enoxaparin, SCDs  # Dispo: IMC status  # Code Status: Full Code    Plan discussed with staff.    Chavez Rodriguez MD   General Surgery Resident

## 2025-03-26 NOTE — PLAN OF CARE
Neuro: A&Ox4.   Cardiac: SR/ST. VSS.   Respiratory: Sating 95% on 1L [place].  GI/: No urine output, bladder scanned and straight cathed. Rescanned after 6 hours. No BM.  Diet/appetite: NPO with tube feedings, increased rate, 20ml/hr q4 30ml FWF. Tolerating well.  Activity:  Assist of 2, up to chair today.  Pain: 3/10 at rest, increased with activity. Continued scheduled pain meds, PCA pump and PRN oxy.   Skin: No new deficits noted.   LDA's: R port. New L side PIV placed by vascular access. R/L chest tubes, dressings changed, consistent output. J tube. Diluated PCA pump. NG, flushed q4, consistent output.    Sepsis triggered, lactic acid labs taken, out of range, rapid response called. Additional 500ml LR bolus completed (1000ml total). Lactic acid retaken, provider notified.     Plan: Continue with POC. Notify primary team with changes.       Goal Outcome Evaluation:      Plan of Care Reviewed With: patient, spouse    Overall Patient Progress: improvingOverall Patient Progress: improving    Outcome Evaluation: Pt stable, discharge planning occuring

## 2025-03-27 ENCOUNTER — HOME INFUSION (OUTPATIENT)
Dept: HOME HEALTH SERVICES | Facility: HOME HEALTH | Age: 70
End: 2025-03-27
Payer: COMMERCIAL

## 2025-03-27 ENCOUNTER — APPOINTMENT (OUTPATIENT)
Dept: GENERAL RADIOLOGY | Facility: CLINIC | Age: 70
End: 2025-03-27
Attending: PHYSICIAN ASSISTANT
Payer: MEDICARE

## 2025-03-27 ENCOUNTER — APPOINTMENT (OUTPATIENT)
Dept: GENERAL RADIOLOGY | Facility: CLINIC | Age: 70
End: 2025-03-27
Attending: STUDENT IN AN ORGANIZED HEALTH CARE EDUCATION/TRAINING PROGRAM
Payer: MEDICARE

## 2025-03-27 VITALS
BODY MASS INDEX: 27.05 KG/M2 | HEIGHT: 70 IN | TEMPERATURE: 98.6 F | DIASTOLIC BLOOD PRESSURE: 79 MMHG | WEIGHT: 188.93 LBS | HEART RATE: 110 BPM | RESPIRATION RATE: 16 BRPM | OXYGEN SATURATION: 93 % | SYSTOLIC BLOOD PRESSURE: 133 MMHG

## 2025-03-27 LAB
ANION GAP SERPL CALCULATED.3IONS-SCNC: 10 MMOL/L (ref 7–15)
BUN SERPL-MCNC: 22.6 MG/DL (ref 8–23)
CALCIUM SERPL-MCNC: 9.2 MG/DL (ref 8.8–10.4)
CHLORIDE SERPL-SCNC: 105 MMOL/L (ref 98–107)
CREAT SERPL-MCNC: 1.05 MG/DL (ref 0.67–1.17)
EGFRCR SERPLBLD CKD-EPI 2021: 77 ML/MIN/1.73M2
ERYTHROCYTE [DISTWIDTH] IN BLOOD BY AUTOMATED COUNT: 12.4 % (ref 10–15)
GLUCOSE BLDC GLUCOMTR-MCNC: 117 MG/DL (ref 70–99)
GLUCOSE BLDC GLUCOMTR-MCNC: 120 MG/DL (ref 70–99)
GLUCOSE BLDC GLUCOMTR-MCNC: 129 MG/DL (ref 70–99)
GLUCOSE BLDC GLUCOMTR-MCNC: 131 MG/DL (ref 70–99)
GLUCOSE BLDC GLUCOMTR-MCNC: 137 MG/DL (ref 70–99)
GLUCOSE BLDC GLUCOMTR-MCNC: 149 MG/DL (ref 70–99)
GLUCOSE BLDC GLUCOMTR-MCNC: 183 MG/DL (ref 70–99)
GLUCOSE SERPL-MCNC: 152 MG/DL (ref 70–99)
HCO3 SERPL-SCNC: 23 MMOL/L (ref 22–29)
HCT VFR BLD AUTO: 35.3 % (ref 40–53)
HGB BLD-MCNC: 11.4 G/DL (ref 13.3–17.7)
MAGNESIUM SERPL-MCNC: 1.9 MG/DL (ref 1.7–2.3)
MCH RBC QN AUTO: 30.3 PG (ref 26.5–33)
MCHC RBC AUTO-ENTMCNC: 32.3 G/DL (ref 31.5–36.5)
MCV RBC AUTO: 94 FL (ref 78–100)
PHOSPHATE SERPL-MCNC: 1.9 MG/DL (ref 2.5–4.5)
PHOSPHATE SERPL-MCNC: 2.3 MG/DL (ref 2.5–4.5)
PLATELET # BLD AUTO: 131 10E3/UL (ref 150–450)
POTASSIUM SERPL-SCNC: 4.3 MMOL/L (ref 3.4–5.3)
RBC # BLD AUTO: 3.76 10E6/UL (ref 4.4–5.9)
SODIUM SERPL-SCNC: 138 MMOL/L (ref 135–145)
WBC # BLD AUTO: 8.1 10E3/UL (ref 4–11)

## 2025-03-27 PROCEDURE — 71045 X-RAY EXAM CHEST 1 VIEW: CPT | Mod: 26 | Performed by: RADIOLOGY

## 2025-03-27 PROCEDURE — 83735 ASSAY OF MAGNESIUM: CPT

## 2025-03-27 PROCEDURE — 82310 ASSAY OF CALCIUM: CPT

## 2025-03-27 PROCEDURE — 258N000003 HC RX IP 258 OP 636

## 2025-03-27 PROCEDURE — 250N000013 HC RX MED GY IP 250 OP 250 PS 637: Performed by: NURSE PRACTITIONER

## 2025-03-27 PROCEDURE — 250N000011 HC RX IP 250 OP 636: Performed by: STUDENT IN AN ORGANIZED HEALTH CARE EDUCATION/TRAINING PROGRAM

## 2025-03-27 PROCEDURE — 80048 BASIC METABOLIC PNL TOTAL CA: CPT

## 2025-03-27 PROCEDURE — 250N000013 HC RX MED GY IP 250 OP 250 PS 637: Performed by: STUDENT IN AN ORGANIZED HEALTH CARE EDUCATION/TRAINING PROGRAM

## 2025-03-27 PROCEDURE — 258N000003 HC RX IP 258 OP 636: Performed by: STUDENT IN AN ORGANIZED HEALTH CARE EDUCATION/TRAINING PROGRAM

## 2025-03-27 PROCEDURE — 120N000003 HC R&B IMCU UMMC

## 2025-03-27 PROCEDURE — 250N000011 HC RX IP 250 OP 636: Performed by: PHYSICIAN ASSISTANT

## 2025-03-27 PROCEDURE — 250N000011 HC RX IP 250 OP 636: Performed by: NURSE PRACTITIONER

## 2025-03-27 PROCEDURE — 250N000013 HC RX MED GY IP 250 OP 250 PS 637

## 2025-03-27 PROCEDURE — 71045 X-RAY EXAM CHEST 1 VIEW: CPT | Mod: 77

## 2025-03-27 PROCEDURE — 71045 X-RAY EXAM CHEST 1 VIEW: CPT

## 2025-03-27 PROCEDURE — 84100 ASSAY OF PHOSPHORUS: CPT | Performed by: STUDENT IN AN ORGANIZED HEALTH CARE EDUCATION/TRAINING PROGRAM

## 2025-03-27 PROCEDURE — 250N000013 HC RX MED GY IP 250 OP 250 PS 637: Performed by: PHYSICIAN ASSISTANT

## 2025-03-27 PROCEDURE — 82374 ASSAY BLOOD CARBON DIOXIDE: CPT

## 2025-03-27 PROCEDURE — 250N000009 HC RX 250

## 2025-03-27 PROCEDURE — 85027 COMPLETE CBC AUTOMATED: CPT

## 2025-03-27 RX ORDER — PIPERACILLIN SODIUM, TAZOBACTAM SODIUM 3; .375 G/15ML; G/15ML
3.38 INJECTION, POWDER, LYOPHILIZED, FOR SOLUTION INTRAVENOUS EVERY 6 HOURS
Status: DISCONTINUED | OUTPATIENT
Start: 2025-03-27 | End: 2025-04-02

## 2025-03-27 RX ORDER — MAGNESIUM SULFATE HEPTAHYDRATE 40 MG/ML
2 INJECTION, SOLUTION INTRAVENOUS ONCE
Status: COMPLETED | OUTPATIENT
Start: 2025-03-27 | End: 2025-03-27

## 2025-03-27 RX ORDER — AMINO ACIDS/PROTEIN HYDROLYS 11G-40/45
1 LIQUID IN PACKET (ML) ORAL DAILY
Status: DISCONTINUED | OUTPATIENT
Start: 2025-03-27 | End: 2025-03-31

## 2025-03-27 RX ORDER — FLUCONAZOLE 2 MG/ML
400 INJECTION, SOLUTION INTRAVENOUS EVERY 24 HOURS
Status: DISCONTINUED | OUTPATIENT
Start: 2025-03-27 | End: 2025-04-01

## 2025-03-27 RX ADMIN — POLYETHYLENE GLYCOL 3350 17 G: 17 POWDER, FOR SOLUTION ORAL at 08:02

## 2025-03-27 RX ADMIN — ENOXAPARIN SODIUM 40 MG: 40 INJECTION SUBCUTANEOUS at 11:45

## 2025-03-27 RX ADMIN — GABAPENTIN 300 MG: 300 CAPSULE ORAL at 14:17

## 2025-03-27 RX ADMIN — POTASSIUM & SODIUM PHOSPHATES POWDER PACK 280-160-250 MG 2 PACKET: 280-160-250 PACK at 08:44

## 2025-03-27 RX ADMIN — GABAPENTIN 300 MG: 300 CAPSULE ORAL at 08:02

## 2025-03-27 RX ADMIN — METHOCARBAMOL 500 MG: 500 TABLET, FILM COATED ORAL at 14:30

## 2025-03-27 RX ADMIN — POTASSIUM & SODIUM PHOSPHATES POWDER PACK 280-160-250 MG 2 PACKET: 280-160-250 PACK at 15:49

## 2025-03-27 RX ADMIN — METHOCARBAMOL 500 MG: 500 TABLET, FILM COATED ORAL at 21:38

## 2025-03-27 RX ADMIN — FLUCONAZOLE 400 MG: 2 INJECTION, SOLUTION INTRAVENOUS at 15:50

## 2025-03-27 RX ADMIN — SENNOSIDES AND DOCUSATE SODIUM 1 TABLET: 50; 8.6 TABLET ORAL at 20:14

## 2025-03-27 RX ADMIN — PIPERACILLIN AND TAZOBACTAM 3.38 G: 3; .375 INJECTION, POWDER, LYOPHILIZED, FOR SOLUTION INTRAVENOUS at 14:16

## 2025-03-27 RX ADMIN — NORTRIPTYLINE HYDROCHLORIDE 25 MG: 25 CAPSULE ORAL at 20:14

## 2025-03-27 RX ADMIN — DEXTROSE, SODIUM CHLORIDE, AND POTASSIUM CHLORIDE: 5; .45; .15 INJECTION INTRAVENOUS at 10:10

## 2025-03-27 RX ADMIN — ACETAMINOPHEN 650 MG: 325 SOLUTION ORAL at 10:11

## 2025-03-27 RX ADMIN — PIPERACILLIN AND TAZOBACTAM 3.38 G: 3; .375 INJECTION, POWDER, LYOPHILIZED, FOR SOLUTION INTRAVENOUS at 20:13

## 2025-03-27 RX ADMIN — SENNOSIDES AND DOCUSATE SODIUM 1 TABLET: 50; 8.6 TABLET ORAL at 08:02

## 2025-03-27 RX ADMIN — GABAPENTIN 300 MG: 300 CAPSULE ORAL at 20:17

## 2025-03-27 RX ADMIN — TOPICAL ANESTHETIC 0.5 ML: 200 SPRAY DENTAL; PERIODONTAL at 03:53

## 2025-03-27 RX ADMIN — ACETAMINOPHEN 650 MG: 325 SOLUTION ORAL at 23:13

## 2025-03-27 RX ADMIN — Medication 60 ML: at 14:17

## 2025-03-27 RX ADMIN — Medication 15 ML: at 08:02

## 2025-03-27 RX ADMIN — ACETAMINOPHEN 650 MG: 325 SOLUTION ORAL at 15:49

## 2025-03-27 RX ADMIN — ATENOLOL 25 MG: 25 TABLET ORAL at 08:02

## 2025-03-27 RX ADMIN — POTASSIUM & SODIUM PHOSPHATES POWDER PACK 280-160-250 MG 2 PACKET: 280-160-250 PACK at 11:45

## 2025-03-27 RX ADMIN — INSULIN ASPART 2 UNITS: 100 INJECTION, SOLUTION INTRAVENOUS; SUBCUTANEOUS at 23:44

## 2025-03-27 RX ADMIN — INSULIN ASPART 1 UNITS: 100 INJECTION, SOLUTION INTRAVENOUS; SUBCUTANEOUS at 04:03

## 2025-03-27 RX ADMIN — MAGNESIUM SULFATE HEPTAHYDRATE 2 G: 2 INJECTION, SOLUTION INTRAVENOUS at 08:44

## 2025-03-27 RX ADMIN — Medication: at 23:04

## 2025-03-27 RX ADMIN — DEXTROSE, SODIUM CHLORIDE, AND POTASSIUM CHLORIDE: 5; .45; .15 INJECTION INTRAVENOUS at 21:39

## 2025-03-27 RX ADMIN — ACETAMINOPHEN 650 MG: 325 SOLUTION ORAL at 03:52

## 2025-03-27 ASSESSMENT — ACTIVITIES OF DAILY LIVING (ADL)
ADLS_ACUITY_SCORE: 42
ADLS_ACUITY_SCORE: 46
ADLS_ACUITY_SCORE: 46
ADLS_ACUITY_SCORE: 42
ADLS_ACUITY_SCORE: 46
ADLS_ACUITY_SCORE: 42
ADLS_ACUITY_SCORE: 46
ADLS_ACUITY_SCORE: 41
ADLS_ACUITY_SCORE: 42
ADLS_ACUITY_SCORE: 41
ADLS_ACUITY_SCORE: 42
ADLS_ACUITY_SCORE: 46
ADLS_ACUITY_SCORE: 42
ADLS_ACUITY_SCORE: 42

## 2025-03-27 NOTE — PLAN OF CARE
Neuro: A&Ox4.   Cardiac: SR/ST. VSS.   Respiratory: Sating >94% on 1L NC.  GI/: straight cath x1 per void protocol, hesitancy and inability to empty bladder, no BM, bowel sounds present  Diet/appetite: Tolerating NPO diet, TF 20ml/hr q4 30ml FWF.  Activity:  Assist of 1/2, not oob during shift  Pain: At acceptable level on current regimen. Dilaudid PCA, oxy x1 PRN   Skin: No new deficits noted.  LDA's: R Port, L PIV, L/R CT,     Plan: Continue with POC. Notify primary team with changes.  Goal Outcome Evaluation:      Plan of Care Reviewed With: patient    Overall Patient Progress: improvingOverall Patient Progress: improving    Outcome Evaluation: VSS, 1L NC, congested painful cough, dilaudid PCA    Problem: Risk for Delirium  Goal: Improved Sleep  Outcome: Progressing     Problem: Pain Acute  Goal: Optimal Pain Control and Function  Intervention: Prevent or Manage Pain  Recent Flowsheet Documentation  Taken 3/27/2025 0358 by Elder Montoya, RN  Medication Review/Management:   medications reviewed   high-risk medications identified  Taken 3/26/2025 2340 by Elder Montoya, RN  Medication Review/Management:   medications reviewed   high-risk medications identified  Taken 3/26/2025 2011 by Elder Montoya, RN  Medication Review/Management:   medications reviewed   high-risk medications identified

## 2025-03-27 NOTE — PROGRESS NOTES
Home Infusion  Received referral from Marleny Connelly RNCC for  enteral.  Benefits verified.  Patient has [employer] and is covered  80% towards $3250.00 out of pocket maximum ($3145.00 remaining). Once the out of pocket has been met in full he will be covered 100%. Called and spoke with Bal to review home infusion services, review benefits and offer choice of providers.  Patient would like to remain in the [facility] system and will use [facility] for home infusion.  Confirmed discharge address, phone, and emergency contact information.  Confirmed allergies.     Jess is willing to learn and manage home IV therapy.  Questions answered.      [facility] will continue to follow until discharge and update pt once final orders are determined.    Thank you for the referral    Lanre Garcia LPN, Coordinator   [facility]   [email or web address]  Office: [phone]

## 2025-03-27 NOTE — PLAN OF CARE
Neuro: A&Ox4.   Cardiac: SR. VSS.   Respiratory: Sating  on RA  GI/: Small voids, straight cath in AM for 325ml. No BM bowel medications given.   Diet/appetite: Tolerating Tube feeds through J tube at 30ml/hr increasing by 10ml q8hr. Ng tube to LCS and flushing q4hr   Activity:  Assist of x1 up to chair. Patient refused walking with therapy today  Pain: Dilaudid PCA for pain.   Skin: No new deficits noted.  LDA's: Port x1 with dilaudid PCA and fluids 75ml/hr, PIV tko for antibiotics    Plan: Continue with POC. Notify primary team with changes.         Goal Outcome Evaluation:      Plan of Care Reviewed With: patient    Overall Patient Progress: no changeOverall Patient Progress: no change    Outcome Evaluation: L chest tube removed, dilaudid PCA, Patient still complaining of lots of pain when coughing and using IS. Encouraged to keep using the PCA to continue to do coughing and IS.

## 2025-03-27 NOTE — PROGRESS NOTES
CLINICAL NUTRITION SERVICES - BRIEF NOTE  For last full RD assessment, see note dated 3/25/25    NEW FINDINGS   - RD monitoring thoracic team POC for TF rate advancement. TF at 10 mL/hr trickle feed 3/25, advance to 20 mL/hr 3/26, and MD updated TF order with instructions to begin TF rate adv of 10 mL Q8hrs towards goal rate of 60 mL/hr this morning.  - RD informed bedside RN and updated TF order to add Prosource TF20 once daily, and added electrolyte parameters for TF rate adv (per potential refeed risk). Phosphorus levels are currently low but replacement in progress per bedside RN.   - Has not had a BM post-op yet, per I/O review. Monitor tolerance to TF rate adv, lytes, and for BM/stooling trends.     >> Once at goal, TF regimen of:  Osmolite 1.5 Gumaro (or equivalent) at goal of  60ml/hr  (1440ml/day) + 1 pkt Prosource TF20 provides: 2240 kcals (26 kcal/kg), 110 g PRO (1.3 g/kg), 1097 ml free H20, 293 g CHO, and 0 g fiber daily.      Monitoring/Evaluation  Will continue to monitor and evaluate per protocol.    Mane Pimentel, MS, RDN, LD, CNSC  Available by Qlue or phone   Deisy: M-F (7:00-3:30)  6B Clinical Dietitian  Weekend/Holiday (7:00-3:30) - Weekend Clinical Dietitian   6B  desk: [phone]       **Clinical Dietitians are no longer available by pager.

## 2025-03-28 ENCOUNTER — APPOINTMENT (OUTPATIENT)
Dept: GENERAL RADIOLOGY | Facility: CLINIC | Age: 70
End: 2025-03-28
Attending: THORACIC SURGERY (CARDIOTHORACIC VASCULAR SURGERY)
Payer: MEDICARE

## 2025-03-28 ENCOUNTER — APPOINTMENT (OUTPATIENT)
Dept: CT IMAGING | Facility: CLINIC | Age: 70
End: 2025-03-28
Payer: MEDICARE

## 2025-03-28 ENCOUNTER — APPOINTMENT (OUTPATIENT)
Dept: GENERAL RADIOLOGY | Facility: CLINIC | Age: 70
End: 2025-03-28
Attending: STUDENT IN AN ORGANIZED HEALTH CARE EDUCATION/TRAINING PROGRAM
Payer: MEDICARE

## 2025-03-28 ENCOUNTER — APPOINTMENT (OUTPATIENT)
Dept: OCCUPATIONAL THERAPY | Facility: CLINIC | Age: 70
End: 2025-03-28
Attending: STUDENT IN AN ORGANIZED HEALTH CARE EDUCATION/TRAINING PROGRAM
Payer: MEDICARE

## 2025-03-28 LAB
ANION GAP SERPL CALCULATED.3IONS-SCNC: 9 MMOL/L (ref 7–15)
BUN SERPL-MCNC: 14.3 MG/DL (ref 8–23)
CALCIUM SERPL-MCNC: 8 MG/DL (ref 8.8–10.4)
CHLORIDE SERPL-SCNC: 104 MMOL/L (ref 98–107)
CREAT SERPL-MCNC: 0.81 MG/DL (ref 0.67–1.17)
EGFRCR SERPLBLD CKD-EPI 2021: >90 ML/MIN/1.73M2
ERYTHROCYTE [DISTWIDTH] IN BLOOD BY AUTOMATED COUNT: 12.5 % (ref 10–15)
GLUCOSE BLDC GLUCOMTR-MCNC: 108 MG/DL (ref 70–99)
GLUCOSE BLDC GLUCOMTR-MCNC: 119 MG/DL (ref 70–99)
GLUCOSE BLDC GLUCOMTR-MCNC: 134 MG/DL (ref 70–99)
GLUCOSE BLDC GLUCOMTR-MCNC: 144 MG/DL (ref 70–99)
GLUCOSE BLDC GLUCOMTR-MCNC: 154 MG/DL (ref 70–99)
GLUCOSE BLDC GLUCOMTR-MCNC: 159 MG/DL (ref 70–99)
GLUCOSE BLDC GLUCOMTR-MCNC: 165 MG/DL (ref 70–99)
GLUCOSE SERPL-MCNC: 167 MG/DL (ref 70–99)
HCO3 SERPL-SCNC: 22 MMOL/L (ref 22–29)
HCT VFR BLD AUTO: 32 % (ref 40–53)
HGB BLD-MCNC: 10.6 G/DL (ref 13.3–17.7)
MAGNESIUM SERPL-MCNC: 1.7 MG/DL (ref 1.7–2.3)
MCH RBC QN AUTO: 29.8 PG (ref 26.5–33)
MCHC RBC AUTO-ENTMCNC: 33.1 G/DL (ref 31.5–36.5)
MCV RBC AUTO: 90 FL (ref 78–100)
PATH REPORT.COMMENTS IMP SPEC: ABNORMAL
PATH REPORT.COMMENTS IMP SPEC: ABNORMAL
PATH REPORT.COMMENTS IMP SPEC: YES
PATH REPORT.FINAL DX SPEC: ABNORMAL
PATH REPORT.GROSS SPEC: ABNORMAL
PATH REPORT.INTRAOP OBS SPEC DOC: ABNORMAL
PATH REPORT.MICROSCOPIC SPEC OTHER STN: ABNORMAL
PATH REPORT.RELEVANT HX SPEC: ABNORMAL
PATHOLOGY SYNOPTIC REPORT: ABNORMAL
PHOTO IMAGE: ABNORMAL
PLATELET # BLD AUTO: 132 10E3/UL (ref 150–450)
POTASSIUM SERPL-SCNC: 3.6 MMOL/L (ref 3.4–5.3)
RBC # BLD AUTO: 3.56 10E6/UL (ref 4.4–5.9)
SODIUM SERPL-SCNC: 135 MMOL/L (ref 135–145)
WBC # BLD AUTO: 8.9 10E3/UL (ref 4–11)

## 2025-03-28 PROCEDURE — 250N000011 HC RX IP 250 OP 636

## 2025-03-28 PROCEDURE — 82310 ASSAY OF CALCIUM: CPT

## 2025-03-28 PROCEDURE — 999N000179 XR SURGERY CARM FLUORO LESS THAN 5 MIN W STILLS

## 2025-03-28 PROCEDURE — 120N000003 HC R&B IMCU UMMC

## 2025-03-28 PROCEDURE — 83735 ASSAY OF MAGNESIUM: CPT

## 2025-03-28 PROCEDURE — 71260 CT THORAX DX C+: CPT

## 2025-03-28 PROCEDURE — 710N000010 HC RECOVERY PHASE 1, LEVEL 2, PER MIN: Performed by: THORACIC SURGERY (CARDIOTHORACIC VASCULAR SURGERY)

## 2025-03-28 PROCEDURE — 250N000009 HC RX 250

## 2025-03-28 PROCEDURE — 97535 SELF CARE MNGMENT TRAINING: CPT | Mod: GO

## 2025-03-28 PROCEDURE — 85041 AUTOMATED RBC COUNT: CPT

## 2025-03-28 PROCEDURE — 0DJ08ZZ INSPECTION OF UPPER INTESTINAL TRACT, VIA NATURAL OR ARTIFICIAL OPENING ENDOSCOPIC: ICD-10-PCS | Performed by: THORACIC SURGERY (CARDIOTHORACIC VASCULAR SURGERY)

## 2025-03-28 PROCEDURE — 80048 BASIC METABOLIC PNL TOTAL CA: CPT

## 2025-03-28 PROCEDURE — 272N000001 HC OR GENERAL SUPPLY STERILE: Performed by: THORACIC SURGERY (CARDIOTHORACIC VASCULAR SURGERY)

## 2025-03-28 PROCEDURE — 250N000011 HC RX IP 250 OP 636: Performed by: PHYSICIAN ASSISTANT

## 2025-03-28 PROCEDURE — 82565 ASSAY OF CREATININE: CPT

## 2025-03-28 PROCEDURE — 250N000013 HC RX MED GY IP 250 OP 250 PS 637: Performed by: PHYSICIAN ASSISTANT

## 2025-03-28 PROCEDURE — 85018 HEMOGLOBIN: CPT

## 2025-03-28 PROCEDURE — 999N000141 HC STATISTIC PRE-PROCEDURE NURSING ASSESSMENT: Performed by: THORACIC SURGERY (CARDIOTHORACIC VASCULAR SURGERY)

## 2025-03-28 PROCEDURE — 74177 CT ABD & PELVIS W/CONTRAST: CPT | Mod: 26 | Performed by: RADIOLOGY

## 2025-03-28 PROCEDURE — 71045 X-RAY EXAM CHEST 1 VIEW: CPT

## 2025-03-28 PROCEDURE — 250N000025 HC SEVOFLURANE, PER MIN: Performed by: THORACIC SURGERY (CARDIOTHORACIC VASCULAR SURGERY)

## 2025-03-28 PROCEDURE — 97110 THERAPEUTIC EXERCISES: CPT | Mod: GO

## 2025-03-28 PROCEDURE — C1769 GUIDE WIRE: HCPCS | Performed by: THORACIC SURGERY (CARDIOTHORACIC VASCULAR SURGERY)

## 2025-03-28 PROCEDURE — 250N000013 HC RX MED GY IP 250 OP 250 PS 637: Performed by: NURSE PRACTITIONER

## 2025-03-28 PROCEDURE — 250N000011 HC RX IP 250 OP 636: Performed by: STUDENT IN AN ORGANIZED HEALTH CARE EDUCATION/TRAINING PROGRAM

## 2025-03-28 PROCEDURE — 370N000017 HC ANESTHESIA TECHNICAL FEE, PER MIN: Performed by: THORACIC SURGERY (CARDIOTHORACIC VASCULAR SURGERY)

## 2025-03-28 PROCEDURE — 258N000003 HC RX IP 258 OP 636

## 2025-03-28 PROCEDURE — 250N000013 HC RX MED GY IP 250 OP 250 PS 637: Performed by: STUDENT IN AN ORGANIZED HEALTH CARE EDUCATION/TRAINING PROGRAM

## 2025-03-28 PROCEDURE — 71260 CT THORAX DX C+: CPT | Mod: 26 | Performed by: RADIOLOGY

## 2025-03-28 PROCEDURE — 360N000075 HC SURGERY LEVEL 2, PER MIN: Performed by: THORACIC SURGERY (CARDIOTHORACIC VASCULAR SURGERY)

## 2025-03-28 PROCEDURE — 250N000011 HC RX IP 250 OP 636: Performed by: THORACIC SURGERY (CARDIOTHORACIC VASCULAR SURGERY)

## 2025-03-28 PROCEDURE — 250N000013 HC RX MED GY IP 250 OP 250 PS 637

## 2025-03-28 PROCEDURE — 71045 X-RAY EXAM CHEST 1 VIEW: CPT | Mod: 26 | Performed by: RADIOLOGY

## 2025-03-28 PROCEDURE — 43235 EGD DIAGNOSTIC BRUSH WASH: CPT | Mod: 78 | Performed by: THORACIC SURGERY (CARDIOTHORACIC VASCULAR SURGERY)

## 2025-03-28 PROCEDURE — 250N000011 HC RX IP 250 OP 636: Mod: JZ | Performed by: STUDENT IN AN ORGANIZED HEALTH CARE EDUCATION/TRAINING PROGRAM

## 2025-03-28 RX ORDER — IOPAMIDOL 755 MG/ML
118 INJECTION, SOLUTION INTRAVASCULAR ONCE
Status: COMPLETED | OUTPATIENT
Start: 2025-03-28 | End: 2025-03-28

## 2025-03-28 RX ORDER — NALOXONE HYDROCHLORIDE 0.4 MG/ML
0.1 INJECTION, SOLUTION INTRAMUSCULAR; INTRAVENOUS; SUBCUTANEOUS
Status: DISCONTINUED | OUTPATIENT
Start: 2025-03-28 | End: 2025-03-28

## 2025-03-28 RX ORDER — HYDROMORPHONE HCL IN WATER/PF 6 MG/30 ML
0.4 PATIENT CONTROLLED ANALGESIA SYRINGE INTRAVENOUS EVERY 5 MIN PRN
Status: DISCONTINUED | OUTPATIENT
Start: 2025-03-28 | End: 2025-03-28 | Stop reason: HOSPADM

## 2025-03-28 RX ORDER — LABETALOL HYDROCHLORIDE 5 MG/ML
10 INJECTION, SOLUTION INTRAVENOUS
Status: DISCONTINUED | OUTPATIENT
Start: 2025-03-28 | End: 2025-03-28 | Stop reason: HOSPADM

## 2025-03-28 RX ORDER — LIDOCAINE 40 MG/G
CREAM TOPICAL
Status: DISCONTINUED | OUTPATIENT
Start: 2025-03-28 | End: 2025-03-28 | Stop reason: HOSPADM

## 2025-03-28 RX ORDER — ONDANSETRON 4 MG/1
4 TABLET, ORALLY DISINTEGRATING ORAL EVERY 30 MIN PRN
Status: DISCONTINUED | OUTPATIENT
Start: 2025-03-28 | End: 2025-03-28

## 2025-03-28 RX ORDER — OXYCODONE HYDROCHLORIDE 10 MG/1
10 TABLET ORAL
Status: DISCONTINUED | OUTPATIENT
Start: 2025-03-28 | End: 2025-03-28

## 2025-03-28 RX ORDER — ONDANSETRON 2 MG/ML
4 INJECTION INTRAMUSCULAR; INTRAVENOUS EVERY 30 MIN PRN
Status: DISCONTINUED | OUTPATIENT
Start: 2025-03-28 | End: 2025-03-28 | Stop reason: HOSPADM

## 2025-03-28 RX ORDER — DEXAMETHASONE SODIUM PHOSPHATE 4 MG/ML
4 INJECTION, SOLUTION INTRA-ARTICULAR; INTRALESIONAL; INTRAMUSCULAR; INTRAVENOUS; SOFT TISSUE
Status: DISCONTINUED | OUTPATIENT
Start: 2025-03-28 | End: 2025-03-28 | Stop reason: HOSPADM

## 2025-03-28 RX ORDER — DEXAMETHASONE SODIUM PHOSPHATE 4 MG/ML
4 INJECTION, SOLUTION INTRA-ARTICULAR; INTRALESIONAL; INTRAMUSCULAR; INTRAVENOUS; SOFT TISSUE
Status: DISCONTINUED | OUTPATIENT
Start: 2025-03-28 | End: 2025-03-28

## 2025-03-28 RX ORDER — POTASSIUM CHLORIDE 20MEQ/15ML
20 LIQUID (ML) ORAL ONCE
Status: COMPLETED | OUTPATIENT
Start: 2025-03-28 | End: 2025-03-28

## 2025-03-28 RX ORDER — ONDANSETRON 2 MG/ML
4 INJECTION INTRAMUSCULAR; INTRAVENOUS EVERY 30 MIN PRN
Status: DISCONTINUED | OUTPATIENT
Start: 2025-03-28 | End: 2025-03-28

## 2025-03-28 RX ORDER — ACETAMINOPHEN 325 MG/1
975 TABLET ORAL ONCE
Status: DISCONTINUED | OUTPATIENT
Start: 2025-03-28 | End: 2025-03-28 | Stop reason: HOSPADM

## 2025-03-28 RX ORDER — FENTANYL CITRATE 50 UG/ML
50 INJECTION, SOLUTION INTRAMUSCULAR; INTRAVENOUS EVERY 5 MIN PRN
Status: DISCONTINUED | OUTPATIENT
Start: 2025-03-28 | End: 2025-03-28 | Stop reason: HOSPADM

## 2025-03-28 RX ORDER — MAGNESIUM SULFATE HEPTAHYDRATE 40 MG/ML
2 INJECTION, SOLUTION INTRAVENOUS ONCE
Status: COMPLETED | OUTPATIENT
Start: 2025-03-28 | End: 2025-03-28

## 2025-03-28 RX ORDER — FENTANYL CITRATE 50 UG/ML
25 INJECTION, SOLUTION INTRAMUSCULAR; INTRAVENOUS EVERY 5 MIN PRN
Status: DISCONTINUED | OUTPATIENT
Start: 2025-03-28 | End: 2025-03-28 | Stop reason: HOSPADM

## 2025-03-28 RX ORDER — HYDROMORPHONE HCL IN WATER/PF 6 MG/30 ML
0.2 PATIENT CONTROLLED ANALGESIA SYRINGE INTRAVENOUS EVERY 5 MIN PRN
Status: DISCONTINUED | OUTPATIENT
Start: 2025-03-28 | End: 2025-03-28 | Stop reason: HOSPADM

## 2025-03-28 RX ORDER — OXYCODONE HYDROCHLORIDE 5 MG/1
5 TABLET ORAL
Status: DISCONTINUED | OUTPATIENT
Start: 2025-03-28 | End: 2025-03-28

## 2025-03-28 RX ORDER — NALOXONE HYDROCHLORIDE 0.4 MG/ML
0.1 INJECTION, SOLUTION INTRAMUSCULAR; INTRAVENOUS; SUBCUTANEOUS
Status: DISCONTINUED | OUTPATIENT
Start: 2025-03-28 | End: 2025-03-28 | Stop reason: HOSPADM

## 2025-03-28 RX ORDER — ONDANSETRON 4 MG/1
4 TABLET, ORALLY DISINTEGRATING ORAL EVERY 30 MIN PRN
Status: DISCONTINUED | OUTPATIENT
Start: 2025-03-28 | End: 2025-03-28 | Stop reason: HOSPADM

## 2025-03-28 RX ORDER — IOPAMIDOL 755 MG/ML
INJECTION, SOLUTION INTRAVASCULAR PRN
Status: DISCONTINUED | OUTPATIENT
Start: 2025-03-28 | End: 2025-03-31

## 2025-03-28 RX ADMIN — ACETAMINOPHEN 650 MG: 325 SOLUTION ORAL at 03:21

## 2025-03-28 RX ADMIN — POLYETHYLENE GLYCOL 3350 17 G: 17 POWDER, FOR SOLUTION ORAL at 09:20

## 2025-03-28 RX ADMIN — SENNOSIDES AND DOCUSATE SODIUM 1 TABLET: 50; 8.6 TABLET ORAL at 21:02

## 2025-03-28 RX ADMIN — FENTANYL CITRATE 25 MCG: 50 INJECTION, SOLUTION INTRAMUSCULAR; INTRAVENOUS at 13:30

## 2025-03-28 RX ADMIN — MAGNESIUM SULFATE HEPTAHYDRATE 2 G: 2 INJECTION, SOLUTION INTRAVENOUS at 09:18

## 2025-03-28 RX ADMIN — PIPERACILLIN AND TAZOBACTAM 3.38 G: 3; .375 INJECTION, POWDER, LYOPHILIZED, FOR SOLUTION INTRAVENOUS at 09:15

## 2025-03-28 RX ADMIN — POTASSIUM & SODIUM PHOSPHATES POWDER PACK 280-160-250 MG 1 PACKET: 280-160-250 PACK at 09:50

## 2025-03-28 RX ADMIN — OXYCODONE HYDROCHLORIDE 5 MG: 5 SOLUTION ORAL at 16:12

## 2025-03-28 RX ADMIN — INSULIN ASPART 2 UNITS: 100 INJECTION, SOLUTION INTRAVENOUS; SUBCUTANEOUS at 23:50

## 2025-03-28 RX ADMIN — FENTANYL CITRATE 25 MCG: 50 INJECTION, SOLUTION INTRAMUSCULAR; INTRAVENOUS at 13:23

## 2025-03-28 RX ADMIN — GABAPENTIN 300 MG: 300 CAPSULE ORAL at 21:01

## 2025-03-28 RX ADMIN — INSULIN ASPART 1 UNITS: 100 INJECTION, SOLUTION INTRAVENOUS; SUBCUTANEOUS at 21:08

## 2025-03-28 RX ADMIN — PIPERACILLIN AND TAZOBACTAM 3.38 G: 3; .375 INJECTION, POWDER, LYOPHILIZED, FOR SOLUTION INTRAVENOUS at 21:08

## 2025-03-28 RX ADMIN — DEXTROSE, SODIUM CHLORIDE, AND POTASSIUM CHLORIDE: 5; .45; .15 INJECTION INTRAVENOUS at 10:44

## 2025-03-28 RX ADMIN — IOPAMIDOL 118 ML: 755 INJECTION, SOLUTION INTRAVENOUS at 10:22

## 2025-03-28 RX ADMIN — Medication 60 ML: at 09:20

## 2025-03-28 RX ADMIN — NORTRIPTYLINE HYDROCHLORIDE 25 MG: 25 CAPSULE ORAL at 21:01

## 2025-03-28 RX ADMIN — ATENOLOL 25 MG: 25 TABLET ORAL at 09:20

## 2025-03-28 RX ADMIN — SODIUM CHLORIDE, PRESERVATIVE FREE 79 ML: 5 INJECTION INTRAVENOUS at 10:22

## 2025-03-28 RX ADMIN — HYDROMORPHONE HYDROCHLORIDE 0.4 MG: 0.2 INJECTION, SOLUTION INTRAMUSCULAR; INTRAVENOUS; SUBCUTANEOUS at 14:28

## 2025-03-28 RX ADMIN — INSULIN ASPART 1 UNITS: 100 INJECTION, SOLUTION INTRAVENOUS; SUBCUTANEOUS at 03:43

## 2025-03-28 RX ADMIN — FENTANYL CITRATE 50 MCG: 50 INJECTION, SOLUTION INTRAMUSCULAR; INTRAVENOUS at 13:54

## 2025-03-28 RX ADMIN — ACETAMINOPHEN 650 MG: 325 SOLUTION ORAL at 10:54

## 2025-03-28 RX ADMIN — GABAPENTIN 300 MG: 300 CAPSULE ORAL at 09:19

## 2025-03-28 RX ADMIN — PIPERACILLIN AND TAZOBACTAM 3.38 G: 3; .375 INJECTION, POWDER, LYOPHILIZED, FOR SOLUTION INTRAVENOUS at 03:20

## 2025-03-28 RX ADMIN — GABAPENTIN 300 MG: 300 CAPSULE ORAL at 16:12

## 2025-03-28 RX ADMIN — ACETAMINOPHEN 650 MG: 325 SOLUTION ORAL at 16:12

## 2025-03-28 RX ADMIN — ACETAMINOPHEN 650 MG: 325 SOLUTION ORAL at 21:01

## 2025-03-28 RX ADMIN — POTASSIUM CHLORIDE 20 MEQ: 20 SOLUTION ORAL at 09:19

## 2025-03-28 RX ADMIN — SENNOSIDES AND DOCUSATE SODIUM 1 TABLET: 50; 8.6 TABLET ORAL at 09:19

## 2025-03-28 RX ADMIN — POTASSIUM & SODIUM PHOSPHATES POWDER PACK 280-160-250 MG 1 PACKET: 280-160-250 PACK at 16:12

## 2025-03-28 RX ADMIN — DEXTROSE, SODIUM CHLORIDE, AND POTASSIUM CHLORIDE: 5; .45; .15 INJECTION INTRAVENOUS at 14:34

## 2025-03-28 RX ADMIN — Medication 15 ML: at 09:18

## 2025-03-28 ASSESSMENT — ACTIVITIES OF DAILY LIVING (ADL)
ADLS_ACUITY_SCORE: 46

## 2025-03-28 NOTE — PROGRESS NOTES
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  He was started on antibiotics yesterday for concern of esophageal leak.  This morning, decision made to pursue CT scan.based on findings, decision was made to proceed to OR for EGD.    Otherwise, continue current cares.  Continue tube feeds.  Will continue to monitor chest tube output.  Orders to decrease MIVF updated.      Lisa Larson MD   PGY1  P: [phone]

## 2025-03-28 NOTE — OP NOTE
KYLE: malignant, Meservey Arya  Preoperative diagnosis: esopahgeal cancer, s/p neoadjuvant FLOT  Postoperative diagnosis: as above  Procedure:   Esophagogastroscopy  Laparoscopic gastric conduit preparation and abdominal lymphadenectomy  Botulinum toxin injection into pylorus  RIGHT thoracoscopic esophageal mobilization with mediastinal lymphadenectomy and intrathoracic anastomosis  LEFT tube thoracostomy  Flexible bronchoscopy  LYSIS OF ADHESIONS 60 MIN  Anesthesia: General with double-lumen ETT  Surgeon: Katlyn Tobar MD (present and participated in the entire procedure)  Co SUrgeon: Dr Erik Lindsey MD    EBL:100cc   Specimens:  esopagogastrectomy   Complications: none   Findings:  Endoscopy:     Anastomosis: amputated  the tip of conduit, anastomosis  conduit appeared viable , leak test negative , .  Laparoscopy: diagnostic portion was negative.  RIGHT Thoracoscopy: diagnostic portion was negative.  Intraoperative frozen section pathology: esophageal margin negatove .  Patient tolerance: good     Description of procedure  Esophagogastroscopy  We performed endoscopy with the pt in the supine position with the above mentioned findings.   Laparoscopy   We prepared and draped the area. We used a 6-port approach and placed the first port using an open technique with fascial sutures for eventual closure.   Of note, there were significant adhesions from previous surggeries and lysis took over an hour  Gastric conduit preparation   We opened the gastrohepatic ligament, transected the short gastric vessels, and freed up the greater curvature carefully identifying and preserving the right gastroepiploic artery to the level of its pedicle. We then performed a Kocher maneuver and verified that the pylorus could reach the hiatus without undue tension. We injected 200 IU of botulinum toxin into the pylorus. We then skeletonized the lesser curvature at a point just proximal to the incisura, preserving the  crow's foot  and created  a 5 cm wide conduit using a stapler. The staple-line was over-sewn using a running Lembert suture.  Abdominal lymphadenectomy   We performed an extensive lymph node and soft-tissue dissection by including the gastrohepatic ligament, left gastric artery lymph nodes, splenic artery lymph nodes and proximal perigastric soft tissue in our specimen. We transected the left gastric artery at its origin after careful skeletonization. We also included the edge of the crura in the en-bloc dissection.  At the end of the dissection and conduit preparation we stitched the tip of the conduit to the specimen side.  LEFT tube thoracostomy  We made a small incision in the left anterolateral chest wall and placed a small-bore chest tube and secured it.    RIGHT thoracoscopic esophageal mobilization with mediastinal lymphadenectomy  We turned  the PATIENT to the left lateral decubitus position and prepared and draped the area. We used a 4-port approach and transected the azygos vein with a stapler. We then mobilized the esophagus from the inlet to the hiatus using a combination of energy device and blunt dissection with great care close to the airway. We ligated the thoracic duct at the hiatus and underneath the azygous vein, and performed an en-bloc dissection of all periesophageal soft tissue including the pleura, thoracic duct, bilateral inferior pulmonary ligaments (we entered the left pleural space), and paraesophageal and subcarinal lymph nodes. We used multiple clips along the bed of the thoracic duct resection to minimize the chance of a postoperative chylothorax.   Next, we made a 7-8 cm working port out of one of our anterior port sites and placed a wound protector. We then pulled the esophagus and attached conduit into the chest and transected the stitch between the specimen and tip of the conduit. We pulled the tip of the conduit out through the working port and amputated it with a stapler to remove the potentially most  ischemic portion of the conduit, but allowing sufficient length for a tension-free anastomosis. We then lined up the esophagus to the tip of the conduit with a stitch and transected the esophagus. The anastomosis was performed as described above and then tested for leak with endoscopy.  After comfirming an air-tight anastomosis we ensured that the conduit was straight, properly oriented and without redundancy. We placed a stitch from each eldon to the serosa of the stomach to reduce the potential for herniation through the hiatus.  Once we completed the procedure, we placed a 24 Sinhala chest tube and loosely sutured it to the chest wall with a vicryl stitch. We then re-insufflated the right lung and closed the incisions with absorbable sutures.  Pharyngostomy tube placement  We examined the oropharynx and identified the posterior tonsillar pillar. We then placed a long clamp behind the posterior tonsillar pillar and could feel the tip on the neck about 1 cm anterior and inferior to the angle of the jaw. We made a small stab incision and pulled the pharyngostomy tube through. After placing a wire endoscopically down the conduit, we threaded the tube over the wire into the conduit and verified thoracoscopically that the tip of the tube was in the distal conduit. We then secured the pharyngostomy tube with stitches to the skin and verified that there was no oropharyngeal bleeding at the insertion site.  Flexible bronchoscopy  We performed a flexible bronchoscopy at the end to clear secretions.

## 2025-03-28 NOTE — PLAN OF CARE
Neuro: A&Ox4. Neuropathy in hands/feet.   Cardiac: SR/ST. VSS.   Respiratory: Sating >92% on RA.  GI/: Little urine output - straight cathed x1. No BM this shift  Diet/appetite: Tolerating NPO diet - TF running at 50ml/hr w/ FWF 30ml/hr  Activity:  Assist of 1   Pain: At acceptable level on current regimen.   Skin: No new deficits noted.  LDA's: L PIV SL. R port, J tube, NG tube, R CT - waterseal    Plan: Continue with POC. Notify primary team with changes. Goal Outcome Evaluation:      Plan of Care Reviewed With: patient    Overall Patient Progress: no change    Outcome Evaluation: VSS, pt complaining of pain/ using PCA pump & x1 PRN Robaxin. Lung sounds coarse/crackles - encouraged to use IS.

## 2025-03-28 NOTE — PROVIDER NOTIFICATION
Paged on-call resident w/ thoracic surgery via [facility]: Pt Bal Junior in PACU bay 15, need op note, transfer order, any post op orders please. Thank you Anabel TREVIZO -[phone]

## 2025-03-28 NOTE — PROVIDER NOTIFICATION
Notified MD Rodríguez regarding pt having TF running into J-tube and if that is okay before sending pt to pre-op for case. MD Rodríguez clarified it is okay to keep running and to send to 3C.

## 2025-03-28 NOTE — PROVIDER NOTIFICATION
No call back or orders entered from previous page to thoracic surgery resident. Surgeon paged via [facility]: Pt Bal Junior in PACU bay 15, need op note, transfer order, any post op orders please. Thank you Anabel TREVIZO -[phone] Pt Bal Junior in PACU bay 15, need op note, transfer order, any post op orders please. Thank you Anabel TREVIZO -[phone]

## 2025-03-29 ENCOUNTER — APPOINTMENT (OUTPATIENT)
Dept: GENERAL RADIOLOGY | Facility: CLINIC | Age: 70
End: 2025-03-29
Attending: STUDENT IN AN ORGANIZED HEALTH CARE EDUCATION/TRAINING PROGRAM
Payer: MEDICARE

## 2025-03-29 ENCOUNTER — APPOINTMENT (OUTPATIENT)
Dept: OCCUPATIONAL THERAPY | Facility: CLINIC | Age: 70
End: 2025-03-29
Attending: STUDENT IN AN ORGANIZED HEALTH CARE EDUCATION/TRAINING PROGRAM
Payer: MEDICARE

## 2025-03-29 LAB
ANION GAP SERPL CALCULATED.3IONS-SCNC: 10 MMOL/L (ref 7–15)
BUN SERPL-MCNC: 13.4 MG/DL (ref 8–23)
CALCIUM SERPL-MCNC: 7.8 MG/DL (ref 8.8–10.4)
CHLORIDE SERPL-SCNC: 105 MMOL/L (ref 98–107)
CREAT SERPL-MCNC: 0.76 MG/DL (ref 0.67–1.17)
EGFRCR SERPLBLD CKD-EPI 2021: >90 ML/MIN/1.73M2
ERYTHROCYTE [DISTWIDTH] IN BLOOD BY AUTOMATED COUNT: 12.7 % (ref 10–15)
GLUCOSE BLDC GLUCOMTR-MCNC: 120 MG/DL (ref 70–99)
GLUCOSE BLDC GLUCOMTR-MCNC: 127 MG/DL (ref 70–99)
GLUCOSE BLDC GLUCOMTR-MCNC: 140 MG/DL (ref 70–99)
GLUCOSE BLDC GLUCOMTR-MCNC: 145 MG/DL (ref 70–99)
GLUCOSE BLDC GLUCOMTR-MCNC: 151 MG/DL (ref 70–99)
GLUCOSE BLDC GLUCOMTR-MCNC: 165 MG/DL (ref 70–99)
GLUCOSE SERPL-MCNC: 155 MG/DL (ref 70–99)
HCO3 SERPL-SCNC: 22 MMOL/L (ref 22–29)
HCT VFR BLD AUTO: 31.8 % (ref 40–53)
HGB BLD-MCNC: 10.4 G/DL (ref 13.3–17.7)
MAGNESIUM SERPL-MCNC: 1.7 MG/DL (ref 1.7–2.3)
MCH RBC QN AUTO: 30.1 PG (ref 26.5–33)
MCHC RBC AUTO-ENTMCNC: 32.7 G/DL (ref 31.5–36.5)
MCV RBC AUTO: 92 FL (ref 78–100)
PHOSPHATE SERPL-MCNC: 2.8 MG/DL (ref 2.5–4.5)
PLATELET # BLD AUTO: 144 10E3/UL (ref 150–450)
POTASSIUM SERPL-SCNC: 3.8 MMOL/L (ref 3.4–5.3)
RBC # BLD AUTO: 3.46 10E6/UL (ref 4.4–5.9)
SODIUM SERPL-SCNC: 137 MMOL/L (ref 135–145)
WBC # BLD AUTO: 7.8 10E3/UL (ref 4–11)

## 2025-03-29 PROCEDURE — 250N000013 HC RX MED GY IP 250 OP 250 PS 637: Performed by: NURSE PRACTITIONER

## 2025-03-29 PROCEDURE — 83735 ASSAY OF MAGNESIUM: CPT

## 2025-03-29 PROCEDURE — 250N000011 HC RX IP 250 OP 636: Performed by: NURSE PRACTITIONER

## 2025-03-29 PROCEDURE — 250N000013 HC RX MED GY IP 250 OP 250 PS 637: Performed by: STUDENT IN AN ORGANIZED HEALTH CARE EDUCATION/TRAINING PROGRAM

## 2025-03-29 PROCEDURE — 250N000013 HC RX MED GY IP 250 OP 250 PS 637

## 2025-03-29 PROCEDURE — 85027 COMPLETE CBC AUTOMATED: CPT

## 2025-03-29 PROCEDURE — 250N000009 HC RX 250

## 2025-03-29 PROCEDURE — 258N000003 HC RX IP 258 OP 636

## 2025-03-29 PROCEDURE — 80048 BASIC METABOLIC PNL TOTAL CA: CPT

## 2025-03-29 PROCEDURE — 120N000003 HC R&B IMCU UMMC

## 2025-03-29 PROCEDURE — 97110 THERAPEUTIC EXERCISES: CPT | Mod: GO

## 2025-03-29 PROCEDURE — 71045 X-RAY EXAM CHEST 1 VIEW: CPT

## 2025-03-29 PROCEDURE — 71045 X-RAY EXAM CHEST 1 VIEW: CPT | Mod: 26 | Performed by: RADIOLOGY

## 2025-03-29 PROCEDURE — 250N000011 HC RX IP 250 OP 636: Performed by: PHYSICIAN ASSISTANT

## 2025-03-29 PROCEDURE — 84100 ASSAY OF PHOSPHORUS: CPT | Performed by: STUDENT IN AN ORGANIZED HEALTH CARE EDUCATION/TRAINING PROGRAM

## 2025-03-29 PROCEDURE — 250N000011 HC RX IP 250 OP 636: Performed by: STUDENT IN AN ORGANIZED HEALTH CARE EDUCATION/TRAINING PROGRAM

## 2025-03-29 PROCEDURE — 250N000013 HC RX MED GY IP 250 OP 250 PS 637: Performed by: PHYSICIAN ASSISTANT

## 2025-03-29 RX ORDER — POTASSIUM CHLORIDE 1.5 G/1.58G
20 POWDER, FOR SOLUTION ORAL ONCE
Status: COMPLETED | OUTPATIENT
Start: 2025-03-29 | End: 2025-03-29

## 2025-03-29 RX ORDER — ATENOLOL 25 MG/1
25 TABLET ORAL DAILY
Status: DISCONTINUED | OUTPATIENT
Start: 2025-03-29 | End: 2025-04-09 | Stop reason: HOSPADM

## 2025-03-29 RX ORDER — MAGNESIUM SULFATE HEPTAHYDRATE 40 MG/ML
2 INJECTION, SOLUTION INTRAVENOUS ONCE
Status: COMPLETED | OUTPATIENT
Start: 2025-03-29 | End: 2025-03-29

## 2025-03-29 RX ADMIN — ATENOLOL 25 MG: 25 TABLET ORAL at 20:35

## 2025-03-29 RX ADMIN — ENOXAPARIN SODIUM 40 MG: 40 INJECTION SUBCUTANEOUS at 11:25

## 2025-03-29 RX ADMIN — PIPERACILLIN AND TAZOBACTAM 3.38 G: 3; .375 INJECTION, POWDER, LYOPHILIZED, FOR SOLUTION INTRAVENOUS at 08:02

## 2025-03-29 RX ADMIN — ACETAMINOPHEN 650 MG: 325 SOLUTION ORAL at 16:20

## 2025-03-29 RX ADMIN — Medication 60 ML: at 08:04

## 2025-03-29 RX ADMIN — GABAPENTIN 300 MG: 300 CAPSULE ORAL at 08:03

## 2025-03-29 RX ADMIN — INSULIN ASPART 1 UNITS: 100 INJECTION, SOLUTION INTRAVENOUS; SUBCUTANEOUS at 08:02

## 2025-03-29 RX ADMIN — MAGNESIUM SULFATE HEPTAHYDRATE 2 G: 2 INJECTION, SOLUTION INTRAVENOUS at 06:31

## 2025-03-29 RX ADMIN — NORTRIPTYLINE HYDROCHLORIDE 25 MG: 25 CAPSULE ORAL at 20:35

## 2025-03-29 RX ADMIN — ACETAMINOPHEN 650 MG: 325 SOLUTION ORAL at 09:32

## 2025-03-29 RX ADMIN — ACETAMINOPHEN 650 MG: 325 SOLUTION ORAL at 22:15

## 2025-03-29 RX ADMIN — INSULIN ASPART 1 UNITS: 100 INJECTION, SOLUTION INTRAVENOUS; SUBCUTANEOUS at 23:51

## 2025-03-29 RX ADMIN — DEXTROSE, SODIUM CHLORIDE, AND POTASSIUM CHLORIDE: 5; .45; .15 INJECTION INTRAVENOUS at 10:33

## 2025-03-29 RX ADMIN — POLYETHYLENE GLYCOL 3350 17 G: 17 POWDER, FOR SOLUTION ORAL at 08:04

## 2025-03-29 RX ADMIN — OXYCODONE HYDROCHLORIDE 10 MG: 5 SOLUTION ORAL at 09:32

## 2025-03-29 RX ADMIN — METHOCARBAMOL 500 MG: 500 TABLET, FILM COATED ORAL at 08:03

## 2025-03-29 RX ADMIN — POTASSIUM CHLORIDE 20 MEQ: 1.5 POWDER, FOR SOLUTION ORAL at 06:30

## 2025-03-29 RX ADMIN — INSULIN ASPART 1 UNITS: 100 INJECTION, SOLUTION INTRAVENOUS; SUBCUTANEOUS at 03:24

## 2025-03-29 RX ADMIN — OXYCODONE HYDROCHLORIDE 5 MG: 5 SOLUTION ORAL at 23:49

## 2025-03-29 RX ADMIN — SENNOSIDES AND DOCUSATE SODIUM 1 TABLET: 50; 8.6 TABLET ORAL at 08:03

## 2025-03-29 RX ADMIN — PIPERACILLIN AND TAZOBACTAM 3.38 G: 3; .375 INJECTION, POWDER, LYOPHILIZED, FOR SOLUTION INTRAVENOUS at 14:20

## 2025-03-29 RX ADMIN — GABAPENTIN 300 MG: 300 CAPSULE ORAL at 14:20

## 2025-03-29 RX ADMIN — FLUCONAZOLE 400 MG: 2 INJECTION, SOLUTION INTRAVENOUS at 14:20

## 2025-03-29 RX ADMIN — PIPERACILLIN AND TAZOBACTAM 3.38 G: 3; .375 INJECTION, POWDER, LYOPHILIZED, FOR SOLUTION INTRAVENOUS at 20:38

## 2025-03-29 RX ADMIN — Medication 15 ML: at 08:03

## 2025-03-29 RX ADMIN — ACETAMINOPHEN 650 MG: 325 SOLUTION ORAL at 03:12

## 2025-03-29 RX ADMIN — PIPERACILLIN AND TAZOBACTAM 3.38 G: 3; .375 INJECTION, POWDER, LYOPHILIZED, FOR SOLUTION INTRAVENOUS at 03:05

## 2025-03-29 RX ADMIN — SENNOSIDES AND DOCUSATE SODIUM 1 TABLET: 50; 8.6 TABLET ORAL at 20:35

## 2025-03-29 RX ADMIN — GABAPENTIN 300 MG: 300 CAPSULE ORAL at 20:35

## 2025-03-29 RX ADMIN — ATENOLOL 25 MG: 25 TABLET ORAL at 08:03

## 2025-03-29 RX ADMIN — INSULIN ASPART 2 UNITS: 100 INJECTION, SOLUTION INTRAVENOUS; SUBCUTANEOUS at 11:24

## 2025-03-29 RX ADMIN — TOPICAL ANESTHETIC 1 ML: 200 SPRAY DENTAL; PERIODONTAL at 08:02

## 2025-03-29 ASSESSMENT — ACTIVITIES OF DAILY LIVING (ADL)
ADLS_ACUITY_SCORE: 46

## 2025-03-29 NOTE — PROGRESS NOTES
"Shift Hours: 0700 - 1900, pt was off unit from approx 1030 - 1545    Neuro: A&Ox4. Denied lightheadedness. Reports baseline numbness, tingling to fingers and toes.   Cardiac: Not on tele. SR. VSS.   Respiratory: Sating >90% on RA.   GI: No BM during shift, LBM 3-28. Bowel sounds active x4, abd soft and nontender.   : Good UOP.  Skin: No new deficits noted. See LDA.   Pain: Had post surgical pain that was relieved with PCA and prn oxy   Activity: Ax1  Diet:  NPO  LDA: R chest port. L PIV  Gtts: TF at goal rate of 60ml/hr now. Dextrose 5% and 0.45% NaCl + KCl 20 mEq/L infusion at 50 ml/hr.     Events: Had EGD  Primary Team: Thoracic      Additional Teams: [ - ]    BP (!) 147/93 (BP Location: Right arm)   Pulse 95   Temp 98.5  F (36.9  C) (Oral)   Resp 23   Ht 1.778 m (5' 10\")   Wt 87 kg (191 lb 12.8 oz)   SpO2 93%   BMI 27.52 kg/m      "

## 2025-03-29 NOTE — PLAN OF CARE
Goal Outcome Evaluation:  Neuro: A&Ox4.   Cardiac: SR, HR 80s-90s. VSS.   Respiratory: Sating >90% on RA. R chest tube to WS.  GI/: Adequate urine output. LBM: 3/26.   Diet/appetite: TF infusing through J tube at 60mL/hr w/ Q4 30mL FWF. NG tube to low intermittent suction.   Activity:  Assist of 1 w/ GB.   Pain: At acceptable level on current regimen. PCA pump in use.    Skin: No new deficits noted.  LDA's: R port w/ PCA and [id number], LPIV    Plan: Continue with POC. Notify primary team with changes.       Plan of Care Reviewed With: patient    Overall Patient Progress: no changeOverall Patient Progress: no change    Outcome Evaluation: A&Ox4, pain well managed with PCA, chest tube to WS, NG to low intermittent suction

## 2025-03-29 NOTE — PROGRESS NOTES
THORACIC POC    Patient seen S/P EGD and NGT adjustment.  Is doing well postoperatively.  Trying to sleep.  Pain tolerated.  Head of bed at 30 degrees.  NG in place.  Suctioned out a big red clot, visible in canister among bilious fluid.  Chest tube without airleak, bilious output.  Abdomen soft, nontender, nondistended.    Recovering appropriately.  Continue current cares.      Lisa Larson MD   PGY1  P: [phone]

## 2025-03-29 NOTE — PROGRESS NOTES
"Shift Hours: 0700 - 1900    Neuro: A&Ox4. Denied lightheadedness. Reports baseline numbness, tingling to fingers and toes.   Cardiac: Not on tele. SR/ST. VSS.   Respiratory: Sating >90% on RA.   GI: No BM during shift, LBM 3-26. Bowel sounds active x4, abd soft and nontender.   : Good UOP.  Skin: No new deficits noted. See LDA.   Pain: Rated pain 3-6 during shift. Typically got good relief with PCA and PRN oxy or scheduled Tylenol  Activity: Ax1-2  Diet:  NPO, Tube feed  LDA: R chest port. L PIV  Gtts: TF at goal rate of 60ml/hr. Dextrose 5% and 0.45% NaCl + KCl 20 mEq/L infusion at 50 ml/hr.     Events: -  Primary Team: Thoracic      Additional Teams: [ - ]    BP (!) 141/88   Pulse 91   Temp 97.5  F (36.4  C) (Oral)   Resp 20   Ht 1.778 m (5' 10\")   Wt 88.7 kg (195 lb 8.8 oz)   SpO2 95%   BMI 28.06 kg/m      "

## 2025-03-29 NOTE — CONSULTS
"Care Management Follow Up    Length of Stay (days): 5    Expected Discharge Date: 03/31/2025     Concerns to be Addressed: all concerns addressed in this encounter     Patient plan of care discussed at interdisciplinary rounds: No    Anticipated Discharge Disposition: Home              Anticipated Discharge Services:    Anticipated Discharge DME: Other (see comment) (Enteral feeds)    Patient/family educated on Medicare website which has current facility and service quality ratings:    Education Provided on the Discharge Plan:    Patient/Family in Agreement with the Plan:      Referrals Placed by CM/SW: External Care Coordination, Home Infusion  Private pay costs discussed: insurance costs out of pocket expenses, co-pays, and deductibles    Discussed  Partnership in Safe Discharge Planning  document with patient/family: Yes: patient     Handoff Completed: No, handoff not indicated or clinically appropriate    Additional Information:  Note placed under consult order to complete order for elevated risk unplanned readmission as patient has already had case management assessment completed by RNCC three days ago for discharge planning needs.    Additionally, per regular 6B RNCC note dated 3/26, patient has been referred to [facility] for TF benefits check/referral. Per I encounter notes in Epic,     \"Patient has [employer] and is covered  80% towards $3250.00 out of pocket maximum ($3145.00 remaining). Once the out of pocket has been met in full he will be covered 100%. Called and spoke with Bal to review home infusion services, review benefits and offer choice of providers.  Patient would like to remain in the [facility] system and will use [facility] for home infusion. Jess is willing to learn and manage home IV therapy.\"    Per Thoracic Surgery daily note today, pt still has CT in place making progress, OT is following for therapy needs. Per nursing, pt is up with assist of 1 and gait belt.     Next Steps: Follow for therapy " needs/recs, needing assistance but has CT still in place. Update with [facility] and place TF orders prior to discharge as appropriate, pt agreeable to [facility] with the coverage he has.     [facility]  Phone # [phone]  Fax # [phone]   Enteral Feeds, needs education prior to discharge, ensure orders placed prior to discharge      Carson Orozco BSN, BA, RN, CMSRN, RNCC  [facility]  Covering Units 6C Beds 7576-8769, 6420  Phone: [phone]  Available on [place] search 6C 8192-14 RNCC, 8715-19 RNCC  After Hours [phone]     6C SW Ph: [phone]     6B/CRNCC Weekend/holiday on [place] or [phone]     [facility] RNCC ED/5 Ortho/5 Med/Surg [phone]     [facility]urg 8A, [phone]     Observation SW and weekend/after hours phone: [phone]      .

## 2025-03-29 NOTE — PROGRESS NOTES
Thoracic Surgery Progress Note    PATIENT NAME: Bal Junior  MRN: [id number]  DATE: 03/29/2025 10:13 AM  LAST PROCEDURE: ESOPHAGOGASTRODUODENOSCOPY, pharyngostomy tube replacement: 3/24/2025    Subjective:  EGD yesterday with Ngt adjustment. No acute events overnight. CT with serous output with some debris.    Objective:  Vital Signs: Most Recent  Ranges (24 hours)   Temp: 98.7  F (37.1  C)  Pulse: 85  BP: (!) 141/80 (Pt stated that he is in pain)  Resp: 22  SpO2: 93 % on None (Room air)  Temp  Min: 97.8  F (36.6  C)  Max: 98.7  F (37.1  C)  Pulse  Min: 65  Max: 96  BP  Min: 129/85  Max: 147/93  Resp  Min: 17  Max: 23  SpO2  Min: 89 %  Max: 97 %     Physical Exam:  Gen: Appears stated age, NAD, Resting in bed. Comfortable  HEENT: NC/AT, PERRL, EOMI, Sclera Anicteric, Hearing intact. NGT in place. Flushed.  Neuro: AO, no focal deficits  Psych: Affect appropriate, Behavior appropriate, Cooperative  CV: Normocardic, Normotensive  Pulm: NWOB on RA, CT in place , No air leak present. Serous output with some debris.  MSK: MAEx4  Skin: No obvious rashes, jaundice, erythema    Other Data:  Recent Labs   Lab 03/29/25  0340 03/28/25  0341 03/27/25  0402 03/26/25  0339   WBC 7.8 8.9 8.1 12.6*   HGB 10.4* 10.6* 11.4* 14.0   * 132* 131* 192     Recent Labs   Lab 03/29/25  0710 03/29/25  0340 03/29/25  0321 03/28/25  2350 03/28/25  0806 03/28/25  0341 03/27/25  0802 03/27/25  0402 03/26/25  1506 03/26/25  1309   NA  --  137  --   --   --  135  --  138  --  136   POTASSIUM  --  3.8  --   --   --  3.6  --  4.3  --  5.0   CHLORIDE  --  105  --   --   --  104  --  105  --  104   CO2  --  22  --   --   --  22  --  23  --  21*   BUN  --  13.4  --   --   --  14.3  --  22.6  --  28.7*   CR  --  0.76  --   --   --  0.81  --  1.05  --  1.69*   * 155* 140* 165*   < > 167*   < > 149*  152*   < > 172*    < > = values in this interval not displayed.       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 Now s/p EGD 3/29.    Plan:  # Pain: Multimodal pain control  # Pulm Cares: CT in place  - Continue to titrate supplemental oxygen to SpO2 goals  - Continue IS use; Deep breathing and coughing to promote lung expansion  - Daily CXRs  # GI Cares: Strict NPO. J tube tube feeds. J Tube cares. NGT to LCS  - PRN anti-emetics  - Continue bowel regimen via J. Last Bowel Movement: 03/23/25   # Renal: Monitor Intake and Outputs  # ID cares: Continue ABX  - Daily CBC  # Activity: OOB as much as possible  # DVT PPX: Enoxaparin, SCDs  # Dispo: IMC status  # Code Status: Full Code    Plan discussed with staff.    Chavez Rodriguez MD   General Surgery Resident

## 2025-03-30 ENCOUNTER — APPOINTMENT (OUTPATIENT)
Dept: GENERAL RADIOLOGY | Facility: CLINIC | Age: 70
End: 2025-03-30
Attending: STUDENT IN AN ORGANIZED HEALTH CARE EDUCATION/TRAINING PROGRAM
Payer: MEDICARE

## 2025-03-30 LAB
ANION GAP SERPL CALCULATED.3IONS-SCNC: 10 MMOL/L (ref 7–15)
BUN SERPL-MCNC: 13.8 MG/DL (ref 8–23)
CALCIUM SERPL-MCNC: 8.3 MG/DL (ref 8.8–10.4)
CHLORIDE SERPL-SCNC: 103 MMOL/L (ref 98–107)
CREAT SERPL-MCNC: 0.72 MG/DL (ref 0.67–1.17)
EGFRCR SERPLBLD CKD-EPI 2021: >90 ML/MIN/1.73M2
ERYTHROCYTE [DISTWIDTH] IN BLOOD BY AUTOMATED COUNT: 12.5 % (ref 10–15)
GLUCOSE BLDC GLUCOMTR-MCNC: 109 MG/DL (ref 70–99)
GLUCOSE BLDC GLUCOMTR-MCNC: 116 MG/DL (ref 70–99)
GLUCOSE BLDC GLUCOMTR-MCNC: 130 MG/DL (ref 70–99)
GLUCOSE BLDC GLUCOMTR-MCNC: 146 MG/DL (ref 70–99)
GLUCOSE BLDC GLUCOMTR-MCNC: 149 MG/DL (ref 70–99)
GLUCOSE BLDC GLUCOMTR-MCNC: 150 MG/DL (ref 70–99)
GLUCOSE BLDC GLUCOMTR-MCNC: 152 MG/DL (ref 70–99)
GLUCOSE SERPL-MCNC: 148 MG/DL (ref 70–99)
HCO3 SERPL-SCNC: 23 MMOL/L (ref 22–29)
HCT VFR BLD AUTO: 32.5 % (ref 40–53)
HGB BLD-MCNC: 10.7 G/DL (ref 13.3–17.7)
MAGNESIUM SERPL-MCNC: 1.7 MG/DL (ref 1.7–2.3)
MCH RBC QN AUTO: 30.2 PG (ref 26.5–33)
MCHC RBC AUTO-ENTMCNC: 32.9 G/DL (ref 31.5–36.5)
MCV RBC AUTO: 92 FL (ref 78–100)
PHOSPHATE SERPL-MCNC: 3 MG/DL (ref 2.5–4.5)
PLATELET # BLD AUTO: 189 10E3/UL (ref 150–450)
POTASSIUM SERPL-SCNC: 4.1 MMOL/L (ref 3.4–5.3)
RBC # BLD AUTO: 3.54 10E6/UL (ref 4.4–5.9)
SODIUM SERPL-SCNC: 136 MMOL/L (ref 135–145)
WBC # BLD AUTO: 8.3 10E3/UL (ref 4–11)

## 2025-03-30 PROCEDURE — 82310 ASSAY OF CALCIUM: CPT

## 2025-03-30 PROCEDURE — 85014 HEMATOCRIT: CPT

## 2025-03-30 PROCEDURE — 250N000013 HC RX MED GY IP 250 OP 250 PS 637

## 2025-03-30 PROCEDURE — 85041 AUTOMATED RBC COUNT: CPT

## 2025-03-30 PROCEDURE — 250N000009 HC RX 250

## 2025-03-30 PROCEDURE — 250N000011 HC RX IP 250 OP 636: Performed by: PHYSICIAN ASSISTANT

## 2025-03-30 PROCEDURE — 71045 X-RAY EXAM CHEST 1 VIEW: CPT

## 2025-03-30 PROCEDURE — 250N000011 HC RX IP 250 OP 636: Performed by: NURSE PRACTITIONER

## 2025-03-30 PROCEDURE — 250N000013 HC RX MED GY IP 250 OP 250 PS 637: Performed by: PHYSICIAN ASSISTANT

## 2025-03-30 PROCEDURE — 83735 ASSAY OF MAGNESIUM: CPT

## 2025-03-30 PROCEDURE — 250N000013 HC RX MED GY IP 250 OP 250 PS 637: Performed by: STUDENT IN AN ORGANIZED HEALTH CARE EDUCATION/TRAINING PROGRAM

## 2025-03-30 PROCEDURE — 71045 X-RAY EXAM CHEST 1 VIEW: CPT | Mod: 26 | Performed by: RADIOLOGY

## 2025-03-30 PROCEDURE — 258N000003 HC RX IP 258 OP 636

## 2025-03-30 PROCEDURE — 80048 BASIC METABOLIC PNL TOTAL CA: CPT

## 2025-03-30 PROCEDURE — 120N000003 HC R&B IMCU UMMC

## 2025-03-30 PROCEDURE — 84100 ASSAY OF PHOSPHORUS: CPT | Performed by: STUDENT IN AN ORGANIZED HEALTH CARE EDUCATION/TRAINING PROGRAM

## 2025-03-30 RX ORDER — MAGNESIUM OXIDE 400 MG/1
400 TABLET ORAL EVERY 4 HOURS
Status: COMPLETED | OUTPATIENT
Start: 2025-03-30 | End: 2025-03-30

## 2025-03-30 RX ADMIN — DEXTROSE, SODIUM CHLORIDE, AND POTASSIUM CHLORIDE: 5; .45; .15 INJECTION INTRAVENOUS at 07:15

## 2025-03-30 RX ADMIN — MAGNESIUM HYDROXIDE 30 ML: 400 SUSPENSION ORAL at 04:22

## 2025-03-30 RX ADMIN — ACETAMINOPHEN 650 MG: 325 SOLUTION ORAL at 10:28

## 2025-03-30 RX ADMIN — GABAPENTIN 300 MG: 300 CAPSULE ORAL at 08:12

## 2025-03-30 RX ADMIN — PIPERACILLIN AND TAZOBACTAM 3.38 G: 3; .375 INJECTION, POWDER, LYOPHILIZED, FOR SOLUTION INTRAVENOUS at 20:21

## 2025-03-30 RX ADMIN — NORTRIPTYLINE HYDROCHLORIDE 25 MG: 25 CAPSULE ORAL at 20:21

## 2025-03-30 RX ADMIN — ATENOLOL 25 MG: 25 TABLET ORAL at 20:21

## 2025-03-30 RX ADMIN — METHOCARBAMOL 500 MG: 500 TABLET, FILM COATED ORAL at 17:00

## 2025-03-30 RX ADMIN — ACETAMINOPHEN 650 MG: 325 SOLUTION ORAL at 23:04

## 2025-03-30 RX ADMIN — ACETAMINOPHEN 650 MG: 325 SOLUTION ORAL at 15:44

## 2025-03-30 RX ADMIN — Medication: at 15:52

## 2025-03-30 RX ADMIN — MAGNESIUM OXIDE TAB 400 MG (241.3 MG ELEMENTAL MG) 400 MG: 400 (241.3 MG) TAB at 08:12

## 2025-03-30 RX ADMIN — SENNOSIDES AND DOCUSATE SODIUM 1 TABLET: 50; 8.6 TABLET ORAL at 20:20

## 2025-03-30 RX ADMIN — METHOCARBAMOL 500 MG: 500 TABLET, FILM COATED ORAL at 10:27

## 2025-03-30 RX ADMIN — PIPERACILLIN AND TAZOBACTAM 3.38 G: 3; .375 INJECTION, POWDER, LYOPHILIZED, FOR SOLUTION INTRAVENOUS at 02:32

## 2025-03-30 RX ADMIN — FLUCONAZOLE 400 MG: 2 INJECTION, SOLUTION INTRAVENOUS at 14:52

## 2025-03-30 RX ADMIN — TOPICAL ANESTHETIC 1 ML: 200 SPRAY DENTAL; PERIODONTAL at 13:08

## 2025-03-30 RX ADMIN — ENOXAPARIN SODIUM 40 MG: 40 INJECTION SUBCUTANEOUS at 11:49

## 2025-03-30 RX ADMIN — POLYETHYLENE GLYCOL 3350 17 G: 17 POWDER, FOR SOLUTION ORAL at 08:13

## 2025-03-30 RX ADMIN — GABAPENTIN 300 MG: 300 CAPSULE ORAL at 20:21

## 2025-03-30 RX ADMIN — PIPERACILLIN AND TAZOBACTAM 3.38 G: 3; .375 INJECTION, POWDER, LYOPHILIZED, FOR SOLUTION INTRAVENOUS at 14:13

## 2025-03-30 RX ADMIN — ACETAMINOPHEN 650 MG: 325 SOLUTION ORAL at 04:03

## 2025-03-30 RX ADMIN — Medication 40 MG: at 18:15

## 2025-03-30 RX ADMIN — INSULIN ASPART 1 UNITS: 100 INJECTION, SOLUTION INTRAVENOUS; SUBCUTANEOUS at 11:58

## 2025-03-30 RX ADMIN — Medication 15 ML: at 08:12

## 2025-03-30 RX ADMIN — GABAPENTIN 300 MG: 300 CAPSULE ORAL at 14:04

## 2025-03-30 RX ADMIN — INSULIN ASPART 1 UNITS: 100 INJECTION, SOLUTION INTRAVENOUS; SUBCUTANEOUS at 04:34

## 2025-03-30 RX ADMIN — METHOCARBAMOL 500 MG: 500 TABLET, FILM COATED ORAL at 04:28

## 2025-03-30 RX ADMIN — MAGNESIUM OXIDE TAB 400 MG (241.3 MG ELEMENTAL MG) 400 MG: 400 (241.3 MG) TAB at 10:33

## 2025-03-30 RX ADMIN — SENNOSIDES AND DOCUSATE SODIUM 1 TABLET: 50; 8.6 TABLET ORAL at 08:12

## 2025-03-30 RX ADMIN — METHOCARBAMOL 500 MG: 500 TABLET, FILM COATED ORAL at 23:04

## 2025-03-30 RX ADMIN — INSULIN ASPART 1 UNITS: 100 INJECTION, SOLUTION INTRAVENOUS; SUBCUTANEOUS at 08:10

## 2025-03-30 RX ADMIN — OXYCODONE HYDROCHLORIDE 5 MG: 5 SOLUTION ORAL at 13:01

## 2025-03-30 RX ADMIN — Medication 60 ML: at 08:13

## 2025-03-30 RX ADMIN — PIPERACILLIN AND TAZOBACTAM 3.38 G: 3; .375 INJECTION, POWDER, LYOPHILIZED, FOR SOLUTION INTRAVENOUS at 08:27

## 2025-03-30 ASSESSMENT — ACTIVITIES OF DAILY LIVING (ADL)
ADLS_ACUITY_SCORE: 46
ADLS_ACUITY_SCORE: 45
ADLS_ACUITY_SCORE: 46
ADLS_ACUITY_SCORE: 46
ADLS_ACUITY_SCORE: 45
ADLS_ACUITY_SCORE: 46
ADLS_ACUITY_SCORE: 46
ADLS_ACUITY_SCORE: 45
ADLS_ACUITY_SCORE: 46
ADLS_ACUITY_SCORE: 45
ADLS_ACUITY_SCORE: 46
ADLS_ACUITY_SCORE: 45
ADLS_ACUITY_SCORE: 46
ADLS_ACUITY_SCORE: 46

## 2025-03-30 NOTE — PLAN OF CARE
Goal Outcome Evaluation:    Neuro: A&Ox4.   Cardiac: SR. HR 70s-80s.   Respiratory: CT to water seal -20cm H20, 1300mL output; Sating 94% on RA. Crackles in all lobes. Weak cough. Pt reports moderate pain near CT site upon coughing.   GI/: Adequate urine output. No BM since 3/23. Bowel regimine promoted with senna and mag.  NG to low intermittent suction, 300mL out this shift, dark brown color.   Diet/appetite: TF through NJ 60mL/hr standard FWF.  Activity:  Assist of x1-2, up to bathroom.   Pain: pt reports moderate pain near CT site upon coughing and at neck. Pain managed with dilaudid through PCA, robaxin, and x1 PRN oxy.   Skin: Anterior abdomen incision site, adhesive strips and open to air. No new skin deficits noted.   LDA's: R chest port, PCA dilaudid infusing; NG to low intermittent suction; CT to water seal -20cm H20. TF through NJ 60mL/hr standard FWF.    Plan: Continue with POC. Notify primary team with changes.       Plan of Care Reviewed With: patient    Overall Patient Progress: no changeOverall Patient Progress: no change    Outcome Evaluation: still using PCA with relief to pain. CT to water seal/ NG low continuous suction.

## 2025-03-30 NOTE — PLAN OF CARE
Shift Events: Notified provider at 16:51 that bright red, blood-like output was coming out of NG, that patient was asymptomatic, and vitally stable. Provider came to bedside to assess, ordered protonix and stated to monitor for more bloody output.       A:   Neuro: A&Ox4. Used call light appropriately. Patient calm and cooperative with cares. Denied headache, dizziness, and lightheadedness.  Cardiac/Tele: VSS. Sinus rhythm. MAPs > 65. Denied chest pain and palpitations. Afebrile.  Respiratory: Sats > 94% on room air. Dyspnea on exertion.  GI/: Continent.  Used beside commode. Adequate urine output. 1 medium BM, per patient report, during shift. Denied nausea or vomiting.  Diet/Appetite: NPO. TF running at goal rate of 60 mL/hr. Q4 hour 30 mL free water flush. Q4 blood glucose monitoring, sliding scale insulin given per order.  Skin: Chest tube site and dressing WDL. Generalized brusing. PEG site WDL. Surgical incisions WDL.   LDAs: NG to low continuous suction, bile output except for bright red output at  16:51 (see shift events above). PEG running TF at goal rate of 60 mL/hr with Q4 30 mL free water flush. Port and PIV infusing.   Activity: Standby assist.  Pain: 5/10 reduced to 3/10 with PCA dilaudid pump and PRN oxycodone.   Electrolytes: Magnesium replaced 1x. Recheck in morning of 3/31/25     P: Continue to monitor and notify team with changes. Reviewed plan of care with patient    Shift: 07:00 to 19:30    Goal Outcome Evaluation:      Plan of Care Reviewed With: patient    Overall Patient Progress: no change    Outcome Evaluation: Patient using PCA to manaage pain along with PRN oxycdone and scheduled tylenol.

## 2025-03-30 NOTE — PROGRESS NOTES
Thoracic Surgery Progress Note    PATIENT NAME: Bal Junior  MRN: [id number]  DATE: 03/30/2025 7:45 AM  LAST PROCEDURE: ESOPHAGOGASTRODUODENOSCOPY, pharyngostomy tube replacement: 3/24/2025    Subjective:  No acute events overnight.    Objective:  Vital Signs: Most Recent  Ranges (24 hours)   Temp: 97.7  F (36.5  C)  Pulse: 81  BP: 136/84  Resp: 20  SpO2: 95 % on None (Room air)  Temp  Min: 97.4  F (36.3  C)  Max: 98.5  F (36.9  C)  Pulse  Min: 81  Max: 97  BP  Min: 118/93  Max: 151/107  Resp  Min: 19  Max: 20  SpO2  Min: 92 %  Max: 96 %     Physical Exam:  Gen: Appears stated age, NAD, Resting in bed. Comfortable  HEENT: NC/AT, PERRL, EOMI, Sclera Anicteric, Hearing intact. NGT in place. Flushed.  Neuro: AO, no focal deficits  Psych: Affect appropriate, Behavior appropriate, Cooperative  CV: Normocardic, Normotensive  Pulm: NWOB on RA, CT in place , No air leak present. Serous output with some debris.  MSK: MAEx4  Skin: No obvious rashes, jaundice, erythema    Other Data:  Recent Labs   Lab 03/30/25  0500 03/29/25  0340 03/28/25  0341 03/27/25  0402   WBC 8.3 7.8 8.9 8.1   HGB 10.7* 10.4* 10.6* 11.4*    144* 132* 131*     Recent Labs   Lab 03/30/25  0713 03/30/25  0500 03/30/25  0432 03/30/25  0203 03/29/25  0710 03/29/25  0340 03/28/25  0806 03/28/25  0341 03/27/25  0802 03/27/25  0402   NA  --  136  --   --   --  137  --  135  --  138   POTASSIUM  --  4.1  --   --   --  3.8  --  3.6  --  4.3   CHLORIDE  --  103  --   --   --  105  --  104  --  105   CO2  --  23  --   --   --  22  --  22  --  23   BUN  --  13.8  --   --   --  13.4  --  14.3  --  22.6   CR  --  0.72  --   --   --  0.76  --  0.81  --  1.05   * 148* 149* 152*   < > 155*   < > 167*   < > 149*  152*    < > = values in this interval not displayed.       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 Now s/p EGD 3/29.    Plan:  # Pain: Multimodal pain control  # Pulm Cares: CT in place  - Continue to titrate supplemental oxygen to SpO2 goals  - Continue IS use; Deep breathing and coughing to promote lung expansion  - Daily CXRs  # GI Cares: Strict NPO. J tube tube feeds. J Tube cares. NGT to LCS  - PRN anti-emetics  - Continue bowel regimen via J. Last Bowel Movement: 03/23/25   # Renal: Monitor Intake and Outputs  # ID cares: Continue ABX  - Daily CBC  # Activity: OOB as much as possible  # DVT PPX: Enoxaparin, SCDs  # Dispo: IMC status  # Code Status: Full Code    Plan discussed with thoracic fellow, who will discuss with staff.    Chavez Rodriguez MD   General Surgery Resident

## 2025-03-31 ENCOUNTER — APPOINTMENT (OUTPATIENT)
Dept: OCCUPATIONAL THERAPY | Facility: CLINIC | Age: 70
End: 2025-03-31
Attending: STUDENT IN AN ORGANIZED HEALTH CARE EDUCATION/TRAINING PROGRAM
Payer: MEDICARE

## 2025-03-31 ENCOUNTER — APPOINTMENT (OUTPATIENT)
Dept: GENERAL RADIOLOGY | Facility: CLINIC | Age: 70
End: 2025-03-31
Attending: STUDENT IN AN ORGANIZED HEALTH CARE EDUCATION/TRAINING PROGRAM
Payer: MEDICARE

## 2025-03-31 LAB
ANION GAP SERPL CALCULATED.3IONS-SCNC: 9 MMOL/L (ref 7–15)
BUN SERPL-MCNC: 12.7 MG/DL (ref 8–23)
CALCIUM SERPL-MCNC: 8 MG/DL (ref 8.8–10.4)
CHLORIDE SERPL-SCNC: 101 MMOL/L (ref 98–107)
CREAT SERPL-MCNC: 0.75 MG/DL (ref 0.67–1.17)
EGFRCR SERPLBLD CKD-EPI 2021: >90 ML/MIN/1.73M2
ERYTHROCYTE [DISTWIDTH] IN BLOOD BY AUTOMATED COUNT: 12.9 % (ref 10–15)
GLUCOSE BLDC GLUCOMTR-MCNC: 118 MG/DL (ref 70–99)
GLUCOSE BLDC GLUCOMTR-MCNC: 136 MG/DL (ref 70–99)
GLUCOSE BLDC GLUCOMTR-MCNC: 144 MG/DL (ref 70–99)
GLUCOSE BLDC GLUCOMTR-MCNC: 158 MG/DL (ref 70–99)
GLUCOSE BLDC GLUCOMTR-MCNC: 160 MG/DL (ref 70–99)
GLUCOSE SERPL-MCNC: 140 MG/DL (ref 70–99)
HCO3 SERPL-SCNC: 23 MMOL/L (ref 22–29)
HCT VFR BLD AUTO: 30.8 % (ref 40–53)
HGB BLD-MCNC: 10.2 G/DL (ref 13.3–17.7)
MAGNESIUM SERPL-MCNC: 1.8 MG/DL (ref 1.7–2.3)
MCH RBC QN AUTO: 29.7 PG (ref 26.5–33)
MCHC RBC AUTO-ENTMCNC: 33.1 G/DL (ref 31.5–36.5)
MCV RBC AUTO: 90 FL (ref 78–100)
PHOSPHATE SERPL-MCNC: 3 MG/DL (ref 2.5–4.5)
PLATELET # BLD AUTO: 211 10E3/UL (ref 150–450)
POTASSIUM SERPL-SCNC: 4.3 MMOL/L (ref 3.4–5.3)
RBC # BLD AUTO: 3.44 10E6/UL (ref 4.4–5.9)
SODIUM SERPL-SCNC: 133 MMOL/L (ref 135–145)
WBC # BLD AUTO: 8 10E3/UL (ref 4–11)

## 2025-03-31 PROCEDURE — 250N000011 HC RX IP 250 OP 636

## 2025-03-31 PROCEDURE — 250N000013 HC RX MED GY IP 250 OP 250 PS 637

## 2025-03-31 PROCEDURE — 97110 THERAPEUTIC EXERCISES: CPT | Mod: GO

## 2025-03-31 PROCEDURE — 250N000011 HC RX IP 250 OP 636: Performed by: PHYSICIAN ASSISTANT

## 2025-03-31 PROCEDURE — 84100 ASSAY OF PHOSPHORUS: CPT | Performed by: STUDENT IN AN ORGANIZED HEALTH CARE EDUCATION/TRAINING PROGRAM

## 2025-03-31 PROCEDURE — 250N000013 HC RX MED GY IP 250 OP 250 PS 637: Performed by: STUDENT IN AN ORGANIZED HEALTH CARE EDUCATION/TRAINING PROGRAM

## 2025-03-31 PROCEDURE — 71045 X-RAY EXAM CHEST 1 VIEW: CPT | Mod: 26 | Performed by: RADIOLOGY

## 2025-03-31 PROCEDURE — 85014 HEMATOCRIT: CPT

## 2025-03-31 PROCEDURE — 80048 BASIC METABOLIC PNL TOTAL CA: CPT

## 2025-03-31 PROCEDURE — 250N000011 HC RX IP 250 OP 636: Performed by: NURSE PRACTITIONER

## 2025-03-31 PROCEDURE — 97530 THERAPEUTIC ACTIVITIES: CPT | Mod: GO

## 2025-03-31 PROCEDURE — 250N000013 HC RX MED GY IP 250 OP 250 PS 637: Performed by: PHYSICIAN ASSISTANT

## 2025-03-31 PROCEDURE — 258N000003 HC RX IP 258 OP 636

## 2025-03-31 PROCEDURE — 71045 X-RAY EXAM CHEST 1 VIEW: CPT

## 2025-03-31 PROCEDURE — 85027 COMPLETE CBC AUTOMATED: CPT

## 2025-03-31 PROCEDURE — 83735 ASSAY OF MAGNESIUM: CPT

## 2025-03-31 PROCEDURE — 120N000003 HC R&B IMCU UMMC

## 2025-03-31 RX ORDER — METHOCARBAMOL 750 MG/1
750 TABLET, FILM COATED ORAL EVERY 6 HOURS PRN
Status: DISCONTINUED | OUTPATIENT
Start: 2025-03-31 | End: 2025-04-09 | Stop reason: HOSPADM

## 2025-03-31 RX ORDER — AMINO ACIDS/PROTEIN HYDROLYS 11G-40/45
1 LIQUID IN PACKET (ML) ORAL 2 TIMES DAILY
Status: DISCONTINUED | OUTPATIENT
Start: 2025-04-01 | End: 2025-04-09 | Stop reason: HOSPADM

## 2025-03-31 RX ORDER — HYDROMORPHONE HCL IN WATER/PF 6 MG/30 ML
0.2 PATIENT CONTROLLED ANALGESIA SYRINGE INTRAVENOUS
Status: DISCONTINUED | OUTPATIENT
Start: 2025-03-31 | End: 2025-04-07

## 2025-03-31 RX ORDER — MAGNESIUM OXIDE 400 MG/1
400 TABLET ORAL EVERY 4 HOURS
Status: COMPLETED | OUTPATIENT
Start: 2025-03-31 | End: 2025-03-31

## 2025-03-31 RX ADMIN — ENOXAPARIN SODIUM 40 MG: 40 INJECTION SUBCUTANEOUS at 12:09

## 2025-03-31 RX ADMIN — ATENOLOL 25 MG: 25 TABLET ORAL at 20:10

## 2025-03-31 RX ADMIN — PIPERACILLIN AND TAZOBACTAM 3.38 G: 3; .375 INJECTION, POWDER, LYOPHILIZED, FOR SOLUTION INTRAVENOUS at 03:37

## 2025-03-31 RX ADMIN — Medication 15 ML: at 07:52

## 2025-03-31 RX ADMIN — MAGNESIUM OXIDE TAB 400 MG (241.3 MG ELEMENTAL MG) 400 MG: 400 (241.3 MG) TAB at 07:52

## 2025-03-31 RX ADMIN — METHOCARBAMOL 750 MG: 750 TABLET ORAL at 12:10

## 2025-03-31 RX ADMIN — GABAPENTIN 300 MG: 300 CAPSULE ORAL at 20:10

## 2025-03-31 RX ADMIN — METHOCARBAMOL 500 MG: 500 TABLET, FILM COATED ORAL at 06:24

## 2025-03-31 RX ADMIN — MAGNESIUM OXIDE TAB 400 MG (241.3 MG ELEMENTAL MG) 400 MG: 400 (241.3 MG) TAB at 12:10

## 2025-03-31 RX ADMIN — POLYETHYLENE GLYCOL 3350 17 G: 17 POWDER, FOR SOLUTION ORAL at 07:52

## 2025-03-31 RX ADMIN — ACETAMINOPHEN 650 MG: 325 SOLUTION ORAL at 15:04

## 2025-03-31 RX ADMIN — Medication 40 MG: at 07:53

## 2025-03-31 RX ADMIN — PIPERACILLIN AND TAZOBACTAM 3.38 G: 3; .375 INJECTION, POWDER, LYOPHILIZED, FOR SOLUTION INTRAVENOUS at 07:52

## 2025-03-31 RX ADMIN — FLUCONAZOLE 400 MG: 2 INJECTION, SOLUTION INTRAVENOUS at 16:06

## 2025-03-31 RX ADMIN — ACETAMINOPHEN 650 MG: 325 SOLUTION ORAL at 03:37

## 2025-03-31 RX ADMIN — GABAPENTIN 300 MG: 300 CAPSULE ORAL at 15:04

## 2025-03-31 RX ADMIN — INSULIN ASPART 1 UNITS: 100 INJECTION, SOLUTION INTRAVENOUS; SUBCUTANEOUS at 12:10

## 2025-03-31 RX ADMIN — GABAPENTIN 300 MG: 300 CAPSULE ORAL at 07:51

## 2025-03-31 RX ADMIN — METHOCARBAMOL 750 MG: 750 TABLET ORAL at 18:50

## 2025-03-31 RX ADMIN — Medication 60 ML: at 07:55

## 2025-03-31 RX ADMIN — OXYCODONE HYDROCHLORIDE 5 MG: 5 SOLUTION ORAL at 15:04

## 2025-03-31 RX ADMIN — INSULIN ASPART 1 UNITS: 100 INJECTION, SOLUTION INTRAVENOUS; SUBCUTANEOUS at 16:06

## 2025-03-31 RX ADMIN — ACETAMINOPHEN 650 MG: 325 SOLUTION ORAL at 10:30

## 2025-03-31 RX ADMIN — PIPERACILLIN AND TAZOBACTAM 3.38 G: 3; .375 INJECTION, POWDER, LYOPHILIZED, FOR SOLUTION INTRAVENOUS at 15:04

## 2025-03-31 RX ADMIN — PIPERACILLIN AND TAZOBACTAM 3.38 G: 3; .375 INJECTION, POWDER, LYOPHILIZED, FOR SOLUTION INTRAVENOUS at 20:10

## 2025-03-31 RX ADMIN — DEXTROSE, SODIUM CHLORIDE, AND POTASSIUM CHLORIDE: 5; .45; .15 INJECTION INTRAVENOUS at 23:39

## 2025-03-31 RX ADMIN — ACETAMINOPHEN 650 MG: 325 SOLUTION ORAL at 23:08

## 2025-03-31 RX ADMIN — NORTRIPTYLINE HYDROCHLORIDE 25 MG: 25 CAPSULE ORAL at 20:10

## 2025-03-31 RX ADMIN — DEXTROSE, SODIUM CHLORIDE, AND POTASSIUM CHLORIDE: 5; .45; .15 INJECTION INTRAVENOUS at 03:37

## 2025-03-31 RX ADMIN — SENNOSIDES AND DOCUSATE SODIUM 1 TABLET: 50; 8.6 TABLET ORAL at 07:51

## 2025-03-31 ASSESSMENT — ACTIVITIES OF DAILY LIVING (ADL)
ADLS_ACUITY_SCORE: 43
ADLS_ACUITY_SCORE: 38
ADLS_ACUITY_SCORE: 40
ADLS_ACUITY_SCORE: 38
ADLS_ACUITY_SCORE: 40
ADLS_ACUITY_SCORE: 43
ADLS_ACUITY_SCORE: 38
ADLS_ACUITY_SCORE: 43
ADLS_ACUITY_SCORE: 40
ADLS_ACUITY_SCORE: 38
ADLS_ACUITY_SCORE: 40
ADLS_ACUITY_SCORE: 40
ADLS_ACUITY_SCORE: 38
ADLS_ACUITY_SCORE: 38
ADLS_ACUITY_SCORE: 43
ADLS_ACUITY_SCORE: 38
ADLS_ACUITY_SCORE: 38

## 2025-03-31 NOTE — PROGRESS NOTES
Antimicrobial Stewardship Team Note    Antimicrobial Stewardship Program - A joint venture between [facility] and  Physicians to optimize antibiotic management.  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     History of Present Illness: The patient received and completed perioperative cefazolin on 3/24. The patient required 4 LPM via NC following the procedure, and a left pleural fluid toyin drain and right pleural chest tube were placed. On 3/26, the patient developed leukocytosis (WBC 12.6) and lactate was elevated (3.7). An x-ray of the chest was obtained on 3/26 which revealed no appreciable pneumothorax and mildly increased streaky right basilar opacities. The patient was initiated on fluconazole and piperacillin/tazobactam on 3/27 due to concern for an esophageal leak. A CT of the chest, abdomen, and pelvis was obtained on 3/28, which revealed post-surgical changes of esophagectomy with likely post-op inflammation along the esophagogastric anastomosis, and small post-op pneumomediastinum and intra-abdominal free air, small left pneumothorax, and linear, basilar pre-dominant consolidative opacities and interlobular septal thickening (noted to likely represent atelectasis/pulmonary edema in the post-op setting). An EGD was performed on 3/28. As of the morning of 3/31, the patient remains afebrile without leukocytosis/leukopenia (WBC 8), and he remains on  piperacillin/tazobactam and fluconazole.         Active Anti-infective Medications   (From admission, onward)                 Start     Stop    03/27/25 1400  piperacillin-tazobactam  3.375 g,   Intravenous,   EVERY 6 HOURS        Skin and Soft Tissue Infection       --    03/27/25 1400  fluconazole  400 mg,   Intravenous,   100 mL/hr,   EVERY 24 HOURS        Fungal Infection Prophylaxis       --                  Assessment: Possible esophageal leak  Since the patient's acute leukocytosis on 3/26, the patient continues to remain otherwise clinically stable - the patient remains afebrile, largely normotensive, and the patient is no longer requiring supplemental oxygen. Additionally, the patient's leukocytosis resolved prior to the first dose of piperacillin/tazobactam and fluconazole on 3/27, reassuring for lack of infection. Documentation of the EGD is unavailable, and what is available in the notes is lacking of a mention of esophageal leak. If the EGD provided evidence of no esophageal leak, we recommend reconsidering the use of ongoing antimicrobials, such as discontinuation of antibiotics or, if antibiotics are deemed necessary, deescalation of piperacillin/tazobactam to ampicillin/sulbactam. If the EGD showed evidence of an esophageal leak, recommend consultation of ID services for assistance in determining duration of antimicrobial therapy as well as organizing ID follow-up in the outpatient setting.    Recommendations:  If EGD provided evidence of no esophageal leak, recommend reconsidering the use of ongoing antimicrobials (see above)  IF EGD provided evidence of a present esophageal leak, recommend consultation of ID services    Pharmacy took the following actions: Called/paged provider, Electronic note created.    Discussed with ID Staff ASHLEY Dsouza, PharmD, PGY2 ID Infectious Diseases Resident    Vital Signs/Clinical Features:  Vitals         03/29 0700  03/30 0659 03/30 0700  03/31  0659 03/31 0700  03/31 1231   Most Recent      Temp ( F) 97.4 -  98.7    97.5 -  99.4      97.8     97.8 (36.6) 03/31 1100    Pulse 85 -  97    76 -  89    76 -  79     79 03/31 1100    Resp 19 -  22    16 -  22    18 -  19     19 03/31 1100    /93 -  151/107    124/83 -  140/82    131/75 -  138/82     138/82 03/31 1100    SpO2 (%) 92 -  96    94 -  98    93 -  98     96 03/31 1100            Labs  Estimated Creatinine Clearance: 104.4 mL/min (based on SCr of 0.75 mg/dL).  Recent Labs   Lab Test 03/26/25  1309 03/27/25  0402 03/28/25  0341 03/29/25  0340 03/30/25  0500 03/31/25  0350   CR 1.69* 1.05 0.81 0.76 0.72 0.75       Recent Labs   Lab Test 03/26/25  0339 03/27/25  0402 03/28/25  0341 03/29/25  0340 03/30/25  0500 03/31/25  0350   WBC 12.6* 8.1 8.9 7.8 8.3 8.0   HGB 14.0 11.4* 10.6* 10.4* 10.7* 10.2*   HCT 43.5 35.3* 32.0* 31.8* 32.5* 30.8*   MCV 94 94 90 92 92 90    131* 132* 144* 189 211       Recent Labs   Lab Test 07/23/24  0856 10/31/24  0829 11/13/24  0921 11/27/24  0734 12/11/24  0757 12/16/24  1646 02/20/25  1411 03/17/25  0938   BILITOTAL 0.5 0.4 0.2 0.2 0.3 0.3  --   --    ALKPHOS 72 81 75 117 118 183*  --   --    ALBUMIN 4.1 3.9 3.7 3.5 3.3* 3.3* 3.9 4.0   AST 14 20 20 27 25 25  --   --    ALT <5 12 18 27 17 25  --   --        Recent Labs   Lab Test 09/26/20  0225 09/26/20  0550 03/24/25  1153 03/24/25  1236 03/24/25  1339 03/24/25  1613 03/26/25  1041 03/26/25  1309   PCAL 0.21  --   --   --   --   --   --   --    LACT  --    < > 2.3* 2.7* 2.8* 3.0* 3.7* 2.4*    < > = values in this interval not displayed.             Culture Results:  7-Day Micro Results       ** No results found for the last 168 hours. **            Recent Labs   Lab Test 12/16/24 2016   URINEPH 5.5   NITRITE Negative   LEUKEST Trace*   WBCU 2                         Imaging: XR Chest Port 1 View    Result Date: 3/31/2025  EXAM: XR CHEST PORT 1 VIEW 3/31/2025 7:50 AM INDICATION: s/p esophagectomy COMPARISON: Chest  radiograph 3/30/2025, 3/29/2025; CT chest abdomen and pelvis 3/28/2025. TECHNIQUE: Single AP view of the chest. FINDINGS: Right apically oriented chest tube is stable in position. Right chest Port-A-Cath tip terminates at the low SVC. Gastric tube terminates at the distal esophagus near the diaphragmatic hiatus. Multiple clips project over the mediastinum and left of the spinal column. Unchanged right costophrenic angle blunting. Ground glass opacification throughout the right lung. Mild left costophrenic angle blunting and linear and groundglass opacities in the left lower lung zone. Prominent peribronchial markings in the left hilum. Stable opacification over the right lung apex. Trachea is midline. Cardiac silhouette is normal in size. No pneumothorax. Unchanged bones and soft tissues.     IMPRESSION: 1. Stable small right and trace left pleural effusions, with opacification in the right lung apex similar to prior likely representing loculated pleural fluid. 2. Pulmonary edema, right greater than left, and stable from comparison. 3. Stable support devices. I have personally reviewed the examination and initial interpretation and I agree with the findings. JEVON VINES MD   SYSTEM ID:  [id number]    XR Chest Port 1 View    Result Date: 3/30/2025  Exam: XR CHEST PORT 1 VIEW, 3/30/2025 8:49 AM Indication: s/p esophagectomy Comparison: 3/29/2025 Findings: Right chest wall internal jugular Port-A-Cath tip at the low SVC. Enteric tube tip and sidehole project over the lower mediastinum. Numerous mediastinal surgical clips. Stable cardiomediastinal silhouette. The pulmonary vasculature is indistinct. Stable small bilateral loculated pleural effusions. No appreciable pneumothorax. Slightly decreased perihilar and basilar predominant mixed interstitial and streaky airspace opacities.     Impression: Stable small bilateral loculated pleural effusions and slightly decreased perihilar and basilar predominant mixed  interstitial and streaky air space opacities. DESEAN SANTANA DO   SYSTEM ID:  [id number]    XR Chest Port 1 View    Result Date: 3/29/2025  Exam: XR CHEST PORT 1 VIEW, 3/29/2025 9:33 AM Indication: s/p esophagectomy Comparison: 3/28/2025 Findings: Enteric tube tip and sidehole project over the lower mediastinum, similar to prior. Stable right apically directed chest tube. Right chest wall internal jugular Port-A-Cath tip at the low SVC. Stable cardiomediastinal silhouette. The pulmonary vasculature is indistinct. Stable small bilateral loculated pleural effusions. Trace biapical pneumothoraces. Stable perihilar and basilar predominant mixed interstitial and streaky airspace opacities.     Impression: Stable small bilateral loculated hydropneumothoraces and perihilar and basilar predominant mixed interstitial and streaky airspace opacities. DESEAN SANTANA DO   SYSTEM ID:  [id number]    XR Surgery SHONA Fluoro Less Than 5 Min w Stills    Result Date: 3/28/2025  This exam was marked as non-reportable because it will not be read by a radiologist or a [facility] non-radiologist provider.     CT Chest/Abdomen/Pelvis w Contrast    Result Date: 3/28/2025  EXAM: CT chest, abdomen, and pelvis with intravenous contrast. 3/28/2025 10:47 AM HISTORY: s/p esophagectomy TECHNIQUE: Helical acquisition of image data was performed for the chest, abdomen, and pelvis with intravenous contrast. COMPARISON: CT 3/14/2025. FINDINGS: Lines and tubes: Right IJ chest wall Port-A-Cath tip at the superior cavoatrial junction. Enteric tube tip terminates just superior to the level of the diaphragm. Left lower quadrant percutaneous jejunostomy. Right apical chest tube in place. CHEST: Thyroid: Subcentimeter hypodense right thyroid nodule, which was non-FDG avid on prior PET. Mediastinum: The heart is not enlarged. Trace postoperative pneumomediastinum. No significant pericardial effusion. Lungs: Central airways are clear. Small right hydropneumothorax,  with apical oriented chest tube. Small left pneumothorax. Loculated pleural fluid along the fissures bilaterally. Linear opacities in the right lower lobe, radiating from the hilum. Apical predominant intralobular septal thickening. Groundglass opacities along the anterior inferior left upper lobe, and at the left lung base. Subcutaneous emphysema along the bilateral chest wall. Lower neck/axillae: No enlarged axillary, mediastinal, or hilar lymph nodes by short axis criteria. ABDOMEN/PELVIS: Liver: No intrahepatic biliary dilatation. Subcentimeter, well circumscribed hypodense lesions in the liver, too small to characterize on CT however likely to represent hepatic cysts. Gallbladder/biliary tree: The gallbladder and common bile duct are within normal limits. Pancreas: Pancreatic atrophy. Spleen: The spleen is not enlarged. Adrenal glands: No adrenal nodules. Kidneys/ureters: No hydronephrosis. Bilateral nonobstructing renal calculi measuring of up to 10 mm on the right, and 10 mm in the left. Bladder/pelvic organs: Unremarkable. Bowel/mesentery: Colonic diverticulosis without evidence of acute diverticulitis. No dilated loops of small bowel or colon. Locules of postoperative intraperitoneal free air along the liver. Esophagus/Stomach: Postsurgical changes of esophagectomy with postoperative inflammatory changes around the esophagogastric anastomosis. Major vessels: No aneurysmal dilatation. Lymph nodes: No enlarged abdominal or pelvic lymph nodes by short axis criteria. Soft Tissues: Unremarkable Bones:  No acute or suspicious osseous abnormality. Degenerative changes of the spine, notable for grade 1 retrolisthesis of L2 and L3, no high-grade spinal canal narrowing.     IMPRESSION: 1. Postsurgical changes of esophagectomy with likely postoperative inflammation along the esophagogastric anastomosis. Small postoperative pneumomediastinum and intra-abdominal free air. 2. Small right hydropneumothorax with chest tube  in place. 3. Small left pneumothorax. 4. Linear, basilar predominant consolidative opacities and interlobular septal thickening, likely representing atelectasis/pulmonary edema in the postoperative setting. 5. Enteric tube tip lies just superior to the level of the diaphragm. Left lower quadrant jejunostomy. I have personally reviewed the examination and initial interpretation and I agree with the findings. JENNY FLORES MD   SYSTEM ID:  [id number]    XR Chest Port 1 View    Result Date: 3/28/2025  Exam: XR CHEST PORT 1 VIEW, 3/28/2025 7:46 AM Indication: s/p esophagectomy Comparison: 3/27/2025. Findings: Portable supine AP views of the chest. Stable positioning of the gastric tube and right apical chest tube. Right chest wall Port-A-Cath tip projects over the SVC. Postoperative changes of esophagectomy. Mediastinal surgical clips. Safety pin projects over the right inferior hemithorax, presumably external. Trachea is midline. Cardiomediastinal silhouette is stable. Persistent low right lung volume. Centrally increased mixed interstitial airspace opacities throughout the right lung. Streaky left basilar opacities. No definitive pneumothorax. Similar mild blunting of the costophrenic angles, right greater than left. Visualized portions of the upper abdomen are unremarkable. Advanced degenerative changes of the right glenohumeral joint. Multilevel degenerative changes of the visualized spine.     Impression: 1. Postoperative changes of esophagectomy with stable positioning of support devices. 2. Slightly increased mixed opacities throughout the right lung, possibly atelectasis/edema versus developing infection. Attention on follow-up 3. Streaky left basilar opacities, likely atelectasis/edema. 4. Stable small right and trace left pleural effusions. I have personally reviewed the examination and initial interpretation and I agree with the findings. JEVON VINES MD   SYSTEM ID:  [id number]    XR Chest Port 1  View    Result Date: 3/27/2025  Exam: XR CHEST PORT 1 VIEW, 3/27/2025 3:39 PM Indication: Left pleural tube removed Comparison: Prior radiographs including same day at 0729, CT 3/14/2025 Findings: Portable semiupright AP view of the chest. Postsurgical changes of esophagectomy. Surgical clips project over the mediastinum. Interval removal of the left chest tube. No definite pneumothorax. Stable position of the right chest tube and enteric tube. Right IJ Port-A-Cath projects over the superior cavoatrial junction, unchanged. Unchanged perihilar and bibasilar streaky and bandlike opacities. Visualized upper abdomen is unremarkable.     Impression: 1.  Interval removal of the left chest tube. No definite pneumothorax. 2.  Unchanged perihilar and bibasilar streaky opacities, likely atelectasis versus pulmonary edema. 3.  Remaining support devices are stable. I have personally reviewed the examination and initial interpretation and I agree with the findings. JEVON VINES MD   SYSTEM ID:  [id number]    XR Chest Port 1 View    Result Date: 3/27/2025  Exam: XR CHEST PORT 1 VIEW, 3/27/2025 8:02 AM Indication: s/p esophagectomy Comparison: 3/26/2025. Findings: Portable semiupright AP view of the chest. Stable positioning of gastric tube and right apical chest tube. Slight retraction of the left basilar chest tube. Right chest wall Port-A-Cath tip projects over the superior cavoatrial junction. Mediastinal surgical clips. Safety pin projects over the left costophrenic angle, presumably external. Trachea is midline. Cardiomediastinal silhouette is stable. Persistent low lung volumes. Increased streaky bibasilar opacities, right greater than left. No appreciable pneumothorax or pleural effusion. Visualized portions of the upper abdomen are unremarkable. No acute osseous abnormality.     Impression: 1. Increased streaky bibasilar opacities, right greater than left, likely atelectasis but infection cannot be excluded.  Attention on follow-up. 2. Slight retraction of the left basilar chest tube. Otherwise stable positioning of support devices. I have personally reviewed the examination and initial interpretation and I agree with the findings. JEVON VINES MD   SYSTEM ID:  [id number]    XR Chest Port 1 View    Result Date: 3/26/2025  EXAM: XR CHEST PORT 1 VIEW  3/26/2025 8:05 AM  HISTORY: s/p esophagectomy COMPARISON: Prior day FINDINGS: Portable semiupright AP view of the chest. Gastric tube sidehole projects over the central thorax, similar to prior. Right IJ catheter tube tip projects over the cavoatrial junction. Stable position of right-sided chest tube. Interval repositioning of left basilar chest tube. Cardiac silhouette is stable. No appreciable pneumothorax. Mildly increased streaky right greater than left basilar opacities. No pleural effusion. Right glenohumeral joint degenerative change.     IMPRESSION: No appreciable pneumothorax. Stable position of right-sided chest tube. Interval repositioning of left-sided chest tube. Mildly increased streaky right basilar opacities. CLAUDIA STOLL MD (Joe)   SYSTEM ID:  [id number]    XR Chest Port 1 View    Result Date: 3/25/2025  Portable chest INDICATION: Postesophagectomy. Right apical pneumothorax. COMPARISON: Yesterday 2151 hours FINDINGS: The previously noted small right pneumothorax the appears similarly small. Right chest tube again present the right IJ catheter tip in the SVC. Esophagectomy with enteric tube sidehole at the mid chest and tip just above the diaphragmatic hiatus. This is unchanged. Patchy and streaky densities especially in the left lower lung zone are similar.     IMPRESSION: No significant changes radiographically from yesterday evening JEVON VINES MD   SYSTEM ID:  [id number]    XR Chest Port 1 View    Result Date: 3/25/2025  Exam: XR CHEST PORT 1 VIEW, 3/24/2025 9:54 PM Comparison: Same day chest radiograph History: s/p esophagectomy  Findings: Portable AP view of the chest. Right portacatheter terminates in the low SVC/right atrium. Enteric tube tip projects over the distal esophagus/GE junction. Stable chest tube. Mediastinal surgical clips. Trachea is approximately midline. Stable cardiac silhouette. No significant pleural effusion. Slightly increased conspicuity of mixed interstitial and airspace pulmonary opacities, most notably in the left midlung zone. Continued reduced lung volumes. Decreased size of small right apical pneumothorax. Surgical drain projects over the left upper abdomen.     Impression: 1. Stable positioning of right apical chest tube, decreased small right apical pneumothorax. 2. Continued low lung volumes, with subtly increased conspicuity of mixed interstitial and pulmonary opacities, likely atelectasis/edema. 3. Enteric tube tip terminates in the distal esophagus, unchanged when compared to prior. I have personally reviewed the examination and initial interpretation and I agree with the findings. WANDA HARRIS MD   SYSTEM ID:  [id number]    XR Chest Port 1 View    Result Date: 3/24/2025  Exam: XR CHEST PORT 1 VIEW, 3/24/2025 7:18 PM Indication: s/p esophagectomy Comparison: 2/27/2025 Findings: Portable, frontal, semiupright view of the chest. New postsurgical changes of the chest. Enteric tube projects to the inferior mediastinum. Apically directed right chest tube. Left basilar chest tube. Right chest wall Port-A-Cath terminates at the superior cavoatrial junction. Mediastinal surgical clips. Focal metallic appearing opacity projecting over the left upper quadrant Trachea is midline. Cardioediastinal silhouette is within normal limits. Low lung volumes. Small right apical pneumothorax. No left pneumothorax. The right costophrenic angle is clear. Blunting of the left costophrenic angle. Streaky perihilar opacities. Degenerative changes of the thoracic spine. Trace subcutaneous emphysema along the left lateral chest  wall.     Impression: 1. Postsurgical changes of esophagectomy with support devices as detailed in body of the report. 2. Small right apical pneumothorax with right apical chest tube in place. 3. Low lung volumes with streaky perihilar opacities compatible with atelectasis/edema. 4. Small left pleural effusion with left basilar chest tube in place. 5. Focal metallic appearing opacity projecting over the left upper quadrant, possibly external to the patient/postsurgical. Consider correlation. [Urgent Result: Pneumothorax] Finding was identified on 3/24/2025 7:52 PM. Dr. Larson was contacted by Dr. Mena at 3/24/2025 8:00 PM and verbalized understanding of the urgent finding. I have personally reviewed the examination and initial interpretation and I agree with the findings. ALLEGRA RENDON MD   SYSTEM ID:  [id number]

## 2025-03-31 NOTE — PROGRESS NOTES
Thoracic Surgery Progress Note    PATIENT NAME: Bal Junior  MRN: [id number]  DATE: 03/31/2025 12:21 PM  LAST PROCEDURE: ESOPHAGOGASTRODUODENOSCOPY, pharyngostomy tube replacement: 3/24/2025    Subjective:  No acute events overnight.    Objective:  Vital Signs: Most Recent  Ranges (24 hours)   Temp: 97.8  F (36.6  C)  Pulse: 79  BP: 138/82  Resp: 19  SpO2: 96 % on None (Room air)  Temp  Min: 97.8  F (36.6  C)  Max: 99.4  F (37.4  C)  Pulse  Min: 76  Max: 89  BP  Min: 124/83  Max: 140/82  Resp  Min: 16  Max: 22  SpO2  Min: 93 %  Max: 98 %     Physical Exam:  Gen: Appears stated age, NAD, Resting in bed. Comfortable  HEENT: NC/AT, PERRL, EOMI, Sclera Anicteric, Hearing intact. NGT in place.  Neuro: AO, no focal deficits  Psych: Affect appropriate, Behavior appropriate, Cooperative  CV: Normocardic, Normotensive  Pulm: NWOB on RA, CT in place , No air leak present. Serous output.  MSK: MAEx4  Skin: No obvious rashes, jaundice, erythema    Other Data:  Recent Labs   Lab 03/31/25  0350 03/30/25  0500 03/29/25  0340 03/28/25  0341   WBC 8.0 8.3 7.8 8.9   HGB 10.2* 10.7* 10.4* 10.6*    189 144* 132*     Recent Labs   Lab 03/31/25  1111 03/31/25  0713 03/31/25  0350 03/31/25  0347 03/30/25  0713 03/30/25  0500 03/29/25  0710 03/29/25  0340 03/28/25  0806 03/28/25  0341   NA  --   --  133*  --   --  136  --  137  --  135   POTASSIUM  --   --  4.3  --   --  4.1  --  3.8  --  3.6   CHLORIDE  --   --  101  --   --  103  --  105  --  104   CO2  --   --  23  --   --  23  --  22  --  22   BUN  --   --  12.7  --   --  13.8  --  13.4  --  14.3   CR  --   --  0.75  --   --  0.72  --  0.76  --  0.81   * 118* 140* 144*   < > 148*   < > 155*   < > 167*    < > = values in this interval not displayed.       Assessment:  Bal Junior is a 70 year old male with a history of HTN, neuritis, peripheral neuropathy, history acid reflux, s/p fundoplication takedown with lap redo nissen fundoplication on 2020 with recently diagnosed  adenocarcinoma of the GE junction, s/p robotic takedown of Nissen fundoplication, J-tube placement on 2/26/25. Now s/p EGD 3/29.    Plan:  # Pain: Multimodal pain control  # Pulm Cares: CT in place  - Continue to titrate supplemental oxygen to SpO2 goals  - Continue IS use; Deep breathing and coughing to promote lung expansion  # GI Cares: Strict NPO. J tube cycled tube feeds. J Tube cares. NGT to LCS  - PRN anti-emetics  - Continue bowel regimen via J. Last Bowel Movement: 03/30/25   - Anticipated duration of tube feed need is > 90 days (documentation for home tube feeds)  # Renal: Monitor Intake and Outputs  # ID cares: Continue ABX  - Daily CBC  # Activity: OOB as much as possible  # DVT PPX: Enoxaparin, SCDs  # Code Status: Full Code    Plan discussed with thoracic fellow, who will discuss with staff.    Chavez Rodriguez MD   General Surgery Resident

## 2025-03-31 NOTE — PROGRESS NOTES
Care Management Follow Up    Length of Stay (days): 7    Expected Discharge Date: 04/04/2025     Concerns to be Addressed: all concerns addressed in this encounter     Patient plan of care discussed at interdisciplinary rounds: Yes    Anticipated Discharge Disposition: Home, Home Care, Home Infusion  Anticipated Discharge Services: Home Care  Anticipated Discharge DME: Other (see comment) (Enteral supplies)    Patient/family educated on Medicare website which has current facility and service quality ratings: yes  Education Provided on the Discharge Plan:    Patient/Family in Agreement with the Plan:      Referrals Placed by CM/SW: External Care Coordination, Home Infusion, Homecare  Private pay costs discussed: to be reviewed by [facility]    Discussed  Partnership in Safe Discharge Planning  document with patient/family: No     Handoff Completed: No, handoff not indicated or clinically appropriate    Additional Information:  From H&P: 3/24/25  Bal Junior is a 70 year old male with a history of HTN, neuritis, peripheral neuropathy, history acid reflux, s/p fundoplication takedown with lap redo nissen fundoplication on 2020 with 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 [facility] following for NEW Enteral (has coverage).   Patient has 16-Bel[place] jejunostomy feeding tube inserted into the bowel loop. Has PORT.   Patient is being followed by [facility] for Biologic and Oncology, now adding Enteral. [facility] Nursing is following.  -Will need 90 day JOVANNY for TF. Sent Vocera to Chavez Rodriguez MD (Thoracic Resident).    [facility] (Oncology, Biologic, Enteral)  Ph: [phone]  Fax: [phone]    Next Steps:   [] Obtain 90 day JOVANNY for TF from provider.   [] Follow-up with [facility] for TF. Will need order.   [] Follow-up on  home care PT/OT(if needed).Will I find agency or does RNCC?     CHARLOTTE 4/7    Julia Guaman RNCC Float  3/31/2025  Nurse Coordinator    Covering for 6B  Phone [phone]      Social Work and Care Management Department   SEARCHABLE in [facility] - search CARE COORDINATOR   [place] [facility] ([phone]) Saturday & Sunday; ([phone]) [facility] Recognized Holidays   Units: 5A Onc 5201 - 5219 RNCC,  5A Onc 5220 thru 5240 RNCC, 5C OFFSERVICE [id number] RNCC & 5C OFF SERVICE [phone] RNCC   Units: 6B Vocera, 6C Card 6401 thru 6420 RNCC, 6C Card 6502 thru 6514 RNCC & 6C Card 6515 thru 6519 RNCC    Units: 7A SOT RNCC Vocera, 7B Med Surg Vocera, 7C Med Surg 7401 thru 7418 RNCC & 7C Med Surg 7502 thru 7521 RNCC   Units: 6A Vocera & 4A CVICU Vocera, 4C MICU Vocera, and 4E SICU Vocera     Units: 5 Ortho Vocera & 5 Med Surg Vocera    Units: 6 Med Surg Vocera & 8 Med Surg Vocera

## 2025-03-31 NOTE — PROGRESS NOTES
CLINICAL NUTRITION SERVICES - BRIEF NOTE     Nutrition Prescription    RECOMMENDATIONS FOR MDs/PROVIDERS TO ORDER:  Please consider switching Liquid Tylenol to tablet form (crush/flush via FT), as liquid Tylenol is hyperosmolar and may cause/contribute to loose stools   Consider adjusting Miralax and Senokot to PRN only, rather than scheduled, pending stooling trends.     Recommendations already ordered by Registered Dietitian (RD):  Modify patient to a cyclic TF regimen starting tomorrow AM  - Ok to keep Osmolite 1.5 running at 60 mL/hr today 3/31.   - At 0800 on 4/1, shut TFs off for transition to cyclic feeds in evening (see rate/schedule below)  - Increasing Prosource modular frequency to BID, to optimize Kcal/protein intake s/p esophageal resection    Dosing weight: 87 kg   EN access: J-tube  Cyclic Timing: x 16 hours, 1600 - 0800   Modulars: 2 packets Prosource TF20 daily    Regimen: Osmolite 1.5 mitchell (or equivalent) at rate of 90 mL/hr x 16 hours + 2 pkts Prosource TF20 daily to provide: 1440 mL formula, 2320 Kcals (27 Kcal/kg), 131 gm protein (1.5 gm/kg), 294 gm CHO, 0 gm fiber, and 1097 mL free water daily   Flushes: 30 mL Q4hrs + 30 mL at start/end of TF cycle      Future/Additional Recommendations:  Monitor nutrition-related findings and follow pt per protocol  Once tolerance to 16 hour feeds established, consider reducing TF cycle to 12 hours, with rate of 120 mL/hr. Will monitor for appropriate time to consider this (discussed POC to give ~48 hours before reducing cyclic feeds further)   For last full RD assessment, see note dated 3/25/25    NEW FINDINGS   - TFs at goal rate; starting to have BMs since 3/30 after delayed ROBF. Patient now reports loose stools (is on antibiotics, scheduled bowel meds x2, and liquid Tylenol, which are all likely contributing. At baseline, patient reports having 1 BM per day in the morning, consistently.     - Otherwise tolerating TFs at goal rate without complaint. Denies  nausea or abdominal discomfort. Discussed plan to begin cycling TFs starting tomorrow AM with patient and wife who are in agreement with plan. Discussed rate/schedule change with bedside RN (no change to TFs until tomorrow AM; ok to continue running at 60 mL/hr until 4/1, see top of note).     INTERVENTIONS  Implementation  Collaboration with other providers - Thoracic team MITZI, bedside RN   Enteral Nutrition - Cycle TFs starting tomorrow AM. Ok to keep Osmolite 1.5 mitchell at rate of 60 mL/hr today.       Monitoring/Evaluation  Will continue to monitor and evaluate per protocol.    Mane Pimentel, MS, RDN, LD, CNSC  Available by [employer] or phone   Vocera: M-F (7:00-3:30)  6B Clinical Dietitian  Weekend/Holiday (7:00-3:30) - Weekend Clinical Dietitian   6B RD desk: [phone]       **Clinical Dietitians are no longer available by pager.

## 2025-03-31 NOTE — PLAN OF CARE
Neuro: A&Ox4.   Cardiac: No tele. VSS. B/P: 135/86, T: 97.8, P: 96, R: 19   Respiratory: O2 sats stable on RA.  GI/: Adequate urine output via urinal. BM X2 this shift. NG to low continuous suction with 20ml FWF Q4H.  Diet/appetite: NPO. Continuous tube feeds via J tube @ 60ml/hr with 30ml FWF Q4H.  Activity:  Stand by assist, up to chair and in halls.  Pain: At acceptable level on current regimen.   Skin: No new deficits noted.  LDA's: PIV, Port, NG, J tube, Chest tube to water seal    Plan: Continue with POC. Notify primary team with changes.      Goal Outcome Evaluation:      Plan of Care Reviewed With: patient    Overall Patient Progress: improvingOverall Patient Progress: improving    Outcome Evaluation: Pain well controlled with PRN pain medication, dilaudid PCA discontinued this shift. Ambulating SBA. BM today.

## 2025-03-31 NOTE — PLAN OF CARE
Neuro: A&Ox4.   Cardiac: SR. VSS.   Respiratory: Sating >92% on RA.  GI/: Adequate urine output. BM x1.   Diet/appetite: Tolerating tube feeds via J tube at 60mL/hr w/ Q4 30mL flush. Denies nausea.   Activity:  SBA, up to bathroom.  Pain: Pt reports PRN robaxin is most effective for pain. C/o consistently 3-4/10 pain from muscle spasms. Dilaudid PCA continued.   Skin: No new deficits noted. CT site, old lap sites, J tube site.   LDA's: R port infusing dilaudid PCA and D5 0.45% NS w/ Kcl at 50mL/hr. LPIV infusing intermittent abx & TKOd NS. CT to water seal w/ minimal output. NG to low continuous suction requiring q3-4h flush.     Plan: Continue with POC. Notify primary team with changes.    Goal Outcome Evaluation:      Plan of Care Reviewed With: patient    Overall Patient Progress: improvingOverall Patient Progress: improving    Outcome Evaluation: Dilaudid PCA and robaxin PRN x2 for pain overnight, NG draining and needing to be flushed Q3-4H. CT w/ minimal output to water seal.

## 2025-04-01 ENCOUNTER — APPOINTMENT (OUTPATIENT)
Dept: OCCUPATIONAL THERAPY | Facility: CLINIC | Age: 70
End: 2025-04-01
Attending: STUDENT IN AN ORGANIZED HEALTH CARE EDUCATION/TRAINING PROGRAM
Payer: MEDICARE

## 2025-04-01 LAB
ANION GAP SERPL CALCULATED.3IONS-SCNC: 11 MMOL/L (ref 7–15)
BUN SERPL-MCNC: 11.8 MG/DL (ref 8–23)
CALCIUM SERPL-MCNC: 7.6 MG/DL (ref 8.8–10.4)
CHLORIDE SERPL-SCNC: 105 MMOL/L (ref 98–107)
CREAT SERPL-MCNC: 0.77 MG/DL (ref 0.67–1.17)
EGFRCR SERPLBLD CKD-EPI 2021: >90 ML/MIN/1.73M2
ERYTHROCYTE [DISTWIDTH] IN BLOOD BY AUTOMATED COUNT: 12.9 % (ref 10–15)
GLUCOSE BLDC GLUCOMTR-MCNC: 112 MG/DL (ref 70–99)
GLUCOSE BLDC GLUCOMTR-MCNC: 122 MG/DL (ref 70–99)
GLUCOSE BLDC GLUCOMTR-MCNC: 122 MG/DL (ref 70–99)
GLUCOSE BLDC GLUCOMTR-MCNC: 127 MG/DL (ref 70–99)
GLUCOSE BLDC GLUCOMTR-MCNC: 143 MG/DL (ref 70–99)
GLUCOSE BLDC GLUCOMTR-MCNC: 159 MG/DL (ref 70–99)
GLUCOSE BLDC GLUCOMTR-MCNC: 87 MG/DL (ref 70–99)
GLUCOSE SERPL-MCNC: 139 MG/DL (ref 70–99)
HCO3 SERPL-SCNC: 20 MMOL/L (ref 22–29)
HCT VFR BLD AUTO: 29.8 % (ref 40–53)
HGB BLD-MCNC: 9.9 G/DL (ref 13.3–17.7)
MAGNESIUM SERPL-MCNC: 1.7 MG/DL (ref 1.7–2.3)
MCH RBC QN AUTO: 29.2 PG (ref 26.5–33)
MCHC RBC AUTO-ENTMCNC: 33.2 G/DL (ref 31.5–36.5)
MCV RBC AUTO: 88 FL (ref 78–100)
PHOSPHATE SERPL-MCNC: 2.8 MG/DL (ref 2.5–4.5)
PLATELET # BLD AUTO: 272 10E3/UL (ref 150–450)
POTASSIUM SERPL-SCNC: 3.8 MMOL/L (ref 3.4–5.3)
RBC # BLD AUTO: 3.39 10E6/UL (ref 4.4–5.9)
SODIUM SERPL-SCNC: 136 MMOL/L (ref 135–145)
WBC # BLD AUTO: 10.2 10E3/UL (ref 4–11)

## 2025-04-01 PROCEDURE — 97535 SELF CARE MNGMENT TRAINING: CPT | Mod: GO | Performed by: OCCUPATIONAL THERAPIST

## 2025-04-01 PROCEDURE — 85018 HEMOGLOBIN: CPT

## 2025-04-01 PROCEDURE — 250N000013 HC RX MED GY IP 250 OP 250 PS 637

## 2025-04-01 PROCEDURE — 84100 ASSAY OF PHOSPHORUS: CPT | Performed by: STUDENT IN AN ORGANIZED HEALTH CARE EDUCATION/TRAINING PROGRAM

## 2025-04-01 PROCEDURE — 258N000003 HC RX IP 258 OP 636

## 2025-04-01 PROCEDURE — 250N000013 HC RX MED GY IP 250 OP 250 PS 637: Performed by: STUDENT IN AN ORGANIZED HEALTH CARE EDUCATION/TRAINING PROGRAM

## 2025-04-01 PROCEDURE — 120N000003 HC R&B IMCU UMMC

## 2025-04-01 PROCEDURE — 83735 ASSAY OF MAGNESIUM: CPT

## 2025-04-01 PROCEDURE — 250N000011 HC RX IP 250 OP 636

## 2025-04-01 PROCEDURE — 80048 BASIC METABOLIC PNL TOTAL CA: CPT

## 2025-04-01 PROCEDURE — 250N000011 HC RX IP 250 OP 636: Mod: JZ

## 2025-04-01 RX ORDER — MAGNESIUM OXIDE 400 MG/1
400 TABLET ORAL EVERY 4 HOURS
Status: COMPLETED | OUTPATIENT
Start: 2025-04-01 | End: 2025-04-01

## 2025-04-01 RX ORDER — ACETAMINOPHEN 325 MG/1
650 TABLET ORAL EVERY 6 HOURS
Status: DISCONTINUED | OUTPATIENT
Start: 2025-04-01 | End: 2025-04-09 | Stop reason: HOSPADM

## 2025-04-01 RX ORDER — POTASSIUM CHLORIDE 20MEQ/15ML
20 LIQUID (ML) ORAL ONCE
Status: COMPLETED | OUTPATIENT
Start: 2025-04-01 | End: 2025-04-01

## 2025-04-01 RX ORDER — FLUCONAZOLE 40 MG/ML
400 POWDER, FOR SUSPENSION ORAL DAILY
Status: DISCONTINUED | OUTPATIENT
Start: 2025-04-01 | End: 2025-04-02

## 2025-04-01 RX ADMIN — METHOCARBAMOL 750 MG: 750 TABLET ORAL at 16:04

## 2025-04-01 RX ADMIN — DEXTROSE, SODIUM CHLORIDE, AND POTASSIUM CHLORIDE: 5; .45; .15 INJECTION INTRAVENOUS at 20:17

## 2025-04-01 RX ADMIN — POTASSIUM CHLORIDE 20 MEQ: 20 SOLUTION ORAL at 10:39

## 2025-04-01 RX ADMIN — GABAPENTIN 300 MG: 300 CAPSULE ORAL at 13:32

## 2025-04-01 RX ADMIN — ACETAMINOPHEN 650 MG: 325 SOLUTION ORAL at 03:54

## 2025-04-01 RX ADMIN — Medication 40 MG: at 07:42

## 2025-04-01 RX ADMIN — INSULIN ASPART 1 UNITS: 100 INJECTION, SOLUTION INTRAVENOUS; SUBCUTANEOUS at 00:59

## 2025-04-01 RX ADMIN — ENOXAPARIN SODIUM 40 MG: 40 INJECTION SUBCUTANEOUS at 13:32

## 2025-04-01 RX ADMIN — PIPERACILLIN AND TAZOBACTAM 3.38 G: 3; .375 INJECTION, POWDER, LYOPHILIZED, FOR SOLUTION INTRAVENOUS at 20:15

## 2025-04-01 RX ADMIN — Medication 60 ML: at 20:19

## 2025-04-01 RX ADMIN — HYDROMORPHONE HYDROCHLORIDE 0.2 MG: 0.2 INJECTION, SOLUTION INTRAMUSCULAR; INTRAVENOUS; SUBCUTANEOUS at 07:38

## 2025-04-01 RX ADMIN — OXYCODONE HYDROCHLORIDE 5 MG: 5 SOLUTION ORAL at 13:32

## 2025-04-01 RX ADMIN — NORTRIPTYLINE HYDROCHLORIDE 25 MG: 25 CAPSULE ORAL at 20:15

## 2025-04-01 RX ADMIN — FLUCONAZOLE 400 MG: 40 POWDER, FOR SUSPENSION ORAL at 13:32

## 2025-04-01 RX ADMIN — Medication 60 ML: at 07:51

## 2025-04-01 RX ADMIN — Medication 15 ML: at 07:41

## 2025-04-01 RX ADMIN — PIPERACILLIN AND TAZOBACTAM 3.38 G: 3; .375 INJECTION, POWDER, LYOPHILIZED, FOR SOLUTION INTRAVENOUS at 07:41

## 2025-04-01 RX ADMIN — OXYCODONE HYDROCHLORIDE 5 MG: 5 SOLUTION ORAL at 03:54

## 2025-04-01 RX ADMIN — GABAPENTIN 300 MG: 300 CAPSULE ORAL at 07:41

## 2025-04-01 RX ADMIN — ACETAMINOPHEN 650 MG: 325 TABLET, FILM COATED ORAL at 22:48

## 2025-04-01 RX ADMIN — PIPERACILLIN AND TAZOBACTAM 3.38 G: 3; .375 INJECTION, POWDER, LYOPHILIZED, FOR SOLUTION INTRAVENOUS at 13:32

## 2025-04-01 RX ADMIN — MAGNESIUM OXIDE TAB 400 MG (241.3 MG ELEMENTAL MG) 400 MG: 400 (241.3 MG) TAB at 10:39

## 2025-04-01 RX ADMIN — PIPERACILLIN AND TAZOBACTAM 3.38 G: 3; .375 INJECTION, POWDER, LYOPHILIZED, FOR SOLUTION INTRAVENOUS at 02:16

## 2025-04-01 RX ADMIN — ATENOLOL 25 MG: 25 TABLET ORAL at 20:15

## 2025-04-01 RX ADMIN — METHYLENE BLUE 3 ML: 5 INJECTION INTRAVENOUS at 14:01

## 2025-04-01 RX ADMIN — MAGNESIUM OXIDE TAB 400 MG (241.3 MG ELEMENTAL MG) 400 MG: 400 (241.3 MG) TAB at 13:38

## 2025-04-01 RX ADMIN — METHOCARBAMOL 750 MG: 750 TABLET ORAL at 07:41

## 2025-04-01 RX ADMIN — GABAPENTIN 300 MG: 300 CAPSULE ORAL at 20:15

## 2025-04-01 RX ADMIN — METHOCARBAMOL 750 MG: 750 TABLET ORAL at 00:59

## 2025-04-01 RX ADMIN — ACETAMINOPHEN 650 MG: 325 TABLET, FILM COATED ORAL at 16:04

## 2025-04-01 RX ADMIN — ACETAMINOPHEN 650 MG: 325 SOLUTION ORAL at 10:39

## 2025-04-01 ASSESSMENT — ACTIVITIES OF DAILY LIVING (ADL)
ADLS_ACUITY_SCORE: 47
ADLS_ACUITY_SCORE: 47
ADLS_ACUITY_SCORE: 44
ADLS_ACUITY_SCORE: 47
ADLS_ACUITY_SCORE: 44
ADLS_ACUITY_SCORE: 47
ADLS_ACUITY_SCORE: 44
ADLS_ACUITY_SCORE: 47
ADLS_ACUITY_SCORE: 44
ADLS_ACUITY_SCORE: 47
ADLS_ACUITY_SCORE: 44
ADLS_ACUITY_SCORE: 44
ADLS_ACUITY_SCORE: 47
ADLS_ACUITY_SCORE: 44
ADLS_ACUITY_SCORE: 47
ADLS_ACUITY_SCORE: 44
ADLS_ACUITY_SCORE: 44
ADLS_ACUITY_SCORE: 47
ADLS_ACUITY_SCORE: 44
ADLS_ACUITY_SCORE: 44
ADLS_ACUITY_SCORE: 43

## 2025-04-01 NOTE — PLAN OF CARE
"Shift Updates (2719-0067): Afebrile and VSS on RA. NG removed by team. Gave methylene blue PO per team for chest tube test.    Neuro: A&Ox4   Cardiac: HR: 80s. HTN.  Respiratory: Sating >95% on RA. Dyspnea upon exertion. Chest tube to water seal- atrium changed.  GI/: Adequate urine output. BM X4-5 loose stools.  Diet/appetite: Tolerating NPO. TF turned off at 0800.  Activity: SBA up to bathroom.  Pain: Pain controlled with PRN robaxin and oxy.  Skin: No new deficits noted.  Access: L PIV TKO. Port w/continuous IVMF.    Plan of Care: Monitor chest tube output for change of color. TF to be resumed at 1600.    BP (!) 149/91 (BP Location: Left arm, Cuff Size: Adult Regular)   Pulse 87   Temp 98.8  F (37.1  C) (Oral)   Resp 20   Ht 1.778 m (5' 10\")   Wt 89.1 kg (196 lb 6.9 oz)   SpO2 96%   BMI 28.18 kg/m      Goal Outcome Evaluation:     Plan of Care Reviewed With: patient     Overall Patient Progress: improving     Outcome Evaluation: Pain controlled with PRN meds. Ambulating to [facility]  "

## 2025-04-01 NOTE — PROGRESS NOTES
CLINICAL NUTRITION SERVICES - REASSESSMENT NOTE     RECOMMENDATIONS FOR MDs/PROVIDERS TO ORDER:  Patient is on scheduled Tylenol which contains Sorbitol and is hyperosmolar; likely contributing to loose stools. Recommend switching to tablet form and crush/flush via FT (discussed with PharmD)    Patient is scheduled to receive Miralax and Senokot but is having frequent loose stools, so this is likely not beneficial. Recommend modifying bowel meds to PRN only.     Registered Dietitian Interventions:  Continue cyclic TFs as ordered, see regimen below    Dosing weight: 87 kg   EN access: J-tube  Cyclic Timing: x 16 hours, 1600 - 0800   Modulars: 2 packets Prosource TF20 daily    Regimen: Osmolite 1.5 mitchell (or equivalent) at rate of 90 mL/hr x 16 hours + 2 pkts Prosource TF20 daily to provide: 1440 mL formula, 2320 Kcals (27 Kcal/kg), 131 gm protein (1.5 gm/kg), 294 gm CHO, 0 gm fiber, and 1097 mL free water daily   Flushes: 30 mL Q4hrs + 30 mL at start/end of TF cycle      Future/Additional Recommendations:  Once tolerance to 16 hour feeds established, consider reducing TF cycle to 12 hours, with rate of 120 mL/hr. Will monitor for appropriate time to consider this (discussed POC to give ~48 hours before reducing cyclic feeds further)      INFORMATION OBTAINED  Patient not available for interview due to busy w/ therapies (RD met with pt in person yesterday afternoon, anticipate no change from that visit; will continue to monitor pt per protocol)    CURRENT NUTRITION ORDERS  Diet: NPO  Snacks/Supplements: N/A       Nutrition Support: TF   -Dosing weight: 87 kg (admit wt)   -EN access via J-tube   -Regimen: Osmolite 1.5 mitchell (or equivalent) at rate of 90 mL/hr x 16 hours + 2 pkts Prosource TF20 daily to provide: 1440 mL formula, 2320 Kcals (27 Kcal/kg), 131 gm protein (1.5 gm/kg), 294 gm CHO, 0 gm fiber, and 1097 mL free water daily   -Modulars: 2 pkts Prosource TF20 daily    -FWF 30 mL Q4hrs + 30 mL at start/end of TF  cycle (240 mL/day) + 1097 mL from TF for total free water provision of 1337 mL/day    CURRENT INTAKE/TOLERANCE  - EN Intake - Average 7-day TF intake: 749 mL formula, 1 packets Prosource =  1204 Kcals (14 Kcal/kg), and 67 g pro (0.8 g/kg). This is meeting 55% of low-end est Kcal needs, and 64% of low-end est protein needs .    NEW FINDINGS  Weight:   Weight on upward trend since admission. Admit weight 85.6 kg (standing scale) and current weight 89.1 kg (standing scale).  No significant weight changes PTA per initial RD note review.   Skin/wounds: Reviewed    WOCN not following  GI symptoms: Reviewed    BM x8 documented yesterday; x2 the day prior; x2-3 so far today per I/O.   Contributing factors likely antibiotics, liquid Tylenol (hyperosmolar), and has scheduled bowel meds  Nutrition-relevant labs: Reviewed  Nutrition-relevant medications: Reviewed    ASSESSED NUTRITION NEEDS  Dosing Weight: 87 kg, based on actual/admit wt  Estimated Energy Needs: 2175 - 2610 kcals/day (25 - 30 kcals/kg)  Justification: Maintenance  Estimated Protein Needs: 130 - 174 grams protein/day (1.5 - 2 grams of pro/kg)  Justification: Increased needs post-op   Estimated Fluid Needs: 1 mL/kcal  Justification: Maintenance    MALNUTRITION  % Intake: < 75% for > 7 days (moderate) - Improving; TFs at goal   % Weight Loss: None noted  Subcutaneous Fat Loss: None observed per RD note 3/25  Muscle Loss: None observed  Fluid Accumulation/Edema: None noted  Malnutrition Diagnosis: Patient does not meet two of the established criteria necessary for diagnosing malnutrition but is at risk for malnutrition  Malnutrition Present on Admission: No    EVALUATION OF THE PROGRESS TOWARD GOALS   Previous Goals  Total avg nutritional intake to meet a minimum of 25 kcal/kg and 1.2 g PRO/kg daily (per dosing wt 87 kg).   Evaluation: Progressing    Previous Nutrition Diagnosis  Inadequate oral intake related to NPO s/p esophageal resection as evidenced by  reliance on TF to meet 100% of nutrition needs.   Evaluation: No change    NUTRITION DIAGNOSIS  Inadequate oral intake related to NPO s/p esophageal resection as evidenced by reliance on TF to meet 100% of nutrition needs.     INTERVENTIONS  Enteral nutrition management    GOALS  Total avg nutritional intake to meet a minimum of 25-30 kcal/kg and 1.5-2 g PRO/kg daily (per dosing wt 87 kg).    Monitoring/Evaluation      Progress toward goals will be monitored and evaluated per policy.    Mane Pimentel, MS, RDN, LD, CNSC  Available by [employer] or phone   Vocera: M-F (7:00-3:30)  6B Clinical Dietitian   Weekend/Holiday (7:00-3:30) - Weekend Clinical Dietitian   6B RD desk: [phone]       **Clinical Dietitians are no longer available by pager.

## 2025-04-01 NOTE — PROGRESS NOTES
"Care from: 1500 - 1930    Neuro: A&Ox4.   Cardiac: No tele. VSS.   Respiratory: Right CT to water seal. Sating greater than 92% on RA.  GI/: Adequate urine output. BM X1  Diet/appetite: Tolerating NPO diet w/ cycled TF @ 90mL/hr Q4 20mL flush.   Activity:  Assist of stand by, up to chair and in halls.  Pain: Robaxin Q6, Oxy Q4.  Skin: Multiple abd lap sites, Left old CT site.  LDA's: J-tube, Right CT to water seal, L PIV, R Port.  Isolation: N/A  POCT: Q4    /74 (BP Location: Right arm)   Pulse 87   Temp 97.6  F (36.4  C) (Oral)   Resp 20   Ht 1.778 m (5' 10\")   Wt 89.1 kg (196 lb 6.9 oz)   SpO2 97%   BMI 28.18 kg/m       Plan: Pending CT removal and blue dye test.   "

## 2025-04-01 NOTE — PROGRESS NOTES
THORACIC & FOREGUT SURGERY    S:  No overnight events.  Pt seen at bedside resting. Tolerating TF. Loose stools.     O:  Vitals:    03/31/25 1900 03/31/25 2350 04/01/25 0445 04/01/25 0700   BP: 135/82 123/77 129/79 (!) 141/79   BP Location: Right arm  Left arm Left arm   Cuff Size:   Adult Regular Adult Regular   Pulse: 87      Resp: 20 18 18 20   Temp: 98.2  F (36.8  C) 98.6  F (37  C) 97.6  F (36.4  C) 97.7  F (36.5  C)   TempSrc: Oral Oral Oral Oral   SpO2:  94% 93% 94%   Weight:       Height:           A&O, NAD  Breathing non-labored, sats >92% on room air  Appears well perfused   Soft, NDNT   Distal extremities warm. No calf tenderness.    Chest tube site healthy appearing. Serous output.     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     CT output now serous. Atrium changed. Remove NG tube.      -Remove NG tube  -Chest tube to water seal  -Multimodal pain control  -Methylene blue challenge to test for leak. Mix 3ml into 100cc of apple juice and administer in small sips by mouth over 30 minutes.  Watch for change in chest tube output.  -Continue ABX. May reconsider based on results of methylene blue challenge.  -NPO, cycle TF. Anticipated duration of tube feeds need is > 90 days   -Encourage ambulation and IS  -Lovenox ppx    Clinically Significant Risk Factors         # Hyponatremia: Lowest Na = 133 mmol/L in last 2 days, will monitor as appropriate           # Hypertension: Noted on problem list     # Acute Hypercapnic Respiratory Failure: based on arterial blood gas results.   "Continue supplemental oxygen and ventilatory support as indicated.         # Overweight: Estimated body mass index is 28.18 kg/m  as calculated from the following:    Height as of this encounter: 1.778 m (5' 10\").    Weight as of this encounter: 89.1 kg (196 lb 6.9 oz).      # Financial/Environmental Concerns: none         Patient seen and discussed with staff and thoracic surgeon Dr Chino Dela Cruz.    Marleny Avitia PA-C     Thoracic and Foregut Surgery  Text page Thoracic Surgery via [facility] Paging/Directory          "

## 2025-04-01 NOTE — PLAN OF CARE
Neuro: A&Ox4.   Cardiac: VSS. No tele   Respiratory: Sating >95% on RA.  GI/: Adequate urine output. BM X4-5 loose stools. NG LCS 20ml FWF Q4h  Diet/appetite: Tolerating NPO and cont TF via J: TF @ 60mL/hr Q4 30mL FWF  Activity:  SBA up to bathroom   Pain: Pain controlled with PRN robaxin and oxy   Skin: No new deficits noted.   LDA's:  NG  J tube   Port   L PIV TKO  R CT water seal    Plan: Continue with POC. Notify primary team with changes. Port needle changed (power port 20G 3/4 in)    Goal Outcome Evaluation:      Plan of Care Reviewed With: patient    Overall Patient Progress: improvingOverall Patient Progress: improving    Outcome Evaluation: Pain controlled with PRN meds. Ambulating to [facility]

## 2025-04-02 ENCOUNTER — APPOINTMENT (OUTPATIENT)
Dept: OCCUPATIONAL THERAPY | Facility: CLINIC | Age: 70
End: 2025-04-02
Attending: STUDENT IN AN ORGANIZED HEALTH CARE EDUCATION/TRAINING PROGRAM
Payer: MEDICARE

## 2025-04-02 LAB
ANION GAP SERPL CALCULATED.3IONS-SCNC: 10 MMOL/L (ref 7–15)
BUN SERPL-MCNC: 15.5 MG/DL (ref 8–23)
CALCIUM SERPL-MCNC: 8 MG/DL (ref 8.8–10.4)
CHLORIDE SERPL-SCNC: 105 MMOL/L (ref 98–107)
CHYLO FLD QL: NORMAL
CHYLOMICRON BILL: NORMAL
CREAT SERPL-MCNC: 0.82 MG/DL (ref 0.67–1.17)
EGFRCR SERPLBLD CKD-EPI 2021: >90 ML/MIN/1.73M2
ERYTHROCYTE [DISTWIDTH] IN BLOOD BY AUTOMATED COUNT: 12.8 % (ref 10–15)
GLUCOSE BLDC GLUCOMTR-MCNC: 102 MG/DL (ref 70–99)
GLUCOSE BLDC GLUCOMTR-MCNC: 103 MG/DL (ref 70–99)
GLUCOSE BLDC GLUCOMTR-MCNC: 117 MG/DL (ref 70–99)
GLUCOSE BLDC GLUCOMTR-MCNC: 144 MG/DL (ref 70–99)
GLUCOSE BLDC GLUCOMTR-MCNC: 151 MG/DL (ref 70–99)
GLUCOSE BLDC GLUCOMTR-MCNC: 157 MG/DL (ref 70–99)
GLUCOSE BLDC GLUCOMTR-MCNC: 164 MG/DL (ref 70–99)
GLUCOSE SERPL-MCNC: 157 MG/DL (ref 70–99)
HCO3 SERPL-SCNC: 20 MMOL/L (ref 22–29)
HCT VFR BLD AUTO: 33.9 % (ref 40–53)
HGB BLD-MCNC: 11.2 G/DL (ref 13.3–17.7)
MAGNESIUM SERPL-MCNC: 1.9 MG/DL (ref 1.7–2.3)
MCH RBC QN AUTO: 30 PG (ref 26.5–33)
MCHC RBC AUTO-ENTMCNC: 33 G/DL (ref 31.5–36.5)
MCV RBC AUTO: 91 FL (ref 78–100)
PHOSPHATE SERPL-MCNC: 2.8 MG/DL (ref 2.5–4.5)
PLATELET # BLD AUTO: 308 10E3/UL (ref 150–450)
POTASSIUM SERPL-SCNC: 4.3 MMOL/L (ref 3.4–5.3)
RBC # BLD AUTO: 3.73 10E6/UL (ref 4.4–5.9)
SODIUM SERPL-SCNC: 135 MMOL/L (ref 135–145)
TRIGL FLD-MCNC: 60 MG/DL
WBC # BLD AUTO: 10.4 10E3/UL (ref 4–11)

## 2025-04-02 PROCEDURE — 258N000003 HC RX IP 258 OP 636

## 2025-04-02 PROCEDURE — 85041 AUTOMATED RBC COUNT: CPT

## 2025-04-02 PROCEDURE — 85027 COMPLETE CBC AUTOMATED: CPT

## 2025-04-02 PROCEDURE — 250N000013 HC RX MED GY IP 250 OP 250 PS 637

## 2025-04-02 PROCEDURE — 250N000011 HC RX IP 250 OP 636

## 2025-04-02 PROCEDURE — 82310 ASSAY OF CALCIUM: CPT

## 2025-04-02 PROCEDURE — 97535 SELF CARE MNGMENT TRAINING: CPT | Mod: GO

## 2025-04-02 PROCEDURE — 84100 ASSAY OF PHOSPHORUS: CPT | Performed by: STUDENT IN AN ORGANIZED HEALTH CARE EDUCATION/TRAINING PROGRAM

## 2025-04-02 PROCEDURE — 250N000011 HC RX IP 250 OP 636: Performed by: STUDENT IN AN ORGANIZED HEALTH CARE EDUCATION/TRAINING PROGRAM

## 2025-04-02 PROCEDURE — 97530 THERAPEUTIC ACTIVITIES: CPT | Mod: GO

## 2025-04-02 PROCEDURE — 250N000013 HC RX MED GY IP 250 OP 250 PS 637: Performed by: STUDENT IN AN ORGANIZED HEALTH CARE EDUCATION/TRAINING PROGRAM

## 2025-04-02 PROCEDURE — 83735 ASSAY OF MAGNESIUM: CPT

## 2025-04-02 PROCEDURE — 80048 BASIC METABOLIC PNL TOTAL CA: CPT

## 2025-04-02 PROCEDURE — 82565 ASSAY OF CREATININE: CPT

## 2025-04-02 PROCEDURE — 250N000013 HC RX MED GY IP 250 OP 250 PS 637: Performed by: PHYSICIAN ASSISTANT

## 2025-04-02 PROCEDURE — 120N000003 HC R&B IMCU UMMC

## 2025-04-02 PROCEDURE — 85014 HEMATOCRIT: CPT

## 2025-04-02 RX ORDER — LOPERAMIDE HCL 1 MG/7.5ML
2 SOLUTION ORAL 4 TIMES DAILY PRN
Status: DISCONTINUED | OUTPATIENT
Start: 2025-04-02 | End: 2025-04-09 | Stop reason: HOSPADM

## 2025-04-02 RX ORDER — LOPERAMIDE HCL 1 MG/7.5ML
2 SOLUTION ORAL 4 TIMES DAILY PRN
Status: DISCONTINUED | OUTPATIENT
Start: 2025-04-02 | End: 2025-04-02

## 2025-04-02 RX ORDER — MAGNESIUM SULFATE HEPTAHYDRATE 40 MG/ML
2 INJECTION, SOLUTION INTRAVENOUS ONCE
Status: COMPLETED | OUTPATIENT
Start: 2025-04-02 | End: 2025-04-02

## 2025-04-02 RX ADMIN — NORTRIPTYLINE HYDROCHLORIDE 25 MG: 25 CAPSULE ORAL at 20:48

## 2025-04-02 RX ADMIN — ACETAMINOPHEN 650 MG: 325 TABLET, FILM COATED ORAL at 03:56

## 2025-04-02 RX ADMIN — LOPERAMIDE HCL 2 MG: 1 SOLUTION ORAL at 15:35

## 2025-04-02 RX ADMIN — Medication 60 ML: at 20:49

## 2025-04-02 RX ADMIN — METHOCARBAMOL 750 MG: 750 TABLET ORAL at 20:48

## 2025-04-02 RX ADMIN — Medication 40 MG: at 09:00

## 2025-04-02 RX ADMIN — ATENOLOL 25 MG: 25 TABLET ORAL at 20:48

## 2025-04-02 RX ADMIN — ACETAMINOPHEN 650 MG: 325 TABLET, FILM COATED ORAL at 09:00

## 2025-04-02 RX ADMIN — ENOXAPARIN SODIUM 40 MG: 40 INJECTION SUBCUTANEOUS at 12:02

## 2025-04-02 RX ADMIN — ACETAMINOPHEN 650 MG: 325 TABLET, FILM COATED ORAL at 15:35

## 2025-04-02 RX ADMIN — INSULIN ASPART 1 UNITS: 100 INJECTION, SOLUTION INTRAVENOUS; SUBCUTANEOUS at 03:56

## 2025-04-02 RX ADMIN — DEXTROSE, SODIUM CHLORIDE, AND POTASSIUM CHLORIDE: 5; .45; .15 INJECTION INTRAVENOUS at 15:46

## 2025-04-02 RX ADMIN — INSULIN ASPART 1 UNITS: 100 INJECTION, SOLUTION INTRAVENOUS; SUBCUTANEOUS at 00:42

## 2025-04-02 RX ADMIN — GABAPENTIN 300 MG: 300 CAPSULE ORAL at 08:59

## 2025-04-02 RX ADMIN — GABAPENTIN 300 MG: 300 CAPSULE ORAL at 20:48

## 2025-04-02 RX ADMIN — INSULIN ASPART 1 UNITS: 100 INJECTION, SOLUTION INTRAVENOUS; SUBCUTANEOUS at 09:18

## 2025-04-02 RX ADMIN — GABAPENTIN 300 MG: 300 CAPSULE ORAL at 13:53

## 2025-04-02 RX ADMIN — Medication 60 ML: at 09:11

## 2025-04-02 RX ADMIN — PIPERACILLIN AND TAZOBACTAM 3.38 G: 3; .375 INJECTION, POWDER, LYOPHILIZED, FOR SOLUTION INTRAVENOUS at 02:08

## 2025-04-02 RX ADMIN — METHOCARBAMOL 750 MG: 750 TABLET ORAL at 00:47

## 2025-04-02 RX ADMIN — METHOCARBAMOL 750 MG: 750 TABLET ORAL at 08:57

## 2025-04-02 RX ADMIN — Medication 15 ML: at 09:03

## 2025-04-02 RX ADMIN — MAGNESIUM SULFATE HEPTAHYDRATE 2 G: 2 INJECTION, SOLUTION INTRAVENOUS at 10:38

## 2025-04-02 RX ADMIN — INSULIN ASPART 1 UNITS: 100 INJECTION, SOLUTION INTRAVENOUS; SUBCUTANEOUS at 23:32

## 2025-04-02 RX ADMIN — ACETAMINOPHEN 650 MG: 325 TABLET, FILM COATED ORAL at 21:42

## 2025-04-02 ASSESSMENT — ACTIVITIES OF DAILY LIVING (ADL)
ADLS_ACUITY_SCORE: 44

## 2025-04-02 NOTE — PLAN OF CARE
Neuro: A&Ox4.   Cardiac: VSS. HR 80s   Respiratory: Sating >95% on RA. Dyspnea and increased WOB with ambulation   GI/: Adequate urine output. BM X6, freq loose stools  Diet/appetite: NPO and cycled TF 5216-9739 @90mL/hr Q4 30mL FWF  Activity: SBA, up to bathroom  Pain: managed with PRN robaxin   Skin: No new deficits noted.   LDA's: L PIV, R Port, J tube, R CT water seal     Plan: Continue with POC. Notify primary team with changes. No blue color noted in CT overnight. AXR today.    Goal Outcome Evaluation:      Plan of Care Reviewed With: patient    Overall Patient Progress: improvingOverall Patient Progress: improving    Outcome Evaluation: Pain controlled with PRN meds, ambulating to bathroom sba

## 2025-04-02 NOTE — PROGRESS NOTES
"THORACIC & FOREGUT SURGERY    S:  No overnight events.  Pt seen at bedside resting comfortably. Feeling okay, had significant bowel movements yesterday and overnight that are liquid texture. Chest tube output <30ml    O:  Vitals:    04/02/25 0035 04/02/25 0408 04/02/25 0700 04/02/25 0713   BP: 125/79 (!) 142/79  139/81   BP Location: Left arm Left arm  Left arm   Cuff Size:  Adult Regular  Adult Regular   Pulse:    78   Resp: 18 22 17   Temp: 97.5  F (36.4  C) 98.2  F (36.8  C)  97.4  F (36.3  C)   TempSrc: Oral Oral  Oral   SpO2: 92% 98%     Weight:   87.5 kg (192 lb 14.4 oz)    Height:           A&O, NAD  Breathing non-labored on RA  Appears well perfused  Soft, NDNT  Distal extremities warm.  No calf tenderness.    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      Patient passed methylene blue challenge for leak. Advancing to CLD, Will assess diet tolerance for pending CT removal. Antibiotics d/c'ed     -Diet: CLD + Cycled TF @ 90  -IVF: D5 1/2NS + 20K @ 50  -Abx: None  -Pain: Multimodal  -Ppx: SCD',s, Lovenox  -Tubes:   -Left CT: Watersealed, possibly remove tomorrow    Clinically Significant Risk Factors                   # Hypertension: Noted on problem list     # Acute Hypercapnic Respiratory Failure: based on arterial blood gas results.  Continue supplemental oxygen and ventilatory support as indicated.         # Overweight: Estimated body mass index is 27.68 kg/m  as calculated from the following:    Height as of this encounter: 1.778 m (5' 10\").    Weight as of this encounter: 87.5 kg (192 lb 14.4 oz).      # Financial/Environmental Concerns: none         Discussed with Attending, Fellow, and Staff.    Janes Galo, " MD     Thoracic and Foregut Surgery  Text page Thoracic Surgery via [facility] Paging/Directory

## 2025-04-02 NOTE — PLAN OF CARE
Goal Outcome Evaluation:    Shift 1102-6490     Pt VSS, afebrile, BP systolic 130-150. SP02>95% RA, calm, cooperative, quiet, calls appropriately. A&Ox4.   Activity: Assist of 1 when getting in/out of bed due to chest tube box. Pt is steady walking with IV pole.   GI/: 2 BM's this shift, loose, watery. Clear Liquid diet.   Tube feed stopped at 0800, restarted at 4pm.   BG checks AC/HS.  Lines: L PIV, R Port, J Tube (meds crushed), R CT to water seal.  PRN's: Robaxin @ 0857    Plan of Care Reviewed With: patient    Overall Patient Progress: improving    Outcome Evaluation: Less frequent stools. Stools still watery. Pt went for walk around the unit.      Problem: Adult Inpatient Plan of Care  Goal: Absence of Hospital-Acquired Illness or Injury  Outcome: Progressing  Intervention: Identify and Manage Fall Risk  Recent Flowsheet Documentation  Taken 4/2/2025 1153 by Josh Gibson RN  Safety Promotion/Fall Prevention: safety round/check completed  Taken 4/2/2025 0850 by Josh Gibson RN  Safety Promotion/Fall Prevention:   activity supervised   assistive device/personal items within reach   treat underlying cause   treat reversible contributory factors   supervised activity   nonskid shoes/slippers when out of bed   patient and family education   clutter free environment maintained   lighting adjusted  Intervention: Prevent Skin Injury  Recent Flowsheet Documentation  Taken 4/2/2025 1153 by Josh Gibson RN  Body Position: position changed independently  Taken 4/2/2025 0850 by Josh Gibson RN  Body Position: position changed independently  Skin Protection:   adhesive use limited   silicone foam dressing in place   tubing/devices free from skin contact  Taken 4/2/2025 0713 by Josh Gibson RN  Body Position: position changed independently  Intervention: Prevent and Manage VTE (Venous Thromboembolism) Risk  Recent Flowsheet Documentation  Taken 4/2/2025 0850 by Josh Gibson RN  VTE Prevention/Management:  SCDs off (sequential compression devices)  Intervention: Prevent Infection  Recent Flowsheet Documentation  Taken 4/2/2025 1153 by Josh Gibson, RN  Infection Prevention:   environmental surveillance performed   equipment surfaces disinfected   hand hygiene promoted   personal protective equipment utilized   single patient room provided   cohorting utilized  Taken 4/2/2025 0850 by Josh Gibson, RN  Infection Prevention:   environmental surveillance performed   equipment surfaces disinfected   hand hygiene promoted   personal protective equipment utilized   single patient room provided   cohorting utilized

## 2025-04-03 ENCOUNTER — APPOINTMENT (OUTPATIENT)
Dept: GENERAL RADIOLOGY | Facility: CLINIC | Age: 70
End: 2025-04-03
Attending: STUDENT IN AN ORGANIZED HEALTH CARE EDUCATION/TRAINING PROGRAM
Payer: MEDICARE

## 2025-04-03 ENCOUNTER — APPOINTMENT (OUTPATIENT)
Dept: OCCUPATIONAL THERAPY | Facility: CLINIC | Age: 70
End: 2025-04-03
Attending: STUDENT IN AN ORGANIZED HEALTH CARE EDUCATION/TRAINING PROGRAM
Payer: MEDICARE

## 2025-04-03 ENCOUNTER — APPOINTMENT (OUTPATIENT)
Dept: CT IMAGING | Facility: CLINIC | Age: 70
End: 2025-04-03
Attending: STUDENT IN AN ORGANIZED HEALTH CARE EDUCATION/TRAINING PROGRAM
Payer: MEDICARE

## 2025-04-03 VITALS
HEIGHT: 70 IN | OXYGEN SATURATION: 94 % | HEART RATE: 80 BPM | RESPIRATION RATE: 18 BRPM | WEIGHT: 189.38 LBS | TEMPERATURE: 98.4 F | SYSTOLIC BLOOD PRESSURE: 131 MMHG | DIASTOLIC BLOOD PRESSURE: 50 MMHG | BODY MASS INDEX: 27.11 KG/M2

## 2025-04-03 LAB
ANION GAP SERPL CALCULATED.3IONS-SCNC: 11 MMOL/L (ref 7–15)
BUN SERPL-MCNC: 16.9 MG/DL (ref 8–23)
C DIFF TOX B STL QL: NEGATIVE
CALCIUM SERPL-MCNC: 8 MG/DL (ref 8.8–10.4)
CHLORIDE SERPL-SCNC: 106 MMOL/L (ref 98–107)
CREAT SERPL-MCNC: 0.75 MG/DL (ref 0.67–1.17)
EGFRCR SERPLBLD CKD-EPI 2021: >90 ML/MIN/1.73M2
ERYTHROCYTE [DISTWIDTH] IN BLOOD BY AUTOMATED COUNT: 13 % (ref 10–15)
GLUCOSE BLDC GLUCOMTR-MCNC: 105 MG/DL (ref 70–99)
GLUCOSE BLDC GLUCOMTR-MCNC: 121 MG/DL (ref 70–99)
GLUCOSE BLDC GLUCOMTR-MCNC: 122 MG/DL (ref 70–99)
GLUCOSE BLDC GLUCOMTR-MCNC: 139 MG/DL (ref 70–99)
GLUCOSE BLDC GLUCOMTR-MCNC: 169 MG/DL (ref 70–99)
GLUCOSE SERPL-MCNC: 169 MG/DL (ref 70–99)
HCO3 SERPL-SCNC: 19 MMOL/L (ref 22–29)
HCT VFR BLD AUTO: 29.7 % (ref 40–53)
HGB BLD-MCNC: 9.7 G/DL (ref 13.3–17.7)
MAGNESIUM SERPL-MCNC: 1.9 MG/DL (ref 1.7–2.3)
MCH RBC QN AUTO: 28.8 PG (ref 26.5–33)
MCHC RBC AUTO-ENTMCNC: 32.7 G/DL (ref 31.5–36.5)
MCV RBC AUTO: 88 FL (ref 78–100)
PHOSPHATE SERPL-MCNC: 2.3 MG/DL (ref 2.5–4.5)
PLATELET # BLD AUTO: 402 10E3/UL (ref 150–450)
POTASSIUM SERPL-SCNC: 4.1 MMOL/L (ref 3.4–5.3)
RADIOLOGIST FLAGS: ABNORMAL
RBC # BLD AUTO: 3.37 10E6/UL (ref 4.4–5.9)
SODIUM SERPL-SCNC: 136 MMOL/L (ref 135–145)
WBC # BLD AUTO: 13.5 10E3/UL (ref 4–11)

## 2025-04-03 PROCEDURE — 250N000009 HC RX 250

## 2025-04-03 PROCEDURE — 250N000013 HC RX MED GY IP 250 OP 250 PS 637

## 2025-04-03 PROCEDURE — 84100 ASSAY OF PHOSPHORUS: CPT | Performed by: STUDENT IN AN ORGANIZED HEALTH CARE EDUCATION/TRAINING PROGRAM

## 2025-04-03 PROCEDURE — 250N000013 HC RX MED GY IP 250 OP 250 PS 637: Performed by: STUDENT IN AN ORGANIZED HEALTH CARE EDUCATION/TRAINING PROGRAM

## 2025-04-03 PROCEDURE — 80051 ELECTROLYTE PANEL: CPT

## 2025-04-03 PROCEDURE — 85014 HEMATOCRIT: CPT

## 2025-04-03 PROCEDURE — 80048 BASIC METABOLIC PNL TOTAL CA: CPT

## 2025-04-03 PROCEDURE — 250N000013 HC RX MED GY IP 250 OP 250 PS 637: Performed by: PHYSICIAN ASSISTANT

## 2025-04-03 PROCEDURE — 83735 ASSAY OF MAGNESIUM: CPT

## 2025-04-03 PROCEDURE — 71045 X-RAY EXAM CHEST 1 VIEW: CPT | Mod: 26 | Performed by: RADIOLOGY

## 2025-04-03 PROCEDURE — 87493 C DIFF AMPLIFIED PROBE: CPT

## 2025-04-03 PROCEDURE — 120N000003 HC R&B IMCU UMMC

## 2025-04-03 PROCEDURE — 250N000011 HC RX IP 250 OP 636

## 2025-04-03 PROCEDURE — 97535 SELF CARE MNGMENT TRAINING: CPT | Mod: GO | Performed by: OCCUPATIONAL THERAPIST

## 2025-04-03 PROCEDURE — 71270 CT THORAX DX C-/C+: CPT | Mod: 26 | Performed by: RADIOLOGY

## 2025-04-03 PROCEDURE — 97530 THERAPEUTIC ACTIVITIES: CPT | Mod: GO | Performed by: OCCUPATIONAL THERAPIST

## 2025-04-03 PROCEDURE — 71045 X-RAY EXAM CHEST 1 VIEW: CPT

## 2025-04-03 PROCEDURE — 70492 CT SFT TSUE NCK W/O & W/DYE: CPT

## 2025-04-03 PROCEDURE — 82565 ASSAY OF CREATININE: CPT

## 2025-04-03 RX ORDER — IOPAMIDOL 755 MG/ML
93 INJECTION, SOLUTION INTRAVASCULAR ONCE
Status: COMPLETED | OUTPATIENT
Start: 2025-04-03 | End: 2025-04-03

## 2025-04-03 RX ORDER — FLUCONAZOLE 2 MG/ML
400 INJECTION, SOLUTION INTRAVENOUS EVERY 24 HOURS
Status: DISCONTINUED | OUTPATIENT
Start: 2025-04-03 | End: 2025-04-09

## 2025-04-03 RX ORDER — PIPERACILLIN SODIUM, TAZOBACTAM SODIUM 3; .375 G/15ML; G/15ML
3.38 INJECTION, POWDER, LYOPHILIZED, FOR SOLUTION INTRAVENOUS EVERY 6 HOURS
Status: DISCONTINUED | OUTPATIENT
Start: 2025-04-03 | End: 2025-04-09

## 2025-04-03 RX ORDER — MAGNESIUM OXIDE 400 MG/1
400 TABLET ORAL EVERY 4 HOURS
Status: COMPLETED | OUTPATIENT
Start: 2025-04-03 | End: 2025-04-03

## 2025-04-03 RX ADMIN — ACETAMINOPHEN 650 MG: 325 TABLET, FILM COATED ORAL at 15:43

## 2025-04-03 RX ADMIN — LOPERAMIDE HCL 2 MG: 1 SOLUTION ORAL at 20:48

## 2025-04-03 RX ADMIN — MAGNESIUM OXIDE TAB 400 MG (241.3 MG ELEMENTAL MG) 400 MG: 400 (241.3 MG) TAB at 09:04

## 2025-04-03 RX ADMIN — FLUCONAZOLE 400 MG: 2 INJECTION, SOLUTION INTRAVENOUS at 14:12

## 2025-04-03 RX ADMIN — GABAPENTIN 300 MG: 300 CAPSULE ORAL at 15:43

## 2025-04-03 RX ADMIN — GABAPENTIN 300 MG: 300 CAPSULE ORAL at 20:48

## 2025-04-03 RX ADMIN — POTASSIUM & SODIUM PHOSPHATES POWDER PACK 280-160-250 MG 1 PACKET: 280-160-250 PACK at 12:20

## 2025-04-03 RX ADMIN — NORTRIPTYLINE HYDROCHLORIDE 25 MG: 25 CAPSULE ORAL at 20:48

## 2025-04-03 RX ADMIN — INSULIN ASPART 2 UNITS: 100 INJECTION, SOLUTION INTRAVENOUS; SUBCUTANEOUS at 04:12

## 2025-04-03 RX ADMIN — METHOCARBAMOL 750 MG: 750 TABLET ORAL at 09:04

## 2025-04-03 RX ADMIN — POTASSIUM & SODIUM PHOSPHATES POWDER PACK 280-160-250 MG 1 PACKET: 280-160-250 PACK at 09:04

## 2025-04-03 RX ADMIN — SODIUM CHLORIDE, PRESERVATIVE FREE 91 ML: 5 INJECTION INTRAVENOUS at 10:33

## 2025-04-03 RX ADMIN — PIPERACILLIN AND TAZOBACTAM 3.38 G: 3; .375 INJECTION, POWDER, LYOPHILIZED, FOR SOLUTION INTRAVENOUS at 14:11

## 2025-04-03 RX ADMIN — SENNOSIDES AND DOCUSATE SODIUM 1 TABLET: 50; 8.6 TABLET ORAL at 20:48

## 2025-04-03 RX ADMIN — ACETAMINOPHEN 650 MG: 325 TABLET, FILM COATED ORAL at 04:12

## 2025-04-03 RX ADMIN — POTASSIUM & SODIUM PHOSPHATES POWDER PACK 280-160-250 MG 1 PACKET: 280-160-250 PACK at 16:20

## 2025-04-03 RX ADMIN — ACETAMINOPHEN 650 MG: 325 TABLET, FILM COATED ORAL at 09:57

## 2025-04-03 RX ADMIN — ENOXAPARIN SODIUM 40 MG: 40 INJECTION SUBCUTANEOUS at 12:20

## 2025-04-03 RX ADMIN — Medication 60 ML: at 09:05

## 2025-04-03 RX ADMIN — MAGNESIUM OXIDE TAB 400 MG (241.3 MG ELEMENTAL MG) 400 MG: 400 (241.3 MG) TAB at 12:20

## 2025-04-03 RX ADMIN — Medication 40 MG: at 09:04

## 2025-04-03 RX ADMIN — ATENOLOL 25 MG: 25 TABLET ORAL at 20:48

## 2025-04-03 RX ADMIN — GABAPENTIN 300 MG: 300 CAPSULE ORAL at 09:04

## 2025-04-03 RX ADMIN — PIPERACILLIN AND TAZOBACTAM 3.38 G: 3; .375 INJECTION, POWDER, LYOPHILIZED, FOR SOLUTION INTRAVENOUS at 20:50

## 2025-04-03 RX ADMIN — Medication 15 ML: at 09:04

## 2025-04-03 RX ADMIN — ACETAMINOPHEN 650 MG: 325 TABLET, FILM COATED ORAL at 21:00

## 2025-04-03 RX ADMIN — IOPAMIDOL 93 ML: 755 INJECTION, SOLUTION INTRAVENOUS at 10:33

## 2025-04-03 RX ADMIN — Medication 10 MG: at 20:55

## 2025-04-03 RX ADMIN — Medication 60 ML: at 20:51

## 2025-04-03 ASSESSMENT — ACTIVITIES OF DAILY LIVING (ADL)
ADLS_ACUITY_SCORE: 44

## 2025-04-03 NOTE — PROGRESS NOTES
"THORACIC & FOREGUT SURGERY    S:  No overnight events.  Pt seen at bedside resting comfortably. Feeling okay, had significant bowel movements yesterday and overnight that are liquid texture. Chest tube output <30ml    O:  Vitals:    04/02/25 2346 04/03/25 0050 04/03/25 0412 04/03/25 0739   BP:   134/78 127/83   BP Location:   Left arm Left arm   Patient Position:       Cuff Size:       Pulse:    84   Resp:   16 18   Temp:   98.9  F (37.2  C) 98.1  F (36.7  C)   TempSrc:   Oral Oral   SpO2: 97%  95% 94%   Weight:  85.9 kg (189 lb 6 oz)     Height:           A&O, NAD  Breathing non-labored on RA  Appears well perfused  Soft, NDNT  Distal extremities warm.  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      Patient passed methylene blue challenge for leak.  Tolerating CLD.      - CT Esophagram  -Diet: NPO for imaging + Cycled TF @ 90  -IVF: None  - C diff testing  -Pain: Multimodal  -Ppx: SCD',s, Lovenox  -Tubes: Left CT: Waterseal    Clinically Significant Risk Factors                   # Hypertension: Noted on problem list     # Acute Hypercapnic Respiratory Failure: based on arterial blood gas results.  Continue supplemental oxygen and ventilatory support as indicated.         # Overweight: Estimated body mass index is 27.17 kg/m  as calculated from the following:    Height as of this encounter: 1.778 m (5' 10\").    Weight as of this encounter: 85.9 kg (189 lb 6 oz).        # Financial/Environmental Concerns: none         Discussed with Fellow who will discuss with Staff.    Chavez Rodriguez MD   General Surgery Resident    Thoracic and Foregut Surgery  Text page Thoracic Surgery via [facility] Paging/Directory  "

## 2025-04-03 NOTE — PROGRESS NOTES
CLINICAL NUTRITION SERVICES - BRIEF NOTE  For last full RD assessment, see note dated 3/31/25 and brief RD note 4/1/25    NEW FINDINGS   - Checking on TF tolerance, recent start of 16-hour TF cycle on 4/1. Tolerating TFs without N/V but is having frequent loose stools. His baseline BM frequency is 1 stool per day in AM. Pt had been on liquid Tylenol which is hyperosmolar and likely contributes to diarrhea, however this has been switched to tablet form. Is no longer on antibiotics which were likely contributing. Remains on scheduled bowel meds (Senokot, Miralax) but has not received in 3 days. Testing for C-diff. Starting Banatrol fiber modulars TID to help bulk stool. Keeping pt on 16-hr TF cycle for now until stooling trends improved. Provided w/ some handouts for diet adv stages s/p esophagectomy for pt to reference when appropriate (briefly had CLD in place but now NPO again; monitoring POC).     INTERVENTIONS  Implementation  Collaboration with other providers - Bedside RN  Enteral Nutrition - Keep 16-hour cycle for now; add Banatrol TF TID   Nutrition education for nutrition relationship to health/disease - esophagectomy diet stages handouts for reference when appro    Monitoring/Evaluation  Will continue to monitor and evaluate per protocol.    Mane Pimentel, MS, RDN, LD, CNSC  Available by Interneer or phone   Deisy: M-F (7:00-3:30)  6B Clinical Dietitian  Weekend/Holiday (7:00-3:30) - Weekend Clinical Dietitian   6B RD desk: [phone]       **Clinical Dietitians are no longer available by pager.

## 2025-04-03 NOTE — PLAN OF CARE
Neuro: A&Ox4.   Cardiac: VSS.   Respiratory: Sating >92% on RA. Pt on 2L per NC for ~2 hours overnight while sleeping due to sating 86% sustained. After getting up to bathroom and repositioning, maintaining sats on room air.   GI/: Adequate urine output. BM X3 loose.   Diet/appetite: Tolerating tube feeds at 90mL/hr cycled w/ Q4 30 mL FWF through J tube and clear liquid diet.   Activity:  SBA up to bathroom.  Pain: At acceptable level on current regimen. Rates pain 2-3/10 in neck and back, reports tylenol and robaxin effective. Gave PRN robaxin x1.   Skin: No new deficits noted. Several old lap sites steri stripped and CT site.   LDA's: R CT to water seal w/ minimal output. LPIV saline locked. R port w/ D5 0.45% NS w/ Kcl infusing at 50mL/hr. J tube w/ tube feeds and meds.     Plan: Continue with POC. Notify primary team with changes. Possibly discontinuing IV maintenance fluids with increased oral intake of fluid. Considering pulling CT after passed methylene blue test and minimal output.       Goal Outcome Evaluation:      Plan of Care Reviewed With: patient    Overall Patient Progress: improvingOverall Patient Progress: improving    Outcome Evaluation: Less frequent loose stools. Up w/ SBA to bathroom. On RA overnight.

## 2025-04-03 NOTE — PLAN OF CARE
Neuro: A&Ox4.   Cardiac: SR/ST  VSS. HR 80-90s. Pulse ox monitor only    Respiratory: Sating >92 on RA.  GI/: Adequate urine output. BM+ loose. C diff negative   Diet/appetite: TF cycle 0213-0432 90m ml FWF Q4 30ml. NPO   Activity:  Up ad masha in room, SBA outside room   Pain: At acceptable level on current regimen. Managed well with scheduled tylenol and PRN robaxin   Skin: No new deficits noted. Lap sites, incisional sites  LDA's: L PIV, R Port, L CT to water seal     Plan: Continue with POC. Notify primary team with changes.   CT revealed new esophageal leak--NPO and new abx ordered. Continue to keep in CT   C diff testing-negative         Goal Outcome Evaluation:      Plan of Care Reviewed With: patient    Overall Patient Progress: decliningOverall Patient Progress: declining    Outcome Evaluation: New esophageal leak-abx ordered

## 2025-04-04 ENCOUNTER — APPOINTMENT (OUTPATIENT)
Dept: INTERVENTIONAL RADIOLOGY/VASCULAR | Facility: CLINIC | Age: 70
End: 2025-04-04
Attending: PHYSICIAN ASSISTANT
Payer: MEDICARE

## 2025-04-04 ENCOUNTER — APPOINTMENT (OUTPATIENT)
Dept: GENERAL RADIOLOGY | Facility: CLINIC | Age: 70
End: 2025-04-04
Attending: STUDENT IN AN ORGANIZED HEALTH CARE EDUCATION/TRAINING PROGRAM
Payer: MEDICARE

## 2025-04-04 LAB
ANION GAP SERPL CALCULATED.3IONS-SCNC: 11 MMOL/L (ref 7–15)
BUN SERPL-MCNC: 16.7 MG/DL (ref 8–23)
CALCIUM SERPL-MCNC: 7.9 MG/DL (ref 8.8–10.4)
CHLORIDE SERPL-SCNC: 104 MMOL/L (ref 98–107)
CREAT SERPL-MCNC: 0.83 MG/DL (ref 0.67–1.17)
EGFRCR SERPLBLD CKD-EPI 2021: >90 ML/MIN/1.73M2
ERYTHROCYTE [DISTWIDTH] IN BLOOD BY AUTOMATED COUNT: 13.2 % (ref 10–15)
GLUCOSE BLDC GLUCOMTR-MCNC: 116 MG/DL (ref 70–99)
GLUCOSE BLDC GLUCOMTR-MCNC: 121 MG/DL (ref 70–99)
GLUCOSE BLDC GLUCOMTR-MCNC: 128 MG/DL (ref 70–99)
GLUCOSE BLDC GLUCOMTR-MCNC: 133 MG/DL (ref 70–99)
GLUCOSE BLDC GLUCOMTR-MCNC: 138 MG/DL (ref 70–99)
GLUCOSE BLDC GLUCOMTR-MCNC: 144 MG/DL (ref 70–99)
GLUCOSE BLDC GLUCOMTR-MCNC: 154 MG/DL (ref 70–99)
GLUCOSE BLDC GLUCOMTR-MCNC: 171 MG/DL (ref 70–99)
GLUCOSE SERPL-MCNC: 155 MG/DL (ref 70–99)
HCO3 SERPL-SCNC: 20 MMOL/L (ref 22–29)
HCT VFR BLD AUTO: 30 % (ref 40–53)
HGB BLD-MCNC: 9.8 G/DL (ref 13.3–17.7)
MAGNESIUM SERPL-MCNC: 1.9 MG/DL (ref 1.7–2.3)
MCH RBC QN AUTO: 29.2 PG (ref 26.5–33)
MCHC RBC AUTO-ENTMCNC: 32.7 G/DL (ref 31.5–36.5)
MCV RBC AUTO: 89 FL (ref 78–100)
PHOSPHATE SERPL-MCNC: 2.8 MG/DL (ref 2.5–4.5)
PLATELET # BLD AUTO: 465 10E3/UL (ref 150–450)
POTASSIUM SERPL-SCNC: 4 MMOL/L (ref 3.4–5.3)
RBC # BLD AUTO: 3.36 10E6/UL (ref 4.4–5.9)
SODIUM SERPL-SCNC: 135 MMOL/L (ref 135–145)
WBC # BLD AUTO: 15.4 10E3/UL (ref 4–11)

## 2025-04-04 PROCEDURE — 250N000011 HC RX IP 250 OP 636

## 2025-04-04 PROCEDURE — 250N000009 HC RX 250: Mod: JW

## 2025-04-04 PROCEDURE — 71045 X-RAY EXAM CHEST 1 VIEW: CPT | Mod: 26 | Performed by: RADIOLOGY

## 2025-04-04 PROCEDURE — 83735 ASSAY OF MAGNESIUM: CPT

## 2025-04-04 PROCEDURE — 71045 X-RAY EXAM CHEST 1 VIEW: CPT

## 2025-04-04 PROCEDURE — 82565 ASSAY OF CREATININE: CPT

## 2025-04-04 PROCEDURE — 250N000011 HC RX IP 250 OP 636: Performed by: STUDENT IN AN ORGANIZED HEALTH CARE EDUCATION/TRAINING PROGRAM

## 2025-04-04 PROCEDURE — 32557 INSERT CATH PLEURA W/ IMAGE: CPT | Mod: RT | Performed by: STUDENT IN AN ORGANIZED HEALTH CARE EDUCATION/TRAINING PROGRAM

## 2025-04-04 PROCEDURE — 250N000013 HC RX MED GY IP 250 OP 250 PS 637

## 2025-04-04 PROCEDURE — C1729 CATH, DRAINAGE: HCPCS

## 2025-04-04 PROCEDURE — 250N000013 HC RX MED GY IP 250 OP 250 PS 637: Performed by: STUDENT IN AN ORGANIZED HEALTH CARE EDUCATION/TRAINING PROGRAM

## 2025-04-04 PROCEDURE — 99152 MOD SED SAME PHYS/QHP 5/>YRS: CPT | Performed by: STUDENT IN AN ORGANIZED HEALTH CARE EDUCATION/TRAINING PROGRAM

## 2025-04-04 PROCEDURE — 272N000192 HC ACCESSORY CR2

## 2025-04-04 PROCEDURE — 32557 INSERT CATH PLEURA W/ IMAGE: CPT

## 2025-04-04 PROCEDURE — 272N000500 HC NEEDLE CR2

## 2025-04-04 PROCEDURE — 80048 BASIC METABOLIC PNL TOTAL CA: CPT

## 2025-04-04 PROCEDURE — 85014 HEMATOCRIT: CPT

## 2025-04-04 PROCEDURE — 120N000003 HC R&B IMCU UMMC

## 2025-04-04 PROCEDURE — 250N000011 HC RX IP 250 OP 636: Mod: JZ

## 2025-04-04 PROCEDURE — 84100 ASSAY OF PHOSPHORUS: CPT | Performed by: STUDENT IN AN ORGANIZED HEALTH CARE EDUCATION/TRAINING PROGRAM

## 2025-04-04 PROCEDURE — 71045 X-RAY EXAM CHEST 1 VIEW: CPT | Mod: 76

## 2025-04-04 RX ORDER — FENTANYL CITRATE 50 UG/ML
25-50 INJECTION, SOLUTION INTRAMUSCULAR; INTRAVENOUS EVERY 5 MIN PRN
Status: DISCONTINUED | OUTPATIENT
Start: 2025-04-04 | End: 2025-04-04

## 2025-04-04 RX ORDER — MAGNESIUM SULFATE HEPTAHYDRATE 40 MG/ML
2 INJECTION, SOLUTION INTRAVENOUS ONCE
Status: COMPLETED | OUTPATIENT
Start: 2025-04-04 | End: 2025-04-04

## 2025-04-04 RX ORDER — FLUMAZENIL 0.1 MG/ML
0.2 INJECTION, SOLUTION INTRAVENOUS
Status: DISCONTINUED | OUTPATIENT
Start: 2025-04-04 | End: 2025-04-04

## 2025-04-04 RX ORDER — LIDOCAINE 4 G/G
1 PATCH TOPICAL
Status: DISCONTINUED | OUTPATIENT
Start: 2025-04-04 | End: 2025-04-09 | Stop reason: HOSPADM

## 2025-04-04 RX ORDER — NALOXONE HYDROCHLORIDE 0.4 MG/ML
0.4 INJECTION, SOLUTION INTRAMUSCULAR; INTRAVENOUS; SUBCUTANEOUS
Status: DISCONTINUED | OUTPATIENT
Start: 2025-04-04 | End: 2025-04-04

## 2025-04-04 RX ORDER — NALOXONE HYDROCHLORIDE 0.4 MG/ML
0.2 INJECTION, SOLUTION INTRAMUSCULAR; INTRAVENOUS; SUBCUTANEOUS
Status: DISCONTINUED | OUTPATIENT
Start: 2025-04-04 | End: 2025-04-04

## 2025-04-04 RX ADMIN — INSULIN ASPART 2 UNITS: 100 INJECTION, SOLUTION INTRAVENOUS; SUBCUTANEOUS at 08:27

## 2025-04-04 RX ADMIN — FLUCONAZOLE 400 MG: 2 INJECTION, SOLUTION INTRAVENOUS at 13:44

## 2025-04-04 RX ADMIN — NORTRIPTYLINE HYDROCHLORIDE 25 MG: 25 CAPSULE ORAL at 20:59

## 2025-04-04 RX ADMIN — INSULIN ASPART 1 UNITS: 100 INJECTION, SOLUTION INTRAVENOUS; SUBCUTANEOUS at 04:42

## 2025-04-04 RX ADMIN — GABAPENTIN 300 MG: 300 CAPSULE ORAL at 15:43

## 2025-04-04 RX ADMIN — ACETAMINOPHEN 650 MG: 325 TABLET, FILM COATED ORAL at 04:40

## 2025-04-04 RX ADMIN — PIPERACILLIN AND TAZOBACTAM 3.38 G: 3; .375 INJECTION, POWDER, LYOPHILIZED, FOR SOLUTION INTRAVENOUS at 08:25

## 2025-04-04 RX ADMIN — MIDAZOLAM 0.5 MG: 1 INJECTION INTRAMUSCULAR; INTRAVENOUS at 11:20

## 2025-04-04 RX ADMIN — PIPERACILLIN AND TAZOBACTAM 3.38 G: 3; .375 INJECTION, POWDER, LYOPHILIZED, FOR SOLUTION INTRAVENOUS at 02:35

## 2025-04-04 RX ADMIN — MIDAZOLAM 1 MG: 1 INJECTION INTRAMUSCULAR; INTRAVENOUS at 10:59

## 2025-04-04 RX ADMIN — ACETAMINOPHEN 650 MG: 325 TABLET, FILM COATED ORAL at 21:15

## 2025-04-04 RX ADMIN — MIDAZOLAM 1 MG: 1 INJECTION INTRAMUSCULAR; INTRAVENOUS at 11:06

## 2025-04-04 RX ADMIN — Medication 15 ML: at 08:25

## 2025-04-04 RX ADMIN — GABAPENTIN 300 MG: 300 CAPSULE ORAL at 08:24

## 2025-04-04 RX ADMIN — FENTANYL CITRATE 25 MCG: 50 INJECTION, SOLUTION INTRAMUSCULAR; INTRAVENOUS at 11:16

## 2025-04-04 RX ADMIN — ENOXAPARIN SODIUM 40 MG: 40 INJECTION SUBCUTANEOUS at 12:02

## 2025-04-04 RX ADMIN — INSULIN ASPART 1 UNITS: 100 INJECTION, SOLUTION INTRAVENOUS; SUBCUTANEOUS at 21:09

## 2025-04-04 RX ADMIN — ONDANSETRON 4 MG: 2 INJECTION, SOLUTION INTRAMUSCULAR; INTRAVENOUS at 11:04

## 2025-04-04 RX ADMIN — ACETAMINOPHEN 650 MG: 325 TABLET, FILM COATED ORAL at 09:20

## 2025-04-04 RX ADMIN — Medication 60 ML: at 21:01

## 2025-04-04 RX ADMIN — Medication 60 ML: at 08:24

## 2025-04-04 RX ADMIN — FENTANYL CITRATE 25 MCG: 50 INJECTION, SOLUTION INTRAMUSCULAR; INTRAVENOUS at 11:19

## 2025-04-04 RX ADMIN — ACETAMINOPHEN 650 MG: 325 TABLET, FILM COATED ORAL at 15:44

## 2025-04-04 RX ADMIN — LIDOCAINE 4% 1 PATCH: 40 PATCH TOPICAL at 08:25

## 2025-04-04 RX ADMIN — FENTANYL CITRATE 50 MCG: 50 INJECTION, SOLUTION INTRAMUSCULAR; INTRAVENOUS at 11:06

## 2025-04-04 RX ADMIN — GABAPENTIN 300 MG: 300 CAPSULE ORAL at 20:59

## 2025-04-04 RX ADMIN — ATENOLOL 25 MG: 25 TABLET ORAL at 20:58

## 2025-04-04 RX ADMIN — MIDAZOLAM 0.5 MG: 1 INJECTION INTRAMUSCULAR; INTRAVENOUS at 11:16

## 2025-04-04 RX ADMIN — LIDOCAINE HYDROCHLORIDE 4 ML: 10 INJECTION, SOLUTION EPIDURAL; INFILTRATION; INTRACAUDAL; PERINEURAL at 11:20

## 2025-04-04 RX ADMIN — Medication 10 MG: at 20:58

## 2025-04-04 RX ADMIN — Medication 40 MG: at 09:21

## 2025-04-04 RX ADMIN — PIPERACILLIN AND TAZOBACTAM 3.38 G: 3; .375 INJECTION, POWDER, LYOPHILIZED, FOR SOLUTION INTRAVENOUS at 20:56

## 2025-04-04 RX ADMIN — PIPERACILLIN AND TAZOBACTAM 3.38 G: 3; .375 INJECTION, POWDER, LYOPHILIZED, FOR SOLUTION INTRAVENOUS at 14:59

## 2025-04-04 RX ADMIN — FENTANYL CITRATE 50 MCG: 50 INJECTION, SOLUTION INTRAMUSCULAR; INTRAVENOUS at 10:59

## 2025-04-04 RX ADMIN — MAGNESIUM SULFATE HEPTAHYDRATE 2 G: 2 INJECTION, SOLUTION INTRAVENOUS at 09:20

## 2025-04-04 RX ADMIN — OXYCODONE HYDROCHLORIDE 10 MG: 5 SOLUTION ORAL at 05:12

## 2025-04-04 ASSESSMENT — ACTIVITIES OF DAILY LIVING (ADL)
ADLS_ACUITY_SCORE: 44
ADLS_ACUITY_SCORE: 43
ADLS_ACUITY_SCORE: 44
ADLS_ACUITY_SCORE: 43
ADLS_ACUITY_SCORE: 44
ADLS_ACUITY_SCORE: 44
ADLS_ACUITY_SCORE: 43
ADLS_ACUITY_SCORE: 44
ADLS_ACUITY_SCORE: 43
ADLS_ACUITY_SCORE: 44
ADLS_ACUITY_SCORE: 43
ADLS_ACUITY_SCORE: 44
ADLS_ACUITY_SCORE: 43
ADLS_ACUITY_SCORE: 44

## 2025-04-04 NOTE — CONSULTS
"      [facility] Consult Service Note  Interventional Radiology  04/04/25   7:56 AM    Consult Requested: \"Drain in posterior thorax s/p esophageal leak\"    Recommendations/Plan:    Patient is approved for IR posterior medial fluid collection drain placement on right.    Timing of procedure is TBD based on IR staffing/schedule and triage.  Please contact the IR charge RN at [phone] for estimated time of procedure.     Labs WNL for procedure. INR pending.    Orders entered for procedure, NPO status, and pre procedure IV antibiotics.   Medications to be held include: N/A, Dr. Short ok with lovenox.  Informed consent will be completed prior to procedure.     Case and imaging discussed with IR attending Dr. Jim Short MD   Recommendations were reviewed with Bruce Cohen.     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 Patient has had a leukocytosis, CT from 4/3/25 shows esophageal extravasation into the pleural space near the esophagogastric anastomosis at level of lexus. Right moderate loculated pleural effusion tracking in the fissure with foci of gas at the base indicative of empyema. Right chest tube is in place, but loculated from collection. Thoracic notes that they will likely take patient for esophageal stenting next week.     Diagnostic labs to be entered by: Bruce Cohen     Pertinent Imaging Reviewed:     Recent Results (from the past 24 hours)   XR Chest Port 1 View    Narrative    Exam: XR CHEST PORT 1 VIEW, 4/3/2025 8:10 AM    Comparison: Chest radiograph from 3/31/2025    History: thoracic surgery daily cxr    Findings:  Single frontal semiupright radiographic view of the " chest was  obtained. Interval removal of enteric tube. Right chest Port-A-Cath  with tip in the low SVC. Stable right-sided chest tube.  Trachea is midline. Stable cardiomediastinal silhouette . Patchy right  basilar mixed interstitial and airspace opacities, which are increased  when compared with exams from 3/31/2025 and 3/30/2025. Unchanged right  side costophrenic meniscus and left costophrenic blunting. No  pneumothorax.       Impression    Impression:   1. Increasing right basilar predominant mixed interstitial and  airspace opacities. May represent developing infection or aspiration.  Atelectasis and/or edema could have a similar appearance.  2. Stable small right pleural effusion with right side chest tube in  place. Stable trace left pleural effusion.    I have personally reviewed the examination and initial interpretation  and I agree with the findings.    ROBIN MULLINS MD         SYSTEM ID:  [id number]   CT Esophagram without & with Contrast   Result Value    Radiologist flags positive for esophageal leak (Urgent)    Narrative    CT of the chest without and with contrast esophagram protocol    HISTORY: Evaluate for esophageal leak status post esophagectomy postop  day 10    COMPARISON STUDY: 3/28/2025    FINDINGS: Right IJ Port-A-Cath tip in the low SVC. Surgical changes of  esophagectomy and gastric pull-through with a posterior area of oral  contrast leak near the anastomosis seen on image 130 series 3 and to  the right pleural space. Slight increase in right pleural effusion  containing bubbly foci of gas with fluid tracking into the fissure  with a right chest tube in place. Subsegmental atelectasis and  interlobular septal thickening in the right base. Subsegmental  atelectasis is noted in the right upper lobe and lingula. Moderate  left pleural effusion.    Right paratracheal node measuring 1.4 cm.    Evaluation of the upper abdomen is limited.    Bones: Degenerative changes of the spine.       "Impression    IMPRESSION:  1. Examination is positive for esophageal extravasation into the  pleural space near the esophagogastric anastomosis at the level of the  lexus.  2. Right moderate loculated pleural effusion tracking in the fissure  with foci of gas at the base indicative of empyema. Right chest tube  in place.    [Access Center: positive for esophageal leak]    This report will be copied to the [place] [facility] to ensure a  provider acknowledges the finding. [facility] is available Monday  through Friday 8am-3:30 pm.     ROBIN MULLINS MD         SYSTEM ID:  [id number]   XR Chest Port 1 View    Impression    RESIDENT PRELIMINARY INTERPRETATION  Impression:   1. Similar small right pleural effusion with chest tube in place.  2. Hazy opacity suggestive of pulmonary edema are unchanged from  prior.       Expected date of discharge:  04/05/2025    Vitals:   /76 (BP Location: Left arm, Cuff Size: Adult Regular)   Pulse 80   Temp 97.8  F (36.6  C) (Oral)   Resp 18   Ht 1.778 m (5' 10\")   Wt 85.9 kg (189 lb 6 oz)   SpO2 94%   BMI 27.17 kg/m      Pertinent Labs Reviewed:  CBC:  Lab Results   Component Value Date    WBC 15.4 (H) 04/04/2025    WBC 13.5 (H) 04/03/2025    WBC 10.4 04/02/2025    WBC 10.0 09/26/2020    WBC 16.4 (H) 09/25/2020     Lab Results   Component Value Date    HGB 9.8 04/04/2025    HGB 9.7 04/03/2025    HGB 11.2 04/02/2025    HGB 13.3 09/26/2020    HGB 14.3 09/25/2020    HGB 14.7 09/25/2020     Lab Results   Component Value Date     04/04/2025     04/03/2025     04/02/2025     09/26/2020     09/25/2020    INR:  No results found for: \"INR\", \"PTT\"       COVID Results:  COVID-19 Antibody Results, Testing for Immunity           No data to display              COVID-19 PCR Results          12/16/2024    17:53   COVID-19 PCR Results   SARS CoV2 PCR Negative     Potassium   Date Value Ref Range Status   04/04/2025 4.0 3.4 - 5.3 mmol/L Final "   08/05/2021 4.3 3.5 - 5.0 mmol/L Final   09/27/2020 3.9 3.4 - 5.3 mmol/L Final     Potassium POCT   Date Value Ref Range Status   03/24/2025 4.8 3.4 - 5.3 mmol/L Final     Comment:     CRITICAL VALUES NOTED AND REPORTED TO MD Ilene Alexandre PA-C  Interventional Radiology  Pager: [phone]

## 2025-04-04 NOTE — PRE-PROCEDURE
GENERAL PRE-PROCEDURE:   Procedure:  Ultrasound guided pleural fluid collection drain placement  Date/Time:  4/4/2025 10:44 AM    Verbal consent obtained?: Yes    Written consent obtained?: Yes    Risks and benefits: Risks, benefits and alternatives were discussed    Consent given by:  Patient  Patient states understanding of procedure being performed: Yes    Patient's understanding of procedure matches consent: Yes    Procedure consent matches procedure scheduled: Yes    Expected level of sedation:  Moderate  Appropriately NPO:  Yes  ASA Class:  2  Mallampati  :  Grade 2- soft palate, base of uvula, tonsillar pillars, and portion of posterior pharyngeal wall visible  Lungs:  Lungs clear with good breath sounds bilaterally  Heart:  Normal heart sounds and rate  History & Physical reviewed:  History and physical reviewed and no updates needed  Statement of review:  I have reviewed the lab findings, diagnostic data, medications, and the plan for sedation

## 2025-04-04 NOTE — PLAN OF CARE
Neuro: A&Ox4.   Cardiac: VSS.              Respiratory: Sating >92% on RA.   GI/: Adequate urine output. BM X2 loose. Gave PRN imodium for loose stools.   Diet/appetite: Tolerating tube feeds at 90mL/hr cycled w/ Q4 30 mL FWF through J tube and NPO.   Activity:  SBA up to bathroom for cord management.   Pain: At acceptable level on current regimen. Rates pain 2-5/10 at CT insertion site radiating to back, reports tylenol and oxycodone effective. Gave PRN oxycodone x1.   Skin: No new deficits noted. Several old lap sites steri stripped and CT site.   LDA's: R CT to water seal w/ minimal output. LPIV TKOd w/ intermittent abx. R port saline locked. J tube w/ tube feeds and meds.      Plan: Continue with POC. Notify primary team with changes.        Goal Outcome Evaluation:      Plan of Care Reviewed With: patient    Overall Patient Progress: no changeOverall Patient Progress: no change    Outcome Evaluation: Increased pain, gave one dose PRN oxycodone. RA overnight. CT w/ minimal output.

## 2025-04-04 NOTE — PROCEDURES
Brief Op Note    Name: Bal Junior   Admission Date: 3/24/2025  MRN: [id number]    Attending Physician: Dr. Jim Short  Resident Physician: N/A  Advanced Practice Provider: N/A    Room/Bed: 6235/6235-02  Sex: Male  : 1955   Age: 70 year old    Diagnosis and Procedure  Pre-Operative Diagnosis: Esophageal perforation  Post-Operative Diagnosis: Same    Procedure: Drainage catheter placement  Anesthesia: Nurse sedation    Procedure Data  Technique: Ultrasound  Findings: Technically successful ultrasound-guided placement of a right thoracic drainage catheter placement yielding 45 ml of serosanguinous fluid with particulates.  EBL: < 5 ML  Specimen Submitted: None  Complications: None  Drains:  8F non-locking Skater    Condition/Disposition: Stable into care of anesthesia/sedation team; full report to follow.    Plan:     - Keep drain to bulb suction  - Strict I/Os  - Flush with 10 ml saline Q8H  - IR will continue to follow    For the final procedural/operative note, please look under: Chart Review > Imaging    Jim Short MD

## 2025-04-04 NOTE — PLAN OF CARE
"/84 (BP Location: Right arm, Cuff Size: Adult Regular)   Pulse 101   Temp 98.4  F (36.9  C) (Oral)   Resp 18   Ht 1.778 m (5' 10\")   Wt 85.9 kg (189 lb 6 oz)   SpO2 93%   BMI 27.17 kg/m      Cardiac: VSS.  Neuro: Aox4.  Respiratory: Sating > 92% on RA.  Pain/Nausea/PRN: Minimal pain, managed with meds.  Diet: NPO w/ Tfs @ 90 mL/hr + 30 mL FWF Q4H.  LDA: L PIV - SL, Port - TKO. RLQ back drain.  GI/: LBM: 4/4. Adequate urine output.  Skin: No new deficits noted.  Mobility: SBA (help with lines).    Goal Outcome Evaluation:      Plan of Care Reviewed With: patient    Overall Patient Progress: improvingOverall Patient Progress: improving    Outcome Evaluation: CT removed. intrathoracic fluid collection drain placed.      "

## 2025-04-04 NOTE — PROGRESS NOTES
"THORACIC & FOREGUT SURGERY    S:  No overnight events. Feeling well, WBC continues to rise, concern over adequate drainage of anastomotic leak    O:  /76 (BP Location: Left arm, Cuff Size: Adult Regular)   Pulse 80   Temp 97.8  F (36.6  C) (Oral)   Resp 18   Ht 1.778 m (5' 10\")   Wt 85.9 kg (189 lb 6 oz)   SpO2 94%   BMI 27.17 kg/m      A&O, NAD  Breathing non-labored on RA  Appears well perfused  Soft, NDNT  Distal extremities warm.  CT with no air leak    Recent Labs   Lab 04/04/25  0712 04/04/25  0445 04/04/25  0442 04/03/25  0741 04/03/25  0412 04/02/25  0353 04/02/25  0343   WBC  --  15.4*  --   --  13.5*  --  10.4   HGB  --  9.8*  --   --  9.7*  --  11.2*   MCV  --  89  --   --  88  --  91   PLT  --  465*  --   --  402  --  308   NA  --  135  --   --  136  --  135   POTASSIUM  --  4.0  --   --  4.1  --  4.3   CHLORIDE  --  104  --   --  106  --  105   CO2  --  20*  --   --  19*  --  20*   BUN  --  16.7  --   --  16.9  --  15.5   CR  --  0.83  --   --  0.75  --  0.82   ANIONGAP  --  11  --   --  11  --  10   AUGUSTIN  --  7.9*  --   --  8.0*  --  8.0*   * 155* 154*   < > 169*   < > 157*    < > = values in this interval not displayed.     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 Patient passed methylene blue challenge for leak, however WBC rising after initiation of CLD. CT esophagram concerning for anastomotic leak.      -Multimodal pain control  -Consult to IR for guided tube placement into undrained collection  -Monitor left pleural effusion, may need thoracentesis in future  -Diet: NPO + Cycled TF @ 90  -Ppx: SCD',s, Lovenox  -Dispo pending " "progress, likely middle of next week    Clinically Significant Risk Factors                   # Hypertension: Noted on problem list     # Acute Hypercapnic Respiratory Failure: based on arterial blood gas results.  Continue supplemental oxygen and ventilatory support as indicated.         # Overweight: Estimated body mass index is 27.17 kg/m  as calculated from the following:    Height as of this encounter: 1.778 m (5' 10\").    Weight as of this encounter: 85.9 kg (189 lb 6 oz).        # Financial/Environmental Concerns: none       Discussed with staff    Saad Long PA-C  Thoracic and Foregut Surgery    Securely message with [employer]   Text page via [facility] Paging/Directory    "

## 2025-04-04 NOTE — PROGRESS NOTES
Patient Name: Bal Junior  Medical Record Number: [id number]  Today's Date: 4/4/2025    Procedure: Image guided abscess drain placement  Proceduralist: Dr Emeli DUVALL  Pathology present: N/A    Procedure Start: 1118  Procedure end: 1133  Sedation medications administered: Midazolam 3 mg, Fentanyl 150 mcg     Report given to: Parker ERWIN RN  : N/A    Other Notes: Pt arrived to IR room 4 from . Consent reviewed. Pt denies any questions or concerns regarding procedure. Pt positioned Lateral right side up and monitored per protocol. 8 Fr skater placed by ultrasound.  Confirmed with primary team that no labs needed for fluid.  No labs ordered.  45 ml aspirated.  Pt tolerated procedure without any noted complications. Pt transferred back to .

## 2025-04-05 LAB
ANION GAP SERPL CALCULATED.3IONS-SCNC: 10 MMOL/L (ref 7–15)
BUN SERPL-MCNC: 19 MG/DL (ref 8–23)
CALCIUM SERPL-MCNC: 8.2 MG/DL (ref 8.8–10.4)
CHLORIDE SERPL-SCNC: 104 MMOL/L (ref 98–107)
CREAT SERPL-MCNC: 0.84 MG/DL (ref 0.67–1.17)
EGFRCR SERPLBLD CKD-EPI 2021: >90 ML/MIN/1.73M2
ERYTHROCYTE [DISTWIDTH] IN BLOOD BY AUTOMATED COUNT: 13.2 % (ref 10–15)
GLUCOSE BLDC GLUCOMTR-MCNC: 101 MG/DL (ref 70–99)
GLUCOSE BLDC GLUCOMTR-MCNC: 117 MG/DL (ref 70–99)
GLUCOSE BLDC GLUCOMTR-MCNC: 130 MG/DL (ref 70–99)
GLUCOSE BLDC GLUCOMTR-MCNC: 150 MG/DL (ref 70–99)
GLUCOSE BLDC GLUCOMTR-MCNC: 165 MG/DL (ref 70–99)
GLUCOSE BLDC GLUCOMTR-MCNC: 173 MG/DL (ref 70–99)
GLUCOSE SERPL-MCNC: 82 MG/DL (ref 70–99)
HCO3 SERPL-SCNC: 22 MMOL/L (ref 22–29)
HCT VFR BLD AUTO: 29.3 % (ref 40–53)
HGB BLD-MCNC: 9.4 G/DL (ref 13.3–17.7)
MAGNESIUM SERPL-MCNC: 2.1 MG/DL (ref 1.7–2.3)
MCH RBC QN AUTO: 28.7 PG (ref 26.5–33)
MCHC RBC AUTO-ENTMCNC: 32.1 G/DL (ref 31.5–36.5)
MCV RBC AUTO: 89 FL (ref 78–100)
PHOSPHATE SERPL-MCNC: 3.7 MG/DL (ref 2.5–4.5)
PLATELET # BLD AUTO: 521 10E3/UL (ref 150–450)
POTASSIUM SERPL-SCNC: 4.4 MMOL/L (ref 3.4–5.3)
RBC # BLD AUTO: 3.28 10E6/UL (ref 4.4–5.9)
SODIUM SERPL-SCNC: 136 MMOL/L (ref 135–145)
WBC # BLD AUTO: 12 10E3/UL (ref 4–11)

## 2025-04-05 PROCEDURE — 86140 C-REACTIVE PROTEIN: CPT | Performed by: STUDENT IN AN ORGANIZED HEALTH CARE EDUCATION/TRAINING PROGRAM

## 2025-04-05 PROCEDURE — 80048 BASIC METABOLIC PNL TOTAL CA: CPT

## 2025-04-05 PROCEDURE — 250N000013 HC RX MED GY IP 250 OP 250 PS 637

## 2025-04-05 PROCEDURE — 250N000011 HC RX IP 250 OP 636

## 2025-04-05 PROCEDURE — 84100 ASSAY OF PHOSPHORUS: CPT | Performed by: STUDENT IN AN ORGANIZED HEALTH CARE EDUCATION/TRAINING PROGRAM

## 2025-04-05 PROCEDURE — 250N000013 HC RX MED GY IP 250 OP 250 PS 637: Performed by: STUDENT IN AN ORGANIZED HEALTH CARE EDUCATION/TRAINING PROGRAM

## 2025-04-05 PROCEDURE — 82374 ASSAY BLOOD CARBON DIOXIDE: CPT

## 2025-04-05 PROCEDURE — 85014 HEMATOCRIT: CPT

## 2025-04-05 PROCEDURE — 83735 ASSAY OF MAGNESIUM: CPT

## 2025-04-05 PROCEDURE — 120N000003 HC R&B IMCU UMMC

## 2025-04-05 RX ADMIN — GABAPENTIN 300 MG: 300 CAPSULE ORAL at 20:07

## 2025-04-05 RX ADMIN — ATENOLOL 25 MG: 25 TABLET ORAL at 20:06

## 2025-04-05 RX ADMIN — Medication 10 MG: at 20:06

## 2025-04-05 RX ADMIN — INSULIN ASPART 1 UNITS: 100 INJECTION, SOLUTION INTRAVENOUS; SUBCUTANEOUS at 23:51

## 2025-04-05 RX ADMIN — ENOXAPARIN SODIUM 40 MG: 40 INJECTION SUBCUTANEOUS at 12:37

## 2025-04-05 RX ADMIN — OXYCODONE HYDROCHLORIDE 5 MG: 5 SOLUTION ORAL at 01:48

## 2025-04-05 RX ADMIN — FLUCONAZOLE 400 MG: 2 INJECTION, SOLUTION INTRAVENOUS at 12:37

## 2025-04-05 RX ADMIN — PIPERACILLIN AND TAZOBACTAM 3.38 G: 3; .375 INJECTION, POWDER, LYOPHILIZED, FOR SOLUTION INTRAVENOUS at 08:43

## 2025-04-05 RX ADMIN — ACETAMINOPHEN 650 MG: 325 TABLET, FILM COATED ORAL at 17:00

## 2025-04-05 RX ADMIN — PIPERACILLIN AND TAZOBACTAM 3.38 G: 3; .375 INJECTION, POWDER, LYOPHILIZED, FOR SOLUTION INTRAVENOUS at 20:08

## 2025-04-05 RX ADMIN — Medication 60 ML: at 20:06

## 2025-04-05 RX ADMIN — LIDOCAINE 4% 1 PATCH: 40 PATCH TOPICAL at 09:49

## 2025-04-05 RX ADMIN — PIPERACILLIN AND TAZOBACTAM 3.38 G: 3; .375 INJECTION, POWDER, LYOPHILIZED, FOR SOLUTION INTRAVENOUS at 14:27

## 2025-04-05 RX ADMIN — GABAPENTIN 300 MG: 300 CAPSULE ORAL at 08:43

## 2025-04-05 RX ADMIN — ACETAMINOPHEN 650 MG: 325 TABLET, FILM COATED ORAL at 21:51

## 2025-04-05 RX ADMIN — ACETAMINOPHEN 650 MG: 325 TABLET, FILM COATED ORAL at 03:53

## 2025-04-05 RX ADMIN — NORTRIPTYLINE HYDROCHLORIDE 25 MG: 25 CAPSULE ORAL at 20:07

## 2025-04-05 RX ADMIN — INSULIN ASPART 2 UNITS: 100 INJECTION, SOLUTION INTRAVENOUS; SUBCUTANEOUS at 03:56

## 2025-04-05 RX ADMIN — PIPERACILLIN AND TAZOBACTAM 3.38 G: 3; .375 INJECTION, POWDER, LYOPHILIZED, FOR SOLUTION INTRAVENOUS at 03:45

## 2025-04-05 RX ADMIN — Medication 15 ML: at 08:43

## 2025-04-05 RX ADMIN — Medication 60 ML: at 08:43

## 2025-04-05 RX ADMIN — Medication 40 MG: at 08:43

## 2025-04-05 RX ADMIN — INSULIN ASPART 2 UNITS: 100 INJECTION, SOLUTION INTRAVENOUS; SUBCUTANEOUS at 20:07

## 2025-04-05 RX ADMIN — GABAPENTIN 300 MG: 300 CAPSULE ORAL at 14:27

## 2025-04-05 RX ADMIN — ACETAMINOPHEN 650 MG: 325 TABLET, FILM COATED ORAL at 11:06

## 2025-04-05 ASSESSMENT — ACTIVITIES OF DAILY LIVING (ADL)
ADLS_ACUITY_SCORE: 44

## 2025-04-05 NOTE — PROGRESS NOTES
"THORACIC & FOREGUT SURGERY    S:  Feels better after getting CT out and placing the IR drain, generally feels well, pain adequately controlled       O:  /80 (BP Location: Left arm)   Pulse 82   Temp 98.1  F (36.7  C) (Oral)   Resp 16   Ht 1.778 m (5' 10\")   Wt 84.9 kg (187 lb 2.7 oz)   SpO2 94%   BMI 26.86 kg/m      A&O, NAD  Breathing non-labored on RA  IR drain with green tinged output  Appears well perfused  Soft, NDNT  Distal extremities warm.      Recent Labs   Lab 04/05/25  0751 04/05/25  0456 04/05/25  0349 04/04/25  0712 04/04/25  0445 04/03/25  0741 04/03/25  0412   WBC  --  12.0*  --   --  15.4*  --  13.5*   HGB  --  9.4*  --   --  9.8*  --  9.7*   MCV  --  89  --   --  89  --  88   PLT  --  521*  --   --  465*  --  402   NA  --  136  --   --  135  --  136   POTASSIUM  --  4.4  --   --  4.0  --  4.1   CHLORIDE  --  104  --   --  104  --  106   CO2  --  22  --   --  20*  --  19*   BUN  --  19.0  --   --  16.7  --  16.9   CR  --  0.84  --   --  0.83  --  0.75   ANIONGAP  --  10  --   --  11  --  11   AUGUSTIN  --  8.2*  --   --  7.9*  --  8.0*   * 82 165*   < > 155*   < > 169*    < > = values in this interval not displayed.     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 Patient passed methylene blue challenge for leak, however WBC rising after initiation of CLD. CT esophagram concerning for anastomotic leak.      - Multimodal pain control  - Continue IR drain   - Monitor left pleural effusion, may need thoracentesis in future  - continue abx fluconazole and zosyn, repeat CBC daily  - tentative plan for EGD with stent placement Monday - will need " "consent  - Diet: NPO + Cycled TF @ 90  - Ppx: SCD',s, Lovenox  - Dispo pending progress, likely middle of next week    Clinically Significant Risk Factors                   # Hypertension: Noted on problem list     # Acute Hypercapnic Respiratory Failure: based on arterial blood gas results.  Continue supplemental oxygen and ventilatory support as indicated.         # Overweight: Estimated body mass index is 26.86 kg/m  as calculated from the following:    Height as of this encounter: 1.778 m (5' 10\").    Weight as of this encounter: 84.9 kg (187 lb 2.7 oz).        # Financial/Environmental Concerns: none       Discussed with staff Dr. César Eisenberg  Thoracic and Foregut Surgery      "

## 2025-04-05 NOTE — PLAN OF CARE
Goal Outcome Evaluation:      Plan of Care Reviewed With: patient    Overall Patient Progress: improvingOverall Patient Progress: improving    Outcome Evaluation: A&Ox4. Sating well on RA. Patient reports mild pain at old CT site controlled by scheduled tylenol and gabapentin. NPO. Intrathoracic fluid collection drain with low output. Port-a-cath infusing TKO and intermittent abx.    Neuro: A&Ox4.   Cardiac: VSS. HR 70-90s. -140. Afebrile.   Respiratory: Sating well on RA.  GI/: Adequate urine output. BM X1 on shift. Loose and watery. Senna held at pm dose.   Diet/appetite: NPO. Patient tolerating cycled TF. 4p-8a. Running through j-tube at 90ml/hr with 30ml q4 FWF.   Activity:  Assist of 1, standby. Ambulating to bathroom.   Pain: At acceptable level on current regimen. Patient reports mild pain rated 3/10 in old CT site controlled by scheduled tylenol and gabapentin.   Skin: No new deficits noted. Old CT site, Old Lap sites, intrathoracic fluid collection drain, jtube. Patient reports mild burning pain in skin at j-tube insertion. MD notified. Barrier cream applied and oxycodone administered.   LDA's: R port a cath infusing TKO and intermittent abx. LPIV SL.     Plan: Continue with POC. Notify primary team with changes. Continue abx regimen. Monitor pain. Monitor drain output. Monitor surgical sites.

## 2025-04-05 NOTE — PLAN OF CARE
"/88 (BP Location: Right arm)   Pulse 88   Temp 98.2  F (36.8  C) (Oral)   Resp 16   Ht 1.778 m (5' 10\")   Wt 84.9 kg (187 lb 2.7 oz)   SpO2 96%   BMI 26.86 kg/m      Cardiac: VSS.  Neuro: AOx4.  Respiratory: Sating > 92% on RA.  Pain/Nausea/PRN: Minimal pain, managed with JAMIR meds.  Diet: NPO w/ cycled (4p-8a) Tfs @ 90 mL/hr + 30 mL FWF Q4H.  LDA: L PIV - SL, Port - TKO. RLQ back drain. J tube - TFs med  GI/: LBM: 4/5. Adequate urine output.  Skin: No new deficits noted.  Mobility: SBA (help with lines).    Goal Outcome Evaluation:      Plan of Care Reviewed With: patient    Overall Patient Progress: improvingOverall Patient Progress: improving    Outcome Evaluation: AOx4. Pts pain managed with JAMIR lidocaine patch + JAMIR Tylenol. Minimal draingae from intrathoracic fluid collection drain.      "

## 2025-04-06 ENCOUNTER — ANESTHESIA EVENT (OUTPATIENT)
Dept: SURGERY | Facility: CLINIC | Age: 70
End: 2025-04-06
Payer: MEDICARE

## 2025-04-06 LAB
ANION GAP SERPL CALCULATED.3IONS-SCNC: 11 MMOL/L (ref 7–15)
BUN SERPL-MCNC: 19.7 MG/DL (ref 8–23)
CALCIUM SERPL-MCNC: 8 MG/DL (ref 8.8–10.4)
CHLORIDE SERPL-SCNC: 102 MMOL/L (ref 98–107)
CREAT SERPL-MCNC: 0.83 MG/DL (ref 0.67–1.17)
CRP SERPL-MCNC: 108 MG/L
EGFRCR SERPLBLD CKD-EPI 2021: >90 ML/MIN/1.73M2
ERYTHROCYTE [DISTWIDTH] IN BLOOD BY AUTOMATED COUNT: 13.1 % (ref 10–15)
GLUCOSE BLDC GLUCOMTR-MCNC: 128 MG/DL (ref 70–99)
GLUCOSE BLDC GLUCOMTR-MCNC: 131 MG/DL (ref 70–99)
GLUCOSE BLDC GLUCOMTR-MCNC: 142 MG/DL (ref 70–99)
GLUCOSE BLDC GLUCOMTR-MCNC: 148 MG/DL (ref 70–99)
GLUCOSE BLDC GLUCOMTR-MCNC: 158 MG/DL (ref 70–99)
GLUCOSE BLDC GLUCOMTR-MCNC: 158 MG/DL (ref 70–99)
GLUCOSE SERPL-MCNC: 163 MG/DL (ref 70–99)
HCO3 SERPL-SCNC: 21 MMOL/L (ref 22–29)
HCT VFR BLD AUTO: 27.8 % (ref 40–53)
HGB BLD-MCNC: 9.2 G/DL (ref 13.3–17.7)
MAGNESIUM SERPL-MCNC: 1.8 MG/DL (ref 1.7–2.3)
MCH RBC QN AUTO: 29.1 PG (ref 26.5–33)
MCHC RBC AUTO-ENTMCNC: 33.1 G/DL (ref 31.5–36.5)
MCV RBC AUTO: 88 FL (ref 78–100)
PHOSPHATE SERPL-MCNC: 3 MG/DL (ref 2.5–4.5)
PLATELET # BLD AUTO: 543 10E3/UL (ref 150–450)
POTASSIUM SERPL-SCNC: 4.4 MMOL/L (ref 3.4–5.3)
RBC # BLD AUTO: 3.16 10E6/UL (ref 4.4–5.9)
SODIUM SERPL-SCNC: 134 MMOL/L (ref 135–145)
WBC # BLD AUTO: 12.4 10E3/UL (ref 4–11)

## 2025-04-06 PROCEDURE — 250N000013 HC RX MED GY IP 250 OP 250 PS 637

## 2025-04-06 PROCEDURE — 85014 HEMATOCRIT: CPT

## 2025-04-06 PROCEDURE — 83735 ASSAY OF MAGNESIUM: CPT

## 2025-04-06 PROCEDURE — 82565 ASSAY OF CREATININE: CPT

## 2025-04-06 PROCEDURE — 84100 ASSAY OF PHOSPHORUS: CPT | Performed by: STUDENT IN AN ORGANIZED HEALTH CARE EDUCATION/TRAINING PROGRAM

## 2025-04-06 PROCEDURE — 80048 BASIC METABOLIC PNL TOTAL CA: CPT

## 2025-04-06 PROCEDURE — 250N000011 HC RX IP 250 OP 636

## 2025-04-06 PROCEDURE — 120N000003 HC R&B IMCU UMMC

## 2025-04-06 PROCEDURE — 250N000013 HC RX MED GY IP 250 OP 250 PS 637: Performed by: STUDENT IN AN ORGANIZED HEALTH CARE EDUCATION/TRAINING PROGRAM

## 2025-04-06 PROCEDURE — 250N000011 HC RX IP 250 OP 636: Performed by: STUDENT IN AN ORGANIZED HEALTH CARE EDUCATION/TRAINING PROGRAM

## 2025-04-06 RX ORDER — MAGNESIUM SULFATE HEPTAHYDRATE 40 MG/ML
2 INJECTION, SOLUTION INTRAVENOUS ONCE
Status: COMPLETED | OUTPATIENT
Start: 2025-04-06 | End: 2025-04-06

## 2025-04-06 RX ADMIN — PIPERACILLIN AND TAZOBACTAM 3.38 G: 3; .375 INJECTION, POWDER, LYOPHILIZED, FOR SOLUTION INTRAVENOUS at 09:39

## 2025-04-06 RX ADMIN — INSULIN ASPART 1 UNITS: 100 INJECTION, SOLUTION INTRAVENOUS; SUBCUTANEOUS at 20:07

## 2025-04-06 RX ADMIN — GABAPENTIN 300 MG: 300 CAPSULE ORAL at 08:08

## 2025-04-06 RX ADMIN — Medication 15 ML: at 08:08

## 2025-04-06 RX ADMIN — PIPERACILLIN AND TAZOBACTAM 3.38 G: 3; .375 INJECTION, POWDER, LYOPHILIZED, FOR SOLUTION INTRAVENOUS at 20:05

## 2025-04-06 RX ADMIN — ACETAMINOPHEN 650 MG: 325 TABLET, FILM COATED ORAL at 15:15

## 2025-04-06 RX ADMIN — ACETAMINOPHEN 650 MG: 325 TABLET, FILM COATED ORAL at 03:45

## 2025-04-06 RX ADMIN — ACETAMINOPHEN 650 MG: 325 TABLET, FILM COATED ORAL at 09:39

## 2025-04-06 RX ADMIN — ATENOLOL 25 MG: 25 TABLET ORAL at 20:05

## 2025-04-06 RX ADMIN — PIPERACILLIN AND TAZOBACTAM 3.38 G: 3; .375 INJECTION, POWDER, LYOPHILIZED, FOR SOLUTION INTRAVENOUS at 15:15

## 2025-04-06 RX ADMIN — MAGNESIUM SULFATE HEPTAHYDRATE 2 G: 2 INJECTION, SOLUTION INTRAVENOUS at 08:08

## 2025-04-06 RX ADMIN — GABAPENTIN 300 MG: 300 CAPSULE ORAL at 15:15

## 2025-04-06 RX ADMIN — OXYCODONE HYDROCHLORIDE 5 MG: 5 SOLUTION ORAL at 01:36

## 2025-04-06 RX ADMIN — PIPERACILLIN AND TAZOBACTAM 3.38 G: 3; .375 INJECTION, POWDER, LYOPHILIZED, FOR SOLUTION INTRAVENOUS at 01:36

## 2025-04-06 RX ADMIN — Medication 60 ML: at 20:05

## 2025-04-06 RX ADMIN — INSULIN ASPART 1 UNITS: 100 INJECTION, SOLUTION INTRAVENOUS; SUBCUTANEOUS at 08:09

## 2025-04-06 RX ADMIN — GABAPENTIN 300 MG: 300 CAPSULE ORAL at 20:05

## 2025-04-06 RX ADMIN — INSULIN ASPART 1 UNITS: 100 INJECTION, SOLUTION INTRAVENOUS; SUBCUTANEOUS at 23:50

## 2025-04-06 RX ADMIN — ENOXAPARIN SODIUM 40 MG: 40 INJECTION SUBCUTANEOUS at 11:50

## 2025-04-06 RX ADMIN — OXYCODONE HYDROCHLORIDE 5 MG: 5 SOLUTION ORAL at 23:59

## 2025-04-06 RX ADMIN — INSULIN ASPART 1 UNITS: 100 INJECTION, SOLUTION INTRAVENOUS; SUBCUTANEOUS at 03:54

## 2025-04-06 RX ADMIN — ACETAMINOPHEN 650 MG: 325 TABLET, FILM COATED ORAL at 21:44

## 2025-04-06 RX ADMIN — Medication 10 MG: at 20:06

## 2025-04-06 RX ADMIN — FLUCONAZOLE 400 MG: 2 INJECTION, SOLUTION INTRAVENOUS at 12:37

## 2025-04-06 RX ADMIN — NORTRIPTYLINE HYDROCHLORIDE 25 MG: 25 CAPSULE ORAL at 20:05

## 2025-04-06 RX ADMIN — Medication 40 MG: at 08:08

## 2025-04-06 ASSESSMENT — ACTIVITIES OF DAILY LIVING (ADL)
ADLS_ACUITY_SCORE: 44

## 2025-04-06 ASSESSMENT — LIFESTYLE VARIABLES: TOBACCO_USE: 0

## 2025-04-06 ASSESSMENT — COPD QUESTIONNAIRES: COPD: 0

## 2025-04-06 ASSESSMENT — ENCOUNTER SYMPTOMS
SEIZURES: 0
DYSRHYTHMIAS: 0

## 2025-04-06 NOTE — PROGRESS NOTES
"THORACIC & FOREGUT SURGERY    S:  No overnight events.  Pt seen at bedside resting comfortably.       O:  Vitals:    04/06/25 0342 04/06/25 0532 04/06/25 0724 04/06/25 1100   BP:   129/73 (!) 140/86   BP Location:   Left arm Right arm   Cuff Size:       Pulse:   97 96   Resp: 20  20 24   Temp: 98.5  F (36.9  C)  97.5  F (36.4  C) 98.5  F (36.9  C)   TempSrc: Oral  Oral Oral   SpO2:   97% 93%   Weight:  84.7 kg (186 lb 11.7 oz)     Height:           A&O, NAD  Breathing non-labored on RA  IR drain with green tinged output  Appears well perfused  Soft, NDNT  Distal extremities warm.    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 Patient passed methylene blue challenge for leak, however WBC rising after initiation of CLD. CT esophagram concerning for anastomotic leak.      - Multimodal pain control  - Continue IR drain   - Monitor left pleural effusion, may need thoracentesis in future  - continue abx fluconazole and zosyn, repeat CBC daily  - tentative plan for EGD with stent placement Monday  - Diet: NPO + Cycled TF @ 90  - Ppx: SCD',s, Lovenox  - Dispo pending progress, likely middle of next week    Clinically Significant Risk Factors         # Hyponatremia: Lowest Na = 134 mmol/L in last 2 days, will monitor as appropriate           # Hypertension: Noted on problem list     # Acute Hypercapnic Respiratory Failure: based on arterial blood gas results.  Continue supplemental oxygen and ventilatory support as indicated.         # Overweight: Estimated body mass index is 26.79 kg/m  as calculated from the following:    Height as of this encounter: 1.778 m (5' 10\").    Weight as of this encounter: 84.7 kg (186 lb 11.7 oz).      # " Financial/Environmental Concerns: none             Janes Galo MD     Thoracic and Foregut Surgery  Text page Thoracic Surgery via [facility] Paging/Directory

## 2025-04-06 NOTE — PLAN OF CARE
Goal Outcome Evaluation:      Plan of Care Reviewed With: patient    Overall Patient Progress: improvingOverall Patient Progress: improving    Outcome Evaluation: Patient reports mild-moderate pain in j-tube site. PRN oxycodone given and barrier cream applied. Minimal output from intrathoracic fluid collection drain.    Neuro: A&Ox4.   Cardiac: VSS. HR 80s. -130. Afebrile.   Respiratory: Sating well on RA.  GI/: Adequate urine output. BM X1 on shift. Loose and watery. Senna held at pm dose.   Diet/appetite: NPO. Patient tolerating cycled TF. 4p-8a. Running through j-tube at 90ml/hr with 30ml q4 FWF.   Activity:  Assist of 1, standby. Ambulating to bathroom.   Pain: At acceptable level on current regimen. Patient reported mild-severe pain in j-tube insertion site. PRN oxycodone given and barrier cream applied x1. .   Skin: No new deficits noted. Old CT site, Old Lap sites, intrathoracic fluid collection drain, jtube.   LDA's: R port a cath infusing TKO and intermittent abx. LPIV SL.      Plan: Continue with POC. Notify primary team with changes. Continue abx regimen. Monitor pain. Monitor drain output. Monitor surgical sites.

## 2025-04-06 NOTE — PLAN OF CARE
"BP (!) 140/86 (BP Location: Left arm)   Pulse 96   Temp 98.5  F (36.9  C) (Oral)   Resp 24   Ht 1.778 m (5' 10\")   Wt 84.7 kg (186 lb 11.7 oz)   SpO2 93%   BMI 26.79 kg/m      Cardiac: VSS.  Neuro: AOx4.  Respiratory: Sating > 92% on RA.  Pain/Nausea/PRN: Minimal pain, managed with JAMIR meds.  Diet: NPO w/ cycled (4p-8a) TFs @ 90 mL/hr + 30 mL FWF Q4H.  LDA: L PIV - SL, R Port - TKO. RLQ back drain. J tube - TFs and meds  GI/: LBM: 4/6. Adequate urine output.  Skin: No new deficits noted.  Mobility: SBA (help with lines).    Goal Outcome Evaluation:                        "

## 2025-04-06 NOTE — PROGRESS NOTES
"Brief IR Progress Note  S:JAIDA    O:  Vital signs:  Temp: 98.5  F (36.9  C) Temp src: Oral BP: (!) 140/86 Pulse: 96   Resp: 24 SpO2: 93 % O2 Device: None (Room air) Oxygen Delivery: 2 LPM Height: 177.8 cm (5' 10\") Weight: 84.7 kg (186 lb 11.7 oz)  Estimated body mass index is 26.79 kg/m  as calculated from the following:    Height as of this encounter: 1.778 m (5' 10\").    Weight as of this encounter: 84.7 kg (186 lb 11.7 oz).      Physical Exam  Vitals and nursing note reviewed.   HENT:      Head: Normocephalic and atraumatic.   Eyes:      Extraocular Movements: Extraocular movements intact.   Pulmonary:      Effort: Pulmonary effort is normal.   Skin:     General: Skin is warm and dry.      Comments: Right chest tube site C/D/I   Neurological:      General: No focal deficit present.      Mental Status: He is alert.   Psychiatric:         Mood and Affect: Mood normal.         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 Hospitalization complicated by esophageal extravasation at the level of the esophagogastric anastomosis, c/b loculated pleural collection. IR placed chest tube/drain into right pleural collection on 4/4/2025. Drain is functioning well.     -Strict I/O  -Flush drain q8h w/ 10 ml KARIE Chan DO  Interventional Radiology  PGY-6  [phone]   "

## 2025-04-06 NOTE — ANESTHESIA PREPROCEDURE EVALUATION
Anesthesia Pre-Procedure Evaluation    Patient: Bal Junior   MRN: [id number] : 1955        Procedure : Procedure(s):  ESOPHAGOGASTRODUODENOSCOPY, WITH TRANSENDOSCOPIC STENT INSERTION          Past Medical History:   Diagnosis Date    Hypertension     Neuritis     Peripheral neuropathy     Personal history of other medical treatment     postgastrectomy dumping syndrome    Stomach problems Noted earlier      Past Surgical History:   Procedure Laterality Date    ABDOMEN SURGERY  ?    APPENDECTOMY  1964?    BRONCHOSCOPY RIDID OR FLEXIBLE W/ENDOBRONCHIAL ULTRASOUND GUIDED 3 OR MORE NODE STATIONS N/A 3/19/2025    Procedure: Bronchoscopy Ridid Or Flexible W/Endobronchial Ultrasound Guided 3 Or More Node Stations;  Surgeon: Saad Olmedo MD;  Location: [facility]    COLONOSCOPY  ,     DAVINCI NISSEN FUNDOPLICATION N/A 2025    Procedure: TAKE-DOWN, FUNDOPLICATION, NISSEN, LAPAROSCOPIC- ROBOTIC, gastrostomy takedown, lysisof adhesions 2 hr;  Surgeon: Katlyn Tobar MD;  Location: [facility]    ESOPHAGEAL BALLOON PROVOCATION STUDY N/A 2024    Procedure: Esophageal Balloon Provocation Study;  Surgeon: Carson Michel DO;  Location: [facility]    ESOPHAGECTOMY MINIMALLY INVASIVE N/A 3/24/2025    Procedure: ESOPHAGECTOMY, MINIMALLY INVASIVE, Laparoscopic Right Thorascopic Esophagectomy, Botox Injection;  Surgeon: Katlyn Tobar MD;  Location: [facility]    ESOPHAGOSCOPY, GASTROSCOPY, DUODENOSCOPY (EGD), COMBINED N/A 2024    Procedure: ESOPHAGOGASTRODUODENOSCOPY, WITH BIOPSY;  Surgeon: Carson Michel DO;  Location: [facility]    ESOPHAGOSCOPY, GASTROSCOPY, DUODENOSCOPY (EGD), COMBINED N/A 10/8/2024    Procedure: ESOPHAGOGASTRODUODENOSCOPY, WITH FINE NEEDLE ASPIRATION BIOPSY, WITH ENDOSCOPIC ULTRASOUND GUIDANCE;  Surgeon: Lance Lopez MD;  Location: [facility]    ESOPHAGOSCOPY, GASTROSCOPY, DUODENOSCOPY (EGD), COMBINED N/A 2025    Procedure: Esophagoscopy, gastroscopy, duodenoscopy (EGD);  Surgeon: Amadou  Katlyn ROSADO MD;  Location: [facility]    ESOPHAGOSCOPY, GASTROSCOPY, DUODENOSCOPY (EGD), COMBINED N/A 3/24/2025    Procedure: Esophagoscopy, gastroscopy, duodenoscopy (EGD), combined;  Surgeon: Katlyn Tobar MD;  Location: [facility]    ESOPHAGOSCOPY, GASTROSCOPY, DUODENOSCOPY (EGD), COMBINED N/A 3/28/2025    Procedure: ESOPHAGOGASTRODUODENOSCOPY, pharyngostomy tube replacement;  Surgeon: Chino Gardner MD;  Location: [facility]    EYE SURGERY  199x    laser for retinal tears    GASTRIC FUNDOPLICATION      HERNIA REPAIR  1961?    IR CHEST PORT PLACEMENT > 5 YRS OF AGE  10/24/2024    LAPAROSCOPIC ASSISTED INSERTION TUBE JEJUNOSTOMY N/A 2/26/2025    Procedure: Laparoscopic assisted insertion tube jejunostomy;  Surgeon: Katlyn Tobar MD;  Location: [facility]    LAPAROSCOPIC TAKEDOWN NISSEN FUNDOPLICATION N/A 9/25/2020    Procedure: TAKE-DOWN, FUNDOPLICATION, REDO NISSEN, LAPAROSCOPIC, gastrostomy feeding tube placement;  Surgeon: Katlyn Tobar MD;  Location: [facility]    CO ESOPHAGOGASTRODUODENOSCOPY TRANSORAL DIAGNOSTIC N/A 5/9/2019    Procedure: UPPER GASTROINTESTINAL ENDOSCOPY;  Surgeon: Dino Ward MD;  Location: [facility];  Service: General    SOFT TISSUE SURGERY  2009?    MCL l. thumb      Allergies   Allergen Reactions    Hornets     Wasp Venom Protein Hives    Bee Venom Swelling      Social History     Tobacco Use    Smoking status: Never     Passive exposure: Past    Smokeless tobacco: Never   Substance Use Topics    Alcohol use: Not Currently      Wt Readings from Last 1 Encounters:   04/06/25 84.7 kg (186 lb 11.7 oz)        Anesthesia Evaluation   Pt has had prior anesthetic. Type: General.    No history of anesthetic complications       ROS/MED HX  ENT/Pulmonary:     (+)     JESSICA risk factors, snores loudly, hypertension,                              (-) tobacco use, asthma, COPD and recent URI   Neurologic:     (+)    peripheral neuropathy,                         (-) no seizures, no CVA and no TIA    Cardiovascular:     (+)  hypertension- -   -  - -                                 Previous cardiac testing   Echo: Date: 2/26/25 Results:  Left ventricular size, wall motion and function are normal. The ejection  fraction is 60-65%.  Right ventricular function, chamber size, wall motion, and thickness are  normal.  No hemodynamically significant valvular abnormalities.  IVC is normal.  There is no prior study for direct comparison.    Stress Test:  Date: Results:    ECG Reviewed:  Date: 12/2024 Results:  Sinus tachycardia   Low voltage QRS   Borderline ECG   Unconfirmed report - interpretation of this ECG is computer generated - see medical record for final interpretation     Cath:  Date: Results:   (-) CAD, arrhythmias and stent   METS/Exercise Tolerance: 3 - Able to walk 1-2 blocks without stopping Comment: Since surgery two weeks ago, activity has been limited - walking about 2 blocks at a time. Prior to surgery in February was walking 10,000-12,000 steps, skiing. 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 Patient passed methylene blue challenge for leak, however WBC rising after initiation of CLD. CT esophagram concerning for anastomotic leak.     (+) GERD,     hiatal hernia,           (-) liver disease   Renal/Genitourinary:    (-) renal disease   Endo:    (-) Type II DM and thyroid disease   Psychiatric/Substance Use:  - neg psychiatric ROS   (+) psychiatric history depression       Infectious Disease: Comment: Concern for anastomotic leak      Malignancy: Comment: Zosyn and fluconazole for leak  (+)  "Malignancy, History of GI.GI CA  Active status post Chemo and Surgery.      Other:            Physical Exam    Airway        Mallampati: III   TM distance: > 3 FB   Neck ROM: full   Mouth opening: > 3 cm    Respiratory Devices and Support         Dental       (+) Modest Abnormalities - crowns, retainers, 1 or 2 missing teeth      Cardiovascular          Rhythm and rate: regular and normal     Pulmonary   pulmonary exam normal                OUTSIDE LABS:  CBC:   Lab Results   Component Value Date    WBC 12.4 (H) 04/06/2025    WBC 12.0 (H) 04/05/2025    HGB 9.2 (L) 04/06/2025    HGB 9.4 (L) 04/05/2025    HCT 27.8 (L) 04/06/2025    HCT 29.3 (L) 04/05/2025     (H) 04/06/2025     (H) 04/05/2025     BMP:   Lab Results   Component Value Date     (L) 04/06/2025     04/05/2025    POTASSIUM 4.4 04/06/2025    POTASSIUM 4.4 04/05/2025    CHLORIDE 102 04/06/2025    CHLORIDE 104 04/05/2025    CO2 21 (L) 04/06/2025    CO2 22 04/05/2025    BUN 19.7 04/06/2025    BUN 19.0 04/05/2025    CR 0.83 04/06/2025    CR 0.84 04/05/2025     (H) 04/06/2025     (H) 04/06/2025     COAGS: No results found for: \"PTT\", \"INR\", \"FIBR\"  POC:   Lab Results   Component Value Date     (H) 10/28/2020     HEPATIC:   Lab Results   Component Value Date    ALBUMIN 4.0 03/17/2025    PROTTOTAL 5.7 (L) 12/16/2024    ALT 25 12/16/2024    AST 25 12/16/2024    ALKPHOS 183 (H) 12/16/2024    BILITOTAL 0.3 12/16/2024     OTHER:   Lab Results   Component Value Date    PH 7.35 03/24/2025    LACT 2.4 (H) 03/26/2025    A1C 6.0 (H) 01/17/2024    AUGUSTIN 8.0 (L) 04/06/2025    PHOS 3.0 04/06/2025    MAG 1.8 04/06/2025    TSH 1.75 12/16/2024       Anesthesia Plan    ASA Status:  3    NPO Status:  ELEVATED Aspiration Risk/Unknown    Anesthesia Type: General.     - Airway: ETT   Induction: RSI.      Techniques and Equipment:     - Lines/Monitors: BIS     Consents    Anesthesia Plan(s) and associated risks, benefits, and realistic " "alternatives discussed. Questions answered and patient/representative(s) expressed understanding.     - Discussed:     - Discussed with:  Patient      - Extended Intubation/Ventilatory Support Discussed: No.      - Patient is DNR/DNI Status: No     Use of blood products discussed: No .     Postoperative Care    Pain management: Multi-modal analgesia.   PONV prophylaxis: Ondansetron (or other 5HT-3), Dexamethasone or Solumedrol     Comments:               Calvin De La O MD    Clinically Significant Risk Factors         # Hyponatremia: Lowest Na = 134 mmol/L in last 2 days, will monitor as appropriate           # Hypertension: Noted on problem list     # Acute Hypercapnic Respiratory Failure: based on arterial blood gas results.  Continue supplemental oxygen and ventilatory support as indicated.         # Overweight: Estimated body mass index is 26.79 kg/m  as calculated from the following:    Height as of this encounter: 1.778 m (5' 10\").    Weight as of this encounter: 84.7 kg (186 lb 11.7 oz).      # Financial/Environmental Concerns: none           "

## 2025-04-07 ENCOUNTER — ANESTHESIA (OUTPATIENT)
Dept: SURGERY | Facility: CLINIC | Age: 70
End: 2025-04-07
Payer: MEDICARE

## 2025-04-07 ENCOUNTER — APPOINTMENT (OUTPATIENT)
Dept: GENERAL RADIOLOGY | Facility: CLINIC | Age: 70
End: 2025-04-07
Attending: SURGERY
Payer: MEDICARE

## 2025-04-07 LAB
ANION GAP SERPL CALCULATED.3IONS-SCNC: 14 MMOL/L (ref 7–15)
BUN SERPL-MCNC: 15.8 MG/DL (ref 8–23)
CALCIUM SERPL-MCNC: 8.3 MG/DL (ref 8.8–10.4)
CHLORIDE SERPL-SCNC: 101 MMOL/L (ref 98–107)
CREAT SERPL-MCNC: 0.83 MG/DL (ref 0.67–1.17)
EGFRCR SERPLBLD CKD-EPI 2021: >90 ML/MIN/1.73M2
ERYTHROCYTE [DISTWIDTH] IN BLOOD BY AUTOMATED COUNT: 13 % (ref 10–15)
GLUCOSE BLDC GLUCOMTR-MCNC: 109 MG/DL (ref 70–99)
GLUCOSE BLDC GLUCOMTR-MCNC: 116 MG/DL (ref 70–99)
GLUCOSE BLDC GLUCOMTR-MCNC: 119 MG/DL (ref 70–99)
GLUCOSE BLDC GLUCOMTR-MCNC: 138 MG/DL (ref 70–99)
GLUCOSE BLDC GLUCOMTR-MCNC: 145 MG/DL (ref 70–99)
GLUCOSE SERPL-MCNC: 103 MG/DL (ref 70–99)
HCO3 SERPL-SCNC: 20 MMOL/L (ref 22–29)
HCT VFR BLD AUTO: 29.4 % (ref 40–53)
HGB BLD-MCNC: 9.6 G/DL (ref 13.3–17.7)
MAGNESIUM SERPL-MCNC: 2.1 MG/DL (ref 1.7–2.3)
MCH RBC QN AUTO: 29.3 PG (ref 26.5–33)
MCHC RBC AUTO-ENTMCNC: 32.7 G/DL (ref 31.5–36.5)
MCV RBC AUTO: 90 FL (ref 78–100)
PHOSPHATE SERPL-MCNC: 3.7 MG/DL (ref 2.5–4.5)
PLATELET # BLD AUTO: 639 10E3/UL (ref 150–450)
POTASSIUM SERPL-SCNC: 4.4 MMOL/L (ref 3.4–5.3)
RBC # BLD AUTO: 3.28 10E6/UL (ref 4.4–5.9)
SODIUM SERPL-SCNC: 135 MMOL/L (ref 135–145)
WBC # BLD AUTO: 11.6 10E3/UL (ref 4–11)

## 2025-04-07 PROCEDURE — 250N000013 HC RX MED GY IP 250 OP 250 PS 637

## 2025-04-07 PROCEDURE — 250N000011 HC RX IP 250 OP 636

## 2025-04-07 PROCEDURE — 250N000009 HC RX 250: Performed by: NURSE ANESTHETIST, CERTIFIED REGISTERED

## 2025-04-07 PROCEDURE — 71045 X-RAY EXAM CHEST 1 VIEW: CPT

## 2025-04-07 PROCEDURE — 370N000017 HC ANESTHESIA TECHNICAL FEE, PER MIN: Performed by: THORACIC SURGERY (CARDIOTHORACIC VASCULAR SURGERY)

## 2025-04-07 PROCEDURE — 0W9930Z DRAINAGE OF RIGHT PLEURAL CAVITY WITH DRAINAGE DEVICE, PERCUTANEOUS APPROACH: ICD-10-PCS | Performed by: STUDENT IN AN ORGANIZED HEALTH CARE EDUCATION/TRAINING PROGRAM

## 2025-04-07 PROCEDURE — 84100 ASSAY OF PHOSPHORUS: CPT | Performed by: STUDENT IN AN ORGANIZED HEALTH CARE EDUCATION/TRAINING PROGRAM

## 2025-04-07 PROCEDURE — 710N000010 HC RECOVERY PHASE 1, LEVEL 2, PER MIN: Performed by: THORACIC SURGERY (CARDIOTHORACIC VASCULAR SURGERY)

## 2025-04-07 PROCEDURE — 43235 EGD DIAGNOSTIC BRUSH WASH: CPT | Mod: 78 | Performed by: THORACIC SURGERY (CARDIOTHORACIC VASCULAR SURGERY)

## 2025-04-07 PROCEDURE — 80048 BASIC METABOLIC PNL TOTAL CA: CPT

## 2025-04-07 PROCEDURE — 32551 INSERTION OF CHEST TUBE: CPT | Mod: 78 | Performed by: THORACIC SURGERY (CARDIOTHORACIC VASCULAR SURGERY)

## 2025-04-07 PROCEDURE — 0W9B30Z DRAINAGE OF LEFT PLEURAL CAVITY WITH DRAINAGE DEVICE, PERCUTANEOUS APPROACH: ICD-10-PCS | Performed by: THORACIC SURGERY (CARDIOTHORACIC VASCULAR SURGERY)

## 2025-04-07 PROCEDURE — 120N000005 HC R&B MS OVERFLOW UMMC

## 2025-04-07 PROCEDURE — 250N000011 HC RX IP 250 OP 636: Performed by: SURGERY

## 2025-04-07 PROCEDURE — 272N000001 HC OR GENERAL SUPPLY STERILE: Performed by: THORACIC SURGERY (CARDIOTHORACIC VASCULAR SURGERY)

## 2025-04-07 PROCEDURE — 999N000141 HC STATISTIC PRE-PROCEDURE NURSING ASSESSMENT: Performed by: THORACIC SURGERY (CARDIOTHORACIC VASCULAR SURGERY)

## 2025-04-07 PROCEDURE — 258N000003 HC RX IP 258 OP 636: Performed by: NURSE ANESTHETIST, CERTIFIED REGISTERED

## 2025-04-07 PROCEDURE — 85027 COMPLETE CBC AUTOMATED: CPT

## 2025-04-07 PROCEDURE — 250N000013 HC RX MED GY IP 250 OP 250 PS 637: Performed by: STUDENT IN AN ORGANIZED HEALTH CARE EDUCATION/TRAINING PROGRAM

## 2025-04-07 PROCEDURE — 250N000025 HC SEVOFLURANE, PER MIN: Performed by: THORACIC SURGERY (CARDIOTHORACIC VASCULAR SURGERY)

## 2025-04-07 PROCEDURE — 360N000082 HC SURGERY LEVEL 2 W/ FLUORO, PER MIN: Performed by: THORACIC SURGERY (CARDIOTHORACIC VASCULAR SURGERY)

## 2025-04-07 PROCEDURE — 83735 ASSAY OF MAGNESIUM: CPT

## 2025-04-07 PROCEDURE — 250N000013 HC RX MED GY IP 250 OP 250 PS 637: Performed by: SURGERY

## 2025-04-07 PROCEDURE — 250N000011 HC RX IP 250 OP 636: Mod: JZ | Performed by: ANESTHESIOLOGY

## 2025-04-07 PROCEDURE — 250N000011 HC RX IP 250 OP 636: Performed by: NURSE ANESTHETIST, CERTIFIED REGISTERED

## 2025-04-07 PROCEDURE — 71045 X-RAY EXAM CHEST 1 VIEW: CPT | Mod: 26 | Performed by: RADIOLOGY

## 2025-04-07 PROCEDURE — 0WJP8ZZ INSPECTION OF GASTROINTESTINAL TRACT, VIA NATURAL OR ARTIFICIAL OPENING ENDOSCOPIC APPROACH: ICD-10-PCS | Performed by: THORACIC SURGERY (CARDIOTHORACIC VASCULAR SURGERY)

## 2025-04-07 PROCEDURE — 82565 ASSAY OF CREATININE: CPT

## 2025-04-07 RX ORDER — SODIUM CHLORIDE, SODIUM LACTATE, POTASSIUM CHLORIDE, CALCIUM CHLORIDE 600; 310; 30; 20 MG/100ML; MG/100ML; MG/100ML; MG/100ML
INJECTION, SOLUTION INTRAVENOUS CONTINUOUS PRN
Status: DISCONTINUED | OUTPATIENT
Start: 2025-04-07 | End: 2025-04-07

## 2025-04-07 RX ORDER — ONDANSETRON 4 MG/1
4 TABLET, ORALLY DISINTEGRATING ORAL EVERY 30 MIN PRN
Status: DISCONTINUED | OUTPATIENT
Start: 2025-04-07 | End: 2025-04-07 | Stop reason: HOSPADM

## 2025-04-07 RX ORDER — SODIUM CHLORIDE, SODIUM LACTATE, POTASSIUM CHLORIDE, CALCIUM CHLORIDE 600; 310; 30; 20 MG/100ML; MG/100ML; MG/100ML; MG/100ML
INJECTION, SOLUTION INTRAVENOUS CONTINUOUS
Status: DISCONTINUED | OUTPATIENT
Start: 2025-04-07 | End: 2025-04-07 | Stop reason: HOSPADM

## 2025-04-07 RX ORDER — ACETAMINOPHEN 325 MG/1
975 TABLET ORAL ONCE
Status: DISCONTINUED | OUTPATIENT
Start: 2025-04-07 | End: 2025-04-07

## 2025-04-07 RX ORDER — OXYCODONE HYDROCHLORIDE 10 MG/1
10 TABLET ORAL
Status: DISCONTINUED | OUTPATIENT
Start: 2025-04-07 | End: 2025-04-07

## 2025-04-07 RX ORDER — FENTANYL CITRATE 50 UG/ML
25 INJECTION, SOLUTION INTRAMUSCULAR; INTRAVENOUS EVERY 5 MIN PRN
Status: DISCONTINUED | OUTPATIENT
Start: 2025-04-07 | End: 2025-04-07 | Stop reason: HOSPADM

## 2025-04-07 RX ORDER — DEXAMETHASONE SODIUM PHOSPHATE 4 MG/ML
4 INJECTION, SOLUTION INTRA-ARTICULAR; INTRALESIONAL; INTRAMUSCULAR; INTRAVENOUS; SOFT TISSUE
Status: DISCONTINUED | OUTPATIENT
Start: 2025-04-07 | End: 2025-04-07 | Stop reason: HOSPADM

## 2025-04-07 RX ORDER — HYDROMORPHONE HCL IN WATER/PF 6 MG/30 ML
0.2 PATIENT CONTROLLED ANALGESIA SYRINGE INTRAVENOUS EVERY 5 MIN PRN
Status: DISCONTINUED | OUTPATIENT
Start: 2025-04-07 | End: 2025-04-07 | Stop reason: HOSPADM

## 2025-04-07 RX ORDER — POLYETHYLENE GLYCOL 3350 17 G/17G
17 POWDER, FOR SOLUTION ORAL DAILY
Status: DISCONTINUED | OUTPATIENT
Start: 2025-04-07 | End: 2025-04-07

## 2025-04-07 RX ORDER — FENTANYL CITRATE 50 UG/ML
INJECTION, SOLUTION INTRAMUSCULAR; INTRAVENOUS PRN
Status: DISCONTINUED | OUTPATIENT
Start: 2025-04-07 | End: 2025-04-07

## 2025-04-07 RX ORDER — HYDROMORPHONE HCL IN WATER/PF 6 MG/30 ML
0.4 PATIENT CONTROLLED ANALGESIA SYRINGE INTRAVENOUS EVERY 5 MIN PRN
Status: DISCONTINUED | OUTPATIENT
Start: 2025-04-07 | End: 2025-04-07 | Stop reason: HOSPADM

## 2025-04-07 RX ORDER — AMOXICILLIN 250 MG
2 CAPSULE ORAL 2 TIMES DAILY
Status: DISCONTINUED | OUTPATIENT
Start: 2025-04-07 | End: 2025-04-09 | Stop reason: HOSPADM

## 2025-04-07 RX ORDER — ONDANSETRON 2 MG/ML
4 INJECTION INTRAMUSCULAR; INTRAVENOUS EVERY 30 MIN PRN
Status: DISCONTINUED | OUTPATIENT
Start: 2025-04-07 | End: 2025-04-07 | Stop reason: HOSPADM

## 2025-04-07 RX ORDER — LIDOCAINE HYDROCHLORIDE 20 MG/ML
INJECTION, SOLUTION INFILTRATION; PERINEURAL PRN
Status: DISCONTINUED | OUTPATIENT
Start: 2025-04-07 | End: 2025-04-07

## 2025-04-07 RX ORDER — AMOXICILLIN 250 MG
1 CAPSULE ORAL 2 TIMES DAILY
Status: DISCONTINUED | OUTPATIENT
Start: 2025-04-07 | End: 2025-04-09 | Stop reason: HOSPADM

## 2025-04-07 RX ORDER — ONDANSETRON 2 MG/ML
4 INJECTION INTRAMUSCULAR; INTRAVENOUS EVERY 30 MIN PRN
Status: DISCONTINUED | OUTPATIENT
Start: 2025-04-07 | End: 2025-04-07

## 2025-04-07 RX ORDER — ONDANSETRON 4 MG/1
4 TABLET, ORALLY DISINTEGRATING ORAL EVERY 30 MIN PRN
Status: DISCONTINUED | OUTPATIENT
Start: 2025-04-07 | End: 2025-04-07

## 2025-04-07 RX ORDER — NALOXONE HYDROCHLORIDE 0.4 MG/ML
0.1 INJECTION, SOLUTION INTRAMUSCULAR; INTRAVENOUS; SUBCUTANEOUS
Status: DISCONTINUED | OUTPATIENT
Start: 2025-04-07 | End: 2025-04-07 | Stop reason: HOSPADM

## 2025-04-07 RX ORDER — DEXAMETHASONE SODIUM PHOSPHATE 4 MG/ML
4 INJECTION, SOLUTION INTRA-ARTICULAR; INTRALESIONAL; INTRAMUSCULAR; INTRAVENOUS; SOFT TISSUE
Status: DISCONTINUED | OUTPATIENT
Start: 2025-04-07 | End: 2025-04-07

## 2025-04-07 RX ORDER — PROPOFOL 10 MG/ML
INJECTION, EMULSION INTRAVENOUS PRN
Status: DISCONTINUED | OUTPATIENT
Start: 2025-04-07 | End: 2025-04-07

## 2025-04-07 RX ORDER — ONDANSETRON 2 MG/ML
INJECTION INTRAMUSCULAR; INTRAVENOUS PRN
Status: DISCONTINUED | OUTPATIENT
Start: 2025-04-07 | End: 2025-04-07

## 2025-04-07 RX ORDER — FENTANYL CITRATE 50 UG/ML
50 INJECTION, SOLUTION INTRAMUSCULAR; INTRAVENOUS EVERY 5 MIN PRN
Status: DISCONTINUED | OUTPATIENT
Start: 2025-04-07 | End: 2025-04-07 | Stop reason: HOSPADM

## 2025-04-07 RX ORDER — OXYCODONE HYDROCHLORIDE 5 MG/1
5 TABLET ORAL
Status: DISCONTINUED | OUTPATIENT
Start: 2025-04-07 | End: 2025-04-07

## 2025-04-07 RX ORDER — NALOXONE HYDROCHLORIDE 0.4 MG/ML
0.1 INJECTION, SOLUTION INTRAMUSCULAR; INTRAVENOUS; SUBCUTANEOUS
Status: DISCONTINUED | OUTPATIENT
Start: 2025-04-07 | End: 2025-04-07

## 2025-04-07 RX ORDER — LIDOCAINE 40 MG/G
CREAM TOPICAL
Status: DISCONTINUED | OUTPATIENT
Start: 2025-04-07 | End: 2025-04-07 | Stop reason: HOSPADM

## 2025-04-07 RX ADMIN — PROPOFOL 100 MG: 10 INJECTION, EMULSION INTRAVENOUS at 08:24

## 2025-04-07 RX ADMIN — PIPERACILLIN AND TAZOBACTAM 3.38 G: 3; .375 INJECTION, POWDER, LYOPHILIZED, FOR SOLUTION INTRAVENOUS at 20:17

## 2025-04-07 RX ADMIN — ACETAMINOPHEN 650 MG: 325 TABLET, FILM COATED ORAL at 16:29

## 2025-04-07 RX ADMIN — Medication: at 17:06

## 2025-04-07 RX ADMIN — NORTRIPTYLINE HYDROCHLORIDE 25 MG: 25 CAPSULE ORAL at 20:20

## 2025-04-07 RX ADMIN — Medication 60 ML: at 20:22

## 2025-04-07 RX ADMIN — PIPERACILLIN AND TAZOBACTAM 3.38 G: 3; .375 INJECTION, POWDER, LYOPHILIZED, FOR SOLUTION INTRAVENOUS at 01:45

## 2025-04-07 RX ADMIN — LIDOCAINE HYDROCHLORIDE 100 MG: 20 INJECTION, SOLUTION INFILTRATION; PERINEURAL at 08:24

## 2025-04-07 RX ADMIN — PROPOFOL 70 MG: 10 INJECTION, EMULSION INTRAVENOUS at 08:38

## 2025-04-07 RX ADMIN — Medication 100 MG: at 08:48

## 2025-04-07 RX ADMIN — GABAPENTIN 300 MG: 300 CAPSULE ORAL at 14:26

## 2025-04-07 RX ADMIN — ONDANSETRON 4 MG: 2 INJECTION INTRAMUSCULAR; INTRAVENOUS at 08:46

## 2025-04-07 RX ADMIN — ENOXAPARIN SODIUM 40 MG: 40 INJECTION SUBCUTANEOUS at 11:18

## 2025-04-07 RX ADMIN — FLUCONAZOLE 400 MG: 2 INJECTION, SOLUTION INTRAVENOUS at 12:31

## 2025-04-07 RX ADMIN — FENTANYL CITRATE 25 MCG: 50 INJECTION, SOLUTION INTRAMUSCULAR; INTRAVENOUS at 09:21

## 2025-04-07 RX ADMIN — ATENOLOL 25 MG: 25 TABLET ORAL at 20:21

## 2025-04-07 RX ADMIN — SUCCINYLCHOLINE CHLORIDE 100 MG: 20 INJECTION, SOLUTION INTRAMUSCULAR; INTRAVENOUS; PARENTERAL at 08:24

## 2025-04-07 RX ADMIN — Medication 20 MG: at 08:38

## 2025-04-07 RX ADMIN — ACETAMINOPHEN 650 MG: 325 TABLET, FILM COATED ORAL at 03:13

## 2025-04-07 RX ADMIN — ACETAMINOPHEN 650 MG: 325 TABLET, FILM COATED ORAL at 21:48

## 2025-04-07 RX ADMIN — SENNOSIDES AND DOCUSATE SODIUM 1 TABLET: 50; 8.6 TABLET ORAL at 20:20

## 2025-04-07 RX ADMIN — FENTANYL CITRATE 100 MCG: 50 INJECTION INTRAMUSCULAR; INTRAVENOUS at 08:24

## 2025-04-07 RX ADMIN — METHOCARBAMOL 750 MG: 750 TABLET ORAL at 20:20

## 2025-04-07 RX ADMIN — PIPERACILLIN AND TAZOBACTAM 3.38 G: 3; .375 INJECTION, POWDER, LYOPHILIZED, FOR SOLUTION INTRAVENOUS at 14:26

## 2025-04-07 RX ADMIN — METHOCARBAMOL 750 MG: 750 TABLET ORAL at 11:18

## 2025-04-07 RX ADMIN — HYDROMORPHONE HYDROCHLORIDE 0.4 MG: 0.2 INJECTION, SOLUTION INTRAMUSCULAR; INTRAVENOUS; SUBCUTANEOUS at 09:54

## 2025-04-07 RX ADMIN — OXYCODONE HYDROCHLORIDE 10 MG: 5 SOLUTION ORAL at 12:28

## 2025-04-07 RX ADMIN — Medication 10 MG: at 20:21

## 2025-04-07 RX ADMIN — HYDROMORPHONE HYDROCHLORIDE 0.4 MG: 0.2 INJECTION, SOLUTION INTRAMUSCULAR; INTRAVENOUS; SUBCUTANEOUS at 09:39

## 2025-04-07 RX ADMIN — HYDROMORPHONE HYDROCHLORIDE 0.4 MG: 0.2 INJECTION, SOLUTION INTRAMUSCULAR; INTRAVENOUS; SUBCUTANEOUS at 09:31

## 2025-04-07 RX ADMIN — SODIUM CHLORIDE, SODIUM LACTATE, POTASSIUM CHLORIDE, AND CALCIUM CHLORIDE: .6; .31; .03; .02 INJECTION, SOLUTION INTRAVENOUS at 08:13

## 2025-04-07 RX ADMIN — INSULIN ASPART 1 UNITS: 100 INJECTION, SOLUTION INTRAVENOUS; SUBCUTANEOUS at 20:17

## 2025-04-07 RX ADMIN — GABAPENTIN 300 MG: 300 CAPSULE ORAL at 20:21

## 2025-04-07 RX ADMIN — FENTANYL CITRATE 25 MCG: 50 INJECTION, SOLUTION INTRAMUSCULAR; INTRAVENOUS at 09:10

## 2025-04-07 RX ADMIN — FENTANYL CITRATE 50 MCG: 50 INJECTION, SOLUTION INTRAMUSCULAR; INTRAVENOUS at 09:15

## 2025-04-07 ASSESSMENT — ACTIVITIES OF DAILY LIVING (ADL)
ADLS_ACUITY_SCORE: 44
ADLS_ACUITY_SCORE: 40
ADLS_ACUITY_SCORE: 40
ADLS_ACUITY_SCORE: 44
ADLS_ACUITY_SCORE: 40
ADLS_ACUITY_SCORE: 40
ADLS_ACUITY_SCORE: 44
ADLS_ACUITY_SCORE: 44
ADLS_ACUITY_SCORE: 40
ADLS_ACUITY_SCORE: 44
ADLS_ACUITY_SCORE: 40
ADLS_ACUITY_SCORE: 40
ADLS_ACUITY_SCORE: 44
ADLS_ACUITY_SCORE: 40
ADLS_ACUITY_SCORE: 44
ADLS_ACUITY_SCORE: 40
ADLS_ACUITY_SCORE: 44
ADLS_ACUITY_SCORE: 40
ADLS_ACUITY_SCORE: 44

## 2025-04-07 NOTE — ANESTHESIA CARE TRANSFER NOTE
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    Anesthesia Type:   General     Note:    Oropharynx: oropharynx clear of all foreign objects  Level of Consciousness: awake  Oxygen Supplementation: face mask    Independent Airway: airway patency satisfactory and stable  Dentition: dentition unchanged  Vital Signs Stable: post-procedure vital signs reviewed and stable  Report to RN Given: handoff report given  Patient transferred to: PACU    Handoff Report: Identifed the Patient, Identified the Reponsible Provider, Reviewed the pertinent medical history, Discussed the surgical course, Reviewed Intra-OP anesthesia mangement and issues during anesthesia, Set expectations for post-procedure period and Allowed opportunity for questions and acknowledgement of understanding  Vitals:  Vitals Value Taken Time   /76 04/07/25 0900   Temp     Pulse 93 04/07/25 0903   Resp 21 04/07/25 0903   SpO2 100 % 04/07/25 0903   Vitals shown include unfiled device data.    Electronically Signed By: ROSY Hernandez CRNA  April 7, 2025  9:04 AM

## 2025-04-07 NOTE — PLAN OF CARE
[id number]    Neuro: A&Ox4.   Cardiac: SR, HR 80-90s. BP 130s/80s. Afebrile.   Respiratory: Sating >95% on RA. End tidal monitoring - post procedure.  GI/: Adequate urine output. No BM this shift.  Diet/appetite: NPO. Cycled TF from 1540-1029, 90mL/hr w/ Q4 30ml FWF.  Activity:  SBA - up to bathroom this shift.  Pain: Pain at new CT site. Dilaudid PCA started this shift.   Skin: No new deficits noted.  LDA's: R Chest Port - SL. L PIV - dilaudid PCA. L CT - water seal. R ODIN drain - bulb suction, Q8 flushing.     EGD with L CT placement today, returned to floor @ 1000    Plan: Continue with POC. Notify primary team with changes.    Goal Outcome Evaluation:      Plan of Care Reviewed With: patient, spouse    Overall Patient Progress: improvingOverall Patient Progress: improving    Outcome Evaluation: Pt arrived from PACU around 1030. Pain with CT - started dilaudid PCA this shift.

## 2025-04-07 NOTE — BRIEF OP NOTE
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  Complications: None.  Implants: * No implants in log *

## 2025-04-07 NOTE — ANESTHESIA POSTPROCEDURE EVALUATION
Patient: Bal Junior    Procedure: Procedure(s):  ESOPHAGOGASTRODUODENOSCOPY, WITH left chest tube placement       Anesthesia Type:  General    Note:  Disposition: Inpatient   Postop Pain Control: Uneventful            Sign Out: Well controlled pain   PONV: No   Neuro/Psych: Uneventful            Sign Out: Acceptable/Baseline neuro status   Airway/Respiratory: Uneventful            Sign Out: Acceptable/Baseline resp. status   CV/Hemodynamics: Uneventful            Sign Out: Acceptable CV status; No obvious hypovolemia; No obvious fluid overload   Other NRE: NONE   DID A NON-ROUTINE EVENT OCCUR? No           Last vitals:  Vitals Value Taken Time   /80 04/07/25 0930   Temp 37.2  C (99  F) 04/07/25 0900   Pulse 82 04/07/25 0937   Resp 21 04/07/25 0937   SpO2 100 % 04/07/25 0937   Vitals shown include unfiled device data.    Electronically Signed By: Jose Garcia MD  April 7, 2025  9:38 AM

## 2025-04-07 NOTE — PROGRESS NOTES
Goal Outcome Evaluation:        Overall Patient Progress: improvingOverall Patient Progress: improving     Outcome Evaluation: Intrathoracic drain with minimal output, flushing q8 hrs. Still has pain on the J tube site, contolled by oxycodone and scheduled tylenol.          Neuro: A&Ox4. Calls appropriately.   Cardiac: No tele, VSS. Afebrile.    Respiratory: Sating >95%  on RA.  GI/: Adequate urine output. BM X2, loose, watery.  Diet/appetite: NPO, Tolerating cycled TF at 90ml/hr, FWF 30 q 4 hrs.   TF hold from 12MN for procedure.   Activity:  SBA up to bathroom.   Pain: At acceptable level on current regimen. Controlled by scheduled tylenol, oxycodone X1 given.   Skin: No new deficits noted.  LDA's: R port a cath infusing TKO for abx, dressing changed today.  L PIV, SL. PEJ tube, R intrathoracic fluid drain attached to suction bulb with orders to flush q8 hrs.     Plan: Continue with POC. Notify primary team with changes.  For EGD with stent placement today, Keep NPO post MN.

## 2025-04-07 NOTE — PROGRESS NOTES
Cross-cover note: 8:51 PM 4/6/2025 04/06/2025    General Surgery Cross Cover Note    Called by nursing regarding IR note says to flush drain Q8H with 10ml NS, but has no port for flushing       B/P: 133/86, T: 97.1, P: 95, R: 22    PE:  A&O x3, NAD  Breathing non-labored  Abd soft, non-tender, non-distended  Extr. Warm to touch  Incisions clean, dry, intact    Plan:  - 3way valve applied and luer lock for to drainage tubing  - Per IR Note:   -Strict I/O  -Flush drain q8h w/ 10 ml NS      A. Datario, DO  General Surgery PGY-1

## 2025-04-07 NOTE — ANESTHESIA PROCEDURE NOTES
Airway       Patient location during procedure: OR       Procedure Start/Stop Times: 4/7/2025 8:25 AM  Staff -        CRNA: Marleny Zhu APRN CRNA       Performed By: CRNA  Consent for Airway        Urgency: elective  Indications and Patient Condition       Indications for airway management: suki-procedural       Induction type:RSI       Mask difficulty assessment: 0 - not attempted    Final Airway Details       Final airway type: endotracheal airway       Successful airway: ETT - single  Endotracheal Airway Details        ETT size (mm): 8.0       Cuffed: yes       Successful intubation technique: direct laryngoscopy       DL Blade Type: MAC 3       Grade View of Cords: 1       Adjucts: stylet       Position: Right       Measured from: lips       Secured at (cm): 22       Bite block used: None    Post intubation assessment        Placement verified by: capnometry, equal breath sounds and chest rise        Number of attempts at approach: 1       Secured with: tape       Ease of procedure: easy       Dentition: Intact and Unchanged    Medication(s) Administered   Medication Administration Time: 4/7/2025 8:25 AM

## 2025-04-07 NOTE — PROGRESS NOTES
"THORACIC & FOREGUT SURGERY    S:  No overnight events.  Pt seen at bedside prior to OR.     O:  Vitals:    04/06/25 2344 04/07/25 0301 04/07/25 0311 04/07/25 0700   BP: 126/74  122/77 133/79   BP Location: Left arm  Left arm    Cuff Size: Adult Regular  Adult Regular Adult Regular   Pulse:       Resp: 16  17    Temp: 98.5  F (36.9  C)  98.5  F (36.9  C) 98  F (36.7  C)   TempSrc: Oral  Oral Oral   SpO2: 95%  96% 97%   Weight:  83.9 kg (184 lb 15.5 oz)     Height:           A&O, NAD  Breathing non-labored on RA  IR drain with green tinged output  Appears well perfused  Soft, NDNT  Distal extremities warm.    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 Patient passed methylene blue challenge for leak, however WBC rising after initiation of CLD. CT esophagram concerning for anastomotic leak. 4/7 EGD with possible stent placement, possible L chest tube.     - Multimodal pain control  - Continue IR drain   - continue abx fluconazole and zosyn, repeat CBC daily  - EGD with stent placement and possible L chest tube placement today  - Diet: NPO + Cycled TF @ 90  - Ppx: SCD',s, Lovenox  - Dispo pending progress, likely middle of this week    Clinically Significant Risk Factors         # Hyponatremia: Lowest Na = 134 mmol/L in last 2 days, will monitor as appropriate           # Hypertension: Noted on problem list     # Acute Hypercapnic Respiratory Failure: based on arterial blood gas results.  Continue supplemental oxygen and ventilatory support as indicated.         # Overweight: Estimated body mass index is 26.54 kg/m  as calculated from the following:    Height as of this encounter: 1.778 m (5' 10\").    Weight as of this " encounter: 83.9 kg (184 lb 15.5 oz).        # Financial/Environmental Concerns: none         Chavez Rodriguez MD   General Surgery Resident    Thoracic and Foregut Surgery  Text page Thoracic Surgery via [facility] Paging/Directory

## 2025-04-08 ENCOUNTER — APPOINTMENT (OUTPATIENT)
Dept: GENERAL RADIOLOGY | Facility: CLINIC | Age: 70
End: 2025-04-08
Attending: STUDENT IN AN ORGANIZED HEALTH CARE EDUCATION/TRAINING PROGRAM
Payer: MEDICARE

## 2025-04-08 ENCOUNTER — APPOINTMENT (OUTPATIENT)
Dept: GENERAL RADIOLOGY | Facility: CLINIC | Age: 70
End: 2025-04-08
Payer: MEDICARE

## 2025-04-08 ENCOUNTER — APPOINTMENT (OUTPATIENT)
Dept: OCCUPATIONAL THERAPY | Facility: CLINIC | Age: 70
DRG: 326 | End: 2025-04-08
Attending: STUDENT IN AN ORGANIZED HEALTH CARE EDUCATION/TRAINING PROGRAM
Payer: MEDICARE

## 2025-04-08 LAB
ANION GAP SERPL CALCULATED.3IONS-SCNC: 10 MMOL/L (ref 7–15)
BUN SERPL-MCNC: 18.9 MG/DL (ref 8–23)
CALCIUM SERPL-MCNC: 8.2 MG/DL (ref 8.8–10.4)
CHLORIDE SERPL-SCNC: 100 MMOL/L (ref 98–107)
CREAT SERPL-MCNC: 0.86 MG/DL (ref 0.67–1.17)
EGFRCR SERPLBLD CKD-EPI 2021: >90 ML/MIN/1.73M2
ERYTHROCYTE [DISTWIDTH] IN BLOOD BY AUTOMATED COUNT: 13.1 % (ref 10–15)
GLUCOSE BLDC GLUCOMTR-MCNC: 103 MG/DL (ref 70–99)
GLUCOSE BLDC GLUCOMTR-MCNC: 118 MG/DL (ref 70–99)
GLUCOSE BLDC GLUCOMTR-MCNC: 153 MG/DL (ref 70–99)
GLUCOSE BLDC GLUCOMTR-MCNC: 163 MG/DL (ref 70–99)
GLUCOSE BLDC GLUCOMTR-MCNC: 173 MG/DL (ref 70–99)
GLUCOSE BLDC GLUCOMTR-MCNC: 198 MG/DL (ref 70–99)
GLUCOSE SERPL-MCNC: 169 MG/DL (ref 70–99)
HCO3 SERPL-SCNC: 23 MMOL/L (ref 22–29)
HCT VFR BLD AUTO: 28.9 % (ref 40–53)
HGB BLD-MCNC: 9.4 G/DL (ref 13.3–17.7)
MAGNESIUM SERPL-MCNC: 1.9 MG/DL (ref 1.7–2.3)
MCH RBC QN AUTO: 28.7 PG (ref 26.5–33)
MCHC RBC AUTO-ENTMCNC: 32.5 G/DL (ref 31.5–36.5)
MCV RBC AUTO: 88 FL (ref 78–100)
PHOSPHATE SERPL-MCNC: 2.9 MG/DL (ref 2.5–4.5)
PLATELET # BLD AUTO: 665 10E3/UL (ref 150–450)
POTASSIUM SERPL-SCNC: 4.6 MMOL/L (ref 3.4–5.3)
RBC # BLD AUTO: 3.28 10E6/UL (ref 4.4–5.9)
SODIUM SERPL-SCNC: 133 MMOL/L (ref 135–145)
WBC # BLD AUTO: 12 10E3/UL (ref 4–11)

## 2025-04-08 PROCEDURE — 250N000013 HC RX MED GY IP 250 OP 250 PS 637

## 2025-04-08 PROCEDURE — 250N000011 HC RX IP 250 OP 636

## 2025-04-08 PROCEDURE — 71045 X-RAY EXAM CHEST 1 VIEW: CPT | Mod: 26 | Performed by: RADIOLOGY

## 2025-04-08 PROCEDURE — 80048 BASIC METABOLIC PNL TOTAL CA: CPT

## 2025-04-08 PROCEDURE — 83735 ASSAY OF MAGNESIUM: CPT

## 2025-04-08 PROCEDURE — 120N000002 HC R&B MED SURG/OB UMMC

## 2025-04-08 PROCEDURE — 250N000013 HC RX MED GY IP 250 OP 250 PS 637: Performed by: STUDENT IN AN ORGANIZED HEALTH CARE EDUCATION/TRAINING PROGRAM

## 2025-04-08 PROCEDURE — 71045 X-RAY EXAM CHEST 1 VIEW: CPT | Mod: 77

## 2025-04-08 PROCEDURE — 250N000013 HC RX MED GY IP 250 OP 250 PS 637: Performed by: SURGERY

## 2025-04-08 PROCEDURE — 85027 COMPLETE CBC AUTOMATED: CPT

## 2025-04-08 PROCEDURE — 84100 ASSAY OF PHOSPHORUS: CPT | Performed by: STUDENT IN AN ORGANIZED HEALTH CARE EDUCATION/TRAINING PROGRAM

## 2025-04-08 PROCEDURE — 71045 X-RAY EXAM CHEST 1 VIEW: CPT

## 2025-04-08 PROCEDURE — 97530 THERAPEUTIC ACTIVITIES: CPT | Mod: GO | Performed by: OCCUPATIONAL THERAPIST

## 2025-04-08 RX ORDER — OXYCODONE HCL 5 MG/5 ML
5-10 SOLUTION, ORAL ORAL EVERY 4 HOURS PRN
Status: DISCONTINUED | OUTPATIENT
Start: 2025-04-08 | End: 2025-04-09 | Stop reason: HOSPADM

## 2025-04-08 RX ORDER — MAGNESIUM OXIDE 400 MG/1
400 TABLET ORAL EVERY 4 HOURS
Status: COMPLETED | OUTPATIENT
Start: 2025-04-08 | End: 2025-04-08

## 2025-04-08 RX ADMIN — ATENOLOL 25 MG: 25 TABLET ORAL at 20:38

## 2025-04-08 RX ADMIN — INSULIN ASPART 3 UNITS: 100 INJECTION, SOLUTION INTRAVENOUS; SUBCUTANEOUS at 08:11

## 2025-04-08 RX ADMIN — GABAPENTIN 300 MG: 300 CAPSULE ORAL at 15:07

## 2025-04-08 RX ADMIN — GABAPENTIN 300 MG: 300 CAPSULE ORAL at 20:38

## 2025-04-08 RX ADMIN — ACETAMINOPHEN 650 MG: 325 TABLET, FILM COATED ORAL at 09:50

## 2025-04-08 RX ADMIN — Medication 10 MG: at 21:52

## 2025-04-08 RX ADMIN — ENOXAPARIN SODIUM 40 MG: 40 INJECTION SUBCUTANEOUS at 11:44

## 2025-04-08 RX ADMIN — ACETAMINOPHEN 650 MG: 325 TABLET, FILM COATED ORAL at 15:48

## 2025-04-08 RX ADMIN — MAGNESIUM OXIDE TAB 400 MG (241.3 MG ELEMENTAL MG) 400 MG: 400 (241.3 MG) TAB at 08:08

## 2025-04-08 RX ADMIN — Medication 60 ML: at 20:39

## 2025-04-08 RX ADMIN — PIPERACILLIN AND TAZOBACTAM 3.38 G: 3; .375 INJECTION, POWDER, LYOPHILIZED, FOR SOLUTION INTRAVENOUS at 20:38

## 2025-04-08 RX ADMIN — INSULIN ASPART 1 UNITS: 100 INJECTION, SOLUTION INTRAVENOUS; SUBCUTANEOUS at 01:30

## 2025-04-08 RX ADMIN — FLUCONAZOLE 400 MG: 2 INJECTION, SOLUTION INTRAVENOUS at 13:20

## 2025-04-08 RX ADMIN — ACETAMINOPHEN 650 MG: 325 TABLET, FILM COATED ORAL at 21:52

## 2025-04-08 RX ADMIN — GABAPENTIN 300 MG: 300 CAPSULE ORAL at 08:08

## 2025-04-08 RX ADMIN — Medication 15 ML: at 08:08

## 2025-04-08 RX ADMIN — PIPERACILLIN AND TAZOBACTAM 3.38 G: 3; .375 INJECTION, POWDER, LYOPHILIZED, FOR SOLUTION INTRAVENOUS at 08:37

## 2025-04-08 RX ADMIN — Medication 40 MG: at 08:37

## 2025-04-08 RX ADMIN — METHOCARBAMOL 750 MG: 750 TABLET ORAL at 08:09

## 2025-04-08 RX ADMIN — MAGNESIUM OXIDE TAB 400 MG (241.3 MG ELEMENTAL MG) 400 MG: 400 (241.3 MG) TAB at 11:44

## 2025-04-08 RX ADMIN — ACETAMINOPHEN 650 MG: 325 TABLET, FILM COATED ORAL at 05:10

## 2025-04-08 RX ADMIN — Medication 60 ML: at 08:37

## 2025-04-08 RX ADMIN — PIPERACILLIN AND TAZOBACTAM 3.38 G: 3; .375 INJECTION, POWDER, LYOPHILIZED, FOR SOLUTION INTRAVENOUS at 14:39

## 2025-04-08 RX ADMIN — PIPERACILLIN AND TAZOBACTAM 3.38 G: 3; .375 INJECTION, POWDER, LYOPHILIZED, FOR SOLUTION INTRAVENOUS at 01:31

## 2025-04-08 RX ADMIN — NORTRIPTYLINE HYDROCHLORIDE 25 MG: 25 CAPSULE ORAL at 20:38

## 2025-04-08 RX ADMIN — INSULIN ASPART 2 UNITS: 100 INJECTION, SOLUTION INTRAVENOUS; SUBCUTANEOUS at 20:39

## 2025-04-08 RX ADMIN — INSULIN ASPART 1 UNITS: 100 INJECTION, SOLUTION INTRAVENOUS; SUBCUTANEOUS at 05:10

## 2025-04-08 ASSESSMENT — ACTIVITIES OF DAILY LIVING (ADL)
ADLS_ACUITY_SCORE: 40

## 2025-04-08 NOTE — PLAN OF CARE
Neuro: A&Ox4.   Cardiac: VSS.              Respiratory: Sating >92% on RA. On capno post procedure.   GI/: Adequate urine output. No BM.    Diet/appetite: Tolerating tube feeds at 90mL/hr cycled 4p-8a w/ Q4 30 mL FWF through J tube and NPO.   Activity:  SBA up to bathroom for cord management.   Pain: At acceptable level on current regimen. Rates pain 3-4/10 at CT insertion site radiating to back, reports tylenol, robaxin, and dilaudid PCA effective. Dilaudid PCA at 0.1mg PRN.   Skin: No new deficits noted. Several old lap sites steri stripped and CT sites x2.   LDA's: L CT to water seal w/ small output. LPIV to dilaudid PCA. R port TKOd w/ intermittent abx. J tube w/ tube feeds and meds. R ODIN irrigated w/ 10mL NS and minimal drainage.      Plan: Continue with POC. Notify primary team with changes.     Goal Outcome Evaluation:      Plan of Care Reviewed With: patient    Overall Patient Progress: no changeOverall Patient Progress: no change    Outcome Evaluation: Pain w/ CT, dilaudid PCA helping.

## 2025-04-08 NOTE — PROGRESS NOTES
CLINICAL NUTRITION SERVICES - REASSESSMENT NOTE     RECOMMENDATIONS FOR MDs/PROVIDERS TO ORDER:  None at this time.     Registered Dietitian Interventions:  Continue enteral nutrition support regimen as tolerated    Dosing weight: 87 kg   EN access: J-tube  Cyclic Timing: x 16 hours, 1600 - 0800   Modulars: 2 packets Prosource TF20 daily + 3 pkts Banatrol  Regimen: Osmolite 1.5 mitchell (or equivalent) at rate of 90 mL/hr x 16 hrs + 2 pkts Prosource 20 +3 pkts Banatrol TF   Provides: 1,440 mL, 2,455 kcal (28.21 kcal/kg), 136 gm protein (1.5 g/kg), 319 g CHO, 15 g fiber, and 1100 mL free water daily    Flushes: 30 mL Q4hrs + 30 mL at start/end of TF cycle       Future/Additional Recommendations:  Once tolerance to 16 hr feeds established and patient confirms readiness, consider reducing TF cycle to 12 hours at rate of 120 mL/hr. Monitor stooling, weights, and EN intake.      INFORMATION OBTAINED  Assessed patient in room.    CURRENT NUTRITION ORDERS  Diet: NPO  Snacks/Supplements:  None ordered at this time     Nutrition Support: TF  - EN Access: J-tube  - Regimen: Osmolite 1.5 at rate of 90 mL/hr x 16 hrs + 2 pkts Prosource 20 +3 pkts Banatrol TF   - Provides: 1,440 mL, 2,455 kcal (28.21 kcal/kg), 136 gm protein (1.5 g/kg), 319 g CHO, 15 g fiber, and 1100 mL free water daily   - FWF: 30 mL Q4 + (15-30) mL at start/end of TF cycle + 1100 mL from TF for total free water provision of 1,279 mL/day     CURRENT INTAKE/TOLERANCE  EN Intake: 7-day average, providing 1102 mL/day, 1655 kcal (19 kcal/kg), and 107 gm protein (1.3 gm/kg) likely meeting 76% of low-end estimated energy needs, and 82% of low-end estimated protein needs. Patient denied abdominal pain and discomfort at time of visit, however noted period of loose stools he suspects related to diuretics, and antibiotics. Per pt he is seeing mild improvement in GI symptoms with the increased fiber in tube feeds.       NEW FINDINGS  GI symptoms:   Recent loose stools, (3  pkts Banatrol TF in EN regimen)   Trending 3 BM per day per I/O  Skin/wounds:   No pressure injury indicated, L chest tube placed 04/07   Nutrition-relevant labs:  Glucose 119-189 (NL-H)  Nutrition-relevant medications: High sliding scale insulin, multi vitamin, Mag-Ox, Miralax  Weight:   Wt Readings from Last 10 Encounters:   04/07/25 83.9 kg (184 lb 15.5 oz)   03/11/25 84.2 kg (185 lb 9.6 oz)   03/11/25 83.9 kg (185 lb)   02/26/25 87.3 kg (192 lb 7.4 oz)   02/20/25 88.5 kg (195 lb 3.2 oz)   02/20/25 88.5 kg (195 lb)   01/16/25 86.1 kg (189 lb 12.8 oz)   12/16/24 80.7 kg (178 lb)   12/11/24 81.6 kg (180 lb)   12/11/24 84.6 kg (186 lb 6.4 oz)     Admit weight 85.6 kg (03/24), current weight 83.9 (04/07), 2% weight loss since admission. Not clinically significant weight loss, suspect fluctuations attributed to pt fluid status.     ASSESSED NUTRITION NEEDS  Dosing Weight: 87 kg, based on actual/admit wt  Estimated Energy Needs: 2175 - 2610 kcals/day (25 - 30 kcals/kg)  Justification: Maintenance  Estimated Protein Needs: 130 - 174 grams protein/day (1.5 - 2 grams of pro/kg)  Justification: Increased needs post-op   Estimated Fluid Needs: 1 mL/kcal  Justification: Maintenance    MALNUTRITION  % Intake: Decreased intake does not meet criteria  % Weight Loss: Weight loss does not meet criteria  - suspect weight loss related to fluid shifts   Subcutaneous Fat Loss: Triceps: Mild  Muscle Loss: Shoulders (deltoids): Mild and Calf (gastrocnemius): Mild  Fluid Accumulation/Edema: None noted  Malnutrition Diagnosis: Moderate malnutrition in the context of acute illness or injury  Malnutrition Present on Admission: No    EVALUATION OF THE PROGRESS TOWARD GOALS   Previous Goals   Total avg nutritional intake to meet a minimum of 25-30 kcal/kg and 1.5-2 g PRO/kg daily (per dosing wt 87 kg)   Evaluation: Not met    Previous Nutrition Diagnosis  Inadequate oral intake related to NPO s/p esophageal resection as evidenced by reliance  on TF to meet 100% of nutrition needs   Evaluation: No change    NUTRITION DIAGNOSIS  Inadequate oral intake related to frequent NPO status, s/p esophageal resection as evidenced by reliance on TF to meet 100% of nutrition needs    INTERVENTIONS  Enteral nutrition management    GOALS  Total avg nutritional intake to meet a minimum of 25-30 kcal/kg and 1.5-2 g PRO/kg daily (per dosing wt 87 kg)     MONITORING/EVALUATION  Progress toward goals will be monitored and evaluated per policy.      Riya Monique   Dietetic Intern

## 2025-04-08 NOTE — PROGRESS NOTES
THORACIC & FOREGUT SURGERY    S:  No overnight events.  Pt seen at bedside resting comfortably.  Does report significant discomfort from new left sided chest tube.    O:  Vitals:    04/08/25 0135 04/08/25 0500 04/08/25 0510 04/08/25 0700   BP: 128/78 120/80  (!) 140/83   BP Location: Left arm   Left arm   Cuff Size:    Adult Regular   Pulse: 90 89  85   Resp: 26 28 28   Temp: 97.3  F (36.3  C) 99.1  F (37.3  C)  97.5  F (36.4  C)   TempSrc: Oral Oral  Oral   SpO2: 96%  96% 97%   Weight:       Height:           A&O, NAD  Breathing non-labored on RA  IR drain with green tinged output  Appears well perfused  Soft, NDNT  Distal extremities warm.    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 Patient passed methylene blue challenge for leak, however WBC rising after initiation of CLD. CT esophagram concerning for anastomotic leak. 4/7 EGD with possible stent placement, possible L chest tube.     - Multimodal pain control  - Continue IR drain   - Continue abx fluconazole and zosyn, repeat CBC daily  - Diet: NPO + Cycled TF @ 90  - Anticipate will need tube feeds for >90days for nutritional support    - Ppx: SCD',s, Lovenox  - Dispo pending progress, likely middle of this week    Clinically Significant Risk Factors         # Hyponatremia: Lowest Na = 133 mmol/L in last 2 days, will monitor as appropriate           # Hypertension: Noted on problem list     # Acute Hypercapnic Respiratory Failure: based on arterial blood gas results.  Continue supplemental oxygen and ventilatory support as indicated.   "       # Overweight: Estimated body mass index is 26.54 kg/m  as calculated from the following:    Height as of this encounter: 1.778 m (5' 10\").    Weight as of this encounter: 83.9 kg (184 lb 15.5 oz).        # Financial/Environmental Concerns: none         Alexey Cohen PA-C        Thoracic and Foregut Surgery  Text page Thoracic Surgery via [facility] Paging/Directory  "

## 2025-04-08 NOTE — OP NOTE
Preoperative diagnosis: Anastomotic leak post esophagectomy     Postoperative diagnosis: Same as preoperative diagnosis     Procedure performed:     1.  Esophagogastroduodenoscopy     2.  LEFT tube thoracostomy (20 Fr.)     Surgeon: Erik Lindsey (present and participated in the entire procedure)     Resident Surgeon: Chavez Salomon     Anesthesia: General     EBL: 0     Findings: Anastomosis at 27 cm, widely patent, no defect identified. ~600 ml of serous fluid in the LEFT pleural space.     Description of procedure:     With Bal Junior in the supine position and under general anesthesia we performed an esophagogastroduodenoscopy with the above-mentioned findings.  The remainder of the stomach and duodenum were normal.     We then prepped and draped the LEFT chest wall and made a 1.5 cm anterolateral chest wall incision and easily placed a 20 Fr chest tube into the pleural space with immediate drainage of ~600 ml of serous fluid.     Bal Junior tolerated the procedure well.

## 2025-04-08 NOTE — PLAN OF CARE
"Goal Outcome Evaluation:      Plan of Care Reviewed With: patient, spouse    Overall Patient Progress: improving    Outcome Evaluation: Left basilar CT removed w/o complications, XR completed. Patient anticipating discharge tomorrow.    Neuro: A&Ox4.   Cardiac: SR. VSS. /85 (BP Location: Left arm, Cuff Size: Adult Regular)   Pulse 85   Temp 98.3  F (36.8  C) (Oral)   Resp 22   Ht 1.778 m (5' 10\")   Wt 83.9 kg (184 lb 15.5 oz)   SpO2 95%   BMI 26.54 kg/m     Respiratory: Sating >95 on RA.  GI/: Adequate urine output. BM X1  Diet/appetite: NPO/Tube Feeding. Tube feeding cycle, no nausea.   Activity: Standby assist, up to chair and in halls.  Pain: At acceptable level on current regimen. CT removed, denies any pain. Dilaudid PCA discontinued.   Skin: No new deficits noted.  LDA's: ODIN drain patent w/ 10mL flush Q8H. J tube for meds. Right Port TKOd w/ intermittent abx. Left PIV removed following discontinuation of Dilaudid PCA. CT removed at bedside.      Plan: Continue with POC. Notify primary team with changes. Anticipating discharge tomorrow.      "

## 2025-04-09 ENCOUNTER — HOME INFUSION (OUTPATIENT)
Dept: HOME HEALTH SERVICES | Facility: HOME HEALTH | Age: 70
End: 2025-04-09
Payer: COMMERCIAL

## 2025-04-09 ENCOUNTER — APPOINTMENT (OUTPATIENT)
Dept: GENERAL RADIOLOGY | Facility: CLINIC | Age: 70
DRG: 326 | End: 2025-04-09
Attending: STUDENT IN AN ORGANIZED HEALTH CARE EDUCATION/TRAINING PROGRAM
Payer: MEDICARE

## 2025-04-09 ENCOUNTER — APPOINTMENT (OUTPATIENT)
Dept: OCCUPATIONAL THERAPY | Facility: CLINIC | Age: 70
DRG: 326 | End: 2025-04-09
Attending: STUDENT IN AN ORGANIZED HEALTH CARE EDUCATION/TRAINING PROGRAM
Payer: MEDICARE

## 2025-04-09 ENCOUNTER — HOME INFUSION BILLING (OUTPATIENT)
Dept: HOME HEALTH SERVICES | Facility: HOME HEALTH | Age: 70
End: 2025-04-09
Payer: COMMERCIAL

## 2025-04-09 VITALS
RESPIRATION RATE: 16 BRPM | TEMPERATURE: 99.7 F | BODY MASS INDEX: 25.74 KG/M2 | HEART RATE: 98 BPM | WEIGHT: 179.8 LBS | SYSTOLIC BLOOD PRESSURE: 141 MMHG | HEIGHT: 70 IN | DIASTOLIC BLOOD PRESSURE: 89 MMHG | OXYGEN SATURATION: 98 %

## 2025-04-09 DIAGNOSIS — C16.0 ADENOCARCINOMA OF GASTROESOPHAGEAL JUNCTION (H): Primary | ICD-10-CM

## 2025-04-09 LAB
ANION GAP SERPL CALCULATED.3IONS-SCNC: 10 MMOL/L (ref 7–15)
BUN SERPL-MCNC: 18.3 MG/DL (ref 8–23)
CALCIUM SERPL-MCNC: 8.4 MG/DL (ref 8.8–10.4)
CHLORIDE SERPL-SCNC: 101 MMOL/L (ref 98–107)
CREAT SERPL-MCNC: 0.78 MG/DL (ref 0.67–1.17)
EGFRCR SERPLBLD CKD-EPI 2021: >90 ML/MIN/1.73M2
ERYTHROCYTE [DISTWIDTH] IN BLOOD BY AUTOMATED COUNT: 12.9 % (ref 10–15)
GLUCOSE BLDC GLUCOMTR-MCNC: 113 MG/DL (ref 70–99)
GLUCOSE BLDC GLUCOMTR-MCNC: 124 MG/DL (ref 70–99)
GLUCOSE BLDC GLUCOMTR-MCNC: 143 MG/DL (ref 70–99)
GLUCOSE BLDC GLUCOMTR-MCNC: 144 MG/DL (ref 70–99)
GLUCOSE SERPL-MCNC: 154 MG/DL (ref 70–99)
HCO3 SERPL-SCNC: 22 MMOL/L (ref 22–29)
HCT VFR BLD AUTO: 28.2 % (ref 40–53)
HGB BLD-MCNC: 9.2 G/DL (ref 13.3–17.7)
MAGNESIUM SERPL-MCNC: 1.8 MG/DL (ref 1.7–2.3)
MCH RBC QN AUTO: 28.8 PG (ref 26.5–33)
MCHC RBC AUTO-ENTMCNC: 32.6 G/DL (ref 31.5–36.5)
MCV RBC AUTO: 88 FL (ref 78–100)
PHOSPHATE SERPL-MCNC: 2.2 MG/DL (ref 2.5–4.5)
PLATELET # BLD AUTO: 580 10E3/UL (ref 150–450)
POTASSIUM SERPL-SCNC: 4.1 MMOL/L (ref 3.4–5.3)
RBC # BLD AUTO: 3.19 10E6/UL (ref 4.4–5.9)
SODIUM SERPL-SCNC: 133 MMOL/L (ref 135–145)
WBC # BLD AUTO: 9.9 10E3/UL (ref 4–11)

## 2025-04-09 PROCEDURE — B4035 ENTERAL FEED SUPP PUMP PER D: HCPCS

## 2025-04-09 PROCEDURE — 80048 BASIC METABOLIC PNL TOTAL CA: CPT | Performed by: SURGERY

## 2025-04-09 PROCEDURE — 250N000011 HC RX IP 250 OP 636: Performed by: SURGERY

## 2025-04-09 PROCEDURE — 250N000011 HC RX IP 250 OP 636

## 2025-04-09 PROCEDURE — 83735 ASSAY OF MAGNESIUM: CPT | Performed by: SURGERY

## 2025-04-09 PROCEDURE — 97530 THERAPEUTIC ACTIVITIES: CPT | Mod: GO

## 2025-04-09 PROCEDURE — B9002 ENTER NUTR INF PUMP ANY TYPE: HCPCS | Mod: RR

## 2025-04-09 PROCEDURE — B4152 EF CALORIE DENSE>/=1.5KCAL: HCPCS

## 2025-04-09 PROCEDURE — 84100 ASSAY OF PHOSPHORUS: CPT | Performed by: STUDENT IN AN ORGANIZED HEALTH CARE EDUCATION/TRAINING PROGRAM

## 2025-04-09 PROCEDURE — 250N000013 HC RX MED GY IP 250 OP 250 PS 637: Performed by: STUDENT IN AN ORGANIZED HEALTH CARE EDUCATION/TRAINING PROGRAM

## 2025-04-09 PROCEDURE — 71045 X-RAY EXAM CHEST 1 VIEW: CPT | Mod: 26 | Performed by: RADIOLOGY

## 2025-04-09 PROCEDURE — B4155 EF INCOMPLETE/MODULAR: HCPCS

## 2025-04-09 PROCEDURE — A4213 20+ CC SYRINGE ONLY: HCPCS

## 2025-04-09 PROCEDURE — 85014 HEMATOCRIT: CPT | Performed by: SURGERY

## 2025-04-09 PROCEDURE — 71045 X-RAY EXAM CHEST 1 VIEW: CPT

## 2025-04-09 PROCEDURE — 250N000013 HC RX MED GY IP 250 OP 250 PS 637: Performed by: SURGERY

## 2025-04-09 PROCEDURE — 250N000011 HC RX IP 250 OP 636: Performed by: STUDENT IN AN ORGANIZED HEALTH CARE EDUCATION/TRAINING PROGRAM

## 2025-04-09 PROCEDURE — 36591 DRAW BLOOD OFF VENOUS DEVICE: CPT | Performed by: SURGERY

## 2025-04-09 PROCEDURE — 250N000013 HC RX MED GY IP 250 OP 250 PS 637

## 2025-04-09 PROCEDURE — A4450 NON-WATERPROOF TAPE: HCPCS

## 2025-04-09 RX ORDER — DEXTROMETHORPHAN POLISTIREX 30 MG/5ML
30 SUSPENSION ORAL
Status: DISCONTINUED | OUTPATIENT
Start: 2025-04-09 | End: 2025-04-09 | Stop reason: HOSPADM

## 2025-04-09 RX ORDER — FLUCONAZOLE 40 MG/ML
100 POWDER, FOR SUSPENSION ORAL DAILY
Qty: 12.5 ML | Refills: 0 | Status: SHIPPED | OUTPATIENT
Start: 2025-04-09 | End: 2025-04-14

## 2025-04-09 RX ORDER — ACETAMINOPHEN 325 MG/1
650 TABLET ORAL EVERY 6 HOURS
COMMUNITY
Start: 2025-04-09

## 2025-04-09 RX ORDER — HEPARIN SODIUM (PORCINE) LOCK FLUSH IV SOLN 100 UNIT/ML 100 UNIT/ML
5-10 SOLUTION INTRAVENOUS
Status: DISCONTINUED | OUTPATIENT
Start: 2025-04-09 | End: 2025-04-09 | Stop reason: HOSPADM

## 2025-04-09 RX ORDER — GABAPENTIN 300 MG/1
300 CAPSULE ORAL 3 TIMES DAILY
Status: SHIPPED
Start: 2025-04-09

## 2025-04-09 RX ORDER — AMINO AC/PROTEIN HYDR/WHEY PRO 10G-100/30
90 LIQUID (ML) ORAL 2 TIMES DAILY
Qty: 5400 ML | Refills: 11 | Status: DISPENSED | OUTPATIENT
Start: 2025-04-09 | End: 2025-04-14

## 2025-04-09 RX ORDER — LIDOCAINE 4 G/G
1 PATCH TOPICAL EVERY 24 HOURS
Qty: 4 PATCH | Refills: 0 | Status: SHIPPED | OUTPATIENT
Start: 2025-04-09

## 2025-04-09 RX ORDER — DEXTROMETHORPHAN POLISTIREX 30 MG/5ML
30 SUSPENSION ORAL
COMMUNITY
Start: 2025-04-09

## 2025-04-09 RX ORDER — NORTRIPTYLINE HYDROCHLORIDE 25 MG/1
25 CAPSULE ORAL EVERY EVENING
COMMUNITY
Start: 2025-04-09

## 2025-04-09 RX ORDER — AMOXICILLIN 250 MG
1 CAPSULE ORAL 2 TIMES DAILY
Qty: 14 TABLET | Refills: 0 | Status: SHIPPED | OUTPATIENT
Start: 2025-04-09

## 2025-04-09 RX ORDER — AMINO ACIDS/PROTEIN HYDROLYS 11G-40/45
60 LIQUID IN PACKET (ML) ORAL 2 TIMES DAILY
Status: SHIPPED
Start: 2025-04-09 | End: 2025-04-14 | Stop reason: ALTCHOICE

## 2025-04-09 RX ORDER — ATENOLOL 50 MG/1
50 TABLET ORAL EVERY EVENING
Status: SHIPPED
Start: 2025-04-09

## 2025-04-09 RX ORDER — GUAIFENESIN 600 MG/1
15 TABLET, EXTENDED RELEASE ORAL DAILY
Qty: 450 ML | Refills: 0 | Status: SHIPPED | OUTPATIENT
Start: 2025-04-10 | End: 2025-05-10

## 2025-04-09 RX ORDER — METHOCARBAMOL 750 MG/1
750 TABLET, FILM COATED ORAL EVERY 6 HOURS PRN
Qty: 20 TABLET | Refills: 0 | Status: SHIPPED | OUTPATIENT
Start: 2025-04-09

## 2025-04-09 RX ORDER — FLUCONAZOLE 2 MG/ML
400 INJECTION, SOLUTION INTRAVENOUS EVERY 24 HOURS
Status: SHIPPED
Start: 2025-04-09 | End: 2025-04-09

## 2025-04-09 RX ORDER — ELECTROLYTES/DEXTROSE
1422 SOLUTION, ORAL ORAL DAILY
Qty: 42660 ML | Refills: 11 | Status: DISPENSED | OUTPATIENT
Start: 2025-04-09 | End: 2025-04-14 | Stop reason: ALTCHOICE

## 2025-04-09 RX ORDER — CYANOCOBALAMIN (VITAMIN B-12) 500 MCG
500 TABLET ORAL
COMMUNITY
Start: 2025-04-09

## 2025-04-09 RX ORDER — PIPERACILLIN SODIUM, TAZOBACTAM SODIUM 3; .375 G/15ML; G/15ML
3.38 INJECTION, POWDER, LYOPHILIZED, FOR SOLUTION INTRAVENOUS EVERY 6 HOURS
Status: SHIPPED
Start: 2025-04-09 | End: 2025-04-09

## 2025-04-09 RX ORDER — OXYCODONE HCL 5 MG/5 ML
5-10 SOLUTION, ORAL ORAL EVERY 4 HOURS PRN
Qty: 100 ML | Refills: 0 | Status: SHIPPED | OUTPATIENT
Start: 2025-04-09

## 2025-04-09 RX ORDER — GABAPENTIN 300 MG/1
300 CAPSULE ORAL 3 TIMES DAILY
Status: CANCELLED | OUTPATIENT
Start: 2025-04-09

## 2025-04-09 RX ORDER — ACETAMINOPHEN 325 MG/1
650 TABLET ORAL EVERY 6 HOURS
Status: CANCELLED | COMMUNITY
Start: 2025-04-09 | End: 2025-05-09

## 2025-04-09 RX ORDER — MAGNESIUM SULFATE HEPTAHYDRATE 40 MG/ML
2 INJECTION, SOLUTION INTRAVENOUS ONCE
Status: COMPLETED | OUTPATIENT
Start: 2025-04-09 | End: 2025-04-09

## 2025-04-09 RX ORDER — HEPARIN SODIUM,PORCINE 10 UNIT/ML
5-15 VIAL (ML) INTRAVENOUS EVERY 24 HOURS
Status: DISCONTINUED | OUTPATIENT
Start: 2025-04-09 | End: 2025-04-09 | Stop reason: HOSPADM

## 2025-04-09 RX ORDER — AMOXICILLIN AND CLAVULANATE POTASSIUM 400; 57 MG/5ML; MG/5ML
875 POWDER, FOR SUSPENSION ORAL 2 TIMES DAILY
Qty: 109.4 ML | Refills: 0 | Status: SHIPPED | OUTPATIENT
Start: 2025-04-09 | End: 2025-04-14

## 2025-04-09 RX ORDER — DEXTROMETHORPHAN POLISTIREX 30 MG/5ML
30 SUSPENSION ORAL ONCE
Status: COMPLETED | OUTPATIENT
Start: 2025-04-09 | End: 2025-04-09

## 2025-04-09 RX ORDER — NORTRIPTYLINE HYDROCHLORIDE 10 MG/1
20 CAPSULE ORAL EVERY EVENING
Status: CANCELLED
Start: 2025-04-09

## 2025-04-09 RX ORDER — POLYETHYLENE GLYCOL 3350 17 G/17G
17 POWDER, FOR SOLUTION ORAL DAILY
Qty: 7 PACKET | Refills: 0 | Status: SHIPPED | OUTPATIENT
Start: 2025-04-10 | End: 2025-04-09

## 2025-04-09 RX ORDER — BENZONATATE 100 MG/1
100 CAPSULE ORAL ONCE
Status: DISCONTINUED | OUTPATIENT
Start: 2025-04-09 | End: 2025-04-09

## 2025-04-09 RX ADMIN — HEPARIN, PORCINE (PF) 10 UNIT/ML INTRAVENOUS SYRINGE 5 ML: at 15:56

## 2025-04-09 RX ADMIN — PIPERACILLIN AND TAZOBACTAM 3.38 G: 3; .375 INJECTION, POWDER, LYOPHILIZED, FOR SOLUTION INTRAVENOUS at 15:14

## 2025-04-09 RX ADMIN — GABAPENTIN 300 MG: 300 CAPSULE ORAL at 09:12

## 2025-04-09 RX ADMIN — ACETAMINOPHEN 650 MG: 325 TABLET, FILM COATED ORAL at 09:39

## 2025-04-09 RX ADMIN — PIPERACILLIN AND TAZOBACTAM 3.38 G: 3; .375 INJECTION, POWDER, LYOPHILIZED, FOR SOLUTION INTRAVENOUS at 07:58

## 2025-04-09 RX ADMIN — INSULIN ASPART 1 UNITS: 100 INJECTION, SOLUTION INTRAVENOUS; SUBCUTANEOUS at 08:06

## 2025-04-09 RX ADMIN — MAGNESIUM SULFATE HEPTAHYDRATE 2 G: 2 INJECTION, SOLUTION INTRAVENOUS at 09:13

## 2025-04-09 RX ADMIN — ACETAMINOPHEN 650 MG: 325 TABLET, FILM COATED ORAL at 04:09

## 2025-04-09 RX ADMIN — INSULIN ASPART 1 UNITS: 100 INJECTION, SOLUTION INTRAVENOUS; SUBCUTANEOUS at 00:33

## 2025-04-09 RX ADMIN — Medication 60 ML: at 08:03

## 2025-04-09 RX ADMIN — Medication 15 ML: at 08:01

## 2025-04-09 RX ADMIN — ENOXAPARIN SODIUM 40 MG: 40 INJECTION SUBCUTANEOUS at 12:39

## 2025-04-09 RX ADMIN — Medication 40 MG: at 08:08

## 2025-04-09 RX ADMIN — PIPERACILLIN AND TAZOBACTAM 3.38 G: 3; .375 INJECTION, POWDER, LYOPHILIZED, FOR SOLUTION INTRAVENOUS at 01:30

## 2025-04-09 RX ADMIN — FLUCONAZOLE 400 MG: 2 INJECTION, SOLUTION INTRAVENOUS at 12:54

## 2025-04-09 RX ADMIN — POTASSIUM & SODIUM PHOSPHATES POWDER PACK 280-160-250 MG 1 PACKET: 280-160-250 PACK at 08:02

## 2025-04-09 RX ADMIN — DEXTROMETHORPHAN POLISTIREX 30 MG: 30 SUSPENSION ORAL at 01:21

## 2025-04-09 RX ADMIN — POTASSIUM & SODIUM PHOSPHATES POWDER PACK 280-160-250 MG 1 PACKET: 280-160-250 PACK at 12:39

## 2025-04-09 ASSESSMENT — ACTIVITIES OF DAILY LIVING (ADL)
ADLS_ACUITY_SCORE: 40

## 2025-04-09 NOTE — PROGRESS NOTES
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 .     Other Info: Patient previously on service for 5FU through chemo clinic. Chemo plan TBD.     Bibi Turner RN 04/09/25

## 2025-04-09 NOTE — PLAN OF CARE
"Goal Outcome Evaluation:          /80 (BP Location: Left arm)   Pulse 97   Temp 99.1  F (37.3  C) (Oral)   Resp 18   Ht 1.778 m (5' 10\")   Wt 81.6 kg (179 lb 12.8 oz)   SpO2 96%   BMI 25.80 kg/m      A+Ox4  HR regular  LS clear-IS encouraged.  +BS, loose BMs d/t TF.  Voiding spontaneously  NPO, cyclic TF. Flush J tube for patency  Rt upper back ODIN to bulb suction, flushed 10ml NS q8hrs. Old chest tube sit with intact dressing.  Rt chest port infusing at tko for IV abx.  Pain controlled with scheduled pain meds.  Lap sites with intact steri-strips-HEATHER.  UAL to bathroom.  No acute changes noted this shift.     Plan:  to discharge on TF with Home infusion      "

## 2025-04-09 NOTE — PROGRESS NOTES
Care Management Discharge Note    Discharge Date: 04/11/2025       Discharge Disposition: Home with services    Discharge Services: Home Infusion    [facility](TF)  Phone: [phone]  Fax: [phone]    Discharge DME: NA    Discharge Transportation: family or friend will provide    Private pay costs discussed: Not applicable    Does the patient's insurance plan have a 3 day qualifying hospital stay waiver?  No    PAS Confirmation Code: NA  Patient/family educated on Medicare website which has current facility and service quality ratings: yes    Education Provided on the Discharge Plan: yes   Persons Notified of Discharge Plans: patient  Patient/Family in Agreement with the Plan:yes      Handoff Referral Completed: Yes, non-[facility] PCP: External handoff communication completed    Additional Information:  Received update from thoracic team that patient is medically stable for discharge to home today.  Patient will discharge on enteral nutrition via the J tube.  Elma Pennington, updated and she will connect with patient and family to provide TF administration education. TF and supplies will be delivered to the patient's home.  RNCC will remain available if further needs arise.     ANALIA Stroud  Phone: [phone]   Med Surg Vocera  Nurse Coordinator             done

## 2025-04-09 NOTE — PROGRESS NOTES
Admitted/transferred from:   2 RN full   skin assessment completed by Ashley Hartman, RN and Sharad BURNS RN.  Skin assessment finding: issues found 5 lap sites, PIV, J tube and ODIN drain on R mid back    Interventions/actions: skin interventions reposition in bed independently      Bedside Emergency Equipment Present:  Suction Regulator: Yes  Suction Canister: Yes  Tubing between Regulator and Canister: Yes  O2 Regulator with Tree: Yes  Ambu Bag: Yes

## 2025-04-09 NOTE — PROGRESS NOTES
Transfer  Transferred to: 7B  Via:bed  Reason for transfer:Pt no longer appropriate for 6B- improved  Family: Aware of transfer  Belongings: Packed and sent with pt  Chart: Delivered with pt to next unit  Medications: Meds sent to new unit with pt  Report given to: DRE RN  Pt status: a&o 4, VSS

## 2025-04-09 NOTE — DISCHARGE SUMMARY
NAME: Bal Junior   MRN: [id number]   : 1955     DATE OF ADMISSION: 3/24/2025     PRE/POSTOPERATIVE DIAGNOSES: Esophageal cancer      PROCEDURES PERFORMED:   3/24/25  Esophagogastroscopy  Laparoscopic gastric conduit preparation and abdominal lymphadenectomy  Botulinum toxin injection into pylorus  RIGHT thoracoscopic esophageal mobilization with mediastinal lymphadenectomy and intrathoracic anastomosis  LEFT tube thoracostomy  Flexible bronchoscopy  LYSIS OF ADHESIONS 60 MIN    PATHOLOGY RESULTS: Pending     CULTURE RESULTS: None      INTRAOPERATIVE COMPLICATIONS: None      POSTOPERATIVE MEDICAL ISSUES: Anastomotic leak    DRAINS/TUBES PRESENT AT DISCHARGE: dressing changes, J tube, and ODIN bulb    DATE OF DISCHARGE:  25    HOSPITAL COURSE: Bal Junior is a 70 year old male who on 3/24/2025 underwent a minimally invasive esophagectomy and associated procedures as described above.  He tolerated the operation well and postoperatively was transferred to the intensive care unit. He was transferred to the floor on 3/25/25.    His course was complicated by the development of an anastomotic leak. On 3/28/25 he underwent upper endoscopy and pharyngostomy tube replacement. A methylene blue challenge was negative for leak however WBC began to rise with initiation of a clear liquid diet.     On 4/3/25 CT demonstrated extravasation of contrast into the pleural space near the anastomosis, and a loculated pleural effusion indicative of empyema and on 25 an 8F right pleural drain was placed by interventional radiology.     On 25 he was brought back to the OR for esophagogastroduodenoscopy and left tube thoracostomy. Around 600 ml of serous fluid was drained from the left pleural space and a 20 fr chest tube remained in place.     Prior to discharge, his pain was controlled well, he was able to perform ADLs and ambulate independently without difficulty, and had full return of bowel and bladder function.  On  04/09/25, he was discharged to home in stable condition with ODIN drain and J tube in place.    DISCHARGE EXAM:   A&O, NAD  Resp non-labored  Distal extremities warm    Incisions CDI     DISCHARGE INSTRUCTIONS:  Discharge Procedure Orders   Home Infusion Referral   Referral Priority: Routine: Next available opening Referral Type: Consultation   Number of Visits Requested: 1     Home Infusion Referral   Referral Priority: Routine: Next available opening Referral Type: Consultation   Number of Visits Requested: 1     Reason for your hospital stay   Order Comments: Surgery     Activity   Order Comments: Your activity upon discharge: As outlined in discharge instructions     Order Specific Question Answer Comments   Is discharge order? Yes      Tubes and Drains   Order Comments: Current Tubes and Drains:     CVC Line  Duration           Port A Cath Single 10/24/24 Right Chest wall 166 days        Drain  Duration           GI Enteral Access Device LLQ -- days      Drain Closed/Suction 1 Right Back Bulb 8 Malaysian 5 days    J TUBE INSTRUCTIONS:     RN PLEASE PROVIDE THE PATIENT WITH 2 60ml EN-FIT syringes for flushing his J tube, as well as several 2x2 split gauze for the J tube dressing    Flush your feeding tube with 30cc of water;  1. After medication administration through the feeding tube  2. Before and after tube feeds  3. Every 8 hours while awake if you have not used the tube during that time      -If possible take medications by mouth or as solution via j tube (Do not put crushed pills through the tube if possible)     -Change the gauze around your tube daily or more often if soiled    -KEEP A FLEXI-TRACK (or other tube retention device) on your tube at all times to prevent incidental removal.    Drain Instructions  DRAIN INSTRUCTIONS  *Empty the drain contents daily (or more often if necessary if the bulb fills).  Record the drainage volume daily until seen in follow up, which will assist with the decision for  removal.  Call the thoracic team regularly (i.e F) at [phone] to keep us informed as to the volume of output.     If the output is less than 30cc per day for 3 days, we will likely make an appointment for drain removal.     Keep Flexi-track on your tube at all times for additional securement.  There should be a small amount of slack between the Flexi-track and insertion site.  This will help to ensure the tube is not inadvertently removed.     Order Specific Question Answer Comments   If tubes and drains present: Continue at discharge      ADULT [facility] Specialty Follow-up and recommended labs and tests   Order Comments: 1.) Follow up with Domenica Wing in 1-2 weeks.  2.) Follow up with Katlyn Tobar MD will be arranged and the Thoracic office will contact you to set up the appointment.  You may call [phone] if you wish to schedule before you are contacted.            Appointments on [facility] and/or [facility] (with [facility] or [facility] provider or service). Call [phone] if you haven't heard regarding these appointments within 7 days of discharge.     Discharge Instructions   Order Comments: -DIET: NPO, nothing by mouth. J-tube feeds for nutrition.     __________________________________________________________________________    You will continue tube feeds until follow up.      Follow instructions provided by the dietician and the speech therapist    Sleep with the head of your bed elevated to help prevent reflux, which is common after esophagectomy.  In addition, you should remain in an upright position for 1-2 hours after meals.     If your travel plans upon discharge include prolonged periods of sitting (a lengthy car or plane ride), it is highly beneficial to get up and walk at least once per hour to help prevent swelling and blood clots.     You may remove chest tube dressing 48 hours after tube removal and bandage the site at your own discretion thereafter.  Small amounts of leakage  "are normal for 2-3 days after removal.  Feel free to call with questions.    Do not submerge, soak, or scrub incision or swim until seen in follow-up.    Take incentive spirometer home for continued frequent use    Activity as tolerated, no strenous activity until seen in follow-up, no lifting greater than 10 pounds for the next 2-4 weeks.    Stay hydrated. Take over the counter fiber (metamucil or benefiber) and stool softeners (Miralax, docusate or senna) if becoming constipated.     Call for fever greater than 101.5, chills, increased size of incision, red skin around incision, vision changes, muscle strength changes, sensation changes, shortness of breath, or other concerns.    No driving while taking narcotic pain medication.    Transition to ibuprofen or tylenol/acetaminophen for pain control. Do not take tylenol/acetaminophen and acetaminophen containing narcotic (e.g., percocet or vicodin) at the same time. If you have known ulcer problems, or kidney trouble (elevated creatinine) do not take the ibuprofen.    In emergencies, call 911    For other Questions or Concerns;   A.) During weekday working hours (Monday through Friday 8am to 4:30pm)   call [phone] and ask to speak to a clinical nurse specialist.     B.) At nights (after 4:30pm), on weekends, or if urgent call [phone] and   tell the  \"I would like to page job code [phone], the thoracic surgery   fellow on call, please.\"     Diet   Order Comments: Follow this diet upon discharge: Nothing by mouth. J tube feeds for nutrition as outlined by dietition.    J Tube Feeds:  Enteral Formula: Osmolite 1.5 + Prosource TF  Rate/Frequency: Osmolite 1.5 @ 90 mL/hr x 16 hours. + Prosource TF 2 pkts BID  Water Flushes: 30 ml before and after cycled feeds and Prosource TF. 30 mL 6x per day for tube patency.   Total provisions: 2290 kcal and 133 gm protein.     Order Specific Question Answer Comments   Is discharge order? Yes        DISCHARGE " MEDICATIONS:   Discharge Medication List as of 4/9/2025  3:28 PM        START taking these medications    Details   amoxicillin-clavulanate (AUGMENTIN) 400-57 MG/5ML suspension Place 10.94 mLs (875 mg) into Feeding Tube 2 times daily for 5 days., Disp-109.4 mL, R-0, E-Prescribe      dextromethorphan (DELSYM) 30 MG/5ML liquid Take 5 mLs (30 mg) by mouth once as needed for cough., OTC      fluconazole (DIFLUCAN) 40 MG/ML suspension Place 2.5 mLs (100 mg) into Feeding Tube daily for 5 days., Disp-12.5 mL, R-0, E-Prescribe      Lidocaine (LIDOCARE) 4 % Patch Place 1 patch over 12 hours onto the skin every 24 hours. To prevent lidocaine toxicity, patient should be patch free for 12 hrs daily.Disp-4 patch, [id number]      methocarbamol (ROBAXIN) 750 MG tablet Place 1 tablet (750 mg) into J tube every 6 hours as needed for muscle spasms., Disp-20 tablet, R-0, E-Prescribe      multivitamins w/minerals liquid Place 15 mLs into Feeding Tube daily., Disp-450 mL, R-0, E-Prescribe      oxyCODONE (ROXICODONE) 5 MG/5ML solution Place 5-10 mLs (5-10 mg) into J tube every 4 hours as needed for severe pain., Disp-100 mL, R-0, E-Prescribe      Prosource TF20 ENfit Compatibl EN LIQD (PROSOURCE TF20) packet Place 60 mLs into Feeding Tube 2 times daily., No Print Out      senna-docusate (SENOKOT-S/PERICOLACE) 8.6-50 MG tablet Take 1 tablet by mouth or Feeding Tube 2 times daily., Disp-14 tablet, R-0, E-Prescribe           CONTINUE these medications which have CHANGED    Details   acetaminophen (TYLENOL) 325 MG tablet Place 2 tablets (650 mg) into Feeding Tube every 6 hours., OTC      atenolol (TENORMIN) 50 MG tablet Place 1 tablet (50 mg) into Feeding Tube every evening., No Print Out      cyanocobalamin (VITAMIN B-12) 500 MCG tablet Place 1 tablet (500 mcg) into Feeding Tube., Historical      gabapentin (NEURONTIN) 300 MG capsule Place 1 capsule (300 mg) into Feeding Tube 3 times daily., No Print Out      nortriptyline (PAMELOR) 25  MG capsule Place 1 capsule (25 mg) into J tube every evening., Historical           CONTINUE these medications which have NOT CHANGED    Details   EPINEPHrine (ANY BX GENERIC EQUIV) 0.3 MG/0.3ML injection 2-pack Historical      !! Osmolite 1.5 [place] 237 mL Place 1,422 mLs into J tube daily. Infuse via pump. 90 mL/hr x 16 hours (6 cartons)  Water flush: 30 mL before and after tube feed cycle. Additional 30 mL 6x per day.   Kcals: 2130, Disp-81403 mL, R-11, Normal      !! Prosource TF PO LIQD (PROSOURCE TF) Place 90 mLs into J tube 2 times daily. Infuse via syringe  Water flush: 30 mL before and after each packet   Kcals: 160, Disp-5400 mL, R-11, Normal       !! - Potential duplicate medications found. Please discuss with provider.        STOP taking these medications       fluconazole (DIFLUCAN) 400-0.9 MG/200ML-% infusion Comments:   Reason for Stopping:         piperacillin-tazobactam (ZOSYN) 3-0.375 GM vial Comments:   Reason for Stopping:         polyethylene glycol (MIRALAX) 17 g packet Comments:   Reason for Stopping:

## 2025-04-09 NOTE — PLAN OF CARE
Goal Outcome Evaluation:      Plan of Care Reviewed With: patient    Overall Patient Progress: improvingOverall Patient Progress: improving         Neuro: A&Ox4, no deficits noted  Respiratory: sating > 98 on RA, LS clear, dyspnea on exertion, productive cough noted  Cardiac: WDL, denies chest pian  Diet: NPO, TF running @ 90ml/hr  GI/: audible BS, LBM 4/8, good urine output   Incision/Drains: J tube, ODIN drain to bulb suction w/ 10 ml flush Q8, R port TKO   Skin: old lap sites covered w/ steri strips, L old chest tube site covered with dressing CDI  Pain: manageable with scheduled tylenol  Labs: reviewed  Activity: SBA      New Changes this shift: one time dose of Delsym per J tube given for cough w/ relief   Plan: discharge 4/9 to home

## 2025-04-09 NOTE — DISCHARGE SUMMARY
Discharge time/date: 1620, 4/9/2025  Walked or Wheelchair: Wheelchair accompanied by his wife  PIV removed: Port heparinized and de accessed.  Reviewed AVS with patient: yes  Medication due times added to AVS in EPIC: yes  Verbalized understanding of discharge with teachback: yes, discharge education on how to manage ODIN.  Medications retrieved from pharmacy: yes  Supplies sent home: N/A, pt will be getting tube feed with Home infusion  Belongings from security with patient: N/A

## 2025-04-09 NOTE — PROGRESS NOTES
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  Feeding tube type: J-Tube  Date Placed: 02/26/25    Current Nutrition Orders  Oral Diet: NPO    Enteral Nutrition Orders  Enteral Formula: Osmolite 1.5 + Prosource TF  Rate/Frequency: Osmolite 1.5 @ 90 mL/hr x 16 hours. + Prosource TF 2 pkts BID  Water Flushes: 30 ml before and after cycled feeds and Prosource TF. 30 mL 6x per day for tube patency.  Enteral Nutrition Provides: 6 cartons Osmolite 1.5 provides 2130 kcal, 89 gm protein, and 1086 mL free water. + 4 pkts Prosource TF provides 160 kcal and 44 gm protein. Total provisions: 2290 kcal and 133 gm protein.    Assessed Nutritional Needs  Kcal Needs: 1517-2840 kcal (25-30 kcal/kg) maintenenace  Protein Needs: 120-165 gm protein (1.5-2 gm/kg) Increased needs post-op  Fluid Needs: 1 mL/kcal maintenance  Nutrition Support is meeting what percent of needs: 100%    Pertinent Medications  Medications: Recieved Banatrol 1 pkt TID while inpt, not available at [facility]    Implementation  Intervention: SOC, cycled feeds and Prosource TF.    Plan  Follow up/Recommendations: Monitor tolerance of current formula, Bal received banatrol while inpt which is not available at [facility].    Kavitha Kwan RD, [facility]  Clinical Dietitian  [facility]   [phone]  [facility] Main Line: [phone]     "

## 2025-04-09 NOTE — PROGRESS NOTES
Met with pt and his wife at bedside and went over teaching of enteral feeding and how to set up backpack, Infinity pump, and feeding bag. Demonstrated how to set up and wife set up after demonstration and did well. Went over discharge order . Along with 30ml of water flush every 4 hours. . This Enteral Nurse Liaison provided Education on Enteral Therapy, including bag/air removal, feeding rate setup, priming, feeding tube flushing and backpack setup.  Encouraged to call I for any questions, clarifications or problems.  Educated on Deliveries, importance of refrigerating open formula. Informed them of the supplies that will be delivered to the to his home. Pt's wife verbalized that she is overwhelmed but pt states he is understanding everything and will assist his wife with all the steps that need to be done. Bal demonstrated the steps of how to set up tube feeding. Also went over with them how to administer medication-1 medication at a time with water flushes in between.  Went over folder that will be in with the supplies asked for them to look through the folder and look over service agreement when get home and sign and send back to [facility].  Went over when to contact I/provider with them.  Pt feel comfortable with being independent with administering TF. Asked pt if he feels comfortable with self-administration of TF and he stated again he is comfortable. Encouraged for them to contact [facility] with any questions or concerns.      DISCHARGE DATE: 04/09/25 (SOC)  THERAPY: Enteral Only  DRUG/ DELIVERY MODE: Delivery to pt's home on day of discharge.  FIRST HOME DOSE DUE: 1800  DELIVERY: Delivery to pt's home today  SUPPLIES: 2x2 gauze,  feeding bags, backpack, Ininity pump, COIN[employer] Standard 104, flexi trak, 60ml syringes, medication syringes, med cups, y connectors.  LINE/TUBE: PEJ  SNV: NA  I FOLLOWING PROVIDER:  Dr. Domenica Boss  90 DAY JOVANNY NOTE: : Yes, has been documented  OTHER:   Thank you for the  referral.    Elma Mendez LPN  Enteral Nurse Liaison [facility]  [address] [place] [address]  [phone]

## 2025-04-09 NOTE — PROGRESS NOTES
Antimicrobial Stewardship Team Note    Antimicrobial Stewardship Program - A joint venture between [facility] and  Physicians to optimize antibiotic management.  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     History of Present Illness: The patient received and completed perioperative cefazolin on 3/24. On 3/26, the patient developed leukocytosis (WBC 12.6) and lactate was elevated (3.7). An x-ray of the chest was obtained on 3/26 which revealed no appreciable pneumothorax and mildly increased streaky right basilar opacities. Leukocytosis resolved the following day prior to antibiotic initiation.   On 3/27, the patient was initiated on fluconazole and piperacillin/tazobactam due to concern for an esophageal leak. CT of the chest, abdomen, and pelvis on 3/28 revealed post-surgical changes of esophagectomy with likely post-op inflammation along the esophagogastric anastomosis, and small post-op pneumomediastinum and intra-abdominal free air, small left pneumothorax, and linear, basilar pre-dominant consolidative opacities and interlobular septal thickening (noted to likely represent atelectasis/pulmonary edema in the post-op setting). An EGD was performed on 3/28.   On 4/2, the patient passed methylene blue challenge for leak and antibiotics were discontinued. CT of esophagus on 4/3 revealed esophageal extravasation into the pleural space and Zosyn and Flagyl were reinitiated. Due to concerns over adequate drainage of anastomotic leak, a drain catheter was placed 4/4. On 4/7, patient underwent  esophagogastroduodenoscopy with left chest tube placement and drainage of approximately 600 mL serous fluid from left pleural space. Post-op, patient continues to remain afebrile and vitally stable with a resolution of leukocytosis (WBC 9.9). Currently, he remains on Zosyn and fluconazole.         Active Anti-infective Medications   (From admission, onward)                 Start     Stop    04/03/25 1300  fluconazole  400 mg,   Intravenous,   100 mL/hr,   EVERY 24 HOURS        Fungal Infection Prophylaxis       --    04/03/25 1300  piperacillin-tazobactam  3.375 g,   Intravenous,   EVERY 6 HOURS        Skin and Soft Tissue Infection       --                  Assessment: Esophageal perforation  Since the patient's acute leukocytosis on 3/26, the patient has remained afebrile, largely normotensive, and overall clinically stable aside from an additional rise in WBC on 4/3. Additionally, the patient's WBC spike on 3/26 improved prior to the first dose of piperacillin/tazobactam and fluconazole which is reassuring for lack of infection. Also, resolution of WBC following chest tube placement and fluid pleural drainage provides evidence that the pleural fluid was causing leukocytosis. Therefore, we recommend reconsidering the use of ongoing antimicrobials.   If anastomotic leak necessitating antibiotic therapy has resolved following 4/7 procedure, discontinuation of Zosyn and fluconazole would be appropriate. If the leak has not resolved or a concern for infection persists, and antibiotics are deemed necessary, deescalation of piperacillin/tazobactam is recommended. As the patient does not have history of Pseudomonal colonization, ampicillin/sulbactam would be a suitable alternative for its Gram negative and anaerobic coverage. Indications for fluconazole are currently low as the patient is immunocompetent and suspicion for candidiasis is low. Thus, we recommend stopping fluconazole therapy.     Recommendations:  If  anastomotic leak has resolved, discontinue Zosyn and fluconazole   If anastomotic leak persists or if concern for infection persists, deescalate Zosyn + fluconazole to Unasyn  Augmentin would be a suitable alternative in the case the patient discharges while still on antibiotics    Pharmacy took the following actions: Called/paged provider, Electronic note created.    Discussed with ID Staff ASHLEY Prince and Navjot Barron, PharmD, BCIDP  Leonela Joseph, P4 pharmacy student    Vital Signs/Clinical Features:  Vitals         04/07 0700  04/08 0659 04/08 0700  04/09 0659 04/09 0700  04/09 1349   Most Recent      Temp ( F) 97.3 -  99.1    97.3 -  98.5       98.5 (36.9) 04/09 0543    Pulse 80 -  97    85 -  103       90 04/08 2100    Resp 15 -  28    18 -  28       18 04/09 0543    /80 -  143/80    124/83 -  149/91       124/83 04/09 0543    SpO2 (%) 95 -  100    94 -  98       96 04/09 0544            Labs  Estimated Creatinine Clearance: 103.2 mL/min (based on SCr of 0.78 mg/dL).  Recent Labs   Lab Test 04/04/25  0445 04/05/25  0456 04/06/25  0355 04/07/25  0323 04/08/25  0521 04/09/25  0429   CR 0.83 0.84 0.83 0.83 0.86 0.78       Recent Labs   Lab Test 04/04/25  0445 04/05/25  0456 04/06/25  0355 04/07/25  0323 04/08/25  0521 04/09/25  0429   WBC 15.4* 12.0* 12.4* 11.6* 12.0* 9.9   HGB 9.8* 9.4* 9.2* 9.6* 9.4* 9.2*   HCT 30.0* 29.3* 27.8* 29.4* 28.9* 28.2*   MCV 89 89 88 90 88 88   * 521* 543* 639* 665* 580*       Recent Labs   Lab Test 07/23/24  0856 10/31/24  0829 11/13/24  0921 11/27/24  0734 12/11/24  0757 12/16/24  1646 02/20/25  1411 03/17/25  0938   BILITOTAL 0.5 0.4 0.2 0.2 0.3 0.3  --   --    ALKPHOS 72 81 75 117 118 183*  --   --    ALBUMIN 4.1 3.9 3.7 3.5 3.3* 3.3* 3.9 4.0   AST 14 20 20 27 25 25  --   --    ALT <5 12 18 27 17 25  --   --        Recent Labs   Lab Test 09/26/20  0225 09/26/20  0550 03/24/25  1153 03/24/25  1236 03/24/25  1339 03/24/25  1613 03/26/25  1041 03/26/25  1309  04/05/25  2353   PCAL 0.21  --   --   --   --   --   --   --   --    LACT  --    < > 2.3* 2.7* 2.8* 3.0* 3.7* 2.4*  --    CRPI  --   --   --   --   --   --   --   --  108.00*    < > = values in this interval not displayed.             Culture Results:  7-Day Micro Results       Procedure Component Value Units Date/Time    C. difficile Toxin B PCR with reflex to C. difficile EIA [[id number]]  (Normal) Collected: 04/03/25 0928    Order Status: Completed Lab Status: Final result Updated: 04/03/25 1403    Specimen: Stool from Per Rectum      C Difficile Toxin B by PCR Negative     Comment: A negative result does not exclude actual disease due to C. difficile and may be due to improper collection, handling and storage of the specimen or the number of organisms in the specimen is below the detection limit of the assay.       Narrative:      The [employer] Xpert C. difficile Assay, performed on the [employer]  Instrument Systems, is a qualitative in vitro diagnostic test for rapid detection of toxin B gene sequences from unformed (liquid or soft) stool specimens collected from patients suspected of having Clostridioides difficile infection (CDI). The test utilizes automated real-time polymerase chain reaction (PCR) to detect toxin gene sequences associated with toxin producing C. difficile. The Xpert C. difficile Assay is intended as an aid in the diagnosis of CDI.            Recent Labs   Lab Test 12/16/24 2016   URINEPH 5.5   NITRITE Negative   LEUKEST Trace*   WBCU 2                   Recent Labs   Lab Test 04/03/25 0928   CDBPCT Negative       Imaging: XR Chest Port 1 View    Result Date: 4/9/2025  EXAM: XR CHEST PORT 1 VIEW  4/9/2025 6:08 AM HISTORY:  Daily CXR. L CT placed for pleural effusion.   COMPARISON:  Chest x-ray 4/8/2025 FINDINGS: Portable 30 degree upright view of the chest. Stable position of the implanted right chest wall Port-A-Cath. Right-sided chest tube tip in stable position, now with a kink  proximally within a segment of the tube that has increased in length. Trachea is midline. Cardiomediastinal silhouette is stable. Unchanged left-sided perihilar streaky opacities. Mildly increased hazy opacities in the right lung. Trace left costophrenic angle blunting. Similar mild right costophrenic angle blunting. No pneumothorax.     IMPRESSION: 1.  Right-sided chest tube appears kinked within a segment of the tube that has increased in length. The tip is stable in position.  2.  Increased hazy right-sided pulmonary opacities with stable right costophrenic angle blunting, likely increased effusion/atelectasis. 3.  Stable mild left sided atelectasis with new minimal left pleural effusion. I have personally reviewed the examination and initial interpretation and I agree with the findings. KATHY MCMANUS MD   SYSTEM ID:  [id number]    XR Chest Port 1 View    Result Date: 4/8/2025  EXAM: XR CHEST PORT 1 VIEW  4/8/2025 1:45 PM  HISTORY: post chest tube removal COMPARISON: 4/8/2025 FINDINGS: Single view of the chest. Right chest wall Port-A-Cath in stable position. Unchanged right basilar chest tube. Left-sided chest tube has been removed. Trachea is midline. Stable cardiac silhouette. Hazy opacity projecting over the right midlung likely correlating to a loculated effusion in the right major fissure. Small bilateral pleural effusions. Relatively unchanged hazy perihilar and bibasilar opacities. No discernible pneumothorax.     IMPRESSION: 1. Interval removal of left basilar chest tube without complication. 2. Pleural effusions and pulmonary opacities are otherwise unchanged from same day radiograph performed at 0613. I have personally reviewed the examination and initial interpretation and I agree with the findings. ROBIN MULLINS MD   SYSTEM ID:  [id number]    XR Chest Port 1 View    Result Date: 4/8/2025  EXAM: XR CHEST PORT 1 VIEW 4/8/2025 6:28 AM INDICATION: Daily CXR. L CT placed for pleural effusion. COMPARISON:  Chest radiograph 4/7/2025, 4/4/2025, CT chest abdomen and pelvis 3/28/2025 TECHNIQUE: Two AP view of the chest. FINDINGS: Right chest Port-A-Cath tip terminates at the right cavoatrial junction. Stable position of small caliber tube is projecting at midline over the left lung base. Stable left basilar chest tube. Stable rib crowding in the right hemithorax and increased groundglass opacity compared to the contralateral side, though mildly improved in the right lung compared to previous exam. Trachea and mediastinal structures are very mildly deviated to the right. Right costophrenic angle blunting. No appreciable pneumothorax. Bones are unchanged.     IMPRESSION: 1. Small bilateral pleural effusions. 2. Stable support devices. 3. Improving groundglass opacity in the right lung which, with associated rib crowding, likely at least in part represents atelectasis. I have personally reviewed the examination and initial interpretation and I agree with the findings. ROBIN MULLINS MD   SYSTEM ID:  [id number]    US Drainage Seroma/Hematoma Abscess/Cyst    Result Date: 4/7/2025  PROCEDURE: Chest tube placement Procedural Personnel Attending physician(s): Jim Short Fellow physician(s): None Resident physician(s): None Advanced practice provider(s): None Pre-procedure diagnosis: Analy-esophageal fluid collection after Nissen fundoplication complicated by esophageal perforation Post-procedure diagnosis: Same Indication: Post-operative pleural fluid collection Additional clinical history: None Complications: No immediate complications.     IMPRESSION: Right 8 Syriac chest tube placement, yielding 45 mL of serosanguinous fluid with particulate. Plan: - Keep drain to bulb suction - Strict I/Os - Flush with 10 ml saline Q8H - IR will continue to follow ______________________________________________________________________ PROCEDURE SUMMARY: - Percutaneous Unilateral pleural drainage with insertion of indwelling catheter under  ultrasound guidance - Additional procedure(s): None PROCEDURE DETAILS: Pre-procedure Consent: Informed consent for the procedure including risks, benefits and alternatives was obtained and time-out was performed prior to the procedure. Preparation: The site was prepared and draped using maximal sterile barrier technique including cutaneous antisepsis. Anesthesia/sedation Level of anesthesia/sedation: Moderate sedation (conscious sedation) Anesthesia/sedation administered by: Independent trained observer under attending supervision with continuous monitoring of the patient?s level of consciousness and physiologic status Total intra-service sedation time (minutes): 15 Chest tube placement The patient was positioned left lateral decubitus. Initial imaging was performed. Local anesthesia was administered. The pleural space was accessed using trocar technique and a drainage catheter was placed. Position of the drainage catheter within the pleural space was confirmed. Initial imaging findings: Paravertebral fluid collection Drainage catheter placed: Skater (Non-locking) Maori size: 8 External catheter securement:  Non-absorbable suture Post-drainage imaging findings: Partial drainage of the pleural fluid Additional findings: None Contrast Contrast agent: None Contrast volume (mL): 0 Radiation Dose None Additional Details Additional description of procedure: None Registry event: V/3/f Device used: None Equipment details: None Aspirated fluid was not sent for analysis. Estimated blood loss (mL): Less than 10 Standardized report: [email or web address] VANESSA WATSON MD   SYSTEM ID:  [id number]    XR Chest Port 1 View    Result Date: 4/7/2025  Portable chest INDICATION: Post chest tube placement COMPARISON: 4/4/2025 Patchy densities are slightly increased in the right hemithorax. Right costophrenic angle blunting unchanged. Right chest catheter unchanged. Right IJ venous catheter tip in the distal SVC again. Surgical changes  in the left perihilar area. No pneumothorax. Slightly mild new left costophrenic angle blunting. Impression 1 increased edema/atelectasis in the right lung comment recommend follow-up to clearing to exclude developing infection. Continued small right pleural effusion. New trace left pleural effusion. JEVON VINES MD   SYSTEM ID:  [id number]    XR Chest Port 1 View    Result Date: 4/4/2025  EXAM: XR CHEST PORT 1 VIEW  4/4/2025 3:07 PM HISTORY:  1hr s/p removal of chest tubes   COMPARISON:  Same day chest radiograph at 0612 hours FINDINGS: Portable 45 degree upright view of the chest. Stable position implanted right upper chest wall Port-A-Cath. Right apical approach chest tube has been removed. Placement of pigtail chest tube with tip projecting medially at the right lung base. Nondisplaced mid spinal structures accounting for rotation. Mediastinum and cardiac silhouette are stable. Elevated right hemidiaphragm and right costophrenic angle blunting unchanged compared to prior. Similar right pleural line at the apex with hazy attenuation superiorly suggestive of layering effusion. Mildly improved right lower lung aeration with decreased hazy perihilar opacity. Left costophrenic angle is relatively sharp. No pneumothorax.     IMPRESSION: 1.  No definite pneumothorax status post right-sided chest tube exchange. 2.  Improved right lung edema/atelectasis with persistent hemidiaphragm elevation. 3.  Grossly stable right pleural effusion. I have personally reviewed the examination and initial interpretation and I agree with the findings. ROBIN MULLINS MD   SYSTEM ID:  [id number]    XR Chest Port 1 View    Result Date: 4/4/2025  Exam: XR CHEST PORT 1 VIEW, 4/4/2025 6:30 AM Indication: Daily CXR. s/p esophagectomy Comparison: 4/3/2025 Findings: Right chest wall Port-A-Cath. Right chest tube. Small right pleural effusion. Hazy pulmonary opacities. No pneumothorax. Stable mediastinum.     Impression: 1. Similar small right  pleural effusion with chest tube in place. 2. Hazy opacity suggestive of pulmonary edema are unchanged from prior. I have personally reviewed the examination and initial interpretation and I agree with the findings. LAURITA ORDAZ MD   SYSTEM ID:  [id number]    CT Esophagram without & with Contrast    Result Date: 4/3/2025  CT of the chest without and with contrast esophagram protocol HISTORY: Evaluate for esophageal leak status post esophagectomy postop day 10 COMPARISON STUDY: 3/28/2025 FINDINGS: Right IJ Port-A-Cath tip in the low SVC. Surgical changes of esophagectomy and gastric pull-through with a posterior area of oral contrast leak near the anastomosis seen on image 130 series 3 and to the right pleural space. Slight increase in right pleural effusion containing bubbly foci of gas with fluid tracking into the fissure with a right chest tube in place. Subsegmental atelectasis and interlobular septal thickening in the right base. Subsegmental atelectasis is noted in the right upper lobe and lingula. Moderate left pleural effusion. Right paratracheal node measuring 1.4 cm. Evaluation of the upper abdomen is limited. Bones: Degenerative changes of the spine.     IMPRESSION: 1. Examination is positive for esophageal extravasation into the pleural space near the esophagogastric anastomosis at the level of the lexus. 2. Right moderate loculated pleural effusion tracking in the fissure with foci of gas at the base indicative of empyema. Right chest tube in place. [Access Center: positive for esophageal leak] This report will be copied to the [facility] to ensure a provider acknowledges the finding. Access Center is available Monday through Friday 8am-3:30 pm. ROBIN MULLINS MD   SYSTEM ID:  [id number]    XR Chest Port 1 View    Result Date: 4/3/2025  Exam: XR CHEST PORT 1 VIEW, 4/3/2025 8:10 AM Comparison: Chest radiograph from 3/31/2025 History: thoracic surgery daily cxr Findings: Single frontal  semiupright radiographic view of the chest was obtained. Interval removal of enteric tube. Right chest Port-A-Cath with tip in the low SVC. Stable right-sided chest tube. Trachea is midline. Stable cardiomediastinal silhouette . Patchy right basilar mixed interstitial and airspace opacities, which are increased when compared with exams from 3/31/2025 and 3/30/2025. Unchanged right side costophrenic meniscus and left costophrenic blunting. No pneumothorax.     Impression: 1. Increasing right basilar predominant mixed interstitial and airspace opacities. May represent developing infection or aspiration. Atelectasis and/or edema could have a similar appearance. 2. Stable small right pleural effusion with right side chest tube in place. Stable trace left pleural effusion. I have personally reviewed the examination and initial interpretation and I agree with the findings. ROBIN MULLINS MD   SYSTEM ID:  [id number]

## 2025-04-09 NOTE — PROGRESS NOTES
Home Infusion  Received referral from  [facility] for Enteral feedings.  Benefits verified.  Patient has Medicare/[employer] and is covered 100%.  Met with pt and his wife at bedside to review home infusion services, review benefits and offer choice of providers.  Patient would like to use [facility] for home infusion.  Confirmed discharge address, phone, and emergency contact information.  Pt is expected to discharge as soon as today and will be going home on enteral feeds via pump.  Pt has never done home enteral therapy before however received teaching by Enteral Nurse Liaison this afternoon with pt and his wife.    Provided pt with information about [facility] services.  Explained about administration method via the Infinity pump with back pack for mobility. Discussed options of hook up of tube feedings at the hospital prior to discharge vs disconnect for transport home (if feasible per medical team) and restart of feeding at home.   Informed them about supplies and delivery of supplies, storage of formula, availability of [facility] staff while on enteral therapy.    Pt verbalized understanding of all information given.  Pt and his wife are willing and able to learn and manage home enteral therapy.  Patient expressed a desire to have the tube feedings hooked up at the hospital or off for transport. .   Questions answered.  Will continue to follow and revisit pt once discharge plans are confirmed.    Thank you for the referral      Elma Mendez LPN  Enteral Nurse Liaison  [facility]  [address] [place] [id number]  [phone]  Main number [phone]

## 2025-04-10 ENCOUNTER — PATIENT OUTREACH (OUTPATIENT)
Dept: CARE COORDINATION | Facility: CLINIC | Age: 70
End: 2025-04-10
Payer: COMMERCIAL

## 2025-04-10 PROCEDURE — B4035 ENTERAL FEED SUPP PUMP PER D: HCPCS

## 2025-04-10 NOTE — PROGRESS NOTES
Connected Care Resource Center: [facility]    Background: Transitional Care Management program identified per system criteria and reviewed by [facility] team for possible outreach.    Assessment: Upon chart review, CCR Team member will not proceed with patient outreach related to this episode of Transitional Care Management program due to reason below:    Patient has active communication with a nurse, provider or care team for reason of post-hospital follow up plan.  Outreach call by [facility] team not indicated to minimize duplicative efforts.     Plan: Transitional Care Management episode addressed appropriately per reason noted above.      JIMI Hernández  [facility] [place], [facility]    *Connected Care Resource Team does NOT follow patient ongoing. Referrals are identified based on internal discharge reports and the outreach is to ensure patient has an understanding of their discharge instructions.

## 2025-04-10 NOTE — PLAN OF CARE
Occupational Therapy Discharge Summary    Reason for therapy discharge:    Discharged to home.    Progress towards therapy goal(s). See goals on Care Plan in [facility] electronic health record for goal details.  Goals partially met.  Barriers to achieving goals:   discharge from facility.    Therapy recommendation(s):    Recommend assist as needed upon return home. Pt not seen by discharging therapist.

## 2025-04-11 ENCOUNTER — HOME INFUSION (OUTPATIENT)
Dept: HOME HEALTH SERVICES | Facility: HOME HEALTH | Age: 70
End: 2025-04-11

## 2025-04-11 PROCEDURE — B4035 ENTERAL FEED SUPP PUMP PER D: HCPCS

## 2025-04-11 NOTE — PROGRESS NOTES
[facility] Nutrition Assessment     Type of therapy: Enteral  Information obtained from: Phone call with Bal.    Nutrition and Medical History  Feeding tube type: J-Tube  Date Placed: 02/26/25    Current Nutrition Orders  Oral Diet: NPO    Enteral Nutrition Orders  Enteral Formula: Osmolite 1.5 + Prosource TF  Rate/Frequency: Osmolite 1.5 @ 90 mL/hr x 16 hours. + Prosource TF 2 pkts BID  Water Flushes: 30 ml before and after cycled feeds and Prosource TF. 30 mL 6x per day for tube patency.  Enteral Nutrition Provides: 6 cartons Osmolite 1.5 provides 2130 kcal, 89 gm protein, and 1086 mL free water. + 4 pkts Prosource TF provides 160 kcal and 44 gm protein. Total provisions: 2290 kcal and 133 gm protein.    Assessed Nutritional Needs  Kcal Needs: 0556-2475 kcal (25-30 kcal/kg) maintenenace  Protein Needs: 120-165 gm protein (1.5-2 gm/kg) Increased needs post-op  Fluid Needs: 1 mL/kcal maintenance  Nutrition Support is meeting what percent of needs: 100%    Pertinent Medications  Medications: Recieved Banatrol 1 pkt TID while inpt, not available at [facility]    Implementation  Intervention: Phone call with Bal. Reports that Osmolite 1.5 is going well, getting 6 cartons at 90 mL/hr. Reports that he is continuing to have loose stools, but his stooling has improved a little since being home. He would like to continue with the Osmolite 1.5 formula for a couple more days to see if there is any more improvement. Also getting 4 packets Prosource TF per day. Water flushes are going well.    Plan  Follow up/Recommendations: Monitor tolerance of current tube feeds. Consider trial of fiber containing formula if needed.    Kavitha Kwan RD, [facility]  Clinical Dietitian  [facility]   [phone]  [facility] Main Line: [phone]

## 2025-04-12 PROCEDURE — B4035 ENTERAL FEED SUPP PUMP PER D: HCPCS

## 2025-04-13 PROCEDURE — B4035 ENTERAL FEED SUPP PUMP PER D: HCPCS

## 2025-04-14 ENCOUNTER — HOME INFUSION BILLING (OUTPATIENT)
Dept: HOME HEALTH SERVICES | Facility: HOME HEALTH | Age: 70
End: 2025-04-14
Payer: COMMERCIAL

## 2025-04-14 ENCOUNTER — HOME INFUSION (OUTPATIENT)
Dept: HOME HEALTH SERVICES | Facility: HOME HEALTH | Age: 70
End: 2025-04-14
Payer: COMMERCIAL

## 2025-04-14 DIAGNOSIS — C16.0 ADENOCARCINOMA OF GASTROESOPHAGEAL JUNCTION (H): ICD-10-CM

## 2025-04-14 PROCEDURE — B4035 ENTERAL FEED SUPP PUMP PER D: HCPCS

## 2025-04-14 RX ORDER — AMINO AC/PROTEIN HYDR/WHEY PRO 10G-100/30
90 LIQUID (ML) ORAL 2 TIMES DAILY
Qty: 5400 ML | Refills: 11 | Status: DISPENSED | OUTPATIENT
Start: 2025-04-14 | End: 2026-04-14

## 2025-04-14 RX ORDER — LACTOSE-REDUCED FOOD/FIBER 0.07 G-1.5
1500 LIQUID (ML) ORAL DAILY
Qty: 45000 ML | Refills: 11 | Status: DISPENSED | OUTPATIENT
Start: 2025-04-14 | End: 2026-04-14

## 2025-04-14 NOTE — PROGRESS NOTES
"[facility] Nutrition Assessment     Type of therapy: Enteral  Information obtained from: Phone call with Bal.    Anthropometrics/Weight Goals  Height: 1.778 m (5' 10\")  Weight: 81.6 kg (179 lb 12.8 oz)    Nutrition and Medical History  Reason for Nutrition Support: NPO  Feeding tube type: J-Tube  Date Placed: 02/26/25    Enteral Nutrition Orders  Enteral Formula: Isosource 1.5 + Prosource TF  Rate/Frequency: Isosource 1.5 @ 90 mL/hr x 16 hours. + Prosource TF 2 pkts BID  Water Flushes: 30 ml before and after cycled feeds and Prosource TF. 30 mL 6x per day for tube patency.  Enteral Nutrition Provides: 6 cartons Isosource 1.5 provides 2250 kcal, 102 gm protein, 23 gm fiber, and 1146 mL free water. + 4 pkts Prosource TF provides 160 kcal and 44 gm protein. Total provisions: 2410 kcal and 146 gm protein.    Assessed Nutritional Needs  Kcal Needs: 6159-0875 kcal (25-30 kcal/kg) maintenenace  Protein Needs: 120-165 gm protein (1.5-2 gm/kg) Increased needs post-op  Fluid Needs: 1 mL/kcal maintenance  Nutrition Support is meeting what percent of needs: 100%    Pertinent Medications  Medications: Recieved Banatrol 1 pkt TID while inpt, not available at [facility]    Implementation  Intervention: Phone call with Bal. Reports that he continues to have loose/liquidy stools. Trial of Isosource 1.5. Also sent enteral supply request to enteral coordinator.  Education: Discussed trial of Isosource 1.5, formula with fiber.    Plan  Follow up/Recommendations: Monitor tolerance of Isosource 1.5.    Kavitha Kwan RD, [facility]  Clinical Dietitian  [facility]   [phone]  [facility] Main Line: [phone]     "

## 2025-04-15 PROCEDURE — B4035 ENTERAL FEED SUPP PUMP PER D: HCPCS

## 2025-04-15 NOTE — OP NOTE
OPERATIVE REPORT     PREOPERATIVE DIAGNOSES:  1.  GEJ adenocarcinoma s/p KYLE  2. Concern for anastomotic leak.      POSTOPERATIVE DIAGNOSES:  1.  Same     OPERATION PERFORMED:    EGD  2.   Intraoperative esophagram  3.   Supervision and interpretation of intra-op imaging.      SURGEON:  Chino Garcia MD     ASSISTANT: None     ANESTHESIA:  General endotracheal anesthesia.     ESTIMATED BLOOD LOSS:  Minimal     COMPLICATIONS:  None.     DRAINS: None     SPECIMEN: None     DESCRIPTION OF OPERATION:  The patient was transported to the operating room and placed supine. Following induction of general endotracheal anesthesia we performed a timeout confirming the name of the patient and correct procedure. SCDs were in place and functioning prior to induction of anesthesia.      We performed an upper endoscopy with an adult scope and examine the esophagus. The anastomosis appeared to be healing well. There was no evidence of anastomotic dehiscence. We performed an intraoperative esophagram which did not reveal extravasation of contrast. We decided to replace the NGT and continue observation.      The patient tolerated the procedure well, was extubated in the OR and transferred to PACU. All instruments and sponge counts were correct.      Chino Garcia MD

## 2025-04-16 PROCEDURE — B4035 ENTERAL FEED SUPP PUMP PER D: HCPCS

## 2025-04-16 NOTE — PROGRESS NOTES
THORACIC SURGERY FOLLOW UP VISIT    Dear Dr. Wing,  I saw Mr. Junior in follow-up today. 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   NEODJUVANT THERAPY   FLOT (4 cycles, last 12/12/2024)    PROCEDURE   1) Laparoscopic redo hiatal hernia repair and Nissen fundoplication (9/2020)  2) Laparoscopic takedown of Nissen fundoplication and J-tube placement (2/26/2025)  3) Minimally invasive [place] Arya esophagectomy (3/24/2025)      HISTOPATHOLOGY   Moderately differentiated adenocarcinoma with partial treatment response; [id number] (0/20 LN)      COMPLICATIONS  Small anastomotic leak requiring additional percutaneous drain placement     INTERVAL STUDIES  CT esophagram 4/17/2025: Leak with drainage to the right pleural space, drained by current chest tube.    SUBJECTIVE   Mr. Junior is not feeling well, he is disappointed and in low spirits. His tube was not draining anything until today's CT esophagram. He also feels low energy, and finished his antibiotics about 3-4 days ago. No fevers.    EXAM      From a personal perspective, he is here with his wife, Jess.    IMPRESSION (C16.0) Siewert type II adenocarcinoma of gastroesophageal junction (H)  (primary encounter diagnosis)    70 year-old man 1 month S/P [place] Arya esophagectomy for a stage I ([id number]) Siewert type II adenocarcinoma  Anastomotic leak     PLAN  I spent  min on the date of the encounter in chart review, patient visit, review of tests, documentation and/or discussion with other providers about the issues documented above. I reviewed the plan as follows:  We will admit him to the hospital for IV antibiotics, EGD +/- stent or pharyngostomy tube. He also agrees with a Health Psychology visit to help him with his situational depression.  All questions were answered and the patient and present family were in agreement with the plan.  I appreciate the opportunity to participate in  the care of your patient and will keep you updated.  Sincerely,    Erik Lindsey MD

## 2025-04-17 ENCOUNTER — HOSPITAL ENCOUNTER (OUTPATIENT)
Dept: CT IMAGING | Facility: CLINIC | Age: 70
Discharge: HOME | End: 2025-04-17
Attending: CLINICAL NURSE SPECIALIST
Payer: MEDICARE

## 2025-04-17 ENCOUNTER — PREP FOR PROCEDURE (OUTPATIENT)
Dept: SURGERY | Facility: CLINIC | Age: 70
End: 2025-04-17

## 2025-04-17 ENCOUNTER — HOSPITAL ENCOUNTER (OUTPATIENT)
Facility: CLINIC | Age: 70
End: 2025-04-17
Attending: THORACIC SURGERY (CARDIOTHORACIC VASCULAR SURGERY) | Admitting: THORACIC SURGERY (CARDIOTHORACIC VASCULAR SURGERY)
Payer: MEDICARE

## 2025-04-17 ENCOUNTER — ONCOLOGY VISIT (OUTPATIENT)
Dept: PULMONOLOGY | Facility: CLINIC | Age: 70
End: 2025-04-17
Payer: COMMERCIAL

## 2025-04-17 ENCOUNTER — HOSPITAL ENCOUNTER (INPATIENT)
Facility: CLINIC | Age: 70
LOS: 6 days | Discharge: HOME | End: 2025-04-23
Attending: EMERGENCY MEDICINE | Admitting: THORACIC SURGERY (CARDIOTHORACIC VASCULAR SURGERY)
Payer: MEDICARE

## 2025-04-17 VITALS
WEIGHT: 173 LBS | OXYGEN SATURATION: 97 % | DIASTOLIC BLOOD PRESSURE: 82 MMHG | SYSTOLIC BLOOD PRESSURE: 128 MMHG | HEART RATE: 110 BPM | BODY MASS INDEX: 24.82 KG/M2

## 2025-04-17 DIAGNOSIS — D64.9 ANEMIA, UNSPECIFIED TYPE: Primary | ICD-10-CM

## 2025-04-17 DIAGNOSIS — K91.89 ESOPHAGEAL ANASTOMOTIC LEAK: ICD-10-CM

## 2025-04-17 DIAGNOSIS — C16.0 SIEWERT TYPE II ADENOCARCINOMA OF GASTROESOPHAGEAL JUNCTION (H): Primary | ICD-10-CM

## 2025-04-17 DIAGNOSIS — K91.89 ANASTOMOTIC LEAK FOLLOWING ESOPHAGECTOMY: Primary | ICD-10-CM

## 2025-04-17 DIAGNOSIS — C16.0 ADENOCARCINOMA OF GASTROESOPHAGEAL JUNCTION (H): ICD-10-CM

## 2025-04-17 DIAGNOSIS — C15.9 MALIGNANT NEOPLASM OF ESOPHAGUS, UNSPECIFIED LOCATION (H): ICD-10-CM

## 2025-04-17 LAB
ABO + RH BLD: NORMAL
ALBUMIN SERPL BCG-MCNC: 3.7 G/DL (ref 3.5–5.2)
ALP SERPL-CCNC: 123 U/L (ref 40–150)
ALT SERPL W P-5'-P-CCNC: 18 U/L (ref 0–70)
ANION GAP SERPL CALCULATED.3IONS-SCNC: 12 MMOL/L (ref 7–15)
AST SERPL W P-5'-P-CCNC: 20 U/L (ref 0–45)
BASOPHILS # BLD AUTO: 0 10E3/UL (ref 0–0.2)
BASOPHILS NFR BLD AUTO: 0 %
BILIRUB SERPL-MCNC: 0.4 MG/DL
BLD GP AB SCN SERPL QL: NEGATIVE
BUN SERPL-MCNC: 33 MG/DL (ref 8–23)
CALCIUM SERPL-MCNC: 9.4 MG/DL (ref 8.8–10.4)
CHLORIDE SERPL-SCNC: 105 MMOL/L (ref 98–107)
CREAT SERPL-MCNC: 0.75 MG/DL (ref 0.67–1.17)
EGFRCR SERPLBLD CKD-EPI 2021: >90 ML/MIN/1.73M2
EOSINOPHIL # BLD AUTO: 0.2 10E3/UL (ref 0–0.7)
EOSINOPHIL NFR BLD AUTO: 1 %
ERYTHROCYTE [DISTWIDTH] IN BLOOD BY AUTOMATED COUNT: 13.2 % (ref 10–15)
GLUCOSE SERPL-MCNC: 126 MG/DL (ref 70–99)
HCO3 SERPL-SCNC: 22 MMOL/L (ref 22–29)
HCT VFR BLD AUTO: 33.9 % (ref 40–53)
HGB BLD-MCNC: 11 G/DL (ref 13.3–17.7)
IMM GRANULOCYTES # BLD: 0.1 10E3/UL
IMM GRANULOCYTES NFR BLD: 1 %
INR PPP: 1.14 (ref 0.85–1.15)
LYMPHOCYTES # BLD AUTO: 2.1 10E3/UL (ref 0.8–5.3)
LYMPHOCYTES NFR BLD AUTO: 17 %
MCH RBC QN AUTO: 28.2 PG (ref 26.5–33)
MCHC RBC AUTO-ENTMCNC: 32.4 G/DL (ref 31.5–36.5)
MCV RBC AUTO: 87 FL (ref 78–100)
MONOCYTES # BLD AUTO: 1.5 10E3/UL (ref 0–1.3)
MONOCYTES NFR BLD AUTO: 12 %
MRSA DNA SPEC QL NAA+PROBE: NEGATIVE
NEUTROPHILS # BLD AUTO: 8.7 10E3/UL (ref 1.6–8.3)
NEUTROPHILS NFR BLD AUTO: 69 %
NRBC # BLD AUTO: 0 10E3/UL
NRBC BLD AUTO-RTO: 0 /100
PLATELET # BLD AUTO: 420 10E3/UL (ref 150–450)
POTASSIUM SERPL-SCNC: 4.2 MMOL/L (ref 3.4–5.3)
PROT SERPL-MCNC: 8 G/DL (ref 6.4–8.3)
RBC # BLD AUTO: 3.9 10E6/UL (ref 4.4–5.9)
SA TARGET DNA: POSITIVE
SODIUM SERPL-SCNC: 139 MMOL/L (ref 135–145)
SPECIMEN EXP DATE BLD: NORMAL
WBC # BLD AUTO: 12.5 10E3/UL (ref 4–11)

## 2025-04-17 PROCEDURE — 87641 MR-STAPH DNA AMP PROBE: CPT

## 2025-04-17 PROCEDURE — 250N000011 HC RX IP 250 OP 636: Performed by: CLINICAL NURSE SPECIALIST

## 2025-04-17 PROCEDURE — 250N000011 HC RX IP 250 OP 636: Performed by: STUDENT IN AN ORGANIZED HEALTH CARE EDUCATION/TRAINING PROGRAM

## 2025-04-17 PROCEDURE — 86900 BLOOD TYPING SEROLOGIC ABO: CPT | Performed by: EMERGENCY MEDICINE

## 2025-04-17 PROCEDURE — 99285 EMERGENCY DEPT VISIT HI MDM: CPT | Performed by: EMERGENCY MEDICINE

## 2025-04-17 PROCEDURE — 250N000009 HC RX 250: Performed by: STUDENT IN AN ORGANIZED HEALTH CARE EDUCATION/TRAINING PROGRAM

## 2025-04-17 PROCEDURE — 36415 COLL VENOUS BLD VENIPUNCTURE: CPT | Performed by: EMERGENCY MEDICINE

## 2025-04-17 PROCEDURE — 85610 PROTHROMBIN TIME: CPT | Performed by: EMERGENCY MEDICINE

## 2025-04-17 PROCEDURE — 82247 BILIRUBIN TOTAL: CPT | Performed by: EMERGENCY MEDICINE

## 2025-04-17 PROCEDURE — 36591 DRAW BLOOD OFF VENOUS DEVICE: CPT | Performed by: EMERGENCY MEDICINE

## 2025-04-17 PROCEDURE — 258N000003 HC RX IP 258 OP 636: Performed by: STUDENT IN AN ORGANIZED HEALTH CARE EDUCATION/TRAINING PROGRAM

## 2025-04-17 PROCEDURE — 250N000013 HC RX MED GY IP 250 OP 250 PS 637: Performed by: STUDENT IN AN ORGANIZED HEALTH CARE EDUCATION/TRAINING PROGRAM

## 2025-04-17 PROCEDURE — 120N000002 HC R&B MED SURG/OB UMMC

## 2025-04-17 PROCEDURE — 85004 AUTOMATED DIFF WBC COUNT: CPT | Performed by: EMERGENCY MEDICINE

## 2025-04-17 PROCEDURE — 71260 CT THORAX DX C+: CPT

## 2025-04-17 RX ORDER — LIDOCAINE 40 MG/G
CREAM TOPICAL
Status: CANCELLED | OUTPATIENT
Start: 2025-04-17

## 2025-04-17 RX ORDER — HYDROMORPHONE HYDROCHLORIDE 1 MG/ML
0.5 INJECTION, SOLUTION INTRAMUSCULAR; INTRAVENOUS; SUBCUTANEOUS
Status: COMPLETED | OUTPATIENT
Start: 2025-04-17 | End: 2025-04-17

## 2025-04-17 RX ORDER — NALOXONE HYDROCHLORIDE 0.4 MG/ML
0.1 INJECTION, SOLUTION INTRAMUSCULAR; INTRAVENOUS; SUBCUTANEOUS
Status: CANCELLED | OUTPATIENT
Start: 2025-04-17

## 2025-04-17 RX ORDER — AMOXICILLIN 250 MG
2 CAPSULE ORAL 2 TIMES DAILY PRN
Status: CANCELLED | OUTPATIENT
Start: 2025-04-17

## 2025-04-17 RX ORDER — PIPERACILLIN SODIUM, TAZOBACTAM SODIUM 4; .5 G/20ML; G/20ML
4.5 INJECTION, POWDER, LYOPHILIZED, FOR SOLUTION INTRAVENOUS EVERY 6 HOURS
Status: DISCONTINUED | OUTPATIENT
Start: 2025-04-17 | End: 2025-04-19

## 2025-04-17 RX ORDER — PIPERACILLIN SODIUM, TAZOBACTAM SODIUM 3; .375 G/15ML; G/15ML
3.38 INJECTION, POWDER, LYOPHILIZED, FOR SOLUTION INTRAVENOUS EVERY 6 HOURS
Status: CANCELLED | OUTPATIENT
Start: 2025-04-17

## 2025-04-17 RX ORDER — AMOXICILLIN 250 MG
2 CAPSULE ORAL 2 TIMES DAILY PRN
Status: DISCONTINUED | OUTPATIENT
Start: 2025-04-17 | End: 2025-04-23 | Stop reason: HOSPADM

## 2025-04-17 RX ORDER — AMOXICILLIN 250 MG
1 CAPSULE ORAL 2 TIMES DAILY PRN
Status: CANCELLED | OUTPATIENT
Start: 2025-04-17

## 2025-04-17 RX ORDER — LIDOCAINE 40 MG/G
CREAM TOPICAL
Status: DISCONTINUED | OUTPATIENT
Start: 2025-04-17 | End: 2025-04-23 | Stop reason: HOSPADM

## 2025-04-17 RX ORDER — LIDOCAINE 4 G/G
1 PATCH TOPICAL EVERY 24 HOURS
Status: DISCONTINUED | OUTPATIENT
Start: 2025-04-17 | End: 2025-04-23 | Stop reason: HOSPADM

## 2025-04-17 RX ORDER — AMOXICILLIN 250 MG
1 CAPSULE ORAL 2 TIMES DAILY PRN
Status: DISCONTINUED | OUTPATIENT
Start: 2025-04-17 | End: 2025-04-23 | Stop reason: HOSPADM

## 2025-04-17 RX ORDER — HYDROMORPHONE HCL IN WATER/PF 6 MG/30 ML
0.4 PATIENT CONTROLLED ANALGESIA SYRINGE INTRAVENOUS ONCE
Status: COMPLETED | OUTPATIENT
Start: 2025-04-17 | End: 2025-04-17

## 2025-04-17 RX ORDER — POLYETHYLENE GLYCOL 3350 17 G/17G
17 POWDER, FOR SOLUTION ORAL DAILY
Status: DISCONTINUED | OUTPATIENT
Start: 2025-04-17 | End: 2025-04-23 | Stop reason: HOSPADM

## 2025-04-17 RX ORDER — ACETAMINOPHEN 650 MG/1
650 SUPPOSITORY RECTAL EVERY 4 HOURS PRN
Status: DISCONTINUED | OUTPATIENT
Start: 2025-04-17 | End: 2025-04-17

## 2025-04-17 RX ORDER — OXYCODONE HYDROCHLORIDE 5 MG/1
5 TABLET ORAL
Status: CANCELLED | OUTPATIENT
Start: 2025-04-17

## 2025-04-17 RX ORDER — ACETAMINOPHEN 650 MG/1
650 SUPPOSITORY RECTAL EVERY 4 HOURS PRN
Status: CANCELLED | OUTPATIENT
Start: 2025-04-17

## 2025-04-17 RX ORDER — OXYCODONE HYDROCHLORIDE 5 MG/1
5 TABLET ORAL EVERY 4 HOURS PRN
Status: DISCONTINUED | OUTPATIENT
Start: 2025-04-17 | End: 2025-04-23 | Stop reason: HOSPADM

## 2025-04-17 RX ORDER — HYDROMORPHONE HYDROCHLORIDE 1 MG/ML
0.2 INJECTION, SOLUTION INTRAMUSCULAR; INTRAVENOUS; SUBCUTANEOUS
Status: CANCELLED | OUTPATIENT
Start: 2025-04-17

## 2025-04-17 RX ORDER — DEXTROSE MONOHYDRATE 100 MG/ML
INJECTION, SOLUTION INTRAVENOUS CONTINUOUS PRN
Status: DISCONTINUED | OUTPATIENT
Start: 2025-04-17 | End: 2025-04-18

## 2025-04-17 RX ORDER — ENOXAPARIN SODIUM 100 MG/ML
40 INJECTION SUBCUTANEOUS EVERY 24 HOURS
Status: DISCONTINUED | OUTPATIENT
Start: 2025-04-17 | End: 2025-04-23 | Stop reason: HOSPADM

## 2025-04-17 RX ORDER — POLYETHYLENE GLYCOL 3350 17 G/17G
17 POWDER, FOR SOLUTION ORAL 2 TIMES DAILY PRN
Status: CANCELLED | OUTPATIENT
Start: 2025-04-17

## 2025-04-17 RX ORDER — IOPAMIDOL 755 MG/ML
90 INJECTION, SOLUTION INTRAVASCULAR ONCE
Status: COMPLETED | OUTPATIENT
Start: 2025-04-17 | End: 2025-04-17

## 2025-04-17 RX ORDER — FLUCONAZOLE 2 MG/ML
400 INJECTION, SOLUTION INTRAVENOUS EVERY 24 HOURS
Status: CANCELLED | OUTPATIENT
Start: 2025-04-17

## 2025-04-17 RX ORDER — AMOXICILLIN 250 MG
2 CAPSULE ORAL 2 TIMES DAILY PRN
Status: DISCONTINUED | OUTPATIENT
Start: 2025-04-17 | End: 2025-04-17

## 2025-04-17 RX ORDER — FLUCONAZOLE 2 MG/ML
400 INJECTION, SOLUTION INTRAVENOUS EVERY 24 HOURS
Status: DISCONTINUED | OUTPATIENT
Start: 2025-04-18 | End: 2025-04-19

## 2025-04-17 RX ORDER — ACETAMINOPHEN 650 MG/1
650 SUPPOSITORY RECTAL EVERY 8 HOURS
Status: DISCONTINUED | OUTPATIENT
Start: 2025-04-17 | End: 2025-04-23 | Stop reason: HOSPADM

## 2025-04-17 RX ORDER — SODIUM CHLORIDE, SODIUM LACTATE, POTASSIUM CHLORIDE, CALCIUM CHLORIDE 600; 310; 30; 20 MG/100ML; MG/100ML; MG/100ML; MG/100ML
INJECTION, SOLUTION INTRAVENOUS CONTINUOUS
Status: CANCELLED | OUTPATIENT
Start: 2025-04-17

## 2025-04-17 RX ORDER — ONDANSETRON 2 MG/ML
4 INJECTION INTRAMUSCULAR; INTRAVENOUS EVERY 6 HOURS PRN
Status: CANCELLED | OUTPATIENT
Start: 2025-04-17

## 2025-04-17 RX ORDER — OXYCODONE HYDROCHLORIDE 10 MG/1
10 TABLET ORAL
Status: CANCELLED | OUTPATIENT
Start: 2025-04-17

## 2025-04-17 RX ORDER — METHOCARBAMOL 500 MG/1
500 TABLET, FILM COATED ORAL 4 TIMES DAILY PRN
Status: DISCONTINUED | OUTPATIENT
Start: 2025-04-17 | End: 2025-04-19

## 2025-04-17 RX ORDER — ACETAMINOPHEN 325 MG/1
975 TABLET ORAL EVERY 8 HOURS
Status: DISCONTINUED | OUTPATIENT
Start: 2025-04-17 | End: 2025-04-23 | Stop reason: HOSPADM

## 2025-04-17 RX ORDER — ENOXAPARIN SODIUM 100 MG/ML
40 INJECTION SUBCUTANEOUS EVERY 24 HOURS
Status: CANCELLED | OUTPATIENT
Start: 2025-04-17

## 2025-04-17 RX ORDER — IRRIGATION SET
800 IRRIGATION SET MISCELLANEOUS ONCE
Status: DISCONTINUED | OUTPATIENT
Start: 2025-04-17 | End: 2025-04-17

## 2025-04-17 RX ORDER — ACETAMINOPHEN 325 MG/1
650 TABLET ORAL EVERY 4 HOURS PRN
Status: CANCELLED | OUTPATIENT
Start: 2025-04-17

## 2025-04-17 RX ORDER — ACETAMINOPHEN 325 MG/1
650 TABLET ORAL EVERY 4 HOURS PRN
Status: DISCONTINUED | OUTPATIENT
Start: 2025-04-17 | End: 2025-04-17

## 2025-04-17 RX ORDER — SODIUM CHLORIDE, SODIUM LACTATE, POTASSIUM CHLORIDE, CALCIUM CHLORIDE 600; 310; 30; 20 MG/100ML; MG/100ML; MG/100ML; MG/100ML
INJECTION, SOLUTION INTRAVENOUS CONTINUOUS
Status: ACTIVE | OUTPATIENT
Start: 2025-04-17 | End: 2025-04-17

## 2025-04-17 RX ORDER — ONDANSETRON 2 MG/ML
4 INJECTION INTRAMUSCULAR; INTRAVENOUS EVERY 6 HOURS PRN
Status: DISCONTINUED | OUTPATIENT
Start: 2025-04-17 | End: 2025-04-23 | Stop reason: HOSPADM

## 2025-04-17 RX ORDER — AMOXICILLIN 250 MG
1 CAPSULE ORAL 2 TIMES DAILY PRN
Status: DISCONTINUED | OUTPATIENT
Start: 2025-04-17 | End: 2025-04-17

## 2025-04-17 RX ORDER — AMOXICILLIN AND CLAVULANATE POTASSIUM 400; 57 MG/5ML; MG/5ML
400 POWDER, FOR SUSPENSION ORAL
COMMUNITY
Start: 2025-04-14

## 2025-04-17 RX ORDER — HYDROMORPHONE HYDROCHLORIDE 1 MG/ML
0.4 INJECTION, SOLUTION INTRAMUSCULAR; INTRAVENOUS; SUBCUTANEOUS
Status: CANCELLED | OUTPATIENT
Start: 2025-04-17

## 2025-04-17 RX ORDER — OXYCODONE HYDROCHLORIDE 5 MG/1
5 TABLET ORAL EVERY 4 HOURS PRN
Status: CANCELLED | OUTPATIENT
Start: 2025-04-17

## 2025-04-17 RX ADMIN — ENOXAPARIN SODIUM 40 MG: 40 INJECTION SUBCUTANEOUS at 18:33

## 2025-04-17 RX ADMIN — HYDROMORPHONE HYDROCHLORIDE 0.4 MG: 0.2 INJECTION, SOLUTION INTRAMUSCULAR; INTRAVENOUS; SUBCUTANEOUS at 18:26

## 2025-04-17 RX ADMIN — ACETAMINOPHEN 975 MG: 325 TABLET ORAL at 19:57

## 2025-04-17 RX ADMIN — HYDROMORPHONE HYDROCHLORIDE 0.5 MG: 1 INJECTION, SOLUTION INTRAMUSCULAR; INTRAVENOUS; SUBCUTANEOUS at 20:16

## 2025-04-17 RX ADMIN — METHOCARBAMOL 500 MG: 500 TABLET ORAL at 20:16

## 2025-04-17 RX ADMIN — TOPICAL ANESTHETIC 0.5 ML: 200 SPRAY DENTAL; PERIODONTAL at 18:26

## 2025-04-17 RX ADMIN — SODIUM CHLORIDE, SODIUM LACTATE, POTASSIUM CHLORIDE, AND CALCIUM CHLORIDE: .6; .31; .03; .02 INJECTION, SOLUTION INTRAVENOUS at 15:57

## 2025-04-17 RX ADMIN — TOPICAL ANESTHETIC 0.5 ML: 200 SPRAY DENTAL; PERIODONTAL at 18:29

## 2025-04-17 RX ADMIN — IOPAMIDOL 90 ML: 755 INJECTION, SOLUTION INTRAVENOUS at 11:11

## 2025-04-17 RX ADMIN — PIPERACILLIN AND TAZOBACTAM 4.5 G: 4; .5 INJECTION, POWDER, LYOPHILIZED, FOR SOLUTION INTRAVENOUS at 21:39

## 2025-04-17 RX ADMIN — PIPERACILLIN AND TAZOBACTAM 4.5 G: 4; .5 INJECTION, POWDER, LYOPHILIZED, FOR SOLUTION INTRAVENOUS at 15:46

## 2025-04-17 RX ADMIN — OXYCODONE HYDROCHLORIDE 2.5 MG: 5 TABLET ORAL at 15:46

## 2025-04-17 ASSESSMENT — ACTIVITIES OF DAILY LIVING (ADL)
ADLS_ACUITY_SCORE: 59

## 2025-04-17 ASSESSMENT — COLUMBIA-SUICIDE SEVERITY RATING SCALE - C-SSRS
2. HAVE YOU ACTUALLY HAD ANY THOUGHTS OF KILLING YOURSELF IN THE PAST MONTH?: NO
6. HAVE YOU EVER DONE ANYTHING, STARTED TO DO ANYTHING, OR PREPARED TO DO ANYTHING TO END YOUR LIFE?: NO
1. IN THE PAST MONTH, HAVE YOU WISHED YOU WERE DEAD OR WISHED YOU COULD GO TO SLEEP AND NOT WAKE UP?: NO

## 2025-04-17 NOTE — ED PROVIDER NOTES
ED Provider Note  [facility]      History     Chief Complaint   Patient presents with    Abnormal Labs    Drainage from Incision     HPI  Bal Junior is a 70 year old male with PMH of esophageal adenocarcinoma s/p four rounds of chemotherapy, neuritis, peripheral neuropathy, hypertension, GERD, thrombocytopenia, s/p fundoplication takedown with lap redo nissen fundoplication on 2020 with recently diagnosed adenocarcinoma of the GE junction, s/p robotic takedown of Nissen fundoplication, J-tube placement on 2/26/25, esophagectomy (3/24/25) who presents to ED with gastric leak and discomfort at ODIN drain site. Patient reports concern for drainage output and a bad cough since 3/24 procedure. He also has notable pain at the drainage site that is currently managed with a lidocaine patch.  He was seen in the clinic earlier today and had imaging studies done that are concerning for esophageal leak.  Thoracic surgery is aware and he was sent here for admission to their service for EGD and stenting.  No recent fevers.    Past Medical History  Past Medical History:   Diagnosis Date    Hypertension     Neuritis     Peripheral neuropathy     Personal history of other medical treatment     postgastrectomy dumping syndrome    Stomach problems Noted earlier     Past Surgical History:   Procedure Laterality Date    ABDOMEN SURGERY  2012?    APPENDECTOMY  1964?    BRONCHOSCOPY RIDID OR FLEXIBLE W/ENDOBRONCHIAL ULTRASOUND GUIDED 3 OR MORE NODE STATIONS N/A 3/19/2025    Procedure: Bronchoscopy Ridid Or Flexible W/Endobronchial Ultrasound Guided 3 Or More Node Stations;  Surgeon: Saad Olmedo MD;  Location:  GI    COLONOSCOPY  2006, 2016    DAVINCI NISSEN FUNDOPLICATION N/A 2/26/2025    Procedure: TAKE-DOWN, FUNDOPLICATION, NISSEN, LAPAROSCOPIC- ROBOTIC, gastrostomy takedown, lysisof adhesions 2 hr;  Surgeon: Katlyn Tobar MD;  Location:  OR    ESOPHAGEAL BALLOON PROVOCATION STUDY N/A 9/24/2024     Procedure: Esophageal Balloon Provocation Study;  Surgeon: Carosn Michel DO;  Location: [facility] GI    ESOPHAGECTOMY MINIMALLY INVASIVE N/A 3/24/2025    Procedure: ESOPHAGECTOMY, MINIMALLY INVASIVE, Laparoscopic Right Thorascopic Esophagectomy, Botox Injection;  Surgeon: Katlyn Tobar MD;  Location: [facility] OR    ESOPHAGOSCOPY, GASTROSCOPY, DUODENOSCOPY (EGD), COMBINED N/A 9/24/2024    Procedure: ESOPHAGOGASTRODUODENOSCOPY, WITH BIOPSY;  Surgeon: Carson Michel DO;  Location: [facility] GI    ESOPHAGOSCOPY, GASTROSCOPY, DUODENOSCOPY (EGD), COMBINED N/A 10/8/2024    Procedure: ESOPHAGOGASTRODUODENOSCOPY, WITH FINE NEEDLE ASPIRATION BIOPSY, WITH ENDOSCOPIC ULTRASOUND GUIDANCE;  Surgeon: Lance Lopez MD;  Location: [facility] GI    ESOPHAGOSCOPY, GASTROSCOPY, DUODENOSCOPY (EGD), COMBINED N/A 2/26/2025    Procedure: Esophagoscopy, gastroscopy, duodenoscopy (EGD);  Surgeon: Katlyn Tobar MD;  Location: [facility] OR    ESOPHAGOSCOPY, GASTROSCOPY, DUODENOSCOPY (EGD), COMBINED N/A 3/24/2025    Procedure: Esophagoscopy, gastroscopy, duodenoscopy (EGD), combined;  Surgeon: Katlyn Tobar MD;  Location: [facility] OR    ESOPHAGOSCOPY, GASTROSCOPY, DUODENOSCOPY (EGD), COMBINED N/A 3/28/2025    Procedure: ESOPHAGOGASTRODUODENOSCOPY, pharyngostomy tube replacement;  Surgeon: Chino Gardner MD;  Location: [facility] OR    EYE SURGERY  199x    laser for retinal tears    GASTRIC FUNDOPLICATION      HERNIA REPAIR  1961?    IR CHEST PORT PLACEMENT > 5 YRS OF AGE  10/24/2024    LAPAROSCOPIC ASSISTED INSERTION TUBE JEJUNOSTOMY N/A 2/26/2025    Procedure: Laparoscopic assisted insertion tube jejunostomy;  Surgeon: Katlyn Tobar MD;  Location: [facility] OR    LAPAROSCOPIC TAKEDOWN NISSEN FUNDOPLICATION N/A 9/25/2020    Procedure: TAKE-DOWN, FUNDOPLICATION, REDO NISSEN, LAPAROSCOPIC, gastrostomy feeding tube placement;  Surgeon: Katlyn Tobar MD;  Location: [facility] OR    IL ESOPHAGOGASTRODUODENOSCOPY TRANSORAL DIAGNOSTIC N/A 5/9/2019    Procedure: UPPER GASTROINTESTINAL  ENDOSCOPY;  Surgeon: Dino Ward MD;  Location: [facility] OR;  Service: General    SOFT TISSUE SURGERY  2009?    MCL l. thumb     acetaminophen (TYLENOL) 325 MG tablet  amoxicillin-clavulanate (AUGMENTIN) 400-57 MG/5ML suspension  atenolol (TENORMIN) 50 MG tablet  cyanocobalamin (VITAMIN B-12) 500 MCG tablet  dextromethorphan (DELSYM) 30 MG/5ML liquid  EPINEPHrine (ANY BX GENERIC EQUIV) 0.3 MG/0.3ML injection 2-pack  gabapentin (NEURONTIN) 300 MG capsule  Isosource 1.5 Gumaro 250 mL  Lidocaine (LIDOCARE) 4 % Patch  methocarbamol (ROBAXIN) 750 MG tablet  multivitamins w/minerals liquid  nortriptyline (PAMELOR) 25 MG capsule  oxyCODONE (ROXICODONE) 5 MG/5ML solution  Prosource TF PO LIQD (PROSOURCE TF)  senna-docusate (SENOKOT-S/PERICOLACE) 8.6-50 MG tablet      Allergies   Allergen Reactions    Hornets     Wasp Venom Protein Hives    Bee Venom Swelling     Family History  Family History   Problem Relation Age of Onset    Hypertension Mother     Uterine Cancer Mother         1965    Anesthesia Reaction Mother         PONV, slow to emerge    Mitral Valve Replacement Father     Coronary Artery Disease Maternal Grandfather     GERD No family hx of     Ulcerative Colitis No family hx of     Crohn's Disease No family hx of     Stomach Cancer No family hx of     Deep Vein Thrombosis (DVT) No family hx of      Social History   Social History     Tobacco Use    Smoking status: Never     Passive exposure: Past    Smokeless tobacco: Never   Substance Use Topics    Alcohol use: Not Currently    Drug use: Never      Past medical history, past surgical history, medications, allergies, family history, and social history were reviewed with the patient. No additional pertinent items.   A medically appropriate review of systems was performed with pertinent positives and negatives noted in the HPI, and all other systems negative.    Physical Exam   BP: 133/80  Pulse: 114  Temp: 97.9  F (36.6  C)  Resp: 16  Height: 177.8 cm  "(5' 10\")  Weight: 78.2 kg (172 lb 8 oz)  SpO2: 99 %  Physical Exam  Vitals and nursing note reviewed.   Constitutional:       General: He is not in acute distress.     Appearance: Normal appearance. He is not ill-appearing, toxic-appearing or diaphoretic.   HENT:      Head: Normocephalic and atraumatic.   Eyes:      General: No scleral icterus.     Extraocular Movements: Extraocular movements intact.      Conjunctiva/sclera: Conjunctivae normal.   Cardiovascular:      Rate and Rhythm: Normal rate and regular rhythm.      Heart sounds: Normal heart sounds.   Pulmonary:      Effort: Pulmonary effort is normal. No respiratory distress.      Breath sounds: Normal breath sounds.      Comments: There is a ODIN drain in posterior right chest wall with approximately 30 cc of serosanguineous drainage in the collecting drain.  Abdominal:      General: Abdomen is flat.      Palpations: Abdomen is soft.      Tenderness: There is no abdominal tenderness.   Musculoskeletal:         General: No tenderness. Normal range of motion.      Cervical back: Normal range of motion and neck supple.   Skin:     General: Skin is warm and dry.      Findings: No rash.   Neurological:      General: No focal deficit present.      Mental Status: He is alert and oriented to person, place, and time.      Motor: No weakness.      Coordination: Coordination normal.   Psychiatric:         Mood and Affect: Mood normal.         Behavior: Behavior normal.         Thought Content: Thought content normal.         Judgment: Judgment normal.           ED Course, Procedures, & Data      Procedures           IV Antibiotics given and/or elevated Lactate of 0 and no sepsis note found - Delete this reminder and enter the sepsis note or '.edcms' before signing chart.>>>     Results for orders placed or performed during the hospital encounter of 04/17/25   INR     Status: Normal   Result Value Ref Range    INR 1.14 0.85 - 1.15   CBC with platelets and differential     " Status: Abnormal   Result Value Ref Range    WBC Count 12.5 (H) 4.0 - 11.0 10e3/uL    RBC Count 3.90 (L) 4.40 - 5.90 10e6/uL    Hemoglobin 11.0 (L) 13.3 - 17.7 g/dL    Hematocrit 33.9 (L) 40.0 - 53.0 %    MCV 87 78 - 100 fL    MCH 28.2 26.5 - 33.0 pg    MCHC 32.4 31.5 - 36.5 g/dL    RDW 13.2 10.0 - 15.0 %    Platelet Count 420 150 - 450 10e3/uL    % Neutrophils 69 %    % Lymphocytes 17 %    % Monocytes 12 %    % Eosinophils 1 %    % Basophils 0 %    % Immature Granulocytes 1 %    NRBCs per 100 WBC 0 <1 /100    Absolute Neutrophils 8.7 (H) 1.6 - 8.3 10e3/uL    Absolute Lymphocytes 2.1 0.8 - 5.3 10e3/uL    Absolute Monocytes 1.5 (H) 0.0 - 1.3 10e3/uL    Absolute Eosinophils 0.2 0.0 - 0.7 10e3/uL    Absolute Basophils 0.0 0.0 - 0.2 10e3/uL    Absolute Immature Granulocytes 0.1 <=0.4 10e3/uL    Absolute NRBCs 0.0 10e3/uL   Adult Type and Screen     Status: None (Preliminary result)   Result Value Ref Range    SPECIMEN EXPIRATION DATE [id number]    CBC with platelets differential     Status: Abnormal    Narrative    The following orders were created for panel order CBC with platelets differential.  Procedure                               Abnormality         Status                     ---------                               -----------         ------                     CBC with platelets and ...[[id number]]  Abnormal            Final result                 Please view results for these tests on the individual orders.   ABO/Rh type and screen     Status: None (In process)    Narrative    The following orders were created for panel order ABO/Rh type and screen.  Procedure                               Abnormality         Status                     ---------                               -----------         ------                     Adult Type and Screen[[id number]]                           Preliminary result           Please view results for these tests on the individual orders.   Results for orders placed or  performed during the hospital encounter of 04/17/25   CT Chest Abdomen w Contrast     Status: None    Narrative    EXAM: CT CHEST ABDOMEN W CONTRAST  LOCATION: [facility]  DATE: 4/17/2025    INDICATION: S/P esophagectomy complicated by anastomotic leak, CT to be scheduled prior to thoracic surgery clinic follow up.  COMPARISON: 4/3/2025 chest CT. 3/28/2025 CT CAP. Others.  TECHNIQUE: CT scan of the chest and abdomen was performed following injection of IV contrast. Multiplanar reformats were obtained. Dose reduction techniques were used.   CONTRAST: Isovue 370 90mL    FINDINGS:     LUNGS AND PLEURA: Mild to moderate right and mild left basilar predominant atelectasis. Small right pleural with intrinsic debris and air. Right pleural catheter in place. Tiny left pleural effusion.    MEDIASTINUM/AXILLAE: Post esophagectomy with gastric pull-through. Persistent gastric leak and defect along the posterior pull-through (series 3, image 66). A 7 mm transverse x 10 mm craniocaudad pull-through defect is identified (axial series 3, image   66 and sagittal series, image 79) with extension of contrast into the right pleural space.     Stable mild mediastinal and right hilar adenopathy. Right paratracheal node measures 13 x 25 mm (series 3, 61). Right paratracheal node measures 12 x 15 mm (image 81). Right IJ portacatheter tip at the cavoatrial junction.    CORONARY ARTERY CALCIFICATION: Present.    HEPATOBILIARY: Stable small hepatic hypodensities.    PANCREAS: Normal.    SPLEEN: Normal.    ADRENAL GLANDS: Normal.    KIDNEYS: Stable nonobstructing renal stones.    BOWEL: No obstruction. Percutaneous jejunostomy tube.    LYMPH NODES: No abdominal adenopathy.    VASCULATURE: Nonaneurysmal aorta.    MUSCULOSKELETAL: Nothing acute.      Impression    IMPRESSION:    1.  Persistent gastric pull-through leak with extravasation of oral contrast into the right pleural space.    2.  Prior large bore right chest  tube has been replaced with a small right pleural catheter. Little change in size of small right pleural effusion with mild intrinsic air and debris.    3.  Improved but persistent tiny left pleural effusion.     4.  Stable mild mediastinal and right perihilar adenopathy.       Medications   lidocaine 1 % 0.1-1 mL (has no administration in time range)   lidocaine (LMX4) cream (has no administration in time range)   sodium chloride (PF) 0.9% PF flush 3 mL (3 mLs Intracatheter $Given 4/17/25 1524)   sodium chloride (PF) 0.9% PF flush 3 mL (has no administration in time range)   enoxaparin ANTICOAGULANT (LOVENOX) injection 40 mg (has no administration in time range)   lactated ringers infusion ( Intravenous $New Bag 4/17/25 1557)   acetaminophen (TYLENOL) tablet 650 mg (has no administration in time range)     Or   acetaminophen (TYLENOL) Suppository 650 mg (has no administration in time range)   oxyCODONE IR (ROXICODONE) half-tab 2.5 mg (2.5 mg Per Feeding Tube $Given 4/17/25 1546)   oxyCODONE (ROXICODONE) tablet 5 mg (has no administration in time range)   melatonin tablet 5 mg (has no administration in time range)   polyethylene glycol (MIRALAX) Packet 17 g (17 g Per Feeding Tube Not Given 4/17/25 1546)   ondansetron (ZOFRAN) injection 4 mg (has no administration in time range)   prochlorperazine (COMPAZINE) injection 5 mg (has no administration in time range)   pantoprazole (PROTONIX) 2 mg/mL suspension 40 mg (has no administration in time range)   piperacillin-tazobactam (ZOSYN) 4.5 g vial to attach to  mL bag (4.5 g Intravenous $New Bag 4/17/25 1546)   fluconazole (DIFLUCAN) intermittent infusion 400 mg (has no administration in time range)   Lidocaine (LIDOCARE) 4 % Patch 1 patch (has no administration in time range)   dextrose 10% infusion (has no administration in time range)   fiber modular (BANATROL TF) packet 1 packet (has no administration in time range)   senna-docusate (SENOKOT-S/PERICOLACE) 8.6-50  MG per tablet 1 tablet (has no administration in time range)     Or   senna-docusate (SENOKOT-S/PERICOLACE) 8.6-50 MG per tablet 2 tablet (has no administration in time range)     Labs Ordered and Resulted from Time of ED Arrival to Time of ED Departure   CBC WITH PLATELETS AND DIFFERENTIAL - Abnormal       Result Value    WBC Count 12.5 (*)     RBC Count 3.90 (*)     Hemoglobin 11.0 (*)     Hematocrit 33.9 (*)     MCV 87      MCH 28.2      MCHC 32.4      RDW 13.2      Platelet Count 420      % Neutrophils 69      % Lymphocytes 17      % Monocytes 12      % Eosinophils 1      % Basophils 0      % Immature Granulocytes 1      NRBCs per 100 WBC 0      Absolute Neutrophils 8.7 (*)     Absolute Lymphocytes 2.1      Absolute Monocytes 1.5 (*)     Absolute Eosinophils 0.2      Absolute Basophils 0.0      Absolute Immature Granulocytes 0.1      Absolute NRBCs 0.0     INR - Normal    INR 1.14     COMPREHENSIVE METABOLIC PANEL   GLUCOSE MONITOR NURSING POCT   TYPE AND SCREEN, ADULT    SPECIMEN EXPIRATION DATE [id number]     MRSA MSSA PCR, NASAL SWAB   ABO/RH TYPE AND SCREEN     No orders to display          Critical care was not performed.     Medical Decision Making  The patient's presentation was of high complexity (an acute health issue posing potential threat to life or bodily function).    The patient's evaluation involved:  review of external note(s) from 1 sources (see separate area of note for details)  review of 3+ test result(s) ordered prior to this encounter (see separate area of note for details)  ordering and/or review of 3+ test(s) in this encounter (see separate area of note for details)  independent interpretation of testing performed by another health professional (see separate area of note for details)  discussion of management or test interpretation with another health professional (see separate area of note for details)    The patient's management necessitated high risk (a decision regarding  hospitalization).    Assessment & Plan    70-year-old man sent from the clinic due to concern for esophageal leak.  I reviewed the outpatient imaging study along with the radiology report which is concerning for esophageal leak.  I discussed the case with thoracic surgery staff and resident.  They want the patient admitted to their service.  Laboratory studies were obtained.  Patient agrees with the plan.    Patient's laboratory studies returned with evidence of mild leukocytosis of 12,500.  There is mild anemia with hemoglobin of 11.0.  INR is normal at 1.14.    I have reviewed the nursing notes. I have reviewed the findings, diagnosis, plan and need for follow up with the patient.    New Prescriptions    No medications on file       Final diagnoses:   Esophageal anastomotic leak       China Geronimo MD  [facility] EMERGENCY DEPARTMENT  4/17/2025     China Geronimo MD  04/17/25 1269

## 2025-04-17 NOTE — H&P
STAFF ADDENDUM:  I saw and evaluated Mr. Junior and agree with the resident s findings and plan of care as documented in the resident s note and edited by me, as applicable.      In summary, Mr. Junior has an ongoing leak that does not seem to be well controlled at this time. We plan for OR for either a stent placement or a pharyngostomy tube placement into the cavity. We'll also start IV antibiotics.  I spent a total of 40 minutes with Mr. Junior, 35 of which were spent in counseling and coordination of care. The patient had all questions answered and was in agreement with the plan.  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 Esophagectomy on 3/24. EGD and intraoperative esophagram confirming no leak on 3/28 and later passed methylene blue challenge further confirming no leak on 4/2, however had a rising WBC after initiation of CLD. 4/3 CT esophagram confirmed anastomotic leak. 4/7 EGD with left chest tube placement which and was safely discharged on 4/9. In clinic on 4/17 CT showed leak of oral contrast into right pleural space now readmitted for a planned EGD with stent placement with Dr. Lindsey on 4/18.       -Admit to Thoracic Surgery  -Consent for procedure  -OR: EGD + possible stent placement on 4/18  -Diet: Strict NPO, Cycled TF @ 90 (Pause at midnight) + Meds through feeding tube  -IVF: LR @ 125 (To be discontinued after TF setup)  -Abx: Zosyn + Fluconazole  -Pain: Multimodal Pain Regimen  -Ppx: SCD's, Lovenox    The patient's care was  discussed with Dr. Lindsey and Dr. Amadou Galo MD  [facility]  All communications related to this note should be expressed to resident/MITZI on call for this team at the time of your communication. Please be advised that not all members of this team utilize vocera.  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 Esophagectomy on 3/24. EGD and intraoperative esophagram confirming no leak on 3/28 and later passed methylene blue challenge further confirming no leak on 4/2, however had a rising WBC after initiation of CLD. 4/3 CT esophagram concerning for anastomotic leak. 4/7 EGD with left chest tube placement which and was safely discharged on 4/9. In clinic on 4/17 CT showed leak of oral contrast into right pleural space now readmitted for a planned EGD with stent placement with Dr. Lindsey on 4/18.    Bal presents from clinic to the emergency room recommended by Dr. Lindsey, he states that the lingering cough and right-sided chest/back pain has remained the same since his discharge on 4/9. He expresses that he is frustrated but understanding that he will need the EGD with possible stent placement this admission. He has no new concerns or questions.      Past Medical History    Past Medical History:   Diagnosis Date    Hypertension     Neuritis     Peripheral neuropathy     Personal history of other medical treatment     postgastrectomy dumping syndrome    Stomach problems Noted earlier        Past Surgical History   Past Surgical History:   Procedure Laterality Date    ABDOMEN SURGERY  2012?    APPENDECTOMY  1964?    BRONCHOSCOPY RIDID OR FLEXIBLE W/ENDOBRONCHIAL ULTRASOUND GUIDED 3 OR MORE NODE STATIONS N/A 3/19/2025    Procedure: Bronchoscopy Ridid Or Flexible W/Endobronchial Ultrasound Guided 3 Or More Node Stations;  Surgeon: Saad Olmedo MD;  Location: [facility]    COLONOSCOPY  2006, 2016    DAVINCI NISSEN FUNDOPLICATION N/A 2/26/2025    Procedure: TAKE-DOWN, FUNDOPLICATION, NISSEN, LAPAROSCOPIC- ROBOTIC, gastrostomy takedown, lysisof adhesions 2 hr;  Surgeon: Katlyn Tobar MD;  Location: [facility]    ESOPHAGEAL BALLOON PROVOCATION STUDY N/A 9/24/2024    Procedure: Esophageal Balloon Provocation Study;  Surgeon: Carson Michel DO;  Location: [facility]    ESOPHAGECTOMY MINIMALLY INVASIVE N/A 3/24/2025    Procedure: ESOPHAGECTOMY, MINIMALLY INVASIVE, Laparoscopic Right Thorascopic Esophagectomy, Botox Injection;  Surgeon: Katlyn Tobar MD;  Location: [facility]    ESOPHAGOSCOPY, GASTROSCOPY, DUODENOSCOPY (EGD), COMBINED N/A 9/24/2024    Procedure: ESOPHAGOGASTRODUODENOSCOPY, WITH BIOPSY;  Surgeon: Carson Michel DO;  Location: [facility]    ESOPHAGOSCOPY, GASTROSCOPY, DUODENOSCOPY (EGD), COMBINED N/A 10/8/2024    Procedure: ESOPHAGOGASTRODUODENOSCOPY, WITH FINE NEEDLE ASPIRATION BIOPSY, WITH ENDOSCOPIC ULTRASOUND GUIDANCE;  Surgeon: Lance Lopez MD;  Location: [facility]    ESOPHAGOSCOPY, GASTROSCOPY, DUODENOSCOPY (EGD), COMBINED N/A 2/26/2025    Procedure: Esophagoscopy, gastroscopy, duodenoscopy (EGD);  Surgeon: Katlyn Tobar MD;  [facility]: [facility]    ESOPHAGOSCOPY, GASTROSCOPY, DUODENOSCOPY (EGD), COMBINED N/A 3/24/2025    Procedure: Esophagoscopy, gastroscopy, duodenoscopy (EGD), combined;  Surgeon: Katlyn Tobar MD;  [facility]: [facility]    ESOPHAGOSCOPY, GASTROSCOPY, DUODENOSCOPY (EGD), COMBINED N/A 3/28/2025    Procedure: ESOPHAGOGASTRODUODENOSCOPY, pharyngostomy tube replacement;  Surgeon: Jose De Jesus  Chino Bautista MD;  Location: [facility]    EYE SURGERY  199x    laser for retinal tears    GASTRIC FUNDOPLICATION      HERNIA REPAIR  1961?    IR CHEST PORT PLACEMENT > 5 YRS OF AGE  10/24/2024    LAPAROSCOPIC ASSISTED INSERTION TUBE JEJUNOSTOMY N/A 2/26/2025    Procedure: Laparoscopic assisted insertion tube jejunostomy;  Surgeon: Katlyn Tobar MD;  [facility]: [facility]    LAPAROSCOPIC TAKEDOWN NISSEN FUNDOPLICATION N/A 9/25/2020    Procedure: TAKE-DOWN, FUNDOPLICATION, REDO NISSEN, LAPAROSCOPIC, gastrostomy feeding tube placement;  Surgeon: Katlyn Tobar MD;  [facility]: [facility]    MT ESOPHAGOGASTRODUODENOSCOPY TRANSORAL DIAGNOSTIC N/A 5/9/2019    Procedure: UPPER GASTROINTESTINAL ENDOSCOPY;  Surgeon: Dino Ward MD;  Location: [facility];  Service: General    SOFT TISSUE SURGERY  2009?    MCL l. thumb       Prior to Admission Medications   Prior to Admission Medications   Prescriptions Last Dose Informant Patient Reported? Taking?   EPINEPHrine (ANY BX GENERIC EQUIV) 0.3 MG/0.3ML injection 2-pack   Yes No   Isosource 1.5 Gumaro 250 mL   No No   Sig: Place 1,500 mLs into J tube daily. Infuse via pump. 90 mL/hr x 16 hours.   6 cartons per day.   Water flush: 30 mL before and after tube feed cycle. Additional 30 mL 6x per day.   Kcals: 2250   Lidocaine (LIDOCARE) 4 % Patch   No No   Sig: Place 1 patch over 12 hours onto the skin every 24 hours. To prevent lidocaine toxicity, patient should be patch free for 12 hrs daily.   Prosource TF PO LIQD (PROSOURCE TF)   No No   Sig: Place 90 mLs into J tube 2 times daily. Infuse via syringe  Water flush: 30 mL before and after each packet   Kcals: 160   acetaminophen (TYLENOL) 325 MG tablet   No No   Sig: Place 2 tablets (650 mg) into Feeding Tube every 6 hours.   amoxicillin-clavulanate (AUGMENTIN) 400-57 MG/5ML suspension   Yes No   Sig: Take 400 mg by mouth.   atenolol (TENORMIN) 50 MG tablet   No No   Sig: Place 1 tablet (50 mg) into Feeding Tube every evening.    cyanocobalamin (VITAMIN B-12) 500 MCG tablet   Yes No   Sig: Place 1 tablet (500 mcg) into Feeding Tube.   dextromethorphan (DELSYM) 30 MG/5ML liquid   No No   Sig: Take 5 mLs (30 mg) by mouth once as needed for cough.   gabapentin (NEURONTIN) 300 MG capsule   No No   Sig: Place 1 capsule (300 mg) into Feeding Tube 3 times daily.   methocarbamol (ROBAXIN) 750 MG tablet   No No   Sig: Place 1 tablet (750 mg) into J tube every 6 hours as needed for muscle spasms.   multivitamins w/minerals liquid   No No   Sig: Place 15 mLs into Feeding Tube daily.   nortriptyline (PAMELOR) 25 MG capsule   Yes No   Sig: Place 1 capsule (25 mg) into J tube every evening.   oxyCODONE (ROXICODONE) 5 MG/5ML solution   No No   Sig: Place 5-10 mLs (5-10 mg) into J tube every 4 hours as needed for severe pain.   senna-docusate (SENOKOT-S/PERICOLACE) 8.6-50 MG tablet   No No   Sig: Take 1 tablet by mouth or Feeding Tube 2 times daily.      Facility-Administered Medications: None        Review of Systems    The 10 point Review of Systems is negative other than noted in the HPI or here.     Physical Exam   Vital Signs: Temp: 97.9  F (36.6  C)   BP: 133/80 Pulse: 114   Resp: 16 SpO2: 99 % O2 Device: None (Room air)    Weight: 172 lbs 8 ozNo intake or output data in the 24 hours ending 04/17/25 1452     Gen: A&Ox4, appears frustrated  Chest: Non-labored breathing on RA, left pigtail drain with brown colored fluid  Abdomen: Soft, non-tender, non-distended, healing incisional scars with steri-strips, J-tube site clean, dry, intact and patent.  Extremities: Warm, well perfused         Data

## 2025-04-17 NOTE — ED TRIAGE NOTES
Pt ambulatory from home with abnormal CT results. Pt has procedure tomorrow but was instructed to come to ER for admission due to gastric leak. Pt complaining of discomfort at ODIN drain site. ODIN drain dusky gray color.     Triage Assessment (Adult)       Row Name 04/17/25 1417          Triage Assessment    Airway WDL WDL        Respiratory WDL    Respiratory WDL WDL        Cardiac WDL    Cardiac WDL WDL        Peripheral/Neurovascular WDL    Peripheral Neurovascular WDL WDL        Cognitive/Neuro/Behavioral WDL    Cognitive/Neuro/Behavioral WDL WDL

## 2025-04-17 NOTE — H&P
[facility]    History and Physical - Thoracic Surgery Service       Date of Admission: 4/17/2025  Procedure(s):  ESOPHAGOGASTRODUODENOSCOPY with stent placement or pharyngostomy tube placement on 4/18/2025  Assessment & Plan: Surgery   Bal Junior is a 70 year old male with a history of Acid reflux s/p fundoplication takedown w/ lap redo nissen fundoplication (2020) with recently diagnosed adenocarcinoma of GE junction s/p robotic takedown of Nissen fundoplication, s/p J-tube placement on  2/26  , s/p MIS esophageal resection on 3/24. Thoracic surgery is consulted for him due to feeling ill at follow up clinic, further CT imaging is concerning for a possible esophageal link.    -Admit to Thoracic Surgery  -OR Tomorrow: EGD +/- LIDIA  -Will obtain consent  -Diet: Strict NPO  -IVF: LR @ 125  -Abx: Zosyn/Fluconale  -Pain: Multimodals (Can give meds through enteral feeing tube  -Ppx: SCD's, LVNX     The patient's care was discussed with Dr. Lindsey and Dr. Tobar.    Janes Galo MD  [facility]  All communications related to this note should be expressed to resident/MITZI on call for this team at the time of your communication. Please be advised that not all members of this team utilize vocera.  ______________________________________________________________________    Chief Complaint   ***    History is obtained from the patient and chart review  ***    History of Present Illness   Bal Junior is a 70 year old male with history of ***      Past Medical History    Past Medical History:   Diagnosis Date    Hypertension     Neuritis     Peripheral neuropathy     Personal history of other medical treatment     postgastrectomy dumping syndrome    Stomach problems Noted earlier       Past Surgical History   Past Surgical History:   Procedure Laterality Date    ABDOMEN SURGERY  2012?    APPENDECTOMY  1964?    BRONCHOSCOPY RIDID OR  FLEXIBLE W/ENDOBRONCHIAL ULTRASOUND GUIDED 3 OR MORE NODE STATIONS N/A 3/19/2025    Procedure: Bronchoscopy Ridid Or Flexible W/Endobronchial Ultrasound Guided 3 Or More Node Stations;  Surgeon: Saad Olmedo MD;  Location: [facility]    COLONOSCOPY  2006, 2016    DAVINCI NISSEN FUNDOPLICATION N/A 2/26/2025    Procedure: TAKE-DOWN, FUNDOPLICATION, NISSEN, LAPAROSCOPIC- ROBOTIC, gastrostomy takedown, lysisof adhesions 2 hr;  Surgeon: Katlyn Tobar MD;  [facility]: [facility]    ESOPHAGEAL BALLOON PROVOCATION STUDY N/A 9/24/2024    Procedure: Esophageal Balloon Provocation Study;  Surgeon: Carson Michel DO;  Location: [facility]    ESOPHAGECTOMY MINIMALLY INVASIVE N/A 3/24/2025    Procedure: ESOPHAGECTOMY, MINIMALLY INVASIVE, Laparoscopic Right Thorascopic Esophagectomy, Botox Injection;  Surgeon: Katlyn Tobar MD;  [facility]    ESOPHAGOSCOPY, GASTROSCOPY, DUODENOSCOPY (EGD), COMBINED N/A 9/24/2024    Procedure: ESOPHAGOGASTRODUODENOSCOPY, WITH BIOPSY;  Surgeon: Carson Michel DO;  Location: [facility]    ESOPHAGOSCOPY, GASTROSCOPY, DUODENOSCOPY (EGD), COMBINED N/A 10/8/2024    Procedure: ESOPHAGOGASTRODUODENOSCOPY, WITH FINE NEEDLE ASPIRATION BIOPSY, WITH ENDOSCOPIC ULTRASOUND GUIDANCE;  Surgeon: Lance Lopez MD;  [facility]    ESOPHAGOSCOPY, GASTROSCOPY, DUODENOSCOPY (EGD), COMBINED N/A 2/26/2025    Procedure: Esophagoscopy, gastroscopy, duodenoscopy (EGD);  Surgeon: Katlyn Tobar MD;  [facility]: [facility]    ESOPHAGOSCOPY, GASTROSCOPY, DUODENOSCOPY (EGD), COMBINED N/A 3/24/2025    Procedure: Esophagoscopy, gastroscopy, duodenoscopy (EGD), combined;  Surgeon: Katlyn Tobar MD;  [facility]: [facility]    ESOPHAGOSCOPY, GASTROSCOPY, DUODENOSCOPY (EGD), COMBINED N/A 3/28/2025    Procedure: ESOPHAGOGASTRODUODENOSCOPY, pharyngostomy tube replacement;  Surgeon: Chino Gardner MD;  Location: [facility]    EYE SURGERY  199x    laser for retinal tears    GASTRIC FUNDOPLICATION      HERNIA REPAIR  1961?    IR CHEST PORT  PLACEMENT > 5 YRS OF AGE  10/24/2024    LAPAROSCOPIC ASSISTED INSERTION TUBE JEJUNOSTOMY N/A 2/26/2025    Procedure: Laparoscopic assisted insertion tube jejunostomy;  Surgeon: Katlyn Tobar MD;  [facility]    LAPAROSCOPIC TAKEDOWN NISSEN FUNDOPLICATION N/A 9/25/2020    Procedure: TAKE-DOWN, FUNDOPLICATION, REDO NISSEN, LAPAROSCOPIC, gastrostomy feeding tube placement;  Surgeon: Katlyn Tobar MD;  [facility]: [facility]    IA ESOPHAGOGASTRODUODENOSCOPY TRANSORAL DIAGNOSTIC N/A 5/9/2019    Procedure: UPPER GASTROINTESTINAL ENDOSCOPY;  Surgeon: Dino Ward MD;  Location: [facility];  Service: General    SOFT TISSUE SURGERY  2009?    MCL l. thumb       Prior to Admission Medications   Cannot display prior to admission medications because the patient has not been admitted in this contact.        Review of Systems    The 10 point Review of Systems is negative other than noted in the HPI.    Physical Exam   Vital Signs:                    Weight: 0 lbs 0 ozNo intake or output data in the 24 hours ending 04/17/25 1406     Gen: Appears uncomfortable, dissapointed  Chest: Non-labored breathing on room air  Abdomen: Soft-nontender  Extremities: Warm and well perfused         Data   { TIP    No CBC, BMP, LFT, lipase, coag, troponin, BNP, TSH, A1C, CRP, procal, lactic acid, D-dimer, fibrinogen, ferritin, retic, or LDH results found in the past 24 hrs      :[id number]}

## 2025-04-18 ENCOUNTER — APPOINTMENT (OUTPATIENT)
Dept: GENERAL RADIOLOGY | Facility: CLINIC | Age: 70
End: 2025-04-18
Attending: THORACIC SURGERY (CARDIOTHORACIC VASCULAR SURGERY)
Payer: MEDICARE

## 2025-04-18 LAB
ALBUMIN SERPL BCG-MCNC: 3.3 G/DL (ref 3.5–5.2)
ALP SERPL-CCNC: 111 U/L (ref 40–150)
ALT SERPL W P-5'-P-CCNC: 20 U/L (ref 0–70)
ANION GAP SERPL CALCULATED.3IONS-SCNC: 11 MMOL/L (ref 7–15)
AST SERPL W P-5'-P-CCNC: 25 U/L (ref 0–45)
BILIRUB SERPL-MCNC: 0.3 MG/DL
BUN SERPL-MCNC: 29.9 MG/DL (ref 8–23)
CALCIUM SERPL-MCNC: 9.1 MG/DL (ref 8.8–10.4)
CHLORIDE SERPL-SCNC: 105 MMOL/L (ref 98–107)
CREAT SERPL-MCNC: 0.86 MG/DL (ref 0.67–1.17)
EGFRCR SERPLBLD CKD-EPI 2021: >90 ML/MIN/1.73M2
ERYTHROCYTE [DISTWIDTH] IN BLOOD BY AUTOMATED COUNT: 13.2 % (ref 10–15)
GLUCOSE BLDC GLUCOMTR-MCNC: 127 MG/DL (ref 70–99)
GLUCOSE BLDC GLUCOMTR-MCNC: 129 MG/DL (ref 70–99)
GLUCOSE BLDC GLUCOMTR-MCNC: 130 MG/DL (ref 70–99)
GLUCOSE SERPL-MCNC: 135 MG/DL (ref 70–99)
HCO3 SERPL-SCNC: 22 MMOL/L (ref 22–29)
HCT VFR BLD AUTO: 31.8 % (ref 40–53)
HGB BLD-MCNC: 10.2 G/DL (ref 13.3–17.7)
MAGNESIUM SERPL-MCNC: 1.9 MG/DL (ref 1.7–2.3)
MCH RBC QN AUTO: 27.7 PG (ref 26.5–33)
MCHC RBC AUTO-ENTMCNC: 32.1 G/DL (ref 31.5–36.5)
MCV RBC AUTO: 86 FL (ref 78–100)
PHOSPHATE SERPL-MCNC: 4.4 MG/DL (ref 2.5–4.5)
PLATELET # BLD AUTO: 333 10E3/UL (ref 150–450)
POTASSIUM SERPL-SCNC: 4.2 MMOL/L (ref 3.4–5.3)
PROT SERPL-MCNC: 7.1 G/DL (ref 6.4–8.3)
RBC # BLD AUTO: 3.68 10E6/UL (ref 4.4–5.9)
SODIUM SERPL-SCNC: 138 MMOL/L (ref 135–145)
WBC # BLD AUTO: 10.6 10E3/UL (ref 4–11)

## 2025-04-18 PROCEDURE — 83735 ASSAY OF MAGNESIUM: CPT | Performed by: STUDENT IN AN ORGANIZED HEALTH CARE EDUCATION/TRAINING PROGRAM

## 2025-04-18 PROCEDURE — 250N000009 HC RX 250: Performed by: STUDENT IN AN ORGANIZED HEALTH CARE EDUCATION/TRAINING PROGRAM

## 2025-04-18 PROCEDURE — 250N000013 HC RX MED GY IP 250 OP 250 PS 637: Performed by: STUDENT IN AN ORGANIZED HEALTH CARE EDUCATION/TRAINING PROGRAM

## 2025-04-18 PROCEDURE — 250N000011 HC RX IP 250 OP 636: Performed by: STUDENT IN AN ORGANIZED HEALTH CARE EDUCATION/TRAINING PROGRAM

## 2025-04-18 PROCEDURE — 710N000010 HC RECOVERY PHASE 1, LEVEL 2, PER MIN: Performed by: THORACIC SURGERY (CARDIOTHORACIC VASCULAR SURGERY)

## 2025-04-18 PROCEDURE — 999N000179 XR SURGERY CARM FLUORO LESS THAN 5 MIN W STILLS

## 2025-04-18 PROCEDURE — 370N000017 HC ANESTHESIA TECHNICAL FEE, PER MIN: Performed by: THORACIC SURGERY (CARDIOTHORACIC VASCULAR SURGERY)

## 2025-04-18 PROCEDURE — 0W9940Z DRAINAGE OF RIGHT PLEURAL CAVITY WITH DRAINAGE DEVICE, PERCUTANEOUS ENDOSCOPIC APPROACH: ICD-10-PCS | Performed by: THORACIC SURGERY (CARDIOTHORACIC VASCULAR SURGERY)

## 2025-04-18 PROCEDURE — C1769 GUIDE WIRE: HCPCS | Performed by: THORACIC SURGERY (CARDIOTHORACIC VASCULAR SURGERY)

## 2025-04-18 PROCEDURE — 272N000001 HC OR GENERAL SUPPLY STERILE: Performed by: THORACIC SURGERY (CARDIOTHORACIC VASCULAR SURGERY)

## 2025-04-18 PROCEDURE — 42955 SURGICAL OPENING OF THROAT: CPT | Mod: 78 | Performed by: THORACIC SURGERY (CARDIOTHORACIC VASCULAR SURGERY)

## 2025-04-18 PROCEDURE — 360N000082 HC SURGERY LEVEL 2 W/ FLUORO, PER MIN: Performed by: THORACIC SURGERY (CARDIOTHORACIC VASCULAR SURGERY)

## 2025-04-18 PROCEDURE — 250N000011 HC RX IP 250 OP 636: Mod: JZ

## 2025-04-18 PROCEDURE — 84155 ASSAY OF PROTEIN SERUM: CPT | Performed by: STUDENT IN AN ORGANIZED HEALTH CARE EDUCATION/TRAINING PROGRAM

## 2025-04-18 PROCEDURE — 85014 HEMATOCRIT: CPT | Performed by: STUDENT IN AN ORGANIZED HEALTH CARE EDUCATION/TRAINING PROGRAM

## 2025-04-18 PROCEDURE — 999N000141 HC STATISTIC PRE-PROCEDURE NURSING ASSESSMENT: Performed by: THORACIC SURGERY (CARDIOTHORACIC VASCULAR SURGERY)

## 2025-04-18 PROCEDURE — 36415 COLL VENOUS BLD VENIPUNCTURE: CPT | Performed by: STUDENT IN AN ORGANIZED HEALTH CARE EDUCATION/TRAINING PROGRAM

## 2025-04-18 PROCEDURE — 84100 ASSAY OF PHOSPHORUS: CPT | Performed by: STUDENT IN AN ORGANIZED HEALTH CARE EDUCATION/TRAINING PROGRAM

## 2025-04-18 PROCEDURE — 120N000002 HC R&B MED SURG/OB UMMC

## 2025-04-18 RX ORDER — NALOXONE HYDROCHLORIDE 0.4 MG/ML
0.4 INJECTION, SOLUTION INTRAMUSCULAR; INTRAVENOUS; SUBCUTANEOUS
Status: DISCONTINUED | OUTPATIENT
Start: 2025-04-18 | End: 2025-04-23 | Stop reason: HOSPADM

## 2025-04-18 RX ORDER — HYDROMORPHONE HCL IN WATER/PF 6 MG/30 ML
0.2 PATIENT CONTROLLED ANALGESIA SYRINGE INTRAVENOUS EVERY 5 MIN PRN
Status: DISCONTINUED | OUTPATIENT
Start: 2025-04-18 | End: 2025-04-18 | Stop reason: HOSPADM

## 2025-04-18 RX ORDER — SODIUM CHLORIDE, SODIUM LACTATE, POTASSIUM CHLORIDE, CALCIUM CHLORIDE 600; 310; 30; 20 MG/100ML; MG/100ML; MG/100ML; MG/100ML
INJECTION, SOLUTION INTRAVENOUS CONTINUOUS
Status: DISCONTINUED | OUTPATIENT
Start: 2025-04-18 | End: 2025-04-18 | Stop reason: HOSPADM

## 2025-04-18 RX ORDER — DEXTROSE MONOHYDRATE 100 MG/ML
INJECTION, SOLUTION INTRAVENOUS CONTINUOUS PRN
Status: DISCONTINUED | OUTPATIENT
Start: 2025-04-18 | End: 2025-04-23 | Stop reason: HOSPADM

## 2025-04-18 RX ORDER — HYDROMORPHONE HCL IN WATER/PF 6 MG/30 ML
0.4 PATIENT CONTROLLED ANALGESIA SYRINGE INTRAVENOUS EVERY 5 MIN PRN
Status: DISCONTINUED | OUTPATIENT
Start: 2025-04-18 | End: 2025-04-18 | Stop reason: HOSPADM

## 2025-04-18 RX ORDER — AMINO ACIDS/PROTEIN HYDROLYS 11G-40/45
1 LIQUID IN PACKET (ML) ORAL 2 TIMES DAILY
Status: DISCONTINUED | OUTPATIENT
Start: 2025-04-18 | End: 2025-04-23 | Stop reason: HOSPADM

## 2025-04-18 RX ORDER — FENTANYL CITRATE 50 UG/ML
25 INJECTION, SOLUTION INTRAMUSCULAR; INTRAVENOUS EVERY 5 MIN PRN
Status: DISCONTINUED | OUTPATIENT
Start: 2025-04-18 | End: 2025-04-18 | Stop reason: HOSPADM

## 2025-04-18 RX ORDER — LIDOCAINE 40 MG/G
CREAM TOPICAL
Status: DISCONTINUED | OUTPATIENT
Start: 2025-04-18 | End: 2025-04-18 | Stop reason: HOSPADM

## 2025-04-18 RX ORDER — ACETAMINOPHEN 325 MG/1
975 TABLET ORAL ONCE
Status: COMPLETED | OUTPATIENT
Start: 2025-04-18 | End: 2025-04-18

## 2025-04-18 RX ORDER — FENTANYL CITRATE 50 UG/ML
50 INJECTION, SOLUTION INTRAMUSCULAR; INTRAVENOUS EVERY 5 MIN PRN
Status: DISCONTINUED | OUTPATIENT
Start: 2025-04-18 | End: 2025-04-18 | Stop reason: HOSPADM

## 2025-04-18 RX ORDER — NALOXONE HYDROCHLORIDE 0.4 MG/ML
0.2 INJECTION, SOLUTION INTRAMUSCULAR; INTRAVENOUS; SUBCUTANEOUS
Status: DISCONTINUED | OUTPATIENT
Start: 2025-04-18 | End: 2025-04-23 | Stop reason: HOSPADM

## 2025-04-18 RX ORDER — FLUCONAZOLE 2 MG/ML
400 INJECTION, SOLUTION INTRAVENOUS EVERY 24 HOURS
Status: DISCONTINUED | OUTPATIENT
Start: 2025-04-18 | End: 2025-04-18

## 2025-04-18 RX ORDER — HYDROMORPHONE HCL IN WATER/PF 6 MG/30 ML
.2-.4 PATIENT CONTROLLED ANALGESIA SYRINGE INTRAVENOUS
Status: DISCONTINUED | OUTPATIENT
Start: 2025-04-18 | End: 2025-04-23 | Stop reason: HOSPADM

## 2025-04-18 RX ORDER — HYDROMORPHONE HYDROCHLORIDE 1 MG/ML
0.5 INJECTION, SOLUTION INTRAMUSCULAR; INTRAVENOUS; SUBCUTANEOUS ONCE
Status: COMPLETED | OUTPATIENT
Start: 2025-04-18 | End: 2025-04-18

## 2025-04-18 RX ORDER — NALOXONE HYDROCHLORIDE 0.4 MG/ML
0.1 INJECTION, SOLUTION INTRAMUSCULAR; INTRAVENOUS; SUBCUTANEOUS
Status: DISCONTINUED | OUTPATIENT
Start: 2025-04-18 | End: 2025-04-18 | Stop reason: HOSPADM

## 2025-04-18 RX ADMIN — HYDROMORPHONE HYDROCHLORIDE 0.2 MG: 0.2 INJECTION, SOLUTION INTRAMUSCULAR; INTRAVENOUS; SUBCUTANEOUS at 23:27

## 2025-04-18 RX ADMIN — FLUCONAZOLE 400 MG: 2 INJECTION, SOLUTION INTRAVENOUS at 20:25

## 2025-04-18 RX ADMIN — LIDOCAINE 4% 1 PATCH: 40 PATCH TOPICAL at 14:54

## 2025-04-18 RX ADMIN — HYDROMORPHONE HYDROCHLORIDE 0.5 MG: 1 INJECTION, SOLUTION INTRAMUSCULAR; INTRAVENOUS; SUBCUTANEOUS at 03:52

## 2025-04-18 RX ADMIN — HYDROMORPHONE HYDROCHLORIDE 0.2 MG: 0.2 INJECTION, SOLUTION INTRAMUSCULAR; INTRAVENOUS; SUBCUTANEOUS at 10:33

## 2025-04-18 RX ADMIN — TOPICAL ANESTHETIC 1 ML: 200 SPRAY DENTAL; PERIODONTAL at 16:08

## 2025-04-18 RX ADMIN — HYDROMORPHONE HYDROCHLORIDE 0.2 MG: 0.2 INJECTION, SOLUTION INTRAMUSCULAR; INTRAVENOUS; SUBCUTANEOUS at 14:45

## 2025-04-18 RX ADMIN — HYDROMORPHONE HYDROCHLORIDE 0.2 MG: 0.2 INJECTION, SOLUTION INTRAMUSCULAR; INTRAVENOUS; SUBCUTANEOUS at 17:20

## 2025-04-18 RX ADMIN — PIPERACILLIN AND TAZOBACTAM 4.5 G: 4; .5 INJECTION, POWDER, LYOPHILIZED, FOR SOLUTION INTRAVENOUS at 02:49

## 2025-04-18 RX ADMIN — METHOCARBAMOL 500 MG: 500 TABLET ORAL at 01:46

## 2025-04-18 RX ADMIN — HYDROMORPHONE HYDROCHLORIDE 0.2 MG: 0.2 INJECTION, SOLUTION INTRAMUSCULAR; INTRAVENOUS; SUBCUTANEOUS at 09:24

## 2025-04-18 RX ADMIN — HYDROMORPHONE HYDROCHLORIDE 0.4 MG: 0.2 INJECTION, SOLUTION INTRAMUSCULAR; INTRAVENOUS; SUBCUTANEOUS at 20:21

## 2025-04-18 RX ADMIN — ACETAMINOPHEN 975 MG: 325 TABLET ORAL at 02:49

## 2025-04-18 RX ADMIN — FENTANYL CITRATE 50 MCG: 50 INJECTION, SOLUTION INTRAMUSCULAR; INTRAVENOUS at 09:14

## 2025-04-18 RX ADMIN — ENOXAPARIN SODIUM 40 MG: 40 INJECTION SUBCUTANEOUS at 17:20

## 2025-04-18 RX ADMIN — HYDROMORPHONE HYDROCHLORIDE 0.2 MG: 0.2 INJECTION, SOLUTION INTRAMUSCULAR; INTRAVENOUS; SUBCUTANEOUS at 12:27

## 2025-04-18 RX ADMIN — Medication 60 ML: at 20:28

## 2025-04-18 RX ADMIN — PIPERACILLIN AND TAZOBACTAM 4.5 G: 4; .5 INJECTION, POWDER, LYOPHILIZED, FOR SOLUTION INTRAVENOUS at 14:45

## 2025-04-18 RX ADMIN — OXYCODONE HYDROCHLORIDE 5 MG: 5 TABLET ORAL at 22:01

## 2025-04-18 RX ADMIN — HYDROMORPHONE HYDROCHLORIDE 0.2 MG: 0.2 INJECTION, SOLUTION INTRAMUSCULAR; INTRAVENOUS; SUBCUTANEOUS at 11:10

## 2025-04-18 RX ADMIN — HYDROMORPHONE HYDROCHLORIDE 0.2 MG: 0.2 INJECTION, SOLUTION INTRAMUSCULAR; INTRAVENOUS; SUBCUTANEOUS at 11:24

## 2025-04-18 RX ADMIN — OXYCODONE HYDROCHLORIDE 5 MG: 5 TABLET ORAL at 02:49

## 2025-04-18 RX ADMIN — METHOCARBAMOL 500 MG: 500 TABLET ORAL at 23:27

## 2025-04-18 RX ADMIN — FENTANYL CITRATE 50 MCG: 50 INJECTION, SOLUTION INTRAMUSCULAR; INTRAVENOUS at 08:59

## 2025-04-18 RX ADMIN — Medication 5 MG: at 01:46

## 2025-04-18 RX ADMIN — ACETAMINOPHEN 975 MG: 325 TABLET ORAL at 18:05

## 2025-04-18 RX ADMIN — PIPERACILLIN AND TAZOBACTAM 4.5 G: 4; .5 INJECTION, POWDER, LYOPHILIZED, FOR SOLUTION INTRAVENOUS at 22:00

## 2025-04-18 RX ADMIN — METHOCARBAMOL 500 MG: 500 TABLET ORAL at 17:20

## 2025-04-18 RX ADMIN — HYDROMORPHONE HYDROCHLORIDE 0.2 MG: 0.2 INJECTION, SOLUTION INTRAMUSCULAR; INTRAVENOUS; SUBCUTANEOUS at 06:23

## 2025-04-18 RX ADMIN — OXYCODONE HYDROCHLORIDE 5 MG: 5 TABLET ORAL at 18:00

## 2025-04-18 RX ADMIN — TOPICAL ANESTHETIC 0.5 ML: 200 SPRAY DENTAL; PERIODONTAL at 01:46

## 2025-04-18 ASSESSMENT — ACTIVITIES OF DAILY LIVING (ADL)
ADLS_ACUITY_SCORE: 65
CONCENTRATING,_REMEMBERING_OR_MAKING_DECISIONS_DIFFICULTY: NO
ADLS_ACUITY_SCORE: 65
ADLS_ACUITY_SCORE: 65
DIFFICULTY_EATING/SWALLOWING: NO
ADLS_ACUITY_SCORE: 65
ADLS_ACUITY_SCORE: 65
ADLS_ACUITY_SCORE: 59
ADLS_ACUITY_SCORE: 65
ADLS_ACUITY_SCORE: 65
FALL_HISTORY_WITHIN_LAST_SIX_MONTHS: NO
ADLS_ACUITY_SCORE: 65
VISION_MANAGEMENT: GLASSES
ADLS_ACUITY_SCORE: 65
ADLS_ACUITY_SCORE: 59
ADLS_ACUITY_SCORE: 65
WEAR_GLASSES_OR_BLIND: YES
ADLS_ACUITY_SCORE: 65
TOILETING_ISSUES: NO
ADLS_ACUITY_SCORE: 59
HEARING_DIFFICULTY_OR_DEAF: NO
DRESSING/BATHING_DIFFICULTY: NO
DOING_ERRANDS_INDEPENDENTLY_DIFFICULTY: NO
ADLS_ACUITY_SCORE: 65
ADLS_ACUITY_SCORE: 63
ADLS_ACUITY_SCORE: 59
WALKING_OR_CLIMBING_STAIRS_DIFFICULTY: NO
ADLS_ACUITY_SCORE: 42
DIFFICULTY_COMMUNICATING: NO
CHANGE_IN_FUNCTIONAL_STATUS_SINCE_ONSET_OF_CURRENT_ILLNESS/INJURY: NO
ADLS_ACUITY_SCORE: 65
ADLS_ACUITY_SCORE: 65
ADLS_ACUITY_SCORE: 59

## 2025-04-18 NOTE — OP NOTE
Preoperative diagnosis: Anastomotic leak after esophagectomy     Postoperative diagnosis: Same as preoperative diagnosis     Procedure performed:     1.  Esophagogastroduodenoscopy with placement of LEFT pharyngostomy tube placement     2.  Fluoroscopy with interpretation of images     Surgeon: Erik Lindsey (present and participated in the entire procedure)     Resident Surgeon: Janes Galo     Anesthesia: General     EBL: 3 ml     Findings: There was a posterior anastomotic leak that allowed passage of the adult gastroscope (~10 mm diameter). The anastomosis was at 25 cm and widely patent, and the conduit appeared straight and healthy.     Description of procedure:     With Bal Junior in the supine position and under general anesthesia we performed an esophagogastroduodenoscopy with the above-mentioned findings.       After this we identified the left posterior tonsillar pillar using a laryngoscope and placed a MD Joe clamp transorally behind the LEFT posterior tonsillar pillar and then we could feel the tip at the skin.  We made an incision at the skin where could feel the tip of the MD Joe clamp and advanced the MD Joe clamp through the incision.  We then placed the tube tip into the MD Joe clamp and pulled it out through the mouth again.  After this we ensured hemostasis in the oropharynx.    Next we performed an esophagogastroduodenoscopy again and passed a wire through the anastomotic defect into the RIGHT pleural space. We fed the tube over the wire down to the bottom of the cavity next to the existing pig-tail catheter.  We secured the tube at 32 cm.    The procedure was done with fluoroscopic guidance.    Bal Junior tolerated the procedure well.

## 2025-04-18 NOTE — PLAN OF CARE
"Goal Outcome Evaluation:      Plan of Care Reviewed With: patient    Overall Patient Progress: no changeOverall Patient Progress: no change       Time [id number]    /80 (BP Location: Right arm)   Pulse 101   Temp 98.3  F (36.8  C) (Oral)   Resp 16   Ht 1.778 m (5' 10\")   Wt 78.2 kg (172 lb 8 oz)   SpO2 98%   BMI 24.75 kg/m      Reason for admission: presents to ED with gastric leak and discomfort at ODIN drain site. Patient reports concern for drainage output and a bad cough since 3/24 procedure. He also has notable pain at the drainage site that is currently managed with a lidocaine patch.  He was seen in the clinic earlier today and had imaging studies done that are concerning for esophageal leak.  Thoracic surgery is aware and he was sent here for admission to their service for EGD and stenting   Activity: SBA  Pain: 5-7/10. Prn dilaudid given for back pain with relief. Pain was managed for most of night, flared up @0300. Prn robaxin and oxy given with some relief.   Neuro: A/Ox4  Cardiac: WDL  Respiratory: Wdl on RA  GI/: J tube. Loose stools. Voids spontaneously.   Diet: TF until @0000 when NPO for EGD.   Lines: J tube TF@90ml/hr until 0000, Chest port LR @125ml/hr. Odin drain   Labs/imaging:       New changes this shift: no acute changes overnight.      Plan: EGD today. Pain continues to be a problem. Prn pain medications given, see MAR.       Continue to monitor and follow POC     "

## 2025-04-18 NOTE — PROGRESS NOTES
BEFORE induction  RASS: 0  Nudesc score: 1  MOTYB: 1  Pain (0-10): 2  Pain w mobility (0-10): 2  Anxiety (0-10): 5  Stress (0-10): 5  Well-being (0-10): 5  Thirst (0-10): 10  Hours since last glass of water/fluid:18  Nausea (0-10): 0  Vomit (y/n): n  Visual aids (besides reading glasses) present (y/n): y  Hearing aids present (y/n): n  Dentures present (y/n): n      ARRIVAL PACU  RASS: -2  Nudesc score: 3  MOTYB: 0  Pain (0-10): 2  Pain w mobility (0-10): 2  Throat discomfort  (0-10): 3  Anxiety (0-10): 3  Stress (0-10): 3  Well-being (0-10): 4  Thirst (0-10): 8  Shivering:  (0-10): 0  Nausea (0-10): 0  Vomit (y/n): n  Visual aids (besides reading glasses) present (y/n): n  Hearing aids present (y/n): n  Dentures present (y/n): n  Bladder catheter (y/n): n    DISCHARGE PACU  RASS: -1  Nudesc score: 0  MOTYB: 1  Pain (0-10): 3  Pain w mobility (0-10): 3  Throat discomfort  (0-10): 4  Anxiety (0-10): 5  Stress (0-10): 5  Well-being (0-10): 3  Thirst (0-10): 10  More than glass of water/fluid in PACU (y/n): n  Nausea (0-10): 0  Vomit (y/n): n  Shivering:  (0-10): 0  Satisfaction w preop Anesthesia (if seen in PAC): 6  Satisfaction w OR anesthesia: 6  Satisfaction w PACU: 6

## 2025-04-18 NOTE — BRIEF OP NOTE
[facility]    Brief Operative Note    Pre-operative diagnosis: Anastomotic leak following esophagectomy [K91.89]  Post-operative diagnosis Same as pre-operative diagnosis    Procedure: ESOPHAGOGASTRODUODENOSCOPY with pharyngostomy tube placement, Left - Neck    Surgeon: Surgeons and Role:     * Erik Lindsey MD - Primary     * Janes Galo MD - Resident - Assisting  Anesthesia: General   Estimated Blood Loss: None    Drains: Pharyngostomy Tube  Specimens: * No specimens in log *  Findings:   Anastomotic leak   Complications: None.  Implants: * No implants in log *    Post-op Orders   -Pharyngostomy Tube 32cm at skin   -Pharyngostomy Tube to LCS   -Flush Pharyngostomy tube 30mls Water Q4H   -May disconnect Pharyngostomy tube when ambulating   -Antibiotics

## 2025-04-18 NOTE — PROGRESS NOTES
"THORACIC & FOREGUT SURGERY    S:  Some increased pain overnight requiring more dilaudid for pain control.    O:  Vitals:    04/17/25 1602 04/17/25 1734 04/18/25 0018 04/18/25 0608   BP:  121/80 127/88 (!) 146/86   BP Location:  Right arm Right arm Left arm   Cuff Size:    Adult Regular   Pulse: 78 101 96 99   Resp: 18 16 18 17   Temp:  98.3  F (36.8  C) 98  F (36.7  C) 98.3  F (36.8  C)   TempSrc:  Oral Oral Oral   SpO2: 100% 98% 96% 97%   Weight:    77 kg (169 lb 12.1 oz)   Height:    1.778 m (5' 10\")       Gen: A&Ox4, NAD  Chest: Non-labored breathing on RA, left pigtail drain with brown colored fluid  Abdomen: Soft, non-tender, non-distended, healing incisional scars with steri-strips, J-tube site clean, dry, intact and patent.  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 Esophagectomy on 3/24. EGD and intraoperative esophagram confirming no leak on 3/28 and later passed methylene blue challenge further confirming no leak on 4/2, however had a rising WBC after initiation of CLD. 4/3 CT esophagram confirmed anastomotic leak. 4/7 EGD with left chest tube placement which and was safely discharged on 4/9. In clinic on 4/17 CT showed leak of oral contrast into right pleural space now readmitted for a planned EGD with stent placement with Dr. Lindsey on 4/18.    OR today EGD with possible stent placement. Will adjust pain medications for post-op.    -Diet: Strict NPO, Cycled TF @ 90 (Paused at midnight) + Meds through feeding tube  -IVF: LR @ 125 (To be discontinued after TF setup)  -Abx: Zosyn + Fluconazole  -Pain: Multimodal Pain Regimen  -Ppx: SCD's, Lovenox    Clinically Significant Risk Factors Present " on Admission                   # Hypertension: Noted on problem list      # Anemia: based on hgb <11           # Financial/Environmental Concerns:               Janes Galo MD     Thoracic and Foregut Surgery  Text page Thoracic Surgery via [facility] Paging/Directory

## 2025-04-18 NOTE — PROGRESS NOTES
CLINICAL NUTRITION SERVICES - ASSESSMENT NOTE    RECOMMENDATIONS FOR MDs/PROVIDERS TO ORDER:  Adjust IVF as needed with initiation of TF  Additional free water flushes/adjustments per MD  Registered Dietitian Interventions:  EN access: J tube   Enteral Formula: Jevity 1.5 + 2 Prosource TF20  Rate/Frequency: Jevity 1.5 @ 90 mL/hr x 16 hours. + 2 Prosource TF20 daily  Water Flushes: 30 ml before and after cycled feeds and Prosource TF20. 30 mL 6x per day for tube patency.  Enteral Nutrition Provides: 1440 ml, 2160 kcals, 92 g protein, 311 g CHO, 72 g fat, 30 g fiber, 1094 ml free water.   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 Esophagectomy on 3/24. EGD and intraoperative esophagram confirming no leak on 3/28 and later passed methylene blue challenge further confirming no leak on 4/2, however had a rising WBC after initiation of CLD. 4/3 CT esophagram confirmed anastomotic leak. 4/7 EGD with left chest tube placement which and was safely discharged on 4/9. In clinic on 4/17 CT showed leak of oral contrast into right pleural space now readmitted for a planned EGD with stent placement with Dr. Lindsey on 4/18.     NUTRITION HISTORY  EMR reviewed. Pt had phone call with home infusion dietitian 4/14. Previously on Osmolite 1.5 with prosource and banatrol. Switched to Isosource 4/14 for added  "fiber d/t continued reports of loose/liquid stools and banatrol not available in home infusion setting.   Home nutrition support plan: per [facility] [place]  J tube  Enteral Formula: Isosource 1.5 + Prosource TF  Rate/Frequency: Isosource 1.5 @ 90 mL/hr x 16 hours. + Prosource TF 2 pkts BID  Water Flushes: 30 ml before and after cycled feeds and Prosource TF. 30 mL 6x per day for tube patency.  Enteral Nutrition Provides: 6 cartons Isosource 1.5 provides 2250 kcal, 102 gm protein, 23 gm fiber, and 1146 mL free water. + 4 pkts Prosource TF provides 160 kcal and 44 gm protein. Total provisions: 2410 kcal and 146 gm protein.    CURRENT NUTRITION ORDERS  Diet: NPO      CURRENT INTAKE/TOLERANCE  No documented intakes to assess.    LABS  Nutrition-relevant labs:  BUN 29.9, glu 135,      MEDICATIONS  Nutrition-relevant medications:  banatrol TF BID, protonix, abx, miralax,     ANTHROPOMETRICS  Height: 1.778 m (5' 10\")    Admission Weight: 78.2 kg (172 lb 8 oz) (04/17/25 1416)   Most Recent Weight: 77 kg (169 lb 12.1 oz) (04/18/25 0608)  IBW: 75.5  kg  % IBW: 102%  BMI: Body mass index is 24.36 kg/m .   Weight History:  9.1 kg (11%) weight loss over 3 months.  Wt Readings from Last 20 Encounters:   04/18/25 77 kg (169 lb 12.1 oz)   04/17/25 78.5 kg (173 lb)   04/09/25 81.6 kg (179 lb 12.8 oz)   03/11/25 84.2 kg (185 lb 9.6 oz)   03/11/25 83.9 kg (185 lb)   02/26/25 87.3 kg (192 lb 7.4 oz)   02/20/25 88.5 kg (195 lb 3.2 oz)   02/20/25 88.5 kg (195 lb)   01/16/25 86.1 kg (189 lb 12.8 oz)   12/16/24 80.7 kg (178 lb)   12/11/24 81.6 kg (180 lb)   12/11/24 84.6 kg (186 lb 6.4 oz)   11/27/24 82.6 kg (182 lb)   11/27/24 84.4 kg (186 lb)   11/13/24 84.8 kg (187 lb)   11/13/24 82.6 kg (182 lb)   10/31/24 86.4 kg (190 lb 6.4 oz)   10/14/24 81.2 kg (179 lb)   10/03/24 83.8 kg (184 lb 11.2 oz)   09/24/24 83 kg (183 lb)         Dosing Weight: 77 kg, based on actual wt    ASSESSED NUTRITION NEEDS  Estimated Energy Needs: 4194-8657+ kcals/day (25 - 30 " kcals/kg)  Justification: Maintenance  Estimated Protein Needs:+ grams protein/day (1.2 - 1.5 grams of pro/kg)  Justification: Increased needs  Estimated Fluid Needs: 3927-1987 mL/day (25 - 30 mL/kg)  Justification: Maintenance    SYSTEM FINDINGS    GI symptoms: Stool occurrences: x1 on 4/17, x2 on 4/18.  Skin/wounds: No issues noted    MALNUTRITION  % Intake: Decreased intake does not meet criteria  % Weight Loss: > 7.5% in 3 months (severe)   Subcutaneous Fat Loss: Unable to assess  Muscle Loss: Unable to assess  Fluid Accumulation/Edema: None noted  Malnutrition Diagnosis: Unable to determine due to need NFPE  Malnutrition Present on Admission: Unable to assess    NUTRITION DIAGNOSIS  Inadequate oral intake related to NPO s/p esophageal resection as evidenced by reliance on TF to meet 100% of nutrition needs.     INTERVENTIONS  Enteral nutrition management    Goals  Total avg nutritional intake to meet a minimum of 25 kcal/kg and 1.5 g PRO/kg daily (per dosing wt 77 kg).      Monitoring/Evaluation  Progress toward goals will be monitored and evaluated per policy.    Nevaeh Gray MS, RD, LD, [facility]    6C (beds 3568-1192) + 7C (beds 1782-0356) + ED + Obs  Available in [facility] by name or unit dietitian

## 2025-04-18 NOTE — PLAN OF CARE
"Admitted/transferred from: PACU  2 RN full   skin assessment completed by Romelia Danielle, ALISON and Raven QUILES RN.    Skin assessment finding: issues found Left neck pharyngostomy tube w/ sutures, LLQ PEG-J, Right upper back ODIN bulb, Abdominal lap sites x4 w/ steri strips HEATHER, old CT on Left chest covered w/ dry gauze and tegaderm, Right chest Port a Cath      Interventions/actions: skin interventions monitor dressings, encourage OOB/activity      Bedside Emergency Equipment Present:  Suction Regulator: Yes  Suction Canister: Yes  Tubing between Regulator and Canister: Yes  O2 Regulator with Tree: Yes  Ambu Bag: Yes     Goal Outcome Evaluation:      Plan of Care Reviewed With: patient    Overall Patient Progress: no changeOverall Patient Progress: no change    A&Ox4. AVSS on RA ex HTN but not within notifying parameters and tachy (HR 110s). Pain managed with PRN IV dilaudid, oxy, and robaxin. Lidocaine on R upper back. Denies nausea. NPO but cycled Tfs. Assist x1 with GB, bed alarm on for safety. L neck pharyngostomy to LCS with brown output. Voiding spontaneously. LBM 4/17, not passing flatus. J tube with cycled Tfs from 6PM-10AM at GR 90ml/hr. R back ODIN to bulb suction with scant output. R chest port SL. No electrolyte replacements, recheck tomorrow AM. Continue with POC.    Vitals: BP (!) 140/89 (BP Location: Left arm, Cuff Size: Adult Regular)   Pulse 102   Temp 98  F (36.7  C) (Oral)   Resp 12   Ht 1.778 m (5' 10\")   Wt 77 kg (169 lb 12.1 oz)   SpO2 96%   BMI 24.36 kg/m        "

## 2025-04-19 ENCOUNTER — APPOINTMENT (OUTPATIENT)
Dept: GENERAL RADIOLOGY | Facility: CLINIC | Age: 70
End: 2025-04-19
Attending: SURGERY
Payer: MEDICARE

## 2025-04-19 LAB
ANION GAP SERPL CALCULATED.3IONS-SCNC: 10 MMOL/L (ref 7–15)
ANION GAP SERPL CALCULATED.3IONS-SCNC: 11 MMOL/L (ref 7–15)
BUN SERPL-MCNC: 29.4 MG/DL (ref 8–23)
BUN SERPL-MCNC: 29.6 MG/DL (ref 8–23)
CALCIUM SERPL-MCNC: 8.6 MG/DL (ref 8.8–10.4)
CALCIUM SERPL-MCNC: 8.8 MG/DL (ref 8.8–10.4)
CHLORIDE SERPL-SCNC: 104 MMOL/L (ref 98–107)
CHLORIDE SERPL-SCNC: 105 MMOL/L (ref 98–107)
CREAT SERPL-MCNC: 0.8 MG/DL (ref 0.67–1.17)
CREAT SERPL-MCNC: 0.81 MG/DL (ref 0.67–1.17)
EGFRCR SERPLBLD CKD-EPI 2021: >90 ML/MIN/1.73M2
EGFRCR SERPLBLD CKD-EPI 2021: >90 ML/MIN/1.73M2
ERYTHROCYTE [DISTWIDTH] IN BLOOD BY AUTOMATED COUNT: 12.9 % (ref 10–15)
GLUCOSE SERPL-MCNC: 132 MG/DL (ref 70–99)
GLUCOSE SERPL-MCNC: 157 MG/DL (ref 70–99)
HCO3 SERPL-SCNC: 25 MMOL/L (ref 22–29)
HCO3 SERPL-SCNC: 25 MMOL/L (ref 22–29)
HCT VFR BLD AUTO: 30.1 % (ref 40–53)
HGB BLD-MCNC: 9.7 G/DL (ref 13.3–17.7)
MAGNESIUM SERPL-MCNC: 2 MG/DL (ref 1.7–2.3)
MCH RBC QN AUTO: 28.2 PG (ref 26.5–33)
MCHC RBC AUTO-ENTMCNC: 32.2 G/DL (ref 31.5–36.5)
MCV RBC AUTO: 88 FL (ref 78–100)
PHOSPHATE SERPL-MCNC: 2.9 MG/DL (ref 2.5–4.5)
PLATELET # BLD AUTO: 337 10E3/UL (ref 150–450)
POTASSIUM SERPL-SCNC: 3.8 MMOL/L (ref 3.4–5.3)
POTASSIUM SERPL-SCNC: 4 MMOL/L (ref 3.4–5.3)
RBC # BLD AUTO: 3.44 10E6/UL (ref 4.4–5.9)
SODIUM SERPL-SCNC: 140 MMOL/L (ref 135–145)
SODIUM SERPL-SCNC: 140 MMOL/L (ref 135–145)
WBC # BLD AUTO: 11.9 10E3/UL (ref 4–11)

## 2025-04-19 PROCEDURE — 85014 HEMATOCRIT: CPT | Performed by: STUDENT IN AN ORGANIZED HEALTH CARE EDUCATION/TRAINING PROGRAM

## 2025-04-19 PROCEDURE — 71045 X-RAY EXAM CHEST 1 VIEW: CPT | Mod: 26 | Performed by: RADIOLOGY

## 2025-04-19 PROCEDURE — 250N000013 HC RX MED GY IP 250 OP 250 PS 637: Performed by: STUDENT IN AN ORGANIZED HEALTH CARE EDUCATION/TRAINING PROGRAM

## 2025-04-19 PROCEDURE — 250N000009 HC RX 250: Performed by: STUDENT IN AN ORGANIZED HEALTH CARE EDUCATION/TRAINING PROGRAM

## 2025-04-19 PROCEDURE — 84100 ASSAY OF PHOSPHORUS: CPT | Performed by: STUDENT IN AN ORGANIZED HEALTH CARE EDUCATION/TRAINING PROGRAM

## 2025-04-19 PROCEDURE — 80048 BASIC METABOLIC PNL TOTAL CA: CPT | Performed by: STUDENT IN AN ORGANIZED HEALTH CARE EDUCATION/TRAINING PROGRAM

## 2025-04-19 PROCEDURE — 83735 ASSAY OF MAGNESIUM: CPT | Performed by: STUDENT IN AN ORGANIZED HEALTH CARE EDUCATION/TRAINING PROGRAM

## 2025-04-19 PROCEDURE — 120N000002 HC R&B MED SURG/OB UMMC

## 2025-04-19 PROCEDURE — 71045 X-RAY EXAM CHEST 1 VIEW: CPT

## 2025-04-19 PROCEDURE — 250N000013 HC RX MED GY IP 250 OP 250 PS 637: Performed by: THORACIC SURGERY (CARDIOTHORACIC VASCULAR SURGERY)

## 2025-04-19 PROCEDURE — 36591 DRAW BLOOD OFF VENOUS DEVICE: CPT | Performed by: STUDENT IN AN ORGANIZED HEALTH CARE EDUCATION/TRAINING PROGRAM

## 2025-04-19 PROCEDURE — 250N000011 HC RX IP 250 OP 636: Mod: JZ | Performed by: STUDENT IN AN ORGANIZED HEALTH CARE EDUCATION/TRAINING PROGRAM

## 2025-04-19 RX ORDER — DEXTROMETHORPHAN POLISTIREX 30 MG/5ML
30 SUSPENSION ORAL
Status: DISCONTINUED | OUTPATIENT
Start: 2025-04-19 | End: 2025-04-23 | Stop reason: HOSPADM

## 2025-04-19 RX ORDER — GABAPENTIN 300 MG/1
300 CAPSULE ORAL 3 TIMES DAILY
Status: DISCONTINUED | OUTPATIENT
Start: 2025-04-19 | End: 2025-04-23 | Stop reason: HOSPADM

## 2025-04-19 RX ORDER — ATENOLOL 25 MG/1
50 TABLET ORAL EVERY EVENING
Status: DISCONTINUED | OUTPATIENT
Start: 2025-04-19 | End: 2025-04-23 | Stop reason: HOSPADM

## 2025-04-19 RX ORDER — FLUCONAZOLE 200 MG/1
200 TABLET ORAL DAILY
Status: DISCONTINUED | OUTPATIENT
Start: 2025-04-19 | End: 2025-04-23 | Stop reason: HOSPADM

## 2025-04-19 RX ORDER — POTASSIUM CHLORIDE 20MEQ/15ML
20 LIQUID (ML) ORAL ONCE
Status: COMPLETED | OUTPATIENT
Start: 2025-04-19 | End: 2025-04-19

## 2025-04-19 RX ORDER — NORTRIPTYLINE HYDROCHLORIDE 25 MG/1
25 CAPSULE ORAL EVERY EVENING
Status: DISCONTINUED | OUTPATIENT
Start: 2025-04-19 | End: 2025-04-23 | Stop reason: HOSPADM

## 2025-04-19 RX ORDER — METHOCARBAMOL 750 MG/1
750 TABLET, FILM COATED ORAL 4 TIMES DAILY PRN
Status: DISCONTINUED | OUTPATIENT
Start: 2025-04-19 | End: 2025-04-23 | Stop reason: HOSPADM

## 2025-04-19 RX ORDER — MULTIVITAMIN WITH IRON
500 TABLET ORAL DAILY
Status: DISCONTINUED | OUTPATIENT
Start: 2025-04-19 | End: 2025-04-23 | Stop reason: HOSPADM

## 2025-04-19 RX ORDER — MAGNESIUM OXIDE 400 MG/1
400 TABLET ORAL EVERY 4 HOURS
Status: COMPLETED | OUTPATIENT
Start: 2025-04-19 | End: 2025-04-19

## 2025-04-19 RX ADMIN — AMOXICILLIN AND CLAVULANATE POTASSIUM 1 TABLET: 875; 125 TABLET, FILM COATED ORAL at 19:32

## 2025-04-19 RX ADMIN — GABAPENTIN 300 MG: 300 CAPSULE ORAL at 19:32

## 2025-04-19 RX ADMIN — PIPERACILLIN AND TAZOBACTAM 4.5 G: 4; .5 INJECTION, POWDER, LYOPHILIZED, FOR SOLUTION INTRAVENOUS at 03:23

## 2025-04-19 RX ADMIN — METHOCARBAMOL 750 MG: 750 TABLET ORAL at 13:03

## 2025-04-19 RX ADMIN — OXYCODONE HYDROCHLORIDE 5 MG: 5 TABLET ORAL at 02:34

## 2025-04-19 RX ADMIN — Medication 60 ML: at 07:02

## 2025-04-19 RX ADMIN — Medication 60 ML: at 19:32

## 2025-04-19 RX ADMIN — OXYCODONE HYDROCHLORIDE 5 MG: 5 TABLET ORAL at 19:41

## 2025-04-19 RX ADMIN — CYANOCOBALAMIN TAB 500 MCG 500 MCG: 500 TAB at 08:22

## 2025-04-19 RX ADMIN — ACETAMINOPHEN 975 MG: 325 TABLET ORAL at 02:34

## 2025-04-19 RX ADMIN — OXYCODONE HYDROCHLORIDE 5 MG: 5 TABLET ORAL at 15:36

## 2025-04-19 RX ADMIN — TOPICAL ANESTHETIC 0.5 ML: 200 SPRAY DENTAL; PERIODONTAL at 13:22

## 2025-04-19 RX ADMIN — OXYCODONE HYDROCHLORIDE 5 MG: 5 TABLET ORAL at 07:01

## 2025-04-19 RX ADMIN — FLUCONAZOLE 200 MG: 200 TABLET ORAL at 07:03

## 2025-04-19 RX ADMIN — GABAPENTIN 300 MG: 300 CAPSULE ORAL at 08:22

## 2025-04-19 RX ADMIN — ACETAMINOPHEN 975 MG: 325 TABLET ORAL at 19:32

## 2025-04-19 RX ADMIN — MAGNESIUM OXIDE TAB 400 MG (241.3 MG ELEMENTAL MG) 400 MG: 400 (241.3 MG) TAB at 13:03

## 2025-04-19 RX ADMIN — METHOCARBAMOL 500 MG: 500 TABLET ORAL at 04:43

## 2025-04-19 RX ADMIN — AMOXICILLIN AND CLAVULANATE POTASSIUM 1 TABLET: 875; 125 TABLET, FILM COATED ORAL at 08:22

## 2025-04-19 RX ADMIN — ATENOLOL 50 MG: 25 TABLET ORAL at 19:32

## 2025-04-19 RX ADMIN — Medication 40 MG: at 07:01

## 2025-04-19 RX ADMIN — MAGNESIUM OXIDE TAB 400 MG (241.3 MG ELEMENTAL MG) 400 MG: 400 (241.3 MG) TAB at 15:36

## 2025-04-19 RX ADMIN — NORTRIPTYLINE HYDROCHLORIDE 25 MG: 25 CAPSULE ORAL at 19:32

## 2025-04-19 RX ADMIN — POTASSIUM CHLORIDE 20 MEQ: 20 SOLUTION ORAL at 15:36

## 2025-04-19 RX ADMIN — ENOXAPARIN SODIUM 40 MG: 40 INJECTION SUBCUTANEOUS at 16:41

## 2025-04-19 RX ADMIN — OXYCODONE HYDROCHLORIDE 5 MG: 5 TABLET ORAL at 11:07

## 2025-04-19 RX ADMIN — HYDROMORPHONE HYDROCHLORIDE 0.4 MG: 0.2 INJECTION, SOLUTION INTRAMUSCULAR; INTRAVENOUS; SUBCUTANEOUS at 04:15

## 2025-04-19 RX ADMIN — GABAPENTIN 300 MG: 300 CAPSULE ORAL at 13:03

## 2025-04-19 RX ADMIN — ACETAMINOPHEN 975 MG: 325 TABLET ORAL at 11:07

## 2025-04-19 ASSESSMENT — ACTIVITIES OF DAILY LIVING (ADL)
ADLS_ACUITY_SCORE: 42
ADLS_ACUITY_SCORE: 45
ADLS_ACUITY_SCORE: 42
ADLS_ACUITY_SCORE: 45
ADLS_ACUITY_SCORE: 42
ADLS_ACUITY_SCORE: 45
ADLS_ACUITY_SCORE: 42
ADLS_ACUITY_SCORE: 42
ADLS_ACUITY_SCORE: 43
ADLS_ACUITY_SCORE: 42
ADLS_ACUITY_SCORE: 45
ADLS_ACUITY_SCORE: 45
ADLS_ACUITY_SCORE: 42
ADLS_ACUITY_SCORE: 45
DEPENDENT_IADLS:: INDEPENDENT
ADLS_ACUITY_SCORE: 43
ADLS_ACUITY_SCORE: 43

## 2025-04-19 NOTE — PLAN OF CARE
"Goal Outcome Evaluation:    /88 (BP Location: Right arm, Patient Position: Semi-Hannah's, Cuff Size: Adult Regular)   Pulse 106   Temp 97.9  F (36.6  C) (Oral)   Resp 16   Ht 1.778 m (5' 10\")   Wt 77 kg (169 lb 12.1 oz)   SpO2 98%   BMI 24.36 kg/m      0735-8211    Reason for admission: S/P EGD with stent placement.  Neuro: A&Ox4.   Cardiac: SR. VSS.   Respiratory: Sating 98% on RA.  GI/: Adequate urine output.   Diet/appetite: Tolerating tube feeding diet.   Activity:  Stand by assist of up to chair and in halls.  Pain: At acceptable level on current regimen.   Skin: No new deficits noted.  LDA's:CVC.  Plan: Continue with POC. Notify primary team with changes.  "

## 2025-04-19 NOTE — PROGRESS NOTES
Replaced Mag 2.0 - needs one more dose later today.    Still waiting for Potassium suspension to arrive from pharmacy. K+ 3.8 - needs 20 mEq    Wife at bedside. Jess.    Pain decreased to 3/10

## 2025-04-19 NOTE — CONSULTS
Care Management Initial Consult    General Information  Assessment completed with: Patient,    Type of CM/SW Visit: Initial Assessment    Primary Care Provider verified and updated as needed:     Readmission within the last 30 days: current reason for admission unrelated to previous admission      Reason for Consult: discharge planning  Advance Care Planning: Advance Care Planning Reviewed: no concerns identified          Communication Assessment  Patient's communication style: spoken language (English or Bilingual)    Hearing Difficulty or Deaf: no   Wear Glasses or Blind: yes    Cognitive  Cognitive/Neuro/Behavioral: WDL  Level of Consciousness: alert  Arousal Level: opens eyes spontaneously  Orientation: oriented x 4  Mood/Behavior: calm, cooperative  Best Language: 0 - No aphasia  Speech: clear, spontaneous, logical    Living Environment:   People in home: spouse  Essie  Current living Arrangements: house      Able to return to prior arrangements: yes       Family/Social Support:  Care provided by: self  Provides care for: no one  Marital Status:   Support system: Wife  Essie       Description of Support System: Supportive, Involved    Support Assessment: Adequate family and caregiver support    Current Resources:   Patient receiving home care services: No        Community Resources: Infusion Services (Enteral Feeds)  Equipment currently used at home: none  Supplies currently used at home: None    Employment/Financial:  Employment Status: retired        Financial Concerns: none   Referral to Financial Worker: No       Does the patient's insurance plan have a 3 day qualifying hospital stay waiver?  No    Lifestyle & Psychosocial Needs:  Social Drivers of Health     Food Insecurity: Low Risk  (4/18/2025)    Food Insecurity     Within the past 12 months, did you worry that your food would run out before you got money to buy more?: No     Within the past 12 months, did the food you bought just not last and you  didn t have money to get more?: No   Depression: Not at risk (3/11/2025)    PHQ-2     PHQ-2 Score: 0   Housing Stability: Low Risk  (4/18/2025)    Housing Stability     Do you have housing? : Yes     Are you worried about losing your housing?: No   Tobacco Use: Low Risk  (4/18/2025)    Patient History     Smoking Tobacco Use: Never     Smokeless Tobacco Use: Never     Passive Exposure: Past   Financial Resource Strain: Low Risk  (4/18/2025)    Financial Resource Strain     Within the past 12 months, have you or your family members you live with been unable to get utilities (heat, electricity) when it was really needed?: No   Alcohol Use: Alcohol Misuse (8/31/2020)    AUDIT-C     Frequency of Alcohol Consumption: 2-3 times a week     Average Number of Drinks: 1 or 2     Frequency of Binge Drinking: Less than monthly   Transportation Needs: Low Risk  (4/18/2025)    Transportation Needs     Within the past 12 months, has lack of transportation kept you from medical appointments, getting your medicines, non-medical meetings or appointments, work, or from getting things that you need?: No   Physical Activity: Not on file   Interpersonal Safety: Low Risk  (4/18/2025)    Interpersonal Safety     Do you feel physically and emotionally safe where you currently live?: Yes     Within the past 12 months, have you been hit, slapped, kicked or otherwise physically hurt by someone?: No     Within the past 12 months, have you been humiliated or emotionally abused in other ways by your partner or ex-partner?: No   Stress: Not on file   Social Connections: Not on file   Health Literacy: Not on file       Functional Status:  Prior to admission patient needed assistance:   Dependent ADLs:: Independent  Dependent IADLs:: Independent  Assesssment of Functional Status: Not at baseline with ADL Functioning    Mental Health Status:  Mental Health Status: No Current Concerns       Chemical Dependency Status:  Chemical Dependency Status: No  "Current Concerns             Values/Beliefs:  Spiritual, Cultural Beliefs, Restoration Practices, Values that affect care: no               Discussed  Partnership in Safe Discharge Planning  document with patient/family: No    Additional Information:  Per H&P: \"Bal Junior is a 70 year old male with a history of HTN, neuritis, peripheral neuropathy, history acid reflux, s/p fundoplication takedown with lap redo nissen fundoplication on 2020 with 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 Esophagectomy on 3/24. EGD and intraoperative esophagram confirming no leak on 3/28 and later passed methylene blue challenge further confirming no leak on 4/2, however had a rising WBC after initiation of CLD. 4/3 CT esophagram confirmed anastomotic leak. 4/7 EGD with left chest tube placement which and was safely discharged on 4/9. In clinic on 4/17 CT showed leak of oral contrast into right pleural space now readmitted for a planned EGD with stent placement with Dr. Lindsey on 4/18.\"    RNCC met with patient to complete initial assessment and introduce RNCC role.  Patient reports he lives at home with his wife and puppy.  He states he is independent in all cares.      Patient reports that he is established with [facility] for his tube feeds and gets supplies through them.  He states he has grab bars, walk in shower, and 2 benches in shower at home.  No other outside resources reported being used in home.      Patient denies any mental health, CD, spiritual, or financial concerns.  Patient states he is retired and receiving social security.      Patient states he wife will be his transport home at discharge.    [facility]  [phone]  [phone]    Next Steps:   []Follow up with [facility] for enteral feeds at discharge.  [] external hand off - Domenica Wing RN    Nurse Coordinator     Covering for: Chacha" 7B    Phone: [phone]    Social Work and Care Management Department    SEARCHABLE in [facility] - search CARE COORDINATOR    [place] ([phone]) Saturday & Sunday; ([phone]) [facility] Recognized Holidays    Units: 5A, 5B & 5C  Pager: [phone]    Units: 6B, 6C & 6D    Pager: [phone]    Units: 7A, 7B, 7C & 7D    Pager: [phone]    Units: 6A & ICU   Pager: [phone]    Units: 5 Ortho, 5MS & WB ED Pager: [phone]    Units: 6MS, 8A & 10 ICU  Pager [phone]

## 2025-04-19 NOTE — PROGRESS NOTES
THORACIC & FOREGUT SURGERY    S:  No overnight events.  Pt seen at bedside resting comfortably.       O:  Vitals:    04/19/25 0000 04/19/25 0030 04/19/25 0100 04/19/25 0234   BP:    125/83   BP Location:    Right arm   Patient Position:    Semi-Hannah's   Cuff Size:    Adult Regular   Pulse:       Resp:    16   Temp:    98.2  F (36.8  C)   TempSrc:    Oral   SpO2: 96% 96% 97% 94%   Weight:       Height:           Gen: A&Ox4, NAD  Chest: Non-labored breathing on RA, left pigtail drain with brown colored fluid  Abdomen: Soft, non-tender, non-distended, healing incisional scars with steri-strips, J-tube site clean, dry, intact and patent.  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 Esophagectomy on 3/24. EGD and intraoperative esophagram confirming no leak on 3/28 and later passed methylene blue challenge further confirming no leak on 4/2, however had a rising WBC after initiation of CLD. 4/3 CT esophagram confirmed anastomotic leak. 4/7 EGD with left chest tube placement which and was safely discharged on 4/9. In clinic on 4/17 CT showed leak of oral contrast into right pleural space now readmitted for a planned EGD with stent placement with Dr. Lindsey on 4/18.    Pain an issue due to the chest tube, increased robaxin from 500 to 750mg, added patients home regimen back on. Continue to work on pain control and document pharyngostomy output.     -Pharyngostomy: 32cm, LCS, May be disconnected when ambulating  -Diet: Strict NPO, Cycled TF @ 90 + Meds through feeding tube  -Abx: Zosyn + Fluconazole  -Pain: Multimodal Pain Regimen  -Ppx: SCD's, Lovenox    Clinically Significant Risk Factors               #  Hypoalbuminemia: Lowest albumin = 3.3 g/dL at 4/18/2025  6:41 AM, will monitor as appropriate     # Hypertension: Noted on problem list                # Financial/Environmental Concerns:               Janes Galo MD     Thoracic and Foregut Surgery  Text page Thoracic Surgery via [facility] Paging/Directory

## 2025-04-19 NOTE — PHARMACY-CONSULT NOTE
Pharmacy Tube Feeding Consult    Medication reviewed for administration by feeding tube and for potential food/drug interactions.    Recommendation: No changes are needed at this time.     Pharmacy will continue to follow as new medications are ordered.    Nancy Ramirez, MarcusD

## 2025-04-19 NOTE — PLAN OF CARE
Darell have had an appointment to see me or APRN in January I do not see that this has been arranged and I am not sure why.  Please let me know why that did not happen.  I am going to refill this for 1 month but patient needs to come in to be seen in the interim.   Goal Outcome Evaluation:      Plan of Care Reviewed With: patient    Overall Patient Progress: no changeOverall Patient Progress: no change    Neuro: A&O x4  Respiratory: on RA, diminished LS, denies SOB  Cardiac: tachy ('s), denies chest pain  Diet: NPO, cycled Tfs 2244-8510 GR @ 90ml/hr  GI/: LBM 4/18, reporting diarrhea, not passing flatus  Incision/Drains: L pharyngostomy on LCS, J tube, R upper back ODIN to bulb suction w/ minimal output, R chest port  Skin: abd lap sites x4 w/ steri stips, old L CT insertion covered w/ farnaz  IV Access: R port  Pain: pain located upper back- managed with PRN dilaudid and oxycodone  Labs: reviewed, electrolyte protocol AM  Activity: SBA/assist 1 w/ GB     New changes: pt requesting night time home meds. MD notified.  Plan: pain management

## 2025-04-19 NOTE — PHARMACY-ADMISSION MEDICATION HISTORY
Pharmacy Intern Admission Medication History    Admission medication history is complete. The information provided in this note is only as accurate as the sources available at the time of the update.    Information Source(s): Patient and CareEverywhere/SureScripts via in-person    Pertinent Information:   amoxicillin-clavulanate (AUGMENTIN) 400-57 MG/5ML suspension --> patient has finished the course on 4/14/2025 (5-day duration)  cyanocobalamin (VITAMIN B-12) 500 MCG tablet --> patient reports that he stops taking it since he can't keep up with all of his medications     Changes made to PTA medication list:  Added: None  Deleted:   amoxicillin-clavulanate (AUGMENTIN) 400-57 MG/5ML suspension: Take 400 mg by mouth daily   --> patient has finished the course on 4/14/2025 (5-day duration)  senna-docusate (SENOKOT-S/PERICOLACE) 8.6-50 MG tablet: Take 1 tablet by mouth or Feeding Tube 2 times daily --> Patient never takes it.  Changed: None    Allergies reviewed with patient and updates made in EHR: yes    Medication History Completed By: Jeanie Hess 4/18/2025 7:43 PM    PTA Med List   Medication Sig Last Dose/Taking    acetaminophen (TYLENOL) 325 MG tablet Place 2 tablets (650 mg) into Feeding Tube every 6 hours. 4/17/2025    atenolol (TENORMIN) 50 MG tablet Place 1 tablet (50 mg) into Feeding Tube every evening. 4/16/2025 Evening    dextromethorphan (DELSYM) 30 MG/5ML liquid Take 5 mLs (30 mg) by mouth once as needed for cough. 4/17/2025 Morning    gabapentin (NEURONTIN) 300 MG capsule Place 1 capsule (300 mg) into Feeding Tube 3 times daily. 4/17/2025 Morning    Lidocaine (LIDOCARE) 4 % Patch Place 1 patch over 12 hours onto the skin every 24 hours. To prevent lidocaine toxicity, patient should be patch free for 12 hrs daily. 4/17/2025    methocarbamol (ROBAXIN) 750 MG tablet Place 1 tablet (750 mg) into J tube every 6 hours as needed for muscle spasms. 4/18/2025    multivitamins w/minerals liquid Place 15 mLs into  Feeding Tube daily. 4/17/2025 Morning    nortriptyline (PAMELOR) 25 MG capsule Place 1 capsule (25 mg) into J tube every evening. 4/16/2025 Evening    oxyCODONE (ROXICODONE) 5 MG/5ML solution Place 5-10 mLs (5-10 mg) into J tube every 4 hours as needed for severe pain. Past Week

## 2025-04-19 NOTE — PROGRESS NOTES
"Blood pressure 131/87, pulse 96, temperature 98.2  F (36.8  C), temperature source Oral, resp. rate 16, height 1.778 m (5' 10\"), weight 78.3 kg (172 lb 9.6 oz), SpO2 97%.    Activity: SBA w/GB  Neuros:  AOX4, makes needs known  Cardiac: Tachy, denies chest pain  Respiratory: WDL, RA  GI/: AUOP, BM today  Diet: NPO, cycled TF 6-10am @90cc/hr  Skin/Incisions/Drains: 4 abd lap sites, Lt pharyngostomy LCS, peg, back ODIN, Rt PAC  Lines: Rt PAC  Pain: Oxy  Plan: Continue with POC    "

## 2025-04-19 NOTE — PLAN OF CARE
Goal Outcome Evaluation:           Overall Patient Progress: no changeOverall Patient Progress: no change     Idania Cortez, RN    Nurse Coordinator     Covering for: Chacha ERICKSON    Phone: *[phone]    Social Work and Care Management Department    SEARCHABLE in [facility] - search CARE COORDINATOR    [place] & [place] ([phone]) Saturday & Sunday; ([phone]) [facility] Recognized Holidays    Units: 5A, 5B & 5C  Pager: [phone]    Units: 6B, 6C & 6D    Pager: [phone]    Units: 7A, 7B, 7C & 7D    Pager: [phone]    Units: 6A & ICU   Pager: [phone]    Units: 5 Ortho, 5MS & WB ED Pager: [phone]    Units: 6MS, 8A & 10 ICU  Pager [phone]

## 2025-04-20 ENCOUNTER — APPOINTMENT (OUTPATIENT)
Dept: GENERAL RADIOLOGY | Facility: CLINIC | Age: 70
End: 2025-04-20
Attending: SURGERY
Payer: MEDICARE

## 2025-04-20 LAB
ANION GAP SERPL CALCULATED.3IONS-SCNC: 10 MMOL/L (ref 7–15)
BUN SERPL-MCNC: 24.2 MG/DL (ref 8–23)
CALCIUM SERPL-MCNC: 8.8 MG/DL (ref 8.8–10.4)
CHLORIDE SERPL-SCNC: 107 MMOL/L (ref 98–107)
CREAT SERPL-MCNC: 0.78 MG/DL (ref 0.67–1.17)
CREAT SERPL-MCNC: 0.79 MG/DL (ref 0.67–1.17)
EGFRCR SERPLBLD CKD-EPI 2021: >90 ML/MIN/1.73M2
EGFRCR SERPLBLD CKD-EPI 2021: >90 ML/MIN/1.73M2
ERYTHROCYTE [DISTWIDTH] IN BLOOD BY AUTOMATED COUNT: 13.2 % (ref 10–15)
GLUCOSE SERPL-MCNC: 131 MG/DL (ref 70–99)
HCO3 SERPL-SCNC: 24 MMOL/L (ref 22–29)
HCT VFR BLD AUTO: 31.2 % (ref 40–53)
HGB BLD-MCNC: 9.9 G/DL (ref 13.3–17.7)
MAGNESIUM SERPL-MCNC: 2.2 MG/DL (ref 1.7–2.3)
MCH RBC QN AUTO: 27.7 PG (ref 26.5–33)
MCHC RBC AUTO-ENTMCNC: 31.7 G/DL (ref 31.5–36.5)
MCV RBC AUTO: 87 FL (ref 78–100)
PHOSPHATE SERPL-MCNC: 3 MG/DL (ref 2.5–4.5)
PLATELET # BLD AUTO: 322 10E3/UL (ref 150–450)
PLATELET # BLD AUTO: 333 10E3/UL (ref 150–450)
POTASSIUM SERPL-SCNC: 4.3 MMOL/L (ref 3.4–5.3)
RBC # BLD AUTO: 3.57 10E6/UL (ref 4.4–5.9)
SODIUM SERPL-SCNC: 141 MMOL/L (ref 135–145)
WBC # BLD AUTO: 13.8 10E3/UL (ref 4–11)

## 2025-04-20 PROCEDURE — 85014 HEMATOCRIT: CPT | Performed by: STUDENT IN AN ORGANIZED HEALTH CARE EDUCATION/TRAINING PROGRAM

## 2025-04-20 PROCEDURE — 85049 AUTOMATED PLATELET COUNT: CPT | Performed by: STUDENT IN AN ORGANIZED HEALTH CARE EDUCATION/TRAINING PROGRAM

## 2025-04-20 PROCEDURE — 71045 X-RAY EXAM CHEST 1 VIEW: CPT

## 2025-04-20 PROCEDURE — 250N000009 HC RX 250: Performed by: STUDENT IN AN ORGANIZED HEALTH CARE EDUCATION/TRAINING PROGRAM

## 2025-04-20 PROCEDURE — 80048 BASIC METABOLIC PNL TOTAL CA: CPT | Performed by: STUDENT IN AN ORGANIZED HEALTH CARE EDUCATION/TRAINING PROGRAM

## 2025-04-20 PROCEDURE — 84100 ASSAY OF PHOSPHORUS: CPT | Performed by: STUDENT IN AN ORGANIZED HEALTH CARE EDUCATION/TRAINING PROGRAM

## 2025-04-20 PROCEDURE — 120N000002 HC R&B MED SURG/OB UMMC

## 2025-04-20 PROCEDURE — 250N000011 HC RX IP 250 OP 636: Performed by: STUDENT IN AN ORGANIZED HEALTH CARE EDUCATION/TRAINING PROGRAM

## 2025-04-20 PROCEDURE — 36592 COLLECT BLOOD FROM PICC: CPT | Performed by: STUDENT IN AN ORGANIZED HEALTH CARE EDUCATION/TRAINING PROGRAM

## 2025-04-20 PROCEDURE — 250N000013 HC RX MED GY IP 250 OP 250 PS 637: Performed by: STUDENT IN AN ORGANIZED HEALTH CARE EDUCATION/TRAINING PROGRAM

## 2025-04-20 PROCEDURE — 83735 ASSAY OF MAGNESIUM: CPT | Performed by: STUDENT IN AN ORGANIZED HEALTH CARE EDUCATION/TRAINING PROGRAM

## 2025-04-20 PROCEDURE — 71045 X-RAY EXAM CHEST 1 VIEW: CPT | Mod: 26 | Performed by: STUDENT IN AN ORGANIZED HEALTH CARE EDUCATION/TRAINING PROGRAM

## 2025-04-20 PROCEDURE — 82947 ASSAY GLUCOSE BLOOD QUANT: CPT | Performed by: STUDENT IN AN ORGANIZED HEALTH CARE EDUCATION/TRAINING PROGRAM

## 2025-04-20 RX ORDER — HEPARIN SODIUM,PORCINE 10 UNIT/ML
5-10 VIAL (ML) INTRAVENOUS
Status: DISCONTINUED | OUTPATIENT
Start: 2025-04-20 | End: 2025-04-23 | Stop reason: HOSPADM

## 2025-04-20 RX ORDER — HEPARIN SODIUM,PORCINE 10 UNIT/ML
5-10 VIAL (ML) INTRAVENOUS EVERY 24 HOURS
Status: DISCONTINUED | OUTPATIENT
Start: 2025-04-20 | End: 2025-04-23 | Stop reason: HOSPADM

## 2025-04-20 RX ORDER — HEPARIN SODIUM (PORCINE) LOCK FLUSH IV SOLN 100 UNIT/ML 100 UNIT/ML
5-10 SOLUTION INTRAVENOUS
Status: DISCONTINUED | OUTPATIENT
Start: 2025-04-20 | End: 2025-04-23 | Stop reason: HOSPADM

## 2025-04-20 RX ADMIN — TOPICAL ANESTHETIC 0.5 ML: 200 SPRAY DENTAL; PERIODONTAL at 16:42

## 2025-04-20 RX ADMIN — ACETAMINOPHEN 975 MG: 325 TABLET ORAL at 03:31

## 2025-04-20 RX ADMIN — FLUCONAZOLE 200 MG: 200 TABLET ORAL at 07:51

## 2025-04-20 RX ADMIN — GABAPENTIN 300 MG: 300 CAPSULE ORAL at 14:38

## 2025-04-20 RX ADMIN — GABAPENTIN 300 MG: 300 CAPSULE ORAL at 07:51

## 2025-04-20 RX ADMIN — HYDROMORPHONE HYDROCHLORIDE 0.4 MG: 0.2 INJECTION, SOLUTION INTRAMUSCULAR; INTRAVENOUS; SUBCUTANEOUS at 01:55

## 2025-04-20 RX ADMIN — ACETAMINOPHEN 975 MG: 325 TABLET ORAL at 11:41

## 2025-04-20 RX ADMIN — OXYCODONE HYDROCHLORIDE 5 MG: 5 TABLET ORAL at 07:50

## 2025-04-20 RX ADMIN — GABAPENTIN 300 MG: 300 CAPSULE ORAL at 19:13

## 2025-04-20 RX ADMIN — OXYCODONE HYDROCHLORIDE 5 MG: 5 TABLET ORAL at 00:12

## 2025-04-20 RX ADMIN — ATENOLOL 50 MG: 25 TABLET ORAL at 19:12

## 2025-04-20 RX ADMIN — AMOXICILLIN AND CLAVULANATE POTASSIUM 1 TABLET: 875; 125 TABLET, FILM COATED ORAL at 19:13

## 2025-04-20 RX ADMIN — Medication 40 MG: at 11:41

## 2025-04-20 RX ADMIN — Medication 60 ML: at 07:52

## 2025-04-20 RX ADMIN — Medication 5 ML: at 12:00

## 2025-04-20 RX ADMIN — CYANOCOBALAMIN TAB 500 MCG 500 MCG: 500 TAB at 07:51

## 2025-04-20 RX ADMIN — TOPICAL ANESTHETIC 0.5 ML: 200 SPRAY DENTAL; PERIODONTAL at 02:02

## 2025-04-20 RX ADMIN — OXYCODONE HYDROCHLORIDE 5 MG: 5 TABLET ORAL at 23:54

## 2025-04-20 RX ADMIN — NORTRIPTYLINE HYDROCHLORIDE 25 MG: 25 CAPSULE ORAL at 19:13

## 2025-04-20 RX ADMIN — DEXTROMETHORPHAN POLISTIREX 30 MG: 30 SUSPENSION ORAL at 12:37

## 2025-04-20 RX ADMIN — OXYCODONE HYDROCHLORIDE 5 MG: 5 TABLET ORAL at 11:42

## 2025-04-20 RX ADMIN — TOPICAL ANESTHETIC 0.5 ML: 200 SPRAY DENTAL; PERIODONTAL at 07:50

## 2025-04-20 RX ADMIN — AMOXICILLIN AND CLAVULANATE POTASSIUM 1 TABLET: 875; 125 TABLET, FILM COATED ORAL at 07:51

## 2025-04-20 RX ADMIN — METHOCARBAMOL 750 MG: 750 TABLET ORAL at 00:12

## 2025-04-20 RX ADMIN — ENOXAPARIN SODIUM 40 MG: 40 INJECTION SUBCUTANEOUS at 16:28

## 2025-04-20 RX ADMIN — OXYCODONE HYDROCHLORIDE 5 MG: 5 TABLET ORAL at 16:28

## 2025-04-20 RX ADMIN — Medication 60 ML: at 19:14

## 2025-04-20 RX ADMIN — ACETAMINOPHEN 975 MG: 325 TABLET ORAL at 19:13

## 2025-04-20 ASSESSMENT — ACTIVITIES OF DAILY LIVING (ADL)
ADLS_ACUITY_SCORE: 43

## 2025-04-20 NOTE — PROGRESS NOTES
STAFF ADDENDUM:  I saw and evaluated Mr. Junior and agree with the resident s findings and plan of care      Feels better today  WBC mildly elevated  Continue antibx     Katlyn Tobar MD;

## 2025-04-20 NOTE — PLAN OF CARE
Goal Outcome Evaluation:Patient repots pain rleif with Oxycodone.Completed t-fdg cycle without apparent GI upset.Intermittent cough,productive,prn meds give per orders.Ambulated,gait steady.Continue to ,monitor

## 2025-04-20 NOTE — PLAN OF CARE
"/83 (BP Location: Right arm)   Pulse 87   Temp 97.8  F (36.6  C) (Oral)   Resp 18   Ht 1.778 m (5' 10\")   Wt 78.3 kg (172 lb 9.6 oz)   SpO2 96%   BMI 24.77 kg/m      Neuro: Alert and oriented x 4, lets needs known  Cardiac:Wnl, denies chest pain  Respiratory:WNL, Denies SOB  GI/: AUOP, Loss BM   Diet/Appetite: NPO, cycled TF 6-10am @90cc/hr , denies nausea,   Skin:no new skin deficient this shift. 4 abd lap sites, Lt pharyngostomy LCS, peg, back ODIN, Rt PAC   LDA:r cvc  Labs: Reviewed.   Activity: SBA  Pain:pain contolled with oxy 5 mg x1   Plan:Cont with POC      Goal Outcome Evaluation:Ongoing                      "

## 2025-04-20 NOTE — PROGRESS NOTES
THORACIC & FOREGUT SURGERY    S:  No overnight events.  Pt seen at bedside resting comfortably.       O:  Vitals:    04/19/25 1148 04/19/25 1346 04/19/25 2201 04/20/25 0659   BP:  131/87 122/74 121/83   BP Location:  Right arm Right arm Right arm   Patient Position:       Cuff Size:       Pulse:  96 87 87   Resp:  16 16 18   Temp:  98.2  F (36.8  C) 98.2  F (36.8  C) 97.8  F (36.6  C)   TempSrc:  Oral Oral Oral   SpO2:  97% 96% 96%   Weight: 78.3 kg (172 lb 9.6 oz)      Height:           Gen: A&Ox4, NAD  Chest: Non-labored breathing on RA, left pigtail drain with light fluid. Pharyngostomy tube working well.  Abdomen: Soft, non-tender, non-distended. 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 Esophagectomy on 3/24. EGD and intraoperative esophagram confirming no leak on 3/28 and later passed methylene blue challenge further confirming no leak on 4/2, however had a rising WBC after initiation of CLD. 4/3 CT esophagram confirmed anastomotic leak. 4/7 EGD with left chest tube placement which and was safely discharged on 4/9. In clinic on 4/17 CT showed leak of oral contrast into right pleural space now readmitted for a planned EGD with stent placement with Dr. Lindsey on 4/18.    -Pharyngostomy: LCS, May be disconnected when ambulating  -Diet: Strict NPO, Cycled TF @ 90 + Meds through feeding tube  -Abx: Augmentin + Fluconazole  -Pain: Multimodal Pain Regimen  -Ppx: SCD's, Lovenox    Clinically Significant Risk Factors               # Hypoalbuminemia: Lowest albumin = 3.3 g/dL at 4/18/2025  6:41 AM, will monitor as appropriate     # Hypertension: Noted on problem list                #  Financial/Environmental Concerns: none         Chavez Rodriguez MD   General Surgery Resident  Thoracic and Foregut Surgery  Text page Thoracic Surgery via [facility] Paging/Directory

## 2025-04-21 ENCOUNTER — APPOINTMENT (OUTPATIENT)
Dept: GENERAL RADIOLOGY | Facility: CLINIC | Age: 70
End: 2025-04-21
Attending: SURGERY
Payer: MEDICARE

## 2025-04-21 LAB
ANION GAP SERPL CALCULATED.3IONS-SCNC: 11 MMOL/L (ref 7–15)
BUN SERPL-MCNC: 25.6 MG/DL (ref 8–23)
CALCIUM SERPL-MCNC: 8.8 MG/DL (ref 8.8–10.4)
CHLORIDE SERPL-SCNC: 107 MMOL/L (ref 98–107)
CREAT SERPL-MCNC: 0.78 MG/DL (ref 0.67–1.17)
EGFRCR SERPLBLD CKD-EPI 2021: >90 ML/MIN/1.73M2
ERYTHROCYTE [DISTWIDTH] IN BLOOD BY AUTOMATED COUNT: 13.3 % (ref 10–15)
GLUCOSE SERPL-MCNC: 157 MG/DL (ref 70–99)
HCO3 SERPL-SCNC: 25 MMOL/L (ref 22–29)
HCT VFR BLD AUTO: 32.2 % (ref 40–53)
HGB BLD-MCNC: 10.2 G/DL (ref 13.3–17.7)
MAGNESIUM SERPL-MCNC: 2.1 MG/DL (ref 1.7–2.3)
MCH RBC QN AUTO: 27.8 PG (ref 26.5–33)
MCHC RBC AUTO-ENTMCNC: 31.7 G/DL (ref 31.5–36.5)
MCV RBC AUTO: 88 FL (ref 78–100)
PHOSPHATE SERPL-MCNC: 3.1 MG/DL (ref 2.5–4.5)
PLATELET # BLD AUTO: 350 10E3/UL (ref 150–450)
POTASSIUM SERPL-SCNC: 4.4 MMOL/L (ref 3.4–5.3)
RBC # BLD AUTO: 3.67 10E6/UL (ref 4.4–5.9)
SODIUM SERPL-SCNC: 143 MMOL/L (ref 135–145)
WBC # BLD AUTO: 13.4 10E3/UL (ref 4–11)

## 2025-04-21 PROCEDURE — 250N000013 HC RX MED GY IP 250 OP 250 PS 637

## 2025-04-21 PROCEDURE — 71045 X-RAY EXAM CHEST 1 VIEW: CPT | Mod: 26 | Performed by: RADIOLOGY

## 2025-04-21 PROCEDURE — 250N000013 HC RX MED GY IP 250 OP 250 PS 637: Performed by: STUDENT IN AN ORGANIZED HEALTH CARE EDUCATION/TRAINING PROGRAM

## 2025-04-21 PROCEDURE — 80048 BASIC METABOLIC PNL TOTAL CA: CPT | Performed by: STUDENT IN AN ORGANIZED HEALTH CARE EDUCATION/TRAINING PROGRAM

## 2025-04-21 PROCEDURE — 250N000011 HC RX IP 250 OP 636: Performed by: STUDENT IN AN ORGANIZED HEALTH CARE EDUCATION/TRAINING PROGRAM

## 2025-04-21 PROCEDURE — 90832 PSYTX W PT 30 MINUTES: CPT | Performed by: COUNSELOR

## 2025-04-21 PROCEDURE — 250N000009 HC RX 250: Performed by: STUDENT IN AN ORGANIZED HEALTH CARE EDUCATION/TRAINING PROGRAM

## 2025-04-21 PROCEDURE — 85014 HEMATOCRIT: CPT | Performed by: STUDENT IN AN ORGANIZED HEALTH CARE EDUCATION/TRAINING PROGRAM

## 2025-04-21 PROCEDURE — 120N000002 HC R&B MED SURG/OB UMMC

## 2025-04-21 PROCEDURE — 83735 ASSAY OF MAGNESIUM: CPT | Performed by: STUDENT IN AN ORGANIZED HEALTH CARE EDUCATION/TRAINING PROGRAM

## 2025-04-21 PROCEDURE — 71045 X-RAY EXAM CHEST 1 VIEW: CPT

## 2025-04-21 PROCEDURE — 84100 ASSAY OF PHOSPHORUS: CPT | Performed by: STUDENT IN AN ORGANIZED HEALTH CARE EDUCATION/TRAINING PROGRAM

## 2025-04-21 PROCEDURE — 36591 DRAW BLOOD OFF VENOUS DEVICE: CPT | Performed by: STUDENT IN AN ORGANIZED HEALTH CARE EDUCATION/TRAINING PROGRAM

## 2025-04-21 RX ORDER — DEXTROMETHORPHAN POLISTIREX 30 MG/5ML
30 SUSPENSION ORAL
Status: DISCONTINUED | OUTPATIENT
Start: 2025-04-21 | End: 2025-04-23 | Stop reason: HOSPADM

## 2025-04-21 RX ADMIN — OXYCODONE HYDROCHLORIDE 5 MG: 5 TABLET ORAL at 09:15

## 2025-04-21 RX ADMIN — TOPICAL ANESTHETIC 0.5 ML: 200 SPRAY DENTAL; PERIODONTAL at 18:32

## 2025-04-21 RX ADMIN — TOPICAL ANESTHETIC 0.5 ML: 200 SPRAY DENTAL; PERIODONTAL at 13:45

## 2025-04-21 RX ADMIN — Medication 60 ML: at 09:26

## 2025-04-21 RX ADMIN — DEXTROMETHORPHAN POLISTIREX 30 MG: 30 SUSPENSION ORAL at 21:38

## 2025-04-21 RX ADMIN — ACETAMINOPHEN 975 MG: 325 TABLET ORAL at 11:41

## 2025-04-21 RX ADMIN — NORTRIPTYLINE HYDROCHLORIDE 25 MG: 25 CAPSULE ORAL at 21:08

## 2025-04-21 RX ADMIN — METHOCARBAMOL 750 MG: 750 TABLET ORAL at 18:28

## 2025-04-21 RX ADMIN — DEXTROMETHORPHAN POLISTIREX 30 MG: 30 SUSPENSION ORAL at 18:32

## 2025-04-21 RX ADMIN — GABAPENTIN 300 MG: 300 CAPSULE ORAL at 13:44

## 2025-04-21 RX ADMIN — GABAPENTIN 300 MG: 300 CAPSULE ORAL at 21:08

## 2025-04-21 RX ADMIN — DEXTROMETHORPHAN POLISTIREX 30 MG: 30 SUSPENSION ORAL at 01:23

## 2025-04-21 RX ADMIN — TOPICAL ANESTHETIC 0.5 ML: 200 SPRAY DENTAL; PERIODONTAL at 21:21

## 2025-04-21 RX ADMIN — AMOXICILLIN AND CLAVULANATE POTASSIUM 1 TABLET: 875; 125 TABLET, FILM COATED ORAL at 09:15

## 2025-04-21 RX ADMIN — TOPICAL ANESTHETIC 0.5 ML: 200 SPRAY DENTAL; PERIODONTAL at 06:54

## 2025-04-21 RX ADMIN — OXYCODONE HYDROCHLORIDE 5 MG: 5 TABLET ORAL at 13:44

## 2025-04-21 RX ADMIN — METHOCARBAMOL 750 MG: 750 TABLET ORAL at 03:29

## 2025-04-21 RX ADMIN — METHOCARBAMOL 750 MG: 750 TABLET ORAL at 13:44

## 2025-04-21 RX ADMIN — ENOXAPARIN SODIUM 40 MG: 40 INJECTION SUBCUTANEOUS at 17:32

## 2025-04-21 RX ADMIN — Medication 40 MG: at 09:16

## 2025-04-21 RX ADMIN — CYANOCOBALAMIN TAB 500 MCG 500 MCG: 500 TAB at 09:15

## 2025-04-21 RX ADMIN — FLUCONAZOLE 200 MG: 200 TABLET ORAL at 09:15

## 2025-04-21 RX ADMIN — LIDOCAINE 4% 1 PATCH: 40 PATCH TOPICAL at 15:51

## 2025-04-21 RX ADMIN — AMOXICILLIN AND CLAVULANATE POTASSIUM 1 TABLET: 875; 125 TABLET, FILM COATED ORAL at 21:08

## 2025-04-21 RX ADMIN — ACETAMINOPHEN 975 MG: 325 TABLET ORAL at 21:09

## 2025-04-21 RX ADMIN — Medication 5 ML: at 11:47

## 2025-04-21 RX ADMIN — Medication 60 ML: at 22:46

## 2025-04-21 RX ADMIN — Medication 5 ML: at 07:24

## 2025-04-21 RX ADMIN — ATENOLOL 50 MG: 25 TABLET ORAL at 21:09

## 2025-04-21 RX ADMIN — GABAPENTIN 300 MG: 300 CAPSULE ORAL at 09:15

## 2025-04-21 RX ADMIN — ACETAMINOPHEN 975 MG: 325 TABLET ORAL at 03:29

## 2025-04-21 RX ADMIN — OXYCODONE HYDROCHLORIDE 5 MG: 5 TABLET ORAL at 04:02

## 2025-04-21 RX ADMIN — OXYCODONE HYDROCHLORIDE 5 MG: 5 TABLET ORAL at 18:28

## 2025-04-21 RX ADMIN — OXYCODONE HYDROCHLORIDE 5 MG: 5 TABLET ORAL at 22:46

## 2025-04-21 ASSESSMENT — ACTIVITIES OF DAILY LIVING (ADL)
ADLS_ACUITY_SCORE: 43
ADLS_ACUITY_SCORE: 43
ADLS_ACUITY_SCORE: 45
ADLS_ACUITY_SCORE: 45
ADLS_ACUITY_SCORE: 42
ADLS_ACUITY_SCORE: 42
ADLS_ACUITY_SCORE: 43
ADLS_ACUITY_SCORE: 43
ADLS_ACUITY_SCORE: 45
ADLS_ACUITY_SCORE: 45
ADLS_ACUITY_SCORE: 43
ADLS_ACUITY_SCORE: 43
ADLS_ACUITY_SCORE: 45
ADLS_ACUITY_SCORE: 43
ADLS_ACUITY_SCORE: 43
ADLS_ACUITY_SCORE: 42
ADLS_ACUITY_SCORE: 43
ADLS_ACUITY_SCORE: 42
ADLS_ACUITY_SCORE: 43
ADLS_ACUITY_SCORE: 45
ADLS_ACUITY_SCORE: 43
ADLS_ACUITY_SCORE: 43
ADLS_ACUITY_SCORE: 45

## 2025-04-21 NOTE — PROGRESS NOTES
THORACIC & FOREGUT SURGERY    S:  No overnight events.  Pt seen at bedside resting comfortably.       O:  Vitals:    04/20/25 1358 04/20/25 1832 04/20/25 2130 04/21/25 0554   BP:  137/87 127/86 119/75   BP Location:  Right arm Right arm Right arm   Pulse: 109 89 89 87   Resp:  18 18 18   Temp: 98.3  F (36.8  C) 98.8  F (37.1  C) 98.7  F (37.1  C) 98.3  F (36.8  C)   TempSrc: Oral Oral Oral Oral   SpO2:  97% 98% 97%   Weight:       Height:           Gen: A&Ox4, NAD  Chest: Non-labored breathing on RA, left pigtail drain with light fluid. Pharyngostomy tube working well.  Abdomen: Soft, non-tender, non-distended. 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 Esophagectomy on 3/24. EGD and intraoperative esophagram confirming no leak on 3/28 and later passed methylene blue challenge further confirming no leak on 4/2, however had a rising WBC after initiation of CLD. 4/3 CT esophagram confirmed anastomotic leak. 4/7 EGD with left chest tube placement which and was safely discharged on 4/9. In clinic on 4/17 CT showed leak of oral contrast into right pleural space who is now s/p EGD w/ Pharyngostomy placement with Dr. Lindsey on 4/18/2025.    Encouraged lidocaine patch for chest, will watch WBC, continue to monitor pharyngostomy output. TCU planning     -Pharyngostomy: LCS, May be disconnected when ambulating  -Diet: Strict NPO, Cycled TF @ 90 + Meds through feeding tube  -Abx: Augmentin + Fluconazole  -Pain: Multimodal Pain Regimen  -Ppx: SCD's, Lovenox    Clinically Significant Risk Factors               # Hypoalbuminemia: Lowest albumin = 3.3 g/dL at 4/18/2025  6:41 AM, will monitor as  appropriate     # Hypertension: Noted on problem list                # Financial/Environmental Concerns: none             Janes Galo MD     Thoracic and Foregut Surgery  Text page Thoracic Surgery via [facility] Paging/Directory

## 2025-04-21 NOTE — PROGRESS NOTES
MEDICAL ONCOLOGY FOLLOW-UP CONSULTATION NOTE - Dr. 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  Microsatellite stable (GUILLAUME)  See scanned report for other details.  Likely pathogenic alterations noted in TP53, PRKCA, FANCD2    Referring physician or other provider(s):  Dr. Katlyn Tobar, Thoracic Surgery service, [facility]      History of Present Illness/Cancer Diagnosis and Evaluation to date:  His past medical history is most prominent for a history of severe acid reflux, necessitating this and fundoplication around 2011/12, also for issues relating to postprandial hypoglycemia and neuritis/neuropathy, of unclear etiology.  He states that the neuropathic symptoms were attributed by his primary care doctor, and others to long-term use of omeprazole in the 2000s, prior to the Nissen fundoplication surgery.    He has been followed routinely with upper endoscopies over the last decade, many of which have been done within the [facility] system.  The ones done over the last seven years include ones performed on August 30, 2017, December 4, 2018, May 9, 2019, and most recently as September 24, 2024.    He had a CT scan in January 2024 with results as follows:     January 26, 2024--CT a/p--IMPRESSION:   1.  Superior aspect of the Nissen fundal wrap appears about 2 cm above  the diaphragm possibly representing slipped Nissen.  2.  Hepatic steatosis.  3.  Nonobstructing renal calculi. No hydronephrosis.    He was  "experiencing dysphagia and slipped Nissen fundoplication and related issues, some of which were said to be related to worsening occasional reflux, and chest pressure associate with early satiety.  On April 30, 2024, he underwent barium swallow, with results as follows:  IMPRESSION:  1. Timed barium esophagram results as detailed above with retained  esophageal contrast lasting up to 5 minutes. There is patent flow of  contrast.   2. Expected passage of barium tablet into the stomach.      He had continued evaluation and during the summer of 2024, including on July 23, 2024. Part of the assessment included impression that the \"Manometry was consistent with inconclusive manometric EGJOO. Esophagram showed routine contrast lasting up to 5 minutes but patent flow of contrast and tablet.\"      Further evaluation included upper endoscopy, that revealed a nodularity at the gastroesophageal junction, concerning for malignancy, as follows:  September 24, 2024--EGD--Evidence of a Nissen fundoplication was found at the gastroesophageal   junction. The wrap appeared loose. This was traversed.   Localized moderate mucosal changes characterized by erythema and   nodularity were found at the gastroesophageal junction. Biopsies were   taken with a cold forceps for histology.   Specimen:    Gastric Esophageal Junction, Gastroesophageal biopsy                                      Final Diagnosis   A. GASTROESOPHAGEAL JUNCTION, BIOPSY:  - Adenocarcinoma, poorly-differentiated, with signet ring cell features     GASTRIC HER2 BIOMARKER TESTS   Test(s) Performed     HER2 by IHC     Results         With this new finding of malignancy, a staging PET/CT was performed, and no distant metastasis was detected:  October 1, 2024--PET/CT --                                                                 IMPRESSION Findings suspicious for the gastroesophageal junction adenocarcinoma without convincing evidence of tona or metastatic " disease.      He further underwent a staging EUS, that characterized the tumor as staged as T2N0 per EUS criteria:      Oct 8, 2024 EUS--    Impression:            - Malignant esophageal tumor was found at the                          gastroesophageal junction. Siewert 2. Measurements as                          above.                          This is classified T2 N0 (cytology pending) M0 by EUS                          criteria.                          Three small lymph nodes seen as described above. Two                          were sampled (station 8R) and are preliminarily                          benign. The third was identical in size and appearance                          and not amenable to sampling due to the proximity to                          the heart and proximal margin of the mass.                          - Z-line, 37 cm from the incisors.                          - A Nissen fundoplication was found. This has                          partially slipped above the diaphragm.   Specimen A                 Interpretation:                  Negative for malignancy  Polymorphous population of lymphocytes present                 Adequacy:                 Satisfactory for evaluation        He met with Dr. Katlyn Tobar from our thoracic surgery service here at [place] on October 3, 2024.      March 24, 2025--Pathologic stage ypT1b N0 Mx     ESOPHAGECTOMY, MINIMALLY INVASIVE, Laparoscopic Right Thorascopic Esophagectomy, Botox Injection   Esophagoscopy, gastroscopy, duodenoscopy (EGD), combined            March 28,. 2025--PREOPERATIVE DIAGNOSES:  1.  GEJ adenocarcinoma s/p KYLE  2. Concern for anastomotic leak.   POSTOPERATIVE DIAGNOSES:  1.  Same  OPERATION PERFORMED:    EGD  2.   Intraoperative esophagram  3.   Supervision and interpretation of intra-op imaging.   SURGEON:  Chino Garcia MD    April 7, 2025--Preoperative diagnosis: Anastomotic leak post esophagectomy  Postoperative diagnosis: Same  as preoperative diagnosis  Procedure performed:  1.  Esophagogastroduodenoscopy   2.  LEFT tube thoracostomy (20 Fr.)      DATE OF ADMISSION: 4/17/2025    PRE/POSTOPERATIVE DIAGNOSES: Anastomotic leak after esophagectomy    Severe malnutrition in the context of acute illness or injury   PROCEDURES PERFORMED:   1.  Esophagogastroduodenoscopy with placement of LEFT pharyngostomy tube placement   2.  Fluoroscopy with interpretation of images    April 25, 2025 -- oncology follow-up/in person visit, Dr. Mc.         INTERVAL HISTORY:  Mr. Junior is a 69 year old male who joins me today for followup regarding a gastroesophageal junction adenocarcinoma.    He is here today with his wife, tachycardic on presentation and I immediately ordered an EKG that showed sinus tachycardia that is not new, but has been a lingering issue linked to worsening fatigue and overall health since diagnosis of his cancer.  Upon entering the room, on initial assessment I noted that he was frail and weak-appearing; I asked him and his wife if we should consider sending him to the ER for evaluation. They both stated no and that he did not feel that anything had changed acutely over the course of yesterday and the day of this visit. He stated he wanted to hear my assessment principally on whether or not chemotherapy would be prescribed from this point forward, and if so whether it would consist of FLOT regimen or other approach.  To review his cancer treatment course:  He has had a highly complicated course since diagnosis of gastroesophageal carcinoma in late summer/early fall 2024.  He started FLOT on 10/31, with intended perioperative treatment.  His fourth cycle took place in December, there were some delays for various reasons for surgery, but surgery was ultimately performed on March 24, 2025.  On histopathologic examination, there was excellent treatment response from the preoperative chemotherapy regimen.  However his immediate and  subsequent postoperative course has been extremely complicated over the last month due to a number of anastomotic leak that required return to the operating room by the thoracic surgery team.  He has had ongoing issues with shortness of breath, sinus tachycardia, and other issues.  His performance status has taken a significant decline over the last several months.    He states he has scheduled follow-up with thoracic surgery team next week, we have an open discussion today about whether or not it would be safe or prudent to proceed with postoperative/adjuvant intent chemotherapy in the postoperative setting considering his severely compromised performance status and surgical complications.    Review Of Systems:  Comprehensive (14-point) ROS reviewed. Pertinent symptoms reviewed above per HPI.      Past medical, social, surgical, and family histories reviewed, confirmed, and pertinent details discussed with patient and family; additional sources include the medical record.      Past Medical History:   Diagnosis Date    Hypertension     Neuritis     Peripheral neuropathy     Personal history of other medical treatment     postgastrectomy dumping syndrome    Stomach problems Noted earlier      Past Surgical History:   Procedure Laterality Date    ABDOMEN SURGERY  2012?    APPENDECTOMY  1964?    BRONCHOSCOPY RIDID OR FLEXIBLE W/ENDOBRONCHIAL ULTRASOUND GUIDED 3 OR MORE NODE STATIONS N/A 3/19/2025    Procedure: Bronchoscopy Ridid Or Flexible W/Endobronchial Ultrasound Guided 3 Or More Node Stations;  Surgeon: Saad Olmedo MD;  Location:  GI    COLONOSCOPY  2006, 2016    COMBINED ESOPHAGOSCOPY, GASTROSCOPY, DUODENOSCOPY (EGD), REPLACE ESOPHAGEAL STENT Left 4/18/2025    Procedure: ESOPHAGOGASTRODUODENOSCOPY with pharyngostomy tube placement;  Surgeon: Erik Lindsey MD;  Location:  OR    DAVINCI NISSEN FUNDOPLICATION N/A 2/26/2025    Procedure: TAKE-DOWN, FUNDOPLICATION, NISSEN, LAPAROSCOPIC- ROBOTIC,  gastrostomy takedown, lysisof adhesions 2 hr;  Surgeon: Katlyn Tobar MD;  Location: [facility] OR    ESOPHAGEAL BALLOON PROVOCATION STUDY N/A 9/24/2024    Procedure: Esophageal Balloon Provocation Study;  Surgeon: Carson Michel DO;  Location: [facility] GI    ESOPHAGECTOMY MINIMALLY INVASIVE N/A 3/24/2025    Procedure: ESOPHAGECTOMY, MINIMALLY INVASIVE, Laparoscopic Right Thorascopic Esophagectomy, Botox Injection;  Surgeon: Katlyn Tobar MD;  Location: [facility] OR    ESOPHAGOSCOPY, GASTROSCOPY, DUODENOSCOPY (EGD), COMBINED N/A 9/24/2024    Procedure: ESOPHAGOGASTRODUODENOSCOPY, WITH BIOPSY;  Surgeon: Carson Michel DO;  Location: [facility] GI    ESOPHAGOSCOPY, GASTROSCOPY, DUODENOSCOPY (EGD), COMBINED N/A 10/8/2024    Procedure: ESOPHAGOGASTRODUODENOSCOPY, WITH FINE NEEDLE ASPIRATION BIOPSY, WITH ENDOSCOPIC ULTRASOUND GUIDANCE;  Surgeon: Lance Lopez MD;  Location: [facility] GI    ESOPHAGOSCOPY, GASTROSCOPY, DUODENOSCOPY (EGD), COMBINED N/A 2/26/2025    Procedure: Esophagoscopy, gastroscopy, duodenoscopy (EGD);  Surgeon: Katlyn Tobar MD;  Location: [facility] OR    ESOPHAGOSCOPY, GASTROSCOPY, DUODENOSCOPY (EGD), COMBINED N/A 3/24/2025    Procedure: Esophagoscopy, gastroscopy, duodenoscopy (EGD), combined;  Surgeon: Katlyn Tobar MD;  Location: [facility] OR    ESOPHAGOSCOPY, GASTROSCOPY, DUODENOSCOPY (EGD), COMBINED N/A 3/28/2025    Procedure: ESOPHAGOGASTRODUODENOSCOPY, pharyngostomy tube replacement;  Surgeon: Chino Gardner MD;  Location: [facility] OR    EYE SURGERY  199x    laser for retinal tears    GASTRIC FUNDOPLICATION      HERNIA REPAIR  1961?    IR CHEST PORT PLACEMENT > 5 YRS OF AGE  10/24/2024    LAPAROSCOPIC ASSISTED INSERTION TUBE JEJUNOSTOMY N/A 2/26/2025    Procedure: Laparoscopic assisted insertion tube jejunostomy;  Surgeon: Katlyn Tobar MD;  Location: [facility] OR    LAPAROSCOPIC TAKEDOWN NISSEN FUNDOPLICATION N/A 9/25/2020    Procedure: TAKE-DOWN, FUNDOPLICATION, REDO NISSEN, LAPAROSCOPIC, gastrostomy feeding tube placement;   Surgeon: Katlyn Tobar MD;  Location: [facility] ESOPHAGOGASTRODUODENOSCOPY TRANSORAL DIAGNOSTIC N/A 2019    Procedure: UPPER GASTROINTESTINAL ENDOSCOPY;  Surgeon: Dino Ward MD;  [facility]: [facility];  Service: General    SOFT TISSUE SURGERY  ?    MCL l. thumb          SOCIAL HISTORY: Lives in [place], [place] with his wife Jess. They've been  for 15 years.  He has three daughters, ages 38, 42, 50.  He is retired from working in computer security.  He denies any history of oral tobacco or cigarette use in his lifetime.  Alcohol use that he reports lifetime is five drinks per week since age of 17.  Info from other notes:    Alcohol use: Yes        Alcohol/week: 3.0 - 6.0 standard drinks of alcohol       Types: 1 - 2 Glasses of wine, 1 - 2 Cans of beer, 1 - 2 Shots of liquor per week       Comment: glass of win   He is an avid skier, and enjoy skiing a winter, and sometimes the peripheral neuropathycan interfere with that aspect of quality of life.    FAMILY HISTORY OF CANCER: his mother was diagnosed with uterine cancer in her late 30s; this diagnosis took place in the 1960s, and she  in  of unrelated causes.  He has two sisters, in good health, and his father had a superficial form of skin cancer, otherwise no other family history of cancer.      Allergies:  Allergies as of 2025 - Reviewed 2025   Allergen Reaction Noted    Hornets  2020    Wasp venom protein Hives 2016    Bee venom Swelling 2019       Current Medications:  No current outpatient medications on file.        Physical Exam:  BP (!) 134/94 (BP Location: Right arm, Patient Position: Sitting, Cuff Size: Adult Regular)   Pulse (!) 121   Temp 97.7  F (36.5  C) (Oral)   Resp 20   Wt 76.7 kg (169 lb 1.6 oz)   SpO2 96%   BMI 24.26 kg/m    KPS 60    GENERAL: Chronically ill, cachectic, mildly anorexic, he states he is currently n.p.o., A and O x 3.  He is able to walk in and out of  the clinic room of his own accord.  HEENT:  PERRLA, anicteric sclerae. Oropharynx clear, with no evidence of mucositis, thrush, or ulcerations; no jaundice of the skin or frenulum.  NECK: central line into left neck, c/d/I, no erythema.  CV: Sinus tachycardia, confirmed on ECG tracing, with relatively lower bpm compared to intake (~105-115 range on my auscultation); normal S1 S2.  No murmurs, gallops, or rubs.   LUNGS: Distant breath sounds bilaterally, mild wheezing.  No dullness to percussion.   ABDOMEN:  Soft, nontender, nondistended, no evident ascites or palpable masses. No bowel sounds heard.  No apparent hepatosplenomegaly.   EXTREMITIES:  No clubbing, cyanosis, edema, or palpable cords.    Laboratory/Imaging Studies  RADIOLOGY:  During today's visit, I printed out copies of the pertinent radiology reports, reviewed the Impression and salient details verbatim and lay language with the patient during today's visit; by the end of today's visit, I provided the patient a printed copy of the radiology report(s) from the above scans.  I also printed out copies of the histopathology report from the March 2025 esophagectomy, and reviewed those details for him and his wife as well.    ASSESSMENT/PLAN:  Mr. Bal Junior is a 70-year-old gentleman from [place], [place] with a long-standing history over ~two decades of severe acid reflux, necessitating Nissen Fundoplication procedure in 2011 or 2012; in recent years he has experienced a slippage of the fundoplication anatomy. Throughout 2024, he has had evaluation for worsening symptoms including dysphagia and chest pressure, and as of September/October 20, 24, he has a new diagnosis of gastroesophageal adenocarcinoma staged by endosonographic criteria as a T2N0 form of GE junction cancer.  Histopathologically, it is poorly differentiated with signet ring cell features.  Per the pet CT, there is no evidence of distant metastasis.  Per the EUS, the tumor is classed as  Siewert 2.    As it has been sometime since he and I have met, we took the opportunity to again review overall basic principles of the natural course, biology, diagnosis, and treatment of gastroesophageal adenocarcinomas. His particular tumor is Siewert class 2, and we reviewed the meaning of that classification.  My overall impression is that he was able to tolerate FLOT x 4 cycles in fall 2024, and there was an excellent treatment response seen objectively on histopathology review based on the March 24, 2025 esophagectomy specimens.  However, in the postoperative setting his recovery has been immensely difficult, so I broached openly the possibility that it may not be safe to institute adjuvant chemotherapy.  I reviewed that the overall window of opportunity for benefiting for risk reduction from chemotherapy in the postoperative setting would be 1 to 2 months, and currently he is approximately 1 month out from surgery.  I suggested that we have him meet with either me or Riya from our [facility] team in 3 or 4 weeks, and at that time make a firm decision how to proceed, either as follows:  If his performance status remains as poor as it is today, then I would recommend active surveillance alone rather than pushing adjuvant chemotherapy, which I do not think she would be able to tolerate in the near and longer future.  Modifying the regimen to institute FOLFOX instead of the full FLOT regimen.  Even for that, I would only institute that approach if he had substantial improvement in his performance status and is relatively short time in the upcoming 3 to 4 weeks.  Reinstituting and continuing the FLOT regimen in the postoperative setting, which I would not advocate for due to his current compromised condition.    I think he would benefit from having a cardiologist on his team, and he states that he does not have 1 at home.  With his permission, I will place a referral for cardiology, and also for physical  therapy/Occupational Therapy, with the hope that they can see him relatively soon in the coming days or week.    I reviewed all of the above, and as he needed to leave with his wife, our visit was lasted a schedule 30 minutes, but he stated satisfaction that he received the answers that he was seeking for this visit by the end of today's discussion.    I spent 20 minutes in consultation, including history and discussion with the patient including review of recent lab values and radiologic imaging results.  An additional 30 minutes was spent on the day of the visit, including reviewing pertinent medical notes and documentation from other physicians and APPs who have evaluated and treated this patient, pertinent lab values, pathology and imaging results, personal review of radiologic images, discussing the case with referring providers and/or nurse coordinator team, post-visit orders, and all post-visit documentation.    Lawrence Mc MD, PhD       The above was transcribed using Dragon voice recognition software that is now required for use by [facility] and [facility] in place of transcription of dictated notes.  This change may unfortunately lead into an increase in errors in the EMR. While I reviewed and edited the transcription, I may miss errors.  Please let me know of any of serious errors and I will addend the note.

## 2025-04-21 NOTE — PROGRESS NOTES
"Blood pressure 127/86, pulse 89, temperature 98.7  F (37.1  C), temperature source Oral, resp. rate 18, height 1.778 m (5' 10\"), weight 77 kg (169 lb 11.2 oz), SpO2 98%.    Activity: SBA  Neuros:  AOX4, makes needs known  Cardiac: WDL  Respiratory: WDL, RA  GI/: AUOP, BM  Diet: NPO, TF 6-10am 90cc/hr TF tubing changed with bad at 2300  Skin/Incisions/Drains: Pharyngostomy, Peg, Rt PAC  Lines: Rt PAC  Pain: Oxy  Plan: Continue with POC    "

## 2025-04-21 NOTE — CONSULTS
"Health Psychology Consultation Note  [facility]     Patient: Bal Junior  MRN: [id number]    : 1955  Date of Admission: 2025  Date of Encounter: 2025     Consult Requested by: Janes Galo MD   Reason for Consult: Assistance with coping with illness     Diagnosis:  Adjustment disorder, unspecified (ICD-10: F43.20)    Impression/Plan:  Mr. Jnuior is a 70 year old y/o male currently inpatient for 4 days, starting on 2025 for Esophageal anastomotic leak [K91.89] who presented today in the hospital room. Patient was engaged in the visit and expressed understanding of the information provided. Pt presented with symptoms of difficulty coping with current illness. Symptom reduction would likely be facilitated by CBT. Pt was agreeable to follow up with the Health Psychology Team. Plan for health psychology to follow this patient approximately once per week for the duration of their hospitalization. Please feel free to call in urgent concerns that arise prior to the next follow-up session.    Recommendations for Care Team:  Continue to monitor changes in patient's mood. While not an exhaustive list, examples of symptoms of note include increases in: sadness/hopelessness, time spent worrying, physiological responses to stress, and decreases in: sleep, appetite, socialization, engagement in care.     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  \"    Patient Demographics (per chart):   Age: 70 year old  Biological Sex: male  Race: White  Ethnicity: Not  or     Hospital Admission (per chart):  Admission Date: 2025  Admission Diagnosis: " "Esophageal anastomotic leak [K91.89]  Length of Stay: 4    Subjective:  Engaged pt in psychotherapy for coping with illness and hospitalization. Pt was agreeable to check-in at this time. Pt reported mood as \"down\" today and indicated an overall sense of feeling overwhelmed.  Engage patient in supportive listening and cognitive processing of ongoing symptoms and stressors.  Patient reflected on medical injury and highs and lows of current hospitalization.  Engaged patient in basic psychoeducation about coping with medical illness and hospitalization.  Additionally engaged patient in brief problem solving and planning for coping strategy engagement including reading, watching TV, and communicating with supports.  Patient was engaged in the visit and expressed understanding of information provided. Pt was agreeable to follow up with Health Psychology team.    Objective/Assessment:   Mental Status/Interview:  Appearance:   Appropriate   Eye Contact:   Good   Psychomotor Behavior:  Normal   Attitude:   Cooperative   Orientation:   All  Speech   Rate / Production: Normal    Volume:  Normal   Mood:    Euthymic  Affect:    Appropriate   Thought Content:   Clear  Thought Form:   Coherent  Logical     Insight:    Did not formally assess at this time.     Suicidal ideation: Pt denied active suicidal ideation.   Homicidal ideation: Pt denied active homicidal ideation.    Session Length:  Start Time: 3:00pm  End Time: 3:30pm    Date of Service:  04/21/25    Ilia Shields, PhD,   Clinical Health Psychologist  Phone: [phone]    *This note was completed using Dragon voice recognition software. Although reviewed after completion, some word and grammatical errors may occur.   "

## 2025-04-21 NOTE — PLAN OF CARE
"/86 (BP Location: Right arm)   Pulse 89   Temp 98.7  F (37.1  C) (Oral)   Resp 18   Ht 1.778 m (5' 10\")   Wt 77 kg (169 lb 11.2 oz)   SpO2 98%   BMI 24.35 kg/m      Neuro: Alert and oriented x 4, lets needs known  Cardiac:Wnl, denies chest pain  Respiratory:WNL, Denies SOB, intermittent cough  GI/: AUOP, Passing gas,  BM  x 1   Diet/Appetite: NPO, cycled TF 6-10am @90cc/hr , denies nausea,   Skin:no new skin deficient this shift. 4 abd lap sites, Lt pharyngostomy LCS, peg, back ODIN, Rt PAC   LDA:R CVC TKO   Labs: Reviewed.   Activity: SBA  Pain:pain contolled with oxy 5 mg x 2  Plan:Cont with POC      Goal Outcome Evaluation:improving                        "

## 2025-04-21 NOTE — PLAN OF CARE
Goal Outcome Evaluation:      Plan of Care Reviewed With: patient    Overall Patient Progress: no changeOverall Patient Progress: no change    Outcome Evaluation: continue with POC    Status: s/p EGD w/ pharyngostomy placement on 4/18 for anastomotic leak   Vitals: VSS on RA  Neuros: A&Ox4, able to make needs known   IV: port HL, dressing and needle change due 4/24  Labs/Electrolytes: no electrolyte replacements needed, continuing to monitor   Resp: dry cough, PRN hurricaine spray given x1, sore throat. Denies SOB   Diet: NPO, cycled TF 6P-10A @90ml/hr  : voids spontaneously in toilet   GI: PEG tube site intact, BM this shift, denies nauseea  Skin:  pharyngostomy tube to LCS, 4 abd lap sites, R back ODIN, R port   Pain: abdominal, throat, back pain, PRN oxy given x2 with relief   Activity: SBA, walked in hallways  Plan: continue with POC

## 2025-04-22 ENCOUNTER — APPOINTMENT (OUTPATIENT)
Dept: GENERAL RADIOLOGY | Facility: CLINIC | Age: 70
End: 2025-04-22
Attending: SURGERY
Payer: MEDICARE

## 2025-04-22 LAB
ANION GAP SERPL CALCULATED.3IONS-SCNC: 11 MMOL/L (ref 7–15)
BUN SERPL-MCNC: 33.2 MG/DL (ref 8–23)
CALCIUM SERPL-MCNC: 9 MG/DL (ref 8.8–10.4)
CHLORIDE SERPL-SCNC: 106 MMOL/L (ref 98–107)
CREAT SERPL-MCNC: 0.86 MG/DL (ref 0.67–1.17)
EGFRCR SERPLBLD CKD-EPI 2021: >90 ML/MIN/1.73M2
ERYTHROCYTE [DISTWIDTH] IN BLOOD BY AUTOMATED COUNT: 13.4 % (ref 10–15)
GLUCOSE SERPL-MCNC: 127 MG/DL (ref 70–99)
HCO3 SERPL-SCNC: 25 MMOL/L (ref 22–29)
HCT VFR BLD AUTO: 32.5 % (ref 40–53)
HGB BLD-MCNC: 10.3 G/DL (ref 13.3–17.7)
MAGNESIUM SERPL-MCNC: 2.2 MG/DL (ref 1.7–2.3)
MCH RBC QN AUTO: 27.7 PG (ref 26.5–33)
MCHC RBC AUTO-ENTMCNC: 31.7 G/DL (ref 31.5–36.5)
MCV RBC AUTO: 87 FL (ref 78–100)
PHOSPHATE SERPL-MCNC: 3.5 MG/DL (ref 2.5–4.5)
PLATELET # BLD AUTO: 354 10E3/UL (ref 150–450)
POTASSIUM SERPL-SCNC: 4.3 MMOL/L (ref 3.4–5.3)
RBC # BLD AUTO: 3.72 10E6/UL (ref 4.4–5.9)
SODIUM SERPL-SCNC: 142 MMOL/L (ref 135–145)
WBC # BLD AUTO: 13 10E3/UL (ref 4–11)

## 2025-04-22 PROCEDURE — 250N000011 HC RX IP 250 OP 636: Performed by: STUDENT IN AN ORGANIZED HEALTH CARE EDUCATION/TRAINING PROGRAM

## 2025-04-22 PROCEDURE — 250N000013 HC RX MED GY IP 250 OP 250 PS 637: Performed by: STUDENT IN AN ORGANIZED HEALTH CARE EDUCATION/TRAINING PROGRAM

## 2025-04-22 PROCEDURE — 120N000002 HC R&B MED SURG/OB UMMC

## 2025-04-22 PROCEDURE — 84100 ASSAY OF PHOSPHORUS: CPT | Performed by: STUDENT IN AN ORGANIZED HEALTH CARE EDUCATION/TRAINING PROGRAM

## 2025-04-22 PROCEDURE — 71045 X-RAY EXAM CHEST 1 VIEW: CPT

## 2025-04-22 PROCEDURE — 36592 COLLECT BLOOD FROM PICC: CPT | Performed by: STUDENT IN AN ORGANIZED HEALTH CARE EDUCATION/TRAINING PROGRAM

## 2025-04-22 PROCEDURE — 80048 BASIC METABOLIC PNL TOTAL CA: CPT | Performed by: STUDENT IN AN ORGANIZED HEALTH CARE EDUCATION/TRAINING PROGRAM

## 2025-04-22 PROCEDURE — 83735 ASSAY OF MAGNESIUM: CPT | Performed by: STUDENT IN AN ORGANIZED HEALTH CARE EDUCATION/TRAINING PROGRAM

## 2025-04-22 PROCEDURE — 71045 X-RAY EXAM CHEST 1 VIEW: CPT | Mod: 26 | Performed by: RADIOLOGY

## 2025-04-22 PROCEDURE — 85027 COMPLETE CBC AUTOMATED: CPT | Performed by: STUDENT IN AN ORGANIZED HEALTH CARE EDUCATION/TRAINING PROGRAM

## 2025-04-22 PROCEDURE — 250N000009 HC RX 250: Performed by: STUDENT IN AN ORGANIZED HEALTH CARE EDUCATION/TRAINING PROGRAM

## 2025-04-22 RX ADMIN — OXYCODONE HYDROCHLORIDE 5 MG: 5 TABLET ORAL at 09:15

## 2025-04-22 RX ADMIN — ACETAMINOPHEN 975 MG: 325 TABLET ORAL at 20:59

## 2025-04-22 RX ADMIN — ATENOLOL 50 MG: 25 TABLET ORAL at 20:58

## 2025-04-22 RX ADMIN — LIDOCAINE 4% 1 PATCH: 40 PATCH TOPICAL at 16:43

## 2025-04-22 RX ADMIN — GABAPENTIN 300 MG: 300 CAPSULE ORAL at 09:15

## 2025-04-22 RX ADMIN — FLUCONAZOLE 200 MG: 200 TABLET ORAL at 09:15

## 2025-04-22 RX ADMIN — GABAPENTIN 300 MG: 300 CAPSULE ORAL at 14:52

## 2025-04-22 RX ADMIN — OXYCODONE HYDROCHLORIDE 5 MG: 5 TABLET ORAL at 20:59

## 2025-04-22 RX ADMIN — METHOCARBAMOL 750 MG: 750 TABLET ORAL at 09:19

## 2025-04-22 RX ADMIN — AMOXICILLIN AND CLAVULANATE POTASSIUM 1 TABLET: 875; 125 TABLET, FILM COATED ORAL at 09:15

## 2025-04-22 RX ADMIN — DEXTROMETHORPHAN POLISTIREX 30 MG: 30 SUSPENSION ORAL at 21:00

## 2025-04-22 RX ADMIN — TOPICAL ANESTHETIC 0.5 ML: 200 SPRAY DENTAL; PERIODONTAL at 16:44

## 2025-04-22 RX ADMIN — Medication 60 ML: at 09:27

## 2025-04-22 RX ADMIN — NORTRIPTYLINE HYDROCHLORIDE 25 MG: 25 CAPSULE ORAL at 20:58

## 2025-04-22 RX ADMIN — OXYCODONE HYDROCHLORIDE 5 MG: 5 TABLET ORAL at 14:52

## 2025-04-22 RX ADMIN — AMOXICILLIN AND CLAVULANATE POTASSIUM 1 TABLET: 875; 125 TABLET, FILM COATED ORAL at 20:59

## 2025-04-22 RX ADMIN — ENOXAPARIN SODIUM 40 MG: 40 INJECTION SUBCUTANEOUS at 16:44

## 2025-04-22 RX ADMIN — METHOCARBAMOL 750 MG: 750 TABLET ORAL at 14:52

## 2025-04-22 RX ADMIN — GABAPENTIN 300 MG: 300 CAPSULE ORAL at 20:58

## 2025-04-22 RX ADMIN — TOPICAL ANESTHETIC 0.5 ML: 200 SPRAY DENTAL; PERIODONTAL at 20:56

## 2025-04-22 RX ADMIN — Medication 60 ML: at 20:59

## 2025-04-22 RX ADMIN — ACETAMINOPHEN 975 MG: 325 TABLET ORAL at 11:12

## 2025-04-22 RX ADMIN — METHOCARBAMOL 750 MG: 750 TABLET ORAL at 21:00

## 2025-04-22 RX ADMIN — ACETAMINOPHEN 975 MG: 325 TABLET ORAL at 03:34

## 2025-04-22 RX ADMIN — CYANOCOBALAMIN TAB 500 MCG 500 MCG: 500 TAB at 09:15

## 2025-04-22 RX ADMIN — OXYCODONE HYDROCHLORIDE 5 MG: 5 TABLET ORAL at 03:34

## 2025-04-22 RX ADMIN — TOPICAL ANESTHETIC 0.5 ML: 200 SPRAY DENTAL; PERIODONTAL at 09:30

## 2025-04-22 RX ADMIN — METHOCARBAMOL 750 MG: 750 TABLET ORAL at 01:44

## 2025-04-22 RX ADMIN — Medication 40 MG: at 09:15

## 2025-04-22 ASSESSMENT — ACTIVITIES OF DAILY LIVING (ADL)
ADLS_ACUITY_SCORE: 42

## 2025-04-22 NOTE — PLAN OF CARE
"Goal Outcome Evaluation:      Plan of Care Reviewed With: patient    Overall Patient Progress: improvingOverall Patient Progress: improving       /82 (BP Location: Right arm, Patient Position: Semi-Hannah's, Cuff Size: Adult Regular)   Pulse 108   Temp 97.9  F (36.6  C) (Oral)   Resp 18   Ht 1.778 m (5' 10\")   Wt 77 kg (169 lb 11.2 oz)   SpO2 97%   BMI 24.35 kg/m         Activity: SBA  Neuros: A&O x4  Cardiac: WDL, tachycardic  Respiratory: shortness of breath, intermittent productive cough  GI/: WDL, voids spontaneously  Diet: NPO, tube feeds 6P-10A 90ml/hr  Skin/Incisions: 4 abd surgical site HEATHER, R chest site HEATHER  Lines/Drains: J tube w/ TF, ODIN drain R back, pharyngostomy to LCS, R chest port  Pain/Nausea: pain managed w/ PRN oxy and robaxin, denies nausea  New Changes: reports pain/burning at back ODIN site, ice applied and repositioned, pain relieved  "

## 2025-04-22 NOTE — PROGRESS NOTES
Care Management Follow Up    Length of Stay (days): 5    Expected Discharge Date: 04/23/2025     Concerns to be Addressed: discharge planning     Patient plan of care discussed at interdisciplinary rounds: Yes    Anticipated Discharge Disposition: Home with services  Anticipated Discharge Services: Home Infusion    [facility](TF)  Phone: [phone]  Fax: [phone]     [facility](RN)  Phone: [phone]  Fax: [phone]     Anticipated Discharge DME: Gastric Suction    [facility]  Ph: [phone]  Clinical Fax: [phone]    Patient/family educated on Medicare website which has current facility and service quality ratings: no  Education Provided on the Discharge Plan: yes    Patient/Family in Agreement with the Plan: yes    Referrals Placed by CM/SW:  DME, Home Care  Private pay costs discussed: Not applicable    Discussed  Partnership in Safe Discharge Planning  document with patient/family: No     Handoff Completed: No, handoff not indicated or clinically appropriate    Additional Information:  Received update from thoracic team that patient will need to discharge with the pharyngostomy tube in place to suction.  For this patient will require a gastric suction machine for discharge.  Called and spoke with [facility], and she reported that they have gastric suction machine and supplies in stock.  DME order and face to face documentation faxed to the [facility] clinical team. They will deliver the gastric suction and supplies to the hospital this evening.       Per provider patient and spouse requesting home health support at discharge.  Referral sent to [facility].  [facility] is able to accept the patient for RN visits.  Orders in place.    Met with patient and spouse at bedside to discuss discharge plans.  They are agreeable to the discharge plan.        RNCC will continue to follow and assist with discharge planning.    Chacha Menard, RNCC  Phone:  [phone]   Med Surg Vocera  Nurse Coordinator

## 2025-04-22 NOTE — PROGRESS NOTES
THORACIC & FOREGUT SURGERY    S:  No overnight events.  Pt seen at bedside resting comfortably.       O:  Vitals:    04/21/25 0554 04/21/25 1402 04/21/25 2110 04/22/25 0630   BP: 119/75 132/87 (!) 124/93 128/82   BP Location: Right arm Right arm Right arm Right arm   Patient Position:   Semi-Hannah's Semi-Hannah's   Cuff Size:   Adult Regular Adult Regular   Pulse: 87 108     Resp: 18 18  18   Temp: 98.3  F (36.8  C) 98.3  F (36.8  C) 99.2  F (37.3  C) 97.9  F (36.6  C)   TempSrc: Oral Oral Oral Oral   SpO2: 97% 98% 94% 97%   Weight:       Height:           Gen: A&Ox4, NAD  Chest: Non-labored breathing on RA, left pigtail drain with light fluid. Pharyngostomy tube working well.  Abdomen: Soft, non-tender, non-distended. 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 Esophagectomy on 3/24. EGD and intraoperative esophagram confirming no leak on 3/28 and later passed methylene blue challenge further confirming no leak on 4/2, however had a rising WBC after initiation of CLD. 4/3 CT esophagram confirmed anastomotic leak. 4/7 EGD with left chest tube placement which and was safely discharged on 4/9. In clinic on 4/17 CT showed leak of oral contrast into right pleural space who is now s/p EGD w/ Pharyngostomy placement with Dr. Lindsey on 4/18/2025.    Encouraged lidocaine patch for chest, will watch WBC, continue to monitor pharyngostomy output. DISPO PLANNING TODAY, PLAN FOR HOME WITH HOME SUCTION FOR PHARYNGOSTOMY, possibly today     Describe the reason for need to support medical necessity: Unable to clear enteric contents through normal GI function due to anastomotic leak.  Needs  pharyngostomy tube to suction to prevent sepsis.    -Pharyngostomy: LCS, May be disconnected when ambulating  -Diet: Strict NPO, Cycled TF @ 90 + Meds through feeding tube  -Abx: Augmentin + Fluconazole  -Pain: Multimodal Pain Regimen  -Ppx: SCD's, Lovenox    Clinically Significant Risk Factors               # Hypoalbuminemia: Lowest albumin = 3.3 g/dL at 4/18/2025  6:41 AM, will monitor as appropriate     # Hypertension: Noted on problem list                # Financial/Environmental Concerns: none             Alexey Cohen PA-C     Thoracic and Foregut Surgery  Text page Thoracic Surgery via [facility] Paging/Directory

## 2025-04-23 ENCOUNTER — APPOINTMENT (OUTPATIENT)
Dept: GENERAL RADIOLOGY | Facility: CLINIC | Age: 70
DRG: 981 | End: 2025-04-23
Attending: SURGERY
Payer: MEDICARE

## 2025-04-23 ENCOUNTER — HOME INFUSION (OUTPATIENT)
Dept: HOME HEALTH SERVICES | Facility: HOME HEALTH | Age: 70
End: 2025-04-23
Payer: COMMERCIAL

## 2025-04-23 VITALS
HEART RATE: 82 BPM | TEMPERATURE: 98.7 F | WEIGHT: 169.7 LBS | DIASTOLIC BLOOD PRESSURE: 85 MMHG | HEIGHT: 70 IN | SYSTOLIC BLOOD PRESSURE: 124 MMHG | RESPIRATION RATE: 16 BRPM | OXYGEN SATURATION: 98 % | BODY MASS INDEX: 24.29 KG/M2

## 2025-04-23 LAB
ANION GAP SERPL CALCULATED.3IONS-SCNC: 11 MMOL/L (ref 7–15)
BUN SERPL-MCNC: 36.1 MG/DL (ref 8–23)
CALCIUM SERPL-MCNC: 9 MG/DL (ref 8.8–10.4)
CHLORIDE SERPL-SCNC: 107 MMOL/L (ref 98–107)
CREAT SERPL-MCNC: 0.83 MG/DL (ref 0.67–1.17)
CREAT SERPL-MCNC: 0.87 MG/DL (ref 0.67–1.17)
EGFRCR SERPLBLD CKD-EPI 2021: >90 ML/MIN/1.73M2
EGFRCR SERPLBLD CKD-EPI 2021: >90 ML/MIN/1.73M2
ERYTHROCYTE [DISTWIDTH] IN BLOOD BY AUTOMATED COUNT: 13.5 % (ref 10–15)
GLUCOSE SERPL-MCNC: 129 MG/DL (ref 70–99)
HCO3 SERPL-SCNC: 24 MMOL/L (ref 22–29)
HCT VFR BLD AUTO: 32.8 % (ref 40–53)
HGB BLD-MCNC: 10.4 G/DL (ref 13.3–17.7)
MAGNESIUM SERPL-MCNC: 2.2 MG/DL (ref 1.7–2.3)
MCH RBC QN AUTO: 27.7 PG (ref 26.5–33)
MCHC RBC AUTO-ENTMCNC: 31.7 G/DL (ref 31.5–36.5)
MCV RBC AUTO: 87 FL (ref 78–100)
PHOSPHATE SERPL-MCNC: 3.7 MG/DL (ref 2.5–4.5)
PLATELET # BLD AUTO: 354 10E3/UL (ref 150–450)
PLATELET # BLD AUTO: 378 10E3/UL (ref 150–450)
POTASSIUM SERPL-SCNC: 4.5 MMOL/L (ref 3.4–5.3)
RBC # BLD AUTO: 3.76 10E6/UL (ref 4.4–5.9)
SODIUM SERPL-SCNC: 142 MMOL/L (ref 135–145)
WBC # BLD AUTO: 9.4 10E3/UL (ref 4–11)

## 2025-04-23 PROCEDURE — 71045 X-RAY EXAM CHEST 1 VIEW: CPT | Mod: 26 | Performed by: RADIOLOGY

## 2025-04-23 PROCEDURE — 83735 ASSAY OF MAGNESIUM: CPT | Performed by: STUDENT IN AN ORGANIZED HEALTH CARE EDUCATION/TRAINING PROGRAM

## 2025-04-23 PROCEDURE — 250N000009 HC RX 250: Performed by: STUDENT IN AN ORGANIZED HEALTH CARE EDUCATION/TRAINING PROGRAM

## 2025-04-23 PROCEDURE — 250N000013 HC RX MED GY IP 250 OP 250 PS 637: Performed by: STUDENT IN AN ORGANIZED HEALTH CARE EDUCATION/TRAINING PROGRAM

## 2025-04-23 PROCEDURE — 36592 COLLECT BLOOD FROM PICC: CPT | Performed by: STUDENT IN AN ORGANIZED HEALTH CARE EDUCATION/TRAINING PROGRAM

## 2025-04-23 PROCEDURE — 84100 ASSAY OF PHOSPHORUS: CPT | Performed by: STUDENT IN AN ORGANIZED HEALTH CARE EDUCATION/TRAINING PROGRAM

## 2025-04-23 PROCEDURE — 71045 X-RAY EXAM CHEST 1 VIEW: CPT

## 2025-04-23 PROCEDURE — 85049 AUTOMATED PLATELET COUNT: CPT | Performed by: STUDENT IN AN ORGANIZED HEALTH CARE EDUCATION/TRAINING PROGRAM

## 2025-04-23 PROCEDURE — 250N000011 HC RX IP 250 OP 636: Performed by: THORACIC SURGERY (CARDIOTHORACIC VASCULAR SURGERY)

## 2025-04-23 PROCEDURE — 250N000011 HC RX IP 250 OP 636: Performed by: STUDENT IN AN ORGANIZED HEALTH CARE EDUCATION/TRAINING PROGRAM

## 2025-04-23 PROCEDURE — 80048 BASIC METABOLIC PNL TOTAL CA: CPT | Performed by: STUDENT IN AN ORGANIZED HEALTH CARE EDUCATION/TRAINING PROGRAM

## 2025-04-23 PROCEDURE — 82310 ASSAY OF CALCIUM: CPT | Performed by: STUDENT IN AN ORGANIZED HEALTH CARE EDUCATION/TRAINING PROGRAM

## 2025-04-23 PROCEDURE — 85014 HEMATOCRIT: CPT | Performed by: STUDENT IN AN ORGANIZED HEALTH CARE EDUCATION/TRAINING PROGRAM

## 2025-04-23 RX ORDER — HEPARIN SODIUM (PORCINE) LOCK FLUSH IV SOLN 100 UNIT/ML 100 UNIT/ML
SOLUTION INTRAVENOUS
Status: COMPLETED
Start: 2025-04-23 | End: 2025-04-23

## 2025-04-23 RX ORDER — AMINO ACIDS/PROTEIN HYDROLYS 11G-40/45
60 LIQUID IN PACKET (ML) ORAL 2 TIMES DAILY
Status: ON HOLD
Start: 2025-04-23

## 2025-04-23 RX ORDER — FLUCONAZOLE 200 MG/1
200 TABLET ORAL DAILY
Qty: 5 TABLET | Refills: 0 | Status: ON HOLD | OUTPATIENT
Start: 2025-04-24 | End: 2025-05-01

## 2025-04-23 RX ORDER — AMOXICILLIN 250 MG
1 CAPSULE ORAL 2 TIMES DAILY PRN
Qty: 30 TABLET | Refills: 0 | Status: SHIPPED | OUTPATIENT
Start: 2025-04-23 | End: 2025-04-27

## 2025-04-23 RX ADMIN — ACETAMINOPHEN 975 MG: 325 TABLET ORAL at 02:37

## 2025-04-23 RX ADMIN — Medication 5 ML: at 06:32

## 2025-04-23 RX ADMIN — GABAPENTIN 300 MG: 300 CAPSULE ORAL at 07:51

## 2025-04-23 RX ADMIN — Medication 40 MG: at 07:51

## 2025-04-23 RX ADMIN — TOPICAL ANESTHETIC 0.5 ML: 200 SPRAY DENTAL; PERIODONTAL at 09:10

## 2025-04-23 RX ADMIN — HEPARIN 500 UNITS: 100 SYRINGE at 12:33

## 2025-04-23 RX ADMIN — Medication 5 ML: at 11:20

## 2025-04-23 RX ADMIN — CYANOCOBALAMIN TAB 500 MCG 500 MCG: 500 TAB at 07:51

## 2025-04-23 RX ADMIN — METHOCARBAMOL 750 MG: 750 TABLET ORAL at 02:37

## 2025-04-23 RX ADMIN — OXYCODONE HYDROCHLORIDE 5 MG: 5 TABLET ORAL at 09:18

## 2025-04-23 RX ADMIN — ACETAMINOPHEN 975 MG: 325 TABLET ORAL at 11:19

## 2025-04-23 RX ADMIN — OXYCODONE HYDROCHLORIDE 5 MG: 5 TABLET ORAL at 02:37

## 2025-04-23 RX ADMIN — METHOCARBAMOL 750 MG: 750 TABLET ORAL at 09:18

## 2025-04-23 RX ADMIN — TOPICAL ANESTHETIC 0.5 ML: 200 SPRAY DENTAL; PERIODONTAL at 05:42

## 2025-04-23 RX ADMIN — Medication 60 ML: at 07:51

## 2025-04-23 RX ADMIN — AMOXICILLIN AND CLAVULANATE POTASSIUM 1 TABLET: 875; 125 TABLET, FILM COATED ORAL at 07:51

## 2025-04-23 RX ADMIN — FLUCONAZOLE 200 MG: 200 TABLET ORAL at 07:51

## 2025-04-23 ASSESSMENT — ACTIVITIES OF DAILY LIVING (ADL)
ADLS_ACUITY_SCORE: 42

## 2025-04-23 NOTE — DISCHARGE SUMMARY
NAME: Bal Junior   MRN: [id number]   : 1955     DATE OF ADMISSION: 2025     PRE/POSTOPERATIVE DIAGNOSES: Anastomotic leak after esophagectomy     PROCEDURES PERFORMED:   1.  Esophagogastroduodenoscopy with placement of LEFT pharyngostomy tube placement   2.  Fluoroscopy with interpretation of images    PATHOLOGY RESULTS: None    CULTURE RESULTS: None     INTRAOPERATIVE COMPLICATIONS: None     POSTOPERATIVE MEDICAL ISSUES: None     DRAINS/TUBES PRESENT AT DISCHARGE: J tube and pharyngostomy tube    DATE OF DISCHARGE:  2025     HOSPITAL COURSE: Bal Junior is a 70 year old male who on 2025 underwent the above-named procedures.  He tolerated the operation well and postoperatively was transferred to the general post-surgical unit.  The remainder of his course was essentially uncomplicated and consisted primarily of TCU evaluation and home care coordination.  Prior to discharge, his pain was controlled well, he was able to perform ADLs and ambulate independently without difficulty, and had full return of bowel and bladder function.  On 2025, he was discharged to home in stable condition with home care services in place.    DISCHARGE EXAM:   A&O, NAD  Resp non-labored  Distal extremities warm    Incisions healthy appearing     DISCHARGE INSTRUCTIONS:  Discharge Procedure Orders   Home Care Referral   Referral Priority: Routine: Next available opening Referral Type: Home Health Therapies & Aides   Number of Visits Requested: 1     Home Infusion Referral   Referral Priority: Routine: Next available opening Referral Type: Consultation   Number of Visits Requested: 1     Reason for your hospital stay   Order Comments: Endoscopy, pharyngostomy tube placement     Activity   Order Comments: As tolerated     Order Specific Question Answer Comments   Is discharge order? Yes      Tubes and Drains   Order Comments: There is a pharyngostomy tube entering the skin on the left side of the neck  and entering the oropharynx, the esophagus and past the hole in the anastamosis.  A pharyngostomy tube decompresses the area around the leak to prevent the formation of an abscess and to facilitate healing of the cavity and eventually the leak.      1. The pharyngostomy tube is to be kept to suction as available, especially while in bed at night and when lying in bed during the day.  The pharyngostomy tube can be put to a leg bag or gravity drainage for therapy or other required activity.    2. A cath tip syringe may be used to provide temporary suction for the tube when no wall suction is available.     4. Though the tube is secured with a skin suture, keep a cath secure or flexi-trach device on the pharyngostomy tube to prevent incidental removal.  Call thoracic surgery if the skin sutures become loose.    5. Flush the tube with 30 mils of tap water every 4 hours and as needed to prevent tube clogging     Order Specific Question Answer Comments   If tubes and drains present: Continue at discharge      ADULT [facility] Specialty Follow-up and recommended labs and tests   Order Comments: Repeat endoscopy will be scheduled for the next 7 to 12 days to reevaluate healing of the anastomosis.  You will be contacted once the procedure has been scheduled.    Appointments on [place] and/or [facility] (with [facility] or [facility] provider or service). Call [phone] if you haven't heard regarding these appointments within 7 days of discharge.     Discharge Instructions   Order Comments: THORACIC SURGERY DISCHARGE INSTRUCTIONS    If your plans upon discharge include prolonged periods of sitting (i.e a lengthy car or plane ride), it is highly beneficial to get up and walk at least once per hour to help prevent swelling and blood clots.     Stay hydrated. Take over the counter fiber (metamucil or benefiber) and stool softeners (Miralax, docusate or senna) if becoming constipated.     Call for fever greater than 101.5, chills,  "increased size of incision, red skin around incision, vision changes, muscle strength changes, sensation changes, shortness of breath, or other concerns.    No driving while taking narcotic pain medication.    Transition to ibuprofen or tylenol/acetaminophen for pain control. Do not take tylenol/acetaminophen and acetaminophen containing narcotic (e.g., percocet or vicodin) at the same time. If you have known ulcer problems, or kidney trouble (elevated creatinine) do not take the ibuprofen.    If you think you will need a refill on your pain medications, you should call the thoracic surgery team at the number below at least 24hrs prior to running out.  It is also more difficult to provide refills Friday afternoon and over the weekend, so call before those times if possible.     In emergencies, call 911    For other Questions or Concerns;   A.) During weekday working hours (Monday through Friday 8am to 4:30pm)   call [phone] ([phone] and ask to speak to a thoracic surgery nurse (RN or LPN).     B.) At nights (after 4:30pm), on weekends, or if urgent call [phone] and   tell the  \"I would like to page job code [phone], the thoracic surgery   fellow on call, please.\"     Tubes   Order Comments: CHEST TUBE INSTRUCTIONS    Chest Tube: To drainage bulb, please record drainage daily until seen in follow up to assist with the decision for removal.    DO NOT CLAMP OR PINCH YOUR CHEST TUBE.  ENSURE THAT THE TUBE IS NOT KINKED AFTER REPOSITIONING, SITTING, OR LYING DOWN.      You should change your chest tube dressing every 24-48 hours, a single split gauze with minimal tape should be adequate.    Keep a Lizz tube attachment device on your chest tube at all times for additional securement.  There should be a small amount of slack between the Lizz tube attachment device and the insertion/suture site.  This will help to ensure the tube is not inadvertently removed.  ________________________________    RN- " Please provide for the patient prior to discharge;    2 Stevens tube attachment devices for securing the tube    Split gauze and medi-pore tape for dressing changes    _________________________________________________     Adult Formula Drip Feeding   Order Comments: Jevity 1.5 @ 90 mL/h x 16 hours ([id number])    Free water flushes as prior to admission.  May add additional free water via feeding tube if feeling thirsty.     Miscellaneous DME Order   Order Comments: DME Documentation:   Describe the reason for need to support medical necessity: Unable to clear enteric contents through normal GI function due to anastomotic leak.  Needs pharyngostomy tube to suction to prevent sepsis..     I, the undersigned, certify that the above prescribed supplies are medically necessary for this patient and is both reasonable and necessary in reference to accepted standards of medical and necessary in reference to accepted standards of medical practice in the treatment of this patient's condition and is not prescribed as a convenience.     Order Specific Question Answer Comments   PATIENT INSTRUCTIONS: Please contact your preferred vendor or insurance provider for assistance in obtaining the ordered medical equipment item.    Start Date: 4/22/2025    DME Item Needed: Gastric suction machine - 1, gastric canister - 1/month, Suction tubing - 1/month, suction filter 30/month    Length of Need: 60 days    DME Item Quantity: Gastric suction machine - 1, gastric canister - 1/month, Suction tubing - 1/month, suction filter 30/month      Diet   Order Comments: Nothing by mouth, ok for swabs     Order Specific Question Answer Comments   Is discharge order? Yes        DISCHARGE MEDICATIONS:   Current Discharge Medication List        START taking these medications    Details   amoxicillin-clavulanate (AUGMENTIN) 875-125 MG tablet Place 1 tablet into Feeding Tube every 12 hours for 5 days.  Qty: 10 tablet, Refills: 0    Associated  Diagnoses: Esophageal anastomotic leak      fluconazole (DIFLUCAN) 200 MG tablet Take 1 tablet (200 mg) by mouth daily for 5 days.  Qty: 5 tablet, Refills: 0    Associated Diagnoses: Esophageal anastomotic leak      Prosource TF20 ENfit Compatibl EN LIQD (PROSOURCE TF20) packet Place 60 mLs into Feeding Tube 2 times daily.    Associated Diagnoses: Esophageal anastomotic leak      senna-docusate (SENOKOT-S/PERICOLACE) 8.6-50 MG tablet Place 1 tablet into Feeding Tube 2 times daily as needed for constipation. Reduce dose or temporarily discontinue if having loose stools.  Qty: 30 tablet, Refills: 0    Associated Diagnoses: Esophageal anastomotic leak           CONTINUE these medications which have NOT CHANGED    Details   acetaminophen (TYLENOL) 325 MG tablet Place 2 tablets (650 mg) into Feeding Tube every 6 hours.    Associated Diagnoses: Adenocarcinoma of gastroesophageal junction (H)      atenolol (TENORMIN) 50 MG tablet Place 1 tablet (50 mg) into Feeding Tube every evening.    Associated Diagnoses: Adenocarcinoma of gastroesophageal junction (H)      dextromethorphan (DELSYM) 30 MG/5ML liquid Take 5 mLs (30 mg) by mouth once as needed for cough.    Associated Diagnoses: Adenocarcinoma of gastroesophageal junction (H)      gabapentin (NEURONTIN) 300 MG capsule Place 1 capsule (300 mg) into Feeding Tube 3 times daily.    Associated Diagnoses: Neuropathy      Lidocaine (LIDOCARE) 4 % Patch Place 1 patch over 12 hours onto the skin every 24 hours. To prevent lidocaine toxicity, patient should be patch free for 12 hrs daily.  Qty: 4 patch, Refills: 0    Associated Diagnoses: Adenocarcinoma of gastroesophageal junction (H)      methocarbamol (ROBAXIN) 750 MG tablet Place 1 tablet (750 mg) into J tube every 6 hours as needed for muscle spasms.  Qty: 20 tablet, Refills: 0    Associated Diagnoses: Adenocarcinoma of gastroesophageal junction (H)      multivitamins w/minerals liquid Place 15 mLs into Feeding Tube  daily.  Qty: 450 mL, Refills: 0    Associated Diagnoses: Adenocarcinoma of gastroesophageal junction (H)      nortriptyline (PAMELOR) 25 MG capsule Place 1 capsule (25 mg) into J tube every evening.      oxyCODONE (ROXICODONE) 5 MG/5ML solution Place 5-10 mLs (5-10 mg) into J tube every 4 hours as needed for severe pain.  Qty: 100 mL, Refills: 0    Associated Diagnoses: Adenocarcinoma of gastroesophageal junction (H)      cyanocobalamin (VITAMIN B-12) 500 MCG tablet Place 1 tablet (500 mcg) into Feeding Tube.      EPINEPHrine (ANY BX GENERIC EQUIV) 0.3 MG/0.3ML injection 2-pack       !! Isosource 1.5 Gumaro 250 mL Place 1,500 mLs into J tube daily. Infuse via pump. 90 mL/hr x 16 hours.   6 cartons per day.   Water flush: 30 mL before and after tube feed cycle. Additional 30 mL 6x per day.   Kcals: 2250  Qty: 74483 mL, Refills: 11    Associated Diagnoses: Adenocarcinoma of gastroesophageal junction (H)      !! Prosource TF PO LIQD (PROSOURCE TF) Place 90 mLs into J tube 2 times daily. Infuse via syringe  Water flush: 30 mL before and after each packet   Kcals: 160  Qty: 5400 mL, Refills: 11    Associated Diagnoses: Adenocarcinoma of gastroesophageal junction (H)       !! - Potential duplicate medications found. Please discuss with provider.

## 2025-04-23 NOTE — PROGRESS NOTES
Patient discharged to home at 1:53 PM via wheel chair. Accompanied by spouse and staff. Discharge instructions reviewed with patient and spouse, opportunity offered to ask questions. Prescriptions sent to patients preferred pharmacy. All belongings sent with patient.

## 2025-04-23 NOTE — PROGRESS NOTES
[facility]  Start of Care/Resumption of Care Note    [facility] JUAN    DX: Esophageal anastomotic leak [K91.89]     Therapy: Enteral      Delivery plan: None at this time.     Nursing plan: Enteral Only. ACFV for other needs.     Teaching Plan: Liaison Teach Done?: NA    Other Info:     Bibi Turner RN 04/23/25

## 2025-04-23 NOTE — PLAN OF CARE
"Goal Outcome Evaluation:      Plan of Care Reviewed With: patient    Overall Patient Progress: improvingOverall Patient Progress: improving       /90 (BP Location: Right arm, Patient Position: Semi-Hannah's, Cuff Size: Adult Regular)   Pulse 82   Temp 97.7  F (36.5  C) (Oral)   Resp 17   Ht 1.778 m (5' 10\")   Wt 77 kg (169 lb 11.2 oz)   SpO2 98%   BMI 24.35 kg/m       Activity: SBA  Neuros: A&O x4  Cardiac: WDL, tachycardic  Respiratory: intermittent productive cough, yankauer for secretions  GI/: WDL, voids spontaneously  Diet: NPO, tube feeds 6P-10A 90ml/hr  Skin/Incisions: 4 abd surgical site HEATHER, R chest site HEATHER  Lines/Drains: J tube w/ TF, ODIN drain R back, pharyngostomy to LCS & Q4H irrigation, R chest port hep lock  Pain/Nausea: pain managed w/ PRN oxy and robaxin, denies nausea  New Changes: plan to discharge 4/23  "

## 2025-04-23 NOTE — PROGRESS NOTES
DISCHARGE DATE: 04/25/25 ([facility])  THERAPY: Enteral only  DRUG/ DELIVERY MODE: Jevity 1.5  FIRST HOME DOSE DUE: 1800 today   DELIVERY: Delivery to pt's home.  SUPPLIES: Pt states he has enough supplies in the home right now.  LINE/TUBE: PEGJ  SNV: [facility]-tomorrow  [facility] FOLLOWING PROVIDER: Same provider that he had prior to admission.  90 DAY JOVANNY NOTE: Not needed.  OTHER: Met with pt at bedside and spoke about the discharge order for enteral feedings.  Pt stated he understood well and the water flushes. Pt stated he continues to be independent with administering TF independently. Asked if he had any questions or concerns and he stated he did not at this time.  No supplies needed. Encouraged to contact \[facility]\"" with any questions or concerns.    Thank you,    Elma Mendez LPN  Enteral Nurse Liaison  [facility]  [address] [place] [address]  [phone]

## 2025-04-23 NOTE — PROGRESS NOTES
Care Management Discharge Note    Discharge Date: 04/23/2025       Discharge Disposition: Home with services    Discharge Services:  Home Infusion, Home Care    [facility](TF)  Phone: [phone]  Fax: [phone]      [facility](RN)  Phone: [phone]  Fax: [phone]     Discharge DME: Gastric Suction    [facility]  Ph: [phone]  Clinical Fax: [phone]    Discharge Transportation: family or friend will provide    Private pay costs discussed: Not applicable    Does the patient's insurance plan have a 3 day qualifying hospital stay waiver?  No    PAS Confirmation Code:    Patient/family educated on Medicare website which has current facility and service quality ratings: no    Education Provided on the Discharge Plan: yes    Persons Notified of Discharge Plans: patient, sposue  Patient/Family in Agreement with the Plan: yes    Handoff Referral Completed: Yes, non-[facility] PCP: External handoff communication completed    Additional Information:  Received update from MD team that patient is medically ready for discharge to home today.  Gastric suction delivered to the bedside and team provided education to pt and spouse on how to use.  Patient will resume home tube feedings at discharge.  [facility] to provide home nursing visits.  Pt's spouse will provide transportation to home.  RNCC will remain available if further needs arise       ANALIA Stroud  Phone: [phone]   Med Surg Vocera  Nurse Coordinator

## 2025-04-23 NOTE — PROGRESS NOTES
"CLINICAL NUTRITION SERVICES - REASSESSMENT NOTE     RECOMMENDATIONS FOR MDs/PROVIDERS TO ORDER:  None    Registered Dietitian Interventions:  - Continue current TF regimen of Jevity 1.5 @ 90 mL/h x 16 hours ([id number])  - Plans for pt to discharge today; given he reports compliance with home TF, nutrition provision should improve    Future/Additional Recommendations:   - Ongoing enteral nutrition modifications per home infusion or [facility] dietitian       Admitting diagnosis: Esophageal anastomotic leak   PMH:   Past Medical History:   Diagnosis Date    Hypertension     Neuritis     Peripheral neuropathy     Personal history of other medical treatment     postgastrectomy dumping syndrome    Stomach problems Noted earlier     INFORMATION OBTAINED  Assessed patient in room.  Pt reports slow improvement with bulk of stools. Endorses compliance with prescribed volume of TF at home. PEG in place since 2025, has been on TF for about one month.     CURRENT NUTRITION ORDERS  Diet: NPO    Nutrition Support:  Jevity 1.5 @ 90 mL/hr x 16 hours. + 2 Prosource TF20 daily provides: 1440 mL, 2320 kcals (30 kcal/kg), 132 g protein (1.7g/kg), 311 g CHO, 72 g fat, 30 g fiber, 1094 ml free water.     Current FWF: 30 mL x 6 daily with 30 mL flush before and after TF run    CURRENT INTAKE/TOLERANCE  Tube Feedin day average tube feeding infusion of 465 mL (32 % of goal volume)     NEW FINDINGS  Height: 1.778 m (5' 10\")  Admission Weight: 78.2 kg (172 lb 8 oz) (25 1416)   IBW: 75.5 kg   Weight History:  8.6% (16 pounds) weight loss in less than 3 months  Wt Readings from Last 5 Encounters:   25 77 kg (169 lb 11.2 oz)   25 78.5 kg (173 lb)   25 81.6 kg (179 lb 12.8 oz)   25 84.2 kg (185 lb 9.6 oz)   25 83.9 kg (185 lb)     Skin/wounds: surgical incisions  GI symptoms: Last BM: 25  Nutrition-relevant medications: Reviewed  Nutrition-relevant labs: Reviewed  Lab Results   Component Value Date "     04/23/2025    POTASSIUM 4.5 04/23/2025     (H) 04/23/2025    BUN 36.1 (H) 04/23/2025    CR 0.83 04/23/2025    GFRESTIMATED >90 04/23/2025    AUGUSTIN 9.0 04/23/2025    MAG 2.2 04/23/2025    PHOS 3.7 04/23/2025     MALNUTRITION  % Intake: </= 50% for >/= 5 days (severe)  % Weight Loss: > 7.5% in 3 months (severe)   Subcutaneous Fat Loss: None observed  Muscle Loss: Temples (temporalis muscle): Mild and Clavicles (pectoralis and deltoids): Mild  Fluid Accumulation/Edema: None noted  Malnutrition Diagnosis: Severe malnutrition in the context of acute illness or injury  Malnutrition Present on Admission: Unable to assess    EVALUATION OF THE PROGRESS TOWARD GOALS   Previous Goals  Total avg nutritional intake to meet a minimum of 25 kcal/kg and 1.5 g PRO/kg daily (per dosing wt 77 kg).   Evaluation: Progressing    Previous Nutrition Diagnosis  Inadequate oral intake related to NPO s/p esophageal resection as evidenced by reliance on TF to meet 100% of nutrition needs.   Evaluation: No change    NUTRITION DIAGNOSIS  Inadequate enteral nutrition infusion related to  intermittent TF holds  as evidenced by receiving average of 32% of total prescribed volume.     INTERVENTIONS  See nutrition interventions above    Goals  Total avg nutritional intake to meet a minimum of 25 kcal/kg and 1.5 g PRO/kg daily (per dosing wt 77 kg).      Monitoring/Evaluation      Progress toward goals will be monitored and evaluated per policy.    Anjelica Mac, RD, LD, CNSC  Float Coverage  Available on [employer]

## 2025-04-23 NOTE — PROGRESS NOTES
THORACIC & FOREGUT SURGERY    S:  No overnight events.  Pt seen at bedside resting comfortably. WBC resolved    O:  Vitals:    04/22/25 2202 04/23/25 0517 04/23/25 0533 04/23/25 0800   BP: 111/75 118/78 131/90 124/85   BP Location: Right arm Right arm Right arm    Patient Position:  Semi-Hannah's Semi-Hannah's    Cuff Size:  Adult Regular Adult Regular    Pulse: 82      Resp: 16 17 16   Temp: 98.4  F (36.9  C) 98.2  F (36.8  C) 97.7  F (36.5  C) 98.7  F (37.1  C)   TempSrc: Oral Oral Oral Oral   SpO2: 93% 98%  98%   Weight:       Height:           Gen: A&Ox4, NAD  Chest: Non-labored breathing on RA, left pigtail drain with light fluid. Pharyngostomy tube working well.  Abdomen: Soft, non-tender, non-distended. 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 Esophagectomy on 3/24. EGD and intraoperative esophagram confirming no leak on 3/28 and later passed methylene blue challenge further confirming no leak on 4/2, however had a rising WBC after initiation of CLD. 4/3 CT esophagram confirmed anastomotic leak. 4/7 EGD with left chest tube placement which and was safely discharged on 4/9. In clinic on 4/17 CT showed leak of oral contrast into right pleural space who is now s/p EGD w/ Pharyngostomy placement with Dr. Lindsey on 4/18/2025.     Patient discharging today after home health care education with suction machine     -Pharyngostomy: LCS, May be disconnected when ambulating  -Diet: Strict NPO, Cycled TF @ 90 + Meds through feeding tube  -Abx: Augmentin + Fluconazole  -Pain: Multimodal Pain Regimen  -Ppx: SCD's, Lovenox    Clinically Significant Risk Factors               #  Hypoalbuminemia: Lowest albumin = 3.3 g/dL at 4/18/2025  6:41 AM, will monitor as appropriate     # Hypertension: Noted on problem list                # Financial/Environmental Concerns: none             Janes Galo MD     Thoracic and Foregut Surgery  Text page Thoracic Surgery via [facility] Paging/Directory

## 2025-04-23 NOTE — PLAN OF CARE
"Goal Outcome Evaluation:      Plan of Care Reviewed With: patient      /87 (BP Location: Right arm)   Pulse 95   Temp 97.6  F (36.4  C) (Oral)   Resp 16   Ht 1.778 m (5' 10\")   Wt 77 kg (169 lb 11.2 oz)   SpO2 100%   BMI 24.35 kg/m        Neuro: Alert and oriented x 4, calls appropriately  Cardiac:Wnl, denies chest pain  Respiratory:WNL, Denies SOB, intermittent cough  GI/: AUOP, Passing gas, no BM this shift  Diet/Appetite: NPO, cycled TF 6-10am @90cc/hr , denies nausea,   Skin:no new skin deficient this shift. 4 abd lap sites, Lt pharyngostomy LCS, peg, back ODIN, Rt PAC   LDA:R CVC TKO   Labs: Reviewed.   Activity: SBA  Pain/Nausea:pain contolled with oxy 5 mg x 3, denies nausea  Plan:Continue with current POC    "

## 2025-04-25 ENCOUNTER — ONCOLOGY VISIT (OUTPATIENT)
Dept: ONCOLOGY | Facility: CLINIC | Age: 70
End: 2025-04-25
Attending: INTERNAL MEDICINE
Payer: COMMERCIAL

## 2025-04-25 ENCOUNTER — HOME INFUSION BILLING (OUTPATIENT)
Dept: HOME HEALTH SERVICES | Facility: HOME HEALTH | Age: 70
End: 2025-04-25
Payer: COMMERCIAL

## 2025-04-25 ENCOUNTER — HOME INFUSION (OUTPATIENT)
Dept: HOME HEALTH SERVICES | Facility: HOME HEALTH | Age: 70
End: 2025-04-25
Payer: COMMERCIAL

## 2025-04-25 VITALS
DIASTOLIC BLOOD PRESSURE: 94 MMHG | SYSTOLIC BLOOD PRESSURE: 134 MMHG | OXYGEN SATURATION: 96 % | RESPIRATION RATE: 20 BRPM | BODY MASS INDEX: 24.26 KG/M2 | WEIGHT: 169.1 LBS | HEART RATE: 121 BPM | TEMPERATURE: 97.7 F

## 2025-04-25 DIAGNOSIS — R00.0 TACHYCARDIA: Primary | ICD-10-CM

## 2025-04-25 DIAGNOSIS — C15.9 ESOPHAGEAL CARCINOMA (H): ICD-10-CM

## 2025-04-25 PROCEDURE — 93005 ELECTROCARDIOGRAM TRACING: CPT

## 2025-04-25 PROCEDURE — G0463 HOSPITAL OUTPT CLINIC VISIT: HCPCS | Performed by: INTERNAL MEDICINE

## 2025-04-25 PROCEDURE — 99215 OFFICE O/P EST HI 40 MIN: CPT | Performed by: INTERNAL MEDICINE

## 2025-04-25 ASSESSMENT — PAIN SCALES - GENERAL: PAINLEVEL_OUTOF10: MODERATE PAIN (4)

## 2025-04-25 NOTE — NURSING NOTE
"Oncology Rooming Note    April 25, 2025 11:05 AM   Bal Junior is a 70 year old male who presents for:    Chief Complaint   Patient presents with    Oncology Clinic Visit     GE junction adenocarcinoma     Initial Vitals: BP (!) 134/94 (BP Location: Right arm, Patient Position: Sitting, Cuff Size: Adult Regular)   Pulse (!) 121   Temp 97.7  F (36.5  C) (Oral)   Resp 20   Wt 76.7 kg (169 lb 1.6 oz)   SpO2 96%   BMI 24.26 kg/m   Estimated body mass index is 24.26 kg/m  as calculated from the following:    Height as of 4/18/25: 1.778 m (5' 10\").    Weight as of this encounter: 76.7 kg (169 lb 1.6 oz). Body surface area is 1.95 meters squared.  Moderate Pain (4) Comment: Data Unavailable   No LMP for male patient.  Allergies reviewed: Yes  Medications reviewed: Yes    Medications: Medication refills not needed today.  Pharmacy name entered into [employer]:    [facility] - [place] [address] AT [facility].OPEN TO ALL.  [facility] - [facility]    Frailty Screening:   Is the patient here for a new oncology consult visit in cancer care? 2. No    PHQ9:  Did this patient require a PHQ9?: No      Clinical concerns:  none      Mar Murray"

## 2025-04-25 NOTE — NURSING NOTE
EKG was performed today per order written by Dr. Lawrence Mc.  Name and  verified with patient. Patient tolerated well without incident. File transmitted to jn.    Mar Murray on 2025 at 11:36 AM

## 2025-04-25 NOTE — LETTER
4/25/2025      Bal Junior  [address] [place] [address]      Dear Colleague,    Thank you for referring your patient, Bal Junior, to the [facility]. Please see a copy of my visit note below.    MEDICAL ONCOLOGY FOLLOW-UP CONSULTATION NOTE - Dr. 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  Microsatellite stable (GUILLAUME)  See scanned report for other details.  Likely pathogenic alterations noted in TP53, PRKCA, FANCD2    Referring physician or other provider(s):  Dr. Katlyn Tobar, Thoracic Surgery service, [facility]      History of Present Illness/Cancer Diagnosis and Evaluation to date:  His past medical history is most prominent for a history of severe acid reflux, necessitating this and fundoplication around 2011/12, also for issues relating to postprandial hypoglycemia and neuritis/neuropathy, of unclear etiology.  He states that the neuropathic symptoms were attributed by his primary care doctor, and others to long-term use of omeprazole in the 2000s, prior to the Nissen fundoplication surgery.    He has been followed routinely with upper endoscopies over the last decade, many of which have been done within the [facility] system.  The ones done over the last seven years include ones performed on August 30, 2017, December 4, 2018, May 9, 2019, and most recently as September 24, 2024.    He had a CT scan in January 2024 with results as follows:     January 26,  "2024--CT a/p--IMPRESSION:   1.  Superior aspect of the Nissen fundal wrap appears about 2 cm above  the diaphragm possibly representing slipped Nissen.  2.  Hepatic steatosis.  3.  Nonobstructing renal calculi. No hydronephrosis.    He was experiencing dysphagia and slipped Nissen fundoplication and related issues, some of which were said to be related to worsening occasional reflux, and chest pressure associate with early satiety.  On April 30, 2024, he underwent barium swallow, with results as follows:  IMPRESSION:  1. Timed barium esophagram results as detailed above with retained  esophageal contrast lasting up to 5 minutes. There is patent flow of  contrast.   2. Expected passage of barium tablet into the stomach.      He had continued evaluation and during the summer of 2024, including on July 23, 2024. Part of the assessment included impression that the \"Manometry was consistent with inconclusive manometric EGJOO. Esophagram showed routine contrast lasting up to 5 minutes but patent flow of contrast and tablet.\"      Further evaluation included upper endoscopy, that revealed a nodularity at the gastroesophageal junction, concerning for malignancy, as follows:  September 24, 2024--EGD--Evidence of a Nissen fundoplication was found at the gastroesophageal   junction. The wrap appeared loose. This was traversed.   Localized moderate mucosal changes characterized by erythema and   nodularity were found at the gastroesophageal junction. Biopsies were   taken with a cold forceps for histology.   Specimen:    Gastric Esophageal Junction, Gastroesophageal biopsy                                      Final Diagnosis   A. GASTROESOPHAGEAL JUNCTION, BIOPSY:  - Adenocarcinoma, poorly-differentiated, with signet ring cell features     GASTRIC HER2 BIOMARKER TESTS   Test(s) Performed     HER2 by IHC     Results         With this new finding of malignancy, a staging PET/CT was performed, and no distant metastasis was " detected:  October 1, 2024--PET/CT --                                                                 IMPRESSION Findings suspicious for the gastroesophageal junction adenocarcinoma without convincing evidence of tona or metastatic disease.      He further underwent a staging EUS, that characterized the tumor as staged as T2N0 per EUS criteria:      Oct 8, 2024 EUS--    Impression:            - Malignant esophageal tumor was found at the                          gastroesophageal junction. Siewert 2. Measurements as                          above.                          This is classified T2 N0 (cytology pending) M0 by EUS                          criteria.                          Three small lymph nodes seen as described above. Two                          were sampled (station 8R) and are preliminarily                          benign. The third was identical in size and appearance                          and not amenable to sampling due to the proximity to                          the heart and proximal margin of the mass.                          - Z-line, 37 cm from the incisors.                          - A Nissen fundoplication was found. This has                          partially slipped above the diaphragm.   Specimen A                 Interpretation:                  Negative for malignancy  Polymorphous population of lymphocytes present                 Adequacy:                 Satisfactory for evaluation        He met with Dr. Katlyn Tobar from our thoracic surgery service here at [facility] on October 3, 2024.      March 24, 2025--Pathologic stage ypT1b N0 Mx     ESOPHAGECTOMY, MINIMALLY INVASIVE, Laparoscopic Right Thorascopic Esophagectomy, Botox Injection   Esophagoscopy, gastroscopy, duodenoscopy (EGD), combined            March 28,. 2025--PREOPERATIVE DIAGNOSES:  1.  GEJ adenocarcinoma s/p KYLE  2. Concern for anastomotic leak.   POSTOPERATIVE DIAGNOSES:  1.  Same  OPERATION PERFORMED:    EGD  2.    Intraoperative esophagram  3.   Supervision and interpretation of intra-op imaging.   SURGEON:  Chino Garcia MD    April 7, 2025--Preoperative diagnosis: Anastomotic leak post esophagectomy  Postoperative diagnosis: Same as preoperative diagnosis  Procedure performed:  1.  Esophagogastroduodenoscopy   2.  LEFT tube thoracostomy (20 Fr.)      DATE OF ADMISSION: 4/17/2025    PRE/POSTOPERATIVE DIAGNOSES: Anastomotic leak after esophagectomy    Severe malnutrition in the context of acute illness or injury   PROCEDURES PERFORMED:   1.  Esophagogastroduodenoscopy with placement of LEFT pharyngostomy tube placement   2.  Fluoroscopy with interpretation of images    April 25, 2025 -- oncology follow-up/in person visit, Dr. Mc.         INTERVAL HISTORY:  Mr. Junior is a 69 year old male who joins me today for followup regarding a gastroesophageal junction adenocarcinoma.    He is here today with his wife, tachycardic on presentation and I immediately ordered an EKG that showed sinus tachycardia that is not new, but has been a lingering issue linked to worsening fatigue and overall health since diagnosis of his cancer.  Upon entering the room, on initial assessment I noted that he was frail and weak-appearing; I asked him and his wife if we should consider sending him to the ER for evaluation. They both stated no and that he did not feel that anything had changed acutely over the course of yesterday and the day of this visit. He stated he wanted to hear my assessment principally on whether or not chemotherapy would be prescribed from this point forward, and if so whether it would consist of FLOT regimen or other approach.  To review his cancer treatment course:  He has had a highly complicated course since diagnosis of gastroesophageal carcinoma in late summer/early fall 2024.  He started FLOT on 10/31, with intended perioperative treatment.  His fourth cycle took place in December, there were some delays for  various reasons for surgery, but surgery was ultimately performed on March 24, 2025.  On histopathologic examination, there was excellent treatment response from the preoperative chemotherapy regimen.  However his immediate and subsequent postoperative course has been extremely complicated over the last month due to a number of anastomotic leak that required return to the operating room by the thoracic surgery team.  He has had ongoing issues with shortness of breath, sinus tachycardia, and other issues.  His performance status has taken a significant decline over the last several months.    He states he has scheduled follow-up with thoracic surgery team next week, we have an open discussion today about whether or not it would be safe or prudent to proceed with postoperative/adjuvant intent chemotherapy in the postoperative setting considering his severely compromised performance status and surgical complications.    Review Of Systems:  Comprehensive (14-point) ROS reviewed. Pertinent symptoms reviewed above per HPI.      Past medical, social, surgical, and family histories reviewed, confirmed, and pertinent details discussed with patient and family; additional sources include the medical record.      Past Medical History:   Diagnosis Date     Hypertension      Neuritis      Peripheral neuropathy      Personal history of other medical treatment     postgastrectomy dumping syndrome     Stomach problems Noted earlier      Past Surgical History:   Procedure Laterality Date     ABDOMEN SURGERY  2012?     APPENDECTOMY  1964?     BRONCHOSCOPY RIDID OR FLEXIBLE W/ENDOBRONCHIAL ULTRASOUND GUIDED 3 OR MORE NODE STATIONS N/A 3/19/2025    Procedure: Bronchoscopy Ridid Or Flexible W/Endobronchial Ultrasound Guided 3 Or More Node Stations;  Surgeon: Saad Olmedo MD;  Location:  GI     COLONOSCOPY  2006, 2016     COMBINED ESOPHAGOSCOPY, GASTROSCOPY, DUODENOSCOPY (EGD), REPLACE ESOPHAGEAL STENT Left 4/18/2025    Procedure:  ESOPHAGOGASTRODUODENOSCOPY with pharyngostomy tube placement;  Surgeon: Erik Lindsey MD;  Location: [facility] OR     DAVINCI NISSEN FUNDOPLICATION N/A 2/26/2025    Procedure: TAKE-DOWN, FUNDOPLICATION, NISSEN, LAPAROSCOPIC- ROBOTIC, gastrostomy takedown, lysisof adhesions 2 hr;  Surgeon: Katlyn Tobar MD;  Location: [facility] OR     ESOPHAGEAL BALLOON PROVOCATION STUDY N/A 9/24/2024    Procedure: Esophageal Balloon Provocation Study;  Surgeon: Carson Michel DO;  Location: [facility] GI     ESOPHAGECTOMY MINIMALLY INVASIVE N/A 3/24/2025    Procedure: ESOPHAGECTOMY, MINIMALLY INVASIVE, Laparoscopic Right Thorascopic Esophagectomy, Botox Injection;  Surgeon: Katlyn Tobar MD;  Location: [facility] OR     ESOPHAGOSCOPY, GASTROSCOPY, DUODENOSCOPY (EGD), COMBINED N/A 9/24/2024    Procedure: ESOPHAGOGASTRODUODENOSCOPY, WITH BIOPSY;  Surgeon: Carson Michel DO;  Location: [facility] GI     ESOPHAGOSCOPY, GASTROSCOPY, DUODENOSCOPY (EGD), COMBINED N/A 10/8/2024    Procedure: ESOPHAGOGASTRODUODENOSCOPY, WITH FINE NEEDLE ASPIRATION BIOPSY, WITH ENDOSCOPIC ULTRASOUND GUIDANCE;  Surgeon: Lance Lopez MD;  Location: [facility] GI     ESOPHAGOSCOPY, GASTROSCOPY, DUODENOSCOPY (EGD), COMBINED N/A 2/26/2025    Procedure: Esophagoscopy, gastroscopy, duodenoscopy (EGD);  Surgeon: Katlyn Tobar MD;  Location: [facility] OR     ESOPHAGOSCOPY, GASTROSCOPY, DUODENOSCOPY (EGD), COMBINED N/A 3/24/2025    Procedure: Esophagoscopy, gastroscopy, duodenoscopy (EGD), combined;  Surgeon: Katlyn Tobar MD;  Location: [facility] OR     ESOPHAGOSCOPY, GASTROSCOPY, DUODENOSCOPY (EGD), COMBINED N/A 3/28/2025    Procedure: ESOPHAGOGASTRODUODENOSCOPY, pharyngostomy tube replacement;  Surgeon: Chino Gardner MD;  Location: [facility] OR     EYE SURGERY  199x    laser for retinal tears     GASTRIC FUNDOPLICATION       HERNIA REPAIR  1961?     IR CHEST PORT PLACEMENT > 5 YRS OF AGE  10/24/2024     LAPAROSCOPIC ASSISTED INSERTION TUBE JEJUNOSTOMY N/A 2/26/2025    Procedure: Laparoscopic  assisted insertion tube jejunostomy;  Surgeon: Katlyn Tobar MD;  Location: [facility]     LAPAROSCOPIC TAKEDOWN NISSEN FUNDOPLICATION N/A 2020    Procedure: TAKE-DOWN, FUNDOPLICATION, REDO NISSEN, LAPAROSCOPIC, gastrostomy feeding tube placement;  Surgeon: Katlyn Tobar MD;  Location: [facility] OR     VT ESOPHAGOGASTRODUODENOSCOPY TRANSORAL DIAGNOSTIC N/A 2019    Procedure: UPPER GASTROINTESTINAL ENDOSCOPY;  Surgeon: Dino Ward MD;  [facility];  Service: General     SOFT TISSUE SURGERY  ?    MCL l. thumb          SOCIAL HISTORY: Lives in [place], [place] with his wife Jess. They've been  for 15 years.  He has three daughters, ages 38, 42, 50.  He is retired from working in [employer].  He denies any history of oral tobacco or cigarette use in his lifetime.  Alcohol use that he reports lifetime is five drinks per week since age of 17.  Info from other notes:    Alcohol use: Yes        Alcohol/week: 3.0 - 6.0 standard drinks of alcohol       Types: 1 - 2 Glasses of wine, 1 - 2 Cans of beer, 1 - 2 Shots of liquor per week       Comment: glass of win   He is an avid skier, and enjoy skiing a winter, and sometimes the peripheral neuropathycan interfere with that aspect of quality of life.    FAMILY HISTORY OF CANCER: his mother was diagnosed with uterine cancer in her late 30s; this diagnosis took place in the 1960s, and she  in  of unrelated causes.  He has two sisters, in good health, and his father had a superficial form of skin cancer, otherwise no other family history of cancer.      Allergies:  Allergies as of 2025 - Reviewed 2025   Allergen Reaction Noted     Hornets  2020     Wasp venom protein Hives 2016     Bee venom Swelling 2019       Current Medications:  No current outpatient medications on file.        Physical Exam:  BP (!) 134/94 (BP Location: Right arm, Patient Position: Sitting, Cuff Size: Adult Regular)   Pulse (!)  121   Temp 97.7  F (36.5  C) (Oral)   Resp 20   Wt 76.7 kg (169 lb 1.6 oz)   SpO2 96%   BMI 24.26 kg/m    KPS 60    GENERAL: Chronically ill, cachectic, mildly anorexic, he states he is currently n.p.o., A and O x 3.  He is able to walk in and out of the clinic room of his own accord.  HEENT:  PERRLA, anicteric sclerae. Oropharynx clear, with no evidence of mucositis, thrush, or ulcerations; no jaundice of the skin or frenulum.  NECK: central line into left neck, c/d/I, no erythema.  CV: Sinus tachycardia, confirmed on ECG tracing, with relatively lower bpm compared to intake (~105-115 range on my auscultation); normal S1 S2.  No murmurs, gallops, or rubs.   LUNGS: Distant breath sounds bilaterally, mild wheezing.  No dullness to percussion.   ABDOMEN:  Soft, nontender, nondistended, no evident ascites or palpable masses. No bowel sounds heard.  No apparent hepatosplenomegaly.   EXTREMITIES:  No clubbing, cyanosis, edema, or palpable cords.    Laboratory/Imaging Studies  RADIOLOGY:  During today's visit, I printed out copies of the pertinent radiology reports, reviewed the Impression and salient details verbatim and lay language with the patient during today's visit; by the end of today's visit, I provided the patient a printed copy of the radiology report(s) from the above scans.  I also printed out copies of the histopathology report from the March 2025 esophagectomy, and reviewed those details for him and his wife as well.    ASSESSMENT/PLAN:  Mr. Bal Junior is a 70-year-old gentleman from [place], [place] with a long-standing history over ~two decades of severe acid reflux, necessitating Nissen Fundoplication procedure in 2011 or 2012; in recent years he has experienced a slippage of the fundoplication anatomy. Throughout 2024, he has had evaluation for worsening symptoms including dysphagia and chest pressure, and as of September/October 20, 24, he has a new diagnosis of gastroesophageal adenocarcinoma  staged by endosonographic criteria as a T2N0 form of GE junction cancer.  Histopathologically, it is poorly differentiated with signet ring cell features.  Per the pet CT, there is no evidence of distant metastasis.  Per the EUS, the tumor is classed as Siewert 2.    As it has been sometime since he and I have met, we took the opportunity to again review overall basic principles of the natural course, biology, diagnosis, and treatment of gastroesophageal adenocarcinomas. His particular tumor is Siewert class 2, and we reviewed the meaning of that classification.  My overall impression is that he was able to tolerate FLOT x 4 cycles in fall 2024, and there was an excellent treatment response seen objectively on histopathology review based on the March 24, 2025 esophagectomy specimens.  However, in the postoperative setting his recovery has been immensely difficult, so I broached openly the possibility that it may not be safe to institute adjuvant chemotherapy.  I reviewed that the overall window of opportunity for benefiting for risk reduction from chemotherapy in the postoperative setting would be 1 to 2 months, and currently he is approximately 1 month out from surgery.  I suggested that we have him meet with either me or Riya from our [facility] team in 3 or 4 weeks, and at that time make a firm decision how to proceed, either as follows:  If his performance status remains as poor as it is today, then I would recommend active surveillance alone rather than pushing adjuvant chemotherapy, which I do not think she would be able to tolerate in the near and longer future.  Modifying the regimen to institute FOLFOX instead of the full FLOT regimen.  Even for that, I would only institute that approach if he had substantial improvement in his performance status and is relatively short time in the upcoming 3 to 4 weeks.  Reinstituting and continuing the FLOT regimen in the postoperative setting, which I would not advocate for  due to his current compromised condition.    I think he would benefit from having a cardiologist on his team, and he states that he does not have 1 at home.  With his permission, I will place a referral for cardiology, and also for physical therapy/Occupational Therapy, with the hope that they can see him relatively soon in the coming days or week.    I reviewed all of the above, and as he needed to leave with his wife, our visit was lasted a schedule 30 minutes, but he stated satisfaction that he received the answers that he was seeking for this visit by the end of today's discussion.    I spent 20 minutes in consultation, including history and discussion with the patient including review of recent lab values and radiologic imaging results.  An additional 30 minutes was spent on the day of the visit, including reviewing pertinent medical notes and documentation from other physicians and APPs who have evaluated and treated this patient, pertinent lab values, pathology and imaging results, personal review of radiologic images, discussing the case with referring providers and/or nurse coordinator team, post-visit orders, and all post-visit documentation.    Lawrence Mc MD, PhD       The above was transcribed using Dragon voice recognition software that is now required for use by [facility] and [facility] in place of transcription of dictated notes.  This change may unfortunately lead into an increase in errors in the EMR. While I reviewed and edited the transcription, I may miss errors.  Please let me know of any of serious errors and I will addend the note.          Again, thank you for allowing me to participate in the care of your patient.        Sincerely,        Lawrence Mc MD    Electronically signed

## 2025-04-25 NOTE — PROGRESS NOTES
"[facility] Nutrition Assessment     Type of therapy: Enteral  Information obtained from: Phone call with Bal.    Anthropometrics/Weight Goals  Height: 1.778 m (5' 10\")  Weight: 77 kg (169 lb 11.2 oz)  FHI Dosing Weight: 82 kg (180 lb 12.4 oz)  Weight history / comments: Weight trending down with recent hospital admission.  Wt Readings from Last 5 Encounters:   04/20/25 77 kg (169 lb 11.2 oz)   04/17/25 78.5 kg (173 lb)   04/09/25 81.6 kg (179 lb 12.8 oz)   03/11/25 84.2 kg (185 lb 9.6 oz)   03/11/25 83.9 kg (185 lb)     Nutrition and Medical History  Reason for Nutrition Support: NPO  Feeding tube type: J-Tube  Date Placed: 02/26/25    Current Nutrition Orders  Oral Diet: NPO per d/c note 4/23/25.    Enteral Nutrition Orders  Enteral Formula: Isosource 1.5 + Prosource TF  Rate/Frequency: Isosource 1.5 @ 90 mL/hr x 16 hours. + Prosource TF 2 pkts BID  Water Flushes: 30 ml before and after cycled feeds and Prosource TF. 30 mL 6x per day for tube patency.  Enteral Nutrition Provides: 6 cartons Isosource 1.5 provides 2250 kcal, 102 gm protein, 23 gm fiber, and 1146 mL free water. + 4 pkts Prosource TF provides 160 kcal and 44 gm protein. Total provisions: 2410 kcal and 146 gm protein.    Assessed Nutritional Needs  Kcal Needs: 2226-7390 kcal (25-30 kcal/kg) maintenenace  Protein Needs: 120-165 gm protein (1.5-2 gm/kg) Increased needs post-op  Fluid Needs: 1 mL/kcal maintenance  Nutrition Support is meeting what percent of needs: 100%    Implementation  Intervention: Phone call with Bal. Reports that his stools have been a little firmer. Would like to continue with Isosource 1.5 formula. Needs delivery of formula today.  Goals: Weight maintenance.    Plan  Follow up/Recommendations: Monitor tolerance of Isosource 1.5. Monitor for need to increase calories from tube feeds.     Kavitha Kwan RD, [facility]  Clinical Dietitian  [facility]   [phone]  hospitals Main Line: [phone]     "

## 2025-04-26 ENCOUNTER — HOSPITAL ENCOUNTER (INPATIENT)
Facility: CLINIC | Age: 70
DRG: 394 | End: 2025-04-26
Attending: EMERGENCY MEDICINE | Admitting: THORACIC SURGERY (CARDIOTHORACIC VASCULAR SURGERY)
Payer: MEDICARE

## 2025-04-26 ENCOUNTER — APPOINTMENT (OUTPATIENT)
Dept: CT IMAGING | Facility: CLINIC | Age: 70
End: 2025-04-26
Attending: EMERGENCY MEDICINE
Payer: MEDICARE

## 2025-04-26 ENCOUNTER — NURSE TRIAGE (OUTPATIENT)
Dept: NURSING | Facility: CLINIC | Age: 70
End: 2025-04-26
Payer: COMMERCIAL

## 2025-04-26 DIAGNOSIS — Z93.1 S/P NISSEN FUNDOPLICATION (WITH GASTROSTOMY TUBE PLACEMENT) (H): ICD-10-CM

## 2025-04-26 DIAGNOSIS — D72.829 LEUKOCYTOSIS, UNSPECIFIED TYPE: ICD-10-CM

## 2025-04-26 DIAGNOSIS — R53.1 WEAKNESS GENERALIZED: ICD-10-CM

## 2025-04-26 DIAGNOSIS — I10 HYPERTENSION, UNSPECIFIED TYPE: ICD-10-CM

## 2025-04-26 DIAGNOSIS — Z98.890 POSTOPERATIVE STATE: ICD-10-CM

## 2025-04-26 DIAGNOSIS — K91.89 ESOPHAGEAL ANASTOMOTIC LEAK: ICD-10-CM

## 2025-04-26 DIAGNOSIS — R53.83 OTHER FATIGUE: Primary | ICD-10-CM

## 2025-04-26 DIAGNOSIS — R19.7 DIARRHEA, UNSPECIFIED TYPE: ICD-10-CM

## 2025-04-26 DIAGNOSIS — C16.0 ADENOCARCINOMA OF GASTROESOPHAGEAL JUNCTION (H): ICD-10-CM

## 2025-04-26 LAB
ALBUMIN SERPL BCG-MCNC: 3.7 G/DL (ref 3.5–5.2)
ALP SERPL-CCNC: 108 U/L (ref 40–150)
ALT SERPL W P-5'-P-CCNC: 18 U/L (ref 0–70)
ANION GAP SERPL CALCULATED.3IONS-SCNC: 14 MMOL/L (ref 7–15)
AST SERPL W P-5'-P-CCNC: 23 U/L (ref 0–45)
BASOPHILS # BLD AUTO: 0 10E3/UL (ref 0–0.2)
BASOPHILS NFR BLD AUTO: 0 %
BILIRUB SERPL-MCNC: 0.2 MG/DL
BUN SERPL-MCNC: 41.4 MG/DL (ref 8–23)
CALCIUM SERPL-MCNC: 9.7 MG/DL (ref 8.8–10.4)
CHLORIDE SERPL-SCNC: 112 MMOL/L (ref 98–107)
CREAT SERPL-MCNC: 0.91 MG/DL (ref 0.67–1.17)
EGFRCR SERPLBLD CKD-EPI 2021: >90 ML/MIN/1.73M2
EOSINOPHIL # BLD AUTO: 0.2 10E3/UL (ref 0–0.7)
EOSINOPHIL NFR BLD AUTO: 1 %
ERYTHROCYTE [DISTWIDTH] IN BLOOD BY AUTOMATED COUNT: 13.8 % (ref 10–15)
GLUCOSE SERPL-MCNC: 155 MG/DL (ref 70–99)
HCO3 SERPL-SCNC: 19 MMOL/L (ref 22–29)
HCT VFR BLD AUTO: 37.2 % (ref 40–53)
HGB BLD-MCNC: 11.3 G/DL (ref 13.3–17.7)
IMM GRANULOCYTES # BLD: 0.2 10E3/UL
IMM GRANULOCYTES NFR BLD: 1 %
LIPASE SERPL-CCNC: 68 U/L (ref 13–60)
LYMPHOCYTES # BLD AUTO: 2.4 10E3/UL (ref 0.8–5.3)
LYMPHOCYTES NFR BLD AUTO: 13 %
MCH RBC QN AUTO: 27.6 PG (ref 26.5–33)
MCHC RBC AUTO-ENTMCNC: 30.4 G/DL (ref 31.5–36.5)
MCV RBC AUTO: 91 FL (ref 78–100)
MONOCYTES # BLD AUTO: 1.2 10E3/UL (ref 0–1.3)
MONOCYTES NFR BLD AUTO: 6 %
NEUTROPHILS # BLD AUTO: 15.1 10E3/UL (ref 1.6–8.3)
NEUTROPHILS NFR BLD AUTO: 79 %
NRBC # BLD AUTO: 0 10E3/UL
NRBC BLD AUTO-RTO: 0 /100
PLATELET # BLD AUTO: 487 10E3/UL (ref 150–450)
POTASSIUM SERPL-SCNC: 4.2 MMOL/L (ref 3.4–5.3)
PROT SERPL-MCNC: 7.9 G/DL (ref 6.4–8.3)
RBC # BLD AUTO: 4.1 10E6/UL (ref 4.4–5.9)
SODIUM SERPL-SCNC: 145 MMOL/L (ref 135–145)
TROPONIN T SERPL HS-MCNC: 16 NG/L
WBC # BLD AUTO: 19 10E3/UL (ref 4–11)

## 2025-04-26 PROCEDURE — 71260 CT THORAX DX C+: CPT

## 2025-04-26 PROCEDURE — 84484 ASSAY OF TROPONIN QUANT: CPT | Performed by: EMERGENCY MEDICINE

## 2025-04-26 PROCEDURE — 70491 CT SOFT TISSUE NECK W/DYE: CPT

## 2025-04-26 PROCEDURE — 93005 ELECTROCARDIOGRAM TRACING: CPT | Performed by: EMERGENCY MEDICINE

## 2025-04-26 PROCEDURE — 93010 ELECTROCARDIOGRAM REPORT: CPT | Performed by: EMERGENCY MEDICINE

## 2025-04-26 PROCEDURE — 83690 ASSAY OF LIPASE: CPT | Performed by: EMERGENCY MEDICINE

## 2025-04-26 PROCEDURE — 99285 EMERGENCY DEPT VISIT HI MDM: CPT | Mod: 25 | Performed by: EMERGENCY MEDICINE

## 2025-04-26 PROCEDURE — 85025 COMPLETE CBC W/AUTO DIFF WBC: CPT | Performed by: EMERGENCY MEDICINE

## 2025-04-26 PROCEDURE — 82565 ASSAY OF CREATININE: CPT | Performed by: EMERGENCY MEDICINE

## 2025-04-26 PROCEDURE — 71260 CT THORAX DX C+: CPT | Mod: 26 | Performed by: RADIOLOGY

## 2025-04-26 PROCEDURE — 250N000011 HC RX IP 250 OP 636: Mod: JZ | Performed by: EMERGENCY MEDICINE

## 2025-04-26 PROCEDURE — 258N000003 HC RX IP 258 OP 636: Performed by: EMERGENCY MEDICINE

## 2025-04-26 PROCEDURE — 70491 CT SOFT TISSUE NECK W/DYE: CPT | Mod: 26 | Performed by: STUDENT IN AN ORGANIZED HEALTH CARE EDUCATION/TRAINING PROGRAM

## 2025-04-26 PROCEDURE — 74177 CT ABD & PELVIS W/CONTRAST: CPT | Mod: 26 | Performed by: RADIOLOGY

## 2025-04-26 PROCEDURE — 96374 THER/PROPH/DIAG INJ IV PUSH: CPT | Performed by: EMERGENCY MEDICINE

## 2025-04-26 PROCEDURE — 99285 EMERGENCY DEPT VISIT HI MDM: CPT | Performed by: EMERGENCY MEDICINE

## 2025-04-26 PROCEDURE — 96361 HYDRATE IV INFUSION ADD-ON: CPT | Performed by: EMERGENCY MEDICINE

## 2025-04-26 PROCEDURE — 36591 DRAW BLOOD OFF VENOUS DEVICE: CPT | Performed by: EMERGENCY MEDICINE

## 2025-04-26 PROCEDURE — 36415 COLL VENOUS BLD VENIPUNCTURE: CPT | Performed by: EMERGENCY MEDICINE

## 2025-04-26 RX ORDER — HYDROMORPHONE HYDROCHLORIDE 1 MG/ML
0.5 INJECTION, SOLUTION INTRAMUSCULAR; INTRAVENOUS; SUBCUTANEOUS ONCE
Status: COMPLETED | OUTPATIENT
Start: 2025-04-26 | End: 2025-04-26

## 2025-04-26 RX ORDER — IOPAMIDOL 755 MG/ML
134 INJECTION, SOLUTION INTRAVASCULAR ONCE
Status: COMPLETED | OUTPATIENT
Start: 2025-04-26 | End: 2025-04-27

## 2025-04-26 RX ADMIN — SODIUM CHLORIDE 500 ML: 0.9 INJECTION, SOLUTION INTRAVENOUS at 22:31

## 2025-04-26 RX ADMIN — HYDROMORPHONE HYDROCHLORIDE 0.5 MG: 1 INJECTION, SOLUTION INTRAMUSCULAR; INTRAVENOUS; SUBCUTANEOUS at 22:31

## 2025-04-26 ASSESSMENT — ACTIVITIES OF DAILY LIVING (ADL)
ADLS_ACUITY_SCORE: 57
ADLS_ACUITY_SCORE: 57

## 2025-04-26 NOTE — LETTER
Transition Communication Hand-off for Care Transitions to Next Level of Care Provider    Name: Bal Junior  : 1955  MRN #: [id number]  Primary Care Provider: Domenica Wing     Primary Clinic: [address] [address]     Reason for Hospitalization:  Weakness generalized [R53.1]  Postoperative state [Z98.890]  Post-operative complication [T81.9XXA]  Leukocytosis, unspecified type [D72.829]  Admit Date/Time: 2025  9:06 PM  Discharge Date: 25  Payor Source: Payor: MEDICA / Plan: MEDICA PRIME SOLUTION / Product Type: Indemnity /                    Discharge Plan:     Home with home care PT/OT, RN, home infusion.  Concern for non-adherence with plan of care:    No  Discharge Needs Assessment:  Needs      Flowsheet Row Most Recent Value   Equipment Currently Used at Home other (see comments)  [grab bar and bench in walk-in shower, also has tub shower (does not use often),flat mattress not bed rails]            Already enrolled in Tele-monitoring program and name of program:  No  Follow-up specialty is recommended: No    Follow-up plan:    Future Appointments   Date Time Provider Department Center   2025  8:00 AM WW CT 2 [facility]   2025  1:50 PM Juan Cruz DO [facility]   2025 12:30 PM Riya Wiley PA-C [facility]   2025  9:45 AM Dilcia Nieves PT [facility]   2025 11:30 AM Yo Millan MD [facility]EU [facility]       Any outstanding tests or procedures:        Referrals       Future Labs/Procedures    Home Infusion Referral     Comments:    Enteral Nutrition and supplies  Jevity 1.5 or equivalent 90ml/hr for 16 hours  1 packet prosource/day  1 packets nutrisource fiber 2x/day  150ml free water q3h(1,200ml/day)              Key Recommendations:      Azul Zelaya RN    AVS/Discharge Summary is the source of truth; this is a helpful guide for improved communication of patient story

## 2025-04-27 PROBLEM — T81.9XXA POST-OPERATIVE COMPLICATION: Status: ACTIVE | Noted: 2025-04-27

## 2025-04-27 PROBLEM — R53.1 WEAKNESS GENERALIZED: Status: ACTIVE | Noted: 2025-04-27

## 2025-04-27 PROBLEM — D72.829 LEUKOCYTOSIS, UNSPECIFIED TYPE: Status: ACTIVE | Noted: 2025-04-27

## 2025-04-27 PROBLEM — Z98.890 POSTOPERATIVE STATE: Status: ACTIVE | Noted: 2025-04-27

## 2025-04-27 LAB
ADV 40+41 DNA STL QL NAA+NON-PROBE: NEGATIVE
ALBUMIN UR-MCNC: NEGATIVE MG/DL
APPEARANCE UR: CLEAR
ASTRO TYP 1-8 RNA STL QL NAA+NON-PROBE: NEGATIVE
ATRIAL RATE - MUSE: 79 BPM
BILIRUB UR QL STRIP: NEGATIVE
C CAYETANENSIS DNA STL QL NAA+NON-PROBE: NEGATIVE
C DIFF TOX B STL QL: NEGATIVE
CAMPYLOBACTER DNA SPEC NAA+PROBE: NEGATIVE
COLOR UR AUTO: ABNORMAL
CRYPTOSP DNA STL QL NAA+NON-PROBE: NEGATIVE
DIASTOLIC BLOOD PRESSURE - MUSE: NORMAL MMHG
E COLI O157 DNA STL QL NAA+NON-PROBE: NORMAL
E HISTOLYT DNA STL QL NAA+NON-PROBE: NEGATIVE
EAEC ASTA GENE ISLT QL NAA+PROBE: NEGATIVE
EC STX1+STX2 GENES STL QL NAA+NON-PROBE: NEGATIVE
EPEC EAE GENE STL QL NAA+NON-PROBE: NEGATIVE
ETEC LTA+ST1A+ST1B TOX ST NAA+NON-PROBE: NEGATIVE
G LAMBLIA DNA STL QL NAA+NON-PROBE: NEGATIVE
GLUCOSE BLDC GLUCOMTR-MCNC: 130 MG/DL (ref 70–99)
GLUCOSE UR STRIP-MCNC: NEGATIVE MG/DL
HGB UR QL STRIP: NEGATIVE
INTERPRETATION ECG - MUSE: NORMAL
KETONES UR STRIP-MCNC: NEGATIVE MG/DL
LEUKOCYTE ESTERASE UR QL STRIP: NEGATIVE
MUCOUS THREADS #/AREA URNS LPF: PRESENT /LPF
NITRATE UR QL: NEGATIVE
NOROVIRUS GI+II RNA STL QL NAA+NON-PROBE: NEGATIVE
P AXIS - MUSE: 53 DEGREES
P SHIGELLOIDES DNA STL QL NAA+NON-PROBE: NEGATIVE
PH UR STRIP: 5 [PH] (ref 5–7)
PR INTERVAL - MUSE: 142 MS
QRS DURATION - MUSE: 68 MS
QT - MUSE: 378 MS
QTC - MUSE: 433 MS
R AXIS - MUSE: 17 DEGREES
RBC URINE: 1 /HPF
RVA RNA STL QL NAA+NON-PROBE: NEGATIVE
SALMONELLA SP RPOD STL QL NAA+PROBE: NEGATIVE
SAPO I+II+IV+V RNA STL QL NAA+NON-PROBE: NEGATIVE
SHIGELLA SP+EIEC IPAH ST NAA+NON-PROBE: NEGATIVE
SP GR UR STRIP: >1.015 (ref 1–1.03)
SYSTOLIC BLOOD PRESSURE - MUSE: NORMAL MMHG
T AXIS - MUSE: 27 DEGREES
TROPONIN T SERPL HS-MCNC: 15 NG/L
UROBILINOGEN UR STRIP-MCNC: NORMAL MG/DL
V CHOLERAE DNA SPEC QL NAA+PROBE: NEGATIVE
VENTRICULAR RATE- MUSE: 79 BPM
VIBRIO DNA SPEC NAA+PROBE: NEGATIVE
WBC URINE: 1 /HPF
Y ENTEROCOL DNA STL QL NAA+PROBE: NEGATIVE

## 2025-04-27 PROCEDURE — 87493 C DIFF AMPLIFIED PROBE: CPT

## 2025-04-27 PROCEDURE — 250N000013 HC RX MED GY IP 250 OP 250 PS 637

## 2025-04-27 PROCEDURE — 81001 URINALYSIS AUTO W/SCOPE: CPT

## 2025-04-27 PROCEDURE — 87507 IADNA-DNA/RNA PROBE TQ 12-25: CPT

## 2025-04-27 PROCEDURE — 250N000011 HC RX IP 250 OP 636: Performed by: EMERGENCY MEDICINE

## 2025-04-27 PROCEDURE — 250N000011 HC RX IP 250 OP 636

## 2025-04-27 PROCEDURE — 250N000009 HC RX 250

## 2025-04-27 PROCEDURE — 120N000002 HC R&B MED SURG/OB UMMC

## 2025-04-27 RX ORDER — METHOCARBAMOL 750 MG/1
750 TABLET, FILM COATED ORAL EVERY 6 HOURS PRN
Status: DISCONTINUED | OUTPATIENT
Start: 2025-04-27 | End: 2025-04-29

## 2025-04-27 RX ORDER — GUAIFENESIN 600 MG/1
15 TABLET, EXTENDED RELEASE ORAL DAILY
Status: DISCONTINUED | OUTPATIENT
Start: 2025-04-27 | End: 2025-05-02 | Stop reason: HOSPADM

## 2025-04-27 RX ORDER — DEXTROMETHORPHAN POLISTIREX 30 MG/5ML
30 SUSPENSION ORAL
Status: DISCONTINUED | OUTPATIENT
Start: 2025-04-27 | End: 2025-04-27

## 2025-04-27 RX ORDER — PIPERACILLIN SODIUM, TAZOBACTAM SODIUM 3; .375 G/15ML; G/15ML
3.38 INJECTION, POWDER, LYOPHILIZED, FOR SOLUTION INTRAVENOUS EVERY 6 HOURS
Status: DISCONTINUED | OUTPATIENT
Start: 2025-04-27 | End: 2025-04-28

## 2025-04-27 RX ORDER — OXYCODONE HCL 5 MG/5 ML
5-10 SOLUTION, ORAL ORAL EVERY 4 HOURS PRN
Status: DISCONTINUED | OUTPATIENT
Start: 2025-04-27 | End: 2025-05-02 | Stop reason: HOSPADM

## 2025-04-27 RX ORDER — FLUCONAZOLE 2 MG/ML
400 INJECTION, SOLUTION INTRAVENOUS EVERY 24 HOURS
Status: DISCONTINUED | OUTPATIENT
Start: 2025-04-27 | End: 2025-04-28

## 2025-04-27 RX ORDER — GABAPENTIN 250 MG/5ML
300 SOLUTION ORAL 3 TIMES DAILY
Status: DISCONTINUED | OUTPATIENT
Start: 2025-04-27 | End: 2025-05-02 | Stop reason: HOSPADM

## 2025-04-27 RX ORDER — DEXTROMETHORPHAN POLISTIREX 30 MG/5ML
30 SUSPENSION ORAL 2 TIMES DAILY PRN
Status: DISCONTINUED | OUTPATIENT
Start: 2025-04-27 | End: 2025-05-02 | Stop reason: HOSPADM

## 2025-04-27 RX ORDER — NORTRIPTYLINE HYDROCHLORIDE 10 MG/5ML
25 SOLUTION ORAL EVERY EVENING
Status: DISCONTINUED | OUTPATIENT
Start: 2025-04-27 | End: 2025-05-02 | Stop reason: HOSPADM

## 2025-04-27 RX ORDER — MULTIVITAMIN WITH IRON
500 TABLET ORAL DAILY
Status: DISCONTINUED | OUTPATIENT
Start: 2025-04-27 | End: 2025-05-02 | Stop reason: HOSPADM

## 2025-04-27 RX ORDER — CALCIUM CARBONATE 500 MG/1
500-1000 TABLET, CHEWABLE ORAL DAILY PRN
Status: DISCONTINUED | OUTPATIENT
Start: 2025-04-27 | End: 2025-05-02 | Stop reason: HOSPADM

## 2025-04-27 RX ORDER — ACETAMINOPHEN 325 MG/1
650 TABLET ORAL EVERY 6 HOURS
Status: DISCONTINUED | OUTPATIENT
Start: 2025-04-27 | End: 2025-04-28

## 2025-04-27 RX ORDER — ATENOLOL 25 MG/1
50 TABLET ORAL EVERY EVENING
Status: DISCONTINUED | OUTPATIENT
Start: 2025-04-27 | End: 2025-05-02 | Stop reason: HOSPADM

## 2025-04-27 RX ORDER — AMINO ACIDS/PROTEIN HYDROLYS 11G-40/45
2 LIQUID IN PACKET (ML) ORAL DAILY
Status: DISCONTINUED | OUTPATIENT
Start: 2025-04-27 | End: 2025-05-01

## 2025-04-27 RX ADMIN — CYANOCOBALAMIN TAB 500 MCG 500 MCG: 500 TAB at 09:54

## 2025-04-27 RX ADMIN — ACETAMINOPHEN 650 MG: 325 TABLET, FILM COATED ORAL at 09:54

## 2025-04-27 RX ADMIN — Medication 15 ML: at 09:54

## 2025-04-27 RX ADMIN — METHOCARBAMOL 750 MG: 750 TABLET ORAL at 16:38

## 2025-04-27 RX ADMIN — ACETAMINOPHEN 650 MG: 325 TABLET, FILM COATED ORAL at 20:36

## 2025-04-27 RX ADMIN — TOPICAL ANESTHETIC 0.5 ML: 200 SPRAY DENTAL; PERIODONTAL at 11:45

## 2025-04-27 RX ADMIN — ACETAMINOPHEN 650 MG: 325 TABLET, FILM COATED ORAL at 02:56

## 2025-04-27 RX ADMIN — ATENOLOL 50 MG: 25 TABLET ORAL at 20:36

## 2025-04-27 RX ADMIN — ACETAMINOPHEN 650 MG: 325 TABLET, FILM COATED ORAL at 15:09

## 2025-04-27 RX ADMIN — OXYCODONE HYDROCHLORIDE 10 MG: 5 SOLUTION ORAL at 16:38

## 2025-04-27 RX ADMIN — CALCIUM CARBONATE (ANTACID) CHEW TAB 500 MG 1000 MG: 500 CHEW TAB at 17:00

## 2025-04-27 RX ADMIN — PIPERACILLIN AND TAZOBACTAM 3.38 G: 3; .375 INJECTION, POWDER, LYOPHILIZED, FOR SOLUTION INTRAVENOUS at 09:59

## 2025-04-27 RX ADMIN — IOPAMIDOL 134 ML: 755 INJECTION, SOLUTION INTRAVENOUS at 00:12

## 2025-04-27 RX ADMIN — PIPERACILLIN AND TAZOBACTAM 3.38 G: 3; .375 INJECTION, POWDER, LYOPHILIZED, FOR SOLUTION INTRAVENOUS at 02:56

## 2025-04-27 RX ADMIN — GABAPENTIN 300 MG: 250 SOLUTION ORAL at 13:33

## 2025-04-27 RX ADMIN — NORTRIPTYLINE HYDROCHLORIDE 25 MG: 10 SOLUTION ORAL at 20:38

## 2025-04-27 RX ADMIN — PIPERACILLIN AND TAZOBACTAM 3.38 G: 3; .375 INJECTION, POWDER, LYOPHILIZED, FOR SOLUTION INTRAVENOUS at 15:09

## 2025-04-27 RX ADMIN — PIPERACILLIN AND TAZOBACTAM 3.38 G: 3; .375 INJECTION, POWDER, LYOPHILIZED, FOR SOLUTION INTRAVENOUS at 20:53

## 2025-04-27 RX ADMIN — FLUCONAZOLE 400 MG: 400 INJECTION, SOLUTION INTRAVENOUS at 04:17

## 2025-04-27 RX ADMIN — Medication 120 ML: at 13:31

## 2025-04-27 RX ADMIN — GABAPENTIN 300 MG: 250 SOLUTION ORAL at 20:37

## 2025-04-27 RX ADMIN — OXYCODONE HYDROCHLORIDE 10 MG: 5 SOLUTION ORAL at 07:55

## 2025-04-27 RX ADMIN — OXYCODONE HYDROCHLORIDE 10 MG: 5 SOLUTION ORAL at 13:31

## 2025-04-27 RX ADMIN — TOPICAL ANESTHETIC 0.5 ML: 200 SPRAY DENTAL; PERIODONTAL at 20:41

## 2025-04-27 ASSESSMENT — ACTIVITIES OF DAILY LIVING (ADL)
ADLS_ACUITY_SCORE: 63
ADLS_ACUITY_SCORE: 57
ADLS_ACUITY_SCORE: 63
ADLS_ACUITY_SCORE: 63
ADLS_ACUITY_SCORE: 66
ADLS_ACUITY_SCORE: 57
ADLS_ACUITY_SCORE: 63
ADLS_ACUITY_SCORE: 66
ADLS_ACUITY_SCORE: 57
ADLS_ACUITY_SCORE: 63
ADLS_ACUITY_SCORE: 66
ADLS_ACUITY_SCORE: 63
ADLS_ACUITY_SCORE: 57
ADLS_ACUITY_SCORE: 66
ADLS_ACUITY_SCORE: 57
ADLS_ACUITY_SCORE: 66
ADLS_ACUITY_SCORE: 63

## 2025-04-27 NOTE — ED PROVIDER NOTES
Emergency Department I-PASS Sign-out      Illness Severity: Stable    Patient Summary:  70 year old male with pertinent PMH of recent esophagectomy by anastomosis leak requiring additional surgeries, placement of drain who presented with malaise, tachycardia for a couple days    ED Course/treatment plan: Labs showed leukocytosis.  Awaiting CT scans    Clinical Impression:  No diagnosis found.    Edited by: Maximilian Ruiz MD at 4/27/2025 0052    Action List:  -To do:  Imaging: CT of the neck, chest, abdomen, pelvis  May require thoracic surgery consult (they did esophagectomy) pending on results    Situational Awareness & Contingency Planning:  Code Status (Most recent):  Prior    Disposition:  likely do be admitted    Edited by: Maximilian Ruiz MD at 4/27/2025 0048    Synthesis & Events after sign-out:  I personally reviewed and interpreted CT imaging.  CT soft tissue neck which demonstrates left-sided neck drain with no fluid collection.  CT chest abdomen pelvis with fluid in the colonic lumen, diffuse wall thickening of the urinary bladder.  Urinalysis pending.    I discussed patient management with thoracic surgery team who will evaluate patient in the emergency department.    Thoracic surgery evaluated patient, will start on antibiotics and admit to their service.  Patient understands and agrees to the plan.            Soo Martinez MD   Emergency Medicine     Soo Martinez MD  04/27/25 5703

## 2025-04-27 NOTE — PROGRESS NOTES
"CLINICAL NUTRITION SERVICES - ASSESSMENT NOTE    RECOMMENDATIONS FOR MDs/PROVIDERS TO ORDER:  Recommend patient discharge with orders for 150 ml q 3 hours (total of 1200 ml daily additional free water,  2294 ml total between TF+flushes/30 ml/kcal).    Registered Dietitian Interventions:  Continue TF orders as written - Jevity 1.5 @ 90 mL/hr x 16 hours. + 1 Prosource TF20 daily    Free water flushes of 150 ml q 3 hours (total of 1200 ml daily additional free water)     Future/Additional Recommendations:  Monitor ongoing stool output - consider the additional of soluble fiber to home regimen.     Monitor weight changes - may require increase in total TF volume to maintain weight.      REASON FOR ASSESSMENT  Provider order - Registered Dietitian to order TF per Medical Nutrition Therapy Guidelines    INFORMATION OBTAINED  Assessed patient in room.    NUTRITION HISTORY  Per patient, he last received his TF in the evening before being admitted.     Home nutrition support plan: Isosource 1.5 @ 90 mL/hr x 16 hours. + Prosource TF 2 pkts BID ->2410 kcal, 146 gm protein, 23 gm fiber, and 1146 mL free water.     His main concern with regard to his nutrition regimen is dehydration, which he believes is the  of his weight loss.  He also notes 3-4 loose stools daily.     CURRENT NUTRITION ORDERS  Diet: NPO  TF: Jevity 1.5 @ 90 mL/hr x 16 hours. + 1 Prosource TF20 daily provides: 1440 mL, 2240 kcals (30 kcal/kg), 112 g protein (1.5g/kg), 311 g CHO, 72 g fat, 30 g fiber, 1094 ml free water.     CURRENT INTAKE/TOLERANCE  Team ordered 500 ml bolus (100 ml q 1 hour for 5 hours)    LABS  Nutrition-relevant labs:  Na 145 (high normal), BUN 41.4 (elevated)    MEDICATIONS  Nutrition-relevant medications:  MVI with minerals,  Vit B12 500 mcg every other day    ANTHROPOMETRICS  Height: 177.8 cm (5' 10\")  Admission Weight: 74.4 kg (164 lb) (04/26/25 2340)   Most Recent Weight: 74.4 kg (164 lb) (04/26/25 2340)  IBW: 75.5 kg  BMI: Body " mass index is 23.53 kg/m .   Weight History:  Weight has been trending down since February (down 14.1 kg/16%) in ~2 months.   Wt Readings from Last 15 Encounters:   04/26/25 74.4 kg (164 lb)   04/25/25 76.7 kg (169 lb 1.6 oz)   04/20/25 77 kg (169 lb 11.2 oz)   04/17/25 78.5 kg (173 lb)   04/09/25 81.6 kg (179 lb 12.8 oz)   03/11/25 84.2 kg (185 lb 9.6 oz)   03/11/25 83.9 kg (185 lb)   02/26/25 87.3 kg (192 lb 7.4 oz)   02/20/25 88.5 kg (195 lb 3.2 oz)   02/20/25 88.5 kg (195 lb)   01/16/25 86.1 kg (189 lb 12.8 oz)   12/16/24 80.7 kg (178 lb)   12/11/24 81.6 kg (180 lb)   12/11/24 84.6 kg (186 lb 6.4 oz)   11/27/24 82.6 kg (182 lb)   Dosing Weight: 74 kg, based on actual wt    ASSESSED NUTRITION NEEDS  Estimated Energy Needs: 5421-5921 kcals/day (25- 35 kcals/kg)+  Justification: Maintenance/Repletion  Estimated Protein Needs:  grams protein/day (1.2 - 1.5 grams of pro/kg)+  Justification: Increased needs  Estimated Fluid Needs: 1 ml/kcal maintenance    SYSTEM AND PHYSICAL FINDINGS    GI symptoms: Reviewed  Skin/wounds: Reviewed    MALNUTRITION  % Intake: Decreased intake does not meet criteria  % Weight Loss: > 5% in 1 month (severe)   Subcutaneous Fat Loss: Orbital: Mild and Buccal: Mild  Muscle Loss: Clavicles (pectoralis and deltoids): Mild  Fluid Accumulation/Edema: None noted  Malnutrition Diagnosis: Moderate malnutrition in the context of acute illness or injury  Malnutrition Present on Admission: Yes    NUTRITION DIAGNOSIS  Unintended weight loss related to dehydration/reliance on nutrition support as evidenced by weight loss of 16% in the last 2 months.     INTERVENTIONS  Nutrition education content  See nutrition interventions above    GOALS  Weight maintenance > 74.4 kg      MONITORING/EVALUATION  Progress toward goals will be monitored and evaluated per policy.    Harleen Roy, MS, RD, LD, CCTD, CNSC  7A + 7B (beds 2778-3409)  Available on Vocera [7A or 7B Clinical Dietitian]   Weekend/Holiday:  Deisy [Weekend Clinical Dietitian]

## 2025-04-27 NOTE — ED TRIAGE NOTES
Pt was in hospital last week had surgery- pt states he hasn't been well and HR has been elevated and extreme fatigue pt states that he can barely stand up.

## 2025-04-27 NOTE — MEDICATION SCRIBE - ADMISSION MEDICATION HISTORY
Medication Scribe Admission Medication History    Admission medication history is complete. The information provided in this note is only as accurate as the sources available at the time of the update.    Information Source(s): Patient via in-person    Pertinent Information: Spoke with patient in person and completed medication hx.  Patient states that for the Fluconazole he has the tablets at home and his wife crushes it up and puts it into his feeding tube.      Changes made to PTA medication list:  Added: Unable to Find: Hirricaine Throat Spray  Deleted: Senna Docusate 8.6-50 MG Tab  Changed: Vitamin B-12 500 MCG Tab: Place 1 tablet into feeding tube. >>> Vitamin B-12 500 MCG Tab: Place 1 tablet into feeding tube every other day.     Epinephrine 0.3 MG/0.3ML Injection 2-pack: BLANK. >>> Epinephrine 0.3 MG/0.3ML Injection 2-pack: Inject 0.3 mg into the muscle PRN for anaphylaxis.     Allergies reviewed with patient and updates made in EHR: no    Medication History Completed By: Maggie Mcbride 4/27/2025 10:39 AM    PTA Med List   Medication Sig Last Dose/Taking    acetaminophen (TYLENOL) 325 MG tablet Place 2 tablets (650 mg) into Feeding Tube every 6 hours. 4/27/2025    amoxicillin-clavulanate (AUGMENTIN) 875-125 MG tablet Place 1 tablet into Feeding Tube every 12 hours for 5 days. 4/25/2025 Evening    atenolol (TENORMIN) 50 MG tablet Place 1 tablet (50 mg) into Feeding Tube every evening. 4/26/2025 Evening    cyanocobalamin (VITAMIN B-12) 500 MCG tablet Place 500 mcg into Feeding Tube every other day. 4/26/2025 Morning    dextromethorphan (DELSYM) 30 MG/5ML liquid Take 5 mLs (30 mg) by mouth once as needed for cough. 4/26/2025 Evening    EPINEPHrine (ANY BX GENERIC EQUIV) 0.3 MG/0.3ML injection 2-pack Inject 0.3 mg into the muscle as needed for anaphylaxis. More than a month    fluconazole (DIFLUCAN) 200 MG tablet Take 1 tablet (200 mg) by mouth daily for 5 days. 4/26/2025 Morning    gabapentin (NEURONTIN) 300 MG  capsule Place 1 capsule (300 mg) into Feeding Tube 3 times daily. 4/26/2025 Evening    Isosource 1.5 Gumaro 250 mL Place 1,500 mLs into J tube daily. Infuse via pump. 90 mL/hr x 16 hours.   6 cartons per day.   Water flush: 30 mL before and after tube feed cycle. Additional 30 mL 6x per day.   Kcals: 2250 Taking    Lidocaine (LIDOCARE) 4 % Patch Place 1 patch over 12 hours onto the skin every 24 hours. To prevent lidocaine toxicity, patient should be patch free for 12 hrs daily. 4/25/2025    methocarbamol (ROBAXIN) 750 MG tablet Place 1 tablet (750 mg) into J tube every 6 hours as needed for muscle spasms. 4/26/2025 Evening    multivitamins w/minerals liquid Place 15 mLs into Feeding Tube daily. 4/26/2025 Morning    nortriptyline (PAMELOR) 25 MG capsule Place 1 capsule (25 mg) into J tube every evening. 4/26/2025 Evening    oxyCODONE (ROXICODONE) 5 MG/5ML solution Place 5-10 mLs (5-10 mg) into J tube every 4 hours as needed for severe pain. 4/27/2025    Prosource TF PO LIQD (PROSOURCE TF) Place 90 mLs into J tube 2 times daily. Infuse via syringe  Water flush: 30 mL before and after each packet   Kcals: 160 Taking    Prosource TF20 ENfit Compatibl EN LIQD (PROSOURCE TF20) packet Place 60 mLs into Feeding Tube 2 times daily. Taking    UNABLE TO FIND Take 1-2 sprays by mouth as needed. MEDICATION NAME: Hurricaine Throat Spray. Past Week

## 2025-04-27 NOTE — PLAN OF CARE
"/90 (BP Location: Left arm, Patient Position: Semi-Hannah's, Cuff Size: Adult Regular)   Pulse 85   Temp 98.3  F (36.8  C) (Oral)   Resp 21   Ht 1.778 m (5' 10\")   Wt 75.2 kg (165 lb 12.6 oz)   SpO2 96%   BMI 23.79 kg/m      Status: Re-admitted d/t leukocytosis, fatigue, weakness.   Activity: SBA with activities. Call light in reach, bed alarm in place.   Pain: Acute generalized pain, RPN's given with minimal effect.   Neuro: Alert and oriented x4..   Cardiac: WNL, denies chest pain.   Respiratory: RA, denies SOB.   GI/: Voiding spontaneously, reported having loose stools during the day. None since transfer.   Diet:  NPO diet, tube feeding 6pm-10am. See orders.   Lines: See LDA.   Wounds: See LDA, Many old surgical sites covered with steri strips and lines.   Labs/imaging: See labs and images for update results and values.       Plan: Pt rested between cares, is able to make needs known. Up with SBA in room, call light in reach. PRNs given for heartburn per pt. Continue to and follow POC.     Intake/Output Summary (Last 24 hours) at 4/27/2025 1825  Last data filed at 4/27/2025 1800  Gross per 24 hour   Intake 963 ml   Output 410 ml   Net 553 ml       Goal Outcome Evaluation: No Change       Plan of Care Reviewed With: patient    Overall Patient Progress: no change  "

## 2025-04-27 NOTE — TELEPHONE ENCOUNTER
Reason for Disposition    Patient sounds very sick or weak to the triager    Additional Information    Negative: Passed out (i.e., lost consciousness, collapsed and was not responding)    Negative: Shock suspected (e.g., cold/pale/clammy skin, too weak to stand, low BP, rapid pulse)    Negative: Difficult to awaken or acting confused (e.g., disoriented, slurred speech)    Negative: Visible sweat on face or sweat dripping down face    Negative: Unable to walk, or can only walk with assistance (e.g., requires support)    Negative: [1] Received SHOCK from implantable cardiac defibrillator AND [2] persisting symptoms (i.e., palpitations, lightheadedness)    Negative: [1] Dizziness, lightheadedness, or weakness AND [2] heart beating very rapidly (e.g., > 140 / minute)    Negative: [1] Dizziness, lightheadedness, or weakness AND [2] heart beating very slowly (e.g., < 50 / minute)    Negative: Sounds like a life-threatening emergency to the triager    Negative: Chest pain    Negative: Implantable Cardiac Defibrillator (ICD) or a pacemaker symptoms or questions    Negative: Difficulty breathing    Negative: Dizziness, lightheadedness, or weakness    Negative: [1] Heart beating very rapidly (e.g., > 140 / minute) AND [2] present now  (Exception: During exercise.)    Negative: Heart beating very slowly (e.g., < 50 / minute)  (Exception: Athlete and heart rate normal for caller.)    Negative: New or worsened shortness of breath with activity (dyspnea on exertion)    Protocols used: Heart Rate and Heartbeat [id number]

## 2025-04-27 NOTE — ED PROVIDER NOTES
ED Provider Note  [facility]      History     Chief Complaint   Patient presents with    Post-op Problem     HPI  Bal Junior is a 70 year old male with a history of esophageal adenocarcinoma s/p four rounds of chemotherapy, neuritis, peripheral neuropathy, hypertension, GERD, thrombocytopenia who presents to the emergency department for a post-op problem.  He was recently discharged from hospital after having been admitted for an esophageal anastomotic leak following esophagectomy.  He states that had a follow-up appointment couple days ago he did have some tachycardia but otherwise had felt well at the time.  He states that he has been having episodes of tachycardia to around 120 at times since then though has not had any chest pain or shortness of breath.  He has felt generally weak, fatigued.  The patient states that today he developed a heart rate in the 120s that he was unable to get down. He did not feel like his heart was beating fast but noticed it because of his watch.     He also notes that his drain in the neck that was placed from his surgery had red and yellow output today. He did have an increase in drainage from yesterday.  He has had a productive cough since his surgery but adds that it is getting better. He is feeling weak and is having lose stools.  He reports pain to the left side of his neck which he attributes to the drain pulling on the skin as well as abdominal pain he is having back pain due to drain as well.       Past Medical History  Past Medical History:   Diagnosis Date    Hypertension     Neuritis     Peripheral neuropathy     Personal history of other medical treatment     postgastrectomy dumping syndrome    Stomach problems Noted earlier     Past Surgical History:   Procedure Laterality Date    ABDOMEN SURGERY  2012?    APPENDECTOMY  1964?    BRONCHOSCOPY RIDID OR FLEXIBLE W/ENDOBRONCHIAL ULTRASOUND GUIDED 3 OR MORE NODE STATIONS N/A 3/19/2025     Procedure: Bronchoscopy Ridid Or Flexible W/Endobronchial Ultrasound Guided 3 Or More Node Stations;  Surgeon: Saad Olmedo MD;  Location: [facility]    COLONOSCOPY  2006, 2016    COMBINED ESOPHAGOSCOPY, GASTROSCOPY, DUODENOSCOPY (EGD), REPLACE ESOPHAGEAL STENT Left 4/18/2025    Procedure: ESOPHAGOGASTRODUODENOSCOPY with pharyngostomy tube placement;  Surgeon: Erik Lindsey MD;  Location: [facility]    DAVINCI NISSEN FUNDOPLICATION N/A 2/26/2025    Procedure: TAKE-DOWN, FUNDOPLICATION, NISSEN, LAPAROSCOPIC- ROBOTIC, gastrostomy takedown, lysisof adhesions 2 hr;  Surgeon: Katlyn Tobar MD;  Location: [facility]    ESOPHAGEAL BALLOON PROVOCATION STUDY N/A 9/24/2024    Procedure: Esophageal Balloon Provocation Study;  Surgeon: Carson Michel DO;  Location: [facility]    ESOPHAGECTOMY MINIMALLY INVASIVE N/A 3/24/2025    Procedure: ESOPHAGECTOMY, MINIMALLY INVASIVE, Laparoscopic Right Thorascopic Esophagectomy, Botox Injection;  Surgeon: Katlyn Tobar MD;  Location: [facility]    ESOPHAGOSCOPY, GASTROSCOPY, DUODENOSCOPY (EGD), COMBINED N/A 9/24/2024    Procedure: ESOPHAGOGASTRODUODENOSCOPY, WITH BIOPSY;  Surgeon: Carson Michel DO;  Location: [facility]    ESOPHAGOSCOPY, GASTROSCOPY, DUODENOSCOPY (EGD), COMBINED N/A 10/8/2024    Procedure: ESOPHAGOGASTRODUODENOSCOPY, WITH FINE NEEDLE ASPIRATION BIOPSY, WITH ENDOSCOPIC ULTRASOUND GUIDANCE;  Surgeon: Lance Lopez MD;  Location: [facility]    ESOPHAGOSCOPY, GASTROSCOPY, DUODENOSCOPY (EGD), COMBINED N/A 2/26/2025    Procedure: Esophagoscopy, gastroscopy, duodenoscopy (EGD);  Surgeon: Katlyn Tobar MD;  Location: [facility]    ESOPHAGOSCOPY, GASTROSCOPY, DUODENOSCOPY (EGD), COMBINED N/A 3/24/2025    Procedure: Esophagoscopy, gastroscopy, duodenoscopy (EGD), combined;  Surgeon: Katlyn Tobar MD;  Location: [facility]    ESOPHAGOSCOPY, GASTROSCOPY, DUODENOSCOPY (EGD), COMBINED N/A 3/28/2025    Procedure: ESOPHAGOGASTRODUODENOSCOPY, pharyngostomy tube replacement;  Surgeon: Jose De Jesus Bautista,  MD [place];  Location: [facility]    EYE SURGERY  199x    laser for retinal tears    GASTRIC FUNDOPLICATION      HERNIA REPAIR  1961?    IR CHEST PORT PLACEMENT > 5 YRS OF AGE  10/24/2024    LAPAROSCOPIC ASSISTED INSERTION TUBE JEJUNOSTOMY N/A 2/26/2025    Procedure: Laparoscopic assisted insertion tube jejunostomy;  Surgeon: Katlyn Tobar MD;  Location: [facility]    LAPAROSCOPIC TAKEDOWN NISSEN FUNDOPLICATION N/A 9/25/2020    Procedure: TAKE-DOWN, FUNDOPLICATION, REDO NISSEN, LAPAROSCOPIC, gastrostomy feeding tube placement;  Surgeon: Katlyn Tobar MD;  Location: [facility]    GA ESOPHAGOGASTRODUODENOSCOPY TRANSORAL DIAGNOSTIC N/A 5/9/2019    Procedure: UPPER GASTROINTESTINAL ENDOSCOPY;  Surgeon: Dino Ward MD;  Location: [facility];  Service: General    SOFT TISSUE SURGERY  2009?    MCL l. thumb     acetaminophen (TYLENOL) 325 MG tablet  amoxicillin-clavulanate (AUGMENTIN) 875-125 MG tablet  atenolol (TENORMIN) 50 MG tablet  cyanocobalamin (VITAMIN B-12) 500 MCG tablet  dextromethorphan (DELSYM) 30 MG/5ML liquid  EPINEPHrine (ANY BX GENERIC EQUIV) 0.3 MG/0.3ML injection 2-pack  fluconazole (DIFLUCAN) 200 MG tablet  gabapentin (NEURONTIN) 300 MG capsule  Isosource 1.5 Gumaro 250 mL  Lidocaine (LIDOCARE) 4 % Patch  methocarbamol (ROBAXIN) 750 MG tablet  multivitamins w/minerals liquid  nortriptyline (PAMELOR) 25 MG capsule  oxyCODONE (ROXICODONE) 5 MG/5ML solution  Prosource TF PO LIQD (PROSOURCE TF)  Prosource TF20 ENfit Compatibl EN LIQD (PROSOURCE TF20) packet  senna-docusate (SENOKOT-S/PERICOLACE) 8.6-50 MG tablet      Allergies   Allergen Reactions    Hornets     Wasp Venom Protein Hives    Bee Venom Swelling     Family History  Family History   Problem Relation Age of Onset    Hypertension Mother     Uterine Cancer Mother         1965    Anesthesia Reaction Mother         PONV, slow to emerge    Mitral Valve Replacement Father     Coronary Artery Disease Maternal Grandfather     GERD No family hx of      Ulcerative Colitis No family hx of     Crohn's Disease No family hx of     Stomach Cancer No family hx of     Deep Vein Thrombosis (DVT) No family hx of      Social History   Social History     Tobacco Use    Smoking status: Never     Passive exposure: Past    Smokeless tobacco: Never   Substance Use Topics    Alcohol use: Not Currently    Drug use: Never      A medically appropriate review of systems was performed with pertinent positives and negatives noted in the HPI, and all other systems negative.    Physical Exam   BP: 119/81  Pulse: 72  Temp: 98.5  F (36.9  C)  Resp: 18  SpO2: 96 %  Physical Exam  Constitutional:       General: He is not in acute distress.     Appearance: He is ill-appearing.   HENT:      Head: Normocephalic and atraumatic.      Comments: There is a drain emerging from the left side of his neck with yellowish fluid  There is some tenderness to the left side of his neck  No erythema or palpable mass     Nose: Nose normal.      Mouth/Throat:      Mouth: Mucous membranes are moist.      Pharynx: Oropharynx is clear.   Cardiovascular:      Rate and Rhythm: Normal rate and regular rhythm.      Heart sounds: No murmur heard.     No gallop.   Pulmonary:      Effort: Pulmonary effort is normal. No respiratory distress.      Breath sounds: No wheezing or rales.   Abdominal:      General: There is no distension.      Palpations: Abdomen is soft.      Tenderness: There is abdominal tenderness. There is no guarding.   Musculoskeletal:         General: Normal range of motion.   Skin:     General: Skin is warm and dry.   Neurological:      General: No focal deficit present.      Mental Status: He is alert and oriented to person, place, and time.           ED Course, Procedures, & Data      Procedures            EKG Interpretation:      Interpreted by Maximilian Ruiz MD  Time reviewed: 10:30  Symptoms at time of EKG: weak   Rhythm: normal sinus   Rate: normal  Axis: normal  Ectopy: none  Conduction:  normal  ST Segments/ T Waves: No ST-T wave changes  Q Waves: none    Clinical Impression: normal EKG           No results found for any visits on 04/26/25.  Medications - No data to display  Labs Ordered and Resulted from Time of ED Arrival to Time of ED Departure - No data to display  No orders to display          Critical care was not performed.     Medical Decision Making  The patient's presentation was of high complexity (an acute health issue posing potential threat to life or bodily function).    The patient's evaluation involved:  review of external note(s) from 3+ sources (discharge summary, progress notes, nursing notes, ED notes)  review of 3+ test result(s) ordered prior to this encounter (see separate area of note for details)  ordering and/or review of 3+ test(s) in this encounter (see separate area of note for details)    The patient's management necessitated high risk (a parenteral controlled substance) and further care after sign-out to Dr. villalobos (see their note for further management).    Assessment & Plan    This is a 70-year-old male with complex surgical history here with fatigue, weakness.  Also notes concerns for increased drainage and pain from surgical sites.  On arrival vitals were reassuring, he was afebrile.  Labs were obtained and notable for significant leukocytosis.  Creatinine at baseline, lipase normal.  Given concern for potential postoperative infection or other acute issue CTs of the neck as well as chest, abdomen, pelvis were ordered.  Patient was signed out pending imaging.    I have reviewed the nursing notes. I have reviewed the findings, diagnosis, plan and need for follow up with the patient.    New Prescriptions    No medications on file       Final diagnoses:   None       Maximilian Ruiz MD  [facility] EMERGENCY DEPARTMENT  4/26/2025     Maximilian Ruiz MD  04/27/25 6847

## 2025-04-27 NOTE — PLAN OF CARE
"Goal Outcome Evaluation:    Shift:[id number]      Reason for admission: \"Post-operative complication  Vitals: VSS  Activity: SBA  Pain: C/O back and neck pain 2/-3/10.   Neuro: Alert and orientedx4   Cardiac: On tel SR;  denies chest pain.    Respiratory: On RA SpO2 >95%. Denies chest   GI/: Voiding without difficulties. Pt had watery stool x1.  Diet: NPO . Pt has J-tube with intermittent feedings.   Lines: R chest port;SL.            "

## 2025-04-27 NOTE — PROGRESS NOTES
Temp: 98.8  F (37.1  C) Temp src: Oral BP: 128/90 Pulse: 85   Resp: 18 SpO2: 99 % O2 Device: None (Room air)       Pt is A&O x4, on RA, VSS. Pain managed with oxycodone + scheduled meds. NPO no exceptions. Receiving free water flushes through J tube. On cycled TF from 6p-10am. Up with SBA. L Pharyngostomy tube to LCS; irrigated per orders. ODIN drain on R back, flushed per orders. R chest port SL.     Pt transferring to , report given to nurse.

## 2025-04-27 NOTE — CONFIDENTIAL NOTE
"Nurse Triage SBAR    Is this a 2nd Level Triage? NO    Situation: Call from patient    Elevated heart rate for the past several hours  Around 124 beats per min; feels weaker than normal; still able to stand and walk without assistance; patient's wife is present with him. Says \"we may need to get thoracic involved.\"    Surgery last month   Sees oncology    Background:     Assessment: needs to be evaluated    Protocol Recommended Disposition:   Caller was informed of care advice. Patient should be evaluated per RN protocol: go to ED now - informed patient that thoracic can be paged by ED but they would probably want him to be seen in person. Caller verbalized understanding but says he wants to wait for half hour to see if his heart rate comes down or not then will go in.      Arsenio Chirinos RN, BSN  Triage Nurse Advisor    "

## 2025-04-27 NOTE — PROGRESS NOTES
Transferred from: ED 37 received report from RN.   Reason for transfer: Inpatient status   2 RN skin assessment: completed by Sterling TREVIZO RN and writer.   Result of skin assessment:   Right sided ODIN drain on back, L pharyngostomy tube, J-tube, and right-sided chest post. Rest of skin intact dry in some places.   Height, weight, drug calc weight: Done  Patient belongings (see Flowsheet)    ?

## 2025-04-27 NOTE — H&P
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 Esophagectomy on 3/24. EGD and intraoperative esophagram confirming no leak on 3/28 and later passed methylene blue challenge further confirming no leak on 4/2, however had a rising WBC after initiation of CLD. 4/3 CT esophagram confirmed anastomotic leak. 4/7 EGD with left chest tube placement which and was safely discharged on 4/9. In clinic on 4/17 CT showed leak of oral contrast into right pleural space, readmitted 4/17-23. Now re-admitted d/t leukocytosis, fatigue, weakness. CT scan without new collections or signs of infection related to his prior esophagectomy. He has been taking Augmentin and fluconazole.    - Admit to Thoracic Surgery  - Diet: Strict NPO, Cycled TF @ 90+ Meds through feeding tube. Increase free water flushes  - Abx: Zosyn + Fluconazole  - Ppx: Ambulate QID  - C diff testing     Drains: None     Code Status:  Full    Clinically Significant Risk Factors Present on Admission          # Hyperchloremia: Highest Cl = 112 mmol/L in last 2 days, will monitor as appropriate              # Hypertension: Noted on problem list               # Financial/Environmental Concerns:             The patient's care was discussed with the thoracic fellow.  Chavez Rodriguez MD  General Surgery Resident  [facility]  Text page via [employer]  Paging/Directory    ______________________________________________________________________    Chief Complaint   Post-op Problem    History is obtained from the patient and the patient's chart.     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 Esophagectomy on 3/24. EGD and intraoperative esophagram confirming no leak on 3/28 and later passed methylene blue challenge further confirming no leak on 4/2, however had a rising WBC after initiation of CLD. 4/3 CT esophagram confirmed anastomotic leak. 4/7 EGD with left chest tube placement which and was safely discharged on 4/9. In clinic on 4/17 CT showed leak of oral contrast into right pleural space, readmitted 4/17-23. Now re-admitted d/t leukocytosis, fatigue, weakness. CT scan without new collections or signs of infection related to his prior esophagectomy.       Past Medical History    Past Medical History:   Diagnosis Date    Hypertension     Neuritis     Peripheral neuropathy     Personal history of other medical treatment     postgastrectomy dumping syndrome    Stomach problems Noted earlier       Past Surgical History   Past Surgical History:   Procedure Laterality Date    ABDOMEN SURGERY  2012?    APPENDECTOMY  1964?    BRONCHOSCOPY RIDID OR FLEXIBLE W/ENDOBRONCHIAL ULTRASOUND GUIDED 3 OR MORE NODE STATIONS N/A 3/19/2025    Procedure: Bronchoscopy Ridid Or Flexible W/Endobronchial Ultrasound Guided 3 Or More Node Stations;  Surgeon: Saad Olmedo MD;  Location:  GI    COLONOSCOPY  2006, 2016    COMBINED ESOPHAGOSCOPY, GASTROSCOPY, DUODENOSCOPY (EGD), REPLACE ESOPHAGEAL STENT Left 4/18/2025    Procedure: ESOPHAGOGASTRODUODENOSCOPY with pharyngostomy tube  placement;  Surgeon: Erik Lindsey MD;  Location: [facility]    DAVINCI NISSEN FUNDOPLICATION N/A 2/26/2025    Procedure: TAKE-DOWN, FUNDOPLICATION, NISSEN, LAPAROSCOPIC- ROBOTIC, gastrostomy takedown, lysisof adhesions 2 hr;  Surgeon: Katlyn Tobar MD;  Location: [facility] OR    ESOPHAGEAL BALLOON PROVOCATION STUDY N/A 9/24/2024    Procedure: Esophageal Balloon Provocation Study;  Surgeon: Carson Michel DO;  Location: [facility] GI    ESOPHAGECTOMY MINIMALLY INVASIVE N/A 3/24/2025    Procedure: ESOPHAGECTOMY, MINIMALLY INVASIVE, Laparoscopic Right Thorascopic Esophagectomy, Botox Injection;  Surgeon: Katlyn Tobar MD;  Location: [facility] OR    ESOPHAGOSCOPY, GASTROSCOPY, DUODENOSCOPY (EGD), COMBINED N/A 9/24/2024    Procedure: ESOPHAGOGASTRODUODENOSCOPY, WITH BIOPSY;  Surgeon: Carson Michel DO;  Location: [facility]    ESOPHAGOSCOPY, GASTROSCOPY, DUODENOSCOPY (EGD), COMBINED N/A 10/8/2024    Procedure: ESOPHAGOGASTRODUODENOSCOPY, WITH FINE NEEDLE ASPIRATION BIOPSY, WITH ENDOSCOPIC ULTRASOUND GUIDANCE;  Surgeon: Lance Lopez MD;  Location: [facility]    ESOPHAGOSCOPY, GASTROSCOPY, DUODENOSCOPY (EGD), COMBINED N/A 2/26/2025    Procedure: Esophagoscopy, gastroscopy, duodenoscopy (EGD);  Surgeon: Katlyn Tobar MD;  Location: [facility]    ESOPHAGOSCOPY, GASTROSCOPY, DUODENOSCOPY (EGD), COMBINED N/A 3/24/2025    Procedure: Esophagoscopy, gastroscopy, duodenoscopy (EGD), combined;  Surgeon: Katlyn Tobar MD;  Location: [facility]    ESOPHAGOSCOPY, GASTROSCOPY, DUODENOSCOPY (EGD), COMBINED N/A 3/28/2025    Procedure: ESOPHAGOGASTRODUODENOSCOPY, pharyngostomy tube replacement;  Surgeon: Chino Gardner MD;  Location: [facility] OR    EYE SURGERY  199x    laser for retinal tears    GASTRIC FUNDOPLICATION      HERNIA REPAIR  1961?    IR CHEST PORT PLACEMENT > 5 YRS OF AGE  10/24/2024    LAPAROSCOPIC ASSISTED INSERTION TUBE JEJUNOSTOMY N/A 2/26/2025    Procedure: Laparoscopic assisted insertion tube jejunostomy;  Surgeon: Katlyn Tobar  MD;  Location: [facility]    LAPAROSCOPIC TAKEDOWN NISSEN FUNDOPLICATION N/A 9/25/2020    Procedure: TAKE-DOWN, FUNDOPLICATION, REDO NISSEN, LAPAROSCOPIC, gastrostomy feeding tube placement;  Surgeon: Katlyn Tobar MD;  Location: [facility]    NH ESOPHAGOGASTRODUODENOSCOPY TRANSORAL DIAGNOSTIC N/A 5/9/2019    Procedure: UPPER GASTROINTESTINAL ENDOSCOPY;  Surgeon: Dino Ward MD;  [facility];  Service: General    SOFT TISSUE SURGERY  2009?    MCL l. thumb       Prior to Admission Medications   I have reviewed this patient's current medications  Current Outpatient Medications on File Prior to Encounter   Medication Sig Dispense Refill    acetaminophen (TYLENOL) 325 MG tablet Place 2 tablets (650 mg) into Feeding Tube every 6 hours.      amoxicillin-clavulanate (AUGMENTIN) 875-125 MG tablet Place 1 tablet into Feeding Tube every 12 hours for 5 days. 10 tablet 0    atenolol (TENORMIN) 50 MG tablet Place 1 tablet (50 mg) into Feeding Tube every evening.      cyanocobalamin (VITAMIN B-12) 500 MCG tablet Place 1 tablet (500 mcg) into Feeding Tube.      dextromethorphan (DELSYM) 30 MG/5ML liquid Take 5 mLs (30 mg) by mouth once as needed for cough.      EPINEPHrine (ANY BX GENERIC EQUIV) 0.3 MG/0.3ML injection 2-pack       fluconazole (DIFLUCAN) 200 MG tablet Take 1 tablet (200 mg) by mouth daily for 5 days. 5 tablet 0    gabapentin (NEURONTIN) 300 MG capsule Place 1 capsule (300 mg) into Feeding Tube 3 times daily.      Isosource 1.5 Gumaro 250 mL Place 1,500 mLs into J tube daily. Infuse via pump. 90 mL/hr x 16 hours.   6 cartons per day.   Water flush: 30 mL before and after tube feed cycle. Additional 30 mL 6x per day.   Kcals: 2250 63030 mL 11    Lidocaine (LIDOCARE) 4 % Patch Place 1 patch over 12 hours onto the skin every 24 hours. To prevent lidocaine toxicity, patient should be patch free for 12 hrs daily. 4 patch 0    methocarbamol (ROBAXIN) 750 MG tablet Place 1 tablet (750 mg) into J tube every 6  hours as needed for muscle spasms. 20 tablet 0    multivitamins w/minerals liquid Place 15 mLs into Feeding Tube daily. 450 mL 0    nortriptyline (PAMELOR) 25 MG capsule Place 1 capsule (25 mg) into J tube every evening.      oxyCODONE (ROXICODONE) 5 MG/5ML solution Place 5-10 mLs (5-10 mg) into J tube every 4 hours as needed for severe pain. 100 mL 0    Prosource TF PO LIQD (PROSOURCE TF) Place 90 mLs into J tube 2 times daily. Infuse via syringe  Water flush: 30 mL before and after each packet   Kcals: 160 5400 mL 11    Prosource TF20 ENfit Compatibl EN LIQD (PROSOURCE TF20) packet Place 60 mLs into Feeding Tube 2 times daily.      senna-docusate (SENOKOT-S/PERICOLACE) 8.6-50 MG tablet Place 1 tablet into Feeding Tube 2 times daily as needed for constipation. Reduce dose or temporarily discontinue if having loose stools. 30 tablet 0          Review of Systems    The 10 point Review of Systems is negative other than noted in the HPI or here.    Social History   I have reviewed this patient's social history and updated it with pertinent information if needed.  Social History     Tobacco Use    Smoking status: Never     Passive exposure: Past    Smokeless tobacco: Never   Substance Use Topics    Alcohol use: Not Currently    Drug use: Never         Family History   I have reviewed this patient's family history and updated it with pertinent information if needed.  Family History   Problem Relation Age of Onset    Hypertension Mother     Uterine Cancer Mother         1965    Anesthesia Reaction Mother         PONV, slow to emerge    Mitral Valve Replacement Father     Coronary Artery Disease Maternal Grandfather     GERD No family hx of     Ulcerative Colitis No family hx of     Crohn's Disease No family hx of     Stomach Cancer No family hx of     Deep Vein Thrombosis (DVT) No family hx of          Allergies   Allergies   Allergen Reactions    Hornets     Wasp Venom Protein Hives    Bee Venom Swelling        Physical  Exam   Vitals: Most Recent  Ranges (Last 24 hours)   Temp: 97.5  F (36.4  C)  Pulse: 84  BP: 124/79  Resp: 18  SpO2: 98 %  O2 Device: None (Room air) Temp  Min: 97.5  F (36.4  C)  Max: 98.5  F (36.9  C)  Pulse  Min: 72  Max: 84  BP  Min: 119/81  Max: 132/88  Resp  Min: 16  Max: 18  SpO2  Min: 96 %  Max: 98 %     Physical Exam:  Gen: Appears stated age, NAD  HEENT: NC/AT, PERRL, EOMI, Sclera Anicteric, Hearing intact. PGT in place and patent.  Neuro: AO, no focal deficits  Psych: Affect appropriate, Behavior appropriate, Cooperative  CV: Normotensive, Normocardic  Pulm: NWOB on RA  ABD: Soft, NT, ND. ODIN drain with clear yellow output (same as pharyngostomy tube)  MSK: MAEx4, warm, PPP  Skin: No obvious rashes, jaundice, erythema        Data     I have personally reviewed the following data over the past 24 hrs:    19.0 (H)  \   11.3 (L)   / 487 (H)     145 112 (H) 41.4 (H) /  155 (H)   4.2 19 (L) 0.91 \     ALT: 18 AST: 23 AP: 108 TBILI: 0.2   ALB: 3.7 TOT PROTEIN: 7.9 LIPASE: 68 (H)     Trop: 16 BNP: N/A       Imaging results reviewed over the past 24 hrs:   Recent Results (from the past 24 hours)   CT Chest/Abdomen/Pelvis w Contrast    Narrative    EXAM: CT CHEST, ABDOMEN AND PELVIS WITH CONTRAST  LOCATION: [facility]  DATE/TIME: 4/27/2025 12:13 AM CDT    INDICATION: Recent esophagectomy complicated by anastomotic leak. Increased pain. Tenderness of the right lower quadrant.  COMPARISON: 4/17/2025.    TECHNIQUE: Note that this study was performed in conjunction with CT scan of the neck. Refer to a separate report for findings from that study. CT scan of the chest, abdomen, and pelvis was performed following injection of IV contrast. Multiplanar   reformats were obtained. Dose reduction techniques were used.  CONTRAST: 134 mL Isovue 370.    FINDINGS:    LUNGS AND PLEURA: A few curvilinear opacities in the lower lungs bilaterally likely represent atelectasis or scarring.  A right pleural drainage catheter is in place with distal tip in the medial aspect of the lower right hemithorax. There is a trace   amount of right pleural fluid, decreased since 4/17/2025.    MEDIASTINUM/AXILLAE: 0.9 cm nodule in the anterior aspect of the right lobe of thyroid gland. Mildly enlarged 2.4 x 1.1 cm lower right paratracheal lymph node (series 3 image 107). A few borderline and mildly enlarged right hilar lymph nodes. The lymph   nodes are unchanged from the recent prior study. Right anterior chest wall port with catheter entering the right internal jugular vein and distal tip in the superior vena cava. Atherosclerotic calcification in the aorta.    CORONARY ARTERY CALCIFICATION: Present    HEPATOBILIARY: A few subcentimeter low-attenuation foci in the liver, too small to characterize.    SPLEEN: Unremarkable.    PANCREAS: Unremarkable.    ADRENAL GLANDS: Unremarkable.    KIDNEYS/BLADDER: 1.3 x 0.6 cm nonobstructing calculus in the inferior pole of the right kidney. 0.9 cm nonobstructing calculus in the inferior pole of the left kidney. Apparent mild diffuse wall thickening of the urinary bladder.    BOWEL: Prior esophagectomy with gastric pull-through again noted. Interval placement of an enteric drainage tube which courses within the intrathoracic stomach, through a defect in the posterior wall of the intrathoracic stomach and into the right   pleural space at the posteromedial aspect of the right hemithorax (for example, series 3 image 153). No significant dilatation of the stomach, small or large bowel. Fluid is present throughout the colonic lumen. The appendix is not visualized. A   percutaneous jejunostomy tube is present. The balloon of the jejunostomy is inflated within the anterior abdominal wall (series 3 image 430).    LYMPH NODES: Unremarkable.    PELVIC ORGANS: No acute findings.    MUSCULOSKELETAL: No acute findings.    OTHER: Tiny paraumbilical hernia containing fat.      Impression     IMPRESSION:   1. Fluid is present within the colonic lumen. This is nonspecific, but could relate to gastroenteritis.  2. Apparent mild diffuse wall thickening of the urinary bladder. This is nonspecific, but cystitis is a possibility.  3. Prior esophagectomy with gastric pull-through. An enteric drainage tube is present which courses through a defect in the posterior wall of the gastric pull-through and with its distal tip in the right pleural space at the posteromedial aspect of the   right hemithorax.  4. A percutaneous jejunostomy tube is present with distal tip in the jejunum. The balloon associated with the jejunostomy tube is inflated within the anterior abdominal wall.  5. No other acute abnormality identified in the chest, abdomen or pelvis.  6. Nonspecific mildly enlarged mediastinal and right hilar lymph nodes, unchanged.   Soft tissue neck CT w contrast    Narrative    EXAM: CT SOFT TISSUE NECK W CONTRAST  LOCATION: [facility]  DATE: 4/27/2025    INDICATION: Left sided neck drain, increased pain and drainage at site  COMPARISON:  PET/CT 3/14/2025.  CONTRAST: Administered  TECHNIQUE: Routine CT Soft Tissue Neck with IV contrast. Multiplanar reformats. Dose reduction techniques were used.    FINDINGS:     MUCOSAL SPACES/SOFT TISSUES: Left-sided neck drain courses through the submandibular region into the hypopharynx with the caudal aspect of the tube outside the field-of-view. Very minimal induration of the superficial fat surrounding the tissue at the   cutaneous surface, not unexpected. No collection in the deep spaces in the neck along the course of the tube.    Otherwise, the nasopharynx and oropharynx are unremarkable. The base of tongue and floor of mouth are not well assessed due to beam hardening from dental amalgam. No retropharyngeal collection.    Venous catheter in the right internal jugular vein.    LYMPH NODES: No pathologic lymph nodes by size or  morphology criteria.     SALIVARY GLANDS: Normal parotid and submandibular glands.    THYROID: Small right thyroid nodule, not meeting size requirement for follow-up.     VESSELS: Vascular structures of the neck are patent.    VISUALIZED INTRACRANIAL/ORBITS/SINUSES: No abnormality of the visualized intracranial compartment or orbits. Visualized paranasal sinuses and mastoid air cells are clear.    OTHER: No destructive osseous lesion. Thoracic findings described separately.      Impression    IMPRESSION:   1.  Left-sided neck drain courses through the submandibular region into the hypopharynx with the caudal aspect of the tube outside the field-of-view.  2.  No collection along the course of the drain.

## 2025-04-28 ENCOUNTER — APPOINTMENT (OUTPATIENT)
Dept: PHYSICAL THERAPY | Facility: CLINIC | Age: 70
DRG: 394 | End: 2025-04-28
Attending: PHYSICIAN ASSISTANT
Payer: MEDICARE

## 2025-04-28 LAB
ANION GAP SERPL CALCULATED.3IONS-SCNC: 12 MMOL/L (ref 7–15)
ATRIAL RATE - MUSE: 119 BPM
BUN SERPL-MCNC: 31.2 MG/DL (ref 8–23)
CALCIUM SERPL-MCNC: 8.9 MG/DL (ref 8.8–10.4)
CHLORIDE SERPL-SCNC: 109 MMOL/L (ref 98–107)
CREAT SERPL-MCNC: 0.81 MG/DL (ref 0.67–1.17)
DIASTOLIC BLOOD PRESSURE - MUSE: NORMAL MMHG
EGFRCR SERPLBLD CKD-EPI 2021: >90 ML/MIN/1.73M2
ERYTHROCYTE [DISTWIDTH] IN BLOOD BY AUTOMATED COUNT: 14.6 % (ref 10–15)
GLUCOSE SERPL-MCNC: 144 MG/DL (ref 70–99)
HCO3 SERPL-SCNC: 22 MMOL/L (ref 22–29)
HCT VFR BLD AUTO: 32.6 % (ref 40–53)
HGB BLD-MCNC: 10.3 G/DL (ref 13.3–17.7)
INTERPRETATION ECG - MUSE: NORMAL
MAGNESIUM SERPL-MCNC: 2.2 MG/DL (ref 1.7–2.3)
MCH RBC QN AUTO: 28.6 PG (ref 26.5–33)
MCHC RBC AUTO-ENTMCNC: 31.6 G/DL (ref 31.5–36.5)
MCV RBC AUTO: 91 FL (ref 78–100)
P AXIS - MUSE: 30 DEGREES
PHOSPHATE SERPL-MCNC: 4.2 MG/DL (ref 2.5–4.5)
PLATELET # BLD AUTO: 358 10E3/UL (ref 150–450)
POTASSIUM SERPL-SCNC: 4.4 MMOL/L (ref 3.4–5.3)
PR INTERVAL - MUSE: 134 MS
QRS DURATION - MUSE: 62 MS
QT - MUSE: 320 MS
QTC - MUSE: 450 MS
R AXIS - MUSE: 14 DEGREES
RBC # BLD AUTO: 3.6 10E6/UL (ref 4.4–5.9)
SODIUM SERPL-SCNC: 143 MMOL/L (ref 135–145)
SYSTOLIC BLOOD PRESSURE - MUSE: NORMAL MMHG
T AXIS - MUSE: 16 DEGREES
VENTRICULAR RATE- MUSE: 119 BPM
WBC # BLD AUTO: 10.3 10E3/UL (ref 4–11)

## 2025-04-28 PROCEDURE — 97530 THERAPEUTIC ACTIVITIES: CPT | Mod: GP

## 2025-04-28 PROCEDURE — 250N000013 HC RX MED GY IP 250 OP 250 PS 637

## 2025-04-28 PROCEDURE — 97116 GAIT TRAINING THERAPY: CPT | Mod: GP

## 2025-04-28 PROCEDURE — 120N000002 HC R&B MED SURG/OB UMMC

## 2025-04-28 PROCEDURE — 250N000011 HC RX IP 250 OP 636

## 2025-04-28 PROCEDURE — 36415 COLL VENOUS BLD VENIPUNCTURE: CPT

## 2025-04-28 PROCEDURE — 83735 ASSAY OF MAGNESIUM: CPT

## 2025-04-28 PROCEDURE — 97161 PT EVAL LOW COMPLEX 20 MIN: CPT | Mod: GP

## 2025-04-28 PROCEDURE — 85041 AUTOMATED RBC COUNT: CPT

## 2025-04-28 PROCEDURE — 84100 ASSAY OF PHOSPHORUS: CPT

## 2025-04-28 PROCEDURE — 82565 ASSAY OF CREATININE: CPT

## 2025-04-28 RX ORDER — LOPERAMIDE HCL 1 MG/7.5ML
2 SOLUTION ORAL DAILY
Status: DISCONTINUED | OUTPATIENT
Start: 2025-04-28 | End: 2025-04-29

## 2025-04-28 RX ORDER — LIDOCAINE 4 G/G
2 PATCH TOPICAL
Status: DISCONTINUED | OUTPATIENT
Start: 2025-04-28 | End: 2025-05-02 | Stop reason: HOSPADM

## 2025-04-28 RX ORDER — LOPERAMIDE HCL 1 MG/7.5ML
2 SOLUTION ORAL DAILY PRN
Status: DISCONTINUED | OUTPATIENT
Start: 2025-04-28 | End: 2025-04-28

## 2025-04-28 RX ORDER — ACETAMINOPHEN 325 MG/1
975 TABLET ORAL EVERY 6 HOURS
Status: DISCONTINUED | OUTPATIENT
Start: 2025-04-28 | End: 2025-05-02 | Stop reason: HOSPADM

## 2025-04-28 RX ORDER — LOPERAMIDE HYDROCHLORIDE 2 MG/1
2 CAPSULE ORAL 4 TIMES DAILY PRN
Status: DISCONTINUED | OUTPATIENT
Start: 2025-04-28 | End: 2025-04-28

## 2025-04-28 RX ADMIN — ACETAMINOPHEN 975 MG: 325 TABLET, FILM COATED ORAL at 20:16

## 2025-04-28 RX ADMIN — METHOCARBAMOL 750 MG: 750 TABLET ORAL at 00:06

## 2025-04-28 RX ADMIN — OXYCODONE HYDROCHLORIDE 10 MG: 5 SOLUTION ORAL at 09:32

## 2025-04-28 RX ADMIN — OXYCODONE HYDROCHLORIDE 10 MG: 5 SOLUTION ORAL at 13:20

## 2025-04-28 RX ADMIN — GABAPENTIN 300 MG: 250 SOLUTION ORAL at 15:40

## 2025-04-28 RX ADMIN — CYANOCOBALAMIN TAB 500 MCG 500 MCG: 500 TAB at 09:31

## 2025-04-28 RX ADMIN — ATENOLOL 50 MG: 25 TABLET ORAL at 20:16

## 2025-04-28 RX ADMIN — METHOCARBAMOL 750 MG: 750 TABLET ORAL at 12:01

## 2025-04-28 RX ADMIN — Medication 15 ML: at 09:32

## 2025-04-28 RX ADMIN — Medication 120 ML: at 13:20

## 2025-04-28 RX ADMIN — ACETAMINOPHEN 650 MG: 325 TABLET, FILM COATED ORAL at 03:06

## 2025-04-28 RX ADMIN — LOPERAMIDE HYDROCHLORIDE 2 MG: 2 SOLUTION ORAL at 12:01

## 2025-04-28 RX ADMIN — FLUCONAZOLE 400 MG: 400 INJECTION, SOLUTION INTRAVENOUS at 03:52

## 2025-04-28 RX ADMIN — ACETAMINOPHEN 975 MG: 325 TABLET, FILM COATED ORAL at 09:31

## 2025-04-28 RX ADMIN — CALCIUM CARBONATE (ANTACID) CHEW TAB 500 MG 1000 MG: 500 CHEW TAB at 12:01

## 2025-04-28 RX ADMIN — LIDOCAINE 4% 2 PATCH: 40 PATCH TOPICAL at 01:47

## 2025-04-28 RX ADMIN — GABAPENTIN 300 MG: 250 SOLUTION ORAL at 09:32

## 2025-04-28 RX ADMIN — METHOCARBAMOL 750 MG: 750 TABLET ORAL at 05:08

## 2025-04-28 RX ADMIN — GABAPENTIN 300 MG: 250 SOLUTION ORAL at 20:17

## 2025-04-28 RX ADMIN — OXYCODONE HYDROCHLORIDE 5 MG: 5 SOLUTION ORAL at 20:16

## 2025-04-28 RX ADMIN — OXYCODONE HYDROCHLORIDE 5 MG: 5 SOLUTION ORAL at 00:06

## 2025-04-28 RX ADMIN — ACETAMINOPHEN 975 MG: 325 TABLET, FILM COATED ORAL at 15:38

## 2025-04-28 RX ADMIN — OXYCODONE HYDROCHLORIDE 10 MG: 5 SOLUTION ORAL at 05:08

## 2025-04-28 RX ADMIN — PIPERACILLIN AND TAZOBACTAM 3.38 G: 3; .375 INJECTION, POWDER, LYOPHILIZED, FOR SOLUTION INTRAVENOUS at 03:06

## 2025-04-28 ASSESSMENT — ACTIVITIES OF DAILY LIVING (ADL)
ADLS_ACUITY_SCORE: 63
ADLS_ACUITY_SCORE: 40
ADLS_ACUITY_SCORE: 63
ADLS_ACUITY_SCORE: 40
ADLS_ACUITY_SCORE: 63
ADLS_ACUITY_SCORE: 63
ADLS_ACUITY_SCORE: 40
ADLS_ACUITY_SCORE: 63
DEPENDENT_IADLS:: INDEPENDENT
ADLS_ACUITY_SCORE: 63
ADLS_ACUITY_SCORE: 40
ADLS_ACUITY_SCORE: 40
ADLS_ACUITY_SCORE: 63
ADLS_ACUITY_SCORE: 40

## 2025-04-28 NOTE — CONSULTS
Care Management Initial Consult    General Information  Assessment completed with: Patient,    Type of CM/SW Visit: Initial Assessment    Primary Care Provider verified and updated as needed: Yes   Readmission within the last 30 days: previous discharge plan unsuccessful (dehydration)      Reason for Consult: discharge planning  Advance Care Planning: Advance Care Planning Reviewed: no concerns identified          Communication Assessment  Patient's communication style: spoken language (English or Bilingual)    Hearing Difficulty or Deaf: no   Wear Glasses or Blind: yes    Cognitive  Cognitive/Neuro/Behavioral: WDL                      Living Environment:   People in home: spouse     Current living Arrangements: house      Able to return to prior arrangements: yes       Family/Social Support:  Care provided by: self  Provides care for: no one  Marital Status:   Support system: Wife          Description of Support System: Supportive, Involved    Support Assessment: Adequate family and caregiver support, Adequate social supports    Current Resources:   Patient receiving home care services: No        Community Resources: Home Infusion, Home Care  Equipment currently used at home: other (see comments) (gastric suction for phyarngostomy)  Supplies currently used at home: None    Employment/Financial:  Employment Status: retired     Financial Concerns: none   Does the patient's insurance plan have a 3 day qualifying hospital stay waiver?  No    Lifestyle & Psychosocial Needs:  Social Drivers of Health     Food Insecurity: Low Risk  (4/27/2025)    Food Insecurity     Within the past 12 months, did you worry that your food would run out before you got money to buy more?: No     Within the past 12 months, did the food you bought just not last and you didn t have money to get more?: No   Depression: Not at risk (3/11/2025)    PHQ-2     PHQ-2 Score: 0   Housing Stability: Low Risk  (4/27/2025)    Housing Stability      Do you have housing? : Yes     Are you worried about losing your housing?: No   Tobacco Use: Low Risk  (4/18/2025)    Patient History     Smoking Tobacco Use: Never     Smokeless Tobacco Use: Never     Passive Exposure: Past   Financial Resource Strain: Low Risk  (4/27/2025)    Financial Resource Strain     Within the past 12 months, have you or your family members you live with been unable to get utilities (heat, electricity) when it was really needed?: No   Alcohol Use: Alcohol Misuse (8/31/2020)    AUDIT-C     Frequency of Alcohol Consumption: 2-3 times a week     Average Number of Drinks: 1 or 2     Frequency of Binge Drinking: Less than monthly   Transportation Needs: Low Risk  (4/27/2025)    Transportation Needs     Within the past 12 months, has lack of transportation kept you from medical appointments, getting your medicines, non-medical meetings or appointments, work, or from getting things that you need?: No   Physical Activity: Not on file   Interpersonal Safety: Low Risk  (4/27/2025)    Interpersonal Safety     Do you feel physically and emotionally safe where you currently live?: Yes     Within the past 12 months, have you been hit, slapped, kicked or otherwise physically hurt by someone?: No     Within the past 12 months, have you been humiliated or emotionally abused in other ways by your partner or ex-partner?: No   Stress: Not on file   Social Connections: Not on file   Health Literacy: Not on file       Functional Status:  Prior to admission patient needed assistance:   Dependent ADLs:: Independent  Dependent IADLs:: Independent  Assesssment of Functional Status: Not at baseline with mobility, Not at  functional baseline (Pt feels weak and would like to see PT/OT, provider ordered)    Mental Health Status:  Mental Health Status: No Current Concerns       Chemical Dependency Status:  Chemical Dependency Status: No Current Concerns             Values/Beliefs:  Spiritual, Cultural Beliefs, Restorationist  Practices, Values that affect care: no               Discussed  Partnership in Safe Discharge Planning  document with patient/family: No    Additional Information:  Patient is a 70 year old male with history of esophogectomy with anastomotic leak.  Patient recently discharged 4/23 with home care, home infusion for TF, and gastric suction for pharyngostomy.  Patient readmitted with elevated WBC, fatigue, and weakness.      Met with patient at bedside to introduce RNCC role and discuss discharge planning.  Patient verified that he was open to [facility] for RN services and the RN was out to see him.  He is also open to [facility] for TF and reports having all the supplies needed at home. His pharyngostomy tube was hooked up to gastric suction and he continues to have the suction machine at home.  He would like all services resumed at discharge.       Discharge services:   [facility](TF)  Phone: [phone]  Fax: [phone]      [facility](RN, PT/OT)  Phone: [phone]  Fax: [phone]      Discharge DME: Gastric Suction     [facility]  Ph: [phone]  Clinical Fax: [phone]      Next Steps:   -Watch PT/OT recommendations  -Update Home Care/Home Infusion of discharge  -IMM  -Ride-spouse    ADDENDUM 1415: Pt's wife asked to speak to this writer regarding discharge.  She has concerns about him returning home again at discharge and asked if he would be able to go to a facility.  Discussed that PT/OT would evaluate the patient.  She explained that even if insurance does not cover it they would be interested in private paying to go to a facility for a short time.  Discussed that we would meet again to discuss after PT/OT evaluate.  Provided her a list of SNF's near [place] per her request.       Chacha Menard, RNCC  Phone: [phone]   Med Surg Vocera  Nurse Coordinator

## 2025-04-28 NOTE — PLAN OF CARE
Goal Outcome Evaluation:      Plan of Care Reviewed With: patient    Overall Patient Progress: no changeOverall Patient Progress: no change    Outcome Evaluation: Anticipate discharge to home with resumption of home services    ANALIA Stroud  Phone: [phone] 7b Med Surg Vocera  Nurse Coordinator

## 2025-04-28 NOTE — PROGRESS NOTES
"   04/28/25 1500   Appointment Info   Signing Clinician's Name / Credentials (PT) Gilmer Plata, SPT   Student Supervision On-site supervision provided   Rehab Comments (PT) NPO, Pharygostomy Tube to LCS- RN ok'd off suction for mobility, ODIN drain, R chest port   Living Environment   People in Home spouse   Current Living Arrangements house   Home Accessibility stairs within home   Number of Stairs, Within Home, Primary greater than 10 stairs   Stair Railings, Within Home, Primary railing on right side (ascending)   Transportation Anticipated family or friend will provide   Living Environment Comments Pt lives with wife in a home with office upstairs, all needs met on main level, wife able to get stuff upstairs if necessary.  Reports wife can help with limited physical assistance, she assists him with medications.   Self-Care   Usual Activity Tolerance good   Current Activity Tolerance fair   Regular Exercise Yes   Activity/Exercise Type walking   Exercise Amount/Frequency 3-5 times/wk  (skiing)   Equipment Currently Used at Home other (see comments)  (grab bar and bench in walk-in shower, also has tub shower (does not use often),flat mattress not bed rails)   Fall history within last six months no   Activity/Exercise/Self-Care Comment pt reports IND w/ I/ADLs and functional mobility without AD. Reports he shares IADLs with his wife. Retired from computer security. Enjoys skiing, biking, being outdoors, walking his poodle.   General Information   Onset of Illness/Injury or Date of Surgery 04/26/25   Referring Physician Alexey Cohen PA-C   Patient/Family Therapy Goals Statement (PT) Return to skiing, and return home.   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 Esophagectomy on 3/24. EGD and intraoperative esophagram confirming no leak on 3/28 and later passed methylene blue challenge further confirming no leak on 4/2, however had a rising WBC after initiation of CLD. 4/3 CT esophagram confirmed anastomotic leak. 4/7 EGD with left chest tube placement which and was safely discharged on 4/9. In clinic on 4/17 CT showed leak of oral contrast into right pleural space, readmitted 4/17-23. Now re-admitted d/t leukocytosis, fatigue, weakness. CT scan without new collections or signs of infection related to his prior esophagectomy. He has been taking Augmentin and fluconazole. \"   Existing Precautions/Restrictions NPO   Weight-Bearing Status - LUE full weight-bearing   Weight-Bearing Status - RUE full weight-bearing   Weight-Bearing Status - LLE full weight-bearing   Weight-Bearing Status - RLE full weight-bearing   Cognition   Affect/Mental Status (Cognition) WNL   Orientation Status (Cognition) oriented x 4   Follows Commands (Cognition) over 90% accuracy   Pain Assessment   Patient Currently in Pain Yes, see Vital Sign flowsheet  (Pain in drain port 3-4/10)   Integumentary/Edema   Integumentary/Edema no deficits were identifed   Posture    Posture Forward head position;Protracted shoulders   Range of Motion (ROM)   Range of Motion ROM is WFL   Strength (Manual Muscle Testing)   Strength (Manual Muscle Testing) strength is WNL   Strength Comments LE seated MMT screen grossly 5/5   Bed Mobility   Comment, (Bed Mobility) Pt able to supine>sit from flat bed with SBA, pt needing Zuleyka with UE use to scoot forward to EOB   Transfers   Comment, (Transfers) Pt sit<>stand from EOB to IV pole with SBA   Gait/Stairs (Locomotion)   Comment, (Gait/Stairs) Pt able to amb 10ft with L UE on IV pole. Slight B foot ER during gait, otherwise no other deficits " identified at this time.   Balance   Balance Comments Pt able to maintain seated EOB and with normal BISMARK static standing L UE on IV pole with no increase in sway   Sensory Examination   Sensory Perception patient reports no sensory changes   Sensory Perception Comments B foot neuropathy, non acute   Coordination   Coordination Comments Heel to shin test B (-)   Clinical Impression   Criteria for Skilled Therapeutic Intervention Yes, treatment indicated   PT Diagnosis (PT) Deconditioning   Influenced by the following impairments Decrease in functional activity tolerance and endurance   Functional limitations due to impairments Pt decreased endurance for daily tasks, limited community mobility   Clinical Presentation (PT Evaluation Complexity) stable   Clinical Presentation Rationale per clinical judgement   Clinical Decision Making (Complexity) low complexity   Planned Therapy Interventions (PT) balance training;gait training;home exercise program;patient/family education;postural re-education;stair training;progressive activity/exercise;strengthening   Risk & Benefits of therapy have been explained evaluation/treatment results reviewed;care plan/treatment goals reviewed;risks/benefits reviewed;current/potential barriers reviewed;participants voiced agreement with care plan;participants included;patient   PT Total Evaluation Time   PT Eval, Low Complexity Minutes (88871) 13   Physical Therapy Goals   PT Frequency 5x/week   PT Predicted Duration/Target Date for Goal Attainment 05/06/25   PT Goals Gait;Stairs;PT Goal 1   PT: Gait Independent;Greater than 200 feet   PT: Stairs Modified independent;Greater than 10 stairs;Rail on right   PT: Goal 1 Pt to be adherent and independent with HEP   Interventions   Interventions Quick Adds Gait Training;Therapeutic Activity   Therapeutic Activity   Therapeutic Activities: dynamic activities to improve functional performance Minutes (67978) 10   Symptoms Noted During/After  "Treatment Fatigue   Treatment Detail/Skilled Intervention Pt supine upon arrival, agreeable to PT.  at rest. Pt able to perform 1x sit<>stand from EOB with SBA in order to manage lines. Seated pt cued to demonstrate donning socks, pt able to do so with figure-4 technique independently. Pt reports frustration with being in the hospital again and feeling \"off\" due to taking oxycodone for his pain with his ODIN drain, provided therapeutic listening. Discussed importance of pt recognizing and monitoring symptoms, provided edu on making \"judgement calls\" for rest breaks and report symptoms while performing physical activity with any staff member. Pt in agreeance of importance. Encourgaed pt to continue to walk with nursing and get up to his chair throughout the day. Pt supine in bed with all needs met priot to departure.   Gait Training   Gait Training Minutes (26434) 10   Symptoms Noted During/After Treatment (Gait Training) fatigue   Treatment Detail/Skilled Intervention Pt declined use of GB due to all his drains and ports and reports frustrtion in the past with use. Edu pt on his feeling \"off\" today due to meds would be important consideration, pt continued to decline use. Initiated amb with CGA and pt L UE on IV pole. Pt able to amb ~40ft (+10ft for eval) before feeling fatigued. Pt seated after amb at EOB .   PT Discharge Planning   PT Plan Progress gait (trial without IV pole), initiate stairs (verify railing side), initiate LE HEP   PT Discharge Recommendation (DC Rec) home with assist;home with home care physical therapy   PT Rationale for DC Rec Pt still functioning below baseline, from today's performance pt able to ambulate with CGA and IV pole, and progressing to Zuleyka with bed mobility. Pt has some assist at home with wife and has previously received  PT/nursing in order to work on activity tolerance.   PT Brief overview of current status Ax1 with IV pole   PT Total Distance Amb During Session " (feet) 50   Physical Therapy Time and Intention   Timed Code Treatment Minutes 20   Total Session Time (sum of timed and untimed services) 33

## 2025-04-28 NOTE — PROGRESS NOTES
THORACIC & FOREGUT SURGERY    S:  Some pain from ODIN drain overnight.  Improved with pain medications.  Pt seen at bedside resting comfortably.  Does note bothersome diarrhea which persists.    O:  Vitals:    04/27/25 1800 04/27/25 2032 04/27/25 2227 04/28/25 0720   BP:  138/86 116/86 136/79   BP Location:  Left arm Left arm Left arm   Patient Position:  Semi-Hannah's     Cuff Size:  Adult Regular     Pulse:   74 80   Resp:  20 18 18   Temp:  98.8  F (37.1  C) 98  F (36.7  C) 98.1  F (36.7  C)   TempSrc:  Oral Axillary Oral   SpO2: 98% 99% 97% 100%   Weight:       Height:           A&O, NAD  Breathing non-labored  Appears well perfused  Soft, NDNT  Distal extremities warm.      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 Esophagectomy on 3/24. EGD and intraoperative esophagram confirming no leak on 3/28 and later passed methylene blue challenge further confirming no leak on 4/2, however had a rising WBC after initiation of CLD. 4/3 CT esophagram confirmed anastomotic leak. 4/7 EGD with left chest tube placement which and was safely discharged on 4/9. In clinic on 4/17 CT showed leak of oral contrast into right pleural space, readmitted 4/17-23. Now re-admitted d/t leukocytosis, fatigue, weakness. CT scan without new collections or signs of infection related to his prior esophagectomy. He has been taking Augmentin and fluconazole.     -Discontinue abx  -Add low dose immodium  -Continue increased Free water flushes  -Continue to monitor WBC daily  -Continue ODIN drain and pharyngostomy tube  -Possible dispo in 1-3 days depending on WBC and hydration  status    Clinically Significant Risk Factors          # Hyperchloremia: Highest Cl = 112 mmol/L in last 2 days, will monitor as appropriate              # Hypertension: Noted on problem list             # Moderate Malnutrition: based on nutrition assessment and treatment provided per dietitian's recommendations., PRESENT ON ADMISSION   # Financial/Environmental Concerns:               Alexey Cohen PA-C     Thoracic and Foregut Surgery  Text page Thoracic Surgery via [facility] Paging/Directory

## 2025-04-28 NOTE — PLAN OF CARE
Goal Outcome Evaluation:      Plan of Care Reviewed With: patient    Overall Patient Progress: no change    Time:1930-0730  Neuros: A&Ox4, cooperative, makes needs known.   Cardiac: WNL, denies chest pain.   Respiratory: WNL on RA, denies SOB.   Pain: managed with prn oxycodone, prn robaxin, Lidocaine patches x2 & scheduled tylenol with relief.   Nausea: Denies N/V   Diet: NPO no exception. Cycled TF at goal rate @ 90 ml/hr with free water flushes.   GI/: Voiding spontaneously. +BS, +flatus, + multiple loose stools.  Skin/Incisions/Drains: R upper back ODIN to bulb suction, flushed/strip drain. L pharyngostomy tube to Low continues suction, flushed with 20 ml flushed Q4 hrs for patency.   LDA: R chest port infusing TKO, IV abx per order.   Labs: Reviewed  Activity: SBA  New Changes this Shift: No acute changes  Plan: Continue POC.

## 2025-04-29 ENCOUNTER — APPOINTMENT (OUTPATIENT)
Dept: PHYSICAL THERAPY | Facility: CLINIC | Age: 70
DRG: 394 | End: 2025-04-29
Payer: MEDICARE

## 2025-04-29 ENCOUNTER — PREP FOR PROCEDURE (OUTPATIENT)
Dept: SURGERY | Facility: CLINIC | Age: 70
End: 2025-04-29

## 2025-04-29 ENCOUNTER — APPOINTMENT (OUTPATIENT)
Dept: GENERAL RADIOLOGY | Facility: CLINIC | Age: 70
DRG: 394 | End: 2025-04-29
Attending: THORACIC SURGERY (CARDIOTHORACIC VASCULAR SURGERY)
Payer: MEDICARE

## 2025-04-29 ENCOUNTER — ANESTHESIA EVENT (OUTPATIENT)
Dept: SURGERY | Facility: CLINIC | Age: 70
End: 2025-04-29
Payer: MEDICARE

## 2025-04-29 ENCOUNTER — ANESTHESIA (OUTPATIENT)
Dept: SURGERY | Facility: CLINIC | Age: 70
End: 2025-04-29
Payer: MEDICARE

## 2025-04-29 DIAGNOSIS — C15.9 MALIGNANT NEOPLASM OF ESOPHAGUS, UNSPECIFIED LOCATION (H): Primary | ICD-10-CM

## 2025-04-29 LAB
ANION GAP SERPL CALCULATED.3IONS-SCNC: 9 MMOL/L (ref 7–15)
BUN SERPL-MCNC: 26.9 MG/DL (ref 8–23)
CALCIUM SERPL-MCNC: 8.7 MG/DL (ref 8.8–10.4)
CHLORIDE SERPL-SCNC: 105 MMOL/L (ref 98–107)
CREAT SERPL-MCNC: 0.73 MG/DL (ref 0.67–1.17)
EGFRCR SERPLBLD CKD-EPI 2021: >90 ML/MIN/1.73M2
ERYTHROCYTE [DISTWIDTH] IN BLOOD BY AUTOMATED COUNT: 14 % (ref 10–15)
GLUCOSE SERPL-MCNC: 132 MG/DL (ref 70–99)
HCO3 SERPL-SCNC: 24 MMOL/L (ref 22–29)
HCT VFR BLD AUTO: 31.6 % (ref 40–53)
HGB BLD-MCNC: 10.1 G/DL (ref 13.3–17.7)
MAGNESIUM SERPL-MCNC: 2 MG/DL (ref 1.7–2.3)
MCH RBC QN AUTO: 27.6 PG (ref 26.5–33)
MCHC RBC AUTO-ENTMCNC: 32 G/DL (ref 31.5–36.5)
MCV RBC AUTO: 86 FL (ref 78–100)
PHOSPHATE SERPL-MCNC: 3.1 MG/DL (ref 2.5–4.5)
PLATELET # BLD AUTO: 390 10E3/UL (ref 150–450)
POTASSIUM SERPL-SCNC: 4 MMOL/L (ref 3.4–5.3)
RBC # BLD AUTO: 3.66 10E6/UL (ref 4.4–5.9)
SODIUM SERPL-SCNC: 138 MMOL/L (ref 135–145)
WBC # BLD AUTO: 12 10E3/UL (ref 4–11)

## 2025-04-29 PROCEDURE — 82310 ASSAY OF CALCIUM: CPT

## 2025-04-29 PROCEDURE — 97530 THERAPEUTIC ACTIVITIES: CPT | Mod: GP | Performed by: PHYSICAL THERAPIST

## 2025-04-29 PROCEDURE — 250N000011 HC RX IP 250 OP 636: Mod: JZ | Performed by: ANESTHESIOLOGY

## 2025-04-29 PROCEDURE — 250N000009 HC RX 250

## 2025-04-29 PROCEDURE — 120N000002 HC R&B MED SURG/OB UMMC

## 2025-04-29 PROCEDURE — 250N000011 HC RX IP 250 OP 636

## 2025-04-29 PROCEDURE — 84100 ASSAY OF PHOSPHORUS: CPT

## 2025-04-29 PROCEDURE — 999N000141 HC STATISTIC PRE-PROCEDURE NURSING ASSESSMENT: Performed by: STUDENT IN AN ORGANIZED HEALTH CARE EDUCATION/TRAINING PROGRAM

## 2025-04-29 PROCEDURE — 83735 ASSAY OF MAGNESIUM: CPT

## 2025-04-29 PROCEDURE — C1769 GUIDE WIRE: HCPCS | Performed by: STUDENT IN AN ORGANIZED HEALTH CARE EDUCATION/TRAINING PROGRAM

## 2025-04-29 PROCEDURE — 360N000075 HC SURGERY LEVEL 2, PER MIN: Performed by: STUDENT IN AN ORGANIZED HEALTH CARE EDUCATION/TRAINING PROGRAM

## 2025-04-29 PROCEDURE — 999N000179 XR SURGERY CARM FLUORO LESS THAN 5 MIN W STILLS

## 2025-04-29 PROCEDURE — 710N000009 HC RECOVERY PHASE 1, LEVEL 1, PER MIN: Performed by: STUDENT IN AN ORGANIZED HEALTH CARE EDUCATION/TRAINING PROGRAM

## 2025-04-29 PROCEDURE — 370N000017 HC ANESTHESIA TECHNICAL FEE, PER MIN: Performed by: STUDENT IN AN ORGANIZED HEALTH CARE EDUCATION/TRAINING PROGRAM

## 2025-04-29 PROCEDURE — 0DJ08ZZ INSPECTION OF UPPER INTESTINAL TRACT, VIA NATURAL OR ARTIFICIAL OPENING ENDOSCOPIC: ICD-10-PCS | Performed by: STUDENT IN AN ORGANIZED HEALTH CARE EDUCATION/TRAINING PROGRAM

## 2025-04-29 PROCEDURE — 250N000009 HC RX 250: Performed by: ANESTHESIOLOGY

## 2025-04-29 PROCEDURE — 250N000025 HC SEVOFLURANE, PER MIN: Performed by: STUDENT IN AN ORGANIZED HEALTH CARE EDUCATION/TRAINING PROGRAM

## 2025-04-29 PROCEDURE — 36415 COLL VENOUS BLD VENIPUNCTURE: CPT

## 2025-04-29 PROCEDURE — 43235 EGD DIAGNOSTIC BRUSH WASH: CPT | Mod: 78 | Performed by: STUDENT IN AN ORGANIZED HEALTH CARE EDUCATION/TRAINING PROGRAM

## 2025-04-29 PROCEDURE — 85014 HEMATOCRIT: CPT

## 2025-04-29 PROCEDURE — 272N000001 HC OR GENERAL SUPPLY STERILE: Performed by: STUDENT IN AN ORGANIZED HEALTH CARE EDUCATION/TRAINING PROGRAM

## 2025-04-29 PROCEDURE — 258N000003 HC RX IP 258 OP 636: Performed by: ANESTHESIOLOGY

## 2025-04-29 PROCEDURE — 250N000013 HC RX MED GY IP 250 OP 250 PS 637

## 2025-04-29 PROCEDURE — 999N000111 HC STATISTIC OT IP EVAL DEFER

## 2025-04-29 PROCEDURE — 250N000013 HC RX MED GY IP 250 OP 250 PS 637: Performed by: STUDENT IN AN ORGANIZED HEALTH CARE EDUCATION/TRAINING PROGRAM

## 2025-04-29 RX ORDER — CEFAZOLIN SODIUM/WATER 2 G/20 ML
2 SYRINGE (ML) INTRAVENOUS
Status: DISCONTINUED | OUTPATIENT
Start: 2025-04-29 | End: 2025-04-29 | Stop reason: HOSPADM

## 2025-04-29 RX ORDER — ONDANSETRON 2 MG/ML
4 INJECTION INTRAMUSCULAR; INTRAVENOUS EVERY 30 MIN PRN
Status: DISCONTINUED | OUTPATIENT
Start: 2025-04-29 | End: 2025-04-29 | Stop reason: HOSPADM

## 2025-04-29 RX ORDER — FENTANYL CITRATE 50 UG/ML
INJECTION, SOLUTION INTRAMUSCULAR; INTRAVENOUS PRN
Status: DISCONTINUED | OUTPATIENT
Start: 2025-04-29 | End: 2025-04-29

## 2025-04-29 RX ORDER — DEXAMETHASONE SODIUM PHOSPHATE 4 MG/ML
INJECTION, SOLUTION INTRA-ARTICULAR; INTRALESIONAL; INTRAMUSCULAR; INTRAVENOUS; SOFT TISSUE PRN
Status: DISCONTINUED | OUTPATIENT
Start: 2025-04-29 | End: 2025-04-29

## 2025-04-29 RX ORDER — ONDANSETRON 4 MG/1
4 TABLET, ORALLY DISINTEGRATING ORAL EVERY 30 MIN PRN
Status: DISCONTINUED | OUTPATIENT
Start: 2025-04-29 | End: 2025-04-29 | Stop reason: HOSPADM

## 2025-04-29 RX ORDER — LIDOCAINE HYDROCHLORIDE 20 MG/ML
INJECTION, SOLUTION INFILTRATION; PERINEURAL PRN
Status: DISCONTINUED | OUTPATIENT
Start: 2025-04-29 | End: 2025-04-29

## 2025-04-29 RX ORDER — NALOXONE HYDROCHLORIDE 0.4 MG/ML
0.1 INJECTION, SOLUTION INTRAMUSCULAR; INTRAVENOUS; SUBCUTANEOUS
Status: DISCONTINUED | OUTPATIENT
Start: 2025-04-29 | End: 2025-04-29 | Stop reason: HOSPADM

## 2025-04-29 RX ORDER — SODIUM CHLORIDE, SODIUM LACTATE, POTASSIUM CHLORIDE, CALCIUM CHLORIDE 600; 310; 30; 20 MG/100ML; MG/100ML; MG/100ML; MG/100ML
INJECTION, SOLUTION INTRAVENOUS CONTINUOUS PRN
Status: DISCONTINUED | OUTPATIENT
Start: 2025-04-29 | End: 2025-04-29

## 2025-04-29 RX ORDER — ENOXAPARIN SODIUM 100 MG/ML
40 INJECTION SUBCUTANEOUS
OUTPATIENT
Start: 2025-04-29

## 2025-04-29 RX ORDER — HYDROMORPHONE HCL IN WATER/PF 6 MG/30 ML
0.2 PATIENT CONTROLLED ANALGESIA SYRINGE INTRAVENOUS EVERY 5 MIN PRN
Status: DISCONTINUED | OUTPATIENT
Start: 2025-04-29 | End: 2025-04-29 | Stop reason: HOSPADM

## 2025-04-29 RX ORDER — VASOPRESSIN IN 0.9 % NACL 2 UNIT/2ML
SYRINGE (ML) INTRAVENOUS PRN
Status: DISCONTINUED | OUTPATIENT
Start: 2025-04-29 | End: 2025-04-29

## 2025-04-29 RX ORDER — DEXAMETHASONE SODIUM PHOSPHATE 4 MG/ML
4 INJECTION, SOLUTION INTRA-ARTICULAR; INTRALESIONAL; INTRAMUSCULAR; INTRAVENOUS; SOFT TISSUE
Status: DISCONTINUED | OUTPATIENT
Start: 2025-04-29 | End: 2025-04-29 | Stop reason: HOSPADM

## 2025-04-29 RX ORDER — FENTANYL CITRATE 50 UG/ML
25 INJECTION, SOLUTION INTRAMUSCULAR; INTRAVENOUS EVERY 5 MIN PRN
Status: DISCONTINUED | OUTPATIENT
Start: 2025-04-29 | End: 2025-04-29 | Stop reason: HOSPADM

## 2025-04-29 RX ORDER — LOPERAMIDE HCL 1 MG/7.5ML
2 SOLUTION ORAL 2 TIMES DAILY
Status: DISCONTINUED | OUTPATIENT
Start: 2025-04-29 | End: 2025-05-01

## 2025-04-29 RX ORDER — ENOXAPARIN SODIUM 100 MG/ML
40 INJECTION SUBCUTANEOUS
Status: COMPLETED | OUTPATIENT
Start: 2025-04-29 | End: 2025-04-29

## 2025-04-29 RX ORDER — CEFAZOLIN SODIUM/WATER 2 G/20 ML
2 SYRINGE (ML) INTRAVENOUS SEE ADMIN INSTRUCTIONS
Status: DISCONTINUED | OUTPATIENT
Start: 2025-04-29 | End: 2025-04-29 | Stop reason: HOSPADM

## 2025-04-29 RX ORDER — ONDANSETRON 2 MG/ML
INJECTION INTRAMUSCULAR; INTRAVENOUS PRN
Status: DISCONTINUED | OUTPATIENT
Start: 2025-04-29 | End: 2025-04-29

## 2025-04-29 RX ORDER — SODIUM CHLORIDE, SODIUM LACTATE, POTASSIUM CHLORIDE, CALCIUM CHLORIDE 600; 310; 30; 20 MG/100ML; MG/100ML; MG/100ML; MG/100ML
INJECTION, SOLUTION INTRAVENOUS CONTINUOUS
Status: DISCONTINUED | OUTPATIENT
Start: 2025-04-29 | End: 2025-04-29 | Stop reason: HOSPADM

## 2025-04-29 RX ORDER — FENTANYL CITRATE 50 UG/ML
50 INJECTION, SOLUTION INTRAMUSCULAR; INTRAVENOUS EVERY 5 MIN PRN
Status: DISCONTINUED | OUTPATIENT
Start: 2025-04-29 | End: 2025-04-29 | Stop reason: HOSPADM

## 2025-04-29 RX ORDER — METHOCARBAMOL 750 MG/1
750 TABLET, FILM COATED ORAL 4 TIMES DAILY
Status: DISCONTINUED | OUTPATIENT
Start: 2025-04-29 | End: 2025-05-02 | Stop reason: HOSPADM

## 2025-04-29 RX ORDER — HYDROMORPHONE HCL IN WATER/PF 6 MG/30 ML
0.4 PATIENT CONTROLLED ANALGESIA SYRINGE INTRAVENOUS EVERY 5 MIN PRN
Status: DISCONTINUED | OUTPATIENT
Start: 2025-04-29 | End: 2025-04-29 | Stop reason: HOSPADM

## 2025-04-29 RX ORDER — PROPOFOL 10 MG/ML
INJECTION, EMULSION INTRAVENOUS PRN
Status: DISCONTINUED | OUTPATIENT
Start: 2025-04-29 | End: 2025-04-29

## 2025-04-29 RX ADMIN — PHENYLEPHRINE HYDROCHLORIDE 200 MCG: 10 INJECTION INTRAVENOUS at 13:55

## 2025-04-29 RX ADMIN — PROPOFOL 200 MG: 10 INJECTION, EMULSION INTRAVENOUS at 13:30

## 2025-04-29 RX ADMIN — ACETAMINOPHEN 975 MG: 325 TABLET, FILM COATED ORAL at 20:10

## 2025-04-29 RX ADMIN — OXYCODONE HYDROCHLORIDE 5 MG: 5 SOLUTION ORAL at 02:23

## 2025-04-29 RX ADMIN — LIDOCAINE HYDROCHLORIDE 100 MG: 20 INJECTION, SOLUTION INFILTRATION; PERINEURAL at 13:30

## 2025-04-29 RX ADMIN — Medication 1 UNITS: at 13:44

## 2025-04-29 RX ADMIN — Medication 120 ML: at 20:18

## 2025-04-29 RX ADMIN — Medication 100 MG: at 14:08

## 2025-04-29 RX ADMIN — TOPICAL ANESTHETIC 0.5 ML: 200 SPRAY DENTAL; PERIODONTAL at 09:39

## 2025-04-29 RX ADMIN — PHENYLEPHRINE HYDROCHLORIDE 200 MCG: 10 INJECTION INTRAVENOUS at 13:35

## 2025-04-29 RX ADMIN — PHENYLEPHRINE HYDROCHLORIDE 200 MCG: 10 INJECTION INTRAVENOUS at 13:31

## 2025-04-29 RX ADMIN — PHENYLEPHRINE HYDROCHLORIDE 200 MCG: 10 INJECTION INTRAVENOUS at 13:33

## 2025-04-29 RX ADMIN — Medication 2 G: at 13:31

## 2025-04-29 RX ADMIN — ACETAMINOPHEN 975 MG: 325 TABLET, FILM COATED ORAL at 03:39

## 2025-04-29 RX ADMIN — GABAPENTIN 300 MG: 250 SOLUTION ORAL at 08:26

## 2025-04-29 RX ADMIN — OXYCODONE HYDROCHLORIDE 10 MG: 5 SOLUTION ORAL at 21:22

## 2025-04-29 RX ADMIN — OXYCODONE HYDROCHLORIDE 5 MG: 5 SOLUTION ORAL at 04:04

## 2025-04-29 RX ADMIN — METHOCARBAMOL 750 MG: 750 TABLET ORAL at 08:16

## 2025-04-29 RX ADMIN — LIDOCAINE 4% 2 PATCH: 40 PATCH TOPICAL at 03:45

## 2025-04-29 RX ADMIN — Medication 15 ML: at 08:16

## 2025-04-29 RX ADMIN — LOPERAMIDE HYDROCHLORIDE 2 MG: 2 SOLUTION ORAL at 08:16

## 2025-04-29 RX ADMIN — ACETAMINOPHEN 975 MG: 325 TABLET, FILM COATED ORAL at 08:16

## 2025-04-29 RX ADMIN — ATENOLOL 50 MG: 25 TABLET ORAL at 20:09

## 2025-04-29 RX ADMIN — PROPOFOL 20 MG: 10 INJECTION, EMULSION INTRAVENOUS at 14:04

## 2025-04-29 RX ADMIN — OXYCODONE HYDROCHLORIDE 5 MG: 5 SOLUTION ORAL at 17:04

## 2025-04-29 RX ADMIN — TOPICAL ANESTHETIC 0.5 ML: 200 SPRAY DENTAL; PERIODONTAL at 17:04

## 2025-04-29 RX ADMIN — METHOCARBAMOL 750 MG: 750 TABLET ORAL at 02:23

## 2025-04-29 RX ADMIN — PHENYLEPHRINE HYDROCHLORIDE 100 MCG: 10 INJECTION INTRAVENOUS at 13:38

## 2025-04-29 RX ADMIN — SODIUM CHLORIDE, SODIUM LACTATE, POTASSIUM CHLORIDE, AND CALCIUM CHLORIDE: .6; .31; .03; .02 INJECTION, SOLUTION INTRAVENOUS at 13:16

## 2025-04-29 RX ADMIN — GABAPENTIN 300 MG: 250 SOLUTION ORAL at 20:10

## 2025-04-29 RX ADMIN — Medication 50 MG: at 13:30

## 2025-04-29 RX ADMIN — OXYCODONE HYDROCHLORIDE 5 MG: 5 SOLUTION ORAL at 06:36

## 2025-04-29 RX ADMIN — TOPICAL ANESTHETIC 0.5 ML: 200 SPRAY DENTAL; PERIODONTAL at 02:51

## 2025-04-29 RX ADMIN — OXYCODONE HYDROCHLORIDE 5 MG: 5 SOLUTION ORAL at 10:37

## 2025-04-29 RX ADMIN — MIDAZOLAM 2 MG: 1 INJECTION INTRAMUSCULAR; INTRAVENOUS at 13:29

## 2025-04-29 RX ADMIN — ENOXAPARIN SODIUM 40 MG: 40 INJECTION SUBCUTANEOUS at 12:05

## 2025-04-29 RX ADMIN — DEXAMETHASONE SODIUM PHOSPHATE 8 MG: 4 INJECTION, SOLUTION INTRA-ARTICULAR; INTRALESIONAL; INTRAMUSCULAR; INTRAVENOUS; SOFT TISSUE at 13:49

## 2025-04-29 RX ADMIN — Medication 1 UNITS: at 14:11

## 2025-04-29 RX ADMIN — CYANOCOBALAMIN TAB 500 MCG 500 MCG: 500 TAB at 08:16

## 2025-04-29 RX ADMIN — METHOCARBAMOL 750 MG: 750 TABLET ORAL at 20:09

## 2025-04-29 RX ADMIN — NORTRIPTYLINE HYDROCHLORIDE 25 MG: 10 SOLUTION ORAL at 20:09

## 2025-04-29 RX ADMIN — FENTANYL CITRATE 100 MCG: 50 INJECTION INTRAMUSCULAR; INTRAVENOUS at 13:30

## 2025-04-29 RX ADMIN — LOPERAMIDE HYDROCHLORIDE 2 MG: 2 SOLUTION ORAL at 20:09

## 2025-04-29 RX ADMIN — ONDANSETRON 4 MG: 2 INJECTION INTRAMUSCULAR; INTRAVENOUS at 13:49

## 2025-04-29 ASSESSMENT — ACTIVITIES OF DAILY LIVING (ADL)
ADLS_ACUITY_SCORE: 40
ADLS_ACUITY_SCORE: 40
ADLS_ACUITY_SCORE: 47
ADLS_ACUITY_SCORE: 40
ADLS_ACUITY_SCORE: 47
ADLS_ACUITY_SCORE: 40
ADLS_ACUITY_SCORE: 40
ADLS_ACUITY_SCORE: 44
ADLS_ACUITY_SCORE: 40
ADLS_ACUITY_SCORE: 44
ADLS_ACUITY_SCORE: 47
ADLS_ACUITY_SCORE: 44
ADLS_ACUITY_SCORE: 40
ADLS_ACUITY_SCORE: 40
ADLS_ACUITY_SCORE: 44
ADLS_ACUITY_SCORE: 44
ADLS_ACUITY_SCORE: 40
ADLS_ACUITY_SCORE: 47
ADLS_ACUITY_SCORE: 40
ADLS_ACUITY_SCORE: 40
ADLS_ACUITY_SCORE: 47

## 2025-04-29 ASSESSMENT — LIFESTYLE VARIABLES: TOBACCO_USE: 0

## 2025-04-29 NOTE — ANESTHESIA PREPROCEDURE EVALUATION
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 Esophagectomy on 3/24     Past Medical History:   Diagnosis Date    Hypertension     Neuritis     Peripheral neuropathy     Personal history of other medical treatment     postgastrectomy dumping syndrome    Stomach problems Noted earlier      Past Surgical History:   Procedure Laterality Date    ABDOMEN SURGERY  ?    APPENDECTOMY  ?    BRONCHOSCOPY RIDID OR FLEXIBLE W/ENDOBRONCHIAL ULTRASOUND GUIDED 3 OR MORE NODE STATIONS N/A 3/19/2025    Procedure: Bronchoscopy Ridid Or Flexible W/Endobronchial Ultrasound Guided 3 Or More Node Stations;  Surgeon: Saad Olmedo MD;  Location:  GI    COLONOSCOPY  ,     COMBINED ESOPHAGOSCOPY, GASTROSCOPY, DUODENOSCOPY (EGD), REPLACE ESOPHAGEAL STENT Left 2025    Procedure: ESOPHAGOGASTRODUODENOSCOPY with pharyngostomy tube placement;  Surgeon: Erik Lindsey MD;  Location:  OR    DAVINCI NISSEN FUNDOPLICATION N/A 2025    Procedure: TAKE-DOWN, FUNDOPLICATION, NISSEN, LAPAROSCOPIC- ROBOTIC, gastrostomy takedown, lysisof adhesions 2 hr;  Surgeon: Katlyn Tobar MD;  Location:  OR    ESOPHAGEAL BALLOON PROVOCATION STUDY N/A 2024    Procedure: Esophageal Balloon Provocation Study;  Surgeon: Carson Michel DO;  Location:  GI    ESOPHAGECTOMY MINIMALLY INVASIVE N/A 3/24/2025    Procedure: ESOPHAGECTOMY, MINIMALLY INVASIVE, Laparoscopic Right Thorascopic Esophagectomy, Botox Injection;  Surgeon:  Katlyn Tobar MD;  Location: [facility] OR    ESOPHAGOSCOPY, GASTROSCOPY, DUODENOSCOPY (EGD), COMBINED N/A 9/24/2024    Procedure: ESOPHAGOGASTRODUODENOSCOPY, WITH BIOPSY;  Surgeon: Carson Michel DO;  Location: [facility] GI    ESOPHAGOSCOPY, GASTROSCOPY, DUODENOSCOPY (EGD), COMBINED N/A 10/8/2024    Procedure: ESOPHAGOGASTRODUODENOSCOPY, WITH FINE NEEDLE ASPIRATION BIOPSY, WITH ENDOSCOPIC ULTRASOUND GUIDANCE;  Surgeon: Lance Lopez MD;  Location: [facility] GI    ESOPHAGOSCOPY, GASTROSCOPY, DUODENOSCOPY (EGD), COMBINED N/A 2/26/2025    Procedure: Esophagoscopy, gastroscopy, duodenoscopy (EGD);  Surgeon: Katlyn Tobar MD;  Location: [facility] OR    ESOPHAGOSCOPY, GASTROSCOPY, DUODENOSCOPY (EGD), COMBINED N/A 3/24/2025    Procedure: Esophagoscopy, gastroscopy, duodenoscopy (EGD), combined;  Surgeon: Katlyn Tobar MD;  Location: [facility] OR    ESOPHAGOSCOPY, GASTROSCOPY, DUODENOSCOPY (EGD), COMBINED N/A 3/28/2025    Procedure: ESOPHAGOGASTRODUODENOSCOPY, pharyngostomy tube replacement;  Surgeon: Chino Gardner MD;  Location: [facility] OR    EYE SURGERY  199x    laser for retinal tears    GASTRIC FUNDOPLICATION      HERNIA REPAIR  1961?    IR CHEST PORT PLACEMENT > 5 YRS OF AGE  10/24/2024    LAPAROSCOPIC ASSISTED INSERTION TUBE JEJUNOSTOMY N/A 2/26/2025    Procedure: Laparoscopic assisted insertion tube jejunostomy;  Surgeon: Katlyn Tobar MD;  Location: [facility] OR    LAPAROSCOPIC TAKEDOWN NISSEN FUNDOPLICATION N/A 9/25/2020    Procedure: TAKE-DOWN, FUNDOPLICATION, REDO NISSEN, LAPAROSCOPIC, gastrostomy feeding tube placement;  Surgeon: Katlyn Tobar MD;  Location: [facility] OR    ME ESOPHAGOGASTRODUODENOSCOPY TRANSORAL DIAGNOSTIC N/A 5/9/2019    Procedure: UPPER GASTROINTESTINAL ENDOSCOPY;  Surgeon: Dino Ward MD;  Location: [facility];  Service: General    SOFT TISSUE SURGERY  2009?    MCL l. thumb      Allergies   Allergen Reactions    Hornets     Wasp Venom Protein Hives    Bee Venom Swelling      Social History     Tobacco  Use    Smoking status: Never     Passive exposure: Past    Smokeless tobacco: Never   Substance Use Topics    Alcohol use: Not Currently      Wt Readings from Last 1 Encounters:   04/27/25 75.2 kg (165 lb 12.6 oz)        Anesthesia Evaluation   Pt has had prior anesthetic.     No history of anesthetic complications       ROS/MED HX  ENT/Pulmonary:     (+)     JESSICA risk factors, snores loudly, hypertension,                              (-) tobacco use, asthma and recent URI   Neurologic:  - neg neurologic ROS     Cardiovascular:     (+)  hypertension- -   -  - -                                 Previous cardiac testing   Echo: Date: 2/6/25 Results:  See H&P  Stress Test:  Date: Results:    ECG Reviewed:  Date: 12/2024 Results:  Sinus tachycardia   Low voltage QRS   Borderline ECG   Unconfirmed report - interpretation of this ECG is computer generated - see medical record for final interpretation     Cath:  Date: Results:      METS/Exercise Tolerance: 4 - Raking leaves, gardening Comment: Since surgery two weeks ago, activity has been limited - walking about 2 blocks at a time. Prior to surgery in February was walking 10,000-12,000 steps, skiing. Denies CHAWLA or CP   Hematologic: Comments: Thrombocytopenia   (-) history of blood clots, anemia and history of blood transfusion   Musculoskeletal:  - neg musculoskeletal ROS     GI/Hepatic: Comment: J-tube    (+) GERD,     hiatal hernia,           (-) liver disease   Renal/Genitourinary:  - neg Renal ROS     Endo:  - neg endo ROS     Psychiatric/Substance Use:  - neg psychiatric ROS     Infectious Disease:  - neg infectious disease ROS     Malignancy:   (+) Malignancy, History of GI.GI CA  Active status post Chemo.      Other:            Physical Exam    Airway        Mallampati: III   TM distance: > 3 FB   Neck ROM: full   Mouth opening: > 3 cm    Respiratory Devices and Support         Dental       (+) Modest Abnormalities - crowns, retainers, 1 or 2 missing  teeth      Cardiovascular   cardiovascular exam normal          Pulmonary   pulmonary exam normal                OUTSIDE LABS:  CBC:   Lab Results   Component Value Date    WBC 12.0 (H) 04/29/2025    WBC 10.3 04/28/2025    HGB 10.1 (L) 04/29/2025    HGB 10.3 (L) 04/28/2025    HCT 31.6 (L) 04/29/2025    HCT 32.6 (L) 04/28/2025     04/29/2025     04/28/2025     BMP:   Lab Results   Component Value Date     04/29/2025     04/28/2025    POTASSIUM 4.0 04/29/2025    POTASSIUM 4.4 04/28/2025    CHLORIDE 105 04/29/2025    CHLORIDE 109 (H) 04/28/2025    CO2 24 04/29/2025    CO2 22 04/28/2025    BUN 26.9 (H) 04/29/2025    BUN 31.2 (H) 04/28/2025    CR 0.73 04/29/2025    CR 0.81 04/28/2025     (H) 04/29/2025     (H) 04/28/2025     COAGS:   Lab Results   Component Value Date    INR 1.14 04/17/2025     POC:   Lab Results   Component Value Date     (H) 10/28/2020     HEPATIC:   Lab Results   Component Value Date    ALBUMIN 3.7 04/26/2025    PROTTOTAL 7.9 04/26/2025    ALT 18 04/26/2025    AST 23 04/26/2025    ALKPHOS 108 04/26/2025    BILITOTAL 0.2 04/26/2025     OTHER:   Lab Results   Component Value Date    PH 7.35 03/24/2025    LACT 2.4 (H) 03/26/2025    A1C 6.0 (H) 01/17/2024    AUGUSTIN 8.7 (L) 04/29/2025    PHOS 3.1 04/29/2025    MAG 2.0 04/29/2025    LIPASE 68 (H) 04/26/2025    TSH 1.75 12/16/2024       Anesthesia Plan    ASA Status:  3, emergent    NPO Status:  ELEVATED Aspiration Risk/Unknown    Anesthesia Type: General.     - Airway: ETT   Induction: RSI.   Maintenance: Balanced.        Consents    Anesthesia Plan(s) and associated risks, benefits, and realistic alternatives discussed. Questions answered and patient/representative(s) expressed understanding.     - Discussed: Risks, Benefits and Alternatives for BOTH SEDATION and the PROCEDURE were discussed     - Discussed with:  Patient      - Extended Intubation/Ventilatory Support Discussed: No.      - Patient is DNR/DNI  Status: No     Use of blood products discussed: No .     Postoperative Care    Pain management: IV analgesics.   PONV prophylaxis: Ondansetron (or other 5HT-3), Dexamethasone or Solumedrol     Comments:               Manuel Moyer MD    Clinically Significant Risk Factors          # Hyperchloremia: Highest Cl = 109 mmol/L in last 2 days, will monitor as appropriate              # Hypertension: Noted on problem list             # Moderate Malnutrition: based on nutrition assessment and treatment provided per dietitian's recommendations., PRESENT ON ADMISSION   # Financial/Environmental Concerns: none

## 2025-04-29 NOTE — PLAN OF CARE
Goal Outcome Evaluation:      Plan of Care Reviewed With: patient    Overall Patient Progress: improving    Outcome Evaluation: Patient went to OR this shift. ODIN and pharyngostomy tube irrigated. Right chest port infusing TKO. Pain 4/10, relief with oxycodone 5 mg. TF at 90/hour. LBM 4/29 and voiding spontaneously. Continue to monitor.

## 2025-04-29 NOTE — PLAN OF CARE
Occupational Therapy: Orders received. Chart reviewed and discussed with care team.? Occupational Therapy not indicated per conversation with PT, patient with no IP OT related concerns, pt mobilizing/completing BADLs with SBA.  Defer discharge recommendations to PT and medical team.? Will complete orders.

## 2025-04-29 NOTE — OP NOTE
[facility]  Operative Note    Pre-operative diagnosis: Anastomotic leak from esophagus [K91.89]   Post-operative diagnosis * No post-op diagnosis entered *   Procedure: Procedure(s):  Esophagoscopy, gastroscopy, duodenoscopy (EGD), fluroscopy, revision of pharnygoscopy tube   Surgeon: Katlyn Tobar MD   Assistants(s):    Anesthesia: General    Estimated blood loss: None    Total IV fluids: (See anesthesia record)   Blood transfusion: No transfusion was given during surgery   Total urine output: (See anesthesia record)   Drains: None  pharyngoostomy   Specimens: None   Implants: None     Findings:   None.   Complications: None.   Condition: Stable               Description of procedure:  After obtaining informed consent the patient was brought to the operating room and laid supine on the operating table after induction of general anesthesia and introduction of an endotracheal tube and upper endoscopy was performed after pulling back the pharyngostomy.  I noted that there was still a defect leading to a cavity.  The cavity was cleaned and the pigtail catheter was visible and was cavity.  Using fluoroscopy and a 260 superstiff Amplatz wire the 5 ostomy was repositioned into the cavity and secured in place.  The patient tolerated the procedure well

## 2025-04-29 NOTE — PROGRESS NOTES
THORACIC & FOREGUT SURGERY    S:  Continues to have some pain from ODIN drain.  Improved with pain medications. Pt seen at bedside resting comfortably. Diarrhea improving but still had several episodes yesterday.      O:  Vitals:    04/28/25 0720 04/28/25 1459 04/28/25 2201 04/29/25 0548   BP: 136/79 121/88 110/78 123/79   BP Location: Left arm Left arm Left arm Right arm   Pulse: 80 98 68 74   Resp: 18 18 20 18   Temp: 98.1  F (36.7  C) 98.1  F (36.7  C) 98.8  F (37.1  C) 99  F (37.2  C)   TempSrc: Oral Oral Oral Oral   SpO2: 100% 97% 96% 100%   Weight:       Height:           A&O, NAD  Breathing non-labored  Appears well perfused  Soft, NDNT  Distal extremities warm.      Thin bilious drainage from ODIN drain.  Blood-tinged bilious drainage from pharyngostomy tube.     Fluid flushed through ODIN drain was immediately drained from pharyngostomy tube.     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 Esophagectomy on 3/24. EGD and intraoperative esophagram confirming no leak on 3/28 and later passed methylene blue challenge further confirming no leak on 4/2, however had a rising WBC after initiation of CLD. 4/3 CT esophagram confirmed anastomotic leak. 4/7 EGD with left chest tube placement which and was safely discharged on 4/9. In clinic on 4/17 CT showed leak of oral contrast into right pleural space, readmitted 4/17-23. Now re-admitted d/t leukocytosis, fatigue, weakness. CT scan without new collections or signs of infection related to his prior esophagectomy. He has been taking Augmentin and fluconazole.     Antibiotics discontinued yesterday  and loperamide ordered.     -Plan to pull reposition PGT today in OR  -Continue low dose immodium  -Continue increased Free water flushes  -Continue to monitor WBC daily  -Continue ODIN drain and pharyngostomy tube  -Possible dispo in 1-3 days depending on WBC and hydration status    Clinically Significant Risk Factors          # Hyperchloremia: Highest Cl = 109 mmol/L in last 2 days, will monitor as appropriate              # Hypertension: Noted on problem list             # Moderate Malnutrition: based on nutrition assessment and treatment provided per dietitian's recommendations., PRESENT ON ADMISSION   # Financial/Environmental Concerns: none         Patient seen and discussed with staff.    Marleny Avitia PA-C    Thoracic and Foregut Surgery  Text page Thoracic Surgery via [facility] Paging/Directory

## 2025-04-29 NOTE — PLAN OF CARE
Goal Outcome Evaluation:      Plan of Care Reviewed With: patient    Overall Patient Progress: no changeOverall Patient Progress: no change     Temp: 98.8  F (37.1  C) Temp src: Oral BP: 110/78 Pulse: 68   Resp: 20 SpO2: 96 % O2 Device: None (Room air)       Pt is A&O x4, on RA, VSS. Pain managed with oxycodone + scheduled meds. C/O heartburn managed with Tums. NPO no exceptions. Receiving free water flushes through J tube. On cycled TF from 6p-10am. Up with SBA. L Pharyngostomy tube to LCS; irrigated per orders. ODIN drain on R back, flushed per orders. R chest port SL.

## 2025-04-29 NOTE — ANESTHESIA POSTPROCEDURE EVALUATION
Patient: Bal Junior    Procedure: Procedure(s):  Esophagoscopy, gastroscopy, duodenoscopy (EGD), fluroscopy, revision of pharnygoscopy tube       Anesthesia Type:  General    Note:  Disposition: Admission   Postop Pain Control: Uneventful            Sign Out: Well controlled pain   PONV: No   Neuro/Psych: Uneventful            Sign Out: Acceptable/Baseline neuro status   Airway/Respiratory: Uneventful            Sign Out: Acceptable/Baseline resp. status   CV/Hemodynamics: Uneventful            Sign Out: Acceptable CV status; No obvious hypovolemia; No obvious fluid overload   Other NRE: NONE   DID A NON-ROUTINE EVENT OCCUR? No           Last vitals:  Vitals Value Taken Time   /76 04/29/25 1515   Temp 36.3  C (97.4  F) 04/29/25 1500   Pulse 80 04/29/25 1524   Resp 18 04/29/25 1524   SpO2 100 % 04/29/25 1524   Vitals shown include unfiled device data.    Electronically Signed By: Manuel Moyer MD  April 29, 2025  3:25 PM

## 2025-04-29 NOTE — PLAN OF CARE
Assumed cares: [id number]  Neuro: A&Ox4.   Cardiac: WNL   Respiratory: RA  GI/: Adequate urine output. BM X 0 on this shift.   Diet/appetite: NPO with no exception. Cycled TF at 90 ml/hr from 1800-10:00 am with FWF at 150 ml Q3hrs.   Activity:  Assist of 1.   Pain: At acceptable level on current regimen. C/o of back pain , PRN oxycodone and Prn robaxin given x1 each.   Skin: No new deficits noted. L pharyngostomy LIS, irrigated per order, right ODIN drain.   LDA's: right port, SL    Plan: Continue with POC. Notify primary team with changes.   Problem: Adult Inpatient Plan of Care  Goal: Plan of Care Review  Description: The Plan of Care Review/Shift note should be completed every shift.  The Outcome Evaluation is a brief statement about your assessment that the patient is improving, declining, or no change.  This information will be displayed automatically on your shiftnote.  Outcome: Progressing  Flowsheets (Taken 4/29/2025 0202)  Plan of Care Reviewed With: patient  Overall Patient Progress: no change   Goal Outcome Evaluation:      Plan of Care Reviewed With: patient    Overall Patient Progress: no changeOverall Patient Progress: no change

## 2025-04-29 NOTE — PLAN OF CARE
Goal Outcome Evaluation:                         Assumed cares: [id number]  Neuro: A&Ox4.   Cardiac: WNL   Respiratory: RA  GI/: Adequate urine output. BM X 0 on this shift.   Diet/appetite: NPO with no exception. Cycled TF at 90 ml/hr from 1800-10:00 am with FWF at 150 ml Q3hrs.   Activity:  SBA.   Pain:  C/o of back pain , PRN oxycodone x 2 given, lidocaine patches placed.   Skin: No new deficits noted. L pharyngostomy LCS, irrigated per order, right ODIN drain.   LDA's: right port, SL

## 2025-04-29 NOTE — ANESTHESIA CARE TRANSFER NOTE
Patient: Bal Junior    Procedure: Procedure(s):  Esophagoscopy, gastroscopy, duodenoscopy (EGD), fluroscopy, revision of pharnygoscopy tube       Diagnosis: Anastomotic leak from esophagus [K91.89]  Diagnosis Additional Information: No value filed.    Anesthesia Type:   General     Note:    Oropharynx: oral airway in place (90mmOPA)  Level of Consciousness: awake and drowsy  Oxygen Supplementation: face mask  Level of Supplemental Oxygen (L/min / FiO2): 8  Independent Airway: airway patency satisfactory and stable    Vital Signs Stable: post-procedure vital signs reviewed and stable  Report to RN Given: handoff report given  Patient transferred to: PACU    Handoff Report: Identifed the Patient, Identified the Reponsible Provider, Reviewed the pertinent medical history, Discussed the surgical course, Reviewed Intra-OP anesthesia mangement and issues during anesthesia, Set expectations for post-procedure period and Allowed opportunity for questions and acknowledgement of understanding      Vitals:  Vitals Value Taken Time   /87 04/29/25 1427   Temp     Pulse 78 04/29/25 1428   Resp 20 04/29/25 1428   SpO2 100 % 04/29/25 1428   Vitals shown include unfiled device data.    Electronically Signed By: ROSY Deleon CRNA  April 29, 2025  2:30 PM

## 2025-04-29 NOTE — ANESTHESIA PROCEDURE NOTES
Airway       Patient location during procedure: OR       Procedure Start/Stop Times: 4/29/2025 1:32 PM and 4/29/2025 1:32 PM  Staff -        CRNA: Raven Jones APRN CRNA       Performed By: CRNA  Consent for Airway        Urgency: elective  Indications and Patient Condition       Indications for airway management: suki-procedural       Induction type:RSI       Mask difficulty assessment: 0 - not attempted    Final Airway Details       Final airway type: endotracheal airway       Successful airway: ETT - single  Endotracheal Airway Details        ETT size (mm): 7.5       Cuffed: yes       Cuff volume (mL): 9       Successful intubation technique: direct laryngoscopy       DL Blade Type: Ho 2       Grade View of Cords: 1       Adjucts: stylet       Position: Right       Measured from: gums/teeth       Secured at (cm): 23       Bite block used: None    Post intubation assessment        Placement verified by: capnometry        Number of attempts at approach: 1       Number of other approaches attempted: 0       Secured with: tape       Ease of procedure: easy       Dentition: Intact    Medication(s) Administered   Medication Administration Time: 4/29/2025 1:32 PM

## 2025-04-29 NOTE — BRIEF OP NOTE
[facility]    Brief Operative Note    Pre-operative diagnosis: Anastomotic leak from esophagus [K91.89]  Post-operative diagnosis Same as pre-operative diagnosis    Procedure: Esophagoscopy, gastroscopy, duodenoscopy (EGD), fluroscopy, revision of pharnygoscopy tube, N/A - Esophagus    Surgeon: Surgeons and Role:     * Katlyn Tobar MD - Primary  Anesthesia: General   Estimated Blood Loss: None    Drains: pharyngostomy  Specimens: * No specimens in log *  Findings:   Cavity as previously noted .  Complications: None.  Implants: * No implants in log *

## 2025-04-30 ENCOUNTER — APPOINTMENT (OUTPATIENT)
Dept: PHYSICAL THERAPY | Facility: CLINIC | Age: 70
DRG: 394 | End: 2025-04-30
Payer: MEDICARE

## 2025-04-30 LAB
ANION GAP SERPL CALCULATED.3IONS-SCNC: 12 MMOL/L (ref 7–15)
BUN SERPL-MCNC: 23.9 MG/DL (ref 8–23)
CALCIUM SERPL-MCNC: 9 MG/DL (ref 8.8–10.4)
CHLORIDE SERPL-SCNC: 102 MMOL/L (ref 98–107)
CREAT SERPL-MCNC: 0.72 MG/DL (ref 0.67–1.17)
EGFRCR SERPLBLD CKD-EPI 2021: >90 ML/MIN/1.73M2
ERYTHROCYTE [DISTWIDTH] IN BLOOD BY AUTOMATED COUNT: 14 % (ref 10–15)
GLUCOSE SERPL-MCNC: 146 MG/DL (ref 70–99)
HCO3 SERPL-SCNC: 22 MMOL/L (ref 22–29)
HCT VFR BLD AUTO: 32.8 % (ref 40–53)
HGB BLD-MCNC: 10.8 G/DL (ref 13.3–17.7)
MAGNESIUM SERPL-MCNC: 2.1 MG/DL (ref 1.7–2.3)
MCH RBC QN AUTO: 28 PG (ref 26.5–33)
MCHC RBC AUTO-ENTMCNC: 32.9 G/DL (ref 31.5–36.5)
MCV RBC AUTO: 85 FL (ref 78–100)
PHOSPHATE SERPL-MCNC: 3.2 MG/DL (ref 2.5–4.5)
PLATELET # BLD AUTO: 483 10E3/UL (ref 150–450)
POTASSIUM SERPL-SCNC: 4.3 MMOL/L (ref 3.4–5.3)
RBC # BLD AUTO: 3.86 10E6/UL (ref 4.4–5.9)
SODIUM SERPL-SCNC: 136 MMOL/L (ref 135–145)
WBC # BLD AUTO: 17.6 10E3/UL (ref 4–11)

## 2025-04-30 PROCEDURE — 36592 COLLECT BLOOD FROM PICC: CPT

## 2025-04-30 PROCEDURE — 84100 ASSAY OF PHOSPHORUS: CPT

## 2025-04-30 PROCEDURE — 90832 PSYTX W PT 30 MINUTES: CPT | Performed by: COUNSELOR

## 2025-04-30 PROCEDURE — 83735 ASSAY OF MAGNESIUM: CPT

## 2025-04-30 PROCEDURE — 120N000002 HC R&B MED SURG/OB UMMC

## 2025-04-30 PROCEDURE — 250N000009 HC RX 250

## 2025-04-30 PROCEDURE — 250N000013 HC RX MED GY IP 250 OP 250 PS 637

## 2025-04-30 PROCEDURE — 250N000011 HC RX IP 250 OP 636: Performed by: PHYSICIAN ASSISTANT

## 2025-04-30 PROCEDURE — 80048 BASIC METABOLIC PNL TOTAL CA: CPT

## 2025-04-30 PROCEDURE — 97116 GAIT TRAINING THERAPY: CPT | Mod: GP | Performed by: PHYSICAL THERAPIST

## 2025-04-30 PROCEDURE — 85027 COMPLETE CBC AUTOMATED: CPT

## 2025-04-30 PROCEDURE — 97530 THERAPEUTIC ACTIVITIES: CPT | Mod: GP | Performed by: PHYSICAL THERAPIST

## 2025-04-30 PROCEDURE — 250N000013 HC RX MED GY IP 250 OP 250 PS 637: Performed by: STUDENT IN AN ORGANIZED HEALTH CARE EDUCATION/TRAINING PROGRAM

## 2025-04-30 RX ORDER — OXYCODONE HCL 5 MG/5 ML
10 SOLUTION, ORAL ORAL ONCE
Status: COMPLETED | OUTPATIENT
Start: 2025-04-30 | End: 2025-04-30

## 2025-04-30 RX ORDER — PIPERACILLIN SODIUM, TAZOBACTAM SODIUM 3; .375 G/15ML; G/15ML
3.38 INJECTION, POWDER, LYOPHILIZED, FOR SOLUTION INTRAVENOUS EVERY 6 HOURS
Status: DISCONTINUED | OUTPATIENT
Start: 2025-04-30 | End: 2025-05-01

## 2025-04-30 RX ORDER — NALOXONE HYDROCHLORIDE 0.4 MG/ML
0.2 INJECTION, SOLUTION INTRAMUSCULAR; INTRAVENOUS; SUBCUTANEOUS
Status: DISCONTINUED | OUTPATIENT
Start: 2025-04-30 | End: 2025-05-02 | Stop reason: HOSPADM

## 2025-04-30 RX ORDER — FLUCONAZOLE 2 MG/ML
400 INJECTION, SOLUTION INTRAVENOUS EVERY 24 HOURS
Status: DISCONTINUED | OUTPATIENT
Start: 2025-04-30 | End: 2025-05-02 | Stop reason: HOSPADM

## 2025-04-30 RX ORDER — NALOXONE HYDROCHLORIDE 0.4 MG/ML
0.4 INJECTION, SOLUTION INTRAMUSCULAR; INTRAVENOUS; SUBCUTANEOUS
Status: DISCONTINUED | OUTPATIENT
Start: 2025-04-30 | End: 2025-05-02 | Stop reason: HOSPADM

## 2025-04-30 RX ADMIN — ATENOLOL 50 MG: 25 TABLET ORAL at 19:56

## 2025-04-30 RX ADMIN — OXYCODONE HYDROCHLORIDE 10 MG: 5 SOLUTION ORAL at 01:53

## 2025-04-30 RX ADMIN — TOPICAL ANESTHETIC 0.5 ML: 200 SPRAY DENTAL; PERIODONTAL at 10:02

## 2025-04-30 RX ADMIN — ACETAMINOPHEN 975 MG: 325 TABLET, FILM COATED ORAL at 16:33

## 2025-04-30 RX ADMIN — ACETAMINOPHEN 975 MG: 325 TABLET, FILM COATED ORAL at 08:00

## 2025-04-30 RX ADMIN — METHOCARBAMOL 750 MG: 750 TABLET ORAL at 08:00

## 2025-04-30 RX ADMIN — PIPERACILLIN AND TAZOBACTAM 3.38 G: 3; .375 INJECTION, POWDER, LYOPHILIZED, FOR SOLUTION INTRAVENOUS at 19:47

## 2025-04-30 RX ADMIN — ACETAMINOPHEN 975 MG: 325 TABLET, FILM COATED ORAL at 21:21

## 2025-04-30 RX ADMIN — CYANOCOBALAMIN TAB 500 MCG 500 MCG: 500 TAB at 08:00

## 2025-04-30 RX ADMIN — PIPERACILLIN AND TAZOBACTAM 3.38 G: 3; .375 INJECTION, POWDER, LYOPHILIZED, FOR SOLUTION INTRAVENOUS at 13:26

## 2025-04-30 RX ADMIN — GABAPENTIN 300 MG: 250 SOLUTION ORAL at 14:42

## 2025-04-30 RX ADMIN — NORTRIPTYLINE HYDROCHLORIDE 25 MG: 10 SOLUTION ORAL at 19:47

## 2025-04-30 RX ADMIN — OXYCODONE HYDROCHLORIDE 10 MG: 5 SOLUTION ORAL at 19:43

## 2025-04-30 RX ADMIN — LOPERAMIDE HYDROCHLORIDE 2 MG: 2 SOLUTION ORAL at 19:47

## 2025-04-30 RX ADMIN — FLUCONAZOLE 400 MG: 2 INJECTION, SOLUTION INTRAVENOUS at 14:25

## 2025-04-30 RX ADMIN — OXYCODONE HYDROCHLORIDE 10 MG: 5 SOLUTION ORAL at 05:51

## 2025-04-30 RX ADMIN — GABAPENTIN 300 MG: 250 SOLUTION ORAL at 19:46

## 2025-04-30 RX ADMIN — ACETAMINOPHEN 975 MG: 325 TABLET, FILM COATED ORAL at 03:26

## 2025-04-30 RX ADMIN — METHOCARBAMOL 750 MG: 750 TABLET ORAL at 19:48

## 2025-04-30 RX ADMIN — OXYCODONE HYDROCHLORIDE 10 MG: 5 SOLUTION ORAL at 22:03

## 2025-04-30 RX ADMIN — Medication 15 ML: at 07:59

## 2025-04-30 RX ADMIN — TOPICAL ANESTHETIC 0.5 ML: 200 SPRAY DENTAL; PERIODONTAL at 01:36

## 2025-04-30 RX ADMIN — LIDOCAINE 4% 2 PATCH: 40 PATCH TOPICAL at 19:47

## 2025-04-30 RX ADMIN — TOPICAL ANESTHETIC 0.5 ML: 200 SPRAY DENTAL; PERIODONTAL at 14:22

## 2025-04-30 RX ADMIN — LOPERAMIDE HYDROCHLORIDE 2 MG: 2 SOLUTION ORAL at 07:59

## 2025-04-30 RX ADMIN — GABAPENTIN 300 MG: 250 SOLUTION ORAL at 07:59

## 2025-04-30 ASSESSMENT — ACTIVITIES OF DAILY LIVING (ADL)
ADLS_ACUITY_SCORE: 47
ADLS_ACUITY_SCORE: 44
ADLS_ACUITY_SCORE: 47
ADLS_ACUITY_SCORE: 44
ADLS_ACUITY_SCORE: 47
ADLS_ACUITY_SCORE: 47
ADLS_ACUITY_SCORE: 44
ADLS_ACUITY_SCORE: 47
ADLS_ACUITY_SCORE: 44
ADLS_ACUITY_SCORE: 47

## 2025-04-30 NOTE — PLAN OF CARE
Shift Hours: 1900 - 0700    Assessment:  Body systems assessments were at patient's baseline.        Activity     Fall Risk Score: 35   Bed alarm on? No     Activity Assistance Provided: assistance, stand-by      Assistive Device Utilized: other (see comments) (iv pole)    Pain: C/O pain at ODIN drain site. PRN 10mg oxy used x2 on shift with good relief. Scheduled tylenol, abilio and robaxin effectvie as well    Labs/RN Managed Protocols: Potassium 4.0, Magnesium 2.0, Phosphorus 3.1. No replacements needed, awaiting AM rechecks    Lines/Drains: L neck pharyngostomy tube. PEG tube, R C port, R mid back ODIN drain to bulb suction    Nutrition: NPO with ice chips. TF runs from 1800 to 10am at GR of 90mL/hr with q3 150mL FWF.    Goal Outcome Evaluation  Plan of Care Reviewed With: patient  Overall Patient Progress: improving  Outcome Evaluation: C/O pain at ODIN drain site. PRN oxy used x2 on shift with goodrelief. Scheduled tylenol, abilio and robaxin effectvie as well. BM ON. Up with SBA to toilet. TF infusing without issue. ODIN drain and pharyngostomy tube both irrigated at 01:51 (irrigate q8). Pt has a list of meds he needs refills for, meds written on board to discuss with day team    Barriers to Discharge:   Pain managed and lab stability. Anticipate discharge to home when med ready, possibly today, 4/30.

## 2025-04-30 NOTE — PLAN OF CARE
Physical Therapy Discharge Summary    Reason for therapy discharge:    Goals met    Progress towards therapy goal(s). See goals on Care Plan in [facility] electronic health record for goal details.  Goals met    Therapy recommendation(s):    No further therapy is recommended.  Continue home exercise program.

## 2025-04-30 NOTE — PROGRESS NOTES
THORACIC & FOREGUT SURGERY    S:  Continues to have some pain from ODIN drain, better controlled last night.  Pt seen at bedside resting comfortably. Diarrhea improving.  Eager for abscess cavity drain removal.    O:  Vitals:    04/29/25 2009 04/29/25 2212 04/30/25 0558 04/30/25 0828   BP: 111/86 110/77 119/80    BP Location:  Left arm Left arm    Patient Position:   Sitting    Cuff Size:   Adult Regular    Pulse: 114 85     Resp:  16 16    Temp:  98  F (36.7  C) 98.8  F (37.1  C)    TempSrc:  Oral Oral    SpO2:  99% 98%    Weight:    76.5 kg (168 lb 11.2 oz)   Height:           A&O, NAD  Breathing non-labored  Appears well perfused  Soft, NDNT  Distal extremities warm.      Thin bilious drainage from ODIN drain.  Bilious drainage from pharyngostomy tube.     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 Esophagectomy on 3/24. EGD and intraoperative esophagram confirming no leak on 3/28 and later passed methylene blue challenge further confirming no leak on 4/2, however had a rising WBC after initiation of CLD. 4/3 CT esophagram confirmed anastomotic leak. 4/7 EGD with left chest tube placement which and was safely discharged on 4/9. In clinic on 4/17 CT showed leak of oral contrast into right pleural space, readmitted 4/17-23. Now re-admitted d/t leukocytosis, fatigue, weakness. CT scan without new collections or signs of infection related to his prior esophagectomy. He has been taking Augmentin and fluconazole.     -Continue low dose immodium  -Continue increased Free water flushes  -Continue to monitor WBC daily  -Continue  pharyngostomy tube  -Cavity pigtail drain removed today w/o incident  -Possible dispo tomorrow depending on WBC and hydration status, likely on enteral abx    Clinically Significant Risk Factors                   # Hypertension: Noted on problem list             # Moderate Malnutrition: based on nutrition assessment and treatment provided per dietitian's recommendations., PRESENT ON ADMISSION   # Financial/Environmental Concerns: none         Patient seen and discussed with staff.    Alexey Cohen PA-C     Thoracic and Foregut Surgery  Text page Thoracic Surgery via [facility] Paging/Directory

## 2025-04-30 NOTE — PLAN OF CARE
Goal Outcome Evaluation:      Plan of Care Reviewed With: patient    Overall Patient Progress: improving    Outcome Evaluation: ODIN drain removed this shift. Pain in throat 2/10 relief with tylenol and hurricane spray. Declined robaxin this shift. Loose stool with some stool incontinence this this shift. Continue to monitor.

## 2025-04-30 NOTE — CONSULTS
"Health Psychology Consultation Note  [facility]     Patient: Bal Junior  MRN: [id number]    : 1955  Date of Admission: 2025  Date of Encounter: 2025     Consult Requested by: Alexey Cohen PA-C   Reason for Consult: Assistance with coping with illness     Diagnosis:  Adjustment disorder, unspecified (ICD-10: F43.20)     Impression/Plan:  Mr. Junior is a 70 year old y/o male currently inpatient for 3 days, starting on 2025 for Weakness generalized [R53.1]  Postoperative state [Z98.890]  Post-operative complication [T81.9XXA]  Leukocytosis, unspecified type [D72.829] who presented today in the hospital room. Patient was engaged in the visit and expressed understanding of the information provided. Pt presented with symptoms of some difficulty coping with repeated hospitalizations. Symptom reduction would likely be facilitated by CBT. Pt was agreeable to follow up with the Health Psychology Team. Plan for health psychology to follow this patient approximately once per week for the duration of their hospitalization. Please feel free to call in urgent concerns that arise prior to the next follow-up session.    Recommendations for Care Team:  Continue to monitor changes in patient's mood. While not an exhaustive list, examples of symptoms of note include increases in: sadness/hopelessness, time spent worrying, physiological responses to stress, and decreases in: sleep, appetite, socialization, engagement in care.     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 Esophagectomy on 3/24. EGD and intraoperative esophagram confirming no leak on 3/28 and " "later passed methylene blue challenge further confirming no leak on 4/2, however had a rising WBC after initiation of CLD. 4/3 CT esophagram confirmed anastomotic leak. 4/7 EGD with left chest tube placement which and was safely discharged on 4/9. In clinic on 4/17 CT showed leak of oral contrast into right pleural space, readmitted 4/17-23. Now re-admitted d/t leukocytosis, fatigue, weakness.  \"    Patient Demographics (per chart):   Age: 70 year old  Biological Sex: male  Race: White  Ethnicity: Not  or     Hospital Admission (per chart):  Admission Date: 4/26/2025  Admission Diagnosis: Weakness generalized [R53.1]  Postoperative state [Z98.890]  Post-operative complication [T81.9XXA]  Leukocytosis, unspecified type [D72.829]  Length of Stay: 3    Subjective:  Have seen pt during last hospitalization. Engaged pt in psychotherapy for coping with medical illness. Pt was agreeable to check-in at this time. Pt reported mood as \"a little frustrated\" citing repeat hospitalizations. Engaged pt in supportive listening and cognitive processing of ongoing sxs and stressors. Pt reflected on repeated hospitalizations and feeling that \"just as one thing gets better, a new problem always comes up.\" Engaged pt in reflection on current mood coping strategies. Pt reported that talking with supports, reading, and watching TV have remained useful during current hospitalization. Additionally engaged pt in brief psychoeducation and skills training in stress coping while hospitalized. Patient was engaged in the visit and expressed understanding of information provided. Pt was agreeable to follow up with Health Psychology team.    Objective/Assessment:   Mental Status/Interview:  Appearance:   Appropriate   Eye Contact:   Good   Psychomotor Behavior:  Normal   Attitude:   Cooperative   Orientation:   All  Speech   Rate / Production: Normal    Volume:  Normal   Mood:    Euthymic  Affect:    Appropriate   Thought Content: "   Clear  Thought Form:   Coherent  Logical     Insight:    Did not formally assess at this time.     Suicidal ideation: Pt denied active suicidal ideation.   Homicidal ideation: Pt denied active homicidal ideation.    Session Length:  Start Time: 2:00pm  End Time: 2:30pm    Date of Service:  04/30/25    Ilia Shields, PhD,   Clinical Health Psychologist  Phone: [phone]    *This note was completed using Dragon voice recognition software. Although reviewed after completion, some word and grammatical errors may occur.

## 2025-05-01 ENCOUNTER — APPOINTMENT (OUTPATIENT)
Dept: GENERAL RADIOLOGY | Facility: CLINIC | Age: 70
DRG: 394 | End: 2025-05-01
Payer: MEDICARE

## 2025-05-01 VITALS
DIASTOLIC BLOOD PRESSURE: 71 MMHG | TEMPERATURE: 98.6 F | HEART RATE: 80 BPM | OXYGEN SATURATION: 100 % | HEIGHT: 70 IN | RESPIRATION RATE: 16 BRPM | BODY MASS INDEX: 24.15 KG/M2 | WEIGHT: 168.7 LBS | SYSTOLIC BLOOD PRESSURE: 106 MMHG

## 2025-05-01 DIAGNOSIS — C15.9 MALIGNANT NEOPLASM OF ESOPHAGUS, UNSPECIFIED LOCATION (H): Primary | ICD-10-CM

## 2025-05-01 LAB
ANION GAP SERPL CALCULATED.3IONS-SCNC: 11 MMOL/L (ref 7–15)
BUN SERPL-MCNC: 25.9 MG/DL (ref 8–23)
CALCIUM SERPL-MCNC: 8.5 MG/DL (ref 8.8–10.4)
CHLORIDE SERPL-SCNC: 104 MMOL/L (ref 98–107)
CREAT SERPL-MCNC: 0.88 MG/DL (ref 0.67–1.17)
EGFRCR SERPLBLD CKD-EPI 2021: >90 ML/MIN/1.73M2
ERYTHROCYTE [DISTWIDTH] IN BLOOD BY AUTOMATED COUNT: 18 % (ref 10–15)
GLUCOSE SERPL-MCNC: 101 MG/DL (ref 70–99)
HCO3 SERPL-SCNC: 24 MMOL/L (ref 22–29)
HCT VFR BLD AUTO: 29.1 % (ref 40–53)
HGB BLD-MCNC: 9.6 G/DL (ref 13.3–17.7)
MAGNESIUM SERPL-MCNC: 2 MG/DL (ref 1.7–2.3)
MCH RBC QN AUTO: 30.6 PG (ref 26.5–33)
MCHC RBC AUTO-ENTMCNC: 33 G/DL (ref 31.5–36.5)
MCV RBC AUTO: 93 FL (ref 78–100)
PHOSPHATE SERPL-MCNC: 3.8 MG/DL (ref 2.5–4.5)
PLATELET # BLD AUTO: 371 10E3/UL (ref 150–450)
POTASSIUM SERPL-SCNC: 3.9 MMOL/L (ref 3.4–5.3)
RBC # BLD AUTO: 3.14 10E6/UL (ref 4.4–5.9)
SODIUM SERPL-SCNC: 139 MMOL/L (ref 135–145)
WBC # BLD AUTO: 12.1 10E3/UL (ref 4–11)

## 2025-05-01 PROCEDURE — 71045 X-RAY EXAM CHEST 1 VIEW: CPT

## 2025-05-01 PROCEDURE — 250N000013 HC RX MED GY IP 250 OP 250 PS 637: Performed by: STUDENT IN AN ORGANIZED HEALTH CARE EDUCATION/TRAINING PROGRAM

## 2025-05-01 PROCEDURE — 250N000011 HC RX IP 250 OP 636: Mod: JZ

## 2025-05-01 PROCEDURE — 83735 ASSAY OF MAGNESIUM: CPT

## 2025-05-01 PROCEDURE — 85027 COMPLETE CBC AUTOMATED: CPT

## 2025-05-01 PROCEDURE — 84100 ASSAY OF PHOSPHORUS: CPT

## 2025-05-01 PROCEDURE — 84132 ASSAY OF SERUM POTASSIUM: CPT

## 2025-05-01 PROCEDURE — 250N000013 HC RX MED GY IP 250 OP 250 PS 637

## 2025-05-01 PROCEDURE — 71045 X-RAY EXAM CHEST 1 VIEW: CPT | Mod: 26 | Performed by: RADIOLOGY

## 2025-05-01 PROCEDURE — 36592 COLLECT BLOOD FROM PICC: CPT

## 2025-05-01 PROCEDURE — 250N000011 HC RX IP 250 OP 636: Performed by: PHYSICIAN ASSISTANT

## 2025-05-01 PROCEDURE — 120N000002 HC R&B MED SURG/OB UMMC

## 2025-05-01 RX ORDER — AMOXICILLIN AND CLAVULANATE POTASSIUM 400; 57 MG/5ML; MG/5ML
500 POWDER, FOR SUSPENSION ORAL 2 TIMES DAILY
Status: DISCONTINUED | OUTPATIENT
Start: 2025-05-01 | End: 2025-05-01

## 2025-05-01 RX ORDER — OXYCODONE HCL 5 MG/5 ML
5-10 SOLUTION, ORAL ORAL EVERY 4 HOURS PRN
Qty: 100 ML | Refills: 0 | Status: SHIPPED | OUTPATIENT
Start: 2025-05-01 | End: 2025-05-02

## 2025-05-01 RX ORDER — LOPERAMIDE HCL 1 MG/7.5ML
2 SOLUTION ORAL 3 TIMES DAILY
Status: DISCONTINUED | OUTPATIENT
Start: 2025-05-01 | End: 2025-05-02 | Stop reason: HOSPADM

## 2025-05-01 RX ORDER — AMOXICILLIN AND CLAVULANATE POTASSIUM 400; 57 MG/5ML; MG/5ML
875 POWDER, FOR SUSPENSION ORAL 2 TIMES DAILY
Qty: 100 ML | Refills: 0 | Status: CANCELLED | OUTPATIENT
Start: 2025-05-01

## 2025-05-01 RX ORDER — GUAR GUM
1 PACKET (EA) ORAL 2 TIMES DAILY
Qty: 60 EACH | Refills: 0 | Status: ON HOLD | OUTPATIENT
Start: 2025-05-01

## 2025-05-01 RX ORDER — AMINO ACIDS/PROTEIN HYDROLYS 11G-40/45
120 LIQUID IN PACKET (ML) ORAL DAILY
Qty: 3600 ML | Refills: 0 | Status: CANCELLED | OUTPATIENT
Start: 2025-05-02 | End: 2025-06-01

## 2025-05-01 RX ORDER — OXYCODONE HCL 5 MG/5 ML
5-10 SOLUTION, ORAL ORAL EVERY 4 HOURS PRN
Qty: 100 ML | Refills: 0 | Status: CANCELLED | OUTPATIENT
Start: 2025-05-01

## 2025-05-01 RX ORDER — AMOXICILLIN AND CLAVULANATE POTASSIUM 400; 57 MG/5ML; MG/5ML
875 POWDER, FOR SUSPENSION ORAL 2 TIMES DAILY
Status: DISCONTINUED | OUTPATIENT
Start: 2025-05-01 | End: 2025-05-02 | Stop reason: HOSPADM

## 2025-05-01 RX ORDER — HYDROMORPHONE HCL IN WATER/PF 6 MG/30 ML
0.2 PATIENT CONTROLLED ANALGESIA SYRINGE INTRAVENOUS ONCE
Status: COMPLETED | OUTPATIENT
Start: 2025-05-01 | End: 2025-05-01

## 2025-05-01 RX ORDER — CALCIUM CARBONATE 500 MG/1
1 TABLET, CHEWABLE ORAL DAILY PRN
Qty: 30 TABLET | Refills: 0 | Status: ON HOLD | OUTPATIENT
Start: 2025-05-01

## 2025-05-01 RX ORDER — FLUCONAZOLE 200 MG/1
400 TABLET ORAL DAILY
Qty: 12 TABLET | Refills: 0 | Status: SHIPPED | OUTPATIENT
Start: 2025-05-01 | End: 2025-05-14

## 2025-05-01 RX ORDER — FLUCONAZOLE 200 MG/1
400 TABLET ORAL DAILY
Qty: 12 TABLET | Refills: 0 | Status: SHIPPED | OUTPATIENT
Start: 2025-05-01 | End: 2025-05-01

## 2025-05-01 RX ORDER — AMOXICILLIN AND CLAVULANATE POTASSIUM 400; 57 MG/5ML; MG/5ML
875 POWDER, FOR SUSPENSION ORAL 2 TIMES DAILY
Qty: 100 ML | Refills: 0 | Status: SHIPPED | OUTPATIENT
Start: 2025-05-01 | End: 2025-05-14

## 2025-05-01 RX ORDER — IBUPROFEN 400 MG/1
400 TABLET, FILM COATED ORAL
Status: COMPLETED | OUTPATIENT
Start: 2025-05-01 | End: 2025-05-02

## 2025-05-01 RX ORDER — GUAR GUM
1 PACKET (EA) ORAL 2 TIMES DAILY
Status: DISCONTINUED | OUTPATIENT
Start: 2025-05-01 | End: 2025-05-02 | Stop reason: HOSPADM

## 2025-05-01 RX ORDER — LOPERAMIDE HCL 1 MG/7.5ML
2 SOLUTION ORAL 3 TIMES DAILY
Qty: 474 ML | Refills: 0 | Status: CANCELLED | OUTPATIENT
Start: 2025-05-01

## 2025-05-01 RX ORDER — KETOROLAC TROMETHAMINE 15 MG/ML
15 INJECTION, SOLUTION INTRAMUSCULAR; INTRAVENOUS ONCE
Status: COMPLETED | OUTPATIENT
Start: 2025-05-01 | End: 2025-05-01

## 2025-05-01 RX ORDER — ATENOLOL 50 MG/1
50 TABLET ORAL DAILY
Status: ON HOLD
Start: 2025-05-01

## 2025-05-01 RX ORDER — LOPERAMIDE HCL 1 MG/7.5ML
2 SOLUTION ORAL 3 TIMES DAILY
Qty: 711 ML | Refills: 0 | Status: ON HOLD | OUTPATIENT
Start: 2025-05-01

## 2025-05-01 RX ORDER — IBUPROFEN 600 MG/1
600 TABLET, FILM COATED ORAL
Status: DISCONTINUED | OUTPATIENT
Start: 2025-05-01 | End: 2025-05-01

## 2025-05-01 RX ORDER — FLUCONAZOLE 2 MG/ML
400 INJECTION, SOLUTION INTRAVENOUS EVERY 24 HOURS
Qty: 1200 ML | Refills: 0 | Status: SHIPPED | OUTPATIENT
Start: 2025-05-01 | End: 2025-05-01

## 2025-05-01 RX ORDER — AMINO ACIDS/PROTEIN HYDROLYS 11G-40/45
1 LIQUID IN PACKET (ML) ORAL DAILY
Status: DISCONTINUED | OUTPATIENT
Start: 2025-05-02 | End: 2025-05-02 | Stop reason: HOSPADM

## 2025-05-01 RX ORDER — NALOXONE HYDROCHLORIDE 0.4 MG/ML
0.2 INJECTION, SOLUTION INTRAMUSCULAR; INTRAVENOUS; SUBCUTANEOUS ONCE
Qty: 0.5 ML | Refills: 0 | Status: ON HOLD | OUTPATIENT
Start: 2025-05-01 | End: 2025-05-14

## 2025-05-01 RX ADMIN — Medication 1 EACH: at 20:04

## 2025-05-01 RX ADMIN — METHOCARBAMOL 750 MG: 750 TABLET ORAL at 08:19

## 2025-05-01 RX ADMIN — GABAPENTIN 300 MG: 250 SOLUTION ORAL at 08:23

## 2025-05-01 RX ADMIN — LOPERAMIDE HYDROCHLORIDE 2 MG: 2 SOLUTION ORAL at 08:19

## 2025-05-01 RX ADMIN — ACETAMINOPHEN 975 MG: 325 TABLET, FILM COATED ORAL at 23:08

## 2025-05-01 RX ADMIN — METHOCARBAMOL 750 MG: 750 TABLET ORAL at 12:02

## 2025-05-01 RX ADMIN — OXYCODONE HYDROCHLORIDE 5 MG: 5 SOLUTION ORAL at 21:57

## 2025-05-01 RX ADMIN — ATENOLOL 50 MG: 25 TABLET ORAL at 20:03

## 2025-05-01 RX ADMIN — OXYCODONE HYDROCHLORIDE 10 MG: 5 SOLUTION ORAL at 02:34

## 2025-05-01 RX ADMIN — AMOXICILLIN AND CLAVULANATE POTASSIUM 875 MG: 400; 57 POWDER, FOR SUSPENSION ORAL at 12:39

## 2025-05-01 RX ADMIN — Medication 15 ML: at 08:19

## 2025-05-01 RX ADMIN — LOPERAMIDE HYDROCHLORIDE 2 MG: 2 SOLUTION ORAL at 20:03

## 2025-05-01 RX ADMIN — METHOCARBAMOL 750 MG: 750 TABLET ORAL at 20:03

## 2025-05-01 RX ADMIN — FLUCONAZOLE 400 MG: 2 INJECTION, SOLUTION INTRAVENOUS at 13:48

## 2025-05-01 RX ADMIN — CYANOCOBALAMIN TAB 500 MCG 500 MCG: 500 TAB at 08:21

## 2025-05-01 RX ADMIN — AMOXICILLIN AND CLAVULANATE POTASSIUM 875 MG: 400; 57 POWDER, FOR SUSPENSION ORAL at 20:03

## 2025-05-01 RX ADMIN — HYDROMORPHONE HYDROCHLORIDE 0.2 MG: 0.2 INJECTION, SOLUTION INTRAMUSCULAR; INTRAVENOUS; SUBCUTANEOUS at 12:33

## 2025-05-01 RX ADMIN — PIPERACILLIN AND TAZOBACTAM 3.38 G: 3; .375 INJECTION, POWDER, LYOPHILIZED, FOR SOLUTION INTRAVENOUS at 01:34

## 2025-05-01 RX ADMIN — LOPERAMIDE HYDROCHLORIDE 2 MG: 2 SOLUTION ORAL at 13:47

## 2025-05-01 RX ADMIN — OXYCODONE HYDROCHLORIDE 10 MG: 5 SOLUTION ORAL at 12:02

## 2025-05-01 RX ADMIN — NORTRIPTYLINE HYDROCHLORIDE 25 MG: 10 SOLUTION ORAL at 20:02

## 2025-05-01 RX ADMIN — GABAPENTIN 300 MG: 250 SOLUTION ORAL at 13:49

## 2025-05-01 RX ADMIN — Medication 1 EACH: at 12:37

## 2025-05-01 RX ADMIN — ACETAMINOPHEN 975 MG: 325 TABLET, FILM COATED ORAL at 08:20

## 2025-05-01 RX ADMIN — METHOCARBAMOL 750 MG: 750 TABLET ORAL at 17:33

## 2025-05-01 RX ADMIN — LIDOCAINE 4% 2 PATCH: 40 PATCH TOPICAL at 08:18

## 2025-05-01 RX ADMIN — KETOROLAC TROMETHAMINE 15 MG: 15 INJECTION, SOLUTION INTRAMUSCULAR; INTRAVENOUS at 12:47

## 2025-05-01 RX ADMIN — ACETAMINOPHEN 975 MG: 325 TABLET, FILM COATED ORAL at 17:33

## 2025-05-01 RX ADMIN — PIPERACILLIN AND TAZOBACTAM 3.38 G: 3; .375 INJECTION, POWDER, LYOPHILIZED, FOR SOLUTION INTRAVENOUS at 06:00

## 2025-05-01 RX ADMIN — Medication 120 ML: at 08:24

## 2025-05-01 RX ADMIN — GABAPENTIN 300 MG: 250 SOLUTION ORAL at 20:02

## 2025-05-01 ASSESSMENT — ACTIVITIES OF DAILY LIVING (ADL)
ADLS_ACUITY_SCORE: 46
ADLS_ACUITY_SCORE: 44
ADLS_ACUITY_SCORE: 46
ADLS_ACUITY_SCORE: 46

## 2025-05-01 NOTE — PROGRESS NOTES
Care Management Discharge Note    Discharge Date: 05/01/2025       Discharge Disposition: Home with services    Discharge Services: Home Infusion, Home Care    [facility](TF)  Phone: [phone]  Fax: [phone]      [facility](RN, PT/OT)  Phone: [phone]  Fax: [phone]     Discharge DME: Other (see comment) (Gastric Suction)    [facility]  Ph: [phone]  Clinical Fax: [phone]    Discharge Transportation: family or friend will provide    Private pay costs discussed: Not applicable    Does the patient's insurance plan have a 3 day qualifying hospital stay waiver?  No    PAS Confirmation Code: NA   Patient/family educated on Medicare website which has current facility and service quality ratings: NA      Education Provided on the Discharge Plan: yes   Persons Notified of Discharge Plans: patient   Patient/Family in Agreement with the Plan: yes    Handoff Referral Completed: No, handoff not indicated or clinically appropriate    Additional Information:  Received update from MD team that patient is anticipated to discharge to home tomorrow.  PT/OT recommending home with assist.  [facility] and [facility] updated.  Patient reports having enough TF and supplies at home. Pt's spouse will provide a ride to home.  RNCC will remain available if further needs arise.        Chacha Menard, ALISONCC  Phone: [phone]   Med Surg Vocera  Nurse Coordinator

## 2025-05-01 NOTE — PROGRESS NOTES
CLINICAL NUTRITION SERVICES - BRIEF NOTE     Registered Dietitian Interventions:  Collaborated with team. Team would like to add Banatrol TF 1 packet BID for loose stools. Order was placed.     Modified Prosource TF20 order to 1 packet once daily. New TF order will provide: Jevity 1.5 @ 90 mL/hr x 16 hours. + 1 Prosource TF20 daily provides: 1440 mL, 2240 kcals (30 kcal/kg), 112 g protein (1.5g/kg), 311 g CHO, 72 g fat, 30 g fiber, 1094 ml free water.     Future/Additional Recommendations:  -- monitor tolerance to TF and addition of Banatrol    CURRENT NUTRITION ORDERS  Diet: NPO  Nutrition Support: Jevity 1.5 @ 90 mL/hr x 16 hours. + 2 Prosource TF20 daily provides: 1440 mL, 2320 kcals (31 kcal/kg), 152 g protein (2 g/kg), 311 g CHO, 72 g fat, 30 g fiber, 1094 ml free water.      NEW FINDINGS  GI symptoms:  Loose stools     Nutrition-relevant labs:   (5/1): BUN 25.9 mg/dL (H), Cr 0.88 mg/dL   Nutrition-relevant medications:   Vitamin B-12  Banatrol 1 packet daily   Imodium   Multivitamins w/ minerals liquid  Prosource TF20 2 packets     INTERVENTIONS  Collaboration by nutrition professional with other providers  Medical food supplement therapy     MONITORING/EVALUATION  Progress toward goals will be monitored and evaluated per policy.    Rosa Graham MS/RD/LD/CNSC  Available on [employer]   M-F (7am-3:30pm) - 7A/7B Clinical Dietitian  Weekend/Holiday Dietitian (7am-3:30pm)    ** Clinical Dietitians no longer available on pager    Addendum to above note (5/1/25 at 1112)  Notified that home care does not have banatrol TF. Team asked this writer to adjust Banatrol to Nutrisource Fiber 1 packet BID. Orders updated to reflect change.   Notified RNCC of updated order.

## 2025-05-01 NOTE — PROGRESS NOTES
[facility] [place]  Patient is currently open to home care services with [facility] [place]. The patient has   RN PT [facility]   and team have been notified of patient admission. The end of this  episode is 6/22/25.   [facility] liaison will continue to follow patient during stay. If  appropriate provide orders to resume home care at time of discharge.

## 2025-05-01 NOTE — PLAN OF CARE
Goal Outcome Evaluation:      Plan of Care Reviewed With: patient    Overall Patient Progress: no changeOverall Patient Progress: no change    Outcome Evaluation: RN assumes cares at [phone]    Team: Thoracic     Reason for Admission: Post-op esophagectomy complications of fatigue, weakness, leukocytosis     VSS  Pain: 7-8/10 pain at where ODIN drain was removed, managed with Oxy 10 x3, scheduled pain medication, cold pack applied   Neuro: A/Ox4  Cardiac: WDL; X tachy   Respiratory: WDL  GI/: Voiding, no bm, no nausea   IV/Drains/Skin: 1x pharyn tube (Q4 flushes), 1x peg tube, 1x port, old port sites   Activity: SBA x1  Labs: On Mg, Phos, and K+ replacements   Diet: NPO, TF running at 90 from 6p-10a. Strict !/Os    Education Complete    Shift Updates: Imodium given x1, increasing pain at old drain site, Zosyn given x3

## 2025-05-01 NOTE — DISCHARGE SUMMARY
Thoracic Surgery Discharge Summary    NAME: Bal Junior   MRN: [id number]   : 1955     DATE OF ADMISSION: 2025     PRE/POSTOPERATIVE DIAGNOSES: Sepsis vs dehydration    PROCEDURES PERFORMED: EGD with PGT reposition    PATHOLOGY RESULTS: NA     CULTURE RESULTS: None     INTRAOPERATIVE COMPLICATIONS: None     POSTOPERATIVE COMPLICATIONS: None     DRAINS/TUBES PRESENT AT DISCHARGE: pharyngostomy tube    DATE OF DISCHARGE:  2025    HOSPITAL COURSE: Bal Junior is a 70 year old male who on 2025  underwent the above-named procedures.  He tolerated the operation well and postoperatively was transferred to the general post-surgical unit.  The remainder of his course was essentially uncomplicated did have significant diarrhea, C. Diff negative, started on imodium.  Prior to discharge, his pain was controlled well, he was able to perform ADLs and ambulate independently without difficulty, and had full return of bowel and bladder function.  On , he was discharged to home with home care in stable condition with NGT  in place.    DISCHARGE INSTRUCTIONS:  ***Copy from [facility]    FOLLOW UP APPOINTMENTS:   ***Copy from [facility]    DISCHARGE MEDICATIONS:   ***dotdischargemedlist***    *MEDICATIONS INTENDED TO BE THE SAME AS PRIOR TO ADMISSION WITH THE EXCEPTION OF *** FOR ***, AND ADDED PAIN MEDICATION AND STOOL SOFTENERS     *** CHECK CC:s (from snapshot)         carbonate 500 MG chewable tablet  Commonly known as: TUMS  500 mg, Oral, DAILY PRN     loperamide 1 MG/7.5ML liquid  Commonly known as: IMODIUM  2 mg, Per Feeding Tube, 3 TIMES DAILY     naloxone 0.4 MG/ML injection  Commonly known as: NARCAN  0.2 mg, Intramuscular, ONCE     Nutrisource Fiber PO packet  1 each, Per Feeding Tube, 2 TIMES DAILY     omeprazole 2 mg/mL suspension  Commonly known as: PriLOSEC  20 mg, Oral, DAILY            Modified      atenolol 50 MG tablet  Commonly known as: TENORMIN  50 mg, Per G Tube, DAILY  What changed:   how to take this  when to take this     fluconazole 200 MG tablet  Commonly known as: DIFLUCAN  400 mg, Per G Tube, DAILY  What changed:   how much to take  how to take this            Discontinued      amoxicillin-clavulanate 875-125 MG tablet  Commonly known as: AUGMENTIN  Replaced by: amoxicillin-clavulanate 400-57 MG/5ML suspension     Prosource TF20 ENfit Compatibl EN LIQD packet            ASK your doctor about these medications      * Isosource 1.5 Gumaro 250 mL  1,500 mLs, Per J Tube, DAILY, Infuse via pump. 90 mL/hr x 16 hours.  6 cartons per day.  Water flush: 120 mL before and after tube feed cycle. Additional 120 mL 8x per day.  Kcals: 2250  Ask about: Which instructions should I use?     * Prosource TF PO LIQD  45 mLs, Per J Tube, 2 TIMES DAILY, Infuse via syringe Water flush: 30 mL before and after each packet  Kcals: 80 2 pkts per day  Ask about: Which instructions should I use?           * This list has 2 medication(s) that are the same as other medications prescribed for you. Read the directions carefully, and ask your doctor or other care provider to review them with you.

## 2025-05-01 NOTE — PLAN OF CARE
"Goal Outcome Evaluation:      Plan of Care Reviewed With: patient    Overall Patient Progress: improvingOverall Patient Progress: improving       Temp: 98.2  F (36.8  C) Temp src: Oral BP: 129/82 Pulse: 100   Resp: 18 SpO2: 100 % O2 Device: None (Room air)       Pt is A&O x4, on RA, VSS. Pain managed with oxycodone + scheduled meds. NPO no exceptions. Receiving free water flushes through J tube. On cycled TF from 6p-10am. Up with SBA. L Pharyngostomy tube to LCS; irrigated per orders. R chest port SL.    New changes this shift: pt C/O \"excruciating pain\" at the old ODIN site with no relief with prn oxycodone  + Robaxin. MD notified about the increased pain. One time dose of Dilaudid ordered but did not help. A one time dose of Toradol was also ordered which pt reported that he felt some relief with it.     "

## 2025-05-02 ENCOUNTER — HOME INFUSION (OUTPATIENT)
Dept: HOME HEALTH SERVICES | Facility: HOME HEALTH | Age: 70
End: 2025-05-02
Payer: COMMERCIAL

## 2025-05-02 VITALS
HEIGHT: 70 IN | HEART RATE: 74 BPM | DIASTOLIC BLOOD PRESSURE: 67 MMHG | TEMPERATURE: 97.8 F | SYSTOLIC BLOOD PRESSURE: 106 MMHG | OXYGEN SATURATION: 100 % | WEIGHT: 168.7 LBS | BODY MASS INDEX: 24.15 KG/M2 | RESPIRATION RATE: 16 BRPM

## 2025-05-02 DIAGNOSIS — C16.0 ADENOCARCINOMA OF GASTROESOPHAGEAL JUNCTION (H): ICD-10-CM

## 2025-05-02 LAB
ERYTHROCYTE [DISTWIDTH] IN BLOOD BY AUTOMATED COUNT: 14.8 % (ref 10–15)
HCT VFR BLD AUTO: 28.5 % (ref 40–53)
HGB BLD-MCNC: 9.3 G/DL (ref 13.3–17.7)
MCH RBC QN AUTO: 28 PG (ref 26.5–33)
MCHC RBC AUTO-ENTMCNC: 32.6 G/DL (ref 31.5–36.5)
MCV RBC AUTO: 86 FL (ref 78–100)
PLATELET # BLD AUTO: 344 10E3/UL (ref 150–450)
RBC # BLD AUTO: 3.32 10E6/UL (ref 4.4–5.9)
WBC # BLD AUTO: 12.2 10E3/UL (ref 4–11)

## 2025-05-02 PROCEDURE — 250N000013 HC RX MED GY IP 250 OP 250 PS 637

## 2025-05-02 PROCEDURE — 250N000013 HC RX MED GY IP 250 OP 250 PS 637: Performed by: STUDENT IN AN ORGANIZED HEALTH CARE EDUCATION/TRAINING PROGRAM

## 2025-05-02 PROCEDURE — 85041 AUTOMATED RBC COUNT: CPT

## 2025-05-02 PROCEDURE — 250N000011 HC RX IP 250 OP 636: Performed by: THORACIC SURGERY (CARDIOTHORACIC VASCULAR SURGERY)

## 2025-05-02 PROCEDURE — 36591 DRAW BLOOD OFF VENOUS DEVICE: CPT

## 2025-05-02 RX ORDER — AMINO AC/PROTEIN HYDR/WHEY PRO 10G-100/30
45 LIQUID (ML) ORAL 2 TIMES DAILY
Qty: 2700 ML | Refills: 11 | Status: ACTIVE | OUTPATIENT
Start: 2025-05-02 | End: 2026-05-02

## 2025-05-02 RX ORDER — HEPARIN SODIUM (PORCINE) LOCK FLUSH IV SOLN 100 UNIT/ML 100 UNIT/ML
5-10 SOLUTION INTRAVENOUS
Status: DISCONTINUED | OUTPATIENT
Start: 2025-05-02 | End: 2025-05-02 | Stop reason: HOSPADM

## 2025-05-02 RX ORDER — LACTOSE-REDUCED FOOD/FIBER 0.07 G-1.5
1500 LIQUID (ML) ORAL DAILY
Qty: 45000 ML | Refills: 11 | Status: ACTIVE | OUTPATIENT
Start: 2025-05-02 | End: 2026-05-02

## 2025-05-02 RX ORDER — OXYCODONE HCL 5 MG/5 ML
5-10 SOLUTION, ORAL ORAL EVERY 4 HOURS PRN
Qty: 200 ML | Refills: 0 | Status: SHIPPED | OUTPATIENT
Start: 2025-05-02 | End: 2025-05-08

## 2025-05-02 RX ADMIN — OXYCODONE HYDROCHLORIDE 5 MG: 5 SOLUTION ORAL at 02:31

## 2025-05-02 RX ADMIN — GABAPENTIN 300 MG: 250 SOLUTION ORAL at 08:36

## 2025-05-02 RX ADMIN — OXYCODONE HYDROCHLORIDE 5 MG: 5 SOLUTION ORAL at 09:05

## 2025-05-02 RX ADMIN — IBUPROFEN 400 MG: 400 TABLET, FILM COATED ORAL at 00:34

## 2025-05-02 RX ADMIN — LOPERAMIDE HYDROCHLORIDE 2 MG: 2 SOLUTION ORAL at 08:37

## 2025-05-02 RX ADMIN — Medication 1 EACH: at 08:39

## 2025-05-02 RX ADMIN — METHOCARBAMOL 750 MG: 750 TABLET ORAL at 08:37

## 2025-05-02 RX ADMIN — Medication 15 ML: at 08:37

## 2025-05-02 RX ADMIN — CYANOCOBALAMIN TAB 500 MCG 500 MCG: 500 TAB at 08:37

## 2025-05-02 RX ADMIN — AMOXICILLIN AND CLAVULANATE POTASSIUM 875 MG: 400; 57 POWDER, FOR SUSPENSION ORAL at 08:37

## 2025-05-02 RX ADMIN — ACETAMINOPHEN 975 MG: 325 TABLET, FILM COATED ORAL at 04:12

## 2025-05-02 RX ADMIN — LIDOCAINE 4% 2 PATCH: 40 PATCH TOPICAL at 08:37

## 2025-05-02 RX ADMIN — HEPARIN 5 ML: 100 SYRINGE at 11:39

## 2025-05-02 RX ADMIN — Medication 60 ML: at 08:39

## 2025-05-02 ASSESSMENT — ACTIVITIES OF DAILY LIVING (ADL)
ADLS_ACUITY_SCORE: 46

## 2025-05-02 NOTE — PROGRESS NOTES
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  Feeding tube type: J-Tube  Date Placed: 02/26/25    Current Nutrition Orders  Oral Diet: NPO    Enteral Nutrition Orders  Enteral Formula: Isosource 1.5 + Prosource TF  Rate/Frequency: Isosource 1.5 @ 90 mL/hr x 16 hours. + Prosource TF 1 pkt BID  Water Flushes: 120 mL before and after tube feed cycle. + 120 mL 8x per day.  Enteral Nutrition Provides: 6 cartons Isosource 1.5 provides 2250 kcal, 102 gm protein, 23 gm fiber, and 1146 mL free water. + 2 pkts Prosource TF provides 80 kcal and 22 gm protein. Total provisions: 2330 kcal and 124 gm protein.    Assessed Nutritional Needs  Kcal Needs: 8060-1771 kcal (25-30 kcal/kg) maintenenace  Protein Needs: 120-165 gm protein (1.5-2 gm/kg) Increased needs post-op  Fluid Needs: 1 mL/kcal maintenance  Nutrition Support is meeting what percent of needs: 100%    Pertinent Medications  Medications: Recieved Banatrol while inpt, not available at [facility]. Nutrisource Fiber ordered at [facility].     Implementation  Intervention: JUAN, pt recieved Jevity 1.5 formula while inpt, patient with loose stools while inpt. Recieved additional fiber pkts while inpt, not avilable at [facility] due to no insurance coverage. Will change back to home formula of Isosource 1.5 and monitor stooling trends. Prosource provision decreased.  Goals: Weight maintenance.    Plan  Follow up/Recommendations: Monitor tolerance of Isosource " 1.5.    Kavitha Kwan RD, [facility]  Clinical Dietitian  [facility]   [phone]  [facility] Main Line: [phone]

## 2025-05-02 NOTE — PROGRESS NOTES
Patient discharging. Discharge paperwork was reviewed with patient. Patient expressed understanding. A copy was given to patient. Pt discharging home. Significant other at bedside and will be transport. All patient belongings were taken with patient.

## 2025-05-02 NOTE — PLAN OF CARE
Goal Outcome Evaluation:      Plan of Care Reviewed With: patient    Overall Patient Progress: improvingOverall Patient Progress: improving  Neuro: AOx4, cooperative with cares   Cardiac: WDL ex tachy  Respiratory: WDL  GI/: voiding adequatetly, no BM, passing flatus  Diet/Appetite: NPO, TF at 90 ml/hr from 9470-7118. Strict I/Os  Skin: old ODIN sites, no new deficit  LDA: L pharyngostomy tube to LCS, flush q4hr, chest port TKO, J-tube  Activity: SBA  Pain: pharyn. Tube site & back pain managed with ibuprofen, Oxycodone and cold back.  Plan: possibly discharge 5/2, continue POC

## 2025-05-02 NOTE — PROGRESS NOTES
Care Management Discharge Note    Discharge Date: 05/02/2025       Discharge Disposition: Home, Home Care, Home Infusion    Discharge Services: Home Care    Discharge DME: Other (see comment) (Gastric Suction)    Discharge Transportation: family or friend will provide    Private pay costs discussed: Not applicable    Does the patient's insurance plan have a 3 day qualifying hospital stay waiver?  No    PAS Confirmation Code:    Patient/family educated on Medicare website which has current facility and service quality ratings:      Education Provided on the Discharge Plan:    Persons Notified of Discharge Plans:   Patient/Family in Agreement with the Plan: yes    Handoff Referral Completed: Yes, non-[facility] PCP: External handoff communication completed  [facility](TF)  Phone: [phone]  Fax: [phone]      [facility](RN, PT/OT)  Phone: [phone]  Fax: [phone]      Discharge DME: Other (see comment) (Gastric Suction)     [facility]  Ph: [phone]  Clinical Fax: [phone]      Additional Information:  Patient will be discharged home with home care and home infusion. Family will transport.   [facility] infusion  and [facility]  notified of discharge and reports that per patient, he has enough formula and supplies at home.     Per chart review: Gastric suction delivered to the bedside and team provided education to pt and spouse on how to use.     EHO sent.     Azul Zelaya, RN, BSN  [facility]  Inpatient Care Management - FLOAT  Covering 7b  [facility] ([phone]) Saturday & Sunday; ([phone]) [facility] Recognized Holidays     Units: 5A Onc Vocera & 5C Vocera   Units: 6B Vocera & 6C Vocera   Units: 7A SOT RNCC Vocera, 7B Med Surg Vocera, & 7C Med Surg Vocera  Units: 6A Vocera & 4A CVICU Vocera, 4C MICU Vocera, and 4E SICU Vocera   Units: 5 Ortho Vocera & 5 Med Surg Vocera    Units: 6 Med Surg Vocera & 8 Med Surg Vocera

## 2025-05-02 NOTE — PROGRESS NOTES
DISCHARGE DATE: 05/02/25 ([facility])  THERAPY: Enteral onlu  DRUG/ DELIVERY MODE: Jevity 1.5  FIRST HOME DOSE DUE: 6pm today   DELIVERY: Delivery to pt's home.  SUPPLIES: Pt does not need supplies at this time.  LINE/TUBE: PEGJ  SNV: [facility]  [facility] FOLLOWING PROVIDER: Same provider as pt had prior to this admission.  90 DAY JOVANNY NOTE: Not needed  OTHER: Spoike to pt regarding discharge enteral feeding order.  Pt understood well regarding the TF and water flushes.  Pt states that he does not need supplies at this time.  Asked if he had any questions or concerns, and he stated he did not. Pt and his wife continues to be comfortable administering TF independently.  Encourged for him to reach out to [facility] with any questions or concerns.    Thank you,    Elma Mendez LPN  Enteral Nurse Liaison  [facility]  [address] [place] [address]  [phone]

## 2025-05-02 NOTE — PROGRESS NOTES
THORACIC & FOREGUT SURGERY    S:  Continues to have some diarrhea. Pain is better. Likely discharge today. Discussed discharge plans with wife and patient.       O:  Vitals:    05/01/25 1411 05/01/25 1959 05/01/25 2002 05/01/25 2155   BP: 129/82 107/81  106/71   BP Location: Left arm   Left arm   Pulse: 100   80   Resp: 18   16   Temp: 98.2  F (36.8  C)   98.6  F (37  C)   TempSrc: Oral   Oral   SpO2: 100% 100% 99% 100%   Weight:       Height:           A&O, NAD  Breathing non-labored  Appears well perfused  Soft, NDNT  Distal extremities warm.      Thin bilious drainage from ODIN drain.  Bilious drainage from pharyngostomy tube.     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 Esophagectomy on 3/24. EGD and intraoperative esophagram confirming no leak on 3/28 and later passed methylene blue challenge further confirming no leak on 4/2, however had a rising WBC after initiation of CLD. 4/3 CT esophagram confirmed anastomotic leak. 4/7 EGD with left chest tube placement which and was safely discharged on 4/9. In clinic on 4/17 CT showed leak of oral contrast into right pleural space, readmitted 4/17-23. Now re-admitted d/t leukocytosis, fatigue, weakness. CT scan without new collections or signs of infection related to his prior esophagectomy. He has been taking Augmentin and fluconazole.     #Malnutrition  #Diarrhea  #Dehydration  -increased imodium, fiber added  -[id number]/3H FWF    #Sepsis  -Continue to monitor WBC daily  -Continue pharyngostomy tube  - Switch IV zosyn to PO Augmentin when WBC is downtrending  - likely discharge today    Clinically Significant Risk  Factors                   # Hypertension: Noted on problem list             # Moderate Malnutrition: based on nutrition assessment and treatment provided per dietitian's recommendations.    # Financial/Environmental Concerns: none         Patient seen and discussed with staff.    ELAINE Schwartz,   General Surgery PGY-1      Thoracic and Foregut Surgery  Text page Thoracic Surgery via [facility] Paging/Directory      _______________________________________________________________________      PM UPDATE:       Patient developed acute right posterior chest pain, stabbing, patient was crying. Given IV dilaudid then IV toradol. Patient reports, toradol helped. More comfortable staying the night then discharge 5/2 AM

## 2025-05-02 NOTE — PROGRESS NOTES
[id number]. A/Ox4, RA, VSS. Pt TF running at goal rate of 90ml/hr until 10am tomorrow. Pt given PRN oxycodone 1x and scheduled tylenol for back pain. Possible discharge tomorrow. No acute events. Please continue with POC.

## 2025-05-03 ENCOUNTER — PATIENT OUTREACH (OUTPATIENT)
Dept: CARE COORDINATION | Facility: CLINIC | Age: 70
End: 2025-05-03
Payer: COMMERCIAL

## 2025-05-03 NOTE — PROGRESS NOTES
"Clinic Care Coordination Contact  Transitions of Care Outreach  Chief Complaint   Patient presents with    Clinic Care Coordination - Post Hospital       Most Recent Admission Date: 4/26/2025   Most Recent Admission Diagnosis: Weakness generalized - R53.1  Postoperative state - Z98.890  Post-operative complication - T81.9XXA  Leukocytosis, unspecified type - D72.829     Most Recent Discharge Date: 5/2/2025   Most Recent Discharge Diagnosis: Weakness generalized - R53.1  Leukocytosis, unspecified type - D72.829  Postoperative state - Z98.890  Other fatigue - R53.83  Adenocarcinoma of gastroesophageal junction (H) - C16.0  Esophageal anastomotic leak - K91.89  Diarrhea, unspecified type - R19.7  Hypertension, unspecified type - I10  S/P Nissen fundoplication (with gastrostomy tube placement) (H) - Z93.1     Transitions of Care Assessment    Discharge Assessment  How are you doing now that you are home?: \" Still in moderate pain \"  How are your symptoms? (Red Flag symptoms escalate to triage hotline per guidelines): Improved, Unchanged  Do you know how to contact your clinic care team if you have future questions or changes to your health status? : Yes  Does the patient have their discharge instructions? : Yes  Does the patient have questions regarding their discharge instructions? : No  Were you started on any new medications or were there changes to any of your previous medications? : Yes  Does the patient have all of their medications?: Yes  Do you have questions regarding any of your medications? : No  Do you have all of your needed medical supplies or equipment (DME)?  (i.e. oxygen tank, CPAP, cane, etc.): Yes    Post-op ([facility] CTA Only)  If the patient had a surgery or procedure, do they have any questions for a nurse?: No             Follow up Plan     Discharge Follow-Up  Discharge follow up appointment scheduled in alignment with recommended follow up timeframe or Transitions of Risk Category? (Low = within 30 " days; Moderate= within 14 days; High= within 7 days): Yes  Discharge Follow Up Appointment Date: 05/06/25  Discharge Follow Up Appointment Scheduled with?: Specialty Care Provider    Future Appointments   Date Time Provider Department Center   5/6/2025  2:30 PM Chacha Bhatia APRN CNS [facility]   5/12/2025  8:00 AM WWH CT 2 WWCTSC [facility]   5/14/2025  1:50 PM Juan Cruz,  HRWWH [facility]   5/16/2025 12:30 PM Riya Wiley PA-C [facility]   5/22/2025  9:45 AM Dilcia Nieves PT SWOPPT [facility]   9/17/2025 11:30 AM Yo Millan MD NUNEU [facility]       Outpatient Plan as outlined on AVS reviewed with patient.    For any urgent concerns, please contact our 24 hour nurse triage line: [phone] ([phone])       Zenaida Tovar MA

## 2025-05-05 ENCOUNTER — MYC MEDICAL ADVICE (OUTPATIENT)
Dept: SURGERY | Facility: CLINIC | Age: 70
End: 2025-05-05
Payer: COMMERCIAL

## 2025-05-05 ENCOUNTER — PATIENT OUTREACH (OUTPATIENT)
Dept: SURGERY | Facility: CLINIC | Age: 70
End: 2025-05-05
Payer: COMMERCIAL

## 2025-05-05 NOTE — PROGRESS NOTES
Pt called c/o increased redness and yellow drainage from site where he had ODIN drain. Afebrile. Pain 5/10 even with taking oxycodone as directed. Pt will send photo through [employer].    Catherine Del Toro, RN BSN  Thoracic Surgery RN Care Coordinator  Phone: [phone]

## 2025-05-06 ENCOUNTER — VIRTUAL VISIT (OUTPATIENT)
Dept: SURGERY | Facility: CLINIC | Age: 70
End: 2025-05-06
Attending: CLINICAL NURSE SPECIALIST
Payer: COMMERCIAL

## 2025-05-06 ENCOUNTER — HOME INFUSION BILLING (OUTPATIENT)
Dept: HOME HEALTH SERVICES | Facility: HOME HEALTH | Age: 70
End: 2025-05-06
Payer: COMMERCIAL

## 2025-05-06 VITALS — BODY MASS INDEX: 24.05 KG/M2 | WEIGHT: 168 LBS | HEIGHT: 70 IN

## 2025-05-06 DIAGNOSIS — C15.9 MALIGNANT NEOPLASM OF ESOPHAGUS, UNSPECIFIED LOCATION (H): Primary | ICD-10-CM

## 2025-05-06 PROCEDURE — 1125F AMNT PAIN NOTED PAIN PRSNT: CPT | Mod: 95 | Performed by: CLINICAL NURSE SPECIALIST

## 2025-05-06 PROCEDURE — 99024 POSTOP FOLLOW-UP VISIT: CPT | Performed by: CLINICAL NURSE SPECIALIST

## 2025-05-06 ASSESSMENT — PAIN SCALES - GENERAL: PAINLEVEL_OUTOF10: MODERATE PAIN (4)

## 2025-05-06 NOTE — PROGRESS NOTES
Virtual Visit Details    Type of service:  Video Visit   Video Start Time:  2:30 PM  Video End Time: 2:41 PM    Originating Location (pt. Location): Home    Distant Location (provider location):  Off-site  Platform used for Video Visit: Grecia

## 2025-05-06 NOTE — NURSING NOTE
Patient reviewed medications and allergies in [employer] during e-check in and said everything looked correct.      Current patient location:  [place]     Is the patient currently in the state of [place]? YES    Visit mode: VIDEO    If the visit is dropped, the patient can be reconnected by:VIDEO VISIT: Text to cell phone:   Telephone Information:   Mobile [phone]       Will anyone else be joining the visit? Jess-Pt's Wife   (If patient encounters technical issues they should call [phone])    Are changes needed to the allergy or medication list? Pt declined med review and Pt stated no med changes    Are refills needed on medications prescribed by this physician? NO    Rooming Documentation:  Patient declined to complete questionnaire(s)    Reason for visit: RECHECK (Spot on shoulder blade in back is very painful, sent picture Via [employer] on 5/5/25)    Harleen ELAM

## 2025-05-06 NOTE — LETTER
5/6/2025      Bal Junior  [address]  [facility] [address]      Dear Colleague,    Thank you for referring your patient, Bal Junior, to the [facility]. Please see a copy of my visit note below.    THORACIC SURGERY FOLLOW UP VISIT      I saw Mr. Junior in follow-up today. 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   NEODJUVANT THERAPY   FLOT (4 cycles, last 12/12/2024)   PROCEDURE   1) Laparoscopic redo hiatal hernia repair and Nissen fundoplication (09/2020)  2) Laparoscopic takedown of Nissen fundoplication and J-tube placement (02/26/2025)  3) Minimally invasive Kosta Arya esophagectomy (03/24/2025)  4) EGD with pharyngostomy tube placement to left neck (04/18/2025)  5) EGD with revision of pharyngostomy tube (04/29/2025)    HISTOPATHOLOGY   Moderately differentiated adenocarcinoma with partial treatment response; [id number] (0/20 LN)     COMPLICATIONS  Small anastomotic leak requiring additional percutaneous drain placement     INTERVAL STUDIES  None    Past Medical History:   Diagnosis Date     Hypertension      Neuritis      Peripheral neuropathy      Personal history of other medical treatment     postgastrectomy dumping syndrome     Stomach problems Noted earlier      Past Surgical History:   Procedure Laterality Date     ABDOMEN SURGERY  2012?     APPENDECTOMY  1964?     BRONCHOSCOPY RIDID OR FLEXIBLE W/ENDOBRONCHIAL ULTRASOUND GUIDED 3 OR MORE NODE STATIONS N/A 3/19/2025    Procedure: Bronchoscopy Ridid Or Flexible W/Endobronchial Ultrasound Guided 3 Or More Node Stations;  Surgeon: Saad Olmedo MD;  Location:  GI     COLONOSCOPY  2006, 2016     COMBINED ESOPHAGOSCOPY, GASTROSCOPY, DUODENOSCOPY (EGD), REPLACE ESOPHAGEAL STENT Left 4/18/2025    Procedure: ESOPHAGOGASTRODUODENOSCOPY with pharyngostomy tube placement;  Surgeon: Erik Lindsey MD;  Location:  OR      DAVINCI NISSEN FUNDOPLICATION N/A 2/26/2025    Procedure: TAKE-DOWN, FUNDOPLICATION, NISSEN, LAPAROSCOPIC- ROBOTIC, gastrostomy takedown, lysisof adhesions 2 hr;  Surgeon: Katlyn Tobar MD;  [facility]     ESOPHAGEAL BALLOON PROVOCATION STUDY N/A 9/24/2024    Procedure: Esophageal Balloon Provocation Study;  Surgeon: Carson Michel DO;  Location: [facility]     ESOPHAGECTOMY MINIMALLY INVASIVE N/A 3/24/2025    Procedure: ESOPHAGECTOMY, MINIMALLY INVASIVE, Laparoscopic Right Thorascopic Esophagectomy, Botox Injection;  Surgeon: Katlyn Tobar MD;  [facility]     ESOPHAGOSCOPY, GASTROSCOPY, DUODENOSCOPY (EGD), COMBINED N/A 9/24/2024    Procedure: ESOPHAGOGASTRODUODENOSCOPY, WITH BIOPSY;  Surgeon: Carson Michel DO;  [facility]: [facility]     ESOPHAGOSCOPY, GASTROSCOPY, DUODENOSCOPY (EGD), COMBINED N/A 10/8/2024    Procedure: ESOPHAGOGASTRODUODENOSCOPY, WITH FINE NEEDLE ASPIRATION BIOPSY, WITH ENDOSCOPIC ULTRASOUND GUIDANCE;  Surgeon: Lance Lopez MD;  [facility]     ESOPHAGOSCOPY, GASTROSCOPY, DUODENOSCOPY (EGD), COMBINED N/A 2/26/2025    Procedure: Esophagoscopy, gastroscopy, duodenoscopy (EGD);  Surgeon: Katlyn Tobar MD;  [facility]     ESOPHAGOSCOPY, GASTROSCOPY, DUODENOSCOPY (EGD), COMBINED N/A 3/24/2025    Procedure: Esophagoscopy, gastroscopy, duodenoscopy (EGD), combined;  Surgeon: Katlyn Tobar MD;  [facility]     ESOPHAGOSCOPY, GASTROSCOPY, DUODENOSCOPY (EGD), COMBINED N/A 3/28/2025    Procedure: ESOPHAGOGASTRODUODENOSCOPY, pharyngostomy tube replacement;  Surgeon: Chino Gardner MD;  [facility]     ESOPHAGOSCOPY, GASTROSCOPY, DUODENOSCOPY (EGD), COMBINED N/A 4/29/2025    Procedure: Esophagoscopy, gastroscopy, duodenoscopy (EGD), fluroscopy, revision of pharnygoscopy tube;  Surgeon: Katlyn Tobar MD;  Location[facility]     EYE SURGERY  199x    laser for retinal tears     GASTRIC FUNDOPLICATION       HERNIA REPAIR  1961?     IR CHEST PORT PLACEMENT > 5 YRS OF AGE   10/24/2024     LAPAROSCOPIC ASSISTED INSERTION TUBE JEJUNOSTOMY N/A 2/26/2025    Procedure: Laparoscopic assisted insertion tube jejunostomy;  Surgeon: Katlyn Tobar MD;  [facility]     LAPAROSCOPIC TAKEDOWN NISSEN FUNDOPLICATION N/A 9/25/2020    Procedure: TAKE-DOWN, FUNDOPLICATION, REDO NISSEN, LAPAROSCOPIC, gastrostomy feeding tube placement;  Surgeon: Katlyn Tobar MD;  [facility]     OR ESOPHAGOGASTRODUODENOSCOPY TRANSORAL DIAGNOSTIC N/A 5/9/2019    Procedure: UPPER GASTROINTESTINAL ENDOSCOPY;  Surgeon: Dino Ward MD;  Location: Health system;  Service: General     SOFT TISSUE SURGERY  2009?    MCL l. thumb      Social History     Socioeconomic History     Marital status:      Spouse name: Not on file     Number of children: Not on file     Years of education: Not on file     Highest education level: Not on file   Occupational History     Not on file   Tobacco Use     Smoking status: Never     Passive exposure: Past     Smokeless tobacco: Never   Substance and Sexual Activity     Alcohol use: Not Currently     Drug use: Never     Sexual activity: Yes     Partners: Female     Birth control/protection: Post-menopausal, Male Surgical, Female Surgical   Other Topics Concern     Not on file   Social History Narrative     Not on file     Social Drivers of Health     Financial Resource Strain: Low Risk  (4/27/2025)    Financial Resource Strain      Within the past 12 months, have you or your family members you live with been unable to get utilities (heat, electricity) when it was really needed?: No   Food Insecurity: Low Risk  (4/27/2025)    Food Insecurity      Within the past 12 months, did you worry that your food would run out before you got money to buy more?: No      Within the past 12 months, did the food you bought just not last and you didn t have money to get more?: No   Transportation Needs: Low Risk  (4/27/2025)    Transportation Needs      Within the past 12 months, has  lack of transportation kept you from medical appointments, getting your medicines, non-medical meetings or appointments, work, or from getting things that you need?: No   Physical Activity: Not on file   Stress: Not on file   Social Connections: Not on file   Interpersonal Safety: Low Risk  (4/27/2025)    Interpersonal Safety      Do you feel physically and emotionally safe where you currently live?: Yes      Within the past 12 months, have you been hit, slapped, kicked or otherwise physically hurt by someone?: No      Within the past 12 months, have you been humiliated or emotionally abused in other ways by your partner or ex-partner?: No   Housing Stability: Low Risk  (4/27/2025)    Housing Stability      Do you have housing? : Yes      Are you worried about losing your housing?: No      SUBJECTIVE   Bal is doing pretty good. His pharyngostomy tube is tender if it gets tugged but otherwise is doing fine. He is still pretty sore and using the pain medication. He does not currently need a refill. He has some old dressings on and is wondering if those can come off. He is still on antibiotics. He sees his PCP this Thursday.    OBJECTIVE  His pharyngostomy site looks good-the suture is intact. His old ODIN drain site looks really good.   The dressing he has on are a week old so they can definitely be removed.    From a personal perspective, he was in the car during the visit as he had driven to [place] to be within state lines for this visit.    UZAIR Selby is a 70 year-old male status post the above listed procedures.     PLAN  I spent 25 min on the date of the encounter in chart review, patient visit, review of tests, documentation and/or discussion with other providers about the issues documented above. I reviewed the plan as follows:  CT as scheduled May 12 and EGD with Dr. Dela Cruz as scheduled May 19, 2025.  All questions were answered and the patient and present family were in agreement with the plan.  I appreciate  "the opportunity to participate in the care of your patient and will keep you updated.  Sincerely,    ROSY Cadet       Virtual Visit Details    Type of service:  Video Visit   Video Start Time: {video visit start/end time for provider to select:978430}  Video End Time:{video visit start/end time for provider to select:182594}    Originating Location (pt. Location): {video visit patient location:053599::\"Home\"}  {PROVIDER LOCATION On-site should be selected for visits conducted from your clinic location or adjoining [facility] hospital, academic office, or other nearby [facility] building. Off-site should be selected for all other provider locations, including home:224934}  Distant Location (provider location):  {virtual location provider:320766}  Platform used for Video Visit: {Virtual Visit Platforms:091169::\"T-VIPS\"}      Again, thank you for allowing me to participate in the care of your patient.        Sincerely,        ROSY Cadet    Electronically signed"

## 2025-05-06 NOTE — PROGRESS NOTES
THORACIC SURGERY FOLLOW UP VISIT      I saw Mr. Junior in follow-up today. 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   NEODJUVANT THERAPY   FLOT (4 cycles, last 12/12/2024)   PROCEDURE   1) Laparoscopic redo hiatal hernia repair and Nissen fundoplication (09/2020)  2) Laparoscopic takedown of Nissen fundoplication and J-tube placement (02/26/2025)  3) Minimally invasive Kosta Arya esophagectomy (03/24/2025)  4) EGD with pharyngostomy tube placement to left neck (04/18/2025)  5) EGD with revision of pharyngostomy tube (04/29/2025)    HISTOPATHOLOGY   Moderately differentiated adenocarcinoma with partial treatment response; [id number] (0/20 LN)     COMPLICATIONS  Small anastomotic leak requiring additional percutaneous drain placement     INTERVAL STUDIES  None    Past Medical History:   Diagnosis Date    Hypertension     Neuritis     Peripheral neuropathy     Personal history of other medical treatment     postgastrectomy dumping syndrome    Stomach problems Noted earlier      Past Surgical History:   Procedure Laterality Date    ABDOMEN SURGERY  2012?    APPENDECTOMY  1964?    BRONCHOSCOPY RIDID OR FLEXIBLE W/ENDOBRONCHIAL ULTRASOUND GUIDED 3 OR MORE NODE STATIONS N/A 3/19/2025    Procedure: Bronchoscopy Ridid Or Flexible W/Endobronchial Ultrasound Guided 3 Or More Node Stations;  Surgeon: Saad Olmedo MD;  Location:  GI    COLONOSCOPY  2006, 2016    COMBINED ESOPHAGOSCOPY, GASTROSCOPY, DUODENOSCOPY (EGD), REPLACE ESOPHAGEAL STENT Left 4/18/2025    Procedure: ESOPHAGOGASTRODUODENOSCOPY with pharyngostomy tube placement;  Surgeon: Erik Lindsey MD;  Location:  OR    DAVINCI NISSEN FUNDOPLICATION N/A 2/26/2025    Procedure: TAKE-DOWN, FUNDOPLICATION, NISSEN, LAPAROSCOPIC- ROBOTIC, gastrostomy takedown, lysisof adhesions 2 hr;  Surgeon: Katlyn Tobar MD;  Location:  OR    ESOPHAGEAL BALLOON PROVOCATION STUDY N/A  9/24/2024    Procedure: Esophageal Balloon Provocation Study;  Surgeon: Carson Michel DO;  Location: [facility]    ESOPHAGECTOMY MINIMALLY INVASIVE N/A 3/24/2025    Procedure: ESOPHAGECTOMY, MINIMALLY INVASIVE, Laparoscopic Right Thorascopic Esophagectomy, Botox Injection;  Surgeon: Katlyn Tobar MD;  [facility]    ESOPHAGOSCOPY, GASTROSCOPY, DUODENOSCOPY (EGD), COMBINED N/A 9/24/2024    Procedure: ESOPHAGOGASTRODUODENOSCOPY, WITH BIOPSY;  Surgeon: Carson Michel DO;  [facility]: [facility]    ESOPHAGOSCOPY, GASTROSCOPY, DUODENOSCOPY (EGD), COMBINED N/A 10/8/2024    Procedure: ESOPHAGOGASTRODUODENOSCOPY, WITH FINE NEEDLE ASPIRATION BIOPSY, WITH ENDOSCOPIC ULTRASOUND GUIDANCE;  Surgeon: Lance Lopez MD;  [facility]    ESOPHAGOSCOPY, GASTROSCOPY, DUODENOSCOPY (EGD), COMBINED N/A 2/26/2025    Procedure: Esophagoscopy, gastroscopy, duodenoscopy (EGD);  Surgeon: Katlyn Tobar MD;  [facility]    ESOPHAGOSCOPY, GASTROSCOPY, DUODENOSCOPY (EGD), COMBINED N/A 3/24/2025    Procedure: Esophagoscopy, gastroscopy, duodenoscopy (EGD), combined;  Surgeon: Katlyn Tobar MD;  [facility]    ESOPHAGOSCOPY, GASTROSCOPY, DUODENOSCOPY (EGD), COMBINED N/A 3/28/2025    Procedure: ESOPHAGOGASTRODUODENOSCOPY, pharyngostomy tube replacement;  Surgeon: Chino Gardner MD;  [facility]    ESOPHAGOSCOPY, GASTROSCOPY, DUODENOSCOPY (EGD), COMBINED N/A 4/29/2025    Procedure: Esophagoscopy, gastroscopy, duodenoscopy (EGD), fluroscopy, revision of pharnygoscopy tube;  Surgeon: Katlyn Tobar MD;  [facility]: [facility]    EYE SURGERY  199x    laser for retinal tears    GASTRIC FUNDOPLICATION      HERNIA REPAIR  1961?    IR CHEST PORT PLACEMENT > 5 YRS OF AGE  10/24/2024    LAPAROSCOPIC ASSISTED INSERTION TUBE JEJUNOSTOMY N/A 2/26/2025    Procedure: Laparoscopic assisted insertion tube jejunostomy;  Surgeon: Katlyn Tobar MD;  [facility]    LAPAROSCOPIC TAKEDOWN NISSEN FUNDOPLICATION N/A 9/25/2020    Procedure:  TAKE-DOWN, FUNDOPLICATION, REDO NISSEN, LAPAROSCOPIC, gastrostomy feeding tube placement;  Surgeon: Katlyn Tobar MD;  [facility]    AR ESOPHAGOGASTRODUODENOSCOPY TRANSORAL DIAGNOSTIC N/A 5/9/2019    Procedure: UPPER GASTROINTESTINAL ENDOSCOPY;  Surgeon: Dino Ward MD;  Location: [facility];  Service: General    SOFT TISSUE SURGERY  2009?    MCL l. thumb      Social History     Socioeconomic History    Marital status:      Spouse name: Not on file    Number of children: Not on file    Years of education: Not on file    Highest education level: Not on file   Occupational History    Not on file   Tobacco Use    Smoking status: Never     Passive exposure: Past    Smokeless tobacco: Never   Substance and Sexual Activity    Alcohol use: Not Currently    Drug use: Never    Sexual activity: Yes     Partners: Female     Birth control/protection: Post-menopausal, Male Surgical, Female Surgical   Other Topics Concern    Not on file   Social History Narrative    Not on file     Social Drivers of Health     Financial Resource Strain: Low Risk  (4/27/2025)    Financial Resource Strain     Within the past 12 months, have you or your family members you live with been unable to get utilities (heat, electricity) when it was really needed?: No   Food Insecurity: Low Risk  (4/27/2025)    Food Insecurity     Within the past 12 months, did you worry that your food would run out before you got money to buy more?: No     Within the past 12 months, did the food you bought just not last and you didn t have money to get more?: No   Transportation Needs: Low Risk  (4/27/2025)    Transportation Needs     Within the past 12 months, has lack of transportation kept you from medical appointments, getting your medicines, non-medical meetings or appointments, work, or from getting things that you need?: No   Physical Activity: Not on file   Stress: Not on file   Social Connections: Not on file   Interpersonal Safety:  Low Risk  (4/27/2025)    Interpersonal Safety     Do you feel physically and emotionally safe where you currently live?: Yes     Within the past 12 months, have you been hit, slapped, kicked or otherwise physically hurt by someone?: No     Within the past 12 months, have you been humiliated or emotionally abused in other ways by your partner or ex-partner?: No   Housing Stability: Low Risk  (4/27/2025)    Housing Stability     Do you have housing? : Yes     Are you worried about losing your housing?: No      SUBJECTIVE   Bal is doing pretty good. His pharyngostomy tube is tender if it gets tugged but otherwise is doing fine. He is still pretty sore and using the pain medication. He does not currently need a refill. He has some old dressings on and is wondering if those can come off. He is still on antibiotics. He sees his PCP this Thursday.    OBJECTIVE  His pharyngostomy site looks good-the suture is intact. His old ODIN drain site looks really good.   The dressing he has on are a week old so they can definitely be removed.    From a personal perspective, he was in the car during the visit as he had driven to [place] to be within state lines for this visit.    IMPRESSION   Bal is a 70 year-old male status post the above listed procedures.     PLAN  I spent 25 min on the date of the encounter in chart review, patient visit, review of tests, documentation and/or discussion with other providers about the issues documented above. I reviewed the plan as follows:  CT as scheduled May 12 and EGD with Dr. Dela Cruz as scheduled May 19, 2025.  All questions were answered and the patient and present family were in agreement with the plan.  I appreciate the opportunity to participate in the care of your patient and will keep you updated.  Sincerely,    Chacha Bhatia, ROSY CNS

## 2025-05-07 ENCOUNTER — PATIENT OUTREACH (OUTPATIENT)
Dept: SURGERY | Facility: CLINIC | Age: 70
End: 2025-05-07
Payer: COMMERCIAL

## 2025-05-07 NOTE — PROGRESS NOTES
"Spoke with pt re: concern for redness onold ODIN drain site. Pt sent new picture in mychart today. We discussed photo was reviewed by ROSY Hernandez and it still does not look concerning for infection. He can try bacitracin BID if he is concerned.    We also discussed c/o pain with pharyngostomy tube when he tries to suction. Pain was relieved with oxycodone. Reported pain was \"at about 2 points above my baseline with the tube\". No blood. Output from tube is 1000ml over the past two nights. Suture is in place and tube has not moved. Per Dr. Tobar, ok to monitor and RNCC will check in with pt before the weekend. Pt will try to suction but will stop if it becomes too uncomfortable. Pt in agreement.    Catherine Del Toro, RN BSN  Thoracic Surgery RN Care Coordinator  Phone: [phone]    "

## 2025-05-08 ENCOUNTER — APPOINTMENT (OUTPATIENT)
Dept: CT IMAGING | Facility: CLINIC | Age: 70
End: 2025-05-08
Attending: INTERNAL MEDICINE
Payer: MEDICARE

## 2025-05-08 ENCOUNTER — HOSPITAL ENCOUNTER (EMERGENCY)
Facility: CLINIC | Age: 70
Discharge: HOME | End: 2025-05-09
Attending: INTERNAL MEDICINE
Payer: MEDICARE

## 2025-05-08 VITALS
RESPIRATION RATE: 17 BRPM | OXYGEN SATURATION: 93 % | HEART RATE: 120 BPM | SYSTOLIC BLOOD PRESSURE: 124 MMHG | DIASTOLIC BLOOD PRESSURE: 84 MMHG | WEIGHT: 169 LBS | HEIGHT: 70 IN | TEMPERATURE: 98.4 F | BODY MASS INDEX: 24.2 KG/M2

## 2025-05-08 DIAGNOSIS — M54.6 LEFT-SIDED THORACIC BACK PAIN, UNSPECIFIED CHRONICITY: Primary | ICD-10-CM

## 2025-05-08 DIAGNOSIS — R52 PAIN: ICD-10-CM

## 2025-05-08 DIAGNOSIS — C16.0 ADENOCARCINOMA OF GASTROESOPHAGEAL JUNCTION (H): ICD-10-CM

## 2025-05-08 DIAGNOSIS — R00.0 TACHYCARDIA, UNSPECIFIED: ICD-10-CM

## 2025-05-08 DIAGNOSIS — G89.18 POSTOPERATIVE PAIN: ICD-10-CM

## 2025-05-08 LAB
ALBUMIN SERPL BCG-MCNC: 3.8 G/DL (ref 3.5–5.2)
ALP SERPL-CCNC: 131 U/L (ref 40–150)
ALT SERPL W P-5'-P-CCNC: 13 U/L (ref 0–70)
ANION GAP SERPL CALCULATED.3IONS-SCNC: 11 MMOL/L (ref 7–15)
AST SERPL W P-5'-P-CCNC: 19 U/L (ref 0–45)
BASOPHILS # BLD AUTO: 0 10E3/UL (ref 0–0.2)
BASOPHILS NFR BLD AUTO: 0 %
BILIRUB SERPL-MCNC: 0.3 MG/DL
BUN SERPL-MCNC: 23.6 MG/DL (ref 8–23)
CALCIUM SERPL-MCNC: 9.4 MG/DL (ref 8.8–10.4)
CHLORIDE SERPL-SCNC: 99 MMOL/L (ref 98–107)
CREAT SERPL-MCNC: 0.79 MG/DL (ref 0.67–1.17)
EGFRCR SERPLBLD CKD-EPI 2021: >90 ML/MIN/1.73M2
EOSINOPHIL # BLD AUTO: 0.1 10E3/UL (ref 0–0.7)
EOSINOPHIL NFR BLD AUTO: 1 %
ERYTHROCYTE [DISTWIDTH] IN BLOOD BY AUTOMATED COUNT: 16.8 % (ref 10–15)
GLUCOSE SERPL-MCNC: 111 MG/DL (ref 70–99)
HCO3 SERPL-SCNC: 23 MMOL/L (ref 22–29)
HCT VFR BLD AUTO: 31.8 % (ref 40–53)
HGB BLD-MCNC: 10.4 G/DL (ref 13.3–17.7)
IMM GRANULOCYTES # BLD: 0.1 10E3/UL
IMM GRANULOCYTES NFR BLD: 1 %
LYMPHOCYTES # BLD AUTO: 1.9 10E3/UL (ref 0.8–5.3)
LYMPHOCYTES NFR BLD AUTO: 20 %
MCH RBC QN AUTO: 27.6 PG (ref 26.5–33)
MCHC RBC AUTO-ENTMCNC: 32.7 G/DL (ref 31.5–36.5)
MCV RBC AUTO: 84 FL (ref 78–100)
MONOCYTES # BLD AUTO: 1.1 10E3/UL (ref 0–1.3)
MONOCYTES NFR BLD AUTO: 12 %
NEUTROPHILS # BLD AUTO: 6.2 10E3/UL (ref 1.6–8.3)
NEUTROPHILS NFR BLD AUTO: 66 %
NRBC # BLD AUTO: 0 10E3/UL
NRBC BLD AUTO-RTO: 0 /100
PLATELET # BLD AUTO: 334 10E3/UL (ref 150–450)
POTASSIUM SERPL-SCNC: 4.7 MMOL/L (ref 3.4–5.3)
PROT SERPL-MCNC: 7.5 G/DL (ref 6.4–8.3)
RBC # BLD AUTO: 3.77 10E6/UL (ref 4.4–5.9)
SODIUM SERPL-SCNC: 133 MMOL/L (ref 135–145)
WBC # BLD AUTO: 9.4 10E3/UL (ref 4–11)

## 2025-05-08 PROCEDURE — 71260 CT THORAX DX C+: CPT | Mod: 26 | Performed by: RADIOLOGY

## 2025-05-08 PROCEDURE — 99284 EMERGENCY DEPT VISIT MOD MDM: CPT | Performed by: INTERNAL MEDICINE

## 2025-05-08 PROCEDURE — 250N000011 HC RX IP 250 OP 636: Mod: JZ | Performed by: EMERGENCY MEDICINE

## 2025-05-08 PROCEDURE — 71260 CT THORAX DX C+: CPT

## 2025-05-08 PROCEDURE — 96375 TX/PRO/DX INJ NEW DRUG ADDON: CPT | Mod: 59 | Performed by: INTERNAL MEDICINE

## 2025-05-08 PROCEDURE — 82565 ASSAY OF CREATININE: CPT | Performed by: INTERNAL MEDICINE

## 2025-05-08 PROCEDURE — 250N000011 HC RX IP 250 OP 636: Mod: JZ | Performed by: INTERNAL MEDICINE

## 2025-05-08 PROCEDURE — 258N000003 HC RX IP 258 OP 636: Performed by: INTERNAL MEDICINE

## 2025-05-08 PROCEDURE — 96361 HYDRATE IV INFUSION ADD-ON: CPT | Mod: 59 | Performed by: INTERNAL MEDICINE

## 2025-05-08 PROCEDURE — 85025 COMPLETE CBC W/AUTO DIFF WBC: CPT | Performed by: INTERNAL MEDICINE

## 2025-05-08 PROCEDURE — 96376 TX/PRO/DX INJ SAME DRUG ADON: CPT | Mod: 59 | Performed by: INTERNAL MEDICINE

## 2025-05-08 PROCEDURE — 96374 THER/PROPH/DIAG INJ IV PUSH: CPT | Mod: 59 | Performed by: INTERNAL MEDICINE

## 2025-05-08 PROCEDURE — 36415 COLL VENOUS BLD VENIPUNCTURE: CPT | Performed by: INTERNAL MEDICINE

## 2025-05-08 PROCEDURE — 99285 EMERGENCY DEPT VISIT HI MDM: CPT | Mod: 25 | Performed by: INTERNAL MEDICINE

## 2025-05-08 PROCEDURE — 250N000011 HC RX IP 250 OP 636: Performed by: INTERNAL MEDICINE

## 2025-05-08 RX ORDER — HYDROMORPHONE HYDROCHLORIDE 1 MG/ML
0.5 INJECTION, SOLUTION INTRAMUSCULAR; INTRAVENOUS; SUBCUTANEOUS ONCE
Status: COMPLETED | OUTPATIENT
Start: 2025-05-08 | End: 2025-05-08

## 2025-05-08 RX ORDER — IOPAMIDOL 755 MG/ML
83 INJECTION, SOLUTION INTRAVASCULAR ONCE
Status: COMPLETED | OUTPATIENT
Start: 2025-05-08 | End: 2025-05-08

## 2025-05-08 RX ORDER — HEPARIN SODIUM (PORCINE) LOCK FLUSH IV SOLN 100 UNIT/ML 100 UNIT/ML
100 SOLUTION INTRAVENOUS ONCE
Status: COMPLETED | OUTPATIENT
Start: 2025-05-08 | End: 2025-05-08

## 2025-05-08 RX ORDER — OXYCODONE HCL 5 MG/5 ML
5-10 SOLUTION, ORAL ORAL EVERY 4 HOURS PRN
Qty: 100 ML | Refills: 0 | Status: SHIPPED | OUTPATIENT
Start: 2025-05-08

## 2025-05-08 RX ADMIN — HYDROMORPHONE HYDROCHLORIDE 0.5 MG: 1 INJECTION, SOLUTION INTRAMUSCULAR; INTRAVENOUS; SUBCUTANEOUS at 17:15

## 2025-05-08 RX ADMIN — HYDROMORPHONE HYDROCHLORIDE 0.5 MG: 1 INJECTION, SOLUTION INTRAMUSCULAR; INTRAVENOUS; SUBCUTANEOUS at 17:40

## 2025-05-08 RX ADMIN — SODIUM CHLORIDE, SODIUM LACTATE, POTASSIUM CHLORIDE, AND CALCIUM CHLORIDE 1000 ML: .6; .31; .03; .02 INJECTION, SOLUTION INTRAVENOUS at 18:06

## 2025-05-08 RX ADMIN — HYDROMORPHONE HYDROCHLORIDE 0.5 MG: 1 INJECTION, SOLUTION INTRAMUSCULAR; INTRAVENOUS; SUBCUTANEOUS at 21:48

## 2025-05-08 RX ADMIN — IOPAMIDOL 83 ML: 755 INJECTION, SOLUTION INTRAVENOUS at 18:37

## 2025-05-08 RX ADMIN — Medication 100 UNITS: at 22:43

## 2025-05-08 ASSESSMENT — COLUMBIA-SUICIDE SEVERITY RATING SCALE - C-SSRS
6. HAVE YOU EVER DONE ANYTHING, STARTED TO DO ANYTHING, OR PREPARED TO DO ANYTHING TO END YOUR LIFE?: NO
1. IN THE PAST MONTH, HAVE YOU WISHED YOU WERE DEAD OR WISHED YOU COULD GO TO SLEEP AND NOT WAKE UP?: NO
2. HAVE YOU ACTUALLY HAD ANY THOUGHTS OF KILLING YOURSELF IN THE PAST MONTH?: NO

## 2025-05-08 ASSESSMENT — ACTIVITIES OF DAILY LIVING (ADL)
ADLS_ACUITY_SCORE: 56

## 2025-05-08 NOTE — CONSULTS
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     Upon interval assessments, patient appears a lot more comfortable. CT scan of his chest was essentially unchanged compared to previous scans.     - Ok to discharge to home--- this was discussed extensively with patient. He feels comfortable with managing his pain at home.  - Keep pharyngostomy tube to gravity (instead of suctioning)    Patient's care was discussed with fellow who d/w staff.     vIy De La Garza,   General Surgery, PGY-2   [facility], Pager [phone]    History of Present Illness:    Bal Junior is a 70 year old male with complex past medical history who most recently had a left pharyngostomy tube placed on 25 for an esophageal anastomotic leak and recent admission on - for dehydration. Today, he presents with 1-day history of pain in right back (where ODIN drain used to be) while suctioning pharyngostomy tube. The pain has been present since the ODIN drain was initially placed but is significantly worse with suctioning. He takes oxycodone at baseline to control pain. He endorses some baseline shortness of breath that is also driven by pain at this time. He does not  use supplemental oxygen at home. He denies fevers, chills, chest pain, abdominal pain, nausea or emesis, neck pain, hemoptysis, or bloody drainage from pharyngostomy. His pharyngostomy output is yellow/green which is unchanged. He still gets diarrhea with tube feeds but is otherwise tolerating it well. Denies any other changes to his health at this time.     CT scan showed postsurgical changes of esophagectomy with gastric pull-through with pharyngostomy tube tip in right pleural space which is unchanged compared to last CT scan. No other acute interval abnormality within the chest seen. Labs are notable for hyponatremia at 133, elevated BUN at 23.6 (at baseline), normal Cr, normal LFTs and total bilirubin, normal WBC at 9.4, chronic anemia (10.4 from 9.3), normal platelet count at 334.     In the ED, he is afebrile, nontachycardic, and normotensive.     Past Medical History:  Past Medical History:   Diagnosis Date    Hypertension     Neuritis     Peripheral neuropathy     Personal history of other medical treatment     postgastrectomy dumping syndrome    Stomach problems Noted earlier       Past Surgical History  Past Surgical History:   Procedure Laterality Date    ABDOMEN SURGERY  2012?    APPENDECTOMY  1964?    BRONCHOSCOPY RIDID OR FLEXIBLE W/ENDOBRONCHIAL ULTRASOUND GUIDED 3 OR MORE NODE STATIONS N/A 3/19/2025    Procedure: Bronchoscopy Ridid Or Flexible W/Endobronchial Ultrasound Guided 3 Or More Node Stations;  Surgeon: Saad Olmedo MD;  Location:  GI    COLONOSCOPY  2006, 2016    COMBINED ESOPHAGOSCOPY, GASTROSCOPY, DUODENOSCOPY (EGD), REPLACE ESOPHAGEAL STENT Left 4/18/2025    Procedure: ESOPHAGOGASTRODUODENOSCOPY with pharyngostomy tube placement;  Surgeon: Erik Lindsey MD;  Location:  OR    [facility] NISSEN FUNDOPLICATION N/A 2/26/2025    Procedure: TAKE-DOWN, FUNDOPLICATION, NISSEN, LAPAROSCOPIC- ROBOTIC, gastrostomy takedown, lysisof adhesions 2 hr;  Surgeon: Katlyn Tobar MD;   [facility]    ESOPHAGEAL BALLOON PROVOCATION STUDY N/A 9/24/2024    Procedure: Esophageal Balloon Provocation Study;  Surgeon: Carson Michel DO;  [facility]: [facility] GI    ESOPHAGECTOMY MINIMALLY INVASIVE N/A 3/24/2025    Procedure: ESOPHAGECTOMY, MINIMALLY INVASIVE, Laparoscopic Right Thorascopic Esophagectomy, Botox Injection;  Surgeon: Katlyn Tobar MD;  [facility]    ESOPHAGOSCOPY, GASTROSCOPY, DUODENOSCOPY (EGD), COMBINED N/A 9/24/2024    Procedure: ESOPHAGOGASTRODUODENOSCOPY, WITH BIOPSY;  Surgeon: Carson Michel DO;  [facility]    ESOPHAGOSCOPY, GASTROSCOPY, DUODENOSCOPY (EGD), COMBINED N/A 10/8/2024    Procedure: ESOPHAGOGASTRODUODENOSCOPY, WITH FINE NEEDLE ASPIRATION BIOPSY, WITH ENDOSCOPIC ULTRASOUND GUIDANCE;  Surgeon: Lance Lopez MD;  [facility]    ESOPHAGOSCOPY, GASTROSCOPY, DUODENOSCOPY (EGD), COMBINED N/A 2/26/2025    Procedure: Esophagoscopy, gastroscopy, duodenoscopy (EGD);  Surgeon: Katlyn Tobar MD;  [facility]    ESOPHAGOSCOPY, GASTROSCOPY, DUODENOSCOPY (EGD), COMBINED N/A 3/24/2025    Procedure: Esophagoscopy, gastroscopy, duodenoscopy (EGD), combined;  Surgeon: Katlyn Tobar MD;  [facility]    ESOPHAGOSCOPY, GASTROSCOPY, DUODENOSCOPY (EGD), COMBINED N/A 3/28/2025    Procedure: ESOPHAGOGASTRODUODENOSCOPY, pharyngostomy tube replacement;  Surgeon: Chino Gardner MD;  [facility]    ESOPHAGOSCOPY, GASTROSCOPY, DUODENOSCOPY (EGD), COMBINED N/A 4/29/2025    Procedure: Esophagoscopy, gastroscopy, duodenoscopy (EGD), fluroscopy, revision of pharnygoscopy tube;  Surgeon: Katlyn Tobar MD;  Location: [facility]    EYE SURGERY  199x    laser for retinal tears    GASTRIC FUNDOPLICATION      HERNIA REPAIR  1961?    IR CHEST PORT PLACEMENT > 5 YRS OF AGE  10/24/2024    LAPAROSCOPIC ASSISTED INSERTION TUBE JEJUNOSTOMY N/A 2/26/2025    Procedure: Laparoscopic assisted insertion tube jejunostomy;  Surgeon: Katlyn Tobar MD;  [facility]    LAPAROSCOPIC  TAKEDOWN NISSEN FUNDOPLICATION N/A 9/25/2020    Procedure: TAKE-DOWN, FUNDOPLICATION, REDO NISSEN, LAPAROSCOPIC, gastrostomy feeding tube placement;  Surgeon: Katlyn Tobar MD;  [facility]    [facility] ESOPHAGOGASTRODUODENOSCOPY TRANSORAL DIAGNOSTIC N/A 5/9/2019    Procedure: UPPER GASTROINTESTINAL ENDOSCOPY;  Surgeon: Dino Ward MD;  Location: [facility];  Service: General    SOFT TISSUE SURGERY  2009?    JCARLOS fermin       Family History:  Family History   Problem Relation Age of Onset    Hypertension Mother     Uterine Cancer Mother         1965    Anesthesia Reaction Mother         PONV, slow to emerge    Mitral Valve Replacement Father     Coronary Artery Disease Maternal Grandfather     GERD No family hx of     Ulcerative Colitis No family hx of     Crohn's Disease No family hx of     Stomach Cancer No family hx of     Deep Vein Thrombosis (DVT) No family hx of        Social History:  Social History     Socioeconomic History    Marital status:      Spouse name: Not on file    Number of children: Not on file    Years of education: Not on file    Highest education level: Not on file   Occupational History    Not on file   Tobacco Use    Smoking status: Never     Passive exposure: Past    Smokeless tobacco: Never   Substance and Sexual Activity    Alcohol use: Not Currently    Drug use: Never    Sexual activity: Yes     Partners: Female     Birth control/protection: Post-menopausal, Male Surgical, Female Surgical   Other Topics Concern    Not on file   Social History Narrative    Not on file     Social Drivers of Health     Financial Resource Strain: Low Risk  (4/27/2025)    Financial Resource Strain     Within the past 12 months, have you or your family members you live with been unable to get utilities (heat, electricity) when it was really needed?: No   Food Insecurity: Low Risk  (4/27/2025)    Food Insecurity     Within the past 12 months, did you worry that your food would run out  before you got money to buy more?: No     Within the past 12 months, did the food you bought just not last and you didn t have money to get more?: No   Transportation Needs: Low Risk  (4/27/2025)    Transportation Needs     Within the past 12 months, has lack of transportation kept you from medical appointments, getting your medicines, non-medical meetings or appointments, work, or from getting things that you need?: No   Physical Activity: Not on file   Stress: Not on file   Social Connections: Not on file   Interpersonal Safety: Low Risk  (4/27/2025)    Interpersonal Safety     Do you feel physically and emotionally safe where you currently live?: Yes     Within the past 12 months, have you been hit, slapped, kicked or otherwise physically hurt by someone?: No     Within the past 12 months, have you been humiliated or emotionally abused in other ways by your partner or ex-partner?: No   Housing Stability: Low Risk  (4/27/2025)    Housing Stability     Do you have housing? : Yes     Are you worried about losing your housing?: No       Medications:  Current Outpatient Medications   Medication Sig Dispense Refill    acetaminophen (TYLENOL) 325 MG tablet Place 2 tablets (650 mg) into Feeding Tube every 6 hours.      amoxicillin-clavulanate (AUGMENTIN) 400-57 MG/5ML suspension Place 10.94 mLs (875 mg) into G tube 2 times daily. 100 mL 0    atenolol (TENORMIN) 50 MG tablet Place 1 tablet (50 mg) into G tube daily.      calcium carbonate (TUMS) 500 MG chewable tablet Take 1 tablet (500 mg) by mouth daily as needed for heartburn. 30 tablet 0    cyanocobalamin (VITAMIN B-12) 500 MCG tablet Place 500 mcg into Feeding Tube every other day.      dextromethorphan (DELSYM) 30 MG/5ML liquid Take 5 mLs (30 mg) by mouth once as needed for cough.      EPINEPHrine (ANY BX GENERIC EQUIV) 0.3 MG/0.3ML injection 2-pack Inject 0.3 mg into the muscle as needed for anaphylaxis.      fluconazole (DIFLUCAN) 200 MG tablet Place 2 tablets  (400 mg) into G tube daily. 12 tablet 0    gabapentin (NEURONTIN) 300 MG capsule Place 1 capsule (300 mg) into Feeding Tube 3 times daily.      Isosource 1.5 Gumaro 250 mL Place 1,500 mLs into J tube daily. Infuse via pump. 90 mL/hr x 16 hours.   6 cartons per day.   Water flush: 120 mL before and after tube feed cycle. Additional 120 mL 8x per day.   Kcals: 2250 52400 mL 11    Lidocaine (LIDOCARE) 4 % Patch Place 1 patch over 12 hours onto the skin every 24 hours. To prevent lidocaine toxicity, patient should be patch free for 12 hrs daily. 4 patch 0    loperamide (IMODIUM) 1 MG/7.5ML liquid Place 15 mLs (2 mg) into Feeding Tube 3 times daily. 711 mL 0    methocarbamol (ROBAXIN) 750 MG tablet Place 1 tablet (750 mg) into J tube every 6 hours as needed for muscle spasms. 20 tablet 0    multivitamins w/minerals liquid Place 15 mLs into Feeding Tube daily. 450 mL 0    naloxone (NARCAN) 0.4 MG/ML injection Inject 0.5 mLs (0.2 mg) into the muscle once for 1 dose. 0.5 mL 0    nortriptyline (PAMELOR) 25 MG capsule Place 1 capsule (25 mg) into J tube every evening.      Nutrisource Fiber PO packet Place 1 each into Feeding Tube 2 times daily. 60 each 0    omeprazole (PRILOSEC) 2 mg/mL suspension Take 10 mLs (20 mg) by mouth daily. 300 mL 0    oxyCODONE (ROXICODONE) 5 MG/5ML solution Place 5-10 mLs (5-10 mg) into J tube every 4 hours as needed for severe pain. 200 mL 0    Prosource TF PO LIQD (PROSOURCE TF) Place 45 mLs into J tube 2 times daily. Infuse via syringe  Water flush: 30 mL before and after each packet   Kcals: 80  2 pkts per day 2700 mL 11    UNABLE TO FIND Take 1-2 sprays by mouth as needed. MEDICATION NAME: Hurricaine Throat Spray.         Allergies:     Allergies   Allergen Reactions    Hornets     Wasp Venom Protein Hives    Bee Venom Swelling       Review of Symptoms:  A 10 point review of symptoms has been conducted and is negative except for that mentioned in the above HPI.    Physical Exam:  Blood pressure  "111/76, pulse 105, temperature 98.4  F (36.9  C), temperature source Oral, resp. rate 16, height 1.778 m (5' 10\"), weight 76.7 kg (169 lb), SpO2 98%.  A&O, NAD in wheelchair   Minimal tachypnea while talking but able to complete full sentences   Left neck with pharyngostomy tube, yellow/green drainage, no purulent drainage   Right back with previous ODIN drain site in middle, well healed. No fluctuance, drainage, erythema   RRR  Abd is slightly distended but soft, NT. Multiple surgical scars that are well healed. J tube in left abdomen  Ext wwp    Labs:  BMP  Recent Labs   Lab 05/08/25  1732   *   POTASSIUM 4.7   CHLORIDE 99   CO2 23   BUN 23.6*   CR 0.79   *     CBC  Recent Labs   Lab 05/08/25  1732 05/02/25  0548   WBC 9.4 12.2*   HGB 10.4* 9.3*    344     LFT  Recent Labs   Lab 05/08/25  1732   AST 19   ALT 13   ALKPHOS 131   BILITOTAL 0.3   ALBUMIN 3.8     Recent Labs   Lab 05/08/25  1732   *       Imaging:  CT Chest 5/8/25   IMPRESSION:   1. Postsurgical changes of esophagectomy with gastric pull-through.  There continues to be a enteric tube coursing through the posterior  wall of the gastric pull-through with the distal tip terminating along  the right pleural space, unchanged dating back to CT 4/26/2025.  2. Essentially unchanged bibasilar scarring/segmental atelectasis and  trace right pleural effusion. No acute interval abnormality within the  chest.  3. Unchanged prominent mediastinal lymph nodes, likely reactive.  4. Hepatic steatosis.    "

## 2025-05-08 NOTE — PROGRESS NOTES
Pt left VM at 0805 this morning that he tried to hook up pharyngostomy tube to suction last night and again this morning and experienced 10/10 pain when he did this. Dr. Tobar updated and recommended ED eval at [facility] due to new acute pain. Pt made aware of this recommendation at 1030. Pt states he is seeing his local PCP at noon and then he will decide if he is going to go to ED.     Catherine Del Toro RN BSN  Thoracic Surgery RN Care Coordinator  Phone: [phone]

## 2025-05-08 NOTE — ED PROVIDER NOTES
[place] EMERGENCY DEPARTMENT ([facility])    5/08/25       ED PROVIDER NOTE    History     Chief Complaint   Patient presents with    drain issues     HPI  Bal Junior is a 70 year old male with a history of esophageal cancer s/p esophagectomy (3/24/25), adenocarcinoma of gastroesophageal junction s/p robotic takedown of Nissen fundoplication & J-tube placement on (2/26/25), neuritis, hypertension, and peripheral neuropathy who presents to the emergency department for severe pain left back, worse when suctioning pharyngeal tube.  No fever. No other complaints currently.    Past Medical History  Past Medical History:   Diagnosis Date    Hypertension     Neuritis     Peripheral neuropathy     Personal history of other medical treatment     postgastrectomy dumping syndrome    Stomach problems Noted earlier     Past Surgical History:   Procedure Laterality Date    ABDOMEN SURGERY  2012?    APPENDECTOMY  1964?    BRONCHOSCOPY RIDID OR FLEXIBLE W/ENDOBRONCHIAL ULTRASOUND GUIDED 3 OR MORE NODE STATIONS N/A 3/19/2025    Procedure: Bronchoscopy Ridid Or Flexible W/Endobronchial Ultrasound Guided 3 Or More Node Stations;  Surgeon: Saad Olmedo MD;  Location:  GI    COLONOSCOPY  2006, 2016    COMBINED ESOPHAGOSCOPY, GASTROSCOPY, DUODENOSCOPY (EGD), REPLACE ESOPHAGEAL STENT Left 4/18/2025    Procedure: ESOPHAGOGASTRODUODENOSCOPY with pharyngostomy tube placement;  Surgeon: Erik Lindsey MD;  Location:  OR    DAVINCI NISSEN FUNDOPLICATION N/A 2/26/2025    Procedure: TAKE-DOWN, FUNDOPLICATION, NISSEN, LAPAROSCOPIC- ROBOTIC, gastrostomy takedown, lysisof adhesions 2 hr;  Surgeon: Katlyn Tobar MD;  Location:  OR    ESOPHAGEAL BALLOON PROVOCATION STUDY N/A 9/24/2024    Procedure: Esophageal Balloon Provocation Study;  Surgeon: Carson Michel DO;  Location:  GI    ESOPHAGECTOMY MINIMALLY INVASIVE N/A 3/24/2025    Procedure: ESOPHAGECTOMY, MINIMALLY INVASIVE, Laparoscopic Right Thorascopic  Esophagectomy, Botox Injection;  Surgeon: Katlyn Tobar MD;  [facility]    ESOPHAGOSCOPY, GASTROSCOPY, DUODENOSCOPY (EGD), COMBINED N/A 9/24/2024    Procedure: ESOPHAGOGASTRODUODENOSCOPY, WITH BIOPSY;  Surgeon: Carson Michel DO;  [facility]: [facility]    ESOPHAGOSCOPY, GASTROSCOPY, DUODENOSCOPY (EGD), COMBINED N/A 10/8/2024    Procedure: ESOPHAGOGASTRODUODENOSCOPY, WITH FINE NEEDLE ASPIRATION BIOPSY, WITH ENDOSCOPIC ULTRASOUND GUIDANCE;  Surgeon: Lance Lopez MD;  [facility]    ESOPHAGOSCOPY, GASTROSCOPY, DUODENOSCOPY (EGD), COMBINED N/A 2/26/2025    Procedure: Esophagoscopy, gastroscopy, duodenoscopy (EGD);  Surgeon: Katlyn Tobar MD;  [facility]    ESOPHAGOSCOPY, GASTROSCOPY, DUODENOSCOPY (EGD), COMBINED N/A 3/24/2025    Procedure: Esophagoscopy, gastroscopy, duodenoscopy (EGD), combined;  Surgeon: Katlyn Tobar MD;  [facility]    ESOPHAGOSCOPY, GASTROSCOPY, DUODENOSCOPY (EGD), COMBINED N/A 3/28/2025    Procedure: ESOPHAGOGASTRODUODENOSCOPY, pharyngostomy tube replacement;  Surgeon: Chino Gardner MD;  [facility]    ESOPHAGOSCOPY, GASTROSCOPY, DUODENOSCOPY (EGD), COMBINED N/A 4/29/2025    Procedure: Esophagoscopy, gastroscopy, duodenoscopy (EGD), fluroscopy, revision of pharnygoscopy tube;  Surgeon: Katlyn Tobar MD;  [facility]    EYE SURGERY  199x    laser for retinal tears    GASTRIC FUNDOPLICATION      HERNIA REPAIR  1961?    IR CHEST PORT PLACEMENT > 5 YRS OF AGE  10/24/2024    LAPAROSCOPIC ASSISTED INSERTION TUBE JEJUNOSTOMY N/A 2/26/2025    Procedure: Laparoscopic assisted insertion tube jejunostomy;  Surgeon: Katlyn Tobar MD;  [facility]    LAPAROSCOPIC TAKEDOWN NISSEN FUNDOPLICATION N/A 9/25/2020    Procedure: TAKE-DOWN, FUNDOPLICATION, REDO NISSEN, LAPAROSCOPIC, gastrostomy feeding tube placement;  Surgeon: Katlyn Tobar MD;  [facility]    ID ESOPHAGOGASTRODUODENOSCOPY TRANSORAL DIAGNOSTIC N/A 5/9/2019    Procedure: UPPER GASTROINTESTINAL  ENDOSCOPY;  Surgeon: Dino Ward MD;  Location: [facility] OR;  Service: General    SOFT TISSUE SURGERY  2009?    MCL l. thumb     acetaminophen (TYLENOL) 325 MG tablet  amoxicillin-clavulanate (AUGMENTIN) 400-57 MG/5ML suspension  atenolol (TENORMIN) 50 MG tablet  calcium carbonate (TUMS) 500 MG chewable tablet  cyanocobalamin (VITAMIN B-12) 500 MCG tablet  dextromethorphan (DELSYM) 30 MG/5ML liquid  EPINEPHrine (ANY BX GENERIC EQUIV) 0.3 MG/0.3ML injection 2-pack  fluconazole (DIFLUCAN) 200 MG tablet  gabapentin (NEURONTIN) 300 MG capsule  Isosource 1.5 Gumaro 250 mL  Lidocaine (LIDOCARE) 4 % Patch  loperamide (IMODIUM) 1 MG/7.5ML liquid  methocarbamol (ROBAXIN) 750 MG tablet  multivitamins w/minerals liquid  naloxone (NARCAN) 0.4 MG/ML injection  nortriptyline (PAMELOR) 25 MG capsule  Nutrisource Fiber PO packet  omeprazole (PRILOSEC) 2 mg/mL suspension  oxyCODONE (ROXICODONE) 5 MG/5ML solution  Prosource TF PO LIQD (PROSOURCE TF)  UNABLE TO FIND      Allergies   Allergen Reactions    Hornets     Wasp Venom Protein Hives    Bee Venom Swelling     Family History  Family History   Problem Relation Age of Onset    Hypertension Mother     Uterine Cancer Mother         1965    Anesthesia Reaction Mother         PONV, slow to emerge    Mitral Valve Replacement Father     Coronary Artery Disease Maternal Grandfather     GERD No family hx of     Ulcerative Colitis No family hx of     Crohn's Disease No family hx of     Stomach Cancer No family hx of     Deep Vein Thrombosis (DVT) No family hx of      Social History   Social History     Tobacco Use    Smoking status: Never     Passive exposure: Past    Smokeless tobacco: Never   Substance Use Topics    Alcohol use: Not Currently    Drug use: Never      A medically appropriate review of systems was performed with pertinent positives and negatives noted in the HPI, and all other systems negative.    Physical Exam   BP: 111/76  Pulse: 105  Temp: 98.4  F (36.9  " C)  Resp: 16  Height: 177.8 cm (5' 10\")  Weight: 76.7 kg (169 lb)  SpO2: 98 %  Physical Exam  Vitals and nursing note reviewed.   Constitutional:       Appearance: He is not toxic-appearing or diaphoretic.      Comments: In significant pain   HENT:      Nose: No rhinorrhea.      Mouth/Throat:      Mouth: Mucous membranes are moist.   Eyes:      General: No scleral icterus.  Cardiovascular:      Rate and Rhythm: Regular rhythm. Tachycardia present.   Pulmonary:      Effort: Pulmonary effort is normal. No respiratory distress.      Breath sounds: Normal breath sounds. No stridor. No wheezing.   Abdominal:      Palpations: Abdomen is soft.      Tenderness: There is no abdominal tenderness. There is no guarding or rebound.   Musculoskeletal:      Cervical back: No tenderness. No muscular tenderness.   Skin:     General: Skin is warm and dry.   Neurological:      General: No focal deficit present.      Mental Status: He is alert.   Psychiatric:      Comments: Severe pain           ED Course, Procedures, & Data      Procedures                No results found for any visits on 05/08/25.  Medications - No data to display  Labs Ordered and Resulted from Time of ED Arrival to Time of ED Departure - No data to display  No orders to display          Critical care was not performed.     Medical Decision Making  The patient's presentation was of high complexity (a chronic illness severe exacerbation, progression, or side effect of treatment).    The patient's evaluation involved:  an assessment requiring an independent historian (see separate area of note for details)  review of 3+ test result(s) ordered prior to this encounter (see separate area of note for details)  ordering and/or review of 3+ test(s) in this encounter (see separate area of note for details)  discussion of management or test interpretation with another health professional (see separate area of note for details)    The patient's management necessitated high " risk (a parenteral controlled substance) and high risk (a decision regarding hospitalization).    Assessment & Plan    Patient with recent esophagectomy by Dr. Tobar known to the thoracic surgery service well.  He has had significant issues with pain control since the surgery.  Today presents with very severe left upper back pain which is in the area that he had his pleural drain previously, now out with well-healed wound still seen.  That is significantly worse when he suctions his pharyngeal tube.  He is hemodynamically stable throughout ED stay.  I spoke to thoracic fellow Dr Eisenberg who asked for CT with IV contrast.  General surgery cross cover resident was consulted per Thor[facility] request.  Signed out to oncoming ER physician pending workup results.    I have reviewed the nursing notes. I have reviewed the findings, diagnosis, plan and need for follow up with the patient.    New Prescriptions    No medications on file       Final diagnoses:   None         [facility] EMERGENCY DEPARTMENT  5/8/2025     Marleny Dominguez MD  05/08/25 1932

## 2025-05-09 PROCEDURE — B9002 ENTER NUTR INF PUMP ANY TYPE: HCPCS | Mod: RR

## 2025-05-09 RX ORDER — OXYCODONE HCL 5 MG/5 ML
5 SOLUTION, ORAL ORAL EVERY 4 HOURS PRN
Qty: 120 ML | Refills: 0 | Status: ON HOLD | OUTPATIENT
Start: 2025-05-09

## 2025-05-09 RX ORDER — METHOCARBAMOL 750 MG/1
750 TABLET, FILM COATED ORAL EVERY 6 HOURS PRN
Qty: 30 TABLET | Refills: 0 | Status: ON HOLD | OUTPATIENT
Start: 2025-05-09

## 2025-05-09 NOTE — ED PROVIDER NOTES
SIGN-OUT:  - Assumed care of this patient from Dr. Dominguez  - Pending at shift change:   --- Pain reportedly improved, but ongoing reassessments if needed should pain recur  --- Finalize CT chest results, Thoracic Surgery consult and recommendations  - Tentative plan from original EM Physician:   --- Follow-up pending CT chest for finalized report  --- Follow-up the pending Thoracic Surgery consults and their final recommendations.  --- Dispo as per the Thoracics team (ok for discharge from original ED physician stand point if cleared for such by Thoracics team.       UPDATES / REASSESSMENT:  Results:   - CT Chest: ***   Reassessment:   - Patient's pain did recur and he required another dose of IV Dilaudid.  --- No acute issues or interventions necessary while still in the emergency department.    DISCUSSIONS:  - w/ Thoracics: ***   - w/ Patient: ***   --- Reviewed available findings, discussions/recommendations, plan, need for and importance of close follow-up, strict return/safety precautions.   ---     DISPOSITION:  IMPRESSION:   - ***   DISPOSITION:   - ***  FOLLOW-UP:   - ***  PENDING:   - ***  OTHER RECOMMENDATIONS:   - ***         prescribed regimen, though the patient is wondering if he may run out of that before the end of the weekend and when he is able to get a refill.    - w/ ED Pharmacist: Discussed with ED pharmacist and based on the math he might run out before the weekend and so we did provide a short refill of his home regimen to build to get him through the weekend.   - w/ Patient:    --- Reviewed available findings, discussions/recommendations, plan, need for and importance of close follow-up, medication safety/instructions, strict return/safety precautions.   --- He feels comfortable with this plan and thinks that he will be able to manage well.  We had a thorough discussion regarding importance of immediate return, calling 9 1, etc. with any new or worsening symptoms or any concerns.  They expressed understanding and agreement with this plan. All questions answered to the best of our ability at this time.       DISPOSITION:  IMPRESSION:   - Pain in the setting of prior Thoracic Surgery, back to baseline  DISPOSITION:   - Discharge to home  FOLLOW-UP:   - With Primary Care in Thoracic Surgery  OTHER RECOMMENDATIONS:   - Medication safety instructions, strict return/safety precautions           Enedina Vaca MD  05/09/25 [id number]

## 2025-05-09 NOTE — DISCHARGE INSTRUCTIONS
TODAY'S VISIT:  - You should discuss all imaging/radiology tests and laboratory tests that were performed during this visit with your usual providers to ensure you continue to improve and do not need any further evaluation, testing or management.   - Please call your Primary Care team to discuss and arrange a follow-up appointment.   Immediately return to the nearest Emergency Department with any new or worsening symptoms or concerns.    FOLLOW-UP:  Please make an appointment to follow up with:  - Follow-up with your regular providers (Primary Care and Thoracic surgery), call them in the morning to arrange a follow-up appointment for soon as possible.  - If you do not have a primary care provider, you can be seen in follow-up and establish care with one of our providers by calling of the the clinics below:  --- [facility] (phone: [phone])  --- Primary Care / [facility] (phone: [phone])   - Have your provider review the results from today's visit with you again to make sure no further follow-up or additional testing is needed based on those results.     PRESCRIPTIONS / MEDICATIONS:  - Take medications only prescribed/instructed.  - You have been prescribed narcotic pain medication. You should not take this medication and use alcohol or other sedating substances or medications. Do not drive, operate heavy machinery, or engage in potentially dangerous activities while on this medication. This medication can also cause constipation and other adverse reactions. If you develop abnormal symptoms stop taking this medication and contact your medical provider.     OTHER INSTRUCTIONS:  - Instead of hooking your tube to suction, just leave to gravity/connect container as the Surgery team discussed.    RETURN TO THE EMERGENCY DEPARTMENT  Return to the Emergency Department immediately for any new or worsening symptoms or any concerns.     Remember that you can always come back to the  Emergency Department if you are not able to see your regular doctor in the amount of time listed above, if you get any new symptoms, or if there is anything that worries you.

## 2025-05-12 ENCOUNTER — HOME INFUSION BILLING (OUTPATIENT)
Dept: HOME HEALTH SERVICES | Facility: HOME HEALTH | Age: 70
End: 2025-05-12
Payer: COMMERCIAL

## 2025-05-12 ENCOUNTER — HOME INFUSION (OUTPATIENT)
Dept: HOME HEALTH SERVICES | Facility: HOME HEALTH | Age: 70
End: 2025-05-12
Payer: COMMERCIAL

## 2025-05-12 ENCOUNTER — MYC MEDICAL ADVICE (OUTPATIENT)
Dept: SURGERY | Facility: CLINIC | Age: 70
End: 2025-05-12
Payer: COMMERCIAL

## 2025-05-12 PROCEDURE — B4155 EF INCOMPLETE/MODULAR: HCPCS

## 2025-05-12 NOTE — PROGRESS NOTES
"[facility] Nutrition Assessment     Type of therapy: Enteral  Information obtained from: Phone call with Bal.    Anthropometrics/Weight Goals  Height: 1.778 m (5' 10\")  Weight: 76.7 kg (169 lb)    Nutrition and Medical History  Feeding tube type: J-Tube  Date Placed: 02/26/25    Current Nutrition Orders  Oral Diet: NPO per d/c note 4/23/25.    Enteral Nutrition Orders  Enteral Formula: Isosource 1.5 + Prosource TF  Rate/Frequency: Isosource 1.5 @ 90 mL/hr x 16 hours. + Prosource TF 1 pkt BID  Water Flushes: 120 mL before and after tube feed cycle. + 120 mL 8x per day.  Enteral Nutrition Provides: 6 cartons Isosource 1.5 provides 2250 kcal, 102 gm protein, 23 gm fiber, and 1146 mL free water. + 2 pkts Prosource TF provides 80 kcal and 22 gm protein. Total provisions: 2330 kcal and 124 gm protein.    Assessed Nutritional Needs  Kcal Needs: 1698-1980 kcal (25-30 kcal/kg) maintenenace  Protein Needs: 120-165 gm protein (1.5-2 gm/kg) Increased needs post-op  Fluid Needs: 1 mL/kcal maintenance  Nutrition Support is meeting what percent of needs: 100%    Pertinent Medications  Medications: Recieved Banatrol while inpt, not available at [facility]    Implementation  Intervention: Bal reports that he did have a normal stool this morning, first one since surgery. Would like to continue with current formula.  Education: Discussed trial of Isosource 1.5, formula with fiber.  Goals: Weight maintenance.    Bal also mentioned that he is almost out of Proosurce TF packets. His previous order was for 4 pkts per day. [facility] sent out Prosource TF on 4/25/25 but only enough for 15 days worth (60 pkts). On his JUAN on 5/2/25 the order changed to 2 pkts per day. Bal is currently using 2 pkts per day in accordance with his order but will still need an additonal supply this week due to the smaller order sent on 4/25/25.     Plan  Follow up/Recommendations: Will check tolerance again prior to next refill.    Thanks,  Kavitha Kwan RD, " [facility]  Clinical Dietitian  [facility]   [phone]  [facility] Main Line: [phone]

## 2025-05-13 ENCOUNTER — NURSE TRIAGE (OUTPATIENT)
Dept: ONCOLOGY | Facility: CLINIC | Age: 70
End: 2025-05-13
Payer: COMMERCIAL

## 2025-05-13 ENCOUNTER — MYC MEDICAL ADVICE (OUTPATIENT)
Dept: SURGERY | Facility: CLINIC | Age: 70
End: 2025-05-13
Payer: COMMERCIAL

## 2025-05-13 NOTE — TELEPHONE ENCOUNTER
Per Chacha Bhatia,  fine to leave bag to gravity drainage. He does not have to suction if this is causing pain.    MyChart reply to pt about suctioning.     MyChart with photo of Pharyngeal tube site forwarded to thoracic care team.

## 2025-05-13 NOTE — TELEPHONE ENCOUNTER
Oncology Nurse Triage    Situation:   aBl reporting the following symptoms:  The Pharyngeal tube coming out of left side of neck started leaking on outside of neck around 11:30am.   Background:   Treating Provider:   Dr. Dela Cruz    Date of last office visit: 5/6/2025 Virtual with Chacha GALLEGOS    Recent Treatments:4/29/2025 Esophagoscopy, gastroscopy, duodenoscopy (EGD), fluroscopy, revision of pharnygoscopy tube     Assessment:     Onset: 11:30am  The Pharyngeal tube coming out of left side of neck started leaking on outside of neck around 11:30am.   The drainage is a mix of saliva and yellowish.   Mix of thick and thin secretions.   Secretions crusting up on side of tube/skin.   Currently left open to air.  Patient looked at site in mirror to check for any redness or swelling. Denies redness/swelling.     Site does feel a little sore toward back of mouth on inside.   Denies internal tongue swelling or neck swelling feeling.   Pt states breathing okay, Denies SOB/Wheezing.     Recommendations:   1404 Per Thoracice care team, consulting with providers on next steps.  Someone will callpt back with update.     1524 Per Chacha GALLEGOS, could pt send a MyChart picture of site. Is pt able to put pharyngeal tube to suction?  After pt's last ED visit, he had been leaving to gravity. Per Chacha, it sounds like the drainage is probably just normal serous stuff from tube irritation.   To manage the drainage, okay for patient to use spilt sterile gauze or leave open to air and wipe with clean gauze as needed.     1528 Per Bal has tried suction on the pharyngeal site since last ED visit last about 2hours then starts to be painful. Since pt has observed leaking around the tube site today has not tried any suction today Pt wanted to check in with thoracic team that pharyngeal tube site was okay/still intact.   Thiswriter updated on provider recommendations above.  Pt will try sending a MyChart Picture.   Pt will closely  monitor site for s/s of infection and use gauze as needed.   Instructed patient to seek care immediately for worsening symptoms, including: fever, chest pain, shortness of breath, dizziness, wheezing, s/s of infection at pharyngeal site.

## 2025-05-14 ENCOUNTER — APPOINTMENT (OUTPATIENT)
Dept: CT IMAGING | Facility: CLINIC | Age: 70
DRG: 920 | End: 2025-05-14
Attending: EMERGENCY MEDICINE
Payer: MEDICARE

## 2025-05-14 ENCOUNTER — HOSPITAL ENCOUNTER (INPATIENT)
Facility: CLINIC | Age: 70
DRG: 920 | End: 2025-05-14
Attending: EMERGENCY MEDICINE | Admitting: STUDENT IN AN ORGANIZED HEALTH CARE EDUCATION/TRAINING PROGRAM
Payer: MEDICARE

## 2025-05-14 DIAGNOSIS — L08.9 NECK INFECTION: Primary | ICD-10-CM

## 2025-05-14 DIAGNOSIS — C16.0 ADENOCARCINOMA OF GASTROESOPHAGEAL JUNCTION (H): ICD-10-CM

## 2025-05-14 DIAGNOSIS — Z93.8: ICD-10-CM

## 2025-05-14 LAB
ALBUMIN SERPL BCG-MCNC: 3.6 G/DL (ref 3.5–5.2)
ALP SERPL-CCNC: 121 U/L (ref 40–150)
ALT SERPL W P-5'-P-CCNC: 12 U/L (ref 0–70)
ANION GAP SERPL CALCULATED.3IONS-SCNC: 12 MMOL/L (ref 7–15)
AST SERPL W P-5'-P-CCNC: 17 U/L (ref 0–45)
BASOPHILS # BLD AUTO: 0 10E3/UL (ref 0–0.2)
BASOPHILS NFR BLD AUTO: 0 %
BILIRUB SERPL-MCNC: 0.3 MG/DL
BUN SERPL-MCNC: 24.1 MG/DL (ref 8–23)
CALCIUM SERPL-MCNC: 9.1 MG/DL (ref 8.8–10.4)
CHLORIDE SERPL-SCNC: 98 MMOL/L (ref 98–107)
CREAT SERPL-MCNC: 0.73 MG/DL (ref 0.67–1.17)
CRP SERPL-MCNC: 25 MG/L
EGFRCR SERPLBLD CKD-EPI 2021: >90 ML/MIN/1.73M2
EOSINOPHIL # BLD AUTO: 0.4 10E3/UL (ref 0–0.7)
EOSINOPHIL NFR BLD AUTO: 3 %
ERYTHROCYTE [DISTWIDTH] IN BLOOD BY AUTOMATED COUNT: 17.4 % (ref 10–15)
ERYTHROCYTE [SEDIMENTATION RATE] IN BLOOD BY WESTERGREN METHOD: 55 MM/HR (ref 0–20)
GLUCOSE BLDC GLUCOMTR-MCNC: 111 MG/DL (ref 70–99)
GLUCOSE SERPL-MCNC: 114 MG/DL (ref 70–99)
HCO3 SERPL-SCNC: 23 MMOL/L (ref 22–29)
HCT VFR BLD AUTO: 32.7 % (ref 40–53)
HGB BLD-MCNC: 10.7 G/DL (ref 13.3–17.7)
IMM GRANULOCYTES # BLD: 0.1 10E3/UL
IMM GRANULOCYTES NFR BLD: 1 %
LACTATE SERPL-SCNC: 1.3 MMOL/L (ref 0.7–2)
LACTATE SERPL-SCNC: 2.1 MMOL/L (ref 0.7–2)
LYMPHOCYTES # BLD AUTO: 2 10E3/UL (ref 0.8–5.3)
LYMPHOCYTES NFR BLD AUTO: 16 %
MCH RBC QN AUTO: 27.9 PG (ref 26.5–33)
MCHC RBC AUTO-ENTMCNC: 32.7 G/DL (ref 31.5–36.5)
MCV RBC AUTO: 85 FL (ref 78–100)
MONOCYTES # BLD AUTO: 1.3 10E3/UL (ref 0–1.3)
MONOCYTES NFR BLD AUTO: 10 %
NEUTROPHILS # BLD AUTO: 8.8 10E3/UL (ref 1.6–8.3)
NEUTROPHILS NFR BLD AUTO: 70 %
NRBC # BLD AUTO: 0 10E3/UL
NRBC BLD AUTO-RTO: 0 /100
PLATELET # BLD AUTO: 292 10E3/UL (ref 150–450)
POTASSIUM SERPL-SCNC: 4.6 MMOL/L (ref 3.4–5.3)
PROT SERPL-MCNC: 7.4 G/DL (ref 6.4–8.3)
RBC # BLD AUTO: 3.83 10E6/UL (ref 4.4–5.9)
SODIUM SERPL-SCNC: 133 MMOL/L (ref 135–145)
WBC # BLD AUTO: 12.5 10E3/UL (ref 4–11)

## 2025-05-14 PROCEDURE — 83605 ASSAY OF LACTIC ACID: CPT | Performed by: EMERGENCY MEDICINE

## 2025-05-14 PROCEDURE — 999N000127 HC STATISTIC PERIPHERAL IV START W US GUIDANCE

## 2025-05-14 PROCEDURE — 84155 ASSAY OF PROTEIN SERUM: CPT | Performed by: EMERGENCY MEDICINE

## 2025-05-14 PROCEDURE — 85025 COMPLETE CBC W/AUTO DIFF WBC: CPT | Performed by: EMERGENCY MEDICINE

## 2025-05-14 PROCEDURE — 71260 CT THORAX DX C+: CPT

## 2025-05-14 PROCEDURE — 96374 THER/PROPH/DIAG INJ IV PUSH: CPT | Mod: 59 | Performed by: EMERGENCY MEDICINE

## 2025-05-14 PROCEDURE — 120N000002 HC R&B MED SURG/OB UMMC

## 2025-05-14 PROCEDURE — 96361 HYDRATE IV INFUSION ADD-ON: CPT | Performed by: EMERGENCY MEDICINE

## 2025-05-14 PROCEDURE — 99285 EMERGENCY DEPT VISIT HI MDM: CPT | Performed by: EMERGENCY MEDICINE

## 2025-05-14 PROCEDURE — 250N000011 HC RX IP 250 OP 636: Mod: JZ | Performed by: STUDENT IN AN ORGANIZED HEALTH CARE EDUCATION/TRAINING PROGRAM

## 2025-05-14 PROCEDURE — 250N000013 HC RX MED GY IP 250 OP 250 PS 637

## 2025-05-14 PROCEDURE — 250N000013 HC RX MED GY IP 250 OP 250 PS 637: Performed by: STUDENT IN AN ORGANIZED HEALTH CARE EDUCATION/TRAINING PROGRAM

## 2025-05-14 PROCEDURE — 250N000011 HC RX IP 250 OP 636: Performed by: EMERGENCY MEDICINE

## 2025-05-14 PROCEDURE — 258N000003 HC RX IP 258 OP 636: Performed by: EMERGENCY MEDICINE

## 2025-05-14 PROCEDURE — 82962 GLUCOSE BLOOD TEST: CPT

## 2025-05-14 PROCEDURE — 99285 EMERGENCY DEPT VISIT HI MDM: CPT | Mod: 25 | Performed by: EMERGENCY MEDICINE

## 2025-05-14 PROCEDURE — 36415 COLL VENOUS BLD VENIPUNCTURE: CPT | Performed by: EMERGENCY MEDICINE

## 2025-05-14 PROCEDURE — 86140 C-REACTIVE PROTEIN: CPT | Performed by: EMERGENCY MEDICINE

## 2025-05-14 PROCEDURE — 71260 CT THORAX DX C+: CPT | Mod: 26 | Performed by: RADIOLOGY

## 2025-05-14 PROCEDURE — 85652 RBC SED RATE AUTOMATED: CPT | Performed by: EMERGENCY MEDICINE

## 2025-05-14 RX ORDER — LIDOCAINE 40 MG/G
CREAM TOPICAL
Status: DISCONTINUED | OUTPATIENT
Start: 2025-05-14 | End: 2025-05-17 | Stop reason: HOSPADM

## 2025-05-14 RX ORDER — OXYCODONE HCL 5 MG/5 ML
5 SOLUTION, ORAL ORAL EVERY 4 HOURS PRN
Status: DISCONTINUED | OUTPATIENT
Start: 2025-05-14 | End: 2025-05-15

## 2025-05-14 RX ORDER — ACETAMINOPHEN 325 MG/10.15ML
650 LIQUID ORAL EVERY 4 HOURS PRN
Status: DISCONTINUED | OUTPATIENT
Start: 2025-05-14 | End: 2025-05-17 | Stop reason: HOSPADM

## 2025-05-14 RX ORDER — DEXTROSE MONOHYDRATE 100 MG/ML
INJECTION, SOLUTION INTRAVENOUS CONTINUOUS PRN
Status: DISCONTINUED | OUTPATIENT
Start: 2025-05-14 | End: 2025-05-17 | Stop reason: HOSPADM

## 2025-05-14 RX ORDER — FLUCONAZOLE 2 MG/ML
400 INJECTION, SOLUTION INTRAVENOUS EVERY 24 HOURS
Status: DISCONTINUED | OUTPATIENT
Start: 2025-05-15 | End: 2025-05-15

## 2025-05-14 RX ORDER — NORTRIPTYLINE HYDROCHLORIDE 25 MG/1
25 CAPSULE ORAL EVERY EVENING
Status: DISCONTINUED | OUTPATIENT
Start: 2025-05-14 | End: 2025-05-17 | Stop reason: HOSPADM

## 2025-05-14 RX ORDER — METHOCARBAMOL 500 MG/1
500 TABLET, FILM COATED ORAL 4 TIMES DAILY
Status: DISCONTINUED | OUTPATIENT
Start: 2025-05-14 | End: 2025-05-17 | Stop reason: HOSPADM

## 2025-05-14 RX ORDER — IOPAMIDOL 755 MG/ML
84 INJECTION, SOLUTION INTRAVASCULAR ONCE
Status: COMPLETED | OUTPATIENT
Start: 2025-05-14 | End: 2025-05-14

## 2025-05-14 RX ORDER — ONDANSETRON 2 MG/ML
4 INJECTION INTRAMUSCULAR; INTRAVENOUS EVERY 6 HOURS PRN
Status: DISCONTINUED | OUTPATIENT
Start: 2025-05-14 | End: 2025-05-17 | Stop reason: HOSPADM

## 2025-05-14 RX ORDER — MULTIVITAMIN WITH IRON
500 TABLET ORAL EVERY OTHER DAY
Status: DISCONTINUED | OUTPATIENT
Start: 2025-05-15 | End: 2025-05-17 | Stop reason: HOSPADM

## 2025-05-14 RX ORDER — PIPERACILLIN SODIUM, TAZOBACTAM SODIUM 4; .5 G/20ML; G/20ML
4.5 INJECTION, POWDER, LYOPHILIZED, FOR SOLUTION INTRAVENOUS ONCE
Status: COMPLETED | OUTPATIENT
Start: 2025-05-14 | End: 2025-05-14

## 2025-05-14 RX ORDER — HYDROMORPHONE HCL IN WATER/PF 6 MG/30 ML
0.2 PATIENT CONTROLLED ANALGESIA SYRINGE INTRAVENOUS
Status: DISCONTINUED | OUTPATIENT
Start: 2025-05-14 | End: 2025-05-15

## 2025-05-14 RX ORDER — OMEPRAZOLE 20 MG/1
20 CAPSULE, DELAYED RELEASE ORAL DAILY
COMMUNITY
Start: 2025-05-05

## 2025-05-14 RX ORDER — MORPHINE SULFATE 4 MG/ML
4 INJECTION, SOLUTION INTRAMUSCULAR; INTRAVENOUS ONCE
Refills: 0 | Status: COMPLETED | OUTPATIENT
Start: 2025-05-14 | End: 2025-05-14

## 2025-05-14 RX ORDER — LOPERAMIDE HCL 1 MG/7.5ML
2 SOLUTION ORAL 3 TIMES DAILY
Status: DISCONTINUED | OUTPATIENT
Start: 2025-05-14 | End: 2025-05-17 | Stop reason: HOSPADM

## 2025-05-14 RX ORDER — PIPERACILLIN SODIUM, TAZOBACTAM SODIUM 3; .375 G/15ML; G/15ML
3.38 INJECTION, POWDER, LYOPHILIZED, FOR SOLUTION INTRAVENOUS EVERY 6 HOURS
Status: DISCONTINUED | OUTPATIENT
Start: 2025-05-15 | End: 2025-05-15

## 2025-05-14 RX ORDER — GABAPENTIN 300 MG/1
300 CAPSULE ORAL 3 TIMES DAILY
Status: DISCONTINUED | OUTPATIENT
Start: 2025-05-14 | End: 2025-05-17 | Stop reason: HOSPADM

## 2025-05-14 RX ORDER — GUAR GUM
1 PACKET (EA) ORAL 2 TIMES DAILY
Status: DISCONTINUED | OUTPATIENT
Start: 2025-05-14 | End: 2025-05-17 | Stop reason: HOSPADM

## 2025-05-14 RX ORDER — TRANSGALACTOOLIGOSACCHARIDES 2.75 G
1 POWDER IN PACKET (EA) ORAL DAILY
Status: DISCONTINUED | OUTPATIENT
Start: 2025-05-14 | End: 2025-05-16

## 2025-05-14 RX ORDER — IRRIGATION SET
800 IRRIGATION SET MISCELLANEOUS ONCE
Status: COMPLETED | OUTPATIENT
Start: 2025-05-14 | End: 2025-05-15

## 2025-05-14 RX ADMIN — IOPAMIDOL 84 ML: 755 INJECTION, SOLUTION INTRAVENOUS at 14:06

## 2025-05-14 RX ADMIN — FLUCONAZOLE 800 MG: 400 INJECTION, SOLUTION INTRAVENOUS at 20:12

## 2025-05-14 RX ADMIN — GABAPENTIN 300 MG: 300 CAPSULE ORAL at 21:10

## 2025-05-14 RX ADMIN — HYDROMORPHONE HYDROCHLORIDE 0.2 MG: 0.2 INJECTION, SOLUTION INTRAMUSCULAR; INTRAVENOUS; SUBCUTANEOUS at 19:49

## 2025-05-14 RX ADMIN — MORPHINE SULFATE 4 MG: 4 INJECTION INTRAVENOUS at 12:38

## 2025-05-14 RX ADMIN — NORTRIPTYLINE HYDROCHLORIDE 25 MG: 25 CAPSULE ORAL at 21:08

## 2025-05-14 RX ADMIN — METHOCARBAMOL 500 MG: 500 TABLET ORAL at 21:09

## 2025-05-14 RX ADMIN — PIPERACILLIN AND TAZOBACTAM 4.5 G: 4; .5 INJECTION, POWDER, LYOPHILIZED, FOR SOLUTION INTRAVENOUS at 22:31

## 2025-05-14 RX ADMIN — SODIUM CHLORIDE, SODIUM LACTATE, POTASSIUM CHLORIDE, AND CALCIUM CHLORIDE 1000 ML: .6; .31; .03; .02 INJECTION, SOLUTION INTRAVENOUS at 13:11

## 2025-05-14 RX ADMIN — HYDROMORPHONE HYDROCHLORIDE 0.2 MG: 0.2 INJECTION, SOLUTION INTRAMUSCULAR; INTRAVENOUS; SUBCUTANEOUS at 22:39

## 2025-05-14 ASSESSMENT — ACTIVITIES OF DAILY LIVING (ADL)
ADLS_ACUITY_SCORE: 56

## 2025-05-14 NOTE — LETTER
Transition Communication Hand-off for Care Transitions to Next Level of Care Provider    Name: Bal Junior  : 1955  MRN #: [id number]  Primary Care Provider: Domenica Wing     Primary Clinic: [facility] [address] [address]     Reason for Hospitalization:  Neck infection [L08.9]  Admit Date/Time: 2025 12:13 PM  Discharge Date:   Payor Source: Payor: MEDICA / Plan: MEDICA PRIME SOLUTION / Product Type: Indemnity /     Reason for Communication Hand-off Referral: Other continuity of care    Discharge Plan:  Discharging home with resumption of cares with [facility] and [facility].     Home Medical Care  Service Provider Services Address Phone Fax Patient Preferred   [facility] ([facility]) [facility] [address] [id number] [phone] --        Concern for non-adherence with plan of care:   No  Discharge Needs Assessment:  Needs      Flowsheet Row Most Recent Value   Equipment Currently Used at Home other (see comments)  [grab bar + bench in walk in shower]          Follow-up plan:    Future Appointments   Date Time Provider Department Center   2025  7:30 AM [facility]   2025  9:45 AM Lawrence Mc MD [facility]   2025 11:30 AM Yo Millan MD [facility]       Any outstanding tests or procedures:        Referrals       Future Labs/Procedures    Home Infusion Referral               Key Recommendations:      ANAL[facility]  Care Management Department  Covering 5A: [id number] & 5C: [id number] (non-BMT)   Phone: [phone]  Teams  Vocera: weekdays 8:00 am - 4:30 pm.   See Vocera Care Team for off-day coverage     AVS/Discharge Summary is the source of truth; this is a helpful guide for improved communication of patient story

## 2025-05-14 NOTE — ED TRIAGE NOTES
70 year old male s/p  esophagectomy 3 weeks ago, presents with concerns of dislodged pharyngeal tube in left neck.  Patient notes increase pain and swelling at insertion site.    Triage Assessment (Adult)       Row Name 05/14/25 1208          Triage Assessment    Airway WDL WDL        Respiratory WDL    Respiratory WDL WDL        Skin Circulation/Temperature WDL    Skin Circulation/Temperature WDL WDL        Cardiac WDL    Cardiac WDL WDL        Peripheral/Neurovascular WDL    Peripheral Neurovascular WDL WDL        Cognitive/Neuro/Behavioral WDL    Cognitive/Neuro/Behavioral WDL WDL;arousability;level of consciousness;speech;orientation     Level of Consciousness alert     Arousal Level opens eyes spontaneously     Orientation oriented x 4     Speech spontaneous;logical;clear

## 2025-05-14 NOTE — ED PROVIDER NOTES
[facility] ([facility])    5/14/25       ED PROVIDER NOTE     History     Chief Complaint   Patient presents with    Post-op Problem     The history is provided by the patient, medical records and the spouse.     Bal Junior is a 70 year old male with a history of adenocarcinoma of gastroesophageal junction s/p robotic takedown of Nissen fundoplication & J-tube placement on (2/26/25), esophageal cancer s/p minimally invasive esophagectomy (3/24/25) complicated by anastomotic leak, loculated pleural effusion s/p 8F right pleural drain placement (4/4/25, interventional radiology Dr. Jim Short), esophagogastroduodenoscopy and left tube thoracostomy with drainage of 600 mL of serous fluid and 20 fr chest tube placement (4/7/25), anastomotic leak s/p left pharyngostomy tube placement (4/18/2025, thoracic surgery Dr. Lindsey) Port-A-Cath placement and right chest wall who presents to the ED with displaced pharyngostomy tube placement with severe pain and swelling at tube placement site. Patient states that the drain lead to his esophagus which had a leak that he was trying to heal.  Normally was post be hooked up to suction but that has been painful so this has been draining to gravity. The patient called his oncology clinic yesterday with report that his pharyngostomy tube started leaking around 1130.  They felt that the drainage was likely serous fluid from tube irritation.  However afterwards he had terrible coughing fits yesterday and developed sore throats.  This morning he developed increased displacement of his pharyngostomy tube, increased swelling at pharyngostomy tube site difficulty breathing, and severe pain and so presents for evaluation. He feels as if the tube is moving, feels like something is loose.  He has severe pain with this, Took oxycodone at 6am but wore off by 8 am today. No fevers or vomiting. He denies any issues at his feeding tube site. Does have power  port in place.     Past Medical History  Past Medical History:   Diagnosis Date    Hypertension     Neuritis     Peripheral neuropathy     Personal history of other medical treatment     postgastrectomy dumping syndrome    Stomach problems Noted earlier     Past Surgical History:   Procedure Laterality Date    ABDOMEN SURGERY  2012?    APPENDECTOMY  1964?    BRONCHOSCOPY RIDID OR FLEXIBLE W/ENDOBRONCHIAL ULTRASOUND GUIDED 3 OR MORE NODE STATIONS N/A 3/19/2025    Procedure: Bronchoscopy Ridid Or Flexible W/Endobronchial Ultrasound Guided 3 Or More Node Stations;  Surgeon: Saad Olmedo MD;  [facility]    COLONOSCOPY  2006, 2016    COMBINED ESOPHAGOSCOPY, GASTROSCOPY, DUODENOSCOPY (EGD), REPLACE ESOPHAGEAL STENT Left 4/18/2025    Procedure: ESOPHAGOGASTRODUODENOSCOPY with pharyngostomy tube placement;  Surgeon: Erik Lindsey MD;  [facility]    DAVINCI NISSEN FUNDOPLICATION N/A 2/26/2025    Procedure: TAKE-DOWN, FUNDOPLICATION, NISSEN, LAPAROSCOPIC- ROBOTIC, gastrostomy takedown, lysisof adhesions 2 hr;  Surgeon: Katlyn Tobar MD;  [facility]    ESOPHAGEAL BALLOON PROVOCATION STUDY N/A 9/24/2024    Procedure: Esophageal Balloon Provocation Study;  Surgeon: Carson Michel DO;  [facility]    ESOPHAGECTOMY MINIMALLY INVASIVE N/A 3/24/2025    Procedure: ESOPHAGECTOMY, MINIMALLY INVASIVE, Laparoscopic Right Thorascopic Esophagectomy, Botox Injection;  Surgeon: Katlyn Tobar MD;  [facility]    ESOPHAGOSCOPY, GASTROSCOPY, DUODENOSCOPY (EGD), COMBINED N/A 9/24/2024    Procedure: ESOPHAGOGASTRODUODENOSCOPY, WITH BIOPSY;  Surgeon: Carson Michel DO;  [facility]    ESOPHAGOSCOPY, GASTROSCOPY, DUODENOSCOPY (EGD), COMBINED N/A 10/8/2024    Procedure: ESOPHAGOGASTRODUODENOSCOPY, WITH FINE NEEDLE ASPIRATION BIOPSY, WITH ENDOSCOPIC ULTRASOUND GUIDANCE;  Surgeon: Lance Lopez MD;  [facility]    ESOPHAGOSCOPY, GASTROSCOPY, DUODENOSCOPY (EGD), COMBINED N/A 2/26/2025     Procedure: Esophagoscopy, gastroscopy, duodenoscopy (EGD);  Surgeon: Katlyn Tobar MD;  [facility]    ESOPHAGOSCOPY, GASTROSCOPY, DUODENOSCOPY (EGD), COMBINED N/A 3/24/2025    Procedure: Esophagoscopy, gastroscopy, duodenoscopy (EGD), combined;  Surgeon: Katlyn Tobar MD;  [facility]    ESOPHAGOSCOPY, GASTROSCOPY, DUODENOSCOPY (EGD), COMBINED N/A 3/28/2025    Procedure: ESOPHAGOGASTRODUODENOSCOPY, pharyngostomy tube replacement;  Surgeon: Chino Gardner MD;  [facility]    ESOPHAGOSCOPY, GASTROSCOPY, DUODENOSCOPY (EGD), COMBINED N/A 4/29/2025    Procedure: Esophagoscopy, gastroscopy, duodenoscopy (EGD), fluroscopy, revision of pharnygoscopy tube;  Surgeon: Katlyn Tobar MD;  [facility]    EYE SURGERY  199x    laser for retinal tears    GASTRIC FUNDOPLICATION      HERNIA REPAIR  1961?    IR CHEST PORT PLACEMENT > 5 YRS OF AGE  10/24/2024    LAPAROSCOPIC ASSISTED INSERTION TUBE JEJUNOSTOMY N/A 2/26/2025    Procedure: Laparoscopic assisted insertion tube jejunostomy;  Surgeon: Katlyn Tobar MD;  [facility]    LAPAROSCOPIC TAKEDOWN NISSEN FUNDOPLICATION N/A 9/25/2020    Procedure: TAKE-DOWN, FUNDOPLICATION, REDO NISSEN, LAPAROSCOPIC, gastrostomy feeding tube placement;  Surgeon: Katlyn Tobar MD;  [facility]    RI ESOPHAGOGASTRODUODENOSCOPY TRANSORAL DIAGNOSTIC N/A 5/9/2019    Procedure: UPPER GASTROINTESTINAL ENDOSCOPY;  Surgeon: Dino Ward MD;  Location: [facility];  Service: General    SOFT TISSUE SURGERY  2009?    MCL l. thumb     acetaminophen (TYLENOL) 325 MG tablet  amoxicillin-clavulanate (AUGMENTIN) 400-57 MG/5ML suspension  atenolol (TENORMIN) 50 MG tablet  calcium carbonate (TUMS) 500 MG chewable tablet  cyanocobalamin (VITAMIN B-12) 500 MCG tablet  dextromethorphan (DELSYM) 30 MG/5ML liquid  EPINEPHrine (ANY BX GENERIC EQUIV) 0.3 MG/0.3ML injection 2-pack  gabapentin (NEURONTIN) 300 MG capsule  Isosource 1.5 Gumaro 250 mL  Lidocaine (LIDOCARE) 4 %  "Patch  loperamide (IMODIUM) 1 MG/7.5ML liquid  methocarbamol (ROBAXIN) 750 MG tablet  Nutrisource Fiber PO packet  omeprazole (PRILOSEC) 2 mg/mL suspension  oxyCODONE (ROXICODONE) 5 MG/5ML solution  Prosource TF PO LIQD (PROSOURCE TF)  fluconazole (DIFLUCAN) 200 MG tablet  naloxone (NARCAN) 0.4 MG/ML injection  nortriptyline (PAMELOR) 25 MG capsule  UNABLE TO FIND      Allergies   Allergen Reactions    Hornets     Wasp Venom Protein Hives    Bee Venom Swelling     Family History  Family History   Problem Relation Age of Onset    Hypertension Mother     Uterine Cancer Mother         1965    Anesthesia Reaction Mother         PONV, slow to emerge    Mitral Valve Replacement Father     Coronary Artery Disease Maternal Grandfather     GERD No family hx of     Ulcerative Colitis No family hx of     Crohn's Disease No family hx of     Stomach Cancer No family hx of     Deep Vein Thrombosis (DVT) No family hx of      Social History   Social History     Tobacco Use    Smoking status: Never     Passive exposure: Past    Smokeless tobacco: Never   Substance Use Topics    Alcohol use: Not Currently    Drug use: Never      A medically appropriate review of systems was performed with pertinent positives and negatives noted in the HPI, and all other systems negative.    Physical Exam   BP: 119/80  Pulse: 81  Temp: 97.8  F (36.6  C)  Resp: 16  Height: 177.8 cm (5' 10\")  Weight: 77.8 kg (171 lb 8 oz)  SpO2: 97 %    Wt Readings from Last 10 Encounters:   05/14/25 77.8 kg (171 lb 8 oz)   05/08/25 76.7 kg (169 lb)   05/06/25 76.2 kg (168 lb)   04/30/25 76.5 kg (168 lb 11.2 oz)   04/25/25 76.7 kg (169 lb 1.6 oz)   04/20/25 77 kg (169 lb 11.2 oz)   04/17/25 78.5 kg (173 lb)   04/09/25 81.6 kg (179 lb 12.8 oz)   03/11/25 84.2 kg (185 lb 9.6 oz)   03/11/25 83.9 kg (185 lb)      Physical Exam  Physical Exam   Constitutional: oriented to person, place, and time. appears well-developed and well-nourished.   HENT:   Head: Normocephalic and " atraumatic.   Neck: Normal range of motion.  Neck with gastric tube shooting from the left side, surrounding mass, no drainage, no erythema of the skin, moderate tenderness.  No stridor.  Pulmonary/Chest: Effort normal. No respiratory distress.   Cardiac: No murmurs, rubs, gallops. RRR.  Abdominal: Abdomen soft, nontender, nondistended. No rebound tenderness.  MSK: Long bones without deformity or evidence of trauma  Neurological: alert and oriented to person, place, and time.   Skin: Skin is warm and dry.   Psychiatric:  normal mood and affect.  behavior is normal. Thought content normal.      ED Course, Procedures, & Data      Procedures            Results for orders placed or performed during the hospital encounter of 05/14/25   CT Chest w Contrast     Status: None    Narrative    CT CHEST W CONTRAST 5/14/2025 2:32 PM    History: post esophagectomy, enteric tube in plueral space,  swelling/discharge, please scan through neck    Comparison: 5/8/2025.    Technique: CT of the chest was obtained with intravenous contrast.  Axial, coronal, and sagittal reconstructions were obtained and  reviewed.     Contrast: 84 mL [id number]    Findings:     Right IJ central line with tip in the low SVC.    Status post esophagectomy with gastric pull-through. Expected  postoperative change with postsurgical clips and sutures in the  posterior mediastinum. Dilated neoesophagus with liquid and air  intraluminal contents. An oral enteric tube is seen with tip  projecting within the neoesophageal lumen at the T3-T4 level,  retracted from the pleural space positioning seen on prior exam.    Lungs: No mass or consolidation. No concerning pulmonary nodules. No  pneumothorax. Small right pleural effusion and basilar segmental  atelectasis.  Airways: Central tracheobronchial tree is clear.  Vessels: Main pulmonary artery and aorta are normal in caliber. Normal  three-vessel arch.  Heart: Heart size is normal without pericardial  effusion  Lymph nodes: Enlarged pretracheal node measuring approximately 13 mm  (3/191), unchanged from prior. No concerning mediastinal or hilar  lymphadenopathy.  Thyroid: Subcentimeter right inferior lobe thyroid nodule..  Esophagus: Within normal limits    Upper abdomen: Limited evaluation of the upper abdomen. Hepatic  steatosis. Scattered hepatic hypodensities, likely cysts. Gallbladder  is unremarkable. Moderate fatty replacement of the pancreas. Spleen is  normal with small splenule near the hilum. No adrenal nodules. No  visualized hydronephrosis or renal masses. Diverticulosis without  diverticulitis.    Bones and soft tissues: No suspicious axillary lymphadenopathy or soft  tissue mass. No suspicious osseous lesion. Moderate degenerative  change of the spine.      Impression    Impression:   1. Oral enteric tube projecting within the neoesophageal lumen at  approximately the T3-4 level. No evidence of pneumothorax or  extravasation into the pleural or mediastinal space.  2. Expected postsurgical changes of esophagectomy and gastric  pull-through.  3. Stable small right pleural effusion and segmental atelectasis.  4. Stable enlarged pretracheal node.  5. Ancillary findings of hepatic steatosis and diverticulosis are  unchanged.    I have personally reviewed the examination and initial interpretation  and I agree with the findings.    JEVON VINES MD         SYSTEM ID:  [id number]   Comprehensive metabolic panel     Status: Abnormal   Result Value Ref Range    Sodium 133 (L) 135 - 145 mmol/L    Potassium 4.6 3.4 - 5.3 mmol/L    Carbon Dioxide (CO2) 23 22 - 29 mmol/L    Anion Gap 12 7 - 15 mmol/L    Urea Nitrogen 24.1 (H) 8.0 - 23.0 mg/dL    Creatinine 0.73 0.67 - 1.17 mg/dL    GFR Estimate >90 >60 mL/min/1.73m2    Calcium 9.1 8.8 - 10.4 mg/dL    Chloride 98 98 - 107 mmol/L    Glucose 114 (H) 70 - 99 mg/dL    Alkaline Phosphatase 121 40 - 150 U/L    AST 17 0 - 45 U/L    ALT 12 0 - 70 U/L    Protein  Total 7.4 6.4 - 8.3 g/dL    Albumin 3.6 3.5 - 5.2 g/dL    Bilirubin Total 0.3 <=1.2 mg/dL   CRP inflammation     Status: Abnormal   Result Value Ref Range    CRP Inflammation 25.00 (H) <5.00 mg/L   Erythrocyte sedimentation rate auto     Status: Abnormal   Result Value Ref Range    Erythrocyte Sedimentation Rate 55 (H) 0 - 20 mm/hr   Lactic acid whole blood with 1x repeat in 2 hr when >2     Status: Abnormal   Result Value Ref Range    Lactic Acid, Initial 2.1 (H) 0.7 - 2.0 mmol/L   CBC with platelets and differential     Status: Abnormal   Result Value Ref Range    WBC Count 12.5 (H) 4.0 - 11.0 10e3/uL    RBC Count 3.83 (L) 4.40 - 5.90 10e6/uL    Hemoglobin 10.7 (L) 13.3 - 17.7 g/dL    Hematocrit 32.7 (L) 40.0 - 53.0 %    MCV 85 78 - 100 fL    MCH 27.9 26.5 - 33.0 pg    MCHC 32.7 31.5 - 36.5 g/dL    RDW 17.4 (H) 10.0 - 15.0 %    Platelet Count 292 150 - 450 10e3/uL    % Neutrophils 70 %    % Lymphocytes 16 %    % Monocytes 10 %    % Eosinophils 3 %    % Basophils 0 %    % Immature Granulocytes 1 %    NRBCs per 100 WBC 0 <1 /100    Absolute Neutrophils 8.8 (H) 1.6 - 8.3 10e3/uL    Absolute Lymphocytes 2.0 0.8 - 5.3 10e3/uL    Absolute Monocytes 1.3 0.0 - 1.3 10e3/uL    Absolute Eosinophils 0.4 0.0 - 0.7 10e3/uL    Absolute Basophils 0.0 0.0 - 0.2 10e3/uL    Absolute Immature Granulocytes 0.1 <=0.4 10e3/uL    Absolute NRBCs 0.0 10e3/uL   Lactic acid whole blood     Status: Normal   Result Value Ref Range    Lactic Acid 1.3 0.7 - 2.0 mmol/L   CBC with platelets differential     Status: Abnormal    Narrative    The following orders were created for panel order CBC with platelets differential.  Procedure                               Abnormality         Status                     ---------                               -----------         ------                     CBC with platelets and ...[[id number]]  Abnormal            Final result                 Please view results for these tests on the individual orders.      Medications   morphine (PF) injection 4 mg (4 mg Intravenous $Given 5/14/25 1231)     Labs Ordered and Resulted from Time of ED Arrival to Time of ED Departure - No data to display  CT Chest w Contrast    (Results Pending)          Critical care was not performed.     Medical Decision Making  The patient's presentation was of high complexity (a chronic illness severe exacerbation, progression, or side effect of treatment).    The patient's evaluation involved:  ordering and/or review of 3+ test(s) in this encounter (see separate area of note for details)  discussion of management or test interpretation with another health professional (thoracic surgery)    The patient's management necessitated moderate risk (IV contrast administration) and high risk (a decision regarding hospitalization).    Assessment & Plan    Patient resenting with problem with his left neck tube placement.  Is concerning for infection on CT, clinically.  He is tolerating his secretions and airway is normal.  Started antibiotics.  Discussed with thoracic surgery who will admit this patient.    I have reviewed the nursing notes. I have reviewed the findings, diagnosis, plan and need for follow up with the patient.    New Prescriptions    No medications on file       Final diagnoses:   Neck infection     I, Lula De La O, am serving as a trained medical scribe to document services personally performed by Alex Nguyen MD based on the provider's statements to me on May 14, 2025.  This document has been checked and approved by the attending provider.    I, Alex Nguyen MD, was physically present and have reviewed and verified the accuracy of this note documented by Lula De La O, medical scribe.      Alex Nguyen MD   [facility] EMERGENCY DEPARTMENT  5/14/2025        Alex Nguyen MD  05/14/25 3498

## 2025-05-14 NOTE — CONSULTS
[facility]    Consult Note - Thoracic Surgery Service  Date of Admission:  5/14/2025  Consult Requested by: ED  Reason for Consult: Pain at pharyngostomy site     Assessment & Plan: Surgery   Bal Junior is a 70 year old male with a history of esophageal adenocarcinoma s/p esophagectomy 3/24/2025 c/b anastomotic leak requiring percutaneous drain placement (now removed) and ultimately pharyngostomy tube placement 4/18, most recently admitted 4/26 due to weakness, fatigue and dehydration, underwent pharyngostomy tube repositioning 4/29 and later discharged 5/1 with a course of augmentin/fluconazole which was completed last week. He now returns to the ED 5/14 due to new pain, drainage and swelling at his pharyngostomy site. Otherwise has been feeling fairly well with good energy levels and is non-toxic appearing. Afebrile without tachycardia or hypotension, mild leukocytosis (12.5) and CT chest demonstrates retracted pharyngostomy tube, now within the neoesophageal lumen. Also noted more soft tissue attenuation and possible fluid tracking around pharyngostomy tube, but no well organized fluid collection.     - Admit to thoracic surgery  - IV zosyn and fluconazole  - Diet: Strict NPO, Cycled TF @ 90+ Meds through feeding tube. 120 FWF q3h  - Add on for EGD 5/15      The patient's care was discussed with the fellow and staff, Dr. Tobar.    Luzmaria Bell, DO  PGY-3 General Surgery   ______________________________________    Chief Complaint   Pharyngostomy tube concern    History is obtained from the patient, chart review    History of Present Illness   Bal Junior is a 70 year old male with a history of hiatal hernia (s/p Nissen fundoplication 2020) esophageal adenocarcinoma s/p takedown of nissen fundoplication with JT placement 2/2025, esophagectomy 3/24/2025 c/b anastomotic leak requiring percutaneous drain placement discharged 4/9, readmitted 4/17 after outpatient  esophagram showed leak of oral contrast into right pleural space and underwent EGD with pharyngostomy tube placement (into right pleural space) 4/18, discharged 4/23 after pharyngostomy education. Most recently he was again admitted 4/26 due to weakness, fatigue and dehydration, underwent pharyngostomy tube repositioning 4/29 and later discharged 5/1. He was seen in the ED on 5/8 due to sharp back pain associated with pharyngostomy tube suctioning, discharged home after reassuring workup with plan for pharyngostomy tube to gravity. He returns today due to concerns of pain, drainage and swelling at his pharyngostomy site starting 5/13. He reports the drainage around his tube is yellow and thick. Drainage from his pharyngostomy tube is yellow/green, unchanged ~200-300 per day. Otherwise has been feeling fairly well with good energy levels. Has intermittent pain where his old ODIN was. This has been much improved since pharyngostomy was taken off of suction. He still occasionally puts it to suction, but can only tolerate about 1 hour at a time. Tolerating cycled TF. Diarrhea has recently improved. Slight nausea today, but no emesis. Started coughing more yesterday (used to have this issue, but resolved ~3 weeks postop).  No fevers or chills at home. Had been taking augmentin/fluconazole and finished this course one week ago.       In the ED he is afebrile with HR 70s and SBP 110s. SpO2 98% on room air. Labs notable for mild leukocytosis to 12.5, stable and mild hyponatremia of 133, normal kidney function, LA upper level of normal at 2.1. CT chest demonstrates the pharyngostomy tube retracted, now within the neoesophageal lumen. Also noted more soft tissue attenuation and possible fluid tracking around pharyngostomy tube, but no well organized fluid collection.       Past Medical History    Past Medical History:   Diagnosis Date    Hypertension     Neuritis     Peripheral neuropathy     Personal history of other medical  treatment     postgastrectomy dumping syndrome    Stomach problems Noted earlier       Past Surgical History   Past Surgical History:   Procedure Laterality Date    ABDOMEN SURGERY  2012?    APPENDECTOMY  1964?    BRONCHOSCOPY RIDID OR FLEXIBLE W/ENDOBRONCHIAL ULTRASOUND GUIDED 3 OR MORE NODE STATIONS N/A 3/19/2025    Procedure: Bronchoscopy Ridid Or Flexible W/Endobronchial Ultrasound Guided 3 Or More Node Stations;  Surgeon: Saad Olmedo MD;  [facility]    COLONOSCOPY  2006, 2016    COMBINED ESOPHAGOSCOPY, GASTROSCOPY, DUODENOSCOPY (EGD), REPLACE ESOPHAGEAL STENT Left 4/18/2025    Procedure: ESOPHAGOGASTRODUODENOSCOPY with pharyngostomy tube placement;  Surgeon: Erik Lindsey MD;  [facility]    DAVINCI NISSEN FUNDOPLICATION N/A 2/26/2025    Procedure: TAKE-DOWN, FUNDOPLICATION, NISSEN, LAPAROSCOPIC- ROBOTIC, gastrostomy takedown, lysisof adhesions 2 hr;  Surgeon: Katlyn Tobar MD;  [facility]    ESOPHAGEAL BALLOON PROVOCATION STUDY N/A 9/24/2024    Procedure: Esophageal Balloon Provocation Study;  Surgeon: Carson Michel DO;  [facility]    ESOPHAGECTOMY MINIMALLY INVASIVE N/A 3/24/2025    Procedure: ESOPHAGECTOMY, MINIMALLY INVASIVE, Laparoscopic Right Thorascopic Esophagectomy, Botox Injection;  Surgeon: Katlyn Tobar MD;  [facility]    ESOPHAGOSCOPY, GASTROSCOPY, DUODENOSCOPY (EGD), COMBINED N/A 9/24/2024    Procedure: ESOPHAGOGASTRODUODENOSCOPY, WITH BIOPSY;  Surgeon: Carson Michel DO;  [facility]    ESOPHAGOSCOPY, GASTROSCOPY, DUODENOSCOPY (EGD), COMBINED N/A 10/8/2024    Procedure: ESOPHAGOGASTRODUODENOSCOPY, WITH FINE NEEDLE ASPIRATION BIOPSY, WITH ENDOSCOPIC ULTRASOUND GUIDANCE;  Surgeon: Lance Lopez MD;  [facility]    ESOPHAGOSCOPY, GASTROSCOPY, DUODENOSCOPY (EGD), COMBINED N/A 2/26/2025    Procedure: Esophagoscopy, gastroscopy, duodenoscopy (EGD);  Surgeon: Katlyn Tobar MD;  [facility]    ESOPHAGOSCOPY, GASTROSCOPY, DUODENOSCOPY  (EGD), COMBINED N/A 3/24/2025    Procedure: Esophagoscopy, gastroscopy, duodenoscopy (EGD), combined;  Surgeon: Katlyn Tobar MD;  [facility]    ESOPHAGOSCOPY, GASTROSCOPY, DUODENOSCOPY (EGD), COMBINED N/A 3/28/2025    Procedure: ESOPHAGOGASTRODUODENOSCOPY, pharyngostomy tube replacement;  Surgeon: Chino Gardner MD;  [facility]    ESOPHAGOSCOPY, GASTROSCOPY, DUODENOSCOPY (EGD), COMBINED N/A 4/29/2025    Procedure: Esophagoscopy, gastroscopy, duodenoscopy (EGD), fluroscopy, revision of pharnygoscopy tube;  Surgeon: Katlyn Tobar MD;  [facility]    EYE SURGERY  199x    laser for retinal tears    GASTRIC FUNDOPLICATION      HERNIA REPAIR  1961?    IR CHEST PORT PLACEMENT > 5 YRS OF AGE  10/24/2024    LAPAROSCOPIC ASSISTED INSERTION TUBE JEJUNOSTOMY N/A 2/26/2025    Procedure: Laparoscopic assisted insertion tube jejunostomy;  Surgeon: Katlyn Tobar MD;  [facility]    LAPAROSCOPIC TAKEDOWN NISSEN FUNDOPLICATION N/A 9/25/2020    Procedure: TAKE-DOWN, FUNDOPLICATION, REDO NISSEN, LAPAROSCOPIC, gastrostomy feeding tube placement;  Surgeon: Katlyn Tobar MD;  [facility]    CA ESOPHAGOGASTRODUODENOSCOPY TRANSORAL DIAGNOSTIC N/A 5/9/2019    Procedure: UPPER GASTROINTESTINAL ENDOSCOPY;  Surgeon: Dino Ward MD;  Location: [facility];  Service: General    SOFT TISSUE SURGERY  2009?    [facility] l. thumb       Prior to Admission Medications   I have reviewed this patient's current medications       Physical Exam   Vital Signs: Temp: 97.8  F (36.6  C) Temp src: Oral BP: 114/78 Pulse: 81   Resp: 18 SpO2: 99 % O2 Device: None (Room air)    Weight: 171 lbs 8 ozNo intake or output data in the 24 hours ending 05/14/25 1244  General Appearance: Frail, but non-toxic appearing  HEENT: pharyngostomy tube with bilious drainage in tube. The site is edematous, tender to palpation. Indurated, but no fluctuance. No erythema. Thick, tan drainage around tube.   Respiratory: unlabored breathing  on room air  Cardiovascular: regular rate  GI: Jejunostomy tube site clean/dry. Minimal abdominal tenderness superior to JT site, non-distended. Well healed incisions.             Data

## 2025-05-15 ENCOUNTER — APPOINTMENT (OUTPATIENT)
Dept: GENERAL RADIOLOGY | Facility: CLINIC | Age: 70
DRG: 920 | End: 2025-05-15
Attending: STUDENT IN AN ORGANIZED HEALTH CARE EDUCATION/TRAINING PROGRAM
Payer: MEDICARE

## 2025-05-15 VITALS
HEART RATE: 113 BPM | TEMPERATURE: 98.6 F | OXYGEN SATURATION: 97 % | HEIGHT: 70 IN | DIASTOLIC BLOOD PRESSURE: 72 MMHG | SYSTOLIC BLOOD PRESSURE: 119 MMHG | RESPIRATION RATE: 22 BRPM | WEIGHT: 171.5 LBS | BODY MASS INDEX: 24.55 KG/M2

## 2025-05-15 LAB
ABO + RH BLD: NORMAL
ANION GAP SERPL CALCULATED.3IONS-SCNC: 12 MMOL/L (ref 7–15)
BLD GP AB SCN SERPL QL: NEGATIVE
BUN SERPL-MCNC: 16.6 MG/DL (ref 8–23)
CALCIUM SERPL-MCNC: 8.9 MG/DL (ref 8.8–10.4)
CHLORIDE SERPL-SCNC: 100 MMOL/L (ref 98–107)
CREAT SERPL-MCNC: 0.82 MG/DL (ref 0.67–1.17)
EGFRCR SERPLBLD CKD-EPI 2021: >90 ML/MIN/1.73M2
ERYTHROCYTE [DISTWIDTH] IN BLOOD BY AUTOMATED COUNT: 21 % (ref 10–15)
GLUCOSE BLDC GLUCOMTR-MCNC: 108 MG/DL (ref 70–99)
GLUCOSE BLDC GLUCOMTR-MCNC: 130 MG/DL (ref 70–99)
GLUCOSE BLDC GLUCOMTR-MCNC: 138 MG/DL (ref 70–99)
GLUCOSE SERPL-MCNC: 121 MG/DL (ref 70–99)
HCO3 SERPL-SCNC: 23 MMOL/L (ref 22–29)
HCT VFR BLD AUTO: 28.7 % (ref 40–53)
HGB BLD-MCNC: 9.8 G/DL (ref 13.3–17.7)
MCH RBC QN AUTO: 32.1 PG (ref 26.5–33)
MCHC RBC AUTO-ENTMCNC: 34.1 G/DL (ref 31.5–36.5)
MCV RBC AUTO: 94 FL (ref 78–100)
PLATELET # BLD AUTO: 239 10E3/UL (ref 150–450)
POTASSIUM SERPL-SCNC: 4.1 MMOL/L (ref 3.4–5.3)
RBC # BLD AUTO: 3.05 10E6/UL (ref 4.4–5.9)
SODIUM SERPL-SCNC: 135 MMOL/L (ref 135–145)
SPECIMEN EXP DATE BLD: NORMAL
WBC # BLD AUTO: 6.8 10E3/UL (ref 4–11)

## 2025-05-15 PROCEDURE — 82962 GLUCOSE BLOOD TEST: CPT

## 2025-05-15 PROCEDURE — 74360 X-RAY GUIDE GI DILATION: CPT | Mod: 26 | Performed by: STUDENT IN AN ORGANIZED HEALTH CARE EDUCATION/TRAINING PROGRAM

## 2025-05-15 PROCEDURE — 255N000002 HC RX 255 OP 636: Performed by: STUDENT IN AN ORGANIZED HEALTH CARE EDUCATION/TRAINING PROGRAM

## 2025-05-15 PROCEDURE — 250N000013 HC RX MED GY IP 250 OP 250 PS 637: Performed by: STUDENT IN AN ORGANIZED HEALTH CARE EDUCATION/TRAINING PROGRAM

## 2025-05-15 PROCEDURE — 80048 BASIC METABOLIC PNL TOTAL CA: CPT

## 2025-05-15 PROCEDURE — 250N000009 HC RX 250

## 2025-05-15 PROCEDURE — 85027 COMPLETE CBC AUTOMATED: CPT

## 2025-05-15 PROCEDURE — 250N000013 HC RX MED GY IP 250 OP 250 PS 637

## 2025-05-15 PROCEDURE — 43266 EGD ENDOSCOPIC STENT PLACE: CPT | Mod: 78 | Performed by: STUDENT IN AN ORGANIZED HEALTH CARE EDUCATION/TRAINING PROGRAM

## 2025-05-15 PROCEDURE — 250N000011 HC RX IP 250 OP 636

## 2025-05-15 PROCEDURE — 36415 COLL VENOUS BLD VENIPUNCTURE: CPT

## 2025-05-15 PROCEDURE — 272N000001 HC OR GENERAL SUPPLY STERILE: Performed by: STUDENT IN AN ORGANIZED HEALTH CARE EDUCATION/TRAINING PROGRAM

## 2025-05-15 PROCEDURE — 86900 BLOOD TYPING SEROLOGIC ABO: CPT | Performed by: STUDENT IN AN ORGANIZED HEALTH CARE EDUCATION/TRAINING PROGRAM

## 2025-05-15 PROCEDURE — 360N000075 HC SURGERY LEVEL 2, PER MIN: Performed by: STUDENT IN AN ORGANIZED HEALTH CARE EDUCATION/TRAINING PROGRAM

## 2025-05-15 PROCEDURE — 710N000010 HC RECOVERY PHASE 1, LEVEL 2, PER MIN: Performed by: STUDENT IN AN ORGANIZED HEALTH CARE EDUCATION/TRAINING PROGRAM

## 2025-05-15 PROCEDURE — C1874 STENT, COATED/COV W/DEL SYS: HCPCS | Performed by: STUDENT IN AN ORGANIZED HEALTH CARE EDUCATION/TRAINING PROGRAM

## 2025-05-15 PROCEDURE — 999N000141 HC STATISTIC PRE-PROCEDURE NURSING ASSESSMENT: Performed by: STUDENT IN AN ORGANIZED HEALTH CARE EDUCATION/TRAINING PROGRAM

## 2025-05-15 PROCEDURE — 36415 COLL VENOUS BLD VENIPUNCTURE: CPT | Performed by: STUDENT IN AN ORGANIZED HEALTH CARE EDUCATION/TRAINING PROGRAM

## 2025-05-15 PROCEDURE — 999N000179 XR SURGERY CARM FLUORO LESS THAN 5 MIN W STILLS

## 2025-05-15 PROCEDURE — 250N000011 HC RX IP 250 OP 636: Mod: JZ | Performed by: STUDENT IN AN ORGANIZED HEALTH CARE EDUCATION/TRAINING PROGRAM

## 2025-05-15 PROCEDURE — C1769 GUIDE WIRE: HCPCS | Performed by: STUDENT IN AN ORGANIZED HEALTH CARE EDUCATION/TRAINING PROGRAM

## 2025-05-15 PROCEDURE — 370N000017 HC ANESTHESIA TECHNICAL FEE, PER MIN: Performed by: STUDENT IN AN ORGANIZED HEALTH CARE EDUCATION/TRAINING PROGRAM

## 2025-05-15 PROCEDURE — 0D758DZ DILATION OF ESOPHAGUS WITH INTRALUMINAL DEVICE, VIA NATURAL OR ARTIFICIAL OPENING ENDOSCOPIC: ICD-10-PCS | Performed by: STUDENT IN AN ORGANIZED HEALTH CARE EDUCATION/TRAINING PROGRAM

## 2025-05-15 PROCEDURE — 86901 BLOOD TYPING SEROLOGIC RH(D): CPT | Performed by: STUDENT IN AN ORGANIZED HEALTH CARE EDUCATION/TRAINING PROGRAM

## 2025-05-15 PROCEDURE — 120N000002 HC R&B MED SURG/OB UMMC

## 2025-05-15 PROCEDURE — B4035 ENTERAL FEED SUPP PUMP PER D: HCPCS

## 2025-05-15 PROCEDURE — 0DP58DZ REMOVAL OF INTRALUMINAL DEVICE FROM ESOPHAGUS, VIA NATURAL OR ARTIFICIAL OPENING ENDOSCOPIC: ICD-10-PCS | Performed by: STUDENT IN AN ORGANIZED HEALTH CARE EDUCATION/TRAINING PROGRAM

## 2025-05-15 PROCEDURE — 250N000025 HC SEVOFLURANE, PER MIN: Performed by: STUDENT IN AN ORGANIZED HEALTH CARE EDUCATION/TRAINING PROGRAM

## 2025-05-15 DEVICE — STENT ESOPHAGEAL ENDOMAXX 23X120MM MAXX-2312: Type: IMPLANTABLE DEVICE | Site: ESOPHAGUS | Status: FUNCTIONAL

## 2025-05-15 RX ORDER — FENTANYL CITRATE 50 UG/ML
25 INJECTION, SOLUTION INTRAMUSCULAR; INTRAVENOUS EVERY 5 MIN PRN
Status: DISCONTINUED | OUTPATIENT
Start: 2025-05-15 | End: 2025-05-15 | Stop reason: HOSPADM

## 2025-05-15 RX ORDER — LABETALOL HYDROCHLORIDE 5 MG/ML
10 INJECTION, SOLUTION INTRAVENOUS
Status: DISCONTINUED | OUTPATIENT
Start: 2025-05-15 | End: 2025-05-15 | Stop reason: HOSPADM

## 2025-05-15 RX ORDER — LIDOCAINE 40 MG/G
CREAM TOPICAL
Status: DISCONTINUED | OUTPATIENT
Start: 2025-05-15 | End: 2025-05-15 | Stop reason: HOSPADM

## 2025-05-15 RX ORDER — SODIUM CHLORIDE, SODIUM LACTATE, POTASSIUM CHLORIDE, CALCIUM CHLORIDE 600; 310; 30; 20 MG/100ML; MG/100ML; MG/100ML; MG/100ML
INJECTION, SOLUTION INTRAVENOUS CONTINUOUS
Status: DISCONTINUED | OUTPATIENT
Start: 2025-05-15 | End: 2025-05-15 | Stop reason: HOSPADM

## 2025-05-15 RX ORDER — ACETAMINOPHEN 325 MG/1
975 TABLET ORAL ONCE
Status: COMPLETED | OUTPATIENT
Start: 2025-05-15 | End: 2025-05-15

## 2025-05-15 RX ORDER — DEXAMETHASONE SODIUM PHOSPHATE 4 MG/ML
4 INJECTION, SOLUTION INTRA-ARTICULAR; INTRALESIONAL; INTRAMUSCULAR; INTRAVENOUS; SOFT TISSUE
Status: DISCONTINUED | OUTPATIENT
Start: 2025-05-15 | End: 2025-05-15

## 2025-05-15 RX ORDER — NALOXONE HYDROCHLORIDE 0.4 MG/ML
0.1 INJECTION, SOLUTION INTRAMUSCULAR; INTRAVENOUS; SUBCUTANEOUS
Status: DISCONTINUED | OUTPATIENT
Start: 2025-05-15 | End: 2025-05-15

## 2025-05-15 RX ORDER — NALOXONE HYDROCHLORIDE 0.4 MG/ML
0.1 INJECTION, SOLUTION INTRAMUSCULAR; INTRAVENOUS; SUBCUTANEOUS
Status: DISCONTINUED | OUTPATIENT
Start: 2025-05-15 | End: 2025-05-15 | Stop reason: HOSPADM

## 2025-05-15 RX ORDER — IOPAMIDOL 510 MG/ML
INJECTION, SOLUTION INTRAVASCULAR PRN
Status: DISCONTINUED | OUTPATIENT
Start: 2025-05-15 | End: 2025-05-15 | Stop reason: HOSPADM

## 2025-05-15 RX ORDER — OXYCODONE HYDROCHLORIDE 5 MG/1
5 TABLET ORAL
Status: DISCONTINUED | OUTPATIENT
Start: 2025-05-15 | End: 2025-05-15

## 2025-05-15 RX ORDER — FENTANYL CITRATE 50 UG/ML
50 INJECTION, SOLUTION INTRAMUSCULAR; INTRAVENOUS EVERY 5 MIN PRN
Status: DISCONTINUED | OUTPATIENT
Start: 2025-05-15 | End: 2025-05-15 | Stop reason: HOSPADM

## 2025-05-15 RX ORDER — AMOXICILLIN 400 MG/5ML
875 POWDER, FOR SUSPENSION ORAL 2 TIMES DAILY
Status: DISCONTINUED | OUTPATIENT
Start: 2025-05-15 | End: 2025-05-15

## 2025-05-15 RX ORDER — HYDROMORPHONE HCL IN WATER/PF 6 MG/30 ML
0.4 PATIENT CONTROLLED ANALGESIA SYRINGE INTRAVENOUS EVERY 5 MIN PRN
Status: DISCONTINUED | OUTPATIENT
Start: 2025-05-15 | End: 2025-05-15 | Stop reason: HOSPADM

## 2025-05-15 RX ORDER — OXYCODONE HCL 5 MG/5 ML
5-10 SOLUTION, ORAL ORAL EVERY 4 HOURS PRN
Status: DISCONTINUED | OUTPATIENT
Start: 2025-05-15 | End: 2025-05-17 | Stop reason: HOSPADM

## 2025-05-15 RX ORDER — OXYCODONE HYDROCHLORIDE 10 MG/1
10 TABLET ORAL
Status: DISCONTINUED | OUTPATIENT
Start: 2025-05-15 | End: 2025-05-15

## 2025-05-15 RX ORDER — AMOXICILLIN AND CLAVULANATE POTASSIUM 400; 57 MG/5ML; MG/5ML
875 POWDER, FOR SUSPENSION ORAL 2 TIMES DAILY
Status: DISCONTINUED | OUTPATIENT
Start: 2025-05-15 | End: 2025-05-17 | Stop reason: HOSPADM

## 2025-05-15 RX ORDER — ONDANSETRON 4 MG/1
4 TABLET, ORALLY DISINTEGRATING ORAL EVERY 30 MIN PRN
Status: DISCONTINUED | OUTPATIENT
Start: 2025-05-15 | End: 2025-05-15 | Stop reason: HOSPADM

## 2025-05-15 RX ORDER — ONDANSETRON 2 MG/ML
4 INJECTION INTRAMUSCULAR; INTRAVENOUS EVERY 30 MIN PRN
Status: DISCONTINUED | OUTPATIENT
Start: 2025-05-15 | End: 2025-05-15

## 2025-05-15 RX ORDER — DEXAMETHASONE SODIUM PHOSPHATE 4 MG/ML
4 INJECTION, SOLUTION INTRA-ARTICULAR; INTRALESIONAL; INTRAMUSCULAR; INTRAVENOUS; SOFT TISSUE
Status: DISCONTINUED | OUTPATIENT
Start: 2025-05-15 | End: 2025-05-15 | Stop reason: HOSPADM

## 2025-05-15 RX ORDER — FLUCONAZOLE 40 MG/ML
400 POWDER, FOR SUSPENSION ORAL EVERY 24 HOURS
Status: DISCONTINUED | OUTPATIENT
Start: 2025-05-15 | End: 2025-05-17 | Stop reason: HOSPADM

## 2025-05-15 RX ORDER — HYDROMORPHONE HCL IN WATER/PF 6 MG/30 ML
0.2 PATIENT CONTROLLED ANALGESIA SYRINGE INTRAVENOUS EVERY 4 HOURS PRN
Status: DISCONTINUED | OUTPATIENT
Start: 2025-05-15 | End: 2025-05-17 | Stop reason: HOSPADM

## 2025-05-15 RX ORDER — HYDROMORPHONE HCL IN WATER/PF 6 MG/30 ML
0.2 PATIENT CONTROLLED ANALGESIA SYRINGE INTRAVENOUS EVERY 5 MIN PRN
Status: DISCONTINUED | OUTPATIENT
Start: 2025-05-15 | End: 2025-05-15 | Stop reason: HOSPADM

## 2025-05-15 RX ORDER — ONDANSETRON 2 MG/ML
4 INJECTION INTRAMUSCULAR; INTRAVENOUS EVERY 30 MIN PRN
Status: DISCONTINUED | OUTPATIENT
Start: 2025-05-15 | End: 2025-05-15 | Stop reason: HOSPADM

## 2025-05-15 RX ORDER — ONDANSETRON 4 MG/1
4 TABLET, ORALLY DISINTEGRATING ORAL EVERY 30 MIN PRN
Status: DISCONTINUED | OUTPATIENT
Start: 2025-05-15 | End: 2025-05-15

## 2025-05-15 RX ADMIN — ACETAMINOPHEN 975 MG: 325 TABLET ORAL at 10:53

## 2025-05-15 RX ADMIN — Medication 40 MG: at 08:30

## 2025-05-15 RX ADMIN — FLUCONAZOLE 400 MG: 40 POWDER, FOR SUSPENSION ORAL at 22:32

## 2025-05-15 RX ADMIN — HYDROMORPHONE HYDROCHLORIDE 0.2 MG: 0.2 INJECTION, SOLUTION INTRAMUSCULAR; INTRAVENOUS; SUBCUTANEOUS at 04:28

## 2025-05-15 RX ADMIN — PIPERACILLIN AND TAZOBACTAM 3.38 G: 3; .375 INJECTION, POWDER, LYOPHILIZED, FOR SOLUTION INTRAVENOUS at 05:18

## 2025-05-15 RX ADMIN — GABAPENTIN 300 MG: 300 CAPSULE ORAL at 08:30

## 2025-05-15 RX ADMIN — HYDROMORPHONE HYDROCHLORIDE 0.2 MG: 0.2 INJECTION, SOLUTION INTRAMUSCULAR; INTRAVENOUS; SUBCUTANEOUS at 02:03

## 2025-05-15 RX ADMIN — LOPERAMIDE HYDROCHLORIDE 2 MG: 2 SOLUTION ORAL at 20:50

## 2025-05-15 RX ADMIN — OXYCODONE HYDROCHLORIDE 5 MG: 5 SOLUTION ORAL at 23:30

## 2025-05-15 RX ADMIN — NORTRIPTYLINE HYDROCHLORIDE 25 MG: 25 CAPSULE ORAL at 22:32

## 2025-05-15 RX ADMIN — METHOCARBAMOL 500 MG: 500 TABLET ORAL at 08:30

## 2025-05-15 RX ADMIN — GABAPENTIN 300 MG: 300 CAPSULE ORAL at 20:50

## 2025-05-15 RX ADMIN — BENZOCAINE 1 ML: 220 SPRAY, METERED PERIODONTAL at 21:00

## 2025-05-15 RX ADMIN — METHOCARBAMOL 500 MG: 500 TABLET ORAL at 20:50

## 2025-05-15 RX ADMIN — AMOXICILLIN AND CLAVULANATE POTASSIUM 875 MG: 400; 57 POWDER, FOR SUSPENSION ORAL at 22:32

## 2025-05-15 RX ADMIN — ACETAMINOPHEN 650 MG: 325 SOLUTION ORAL at 20:50

## 2025-05-15 RX ADMIN — HYDROMORPHONE HYDROCHLORIDE 0.2 MG: 0.2 INJECTION, SOLUTION INTRAMUSCULAR; INTRAVENOUS; SUBCUTANEOUS at 06:51

## 2025-05-15 ASSESSMENT — ACTIVITIES OF DAILY LIVING (ADL)
ADLS_ACUITY_SCORE: 60
ADLS_ACUITY_SCORE: 42
DEPENDENT_IADLS:: INDEPENDENT
ADLS_ACUITY_SCORE: 60
ADLS_ACUITY_SCORE: 42
ADLS_ACUITY_SCORE: 60
ADLS_ACUITY_SCORE: 56
ADLS_ACUITY_SCORE: 60
ADLS_ACUITY_SCORE: 42
ADLS_ACUITY_SCORE: 60
ADLS_ACUITY_SCORE: 42
ADLS_ACUITY_SCORE: 60

## 2025-05-15 NOTE — MEDICATION SCRIBE - ADMISSION MEDICATION HISTORY
Medication Scribe Admission Medication History    Admission medication history is complete. The information provided in this note is only as accurate as the sources available at the time of the update.    Information Source(s): Patient via in-person    Pertinent Information: Bal reports that his doctor changed the dosing and frequency of the gabapentin as listed below. Per patient, yesterday he took 1000 MCG of the cyanocobalamin (VITAMIN B-12). He noted finishing the amoxicillin-clavulanate (AUGMENTIN) 400-57 MG/5ML suspension (last dispense 05/01/25 for 5 day supply) and fluconazole (DIFLUCAN) 200 MG tablet (last dispense 05/01/25 for 6 day supply) a week ago. Patient denies taking any other medications. Dispense report and outside medication reconciliation list have been reviewed.     Changes made to PTA medication list:  Added:   omeprazole (PRILOSEC) 20 MG DR capsule (last dispense 05/05/25)  Deleted:   omeprazole (PRILOSEC) 2 mg/mL suspension  amoxicillin-clavulanate (AUGMENTIN) 400-57 MG/5ML suspension  fluconazole (DIFLUCAN) 200 MG tablet  Changed:   gabapentin (NEURONTIN) 300 MG capsule. Place 1 capsule (300 mg) into Feeding Tube 3 times daily. ---> gabapentin (NEURONTIN) 300 MG capsule. Place 300 mg into Feeding Tube 3 times daily. 600 MG morning, 600 MG noon, and 300 MG bedtime    Allergies reviewed with patient and updates made in EHR: yes    Medication History Completed By: Yanna Hope 5/14/2025 7:02 PM    PTA Med List   Medication Sig Last Dose/Taking    acetaminophen (TYLENOL) 325 MG tablet Place 2 tablets (650 mg) into Feeding Tube every 6 hours. 5/14/2025 at  8:30 AM    atenolol (TENORMIN) 50 MG tablet Place 1 tablet (50 mg) into G tube daily. 5/13/2025 at  9:00 PM    calcium carbonate (TUMS) 500 MG chewable tablet Take 1 tablet (500 mg) by mouth daily as needed for heartburn. Taking As Needed    cyanocobalamin (VITAMIN B-12) 500 MCG tablet Place 500 mcg into Feeding Tube every other day.  5/13/2025    dextromethorphan (DELSYM) 30 MG/5ML liquid Take 5 mLs (30 mg) by mouth once as needed for cough. 5/13/2025    EPINEPHrine (ANY BX GENERIC EQUIV) 0.3 MG/0.3ML injection 2-pack Inject 0.3 mg into the muscle as needed for anaphylaxis. Taking As Needed    gabapentin (NEURONTIN) 300 MG capsule Place 1 capsule (300 mg) into Feeding Tube 3 times daily. (Patient taking differently: Place 300 mg into Feeding Tube 3 times daily. 600 MG morning, 600 MG noon, and 300 MG bedtime) 5/14/2025 at  9:00 AM    Isosource 1.5 [place] 250 mL Place 1,500 mLs into J tube daily. Infuse via pump. 90 mL/hr x 16 hours.   6 cartons per day.   Water flush: 120 mL before and after tube feed cycle. Additional 120 mL 8x per day.   Kcals: 2250 5/14/2025 at  8:30 AM    Lidocaine (LIDOCARE) 4 % Patch Place 1 patch over 12 hours onto the skin every 24 hours. To prevent lidocaine toxicity, patient should be patch free for 12 hrs daily. 5/9/2025    loperamide (IMODIUM) 1 MG/7.5ML liquid Place 15 mLs (2 mg) into Feeding Tube 3 times daily. 5/10/2025    methocarbamol (ROBAXIN) 750 MG tablet Place 1 tablet (750 mg) into J tube every 6 hours as needed for muscle spasms. 5/12/2025 at  9:00 PM    naloxone (NARCAN) 0.4 MG/ML injection Inject 0.5 mLs (0.2 mg) into the muscle once for 1 dose. Taking    nortriptyline (PAMELOR) 25 MG capsule Place 1 capsule (25 mg) into J tube every evening. 5/13/2025 Evening    Nutrisource Fiber PO packet Place 1 each into Feeding Tube 2 times daily. 5/14/2025 Morning    omeprazole (PRILOSEC) 20 MG DR capsule Take 20 mg by mouth daily. 5/14/2025 Morning    oxyCODONE (ROXICODONE) 5 MG/5ML solution Place 5 mLs (5 mg) into J tube every 4 hours as needed for severe pain. 5/14/2025 at  6:00 AM    Prosource TF PO LIQD (PROSOURCE TF) Place 45 mLs into J tube 2 times daily. Infuse via syringe  Water flush: 30 mL before and after each packet   Kcals: 80  2 pkts per day 5/14/2025 at  8:30 AM    UNABLE TO FIND Take 1-2 sprays by  mouth as needed. MEDICATION NAME: Hurricaine Throat Spray. Taking As Needed

## 2025-05-15 NOTE — PLAN OF CARE
VSS on RA. A&Ox4. Diet - NPO with tube feedings ordered. Called flyer at 0000 to acquire formula for feeds and she was unable to find any saying it will have to be ordered and brought by nutrition during day shift. BG taken Q4 to ensure pt is not hypo due to NPO status and no feed running.   Activity - SBA help with lines   PIV SL; Port TKO for Abx.   Pharyngectomy tube 5-8/10 throughout the night; Tube is swollen and has purulent drainage coming out of the wound site; upon visual assessment the R side of the neck appears to be more swollen and red; neck is asymmetrical in appearance.    Q2 hour dilaudid and all pills are crushed and put through the NG tube   Plan - continue to change the 4x4 under the pharyngectomy tube per pt request or when visibly soiled, drainage gets stuck in pts facial hair; continue ABX; Unscheduled trip to OR today       Goal Outcome Evaluation:      Plan of Care Reviewed With: patient    Overall Patient Progress: no changeOverall Patient Progress: no change

## 2025-05-15 NOTE — PLAN OF CARE
Admission          5/14/2025 1836  Pt is settled in bed.  Vitals sign done.  Pt c/o pain from Pharyngectomy tube out of left neck 7/10, per pt last had morphine for pain control at 1200 PM today.  Pt would like something for pain control.  Paged Jair cross cover Dr Apolinar Dennis as Jair waldron cover.

## 2025-05-15 NOTE — BRIEF OP NOTE
[facility]    Brief Operative Note    Pre-operative diagnosis: Neck infection [L08.9]  Post-operative diagnosis Same as pre-operative diagnosis    Procedure: Esophagoscopy, gastroscopy, duodenoscopy (EGD), combined w/ pharyngoscopy tube removal, stent placement, N/A - Esophagus    Surgeon: Surgeons and Role:     * Katlyn Tobar MD - Primary     * Luzmaria Bell MD  Anesthesia: General   Estimated Blood Loss: Minimal    Drains: None  Specimens: * No specimens in log *  Findings:   Now only small defect. Stent placed. Pharyngostomy tube removed.  Complications: None.  Implants:   Implant Name Type Inv. Item Serial No.  Lot No. LRB No. Used Action   [id number] [id number] Fully Covered Esophageal Stent Stent  [id number] [employer] [id number] N/A 1 Implanted

## 2025-05-15 NOTE — PLAN OF CARE
"Goal Outcome Evaluation:       A/Ox4, c/o moderate pain to throat and neck area, scant amount of mucous yellowish drainage noted at the insertion site, gauze applied.  ambulated to BR voided and had BM. Kept NPO for procedure today. CHG done, BG done, and made ready for OR, consent already signed. Pt was wheeled to OR at around 10:15.  /77   Pulse 93   Temp 98.5  F (36.9  C) (Oral)   Resp 20   Ht 1.778 m (5' 10\")   Wt 77.8 kg (171 lb 8 oz)   SpO2 99%   BMI 24.61 kg/m                           "

## 2025-05-15 NOTE — PROGRESS NOTES
"CLINICAL NUTRITION SERVICES - ASSESSMENT NOTE    RECOMMENDATIONS FOR MDs/PROVIDERS TO ORDER:  Please place consult for \"Registered Dietitian to order TF per MNT guidelines\" when wanting to restart TF's.  Monitor lytes/fluids and replace PRN  Free water adjustments per MD  Registered Dietitian Interventions:  None currently    Future/Additional Recommendations:  For EN while inpatient:  Per J tube Goal TF: Jevity 1.5 @ 90 ml/hr x 16hrs + 1 pkt Prosource TF20. Provides 1440 ml, 2240 kcals, 112 g protein, 311 g CHO, 72 g fat, 30 g fiber, 1094 ml free water.  Water flushes: 120 mL before and after tube feed cycle. + 120 mL 8x per day. Additional fluids and/or adjustments per MD.    Nutrisource fiber BID(30 kcals, 6 g fiber)  Return to PTA regimen at discharge for home       REASON FOR ASSESSMENT  RD notified of TF order for formula not carried in hospital. Followed up with pt to determine appropriateness to substitute.     SUBJECTIVE INFORMATION  Assessed patient in room.    PERTINENT MEDICAL/CLINICAL HISTORY:  70 year old male with a history of esophageal adenocarcinoma s/p esophagectomy 3/24/2025 c/b anastomotic leak requiring percutaneous drain placement (now removed) and ultimately pharyngostomy tube placement 4/18, most recently admitted 4/26 due to weakness, fatigue and dehydration, underwent pharyngostomy tube repositioning 4/29 and later discharged 5/1 with a course of augmentin/fluconazole which was completed last week. He now returns to the ED 5/14 due to new pain, drainage and swelling at his pharyngostomy site.   NUTRITION HISTORY  Pt reports fair tolerance to TF's PTA. Denies N/V/abdominal pain/diarrhea or constipation related to TF's. Additionally pt reports he has been using prosource and a fiber modular. Denies any issues/concerns with administering TF's or water flushes. Reports potential weight gain recently. Pt does not have Isosource with him but is agreeable to substitute with Jevity 1.5 depending " "on if he stays in the hospital.  Home nutrition support plan: FHI  J tube  Enteral Formula: Isosource 1.5 + Prosource TF  Rate/Frequency: Isosource 1.5 @ 90 mL/hr x 16 hours. + Prosource TF 1 pkt BID  Water Flushes: 120 mL before and after tube feed cycle. + 120 mL 8x per day.  Enteral Nutrition Provides: 6 cartons Isosource 1.5 provides 2250 kcal, 102 gm protein, 23 gm fiber, and 1146 mL free water. + 2 pkts Prosource TF provides 80 kcal and 22 gm protein. Total provisions: 2330 kcal and 124 gm protein.    CURRENT NUTRITION ORDERS  Diet: NPO      CURRENT INTAKE/TOLERANCE  No documented intakes to assess.    LABS  Nutrition-relevant labs: Na 133, BUN 24.1, CRP 25, glu 114,     MEDICATIONS  Nutrition-relevant medications: Vit B12, imodium, nutrisource fiber BID, protonix, abx, Prosource TF free 1 pkt daily    ANTHROPOMETRICS  Height: 1.778 m (5' 10\")    Admission Weight: 77.8 kg (171 lb 8 oz) (05/14/25 1203)   Most Recent Weight: 77.8 kg (171 lb 8 oz) (05/14/25 1203)  IBW: 75.5  kg  % IBW: 103%  BMI: Body mass index is 24.61 kg/m .   Weight History: 7 kg (8%) weight loss over 6 months.  Wt Readings from Last 20 Encounters:   05/14/25 77.8 kg (171 lb 8 oz)   05/08/25 76.7 kg (169 lb)   05/06/25 76.2 kg (168 lb)   04/30/25 76.5 kg (168 lb 11.2 oz)   04/25/25 76.7 kg (169 lb 1.6 oz)   04/20/25 77 kg (169 lb 11.2 oz)   04/17/25 78.5 kg (173 lb)   04/09/25 81.6 kg (179 lb 12.8 oz)   03/11/25 84.2 kg (185 lb 9.6 oz)   03/11/25 83.9 kg (185 lb)   02/26/25 87.3 kg (192 lb 7.4 oz)   02/20/25 88.5 kg (195 lb 3.2 oz)   02/20/25 88.5 kg (195 lb)   01/16/25 86.1 kg (189 lb 12.8 oz)   12/16/24 80.7 kg (178 lb)   12/11/24 81.6 kg (180 lb)   12/11/24 84.6 kg (186 lb 6.4 oz)   11/27/24 82.6 kg (182 lb)   11/27/24 84.4 kg (186 lb)   11/13/24 84.8 kg (187 lb)         Dosing Weight: 77.8 kg, based on admission wt    ASSESSED NUTRITION NEEDS  Estimated Energy Needs: 2476-1793 kcals/day (25 - 30 kcals/kg)  Justification: " Maintenance  Estimated Protein Needs: 117-156 grams protein/day (1.5 - 2 grams of pro/kg)  Justification: Increased needs  Estimated Fluid Needs:  (1 mL/kcal)  Justification: Maintenance    SYSTEM FINDINGS    GI symptoms: LBM PTA  Skin/wounds: No issues noted    MALNUTRITION  % Intake: Decreased intake does not meet criteria  % Weight Loss: Weight loss does not meet criteria   Subcutaneous Fat Loss: Orbital: Mild  Muscle Loss: Clavicles (pectoralis and deltoids): Mild  Fluid Accumulation/Edema: None noted  Malnutrition Diagnosis: Moderate malnutrition in the context of acute illness or injury  Malnutrition Present on Admission: Yes    NUTRITION DIAGNOSIS  Inadequate oral intake related to strict NPO s/p esophageal resection as evidenced by reliance on TF to meet 100% of nutrition needs.     INTERVENTIONS  Collaboration by nutrition professional with other providers  Enteral nutrition management    Goals  Total avg nutritional intake to meet a minimum of 25 kcal/kg and 1.5 g PRO/kg daily (per dosing wt 77.8 kg).      Monitoring/Evaluation  Progress toward goals will be monitored and evaluated per policy.    Nevaeh Gray MS, RD, LD, [facility]    6C (beds 4331-2168) + 7C (beds 4792-5647) + ED + Obs  Available in [facility] by name or unit dietitian

## 2025-05-15 NOTE — CONSULTS
Care Management Initial Consult    General Information  Assessment completed with: Bal Hess  Type of CM/SW Visit: Initial Assessment    Primary Care Provider verified and updated as needed: Yes (Domenica Wing [phone])   Readmission within the last 30 days: previous discharge plan unsuccessful (dehydration)      Reason for Consult: other (see comments) (Elevated Risk Score.)  Advance Care Planning: Advance Care Planning Reviewed: no concerns identified     Communication Assessment  Patient's communication style: spoken language (English or Bilingual)      Cognitive  Cognitive/Neuro/Behavioral: WDL  Level of Consciousness: alert  Arousal Level: opens eyes spontaneously  Orientation: oriented x 4        Speech: clear, spontaneous, logical    Living Environment:   People in home: spouse     Current living Arrangements: house      Able to return to prior arrangements: yes    Family/Social Support:  Care provided by: self  Provides care for: no one  Marital Status:   Support system: Wife          Description of Support System: Supportive, Involved    Support Assessment: Adequate family and caregiver support, Adequate social supports    Current Resources:   Patient receiving home care services: No    Community Resources: Home Infusion, Home Care  Equipment currently used at home: other (see comments) (grab bar and bench in walk-in shower, also has tub shower (does not use often),flat mattress not bed rails)  Supplies currently used at home: None    Employment/Financial:  Employment Status: retired        Financial Concerns: none   Does the patient's insurance plan have a 3 day qualifying hospital stay waiver?  No    Lifestyle & Psychosocial Needs:  Social Drivers of Health     Food Insecurity: Low Risk  (4/27/2025)    Food Insecurity     Within the past 12 months, did you worry that your food would run out before you got money to buy more?: No     Within the past 12 months, did the food you bought  just not last and you didn t have money to get more?: No   Depression: Not at risk (3/11/2025)    PHQ-2     PHQ-2 Score: 0   Housing Stability: Low Risk  (4/27/2025)    Housing Stability     Do you have housing? : Yes     Are you worried about losing your housing?: No   Tobacco Use: Low Risk  (5/14/2025)    Patient History     Smoking Tobacco Use: Never     Smokeless Tobacco Use: Never     Passive Exposure: Past   Financial Resource Strain: Low Risk  (4/27/2025)    Financial Resource Strain     Within the past 12 months, have you or your family members you live with been unable to get utilities (heat, electricity) when it was really needed?: No   Alcohol Use: Alcohol Misuse (8/31/2020)    AUDIT-C     Frequency of Alcohol Consumption: 2-3 times a week     Average Number of Drinks: 1 or 2     Frequency of Binge Drinking: Less than monthly   Transportation Needs: Low Risk  (4/27/2025)    Transportation Needs     Within the past 12 months, has lack of transportation kept you from medical appointments, getting your medicines, non-medical meetings or appointments, work, or from getting things that you need?: No   Physical Activity: Not on file   Interpersonal Safety: Low Risk  (4/27/2025)    Interpersonal Safety     Do you feel physically and emotionally safe where you currently live?: Yes     Within the past 12 months, have you been hit, slapped, kicked or otherwise physically hurt by someone?: No     Within the past 12 months, have you been humiliated or emotionally abused in other ways by your partner or ex-partner?: No   Stress: Not on file   Social Connections: Not on file   Health Literacy: Not on file     Functional Status:  Prior to admission patient needed assistance:   Dependent ADLs:: Independent  Dependent IADLs:: Independent     Mental Health Status:  Mental Health Status: No Current Concerns       Chemical Dependency Status:  Chemical Dependency Status: No Current Concerns            Values/Beliefs:  Spiritual, Cultural Beliefs, Confucianist Practices, Values that affect care: no             Discussed  Partnership in Safe Discharge Planning  document with patient/family: No    Additional Information:  SW received consult for elevated risk score. SW met with pt at bedside and introduced self,role, and explained purpose of assessment. SW verified PCP,address,and insurance. Pt reported he lives at home with wife and is independent with I/ADLs. Pt has home care services of PT,OT, and RN through [facility] and receives TF from [facility]. Pt stated he has no mental health, chemical dependency, financial, and transportation concerns. Pt shared his wife will be able to provide discharge transportation and will need resumption of care for home care services and home infusion.   Next Steps:   -Resumption of care for TF and home care.    YAN Nazario, KARO  OBS/ED   Phone: [phone]  Fax: [phone]  Vocera: 1A Obs CAMERON

## 2025-05-15 NOTE — PROGRESS NOTES
Nursing Focus: Admission    D: Patient admitted/transferred from PACU via stretcher for observation and recovery.      I: Upon arrival to the unit patient was oriented to room, unit, and call light. Patient s height, weight, and vital signs were obtained. Allergies reviewed and allergy band applied. MD notified of patient s arrival on the unit. Adult AVS completed  by bedside RN/. Head to toe assessment completed. Education assessment completed. Care plan initiated.     A: Vital signs stable upon admission. Patient rates pain at 0/10 initially. Two RN skin assessment completed Yes. Second RN was Angie DANIELS RN. Significant Skin Findings include surgical wound on L neck, covered; J-tube, old lap sites, small abrasion on R knee. [facility] Nurse Consult Ordered  No.    P: Continue to monitor patient s vitals and pain and intervene as needed. Continue with plan of care. Notify MD with any concerns or changes in patient status.

## 2025-05-15 NOTE — PHARMACY-CONSULT NOTE
Pharmacy Tube Feeding Consult    Medication reviewed for administration by feeding tube and for potential food/drug interactions.    Recommendation: No changes are needed at this time.     Pharmacy will continue to follow as new medications are ordered.    Aime Smith, PharmD, BCPS

## 2025-05-15 NOTE — PROGRESS NOTES
"Post Op Check    05/15/2025    Bal Junior is a 70 year old male s/p EGD with stent placement, pharyngostomy tube removal.    Patient seen at the bedside awake, alert, and interactive. He reports that he is actually feeling quite well. He was pleased to wake up without the pharyngostomy tube. We discussed findings from the OR and the plan moving forward.     /84   Pulse 96   Temp 98.5  F (36.9  C) (Oral)   Resp 16   Ht 1.778 m (5' 10\")   Wt 77.8 kg (171 lb 8 oz)   SpO2 99%   BMI 24.61 kg/m      Gen: Appears comfortable   HEENT: left neck with primapore dressing over prior pharyngostomy tube site  Chest: breathing non-labored on room air  Abdomen: soft, non-distended, JT in place    A/P: No acute post-op issues. Patient waiting on transfer to floor.    Luzmaria Bell  PGY-3 General Surgery Resident  "

## 2025-05-15 NOTE — OP NOTE
[facility]  Operative Note    Pre-operative diagnosis: Neck infection [L08.9]   Post-operative diagnosis * No post-op diagnosis entered *   Procedure:  Esophagoscopy, gastroscopy, duodenoscopy (EGD), combined w/ pharyngostoopy tube removal, stent placement, N/A - Esophagus   Fluoroscopic interpretation of images   Surgeon: Katlyn Tobar MD   Assistants(s):    Anesthesia: General    Estimated blood loss: None    Total IV fluids: (See anesthesia record)   Blood transfusion: No transfusion was given during surgery   Total urine output: (See anesthesia record)   Drains: None   Specimens: None   Implants: None     Findings:   None.   Complications: None.   Condition: Stable               Description of procedure:  After obtaining informed consent the patient was brought to the operating room and laid supine on the operating table.  After general anesthesia we proceeded to perform an EGD and noted that the anastomotic defect was now very small and led to a very small tract.  We shot some contrast through the scope and this did not flow freely into the pleural space.  At this point a 260 superstiff Amplatz was passed through the gastroscope into the esophagus and stomach.  23 x 120 Endo max stent was deployed going across the anastomotic defect with fluoroscopic visualization.  We then remove the pharyngostomy tube.  The patient tolerated the procedure well

## 2025-05-16 LAB
ANION GAP SERPL CALCULATED.3IONS-SCNC: 10 MMOL/L (ref 7–15)
BUN SERPL-MCNC: 21.6 MG/DL (ref 8–23)
CALCIUM SERPL-MCNC: 8.9 MG/DL (ref 8.8–10.4)
CHLORIDE SERPL-SCNC: 102 MMOL/L (ref 98–107)
CREAT SERPL-MCNC: 0.73 MG/DL (ref 0.67–1.17)
EGFRCR SERPLBLD CKD-EPI 2021: >90 ML/MIN/1.73M2
ERYTHROCYTE [DISTWIDTH] IN BLOOD BY AUTOMATED COUNT: 16.4 % (ref 10–15)
GLUCOSE SERPL-MCNC: 169 MG/DL (ref 70–99)
HCO3 SERPL-SCNC: 22 MMOL/L (ref 22–29)
HCT VFR BLD AUTO: 29.5 % (ref 40–53)
HGB BLD-MCNC: 9.7 G/DL (ref 13.3–17.7)
MAGNESIUM SERPL-MCNC: 1.8 MG/DL (ref 1.7–2.3)
MCH RBC QN AUTO: 28.2 PG (ref 26.5–33)
MCHC RBC AUTO-ENTMCNC: 32.9 G/DL (ref 31.5–36.5)
MCV RBC AUTO: 86 FL (ref 78–100)
PHOSPHATE SERPL-MCNC: 2.8 MG/DL (ref 2.5–4.5)
PLATELET # BLD AUTO: 292 10E3/UL (ref 150–450)
POTASSIUM SERPL-SCNC: 4.3 MMOL/L (ref 3.4–5.3)
RBC # BLD AUTO: 3.44 10E6/UL (ref 4.4–5.9)
SODIUM SERPL-SCNC: 134 MMOL/L (ref 135–145)
WBC # BLD AUTO: 9 10E3/UL (ref 4–11)

## 2025-05-16 PROCEDURE — 84100 ASSAY OF PHOSPHORUS: CPT

## 2025-05-16 PROCEDURE — 250N000013 HC RX MED GY IP 250 OP 250 PS 637

## 2025-05-16 PROCEDURE — 83735 ASSAY OF MAGNESIUM: CPT

## 2025-05-16 PROCEDURE — 93010 ELECTROCARDIOGRAM REPORT: CPT | Performed by: INTERNAL MEDICINE

## 2025-05-16 PROCEDURE — 85027 COMPLETE CBC AUTOMATED: CPT

## 2025-05-16 PROCEDURE — 93005 ELECTROCARDIOGRAM TRACING: CPT

## 2025-05-16 PROCEDURE — 80048 BASIC METABOLIC PNL TOTAL CA: CPT

## 2025-05-16 PROCEDURE — 120N000002 HC R&B MED SURG/OB UMMC

## 2025-05-16 PROCEDURE — B4035 ENTERAL FEED SUPP PUMP PER D: HCPCS

## 2025-05-16 RX ORDER — HEPARIN SODIUM (PORCINE) LOCK FLUSH IV SOLN 100 UNIT/ML 100 UNIT/ML
5-10 SOLUTION INTRAVENOUS
Status: DISCONTINUED | OUTPATIENT
Start: 2025-05-16 | End: 2025-05-17 | Stop reason: HOSPADM

## 2025-05-16 RX ORDER — NALOXONE HYDROCHLORIDE 0.4 MG/ML
0.4 INJECTION, SOLUTION INTRAMUSCULAR; INTRAVENOUS; SUBCUTANEOUS
Status: DISCONTINUED | OUTPATIENT
Start: 2025-05-16 | End: 2025-05-17 | Stop reason: HOSPADM

## 2025-05-16 RX ORDER — DEXTROSE MONOHYDRATE 100 MG/ML
INJECTION, SOLUTION INTRAVENOUS CONTINUOUS PRN
Status: DISCONTINUED | OUTPATIENT
Start: 2025-05-16 | End: 2025-05-17 | Stop reason: HOSPADM

## 2025-05-16 RX ORDER — AMINO ACIDS/PROTEIN HYDROLYS 11G-40/45
1 LIQUID IN PACKET (ML) ORAL DAILY
Status: DISCONTINUED | OUTPATIENT
Start: 2025-05-16 | End: 2025-05-17 | Stop reason: HOSPADM

## 2025-05-16 RX ORDER — HEPARIN SODIUM,PORCINE 10 UNIT/ML
5-10 VIAL (ML) INTRAVENOUS
Status: DISCONTINUED | OUTPATIENT
Start: 2025-05-16 | End: 2025-05-17 | Stop reason: HOSPADM

## 2025-05-16 RX ORDER — NALOXONE HYDROCHLORIDE 0.4 MG/ML
0.2 INJECTION, SOLUTION INTRAMUSCULAR; INTRAVENOUS; SUBCUTANEOUS
Status: DISCONTINUED | OUTPATIENT
Start: 2025-05-16 | End: 2025-05-17 | Stop reason: HOSPADM

## 2025-05-16 RX ORDER — HEPARIN SODIUM,PORCINE 10 UNIT/ML
5-10 VIAL (ML) INTRAVENOUS EVERY 24 HOURS
Status: DISCONTINUED | OUTPATIENT
Start: 2025-05-16 | End: 2025-05-17 | Stop reason: HOSPADM

## 2025-05-16 RX ORDER — ATENOLOL 50 MG/1
50 TABLET ORAL ONCE
Status: COMPLETED | OUTPATIENT
Start: 2025-05-16 | End: 2025-05-16

## 2025-05-16 RX ADMIN — FLUCONAZOLE 400 MG: 40 POWDER, FOR SUSPENSION ORAL at 20:39

## 2025-05-16 RX ADMIN — METHOCARBAMOL 500 MG: 500 TABLET ORAL at 18:27

## 2025-05-16 RX ADMIN — METHOCARBAMOL 500 MG: 500 TABLET ORAL at 11:54

## 2025-05-16 RX ADMIN — GABAPENTIN 300 MG: 300 CAPSULE ORAL at 20:40

## 2025-05-16 RX ADMIN — OXYCODONE HYDROCHLORIDE 5 MG: 5 SOLUTION ORAL at 21:48

## 2025-05-16 RX ADMIN — LOPERAMIDE HYDROCHLORIDE 2 MG: 2 SOLUTION ORAL at 07:39

## 2025-05-16 RX ADMIN — LOPERAMIDE HYDROCHLORIDE 2 MG: 2 SOLUTION ORAL at 20:39

## 2025-05-16 RX ADMIN — AMOXICILLIN AND CLAVULANATE POTASSIUM 875 MG: 400; 57 POWDER, FOR SUSPENSION ORAL at 20:39

## 2025-05-16 RX ADMIN — Medication 40 MG: at 07:39

## 2025-05-16 RX ADMIN — AMOXICILLIN AND CLAVULANATE POTASSIUM 875 MG: 400; 57 POWDER, FOR SUSPENSION ORAL at 07:39

## 2025-05-16 RX ADMIN — ATENOLOL 50 MG: 50 TABLET ORAL at 20:42

## 2025-05-16 RX ADMIN — ACETAMINOPHEN 650 MG: 325 SOLUTION ORAL at 17:51

## 2025-05-16 RX ADMIN — METHOCARBAMOL 500 MG: 500 TABLET ORAL at 21:48

## 2025-05-16 RX ADMIN — NORTRIPTYLINE HYDROCHLORIDE 25 MG: 25 CAPSULE ORAL at 20:40

## 2025-05-16 RX ADMIN — Medication 60 ML: at 12:09

## 2025-05-16 RX ADMIN — GABAPENTIN 300 MG: 300 CAPSULE ORAL at 07:39

## 2025-05-16 RX ADMIN — METHOCARBAMOL 500 MG: 500 TABLET ORAL at 07:39

## 2025-05-16 RX ADMIN — Medication 1 PACKET: at 07:39

## 2025-05-16 RX ADMIN — GABAPENTIN 300 MG: 300 CAPSULE ORAL at 14:17

## 2025-05-16 RX ADMIN — Medication 1 EACH: at 11:37

## 2025-05-16 RX ADMIN — OXYCODONE HYDROCHLORIDE 5 MG: 5 SOLUTION ORAL at 05:21

## 2025-05-16 ASSESSMENT — ACTIVITIES OF DAILY LIVING (ADL)
ADLS_ACUITY_SCORE: 42

## 2025-05-16 NOTE — PROGRESS NOTES
"CLINICAL NUTRITION SERVICES - BRIEF NOTE     RECOMMENDATIONS FOR MDs/PROVIDERS TO ORDER:  Adjustments to free water flushes per provider discretion    Registered Dietitian Interventions:  Ordered free water flush as a separate order from TF.  Continue 120 mL q3h free water flush as ordered (and clarified should be throughout day and night to match pt's home free water flush regimen of 120 mL 8 times daily)    Changed Prosource TF Free to Prosoruce TF20 per RD recs 5/15       *See RD note on 5/15 for nutrition assessment note  *Received provider order consult - \"Registered Dietitian to order TF per Medical Nutrition Therapy Guidelines\"    CURRENT NUTRITION ORDERS  Diet: NPO    Nutrition Support: Jevity 1.5 @ 90 ml/hr x 16 hrs (1440ml/day) + 1 pkt Prosource TF20 daily provides: 2240 kcals, 111 g PRO, 1094 ml free H20, 310 g CHO, and 30 g fiber daily.   -- comment in TF order for \" mL q3h\"    CURRENT INTAKE/TOLERANCE  TF ordered by provider 4/14 with above goal rate/cycle length, but pt's home formula Isosource 1.5 was ordered which we do not carry. Provider updated TF order to Jevity 1.5 this morning (comparable formula, pt ok with this substitution per RD note yesterday)       NEW FINDINGS  Labs:   - K+, Mg++, and Phos WNL    Meds:   - Nutrisource fiber, 1 pkt BID  - Prosource TF Free, 1 pkt daily    INTERVENTIONS  See above       MONITORING/EVALUATION  Progress toward goals will be monitored and evaluated per policy.     Brandi Burden, RD, , LD  Weekday Units covered: 5A (non-Heme Onc pts) and 7B ([id number])  Available by 5A or 7B Clinical Dietitiadebayo Olivas  Weekend Coverage: Weekend Clinical Dietitiadebayo Olivas    Inpatient Clinical Dietitians no longer available via paging   "

## 2025-05-16 NOTE — PLAN OF CARE
Goal Outcome Evaluation:                      POD0, doing well. Surgical site on L neck covered by bandage.    Reports some pain in epigastric region of chest; fluctuates whether or not this bothers pt.     Sore throat, relief w/PRN hurricaine spray x1 and PRN tylenol x1.     Tube feeds in abd. J tube, began late due to lack of right tube feed + absence of pump. Began ~22:40.     VSS. Pt calm, pleasant and coooperative. NPO, pt sucks on ice cubes and spits out water to avoid swallowing.      Where Do You Want The Question To Include Opioid Counseling Located?: Case Summary Tab

## 2025-05-16 NOTE — PROGRESS NOTES
THORACIC & FOREGUT SURGERY    S:  Pt seen at bedside resting comfortably.   Very pleased to have had pharyngostomy tube removed.    O:  Vitals:    05/15/25 1849 05/15/25 2325 05/16/25 0340 05/16/25 0843   BP: 117/83 119/72 129/88 135/85   BP Location: Right arm Right arm Right arm (P) Right arm   Patient Position:       Cuff Size:  Adult Regular     Pulse: 113  109 (P) 104   Resp:  22 20 (P) 20   Temp: 98.3  F (36.8  C) 98.6  F (37  C) 98.1  F (36.7  C)    TempSrc: Oral Oral Oral (P) Oral   SpO2: 97% 97% 99% 99%   Weight:    (P) 76.9 kg (169 lb 8.5 oz)   Height:           A&O, NAD  Breathing non-labored  Appears well perfused  Soft, NDNT, jejunostomy tube in place  Distal extremities warm     Assessment & Plan: Surgery   Bal Junior is a 70 year old male with a history of esophageal adenocarcinoma s/p esophagectomy 3/24/2025 c/b anastomotic leak.  Has subsequently underwent multiple EGDs and revisions of pharyngostomy tube.  Presented on 5/14/2025 with dislodgment of pharyngostomy tube.  On 5/15 went to the OR for repeat EGD, during which the pharyngostomy tube was removed as the anastomotic leak cavity had adequately resolved.     -Continue home tube feeds  -Repeat CT scan tomorrow morning  -Abx for pharyngostomy tube site infection  -Possible repeat endoscopy on 5/19 if CT scan shows anastomotic leak cavity enlargement    Clinically Significant Risk Factors         # Hyponatremia: Lowest Na = 133 mmol/L in last 2 days, will monitor as appropriate           # Hypertension: Noted on problem list             # Moderate Malnutrition: based on nutrition assessment and treatment provided per dietitian's recommendations., PRESENT ON ADMISSION   # Financial/Environmental Concerns: none         Alexey Cohen PA-C     Thoracic and Foregut Surgery  Text page Thoracic Surgery via [facility] Paging/Directory

## 2025-05-16 NOTE — PLAN OF CARE
Goal Outcome Evaluation:                    Summary Type: 5A Post Op Report   POD #1 =   Pain:  Controlled  Neuro:WDL  CV:WDL  Resp:WDL  GI:.WDL except, appearance/characteristics  :WDL  Skin: .WDL except, integrity - left neck surgical site   Activity Assistance: independent  Safety/Falls Risk: Level of Risk: Moderate Risk  Procedures/Imagin/14 Chest CT  Precautions: None    New changes this shift: No new changes this shift, continue with care as planned.

## 2025-05-16 NOTE — PLAN OF CARE
"Goal Outcome Evaluation:         Time    /88 (BP Location: Right arm)   Pulse 109   Temp 98.1  F (36.7  C) (Oral)   Resp 20   Ht 1.778 m (5' 10\")   Wt 77.8 kg (171 lb 8 oz)   SpO2 99%   BMI 24.61 kg/m      Reason for admission: neck infection  Activity: SBA  Pain: 5/10, upper chest and incisional pain being managed with prn oxycodone, lowers pain to a 2/10  Neuro: A&O X4  Cardiac: WNL  Respiratory: WNL  GI/: Voiding urine spontaneously   Diet: NPO, tube feeds  Lines: Port SL        Continue to monitor and follow POC                     "

## 2025-05-17 ENCOUNTER — HOME INFUSION (OUTPATIENT)
Dept: HOME HEALTH SERVICES | Facility: HOME HEALTH | Age: 70
End: 2025-05-17
Payer: COMMERCIAL

## 2025-05-17 ENCOUNTER — APPOINTMENT (OUTPATIENT)
Dept: CT IMAGING | Facility: CLINIC | Age: 70
DRG: 920 | End: 2025-05-17
Attending: STUDENT IN AN ORGANIZED HEALTH CARE EDUCATION/TRAINING PROGRAM
Payer: MEDICARE

## 2025-05-17 VITALS
HEART RATE: 83 BPM | WEIGHT: 169.9 LBS | SYSTOLIC BLOOD PRESSURE: 117 MMHG | RESPIRATION RATE: 18 BRPM | OXYGEN SATURATION: 99 % | HEIGHT: 70 IN | TEMPERATURE: 98.2 F | BODY MASS INDEX: 24.32 KG/M2 | DIASTOLIC BLOOD PRESSURE: 78 MMHG

## 2025-05-17 LAB
ANION GAP SERPL CALCULATED.3IONS-SCNC: 11 MMOL/L (ref 7–15)
ATRIAL RATE - MUSE: 94 BPM
BUN SERPL-MCNC: 23.9 MG/DL (ref 8–23)
CALCIUM SERPL-MCNC: 8.4 MG/DL (ref 8.8–10.4)
CHLORIDE SERPL-SCNC: 102 MMOL/L (ref 98–107)
CREAT SERPL-MCNC: 0.74 MG/DL (ref 0.67–1.17)
DIASTOLIC BLOOD PRESSURE - MUSE: NORMAL MMHG
EGFRCR SERPLBLD CKD-EPI 2021: >90 ML/MIN/1.73M2
ERYTHROCYTE [DISTWIDTH] IN BLOOD BY AUTOMATED COUNT: 17.2 % (ref 10–15)
GLUCOSE SERPL-MCNC: 137 MG/DL (ref 70–99)
HCO3 SERPL-SCNC: 24 MMOL/L (ref 22–29)
HCT VFR BLD AUTO: 28.4 % (ref 40–53)
HGB BLD-MCNC: 9.1 G/DL (ref 13.3–17.7)
INTERPRETATION ECG - MUSE: NORMAL
MCH RBC QN AUTO: 28.3 PG (ref 26.5–33)
MCHC RBC AUTO-ENTMCNC: 32 G/DL (ref 31.5–36.5)
MCV RBC AUTO: 89 FL (ref 78–100)
P AXIS - MUSE: 27 DEGREES
PLATELET # BLD AUTO: 269 10E3/UL (ref 150–450)
POTASSIUM SERPL-SCNC: 3.9 MMOL/L (ref 3.4–5.3)
PR INTERVAL - MUSE: 148 MS
QRS DURATION - MUSE: 64 MS
QT - MUSE: 340 MS
QTC - MUSE: 425 MS
R AXIS - MUSE: 14 DEGREES
RBC # BLD AUTO: 3.21 10E6/UL (ref 4.4–5.9)
SODIUM SERPL-SCNC: 137 MMOL/L (ref 135–145)
SYSTOLIC BLOOD PRESSURE - MUSE: NORMAL MMHG
T AXIS - MUSE: 2 DEGREES
VENTRICULAR RATE- MUSE: 94 BPM
WBC # BLD AUTO: 10.4 10E3/UL (ref 4–11)

## 2025-05-17 PROCEDURE — 71250 CT THORAX DX C-: CPT | Mod: 26 | Performed by: RADIOLOGY

## 2025-05-17 PROCEDURE — 85014 HEMATOCRIT: CPT

## 2025-05-17 PROCEDURE — 250N000011 HC RX IP 250 OP 636

## 2025-05-17 PROCEDURE — 71250 CT THORAX DX C-: CPT

## 2025-05-17 PROCEDURE — B4035 ENTERAL FEED SUPP PUMP PER D: HCPCS

## 2025-05-17 PROCEDURE — 250N000013 HC RX MED GY IP 250 OP 250 PS 637

## 2025-05-17 PROCEDURE — 80048 BASIC METABOLIC PNL TOTAL CA: CPT

## 2025-05-17 RX ORDER — LIDOCAINE 4 G/G
1 PATCH TOPICAL EVERY 24 HOURS
Qty: 4 PATCH | Refills: 0 | Status: SHIPPED | OUTPATIENT
Start: 2025-05-17

## 2025-05-17 RX ORDER — OXYCODONE HCL 5 MG/5 ML
5-10 SOLUTION, ORAL ORAL EVERY 4 HOURS PRN
Qty: 100 ML | Refills: 0 | Status: SHIPPED | OUTPATIENT
Start: 2025-05-17

## 2025-05-17 RX ORDER — AMOXICILLIN AND CLAVULANATE POTASSIUM 400; 57 MG/5ML; MG/5ML
875 POWDER, FOR SUSPENSION ORAL 2 TIMES DAILY
Qty: 153.16 ML | Refills: 0 | Status: SHIPPED | OUTPATIENT
Start: 2025-05-17 | End: 2025-05-24

## 2025-05-17 RX ORDER — FLUCONAZOLE 40 MG/ML
400 POWDER, FOR SUSPENSION ORAL EVERY 24 HOURS
Qty: 70 ML | Refills: 0 | Status: SHIPPED | OUTPATIENT
Start: 2025-05-17 | End: 2025-05-24

## 2025-05-17 RX ORDER — METHOCARBAMOL 500 MG/1
500 TABLET, FILM COATED ORAL 4 TIMES DAILY
Qty: 30 TABLET | Refills: 0 | Status: SHIPPED | OUTPATIENT
Start: 2025-05-17

## 2025-05-17 RX ORDER — LIDOCAINE 4 G/G
1 PATCH TOPICAL
Status: CANCELLED | OUTPATIENT
Start: 2025-05-17

## 2025-05-17 RX ORDER — OXYCODONE HYDROCHLORIDE 5 MG/1
5 TABLET ORAL EVERY 6 HOURS PRN
Qty: 12 TABLET | Refills: 0 | Status: SHIPPED | OUTPATIENT
Start: 2025-05-17 | End: 2025-05-20

## 2025-05-17 RX ORDER — ACETAMINOPHEN 325 MG/10.15ML
650 LIQUID ORAL EVERY 4 HOURS PRN
Qty: 500 ML | Refills: 0 | Status: SHIPPED | OUTPATIENT
Start: 2025-05-17

## 2025-05-17 RX ADMIN — GABAPENTIN 300 MG: 300 CAPSULE ORAL at 08:06

## 2025-05-17 RX ADMIN — Medication 5 ML: at 14:56

## 2025-05-17 RX ADMIN — ACETAMINOPHEN 650 MG: 325 SOLUTION ORAL at 14:00

## 2025-05-17 RX ADMIN — LOPERAMIDE HYDROCHLORIDE 2 MG: 2 SOLUTION ORAL at 08:06

## 2025-05-17 RX ADMIN — Medication 5 ML: at 08:06

## 2025-05-17 RX ADMIN — METHOCARBAMOL 500 MG: 500 TABLET ORAL at 14:00

## 2025-05-17 RX ADMIN — GABAPENTIN 300 MG: 300 CAPSULE ORAL at 14:00

## 2025-05-17 RX ADMIN — LOPERAMIDE HYDROCHLORIDE 2 MG: 2 SOLUTION ORAL at 14:00

## 2025-05-17 RX ADMIN — Medication 1 EACH: at 08:07

## 2025-05-17 RX ADMIN — METHOCARBAMOL 500 MG: 500 TABLET ORAL at 08:06

## 2025-05-17 RX ADMIN — Medication 40 MG: at 08:06

## 2025-05-17 RX ADMIN — Medication 60 ML: at 11:57

## 2025-05-17 RX ADMIN — OXYCODONE HYDROCHLORIDE 5 MG: 5 SOLUTION ORAL at 09:07

## 2025-05-17 RX ADMIN — CYANOCOBALAMIN TAB 500 MCG 500 MCG: 500 TAB at 08:06

## 2025-05-17 RX ADMIN — AMOXICILLIN AND CLAVULANATE POTASSIUM 875 MG: 400; 57 POWDER, FOR SUSPENSION ORAL at 08:06

## 2025-05-17 ASSESSMENT — ACTIVITIES OF DAILY LIVING (ADL)
ADLS_ACUITY_SCORE: 42

## 2025-05-17 NOTE — DISCHARGE SUMMARY
NAME: Bal Junior   MRN: [id number]   : 1955     DATE OF ADMISSION: 2025     PRE/POSTOPERATIVE DIAGNOSES: Pharyngostomy tube dislodgement and site infection    PROCEDURES PERFORMED: EGD with pharyngostomy tube removal, stent placement    PATHOLOGY RESULTS: No specimens taken     CULTURE RESULTS: None     INTRAOPERATIVE COMPLICATIONS: None     POSTOPERATIVE MEDICAL ISSUES: None     DRAINS/TUBES PRESENT AT DISCHARGE: J tube    DATE OF DISCHARGE:  2025    HPI:  Bal Junior is a 70 year old male with a history of hiatal hernia (s/p Nissen fundoplication ) esophageal adenocarcinoma s/p takedown of nissen fundoplication with JT placement 2025, esophagectomy 3/24/2025 c/b anastomotic leak requiring percutaneous drain placement discharged , readmitted  after outpatient esophagram showed leak of oral contrast into right pleural space and underwent EGD with pharyngostomy tube placement (into right pleural space) , discharged  after pharyngostomy education. Most recently he was again admitted  due to weakness, fatigue and dehydration, underwent pharyngostomy tube repositioning  and later discharged . He was seen in the ED on  due to sharp back pain associated with pharyngostomy tube suctioning, discharged home after reassuring workup with plan for pharyngostomy tube to gravity. He returns 25 due to concerns of pain, drainage and swelling at his pharyngostomy sit     HOSPITAL COURSE:   He was started on antibiotics and underwent the above-named procedures where his anastomotic defect was noted to be very small. A stent was placed and pharyngostomy tube was removed. He tolerated the procedure well and postoperatively was transferred to the general post-surgical unit. The remainder of his course was essentially uncomplicated. His leukocytosis normalized. Repeat CT chest after the stent placement was without significant fluid collection. Prior to discharge, his pain was  controlled well, he was able to perform ADLs and ambulate independently without difficulty, and had full return of bowel and bladder function.  On 5/17/2025, he was discharged to home in stable condition with his prior J-tube in place.    DISCHARGE EXAM:   Gen: Appears comfortable   HEENT: left neck with primapore dressing over prior pharyngostomy tube site  Chest: breathing non-labored on room air  CV: regular rate  Abdomen: soft, non-distended, JT in place    DISCHARGE INSTRUCTIONS:  Discharge Procedure Orders   Home Infusion Referral   Referral Priority: Routine: Next available opening Referral Type: Home Infusion Authorization   Number of Visits Requested: 1     Reason for your hospital stay   Order Comments: Tube dislodgement, EGD with tube removal, esophageal stent placement     ADULT [facility] Specialty Follow-up and recommended labs and tests   Order Comments: 1.) Follow up with Domenica Wing in 1-2 weeks.  2.) Dr. Tobar wants you to have another EGD in 2-3 weeks. You should hear from the thoracic surgery office regarding timing.     Discharge Instructions   Order Comments: THORACIC SURGERY DISCHARGE INSTRUCTIONS    DIET: Continue your prior to admission feeding tube diet.     If your plans upon discharge include prolonged periods of sitting (i.e a lengthy car or plane ride), it is highly beneficial to get up and walk at least once per hour to help prevent swelling and blood clots.     You may get your pharyngostomy site wet. Do not submerge, soak, or scrub incision or swim until seen in follow-up.    Take incentive spirometer home for continued frequent use    Activity as tolerated.    Stay hydrated. Take over the counter fiber (metamucil or benefiber) and stool softeners (Miralax, docusate or senna) if becoming constipated.     Call for fever greater than 101.5, chills, increased size of incision, red skin around incision, vision changes, muscle strength changes, sensation changes, shortness of breath,  "or other concerns.    No driving while taking narcotic pain medication.    Transition to ibuprofen or tylenol/acetaminophen for pain control. Do not take tylenol/acetaminophen and acetaminophen containing narcotic (e.g., percocet or vicodin) at the same time. If you have known ulcer problems, or kidney trouble (elevated creatinine) do not take the ibuprofen.    If you think you will need a refill on your pain medications, you should call the thoracic surgery team at the number below at least 24hrs prior to running out.  It is also more difficult to provide refills Friday afternoon and over the weekend, so call before those times if possible.     In emergencies, call 911    For other Questions or Concerns;   A.) During weekday working hours (Monday through Friday 8am to 4:30pm)   call [phone] and ask to speak to a thoracic surgery nurse (RN or LPN).     B.) At nights (after 4:30pm), on weekends, or if urgent call [phone] and   tell the  \"I would like to page job code [phone], the thoracic surgery   fellow on call, please.\"     Activity   Order Comments: Your activity upon discharge: Activity as tolerated.     Order Specific Question Answer Comments   Is discharge order? Yes      Resume Home Care Services   Order Comments: Resume services with [facility] as prior to admission. Thanks!     Diet   Order Comments: Follow this diet upon discharge: Continue prior to admission diet.     Order Specific Question Answer Comments   Is discharge order? Yes        DISCHARGE MEDICATIONS:   Discharge Medication List as of 5/17/2025  3:10 PM        START taking these medications    Details   acetaminophen (TYLENOL) 325 MG/10.15ML liquid Place 20.3 mLs (650 mg) into J tube every 4 hours as needed for mild pain or fever., Disp-500 mL, R-0, E-Prescribe      amoxicillin-clavulanate (AUGMENTIN) 400-57 MG/5ML suspension Take 10.94 mLs (875 mg) by mouth or Feeding Tube 2 times daily for 7 days., Disp-153.16 mL, R-0, " E-Prescribe      fluconazole (DIFLUCAN) 40 MG/ML suspension Take 10 mLs (400 mg) by mouth or Feeding Tube every 24 hours for 7 days., Disp-70 mL, R-0, E-Prescribe           CONTINUE these medications which have CHANGED    Details   Lidocaine (LIDOCARE) 4 % Patch Place 1 patch over 12 hours onto the skin every 24 hours. To prevent lidocaine toxicity, patient should be patch free for 12 hrs daily.Disp-4 patch, [id number]      methocarbamol (ROBAXIN) 500 MG tablet Place 1 tablet (500 mg) into J tube 4 times daily., Disp-30 tablet, R-0, E-Prescribe      oxyCODONE (ROXICODONE) 5 MG/5ML solution Place 5-10 mLs (5-10 mg) into J tube every 4 hours as needed for moderate pain., Disp-100 mL, R-0, Local Print           CONTINUE these medications which have NOT CHANGED    Details   atenolol (TENORMIN) 50 MG tablet Place 1 tablet (50 mg) into G tube daily., No Print OutContinue taking atenolol      calcium carbonate (TUMS) 500 MG chewable tablet Take 1 tablet (500 mg) by mouth daily as needed for heartburn., Disp-30 tablet, R-0, E-Prescribe      cyanocobalamin (VITAMIN B-12) 500 MCG tablet Place 500 mcg into Feeding Tube every other day., Historical      dextromethorphan (DELSYM) 30 MG/5ML liquid Take 5 mLs (30 mg) by mouth once as needed for cough., OTC      EPINEPHrine (ANY BX GENERIC EQUIV) 0.3 MG/0.3ML injection 2-pack Inject 0.3 mg into the muscle as needed for anaphylaxis., Historical      gabapentin (NEURONTIN) 300 MG capsule Place 1 capsule (300 mg) into Feeding Tube 3 times daily., No Print Out      !! Isosource 1.5 Gumaro 250 mL Place 1,500 mLs into J tube daily. Infuse via pump. 90 mL/hr x 16 hours.   6 cartons per day.   Water flush: 120 mL before and after tube feed cycle. Additional 120 mL 8x per day.   Kcals: 2250, Disp-07965 mL, R-11, Normal      loperamide (IMODIUM) 1 MG/7.5ML liquid Place 15 mLs (2 mg) into Feeding Tube 3 times daily., Disp-711 mL, R-0, E-PrescribeStop if no bowel movement for 48 hours       naloxone (NARCAN) 0.4 MG/ML injection Inject 0.5 mLs (0.2 mg) into the muscle once for 1 dose., Disp-0.5 mL, R-0, E-Prescribe      nortriptyline (PAMELOR) 25 MG capsule Place 1 capsule (25 mg) into J tube every evening., Historical      Nutrisource Fiber PO packet Place 1 each into Feeding Tube 2 times daily., Disp-60 each, R-0, E-PrescribeIf no bowel movement for 48 hours stop      omeprazole (PRILOSEC) 20 MG DR capsule Take 20 mg by mouth daily., Historical      !! Prosource TF PO LIQD (PROSOURCE TF) Place 45 mLs into J tube 2 times daily. Infuse via syringe  Water flush: 30 mL before and after each packet   Kcals: 80  2 pkts per day, Disp-2700 mL, R-11, Normal      UNABLE TO FIND Take 1-2 sprays by mouth as needed. MEDICATION NAME: Hurricaine Throat Spray., Historical       !! - Potential duplicate medications found. Please discuss with provider.        STOP taking these medications       acetaminophen (TYLENOL) 325 MG tablet Comments:   Reason for Stopping:         omeprazole (PRILOSEC) 2 mg/mL suspension Comments:   Reason for Stopping:             Luzmaria Bell   PGY-3 General Surgery Resident

## 2025-05-17 NOTE — PROGRESS NOTES
[facility]  Start of Care/Resumption of Care Note    [facility]UAN    Therapy: Enteral    Delivery plan: no supplies needed at this time, enteral team to follow up on Monday     Nursing plan: Pharmacy Only.     Teaching Plan: Liaison Teach Done?: NA    Other Info: coy Rincon RN 05/17/25

## 2025-05-17 NOTE — PROGRESS NOTES
Care Management Discharge Note    Discharge Date: 05/17/2025  Discharge Disposition:  Home    Discharge Services:  Resume Home Care and Home Infusion  Discharge DME:  none  Discharge Transportation:  Wife    Private pay costs discussed: Not applicable    Does the patient's insurance plan have a 3 day qualifying hospital stay waiver?  No    PAS Confirmation Code:  n/a  Patient/family educated on Medicare website which has current facility and service quality ratings:  n/a    Education Provided on the Discharge Plan:  yes  Persons Notified of Discharge Plans: patient, [facility], [facility]  Patient/Family in Agreement with the Plan:  yes    Handoff Referral Completed: Yes, non-[facility] PCP: External handoff communication completed    Additional Information:  Per previous RNCC sticky note, patient might discharge today with resumption of prior to admission home health care and home infusion.     Writer reached out to Dionte Membreno (surgical cross-cover) to inquire about whether patient will discharge today. He deferred final decision to primary team (Thoracic).     Per MD Bell's note, patient will discharge today. Writer met with patient at the bedside to review plan to discharge home with resumption of home care/home infusion. He is in agreement, states that his wife is on her way. MD López assisted writer with signing resumption orders for home care and home infusion. External Handoff sent. IMM given and signed by covering SW.     Home Medical Care  Service Provider Services Address Phone Fax Patient Preferred   [facility] [address] [place] [id number] [phone] --   Home care needs: Open to AC for PT, OT, RN  Communications with home care agency: [x] Resumption orders placed, EPIC communication sent     Home infusion  [facility] - enteral  (Phone:) [phone]  (Fax:) [phone]   Elma Mendez,  [facility] liaison  [phone]  Teams   [facility] Resource Nurse   [phone]  Teams   Prior to admission services: Tube feeding  Communication with [facility] liaison:    [x] resumption orders placed and I weekend resource RN (Ale Rincon) notified via Teams     Lisa Gustafson RNCC  Care Management Department  Covering 5A: [phone] & 5C: [phone] (non-BMT)   Phone: [phone]  Teams  Vocera: weekdays 8:00 am - 4:30 pm.   See Vocera Care Team for off-day coverage

## 2025-05-17 NOTE — PROGRESS NOTES
Nursing Focus: Discharge    D: Patient discharged to home at 1545. Patient Bal and accompanied by Wife.    I: Discharge prescriptions sent to discharge pharmacy to be filled. All discharge medications and instructions reviewed with him by bedside RN. Patient instructed to call clinic triage nurse if he experiences a fever >100.4, uncontrolled nausea, vomiting, diarrhea, or pain; or experiences any signs or symptoms of bleeding. Other phone numbers to call with questions or concerns after discharge reviewed. PIV removed, port deaccessed. Education completed.    A: Bal verbalized understanding of discharge medications and instructions. Patient will  medications at discharge pharmacy.     P: Patient to follow-up in clinic.

## 2025-05-17 NOTE — PROGRESS NOTES
Thoracic Surgery Progress Note    PATIENT NAME: Bal Junior  MRN: [id number]  DATE: 05/17/2025 12:11 AM  LAST PROCEDURE: Esophagoscopy, gastroscopy, duodenoscopy (EGD), combined w/ pharyngoscopy tube removal, stent placement:   5/15/2025    Subjective:  No acute events overnight. Tachycardic yesterday, EKG showed NSR. Now resolved. Overall feeling quite well. His neck is feeling better. Continues to have back pain at his prior drain site that he also has intermittently at home.    Objective:  Vital Signs: Most Recent  Ranges (24 hours)   Temp: 98.6  F (37  C)  Pulse: 119  BP: 130/88  Resp: 18  SpO2: 99 % on None (Room air) Temp  Min: 98.1  F (36.7  C)  Max: 98.6  F (37  C)  Pulse  Min: 99  Max: 119  BP  Min: 129/88  Max: 141/92  Resp  Min: 16  Max: 20  SpO2  Min: 97 %  Max: 100 %     Physical Exam:  Gen: Appears comfortable   HEENT: left neck with primapore dressing over prior pharyngostomy tube site  Chest: breathing non-labored on room air  CV: regular rate  Abdomen: soft, non-distended, JT in place    Assessment:  Bal Junior is a 70 year old male with a history of esophageal adenocarcinoma s/p esophagectomy 3/24/2025 c/b anastomotic leak. Has subsequently underwent multiple EGDs and revisions of pharyngostomy tube.  Presented on 5/14/2025 with dislodgment of pharyngostomy tube.  On 5/15 went to the OR for repeat EGD, during which the pharyngostomy tube was removed as the anastomotic leak cavity had adequately resolved.     -Continue home tube feeds  -Abx for pharyngostomy tube site infection  -Add lidocaine patch for back pain  -Repeat CT scan this morning  -Possible discharge home today versus repeat endoscopy on 5/19 pending CT scan results.    Plan discussed with the fellow, who will discuss with staff.    Luzmaria Bell  General Surgery Resident

## 2025-05-17 NOTE — PLAN OF CARE
"Goal Outcome Evaluation:         Time    /77 (BP Location: Right arm)   Pulse 79   Temp 97.7  F (36.5  C) (Oral)   Resp 18   Ht 1.778 m (5' 10\")   Wt 76.9 kg (169 lb 8.5 oz)   SpO2 96%   BMI 24.33 kg/m      Reason for admission: Neck infection  Activity: UAL  Pain: Pt reported 5/10 headache pain received pta atenolol for headache and prn oxycodone, lowered patients pain to a 0/10  Neuro: A&O X4  Cardiac: WNL  Respiratory: WNL  GI/: J tube with tube feeds, pt is passing gas and had a bm on 5/16  Diet: NPO and tube feeds  Lines: J tube and port  Wounds: Primapore dressing on left side of neck        Continue to monitor and follow POC                     "

## 2025-05-18 PROCEDURE — B4035 ENTERAL FEED SUPP PUMP PER D: HCPCS

## 2025-05-19 ENCOUNTER — HOSPITAL ENCOUNTER (OUTPATIENT)
Facility: CLINIC | Age: 70
End: 2025-05-19
Attending: THORACIC SURGERY (CARDIOTHORACIC VASCULAR SURGERY) | Admitting: THORACIC SURGERY (CARDIOTHORACIC VASCULAR SURGERY)
Payer: MEDICARE

## 2025-05-19 ENCOUNTER — PATIENT OUTREACH (OUTPATIENT)
Dept: CARE COORDINATION | Facility: CLINIC | Age: 70
End: 2025-05-19
Payer: COMMERCIAL

## 2025-05-19 DIAGNOSIS — K91.89 ANASTOMOTIC LEAK FOLLOWING ESOPHAGECTOMY: ICD-10-CM

## 2025-05-19 DIAGNOSIS — C15.9 MALIGNANT NEOPLASM OF ESOPHAGUS, UNSPECIFIED LOCATION (H): Primary | ICD-10-CM

## 2025-05-19 PROCEDURE — B4035 ENTERAL FEED SUPP PUMP PER D: HCPCS

## 2025-05-19 NOTE — TELEPHONE ENCOUNTER
Ambulatory Care Management Chart Review     Situation: Patient chart reviewed by care coordination leader due to critical staffing.     Background: Patient was recently discharged within an [facility] hospital and identified by [facility] ([facility] team) for potential post-discharge outreach.      Assessment: Per chart review, patient has an established PCP outside of [facility] and very close follow up with his specialty providers (Thoracic surgery, Oncology). He resumed prior home services of home care and home infusion at time of discharge.  Per notes, he voiced good understanding of discharge instructions and follow up recommendations prior to discharge home.       Plan/Recommendations: Patient can be encouraged to follow discharge instructions as outlined on AVS. No intervention planned by [facility] at this time.     Idania Miller, RN  Manager of Ambulatory Care Management  [facility]

## 2025-05-20 ENCOUNTER — HOME INFUSION BILLING (OUTPATIENT)
Dept: HOME HEALTH SERVICES | Facility: HOME HEALTH | Age: 70
End: 2025-05-20
Payer: COMMERCIAL

## 2025-05-20 ENCOUNTER — TELEPHONE (OUTPATIENT)
Dept: SURGERY | Facility: CLINIC | Age: 70
End: 2025-05-20
Payer: COMMERCIAL

## 2025-05-20 PROCEDURE — B4035 ENTERAL FEED SUPP PUMP PER D: HCPCS

## 2025-05-20 PROCEDURE — B4152 EF CALORIE DENSE>/=1.5KCAL: HCPCS

## 2025-05-20 NOTE — TELEPHONE ENCOUNTER
Called and spoke with pt to let him know the surgery that's esvin edu led fro 6/6 is being moved to 6/9. Pt stated he would need to check with his ride, but stated it was ok to move to 6/9 for now. Writer informed pt has contact number to follow up should the date note work.    Kendra Queen on 5/20/2025 at 4:29 PM

## 2025-05-21 PROCEDURE — B4035 ENTERAL FEED SUPP PUMP PER D: HCPCS

## 2025-05-22 PROCEDURE — B4035 ENTERAL FEED SUPP PUMP PER D: HCPCS

## 2025-05-22 NOTE — TELEPHONE ENCOUNTER
Received call back from pt confirming he was able to get a ride for the 6/9 EGD.     Kendra Queen on 5/22/2025 at 9:13 AM

## 2025-05-23 PROCEDURE — B4035 ENTERAL FEED SUPP PUMP PER D: HCPCS

## 2025-05-24 PROCEDURE — B4035 ENTERAL FEED SUPP PUMP PER D: HCPCS

## 2025-05-25 PROCEDURE — B4035 ENTERAL FEED SUPP PUMP PER D: HCPCS

## 2025-05-26 PROCEDURE — B4035 ENTERAL FEED SUPP PUMP PER D: HCPCS

## 2025-05-27 PROCEDURE — B4035 ENTERAL FEED SUPP PUMP PER D: HCPCS

## 2025-05-28 PROCEDURE — B4035 ENTERAL FEED SUPP PUMP PER D: HCPCS

## 2025-05-29 PROCEDURE — B4035 ENTERAL FEED SUPP PUMP PER D: HCPCS

## 2025-05-30 PROCEDURE — B4035 ENTERAL FEED SUPP PUMP PER D: HCPCS

## 2025-05-31 PROCEDURE — B4035 ENTERAL FEED SUPP PUMP PER D: HCPCS

## 2025-06-01 PROCEDURE — B4035 ENTERAL FEED SUPP PUMP PER D: HCPCS

## 2025-06-02 ENCOUNTER — OFFICE VISIT (OUTPATIENT)
Dept: CARDIOLOGY | Facility: CLINIC | Age: 70
End: 2025-06-02
Payer: COMMERCIAL

## 2025-06-02 VITALS
SYSTOLIC BLOOD PRESSURE: 110 MMHG | DIASTOLIC BLOOD PRESSURE: 72 MMHG | RESPIRATION RATE: 20 BRPM | HEART RATE: 72 BPM | BODY MASS INDEX: 24.92 KG/M2 | OXYGEN SATURATION: 93 % | WEIGHT: 173.7 LBS

## 2025-06-02 DIAGNOSIS — R00.0 TACHYCARDIA: ICD-10-CM

## 2025-06-02 DIAGNOSIS — C15.9 ESOPHAGEAL CARCINOMA (H): ICD-10-CM

## 2025-06-02 PROCEDURE — B4035 ENTERAL FEED SUPP PUMP PER D: HCPCS

## 2025-06-02 PROCEDURE — 3078F DIAST BP <80 MM HG: CPT | Performed by: INTERNAL MEDICINE

## 2025-06-02 PROCEDURE — 99204 OFFICE O/P NEW MOD 45 MIN: CPT | Performed by: INTERNAL MEDICINE

## 2025-06-02 PROCEDURE — 3074F SYST BP LT 130 MM HG: CPT | Performed by: INTERNAL MEDICINE

## 2025-06-02 NOTE — PROGRESS NOTES
Thank you, Dr. Mc, for asking [facility] to evaluate . Bal Junior.      Assessment/Recommendations   Assessment:    Tachycardia, likely physiological sinus tachycardia, resolved after removal of pharyngostomy tube  Esophageal cancer  Hypertension, controlled  Plan:  Echo to rule out structural heart abnormalities  Continue atenolol  He will use Fitbit to monitor heart rhythm.  The longitudinal plan of care for the diagnosis(es)/condition(s) as documented were addressed during this visit. Due to the added complexity in care, I will continue to support Bal in the subsequent management and with ongoing continuity of care.      History of Present Illness    Mr. Bal Junior is a 70 year old male who comes in for cardiac evaluation.  He was noted to be tachycardic when seen by oncologist.  He denies sensation of heart palpitation.  He has not had syncope.  In September of last year he was diagnosed with esophageal cancer.  He underwent course of chemo and then surgery.  Surgery was complicated by leaks.  He was hospitalized on a few occasions most recently in mid May of this year.  Overall he starts to feel better.  He reports that heart rate went back to normal after pharyngostomy's tube was removed.  He does use Fitbit at home.  Currently his heart rate stays  in the 60s when he is at rest  He has no history of known heart disease.  He had normal cardiac workup in 2008.  He has no history of heart palpitations syncope.    ECG: Personally reviewed.  Sinus tachycardia otherwise normal.         Physical Examination Review of Systems   /72 (BP Location: Right arm, Patient Position: Sitting, Cuff Size: Adult Regular)   Pulse 72   Resp 20   Wt 78.8 kg (173 lb 11.2 oz)   SpO2 93%   BMI 24.92 kg/m    Body mass index is 24.92 kg/m .  Wt Readings from Last 3 Encounters:   06/02/25 78.8 kg (173 lb 11.2 oz)   05/17/25 77.1 kg (169 lb 14.4 oz)   05/08/25 76.7 kg (169 lb)     General  "Appearance:   Alert, cooperative, no distress, appears stated age   Head/ENT: Normocephalic, without obvious abnormality. Membranes moist      EYES:  no scleral icterus, normal conjunctivae   Neck: Supple, symmetrical, trachea midline, no adenopathy, thyroid: not enlarged, symmetric, no carotid bruit or JVD   Chest/Lungs:   Lungs are clear to auscultation, respirations unlabored. No tenderness or deformity    Cardiovascular:   Regular rhythm, S1, S2 normal, no murmur, rub or gallop.   Abdomen:  JT tube in place   Extremities: no cyanosis or clubbing. No edema   Skin: Skin color, texture, turgor normal, no rashes or lesions.    Psychiatric: Normal affect, calm   Neurologic: Alert and oriented x 3, moving all four extremities.     Encounter Vitals  BP: 110/72  Pulse: 72  Resp: 20  SpO2: 93 %  Weight: 78.8 kg (173 lb 11.2 oz) (shoes on)                                          Lab Results    Chemistry/lipid CBC Cardiac Enzymes/BNP/TSH/INR   Recent Labs   Lab Test 09/14/23  1646   CHOL 156   HDL 54   LDL 51   TRIG 257*     Recent Labs   Lab Test 09/14/23  1646 07/15/22  1652 08/05/21  1601   LDL 51 51 75     Recent Labs   Lab Test 05/17/25  0429      POTASSIUM 3.9   CHLORIDE 102   CO2 24   *   BUN 23.9*   CR 0.74   GFRESTIMATED >90   AUGUSTIN 8.4*     Recent Labs   Lab Test 05/17/25  0429 05/16/25  0600 05/15/25  0903   CR 0.74 0.73 0.82     Recent Labs   Lab Test 01/17/24  1450 01/18/23  1204   A1C 6.0* 5.9*          Recent Labs   Lab Test 05/17/25  0429   WBC 10.4   HGB 9.1*   HCT 28.4*   MCV 89        Recent Labs   Lab Test 05/17/25  0429 05/16/25  0600 05/15/25  0903   HGB 9.1* 9.7* 9.8*    No results for input(s): \"TROPONINI\" in the last 15816 hours.  No results for input(s): \"BNP\", \"NTBNPI\", \"NTBNP\" in the last 86587 hours.  Recent Labs   Lab Test 12/16/24  1752   TSH 1.75     Recent Labs   Lab Test 04/17/25  1517   INR 1.14        Medical History  Surgical History Family History Social History "   Past Medical History:   Diagnosis Date    Hypertension     Neuritis     Peripheral neuropathy     Personal history of other medical treatment     postgastrectomy dumping syndrome    Stomach problems Noted earlier     Past Surgical History:   Procedure Laterality Date    ABDOMEN SURGERY  2012?    APPENDECTOMY  1964?    BRONCHOSCOPY RIDID OR FLEXIBLE W/ENDOBRONCHIAL ULTRASOUND GUIDED 3 OR MORE NODE STATIONS N/A 3/19/2025    Procedure: Bronchoscopy Ridid Or Flexible W/Endobronchial Ultrasound Guided 3 Or More Node Stations;  Surgeon: Saad Olmedo MD;  Location: [facility]    COLONOSCOPY  2006, 2016    COMBINED ESOPHAGOSCOPY, GASTROSCOPY, DUODENOSCOPY (EGD), REPLACE ESOPHAGEAL STENT Left 4/18/2025    Procedure: ESOPHAGOGASTRODUODENOSCOPY with pharyngostomy tube placement;  Surgeon: Erik Lindsey MD;  Location: [facility]    DAVINCI NISSEN FUNDOPLICATION N/A 2/26/2025    Procedure: TAKE-DOWN, FUNDOPLICATION, NISSEN, LAPAROSCOPIC- ROBOTIC, gastrostomy takedown, lysisof adhesions 2 hr;  Surgeon: Katlyn Tobar MD;  Location: [facility]    ESOPHAGEAL BALLOON PROVOCATION STUDY N/A 9/24/2024    Procedure: Esophageal Balloon Provocation Study;  Surgeon: Carson Michel DO;  Location: [facility]    ESOPHAGECTOMY MINIMALLY INVASIVE N/A 3/24/2025    Procedure: ESOPHAGECTOMY, MINIMALLY INVASIVE, Laparoscopic Right Thorascopic Esophagectomy, Botox Injection;  Surgeon: Katlyn Tobar MD;  [facility]    ESOPHAGOSCOPY, GASTROSCOPY, DUODENOSCOPY (EGD), COMBINED N/A 9/24/2024    Procedure: ESOPHAGOGASTRODUODENOSCOPY, WITH BIOPSY;  Surgeon: Carson Michel DO;  Location: [facility]    ESOPHAGOSCOPY, GASTROSCOPY, DUODENOSCOPY (EGD), COMBINED N/A 10/8/2024    Procedure: ESOPHAGOGASTRODUODENOSCOPY, WITH FINE NEEDLE ASPIRATION BIOPSY, WITH ENDOSCOPIC ULTRASOUND GUIDANCE;  Surgeon: Lance Lopez MD;  Location: [facility]    ESOPHAGOSCOPY, GASTROSCOPY, DUODENOSCOPY (EGD), COMBINED N/A 2/26/2025    Procedure: Esophagoscopy, gastroscopy,  duodenoscopy (EGD);  Surgeon: Katlyn Tobar MD;  Location: [facility]    ESOPHAGOSCOPY, GASTROSCOPY, DUODENOSCOPY (EGD), COMBINED N/A 3/24/2025    Procedure: Esophagoscopy, gastroscopy, duodenoscopy (EGD), combined;  Surgeon: Katlyn Tobar MD;  Location: [facility]    ESOPHAGOSCOPY, GASTROSCOPY, DUODENOSCOPY (EGD), COMBINED N/A 3/28/2025    Procedure: ESOPHAGOGASTRODUODENOSCOPY, pharyngostomy tube replacement;  Surgeon: Chino Gardner MD;  Location: [facility]    ESOPHAGOSCOPY, GASTROSCOPY, DUODENOSCOPY (EGD), COMBINED N/A 4/29/2025    Procedure: Esophagoscopy, gastroscopy, duodenoscopy (EGD), fluroscopy, revision of pharnygoscopy tube;  Surgeon: Katlyn Tobar MD;  Location: [facility]    ESOPHAGOSCOPY, GASTROSCOPY, DUODENOSCOPY (EGD), COMBINED N/A 5/15/2025    Procedure: Esophagoscopy, gastroscopy, duodenoscopy (EGD), combined w/ pharyngoscopy tube removal, stent placement;  Surgeon: Katlyn Tobar MD;  Location: [facility]    EYE SURGERY  199x    laser for retinal tears    GASTRIC FUNDOPLICATION      HERNIA REPAIR  1961?    IR CHEST PORT PLACEMENT > 5 YRS OF AGE  10/24/2024    LAPAROSCOPIC ASSISTED INSERTION TUBE JEJUNOSTOMY N/A 2/26/2025    Procedure: Laparoscopic assisted insertion tube jejunostomy;  Surgeon: Katlyn Tobar MD;  Location: [facility]    LAPAROSCOPIC TAKEDOWN NISSEN FUNDOPLICATION N/A 9/25/2020    Procedure: TAKE-DOWN, FUNDOPLICATION, REDO NISSEN, LAPAROSCOPIC, gastrostomy feeding tube placement;  Surgeon: Katlyn Tobar MD;  Location: [facility]    TX ESOPHAGOGASTRODUODENOSCOPY TRANSORAL DIAGNOSTIC N/A 5/9/2019    Procedure: UPPER GASTROINTESTINAL ENDOSCOPY;  Surgeon: Dino Ward MD;  [facility];  Service: General    SOFT TISSUE SURGERY  2009?    JCARLOS l. thumb     No premature CAD, SCD,cardiomyopathy   Social History     Socioeconomic History    Marital status:      Spouse name: Not on file    Number of children: Not on file    Years of education: Not on file    Highest education  level: Not on file   Occupational History    Not on file   Tobacco Use    Smoking status: Never     Passive exposure: Past    Smokeless tobacco: Never   Substance and Sexual Activity    Alcohol use: Not Currently    Drug use: Never    Sexual activity: Yes     Partners: Female     Birth control/protection: Post-menopausal, Male Surgical, Female Surgical   Other Topics Concern    Not on file   Social History Narrative    Not on file     Social Drivers of Health     Financial Resource Strain: Low Risk  (5/15/2025)    Financial Resource Strain     Within the past 12 months, have you or your family members you live with been unable to get utilities (heat, electricity) when it was really needed?: No   Food Insecurity: Low Risk  (5/15/2025)    Food Insecurity     Within the past 12 months, did you worry that your food would run out before you got money to buy more?: No     Within the past 12 months, did the food you bought just not last and you didn t have money to get more?: No   Transportation Needs: Low Risk  (5/15/2025)    Transportation Needs     Within the past 12 months, has lack of transportation kept you from medical appointments, getting your medicines, non-medical meetings or appointments, work, or from getting things that you need?: No   Physical Activity: Not on file   Stress: Not on file   Social Connections: Not on file   Interpersonal Safety: Low Risk  (5/15/2025)    Interpersonal Safety     Do you feel physically and emotionally safe where you currently live?: Yes     Within the past 12 months, have you been hit, slapped, kicked or otherwise physically hurt by someone?: No     Within the past 12 months, have you been humiliated or emotionally abused in other ways by your partner or ex-partner?: No   Housing Stability: Low Risk  (5/15/2025)    Housing Stability     Do you have housing? : Yes     Are you worried about losing your housing?: No         Medications  Allergies   Current Outpatient Medications    Medication Sig Dispense Refill    acetaminophen (TYLENOL) 325 MG/10.15ML liquid Place 20.3 mLs (650 mg) into J tube every 4 hours as needed for mild pain or fever. 500 mL 0    atenolol (TENORMIN) 50 MG tablet Place 1 tablet (50 mg) into G tube daily.      cyanocobalamin (VITAMIN B-12) 500 MCG tablet Place 500 mcg into Feeding Tube every other day.      EPINEPHrine (ANY BX GENERIC EQUIV) 0.3 MG/0.3ML injection 2-pack Inject 0.3 mg into the muscle as needed for anaphylaxis.      gabapentin (NEURONTIN) 300 MG capsule Place 1 capsule (300 mg) into Feeding Tube 3 times daily. (Patient taking differently: Place 300 mg into Feeding Tube 3 times daily. 600 MG morning, 600 MG noon, and 300 MG bedtime)      Isosource 1.5 Gumaro 250 mL Place 1,500 mLs into J tube daily. Infuse via pump. 90 mL/hr x 16 hours.   6 cartons per day.   Water flush: 120 mL before and after tube feed cycle. Additional 120 mL 8x per day.   Kcals: 2250 52667 mL 11    loperamide (IMODIUM) 1 MG/7.5ML liquid Place 15 mLs (2 mg) into Feeding Tube 3 times daily. (Patient taking differently: Place 2 mg into Feeding Tube daily.) 711 mL 0    methocarbamol (ROBAXIN) 500 MG tablet Place 1 tablet (500 mg) into J tube 4 times daily. (Patient taking differently: Place 500 mg into J tube 2 times daily.) 30 tablet 0    nortriptyline (PAMELOR) 25 MG capsule Place 1 capsule (25 mg) into J tube every evening.      Nutrisource Fiber PO packet Place 1 each into Feeding Tube 2 times daily. 60 each 0    omeprazole (PRILOSEC) 20 MG DR capsule Take 20 mg by mouth daily.      oxyCODONE (ROXICODONE) 5 MG tablet Take 1 tablet (5 mg) by mouth every 6 hours as needed for pain. 12 tablet 0    Prosource TF PO LIQD (PROSOURCE TF) Place 45 mLs into J tube 2 times daily. Infuse via syringe  Water flush: 30 mL before and after each packet   Kcals: 80  2 pkts per day 2700 mL 11    calcium carbonate (TUMS) 500 MG chewable tablet Take 1 tablet (500 mg) by mouth daily as needed for heartburn.  (Patient not taking: Reported on 6/2/2025) 30 tablet 0    dextromethorphan (DELSYM) 30 MG/5ML liquid Take 5 mLs (30 mg) by mouth once as needed for cough. (Patient not taking: Reported on 6/2/2025)      Lidocaine (LIDOCARE) 4 % Patch Place 1 patch over 12 hours onto the skin every 24 hours. To prevent lidocaine toxicity, patient should be patch free for 12 hrs daily. (Patient not taking: Reported on 6/2/2025) 4 patch 0    naloxone (NARCAN) 0.4 MG/ML injection Inject 0.5 mLs (0.2 mg) into the muscle once for 1 dose. (Patient not taking: Reported on 6/2/2025) 0.5 mL 0    UNABLE TO FIND Take 1-2 sprays by mouth as needed. MEDICATION NAME: Hurricaine Throat Spray. (Patient not taking: Reported on 6/2/2025)          Allergies   Allergen Reactions    Hornets     Wasp Venom Protein Hives    Bee Venom Swelling

## 2025-06-02 NOTE — LETTER
6/2/2025    Domenica Wing MD  [facility] [address] [place] [id number]    RE: Bal Junior       Dear Colleague,     I had the pleasure of seeing Bal Junior in the [facility].        Thank you, Dr. Mc, for asking [facility] to evaluate Mr. Bal Junior.      Assessment/Recommendations   Assessment:    Tachycardia, likely physiological sinus tachycardia, resolved after removal of pharyngostomy tube  Esophageal cancer  Hypertension, controlled  Plan:  Echo to rule out structural heart abnormalities  Continue atenolol  He will use Fitbit to monitor heart rhythm.  The longitudinal plan of care for the diagnosis(es)/condition(s) as documented were addressed during this visit. Due to the added complexity in care, I will continue to support Bal in the subsequent management and with ongoing continuity of care.      History of Present Illness    Mr. Bal Junior is a 70 year old male who comes in for cardiac evaluation.  He was noted to be tachycardic when seen by oncologist.  He denies sensation of heart palpitation.  He has not had syncope.  In September of last year he was diagnosed with esophageal cancer.  He underwent course of chemo and then surgery.  Surgery was complicated by leaks.  He was hospitalized on a few occasions most recently in mid May of this year.  Overall he starts to feel better.  He reports that heart rate went back to normal after pharyngostomy's tube was removed.  He does use Fitbit at home.  Currently his heart rate stays  in the 60s when he is at rest  He has no history of known heart disease.  He had normal cardiac workup in 2008.  He has no history of heart palpitations syncope.    ECG: Personally reviewed.  Sinus tachycardia otherwise normal.         Physical Examination Review of Systems   /72 (BP Location: Right arm, Patient Position: Sitting, Cuff Size: Adult Regular)   Pulse 72   Resp 20   Wt 78.8 kg (173 lb 11.2 oz)    "SpO2 93%   BMI 24.92 kg/m    Body mass index is 24.92 kg/m .  Wt Readings from Last 3 Encounters:   06/02/25 78.8 kg (173 lb 11.2 oz)   05/17/25 77.1 kg (169 lb 14.4 oz)   05/08/25 76.7 kg (169 lb)     General Appearance:   Alert, cooperative, no distress, appears stated age   Head/ENT: Normocephalic, without obvious abnormality. Membranes moist      EYES:  no scleral icterus, normal conjunctivae   Neck: Supple, symmetrical, trachea midline, no adenopathy, thyroid: not enlarged, symmetric, no carotid bruit or JVD   Chest/Lungs:   Lungs are clear to auscultation, respirations unlabored. No tenderness or deformity    Cardiovascular:   Regular rhythm, S1, S2 normal, no murmur, rub or gallop.   Abdomen:  JT tube in place   Extremities: no cyanosis or clubbing. No edema   Skin: Skin color, texture, turgor normal, no rashes or lesions.    Psychiatric: Normal affect, calm   Neurologic: Alert and oriented x 3, moving all four extremities.     Encounter Vitals  BP: 110/72  Pulse: 72  Resp: 20  SpO2: 93 %  Weight: 78.8 kg (173 lb 11.2 oz) (shoes on)                                          Lab Results    Chemistry/lipid CBC Cardiac Enzymes/BNP/TSH/INR   Recent Labs   Lab Test 09/14/23  1646   CHOL 156   HDL 54   LDL 51   TRIG 257*     Recent Labs   Lab Test 09/14/23  1646 07/15/22  1652 08/05/21  1601   LDL 51 51 75     Recent Labs   Lab Test 05/17/25  0429      POTASSIUM 3.9   CHLORIDE 102   CO2 24   *   BUN 23.9*   CR 0.74   GFRESTIMATED >90   AUGUSTIN 8.4*     Recent Labs   Lab Test 05/17/25  0429 05/16/25  0600 05/15/25  0903   CR 0.74 0.73 0.82     Recent Labs   Lab Test 01/17/24  1450 01/18/23  1204   A1C 6.0* 5.9*          Recent Labs   Lab Test 05/17/25  0429   WBC 10.4   HGB 9.1*   HCT 28.4*   MCV 89        Recent Labs   Lab Test 05/17/25  0429 05/16/25  0600 05/15/25  0903   HGB 9.1* 9.7* 9.8*    No results for input(s): \"TROPONINI\" in the last 02025 hours.  No results for input(s): \"BNP\", \"NTBNPI\", " "\"NTBNP\" in the last 05274 hours.  Recent Labs   Lab Test 12/16/24  1752   TSH 1.75     Recent Labs   Lab Test 04/17/25  1517   INR 1.14        Medical History  Surgical History Family History Social History   Past Medical History:   Diagnosis Date     Hypertension      Neuritis      Peripheral neuropathy      Personal history of other medical treatment     postgastrectomy dumping syndrome     Stomach problems Noted earlier     Past Surgical History:   Procedure Laterality Date     ABDOMEN SURGERY  2012?     APPENDECTOMY  1964?     BRONCHOSCOPY RIDID OR FLEXIBLE W/ENDOBRONCHIAL ULTRASOUND GUIDED 3 OR MORE NODE STATIONS N/A 3/19/2025    Procedure: Bronchoscopy Ridid Or Flexible W/Endobronchial Ultrasound Guided 3 Or More Node Stations;  Surgeon: Saad Olmedo MD;  Location: [facility]     COLONOSCOPY  2006, 2016     COMBINED ESOPHAGOSCOPY, GASTROSCOPY, DUODENOSCOPY (EGD), REPLACE ESOPHAGEAL STENT Left 4/18/2025    Procedure: ESOPHAGOGASTRODUODENOSCOPY with pharyngostomy tube placement;  Surgeon: Erik Lindsey MD;  Location:  OR     DAVINCI NISSEN FUNDOPLICATION N/A 2/26/2025    Procedure: TAKE-DOWN, FUNDOPLICATION, NISSEN, LAPAROSCOPIC- ROBOTIC, gastrostomy takedown, lysisof adhesions 2 hr;  Surgeon: Katlyn Tobar MD;  [facility]     ESOPHAGEAL BALLOON PROVOCATION STUDY N/A 9/24/2024    Procedure: Esophageal Balloon Provocation Study;  Surgeon: Carson Michel DO;  [facility]: [facility]     ESOPHAGECTOMY MINIMALLY INVASIVE N/A 3/24/2025    Procedure: ESOPHAGECTOMY, MINIMALLY INVASIVE, Laparoscopic Right Thorascopic Esophagectomy, Botox Injection;  Surgeon: Katlyn Tobar MD;  Location:  OR     ESOPHAGOSCOPY, GASTROSCOPY, DUODENOSCOPY (EGD), COMBINED N/A 9/24/2024    Procedure: ESOPHAGOGASTRODUODENOSCOPY, WITH BIOPSY;  Surgeon: Carson Michel DO;  [facility]     ESOPHAGOSCOPY, GASTROSCOPY, DUODENOSCOPY (EGD), COMBINED N/A 10/8/2024    Procedure: ESOPHAGOGASTRODUODENOSCOPY, WITH FINE NEEDLE " ASPIRATION BIOPSY, WITH ENDOSCOPIC ULTRASOUND GUIDANCE;  Surgeon: Lance Lopez MD;  [facility]     ESOPHAGOSCOPY, GASTROSCOPY, DUODENOSCOPY (EGD), COMBINED N/A 2/26/2025    Procedure: Esophagoscopy, gastroscopy, duodenoscopy (EGD);  Surgeon: Katlyn Tobar MD;  [facility]     ESOPHAGOSCOPY, GASTROSCOPY, DUODENOSCOPY (EGD), COMBINED N/A 3/24/2025    Procedure: Esophagoscopy, gastroscopy, duodenoscopy (EGD), combined;  Surgeon: Katlyn Tobar MD;  [facility]     ESOPHAGOSCOPY, GASTROSCOPY, DUODENOSCOPY (EGD), COMBINED N/A 3/28/2025    Procedure: ESOPHAGOGASTRODUODENOSCOPY, pharyngostomy tube replacement;  Surgeon: Chino Gardner MD;  [facility]     ESOPHAGOSCOPY, GASTROSCOPY, DUODENOSCOPY (EGD), COMBINED N/A 4/29/2025    Procedure: Esophagoscopy, gastroscopy, duodenoscopy (EGD), fluroscopy, revision of pharnygoscopy tube;  Surgeon: Katlyn Tobar MD;  [facility]     ESOPHAGOSCOPY, GASTROSCOPY, DUODENOSCOPY (EGD), COMBINED N/A 5/15/2025    Procedure: Esophagoscopy, gastroscopy, duodenoscopy (EGD), combined w/ pharyngoscopy tube removal, stent placement;  Surgeon: Katlyn Tobar MD;  [facility]     EYE SURGERY  199x    laser for retinal tears     GASTRIC FUNDOPLICATION       HERNIA REPAIR  1961?     IR CHEST PORT PLACEMENT > 5 YRS OF AGE  10/24/2024     LAPAROSCOPIC ASSISTED INSERTION TUBE JEJUNOSTOMY N/A 2/26/2025    Procedure: Laparoscopic assisted insertion tube jejunostomy;  Surgeon: Katlyn Tobar MD;  [facility]     LAPAROSCOPIC TAKEDOWN NISSEN FUNDOPLICATION N/A 9/25/2020    Procedure: TAKE-DOWN, FUNDOPLICATION, REDO NISSEN, LAPAROSCOPIC, gastrostomy feeding tube placement;  Surgeon: Katlyn Tobar MD;  [facility]     IA ESOPHAGOGASTRODUODENOSCOPY TRANSORAL DIAGNOSTIC N/A 5/9/2019    Procedure: UPPER GASTROINTESTINAL ENDOSCOPY;  Surgeon: Dino Ward MD;  Location: [facility];  Service: General     SOFT TISSUE SURGERY  2009?    MCL l.  thumb     No premature CAD, SCD,cardiomyopathy   Social History     Socioeconomic History     Marital status:      Spouse name: Not on file     Number of children: Not on file     Years of education: Not on file     Highest education level: Not on file   Occupational History     Not on file   Tobacco Use     Smoking status: Never     Passive exposure: Past     Smokeless tobacco: Never   Substance and Sexual Activity     Alcohol use: Not Currently     Drug use: Never     Sexual activity: Yes     Partners: Female     Birth control/protection: Post-menopausal, Male Surgical, Female Surgical   Other Topics Concern     Not on file   Social History Narrative     Not on file     Social Drivers of Health     Financial Resource Strain: Low Risk  (5/15/2025)    Financial Resource Strain      Within the past 12 months, have you or your family members you live with been unable to get utilities (heat, electricity) when it was really needed?: No   Food Insecurity: Low Risk  (5/15/2025)    Food Insecurity      Within the past 12 months, did you worry that your food would run out before you got money to buy more?: No      Within the past 12 months, did the food you bought just not last and you didn t have money to get more?: No   Transportation Needs: Low Risk  (5/15/2025)    Transportation Needs      Within the past 12 months, has lack of transportation kept you from medical appointments, getting your medicines, non-medical meetings or appointments, work, or from getting things that you need?: No   Physical Activity: Not on file   Stress: Not on file   Social Connections: Not on file   Interpersonal Safety: Low Risk  (5/15/2025)    Interpersonal Safety      Do you feel physically and emotionally safe where you currently live?: Yes      Within the past 12 months, have you been hit, slapped, kicked or otherwise physically hurt by someone?: No      Within the past 12 months, have you been humiliated or emotionally abused in  other ways by your partner or ex-partner?: No   Housing Stability: Low Risk  (5/15/2025)    Housing Stability      Do you have housing? : Yes      Are you worried about losing your housing?: No         Medications  Allergies   Current Outpatient Medications   Medication Sig Dispense Refill     acetaminophen (TYLENOL) 325 MG/10.15ML liquid Place 20.3 mLs (650 mg) into J tube every 4 hours as needed for mild pain or fever. 500 mL 0     atenolol (TENORMIN) 50 MG tablet Place 1 tablet (50 mg) into G tube daily.       cyanocobalamin (VITAMIN B-12) 500 MCG tablet Place 500 mcg into Feeding Tube every other day.       EPINEPHrine (ANY BX GENERIC EQUIV) 0.3 MG/0.3ML injection 2-pack Inject 0.3 mg into the muscle as needed for anaphylaxis.       gabapentin (NEURONTIN) 300 MG capsule Place 1 capsule (300 mg) into Feeding Tube 3 times daily. (Patient taking differently: Place 300 mg into Feeding Tube 3 times daily. 600 MG morning, 600 MG noon, and 300 MG bedtime)       Isosource 1.5 Gumaro 250 mL Place 1,500 mLs into J tube daily. Infuse via pump. 90 mL/hr x 16 hours.   6 cartons per day.   Water flush: 120 mL before and after tube feed cycle. Additional 120 mL 8x per day.   Kcals: 2250 25456 mL 11     loperamide (IMODIUM) 1 MG/7.5ML liquid Place 15 mLs (2 mg) into Feeding Tube 3 times daily. (Patient taking differently: Place 2 mg into Feeding Tube daily.) 711 mL 0     methocarbamol (ROBAXIN) 500 MG tablet Place 1 tablet (500 mg) into J tube 4 times daily. (Patient taking differently: Place 500 mg into J tube 2 times daily.) 30 tablet 0     nortriptyline (PAMELOR) 25 MG capsule Place 1 capsule (25 mg) into J tube every evening.       Nutrisource Fiber PO packet Place 1 each into Feeding Tube 2 times daily. 60 each 0     omeprazole (PRILOSEC) 20 MG DR capsule Take 20 mg by mouth daily.       oxyCODONE (ROXICODONE) 5 MG tablet Take 1 tablet (5 mg) by mouth every 6 hours as needed for pain. 12 tablet 0     Prosource TF PO LIQD  (PROSOURCE TF) Place 45 mLs into J tube 2 times daily. Infuse via syringe  Water flush: 30 mL before and after each packet   Kcals: 80  2 pkts per day 2700 mL 11     calcium carbonate (TUMS) 500 MG chewable tablet Take 1 tablet (500 mg) by mouth daily as needed for heartburn. (Patient not taking: Reported on 6/2/2025) 30 tablet 0     dextromethorphan (DELSYM) 30 MG/5ML liquid Take 5 mLs (30 mg) by mouth once as needed for cough. (Patient not taking: Reported on 6/2/2025)       Lidocaine (LIDOCARE) 4 % Patch Place 1 patch over 12 hours onto the skin every 24 hours. To prevent lidocaine toxicity, patient should be patch free for 12 hrs daily. (Patient not taking: Reported on 6/2/2025) 4 patch 0     naloxone (NARCAN) 0.4 MG/ML injection Inject 0.5 mLs (0.2 mg) into the muscle once for 1 dose. (Patient not taking: Reported on 6/2/2025) 0.5 mL 0     UNABLE TO FIND Take 1-2 sprays by mouth as needed. MEDICATION NAME: Hurricaine Throat Spray. (Patient not taking: Reported on 6/2/2025)          Allergies   Allergen Reactions     Hornets      Wasp Venom Protein Hives     Bee Venom Swelling                      Thank you for allowing me to participate in the care of your patient.      Sincerely,     Bismark Teresa MD     [facility]  cc:   Lawrence Mc MD  [address] [facility] [address]  [place] [address]

## 2025-06-02 NOTE — PATIENT INSTRUCTIONS
Bal Junior,    It was a pleasure to see you today at [facility].     My recommendations after this visit include:    obinna Teresa MD, FACC, JADON

## 2025-06-03 PROCEDURE — B4035 ENTERAL FEED SUPP PUMP PER D: HCPCS

## 2025-06-04 ENCOUNTER — HOSPITAL ENCOUNTER (OUTPATIENT)
Dept: CT IMAGING | Facility: CLINIC | Age: 70
Discharge: HOME | End: 2025-06-04
Attending: CLINICAL NURSE SPECIALIST
Payer: MEDICARE

## 2025-06-04 ENCOUNTER — HOME INFUSION BILLING (OUTPATIENT)
Dept: HOME HEALTH SERVICES | Facility: HOME HEALTH | Age: 70
End: 2025-06-04
Payer: COMMERCIAL

## 2025-06-04 DIAGNOSIS — C15.9 MALIGNANT NEOPLASM OF ESOPHAGUS, UNSPECIFIED LOCATION (H): ICD-10-CM

## 2025-06-04 DIAGNOSIS — K91.89 ANASTOMOTIC LEAK FOLLOWING ESOPHAGECTOMY: ICD-10-CM

## 2025-06-04 PROCEDURE — B4035 ENTERAL FEED SUPP PUMP PER D: HCPCS

## 2025-06-04 PROCEDURE — 250N000011 HC RX IP 250 OP 636: Performed by: CLINICAL NURSE SPECIALIST

## 2025-06-04 PROCEDURE — 74170 CT ABD WO CNTRST FLWD CNTRST: CPT

## 2025-06-04 RX ORDER — IOPAMIDOL 755 MG/ML
90 INJECTION, SOLUTION INTRAVASCULAR ONCE
Status: COMPLETED | OUTPATIENT
Start: 2025-06-04 | End: 2025-06-04

## 2025-06-04 RX ADMIN — IOPAMIDOL 90 ML: 755 INJECTION, SOLUTION INTRAVENOUS at 09:01

## 2025-06-05 ENCOUNTER — PATIENT OUTREACH (OUTPATIENT)
Dept: SURGERY | Facility: CLINIC | Age: 70
End: 2025-06-05
Payer: COMMERCIAL

## 2025-06-05 PROCEDURE — B4035 ENTERAL FEED SUPP PUMP PER D: HCPCS

## 2025-06-05 NOTE — PROGRESS NOTES
"Call placed to pt to check in prior to 6/9 EGD with Dr. Tobar. Pt denies any cold/flu symptoms and states he has been \"feeling well for the most part\" recently. Discussed NPO (mychart sent to confirm 8 hour tube feed hold time, pt states he was told to do 5 hours due to having J tube). Reviewed that at this time, final report is not back yet for CT done yesterday. Can take AM meds as ordered on DOS. Follow up will be determined after procedure. Pt had no further questions/concerns.    Catherine Del Troo, RN BSN  Thoracic Surgery RN Care Coordinator  Phone: [phone]    "

## 2025-06-06 PROCEDURE — B4035 ENTERAL FEED SUPP PUMP PER D: HCPCS

## 2025-06-07 PROCEDURE — B4035 ENTERAL FEED SUPP PUMP PER D: HCPCS

## 2025-06-08 PROCEDURE — B4035 ENTERAL FEED SUPP PUMP PER D: HCPCS

## 2025-06-09 ENCOUNTER — ANESTHESIA EVENT (OUTPATIENT)
Dept: SURGERY | Facility: CLINIC | Age: 70
End: 2025-06-09
Payer: MEDICARE

## 2025-06-09 ENCOUNTER — HOSPITAL ENCOUNTER (OUTPATIENT)
Facility: CLINIC | Age: 70
Discharge: HOME | End: 2025-06-09
Attending: STUDENT IN AN ORGANIZED HEALTH CARE EDUCATION/TRAINING PROGRAM | Admitting: STUDENT IN AN ORGANIZED HEALTH CARE EDUCATION/TRAINING PROGRAM
Payer: MEDICARE

## 2025-06-09 ENCOUNTER — APPOINTMENT (OUTPATIENT)
Dept: GENERAL RADIOLOGY | Facility: CLINIC | Age: 70
End: 2025-06-09
Attending: STUDENT IN AN ORGANIZED HEALTH CARE EDUCATION/TRAINING PROGRAM
Payer: MEDICARE

## 2025-06-09 ENCOUNTER — ANESTHESIA (OUTPATIENT)
Dept: SURGERY | Facility: CLINIC | Age: 70
End: 2025-06-09
Payer: MEDICARE

## 2025-06-09 ENCOUNTER — HOME INFUSION (OUTPATIENT)
Dept: HOME HEALTH SERVICES | Facility: HOME HEALTH | Age: 70
End: 2025-06-09
Payer: COMMERCIAL

## 2025-06-09 VITALS
HEIGHT: 70 IN | HEART RATE: 71 BPM | BODY MASS INDEX: 24.71 KG/M2 | OXYGEN SATURATION: 99 % | DIASTOLIC BLOOD PRESSURE: 80 MMHG | SYSTOLIC BLOOD PRESSURE: 102 MMHG | RESPIRATION RATE: 18 BRPM | TEMPERATURE: 98.1 F | WEIGHT: 172.62 LBS

## 2025-06-09 DIAGNOSIS — C16.0 ADENOCARCINOMA OF GASTROESOPHAGEAL JUNCTION (H): ICD-10-CM

## 2025-06-09 DIAGNOSIS — L08.9 NECK INFECTION: ICD-10-CM

## 2025-06-09 DIAGNOSIS — K91.89 ESOPHAGEAL ANASTOMOTIC LEAK: Primary | ICD-10-CM

## 2025-06-09 PROCEDURE — 258N000003 HC RX IP 258 OP 636: Performed by: NURSE ANESTHETIST, CERTIFIED REGISTERED

## 2025-06-09 PROCEDURE — 250N000011 HC RX IP 250 OP 636: Performed by: NURSE ANESTHETIST, CERTIFIED REGISTERED

## 2025-06-09 PROCEDURE — 250N000009 HC RX 250: Performed by: NURSE ANESTHETIST, CERTIFIED REGISTERED

## 2025-06-09 PROCEDURE — 255N000002 HC RX 255 OP 636: Performed by: STUDENT IN AN ORGANIZED HEALTH CARE EDUCATION/TRAINING PROGRAM

## 2025-06-09 PROCEDURE — 360N000082 HC SURGERY LEVEL 2 W/ FLUORO, PER MIN: Performed by: STUDENT IN AN ORGANIZED HEALTH CARE EDUCATION/TRAINING PROGRAM

## 2025-06-09 PROCEDURE — 250N000025 HC SEVOFLURANE, PER MIN: Performed by: STUDENT IN AN ORGANIZED HEALTH CARE EDUCATION/TRAINING PROGRAM

## 2025-06-09 PROCEDURE — B4035 ENTERAL FEED SUPP PUMP PER D: HCPCS

## 2025-06-09 PROCEDURE — 43247 EGD REMOVE FOREIGN BODY: CPT | Mod: 78 | Performed by: STUDENT IN AN ORGANIZED HEALTH CARE EDUCATION/TRAINING PROGRAM

## 2025-06-09 PROCEDURE — 76000 FLUOROSCOPY <1 HR PHYS/QHP: CPT | Mod: 26 | Performed by: STUDENT IN AN ORGANIZED HEALTH CARE EDUCATION/TRAINING PROGRAM

## 2025-06-09 PROCEDURE — 999N000179 XR SURGERY CARM FLUORO LESS THAN 5 MIN W STILLS

## 2025-06-09 PROCEDURE — 370N000017 HC ANESTHESIA TECHNICAL FEE, PER MIN: Performed by: STUDENT IN AN ORGANIZED HEALTH CARE EDUCATION/TRAINING PROGRAM

## 2025-06-09 PROCEDURE — 710N000012 HC RECOVERY PHASE 2, PER MINUTE: Performed by: STUDENT IN AN ORGANIZED HEALTH CARE EDUCATION/TRAINING PROGRAM

## 2025-06-09 PROCEDURE — 710N000010 HC RECOVERY PHASE 1, LEVEL 2, PER MIN: Performed by: STUDENT IN AN ORGANIZED HEALTH CARE EDUCATION/TRAINING PROGRAM

## 2025-06-09 PROCEDURE — 250N000011 HC RX IP 250 OP 636: Performed by: STUDENT IN AN ORGANIZED HEALTH CARE EDUCATION/TRAINING PROGRAM

## 2025-06-09 PROCEDURE — B9002 ENTER NUTR INF PUMP ANY TYPE: HCPCS | Mod: RR

## 2025-06-09 PROCEDURE — 999N000141 HC STATISTIC PRE-PROCEDURE NURSING ASSESSMENT: Performed by: STUDENT IN AN ORGANIZED HEALTH CARE EDUCATION/TRAINING PROGRAM

## 2025-06-09 PROCEDURE — C1769 GUIDE WIRE: HCPCS | Performed by: STUDENT IN AN ORGANIZED HEALTH CARE EDUCATION/TRAINING PROGRAM

## 2025-06-09 PROCEDURE — 272N000001 HC OR GENERAL SUPPLY STERILE: Performed by: STUDENT IN AN ORGANIZED HEALTH CARE EDUCATION/TRAINING PROGRAM

## 2025-06-09 RX ORDER — CEFAZOLIN SODIUM/WATER 2 G/20 ML
2 SYRINGE (ML) INTRAVENOUS
Status: DISCONTINUED | OUTPATIENT
Start: 2025-06-09 | End: 2025-06-09 | Stop reason: HOSPADM

## 2025-06-09 RX ORDER — FENTANYL CITRATE 50 UG/ML
25 INJECTION, SOLUTION INTRAMUSCULAR; INTRAVENOUS EVERY 5 MIN PRN
Status: DISCONTINUED | OUTPATIENT
Start: 2025-06-09 | End: 2025-06-09 | Stop reason: HOSPADM

## 2025-06-09 RX ORDER — HYDROMORPHONE HCL IN WATER/PF 6 MG/30 ML
0.2 PATIENT CONTROLLED ANALGESIA SYRINGE INTRAVENOUS EVERY 5 MIN PRN
Status: DISCONTINUED | OUTPATIENT
Start: 2025-06-09 | End: 2025-06-09 | Stop reason: HOSPADM

## 2025-06-09 RX ORDER — DEXAMETHASONE SODIUM PHOSPHATE 4 MG/ML
4 INJECTION, SOLUTION INTRA-ARTICULAR; INTRALESIONAL; INTRAMUSCULAR; INTRAVENOUS; SOFT TISSUE
Status: DISCONTINUED | OUTPATIENT
Start: 2025-06-09 | End: 2025-06-09 | Stop reason: HOSPADM

## 2025-06-09 RX ORDER — IOPAMIDOL 510 MG/ML
INJECTION, SOLUTION INTRAVASCULAR PRN
Status: DISCONTINUED | OUTPATIENT
Start: 2025-06-09 | End: 2025-06-09 | Stop reason: HOSPADM

## 2025-06-09 RX ORDER — HEPARIN SODIUM (PORCINE) LOCK FLUSH IV SOLN 100 UNIT/ML 100 UNIT/ML
5-10 SOLUTION INTRAVENOUS
Status: DISCONTINUED | OUTPATIENT
Start: 2025-06-09 | End: 2025-06-09 | Stop reason: HOSPADM

## 2025-06-09 RX ORDER — FENTANYL CITRATE 50 UG/ML
50 INJECTION, SOLUTION INTRAMUSCULAR; INTRAVENOUS EVERY 5 MIN PRN
Status: DISCONTINUED | OUTPATIENT
Start: 2025-06-09 | End: 2025-06-09 | Stop reason: HOSPADM

## 2025-06-09 RX ORDER — LIDOCAINE HYDROCHLORIDE 20 MG/ML
INJECTION, SOLUTION INFILTRATION; PERINEURAL PRN
Status: DISCONTINUED | OUTPATIENT
Start: 2025-06-09 | End: 2025-06-09

## 2025-06-09 RX ORDER — ONDANSETRON 2 MG/ML
4 INJECTION INTRAMUSCULAR; INTRAVENOUS EVERY 30 MIN PRN
Status: DISCONTINUED | OUTPATIENT
Start: 2025-06-09 | End: 2025-06-09 | Stop reason: HOSPADM

## 2025-06-09 RX ORDER — SODIUM CHLORIDE, SODIUM LACTATE, POTASSIUM CHLORIDE, CALCIUM CHLORIDE 600; 310; 30; 20 MG/100ML; MG/100ML; MG/100ML; MG/100ML
INJECTION, SOLUTION INTRAVENOUS CONTINUOUS PRN
Status: DISCONTINUED | OUTPATIENT
Start: 2025-06-09 | End: 2025-06-09

## 2025-06-09 RX ORDER — PROPOFOL 10 MG/ML
INJECTION, EMULSION INTRAVENOUS PRN
Status: DISCONTINUED | OUTPATIENT
Start: 2025-06-09 | End: 2025-06-09

## 2025-06-09 RX ORDER — METHOCARBAMOL 500 MG/1
500 TABLET, FILM COATED ORAL 4 TIMES DAILY
Status: DISCONTINUED | OUTPATIENT
Start: 2025-06-09 | End: 2025-06-09 | Stop reason: HOSPADM

## 2025-06-09 RX ORDER — ACETAMINOPHEN 325 MG/10.15ML
650 LIQUID ORAL EVERY 4 HOURS PRN
Qty: 500 ML | Refills: 0 | Status: SHIPPED | OUTPATIENT
Start: 2025-06-09

## 2025-06-09 RX ORDER — ONDANSETRON 4 MG/1
4 TABLET, ORALLY DISINTEGRATING ORAL EVERY 8 HOURS PRN
Qty: 4 TABLET | Refills: 0 | Status: SHIPPED | OUTPATIENT
Start: 2025-06-09 | End: 2025-06-09

## 2025-06-09 RX ORDER — ONDANSETRON 4 MG/1
4 TABLET, ORALLY DISINTEGRATING ORAL EVERY 30 MIN PRN
Status: DISCONTINUED | OUTPATIENT
Start: 2025-06-09 | End: 2025-06-09 | Stop reason: HOSPADM

## 2025-06-09 RX ORDER — HEPARIN SODIUM,PORCINE 10 UNIT/ML
5-10 VIAL (ML) INTRAVENOUS EVERY 24 HOURS
Status: DISCONTINUED | OUTPATIENT
Start: 2025-06-09 | End: 2025-06-09 | Stop reason: HOSPADM

## 2025-06-09 RX ORDER — OXYCODONE HYDROCHLORIDE 5 MG/1
5 TABLET ORAL
Status: DISCONTINUED | OUTPATIENT
Start: 2025-06-09 | End: 2025-06-09 | Stop reason: HOSPADM

## 2025-06-09 RX ORDER — HYDROMORPHONE HCL IN WATER/PF 6 MG/30 ML
0.4 PATIENT CONTROLLED ANALGESIA SYRINGE INTRAVENOUS EVERY 5 MIN PRN
Status: DISCONTINUED | OUTPATIENT
Start: 2025-06-09 | End: 2025-06-09 | Stop reason: HOSPADM

## 2025-06-09 RX ORDER — OXYCODONE HYDROCHLORIDE 10 MG/1
10 TABLET ORAL
Status: DISCONTINUED | OUTPATIENT
Start: 2025-06-09 | End: 2025-06-09 | Stop reason: HOSPADM

## 2025-06-09 RX ORDER — DEXAMETHASONE SODIUM PHOSPHATE 4 MG/ML
INJECTION, SOLUTION INTRA-ARTICULAR; INTRALESIONAL; INTRAMUSCULAR; INTRAVENOUS; SOFT TISSUE PRN
Status: DISCONTINUED | OUTPATIENT
Start: 2025-06-09 | End: 2025-06-09

## 2025-06-09 RX ORDER — CEFAZOLIN SODIUM/WATER 2 G/20 ML
2 SYRINGE (ML) INTRAVENOUS SEE ADMIN INSTRUCTIONS
Status: DISCONTINUED | OUTPATIENT
Start: 2025-06-09 | End: 2025-06-09 | Stop reason: HOSPADM

## 2025-06-09 RX ORDER — ONDANSETRON 2 MG/ML
INJECTION INTRAMUSCULAR; INTRAVENOUS PRN
Status: DISCONTINUED | OUTPATIENT
Start: 2025-06-09 | End: 2025-06-09

## 2025-06-09 RX ORDER — ENOXAPARIN SODIUM 100 MG/ML
40 INJECTION SUBCUTANEOUS
Status: COMPLETED | OUTPATIENT
Start: 2025-06-09 | End: 2025-06-09

## 2025-06-09 RX ORDER — NALOXONE HYDROCHLORIDE 0.4 MG/ML
0.1 INJECTION, SOLUTION INTRAMUSCULAR; INTRAVENOUS; SUBCUTANEOUS
Status: DISCONTINUED | OUTPATIENT
Start: 2025-06-09 | End: 2025-06-09 | Stop reason: HOSPADM

## 2025-06-09 RX ORDER — HEPARIN SODIUM,PORCINE 10 UNIT/ML
5-10 VIAL (ML) INTRAVENOUS
Status: DISCONTINUED | OUTPATIENT
Start: 2025-06-09 | End: 2025-06-09 | Stop reason: HOSPADM

## 2025-06-09 RX ORDER — FENTANYL CITRATE 50 UG/ML
INJECTION, SOLUTION INTRAMUSCULAR; INTRAVENOUS PRN
Status: DISCONTINUED | OUTPATIENT
Start: 2025-06-09 | End: 2025-06-09

## 2025-06-09 RX ORDER — SODIUM CHLORIDE, SODIUM LACTATE, POTASSIUM CHLORIDE, CALCIUM CHLORIDE 600; 310; 30; 20 MG/100ML; MG/100ML; MG/100ML; MG/100ML
INJECTION, SOLUTION INTRAVENOUS CONTINUOUS
Status: DISCONTINUED | OUTPATIENT
Start: 2025-06-09 | End: 2025-06-09 | Stop reason: HOSPADM

## 2025-06-09 RX ORDER — ACETAMINOPHEN 325 MG/1
650 TABLET ORAL
Status: DISCONTINUED | OUTPATIENT
Start: 2025-06-09 | End: 2025-06-09 | Stop reason: HOSPADM

## 2025-06-09 RX ORDER — METHOCARBAMOL 500 MG/1
500 TABLET, FILM COATED ORAL 4 TIMES DAILY
Qty: 120 TABLET | Refills: 0 | Status: SHIPPED | OUTPATIENT
Start: 2025-06-09

## 2025-06-09 RX ADMIN — HEPARIN 5 ML: 100 SYRINGE at 10:18

## 2025-06-09 RX ADMIN — Medication 50 MG: at 07:50

## 2025-06-09 RX ADMIN — LIDOCAINE HYDROCHLORIDE 60 MG: 20 INJECTION, SOLUTION INFILTRATION; PERINEURAL at 07:34

## 2025-06-09 RX ADMIN — PHENYLEPHRINE HYDROCHLORIDE 200 MCG: 10 INJECTION INTRAVENOUS at 07:49

## 2025-06-09 RX ADMIN — ONDANSETRON 4 MG: 2 INJECTION INTRAMUSCULAR; INTRAVENOUS at 08:18

## 2025-06-09 RX ADMIN — PHENYLEPHRINE HYDROCHLORIDE 100 MCG: 10 INJECTION INTRAVENOUS at 07:46

## 2025-06-09 RX ADMIN — ENOXAPARIN SODIUM 40 MG: 40 INJECTION SUBCUTANEOUS at 06:13

## 2025-06-09 RX ADMIN — FENTANYL CITRATE 50 MCG: 50 INJECTION INTRAMUSCULAR; INTRAVENOUS at 08:03

## 2025-06-09 RX ADMIN — PHENYLEPHRINE HYDROCHLORIDE 100 MCG: 10 INJECTION INTRAVENOUS at 07:51

## 2025-06-09 RX ADMIN — Medication 200 MG: at 08:18

## 2025-06-09 RX ADMIN — SUCCINYLCHOLINE CHLORIDE 100 MG: 20 INJECTION, SOLUTION INTRAMUSCULAR; INTRAVENOUS; PARENTERAL at 07:34

## 2025-06-09 RX ADMIN — FENTANYL CITRATE 50 MCG: 50 INJECTION INTRAMUSCULAR; INTRAVENOUS at 07:34

## 2025-06-09 RX ADMIN — DEXAMETHASONE SODIUM PHOSPHATE 4 MG: 4 INJECTION, SOLUTION INTRAMUSCULAR; INTRAVENOUS at 07:40

## 2025-06-09 RX ADMIN — PROPOFOL 150 MG: 10 INJECTION, EMULSION INTRAVENOUS at 07:34

## 2025-06-09 RX ADMIN — SODIUM CHLORIDE, SODIUM LACTATE, POTASSIUM CHLORIDE, AND CALCIUM CHLORIDE: .6; .31; .03; .02 INJECTION, SOLUTION INTRAVENOUS at 07:27

## 2025-06-09 ASSESSMENT — ACTIVITIES OF DAILY LIVING (ADL)
ADLS_ACUITY_SCORE: 55

## 2025-06-09 ASSESSMENT — LIFESTYLE VARIABLES: TOBACCO_USE: 0

## 2025-06-09 NOTE — PROGRESS NOTES
Dr. Mejia notified of pt's dry intermittent non-productive cough since last discharge in May 2025. Provider aware.

## 2025-06-09 NOTE — ANESTHESIA POSTPROCEDURE EVALUATION
Patient: Bal Junior    Procedure: Procedure(s):  ESOPHAGOGASTRODUODENOSCOPY, stent removal, fluroscopy       Anesthesia Type:  General    Note:  Disposition: Outpatient   Postop Pain Control: Uneventful            Sign Out: Well controlled pain   PONV: No   Neuro/Psych: Uneventful            Sign Out: Acceptable/Baseline neuro status   Airway/Respiratory: Uneventful            Sign Out: Acceptable/Baseline resp. status   CV/Hemodynamics: Uneventful            Sign Out: Acceptable CV status; No obvious hypovolemia; No obvious fluid overload   Other NRE: NONE   DID A NON-ROUTINE EVENT OCCUR? No           Last vitals:  Vitals Value Taken Time   /68 06/09/25 09:15   Temp 36.6  C (97.8  F) 06/09/25 08:30   Pulse 69 06/09/25 09:22   Resp 12 06/09/25 09:22   SpO2 100 % 06/09/25 09:22   Vitals shown include unfiled device data.    Electronically Signed By: Dino Hutchins MD  June 9, 2025  9:22 AM

## 2025-06-09 NOTE — DISCHARGE INSTRUCTIONS
**Phase II RN, please ensure pt has new Methocarbamol Rx for home prior to discharge. Pt ran out of med last night and has no refills active.**

## 2025-06-09 NOTE — ANESTHESIA PREPROCEDURE EVALUATION
Anesthesia Pre-Procedure Evaluation    Patient: Bal Junior   MRN: [id number] : 1955          Procedure : Procedure(s):  ESOPHAGOGASTRODUODENOSCOPY, possible stent,  possible pharyngostomy         Past Medical History:   Diagnosis Date    Hypertension     Neuritis     Peripheral neuropathy     Personal history of other medical treatment     postgastrectomy dumping syndrome    Stomach problems Noted earlier      Past Surgical History:   Procedure Laterality Date    ABDOMEN SURGERY  ?    APPENDECTOMY  1964?    BRONCHOSCOPY RIDID OR FLEXIBLE W/ENDOBRONCHIAL ULTRASOUND GUIDED 3 OR MORE NODE STATIONS N/A 3/19/2025    Procedure: Bronchoscopy Ridid Or Flexible W/Endobronchial Ultrasound Guided 3 Or More Node Stations;  Surgeon: Saad Olmedo MD;  Location: [facility]    COLONOSCOPY  ,     COMBINED ESOPHAGOSCOPY, GASTROSCOPY, DUODENOSCOPY (EGD), REPLACE ESOPHAGEAL STENT Left 2025    Procedure: ESOPHAGOGASTRODUODENOSCOPY with pharyngostomy tube placement;  Surgeon: Erik Lindsey MD;  [facility]    DAVINCI NISSEN FUNDOPLICATION N/A 2025    Procedure: TAKE-DOWN, FUNDOPLICATION, NISSEN, LAPAROSCOPIC- ROBOTIC, gastrostomy takedown, lysisof adhesions 2 hr;  Surgeon: Katlyn Tobar MD;  [facility]    ESOPHAGEAL BALLOON PROVOCATION STUDY N/A 2024    Procedure: Esophageal Balloon Provocation Study;  Surgeon: Carson Michel DO;  [facility]    ESOPHAGECTOMY MINIMALLY INVASIVE N/A 3/24/2025    Procedure: ESOPHAGECTOMY, MINIMALLY INVASIVE, Laparoscopic Right Thorascopic Esophagectomy, Botox Injection;  Surgeon: Katlyn Tobar MD;  [facility]    ESOPHAGOSCOPY, GASTROSCOPY, DUODENOSCOPY (EGD), COMBINED N/A 2024    Procedure: ESOPHAGOGASTRODUODENOSCOPY, WITH BIOPSY;  Surgeon: Carson Michel DO;  [facility]    ESOPHAGOSCOPY, GASTROSCOPY, DUODENOSCOPY (EGD), COMBINED N/A 10/8/2024    Procedure: ESOPHAGOGASTRODUODENOSCOPY, WITH FINE NEEDLE ASPIRATION BIOPSY, WITH  ENDOSCOPIC ULTRASOUND GUIDANCE;  Surgeon: Lance Lopez MD;  [facility]    ESOPHAGOSCOPY, GASTROSCOPY, DUODENOSCOPY (EGD), COMBINED N/A 2/26/2025    Procedure: Esophagoscopy, gastroscopy, duodenoscopy (EGD);  Surgeon: Katlyn Tobra MD;  [facility]    ESOPHAGOSCOPY, GASTROSCOPY, DUODENOSCOPY (EGD), COMBINED N/A 3/24/2025    Procedure: Esophagoscopy, gastroscopy, duodenoscopy (EGD), combined;  Surgeon: Katlyn Tobar MD;  [facility]    ESOPHAGOSCOPY, GASTROSCOPY, DUODENOSCOPY (EGD), COMBINED N/A 3/28/2025    Procedure: ESOPHAGOGASTRODUODENOSCOPY, pharyngostomy tube replacement;  Surgeon: Chino Gardner MD;  [facility]    ESOPHAGOSCOPY, GASTROSCOPY, DUODENOSCOPY (EGD), COMBINED N/A 4/29/2025    Procedure: Esophagoscopy, gastroscopy, duodenoscopy (EGD), fluroscopy, revision of pharnygoscopy tube;  Surgeon: Katlyn Tobar MD;  [facility]    ESOPHAGOSCOPY, GASTROSCOPY, DUODENOSCOPY (EGD), COMBINED N/A 5/15/2025    Procedure: Esophagoscopy, gastroscopy, duodenoscopy (EGD), combined w/ pharyngoscopy tube removal, stent placement;  Surgeon: Katlyn Tobar MD;  [facility]    EYE SURGERY  199x    laser for retinal tears    GASTRIC FUNDOPLICATION      HERNIA REPAIR  1961?    IR CHEST PORT PLACEMENT > 5 YRS OF AGE  10/24/2024    LAPAROSCOPIC ASSISTED INSERTION TUBE JEJUNOSTOMY N/A 2/26/2025    Procedure: Laparoscopic assisted insertion tube jejunostomy;  Surgeon: Katlyn Tobar MD;  [facility]    LAPAROSCOPIC TAKEDOWN NISSEN FUNDOPLICATION N/A 9/25/2020    Procedure: TAKE-DOWN, FUNDOPLICATION, REDO NISSEN, LAPAROSCOPIC, gastrostomy feeding tube placement;  Surgeon: Katlyn Tobar MD;  [facility]    ID ESOPHAGOGASTRODUODENOSCOPY TRANSORAL DIAGNOSTIC N/A 5/9/2019    Procedure: UPPER GASTROINTESTINAL ENDOSCOPY;  Surgeon: Dino Ward MD;  Location: [facility];  Service: General    SOFT TISSUE SURGERY  2009?    MCL l. thumb      Allergies   Allergen  Reactions    Hornets     Wasp Venom Protein Hives    Bee Venom Swelling      Social History     Tobacco Use    Smoking status: Never     Passive exposure: Past    Smokeless tobacco: Never   Substance Use Topics    Alcohol use: Not Currently      Wt Readings from Last 1 Encounters:   06/09/25 78.3 kg (172 lb 9.9 oz)        Anesthesia Evaluation   Pt has had prior anesthetic. Type: General (GrI with Miller2, VL).    No history of anesthetic complications       ROS/MED HX  ENT/Pulmonary: Comment: Neck infection    (+)     JESSICA risk factors, snores loudly, hypertension,                              (-) tobacco use, asthma and recent URI   Neurologic:  - neg neurologic ROS     Cardiovascular:     (+)  hypertension- -   -  - -                                 Previous cardiac testing   Echo: Date: 2/6/25 Results:  See H&P  Stress Test:  Date: Results:    ECG Reviewed:  Date: 4/26/25 Results:  NSR    Cath:  Date: Results:      METS/Exercise Tolerance: 4 - Raking leaves, gardening Comment: Since surgery two weeks ago, activity has been limited - walking about 2 blocks at a time. Prior to surgery in February was walking 10,000-12,000 steps, skiing. Denies CHAWLA or CP   Hematologic: Comments: Thrombocytopenia   (-) history of blood clots, anemia and history of blood transfusion   Musculoskeletal:  - neg musculoskeletal ROS     GI/Hepatic: Comment: Hx esophageal adenocarcinoma s/p esophagectomy 3/24/2025 c/b anastomotic leak requiring percutaneous drain placement (now removed) and ultimately pharyngostomy tube placement 4/18, most recently admitted 4/26 due to weakness, fatigue and dehydration, underwent pharyngostomy tube repositioning 4/29, returns to the ED 5/14 due to new pain, drainage and swelling at his pharyngostomy site. CT chest demonstrates retracted pharyngostomy tube, now within the neoesophageal lumen    (+) GERD,     hiatal hernia,           (-) liver disease   Renal/Genitourinary:  - neg Renal ROS     Endo:  - neg  endo ROS     Psychiatric/Substance Use:  - neg psychiatric ROS     Infectious Disease:  - neg infectious disease ROS     Malignancy:   (+) Malignancy, History of GI.GI CA  Active status post Chemo.      Other:              Physical Exam  Airway  Mallampati: II  TM distance: >3 FB  Neck ROM: full  Mouth opening: >= 4 cm    Cardiovascular   Rhythm: regular  Rate: normal rate     Dental   (+) Minor Abnormalities - some fillings, tiny chips      Pulmonary Breath sounds clear to auscultation        Neurological   He appears awake.    Other Findings       OUTSIDE LABS:  CBC:   Lab Results   Component Value Date    WBC 10.4 05/17/2025    WBC 9.0 05/16/2025    HGB 9.1 (L) 05/17/2025    HGB 9.7 (L) 05/16/2025    HCT 28.4 (L) 05/17/2025    HCT 29.5 (L) 05/16/2025     05/17/2025     05/16/2025     BMP:   Lab Results   Component Value Date     05/17/2025     (L) 05/16/2025    POTASSIUM 3.9 05/17/2025    POTASSIUM 4.3 05/16/2025    CHLORIDE 102 05/17/2025    CHLORIDE 102 05/16/2025    CO2 24 05/17/2025    CO2 22 05/16/2025    BUN 23.9 (H) 05/17/2025    BUN 21.6 05/16/2025    CR 0.74 05/17/2025    CR 0.73 05/16/2025     (H) 05/17/2025     (H) 05/16/2025     COAGS:   Lab Results   Component Value Date    INR 1.14 04/17/2025     POC:   Lab Results   Component Value Date     (H) 10/28/2020     HEPATIC:   Lab Results   Component Value Date    ALBUMIN 3.6 05/14/2025    PROTTOTAL 7.4 05/14/2025    ALT 12 05/14/2025    AST 17 05/14/2025    ALKPHOS 121 05/14/2025    BILITOTAL 0.3 05/14/2025     OTHER:   Lab Results   Component Value Date    PH 7.35 03/24/2025    LACT 1.3 05/14/2025    A1C 6.0 (H) 01/17/2024    AUGUSTIN 8.4 (L) 05/17/2025    PHOS 2.8 05/16/2025    MAG 1.8 05/16/2025    LIPASE 68 (H) 04/26/2025    TSH 1.75 12/16/2024    SED 55 (H) 05/14/2025       Anesthesia Plan    ASA Status:  2      NPO Status: NPO Appropriate   Anesthesia Type: General.  Airway: oral.  Induction: intravenous,  rapid sequence.  Maintenance: Balanced.   Techniques and Equipment:       - Monitoring Plan: standard ASA monitoring, processed EEG monitor     Consents    Anesthesia Plan(s) and associated risks, benefits, and realistic alternatives discussed. Questions answered and patient/representative(s) expressed understanding.     - Discussed: anesthesiologist     - Discussed with:  Patient               Postoperative Care    Pain management: non-narcotic analgesics, plan for postoperative opioid use.     Comments:    Other Comments: I discussed the risks and benefits of general anesthesia with the patient.  Questions were sought and answered.      Osman Mejia MD  Attending Anesthesiologist                  Osman Mejia MD    I have reviewed the pertinent notes and labs in the chart from the past 30 days and (re)examined the patient.  Any updates or changes from those notes are reflected in this note.    Clinically Significant Risk Factors Present on Admission                   # Hypertension: Noted on problem list               # Financial/Environmental Concerns:

## 2025-06-09 NOTE — ANESTHESIA PROCEDURE NOTES
Airway       Patient location during procedure: OR       Procedure Start/Stop Times: 6/9/2025 7:35 AM  Staff -        Anesthesiologist:  Osman Mejia MD       CRNA: Edward Romeo APRN CRNA       Performed By: anesthesiologist and CRNA  Consent for Airway        Urgency: elective  Indications and Patient Condition       Indications for airway management: suki-procedural       Induction type:RSI       Mask difficulty assessment: 0 - not attempted    Final Airway Details       Final airway type: endotracheal airway       Successful airway: ETT - single  Endotracheal Airway Details        ETT size (mm): 7.5       Cuffed: yes       Cuff volume (mL): 8       Successful intubation technique: video laryngoscopy       VL Blade Size: Glidescope 3       Grade View of Cords: 1       Adjucts: stylet       Position: Right       Measured from: lips       Secured at (cm): 21       Bite Block used: endo.    Post intubation assessment        Placement verified by: capnometry, equal breath sounds and chest rise        Number of attempts at approach: 1       Number of other approaches attempted: 0       Secured with: tape       Ease of procedure: easy       Dentition: Intact and Unchanged    Medication(s) Administered   Medication Administration Time: 6/9/2025 7:35 AM

## 2025-06-09 NOTE — ANESTHESIA CARE TRANSFER NOTE
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    Anesthesia Type:   General     Note:    Oropharynx: oropharynx clear of all foreign objects  Level of Consciousness: awake  Oxygen Supplementation: face mask  Level of Supplemental Oxygen (L/min / FiO2): 4  Independent Airway: airway patency satisfactory and stable  Dentition: dentition unchanged  Vital Signs Stable: post-procedure vital signs reviewed and stable  Report to RN Given: handoff report given  Patient transferred to: PACU    Handoff Report: Identifed the Patient, Identified the Reponsible Provider, Reviewed the pertinent medical history, Discussed the surgical course, Reviewed Intra-OP anesthesia mangement and issues during anesthesia, Set expectations for post-procedure period and Allowed opportunity for questions and acknowledgement of understanding      Vitals:  Vitals Value Taken Time   /78 06/09/25 08:31   Temp 97.8    Pulse 67 06/09/25 08:35   Resp 16 06/09/25 08:35   SpO2 100 % 06/09/25 08:35   Vitals shown include unfiled device data.    Electronically Signed By: ROSY Green CRNA  June 9, 2025  8:36 AM

## 2025-06-09 NOTE — BRIEF OP NOTE
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 Fluoroscopy with contrast performed. Contrast contained within esophagus.     Complications: None.  Implants:   Implant Name Type Inv. Item Serial No.  Lot No. LRB No. Used Action   STENT ESOPHAGEAL ENDOMAXX [id number] CESAR-2312 - [id number] Stent STENT ESOPHAGEAL ENDOMAXX [id number] CESAR-2312  [employer] [id number] N/A 1 Explanted

## 2025-06-10 ENCOUNTER — HOME INFUSION BILLING (OUTPATIENT)
Dept: HOME HEALTH SERVICES | Facility: HOME HEALTH | Age: 70
End: 2025-06-10
Payer: COMMERCIAL

## 2025-06-10 DIAGNOSIS — R19.7 DIARRHEA, UNSPECIFIED TYPE: ICD-10-CM

## 2025-06-10 DIAGNOSIS — K91.89 ESOPHAGEAL ANASTOMOTIC LEAK: ICD-10-CM

## 2025-06-10 PROCEDURE — B4035 ENTERAL FEED SUPP PUMP PER D: HCPCS

## 2025-06-10 RX ORDER — GUAR GUM
1 PACKET (EA) ORAL 2 TIMES DAILY
Qty: 60 EACH | Refills: 11 | Status: SHIPPED | OUTPATIENT
Start: 2025-06-10

## 2025-06-11 PROCEDURE — B4035 ENTERAL FEED SUPP PUMP PER D: HCPCS

## 2025-06-12 ENCOUNTER — RESULTS FOLLOW-UP (OUTPATIENT)
Dept: CARDIOLOGY | Facility: CLINIC | Age: 70
End: 2025-06-12

## 2025-06-12 PROCEDURE — B4035 ENTERAL FEED SUPP PUMP PER D: HCPCS

## 2025-06-13 PROCEDURE — B4035 ENTERAL FEED SUPP PUMP PER D: HCPCS

## 2025-06-14 PROCEDURE — B4035 ENTERAL FEED SUPP PUMP PER D: HCPCS

## 2025-06-15 PROCEDURE — B4035 ENTERAL FEED SUPP PUMP PER D: HCPCS

## 2025-06-16 PROCEDURE — B4035 ENTERAL FEED SUPP PUMP PER D: HCPCS

## 2025-06-17 PROCEDURE — B4035 ENTERAL FEED SUPP PUMP PER D: HCPCS

## 2025-06-17 NOTE — PROGRESS NOTES
THORACIC SURGERY FOLLOW UP VISIT      I saw Mr. Junior in follow-up today. 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   NEODJUVANT THERAPY   FLOT (4 cycles, last 12/12/2024)   PROCEDURE   1) Laparoscopic redo hiatal hernia repair and Nissen fundoplication (09/2020)  2) Laparoscopic takedown of Nissen fundoplication and J-tube placement (02/26/2025)  3) Minimally invasive Kosta Arya esophagectomy (03/24/2025)  4) EGD with pharyngostomy tube placement to left neck (04/18/2025)  5) EGD with revision of pharyngostomy tube (04/29/2025)  6) EGD combined with pharyngostoopy tube removal, stent placement (05/15/2025)  7) EGD (05/19/2025)  8) EGD with stent removal (06/09/2025)    HISTOPATHOLOGY   Moderately differentiated adenocarcinoma with partial treatment response; [id number] (03/24/2025)    COMPLICATIONS  Small anastomotic leak requiring additional percutaneous drain placement     INTERVAL STUDIES  CT chest 06/19/2025: final report pending at time of clinic visit. To my review, I do not see extravasation of contrast at the anastomosis.    Past Medical History:   Diagnosis Date    Hypertension     Neuritis     Peripheral neuropathy     Personal history of other medical treatment     postgastrectomy dumping syndrome    Stomach problems Noted earlier      Past Surgical History:   Procedure Laterality Date    ABDOMEN SURGERY  2012?    APPENDECTOMY  1964?    BRONCHOSCOPY RIDID OR FLEXIBLE W/ENDOBRONCHIAL ULTRASOUND GUIDED 3 OR MORE NODE STATIONS N/A 3/19/2025    Procedure: Bronchoscopy Ridid Or Flexible W/Endobronchial Ultrasound Guided 3 Or More Node Stations;  Surgeon: Saad Olmedo MD;  Location:  GI    COLONOSCOPY  2006, 2016    COMBINED ESOPHAGOSCOPY, GASTROSCOPY, DUODENOSCOPY (EGD), REPLACE ESOPHAGEAL STENT Left 4/18/2025    Procedure: ESOPHAGOGASTRODUODENOSCOPY with pharyngostomy tube placement;  Surgeon: Erik Lindsey  MD Alex;  [facility]    COMBINED ESOPHAGOSCOPY, GASTROSCOPY, DUODENOSCOPY (EGD), REPLACE ESOPHAGEAL STENT N/A 6/9/2025    Procedure: ESOPHAGOGASTRODUODENOSCOPY, stent removal, fluroscopy;  Surgeon: Katlyn Tobar MD;  [facility]: [facility]    DAVINCI NISSEN FUNDOPLICATION N/A 2/26/2025    Procedure: TAKE-DOWN, FUNDOPLICATION, NISSEN, LAPAROSCOPIC- ROBOTIC, gastrostomy takedown, lysisof adhesions 2 hr;  Surgeon: Katlyn Tobar MD;  [facility]: [facility]    ESOPHAGEAL BALLOON PROVOCATION STUDY N/A 9/24/2024    Procedure: Esophageal Balloon Provocation Study;  Surgeon: Carson Michel DO;  Location: [facility]    ESOPHAGECTOMY MINIMALLY INVASIVE N/A 3/24/2025    Procedure: ESOPHAGECTOMY, MINIMALLY INVASIVE, Laparoscopic Right Thorascopic Esophagectomy, Botox Injection;  Surgeon: Katlyn Tobar MD;  [facility]    ESOPHAGOSCOPY, GASTROSCOPY, DUODENOSCOPY (EGD), COMBINED N/A 9/24/2024    Procedure: ESOPHAGOGASTRODUODENOSCOPY, WITH BIOPSY;  Surgeon: Carson Michel DO;  [facility]    ESOPHAGOSCOPY, GASTROSCOPY, DUODENOSCOPY (EGD), COMBINED N/A 10/8/2024    Procedure: ESOPHAGOGASTRODUODENOSCOPY, WITH FINE NEEDLE ASPIRATION BIOPSY, WITH ENDOSCOPIC ULTRASOUND GUIDANCE;  Surgeon: Lance Lopez MD;  [facility]    ESOPHAGOSCOPY, GASTROSCOPY, DUODENOSCOPY (EGD), COMBINED N/A 2/26/2025    Procedure: Esophagoscopy, gastroscopy, duodenoscopy (EGD);  Surgeon: Katlyn Tobar MD;  [facility]    ESOPHAGOSCOPY, GASTROSCOPY, DUODENOSCOPY (EGD), COMBINED N/A 3/24/2025    Procedure: Esophagoscopy, gastroscopy, duodenoscopy (EGD), combined;  Surgeon: Katlyn Tobar MD;  [facility]    ESOPHAGOSCOPY, GASTROSCOPY, DUODENOSCOPY (EGD), COMBINED N/A 3/28/2025    Procedure: ESOPHAGOGASTRODUODENOSCOPY, pharyngostomy tube replacement;  Surgeon: Chino Gardner MD;  [facility]    ESOPHAGOSCOPY, GASTROSCOPY, DUODENOSCOPY (EGD), COMBINED N/A 4/29/2025    Procedure: Esophagoscopy, gastroscopy, duodenoscopy (EGD),  fluroscopy, revision of pharnygoscopy tube;  Surgeon: Katlyn Tobar MD;  [facility]    ESOPHAGOSCOPY, GASTROSCOPY, DUODENOSCOPY (EGD), COMBINED N/A 5/15/2025    Procedure: Esophagoscopy, gastroscopy, duodenoscopy (EGD), combined w/ pharyngoscopy tube removal, stent placement;  Surgeon: Katlyn Tobar MD;  Location: [facility]    EYE SURGERY  199x    laser for retinal tears    GASTRIC FUNDOPLICATION      HERNIA REPAIR  1961?    IR CHEST PORT PLACEMENT > 5 YRS OF AGE  10/24/2024    LAPAROSCOPIC ASSISTED INSERTION TUBE JEJUNOSTOMY N/A 2/26/2025    Procedure: Laparoscopic assisted insertion tube jejunostomy;  Surgeon: Katlyn Tobar MD;  [facility]    LAPAROSCOPIC TAKEDOWN NISSEN FUNDOPLICATION N/A 9/25/2020    Procedure: TAKE-DOWN, FUNDOPLICATION, REDO NISSEN, LAPAROSCOPIC, gastrostomy feeding tube placement;  Surgeon: Katlyn Tobar MD;  [facility]    MD ESOPHAGOGASTRODUODENOSCOPY TRANSORAL DIAGNOSTIC N/A 5/9/2019    Procedure: UPPER GASTROINTESTINAL ENDOSCOPY;  Surgeon: Dino Ward MD;  [facility];  Service: General    SOFT TISSUE SURGERY  2009?    MCL l. thumb      Social History     Socioeconomic History    Marital status:      Spouse name: Not on file    Number of children: Not on file    Years of education: Not on file    Highest education level: Not on file   Occupational History    Not on file   Tobacco Use    Smoking status: Never     Passive exposure: Past    Smokeless tobacco: Never   Substance and Sexual Activity    Alcohol use: Not Currently    Drug use: Never    Sexual activity: Yes     Partners: Female     Birth control/protection: Post-menopausal, Male Surgical, Female Surgical   Other Topics Concern    Not on file   Social History Narrative    Not on file     Social Drivers of Health     Financial Resource Strain: Low Risk  (5/15/2025)    Financial Resource Strain     Within the past 12 months, have you or your family members you live with been unable to  "get utilities (heat, electricity) when it was really needed?: No   Food Insecurity: Low Risk  (5/15/2025)    Food Insecurity     Within the past 12 months, did you worry that your food would run out before you got money to buy more?: No     Within the past 12 months, did the food you bought just not last and you didn t have money to get more?: No   Transportation Needs: Low Risk  (5/15/2025)    Transportation Needs     Within the past 12 months, has lack of transportation kept you from medical appointments, getting your medicines, non-medical meetings or appointments, work, or from getting things that you need?: No   Physical Activity: Not on file   Stress: Not on file   Social Connections: Not on file   Interpersonal Safety: Low Risk  (6/9/2025)    Interpersonal Safety     Do you feel physically and emotionally safe where you currently live?: Yes     Within the past 12 months, have you been hit, slapped, kicked or otherwise physically hurt by someone?: No     Within the past 12 months, have you been humiliated or emotionally abused in other ways by your partner or ex-partner?: No   Housing Stability: Low Risk  (5/15/2025)    Housing Stability     Do you have housing? : Yes     Are you worried about losing your housing?: No      SUBJECTIVE   Bal is doing ok. He is really tired of having to spit out chewed up ice and is hoping that the CT he just did shows no leak. He had no problems swallowing the contrast for the exam today other than it tasted awful. He is really looking forward to being able to have a cup of tea. He has a dry cough but this is not a new thing-he has had this since surgery. He takes Imodium once a day otherwise the tube feedings make his stools too loose. He still has pain at the previous drain site. If he lapses on the Robaxin the pain comes back \"with a vengeance.\"     OBJECTIVE  /73   Pulse 64   Temp 97.8  F (36.6  C) (Tympanic)   Resp 16   Ht 1.753 m (5' 9\")   Wt 78 kg (172 lb)   " SpO2 100%   BMI 25.40 kg/m       From a personal perspective, he is here with his wife, Jess. She just got back from [place] late last night. She was visiting family there.    UZAIR Selby is a 70 year-old male status post the above listed procedures.     I reviewed the CT from today and do not see anything worrisome for a leak. It is ok for him to start taking sips and chips of ice. If he coughs when swallowing the sips, he should stop and contact our office right away. If he does ok over the weekend, we will advance him to clear liquids Monday morning.     PLAN  I spent 25 min on the date of the encounter in chart review, patient visit, review of tests, documentation and/or discussion with other providers about the issues documented above. I reviewed the plan as follows:  OK to start ice chips and sips. Possible advancement to clear liquids Monday if he tolerates sips and chips.  All questions were answered and the patient and present family were in agreement with the plan.  I appreciate the opportunity to participate in the care of your patient and will keep you updated.  Sincerely,    ROSY Cadet CNS

## 2025-06-18 PROCEDURE — B4035 ENTERAL FEED SUPP PUMP PER D: HCPCS

## 2025-06-19 ENCOUNTER — ONCOLOGY VISIT (OUTPATIENT)
Dept: SURGERY | Facility: CLINIC | Age: 70
End: 2025-06-19
Attending: CLINICAL NURSE SPECIALIST
Payer: MEDICARE

## 2025-06-19 VITALS
HEART RATE: 64 BPM | BODY MASS INDEX: 25.48 KG/M2 | WEIGHT: 172 LBS | DIASTOLIC BLOOD PRESSURE: 73 MMHG | RESPIRATION RATE: 16 BRPM | OXYGEN SATURATION: 100 % | SYSTOLIC BLOOD PRESSURE: 114 MMHG | HEIGHT: 69 IN | TEMPERATURE: 97.8 F

## 2025-06-19 DIAGNOSIS — K91.89 ESOPHAGEAL ANASTOMOTIC LEAK: Primary | ICD-10-CM

## 2025-06-19 DIAGNOSIS — C16.0 ADENOCARCINOMA OF GASTROESOPHAGEAL JUNCTION (H): ICD-10-CM

## 2025-06-19 PROCEDURE — B4035 ENTERAL FEED SUPP PUMP PER D: HCPCS

## 2025-06-19 PROCEDURE — G0463 HOSPITAL OUTPT CLINIC VISIT: HCPCS | Performed by: CLINICAL NURSE SPECIALIST

## 2025-06-19 ASSESSMENT — PAIN SCALES - GENERAL: PAINLEVEL_OUTOF10: MILD PAIN (1)

## 2025-06-19 NOTE — LETTER
6/19/2025      Bal Junior  [address]  [place] [facility] [address]      Dear Colleague,    Thank you for referring your patient, Bal Junior, to the [facility]. Please see a copy of my visit note below.    THORACIC SURGERY FOLLOW UP VISIT      I saw Mr. Junior in follow-up today. 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   NEODJUVANT THERAPY   FLOT (4 cycles, last 12/12/2024)   PROCEDURE   1) Laparoscopic redo hiatal hernia repair and Nissen fundoplication (09/2020)  2) Laparoscopic takedown of Nissen fundoplication and J-tube placement (02/26/2025)  3) Minimally invasive Kosta Arya esophagectomy (03/24/2025)  4) EGD with pharyngostomy tube placement to left neck (04/18/2025)  5) EGD with revision of pharyngostomy tube (04/29/2025)  6) EGD combined with pharyngostoopy tube removal, stent placement (05/15/2025)  7) EGD (05/19/2025)  8) EGD with stent removal (06/09/2025)    HISTOPATHOLOGY   Moderately differentiated adenocarcinoma with partial treatment response; [id number] (03/24/2025)    COMPLICATIONS  Small anastomotic leak requiring additional percutaneous drain placement     INTERVAL STUDIES  CT chest 06/19/2025: final report pending at time of clinic visit. To my review, I do not see extravasation of contrast at the anastomosis.    Past Medical History:   Diagnosis Date     Hypertension      Neuritis      Peripheral neuropathy      Personal history of other medical treatment     postgastrectomy dumping syndrome     Stomach problems Noted earlier      Past Surgical History:   Procedure Laterality Date     ABDOMEN SURGERY  2012?     APPENDECTOMY  1964?     BRONCHOSCOPY RIDID OR FLEXIBLE W/ENDOBRONCHIAL ULTRASOUND GUIDED 3 OR MORE NODE STATIONS N/A 3/19/2025    Procedure: Bronchoscopy Ridid Or Flexible W/Endobronchial Ultrasound Guided 3 Or More Node Stations;  Surgeon: Saad Olmedo MD;   Location: [facility]     COLONOSCOPY  2006, 2016     COMBINED ESOPHAGOSCOPY, GASTROSCOPY, DUODENOSCOPY (EGD), REPLACE ESOPHAGEAL STENT Left 4/18/2025    Procedure: ESOPHAGOGASTRODUODENOSCOPY with pharyngostomy tube placement;  Surgeon: Erik Lindsey MD;  [facility]     COMBINED ESOPHAGOSCOPY, GASTROSCOPY, DUODENOSCOPY (EGD), REPLACE ESOPHAGEAL STENT N/A 6/9/2025    Procedure: ESOPHAGOGASTRODUODENOSCOPY, stent removal, fluroscopy;  Surgeon: Katlyn Tobar MD;  [facility]     DAVINCI NISSEN FUNDOPLICATION N/A 2/26/2025    Procedure: TAKE-DOWN, FUNDOPLICATION, NISSEN, LAPAROSCOPIC- ROBOTIC, gastrostomy takedown, lysisof adhesions 2 hr;  Surgeon: Katlyn Tobar MD;  [facility]     ESOPHAGEAL BALLOON PROVOCATION STUDY N/A 9/24/2024    Procedure: Esophageal Balloon Provocation Study;  Surgeon: Carson Michel DO;  [facility]     ESOPHAGECTOMY MINIMALLY INVASIVE N/A 3/24/2025    Procedure: ESOPHAGECTOMY, MINIMALLY INVASIVE, Laparoscopic Right Thorascopic Esophagectomy, Botox Injection;  Surgeon: Katlyn Tobar MD;  [facility]     ESOPHAGOSCOPY, GASTROSCOPY, DUODENOSCOPY (EGD), COMBINED N/A 9/24/2024    Procedure: ESOPHAGOGASTRODUODENOSCOPY, WITH BIOPSY;  Surgeon: Carson Michel DO;  [facility]     ESOPHAGOSCOPY, GASTROSCOPY, DUODENOSCOPY (EGD), COMBINED N/A 10/8/2024    Procedure: ESOPHAGOGASTRODUODENOSCOPY, WITH FINE NEEDLE ASPIRATION BIOPSY, WITH ENDOSCOPIC ULTRASOUND GUIDANCE;  Surgeon: Lance Lopez MD;  [facility]     ESOPHAGOSCOPY, GASTROSCOPY, DUODENOSCOPY (EGD), COMBINED N/A 2/26/2025    Procedure: Esophagoscopy, gastroscopy, duodenoscopy (EGD);  Surgeon: Katlyn Tobar MD;  [facility]     ESOPHAGOSCOPY, GASTROSCOPY, DUODENOSCOPY (EGD), COMBINED N/A 3/24/2025    Procedure: Esophagoscopy, gastroscopy, duodenoscopy (EGD), combined;  Surgeon: Katlyn Tobar MD;  [facility]     ESOPHAGOSCOPY, GASTROSCOPY, DUODENOSCOPY (EGD), COMBINED N/A 3/28/2025     Procedure: ESOPHAGOGASTRODUODENOSCOPY, pharyngostomy tube replacement;  Surgeon: Chino Gardner MD;  [facility]     ESOPHAGOSCOPY, GASTROSCOPY, DUODENOSCOPY (EGD), COMBINED N/A 4/29/2025    Procedure: Esophagoscopy, gastroscopy, duodenoscopy (EGD), fluroscopy, revision of pharnygoscopy tube;  Surgeon: Katlyn Tobar MD;  [facility]     ESOPHAGOSCOPY, GASTROSCOPY, DUODENOSCOPY (EGD), COMBINED N/A 5/15/2025    Procedure: Esophagoscopy, gastroscopy, duodenoscopy (EGD), combined w/ pharyngoscopy tube removal, stent placement;  Surgeon: Katlyn Tobar MD;  [facility]     EYE SURGERY  199x    laser for retinal tears     GASTRIC FUNDOPLICATION       HERNIA REPAIR  1961?     IR CHEST PORT PLACEMENT > 5 YRS OF AGE  10/24/2024     LAPAROSCOPIC ASSISTED INSERTION TUBE JEJUNOSTOMY N/A 2/26/2025    Procedure: Laparoscopic assisted insertion tube jejunostomy;  Surgeon: Katlyn Tobar MD;  [facility]     LAPAROSCOPIC TAKEDOWN NISSEN FUNDOPLICATION N/A 9/25/2020    Procedure: TAKE-DOWN, FUNDOPLICATION, REDO NISSEN, LAPAROSCOPIC, gastrostomy feeding tube placement;  Surgeon: Katlyn Tobar MD;  [facility]     WY ESOPHAGOGASTRODUODENOSCOPY TRANSORAL DIAGNOSTIC N/A 5/9/2019    Procedure: UPPER GASTROINTESTINAL ENDOSCOPY;  Surgeon: Dino Ward MD;  Location: [facility];  Service: General     SOFT TISSUE SURGERY  2009?    MCL l. thumb      Social History     Socioeconomic History     Marital status:      Spouse name: Not on file     Number of children: Not on file     Years of education: Not on file     Highest education level: Not on file   Occupational History     Not on file   Tobacco Use     Smoking status: Never     Passive exposure: Past     Smokeless tobacco: Never   Substance and Sexual Activity     Alcohol use: Not Currently     Drug use: Never     Sexual activity: Yes     Partners: Female     Birth control/protection: Post-menopausal, Male Surgical, Female Surgical    Other Topics Concern     Not on file   Social History Narrative     Not on file     Social Drivers of Health     Financial Resource Strain: Low Risk  (5/15/2025)    Financial Resource Strain      Within the past 12 months, have you or your family members you live with been unable to get utilities (heat, electricity) when it was really needed?: No   Food Insecurity: Low Risk  (5/15/2025)    Food Insecurity      Within the past 12 months, did you worry that your food would run out before you got money to buy more?: No      Within the past 12 months, did the food you bought just not last and you didn t have money to get more?: No   Transportation Needs: Low Risk  (5/15/2025)    Transportation Needs      Within the past 12 months, has lack of transportation kept you from medical appointments, getting your medicines, non-medical meetings or appointments, work, or from getting things that you need?: No   Physical Activity: Not on file   Stress: Not on file   Social Connections: Not on file   Interpersonal Safety: Low Risk  (6/9/2025)    Interpersonal Safety      Do you feel physically and emotionally safe where you currently live?: Yes      Within the past 12 months, have you been hit, slapped, kicked or otherwise physically hurt by someone?: No      Within the past 12 months, have you been humiliated or emotionally abused in other ways by your partner or ex-partner?: No   Housing Stability: Low Risk  (5/15/2025)    Housing Stability      Do you have housing? : Yes      Are you worried about losing your housing?: No      SUBJECTIVE   Bal is doing ok. He is really tired of having to spit out chewed up ice and is hoping that the CT he just did shows no leak. He had no problems swallowing the contrast for the exam today other than it tasted awful. He is really looking forward to being able to have a cup of tea. He has a dry cough but this is not a new thing-he has had this since surgery. He takes Imodium once a day  "otherwise the tube feedings make his stools too loose. He still has pain at the previous drain site. If he lapses on the Robaxin the pain comes back \"with a vengeance.\"     OBJECTIVE  /73   Pulse 64   Temp 97.8  F (36.6  C) (Tympanic)   Resp 16   Ht 1.753 m (5' 9\")   Wt 78 kg (172 lb)   SpO2 100%   BMI 25.40 kg/m       From a personal perspective, he is here with his wife, Jess. She just got back from [place] late last night. She was visiting family there.    UZAIR Selby is a 70 year-old male status post the above listed procedures.     I reviewed the CT from today and do not see anything worrisome for a leak. It is ok for him to start taking sips and chips of ice. If he coughs when swallowing the sips, he should stop and contact our office right away. If he does ok over the weekend, we will advance him to clear liquids Monday morning.     PLAN  I spent 25 min on the date of the encounter in chart review, patient visit, review of tests, documentation and/or discussion with other providers about the issues documented above. I reviewed the plan as follows:  OK to start ice chips and sips. Possible advancement to clear liquids Monday if he tolerates sips and chips.  All questions were answered and the patient and present family were in agreement with the plan.  I appreciate the opportunity to participate in the care of your patient and will keep you updated.  Sincerely,    ROSY Cadet       Again, thank you for allowing me to participate in the care of your patient.        Sincerely,        ROSY Cadet    Electronically signed"

## 2025-06-19 NOTE — NURSING NOTE
"Oncology Rooming Note    June 19, 2025 8:13 AM   Bal Junior is a 70 year old male who presents for:    Chief Complaint   Patient presents with    Oncology Clinic Visit     [place] Adenocarcinoma     Initial Vitals: /73   Pulse 64   Temp 97.8  F (36.6  C) (Tympanic)   Resp 16   Ht 1.753 m (5' 9\")   Wt 78 kg (172 lb)   SpO2 100%   BMI 25.40 kg/m   Estimated body mass index is 25.4 kg/m  as calculated from the following:    Height as of this encounter: 1.753 m (5' 9\").    Weight as of this encounter: 78 kg (172 lb). Body surface area is 1.95 meters squared.  Mild Pain (1) Comment: Reports of some pain at the incision site, as well as soreness when regarding his ODIN drain   No LMP for male patient.  Allergies reviewed: Yes  Medications reviewed: Yes    Medications: Medication refills not needed today.  Pharmacy name entered into [employer]:    [facility] - [place] [address] AT [facility].OPEN TO ALL.  [facility] - [facility]    Frailty Screening:   Is the patient here for a new oncology consult visit in cancer care? 2. No    PHQ9:  Did this patient require a PHQ9?: No      Clinical concerns: Patient has some concerns regarding his cough that he has. He reports that it's been getting worse since the surgery. Also reports of having issues staying asleep.     Erick Mckeon LPN              "

## 2025-06-20 PROCEDURE — B4035 ENTERAL FEED SUPP PUMP PER D: HCPCS

## 2025-06-20 NOTE — OP NOTE
[facility]  Operative Note     Pre-operative diagnosis: Neck infection [L08.9]   Post-operative diagnosis * No post-op diagnosis entered *   Procedure:  Esophagoscopy, gastroscopy, duodenoscopy (EGD), combined w/ stent removal N/A - Esophagus   Fluoroscopic interpretation of images   Surgeon: Katlyn Tobar MD   Assistants(s):     Anesthesia: General               Estimated blood loss: None               Total IV fluids: (See anesthesia record)   Blood transfusion: No transfusion was given during surgery   Total urine output: (See anesthesia record)   Drains: None   Specimens: None   Implants: None      Findings:    Healed leak   Complications: None.   Condition: Stable                     Description of procedure:  After obtaining informed consent the patient was brought to the operating room and laid supine on the operating table.  After general anesthesia we proceeded with and EGD. The stent was remove with fluoroscopy. The defect was almost completely healed with a small diverticular area noted on fluro with no leak,  We decided not to replace the stent.  The patient tolerated the procedure well

## 2025-06-21 PROCEDURE — B4035 ENTERAL FEED SUPP PUMP PER D: HCPCS

## 2025-06-22 PROCEDURE — B4035 ENTERAL FEED SUPP PUMP PER D: HCPCS

## 2025-06-23 ENCOUNTER — PATIENT OUTREACH (OUTPATIENT)
Dept: SURGERY | Facility: CLINIC | Age: 70
End: 2025-06-23
Payer: COMMERCIAL

## 2025-06-23 ENCOUNTER — HOME INFUSION BILLING (OUTPATIENT)
Dept: HOME HEALTH SERVICES | Facility: HOME HEALTH | Age: 70
End: 2025-06-23
Payer: COMMERCIAL

## 2025-06-23 PROCEDURE — B4152 EF CALORIE DENSE>/=1.5KCAL: HCPCS

## 2025-06-23 PROCEDURE — B4035 ENTERAL FEED SUPP PUMP PER D: HCPCS

## 2025-06-23 NOTE — PROGRESS NOTES
"Pt called to update on how the weekend went. Overall, went well, tolerating ice chips and sips. No coughing. The dry cough that he has experienced since surgery has improved. He is very excited to progress diet.    We discussed per plan that he may progress to clear liquids. Pt sent list of clears through [facility] per request.    Pt reports he did wake up in the middle of the night to take melatonin through j tube, then laid back down. About 1.5 hours later, woke up with \"bad heartburn and extreme bitter taste in mouth\". We reviewed he should remain upright after med admin, suspect reflux is r/t this even though it was admin through j tube. RNCC will check with MITZI and get back to pt with her thoughts.     Advised pt that he should follow up with PCP to address insomnia. Pt reports he is seeing PCP this week and will discuss further with them.     Pt in agreement and had no further questions or concerns.    Catherine Del Toro, RN BSN  Thoracic Surgery RN Care Coordinator  Phone: [phone]    "

## 2025-06-24 PROCEDURE — B4035 ENTERAL FEED SUPP PUMP PER D: HCPCS

## 2025-06-25 PROCEDURE — B4035 ENTERAL FEED SUPP PUMP PER D: HCPCS

## 2025-06-26 ENCOUNTER — PATIENT OUTREACH (OUTPATIENT)
Dept: SURGERY | Facility: CLINIC | Age: 70
End: 2025-06-26
Payer: COMMERCIAL

## 2025-06-26 PROCEDURE — B4035 ENTERAL FEED SUPP PUMP PER D: HCPCS

## 2025-06-26 NOTE — PROGRESS NOTES
Spoke with pt who reported that he is tolerating clear diet well. Per ROSY Hernandez pt may advance to full liquid diet but should not take meds orally. We reviewed full liquid diet and pt voiced understanding. RNCC will call pt on Monday to discuss further advancement. Pt will plan to discuss referral to PT with med onc PA whom he is scheduled with tomorrow. We discussed that as long as he follows postop lifting restrictions he is ok to work with PT for concerns with overall deconditioning.    Catherine Del Toro, RN BSN  Thoracic Surgery RN Care Coordinator  Phone: [phone]

## 2025-06-26 NOTE — PROGRESS NOTES
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  Microsatellite stable (GUILLAUME)  See scanned report for other details.  Likely pathogenic alterations noted in TP53, PRKCA, FANCD2    Referring physician or other provider(s):  Dr. Katlyn Tobar, Thoracic Surgery service, [facility]      History of Present Illness/Cancer Diagnosis and Evaluation to date:  His past medical history is most prominent for a history of severe acid reflux, necessitating this and fundoplication around 2011/12, also for issues relating to postprandial hypoglycemia and neuritis/neuropathy, of unclear etiology.  He states that the neuropathic symptoms were attributed by his primary care doctor, and others to long-term use of omeprazole in the 2000s, prior to the Nissen fundoplication surgery.    He has been followed routinely with upper endoscopies over the last decade, many of which have been done within the [facility] system.  The ones done over the last seven years include ones performed on August 30, 2017, December 4, 2018, May 9, 2019, and most recently as September 24, 2024.    He had a CT scan in January 2024 with results as follows:     January 26, 2024--CT a/p--IMPRESSION:   1.  Superior aspect of the Nissen fundal wrap appears about 2 cm above  the diaphragm possibly representing slipped Nissen.  2.  Hepatic steatosis.  3.  Nonobstructing renal calculi. No hydronephrosis.    He was experiencing dysphagia and slipped Nissen  "fundoplication and related issues, some of which were said to be related to worsening occasional reflux, and chest pressure associate with early satiety.  On April 30, 2024, he underwent barium swallow, with results as follows:  IMPRESSION:  1. Timed barium esophagram results as detailed above with retained  esophageal contrast lasting up to 5 minutes. There is patent flow of  contrast.   2. Expected passage of barium tablet into the stomach.      He had continued evaluation and during the summer of 2024, including on July 23, 2024. Part of the assessment included impression that the \"Manometry was consistent with inconclusive manometric EGJOO. Esophagram showed routine contrast lasting up to 5 minutes but patent flow of contrast and tablet.\"      Further evaluation included upper endoscopy, that revealed a nodularity at the gastroesophageal junction, concerning for malignancy, as follows:  September 24, 2024--EGD--Evidence of a Nissen fundoplication was found at the gastroesophageal   junction. The wrap appeared loose. This was traversed.   Localized moderate mucosal changes characterized by erythema and   nodularity were found at the gastroesophageal junction. Biopsies were   taken with a cold forceps for histology.   Specimen:    Gastric Esophageal Junction, Gastroesophageal biopsy                                      Final Diagnosis   A. GASTROESOPHAGEAL JUNCTION, BIOPSY:  - Adenocarcinoma, poorly-differentiated, with signet ring cell features     GASTRIC HER2 BIOMARKER TESTS   Test(s) Performed     HER2 by IHC     Results         With this new finding of malignancy, a staging PET/CT was performed, and no distant metastasis was detected:  October 1, 2024--PET/CT --                                                                 IMPRESSION Findings suspicious for the gastroesophageal junction adenocarcinoma without convincing evidence of tona or metastatic disease.      He further underwent a staging EUS, that " characterized the tumor as staged as T2N0 per EUS criteria:      Oct 8, 2024 EUS--    Impression:            - Malignant esophageal tumor was found at the                          gastroesophageal junction. Siewert 2. Measurements as                          above.                          This is classified T2 N0 (cytology pending) M0 by EUS                          criteria.                          Three small lymph nodes seen as described above. Two                          were sampled (station [id number]) and are preliminarily                          benign. The third was identical in size and appearance                          and not amenable to sampling due to the proximity to                          the heart and proximal margin of the mass.                          - Z-line, 37 cm from the incisors.                          - A Nissen fundoplication was found. This has                          partially slipped above the diaphragm.   Specimen A                 Interpretation:                  Negative for malignancy  Polymorphous population of lymphocytes present                 Adequacy:                 Satisfactory for evaluation        He met with Dr. Katlyn Tobar from our thoracic surgery service here at [facility] on October 3, 2024.      March 24, 2025--Pathologic stage ypT1b N0 Mx     ESOPHAGECTOMY, MINIMALLY INVASIVE, Laparoscopic Right Thorascopic Esophagectomy, Botox Injection   Esophagoscopy, gastroscopy, duodenoscopy (EGD), combined            March 28,. 2025--PREOPERATIVE DIAGNOSES:  1.  GEJ adenocarcinoma s/p KYLE  2. Concern for anastomotic leak.   POSTOPERATIVE DIAGNOSES:  1.  Same  OPERATION PERFORMED:    EGD  2.   Intraoperative esophagram  3.   Supervision and interpretation of intra-op imaging.   SURGEON:  Chino Garcia MD    April 7, 2025--Preoperative diagnosis: Anastomotic leak post esophagectomy  Postoperative diagnosis: Same as preoperative diagnosis  Procedure performed:  1.   Esophagogastroduodenoscopy   2.  LEFT tube thoracostomy (20 Fr.)      DATE OF ADMISSION: 4/17/2025    PRE/POSTOPERATIVE DIAGNOSES: Anastomotic leak after esophagectomy    Severe malnutrition in the context of acute illness or injury   PROCEDURES PERFORMED:   1.  Esophagogastroduodenoscopy with placement of LEFT pharyngostomy tube placement   2.  Fluoroscopy with interpretation of images    April 25, 2025 -- oncology follow-up/in person visit, Dr. Mc. Discussed adjuvant chemotherapy but patient was too frail at this time to start.     He had multiple admissions in April and May due to pharyngostomy tube malfunctions with weakness, fatigue, and dehydration and other tube complications.     INTERVAL HISTORY:  Bal is seen in routine follow-up. He is finally starting to feel better. Had a recent CT showing no leak so he is working on advancing his diet. His J tube feeds are being slowly titrated down. He is walking regularly and gaining stamina. He would like to start cancer rehab PT.     He has been regaining some weight. Energy has been good. He has been active and is up and out of the bed/chair much more than 50% of the day.     He is really interested in receiving adjuvant chemotherapy if it makes sense. He is worried about recurrence.       Past medical, social, surgical, and family histories reviewed, confirmed, and pertinent details discussed with patient and family; additional sources include the medical record.      Past Medical History:   Diagnosis Date    Hypertension     Neuritis     Peripheral neuropathy     Personal history of other medical treatment     postgastrectomy dumping syndrome    Stomach problems Noted earlier      Past Surgical History:   Procedure Laterality Date    ABDOMEN SURGERY  2012?    APPENDECTOMY  1964?    BRONCHOSCOPY RIDID OR FLEXIBLE W/ENDOBRONCHIAL ULTRASOUND GUIDED 3 OR MORE NODE STATIONS N/A 3/19/2025    Procedure: Bronchoscopy Ridid Or Flexible W/Endobronchial Ultrasound Guided  3 Or More Node Stations;  Surgeon: Saad Olmedo MD;  Location: [facility]    COLONOSCOPY  2006, 2016    COMBINED ESOPHAGOSCOPY, GASTROSCOPY, DUODENOSCOPY (EGD), REPLACE ESOPHAGEAL STENT Left 4/18/2025    Procedure: ESOPHAGOGASTRODUODENOSCOPY with pharyngostomy tube placement;  Surgeon: Erik Lindsey MD;  Location: [facility]    COMBINED ESOPHAGOSCOPY, GASTROSCOPY, DUODENOSCOPY (EGD), REPLACE ESOPHAGEAL STENT N/A 6/9/2025    Procedure: ESOPHAGOGASTRODUODENOSCOPY, stent removal, fluroscopy;  Surgeon: Katlyn Tobar MD;  Location: [facility]    DAVINCI NISSEN FUNDOPLICATION N/A 2/26/2025    Procedure: TAKE-DOWN, FUNDOPLICATION, NISSEN, LAPAROSCOPIC- ROBOTIC, gastrostomy takedown, lysisof adhesions 2 hr;  Surgeon: Katlyn Tobar MD;  Location: [facility]    ESOPHAGEAL BALLOON PROVOCATION STUDY N/A 9/24/2024    Procedure: Esophageal Balloon Provocation Study;  Surgeon: Carson Michel DO;  Location: [facility]    ESOPHAGECTOMY MINIMALLY INVASIVE N/A 3/24/2025    Procedure: ESOPHAGECTOMY, MINIMALLY INVASIVE, Laparoscopic Right Thorascopic Esophagectomy, Botox Injection;  Surgeon: Katlyn Tobar MD;  [facility]: [facility]    ESOPHAGOSCOPY, GASTROSCOPY, DUODENOSCOPY (EGD), COMBINED N/A 9/24/2024    Procedure: ESOPHAGOGASTRODUODENOSCOPY, WITH BIOPSY;  Surgeon: Carson Michel DO;  Location: [facility]    ESOPHAGOSCOPY, GASTROSCOPY, DUODENOSCOPY (EGD), COMBINED N/A 10/8/2024    Procedure: ESOPHAGOGASTRODUODENOSCOPY, WITH FINE NEEDLE ASPIRATION BIOPSY, WITH ENDOSCOPIC ULTRASOUND GUIDANCE;  Surgeon: Lance Lopez MD;  Location: [facility]    ESOPHAGOSCOPY, GASTROSCOPY, DUODENOSCOPY (EGD), COMBINED N/A 2/26/2025    Procedure: Esophagoscopy, gastroscopy, duodenoscopy (EGD);  Surgeon: Katlyn Tobar MD;  [facility]    ESOPHAGOSCOPY, GASTROSCOPY, DUODENOSCOPY (EGD), COMBINED N/A 3/24/2025    Procedure: Esophagoscopy, gastroscopy, duodenoscopy (EGD), combined;  Surgeon: Katlyn Tobar MD;  [facility]: [facility]    ESOPHAGOSCOPY, GASTROSCOPY,  DUODENOSCOPY (EGD), COMBINED N/A 3/28/2025    Procedure: ESOPHAGOGASTRODUODENOSCOPY, pharyngostomy tube replacement;  Surgeon: Chino Gardner MD;  [facility]    ESOPHAGOSCOPY, GASTROSCOPY, DUODENOSCOPY (EGD), COMBINED N/A 4/29/2025    Procedure: Esophagoscopy, gastroscopy, duodenoscopy (EGD), fluroscopy, revision of pharnygoscopy tube;  Surgeon: Katlyn Tobar MD;  [facility]    ESOPHAGOSCOPY, GASTROSCOPY, DUODENOSCOPY (EGD), COMBINED N/A 5/15/2025    Procedure: Esophagoscopy, gastroscopy, duodenoscopy (EGD), combined w/ pharyngoscopy tube removal, stent placement;  Surgeon: Katlyn Tobar MD;  Location: [facility]    EYE SURGERY  199x    laser for retinal tears    GASTRIC FUNDOPLICATION      HERNIA REPAIR  1961?    IR CHEST PORT PLACEMENT > 5 YRS OF AGE  10/24/2024    LAPAROSCOPIC ASSISTED INSERTION TUBE JEJUNOSTOMY N/A 2/26/2025    Procedure: Laparoscopic assisted insertion tube jejunostomy;  Surgeon: Katlyn Tobar MD;  Location: [facility]    LAPAROSCOPIC TAKEDOWN NISSEN FUNDOPLICATION N/A 9/25/2020    Procedure: TAKE-DOWN, FUNDOPLICATION, REDO NISSEN, LAPAROSCOPIC, gastrostomy feeding tube placement;  Surgeon: Katlyn Tobar MD;  Location: [facility]    CT ESOPHAGOGASTRODUODENOSCOPY TRANSORAL DIAGNOSTIC N/A 5/9/2019    Procedure: UPPER GASTROINTESTINAL ENDOSCOPY;  Surgeon: Dino Ward MD;  [facility]: [facility];  Service: General    SOFT TISSUE SURGERY  2009?    JCARLOS l. thumb          SOCIAL HISTORY: Lives in [place], [place] with his wife Jess. They've been  for 15 years.  He has three daughters, ages 38, 42, 50.  He is retired from working in computer security.  He denies any history of oral tobacco or cigarette use in his lifetime.  Alcohol use that he reports lifetime is five drinks per week since age of 17.  Info from other notes:    Alcohol use: Yes        Alcohol/week: 3.0 - 6.0 standard drinks of alcohol       Types: 1 - 2 Glasses of wine, 1 - 2 Cans of beer, 1 - 2 Shots of  liquor per week       Comment: glass of win   He is an avid skier, and enjoy skiing a winter, and sometimes the peripheral neuropathycan interfere with that aspect of quality of life.    FAMILY HISTORY OF CANCER: his mother was diagnosed with uterine cancer in her late 30s; this diagnosis took place in the 1960s, and she  in  of unrelated causes.  He has two sisters, in good health, and his father had a superficial form of skin cancer, otherwise no other family history of cancer.      Allergies:  Allergies as of 2025 - Reviewed 2025   Allergen Reaction Noted    Hornets  2020    Wasp venom protein Hives 2016    Bee venom Swelling 2019       Current Medications:  Current Outpatient Medications   Medication Sig Dispense Refill    acetaminophen (TYLENOL) 325 MG/10.15ML liquid Place 20.3 mLs (650 mg) into J tube every 4 hours as needed for mild pain or fever. 500 mL 0    atenolol (TENORMIN) 50 MG tablet Place 1 tablet (50 mg) into G tube daily. (Patient taking differently: Place 25 mg into G tube daily.)      calcium carbonate (TUMS) 500 MG chewable tablet Take 1 tablet (500 mg) by mouth daily as needed for heartburn. 30 tablet 0    cyanocobalamin (VITAMIN B-12) 500 MCG tablet Place 500 mcg into Feeding Tube every other day.      dextromethorphan (DELSYM) 30 MG/5ML liquid Take 5 mLs (30 mg) by mouth once as needed for cough.      EPINEPHrine (ANY BX GENERIC EQUIV) 0.3 MG/0.3ML injection 2-pack Inject 0.3 mg into the muscle as needed for anaphylaxis.      gabapentin (NEURONTIN) 300 MG capsule Place 1 capsule (300 mg) into Feeding Tube 3 times daily. (Patient taking differently: Place 300 mg into Feeding Tube 3 times daily. 600 MG morning, 600 MG noon, and 300 MG bedtime)      Isosource 1.5 Gumaro 250 mL Place 1,500 mLs into J tube daily. Infuse via pump. 90 mL/hr x 16 hours.   6 cartons per day.   Water flush: 120 mL before and after tube feed cycle. Additional 120 mL 8x per day.    Kcals: 2250 50186 mL 11    Lidocaine (LIDOCARE) 4 % Patch Place 1 patch over 12 hours onto the skin every 24 hours. To prevent lidocaine toxicity, patient should be patch free for 12 hrs daily. 4 patch 0    loperamide (IMODIUM) 1 MG/7.5ML liquid Place 15 mLs (2 mg) into Feeding Tube 3 times daily. (Patient taking differently: Place 2 mg into Feeding Tube daily.) 711 mL 0    methocarbamol (ROBAXIN) 500 MG tablet Place 1 tablet (500 mg) into J tube 4 times daily. 120 tablet 0    methocarbamol (ROBAXIN) 500 MG tablet Place 1 tablet (500 mg) into J tube 4 times daily. 30 tablet 0    nortriptyline (PAMELOR) 25 MG capsule Place 1 capsule (25 mg) into J tube every evening.      Nutrisource Fiber PO packet Place 1 each into Feeding Tube 2 times daily. 60 each 11    omeprazole (PRILOSEC) 40 MG DR capsule Take 20 mg by mouth daily.      Prosource TF PO LIQD (PROSOURCE TF) Place 45 mLs into J tube 2 times daily. Infuse via syringe  Water flush: 30 mL before and after each packet   Kcals: 80  2 pkts per day 2700 mL 11    naloxone (NARCAN) 0.4 MG/ML injection Inject 0.5 mLs (0.2 mg) into the muscle once for 1 dose. (Patient not taking: No sig reported) 0.5 mL 0        Physical Exam:  /75   Pulse 63   Temp 98.2  F (36.8  C)   Resp 16   Wt 78.9 kg (174 lb)   SpO2 98%   BMI 25.70 kg/m    GENERAL: Well appearing, pleasant, alert, oriented, NAD  HEENT:  PERRLA, anicteric sclerae. Oropharynx clear, with no evidence of mucositis, thrush  NECK: No line, supple, no LAD   CV: RRR  LUNGS: CTAB  ABDOMEN:  Soft, nontender, nondistended, +BS. J tube in place and skin has no signs of cellulitis   EXTREMITIES:  No clubbing, cyanosis, edema, or palpable cords.    Laboratory/Imaging Studies   Latest Reference Range & Units 06/27/25 11:20   Sodium 135 - 145 mmol/L 137   Potassium 3.4 - 5.3 mmol/L 4.5   Chloride 98 - 107 mmol/L 103   Carbon Dioxide (CO2) 22 - 29 mmol/L 22   Urea Nitrogen 8.0 - 23.0 mg/dL 22.5   Creatinine 0.67 - 1.17  mg/dL 0.69   GFR Estimate >60 mL/min/1.73m2 >90   Calcium 8.8 - 10.4 mg/dL 9.2   Anion Gap 7 - 15 mmol/L 12   Albumin 3.5 - 5.2 g/dL 4.0   Protein Total 6.4 - 8.3 g/dL 6.9   Alkaline Phosphatase 40 - 150 U/L 120   ALT 0 - 70 U/L 22   AST 0 - 45 U/L 23   Bilirubin Total <=1.2 mg/dL 0.3   Glucose 70 - 99 mg/dL 124 (H)   WBC 4.0 - 11.0 10e3/uL 7.6   Hemoglobin 13.3 - 17.7 g/dL 12.1 (L)   Hematocrit 40.0 - 53.0 % 37.0 (L)   Platelet Count 150 - 450 10e3/uL 185   RBC Count 4.40 - 5.90 10e6/uL 4.14 (L)   MCV 78 - 100 fL 89   MCH 26.5 - 33.0 pg 29.2   MCHC 31.5 - 36.5 g/dL 32.7   RDW 10.0 - 15.0 % 14.7   % Neutrophils % 66   % Lymphocytes % 21   % Monocytes % 10   % Eosinophils % 2   % Basophils % 0   % Immature Granulocytes % 0   NRBC/W <1 /100 0   Absolute Neutrophil 1.6 - 8.3 10e3/uL 5.0   Absolute Lymphocytes 0.8 - 5.3 10e3/uL 1.6   Absolute Monocytes 0.0 - 1.3 10e3/uL 0.8   Absolute Eosinophils 0.0 - 0.7 10e3/uL 0.2   Absolute Basophils 0.0 - 0.2 10e3/uL 0.0   Absolute Immature Granulocytes <=0.4 10e3/uL 0.0   Absolute NRBCs 10e3/uL 0.0       ASSESSMENT/PLAN:  Mr. Bal Junior is a 70-year-old gentleman from [place], [place] with T2N0 GE junction carcinoma.     He had neoadjuvant FLOT x 4 followed by esophagectomy late March 2025 showing excellent treatment response with TX of [id number]. His surgery was complicated by anastomatic leak. This has finally improved and he is  working on advancing his diet. His performance status is much better than last visit end of April. ECOG is 1 and appropriate for treatment. I think post op FLOT would be too toxic with his recent complications but FOLFOX would be more manageable. Discussed with Dr. Mc and we would plan for FOLFOX x 4. I would get a new baseline scan prior to starting and one about a month out from the end of chemotherapy as well. Reviewed the typical adjuvant therapy is started 4-8 weeks after surgery to give best benefit for risk reduction however since he does have a  higher recurrence risk we will give him delayed adjuvant therapy. Bal agrees with this plan. Will work on getting started after the fourth of July holiday.     He will continue to work with Thoracic surgery team as planned. I will put referral in for cancer rehab. Will see him back prior to FOLFOX C1 after his new baseline CT. He will call with any interval concerns.     35 minutes spent on the date of the encounter doing chart review, review of test results, interpretation of tests, patient visit, and documentation     The longitudinal plan of care for the diagnosis(es)/condition(s) as documented were addressed during this visit. Due to the added complexity in care, I will continue to support Bal in the subsequent management and with ongoing continuity of care.    Riya Wiley PA-C

## 2025-06-27 ENCOUNTER — ONCOLOGY VISIT (OUTPATIENT)
Dept: ONCOLOGY | Facility: CLINIC | Age: 70
End: 2025-06-27
Attending: PHYSICIAN ASSISTANT
Payer: MEDICARE

## 2025-06-27 ENCOUNTER — APPOINTMENT (OUTPATIENT)
Dept: LAB | Facility: CLINIC | Age: 70
End: 2025-06-27
Attending: PHYSICIAN ASSISTANT
Payer: MEDICARE

## 2025-06-27 VITALS
SYSTOLIC BLOOD PRESSURE: 123 MMHG | OXYGEN SATURATION: 98 % | WEIGHT: 174 LBS | DIASTOLIC BLOOD PRESSURE: 75 MMHG | HEART RATE: 63 BPM | BODY MASS INDEX: 25.7 KG/M2 | TEMPERATURE: 98.2 F | RESPIRATION RATE: 16 BRPM

## 2025-06-27 DIAGNOSIS — C16.0 ADENOCARCINOMA OF GASTROESOPHAGEAL JUNCTION (H): ICD-10-CM

## 2025-06-27 LAB
ALBUMIN SERPL BCG-MCNC: 4 G/DL (ref 3.5–5.2)
ALP SERPL-CCNC: 120 U/L (ref 40–150)
ALT SERPL W P-5'-P-CCNC: 22 U/L (ref 0–70)
ANION GAP SERPL CALCULATED.3IONS-SCNC: 12 MMOL/L (ref 7–15)
AST SERPL W P-5'-P-CCNC: 23 U/L (ref 0–45)
BASOPHILS # BLD AUTO: 0 10E3/UL (ref 0–0.2)
BASOPHILS NFR BLD AUTO: 0 %
BILIRUB SERPL-MCNC: 0.3 MG/DL
BUN SERPL-MCNC: 22.5 MG/DL (ref 8–23)
CALCIUM SERPL-MCNC: 9.2 MG/DL (ref 8.8–10.4)
CHLORIDE SERPL-SCNC: 103 MMOL/L (ref 98–107)
CREAT SERPL-MCNC: 0.69 MG/DL (ref 0.67–1.17)
EGFRCR SERPLBLD CKD-EPI 2021: >90 ML/MIN/1.73M2
EOSINOPHIL # BLD AUTO: 0.2 10E3/UL (ref 0–0.7)
EOSINOPHIL NFR BLD AUTO: 2 %
ERYTHROCYTE [DISTWIDTH] IN BLOOD BY AUTOMATED COUNT: 14.7 % (ref 10–15)
GLUCOSE SERPL-MCNC: 124 MG/DL (ref 70–99)
HCO3 SERPL-SCNC: 22 MMOL/L (ref 22–29)
HCT VFR BLD AUTO: 37 % (ref 40–53)
HGB BLD-MCNC: 12.1 G/DL (ref 13.3–17.7)
IMM GRANULOCYTES # BLD: 0 10E3/UL
IMM GRANULOCYTES NFR BLD: 0 %
LYMPHOCYTES # BLD AUTO: 1.6 10E3/UL (ref 0.8–5.3)
LYMPHOCYTES NFR BLD AUTO: 21 %
MCH RBC QN AUTO: 29.2 PG (ref 26.5–33)
MCHC RBC AUTO-ENTMCNC: 32.7 G/DL (ref 31.5–36.5)
MCV RBC AUTO: 89 FL (ref 78–100)
MONOCYTES # BLD AUTO: 0.8 10E3/UL (ref 0–1.3)
MONOCYTES NFR BLD AUTO: 10 %
NEUTROPHILS # BLD AUTO: 5 10E3/UL (ref 1.6–8.3)
NEUTROPHILS NFR BLD AUTO: 66 %
NRBC # BLD AUTO: 0 10E3/UL
NRBC BLD AUTO-RTO: 0 /100
PLATELET # BLD AUTO: 185 10E3/UL (ref 150–450)
POTASSIUM SERPL-SCNC: 4.5 MMOL/L (ref 3.4–5.3)
PROT SERPL-MCNC: 6.9 G/DL (ref 6.4–8.3)
RBC # BLD AUTO: 4.14 10E6/UL (ref 4.4–5.9)
SODIUM SERPL-SCNC: 137 MMOL/L (ref 135–145)
WBC # BLD AUTO: 7.6 10E3/UL (ref 4–11)

## 2025-06-27 PROCEDURE — G2211 COMPLEX E/M VISIT ADD ON: HCPCS | Performed by: PHYSICIAN ASSISTANT

## 2025-06-27 PROCEDURE — 80053 COMPREHEN METABOLIC PANEL: CPT | Performed by: PHYSICIAN ASSISTANT

## 2025-06-27 PROCEDURE — 99214 OFFICE O/P EST MOD 30 MIN: CPT | Mod: 24 | Performed by: PHYSICIAN ASSISTANT

## 2025-06-27 PROCEDURE — 85004 AUTOMATED DIFF WBC COUNT: CPT | Performed by: PHYSICIAN ASSISTANT

## 2025-06-27 PROCEDURE — G0463 HOSPITAL OUTPT CLINIC VISIT: HCPCS | Performed by: PHYSICIAN ASSISTANT

## 2025-06-27 PROCEDURE — 250N000011 HC RX IP 250 OP 636: Performed by: PHYSICIAN ASSISTANT

## 2025-06-27 PROCEDURE — B4035 ENTERAL FEED SUPP PUMP PER D: HCPCS

## 2025-06-27 PROCEDURE — 36591 DRAW BLOOD OFF VENOUS DEVICE: CPT | Performed by: PHYSICIAN ASSISTANT

## 2025-06-27 RX ORDER — HEPARIN SODIUM (PORCINE) LOCK FLUSH IV SOLN 100 UNIT/ML 100 UNIT/ML
5 SOLUTION INTRAVENOUS ONCE
Status: COMPLETED | OUTPATIENT
Start: 2025-06-27 | End: 2025-06-27

## 2025-06-27 RX ADMIN — Medication 5 ML: at 11:23

## 2025-06-27 ASSESSMENT — PAIN SCALES - GENERAL: PAINLEVEL_OUTOF10: NO PAIN (0)

## 2025-06-27 NOTE — NURSING NOTE
"Oncology Rooming Note    June 27, 2025 11:29 AM   Bal Junior is a 70 year old male who presents for:    Chief Complaint   Patient presents with    Port Draw     Labs collected from port by RN. Vitals taken. Checked in for appointment(s).     Oncology Clinic Visit     GE Junction Adenocarcinoma     Initial Vitals: /75   Pulse 63   Temp 98.2  F (36.8  C)   Resp 16   Wt 78.9 kg (174 lb)   SpO2 98%   BMI 25.70 kg/m   Estimated body mass index is 25.7 kg/m  as calculated from the following:    Height as of 6/19/25: 1.753 m (5' 9\").    Weight as of this encounter: 78.9 kg (174 lb). Body surface area is 1.96 meters squared.  No Pain (0) Comment: Data Unavailable   No LMP for male patient.  Allergies reviewed: Yes  Medications reviewed: Yes    Medications: Medication refills not needed today.  Pharmacy name entered into [facility]:    [facility] - [place] [id number] AT [facility].OPEN TO ALL.  [facility] - [facility]    Frailty Screening:   Is the patient here for a new oncology consult visit in cancer care? 2. No    PHQ9:  Did this patient require a PHQ9?: No      Clinical concerns:        Lisa Flores CMA              "

## 2025-06-27 NOTE — LETTER
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  Microsatellite stable (GUILLAUME)  See scanned report for other details.  Likely pathogenic alterations noted in TP53, PRKCA, FANCD2    Referring physician or other provider(s):  Dr. Katlyn Tobar, Thoracic Surgery service, [facility]      History of Present Illness/Cancer Diagnosis and Evaluation to date:  His past medical history is most prominent for a history of severe acid reflux, necessitating this and fundoplication around 2011/12, also for issues relating to postprandial hypoglycemia and neuritis/neuropathy, of unclear etiology.  He states that the neuropathic symptoms were attributed by his primary care doctor, and others to long-term use of omeprazole in the 2000s, prior to the Nissen fundoplication surgery.    He has been followed routinely with upper endoscopies over the last decade, many of which have been done within the [facility] system.  The ones done over the last seven years include ones performed on August 30, 2017, December 4, 2018, May 9, 2019, and most recently as September 24, 2024.    He had a CT scan in January 2024 with results as follows:     January 26, 2024--CT a/p--IMPRESSION:   1.  Superior aspect of the Nissen fundal wrap appears about 2 cm above  the diaphragm possibly representing slipped Nissen.  2.  Hepatic steatosis.  3.  Nonobstructing  "renal calculi. No hydronephrosis.    He was experiencing dysphagia and slipped Nissen fundoplication and related issues, some of which were said to be related to worsening occasional reflux, and chest pressure associate with early satiety.  On April 30, 2024, he underwent barium swallow, with results as follows:  IMPRESSION:  1. Timed barium esophagram results as detailed above with retained  esophageal contrast lasting up to 5 minutes. There is patent flow of  contrast.   2. Expected passage of barium tablet into the stomach.      He had continued evaluation and during the summer of 2024, including on July 23, 2024. Part of the assessment included impression that the \"Manometry was consistent with inconclusive manometric EGJOO. Esophagram showed routine contrast lasting up to 5 minutes but patent flow of contrast and tablet.\"      Further evaluation included upper endoscopy, that revealed a nodularity at the gastroesophageal junction, concerning for malignancy, as follows:  September 24, 2024--EGD--Evidence of a Nissen fundoplication was found at the gastroesophageal   junction. The wrap appeared loose. This was traversed.   Localized moderate mucosal changes characterized by erythema and   nodularity were found at the gastroesophageal junction. Biopsies were   taken with a cold forceps for histology.   Specimen:    Gastric Esophageal Junction, Gastroesophageal biopsy                                      Final Diagnosis   A. GASTROESOPHAGEAL JUNCTION, BIOPSY:  - Adenocarcinoma, poorly-differentiated, with signet ring cell features     GASTRIC HER2 BIOMARKER TESTS   Test(s) Performed     HER2 by IHC     Results         With this new finding of malignancy, a staging PET/CT was performed, and no distant metastasis was detected:  October 1, 2024--PET/CT --                                                                 IMPRESSION Findings suspicious for the gastroesophageal junction adenocarcinoma without convincing " evidence of tona or metastatic disease.      He further underwent a staging EUS, that characterized the tumor as staged as T2N0 per EUS criteria:      Oct 8, 2024 EUS--    Impression:            - Malignant esophageal tumor was found at the                          gastroesophageal junction. Siewert 2. Measurements as                          above.                          This is classified T2 N0 (cytology pending) M0 by EUS                          criteria.                          Three small lymph nodes seen as described above. Two                          were sampled (station [id number]) and are preliminarily                          benign. The third was identical in size and appearance                          and not amenable to sampling due to the proximity to                          the heart and proximal margin of the mass.                          - Z-line, 37 cm from the incisors.                          - A Nissen fundoplication was found. This has                          partially slipped above the diaphragm.   Specimen A                 Interpretation:                  Negative for malignancy  Polymorphous population of lymphocytes present                 Adequacy:                 Satisfactory for evaluation        He met with Dr. Katlyn Tobar from our thoracic surgery service here at [facility] on October 3, 2024.      March 24, 2025--Pathologic stage ypT1b N0 Mx     ESOPHAGECTOMY, MINIMALLY INVASIVE, Laparoscopic Right Thorascopic Esophagectomy, Botox Injection   Esophagoscopy, gastroscopy, duodenoscopy (EGD), combined            March 28,. 2025--PREOPERATIVE DIAGNOSES:  1.  GEJ adenocarcinoma s/p KYLE  2. Concern for anastomotic leak.   POSTOPERATIVE DIAGNOSES:  1.  Same  OPERATION PERFORMED:    EGD  2.   Intraoperative esophagram  3.   Supervision and interpretation of intra-op imaging.   SURGEON:  Chino Garcia MD    April 7, 2025--Preoperative diagnosis: Anastomotic leak post  esophagectomy  Postoperative diagnosis: Same as preoperative diagnosis  Procedure performed:  1.  Esophagogastroduodenoscopy   2.  LEFT tube thoracostomy (20 Fr.)      DATE OF ADMISSION: 4/17/2025    PRE/POSTOPERATIVE DIAGNOSES: Anastomotic leak after esophagectomy    Severe malnutrition in the context of acute illness or injury   PROCEDURES PERFORMED:   1.  Esophagogastroduodenoscopy with placement of LEFT pharyngostomy tube placement   2.  Fluoroscopy with interpretation of images    April 25, 2025 -- oncology follow-up/in person visit, Dr. Mc. Discussed adjuvant chemotherapy but patient was too frail at this time to start.     He had multiple admissions in April and May due to pharyngostomy tube malfunctions with weakness, fatigue, and dehydration and other tube complications.     INTERVAL HISTORY:  Bal is seen in routine follow-up. He is finally starting to feel better. Had a recent CT showing no leak so he is working on advancing his diet. His J tube feeds are being slowly titrated down. He is walking regularly and gaining stamina. He would like to start cancer rehab PT.     He has been regaining some weight. Energy has been good. He has been active and is up and out of the bed/chair much more than 50% of the day.     He is really interested in receiving adjuvant chemotherapy if it makes sense. He is worried about recurrence.       Past medical, social, surgical, and family histories reviewed, confirmed, and pertinent details discussed with patient and family; additional sources include the medical record.      Past Medical History:   Diagnosis Date     Hypertension      Neuritis      Peripheral neuropathy      Personal history of other medical treatment     postgastrectomy dumping syndrome     Stomach problems Noted earlier      Past Surgical History:   Procedure Laterality Date     ABDOMEN SURGERY  2012?     APPENDECTOMY  1964?     BRONCHOSCOPY RIDID OR FLEXIBLE W/ENDOBRONCHIAL ULTRASOUND GUIDED 3 OR MORE  NODE STATIONS N/A 3/19/2025    Procedure: Bronchoscopy Ridid Or Flexible W/Endobronchial Ultrasound Guided 3 Or More Node Stations;  Surgeon: Saad Olmedo MD;  Location: [facility]     COLONOSCOPY  2006, 2016     COMBINED ESOPHAGOSCOPY, GASTROSCOPY, DUODENOSCOPY (EGD), REPLACE ESOPHAGEAL STENT Left 4/18/2025    Procedure: ESOPHAGOGASTRODUODENOSCOPY with pharyngostomy tube placement;  Surgeon: Erik Lindsey MD;  Location: [facility]     COMBINED ESOPHAGOSCOPY, GASTROSCOPY, DUODENOSCOPY (EGD), REPLACE ESOPHAGEAL STENT N/A 6/9/2025    Procedure: ESOPHAGOGASTRODUODENOSCOPY, stent removal, fluroscopy;  Surgeon: Katlyn Tobar MD;  Location: [facility]     DAVINCI NISSEN FUNDOPLICATION N/A 2/26/2025    Procedure: TAKE-DOWN, FUNDOPLICATION, NISSEN, LAPAROSCOPIC- ROBOTIC, gastrostomy takedown, lysisof adhesions 2 hr;  Surgeon: Katlyn Tobar MD;  Location: [facility]     ESOPHAGEAL BALLOON PROVOCATION STUDY N/A 9/24/2024    Procedure: Esophageal Balloon Provocation Study;  Surgeon: Carson Michel DO;  Location: [facility]     ESOPHAGECTOMY MINIMALLY INVASIVE N/A 3/24/2025    Procedure: ESOPHAGECTOMY, MINIMALLY INVASIVE, Laparoscopic Right Thorascopic Esophagectomy, Botox Injection;  Surgeon: Katlyn Tobar MD;  Location: [facility]     ESOPHAGOSCOPY, GASTROSCOPY, DUODENOSCOPY (EGD), COMBINED N/A 9/24/2024    Procedure: ESOPHAGOGASTRODUODENOSCOPY, WITH BIOPSY;  Surgeon: Carson Michel DO;  Location: [facility]     ESOPHAGOSCOPY, GASTROSCOPY, DUODENOSCOPY (EGD), COMBINED N/A 10/8/2024    Procedure: ESOPHAGOGASTRODUODENOSCOPY, WITH FINE NEEDLE ASPIRATION BIOPSY, WITH ENDOSCOPIC ULTRASOUND GUIDANCE;  Surgeon: Lance Lopez MD;  Location: [facility]     ESOPHAGOSCOPY, GASTROSCOPY, DUODENOSCOPY (EGD), COMBINED N/A 2/26/2025    Procedure: Esophagoscopy, gastroscopy, duodenoscopy (EGD);  Surgeon: Katlyn Tobar MD;  [facility]: [facility]     ESOPHAGOSCOPY, GASTROSCOPY, DUODENOSCOPY (EGD), COMBINED N/A 3/24/2025    Procedure: Esophagoscopy,  gastroscopy, duodenoscopy (EGD), combined;  Surgeon: Katlyn Tobar MD;  [facility]: [facility]     ESOPHAGOSCOPY, GASTROSCOPY, DUODENOSCOPY (EGD), COMBINED N/A 3/28/2025    Procedure: ESOPHAGOGASTRODUODENOSCOPY, pharyngostomy tube replacement;  Surgeon: Chino Gardner MD;  [facility]: [facility]     ESOPHAGOSCOPY, GASTROSCOPY, DUODENOSCOPY (EGD), COMBINED N/A 4/29/2025    Procedure: Esophagoscopy, gastroscopy, duodenoscopy (EGD), fluroscopy, revision of pharnygoscopy tube;  Surgeon: Katlyn Tobar MD;  Location: [facility]     ESOPHAGOSCOPY, GASTROSCOPY, DUODENOSCOPY (EGD), COMBINED N/A 5/15/2025    Procedure: Esophagoscopy, gastroscopy, duodenoscopy (EGD), combined w/ pharyngoscopy tube removal, stent placement;  Surgeon: Katlyn Tobar MD;  Location: [facility]     EYE SURGERY  199x    laser for retinal tears     GASTRIC FUNDOPLICATION       HERNIA REPAIR  1961?     IR CHEST PORT PLACEMENT > 5 YRS OF AGE  10/24/2024     LAPAROSCOPIC ASSISTED INSERTION TUBE JEJUNOSTOMY N/A 2/26/2025    Procedure: Laparoscopic assisted insertion tube jejunostomy;  Surgeon: Katlyn Tobar MD;  Location: [facility]     LAPAROSCOPIC TAKEDOWN NISSEN FUNDOPLICATION N/A 9/25/2020    Procedure: TAKE-DOWN, FUNDOPLICATION, REDO NISSEN, LAPAROSCOPIC, gastrostomy feeding tube placement;  Surgeon: Katlyn Tobar MD;  Location: [facility]     CA ESOPHAGOGASTRODUODENOSCOPY TRANSORAL DIAGNOSTIC N/A 5/9/2019    Procedure: UPPER GASTROINTESTINAL ENDOSCOPY;  Surgeon: Dino Ward MD;  Location: [facility];  Service: General     SOFT TISSUE SURGERY  2009?    MCL l. thumb          SOCIAL HISTORY: Lives in [place], [place] with his wife Jess. They've been  for 15 years.  He has three daughters, ages 38, 42, 50.  He is retired from working in computer security.  He denies any history of oral tobacco or cigarette use in his lifetime.  Alcohol use that he reports lifetime is five drinks per week since age of 17.  Info from other notes:    Alcohol use:  Yes        Alcohol/week: 3.0 - 6.0 standard drinks of alcohol       Types: 1 - 2 Glasses of wine, 1 - 2 Cans of beer, 1 - 2 Shots of liquor per week       Comment: glass of win   He is an avid skier, and enjoy skiing a winter, and sometimes the peripheral neuropathycan interfere with that aspect of quality of life.    FAMILY HISTORY OF CANCER: his mother was diagnosed with uterine cancer in her late 30s; this diagnosis took place in the 1960s, and she  in  of unrelated causes.  He has two sisters, in good health, and his father had a superficial form of skin cancer, otherwise no other family history of cancer.      Allergies:  Allergies as of 2025 - Reviewed 2025   Allergen Reaction Noted     Hornets  2020     Wasp venom protein Hives 2016     Bee venom Swelling 2019       Current Medications:  Current Outpatient Medications   Medication Sig Dispense Refill     acetaminophen (TYLENOL) 325 MG/10.15ML liquid Place 20.3 mLs (650 mg) into J tube every 4 hours as needed for mild pain or fever. 500 mL 0     atenolol (TENORMIN) 50 MG tablet Place 1 tablet (50 mg) into G tube daily. (Patient taking differently: Place 25 mg into G tube daily.)       calcium carbonate (TUMS) 500 MG chewable tablet Take 1 tablet (500 mg) by mouth daily as needed for heartburn. 30 tablet 0     cyanocobalamin (VITAMIN B-12) 500 MCG tablet Place 500 mcg into Feeding Tube every other day.       dextromethorphan (DELSYM) 30 MG/5ML liquid Take 5 mLs (30 mg) by mouth once as needed for cough.       EPINEPHrine (ANY BX GENERIC EQUIV) 0.3 MG/0.3ML injection 2-pack Inject 0.3 mg into the muscle as needed for anaphylaxis.       gabapentin (NEURONTIN) 300 MG capsule Place 1 capsule (300 mg) into Feeding Tube 3 times daily. (Patient taking differently: Place 300 mg into Feeding Tube 3 times daily. 600 MG morning, 600 MG noon, and 300 MG bedtime)       Isosource 1.5 Gumaro 250 mL Place 1,500 mLs into J tube daily. Infuse  via pump. 90 mL/hr x 16 hours.   6 cartons per day.   Water flush: 120 mL before and after tube feed cycle. Additional 120 mL 8x per day.   Kcals: 2250 52865 mL 11     Lidocaine (LIDOCARE) 4 % Patch Place 1 patch over 12 hours onto the skin every 24 hours. To prevent lidocaine toxicity, patient should be patch free for 12 hrs daily. 4 patch 0     loperamide (IMODIUM) 1 MG/7.5ML liquid Place 15 mLs (2 mg) into Feeding Tube 3 times daily. (Patient taking differently: Place 2 mg into Feeding Tube daily.) 711 mL 0     methocarbamol (ROBAXIN) 500 MG tablet Place 1 tablet (500 mg) into J tube 4 times daily. 120 tablet 0     methocarbamol (ROBAXIN) 500 MG tablet Place 1 tablet (500 mg) into J tube 4 times daily. 30 tablet 0     nortriptyline (PAMELOR) 25 MG capsule Place 1 capsule (25 mg) into J tube every evening.       Nutrisource Fiber PO packet Place 1 each into Feeding Tube 2 times daily. 60 each 11     omeprazole (PRILOSEC) 40 MG DR capsule Take 20 mg by mouth daily.       Prosource TF PO LIQD (PROSOURCE TF) Place 45 mLs into J tube 2 times daily. Infuse via syringe  Water flush: 30 mL before and after each packet   Kcals: 80  2 pkts per day 2700 mL 11     naloxone (NARCAN) 0.4 MG/ML injection Inject 0.5 mLs (0.2 mg) into the muscle once for 1 dose. (Patient not taking: No sig reported) 0.5 mL 0        Physical Exam:  /75   Pulse 63   Temp 98.2  F (36.8  C)   Resp 16   Wt 78.9 kg (174 lb)   SpO2 98%   BMI 25.70 kg/m    GENERAL: Well appearing, pleasant, alert, oriented, NAD  HEENT:  PERRLA, anicteric sclerae. Oropharynx clear, with no evidence of mucositis, thrush  NECK: No line, supple, no LAD   CV: RRR  LUNGS: CTAB  ABDOMEN:  Soft, nontender, nondistended, +BS. J tube in place and skin has no signs of cellulitis   EXTREMITIES:  No clubbing, cyanosis, edema, or palpable cords.    Laboratory/Imaging Studies   Latest Reference Range & Units 06/27/25 11:20   Sodium 135 - 145 mmol/L 137   Potassium 3.4 - 5.3  mmol/L 4.5   Chloride 98 - 107 mmol/L 103   Carbon Dioxide (CO2) 22 - 29 mmol/L 22   Urea Nitrogen 8.0 - 23.0 mg/dL 22.5   Creatinine 0.67 - 1.17 mg/dL 0.69   GFR Estimate >60 mL/min/1.73m2 >90   Calcium 8.8 - 10.4 mg/dL 9.2   Anion Gap 7 - 15 mmol/L 12   Albumin 3.5 - 5.2 g/dL 4.0   Protein Total 6.4 - 8.3 g/dL 6.9   Alkaline Phosphatase 40 - 150 U/L 120   ALT 0 - 70 U/L 22   AST 0 - 45 U/L 23   Bilirubin Total <=1.2 mg/dL 0.3   Glucose 70 - 99 mg/dL 124 (H)   WBC 4.0 - 11.0 10e3/uL 7.6   Hemoglobin 13.3 - 17.7 g/dL 12.1 (L)   Hematocrit 40.0 - 53.0 % 37.0 (L)   Platelet Count 150 - 450 10e3/uL 185   RBC Count 4.40 - 5.90 10e6/uL 4.14 (L)   MCV 78 - 100 fL 89   MCH 26.5 - 33.0 pg 29.2   MCHC 31.5 - 36.5 g/dL 32.7   RDW 10.0 - 15.0 % 14.7   % Neutrophils % 66   % Lymphocytes % 21   % Monocytes % 10   % Eosinophils % 2   % Basophils % 0   % Immature Granulocytes % 0   NRBC/W <1 /100 0   Absolute Neutrophil 1.6 - 8.3 10e3/uL 5.0   Absolute Lymphocytes 0.8 - 5.3 10e3/uL 1.6   Absolute Monocytes 0.0 - 1.3 10e3/uL 0.8   Absolute Eosinophils 0.0 - 0.7 10e3/uL 0.2   Absolute Basophils 0.0 - 0.2 10e3/uL 0.0   Absolute Immature Granulocytes <=0.4 10e3/uL 0.0   Absolute NRBCs 10e3/uL 0.0       ASSESSMENT/PLAN:  Mr. Bal Junior is a 70-year-old gentleman from [place], [place] with T2N0 GE junction carcinoma.     He had neoadjuvant FLOT x 4 followed by esophagectomy late March 2025 showing excellent treatment response with MI of [id number]. His surgery was complicated by anastomatic leak. This has finally improved and he is  working on advancing his diet. His performance status is much better than last visit end of April. ECOG is 1 and appropriate for treatment. I think post op FLOT would be too toxic with his recent complications but FOLFOX would be more manageable. Discussed with Dr. Mc and we would plan for FOLFOX x 4. I would get a new baseline scan prior to starting and one about a month out from the end of chemotherapy as  well. Reviewed the typical adjuvant therapy is started 4-8 weeks after surgery to give best benefit for risk reduction however since he does have a higher recurrence risk we will give him delayed adjuvant therapy. Bal agrees with this plan. Will work on getting started after the fourth of July holiday.     He will continue to work with Thoracic surgery team as planned. I will put referral in for cancer rehab. Will see him back prior to FOLFOX C1 after his new baseline CT. He will call with any interval concerns.     35 minutes spent on the date of the encounter doing chart review, review of test results, interpretation of tests, patient visit, and documentation     The longitudinal plan of care for the diagnosis(es)/condition(s) as documented were addressed during this visit. Due to the added complexity in care, I will continue to support Bal in the subsequent management and with ongoing continuity of care.    AYDEE Bauman PA-C

## 2025-06-27 NOTE — NURSING NOTE
Chief Complaint   Patient presents with    Port Draw     Labs collected from port by RN. Vitals taken. Checked in for appointment(s).      Port accessed with 20 gauge 3/4 inch flat needle by RN, labs collected, line flushed with saline and heparin.  Vitals taken. Pt checked in for appointment(s).     Ginny Carrera RN

## 2025-06-28 PROCEDURE — B4035 ENTERAL FEED SUPP PUMP PER D: HCPCS

## 2025-06-29 PROCEDURE — B4035 ENTERAL FEED SUPP PUMP PER D: HCPCS

## 2025-06-30 ENCOUNTER — MYC MEDICAL ADVICE (OUTPATIENT)
Dept: SURGERY | Facility: CLINIC | Age: 70
End: 2025-06-30
Payer: COMMERCIAL

## 2025-06-30 DIAGNOSIS — C16.0 ADENOCARCINOMA OF GASTROESOPHAGEAL JUNCTION (H): Primary | ICD-10-CM

## 2025-06-30 DIAGNOSIS — D70.1 CHEMOTHERAPY-INDUCED NEUTROPENIA: ICD-10-CM

## 2025-06-30 DIAGNOSIS — T45.1X5A CHEMOTHERAPY-INDUCED NEUTROPENIA: ICD-10-CM

## 2025-06-30 PROCEDURE — B4035 ENTERAL FEED SUPP PUMP PER D: HCPCS

## 2025-06-30 RX ORDER — DIPHENHYDRAMINE HYDROCHLORIDE 50 MG/ML
25 INJECTION, SOLUTION INTRAMUSCULAR; INTRAVENOUS
Start: 2025-08-20

## 2025-06-30 RX ORDER — MEPERIDINE HYDROCHLORIDE 25 MG/ML
25 INJECTION INTRAMUSCULAR; INTRAVENOUS; SUBCUTANEOUS
OUTPATIENT
Start: 2025-07-09

## 2025-06-30 RX ORDER — LORAZEPAM 2 MG/ML
0.5 INJECTION INTRAMUSCULAR EVERY 4 HOURS PRN
OUTPATIENT
Start: 2025-08-20

## 2025-06-30 RX ORDER — MEPERIDINE HYDROCHLORIDE 25 MG/ML
25 INJECTION INTRAMUSCULAR; INTRAVENOUS; SUBCUTANEOUS
OUTPATIENT
Start: 2025-08-06

## 2025-06-30 RX ORDER — FLUOROURACIL 50 MG/ML
400 INJECTION, SOLUTION INTRAVENOUS ONCE
OUTPATIENT
Start: 2025-08-06

## 2025-06-30 RX ORDER — EPINEPHRINE 1 MG/ML
0.3 INJECTION, SOLUTION INTRAMUSCULAR; SUBCUTANEOUS EVERY 5 MIN PRN
OUTPATIENT
Start: 2025-08-20

## 2025-06-30 RX ORDER — LORAZEPAM 2 MG/ML
0.5 INJECTION INTRAMUSCULAR EVERY 4 HOURS PRN
OUTPATIENT
Start: 2025-07-09

## 2025-06-30 RX ORDER — EPINEPHRINE 1 MG/ML
0.3 INJECTION, SOLUTION INTRAMUSCULAR; SUBCUTANEOUS EVERY 5 MIN PRN
OUTPATIENT
Start: 2025-07-09

## 2025-06-30 RX ORDER — ALBUTEROL SULFATE 0.83 MG/ML
2.5 SOLUTION RESPIRATORY (INHALATION)
OUTPATIENT
Start: 2025-08-20

## 2025-06-30 RX ORDER — HEPARIN SODIUM (PORCINE) LOCK FLUSH IV SOLN 100 UNIT/ML 100 UNIT/ML
5 SOLUTION INTRAVENOUS
OUTPATIENT
Start: 2025-08-08

## 2025-06-30 RX ORDER — HEPARIN SODIUM,PORCINE 10 UNIT/ML
5-20 VIAL (ML) INTRAVENOUS DAILY PRN
OUTPATIENT
Start: 2025-08-20

## 2025-06-30 RX ORDER — DIPHENHYDRAMINE HYDROCHLORIDE 50 MG/ML
50 INJECTION, SOLUTION INTRAMUSCULAR; INTRAVENOUS
Start: 2025-07-09

## 2025-06-30 RX ORDER — HEPARIN SODIUM,PORCINE 10 UNIT/ML
5-20 VIAL (ML) INTRAVENOUS DAILY PRN
OUTPATIENT
Start: 2025-07-11

## 2025-06-30 RX ORDER — HEPARIN SODIUM (PORCINE) LOCK FLUSH IV SOLN 100 UNIT/ML 100 UNIT/ML
5 SOLUTION INTRAVENOUS
OUTPATIENT
Start: 2025-07-11

## 2025-06-30 RX ORDER — HEPARIN SODIUM,PORCINE 10 UNIT/ML
5-20 VIAL (ML) INTRAVENOUS DAILY PRN
OUTPATIENT
Start: 2025-08-06

## 2025-06-30 RX ORDER — METHYLPREDNISOLONE SODIUM SUCCINATE 40 MG/ML
40 INJECTION INTRAMUSCULAR; INTRAVENOUS
Start: 2025-08-20

## 2025-06-30 RX ORDER — HEPARIN SODIUM (PORCINE) LOCK FLUSH IV SOLN 100 UNIT/ML 100 UNIT/ML
5 SOLUTION INTRAVENOUS
OUTPATIENT
Start: 2025-07-23

## 2025-06-30 RX ORDER — HEPARIN SODIUM,PORCINE 10 UNIT/ML
5-20 VIAL (ML) INTRAVENOUS DAILY PRN
OUTPATIENT
Start: 2025-07-25

## 2025-06-30 RX ORDER — ONDANSETRON 2 MG/ML
8 INJECTION INTRAMUSCULAR; INTRAVENOUS ONCE
OUTPATIENT
Start: 2025-08-20

## 2025-06-30 RX ORDER — ALBUTEROL SULFATE 0.83 MG/ML
2.5 SOLUTION RESPIRATORY (INHALATION)
OUTPATIENT
Start: 2025-08-06

## 2025-06-30 RX ORDER — LORAZEPAM 2 MG/ML
0.5 INJECTION INTRAMUSCULAR EVERY 4 HOURS PRN
OUTPATIENT
Start: 2025-08-06

## 2025-06-30 RX ORDER — HEPARIN SODIUM,PORCINE 10 UNIT/ML
5-20 VIAL (ML) INTRAVENOUS DAILY PRN
OUTPATIENT
Start: 2025-07-23

## 2025-06-30 RX ORDER — MEPERIDINE HYDROCHLORIDE 25 MG/ML
25 INJECTION INTRAMUSCULAR; INTRAVENOUS; SUBCUTANEOUS
OUTPATIENT
Start: 2025-07-23

## 2025-06-30 RX ORDER — MEPERIDINE HYDROCHLORIDE 25 MG/ML
25 INJECTION INTRAMUSCULAR; INTRAVENOUS; SUBCUTANEOUS
OUTPATIENT
Start: 2025-08-20

## 2025-06-30 RX ORDER — HEPARIN SODIUM (PORCINE) LOCK FLUSH IV SOLN 100 UNIT/ML 100 UNIT/ML
5 SOLUTION INTRAVENOUS
OUTPATIENT
Start: 2025-08-06

## 2025-06-30 RX ORDER — DIPHENHYDRAMINE HYDROCHLORIDE 50 MG/ML
25 INJECTION, SOLUTION INTRAMUSCULAR; INTRAVENOUS
Start: 2025-07-09

## 2025-06-30 RX ORDER — ONDANSETRON 2 MG/ML
8 INJECTION INTRAMUSCULAR; INTRAVENOUS ONCE
OUTPATIENT
Start: 2025-07-23

## 2025-06-30 RX ORDER — HEPARIN SODIUM,PORCINE 10 UNIT/ML
5-20 VIAL (ML) INTRAVENOUS DAILY PRN
OUTPATIENT
Start: 2025-08-22

## 2025-06-30 RX ORDER — DIPHENHYDRAMINE HYDROCHLORIDE 50 MG/ML
50 INJECTION, SOLUTION INTRAMUSCULAR; INTRAVENOUS
Start: 2025-08-20

## 2025-06-30 RX ORDER — DIPHENHYDRAMINE HYDROCHLORIDE 50 MG/ML
25 INJECTION, SOLUTION INTRAMUSCULAR; INTRAVENOUS
Start: 2025-07-23

## 2025-06-30 RX ORDER — FLUOROURACIL 50 MG/ML
400 INJECTION, SOLUTION INTRAVENOUS ONCE
OUTPATIENT
Start: 2025-07-23

## 2025-06-30 RX ORDER — DIPHENHYDRAMINE HYDROCHLORIDE 50 MG/ML
50 INJECTION, SOLUTION INTRAMUSCULAR; INTRAVENOUS
Start: 2025-08-06

## 2025-06-30 RX ORDER — DIPHENHYDRAMINE HYDROCHLORIDE 50 MG/ML
50 INJECTION, SOLUTION INTRAMUSCULAR; INTRAVENOUS
Start: 2025-07-23

## 2025-06-30 RX ORDER — DIPHENHYDRAMINE HYDROCHLORIDE 50 MG/ML
25 INJECTION, SOLUTION INTRAMUSCULAR; INTRAVENOUS
Start: 2025-08-06

## 2025-06-30 RX ORDER — ONDANSETRON 2 MG/ML
8 INJECTION INTRAMUSCULAR; INTRAVENOUS ONCE
OUTPATIENT
Start: 2025-08-06

## 2025-06-30 RX ORDER — METHYLPREDNISOLONE SODIUM SUCCINATE 40 MG/ML
40 INJECTION INTRAMUSCULAR; INTRAVENOUS
Start: 2025-07-23

## 2025-06-30 RX ORDER — ALBUTEROL SULFATE 90 UG/1
1-2 INHALANT RESPIRATORY (INHALATION)
Start: 2025-07-23

## 2025-06-30 RX ORDER — ALBUTEROL SULFATE 0.83 MG/ML
2.5 SOLUTION RESPIRATORY (INHALATION)
OUTPATIENT
Start: 2025-07-09

## 2025-06-30 RX ORDER — ALBUTEROL SULFATE 90 UG/1
1-2 INHALANT RESPIRATORY (INHALATION)
Start: 2025-08-06

## 2025-06-30 RX ORDER — HEPARIN SODIUM (PORCINE) LOCK FLUSH IV SOLN 100 UNIT/ML 100 UNIT/ML
5 SOLUTION INTRAVENOUS
OUTPATIENT
Start: 2025-08-22

## 2025-06-30 RX ORDER — ALBUTEROL SULFATE 90 UG/1
1-2 INHALANT RESPIRATORY (INHALATION)
Start: 2025-07-09

## 2025-06-30 RX ORDER — EPINEPHRINE 1 MG/ML
0.3 INJECTION, SOLUTION INTRAMUSCULAR; SUBCUTANEOUS EVERY 5 MIN PRN
OUTPATIENT
Start: 2025-07-23

## 2025-06-30 RX ORDER — METHYLPREDNISOLONE SODIUM SUCCINATE 40 MG/ML
40 INJECTION INTRAMUSCULAR; INTRAVENOUS
Start: 2025-07-09

## 2025-06-30 RX ORDER — ALBUTEROL SULFATE 0.83 MG/ML
2.5 SOLUTION RESPIRATORY (INHALATION)
OUTPATIENT
Start: 2025-07-23

## 2025-06-30 RX ORDER — HEPARIN SODIUM (PORCINE) LOCK FLUSH IV SOLN 100 UNIT/ML 100 UNIT/ML
5 SOLUTION INTRAVENOUS
OUTPATIENT
Start: 2025-07-09

## 2025-06-30 RX ORDER — METHYLPREDNISOLONE SODIUM SUCCINATE 40 MG/ML
40 INJECTION INTRAMUSCULAR; INTRAVENOUS
Start: 2025-08-06

## 2025-06-30 RX ORDER — LORAZEPAM 2 MG/ML
0.5 INJECTION INTRAMUSCULAR EVERY 4 HOURS PRN
OUTPATIENT
Start: 2025-07-23

## 2025-06-30 RX ORDER — HEPARIN SODIUM,PORCINE 10 UNIT/ML
5-20 VIAL (ML) INTRAVENOUS DAILY PRN
OUTPATIENT
Start: 2025-07-09

## 2025-06-30 RX ORDER — FLUOROURACIL 50 MG/ML
400 INJECTION, SOLUTION INTRAVENOUS ONCE
OUTPATIENT
Start: 2025-07-09

## 2025-06-30 RX ORDER — HEPARIN SODIUM,PORCINE 10 UNIT/ML
5-20 VIAL (ML) INTRAVENOUS DAILY PRN
OUTPATIENT
Start: 2025-08-08

## 2025-06-30 RX ORDER — ALBUTEROL SULFATE 90 UG/1
1-2 INHALANT RESPIRATORY (INHALATION)
Start: 2025-08-20

## 2025-06-30 RX ORDER — ONDANSETRON 2 MG/ML
8 INJECTION INTRAMUSCULAR; INTRAVENOUS ONCE
OUTPATIENT
Start: 2025-07-09

## 2025-06-30 RX ORDER — FLUOROURACIL 50 MG/ML
400 INJECTION, SOLUTION INTRAVENOUS ONCE
OUTPATIENT
Start: 2025-08-20

## 2025-06-30 RX ORDER — EPINEPHRINE 1 MG/ML
0.3 INJECTION, SOLUTION INTRAMUSCULAR; SUBCUTANEOUS EVERY 5 MIN PRN
OUTPATIENT
Start: 2025-08-06

## 2025-06-30 RX ORDER — HEPARIN SODIUM (PORCINE) LOCK FLUSH IV SOLN 100 UNIT/ML 100 UNIT/ML
5 SOLUTION INTRAVENOUS
OUTPATIENT
Start: 2025-08-20

## 2025-06-30 RX ORDER — HEPARIN SODIUM (PORCINE) LOCK FLUSH IV SOLN 100 UNIT/ML 100 UNIT/ML
5 SOLUTION INTRAVENOUS
OUTPATIENT
Start: 2025-07-25

## 2025-07-01 PROCEDURE — B4035 ENTERAL FEED SUPP PUMP PER D: HCPCS

## 2025-07-02 ENCOUNTER — THERAPY VISIT (OUTPATIENT)
Dept: PHYSICAL THERAPY | Facility: REHABILITATION | Age: 70
End: 2025-07-02
Attending: PHYSICIAN ASSISTANT
Payer: COMMERCIAL

## 2025-07-02 DIAGNOSIS — R53.83 FATIGUE, UNSPECIFIED TYPE: ICD-10-CM

## 2025-07-02 DIAGNOSIS — C16.0 ADENOCARCINOMA OF GASTROESOPHAGEAL JUNCTION (H): Primary | ICD-10-CM

## 2025-07-02 DIAGNOSIS — R53.1 WEAKNESS GENERALIZED: Primary | ICD-10-CM

## 2025-07-02 DIAGNOSIS — C16.0 ADENOCARCINOMA OF GASTROESOPHAGEAL JUNCTION (H): ICD-10-CM

## 2025-07-02 PROCEDURE — 97110 THERAPEUTIC EXERCISES: CPT | Mod: GP | Performed by: PHYSICAL THERAPIST

## 2025-07-02 PROCEDURE — 97162 PT EVAL MOD COMPLEX 30 MIN: CPT | Mod: GP | Performed by: PHYSICAL THERAPIST

## 2025-07-02 PROCEDURE — B4035 ENTERAL FEED SUPP PUMP PER D: HCPCS

## 2025-07-02 NOTE — PROGRESS NOTES
PHYSICAL THERAPY EVALUATION  Type of Visit: Evaluation       Fall Risk Screen:  Have you fallen 2 or more times in the past year?: No  Have you fallen and had an injury in the past year?: No    Subjective   Pt is a 70 year-old man with chief complaint decreased strength/endurance in the setting of gastroesophageal cancer s/p esophagectomy 3/25/25 and use of J-tube feeds. Complications after surgery included an internal leak that was  fixed  3 times. Chemotherapy will start in one week (he had 4 rounds pre-surgery) and it will be 4 rounds (every other week). Pt was in great shape before his diagnosis last year - mountain biking 3-4x/week, kayaking 1-2x/week, walking the poodle daily, hiking and skiing in the winter (can do black diamonds normally and racing around poles). He has no conditioning right now; he can walk 1 mile and then starts to feel winded. Can bike slightly farther. He feels weak, has no core, etc. He does have silver sneakers at the [facility], hasn't been there for 1.5 years.         Presenting condition or subjective complaint: Oncology rehab, esophajectomy end of March with complications  Date of onset: 03/25/25    Relevant medical history: Cancer   Dates & types of surgery: 3/25/2925 esophajectomy    Prior diagnostic imaging/testing results: MRI; CT scan; EMG     Prior therapy history for the same diagnosis, illness or injury: Yes Home health PT since last hospital discharge    Prior Level of Function  Transfers: Independent  Ambulation: Independent  ADL: Independent      Living Environment  Social support: With a significant other or spouse   Type of home: House; 1 level   Stairs to enter the home: No       Ramp: No   Stairs inside the home: Yes 12 Is there a railing: Yes     Help at home: None  Equipment owned:       Employment: No    Hobbies/Interests: Skiing, kayaking, mountain biking, reading    Patient goals for therapy: Goal is to ski at advanced levels next winter    Pain assessment: Pain  present  Location: right posterior ribs (where drain was) /Ratin-4/10     Objective      Cognitive Status Examination  Orientation: Oriented to person, place and time   Level of Consciousness: Alert  Follows Commands and Answers Questions: 100% of the time  Personal Safety and Judgement: Intact  Memory: Intact    POSTURE:   PALPATION: tender right posterior ribs and intercostal space just below scapula  RANGE OF MOTION:   STRENGTH: B LE grossly 5/5 except hip abduction (in sitting) is 4-/5        Vitals: at rest SpO2 98%, HR 69 bpm, SOB: 0/10     BED MOBILITY:     TRANSFERS: Independent    GAIT:   Level of [place]: Independent  Assistive Device(s): None  Gait Deviations: Decreased trunk rotation       BALANCE:     SPECIAL TESTS  Functional Gait Assessment (FGA)      10 Meter Walk Test (Comfortable)     10 Meter Walk Test (Fast)     6 Minute Walk Test (6MWT)   1401 feet (427m), RPE 3/10, SpO2 98%, HR 77 bpm  (Norm is 572m)     Holden Balance Scale (BBS)     5 Times Sit-to-Stand (5TSTS)       Dynamic Gait Index (DGI)     Timed Up and Go (TUG) - sec    Single Leg Stance Right (sec)    Single Leg Stance Left (sec)    Modified CTSIB Conditions (sec) Cond 1:   Cond 2:   Cond 4:   Cond 5 :    Romberg  (sec)    Sharpened Romberg (sec)    30 Second Sit to Stand (reps/height) 30 sec STS: 16 reps with increasing fatigue over time, RPE 5/10, SpO2 98%, HR 88 bpm (norm is 12-18 reps)       Mini-BESTest              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 He demonstrates decreased activity tolerance and fatigue with activity; he was very active prior to diagnosis and  treatment starting last year (skiing, hiking, mountain biking, kayaking) and he feels very deconditioned at this time. He is starting chemotherapy again next week (every 2 weeks x 4 cycles) and he will likely be affected by this as well. He is appropriate for skilled PT to address impairments, instruct in HEP to return to PLOF.     Clinical Decision Making (Complexity):  Clinical Presentation: Evolving/Changing - upcoming chemotherapy  Clinical Presentation Rationale: based on medical and personal factors listed in PT evaluation  Clinical Decision Making (Complexity): Moderate complexity    PLAN OF CARE  Treatment Interventions:  Interventions: Gait Training, Manual Therapy, Neuromuscular Re-education, Therapeutic Activity, Therapeutic Exercise, Self-Care/Home Management    Long Term Goals     PT Goal 1  Goal Identifier: Activity  Goal Description: Pt will report ability to tolerate biking 9 mile loop ([address] which includes a big hill) per PLOF.  Target Date: 09/29/25  PT Goal 2  Goal Identifier: Hiking  Goal Description: Pt will report ability to tolerate [place]  Target Date: 09/29/25  PT Goal 3  Goal Identifier: 6MWT  Goal Description: Pt will improve 6MWT from 427 m to 572 m to approximate age-matched norms  Target Date: 09/29/25      Frequency of Treatment: 1-2x/week  Duration of Treatment: 8-16 appointments, up to 90 days        Risks and benefits of evaluation/treatment have been explained.   Patient/Family/caregiver agrees with Plan of Care.     Evaluation Time:     PT Eval, Moderate Complexity Minutes (85973): 33       Signing Clinician: Brittany Balbuena, PT        [facility]                                                                                   OUTPATIENT PHYSICAL THERAPY      PLAN OF TREATMENT FOR OUTPATIENT REHABILITATION   Patient's Last Name, First Name, Bal Sewell YOB: 1955   Provider's Name   [facility]   Medical Record No.  [id number]     Onset Date: 03/25/25  Start of Care Date: 07/02/25     Medical Diagnosis:  Adenocarcinoma of gastroesophageal junction (H)      PT Treatment Diagnosis:  General weakness, fatigue Plan of Treatment  Frequency/Duration: 1-2x/week/ 8-16 appointments, up to 90 days    Certification date from 07/02/25 to 09/29/25         See note for plan of treatment details and functional goals     Brittany Balbuena, PT                         I CERTIFY THE NEED FOR THESE SERVICES FURNISHED UNDER        THIS PLAN OF TREATMENT AND WHILE UNDER MY CARE     (Physician attestation of this document indicates review and certification of the therapy plan).              Referring Provider:  Riya Wiley    Initial Assessment  See Epic Evaluation- Start of Care Date: 07/02/25

## 2025-07-03 PROCEDURE — B4035 ENTERAL FEED SUPP PUMP PER D: HCPCS

## 2025-07-04 PROCEDURE — B4035 ENTERAL FEED SUPP PUMP PER D: HCPCS

## 2025-07-05 PROCEDURE — B4035 ENTERAL FEED SUPP PUMP PER D: HCPCS

## 2025-07-06 PROCEDURE — B4035 ENTERAL FEED SUPP PUMP PER D: HCPCS

## 2025-07-07 PROCEDURE — B4035 ENTERAL FEED SUPP PUMP PER D: HCPCS

## 2025-07-08 ENCOUNTER — EPISODE UPDATE (OUTPATIENT)
Dept: HOME HEALTH SERVICES | Facility: HOME HEALTH | Age: 70
End: 2025-07-08
Payer: COMMERCIAL

## 2025-07-08 PROCEDURE — B4035 ENTERAL FEED SUPP PUMP PER D: HCPCS

## 2025-07-08 RX ORDER — ONDANSETRON 8 MG/1
8 TABLET, FILM COATED ORAL EVERY 8 HOURS PRN
Qty: 30 TABLET | Refills: 2 | Status: SHIPPED | OUTPATIENT
Start: 2025-07-08

## 2025-07-08 RX ORDER — DEXAMETHASONE 4 MG/1
8 TABLET ORAL DAILY
Qty: 4 TABLET | Refills: 2 | Status: SHIPPED | OUTPATIENT
Start: 2025-07-08

## 2025-07-08 RX ORDER — PROCHLORPERAZINE MALEATE 5 MG/1
5 TABLET ORAL EVERY 6 HOURS PRN
Qty: 30 TABLET | Refills: 2 | Status: SHIPPED | OUTPATIENT
Start: 2025-07-08

## 2025-07-09 ENCOUNTER — APPOINTMENT (OUTPATIENT)
Dept: LAB | Facility: CLINIC | Age: 70
End: 2025-07-09
Attending: PHYSICIAN ASSISTANT
Payer: MEDICARE

## 2025-07-09 ENCOUNTER — ONCOLOGY VISIT (OUTPATIENT)
Dept: ONCOLOGY | Facility: CLINIC | Age: 70
End: 2025-07-09
Payer: MEDICARE

## 2025-07-09 ENCOUNTER — INFUSION THERAPY VISIT (OUTPATIENT)
Dept: ONCOLOGY | Facility: CLINIC | Age: 70
End: 2025-07-09
Attending: INTERNAL MEDICINE
Payer: MEDICARE

## 2025-07-09 ENCOUNTER — HOME INFUSION BILLING (OUTPATIENT)
Dept: HOME HEALTH SERVICES | Facility: HOME HEALTH | Age: 70
End: 2025-07-09
Payer: COMMERCIAL

## 2025-07-09 ENCOUNTER — HOME INFUSION (OUTPATIENT)
Dept: HOME HEALTH SERVICES | Facility: HOME HEALTH | Age: 70
End: 2025-07-09
Payer: COMMERCIAL

## 2025-07-09 VITALS
TEMPERATURE: 97.4 F | RESPIRATION RATE: 16 BRPM | HEART RATE: 63 BPM | BODY MASS INDEX: 26.45 KG/M2 | HEIGHT: 68 IN | WEIGHT: 174.5 LBS | OXYGEN SATURATION: 100 % | SYSTOLIC BLOOD PRESSURE: 117 MMHG | DIASTOLIC BLOOD PRESSURE: 71 MMHG

## 2025-07-09 VITALS
HEART RATE: 73 BPM | SYSTOLIC BLOOD PRESSURE: 122 MMHG | DIASTOLIC BLOOD PRESSURE: 84 MMHG | RESPIRATION RATE: 16 BRPM | OXYGEN SATURATION: 100 %

## 2025-07-09 DIAGNOSIS — D70.1 CHEMOTHERAPY-INDUCED NEUTROPENIA: ICD-10-CM

## 2025-07-09 DIAGNOSIS — C16.0 ADENOCARCINOMA OF GASTROESOPHAGEAL JUNCTION (H): Primary | ICD-10-CM

## 2025-07-09 DIAGNOSIS — T45.1X5A CHEMOTHERAPY-INDUCED NEUTROPENIA: ICD-10-CM

## 2025-07-09 LAB
ALBUMIN SERPL BCG-MCNC: 3.9 G/DL (ref 3.5–5.2)
ALP SERPL-CCNC: 115 U/L (ref 40–150)
ALT SERPL W P-5'-P-CCNC: 18 U/L (ref 0–70)
ANION GAP SERPL CALCULATED.3IONS-SCNC: 11 MMOL/L (ref 7–15)
AST SERPL W P-5'-P-CCNC: 20 U/L (ref 0–45)
BASOPHILS # BLD AUTO: 0 10E3/UL (ref 0–0.2)
BASOPHILS NFR BLD AUTO: 0 %
BILIRUB SERPL-MCNC: 0.2 MG/DL
BUN SERPL-MCNC: 23.8 MG/DL (ref 8–23)
CALCIUM SERPL-MCNC: 9.2 MG/DL (ref 8.8–10.4)
CHLORIDE SERPL-SCNC: 105 MMOL/L (ref 98–107)
CREAT SERPL-MCNC: 0.77 MG/DL (ref 0.67–1.17)
EGFRCR SERPLBLD CKD-EPI 2021: >90 ML/MIN/1.73M2
EOSINOPHIL # BLD AUTO: 0.2 10E3/UL (ref 0–0.7)
EOSINOPHIL NFR BLD AUTO: 3 %
ERYTHROCYTE [DISTWIDTH] IN BLOOD BY AUTOMATED COUNT: 13.9 % (ref 10–15)
GLUCOSE SERPL-MCNC: 137 MG/DL (ref 70–99)
HCO3 SERPL-SCNC: 22 MMOL/L (ref 22–29)
HCT VFR BLD AUTO: 36.8 % (ref 40–53)
HGB BLD-MCNC: 12.1 G/DL (ref 13.3–17.7)
IMM GRANULOCYTES # BLD: 0 10E3/UL
IMM GRANULOCYTES NFR BLD: 0 %
LYMPHOCYTES # BLD AUTO: 1.5 10E3/UL (ref 0.8–5.3)
LYMPHOCYTES NFR BLD AUTO: 22 %
MCH RBC QN AUTO: 29.2 PG (ref 26.5–33)
MCHC RBC AUTO-ENTMCNC: 32.9 G/DL (ref 31.5–36.5)
MCV RBC AUTO: 89 FL (ref 78–100)
MONOCYTES # BLD AUTO: 0.7 10E3/UL (ref 0–1.3)
MONOCYTES NFR BLD AUTO: 10 %
NEUTROPHILS # BLD AUTO: 4.4 10E3/UL (ref 1.6–8.3)
NEUTROPHILS NFR BLD AUTO: 64 %
NRBC # BLD AUTO: 0 10E3/UL
NRBC BLD AUTO-RTO: 0 /100
PLATELET # BLD AUTO: 221 10E3/UL (ref 150–450)
POTASSIUM SERPL-SCNC: 4.7 MMOL/L (ref 3.4–5.3)
PROT SERPL-MCNC: 6.9 G/DL (ref 6.4–8.3)
RBC # BLD AUTO: 4.14 10E6/UL (ref 4.4–5.9)
SODIUM SERPL-SCNC: 138 MMOL/L (ref 135–145)
WBC # BLD AUTO: 6.9 10E3/UL (ref 4–11)

## 2025-07-09 PROCEDURE — 36591 DRAW BLOOD OFF VENOUS DEVICE: CPT | Performed by: INTERNAL MEDICINE

## 2025-07-09 PROCEDURE — 85004 AUTOMATED DIFF WBC COUNT: CPT | Performed by: INTERNAL MEDICINE

## 2025-07-09 PROCEDURE — E0781 EXTERNAL AMBULATORY INFUS PU: HCPCS | Mod: RR

## 2025-07-09 PROCEDURE — 250N000011 HC RX IP 250 OP 636: Performed by: INTERNAL MEDICINE

## 2025-07-09 PROCEDURE — 250N000011 HC RX IP 250 OP 636

## 2025-07-09 PROCEDURE — B4035 ENTERAL FEED SUPP PUMP PER D: HCPCS

## 2025-07-09 PROCEDURE — E1399 DURABLE MEDICAL EQUIPMENT MI: HCPCS

## 2025-07-09 PROCEDURE — G0463 HOSPITAL OUTPT CLINIC VISIT: HCPCS

## 2025-07-09 PROCEDURE — B9002 ENTER NUTR INF PUMP ANY TYPE: HCPCS | Mod: RR

## 2025-07-09 PROCEDURE — A4222 INFUSION SUPPLIES WITH PUMP: HCPCS

## 2025-07-09 PROCEDURE — 84132 ASSAY OF SERUM POTASSIUM: CPT | Performed by: INTERNAL MEDICINE

## 2025-07-09 PROCEDURE — 258N000003 HC RX IP 258 OP 636: Performed by: INTERNAL MEDICINE

## 2025-07-09 RX ORDER — ATENOLOL 25 MG/1
25 TABLET ORAL DAILY
COMMUNITY
Start: 2025-06-26

## 2025-07-09 RX ORDER — FLUOROURACIL 50 MG/ML
400 INJECTION, SOLUTION INTRAVENOUS ONCE
Status: COMPLETED | OUTPATIENT
Start: 2025-07-09 | End: 2025-07-09

## 2025-07-09 RX ORDER — DIPHENHYDRAMINE HYDROCHLORIDE 50 MG/ML
50 INJECTION, SOLUTION INTRAMUSCULAR; INTRAVENOUS
Status: COMPLETED | OUTPATIENT
Start: 2025-07-09 | End: 2025-07-09

## 2025-07-09 RX ORDER — ONDANSETRON 2 MG/ML
8 INJECTION INTRAMUSCULAR; INTRAVENOUS ONCE
Status: COMPLETED | OUTPATIENT
Start: 2025-07-09 | End: 2025-07-09

## 2025-07-09 RX ORDER — NALOXONE HYDROCHLORIDE 0.4 MG/ML
0.4 INJECTION, SOLUTION INTRAMUSCULAR; INTRAVENOUS; SUBCUTANEOUS PRN
COMMUNITY
Start: 2025-05-08

## 2025-07-09 RX ORDER — HEPARIN SODIUM (PORCINE) LOCK FLUSH IV SOLN 100 UNIT/ML 100 UNIT/ML
5 SOLUTION INTRAVENOUS ONCE
Status: COMPLETED | OUTPATIENT
Start: 2025-07-09 | End: 2025-07-09

## 2025-07-09 RX ADMIN — DEXTROSE 100 ML: 50 INJECTION, SOLUTION INTRAVENOUS at 12:20

## 2025-07-09 RX ADMIN — FAMOTIDINE 20 MG: 10 INJECTION, SOLUTION INTRAVENOUS at 12:50

## 2025-07-09 RX ADMIN — FOSAPREPITANT: 150 INJECTION, POWDER, LYOPHILIZED, FOR SOLUTION INTRAVENOUS at 11:51

## 2025-07-09 RX ADMIN — LEUCOVORIN CALCIUM 700 MG: 500 INJECTION, POWDER, LYOPHILIZED, FOR SOLUTION INTRAMUSCULAR; INTRAVENOUS at 12:21

## 2025-07-09 RX ADMIN — ONDANSETRON 8 MG: 2 INJECTION INTRAMUSCULAR; INTRAVENOUS at 11:51

## 2025-07-09 RX ADMIN — SODIUM CHLORIDE 1000 ML: 0.9 INJECTION, SOLUTION INTRAVENOUS at 12:48

## 2025-07-09 RX ADMIN — OXALIPLATIN 150 MG: 5 INJECTION, SOLUTION INTRAVENOUS at 12:21

## 2025-07-09 RX ADMIN — Medication 5 ML: at 10:03

## 2025-07-09 RX ADMIN — DIPHENHYDRAMINE HYDROCHLORIDE 50 MG: 50 INJECTION INTRAMUSCULAR; INTRAVENOUS at 12:47

## 2025-07-09 RX ADMIN — FLUOROURACIL 785 MG: 50 INJECTION, SOLUTION INTRAVENOUS at 14:41

## 2025-07-09 ASSESSMENT — PAIN SCALES - GENERAL: PAINLEVEL_OUTOF10: MILD PAIN (1)

## 2025-07-09 NOTE — PROGRESS NOTES
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  Microsatellite stable (GUILLAUME)  See scanned report for other details.  Likely pathogenic alterations noted in TP53, PRKCA, FANCD2    Referring physician or other provider(s):  Dr. Katlyn Tobar, Thoracic Surgery service, [facility]      History of Present Illness/Cancer Diagnosis and Evaluation to date:  His past medical history is most prominent for a history of severe acid reflux, necessitating this and fundoplication around 2011/12, also for issues relating to postprandial hypoglycemia and neuritis/neuropathy, of unclear etiology.  He states that the neuropathic symptoms were attributed by his primary care doctor, and others to long-term use of omeprazole in the 2000s, prior to the Nissen fundoplication surgery.    He has been followed routinely with upper endoscopies over the last decade, many of which have been done within the [facility] system.  The ones done over the last seven years include ones performed on August 30, 2017, December 4, 2018, May 9, 2019, and most recently as September 24, 2024.    He had a CT scan in January 2024 with results as follows:     January 26, 2024--CT a/p--IMPRESSION:   1.  Superior aspect of the Nissen fundal wrap appears about 2 cm above  the diaphragm possibly representing slipped Nissen.  2.  Hepatic steatosis.  3.  Nonobstructing renal calculi. No hydronephrosis.    He was experiencing dysphagia and slipped Nissen  "fundoplication and related issues, some of which were said to be related to worsening occasional reflux, and chest pressure associate with early satiety.  On April 30, 2024, he underwent barium swallow, with results as follows:  IMPRESSION:  1. Timed barium esophagram results as detailed above with retained  esophageal contrast lasting up to 5 minutes. There is patent flow of  contrast.   2. Expected passage of barium tablet into the stomach.      He had continued evaluation and during the summer of 2024, including on July 23, 2024. Part of the assessment included impression that the \"Manometry was consistent with inconclusive manometric EGJOO. Esophagram showed routine contrast lasting up to 5 minutes but patent flow of contrast and tablet.\"      Further evaluation included upper endoscopy, that revealed a nodularity at the gastroesophageal junction, concerning for malignancy, as follows:  September 24, 2024--EGD--Evidence of a Nissen fundoplication was found at the gastroesophageal   junction. The wrap appeared loose. This was traversed.   Localized moderate mucosal changes characterized by erythema and   nodularity were found at the gastroesophageal junction. Biopsies were   taken with a cold forceps for histology.   Specimen:    Gastric Esophageal Junction, Gastroesophageal biopsy                                      Final Diagnosis   A. GASTROESOPHAGEAL JUNCTION, BIOPSY:  - Adenocarcinoma, poorly-differentiated, with signet ring cell features     GASTRIC HER2 BIOMARKER TESTS   Test(s) Performed     HER2 by IHC     Results         With this new finding of malignancy, a staging PET/CT was performed, and no distant metastasis was detected:  October 1, 2024--PET/CT --                                                                 IMPRESSION Findings suspicious for the gastroesophageal junction adenocarcinoma without convincing evidence of tona or metastatic disease.      He further underwent a staging EUS, that " characterized the tumor as staged as T2N0 per EUS criteria:      Oct 8, 2024 EUS--    Impression:            - Malignant esophageal tumor was found at the                          gastroesophageal junction. Siewert 2. Measurements as                          above.                          This is classified T2 N0 (cytology pending) M0 by EUS                          criteria.                          Three small lymph nodes seen as described above. Two                          were sampled (station 8R) and are preliminarily                          benign. The third was identical in size and appearance                          and not amenable to sampling due to the proximity to                          the heart and proximal margin of the mass.                          - Z-line, 37 cm from the incisors.                          - A Nissen fundoplication was found. This has                          partially slipped above the diaphragm.   Specimen A                 Interpretation:                  Negative for malignancy  Polymorphous population of lymphocytes present                 Adequacy:                 Satisfactory for evaluation        He met with Dr. Katlyn Tobar from our thoracic surgery service here at [place] on October 3, 2024.      March 24, 2025--Pathologic stage ypT1b N0 Mx     ESOPHAGECTOMY, MINIMALLY INVASIVE, Laparoscopic Right Thorascopic Esophagectomy, Botox Injection   Esophagoscopy, gastroscopy, duodenoscopy (EGD), combined            March 28,. 2025--PREOPERATIVE DIAGNOSES:  1.  GEJ adenocarcinoma s/p KYLE  2. Concern for anastomotic leak.   POSTOPERATIVE DIAGNOSES:  1.  Same  OPERATION PERFORMED:    EGD  2.   Intraoperative esophagram  3.   Supervision and interpretation of intra-op imaging.   SURGEON:  Chino Garcia MD    April 7, 2025--Preoperative diagnosis: Anastomotic leak post esophagectomy  Postoperative diagnosis: Same as preoperative diagnosis  Procedure performed:  1.   Esophagogastroduodenoscopy   2.  LEFT tube thoracostomy (20 Fr.)      DATE OF ADMISSION: 4/17/2025    PRE/POSTOPERATIVE DIAGNOSES: Anastomotic leak after esophagectomy    Severe malnutrition in the context of acute illness or injury   PROCEDURES PERFORMED:   1.  Esophagogastroduodenoscopy with placement of LEFT pharyngostomy tube placement   2.  Fluoroscopy with interpretation of images    April 25, 2025 -- oncology follow-up/in person visit, Dr. Mc. Discussed adjuvant chemotherapy but patient was too frail at this time to start.     He had multiple admissions in April and May due to pharyngostomy tube malfunctions with weakness, fatigue, and dehydration and other tube complications.     INTERVAL HISTORY:    Injured left rib, muscle injury, getting beter, felt like grinding   -taking Tylenol as needed    -scant diarrhea due to tube feeds, down to 1L daily/4 packs   -advnaced to soft mechanical diet yesterday, had oatmeal today and it went well   -feels cognitiviely and physically a little slower after eating, lasts 15-30 minutes  -still has ongoing cough, improving since advancing diet, changed characteriestics, not as deep  -staying active, riding his bike   -chronic neuroapthy unchanged with FLOT, had cold sensitivity in hands, face, mouth, feet  -      ***  Bal is seen in routine follow-up. He is finally starting to feel better. Had a recent CT showing no leak so he is working on advancing his diet. His J tube feeds are being slowly titrated down. He is walking regularly and gaining stamina. He would like to start cancer rehab PT.     He has been regaining some weight. Energy has been good. He has been active and is up and out of the bed/chair much more than 50% of the day.     He is really interested in receiving adjuvant chemotherapy if it makes sense. He is worried about recurrence.       Past medical, social, surgical, and family histories reviewed, confirmed, and pertinent details discussed with patient  and family; additional sources include the medical record.      Past Medical History:   Diagnosis Date    Hypertension     Neuritis     Peripheral neuropathy     Personal history of other medical treatment     postgastrectomy dumping syndrome    Stomach problems Noted earlier      Past Surgical History:   Procedure Laterality Date    ABDOMEN SURGERY  2012?    APPENDECTOMY  1964?    BRONCHOSCOPY RIDID OR FLEXIBLE W/ENDOBRONCHIAL ULTRASOUND GUIDED 3 OR MORE NODE STATIONS N/A 3/19/2025    Procedure: Bronchoscopy Ridid Or Flexible W/Endobronchial Ultrasound Guided 3 Or More Node Stations;  Surgeon: Saad Olmedo MD;  Location: [facility]    COLONOSCOPY  2006, 2016    COMBINED ESOPHAGOSCOPY, GASTROSCOPY, DUODENOSCOPY (EGD), REPLACE ESOPHAGEAL STENT Left 4/18/2025    Procedure: ESOPHAGOGASTRODUODENOSCOPY with pharyngostomy tube placement;  Surgeon: Erik Lindsey MD;  Location: [facility]    COMBINED ESOPHAGOSCOPY, GASTROSCOPY, DUODENOSCOPY (EGD), REPLACE ESOPHAGEAL STENT N/A 6/9/2025    Procedure: ESOPHAGOGASTRODUODENOSCOPY, stent removal, fluroscopy;  Surgeon: Katlyn Tobar MD;  Location: [facility]    DAVINCI NISSEN FUNDOPLICATION N/A 2/26/2025    Procedure: TAKE-DOWN, FUNDOPLICATION, NISSEN, LAPAROSCOPIC- ROBOTIC, gastrostomy takedown, lysisof adhesions 2 hr;  Surgeon: Katlyn Tobar MD;  Location: [facility]    ESOPHAGEAL BALLOON PROVOCATION STUDY N/A 9/24/2024    Procedure: Esophageal Balloon Provocation Study;  Surgeon: Carson Michel DO;  Location: [facility]    ESOPHAGECTOMY MINIMALLY INVASIVE N/A 3/24/2025    Procedure: ESOPHAGECTOMY, MINIMALLY INVASIVE, Laparoscopic Right Thorascopic Esophagectomy, Botox Injection;  Surgeon: Katlyn Tobar MD;  Location: [facility]    ESOPHAGOSCOPY, GASTROSCOPY, DUODENOSCOPY (EGD), COMBINED N/A 9/24/2024    Procedure: ESOPHAGOGASTRODUODENOSCOPY, WITH BIOPSY;  Surgeon: Carson Michel DO;  Location: [facility]    ESOPHAGOSCOPY, GASTROSCOPY, DUODENOSCOPY (EGD), COMBINED N/A 10/8/2024     Procedure: ESOPHAGOGASTRODUODENOSCOPY, WITH FINE NEEDLE ASPIRATION BIOPSY, WITH ENDOSCOPIC ULTRASOUND GUIDANCE;  Surgeon: Lance Lopez MD;  Location: [facility]    ESOPHAGOSCOPY, GASTROSCOPY, DUODENOSCOPY (EGD), COMBINED N/A 2/26/2025    Procedure: Esophagoscopy, gastroscopy, duodenoscopy (EGD);  Surgeon: Katlyn Tobar MD;  Location: [facility]    ESOPHAGOSCOPY, GASTROSCOPY, DUODENOSCOPY (EGD), COMBINED N/A 3/24/2025    Procedure: Esophagoscopy, gastroscopy, duodenoscopy (EGD), combined;  Surgeon: Katlyn Tobar MD;  Location: [facility]    ESOPHAGOSCOPY, GASTROSCOPY, DUODENOSCOPY (EGD), COMBINED N/A 3/28/2025    Procedure: ESOPHAGOGASTRODUODENOSCOPY, pharyngostomy tube replacement;  Surgeon: Chino Gardner MD;  Location: [facility]    ESOPHAGOSCOPY, GASTROSCOPY, DUODENOSCOPY (EGD), COMBINED N/A 4/29/2025    Procedure: Esophagoscopy, gastroscopy, duodenoscopy (EGD), fluroscopy, revision of pharnygoscopy tube;  Surgeon: Katlyn Tobar MD;  Location: [facility]    ESOPHAGOSCOPY, GASTROSCOPY, DUODENOSCOPY (EGD), COMBINED N/A 5/15/2025    Procedure: Esophagoscopy, gastroscopy, duodenoscopy (EGD), combined w/ pharyngoscopy tube removal, stent placement;  Surgeon: Katlyn Tobar MD;  Location: [facility]    EYE SURGERY  199x    laser for retinal tears    GASTRIC FUNDOPLICATION      HERNIA REPAIR  1961?    IR CHEST PORT PLACEMENT > 5 YRS OF AGE  10/24/2024    LAPAROSCOPIC ASSISTED INSERTION TUBE JEJUNOSTOMY N/A 2/26/2025    Procedure: Laparoscopic assisted insertion tube jejunostomy;  Surgeon: Katlyn Tobar MD;  Location: [facility]    LAPAROSCOPIC TAKEDOWN NISSEN FUNDOPLICATION N/A 9/25/2020    Procedure: TAKE-DOWN, FUNDOPLICATION, REDO NISSEN, LAPAROSCOPIC, gastrostomy feeding tube placement;  Surgeon: Katlyn Tobar MD;  Location: [facility]    KS ESOPHAGOGASTRODUODENOSCOPY TRANSORAL DIAGNOSTIC N/A 5/9/2019    Procedure: UPPER GASTROINTESTINAL ENDOSCOPY;  Surgeon: Dino Ward MD;  [facility]: [facility];  Service:  General    SOFT TISSUE SURGERY  ?    JCARLOS l. thumb          SOCIAL HISTORY: Lives in [place], [place] with his wife Jess. They've been  for 15 years.  He has three daughters, ages 38, 42, 50.  He is retired from working in computer security.  He denies any history of oral tobacco or cigarette use in his lifetime.  Alcohol use that he reports lifetime is five drinks per week since age of 17.  Info from other notes:    Alcohol use: Yes        Alcohol/week: 3.0 - 6.0 standard drinks of alcohol       Types: 1 - 2 Glasses of wine, 1 - 2 Cans of beer, 1 - 2 Shots of liquor per week       Comment: glass of win   He is an avid skier, and enjoy skiing a winter, and sometimes the peripheral neuropathycan interfere with that aspect of quality of life.    FAMILY HISTORY OF CANCER: his mother was diagnosed with uterine cancer in her late 30s; this diagnosis took place in the 1960s, and she  in  of unrelated causes.  He has two sisters, in good health, and his father had a superficial form of skin cancer, otherwise no other family history of cancer.      Allergies:  Allergies as of 2025 - Reviewed 2025   Allergen Reaction Noted    Hornets  2020    Wasp venom protein Hives 2016    Bee venom Swelling 2019       Current Medications:  Current Outpatient Medications   Medication Sig Dispense Refill    acetaminophen (TYLENOL) 325 MG/10.15ML liquid Place 20.3 mLs (650 mg) into J tube every 4 hours as needed for mild pain or fever. 500 mL 0    atenolol (TENORMIN) 25 MG tablet Take 25 mg by mouth daily.      calcium carbonate (TUMS) 500 MG chewable tablet Take 1 tablet (500 mg) by mouth daily as needed for heartburn. 30 tablet 0    cyanocobalamin (VITAMIN B-12) 500 MCG tablet Place 500 mcg into Feeding Tube every other day.      dexAMETHasone (DECADRON) 4 MG tablet Take 2 tablets (8 mg) by mouth daily. Take for 2 days, starting the day after chemo. Take with food. 4 tablet 2     dextromethorphan (DELSYM) 30 MG/5ML liquid Take 5 mLs (30 mg) by mouth once as needed for cough.      EPINEPHrine (ANY BX GENERIC EQUIV) 0.3 MG/0.3ML injection 2-pack Inject 0.3 mg into the muscle as needed for anaphylaxis.      gabapentin (NEURONTIN) 300 MG capsule Place 1 capsule (300 mg) into Feeding Tube 3 times daily. (Patient taking differently: Place 300 mg into Feeding Tube 3 times daily. 600 MG morning, 600 MG noon, and 300 MG bedtime)      Isosource 1.5 Gumaro 250 mL Place 1,500 mLs into J tube daily. Infuse via pump. 90 mL/hr x 16 hours.   6 cartons per day.   Water flush: 120 mL before and after tube feed cycle. Additional 120 mL 8x per day.   Kcals: 2250 13953 mL 11    Lidocaine (LIDOCARE) 4 % Patch Place 1 patch over 12 hours onto the skin every 24 hours. To prevent lidocaine toxicity, patient should be patch free for 12 hrs daily. 4 patch 0    loperamide (IMODIUM) 1 MG/7.5ML liquid Place 15 mLs (2 mg) into Feeding Tube 3 times daily. (Patient taking differently: Place 2 mg into Feeding Tube daily.) 711 mL 0    methocarbamol (ROBAXIN) 500 MG tablet Place 1 tablet (500 mg) into J tube 4 times daily. 120 tablet 0    Naloxone HCl 0.4 MG/ML SOSY Inject 0.4 mg into the muscle as needed.      nortriptyline (PAMELOR) 25 MG capsule Place 1 capsule (25 mg) into J tube every evening.      Nutrisource Fiber PO packet Place 1 each into Feeding Tube 2 times daily. (Patient taking differently: Place 1 packet into Feeding Tube daily.) 60 each 11    omeprazole (PRILOSEC) 40 MG DR capsule Take 20 mg by mouth daily.      ondansetron (ZOFRAN) 8 MG tablet Take 1 tablet (8 mg) by mouth every 8 hours as needed for nausea (vomiting). 30 tablet 2    prochlorperazine (COMPAZINE) 5 MG tablet Take 1 tablet (5 mg) by mouth every 6 hours as needed for nausea or vomiting. 30 tablet 2    Prosource TF PO LIQD (PROSOURCE TF) Place 45 mLs into J tube 2 times daily. Infuse via syringe  Water flush: 30 mL before and after each packet  "  Kcals: 80  2 pkts per day 2700 mL 11    atenolol (TENORMIN) 50 MG tablet Place 1 tablet (50 mg) into G tube daily. (Patient not taking: Reported on 7/9/2025)      methocarbamol (ROBAXIN) 500 MG tablet Place 1 tablet (500 mg) into J tube 4 times daily. 30 tablet 0    naloxone (NARCAN) 0.4 MG/ML injection Inject 0.5 mLs (0.2 mg) into the muscle once for 1 dose. (Patient not taking: No sig reported) 0.5 mL 0        Physical Exam:  /71   Pulse 63   Temp 97.4  F (36.3  C)   Resp 16   Ht 1.715 m (5' 7.52\")   Wt 79.2 kg (174 lb 8 oz)   SpO2 100%   BMI 26.91 kg/m    GENERAL: Well appearing, pleasant, alert, oriented, NAD  HEENT:  PERRLA, anicteric sclerae. Oropharynx clear, with no evidence of mucositis, thrush  NECK: No line, supple, no LAD   CV: RRR  LUNGS: CTAB  ABDOMEN:  Soft, nontender, nondistended, +BS. J tube in place and skin has no signs of cellulitis   EXTREMITIES:  No clubbing, cyanosis, edema, or palpable cords.      Laboratory/Imaging Studies    Most Recent 3 CBC's:  Recent Labs   Lab Test 07/09/25 0958 06/27/25  1120 05/17/25  0429 05/15/25  0903 05/14/25  1233   WBC 6.9 7.6 10.4   < > 12.5*   HGB 12.1* 12.1* 9.1*   < > 10.7*   MCV 89 89 89   < > 85    185 269   < > 292   ANEU 4.4 5.0  --   --  8.8*    < > = values in this interval not displayed.     Most Recent 3 BMP's:  Recent Labs   Lab Test 07/09/25 0958 06/27/25  1120 05/17/25 0429 05/14/25 2129 05/14/25  1233    137 137   < > 133*   POTASSIUM 4.7 4.5 3.9   < > 4.6   CHLORIDE 105 103 102   < > 98   CO2 22 22 24   < > 23   BUN 23.8* 22.5 23.9*   < > 24.1*   CR 0.77 0.69 0.74   < > 0.73   ANIONGAP 11 12 11   < > 12   AUGUSTIN 9.2 9.2 8.4*   < > 9.1   * 124* 137*   < > 114*   PROTTOTAL 6.9 6.9  --   --  7.4   ALBUMIN 3.9 4.0  --   --  3.6    < > = values in this interval not displayed.    Most Recent 3 LFT's:  Recent Labs   Lab Test 07/09/25  0958 06/27/25  1120 05/14/25  1233   AST 20 23 17   ALT 18 22 12   ALKPHOS 115 120 " 121   BILITOTAL 0.2 0.3 0.3    Most Recent 2 TSH and T4:  Recent Labs   Lab Test 12/16/24  1752 07/23/24  0856   TSH 1.75 1.16     I reviewed the above labs today.        I reviewed the above imaging report today.          ASSESSMENT/PLAN:  Mr. Bal Junior is a 70-year-old gentleman from [place], [place] with T2N0 GE junction carcinoma.     He had neoadjuvant FLOT x 4 followed by esophagectomy late March 2025 showing excellent treatment response with NJ of [id number]. His surgery was complicated by anastomatic leak. This has finally improved and he is  working on advancing his diet. His performance status is much better than last visit end of April. ECOG is 1 and appropriate for treatment. I think post op FLOT would be too toxic with his recent complications but FOLFOX would be more manageable. Discussed with Dr. Mc and we would plan for FOLFOX x 4. I would get a new baseline scan prior to starting and one about a month out from the end of chemotherapy as well. Reviewed the typical adjuvant therapy is started 4-8 weeks after surgery to give best benefit for risk reduction however since he does have a higher recurrence risk we will give him delayed adjuvant therapy. Bal agrees with this plan. Will work on getting started after the fourth of July holiday.     He will continue to work with Thoracic surgery team as planned. I will put referral in for cancer rehab. Will see him back prior to FOLFOX C1 after his new baseline CT. He will call with any interval concerns.     ***

## 2025-07-09 NOTE — Clinical Note
7/9/2025      Bal Junior  [address] [place] [address]      Dear Colleague,    Thank you for referring your patient, Bal Junior, to the [facility]. Please see a copy of my visit note below.    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  Microsatellite stable (GUILLAUME)  See scanned report for other details.  Likely pathogenic alterations noted in TP53, PRKCA, FANCD2    Referring physician or other provider(s):  Dr. Katlyn Tobar, Thoracic Surgery service, [facility]      History of Present Illness/Cancer Diagnosis and Evaluation to date:  His past medical history is most prominent for a history of severe acid reflux, necessitating this and fundoplication around 2011/12, also for issues relating to postprandial hypoglycemia and neuritis/neuropathy, of unclear etiology.  He states that the neuropathic symptoms were attributed by his primary care doctor, and others to long-term use of omeprazole in the 2000s, prior to the Nissen fundoplication surgery.    He has been followed routinely with upper endoscopies over the last decade, many of which have been done within the [facility] system.  The ones done over the last seven years include ones performed on August 30, 2017, December 4, 2018, May 9, 2019, and most recently as September 24, 2024.    He had a CT scan in January 2024 with results as follows:     January 26, 2024--CT a/p--IMPRESSION:   1.  Superior  "aspect of the Nissen fundal wrap appears about 2 cm above  the diaphragm possibly representing slipped Nissen.  2.  Hepatic steatosis.  3.  Nonobstructing renal calculi. No hydronephrosis.    He was experiencing dysphagia and slipped Nissen fundoplication and related issues, some of which were said to be related to worsening occasional reflux, and chest pressure associate with early satiety.  On April 30, 2024, he underwent barium swallow, with results as follows:  IMPRESSION:  1. Timed barium esophagram results as detailed above with retained  esophageal contrast lasting up to 5 minutes. There is patent flow of  contrast.   2. Expected passage of barium tablet into the stomach.      He had continued evaluation and during the summer of 2024, including on July 23, 2024. Part of the assessment included impression that the \"Manometry was consistent with inconclusive manometric EGJOO. Esophagram showed routine contrast lasting up to 5 minutes but patent flow of contrast and tablet.\"      Further evaluation included upper endoscopy, that revealed a nodularity at the gastroesophageal junction, concerning for malignancy, as follows:  September 24, 2024--EGD--Evidence of a Nissen fundoplication was found at the gastroesophageal   junction. The wrap appeared loose. This was traversed.   Localized moderate mucosal changes characterized by erythema and   nodularity were found at the gastroesophageal junction. Biopsies were   taken with a cold forceps for histology.   Specimen:    Gastric Esophageal Junction, Gastroesophageal biopsy                                      Final Diagnosis   A. GASTROESOPHAGEAL JUNCTION, BIOPSY:  - Adenocarcinoma, poorly-differentiated, with signet ring cell features     GASTRIC HER2 BIOMARKER TESTS   Test(s) Performed     HER2 by IHC     Results         With this new finding of malignancy, a staging PET/CT was performed, and no distant metastasis was detected:  October 1, 2024--PET/CT --            "                                                      IMPRESSION Findings suspicious for the gastroesophageal junction adenocarcinoma without convincing evidence of tona or metastatic disease.      He further underwent a staging EUS, that characterized the tumor as staged as T2N0 per EUS criteria:      Oct 8, 2024 EUS--    Impression:            - Malignant esophageal tumor was found at the                          gastroesophageal junction. Siewert 2. Measurements as                          above.                          This is classified T2 N0 (cytology pending) M0 by EUS                          criteria.                          Three small lymph nodes seen as described above. Two                          were sampled (station 8R) and are preliminarily                          benign. The third was identical in size and appearance                          and not amenable to sampling due to the proximity to                          the heart and proximal margin of the mass.                          - Z-line, 37 cm from the incisors.                          - A Nissen fundoplication was found. This has                          partially slipped above the diaphragm.   Specimen A                 Interpretation:                  Negative for malignancy  Polymorphous population of lymphocytes present                 Adequacy:                 Satisfactory for evaluation        He met with Dr. Katlyn Tobar from our thoracic surgery service here at [place] on October 3, 2024.      March 24, 2025--Pathologic stage ypT1b N0 Mx     ESOPHAGECTOMY, MINIMALLY INVASIVE, Laparoscopic Right Thorascopic Esophagectomy, Botox Injection   Esophagoscopy, gastroscopy, duodenoscopy (EGD), combined            March 28,. 2025--PREOPERATIVE DIAGNOSES:  1.  GEJ adenocarcinoma s/p KYLE  2. Concern for anastomotic leak.   POSTOPERATIVE DIAGNOSES:  1.  Same  OPERATION PERFORMED:    EGD  2.   Intraoperative esophagram  3.   Supervision  and interpretation of intra-op imaging.   SURGEON:  Chino Garcia MD    April 7, 2025--Preoperative diagnosis: Anastomotic leak post esophagectomy  Postoperative diagnosis: Same as preoperative diagnosis  Procedure performed:  1.  Esophagogastroduodenoscopy   2.  LEFT tube thoracostomy (20 Fr.)      DATE OF ADMISSION: 4/17/2025    PRE/POSTOPERATIVE DIAGNOSES: Anastomotic leak after esophagectomy    Severe malnutrition in the context of acute illness or injury   PROCEDURES PERFORMED:   1.  Esophagogastroduodenoscopy with placement of LEFT pharyngostomy tube placement   2.  Fluoroscopy with interpretation of images    April 25, 2025 -- oncology follow-up/in person visit, Dr. Mc. Discussed adjuvant chemotherapy but patient was too frail at this time to start.     He had multiple admissions in April and May due to pharyngostomy tube malfunctions with weakness, fatigue, and dehydration and other tube complications.     INTERVAL HISTORY:    Injured left rib, muscle injury, getting beter, felt like grinding   -taking Tylenol as needed    -scant diarrhea due to tube feeds, down to 1L daily/4 packs   -advnaced to soft mechanical diet yesterday, had oatmeal today and it went well   -feels cognitiviely and physically a little slower after eating, lasts 15-30 minutes  -still has ongoing cough, improving since advancing diet, changed characteriestics, not as deep  -staying active, riding his bike   -chronic neuroapthy unchanged with FLOT, had cold sensitivity in hands, face, mouth, feet  -      ***  Bal is seen in routine follow-up. He is finally starting to feel better. Had a recent CT showing no leak so he is working on advancing his diet. His J tube feeds are being slowly titrated down. He is walking regularly and gaining stamina. He would like to start cancer rehab PT.     He has been regaining some weight. Energy has been good. He has been active and is up and out of the bed/chair much more than 50% of the day.      He is really interested in receiving adjuvant chemotherapy if it makes sense. He is worried about recurrence.       Past medical, social, surgical, and family histories reviewed, confirmed, and pertinent details discussed with patient and family; additional sources include the medical record.      Past Medical History:   Diagnosis Date    Hypertension     Neuritis     Peripheral neuropathy     Personal history of other medical treatment     postgastrectomy dumping syndrome    Stomach problems Noted earlier      Past Surgical History:   Procedure Laterality Date    ABDOMEN SURGERY  2012?    APPENDECTOMY  1964?    BRONCHOSCOPY RIDID OR FLEXIBLE W/ENDOBRONCHIAL ULTRASOUND GUIDED 3 OR MORE NODE STATIONS N/A 3/19/2025    Procedure: Bronchoscopy Ridid Or Flexible W/Endobronchial Ultrasound Guided 3 Or More Node Stations;  Surgeon: Saad Olmedo MD;  [facility]    COLONOSCOPY  2006, 2016    COMBINED ESOPHAGOSCOPY, GASTROSCOPY, DUODENOSCOPY (EGD), REPLACE ESOPHAGEAL STENT Left 4/18/2025    Procedure: ESOPHAGOGASTRODUODENOSCOPY with pharyngostomy tube placement;  Surgeon: Erik Lindsey MD;  [facility]    COMBINED ESOPHAGOSCOPY, GASTROSCOPY, DUODENOSCOPY (EGD), REPLACE ESOPHAGEAL STENT N/A 6/9/2025    Procedure: ESOPHAGOGASTRODUODENOSCOPY, stent removal, fluroscopy;  Surgeon: Katlyn Tobar MD;  [facility]    DAVINCI NISSEN FUNDOPLICATION N/A 2/26/2025    Procedure: TAKE-DOWN, FUNDOPLICATION, NISSEN, LAPAROSCOPIC- ROBOTIC, gastrostomy takedown, lysisof adhesions 2 hr;  Surgeon: Katlyn Tobar MD;  [facility]    ESOPHAGEAL BALLOON PROVOCATION STUDY N/A 9/24/2024    Procedure: Esophageal Balloon Provocation Study;  Surgeon: Carson Michel DO;  [facility]    ESOPHAGECTOMY MINIMALLY INVASIVE N/A 3/24/2025    Procedure: ESOPHAGECTOMY, MINIMALLY INVASIVE, Laparoscopic Right Thorascopic Esophagectomy, Botox Injection;  Surgeon: Katlyn Tobar MD;  [facility]    ESOPHAGOSCOPY,  GASTROSCOPY, DUODENOSCOPY (EGD), COMBINED N/A 9/24/2024    Procedure: ESOPHAGOGASTRODUODENOSCOPY, WITH BIOPSY;  Surgeon: Carson Michel DO;  [facility]    ESOPHAGOSCOPY, GASTROSCOPY, DUODENOSCOPY (EGD), COMBINED N/A 10/8/2024    Procedure: ESOPHAGOGASTRODUODENOSCOPY, WITH FINE NEEDLE ASPIRATION BIOPSY, WITH ENDOSCOPIC ULTRASOUND GUIDANCE;  Surgeon: Lance Lopez MD;  [facility]    ESOPHAGOSCOPY, GASTROSCOPY, DUODENOSCOPY (EGD), COMBINED N/A 2/26/2025    Procedure: Esophagoscopy, gastroscopy, duodenoscopy (EGD);  Surgeon: Katlyn Tobar MD;  [facility]    ESOPHAGOSCOPY, GASTROSCOPY, DUODENOSCOPY (EGD), COMBINED N/A 3/24/2025    Procedure: Esophagoscopy, gastroscopy, duodenoscopy (EGD), combined;  Surgeon: Katlyn Tobar MD;  [facility]    ESOPHAGOSCOPY, GASTROSCOPY, DUODENOSCOPY (EGD), COMBINED N/A 3/28/2025    Procedure: ESOPHAGOGASTRODUODENOSCOPY, pharyngostomy tube replacement;  Surgeon: Chino Gardner MD;  [facility]    ESOPHAGOSCOPY, GASTROSCOPY, DUODENOSCOPY (EGD), COMBINED N/A 4/29/2025    Procedure: Esophagoscopy, gastroscopy, duodenoscopy (EGD), fluroscopy, revision of pharnygoscopy tube;  Surgeon: Katlyn Tobar MD;  [facility]    ESOPHAGOSCOPY, GASTROSCOPY, DUODENOSCOPY (EGD), COMBINED N/A 5/15/2025    Procedure: Esophagoscopy, gastroscopy, duodenoscopy (EGD), combined w/ pharyngoscopy tube removal, stent placement;  Surgeon: Katlyn Tobar MD;  [facility]    EYE SURGERY  199x    laser for retinal tears    GASTRIC FUNDOPLICATION      HERNIA REPAIR  1961?    IR CHEST PORT PLACEMENT > 5 YRS OF AGE  10/24/2024    LAPAROSCOPIC ASSISTED INSERTION TUBE JEJUNOSTOMY N/A 2/26/2025    Procedure: Laparoscopic assisted insertion tube jejunostomy;  Surgeon: Katlyn Tobar MD;  [facility]    LAPAROSCOPIC TAKEDOWN NISSEN FUNDOPLICATION N/A 9/25/2020    Procedure: TAKE-DOWN, FUNDOPLICATION, REDO NISSEN, LAPAROSCOPIC, gastrostomy feeding tube placement;   Surgeon: Katlyn Tobar MD;  [facility] ESOPHAGOGASTRODUODENOSCOPY TRANSORAL DIAGNOSTIC N/A 2019    Procedure: UPPER GASTROINTESTINAL ENDOSCOPY;  Surgeon: Dino Ward MD;  Location: [facility];  Service: General    SOFT TISSUE SURGERY  ?    MCL l. thumb          SOCIAL HISTORY: Lives in [place], [place] with his wife Jess. They've been  for 15 years.  He has three daughters, ages 38, 42, 50.  He is retired from working in computer security.  He denies any history of oral tobacco or cigarette use in his lifetime.  Alcohol use that he reports lifetime is five drinks per week since age of 17.  Info from other notes:    Alcohol use: Yes        Alcohol/week: 3.0 - 6.0 standard drinks of alcohol       Types: 1 - 2 Glasses of wine, 1 - 2 Cans of beer, 1 - 2 Shots of liquor per week       Comment: glass of win   He is an avid skier, and enjoy skiing a winter, and sometimes the peripheral neuropathycan interfere with that aspect of quality of life.    FAMILY HISTORY OF CANCER: his mother was diagnosed with uterine cancer in her late 30s; this diagnosis took place in the 1960s, and she  in  of unrelated causes.  He has two sisters, in good health, and his father had a superficial form of skin cancer, otherwise no other family history of cancer.      Allergies:  Allergies as of 2025 - Reviewed 2025   Allergen Reaction Noted    Hornets  2020    Wasp venom protein Hives 2016    Bee venom Swelling 2019       Current Medications:  Current Outpatient Medications   Medication Sig Dispense Refill    acetaminophen (TYLENOL) 325 MG/10.15ML liquid Place 20.3 mLs (650 mg) into J tube every 4 hours as needed for mild pain or fever. 500 mL 0    atenolol (TENORMIN) 25 MG tablet Take 25 mg by mouth daily.      calcium carbonate (TUMS) 500 MG chewable tablet Take 1 tablet (500 mg) by mouth daily as needed for heartburn. 30 tablet 0    cyanocobalamin (VITAMIN B-12)  500 MCG tablet Place 500 mcg into Feeding Tube every other day.      dexAMETHasone (DECADRON) 4 MG tablet Take 2 tablets (8 mg) by mouth daily. Take for 2 days, starting the day after chemo. Take with food. 4 tablet 2    dextromethorphan (DELSYM) 30 MG/5ML liquid Take 5 mLs (30 mg) by mouth once as needed for cough.      EPINEPHrine (ANY BX GENERIC EQUIV) 0.3 MG/0.3ML injection 2-pack Inject 0.3 mg into the muscle as needed for anaphylaxis.      gabapentin (NEURONTIN) 300 MG capsule Place 1 capsule (300 mg) into Feeding Tube 3 times daily. (Patient taking differently: Place 300 mg into Feeding Tube 3 times daily. 600 MG morning, 600 MG noon, and 300 MG bedtime)      Isosource 1.5 Gumaro 250 mL Place 1,500 mLs into J tube daily. Infuse via pump. 90 mL/hr x 16 hours.   6 cartons per day.   Water flush: 120 mL before and after tube feed cycle. Additional 120 mL 8x per day.   Kcals: 2250 60458 mL 11    Lidocaine (LIDOCARE) 4 % Patch Place 1 patch over 12 hours onto the skin every 24 hours. To prevent lidocaine toxicity, patient should be patch free for 12 hrs daily. 4 patch 0    loperamide (IMODIUM) 1 MG/7.5ML liquid Place 15 mLs (2 mg) into Feeding Tube 3 times daily. (Patient taking differently: Place 2 mg into Feeding Tube daily.) 711 mL 0    methocarbamol (ROBAXIN) 500 MG tablet Place 1 tablet (500 mg) into J tube 4 times daily. 120 tablet 0    Naloxone HCl 0.4 MG/ML SOSY Inject 0.4 mg into the muscle as needed.      nortriptyline (PAMELOR) 25 MG capsule Place 1 capsule (25 mg) into J tube every evening.      Nutrisource Fiber PO packet Place 1 each into Feeding Tube 2 times daily. (Patient taking differently: Place 1 packet into Feeding Tube daily.) 60 each 11    omeprazole (PRILOSEC) 40 MG DR capsule Take 20 mg by mouth daily.      ondansetron (ZOFRAN) 8 MG tablet Take 1 tablet (8 mg) by mouth every 8 hours as needed for nausea (vomiting). 30 tablet 2    prochlorperazine (COMPAZINE) 5 MG tablet Take 1 tablet (5 mg)  "by mouth every 6 hours as needed for nausea or vomiting. 30 tablet 2    Prosource TF PO LIQD (PROSOURCE TF) Place 45 mLs into J tube 2 times daily. Infuse via syringe  Water flush: 30 mL before and after each packet   Kcals: 80  2 pkts per day 2700 mL 11    atenolol (TENORMIN) 50 MG tablet Place 1 tablet (50 mg) into G tube daily. (Patient not taking: Reported on 7/9/2025)      methocarbamol (ROBAXIN) 500 MG tablet Place 1 tablet (500 mg) into J tube 4 times daily. 30 tablet 0    naloxone (NARCAN) 0.4 MG/ML injection Inject 0.5 mLs (0.2 mg) into the muscle once for 1 dose. (Patient not taking: No sig reported) 0.5 mL 0        Physical Exam:  /71   Pulse 63   Temp 97.4  F (36.3  C)   Resp 16   Ht 1.715 m (5' 7.52\")   Wt 79.2 kg (174 lb 8 oz)   SpO2 100%   BMI 26.91 kg/m    GENERAL: Well appearing, pleasant, alert, oriented, NAD  HEENT:  PERRLA, anicteric sclerae. Oropharynx clear, with no evidence of mucositis, thrush  NECK: No line, supple, no LAD   CV: RRR  LUNGS: CTAB  ABDOMEN:  Soft, nontender, nondistended, +BS. J tube in place and skin has no signs of cellulitis   EXTREMITIES:  No clubbing, cyanosis, edema, or palpable cords.      Laboratory/Imaging Studies    Most Recent 3 CBC's:  Recent Labs   Lab Test 07/09/25 0958 06/27/25  1120 05/17/25  0429 05/15/25  0903 05/14/25  1233   WBC 6.9 7.6 10.4   < > 12.5*   HGB 12.1* 12.1* 9.1*   < > 10.7*   MCV 89 89 89   < > 85    185 269   < > 292   ANEU 4.4 5.0  --   --  8.8*    < > = values in this interval not displayed.     Most Recent 3 BMP's:  Recent Labs   Lab Test 07/09/25  0958 06/27/25  1120 05/17/25 0429 05/14/25 2129 05/14/25  1233    137 137   < > 133*   POTASSIUM 4.7 4.5 3.9   < > 4.6   CHLORIDE 105 103 102   < > 98   CO2 22 22 24   < > 23   BUN 23.8* 22.5 23.9*   < > 24.1*   CR 0.77 0.69 0.74   < > 0.73   ANIONGAP 11 12 11   < > 12   AUGUSTIN 9.2 9.2 8.4*   < > 9.1   * 124* 137*   < > 114*   PROTTOTAL 6.9 6.9  --   --  7.4 "   ALBUMIN 3.9 4.0  --   --  3.6    < > = values in this interval not displayed.    Most Recent 3 LFT's:  Recent Labs   Lab Test 07/09/25  0958 06/27/25  1120 05/14/25  1233   AST 20 23 17   ALT 18 22 12   ALKPHOS 115 120 121   BILITOTAL 0.2 0.3 0.3    Most Recent 2 TSH and T4:  Recent Labs   Lab Test 12/16/24  1752 07/23/24  0856   TSH 1.75 1.16     I reviewed the above labs today.        I reviewed the above imaging report today.          ASSESSMENT/PLAN:  Mr. Bal Junior is a 70-year-old gentleman from [place], [place] with T2N0 GE junction carcinoma.     He had neoadjuvant FLOT x 4 followed by esophagectomy late March 2025 showing excellent treatment response with [place] of [id number]. His surgery was complicated by anastomatic leak. This has finally improved and he is  working on advancing his diet. His performance status is much better than last visit end of April. ECOG is 1 and appropriate for treatment. I think post op FLOT would be too toxic with his recent complications but FOLFOX would be more manageable. Discussed with Dr. Mc and we would plan for FOLFOX x 4. I would get a new baseline scan prior to starting and one about a month out from the end of chemotherapy as well. Reviewed the typical adjuvant therapy is started 4-8 weeks after surgery to give best benefit for risk reduction however since he does have a higher recurrence risk we will give him delayed adjuvant therapy. Bal agrees with this plan. Will work on getting started after the fourth of July holiday.     He will continue to work with Thoracic surgery team as planned. I will put referral in for cancer rehab. Will see him back prior to FOLFOX C1 after his new baseline CT. He will call with any interval concerns.     ***        Again, thank you for allowing me to participate in the care of your patient.        Sincerely,        ROSY Manriquez CNP    Electronically signed

## 2025-07-09 NOTE — PROGRESS NOTES
Infusion Nursing Note:  Bal Junior presents today for Cycle 1 Day 1 Oxaliplatin (dose #5), Leucovorin, Fluorouracil bolus, and Fluorouracil home pump connect - Oxaliplatin reaction.  Patient seen by provider today: Yes: Shanda Amaral CNP   present during visit today: Not Applicable.    Note: Bal presents to infusion today following his provider visit. He denies any questions or concerns at this time. This is a new regimen for Bal today but he has had Oxaliplatin, 5Fu, and leucovorin in the past. He denies any questions or concerns about these medications and wishes to proceed with treatment today.      Intravenous Access:  Implanted Port.    Treatment Conditions:  Lab Results   Component Value Date    HGB 12.1 (L) 07/09/2025    WBC 6.9 07/09/2025    ANEU 4.4 07/09/2025     07/09/2025        Lab Results   Component Value Date     07/09/2025    POTASSIUM 4.7 07/09/2025    MAG 1.8 05/16/2025    CR 0.77 07/09/2025    AUGUSTIN 9.2 07/09/2025    BILITOTAL 0.2 07/09/2025    ALBUMIN 3.9 07/09/2025    ALT 18 07/09/2025    AST 20 07/09/2025       Results reviewed, labs MET treatment parameters, ok to proceed with treatment.      Post Infusion Assessment:  Patient tolerated infusion poorly due to : Hypersensitivity: Did patient have a hypersensitivity reaction? : Yes  Drug or Product name: oxaliplatin  Were pre-meds administered?: Yes  What pre-meds were administered?: Dexamethasone (decadron, Fosaprepitant (emend), Ondansetron (Zofran)  First or Subsequent treatment: Subsequent infusion (dose number 5)  Rate of infusion when patient had hypersensitivity reaction: 290  Time the hypersensitivity reaction was first recognized: 1244  Symptoms observed or reported (select all that apply): Erythema, Itching (hands)  Interventions/treatment following reaction: Infusion stopped, Hypersensitivity medications administered  What hypersensitivity medications were administered?: DiphendydrAMINE (benadryl), NaCl 0.9%  "Bolus, Famotidine(Pepcid)  Name of provider notified: Shanda Amaral  Time provider notified: 1257  Type of notification (select all that apply): Paged/Phone  Was the patient re-challenged today?: No - no re-challenge today  Blood return noted pre and post infusion.  Site patent and intact, free from redness, edema or discomfort.  No evidence of extravasations.     TORB: Shanda Amaral CNP/Randi White RN 7/9/25  -No more oxaliplatin today  -Let's just finish giving his Leucovorin, fluorouracil bolus and home pump  -We will do an oxali desens in 2 weeks.     Bal and his wife Jess aware of the above plan and verbalize their understanding.     IB sent to Dr. Mc, Lisa Sorenson RN, and scheduling to follow up with Bal and adjust his future appointment lengths/treatment plan.     Prior to discharge: Port is secured in place with tegaderm and flushed with 10cc NS with positive blood return noted.    Continuous home infusion CADD pump connected.    All connectors secured in place and clamps taped open.    Pump started, \"running\" noted on display (CADD): YES.   Capillary element taped to pt's skin per protocol.  Pump Connection double checked with Esme Ruiz RN.  Patient instructed to call our clinic or [facility] with any questions or concerns at home.  Patient verbalized understanding.    Patient set up for pump disconnect at [facility] infusion clinic on 7/11.        Discharge Plan:   Prescription refills given for zofran, dexamethasone, and compazine.  Discharge instructions reviewed with: Patient.  Patient and/or family verbalized understanding of discharge instructions and all questions answered.  Copy of AVS reviewed with patient and/or family.  Patient will return 7/21 for next appointment.  Patient discharged in stable condition accompanied by: self and wife.  Departure Mode: Ambulatory.      Randi White RN  "

## 2025-07-09 NOTE — NURSING NOTE
Chief Complaint   Patient presents with    Blood Draw     Labs collected from port by RN. Vitals taken. Checked in for appointment(s).      Port accessed with 20 gauge 3/4 inch flat needle by RN, labs collected, line flushed with saline and heparin.  Vitals taken. Pt checked in for appointment(s).     Ginny Carrera RN

## 2025-07-09 NOTE — PROGRESS NOTES
Received C1 of Fluorouracil in the [facility] on 7/9/2025 with Fluorouracil hooked up to run at home over 46 hours via CADD pump which they will have disconnected at clinic on 7/11/25. Patient verified this is scheduled. Spoke with pt who verifies they have Welcome Folder with [facility] with 24/7 phone number.  Verbalizes understanding of 24/7 coverage with pharmacist and nurse.  Verified that they have a chemo spill kit in the home. Reviewed potential side effects of chemo drugs and antiemetics. All questions answered.

## 2025-07-09 NOTE — NURSING NOTE
"Oncology Rooming Note    July 9, 2025 10:20 AM   Bal Junior is a 70 year old male who presents for:    Chief Complaint   Patient presents with    Blood Draw     Labs collected from port by RN. Vitals taken. Checked in for appointment(s).     Oncology Clinic Visit     [place] Aden[facility]     Initial Vitals: /71   Pulse 63   Temp 97.4  F (36.3  C)   Resp 16   Ht 1.715 m (5' 7.52\")   Wt 79.2 kg (174 lb 8 oz)   SpO2 100%   BMI 26.91 kg/m   Estimated body mass index is 26.91 kg/m  as calculated from the following:    Height as of this encounter: 1.715 m (5' 7.52\").    Weight as of this encounter: 79.2 kg (174 lb 8 oz). Body surface area is 1.94 meters squared.  Mild Pain (1) Comment: Data Unavailable   No LMP for male patient.  Allergies reviewed: Yes  Medications reviewed: Yes    Medications: Medication refills not needed today.  Pharmacy name entered into [employer]:    [facility] - [place], [place] - [facility] [address] [facility].OPEN TO ALL.  [facility] - [facility]    Frailty Screening:   Is the patient here for a new oncology consult visit in cancer care? 2. No    PHQ9:  Did this patient require a PHQ9?: No      Clinical concerns: none      Johnny Ramirez              "

## 2025-07-09 NOTE — PATIENT INSTRUCTIONS
[facility] Triage and after hours / weekends / holidays:  [phone] option 5, option 2    Please call the triage or after hours line if you experience a temperature greater than or equal to 100.4, shaking chills, have uncontrolled nausea, vomiting and/or diarrhea, dizziness, shortness of breath, chest pain, bleeding, unexplained bruising, or if you have any other new/concerning symptoms, questions or concerns.      If you are having any concerning symptoms or wish to speak to a provider before your next infusion visit, please call triage to notify your care team so we can adequately serve you.     If you need a refill on a narcotic prescription or other medication, please call before your infusion appointment.          July 2025 Sunday Monday Tuesday Wednesday Thursday Friday Saturday             1     2    PT Cancer Rehab Eval   9:45 AM   (60 min.)   Brittany Balbuena, CHIQUI   [facility] 3    CT CHEST/ABDOMEN/PELVIS W CONTRAST  11:10 AM   (20 min.)   [facility] CT 2   [facility] CT 4     5       6     7     8     9    [facility]   9:45 AM   (15 min.)   [facility] D[facility]   [facility]    Return Patient  10:15 AM   (45 min.)   Shanda Amaral APRN CNP   [facility]    Infusion 240  12:30 PM   (240 min.)    ONC INFUSION NURSE   [facility] 10     11    Infusion Visit  12:15 PM   (30 min.)   [facility] INFUSION NURSE   [facility] 12       13     14     15     16     17     18     19       20     21    [facility]  12:15 PM   (15 min.)    [facility]   [facility]    Infusion 240   1:00 PM   (240 min.)    ONC INFUSION NURSE   [facility] 22     23    Infusion Visit  12:45 PM   (30 min.)   [facility] INFUSION NURSE   [facility] 24    PT Cancer Rehab  Treatment  10:45 AM   (45 min.)   Cynthia Arce PTA M [facility] 25     26       27     28     29    PT Cancer Rehab Treatment   9:00 AM   (45 min.)   Cynthia Arce PTA M [facility] 30     31 August 2025 Sunday Monday Tuesday Wednesday Thursday Friday Saturday                            1     2       3     4     5    PT Cancer Rehab Treatment  11:00 AM   (45 min.)   Cynthia Arce PTA M [facility] 6    [facility]   9:45 AM   (15 min.)    [facility]   [facility]    Infusion 240  10:30 AM   (240 min.)    ONC INFUSION NURSE   [facility] 7     8    Infusion Visit  10:15 AM   (30 min.)   [facility] INFUSION NURSE   [facility] 9       10     11     12     13    PT Cancer Rehab Treatment   3:00 PM   (45 min.)   Brittany Balbuena PT   [facility] 14     15     16       17     18     19    [facility]   9:45 AM   (15 min.)    [facility]   [facility]    Infusion 240  10:30 AM   (240 min.)    ONC INFUSION NURSE   [facility] 20    PT Cancer Rehab Treatment   2:00 PM   (45 min.)   Brittany Balbuena PT   [facility] 21    Infusion Visit  10:15 AM   (30 min.)   [facility] INFUSION NURSE   [facility] 22     23       24     25     26    PT Cancer Rehab Treatment   2:00 PM   (45 min.)   Cynthia Arce PTA M [facility] 27     28     29    CT CHEST/ABDOMEN/PELVIS W CONTRAST   1:30 PM   (20 min.)   [facility] CT 2   [facility] CT 30       31                                                     Lab Results:  Recent Results (from the past 12 hours)    Comprehensive metabolic panel    Collection Time: 07/09/25  9:58 AM   Result Value Ref Range    Sodium 138 135 - 145 mmol/L    Potassium 4.7 3.4 - 5.3 mmol/L    Carbon Dioxide (CO2) 22 22 - 29 mmol/L    Anion Gap 11 7 - 15 mmol/L    Urea Nitrogen 23.8 (H) 8.0 - 23.0 mg/dL    Creatinine 0.77 0.67 - 1.17 mg/dL    GFR Estimate >90 >60 mL/min/1.73m2    Calcium 9.2 8.8 - 10.4 mg/dL    Chloride 105 98 - 107 mmol/L    Glucose 137 (H) 70 - 99 mg/dL    Alkaline Phosphatase 115 40 - 150 U/L    AST 20 0 - 45 U/L    ALT 18 0 - 70 U/L    Protein Total 6.9 6.4 - 8.3 g/dL    Albumin 3.9 3.5 - 5.2 g/dL    Bilirubin Total 0.2 <=1.2 mg/dL   CBC with platelets and differential    Collection Time: 07/09/25  9:58 AM   Result Value Ref Range    WBC Count 6.9 4.0 - 11.0 10e3/uL    RBC Count 4.14 (L) 4.40 - 5.90 10e6/uL    Hemoglobin 12.1 (L) 13.3 - 17.7 g/dL    Hematocrit 36.8 (L) 40.0 - 53.0 %    MCV 89 78 - 100 fL    MCH 29.2 26.5 - 33.0 pg    MCHC 32.9 31.5 - 36.5 g/dL    RDW 13.9 10.0 - 15.0 %    Platelet Count 221 150 - 450 10e3/uL    % Neutrophils 64 %    % Lymphocytes 22 %    % Monocytes 10 %    % Eosinophils 3 %    % Basophils 0 %    % Immature Granulocytes 0 %    NRBCs per 100 WBC 0 <1 /100    Absolute Neutrophils 4.4 1.6 - 8.3 10e3/uL    Absolute Lymphocytes 1.5 0.8 - 5.3 10e3/uL    Absolute Monocytes 0.7 0.0 - 1.3 10e3/uL    Absolute Eosinophils 0.2 0.0 - 0.7 10e3/uL    Absolute Basophils 0.0 0.0 - 0.2 10e3/uL    Absolute Immature Granulocytes 0.0 <=0.4 10e3/uL    Absolute NRBCs 0.0 10e3/uL

## 2025-07-10 DIAGNOSIS — D70.1 CHEMOTHERAPY-INDUCED NEUTROPENIA: ICD-10-CM

## 2025-07-10 DIAGNOSIS — T45.1X5A CHEMOTHERAPY-INDUCED NEUTROPENIA: ICD-10-CM

## 2025-07-10 DIAGNOSIS — C16.0 ADENOCARCINOMA OF GASTROESOPHAGEAL JUNCTION (H): Primary | ICD-10-CM

## 2025-07-10 PROCEDURE — B4035 ENTERAL FEED SUPP PUMP PER D: HCPCS

## 2025-07-10 RX ORDER — FLUOROURACIL 50 MG/ML
400 INJECTION, SOLUTION INTRAVENOUS ONCE
OUTPATIENT
Start: 2025-07-23

## 2025-07-10 RX ORDER — FLUOROURACIL 50 MG/ML
400 INJECTION, SOLUTION INTRAVENOUS ONCE
OUTPATIENT
Start: 2025-08-06

## 2025-07-10 RX ORDER — FLUOROURACIL 50 MG/ML
400 INJECTION, SOLUTION INTRAVENOUS ONCE
OUTPATIENT
Start: 2025-08-20

## 2025-07-11 PROCEDURE — B4035 ENTERAL FEED SUPP PUMP PER D: HCPCS

## 2025-07-12 PROCEDURE — B4035 ENTERAL FEED SUPP PUMP PER D: HCPCS

## 2025-07-13 PROCEDURE — B4035 ENTERAL FEED SUPP PUMP PER D: HCPCS

## 2025-07-14 PROCEDURE — B4035 ENTERAL FEED SUPP PUMP PER D: HCPCS

## 2025-07-15 ENCOUNTER — HOME INFUSION BILLING (OUTPATIENT)
Dept: HOME HEALTH SERVICES | Facility: HOME HEALTH | Age: 70
End: 2025-07-15
Payer: COMMERCIAL

## 2025-07-15 PROCEDURE — B4035 ENTERAL FEED SUPP PUMP PER D: HCPCS

## 2025-07-16 PROCEDURE — B4035 ENTERAL FEED SUPP PUMP PER D: HCPCS

## 2025-07-17 ENCOUNTER — NURSE TRIAGE (OUTPATIENT)
Dept: ONCOLOGY | Facility: CLINIC | Age: 70
End: 2025-07-17
Payer: COMMERCIAL

## 2025-07-17 PROCEDURE — B4035 ENTERAL FEED SUPP PUMP PER D: HCPCS

## 2025-07-17 NOTE — TELEPHONE ENCOUNTER
Oncology Nurse Triage - Reporting Symptoms    Situation:   Bal reporting the following symptoms: cough       Background:   Treating Provider:   DR Mc     Date of last office visit: 7/9/25 Shanda Amaral   6/19/25 Chacha Bhatia     Recent treatments: Yes: 7/9/25 Cycle 1 Day 1 Oxaliplatin (dose #5), Leucovorin, Fluorouracil bolus, and Fluorouracil home pump connect - Oxaliplatin reaction.       Assessment  Onset of symptoms:   Has had cough since March when he had surgery it was a dry hacking cough.   They advanced his diet to a mechanical soft diet a week ago and since then cough has been getting worse.   Productive cough, coughing up a tablespoon of phlegm, this happens a few times an hour.   At night when sleeping cough does not happen much.   Phlegm is clear.   No fevers.   Is drinking fluids and is getting 64 fl oz in per day.        Paged provider: 10:02 Update provided to thoracic team     Recommendations:   Per DR Tobar: Dr. Tobar says switch back to full liquids and see PCP with CXR.    Call placed to Bal: let him know to switch back to full liquid diet and to see PCP and get a chest x ray. He says he will call PCP right away and see how long it takes him to get in. Will switch back to full liquid diet.

## 2025-07-18 PROCEDURE — B4035 ENTERAL FEED SUPP PUMP PER D: HCPCS

## 2025-07-19 ENCOUNTER — HEALTH MAINTENANCE LETTER (OUTPATIENT)
Age: 70
End: 2025-07-19

## 2025-07-19 PROCEDURE — B4035 ENTERAL FEED SUPP PUMP PER D: HCPCS

## 2025-07-20 PROCEDURE — B4035 ENTERAL FEED SUPP PUMP PER D: HCPCS

## 2025-07-21 ENCOUNTER — HOME INFUSION BILLING (OUTPATIENT)
Dept: HOME HEALTH SERVICES | Facility: HOME HEALTH | Age: 70
End: 2025-07-21
Payer: COMMERCIAL

## 2025-07-21 ENCOUNTER — HOME INFUSION (OUTPATIENT)
Dept: HOME HEALTH SERVICES | Facility: HOME HEALTH | Age: 70
End: 2025-07-21
Payer: COMMERCIAL

## 2025-07-21 PROCEDURE — B4155 EF INCOMPLETE/MODULAR: HCPCS

## 2025-07-21 PROCEDURE — B4035 ENTERAL FEED SUPP PUMP PER D: HCPCS

## 2025-07-22 PROCEDURE — B4035 ENTERAL FEED SUPP PUMP PER D: HCPCS

## 2025-07-22 RX ORDER — DIPHENHYDRAMINE HYDROCHLORIDE 50 MG/ML
50 INJECTION, SOLUTION INTRAMUSCULAR; INTRAVENOUS ONCE
Start: 2025-08-07

## 2025-07-22 RX ORDER — DIPHENHYDRAMINE HYDROCHLORIDE 50 MG/ML
50 INJECTION, SOLUTION INTRAMUSCULAR; INTRAVENOUS ONCE
Start: 2025-08-21

## 2025-07-22 RX ORDER — DIPHENHYDRAMINE HYDROCHLORIDE 50 MG/ML
25 INJECTION, SOLUTION INTRAMUSCULAR; INTRAVENOUS ONCE
Start: 2025-08-07

## 2025-07-22 RX ORDER — DIPHENHYDRAMINE HYDROCHLORIDE 50 MG/ML
25 INJECTION, SOLUTION INTRAMUSCULAR; INTRAVENOUS ONCE
Start: 2025-08-21

## 2025-07-23 ENCOUNTER — HOME INFUSION (OUTPATIENT)
Dept: HOME HEALTH SERVICES | Facility: HOME HEALTH | Age: 70
End: 2025-07-23
Payer: COMMERCIAL

## 2025-07-23 DIAGNOSIS — C16.0 ADENOCARCINOMA OF GASTROESOPHAGEAL JUNCTION (H): ICD-10-CM

## 2025-07-23 PROCEDURE — B4035 ENTERAL FEED SUPP PUMP PER D: HCPCS

## 2025-07-24 ENCOUNTER — VIRTUAL VISIT (OUTPATIENT)
Dept: ONCOLOGY | Facility: CLINIC | Age: 70
End: 2025-07-24
Payer: MEDICARE

## 2025-07-24 ENCOUNTER — HOSPITAL ENCOUNTER (EMERGENCY)
Facility: CLINIC | Age: 70
Discharge: HOME | End: 2025-07-24
Attending: EMERGENCY MEDICINE | Admitting: EMERGENCY MEDICINE
Payer: MEDICARE

## 2025-07-24 ENCOUNTER — INFUSION THERAPY VISIT (OUTPATIENT)
Dept: ONCOLOGY | Facility: CLINIC | Age: 70
End: 2025-07-24
Attending: INTERNAL MEDICINE
Payer: MEDICARE

## 2025-07-24 ENCOUNTER — HOME INFUSION BILLING (OUTPATIENT)
Dept: HOME HEALTH SERVICES | Facility: HOME HEALTH | Age: 70
End: 2025-07-24
Payer: COMMERCIAL

## 2025-07-24 ENCOUNTER — DOCUMENTATION ONLY (OUTPATIENT)
Dept: INFUSION THERAPY | Facility: CLINIC | Age: 70
End: 2025-07-24

## 2025-07-24 ENCOUNTER — APPOINTMENT (OUTPATIENT)
Dept: LAB | Facility: CLINIC | Age: 70
End: 2025-07-24
Attending: PHYSICIAN ASSISTANT
Payer: MEDICARE

## 2025-07-24 VITALS
DIASTOLIC BLOOD PRESSURE: 82 MMHG | RESPIRATION RATE: 18 BRPM | HEART RATE: 106 BPM | OXYGEN SATURATION: 100 % | TEMPERATURE: 97.8 F | SYSTOLIC BLOOD PRESSURE: 120 MMHG

## 2025-07-24 VITALS — HEIGHT: 67 IN | BODY MASS INDEX: 27.15 KG/M2 | WEIGHT: 173 LBS

## 2025-07-24 VITALS
HEART RATE: 60 BPM | OXYGEN SATURATION: 97 % | DIASTOLIC BLOOD PRESSURE: 75 MMHG | RESPIRATION RATE: 18 BRPM | SYSTOLIC BLOOD PRESSURE: 121 MMHG

## 2025-07-24 DIAGNOSIS — T45.1X5A CHEMOTHERAPY-INDUCED NEUTROPENIA: ICD-10-CM

## 2025-07-24 DIAGNOSIS — K12.30 MUCOSITIS: ICD-10-CM

## 2025-07-24 DIAGNOSIS — R05.3 CHRONIC COUGH: ICD-10-CM

## 2025-07-24 DIAGNOSIS — T78.40XA ALLERGIC REACTION, INITIAL ENCOUNTER: Primary | ICD-10-CM

## 2025-07-24 DIAGNOSIS — C16.0 ADENOCARCINOMA OF GASTROESOPHAGEAL JUNCTION (H): Primary | ICD-10-CM

## 2025-07-24 DIAGNOSIS — G60.9 IDIOPATHIC PERIPHERAL NEUROPATHY: ICD-10-CM

## 2025-07-24 DIAGNOSIS — D70.1 CHEMOTHERAPY-INDUCED NEUTROPENIA: ICD-10-CM

## 2025-07-24 LAB
ALBUMIN SERPL BCG-MCNC: 3.8 G/DL (ref 3.5–5.2)
ALP SERPL-CCNC: 128 U/L (ref 40–150)
ALT SERPL W P-5'-P-CCNC: 14 U/L (ref 0–70)
ANION GAP SERPL CALCULATED.3IONS-SCNC: 13 MMOL/L (ref 7–15)
AST SERPL W P-5'-P-CCNC: 19 U/L (ref 0–45)
BASOPHILS # BLD AUTO: 0 10E3/UL (ref 0–0.2)
BASOPHILS NFR BLD AUTO: 0 %
BILIRUB SERPL-MCNC: <0.2 MG/DL
BUN SERPL-MCNC: 25.5 MG/DL (ref 8–23)
CALCIUM SERPL-MCNC: 9.2 MG/DL (ref 8.8–10.4)
CHLORIDE SERPL-SCNC: 105 MMOL/L (ref 98–107)
CREAT SERPL-MCNC: 0.81 MG/DL (ref 0.67–1.17)
EGFRCR SERPLBLD CKD-EPI 2021: >90 ML/MIN/1.73M2
EOSINOPHIL # BLD AUTO: 0.2 10E3/UL (ref 0–0.7)
EOSINOPHIL NFR BLD AUTO: 4 %
ERYTHROCYTE [DISTWIDTH] IN BLOOD BY AUTOMATED COUNT: 13.3 % (ref 10–15)
GLUCOSE SERPL-MCNC: 86 MG/DL (ref 70–99)
HCO3 SERPL-SCNC: 22 MMOL/L (ref 22–29)
HCT VFR BLD AUTO: 38.4 % (ref 40–53)
HGB BLD-MCNC: 12.4 G/DL (ref 13.3–17.7)
IMM GRANULOCYTES # BLD: 0 10E3/UL
IMM GRANULOCYTES NFR BLD: 0 %
LYMPHOCYTES # BLD AUTO: 1.8 10E3/UL (ref 0.8–5.3)
LYMPHOCYTES NFR BLD AUTO: 32 %
MCH RBC QN AUTO: 28.4 PG (ref 26.5–33)
MCHC RBC AUTO-ENTMCNC: 32.3 G/DL (ref 31.5–36.5)
MCV RBC AUTO: 88 FL (ref 78–100)
MONOCYTES # BLD AUTO: 0.7 10E3/UL (ref 0–1.3)
MONOCYTES NFR BLD AUTO: 13 %
NEUTROPHILS # BLD AUTO: 2.8 10E3/UL (ref 1.6–8.3)
NEUTROPHILS NFR BLD AUTO: 51 %
NRBC # BLD AUTO: 0 10E3/UL
NRBC BLD AUTO-RTO: 0 /100
PLATELET # BLD AUTO: 273 10E3/UL (ref 150–450)
POTASSIUM SERPL-SCNC: 4.4 MMOL/L (ref 3.4–5.3)
PROT SERPL-MCNC: 7 G/DL (ref 6.4–8.3)
RBC # BLD AUTO: 4.37 10E6/UL (ref 4.4–5.9)
SODIUM SERPL-SCNC: 140 MMOL/L (ref 135–145)
WBC # BLD AUTO: 5.6 10E3/UL (ref 4–11)

## 2025-07-24 PROCEDURE — 250N000011 HC RX IP 250 OP 636

## 2025-07-24 PROCEDURE — 99284 EMERGENCY DEPT VISIT MOD MDM: CPT | Mod: 25 | Performed by: EMERGENCY MEDICINE

## 2025-07-24 PROCEDURE — 36591 DRAW BLOOD OFF VENOUS DEVICE: CPT | Performed by: INTERNAL MEDICINE

## 2025-07-24 PROCEDURE — 258N000003 HC RX IP 258 OP 636: Performed by: EMERGENCY MEDICINE

## 2025-07-24 PROCEDURE — 80053 COMPREHEN METABOLIC PANEL: CPT | Performed by: INTERNAL MEDICINE

## 2025-07-24 PROCEDURE — 96361 HYDRATE IV INFUSION ADD-ON: CPT | Performed by: EMERGENCY MEDICINE

## 2025-07-24 PROCEDURE — 96374 THER/PROPH/DIAG INJ IV PUSH: CPT | Mod: 59 | Performed by: EMERGENCY MEDICINE

## 2025-07-24 PROCEDURE — 85004 AUTOMATED DIFF WBC COUNT: CPT | Performed by: INTERNAL MEDICINE

## 2025-07-24 PROCEDURE — 258N000003 HC RX IP 258 OP 636: Performed by: INTERNAL MEDICINE

## 2025-07-24 PROCEDURE — 250N000011 HC RX IP 250 OP 636: Performed by: EMERGENCY MEDICINE

## 2025-07-24 PROCEDURE — 99284 EMERGENCY DEPT VISIT MOD MDM: CPT | Performed by: EMERGENCY MEDICINE

## 2025-07-24 PROCEDURE — B4035 ENTERAL FEED SUPP PUMP PER D: HCPCS

## 2025-07-24 PROCEDURE — 250N000011 HC RX IP 250 OP 636: Performed by: INTERNAL MEDICINE

## 2025-07-24 PROCEDURE — 96372 THER/PROPH/DIAG INJ SC/IM: CPT | Performed by: INTERNAL MEDICINE

## 2025-07-24 RX ORDER — EPINEPHRINE 1 MG/ML
0.3 INJECTION, SOLUTION, CONCENTRATE INTRAVENOUS EVERY 5 MIN PRN
Status: DISCONTINUED | OUTPATIENT
Start: 2025-07-24 | End: 2025-07-24 | Stop reason: HOSPADM

## 2025-07-24 RX ORDER — METHYLPREDNISOLONE SODIUM SUCCINATE 40 MG/ML
40 INJECTION INTRAMUSCULAR; INTRAVENOUS
OUTPATIENT
Start: 2025-07-25 | End: 2025-07-25

## 2025-07-24 RX ORDER — EPINEPHRINE 1 MG/ML
0.3 INJECTION, SOLUTION INTRAMUSCULAR; SUBCUTANEOUS EVERY 5 MIN PRN
OUTPATIENT
Start: 2025-07-25

## 2025-07-24 RX ORDER — FLUOROURACIL 50 MG/ML
400 INJECTION, SOLUTION INTRAVENOUS ONCE
OUTPATIENT
Start: 2025-07-25

## 2025-07-24 RX ORDER — ALBUTEROL SULFATE 90 UG/1
1-2 INHALANT RESPIRATORY (INHALATION)
OUTPATIENT
Start: 2025-07-25

## 2025-07-24 RX ORDER — METHYLPREDNISOLONE SODIUM SUCCINATE 40 MG/ML
40 INJECTION INTRAMUSCULAR; INTRAVENOUS
Status: COMPLETED | OUTPATIENT
Start: 2025-07-24 | End: 2025-07-24

## 2025-07-24 RX ORDER — DIPHENHYDRAMINE HYDROCHLORIDE 50 MG/ML
25 INJECTION, SOLUTION INTRAMUSCULAR; INTRAVENOUS ONCE
Status: COMPLETED | OUTPATIENT
Start: 2025-07-24 | End: 2025-07-24

## 2025-07-24 RX ORDER — ALBUTEROL SULFATE 90 UG/1
1-2 INHALANT RESPIRATORY (INHALATION)
Status: DISCONTINUED | OUTPATIENT
Start: 2025-07-24 | End: 2025-07-24 | Stop reason: HOSPADM

## 2025-07-24 RX ORDER — ALBUTEROL SULFATE 0.83 MG/ML
2.5 SOLUTION RESPIRATORY (INHALATION)
Status: DISCONTINUED | OUTPATIENT
Start: 2025-07-24 | End: 2025-07-24 | Stop reason: HOSPADM

## 2025-07-24 RX ORDER — MEPERIDINE HYDROCHLORIDE 25 MG/ML
25 INJECTION INTRAMUSCULAR; INTRAVENOUS; SUBCUTANEOUS
OUTPATIENT
Start: 2025-07-25

## 2025-07-24 RX ORDER — LORAZEPAM 2 MG/ML
0.5 INJECTION INTRAMUSCULAR EVERY 4 HOURS PRN
OUTPATIENT
Start: 2025-07-25

## 2025-07-24 RX ORDER — MEPERIDINE HYDROCHLORIDE 25 MG/ML
25 INJECTION INTRAMUSCULAR; INTRAVENOUS; SUBCUTANEOUS
Status: DISCONTINUED | OUTPATIENT
Start: 2025-07-24 | End: 2025-07-24 | Stop reason: HOSPADM

## 2025-07-24 RX ORDER — ALBUTEROL SULFATE 0.83 MG/ML
2.5 SOLUTION RESPIRATORY (INHALATION)
OUTPATIENT
Start: 2025-07-25

## 2025-07-24 RX ORDER — DIPHENHYDRAMINE HYDROCHLORIDE 50 MG/ML
50 INJECTION, SOLUTION INTRAMUSCULAR; INTRAVENOUS
OUTPATIENT
Start: 2025-07-25

## 2025-07-24 RX ORDER — FLUOROURACIL 50 MG/ML
400 INJECTION, SOLUTION INTRAVENOUS ONCE
Status: COMPLETED | OUTPATIENT
Start: 2025-07-24 | End: 2025-07-24

## 2025-07-24 RX ORDER — DIPHENHYDRAMINE HYDROCHLORIDE 50 MG/ML
50 INJECTION, SOLUTION INTRAMUSCULAR; INTRAVENOUS
Status: DISCONTINUED | OUTPATIENT
Start: 2025-07-24 | End: 2025-07-24 | Stop reason: HOSPADM

## 2025-07-24 RX ORDER — HEPARIN SODIUM (PORCINE) LOCK FLUSH IV SOLN 100 UNIT/ML 100 UNIT/ML
3 SOLUTION INTRAVENOUS ONCE
Status: COMPLETED | OUTPATIENT
Start: 2025-07-24 | End: 2025-07-24

## 2025-07-24 RX ORDER — DIPHENHYDRAMINE HYDROCHLORIDE 50 MG/ML
50 INJECTION, SOLUTION INTRAMUSCULAR; INTRAVENOUS ONCE
Status: COMPLETED | OUTPATIENT
Start: 2025-07-24 | End: 2025-07-24

## 2025-07-24 RX ORDER — DIPHENHYDRAMINE HYDROCHLORIDE 50 MG/ML
25 INJECTION, SOLUTION INTRAMUSCULAR; INTRAVENOUS
Status: DISCONTINUED | OUTPATIENT
Start: 2025-07-24 | End: 2025-07-24 | Stop reason: HOSPADM

## 2025-07-24 RX ORDER — DIPHENHYDRAMINE HYDROCHLORIDE 50 MG/ML
25 INJECTION, SOLUTION INTRAMUSCULAR; INTRAVENOUS
OUTPATIENT
Start: 2025-07-25

## 2025-07-24 RX ORDER — METHYLPREDNISOLONE SODIUM SUCCINATE 125 MG/2ML
85 INJECTION INTRAMUSCULAR; INTRAVENOUS ONCE
Status: COMPLETED | OUTPATIENT
Start: 2025-07-24 | End: 2025-07-24

## 2025-07-24 RX ADMIN — DEXAMETHASONE SODIUM PHOSPHATE: 10 INJECTION, SOLUTION INTRAMUSCULAR; INTRAVENOUS at 08:17

## 2025-07-24 RX ADMIN — OXALIPLATIN 16 MG: 5 INJECTION, SOLUTION INTRAVENOUS at 11:53

## 2025-07-24 RX ADMIN — DIPHENHYDRAMINE HYDROCHLORIDE 50 MG: 50 INJECTION INTRAMUSCULAR; INTRAVENOUS at 07:35

## 2025-07-24 RX ADMIN — FAMOTIDINE 20 MG: 10 INJECTION, SOLUTION INTRAVENOUS at 07:42

## 2025-07-24 RX ADMIN — SODIUM CHLORIDE, PRESERVATIVE FREE 3 ML: 5 INJECTION INTRAVENOUS at 16:51

## 2025-07-24 RX ADMIN — METHYLPREDNISOLONE SODIUM SUCCINATE 87.5 MG: 125 INJECTION, POWDER, FOR SOLUTION INTRAMUSCULAR; INTRAVENOUS at 13:44

## 2025-07-24 RX ADMIN — FAMOTIDINE 20 MG: 10 INJECTION, SOLUTION INTRAVENOUS at 12:32

## 2025-07-24 RX ADMIN — FOSAPREPITANT 150 MG: 150 INJECTION, POWDER, LYOPHILIZED, FOR SOLUTION INTRAVENOUS at 07:52

## 2025-07-24 RX ADMIN — EPINEPHRINE 0.3 MG: 1 INJECTION INTRAMUSCULAR; INTRAVENOUS; SUBCUTANEOUS at 12:46

## 2025-07-24 RX ADMIN — OXALIPLATIN 0.02 MG: 5 INJECTION, SOLUTION INTRAVENOUS at 08:36

## 2025-07-24 RX ADMIN — EPINEPHRINE 0.3 MG: 1 INJECTION INTRAMUSCULAR; INTRAVENOUS; SUBCUTANEOUS at 12:30

## 2025-07-24 RX ADMIN — OXALIPLATIN 0.16 MG: 5 INJECTION, SOLUTION INTRAVENOUS at 09:44

## 2025-07-24 RX ADMIN — SODIUM CHLORIDE 1000 ML: 9 INJECTION, SOLUTION INTRAVENOUS at 12:32

## 2025-07-24 RX ADMIN — DEXTROSE 250 ML: 5 SOLUTION INTRAVENOUS at 08:36

## 2025-07-24 RX ADMIN — METHYLPREDNISOLONE SODIUM SUCCINATE 40 MG: 40 INJECTION, POWDER, FOR SOLUTION INTRAMUSCULAR; INTRAVENOUS at 12:34

## 2025-07-24 RX ADMIN — SODIUM CHLORIDE 500 ML: 9 INJECTION, SOLUTION INTRAVENOUS at 13:44

## 2025-07-24 RX ADMIN — DIPHENHYDRAMINE HYDROCHLORIDE 25 MG: 50 INJECTION INTRAMUSCULAR; INTRAVENOUS at 12:30

## 2025-07-24 RX ADMIN — OXALIPLATIN 1.6 MG: 5 INJECTION, SOLUTION INTRAVENOUS at 10:48

## 2025-07-24 ASSESSMENT — ACTIVITIES OF DAILY LIVING (ADL)
ADLS_ACUITY_SCORE: 58

## 2025-07-24 ASSESSMENT — PAIN SCALES - GENERAL: PAINLEVEL_OUTOF10: NO PAIN (0)

## 2025-07-24 NOTE — NURSING NOTE
Rapid Response Epic Documentation     Situation:   Called for an allergic reaction     Objective:    Patient was sitting in a recliner chair w/redness on throat and no airway involvement.    Assessment:   Per RN staff patient received rescue meds and x2 Epinephrine for itchy/red hands and itchy scalp, w/ no airway involvement or secondary body system involvement. Staff said they wanted to send patient to ED for observation due to potential rebound symptoms. 911 was called and patient only had a complaint of shakiness while waiting for [facility]. When the ambulance arrived, paperwork and report given and patient to be taken to [facility].     BP:  108/70, 120/70   Pulse: 105, 104  Respiration: 20  SPO2: 97 %  Glucose: N/A  Mental Status: Alert  CMS: Intact  Stroke Scale: Not Applicable  EKG: Not Performed      Treatment:    Rescue medication, Epinephrine x2 for itchy red hands and scalp      Location:  2nd floor, 4A        Disposition:      Ambulance transport to [facility]    Protocol Used:

## 2025-07-24 NOTE — NURSING NOTE
Current patient location: [address] [place] [address]    Is the patient currently in the state of [place]? YES    Visit mode: VIDEO    If the visit is dropped, the patient can be reconnected by:VIDEO VISIT: Text to cell phone:   Telephone Information:   Mobile [phone]       Will anyone else be joining the visit? NO  (If patient encounters technical issues they should call [phone]    Are changes needed to the allergy or medication list? Pt stated no changes to allergies and Pt stated no med changes    Are refills needed on medications prescribed by this physician? NO    Rooming Documentation:  Not applicable    Reason for visit: SIERRA ELAM

## 2025-07-24 NOTE — PROGRESS NOTES
Virtual Visit Details    Type of service:  Video Visit   Video Start Time: 7:07 AM  Video End Time:7:23 AM    Originating Location (pt. 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  Microsatellite stable (GUILLAUME)  See scanned report for other details.  Likely pathogenic alterations noted in TP53, PRKCA, FANCD2    Referring physician or other provider(s):  Dr. Katlyn Tobar, Thoracic Surgery service, [facility]      History of Present Illness/Cancer Diagnosis and Evaluation to date:  His past medical history is most prominent for a history of severe acid reflux, necessitating this and fundoplication around 2011/12, also for issues relating to postprandial hypoglycemia and neuritis/neuropathy, of unclear etiology.  He states that the neuropathic symptoms were attributed by his primary care doctor, and others to long-term use of omeprazole in the 2000s, prior to the Nissen fundoplication surgery.    He has been followed routinely with upper endoscopies over the last decade, many of which have been done within the [employer]  system.  The ones done over the last seven years include ones performed on August 30, 2017, December 4, 2018, May 9, 2019, and most recently as September 24, 2024.    He had a CT scan in January 2024 with results as follows:     January 26, 2024--CT a/p--IMPRESSION:   1.   "Superior aspect of the Nissen fundal wrap appears about 2 cm above  the diaphragm possibly representing slipped Nissen.  2.  Hepatic steatosis.  3.  Nonobstructing renal calculi. No hydronephrosis.    He was experiencing dysphagia and slipped Nissen fundoplication and related issues, some of which were said to be related to worsening occasional reflux, and chest pressure associate with early satiety.  On April 30, 2024, he underwent barium swallow, with results as follows:  IMPRESSION:  1. Timed barium esophagram results as detailed above with retained  esophageal contrast lasting up to 5 minutes. There is patent flow of  contrast.   2. Expected passage of barium tablet into the stomach.      He had continued evaluation and during the summer of 2024, including on July 23, 2024. Part of the assessment included impression that the \"Manometry was consistent with inconclusive manometric EGJOO. Esophagram showed routine contrast lasting up to 5 minutes but patent flow of contrast and tablet.\"      Further evaluation included upper endoscopy, that revealed a nodularity at the gastroesophageal junction, concerning for malignancy, as follows:  September 24, 2024--EGD--Evidence of a Nissen fundoplication was found at the gastroesophageal   junction. The wrap appeared loose. This was traversed.   Localized moderate mucosal changes characterized by erythema and   nodularity were found at the gastroesophageal junction. Biopsies were   taken with a cold forceps for histology.   Specimen:    Gastric Esophageal Junction, Gastroesophageal biopsy                                      Final Diagnosis   A. GASTROESOPHAGEAL JUNCTION, BIOPSY:  - Adenocarcinoma, poorly-differentiated, with signet ring cell features     GASTRIC HER2 BIOMARKER TESTS   Test(s) Performed     HER2 by IHC     Results         With this new finding of malignancy, a staging PET/CT was performed, and no distant metastasis was detected:  October 1, 2024--PET/CT --   "                                                               IMPRESSION Findings suspicious for the gastroesophageal junction adenocarcinoma without convincing evidence of tona or metastatic disease.      He further underwent a staging EUS, that characterized the tumor as staged as T2N0 per EUS criteria:      Oct 8, 2024 EUS--    Impression:            - Malignant esophageal tumor was found at the                          gastroesophageal junction. Siewert 2. Measurements as                          above.                          This is classified T2 N0 (cytology pending) M0 by EUS                          criteria.                          Three small lymph nodes seen as described above. Two                          were sampled (station 8R) and are preliminarily                          benign. The third was identical in size and appearance                          and not amenable to sampling due to the proximity to                          the heart and proximal margin of the mass.                          - Z-line, 37 cm from the incisors.                          - A Nissen fundoplication was found. This has                          partially slipped above the diaphragm.   Specimen A                 Interpretation:                  Negative for malignancy  Polymorphous population of lymphocytes present                 Adequacy:                 Satisfactory for evaluation        He met with Dr. Katlyn Tobar from our thoracic surgery service here at [facility] on October 3, 2024.      March 24, 2025--Pathologic stage ypT1b N0 Mx     ESOPHAGECTOMY, MINIMALLY INVASIVE, Laparoscopic Right Thorascopic Esophagectomy, Botox Injection   Esophagoscopy, gastroscopy, duodenoscopy (EGD), combined            March 28,. 2025--PREOPERATIVE DIAGNOSES:  1.  GEJ adenocarcinoma s/p KYLE  2. Concern for anastomotic leak.   POSTOPERATIVE DIAGNOSES:  1.  Same  OPERATION PERFORMED:    EGD  2.   Intraoperative esophagram  3.    Supervision and interpretation of intra-op imaging.   SURGEON:  Chino Garcia MD    April 7, 2025--Preoperative diagnosis: Anastomotic leak post esophagectomy  Postoperative diagnosis: Same as preoperative diagnosis  Procedure performed:  1.  Esophagogastroduodenoscopy   2.  LEFT tube thoracostomy (20 Fr.)      DATE OF ADMISSION: 4/17/2025    PRE/POSTOPERATIVE DIAGNOSES: Anastomotic leak after esophagectomy    Severe malnutrition in the context of acute illness or injury   PROCEDURES PERFORMED:   1.  Esophagogastroduodenoscopy with placement of LEFT pharyngostomy tube placement   2.  Fluoroscopy with interpretation of images    April 25, 2025 -- oncology follow-up/in person visit, Dr. Mc. Discussed adjuvant chemotherapy but patient was too frail at this time to start.     He had multiple admissions in April and May due to pharyngostomy tube malfunctions with weakness, fatigue, and dehydration and other tube complications.       7/9/25: began adjuvant chemotherapy with FOLFOX--reaction to oxali with itching palms      INTERVAL HISTORY:    Bal is doing well. He had itching of his palms with lasts oxali   Doing a softer diet--had some coughing with advancement, went to PCP and got XR and they recommended diet adjustments. Still supplementing with TF at night.   Good activity level, getting around independent  Chronic neuropathy unchanged  Mild mucositis well managed with s/s rinses  Bowels at baseline without acute issues.       Past Medical History:   Diagnosis Date    Hypertension     Neuritis     Peripheral neuropathy     Personal history of other medical treatment     postgastrectomy dumping syndrome    Stomach problems Noted earlier      Past Surgical History:   Procedure Laterality Date    ABDOMEN SURGERY  2012?    APPENDECTOMY  1964?    BRONCHOSCOPY RIDID OR FLEXIBLE W/ENDOBRONCHIAL ULTRASOUND GUIDED 3 OR MORE NODE STATIONS N/A 3/19/2025    Procedure: Bronchoscopy Ridid Or Flexible W/Endobronchial  Ultrasound Guided 3 Or More Node Stations;  Surgeon: Saad Olmedo MD;  Location: [facility]    COLONOSCOPY  2006, 2016    COMBINED ESOPHAGOSCOPY, GASTROSCOPY, DUODENOSCOPY (EGD), REPLACE ESOPHAGEAL STENT Left 4/18/2025    Procedure: ESOPHAGOGASTRODUODENOSCOPY with pharyngostomy tube placement;  Surgeon: Erik Lindsey MD;  Location: [facility]    COMBINED ESOPHAGOSCOPY, GASTROSCOPY, DUODENOSCOPY (EGD), REPLACE ESOPHAGEAL STENT N/A 6/9/2025    Procedure: ESOPHAGOGASTRODUODENOSCOPY, stent removal, fluroscopy;  Surgeon: Katlyn Tobar MD;  Location: [facility]    DAVINCI NISSEN FUNDOPLICATION N/A 2/26/2025    Procedure: TAKE-DOWN, FUNDOPLICATION, NISSEN, LAPAROSCOPIC- ROBOTIC, gastrostomy takedown, lysisof adhesions 2 hr;  Surgeon: Katlyn Tobar MD;  Location: [facility]    ESOPHAGEAL BALLOON PROVOCATION STUDY N/A 9/24/2024    Procedure: Esophageal Balloon Provocation Study;  Surgeon: Carson Michel DO;  Location: [facility]    ESOPHAGECTOMY MINIMALLY INVASIVE N/A 3/24/2025    Procedure: ESOPHAGECTOMY, MINIMALLY INVASIVE, Laparoscopic Right Thorascopic Esophagectomy, Botox Injection;  Surgeon: Katlyn Tobar MD;  Location: [facility]    ESOPHAGOSCOPY, GASTROSCOPY, DUODENOSCOPY (EGD), COMBINED N/A 9/24/2024    Procedure: ESOPHAGOGASTRODUODENOSCOPY, WITH BIOPSY;  Surgeon: Carson Michel DO;  Location: [facility]    ESOPHAGOSCOPY, GASTROSCOPY, DUODENOSCOPY (EGD), COMBINED N/A 10/8/2024    Procedure: ESOPHAGOGASTRODUODENOSCOPY, WITH FINE NEEDLE ASPIRATION BIOPSY, WITH ENDOSCOPIC ULTRASOUND GUIDANCE;  Surgeon: Lance Lopez MD;  Location: [facility]    ESOPHAGOSCOPY, GASTROSCOPY, DUODENOSCOPY (EGD), COMBINED N/A 2/26/2025    Procedure: Esophagoscopy, gastroscopy, duodenoscopy (EGD);  Surgeon: Katlyn Tobar MD;  [facility]    ESOPHAGOSCOPY, GASTROSCOPY, DUODENOSCOPY (EGD), COMBINED N/A 3/24/2025    Procedure: Esophagoscopy, gastroscopy, duodenoscopy (EGD), combined;  Surgeon: Katlyn Tobar MD;  [facility]: [facility]    ESOPHAGOSCOPY,  GASTROSCOPY, DUODENOSCOPY (EGD), COMBINED N/A 3/28/2025    Procedure: ESOPHAGOGASTRODUODENOSCOPY, pharyngostomy tube replacement;  Surgeon: Chino Gardner MD;  [facility]    ESOPHAGOSCOPY, GASTROSCOPY, DUODENOSCOPY (EGD), COMBINED N/A 4/29/2025    Procedure: Esophagoscopy, gastroscopy, duodenoscopy (EGD), fluroscopy, revision of pharnygoscopy tube;  Surgeon: Katlyn Tobar MD;  Location: [facility]    ESOPHAGOSCOPY, GASTROSCOPY, DUODENOSCOPY (EGD), COMBINED N/A 5/15/2025    Procedure: Esophagoscopy, gastroscopy, duodenoscopy (EGD), combined w/ pharyngoscopy tube removal, stent placement;  Surgeon: Katlyn Tobar MD;  Location: [facility]    EYE SURGERY  199x    laser for retinal tears    GASTRIC FUNDOPLICATION      HERNIA REPAIR  1961?    IR CHEST PORT PLACEMENT > 5 YRS OF AGE  10/24/2024    LAPAROSCOPIC ASSISTED INSERTION TUBE JEJUNOSTOMY N/A 2/26/2025    Procedure: Laparoscopic assisted insertion tube jejunostomy;  Surgeon: Katlyn Tobar MD;  Location: [facility]    LAPAROSCOPIC TAKEDOWN NISSEN FUNDOPLICATION N/A 9/25/2020    Procedure: TAKE-DOWN, FUNDOPLICATION, REDO NISSEN, LAPAROSCOPIC, gastrostomy feeding tube placement;  Surgeon: Katlyn Tobar MD;  Location: [facility]    NV ESOPHAGOGASTRODUODENOSCOPY TRANSORAL DIAGNOSTIC N/A 5/9/2019    Procedure: UPPER GASTROINTESTINAL ENDOSCOPY;  Surgeon: Dino Ward MD;  Location: [facility];  Service: General    SOFT TISSUE SURGERY  2009?    MCL l. thumb          SOCIAL HISTORY: Lives in [place], [place] with his wife Jess. They've been  for 15 years.  He has three daughters, ages 38, 42, 50.  He is retired from working in computer security.  He denies any history of oral tobacco or cigarette use in his lifetime.  Alcohol use that he reports lifetime is five drinks per week since age of 17.  Info from other notes:    Alcohol use: Yes        Alcohol/week: 3.0 - 6.0 standard drinks of alcohol       Types: 1 - 2 Glasses of wine, 1 - 2 Cans of beer, 1 -  2 Shots of liquor per week       Comment: glass of win   He is an avid skier, and enjoy skiing a winter, and sometimes the peripheral neuropathycan interfere with that aspect of quality of life.    FAMILY HISTORY OF CANCER: his mother was diagnosed with uterine cancer in her late 30s; this diagnosis took place in the 1960s, and she  in  of unrelated causes.  He has two sisters, in good health, and his father had a superficial form of skin cancer, otherwise no other family history of cancer.      Allergies:  Allergies as of 2025 - Reviewed 2025   Allergen Reaction Noted    Hornets  2020    Wasp venom protein Hives 2016    Bee venom Swelling 2019    Oxaliplatin Itching 2025       Current Medications:  Current Outpatient Medications   Medication Sig Dispense Refill    acetaminophen (TYLENOL) 325 MG/10.15ML liquid Place 20.3 mLs (650 mg) into J tube every 4 hours as needed for mild pain or fever. 500 mL 0    atenolol (TENORMIN) 25 MG tablet Take 25 mg by mouth daily.      atenolol (TENORMIN) 50 MG tablet Place 1 tablet (50 mg) into G tube daily. (Patient not taking: Reported on 2025)      calcium carbonate (TUMS) 500 MG chewable tablet Take 1 tablet (500 mg) by mouth daily as needed for heartburn. 30 tablet 0    cyanocobalamin (VITAMIN B-12) 500 MCG tablet Place 500 mcg into Feeding Tube every other day.      dexAMETHasone (DECADRON) 4 MG tablet Take 2 tablets (8 mg) by mouth daily. Take for 2 days, starting the day after chemo. Take with food. 4 tablet 2    dextromethorphan (DELSYM) 30 MG/5ML liquid Take 5 mLs (30 mg) by mouth once as needed for cough.      EPINEPHrine (ANY BX GENERIC EQUIV) 0.3 MG/0.3ML injection 2-pack Inject 0.3 mg into the muscle as needed for anaphylaxis.      Fluorouracil (ADRUCIL) 4,705 mg in sodium chloride 0.9 % 230 mL via CADD pump Infuse 4,705 mg at 5 mL/hr over 46 hours into the vein once for 1 dose. 062283 mL 0    gabapentin (NEURONTIN) 300  MG capsule Place 1 capsule (300 mg) into Feeding Tube 3 times daily. (Patient taking differently: Place 300 mg into Feeding Tube 3 times daily. 600 MG morning, 600 MG noon, and 300 MG bedtime)      Isosource 1.5 Gumaro 250 mL Place 1,500 mLs into J tube daily. Infuse via pump. 90 mL/hr x 16 hours.   6 cartons per day.   Water flush: 120 mL before and after tube feed cycle. Additional 120 mL 8x per day.   Kcals: 2250 78120 mL 11    Lidocaine (LIDOCARE) 4 % Patch Place 1 patch over 12 hours onto the skin every 24 hours. To prevent lidocaine toxicity, patient should be patch free for 12 hrs daily. 4 patch 0    loperamide (IMODIUM) 1 MG/7.5ML liquid Place 15 mLs (2 mg) into Feeding Tube 3 times daily. (Patient taking differently: Place 2 mg into Feeding Tube daily.) 711 mL 0    methocarbamol (ROBAXIN) 500 MG tablet Place 1 tablet (500 mg) into J tube 4 times daily. 120 tablet 0    methocarbamol (ROBAXIN) 500 MG tablet Place 1 tablet (500 mg) into J tube 4 times daily. 30 tablet 0    naloxone (NARCAN) 0.4 MG/ML injection Inject 0.5 mLs (0.2 mg) into the muscle once for 1 dose. (Patient not taking: No sig reported) 0.5 mL 0    Naloxone HCl 0.4 MG/ML SOSY Inject 0.4 mg into the muscle as needed.      nortriptyline (PAMELOR) 25 MG capsule Place 1 capsule (25 mg) into J tube every evening.      Nutrisource Fiber PO packet Place 1 each into Feeding Tube 2 times daily. (Patient taking differently: Place 1 packet into Feeding Tube daily.) 60 each 11    omeprazole (PRILOSEC) 40 MG DR capsule Take 20 mg by mouth daily.      ondansetron (ZOFRAN) 8 MG tablet Take 1 tablet (8 mg) by mouth every 8 hours as needed for nausea (vomiting). 30 tablet 2    prochlorperazine (COMPAZINE) 5 MG tablet Take 1 tablet (5 mg) by mouth every 6 hours as needed for nausea or vomiting. 30 tablet 2    Prosource TF PO LIQD (PROSOURCE TF) Place 45 mLs into J tube 2 times daily. Infuse via syringe  Water flush: 30 mL before and after each packet   Kcals:  "80  2 pkts per day 2700 mL 11        Physical Exam:  Ht 1.702 m (5' 7\")   Wt 78.5 kg (173 lb)   BMI 27.10 kg/m    GENERAL: Well appearing, pleasant, alert, oriented, NAD  HEENT:  PERRLA, anicteric sclerae. Oropharynx clear, with no evidence of mucositis, thrush  NECK: No line, supple, no LAD   CV: RRR  LUNGS: CTAB  ABDOMEN:  Soft, nontender, nondistended, +BS. J tube in place and skin has no signs of cellulitis   EXTREMITIES:  No clubbing, cyanosis, edema, or palpable cords.      Laboratory/Imaging Studies    Most Recent 3 CBC's:  Recent Labs   Lab Test 07/24/25  0630 07/09/25  0958 06/27/25  1120   WBC 5.6 6.9 7.6   HGB 12.4* 12.1* 12.1*   MCV 88 89 89    221 185   ANEU 2.8 4.4 5.0     Most Recent 3 BMP's:  Recent Labs   Lab Test 07/09/25  0958 06/27/25  1120 05/17/25  0429 05/14/25  2129 05/14/25  1233    137 137   < > 133*   POTASSIUM 4.7 4.5 3.9   < > 4.6   CHLORIDE 105 103 102   < > 98   CO2 22 22 24   < > 23   BUN 23.8* 22.5 23.9*   < > 24.1*   CR 0.77 0.69 0.74   < > 0.73   ANIONGAP 11 12 11   < > 12   AUGUSTIN 9.2 9.2 8.4*   < > 9.1   * 124* 137*   < > 114*   PROTTOTAL 6.9 6.9  --   --  7.4   ALBUMIN 3.9 4.0  --   --  3.6    < > = values in this interval not displayed.    Most Recent 3 LFT's:  Recent Labs   Lab Test 07/09/25  0958 06/27/25  1120 05/14/25  1233   AST 20 23 17   ALT 18 22 12   ALKPHOS 115 120 121   BILITOTAL 0.2 0.3 0.3    Most Recent 2 TSH and T4:  Recent Labs   Lab Test 12/16/24  1752 07/23/24  0856   TSH 1.75 1.16     I reviewed the above labs today.      ASSESSMENT/PLAN:  Mr. Bal Junior is a 70-year-old gentleman from [place], [place] with T2N0 GE junction carcinoma.     He had neoadjuvant FLOT x 4 followed by esophagectomy late March 2025 showing excellent treatment response with [place] of [id number]. His surgery was complicated by anastomatic leak. This has finally improved and he continues to advance his diet. Plan for  FOLFOX x 4 adjuvant therapy.     Unfortunately had palm " itching/redness during first adjuvant cycle with oxaliplatin so this was held and we discussed need for oxaliplatin desensitization today. He otherwise tolerated 5FU well without acute dose limiting toxicity. He does report a cough with intake that is improving--he saw his PCP locally several days ago without concern for infection and continues on his PPI/GERD mgmt and diet modification. Additionally, he directed CXR results to thoracic surgery for their review. Labs acceptable to proceed today as planned with full regimen--adjust as needed pending tolerance. Follow up prior to next cycle as scheduled.       30 minutes spent on the date of the encounter doing chart review, review of test results, interpretation of tests, patient visit, and documentation      Kaitlyn Still CNP on 7/24/2025 at 8:33 AM      Addendum: reaction with itching and flushing with bag 4 of oxaliplatin desensitization protocol. See infusion RN notes for further details, along with rescue medications given prior to transfer to ED. Care team will be updated to discuss future infusion plans. Assuming discharge from ED, will complete chemo administration tomorrow with

## 2025-07-24 NOTE — PROGRESS NOTES
Infusion Nursing Note:  Bal Junior presents today for C2D1 Oxaliplatin Desensitization.   Leucovorin, Fluorouracil bolus/pump connect (NOT GIVEN).   Patient seen by provider today: Yes: Kaitlyn Still CNP Virtually today.    present during visit today: Not Applicable.    Note: Patient presents for treatment today. He reports no new concerns following Provider appointment this morning. He is agreeable to treatment today. Patient instructed to call care team with any new or worsening symptoms or with any questions or concerns following treatment today.  Patient endorsed moderate anxiety today. RN offered Ativan, patient declined during time of visit.     Partway through Oxaliplatin bag #4 patient began to have erythema of palms with associated itching, and itchy scalp. Patient's face and chest became flushed. Patient had urge to pass a stool with no result. Medication discontinued immediately and hypersensitivity reaction medications administered including 0.3 IM epinephrine. Symptoms began to resolve then 15 minutes later patient had rebound reaction. Second dose of Epinephrine administered and patient transported to ED. NORA Smith assessed patient bedside during reaction.     RN handoff given to ED Charge Nurse, ALISON Garcia at 1319. VSS at time of transport. Patient endorsed only symptom as tremors prior to dismissal. Patient instructed to possibly return to clinic tomorrow morning for completion of treatment sans Oxaliplatin.     Vitals prior to dismissal from Clinic to ED:   /75   Pulse 60   Resp 18   SpO2 97%  on RA.    TORB 7/24/25 @ 1300 NORA Smith/Violet Grimaldo RN - Patient can return tomorrow to complete treatment without Oxaliplatin.     Additional Comments:  - I personally verified patient is taking Dexamethasone as prescribed.     Pre-Medications Administered:  Ondansetron 8 mg, Dexamethasone 10 mg, Fosaprepitant 150 mg, Diphenhydramine 50 mg, and Famotidine 20 mg.      Pre-Medications prior to Oxaliplatin bag #5:   Dexamethasone 10 mg, Diphenhydramine 25 mg - NOT GIVEN. See RXN comments.     Intravenous Access:  Implanted Port.    Treatment Conditions:  Lab Results   Component Value Date    HGB 12.4 (L) 07/24/2025    WBC 5.6 07/24/2025    ANEU 2.8 07/24/2025     07/24/2025     Lab Results   Component Value Date     07/24/2025    POTASSIUM 4.4 07/24/2025    MAG 1.8 05/16/2025    CR 0.81 07/24/2025    AUGUSTIN 9.2 07/24/2025    BILITOTAL <0.2 07/24/2025    ALBUMIN 3.8 07/24/2025    ALT 14 07/24/2025    AST 19 07/24/2025   Results reviewed, labs MET treatment parameters, ok to proceed with treatment.    Electrolytes not needed today.     Post Infusion Assessment:  Patient tolerated infusion poorly due to : Hypersensitivity: Did patient have a hypersensitivity reaction? : Yes  Drug or Product name: Oxaliplatin  Were pre-meds administered?: Yes  What pre-meds were administered?: Fosaprepitant (emend), Ondansetron (Zofran), Diphenhydramine (Benadryl), Famotidine (Pepcid)  First or Subsequent treatment: Subsequent infusion  Rate of infusion when patient had hypersensitivity reaction: 113ml/hr  Time the hypersensitivity reaction was first recognized: 1229  Symptoms observed or reported (select all that apply): Erythema, Flushing, Itching  Interventions/treatment following reaction: Infusion stopped, Hypersensitivity medications administered, Rapid Response called, EMS called, Transported to hospital, Therapy discontinued  What hypersensitivity medications were administered?: DiphendydrAMINE (benadryl), Epinephrine, Methylprednisolone, NaCl 0.9% Bolus, Famotidine(Pepcid)  Name of provider notified: NORA Smith  Time provider notified: 3769  Type of notification (select all that apply): Provider at bedside  Was the patient re-challenged today?: No - no re-challenge today  Blood return noted pre and post infusion.  Site patent and intact, free from redness, edema or  discomfort.  No evidence of extravasations.    Discharge Plan:   Prescription refills given for Dexamethasone.  Discharge instructions reviewed with: Patient.  Patient and/or family verbalized understanding of discharge instructions and all questions answered.  Copy of AVS reviewed with patient and/or family. Patient will return 7/26 for next appointment.  Patient discharged in stable condition accompanied by: wife.  Departure Mode: Ambulatory.    Violet Grimaldo RN

## 2025-07-24 NOTE — ED PROVIDER NOTES
ED Provider Note  [facility]      History     Chief Complaint   Patient presents with    Allergic Reaction     The history is provided by the patient, the EMS personnel and medical records.     Bal Junior is a 70 year old male with history of GE junction adenocarcinoma who presents to the ED from [facility] via EMS for allergic reaction during chemo. He was in infusion clinic for C2D1 oxaliplatin desensitization. He reports that he completed 3 bags and senior care through the 4th bag he began to feel itchy with redness of the palms so was given 0.3 mg epi, 25 mg benadryl, and 40 mg solumedrol. Symptoms resolved, but 15 minutes later returned so he was given an additional 0.3 mg epi. His symptoms mostly resolved and there was no concern for airway compromise, but they sent him here for monitoring. Patient reports that this was similar to the reaction he had during the first infusion of oxaliplatin on 7/9. He states that his itching has resolved now and the redness of his palms has nearly resolved. He reports just feeling shaky and tired, but denies chest pain, shortness of breath, abdominal pain, or nausea.     Past Medical History  Past Medical History:   Diagnosis Date    Hypertension     Neuritis     Peripheral neuropathy     Personal history of other medical treatment     postgastrectomy dumping syndrome    Stomach problems Noted earlier     Past Surgical History:   Procedure Laterality Date    ABDOMEN SURGERY  2012?    APPENDECTOMY  1964?    BRONCHOSCOPY RIDID OR FLEXIBLE W/ENDOBRONCHIAL ULTRASOUND GUIDED 3 OR MORE NODE STATIONS N/A 3/19/2025    Procedure: Bronchoscopy Ridid Or Flexible W/Endobronchial Ultrasound Guided 3 Or More Node Stations;  Surgeon: Saad Olmedo MD;  Location:  GI    COLONOSCOPY  2006, 2016    COMBINED ESOPHAGOSCOPY, GASTROSCOPY, DUODENOSCOPY (EGD), REPLACE ESOPHAGEAL STENT Left 4/18/2025    Procedure: ESOPHAGOGASTRODUODENOSCOPY with pharyngostomy tube placement;   Surgeon: Erik Lindsey MD;  Location: [facility] OR    COMBINED ESOPHAGOSCOPY, GASTROSCOPY, DUODENOSCOPY (EGD), REPLACE ESOPHAGEAL STENT N/A 6/9/2025    Procedure: ESOPHAGOGASTRODUODENOSCOPY, stent removal, fluroscopy;  Surgeon: Katlyn Tobar MD;  Location: [facility] OR    DAVINCI NISSEN FUNDOPLICATION N/A 2/26/2025    Procedure: TAKE-DOWN, FUNDOPLICATION, NISSEN, LAPAROSCOPIC- ROBOTIC, gastrostomy takedown, lysisof adhesions 2 hr;  Surgeon: Katlyn Tobar MD;  Location: [facility] OR    ESOPHAGEAL BALLOON PROVOCATION STUDY N/A 9/24/2024    Procedure: Esophageal Balloon Provocation Study;  Surgeon: Carson Michel DO;  Location: [facility] GI    ESOPHAGECTOMY MINIMALLY INVASIVE N/A 3/24/2025    Procedure: ESOPHAGECTOMY, MINIMALLY INVASIVE, Laparoscopic Right Thorascopic Esophagectomy, Botox Injection;  Surgeon: Katlyn Tobar MD;  Location: [facility]    ESOPHAGOSCOPY, GASTROSCOPY, DUODENOSCOPY (EGD), COMBINED N/A 9/24/2024    Procedure: ESOPHAGOGASTRODUODENOSCOPY, WITH BIOPSY;  Surgeon: Carson Michel DO;  Location: [facility]    ESOPHAGOSCOPY, GASTROSCOPY, DUODENOSCOPY (EGD), COMBINED N/A 10/8/2024    Procedure: ESOPHAGOGASTRODUODENOSCOPY, WITH FINE NEEDLE ASPIRATION BIOPSY, WITH ENDOSCOPIC ULTRASOUND GUIDANCE;  Surgeon: Lance Lopez MD;  Location: [facility]    ESOPHAGOSCOPY, GASTROSCOPY, DUODENOSCOPY (EGD), COMBINED N/A 2/26/2025    Procedure: Esophagoscopy, gastroscopy, duodenoscopy (EGD);  Surgeon: Katlyn Tobar MD;  Location: [facility]    ESOPHAGOSCOPY, GASTROSCOPY, DUODENOSCOPY (EGD), COMBINED N/A 3/24/2025    Procedure: Esophagoscopy, gastroscopy, duodenoscopy (EGD), combined;  Surgeon: Katlyn Tobar MD;  Location: [facility]    ESOPHAGOSCOPY, GASTROSCOPY, DUODENOSCOPY (EGD), COMBINED N/A 3/28/2025    Procedure: ESOPHAGOGASTRODUODENOSCOPY, pharyngostomy tube replacement;  Surgeon: Chino Gardner MD;  Location: [facility]    ESOPHAGOSCOPY, GASTROSCOPY, DUODENOSCOPY (EGD), COMBINED N/A 4/29/2025    Procedure: Esophagoscopy,  gastroscopy, duodenoscopy (EGD), fluroscopy, revision of pharnygoscopy tube;  Surgeon: Katlyn Tobar MD;  Location: [facility]    ESOPHAGOSCOPY, GASTROSCOPY, DUODENOSCOPY (EGD), COMBINED N/A 5/15/2025    Procedure: Esophagoscopy, gastroscopy, duodenoscopy (EGD), combined w/ pharyngoscopy tube removal, stent placement;  Surgeon: Katlyn Tobar MD;  Location: [facility]    EYE SURGERY  199x    laser for retinal tears    GASTRIC FUNDOPLICATION      HERNIA REPAIR  1961?    IR CHEST PORT PLACEMENT > 5 YRS OF AGE  10/24/2024    LAPAROSCOPIC ASSISTED INSERTION TUBE JEJUNOSTOMY N/A 2/26/2025    Procedure: Laparoscopic assisted insertion tube jejunostomy;  Surgeon: Katlyn Tobar MD;  Location: [facility]    LAPAROSCOPIC TAKEDOWN NISSEN FUNDOPLICATION N/A 9/25/2020    Procedure: TAKE-DOWN, FUNDOPLICATION, REDO NISSEN, LAPAROSCOPIC, gastrostomy feeding tube placement;  Surgeon: Katlyn Tobar MD;  Location: [facility]    SD ESOPHAGOGASTRODUODENOSCOPY TRANSORAL DIAGNOSTIC N/A 5/9/2019    Procedure: UPPER GASTROINTESTINAL ENDOSCOPY;  Surgeon: Dino Ward MD;  Location: [facility] OR;  Service: General    SOFT TISSUE SURGERY  2009?    MCL l. thumb     acetaminophen (TYLENOL) 325 MG/10.15ML liquid  atenolol (TENORMIN) 25 MG tablet  calcium carbonate (TUMS) 500 MG chewable tablet  cyanocobalamin (VITAMIN B-12) 500 MCG tablet  dexAMETHasone (DECADRON) 4 MG tablet  dextromethorphan (DELSYM) 30 MG/5ML liquid  EPINEPHrine (ANY BX GENERIC EQUIV) 0.3 MG/0.3ML injection 2-pack  Fluorouracil (ADRUCIL) 4,705 mg in sodium chloride 0.9 % 230 mL via CADD pump  gabapentin (NEURONTIN) 300 MG capsule  Isosource 1.5 Gumaro 250 mL  Lidocaine (LIDOCARE) 4 % Patch  loperamide (IMODIUM) 1 MG/7.5ML liquid  methocarbamol (ROBAXIN) 500 MG tablet  naloxone (NARCAN) 0.4 MG/ML injection  Naloxone HCl 0.4 MG/ML SOSY  nortriptyline (PAMELOR) 25 MG capsule  Nutrisource Fiber PO packet  omeprazole (PRILOSEC) 40 MG DR capsule  ondansetron (ZOFRAN) 8 MG  tablet  prochlorperazine (COMPAZINE) 5 MG tablet  Prosource TF PO LIQD (PROSOURCE TF)      Allergies   Allergen Reactions    Hornets     Wasp Venom Protein Hives    Bee Venom Swelling    Oxaliplatin Itching     Family History  Family History   Problem Relation Age of Onset    Hypertension Mother     Uterine Cancer Mother         1965    Anesthesia Reaction Mother         PONV, slow to emerge    Mitral Valve Replacement Father     Coronary Artery Disease Maternal Grandfather     GERD No family hx of     Ulcerative Colitis No family hx of     Crohn's Disease No family hx of     Stomach Cancer No family hx of     Deep Vein Thrombosis (DVT) No family hx of      Social History   Social History     Tobacco Use    Smoking status: Never     Passive exposure: Past    Smokeless tobacco: Never   Substance Use Topics    Alcohol use: Not Currently    Drug use: Never      A medically appropriate review of systems was performed with pertinent positives and negatives noted in the HPI, and all other systems negative.    Physical Exam   BP: 115/79  Pulse: 97  Temp: 97.6  F (36.4  C)  Resp: 18  SpO2: 97 %  Physical Exam  Constitutional:       General: He is not in acute distress.     Appearance: He is not toxic-appearing.   HENT:      Head: Normocephalic and atraumatic.      Nose: Nose normal.      Mouth/Throat:      Mouth: Mucous membranes are moist.      Pharynx: Oropharynx is clear.   Eyes:      Conjunctiva/sclera: Conjunctivae normal.      Pupils: Pupils are equal, round, and reactive to light.   Cardiovascular:      Rate and Rhythm: Normal rate and regular rhythm.      Heart sounds: No murmur heard.  Pulmonary:      Effort: Pulmonary effort is normal. No respiratory distress.      Breath sounds: No wheezing or rales.   Musculoskeletal:         General: Normal range of motion.   Skin:     General: Skin is warm and dry.   Neurological:      General: No focal deficit present.      Mental Status: He is alert and oriented to person,  place, and time.           ED Course, Procedures, & Data      Procedures                   Medications   methylPREDNISolone Na Suc (solu-MEDROL) injection 87.5 mg (87.5 mg Intravenous $Given 7/24/25 1344)   sodium chloride 0.9% BOLUS 500 mL (0 mLs Intravenous Stopped 7/24/25 1640)   heparin lock flush 100 unit/mL injection 3 mL (3 mLs Intravenous $Given 7/24/25 1651)          Critical care was not performed.     Medical Decision Making  The patient's presentation was of high complexity (an acute health issue posing potential threat to life or bodily function).    The patient's evaluation involved:  review of external note(s) from 3+ sources (rapid response note, oncology note, clinic, note)  review of 2 test result(s) ordered prior to this encounter (cbc, cmp)    The patient's management necessitated moderate risk (prescription drug management including medications given in the ED).    Assessment & Plan    This is a 70-year-old male presenting with a allergic reaction.  He had received a chemotherapy infusion and subsequently developed itchiness, a rash and received Benadryl, Solu-Medrol, epinephrine.  He did receive a second dose of epinephrine prior to arrival.  On arrival here he was well-appearing, did not have signs or symptoms suggestive of airway involvement, did not have any rash or itching.  He was given additional dose of Solu-Medrol to give him the full 125 mg dose.  He was monitored for several hours and remained asymptomatic.  He will follow-up with oncology. He was discharged in stable condition.     I have reviewed the nursing notes. I have reviewed the findings, diagnosis, plan and need for follow up with the patient.    New Prescriptions    No medications on file       Final diagnoses:   Allergic reaction, initial encounter   I, Jesi Avina, am serving as a trained medical scribe to document services personally performed by Maximilian Ruiz MD, based on the provider's statements to me.     I,  Maximilian Ruiz MD, was physically present and have reviewed and verified the accuracy of this note documented by Jesi Avina.      Maximilian Ruiz MD  [facility] EMERGENCY DEPARTMENT  7/24/2025     Maximilian Ruiz MD  07/24/25 [id number]

## 2025-07-24 NOTE — PATIENT INSTRUCTIONS
Contact Numbers    [facility] Main Line (for Scheduling/Triage/After Hours Nurse Line): [phone]    Please call the [facility] nurse triage or the after hours nurse line if you experience a temperature greater than or equal to 100.4, shaking chills, have uncontrolled nausea, vomiting and/or diarrhea, dizziness, lightheadedness, shortness of breath, chest pain, bleeding, unexplained bruising, or if you have any other new/concerning symptoms, questions or concerns.     If you are having any concerning symptoms or wish to speak to a provider before your next infusion visit, please call your care coordinator or triage to notify them so we can adequately serve you.     If you need any refills on medications (narcotics or other medications), please call before your infusion appointment.    Lab Results:  Recent Results (from the past 12 hours)   Comprehensive metabolic panel    Collection Time: 07/24/25  6:30 AM   Result Value Ref Range    Sodium 140 135 - 145 mmol/L    Potassium 4.4 3.4 - 5.3 mmol/L    Carbon Dioxide (CO2) 22 22 - 29 mmol/L    Anion Gap 13 7 - 15 mmol/L    Urea Nitrogen 25.5 (H) 8.0 - 23.0 mg/dL    Creatinine 0.81 0.67 - 1.17 mg/dL    GFR Estimate >90 >60 mL/min/1.73m2    Calcium 9.2 8.8 - 10.4 mg/dL    Chloride 105 98 - 107 mmol/L    Glucose 86 70 - 99 mg/dL    Alkaline Phosphatase 128 40 - 150 U/L    AST 19 0 - 45 U/L    ALT 14 0 - 70 U/L    Protein Total 7.0 6.4 - 8.3 g/dL    Albumin 3.8 3.5 - 5.2 g/dL    Bilirubin Total <0.2 <=1.2 mg/dL   CBC with platelets and differential    Collection Time: 07/24/25  6:30 AM   Result Value Ref Range    WBC Count 5.6 4.0 - 11.0 10e3/uL    RBC Count 4.37 (L) 4.40 - 5.90 10e6/uL    Hemoglobin 12.4 (L) 13.3 - 17.7 g/dL    Hematocrit 38.4 (L) 40.0 - 53.0 %    MCV 88 78 - 100 fL    MCH 28.4 26.5 - 33.0 pg    MCHC 32.3 31.5 - 36.5 g/dL    RDW 13.3 10.0 - 15.0 %    Platelet Count 273 150 - 450 10e3/uL    % Neutrophils 51 %    % Lymphocytes 32 %    % Monocytes 13 %    %  Eosinophils 4 %    % Basophils 0 %    % Immature Granulocytes 0 %    NRBCs per 100 WBC 0 <1 /100    Absolute Neutrophils 2.8 1.6 - 8.3 10e3/uL    Absolute Lymphocytes 1.8 0.8 - 5.3 10e3/uL    Absolute Monocytes 0.7 0.0 - 1.3 10e3/uL    Absolute Eosinophils 0.2 0.0 - 0.7 10e3/uL    Absolute Basophils 0.0 0.0 - 0.2 10e3/uL    Absolute Immature Granulocytes 0.0 <=0.4 10e3/uL    Absolute NRBCs 0.0 10e3/uL

## 2025-07-24 NOTE — LETTER
7/24/2025      Bal Junior  [address]  [facility] [address]      Dear Colleague,    Thank you for referring your patient, Bal Junior, to the [facility]. Please see a copy of my visit note below.    Virtual Visit Details    Type of service:  Video Visit   Video Start Time: 7:07 AM  Video End Time:7:23 AM    Originating Location (pt. 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  Microsatellite stable (GUILLAUME)  See scanned report for other details.  Likely pathogenic alterations noted in TP53, PRKCA, FANCD2    Referring physician or other provider(s):  Dr. Katlyn Tobar, Thoracic Surgery service, [facility]      History of Present Illness/Cancer Diagnosis and Evaluation to date:  His past medical history is most prominent for a history of severe acid reflux, necessitating this and fundoplication around 2011/12, also for issues relating to postprandial hypoglycemia and neuritis/neuropathy, of unclear etiology.  He states that the neuropathic symptoms were attributed by his primary care doctor, and others to long-term use of omeprazole in the 2000s, prior to the Nissen fundoplication surgery.    He has been followed routinely with upper endoscopies over the last decade, many of which have been done within the [facility] system.  The ones done over the last  "seven years include ones performed on August 30, 2017, December 4, 2018, May 9, 2019, and most recently as September 24, 2024.    He had a CT scan in January 2024 with results as follows:     January 26, 2024--CT a/p--IMPRESSION:   1.  Superior aspect of the Nissen fundal wrap appears about 2 cm above  the diaphragm possibly representing slipped Nissen.  2.  Hepatic steatosis.  3.  Nonobstructing renal calculi. No hydronephrosis.    He was experiencing dysphagia and slipped Nissen fundoplication and related issues, some of which were said to be related to worsening occasional reflux, and chest pressure associate with early satiety.  On April 30, 2024, he underwent barium swallow, with results as follows:  IMPRESSION:  1. Timed barium esophagram results as detailed above with retained  esophageal contrast lasting up to 5 minutes. There is patent flow of  contrast.   2. Expected passage of barium tablet into the stomach.      He had continued evaluation and during the summer of 2024, including on July 23, 2024. Part of the assessment included impression that the \"Manometry was consistent with inconclusive manometric EGJOO. Esophagram showed routine contrast lasting up to 5 minutes but patent flow of contrast and tablet.\"      Further evaluation included upper endoscopy, that revealed a nodularity at the gastroesophageal junction, concerning for malignancy, as follows:  September 24, 2024--EGD--Evidence of a Nissen fundoplication was found at the gastroesophageal   junction. The wrap appeared loose. This was traversed.   Localized moderate mucosal changes characterized by erythema and   nodularity were found at the gastroesophageal junction. Biopsies were   taken with a cold forceps for histology.   Specimen:    Gastric Esophageal Junction, Gastroesophageal biopsy                                      Final Diagnosis   A. GASTROESOPHAGEAL JUNCTION, BIOPSY:  - Adenocarcinoma, poorly-differentiated, with signet ring cell " features     GASTRIC HER2 BIOMARKER TESTS   Test(s) Performed     HER2 by IHC     Results         With this new finding of malignancy, a staging PET/CT was performed, and no distant metastasis was detected:  October 1, 2024--PET/CT --                                                                 IMPRESSION Findings suspicious for the gastroesophageal junction adenocarcinoma without convincing evidence of tona or metastatic disease.      He further underwent a staging EUS, that characterized the tumor as staged as T2N0 per EUS criteria:      Oct 8, 2024 EUS--    Impression:            - Malignant esophageal tumor was found at the                          gastroesophageal junction. Siewert 2. Measurements as                          above.                          This is classified T2 N0 (cytology pending) M0 by EUS                          criteria.                          Three small lymph nodes seen as described above. Two                          were sampled (station 8R) and are preliminarily                          benign. The third was identical in size and appearance                          and not amenable to sampling due to the proximity to                          the heart and proximal margin of the mass.                          - Z-line, 37 cm from the incisors.                          - A Nissen fundoplication was found. This has                          partially slipped above the diaphragm.   Specimen A                 Interpretation:                  Negative for malignancy  Polymorphous population of lymphocytes present                 Adequacy:                 Satisfactory for evaluation        He met with Dr. Katlyn Tobar from our thoracic surgery service here at [facility] on October 3, 2024.      March 24, 2025--Pathologic stage ypT1b N0 Mx     ESOPHAGECTOMY, MINIMALLY INVASIVE, Laparoscopic Right Thorascopic Esophagectomy, Botox Injection   Esophagoscopy, gastroscopy, duodenoscopy (EGD),  combined            March 28,. 2025--PREOPERATIVE DIAGNOSES:  1.  GEJ adenocarcinoma s/p KYLE  2. Concern for anastomotic leak.   POSTOPERATIVE DIAGNOSES:  1.  Same  OPERATION PERFORMED:    EGD  2.   Intraoperative esophagram  3.   Supervision and interpretation of intra-op imaging.   SURGEON:  Chino Garcia MD    April 7, 2025--Preoperative diagnosis: Anastomotic leak post esophagectomy  Postoperative diagnosis: Same as preoperative diagnosis  Procedure performed:  1.  Esophagogastroduodenoscopy   2.  LEFT tube thoracostomy (20 Fr.)      DATE OF ADMISSION: 4/17/2025    PRE/POSTOPERATIVE DIAGNOSES: Anastomotic leak after esophagectomy    Severe malnutrition in the context of acute illness or injury   PROCEDURES PERFORMED:   1.  Esophagogastroduodenoscopy with placement of LEFT pharyngostomy tube placement   2.  Fluoroscopy with interpretation of images    April 25, 2025 -- oncology follow-up/in person visit, Dr. Mc. Discussed adjuvant chemotherapy but patient was too frail at this time to start.     He had multiple admissions in April and May due to pharyngostomy tube malfunctions with weakness, fatigue, and dehydration and other tube complications.       7/9/25: began adjuvant chemotherapy with FOLFOX--reaction to oxali with itching palms      INTERVAL HISTORY:    Bal is doing well. He had itching of his palms with lasts oxali   Doing a softer diet--had some coughing with advancement, went to PCP and got XR and they recommended diet adjustments. Still supplementing with TF at night.   Good activity level, getting around independent  Chronic neuropathy unchanged  Mild mucositis well managed with s/s rinses  Bowels at baseline without acute issues.       Past Medical History:   Diagnosis Date     Hypertension      Neuritis      Peripheral neuropathy      Personal history of other medical treatment     postgastrectomy dumping syndrome     Stomach problems Noted earlier      Past Surgical History:   Procedure  Laterality Date     ABDOMEN SURGERY  2012?     APPENDECTOMY  1964?     BRONCHOSCOPY RIDID OR FLEXIBLE W/ENDOBRONCHIAL ULTRASOUND GUIDED 3 OR MORE NODE STATIONS N/A 3/19/2025    Procedure: Bronchoscopy Ridid Or Flexible W/Endobronchial Ultrasound Guided 3 Or More Node Stations;  Surgeon: Saad Olmedo MD;  Location: [facility]     COLONOSCOPY  2006, 2016     COMBINED ESOPHAGOSCOPY, GASTROSCOPY, DUODENOSCOPY (EGD), REPLACE ESOPHAGEAL STENT Left 4/18/2025    Procedure: ESOPHAGOGASTRODUODENOSCOPY with pharyngostomy tube placement;  Surgeon: Erik Lindsey MD;  Location: [facility]     COMBINED ESOPHAGOSCOPY, GASTROSCOPY, DUODENOSCOPY (EGD), REPLACE ESOPHAGEAL STENT N/A 6/9/2025    Procedure: ESOPHAGOGASTRODUODENOSCOPY, stent removal, fluroscopy;  Surgeon: Katlyn Tobar MD;  Location: [facility]     DAVINCI NISSEN FUNDOPLICATION N/A 2/26/2025    Procedure: TAKE-DOWN, FUNDOPLICATION, NISSEN, LAPAROSCOPIC- ROBOTIC, gastrostomy takedown, lysisof adhesions 2 hr;  Surgeon: Katlyn Tobar MD;  Location: [facility]     ESOPHAGEAL BALLOON PROVOCATION STUDY N/A 9/24/2024    Procedure: Esophageal Balloon Provocation Study;  Surgeon: Carson Michel DO;  Location: [facility]     ESOPHAGECTOMY MINIMALLY INVASIVE N/A 3/24/2025    Procedure: ESOPHAGECTOMY, MINIMALLY INVASIVE, Laparoscopic Right Thorascopic Esophagectomy, Botox Injection;  Surgeon: Katlyn Tobar MD;  Location: [facility]     ESOPHAGOSCOPY, GASTROSCOPY, DUODENOSCOPY (EGD), COMBINED N/A 9/24/2024    Procedure: ESOPHAGOGASTRODUODENOSCOPY, WITH BIOPSY;  Surgeon: Carson Michel DO;  Location: [facility]     ESOPHAGOSCOPY, GASTROSCOPY, DUODENOSCOPY (EGD), COMBINED N/A 10/8/2024    Procedure: ESOPHAGOGASTRODUODENOSCOPY, WITH FINE NEEDLE ASPIRATION BIOPSY, WITH ENDOSCOPIC ULTRASOUND GUIDANCE;  Surgeon: Lance Lopez MD;  Location: [facility]     ESOPHAGOSCOPY, GASTROSCOPY, DUODENOSCOPY (EGD), COMBINED N/A 2/26/2025    Procedure: Esophagoscopy, gastroscopy, duodenoscopy (EGD);  Surgeon:  Katlyn Tobar MD;  [facility]: [facility]     ESOPHAGOSCOPY, GASTROSCOPY, DUODENOSCOPY (EGD), COMBINED N/A 3/24/2025    Procedure: Esophagoscopy, gastroscopy, duodenoscopy (EGD), combined;  Surgeon: Katlyn Tobar MD;  [facility]     ESOPHAGOSCOPY, GASTROSCOPY, DUODENOSCOPY (EGD), COMBINED N/A 3/28/2025    Procedure: ESOPHAGOGASTRODUODENOSCOPY, pharyngostomy tube replacement;  Surgeon: Chino Gardner MD;  [facility]: [facility]     ESOPHAGOSCOPY, GASTROSCOPY, DUODENOSCOPY (EGD), COMBINED N/A 4/29/2025    Procedure: Esophagoscopy, gastroscopy, duodenoscopy (EGD), fluroscopy, revision of pharnygoscopy tube;  Surgeon: Katlyn Tobar MD;  Location: [facility]     ESOPHAGOSCOPY, GASTROSCOPY, DUODENOSCOPY (EGD), COMBINED N/A 5/15/2025    Procedure: Esophagoscopy, gastroscopy, duodenoscopy (EGD), combined w/ pharyngoscopy tube removal, stent placement;  Surgeon: Katlyn Tobar MD;  Location: [facility]     EYE SURGERY  199x    laser for retinal tears     GASTRIC FUNDOPLICATION       HERNIA REPAIR  1961?     IR CHEST PORT PLACEMENT > 5 YRS OF AGE  10/24/2024     LAPAROSCOPIC ASSISTED INSERTION TUBE JEJUNOSTOMY N/A 2/26/2025    Procedure: Laparoscopic assisted insertion tube jejunostomy;  Surgeon: Katlyn Tobar MD;  Location: [facility]     LAPAROSCOPIC TAKEDOWN NISSEN FUNDOPLICATION N/A 9/25/2020    Procedure: TAKE-DOWN, FUNDOPLICATION, REDO NISSEN, LAPAROSCOPIC, gastrostomy feeding tube placement;  Surgeon: Katlyn Tobar MD;  Location: [facility]     NE ESOPHAGOGASTRODUODENOSCOPY TRANSORAL DIAGNOSTIC N/A 5/9/2019    Procedure: UPPER GASTROINTESTINAL ENDOSCOPY;  Surgeon: Dino Ward MD;  Location: [facility];  Service: General     SOFT TISSUE SURGERY  2009?    MCL l. thumb          SOCIAL HISTORY: Lives in [place], [place] with his wife Jess. They've been  for 15 years.  He has three daughters, ages 38, 42, 50.  He is retired from working in computer security.  He denies any history of oral tobacco or cigarette  use in his lifetime.  Alcohol use that he reports lifetime is five drinks per week since age of 17.  Info from other notes:    Alcohol use: Yes        Alcohol/week: 3.0 - 6.0 standard drinks of alcohol       Types: 1 - 2 Glasses of wine, 1 - 2 Cans of beer, 1 - 2 Shots of liquor per week       Comment: glass of win   He is an avid skier, and enjoy skiing a winter, and sometimes the peripheral neuropathycan interfere with that aspect of quality of life.    FAMILY HISTORY OF CANCER: his mother was diagnosed with uterine cancer in her late 30s; this diagnosis took place in the 1960s, and she  in  of unrelated causes.  He has two sisters, in good health, and his father had a superficial form of skin cancer, otherwise no other family history of cancer.      Allergies:  Allergies as of 2025 - Reviewed 2025   Allergen Reaction Noted     Hornets  2020     Wasp venom protein Hives 2016     Bee venom Swelling 2019     Oxaliplatin Itching 2025       Current Medications:  Current Outpatient Medications   Medication Sig Dispense Refill     acetaminophen (TYLENOL) 325 MG/10.15ML liquid Place 20.3 mLs (650 mg) into J tube every 4 hours as needed for mild pain or fever. 500 mL 0     atenolol (TENORMIN) 25 MG tablet Take 25 mg by mouth daily.       atenolol (TENORMIN) 50 MG tablet Place 1 tablet (50 mg) into G tube daily. (Patient not taking: Reported on 2025)       calcium carbonate (TUMS) 500 MG chewable tablet Take 1 tablet (500 mg) by mouth daily as needed for heartburn. 30 tablet 0     cyanocobalamin (VITAMIN B-12) 500 MCG tablet Place 500 mcg into Feeding Tube every other day.       dexAMETHasone (DECADRON) 4 MG tablet Take 2 tablets (8 mg) by mouth daily. Take for 2 days, starting the day after chemo. Take with food. 4 tablet 2     dextromethorphan (DELSYM) 30 MG/5ML liquid Take 5 mLs (30 mg) by mouth once as needed for cough.       EPINEPHrine (ANY BX GENERIC EQUIV) 0.3  MG/0.3ML injection 2-pack Inject 0.3 mg into the muscle as needed for anaphylaxis.       Fluorouracil (ADRUCIL) 4,705 mg in sodium chloride 0.9 % 230 mL via CADD pump Infuse 4,705 mg at 5 mL/hr over 46 hours into the vein once for 1 dose. [id number] mL 0     gabapentin (NEURONTIN) 300 MG capsule Place 1 capsule (300 mg) into Feeding Tube 3 times daily. (Patient taking differently: Place 300 mg into Feeding Tube 3 times daily. 600 MG morning, 600 MG noon, and 300 MG bedtime)       Isosource 1.5 Gumaro 250 mL Place 1,500 mLs into J tube daily. Infuse via pump. 90 mL/hr x 16 hours.   6 cartons per day.   Water flush: 120 mL before and after tube feed cycle. Additional 120 mL 8x per day.   Kcals: 2250 68466 mL 11     Lidocaine (LIDOCARE) 4 % Patch Place 1 patch over 12 hours onto the skin every 24 hours. To prevent lidocaine toxicity, patient should be patch free for 12 hrs daily. 4 patch 0     loperamide (IMODIUM) 1 MG/7.5ML liquid Place 15 mLs (2 mg) into Feeding Tube 3 times daily. (Patient taking differently: Place 2 mg into Feeding Tube daily.) 711 mL 0     methocarbamol (ROBAXIN) 500 MG tablet Place 1 tablet (500 mg) into J tube 4 times daily. 120 tablet 0     methocarbamol (ROBAXIN) 500 MG tablet Place 1 tablet (500 mg) into J tube 4 times daily. 30 tablet 0     naloxone (NARCAN) 0.4 MG/ML injection Inject 0.5 mLs (0.2 mg) into the muscle once for 1 dose. (Patient not taking: No sig reported) 0.5 mL 0     Naloxone HCl 0.4 MG/ML SOSY Inject 0.4 mg into the muscle as needed.       nortriptyline (PAMELOR) 25 MG capsule Place 1 capsule (25 mg) into J tube every evening.       Nutrisource Fiber PO packet Place 1 each into Feeding Tube 2 times daily. (Patient taking differently: Place 1 packet into Feeding Tube daily.) 60 each 11     omeprazole (PRILOSEC) 40 MG DR capsule Take 20 mg by mouth daily.       ondansetron (ZOFRAN) 8 MG tablet Take 1 tablet (8 mg) by mouth every 8 hours as needed for nausea (vomiting). 30 tablet 2  "    prochlorperazine (COMPAZINE) 5 MG tablet Take 1 tablet (5 mg) by mouth every 6 hours as needed for nausea or vomiting. 30 tablet 2     Prosource TF PO LIQD (PROSOURCE TF) Place 45 mLs into J tube 2 times daily. Infuse via syringe  Water flush: 30 mL before and after each packet   Kcals: 80  2 pkts per day 2700 mL 11        Physical Exam:  Ht 1.702 m (5' 7\")   Wt 78.5 kg (173 lb)   BMI 27.10 kg/m    GENERAL: Well appearing, pleasant, alert, oriented, NAD  HEENT:  PERRLA, anicteric sclerae. Oropharynx clear, with no evidence of mucositis, thrush  NECK: No line, supple, no LAD   CV: RRR  LUNGS: CTAB  ABDOMEN:  Soft, nontender, nondistended, +BS. J tube in place and skin has no signs of cellulitis   EXTREMITIES:  No clubbing, cyanosis, edema, or palpable cords.      Laboratory/Imaging Studies    Most Recent 3 CBC's:  Recent Labs   Lab Test 07/24/25  0630 07/09/25  0958 06/27/25  1120   WBC 5.6 6.9 7.6   HGB 12.4* 12.1* 12.1*   MCV 88 89 89    221 185   ANEU 2.8 4.4 5.0     Most Recent 3 BMP's:  Recent Labs   Lab Test 07/09/25  0958 06/27/25  1120 05/17/25  0429 05/14/25  2129 05/14/25  1233    137 137   < > 133*   POTASSIUM 4.7 4.5 3.9   < > 4.6   CHLORIDE 105 103 102   < > 98   CO2 22 22 24   < > 23   BUN 23.8* 22.5 23.9*   < > 24.1*   CR 0.77 0.69 0.74   < > 0.73   ANIONGAP 11 12 11   < > 12   AUGUSTIN 9.2 9.2 8.4*   < > 9.1   * 124* 137*   < > 114*   PROTTOTAL 6.9 6.9  --   --  7.4   ALBUMIN 3.9 4.0  --   --  3.6    < > = values in this interval not displayed.    Most Recent 3 LFT's:  Recent Labs   Lab Test 07/09/25  0958 06/27/25  1120 05/14/25  1233   AST 20 23 17   ALT 18 22 12   ALKPHOS 115 120 121   BILITOTAL 0.2 0.3 0.3    Most Recent 2 TSH and T4:  Recent Labs   Lab Test 12/16/24  1752 07/23/24  0856   TSH 1.75 1.16     I reviewed the above labs today.      ASSESSMENT/PLAN:  Mr. Bal Junior is a 70-year-old gentleman from [place], [place] with T2N0 GE junction carcinoma.     He had " neoadjuvant FLOT x 4 followed by esophagectomy late March 2025 showing excellent treatment response with UT of [id number]. His surgery was complicated by anastomatic leak. This has finally improved and he continues to advance his diet. Plan for  FOLFOX x 4 adjuvant therapy.     Unfortunately had palm itching/redness during first adjuvant cycle with oxaliplatin so this was held and we discussed need for oxaliplatin desensitization today. He otherwise tolerated 5FU well without acute dose limiting toxicity. He does report a cough with intake that is improving--he saw his PCP locally several days ago without concern for infection and continues on his PPI/GERD mgmt and diet modification. Additionally, he directed CXR results to thoracic surgery for their review. Labs acceptable to proceed today as planned with full regimen--adjust as needed pending tolerance. Follow up prior to next cycle as scheduled.       30 minutes spent on the date of the encounter doing chart review, review of test results, interpretation of tests, patient visit, and documentation      Kaitlyn Still CNP on 7/24/2025 at 8:33 AM        Again, thank you for allowing me to participate in the care of your patient.        Sincerely,        Kaitlyn Still CNP    Electronically signed

## 2025-07-24 NOTE — ED TRIAGE NOTES
Felt itchy during infusion at [place]. Infusion was oxaliplatin      25mg bensdryl 40 mg of solumedrol. 20 min later rebounded reaction

## 2025-07-24 NOTE — DISCHARGE INSTRUCTIONS
Follow up with your oncology team. If you have new or worsening symptoms or other concerns return to the emergency department.

## 2025-07-25 PROCEDURE — A4222 INFUSION SUPPLIES WITH PUMP: HCPCS

## 2025-07-25 PROCEDURE — B4035 ENTERAL FEED SUPP PUMP PER D: HCPCS

## 2025-07-26 PROCEDURE — B4035 ENTERAL FEED SUPP PUMP PER D: HCPCS

## 2025-07-27 ENCOUNTER — INFUSION THERAPY VISIT (OUTPATIENT)
Dept: ONCOLOGY | Facility: CLINIC | Age: 70
End: 2025-07-27
Attending: INTERNAL MEDICINE
Payer: MEDICARE

## 2025-07-27 VITALS
RESPIRATION RATE: 18 BRPM | TEMPERATURE: 97.6 F | SYSTOLIC BLOOD PRESSURE: 131 MMHG | OXYGEN SATURATION: 99 % | HEART RATE: 62 BPM | DIASTOLIC BLOOD PRESSURE: 84 MMHG

## 2025-07-27 DIAGNOSIS — T45.1X5A CHEMOTHERAPY-INDUCED NEUTROPENIA: Primary | ICD-10-CM

## 2025-07-27 DIAGNOSIS — D70.1 CHEMOTHERAPY-INDUCED NEUTROPENIA: Primary | ICD-10-CM

## 2025-07-27 DIAGNOSIS — C16.0 ADENOCARCINOMA OF GASTROESOPHAGEAL JUNCTION (H): ICD-10-CM

## 2025-07-27 PROCEDURE — B4035 ENTERAL FEED SUPP PUMP PER D: HCPCS

## 2025-07-27 PROCEDURE — 250N000011 HC RX IP 250 OP 636: Performed by: INTERNAL MEDICINE

## 2025-07-27 RX ORDER — HEPARIN SODIUM (PORCINE) LOCK FLUSH IV SOLN 100 UNIT/ML 100 UNIT/ML
5 SOLUTION INTRAVENOUS
Status: DISCONTINUED | OUTPATIENT
Start: 2025-07-27 | End: 2025-07-27 | Stop reason: HOSPADM

## 2025-07-27 RX ADMIN — Medication 5 ML: at 10:16

## 2025-07-27 NOTE — PROGRESS NOTES
Infusion Nursing Note:  Bal Junior presents today for pump disconnect.    Patient spoke with provider today: No    Treatment Conditions:  Not Applicable.    Note: Pt denies fever/chills, SOB or cough. No reports of nausea/vomiting.    Numbness/tingling in fingers/toes unchanged.  CADD pump empty at time of disconnect.     Intravenous Access:  Implanted Port.    Post Infusion Assessment:  Patient tolerated infusion without incident.  No evidence of extravasations.  Access discontinued per protocol.    Discharge Plan:   Patient declined prescription refills.  Discharge instructions reviewed with: Patient.  Patient and/or family verbalized understanding of discharge instructions and all questions answered.  AVS to patient via TMAT.  Patient will return 8/7/25 for next appointment.   Patient discharged in stable condition accompanied by: self.  Departure Mode: Ambulatory.      Lani Mcneil RN

## 2025-07-28 PROCEDURE — E1399 DURABLE MEDICAL EQUIPMENT MI: HCPCS

## 2025-07-28 PROCEDURE — B4035 ENTERAL FEED SUPP PUMP PER D: HCPCS

## 2025-07-29 PROCEDURE — B4035 ENTERAL FEED SUPP PUMP PER D: HCPCS

## 2025-07-30 PROCEDURE — B4035 ENTERAL FEED SUPP PUMP PER D: HCPCS

## 2025-07-31 DIAGNOSIS — D70.1 CHEMOTHERAPY-INDUCED NEUTROPENIA: ICD-10-CM

## 2025-07-31 DIAGNOSIS — T45.1X5A CHEMOTHERAPY-INDUCED NEUTROPENIA: ICD-10-CM

## 2025-07-31 DIAGNOSIS — C16.0 ADENOCARCINOMA OF GASTROESOPHAGEAL JUNCTION (H): Primary | ICD-10-CM

## 2025-07-31 PROCEDURE — B4035 ENTERAL FEED SUPP PUMP PER D: HCPCS

## 2025-08-01 PROCEDURE — B4035 ENTERAL FEED SUPP PUMP PER D: HCPCS

## 2025-08-02 PROCEDURE — B4035 ENTERAL FEED SUPP PUMP PER D: HCPCS

## 2025-08-03 PROCEDURE — B4035 ENTERAL FEED SUPP PUMP PER D: HCPCS

## 2025-08-04 PROCEDURE — B4035 ENTERAL FEED SUPP PUMP PER D: HCPCS

## 2025-08-05 PROCEDURE — B4035 ENTERAL FEED SUPP PUMP PER D: HCPCS

## 2025-08-06 DIAGNOSIS — C16.0 ADENOCARCINOMA OF GASTROESOPHAGEAL JUNCTION (H): Primary | ICD-10-CM

## 2025-08-06 PROCEDURE — B4035 ENTERAL FEED SUPP PUMP PER D: HCPCS

## 2025-08-07 ENCOUNTER — HOME INFUSION (OUTPATIENT)
Dept: HOME HEALTH SERVICES | Facility: HOME HEALTH | Age: 70
End: 2025-08-07
Payer: COMMERCIAL

## 2025-08-07 PROCEDURE — B4035 ENTERAL FEED SUPP PUMP PER D: HCPCS

## 2025-08-08 ENCOUNTER — HOME INFUSION BILLING (OUTPATIENT)
Dept: HOME HEALTH SERVICES | Facility: HOME HEALTH | Age: 70
End: 2025-08-08
Payer: COMMERCIAL

## 2025-08-08 ENCOUNTER — APPOINTMENT (OUTPATIENT)
Dept: LAB | Facility: CLINIC | Age: 70
End: 2025-08-08
Attending: INTERNAL MEDICINE
Payer: MEDICARE

## 2025-08-08 PROCEDURE — B4035 ENTERAL FEED SUPP PUMP PER D: HCPCS

## 2025-08-08 PROCEDURE — B4152 EF CALORIE DENSE>/=1.5KCAL: HCPCS

## 2025-08-08 PROCEDURE — A4222 INFUSION SUPPLIES WITH PUMP: HCPCS

## 2025-08-09 PROCEDURE — B9002 ENTER NUTR INF PUMP ANY TYPE: HCPCS | Mod: RR

## 2025-08-09 PROCEDURE — B4035 ENTERAL FEED SUPP PUMP PER D: HCPCS

## 2025-08-09 PROCEDURE — E0781 EXTERNAL AMBULATORY INFUS PU: HCPCS | Mod: RR

## 2025-08-10 ENCOUNTER — INFUSION THERAPY VISIT (OUTPATIENT)
Dept: ONCOLOGY | Facility: CLINIC | Age: 70
End: 2025-08-10
Attending: INTERNAL MEDICINE
Payer: MEDICARE

## 2025-08-10 VITALS
TEMPERATURE: 97.5 F | SYSTOLIC BLOOD PRESSURE: 105 MMHG | RESPIRATION RATE: 12 BRPM | HEART RATE: 71 BPM | OXYGEN SATURATION: 98 % | DIASTOLIC BLOOD PRESSURE: 71 MMHG

## 2025-08-10 DIAGNOSIS — D70.1 CHEMOTHERAPY-INDUCED NEUTROPENIA: Primary | ICD-10-CM

## 2025-08-10 DIAGNOSIS — T45.1X5A CHEMOTHERAPY-INDUCED NEUTROPENIA: Primary | ICD-10-CM

## 2025-08-10 DIAGNOSIS — C16.0 ADENOCARCINOMA OF GASTROESOPHAGEAL JUNCTION (H): ICD-10-CM

## 2025-08-10 PROCEDURE — B4035 ENTERAL FEED SUPP PUMP PER D: HCPCS

## 2025-08-10 PROCEDURE — 250N000011 HC RX IP 250 OP 636: Performed by: INTERNAL MEDICINE

## 2025-08-10 RX ORDER — HEPARIN SODIUM (PORCINE) LOCK FLUSH IV SOLN 100 UNIT/ML 100 UNIT/ML
5 SOLUTION INTRAVENOUS
Status: DISCONTINUED | OUTPATIENT
Start: 2025-08-10 | End: 2025-08-10 | Stop reason: HOSPADM

## 2025-08-10 RX ORDER — HEPARIN SODIUM,PORCINE 10 UNIT/ML
5-20 VIAL (ML) INTRAVENOUS DAILY PRN
Status: DISCONTINUED | OUTPATIENT
Start: 2025-08-10 | End: 2025-08-10 | Stop reason: HOSPADM

## 2025-08-10 RX ADMIN — Medication 5 ML: at 09:17

## 2025-08-10 ASSESSMENT — PAIN SCALES - GENERAL: PAINLEVEL_OUTOF10: NO PAIN (0)

## 2025-08-11 PROCEDURE — B4035 ENTERAL FEED SUPP PUMP PER D: HCPCS

## 2025-08-12 PROCEDURE — B4035 ENTERAL FEED SUPP PUMP PER D: HCPCS

## 2025-08-13 ENCOUNTER — THERAPY VISIT (OUTPATIENT)
Dept: PHYSICAL THERAPY | Facility: REHABILITATION | Age: 70
End: 2025-08-13
Payer: COMMERCIAL

## 2025-08-13 DIAGNOSIS — R53.83 FATIGUE, UNSPECIFIED TYPE: ICD-10-CM

## 2025-08-13 DIAGNOSIS — R53.1 WEAKNESS GENERALIZED: Primary | ICD-10-CM

## 2025-08-13 PROCEDURE — 97110 THERAPEUTIC EXERCISES: CPT | Mod: GP | Performed by: PHYSICAL THERAPIST

## 2025-08-13 PROCEDURE — B4035 ENTERAL FEED SUPP PUMP PER D: HCPCS

## 2025-08-14 PROCEDURE — B4035 ENTERAL FEED SUPP PUMP PER D: HCPCS

## 2025-08-14 PROCEDURE — E1399 DURABLE MEDICAL EQUIPMENT MI: HCPCS

## 2025-08-15 PROCEDURE — B4035 ENTERAL FEED SUPP PUMP PER D: HCPCS

## 2025-08-16 PROCEDURE — B4035 ENTERAL FEED SUPP PUMP PER D: HCPCS

## 2025-08-17 PROCEDURE — B4035 ENTERAL FEED SUPP PUMP PER D: HCPCS

## 2025-08-18 PROCEDURE — B4035 ENTERAL FEED SUPP PUMP PER D: HCPCS

## 2025-08-19 PROCEDURE — B4035 ENTERAL FEED SUPP PUMP PER D: HCPCS

## 2025-08-20 ENCOUNTER — THERAPY VISIT (OUTPATIENT)
Dept: PHYSICAL THERAPY | Facility: REHABILITATION | Age: 70
End: 2025-08-20
Payer: COMMERCIAL

## 2025-08-20 DIAGNOSIS — R53.83 FATIGUE, UNSPECIFIED TYPE: ICD-10-CM

## 2025-08-20 DIAGNOSIS — R53.1 WEAKNESS GENERALIZED: Primary | ICD-10-CM

## 2025-08-20 PROCEDURE — 97110 THERAPEUTIC EXERCISES: CPT | Mod: GP | Performed by: PHYSICAL THERAPIST

## 2025-08-20 PROCEDURE — 97112 NEUROMUSCULAR REEDUCATION: CPT | Mod: GP | Performed by: PHYSICAL THERAPIST

## 2025-08-20 PROCEDURE — B4035 ENTERAL FEED SUPP PUMP PER D: HCPCS

## 2025-08-21 ENCOUNTER — APPOINTMENT (OUTPATIENT)
Dept: LAB | Facility: CLINIC | Age: 70
End: 2025-08-21
Attending: INTERNAL MEDICINE
Payer: MEDICARE

## 2025-08-21 ENCOUNTER — ONCOLOGY VISIT (OUTPATIENT)
Dept: ONCOLOGY | Facility: CLINIC | Age: 70
End: 2025-08-21
Attending: INTERNAL MEDICINE
Payer: MEDICARE

## 2025-08-21 VITALS
RESPIRATION RATE: 18 BRPM | OXYGEN SATURATION: 98 % | BODY MASS INDEX: 27.27 KG/M2 | DIASTOLIC BLOOD PRESSURE: 78 MMHG | WEIGHT: 174.1 LBS | SYSTOLIC BLOOD PRESSURE: 117 MMHG | TEMPERATURE: 97.7 F | HEART RATE: 73 BPM

## 2025-08-21 DIAGNOSIS — C16.0 ADENOCARCINOMA OF GASTROESOPHAGEAL JUNCTION (H): ICD-10-CM

## 2025-08-21 DIAGNOSIS — C16.0 ADENOCARCINOMA OF GASTROESOPHAGEAL JUNCTION (H): Primary | ICD-10-CM

## 2025-08-21 LAB
ALBUMIN SERPL BCG-MCNC: 4 G/DL (ref 3.5–5.2)
ALP SERPL-CCNC: 114 U/L (ref 40–150)
ALT SERPL W P-5'-P-CCNC: 15 U/L (ref 0–70)
ANION GAP SERPL CALCULATED.3IONS-SCNC: 14 MMOL/L (ref 7–15)
AST SERPL W P-5'-P-CCNC: 20 U/L (ref 0–45)
BASOPHILS # BLD AUTO: <0.03 10E3/UL (ref 0–0.2)
BASOPHILS NFR BLD AUTO: 0.4 %
BILIRUB SERPL-MCNC: 0.3 MG/DL
BUN SERPL-MCNC: 19.9 MG/DL (ref 8–23)
CALCIUM SERPL-MCNC: 9.3 MG/DL (ref 8.8–10.4)
CHLORIDE SERPL-SCNC: 102 MMOL/L (ref 98–107)
CREAT SERPL-MCNC: 0.89 MG/DL (ref 0.67–1.17)
EGFRCR SERPLBLD CKD-EPI 2021: >90 ML/MIN/1.73M2
EOSINOPHIL # BLD AUTO: 0.25 10E3/UL (ref 0–0.7)
EOSINOPHIL NFR BLD AUTO: 4.8 %
ERYTHROCYTE [DISTWIDTH] IN BLOOD BY AUTOMATED COUNT: 14.8 % (ref 10–15)
GLUCOSE SERPL-MCNC: 107 MG/DL (ref 70–99)
HCO3 SERPL-SCNC: 22 MMOL/L (ref 22–29)
HCT VFR BLD AUTO: 38.5 % (ref 40–53)
HGB BLD-MCNC: 12.8 G/DL (ref 13.3–17.7)
IMM GRANULOCYTES # BLD: <0.03 10E3/UL
IMM GRANULOCYTES NFR BLD: 0.2 %
LYMPHOCYTES # BLD AUTO: 1.59 10E3/UL (ref 0.8–5.3)
LYMPHOCYTES NFR BLD AUTO: 30.4 %
MCH RBC QN AUTO: 28.8 PG (ref 26.5–33)
MCHC RBC AUTO-ENTMCNC: 33.2 G/DL (ref 31.5–36.5)
MCV RBC AUTO: 86.5 FL (ref 78–100)
MONOCYTES # BLD AUTO: 0.88 10E3/UL (ref 0–1.3)
MONOCYTES NFR BLD AUTO: 16.8 %
NEUTROPHILS # BLD AUTO: 2.48 10E3/UL (ref 1.6–8.3)
NEUTROPHILS NFR BLD AUTO: 47.4 %
NRBC # BLD AUTO: <0.03 10E3/UL
NRBC BLD AUTO-RTO: 0 /100
PLATELET # BLD AUTO: 178 10E3/UL (ref 150–450)
POTASSIUM SERPL-SCNC: 4.1 MMOL/L (ref 3.4–5.3)
PROT SERPL-MCNC: 6.8 G/DL (ref 6.4–8.3)
RBC # BLD AUTO: 4.45 10E6/UL (ref 4.4–5.9)
SODIUM SERPL-SCNC: 138 MMOL/L (ref 135–145)
WBC # BLD AUTO: 5.23 10E3/UL (ref 4–11)

## 2025-08-21 PROCEDURE — G0463 HOSPITAL OUTPT CLINIC VISIT: HCPCS | Performed by: PHYSICIAN ASSISTANT

## 2025-08-21 PROCEDURE — B4035 ENTERAL FEED SUPP PUMP PER D: HCPCS

## 2025-08-21 PROCEDURE — 250N000011 HC RX IP 250 OP 636: Performed by: PHYSICIAN ASSISTANT

## 2025-08-21 RX ORDER — HEPARIN SODIUM (PORCINE) LOCK FLUSH IV SOLN 100 UNIT/ML 100 UNIT/ML
5 SOLUTION INTRAVENOUS ONCE
Status: COMPLETED | OUTPATIENT
Start: 2025-08-21 | End: 2025-08-21

## 2025-08-21 RX ADMIN — Medication 5 ML: at 14:42

## 2025-08-21 ASSESSMENT — PAIN SCALES - GENERAL: PAINLEVEL_OUTOF10: NO PAIN (0)

## 2025-08-22 ENCOUNTER — HOME INFUSION BILLING (OUTPATIENT)
Dept: HOME HEALTH SERVICES | Facility: HOME HEALTH | Age: 70
End: 2025-08-22
Payer: COMMERCIAL

## 2025-08-22 ENCOUNTER — INFUSION THERAPY VISIT (OUTPATIENT)
Dept: ONCOLOGY | Facility: CLINIC | Age: 70
End: 2025-08-22
Attending: INTERNAL MEDICINE
Payer: COMMERCIAL

## 2025-08-22 DIAGNOSIS — T45.1X5A CHEMOTHERAPY-INDUCED NEUTROPENIA: ICD-10-CM

## 2025-08-22 DIAGNOSIS — C16.0 ADENOCARCINOMA OF GASTROESOPHAGEAL JUNCTION (H): Primary | ICD-10-CM

## 2025-08-22 DIAGNOSIS — D70.1 CHEMOTHERAPY-INDUCED NEUTROPENIA: ICD-10-CM

## 2025-08-22 PROCEDURE — B4035 ENTERAL FEED SUPP PUMP PER D: HCPCS

## 2025-08-22 PROCEDURE — E1399 DURABLE MEDICAL EQUIPMENT MI: HCPCS

## 2025-08-22 PROCEDURE — A4222 INFUSION SUPPLIES WITH PUMP: HCPCS

## 2025-08-23 PROCEDURE — B4035 ENTERAL FEED SUPP PUMP PER D: HCPCS

## 2025-08-24 ENCOUNTER — INFUSION THERAPY VISIT (OUTPATIENT)
Dept: ONCOLOGY | Facility: CLINIC | Age: 70
End: 2025-08-24
Attending: INTERNAL MEDICINE
Payer: MEDICARE

## 2025-08-24 VITALS
OXYGEN SATURATION: 99 % | RESPIRATION RATE: 20 BRPM | SYSTOLIC BLOOD PRESSURE: 113 MMHG | HEART RATE: 68 BPM | TEMPERATURE: 97.4 F | DIASTOLIC BLOOD PRESSURE: 77 MMHG

## 2025-08-24 DIAGNOSIS — T45.1X5A CHEMOTHERAPY-INDUCED NEUTROPENIA: Primary | ICD-10-CM

## 2025-08-24 DIAGNOSIS — D70.1 CHEMOTHERAPY-INDUCED NEUTROPENIA: Primary | ICD-10-CM

## 2025-08-24 DIAGNOSIS — C16.0 ADENOCARCINOMA OF GASTROESOPHAGEAL JUNCTION (H): ICD-10-CM

## 2025-08-24 PROCEDURE — B4035 ENTERAL FEED SUPP PUMP PER D: HCPCS

## 2025-08-24 PROCEDURE — 250N000011 HC RX IP 250 OP 636: Performed by: INTERNAL MEDICINE

## 2025-08-24 RX ORDER — HEPARIN SODIUM (PORCINE) LOCK FLUSH IV SOLN 100 UNIT/ML 100 UNIT/ML
5 SOLUTION INTRAVENOUS
Status: DISCONTINUED | OUTPATIENT
Start: 2025-08-24 | End: 2025-08-24 | Stop reason: HOSPADM

## 2025-08-24 RX ADMIN — Medication 5 ML: at 07:14

## 2025-08-24 ASSESSMENT — PAIN SCALES - GENERAL: PAINLEVEL_OUTOF10: MILD PAIN (2)

## 2025-08-25 PROCEDURE — B4035 ENTERAL FEED SUPP PUMP PER D: HCPCS

## 2025-08-26 ENCOUNTER — THERAPY VISIT (OUTPATIENT)
Dept: PHYSICAL THERAPY | Facility: REHABILITATION | Age: 70
End: 2025-08-26
Payer: COMMERCIAL

## 2025-08-26 DIAGNOSIS — R53.1 WEAKNESS GENERALIZED: Primary | ICD-10-CM

## 2025-08-26 DIAGNOSIS — R53.83 FATIGUE, UNSPECIFIED TYPE: ICD-10-CM

## 2025-08-26 PROCEDURE — B4035 ENTERAL FEED SUPP PUMP PER D: HCPCS

## 2025-08-26 PROCEDURE — 97110 THERAPEUTIC EXERCISES: CPT | Mod: GP | Performed by: PHYSICAL THERAPIST

## 2025-08-27 PROCEDURE — B4035 ENTERAL FEED SUPP PUMP PER D: HCPCS

## 2025-08-28 PROCEDURE — B4035 ENTERAL FEED SUPP PUMP PER D: HCPCS

## 2025-08-29 ENCOUNTER — HOSPITAL ENCOUNTER (OUTPATIENT)
Dept: CT IMAGING | Facility: CLINIC | Age: 70
Discharge: HOME | End: 2025-08-29
Attending: INTERNAL MEDICINE | Admitting: INTERNAL MEDICINE
Payer: MEDICARE

## 2025-08-29 DIAGNOSIS — C16.0 ADENOCARCINOMA OF GASTROESOPHAGEAL JUNCTION (H): ICD-10-CM

## 2025-08-29 PROCEDURE — 250N000011 HC RX IP 250 OP 636: Performed by: INTERNAL MEDICINE

## 2025-08-29 PROCEDURE — 74177 CT ABD & PELVIS W/CONTRAST: CPT

## 2025-08-29 PROCEDURE — B4035 ENTERAL FEED SUPP PUMP PER D: HCPCS

## 2025-08-29 RX ORDER — HEPARIN SODIUM (PORCINE) LOCK FLUSH IV SOLN 100 UNIT/ML 100 UNIT/ML
5-10 SOLUTION INTRAVENOUS
Status: DISCONTINUED | OUTPATIENT
Start: 2025-08-29 | End: 2025-08-30 | Stop reason: HOSPADM

## 2025-08-29 RX ORDER — HEPARIN SODIUM,PORCINE 10 UNIT/ML
5-10 VIAL (ML) INTRAVENOUS EVERY 24 HOURS
Status: DISCONTINUED | OUTPATIENT
Start: 2025-08-29 | End: 2025-08-30 | Stop reason: HOSPADM

## 2025-08-29 RX ORDER — HEPARIN SODIUM,PORCINE 10 UNIT/ML
5-10 VIAL (ML) INTRAVENOUS
Status: DISCONTINUED | OUTPATIENT
Start: 2025-08-29 | End: 2025-08-30 | Stop reason: HOSPADM

## 2025-08-29 RX ORDER — IOPAMIDOL 755 MG/ML
90 INJECTION, SOLUTION INTRAVASCULAR ONCE
Status: COMPLETED | OUTPATIENT
Start: 2025-08-29 | End: 2025-08-29

## 2025-08-29 RX ADMIN — IOPAMIDOL 90 ML: 755 INJECTION, SOLUTION INTRAVENOUS at 13:48

## 2025-08-29 RX ADMIN — HEPARIN SODIUM (PORCINE) LOCK FLUSH IV SOLN 100 UNIT/ML 5 ML: 100 SOLUTION at 14:01

## 2025-08-30 PROCEDURE — B4035 ENTERAL FEED SUPP PUMP PER D: HCPCS

## 2025-08-31 PROCEDURE — B4035 ENTERAL FEED SUPP PUMP PER D: HCPCS

## 2025-09-01 PROCEDURE — B4035 ENTERAL FEED SUPP PUMP PER D: HCPCS

## 2025-09-02 PROCEDURE — B4035 ENTERAL FEED SUPP PUMP PER D: HCPCS

## (undated) DEVICE — SU MONOCRYL 4-0 PS-2 27" UND Y426H

## (undated) DEVICE — LINEN TOWEL PACK X6 WHITE 5487

## (undated) DEVICE — GLOVE BIOGEL PI MICRO SZ 8.0 48580

## (undated) DEVICE — ENDO TUBING CO2 SMARTCAP STERILE DISP 100145CO2EXT

## (undated) DEVICE — WIRE GUIDE AMPLATZ SUPER STIFF 0.035"X260CM M001465261

## (undated) DEVICE — SU DERMABOND ADVANCED .7ML DNX12

## (undated) DEVICE — BITE BLOCK BLOX ADJ STRAP 60FR SBT-546-100

## (undated) DEVICE — LINEN TOWEL PACK X5 5464

## (undated) DEVICE — STPL DAVINCI SUREFORM 45MM RELOAD BLUE 48345B

## (undated) DEVICE — TUBE JEJUNOSTOMY ENFIT 16FR 8200-16

## (undated) DEVICE — DRAIN CHEST TUBE 28FR STR 8028

## (undated) DEVICE — SYR 10ML SLIP TIP W/O NDL 303134

## (undated) DEVICE — DAVINCI HOT SHEARS TIP COVER  400180

## (undated) DEVICE — GLOVE BIOGEL PI MICRO SZ 7.5 48575

## (undated) DEVICE — SU PLEDGET SOFT TFE 3/8"X3/26"X1/16" PCP40

## (undated) DEVICE — TUBING SMOKE EVAC PNEUMOCLEAR HIGH FLOW 0620050250

## (undated) DEVICE — DEVICE SUTURE PASSER 14GA WECK EFX EFXSP2

## (undated) DEVICE — ENDO DISSECTOR BLUNT 05MM  BTD05

## (undated) DEVICE — Device

## (undated) DEVICE — ESU PENCIL W/ROCKER SWITCH BLADE HOLSTER E2350HDB

## (undated) DEVICE — GUIDEWIRE AMPLATZ SUPER STIFF 0.035"X260CM M001465260

## (undated) DEVICE — SOL WATER IRRIG 1000ML BOTTLE 2F7114

## (undated) DEVICE — ENDO TROCAR FIRST ENTRY KII FIOS Z-THRD 12X100MM CTF73

## (undated) DEVICE — TUBING SUCTION 10'X3/16" N510

## (undated) DEVICE — CATH TRAY FOLEY SURESTEP 16FR W/URNE MTR STLK LATEX A303316A

## (undated) DEVICE — STPL ENDO RELOAD 60MM MEDIUM THICK PURPLE EGIA60AMT

## (undated) DEVICE — GOWN IMPERVIOUS BREATHABLE SMART XLG 89045

## (undated) DEVICE — STPL ENDO HANDLE GIA ULTRA UNIVERSAL STD EGIAUSTND

## (undated) DEVICE — ENDO SCOPE WARMER SEAL  C3101

## (undated) DEVICE — SU VICRYL 4-0 PS-2 18" UND J496H

## (undated) DEVICE — LUBRICANT INST KIT ENDO-LUBE 220-90

## (undated) DEVICE — ENDO TROCAR SLEEVE KII Z-THREADED 12X100MM CTS22

## (undated) DEVICE — DAVINCI XI DRIVER NDL MEGA SUTURECUT 8MM EXT 471309

## (undated) DEVICE — PREP CHLORAPREP 26ML TINTED HI-LITE ORANGE 930815

## (undated) DEVICE — DRSG STERI STRIP 1/2X4" R1547

## (undated) DEVICE — ESU GROUND PAD ADULT REM W/15' CORD E7507DB

## (undated) DEVICE — ENDO BALLOON FOR ESOPHAGEAL BALLOON PROVOCATION STUDY

## (undated) DEVICE — DAVINCI XI TISSUE SEALER SYNCROSEAL 8MM 480440

## (undated) DEVICE — LUBRICANT INST ELECTROLUBE EL101

## (undated) DEVICE — SUCTION MANIFOLD NEPTUNE 2 SYS 4 PORT 0702-020-000

## (undated) DEVICE — ATTACHMENT DEVICE DRAIN/TUBE 9781

## (undated) DEVICE — GLOVE BIOGEL PI MICRO SZ 6.5 48565

## (undated) DEVICE — SU VICRYL 0 TIE 54" J608H

## (undated) DEVICE — DRAPE IOBAN INCISE 23X17" 6650EZ

## (undated) DEVICE — ESU ENDO SCISSORS 5MM CVD 5DCS

## (undated) DEVICE — WIPES FOLEY CARE SURESTEP PROVON DFC100

## (undated) DEVICE — KIT CONNECTOR FOR OLYMPUS ENDOSCOPES DEFENDO 100310

## (undated) DEVICE — SPONGE RAY-TEC 4X8" 7318

## (undated) DEVICE — PREP CHLORAPREP 26ML TINTED ORANGE  260815

## (undated) DEVICE — SYR 30ML SLIP TIP W/O NDL 302833

## (undated) DEVICE — ENDO CAP AND TUBING STERILE FOR ENDOGATOR  100130

## (undated) DEVICE — SU SILK 0 TIE 6X30" A306H

## (undated) DEVICE — NDL SPINAL 22GA 7" QUINCKE 405149

## (undated) DEVICE — DAVINCI XI DRAPE COLUMN 470341

## (undated) DEVICE — DRSG PRIMAPORE 02X3" 7133

## (undated) DEVICE — ESU LIGASURE MARYLAND VESSEL LAP 44CM XLONG LF1944

## (undated) DEVICE — TAPE MICROPORE 2"X1.5YD 1530S-2

## (undated) DEVICE — GLOVE BIOGEL PI MICRO INDICATOR UNDERGLOVE SZ 8.5 48985

## (undated) DEVICE — SU SILK 0 SH 30" K834H

## (undated) DEVICE — NDL PERC ENTRY THINWALL 18GA 7.0" G00166

## (undated) DEVICE — LINEN TOWEL PACK X30 5481

## (undated) DEVICE — ESU LIGASURE MARYLAND LAPAROSCOPIC SLR/DVDR 5MMX37CM LF1937

## (undated) DEVICE — PACK GOWN 3/PK DISP XL SBA32GPFCB

## (undated) DEVICE — TUBING SUCTION MEDI-VAC SOFT 3/16"X20' N520A

## (undated) DEVICE — GUIDEWIRE AMPLATZ SUPER STIFF 0.035"X180CM M001465250

## (undated) DEVICE — KIT ENDO FIRST STEP DISINFECTANT 200ML W/POUCH EP-4

## (undated) DEVICE — ENDO TROCAR OPTICAL ACCESS KII Z-THRD 12X60MM C0R83

## (undated) DEVICE — DAVINCI XI MONOPOLAR SCISSORS HOT SHEARS 8MM 470179

## (undated) DEVICE — DRAIN JACKSON PRATT ROUND SIL 19FR W/TROCAR LF JP-2232

## (undated) DEVICE — JELLY LUBRICATING SURGILUBE 2OZ TUBE

## (undated) DEVICE — STPL DAVINCI SUREFORM 45MM RELOAD GREEN 48345G

## (undated) DEVICE — ENDO FORCEP ENDOJAW BIOPSY 2.8MMX160CM FB-220K

## (undated) DEVICE — ESU PENCIL SMOKE EVAC W/ROCKER SWITCH 0703-047-000

## (undated) DEVICE — SYR 10ML FINGER CONTROL W/O NDL 309695

## (undated) DEVICE — ESU ELEC BLADE 6" COATED/INSULATED E1455-6

## (undated) DEVICE — ESU GROUND PAD ADULT W/CORD E7507

## (undated) DEVICE — SYR BULB IRRIG 50ML LATEX FREE 0035280

## (undated) DEVICE — SU SILK 2-0 SH 30" K833H

## (undated) DEVICE — LINEN GOWN XLG 5407

## (undated) DEVICE — SU VICRYL+ 0 27 UR6 VLT VCP603H

## (undated) DEVICE — VIAL DECANTER STERILE WHITE DYNJDEC06

## (undated) DEVICE — INTR PEELAWAY 14FRX15CM G06496 PLVW-14.0-38

## (undated) DEVICE — DAVINCI XI SEAL UNIVERSAL 5-12MM 470500

## (undated) DEVICE — INTR PEELAWAY 18FRX15.5CM G06444 PLVW-18.0-38

## (undated) DEVICE — DRAIN PENROSE 3/8X18" LATEX 30416-038

## (undated) DEVICE — GLOVE BIOGEL PI MICRO INDICATOR UNDERGLOVE SZ 8.0 48980

## (undated) DEVICE — ENDO CLIP CARTIRDGE K2 MED/LG 10 1112

## (undated) DEVICE — ENDO SYSTEM WATER BOTTLE & TUBING W/CO2 FILTER 00711549

## (undated) DEVICE — NDL INSUFFLATION 13GA 120MM C2201

## (undated) DEVICE — ENDO DISSECTOR KITTNER CIGARETTE ROLL4"X4" 15505/25

## (undated) DEVICE — SUCTION DRY CHEST DRAIN OASIS 3600-100

## (undated) DEVICE — STPL ENDO RELOAD 45MM MEDIUM THICK PURPLE EGIA45AMT

## (undated) DEVICE — DRAPE MAYO STAND 23X54 8337

## (undated) DEVICE — DAVINCI XI FCP CADIERE 8MM ENDOWRIST 471049

## (undated) DEVICE — STPL ENDO LINEAR CUT ECHELON FLEX GST 45MM LONG PLEE45A

## (undated) DEVICE — ENDO SEALING CAP SMARTCAP

## (undated) DEVICE — TIES BANDING T50R

## (undated) DEVICE — BLADE KNIFE SURG 11 371111

## (undated) DEVICE — SU VICRYL 3-0 SH 27" J316H

## (undated) DEVICE — DAVINCI XI DRAPE ARM 470015

## (undated) DEVICE — ENDO TROCAR CONMED AIRSEAL BLADELESS 12X120MM IAS12-120LP

## (undated) DEVICE — DRSG GAUZE 4X4" TRAY 6939

## (undated) DEVICE — DRAPE LAP W/ARMBOARD 29410

## (undated) DEVICE — DECANTER VIAL 2006S

## (undated) DEVICE — DRSG DRAIN 2X2" 7087

## (undated) DEVICE — BLADE CLIPPER DISP 4406

## (undated) DEVICE — CUP AND LID 2PK 2OZ STERILE  SSK9006A

## (undated) DEVICE — ANTIFOG SOLUTION W/FOAM PAD 31142527

## (undated) DEVICE — SOL ADH LIQUID BENZOIN SWAB 0.6ML C1544

## (undated) DEVICE — DAVINCI XI REDUCER 8-12MM 470381

## (undated) DEVICE — DRAPE SHEET REV FOLD 3/4 9349

## (undated) DEVICE — ENDO TROCAR FIRST ENTRY KII FIOS Z-THRD 05X100MM CTF03

## (undated) DEVICE — SU VICRYL+ 3-0 27IN SH UND VCP416H

## (undated) DEVICE — GLOVE PROTEXIS MICRO 6.0 LT BLUE 2D73PM60

## (undated) DEVICE — ENDO BRUSH BILIARY CYTOLOGY 8FRX200CM M00545000

## (undated) DEVICE — BLADE CLIPPER SGL USE 9680

## (undated) DEVICE — STPL ENDO RELOAD SIG GIA CVD TIP TAN 30MM MED SIG30CTAVM

## (undated) DEVICE — ENDO FCP GRASPING ROTATABLE 7.2MMX2.6MM FG-244NR

## (undated) DEVICE — ADH LIQUID MASTISOL TOPICAL VIAL 2-3ML 0523-48

## (undated) DEVICE — TUBE GASTROSTOMY PONSKY PULL DELUXE 20FR 000792

## (undated) DEVICE — DEVICE BRIDLE PRO NASAL 12FR 4-4212

## (undated) DEVICE — PREP CHLORAPREP ORANGE 3ML  260415

## (undated) DEVICE — CONNECTOR BLAKE DRAIN SGL BCC1

## (undated) DEVICE — ESU PENCIL W/COATED BLADE E2450H

## (undated) DEVICE — STPL DAVINCI SUREFORM 45MM STR TIPRELOAD 12FIRE 480445

## (undated) DEVICE — DRSG TEGADERM 4X4 3/4" 1626W

## (undated) DEVICE — SU PLEDGET SOFT TFE 13MMX7MMX1.5MM D7044

## (undated) DEVICE — SU VICRYL 0 UR-6 27" J603H

## (undated) DEVICE — SYR 10ML LL W/O NDL 302995

## (undated) DEVICE — DRAIN CHEST TUBE 20FR STR 8020

## (undated) DEVICE — ENDO VALVE BX EVIS MAJ-210

## (undated) DEVICE — BAG URINARY DRAIN LUBRISIL IC 4000ML LF 253509A

## (undated) DEVICE — GLOVE PROTEXIS MICRO 6.0  2D73PM60

## (undated) DEVICE — DAVINCI XI GRASPER FENESTRATED TIP UP 8MM 470347

## (undated) DEVICE — SURGICEL ABSORBABLE HEMOSTAT SNOW 4"X4" 2083

## (undated) DEVICE — SU TICRON 2 GS-21DA 36" 3146-81

## (undated) DEVICE — WIRE GUIDE 0.035"X260CM NAVIPRO ANG 5625 M00556251

## (undated) DEVICE — ANTIFOG SOLUTION SEE SHARP 150M TROCAR SWABS 30978 (COI)

## (undated) DEVICE — SUCTION MANIFOLD DORNOCH ULTRA CART UL-CL500

## (undated) DEVICE — SYSTEM CLEARIFY VISUALIZATION 21-345

## (undated) DEVICE — SYR 30ML LL W/O NDL 302832

## (undated) DEVICE — TUBING FILTER TRI-LUMEN AIRSEAL ASC-EVAC1

## (undated) DEVICE — TAPE MEASURE PAPER 36" LF NI14-1300

## (undated) DEVICE — ENDO VALVE SUCTION BRONCH EVIS MAJ-209

## (undated) DEVICE — SU SILK 3-0 SH 30" K832H

## (undated) DEVICE — CATH VA INTR 14FRX14CM KIT LATEX 612613

## (undated) RX ORDER — HYDROMORPHONE HCL IN WATER/PF 6 MG/30 ML
PATIENT CONTROLLED ANALGESIA SYRINGE INTRAVENOUS
Status: DISPENSED
Start: 2025-04-18

## (undated) RX ORDER — ONDANSETRON 2 MG/ML
INJECTION INTRAMUSCULAR; INTRAVENOUS
Status: DISPENSED
Start: 2025-06-09

## (undated) RX ORDER — FENTANYL CITRATE-0.9 % NACL/PF 10 MCG/ML
PLASTIC BAG, INJECTION (ML) INTRAVENOUS
Status: DISPENSED
Start: 2025-05-15

## (undated) RX ORDER — FENTANYL CITRATE 50 UG/ML
INJECTION, SOLUTION INTRAMUSCULAR; INTRAVENOUS
Status: DISPENSED
Start: 2020-09-25

## (undated) RX ORDER — FENTANYL CITRATE 50 UG/ML
INJECTION, SOLUTION INTRAMUSCULAR; INTRAVENOUS
Status: DISPENSED
Start: 2025-04-29

## (undated) RX ORDER — PROPOFOL 10 MG/ML
INJECTION, EMULSION INTRAVENOUS
Status: DISPENSED
Start: 2025-03-24

## (undated) RX ORDER — ENOXAPARIN SODIUM 100 MG/ML
INJECTION SUBCUTANEOUS
Status: DISPENSED
Start: 2025-04-29

## (undated) RX ORDER — HYDROMORPHONE HYDROCHLORIDE 1 MG/ML
INJECTION, SOLUTION INTRAMUSCULAR; INTRAVENOUS; SUBCUTANEOUS
Status: DISPENSED
Start: 2020-09-25

## (undated) RX ORDER — PROPOFOL 10 MG/ML
INJECTION, EMULSION INTRAVENOUS
Status: DISPENSED
Start: 2020-09-25

## (undated) RX ORDER — FENTANYL CITRATE 50 UG/ML
INJECTION, SOLUTION INTRAMUSCULAR; INTRAVENOUS
Status: DISPENSED
Start: 2025-02-26

## (undated) RX ORDER — FENTANYL CITRATE 50 UG/ML
INJECTION, SOLUTION INTRAMUSCULAR; INTRAVENOUS
Status: DISPENSED
Start: 2025-04-18

## (undated) RX ORDER — HYDROMORPHONE HCL IN WATER/PF 6 MG/30 ML
PATIENT CONTROLLED ANALGESIA SYRINGE INTRAVENOUS
Status: DISPENSED
Start: 2025-03-24

## (undated) RX ORDER — LIDOCAINE HYDROCHLORIDE 10 MG/ML
INJECTION, SOLUTION EPIDURAL; INFILTRATION; INTRACAUDAL; PERINEURAL
Status: DISPENSED
Start: 2025-03-19

## (undated) RX ORDER — FENTANYL CITRATE-0.9 % NACL/PF 10 MCG/ML
PLASTIC BAG, INJECTION (ML) INTRAVENOUS
Status: DISPENSED
Start: 2025-02-26

## (undated) RX ORDER — DEXTROSE MONOHYDRATE, SODIUM CHLORIDE, AND POTASSIUM CHLORIDE 50; 1.49; 4.5 G/1000ML; G/1000ML; G/1000ML
INJECTION, SOLUTION INTRAVENOUS
Status: DISPENSED
Start: 2025-02-26

## (undated) RX ORDER — LIDOCAINE HYDROCHLORIDE 10 MG/ML
INJECTION, SOLUTION EPIDURAL; INFILTRATION; INTRACAUDAL; PERINEURAL
Status: DISPENSED
Start: 2025-04-04

## (undated) RX ORDER — DEXAMETHASONE SODIUM PHOSPHATE 4 MG/ML
INJECTION, SOLUTION INTRA-ARTICULAR; INTRALESIONAL; INTRAMUSCULAR; INTRAVENOUS; SOFT TISSUE
Status: DISPENSED
Start: 2025-03-24

## (undated) RX ORDER — CEFAZOLIN SODIUM 1 G/3ML
INJECTION, POWDER, FOR SOLUTION INTRAMUSCULAR; INTRAVENOUS
Status: DISPENSED
Start: 2020-09-25

## (undated) RX ORDER — DEXAMETHASONE SODIUM PHOSPHATE 4 MG/ML
INJECTION, SOLUTION INTRA-ARTICULAR; INTRALESIONAL; INTRAMUSCULAR; INTRAVENOUS; SOFT TISSUE
Status: DISPENSED
Start: 2020-09-25

## (undated) RX ORDER — DEXTROSE MONOHYDRATE 100 MG/ML
INJECTION, SOLUTION INTRAVENOUS
Status: DISPENSED
Start: 2025-05-15

## (undated) RX ORDER — ONDANSETRON 2 MG/ML
INJECTION INTRAMUSCULAR; INTRAVENOUS
Status: DISPENSED
Start: 2025-03-24

## (undated) RX ORDER — FENTANYL CITRATE 50 UG/ML
INJECTION, SOLUTION INTRAMUSCULAR; INTRAVENOUS
Status: DISPENSED
Start: 2025-06-09

## (undated) RX ORDER — ACETAMINOPHEN 325 MG/1
TABLET ORAL
Status: DISPENSED
Start: 2025-02-26

## (undated) RX ORDER — DIPHENHYDRAMINE HYDROCHLORIDE 50 MG/ML
INJECTION, SOLUTION INTRAMUSCULAR; INTRAVENOUS
Status: DISPENSED
Start: 2025-03-19

## (undated) RX ORDER — HYDROMORPHONE HYDROCHLORIDE 1 MG/ML
INJECTION, SOLUTION INTRAMUSCULAR; INTRAVENOUS; SUBCUTANEOUS
Status: DISPENSED
Start: 2025-03-24

## (undated) RX ORDER — INDOCYANINE GREEN AND WATER 25 MG
KIT INJECTION
Status: DISPENSED
Start: 2025-02-26

## (undated) RX ORDER — BUPIVACAINE HYDROCHLORIDE AND EPINEPHRINE 2.5; 5 MG/ML; UG/ML
INJECTION, SOLUTION INFILTRATION; PERINEURAL
Status: DISPENSED
Start: 2020-09-25

## (undated) RX ORDER — ONDANSETRON 2 MG/ML
INJECTION INTRAMUSCULAR; INTRAVENOUS
Status: DISPENSED
Start: 2020-09-25

## (undated) RX ORDER — HYDROMORPHONE HCL IN WATER/PF 6 MG/30 ML
PATIENT CONTROLLED ANALGESIA SYRINGE INTRAVENOUS
Status: DISPENSED
Start: 2025-04-07

## (undated) RX ORDER — LIDOCAINE HYDROCHLORIDE AND EPINEPHRINE 10; 10 MG/ML; UG/ML
INJECTION, SOLUTION INFILTRATION; PERINEURAL
Status: DISPENSED
Start: 2024-10-24

## (undated) RX ORDER — OXYCODONE HCL 5 MG/5 ML
SOLUTION, ORAL ORAL
Status: DISPENSED
Start: 2025-02-27

## (undated) RX ORDER — HEPARIN SODIUM (PORCINE) LOCK FLUSH IV SOLN 100 UNIT/ML 100 UNIT/ML
SOLUTION INTRAVENOUS
Status: DISPENSED
Start: 2025-03-19

## (undated) RX ORDER — IOPAMIDOL 755 MG/ML
INJECTION, SOLUTION INTRAVASCULAR
Status: DISPENSED
Start: 2025-05-15

## (undated) RX ORDER — INDOCYANINE GREEN AND WATER 25 MG
KIT INJECTION
Status: DISPENSED
Start: 2025-03-24

## (undated) RX ORDER — PROPOFOL 10 MG/ML
INJECTION, EMULSION INTRAVENOUS
Status: DISPENSED
Start: 2025-02-26

## (undated) RX ORDER — FENTANYL CITRATE 50 UG/ML
INJECTION, SOLUTION INTRAMUSCULAR; INTRAVENOUS
Status: DISPENSED
Start: 2025-04-04

## (undated) RX ORDER — CEFAZOLIN SODIUM/WATER 2 G/20 ML
SYRINGE (ML) INTRAVENOUS
Status: DISPENSED
Start: 2024-10-24

## (undated) RX ORDER — FENTANYL CITRATE-0.9 % NACL/PF 10 MCG/ML
PLASTIC BAG, INJECTION (ML) INTRAVENOUS
Status: DISPENSED
Start: 2020-09-25

## (undated) RX ORDER — ACETAMINOPHEN 325 MG/1
TABLET ORAL
Status: DISPENSED
Start: 2025-02-27

## (undated) RX ORDER — DEXTROSE, SODIUM CHLORIDE, SODIUM LACTATE, POTASSIUM CHLORIDE, AND CALCIUM CHLORIDE 5; .6; .31; .03; .02 G/100ML; G/100ML; G/100ML; G/100ML; G/100ML
INJECTION, SOLUTION INTRAVENOUS
Status: DISPENSED
Start: 2020-09-25

## (undated) RX ORDER — FENTANYL CITRATE 50 UG/ML
INJECTION, SOLUTION INTRAMUSCULAR; INTRAVENOUS
Status: DISPENSED
Start: 2025-03-19

## (undated) RX ORDER — DEXAMETHASONE SODIUM PHOSPHATE 4 MG/ML
INJECTION, SOLUTION INTRA-ARTICULAR; INTRALESIONAL; INTRAMUSCULAR; INTRAVENOUS; SOFT TISSUE
Status: DISPENSED
Start: 2025-02-26

## (undated) RX ORDER — IOPAMIDOL 510 MG/ML
INJECTION, SOLUTION INTRAVASCULAR
Status: DISPENSED
Start: 2025-05-15

## (undated) RX ORDER — LIDOCAINE HYDROCHLORIDE 10 MG/ML
INJECTION, SOLUTION INFILTRATION; PERINEURAL
Status: DISPENSED
Start: 2024-10-24

## (undated) RX ORDER — CEFAZOLIN SODIUM/WATER 2 G/20 ML
SYRINGE (ML) INTRAVENOUS
Status: DISPENSED
Start: 2025-02-26

## (undated) RX ORDER — SODIUM CHLORIDE 9 MG/ML
INJECTION, SOLUTION INTRAVENOUS
Status: DISPENSED
Start: 2020-09-25

## (undated) RX ORDER — HYDROMORPHONE HYDROCHLORIDE 1 MG/ML
INJECTION, SOLUTION INTRAMUSCULAR; INTRAVENOUS; SUBCUTANEOUS
Status: DISPENSED
Start: 2025-02-26

## (undated) RX ORDER — LIDOCAINE HYDROCHLORIDE 20 MG/ML
INJECTION, SOLUTION EPIDURAL; INFILTRATION; INTRACAUDAL; PERINEURAL
Status: DISPENSED
Start: 2020-09-25

## (undated) RX ORDER — ESMOLOL HYDROCHLORIDE 10 MG/ML
INJECTION INTRAVENOUS
Status: DISPENSED
Start: 2020-09-25

## (undated) RX ORDER — FENTANYL CITRATE 50 UG/ML
INJECTION, SOLUTION INTRAMUSCULAR; INTRAVENOUS
Status: DISPENSED
Start: 2025-03-24

## (undated) RX ORDER — EPHEDRINE SULFATE 50 MG/ML
INJECTION, SOLUTION INTRAMUSCULAR; INTRAVENOUS; SUBCUTANEOUS
Status: DISPENSED
Start: 2020-09-25

## (undated) RX ORDER — LABETALOL HYDROCHLORIDE 5 MG/ML
INJECTION, SOLUTION INTRAVENOUS
Status: DISPENSED
Start: 2020-09-25

## (undated) RX ORDER — DEXTROSE MONOHYDRATE, SODIUM CHLORIDE, AND POTASSIUM CHLORIDE 50; 1.49; 4.5 G/1000ML; G/1000ML; G/1000ML
INJECTION, SOLUTION INTRAVENOUS
Status: DISPENSED
Start: 2025-03-28

## (undated) RX ORDER — HEPARIN SODIUM 200 [USP'U]/100ML
INJECTION, SOLUTION INTRAVENOUS
Status: DISPENSED
Start: 2024-10-24

## (undated) RX ORDER — HYDROMORPHONE HCL IN WATER/PF 6 MG/30 ML
PATIENT CONTROLLED ANALGESIA SYRINGE INTRAVENOUS
Status: DISPENSED
Start: 2025-02-26

## (undated) RX ORDER — HEPARIN SODIUM,PORCINE 10 UNIT/ML
VIAL (ML) INTRAVENOUS
Status: DISPENSED
Start: 2024-10-08

## (undated) RX ORDER — IOPAMIDOL 510 MG/ML
INJECTION, SOLUTION INTRAVASCULAR
Status: DISPENSED
Start: 2025-03-28

## (undated) RX ORDER — FENTANYL CITRATE 50 UG/ML
INJECTION, SOLUTION INTRAMUSCULAR; INTRAVENOUS
Status: DISPENSED
Start: 2025-05-15

## (undated) RX ORDER — DEXAMETHASONE SODIUM PHOSPHATE 4 MG/ML
INJECTION, SOLUTION INTRA-ARTICULAR; INTRALESIONAL; INTRAMUSCULAR; INTRAVENOUS; SOFT TISSUE
Status: DISPENSED
Start: 2025-05-15

## (undated) RX ORDER — HEPARIN SODIUM (PORCINE) LOCK FLUSH IV SOLN 100 UNIT/ML 100 UNIT/ML
SOLUTION INTRAVENOUS
Status: DISPENSED
Start: 2024-10-24

## (undated) RX ORDER — ALBUTEROL SULFATE 90 UG/1
INHALANT RESPIRATORY (INHALATION)
Status: DISPENSED
Start: 2025-03-24

## (undated) RX ORDER — ENOXAPARIN SODIUM 100 MG/ML
INJECTION SUBCUTANEOUS
Status: DISPENSED
Start: 2025-03-24

## (undated) RX ORDER — ONDANSETRON 2 MG/ML
INJECTION INTRAMUSCULAR; INTRAVENOUS
Status: DISPENSED
Start: 2025-02-26

## (undated) RX ORDER — FENTANYL CITRATE-0.9 % NACL/PF 10 MCG/ML
PLASTIC BAG, INJECTION (ML) INTRAVENOUS
Status: DISPENSED
Start: 2025-03-24

## (undated) RX ORDER — BUPIVACAINE HYDROCHLORIDE AND EPINEPHRINE 2.5; 5 MG/ML; UG/ML
INJECTION, SOLUTION EPIDURAL; INFILTRATION; INTRACAUDAL; PERINEURAL
Status: DISPENSED
Start: 2025-03-24

## (undated) RX ORDER — FENTANYL CITRATE 50 UG/ML
INJECTION, SOLUTION INTRAMUSCULAR; INTRAVENOUS
Status: DISPENSED
Start: 2025-03-28

## (undated) RX ORDER — METHOCARBAMOL 500 MG/1
TABLET, FILM COATED ORAL
Status: DISPENSED
Start: 2025-06-09

## (undated) RX ORDER — FAMOTIDINE 20 MG/1
TABLET, FILM COATED ORAL
Status: DISPENSED
Start: 2025-02-27

## (undated) RX ORDER — ENOXAPARIN SODIUM 100 MG/ML
INJECTION SUBCUTANEOUS
Status: DISPENSED
Start: 2025-02-26

## (undated) RX ORDER — IOPAMIDOL 510 MG/ML
INJECTION, SOLUTION INTRAVASCULAR
Status: DISPENSED
Start: 2025-06-09

## (undated) RX ORDER — PROPOFOL 10 MG/ML
INJECTION, EMULSION INTRAVENOUS
Status: DISPENSED
Start: 2024-10-08

## (undated) RX ORDER — FENTANYL CITRATE 50 UG/ML
INJECTION, SOLUTION INTRAMUSCULAR; INTRAVENOUS
Status: DISPENSED
Start: 2024-10-24

## (undated) RX ORDER — GLYCOPYRROLATE 0.2 MG/ML
INJECTION, SOLUTION INTRAMUSCULAR; INTRAVENOUS
Status: DISPENSED
Start: 2020-09-25

## (undated) RX ORDER — AMOXICILLIN 250 MG
CAPSULE ORAL
Status: DISPENSED
Start: 2025-02-27

## (undated) RX ORDER — ENOXAPARIN SODIUM 100 MG/ML
INJECTION SUBCUTANEOUS
Status: DISPENSED
Start: 2025-02-27

## (undated) RX ORDER — HEPARIN SODIUM (PORCINE) LOCK FLUSH IV SOLN 100 UNIT/ML 100 UNIT/ML
SOLUTION INTRAVENOUS
Status: DISPENSED
Start: 2025-08-29

## (undated) RX ORDER — FENTANYL CITRATE 50 UG/ML
INJECTION, SOLUTION INTRAMUSCULAR; INTRAVENOUS
Status: DISPENSED
Start: 2025-04-07

## (undated) RX ORDER — DEXAMETHASONE SODIUM PHOSPHATE 4 MG/ML
INJECTION, SOLUTION INTRA-ARTICULAR; INTRALESIONAL; INTRAMUSCULAR; INTRAVENOUS; SOFT TISSUE
Status: DISPENSED
Start: 2025-06-09

## (undated) RX ORDER — DEXTROSE MONOHYDRATE, SODIUM CHLORIDE, AND POTASSIUM CHLORIDE 50; 1.49; 4.5 G/1000ML; G/1000ML; G/1000ML
INJECTION, SOLUTION INTRAVENOUS
Status: DISPENSED
Start: 2025-03-24

## (undated) RX ORDER — EPHEDRINE SULFATE 50 MG/ML
INJECTION, SOLUTION INTRAMUSCULAR; INTRAVENOUS; SUBCUTANEOUS
Status: DISPENSED
Start: 2025-03-24

## (undated) RX ORDER — BUPIVACAINE HYDROCHLORIDE AND EPINEPHRINE 2.5; 5 MG/ML; UG/ML
INJECTION, SOLUTION EPIDURAL; INFILTRATION; INTRACAUDAL; PERINEURAL
Status: DISPENSED
Start: 2025-02-26

## (undated) RX ORDER — ESMOLOL HYDROCHLORIDE 10 MG/ML
INJECTION INTRAVENOUS
Status: DISPENSED
Start: 2025-03-24

## (undated) RX ORDER — PROPOFOL 10 MG/ML
INJECTION, EMULSION INTRAVENOUS
Status: DISPENSED
Start: 2025-06-09

## (undated) RX ORDER — LIDOCAINE HYDROCHLORIDE 20 MG/ML
JELLY TOPICAL
Status: DISPENSED
Start: 2024-04-11

## (undated) RX ORDER — PROPOFOL 10 MG/ML
INJECTION, EMULSION INTRAVENOUS
Status: DISPENSED
Start: 2025-05-15

## (undated) RX ORDER — IOPAMIDOL 755 MG/ML
INJECTION, SOLUTION INTRAVASCULAR
Status: DISPENSED
Start: 2025-04-07

## (undated) RX ORDER — ENOXAPARIN SODIUM 100 MG/ML
INJECTION SUBCUTANEOUS
Status: DISPENSED
Start: 2025-06-09

## (undated) RX ORDER — EPHEDRINE SULFATE 50 MG/ML
INJECTION, SOLUTION INTRAMUSCULAR; INTRAVENOUS; SUBCUTANEOUS
Status: DISPENSED
Start: 2025-02-26

## (undated) RX ORDER — CEFAZOLIN SODIUM 1 G/3ML
INJECTION, POWDER, FOR SOLUTION INTRAMUSCULAR; INTRAVENOUS
Status: DISPENSED
Start: 2025-03-24

## (undated) RX ORDER — AMOXICILLIN 250 MG
CAPSULE ORAL
Status: DISPENSED
Start: 2025-02-26

## (undated) RX ORDER — CALCIUM CHLORIDE 100 MG/ML
INJECTION INTRAVENOUS; INTRAVENTRICULAR
Status: DISPENSED
Start: 2025-03-24

## (undated) RX ORDER — HEPARIN SODIUM (PORCINE) LOCK FLUSH IV SOLN 100 UNIT/ML 100 UNIT/ML
SOLUTION INTRAVENOUS
Status: DISPENSED
Start: 2025-06-09

## (undated) RX ORDER — HYDROMORPHONE HCL IN WATER/PF 6 MG/30 ML
PATIENT CONTROLLED ANALGESIA SYRINGE INTRAVENOUS
Status: DISPENSED
Start: 2025-03-28

## (undated) RX ORDER — ONDANSETRON 2 MG/ML
INJECTION INTRAMUSCULAR; INTRAVENOUS
Status: DISPENSED
Start: 2025-04-04

## (undated) RX ORDER — CEFAZOLIN SODIUM 1 G/3ML
INJECTION, POWDER, FOR SOLUTION INTRAMUSCULAR; INTRAVENOUS
Status: DISPENSED
Start: 2025-02-26

## (undated) RX ORDER — HYDROMORPHONE HYDROCHLORIDE 1 MG/ML
INJECTION, SOLUTION INTRAMUSCULAR; INTRAVENOUS; SUBCUTANEOUS
Status: DISPENSED
Start: 2025-05-15

## (undated) RX ORDER — DEXTROSE MONOHYDRATE, SODIUM CHLORIDE, AND POTASSIUM CHLORIDE 50; 1.49; 9 G/1000ML; G/1000ML; G/1000ML
INJECTION, SOLUTION INTRAVENOUS
Status: DISPENSED
Start: 2025-02-27

## (undated) RX ORDER — CEFAZOLIN SODIUM 2 G/100ML
INJECTION, SOLUTION INTRAVENOUS
Status: DISPENSED
Start: 2020-09-25

## (undated) RX ORDER — ACETAMINOPHEN 325 MG/1
TABLET ORAL
Status: DISPENSED
Start: 2025-05-15

## (undated) RX ORDER — GLYCOPYRROLATE 0.2 MG/ML
INJECTION, SOLUTION INTRAMUSCULAR; INTRAVENOUS
Status: DISPENSED
Start: 2025-03-24

## (undated) RX ORDER — ONDANSETRON 2 MG/ML
INJECTION INTRAMUSCULAR; INTRAVENOUS
Status: DISPENSED
Start: 2025-05-15

## (undated) RX ORDER — IODIXANOL 320 MG/ML
INJECTION, SOLUTION INTRAVASCULAR
Status: DISPENSED
Start: 2025-04-18

## (undated) RX ORDER — FENTANYL CITRATE-0.9 % NACL/PF 10 MCG/ML
PLASTIC BAG, INJECTION (ML) INTRAVENOUS
Status: DISPENSED
Start: 2025-06-09